# Patient Record
Sex: FEMALE | Race: WHITE | NOT HISPANIC OR LATINO | Employment: OTHER | URBAN - METROPOLITAN AREA
[De-identification: names, ages, dates, MRNs, and addresses within clinical notes are randomized per-mention and may not be internally consistent; named-entity substitution may affect disease eponyms.]

---

## 2017-01-12 RX ORDER — LISINOPRIL AND HYDROCHLOROTHIAZIDE 20; 12.5 MG/1; MG/1
TABLET ORAL
Qty: 90 TABLET | Refills: 1 | Status: SHIPPED | OUTPATIENT
Start: 2017-01-12 | End: 2017-08-19 | Stop reason: SDUPTHER

## 2017-01-12 RX ORDER — PRAVASTATIN SODIUM 40 MG
TABLET ORAL
Qty: 90 TABLET | Refills: 1 | Status: SHIPPED | OUTPATIENT
Start: 2017-01-12 | End: 2017-08-19 | Stop reason: SDUPTHER

## 2017-01-12 RX ORDER — ALLOPURINOL 300 MG/1
TABLET ORAL
Qty: 90 TABLET | Refills: 1 | Status: SHIPPED | OUTPATIENT
Start: 2017-01-12 | End: 2017-08-19 | Stop reason: SDUPTHER

## 2017-06-14 ENCOUNTER — OFFICE VISIT (OUTPATIENT)
Dept: INTERNAL MEDICINE | Facility: CLINIC | Age: 82
End: 2017-06-14

## 2017-06-14 VITALS
HEIGHT: 63 IN | WEIGHT: 208 LBS | OXYGEN SATURATION: 97 % | BODY MASS INDEX: 36.86 KG/M2 | SYSTOLIC BLOOD PRESSURE: 130 MMHG | DIASTOLIC BLOOD PRESSURE: 80 MMHG | HEART RATE: 78 BPM

## 2017-06-14 DIAGNOSIS — N18.2 CHRONIC RENAL INSUFFICIENCY, STAGE II (MILD): Chronic | ICD-10-CM

## 2017-06-14 DIAGNOSIS — G47.34 NOCTURNAL HYPOXEMIA: Chronic | ICD-10-CM

## 2017-06-14 DIAGNOSIS — Z00.00 INITIAL MEDICARE ANNUAL WELLNESS VISIT: Primary | ICD-10-CM

## 2017-06-14 DIAGNOSIS — R73.01 IMPAIRED FASTING GLUCOSE: Chronic | ICD-10-CM

## 2017-06-14 DIAGNOSIS — Z82.49 FAMILY HISTORY OF CORONARY ARTERY DISEASE: Chronic | ICD-10-CM

## 2017-06-14 DIAGNOSIS — E03.9 HYPOTHYROIDISM, UNSPECIFIED TYPE: Chronic | ICD-10-CM

## 2017-06-14 DIAGNOSIS — D75.1 SECONDARY POLYCYTHEMIA: Chronic | ICD-10-CM

## 2017-06-14 DIAGNOSIS — Z87.39 HISTORY OF GOUT: Chronic | ICD-10-CM

## 2017-06-14 DIAGNOSIS — I10 BENIGN ESSENTIAL HYPERTENSION: Chronic | ICD-10-CM

## 2017-06-14 DIAGNOSIS — Z51.81 THERAPEUTIC DRUG MONITORING: ICD-10-CM

## 2017-06-14 DIAGNOSIS — E78.5 HYPERLIPIDEMIA, UNSPECIFIED HYPERLIPIDEMIA TYPE: Chronic | ICD-10-CM

## 2017-06-14 DIAGNOSIS — H90.3 BILATERAL SENSORINEURAL HEARING LOSS: Chronic | ICD-10-CM

## 2017-06-14 DIAGNOSIS — E55.9 VITAMIN D DEFICIENCY: Chronic | ICD-10-CM

## 2017-06-14 PROCEDURE — G0438 PPPS, INITIAL VISIT: HCPCS | Performed by: INTERNAL MEDICINE

## 2017-06-14 PROCEDURE — 99214 OFFICE O/P EST MOD 30 MIN: CPT | Performed by: INTERNAL MEDICINE

## 2017-06-14 NOTE — PROGRESS NOTES
QUICK REFERENCE INFORMATION:  The ABCs of the Annual Wellness Visit    Initial Medicare Wellness Visit    HEALTH RISK ASSESSMENT    5/30/1933    Recent Hospitalizations:  No hospitalization(s) within the last year..        Current Medical Providers:  Patient Care Team:  Daryl Santos MD as PCP - General (Internal Medicine)  Daryl Santos MD as PCP - Claims Attributed        Smoking Status:  History   Smoking Status   • Former Smoker   Smokeless Tobacco   • Never Used     Comment: Stopped drinking at age 30.       Alcohol Consumption:  History   Alcohol Use No       Depression Screen:   PHQ-9 Depression Screening 6/14/2017   Little interest or pleasure in doing things 0   Feeling down, depressed, or hopeless 0   PHQ-9 Total Score 0       Health Habits and Functional and Cognitive Screening:  Functional & Cognitive Status 6/14/2017   Do you have difficulty preparing food and eating? No   Do you have difficulty bathing yourself? No   Do you have difficulty getting dressed? No   Do you have difficulty using the toilet? No   Do you have difficulty moving around from place to place? No   In the past year have you fallen or experienced a near fall? No   Do you need help using the phone?  No   Are you deaf or do you have serious difficulty hearing?  Yes   Do you need help with transportation? No   Do you need help shopping? No   Do you need help preparing meals?  No   Do you need help with housework?  No   Do you need help with laundry? No   Do you need help taking your medications? No   Do you need help managing money? No   Do you have difficulty concentrating, remembering or making decisions? No       Health Habits  Current Diet: Limited Junk Food  Dental Exam: Up to date  Eye Exam: Up to date  Exercise (times per week): 3 times per week  Current Exercise Activities Include: Housecleaning          Does the patient have evidence of cognitive impairment? No    Asiprin use counseling: Start ASA 81 mg daily        Recent Lab Results:    Visual Acuity:  No exam data present    Age-appropriate Screening Schedule:  Refer to the list below for future screening recommendations based on patient's age, sex and/or medical conditions. Orders for these recommended tests are listed in the plan section. The patient has been provided with a written plan.    Health Maintenance   Topic Date Due   • ZOSTER VACCINE  05/16/2016   • INFLUENZA VACCINE  08/01/2017   • LIPID PANEL  05/31/2018   • MAMMOGRAM  11/30/2018   • TDAP/TD VACCINES (2 - Td) 06/14/2027   • PNEUMOCOCCAL VACCINES (65+ LOW/MEDIUM RISK)  Completed        Subjective   History of Present Illness    Mandy Alarcon is a 84 y.o. female who presents for an Annual Wellness Visit.    The following portions of the patient's history were reviewed and updated as appropriate: allergies, current medications, past family history, past medical history, past social history, past surgical history and problem list.    Outpatient Medications Prior to Visit   Medication Sig Dispense Refill   • allopurinol (ZYLOPRIM) 300 MG tablet TAKE 1 TABLET BY MOUTH EVERY DAY 90 tablet 1   • Biotin 10 MG tablet Take 1 tablet by mouth Daily.     • Cholecalciferol (VITAMIN D) 2000 UNITS tablet Take 2 tablets by mouth daily.     • cyclobenzaprine (FLEXERIL) 5 MG tablet Take 1 tablet by mouth 3 (Three) Times a Day As Needed for muscle spasms. 15 tablet 0   • lisinopril-hydrochlorothiazide (PRINZIDE,ZESTORETIC) 20-12.5 MG per tablet TAKE 1 TABLET BY MOUTH EVERY MORNING FOR BLOOD PRESSURE 90 tablet 1   • pravastatin (PRAVACHOL) 40 MG tablet TAKE 1 TABLET BY MOUTH EVERY NIGHT AT BEDTIME 90 tablet 1     No facility-administered medications prior to visit.        Patient Active Problem List   Diagnosis   • Benign essential hypertension   • Chronic renal insufficiency, stage II (mild), 05/18/2016--creatinine 1.35   • Claustrophobia   • Gout   • Hyperlipidemia   • Hypothyroidism   • Impaired fasting glucose   •  Nocturnal hypoxemia   • Secondary polycythemia   • Vitamin D deficiency   • Therapeutic drug monitoring   • Family history of coronary artery disease   • Bilateral sensorineural hearing loss, wears hearing aids       Advance Care Planning:  has NO advance directive - information provided to the patient today    Identification of Risk Factors:  Risk factors include: weight , cardiovascular risk and hearing limitations.    Review of Systems   Constitutional: Negative.    HENT: Positive for hearing loss.    Eyes: Negative.    Respiratory: Negative.    Cardiovascular: Negative.    Gastrointestinal: Negative.    Endocrine: Negative.    Genitourinary: Negative.    Musculoskeletal: Negative.    Skin: Negative.    Allergic/Immunologic: Negative.    Neurological: Negative.    Hematological: Negative.    Psychiatric/Behavioral: Negative.        Compared to one year ago, the patient feels her physical health is better.  Compared to one year ago, the patient feels her mental health is the same.    Objective       Physical Exam    General: Alert and oriented x 3.  No acute distress. Obese.  Normal affect.  HEENT: Pupils equal, round, reactive to light; extraocular movements intact; sclerae nonicteric; pharynx, ear canals and TMs normal.  Neck: Without JVD, thyromegaly, bruit, or adenopathy.  Lungs: Clear to auscultation in all fields.  Heart: Regular rate and rhythm without murmur, rub, gallop, or click.  Abdomen: Soft, nontender, without hepatosplenomegaly or hernia.  Bowel sounds normal.  : Deferred.  Rectal: Deferred.  Extremities: Without clubbing, cyanosis, edema, or pulse deficit.  Neurologic: Intact without focal deficit.  Normal station and gait observed during ingress and egress from the examination room.  Skin: Without significant lesion.  Musculoskeletal: Unremarkable.    Vitals:    06/14/17 1215   BP: 130/80   BP Location: Left arm   Patient Position: Sitting   Cuff Size: Large Adult   Pulse: 78   SpO2: 97%  "  Weight: 208 lb (94.3 kg)   Height: 63\" (160 cm)   PainSc: 0-No pain       Body mass index is 36.85 kg/(m^2).  Discussed the patient's BMI with her. The BMI is above average; BMI management plan is completed.    Assessment/Plan   Patient Self-Management and Personalized Health Advice  The patient has been provided with information about: diet, exercise, weight management, prevention of cardiac or vascular disease, the relationship between weight and GERD and fall prevention and preventive services including:   · Advance directive, Diabetes screening, see lab orders, Exercise counseling provided, Fall Risk assessment done, Glaucoma screening recommended, Zostavax vaccine (Herpes Zoster).    Visit Diagnoses:    ICD-10-CM ICD-9-CM   1. Initial Medicare annual wellness visit Z00.00 V70.0   2. Impaired fasting glucose R73.01 790.21   3. Hyperlipidemia, unspecified hyperlipidemia type E78.5 272.4   4. Hypothyroidism, unspecified type E03.9 244.9   5. Gout Z87.39 V12.29   6. Chronic renal insufficiency, stage II (mild), 05/18/2016--creatinine 1.35 N18.2 585.2   7. Benign essential hypertension I10 401.1   8. Nocturnal hypoxemia G47.34 327.24   9. Secondary polycythemia D75.1 289.0   10. Vitamin D deficiency E55.9 268.9   11. Family history of coronary artery disease Z82.49 V17.3   12. Bilateral sensorineural hearing loss, wears hearing aids H90.3 389.18   13. Therapeutic drug monitoring Z51.81 V58.83       No orders of the defined types were placed in this encounter.      Outpatient Encounter Prescriptions as of 6/14/2017   Medication Sig Dispense Refill   • allopurinol (ZYLOPRIM) 300 MG tablet TAKE 1 TABLET BY MOUTH EVERY DAY 90 tablet 1   • Biotin 10 MG tablet Take 1 tablet by mouth Daily.     • Cholecalciferol (VITAMIN D) 2000 UNITS tablet Take 2 tablets by mouth daily.     • cyclobenzaprine (FLEXERIL) 5 MG tablet Take 1 tablet by mouth 3 (Three) Times a Day As Needed for muscle spasms. 15 tablet 0   • aspirin 81 MG " tablet Take 1 tablet by mouth Daily.     • lisinopril-hydrochlorothiazide (PRINZIDE,ZESTORETIC) 20-12.5 MG per tablet TAKE 1 TABLET BY MOUTH EVERY MORNING FOR BLOOD PRESSURE 90 tablet 1   • pravastatin (PRAVACHOL) 40 MG tablet TAKE 1 TABLET BY MOUTH EVERY NIGHT AT BEDTIME 90 tablet 1     No facility-administered encounter medications on file as of 6/14/2017.        Reviewed use of high risk medication in the elderly: yes  Reviewed for potential of harmful drug interactions in the elderly: yes    Follow Up:  No Follow-up on file.     An After Visit Summary and PPPS with all of these plans were given to the patient.

## 2017-06-14 NOTE — PROGRESS NOTES
06/14/2017    Patient Information  Mandy Alarcon                                                                                          [address] [facility] [address]      5/30/1933  [phone]      Chief Complaint:     Follow-up impaired fasting glucose, hyperlipidemia, hypothyroidism, gout, chronic renal insufficiency, hypertension, nocturnal hypoxemia and secondary polycythemia, vitamin D deficiency, family history of coronary artery disease, sensorineural hearing loss.  Initial Medicare wellness visit.  No new acute complaints.    History of Present Illness:    Patient with a history of medical problems as outlined in the chief complaint that have been fairly stable over at least the past year.  She presents today for a routine follow-up with lab prior in order to monitor her chronic medical issues.  She is tolerating her medications well and has no new acute complaints.  She reports she feels well and has a lot of energy.  She is very active.  Past medical history reviewed and updated where necessary including health maintenance parameters.  This reveals she is up-to-date except she needs a Zostavax.  She prefers not to have a screening colonoscopy or mammogram.  Her age is 84 and I think this is fairly reasonable although currently her life expectancy would be well into the 90s.    Review of Systems   Constitution: Negative.   HENT: Negative.    Eyes: Negative.    Cardiovascular: Negative.    Respiratory: Negative.    Endocrine: Negative.    Hematologic/Lymphatic: Negative.    Skin: Negative.    Musculoskeletal: Negative.    Gastrointestinal: Negative.    Genitourinary: Negative.    Neurological: Negative.    Psychiatric/Behavioral: Negative.    Allergic/Immunologic: Negative.        Active Problems:    Patient Active Problem List   Diagnosis   • Benign essential hypertension   • Chronic renal insufficiency, stage II (mild), 05/18/2016--creatinine 1.35   • Claustrophobia   • Gout   •  Hyperlipidemia   • Hypothyroidism   • Impaired fasting glucose   • Nocturnal hypoxemia   • Secondary polycythemia   • Vitamin D deficiency   • Therapeutic drug monitoring   • Family history of coronary artery disease   • Bilateral sensorineural hearing loss, wears hearing aids         Past Medical History:   Diagnosis Date   • History of Cellulitis of trunk, Right 09/13/2012 09/13/2012--patient presented with a rather significant cellulitis of the right anterior chest wall. Treated successfully with Bactrim double strength two by mouth twice a day.   • History of mammogram 07/17/2012 07/17/2012--normal mammogram.   • History of pneumococcal vaccination 11/30/2016 11/30/2016--PPSV 23 given.  No further pneumococcal vaccinations required.  05/13/2015--Prevnar 13 given. Patient is pneumococcal vaccination jacob and therefore will need PPSV 23 in 6-12 months.         Past Surgical History:   Procedure Laterality Date   • TOTAL ABDOMINAL HYSTERECTOMY  48 years old    48 years of age. Uterine fibroid tumors with menorrhagia         Allergies   Allergen Reactions   • Cefaclor Swelling   • Sulfa Antibiotics Rash           Current Outpatient Prescriptions:   •  allopurinol (ZYLOPRIM) 300 MG tablet, TAKE 1 TABLET BY MOUTH EVERY DAY, Disp: 90 tablet, Rfl: 1  •  Biotin 10 MG tablet, Take 1 tablet by mouth Daily., Disp: , Rfl:   •  Cholecalciferol (VITAMIN D) 2000 UNITS tablet, Take 2 tablets by mouth daily., Disp: , Rfl:   •  cyclobenzaprine (FLEXERIL) 5 MG tablet, Take 1 tablet by mouth 3 (Three) Times a Day As Needed for muscle spasms., Disp: 15 tablet, Rfl: 0  •  aspirin 81 MG tablet, Take 1 tablet by mouth Daily., Disp: , Rfl:   •  lisinopril-hydrochlorothiazide (PRINZIDE,ZESTORETIC) 20-12.5 MG per tablet, TAKE 1 TABLET BY MOUTH EVERY MORNING FOR BLOOD PRESSURE, Disp: 90 tablet, Rfl: 1  •  pravastatin (PRAVACHOL) 40 MG tablet, TAKE 1 TABLET BY MOUTH EVERY NIGHT AT BEDTIME, Disp: 90 tablet, Rfl: 1      Family History  "  Problem Relation Age of Onset   • Heart disease Mother      Ischemic.  from coronary artery disease.   • Heart disease Father      Ischemic.  from coronary artery disease.   • Heart disease Sister      Ischemic.  from coronary artery disease.   • Heart disease Brother      Ischemic.  from coronary artery disease.   • Heart disease Sister      Ischemic.  from coronary artery disease.         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   • Alcohol use No   • Drug use: No   • Sexual activity: Not Currently     Partners: Male     Other Topics Concern   • Not on file     Social History Narrative         Vitals:    17 1215   BP: 130/80   BP Location: Left arm   Patient Position: Sitting   Cuff Size: Large Adult   Pulse: 78   SpO2: 97%   Weight: 208 lb (94.3 kg)   Height: 63\" (160 cm)          Physical Exam:    General: Alert and oriented x 3.  Obese. No acute distress.  Normal affect.  HEENT: Pupils equal, round, reactive to light; extraocular movements intact; sclerae nonicteric; pharynx, ear canals and TMs normal.  Neck: Without JVD, thyromegaly, bruit, or adenopathy.  Lungs: Clear to auscultation in all fields.  Heart: Regular rate and rhythm without murmur, rub, gallop, or click.  Abdomen: Soft, nontender, without hepatosplenomegaly or hernia.  Bowel sounds normal.  : Deferred.  Rectal: Deferred.  Extremities: Without clubbing, cyanosis, edema, or pulse deficit.  Neurologic: Intact without focal deficit.  Normal station and gait observed during ingress and egress from the examination room.  Skin: Without significant lesion.  Musculoskeletal: Unremarkable.      Lab/other results:    NMR is " absolutely perfect.  CMP normal except blood sugar elevated at 107, creatinine elevated at 1.5.  Hemoglobin is elevated at 16.2 and hematocrit elevated at 49.6.  Hemoglobin A1c 5.7.  Thyroid function tests normal.  Vitamin D is a little low at 29.6.  CPK normal.    Assessment/Plan:     Diagnosis Plan   1. Initial Medicare annual wellness visit     2. Impaired fasting glucose     3. Hyperlipidemia, unspecified hyperlipidemia type     4. Hypothyroidism, unspecified type     5. Gout     6. Chronic renal insufficiency, stage II (mild), 05/18/2016--creatinine 1.35     7. Benign essential hypertension     8. Nocturnal hypoxemia     9. Secondary polycythemia     10. Vitamin D deficiency     11. Family history of coronary artery disease     12. Bilateral sensorineural hearing loss, wears hearing aids     13. Therapeutic drug monitoring         The initial Medicare wellness visit is documented on a separate note.  Patient has mild impaired fasting glucose that does not require medication.  Hyperlipidemia is under excellent control which is particularly important given her family history of coronary artery disease.  She needs to start taking a baby aspirin every day.  She has diagnosis of hypothyroidism which may be laboratory error.  We will continue to monitor.  Gout is stable on allopurinol.  She has chronic renal insufficiency that is mild to moderate and her creatinine is a little higher than previous.  We will continue to monitor.  Blood pressure is well controlled.  Patient has secondary polycythemia related to documented nocturnal hypoxemia.  We were unable to obtain nocturnal oxygen due to her Medicare guidelines.  Also patient would prefer not to proceed with this anyway.  She has been donating blood on a fairly regular basis.  I have recommended that she continue this practice.  She does have bilateral sensorineural hearing loss in her left ear is much worse than the right.  She had multiple episodes of otitis  media as a child and this is likely the etiology.  Both tympanic membranes have a lot of scarring.    Plan is as follows: Prescription for Zostavax given.  No change in current medical regimen.  Patient will follow-up in 6 months with lab prior.          Procedures

## 2017-08-21 RX ORDER — PRAVASTATIN SODIUM 40 MG
TABLET ORAL
Qty: 90 TABLET | Refills: 0 | Status: SHIPPED | OUTPATIENT
Start: 2017-08-21 | End: 2017-12-11 | Stop reason: SDUPTHER

## 2017-08-21 RX ORDER — ALLOPURINOL 300 MG/1
TABLET ORAL
Qty: 90 TABLET | Refills: 0 | Status: SHIPPED | OUTPATIENT
Start: 2017-08-21 | End: 2017-12-11 | Stop reason: SDUPTHER

## 2017-08-21 RX ORDER — LISINOPRIL AND HYDROCHLOROTHIAZIDE 20; 12.5 MG/1; MG/1
TABLET ORAL
Qty: 90 TABLET | Refills: 0 | Status: SHIPPED | OUTPATIENT
Start: 2017-08-21 | End: 2017-12-11 | Stop reason: SDUPTHER

## 2017-12-11 RX ORDER — LISINOPRIL AND HYDROCHLOROTHIAZIDE 20; 12.5 MG/1; MG/1
TABLET ORAL
Qty: 90 TABLET | Refills: 0 | Status: SHIPPED | OUTPATIENT
Start: 2017-12-11 | End: 2018-04-03 | Stop reason: SDUPTHER

## 2017-12-11 RX ORDER — ALLOPURINOL 300 MG/1
TABLET ORAL
Qty: 90 TABLET | Refills: 0 | Status: SHIPPED | OUTPATIENT
Start: 2017-12-11 | End: 2018-04-05 | Stop reason: SDUPTHER

## 2017-12-11 RX ORDER — PRAVASTATIN SODIUM 40 MG
TABLET ORAL
Qty: 90 TABLET | Refills: 0 | Status: SHIPPED | OUTPATIENT
Start: 2017-12-11 | End: 2018-04-03 | Stop reason: SDUPTHER

## 2017-12-13 DIAGNOSIS — E55.9 VITAMIN D DEFICIENCY: Chronic | ICD-10-CM

## 2017-12-13 DIAGNOSIS — Z87.39 HISTORY OF GOUT: Chronic | ICD-10-CM

## 2017-12-13 DIAGNOSIS — R73.01 IMPAIRED FASTING GLUCOSE: Chronic | ICD-10-CM

## 2017-12-13 DIAGNOSIS — E03.9 HYPOTHYROIDISM, UNSPECIFIED TYPE: Chronic | ICD-10-CM

## 2017-12-13 DIAGNOSIS — D75.1 SECONDARY POLYCYTHEMIA: Chronic | ICD-10-CM

## 2017-12-13 DIAGNOSIS — E78.5 HYPERLIPIDEMIA, UNSPECIFIED HYPERLIPIDEMIA TYPE: Chronic | ICD-10-CM

## 2017-12-16 LAB
25(OH)D3+25(OH)D2 SERPL-MCNC: 33.2 NG/ML (ref 30–100)
ALBUMIN SERPL-MCNC: 4 G/DL (ref 3.5–5.2)
ALBUMIN/GLOB SERPL: 1.4 G/DL
ALP SERPL-CCNC: 44 U/L (ref 39–117)
ALT SERPL-CCNC: 22 U/L (ref 1–33)
AST SERPL-CCNC: 19 U/L (ref 1–32)
BILIRUB SERPL-MCNC: 0.3 MG/DL (ref 0.1–1.2)
BUN SERPL-MCNC: 21 MG/DL (ref 8–23)
BUN/CREAT SERPL: 15.2 (ref 7–25)
CALCIUM SERPL-MCNC: 9 MG/DL (ref 8.6–10.5)
CHLORIDE SERPL-SCNC: 105 MMOL/L (ref 98–107)
CHOLEST SERPL-MCNC: 135 MG/DL (ref 100–199)
CK SERPL-CCNC: 164 U/L (ref 20–180)
CO2 SERPL-SCNC: 21.7 MMOL/L (ref 22–29)
CREAT SERPL-MCNC: 1.38 MG/DL (ref 0.57–1)
ERYTHROCYTE [DISTWIDTH] IN BLOOD BY AUTOMATED COUNT: 14.3 % (ref 11.7–13)
GFR SERPLBLD CREATININE-BSD FMLA CKD-EPI: 36 ML/MIN/1.73
GFR SERPLBLD CREATININE-BSD FMLA CKD-EPI: 44 ML/MIN/1.73
GLOBULIN SER CALC-MCNC: 2.8 GM/DL
GLUCOSE SERPL-MCNC: 97 MG/DL (ref 65–99)
HBA1C MFR BLD: 5.56 % (ref 4.8–5.6)
HCT VFR BLD AUTO: 43.7 % (ref 35.6–45.5)
HDL SERPL-SCNC: 29.7 UMOL/L
HDLC SERPL-MCNC: 45 MG/DL
HGB BLD-MCNC: 13.9 G/DL (ref 11.9–15.5)
LDL SERPL QN: 20.5 NM
LDL SERPL-SCNC: 988 NMOL/L
LDL SMALL SERPL-SCNC: 519 NMOL/L
LDLC SERPL CALC-MCNC: 67 MG/DL (ref 0–99)
MCH RBC QN AUTO: 31 PG (ref 26.9–32)
MCHC RBC AUTO-ENTMCNC: 31.8 G/DL (ref 32.4–36.3)
MCV RBC AUTO: 97.5 FL (ref 80.5–98.2)
PLATELET # BLD AUTO: 227 10*3/MM3 (ref 140–500)
POTASSIUM SERPL-SCNC: 4.5 MMOL/L (ref 3.5–5.2)
PROT SERPL-MCNC: 6.8 G/DL (ref 6–8.5)
RBC # BLD AUTO: 4.48 10*6/MM3 (ref 3.9–5.2)
SODIUM SERPL-SCNC: 142 MMOL/L (ref 136–145)
T3FREE SERPL-MCNC: 3.2 PG/ML (ref 2–4.4)
T4 FREE SERPL-MCNC: 1.1 NG/DL (ref 0.93–1.7)
TRIGL SERPL-MCNC: 113 MG/DL (ref 0–149)
TSH SERPL DL<=0.005 MIU/L-ACNC: 7.79 MIU/ML (ref 0.27–4.2)
URATE SERPL-MCNC: 4.9 MG/DL (ref 2.4–5.7)
WBC # BLD AUTO: 7.34 10*3/MM3 (ref 4.5–10.7)

## 2017-12-21 ENCOUNTER — HOSPITAL ENCOUNTER (OUTPATIENT)
Dept: ULTRASOUND IMAGING | Facility: HOSPITAL | Age: 82
Discharge: HOME | End: 2017-12-21
Admitting: INTERNAL MEDICINE

## 2017-12-21 ENCOUNTER — OFFICE VISIT (OUTPATIENT)
Dept: INTERNAL MEDICINE | Facility: CLINIC | Age: 82
End: 2017-12-21

## 2017-12-21 VITALS
BODY MASS INDEX: 38.34 KG/M2 | HEIGHT: 63 IN | OXYGEN SATURATION: 99 % | DIASTOLIC BLOOD PRESSURE: 76 MMHG | HEART RATE: 72 BPM | SYSTOLIC BLOOD PRESSURE: 142 MMHG | WEIGHT: 216.4 LBS

## 2017-12-21 DIAGNOSIS — Z51.81 THERAPEUTIC DRUG MONITORING: ICD-10-CM

## 2017-12-21 DIAGNOSIS — Z87.39 HISTORY OF GOUT: Chronic | ICD-10-CM

## 2017-12-21 DIAGNOSIS — E78.5 HYPERLIPIDEMIA, UNSPECIFIED HYPERLIPIDEMIA TYPE: Chronic | ICD-10-CM

## 2017-12-21 DIAGNOSIS — R73.01 IMPAIRED FASTING GLUCOSE: Primary | Chronic | ICD-10-CM

## 2017-12-21 DIAGNOSIS — N18.2 CHRONIC RENAL INSUFFICIENCY, STAGE II (MILD): Chronic | ICD-10-CM

## 2017-12-21 DIAGNOSIS — G47.34 NOCTURNAL HYPOXEMIA: Chronic | ICD-10-CM

## 2017-12-21 DIAGNOSIS — I10 BENIGN ESSENTIAL HYPERTENSION: Chronic | ICD-10-CM

## 2017-12-21 DIAGNOSIS — D75.1 SECONDARY POLYCYTHEMIA: Chronic | ICD-10-CM

## 2017-12-21 DIAGNOSIS — E55.9 VITAMIN D DEFICIENCY: Chronic | ICD-10-CM

## 2017-12-21 DIAGNOSIS — Z12.31 ENCOUNTER FOR SCREENING MAMMOGRAM FOR MALIGNANT NEOPLASM OF BREAST: ICD-10-CM

## 2017-12-21 DIAGNOSIS — E03.9 HYPOTHYROIDISM, UNSPECIFIED TYPE: Chronic | ICD-10-CM

## 2017-12-21 PROCEDURE — 99214 OFFICE O/P EST MOD 30 MIN: CPT | Performed by: INTERNAL MEDICINE

## 2017-12-21 PROCEDURE — 76536 US EXAM OF HEAD AND NECK: CPT

## 2017-12-21 NOTE — PROGRESS NOTES
12/21/2017    Patient Information  Mandy Alarcon                                                                                          [address] [facility] [address]      5/30/1933  [phone]      Chief Complaint:     Follow-up impaired fasting glucose, hypothyroidism, hyperlipidemia, gout, chronic renal insufficiency, hypertension, secondary polycythemia, nocturnal hypoxemia, vitamin D deficiency.  No new acute complaints.    History of Present Illness:    Patient with a history of medical problems as outlined in the chief complaint that have been fairly stable now for at least the past year.  She presents today for follow-up with lab prior in order to monitor her chronic medical issues.  She is tolerating her medications well and has no new acute complaints.  Her past medical history reviewed and updated where necessary including health maintenance parameters.  This reveals she is up-to-date except she needs a mammogram which we will order to be done after the first year.  Also it appears that patient does have hypothyroidism which we have been monitoring over the past couple of years.  This will be described below.    The history regarding possible hypothyroidism:    12/21/2017--TSH is elevated at 7.79.  Repeat for normal at 1.1.  Free T3 normal at 3.2.  Note that several thyroid function tests performed after 11/17/2015 overall in the normal range.  No thyroid antibodies noted at that time.  It appears that this diagnosis indeed present and needs further evaluation.  We will repeat thyroid function tests and obtain thyroid antibodies once again.  Thyroid ultrasound ordered.  I will have patient follow-up after the results are known.    11/17/2015--routine follow-up reveals elevated TSH of 5.14. Repeat thyroid function tests ordered as well as thyroid antibodies.    Review of Systems   Constitution: Negative.   HENT: Negative.    Eyes: Negative.    Cardiovascular: Negative.     Respiratory: Negative.    Endocrine: Negative.    Hematologic/Lymphatic: Negative.    Skin: Negative.    Musculoskeletal: Negative.    Gastrointestinal: Negative.    Genitourinary: Negative.    Neurological: Negative.    Psychiatric/Behavioral: Negative.    Allergic/Immunologic: Negative.        Active Problems:    Patient Active Problem List   Diagnosis   • Benign essential hypertension   • Chronic renal insufficiency, stage II (mild), 2016--creatinine 1.35   • Claustrophobia   • Gout   • Hyperlipidemia   • Hypothyroidism   • Impaired fasting glucose   • Nocturnal hypoxemia   • Secondary polycythemia   • Vitamin D deficiency   • Therapeutic drug monitoring   • Family history of coronary artery disease   • Bilateral sensorineural hearing loss, wears hearing aids         Past Medical History:   Diagnosis Date   • Benign essential hypertension 10/28/2012    2012--treatment for hypertension begun.   • Bilateral sensorineural hearing loss, wears hearing aids 2017    Left is much worse than right.  Patient had multiple ear infections as a child.   • Chronic renal insufficiency, stage II (mild), 2016--creatinine 1.35 2016--creatinine 1.35.  Baseline creatinine approximately 1.3.   • Claustrophobia 2016    This patient has significant nocturnal hypoxemia and I think that she could benefit from nocturnal oxygen therapy. The exact etiology of her hypoxemia is not clear. She could possibly have obstructive sleep apnea but this is not documented. We cannot test this patient for sleep apnea due to the fact that she is severely claustrophobic. Patient was a former smoker and it is possibly that COPD he is playing a role.   • Family history of coronary artery disease 2016    Patient's mother, father, 2 sisters and a brother all  from myocardial infarctions   • Gout 10/28/2012    2012--initial diagnosis and treatment of gout.   • History of Cellulitis of trunk,  Right 09/13/2012 09/13/2012--patient presented with a rather significant cellulitis of the right anterior chest wall. Treated successfully with Bactrim double strength two by mouth twice a day.   • History of mammogram 07/17/2012 07/17/2012--normal mammogram.   • History of pneumococcal vaccination 11/30/2016 11/30/2016--PPSV 23 given.  No further pneumococcal vaccinations required.  05/13/2015--Prevnar 13 given. Patient is pneumococcal vaccination jacob and therefore will need PPSV 23 in 6-12 months.   • Hyperlipidemia 10/28/2012    October 2012--treatment for hyperlipidemia begun.   • Hypothyroidism 11/17/2015 11/17/2015--routine follow-up reveals elevated TSH of 5.14. Repeat thyroid function tests ordered as well as thyroid antibodies.   • Impaired fasting glucose 10/28/2012    October 2012--initial diagnosis impaired fasting glucose.   • Nocturnal hypoxemia 8/2/2012 11/06/2014--patient did not receive nocturnal oxygen because of Medicare regulations.   05/15/2014--overnight oximetry revealed oxygen saturations less than 89% for 22 minutes and 40 seconds. Oxygen saturations less than or equal to 88% for 22 minutes and 40 seconds. Lowest oxygen saturation 83%. The longest continuous time with oxygen saturations less than or equal to 88% was 1 minute and 32 seconds.   08/02/2012--overnight oximetry revealed oxygen saturations less than 90% for one hour and 35 minutes. Oxygen saturations less than 89% 59 minutes. This patient has significant nocturnal hypoxemia and I think that she could benefit from nocturnal oxygen therapy. The exact etiology of her hypoxemia is not clear. She could possibly have obstructive sleep apnea but this is not documented. We cannot test this patient for sleep apnea due to the fact that she is severely claustrophobic. Patient was a former smoker and it is possibly that COPD he is playing a role.   • Secondary polycythemia 8/2/2012 11/06/2014--patient did not receive  nocturnal oxygen because of Medicare regulations.   05/15/2014--overnight oximetry revealed oxygen saturations less than 89% for 22 minutes and 40 seconds. Oxygen saturations less than or equal to 88% for 22 minutes and 40 seconds. Lowest oxygen saturation 83%. The longest continuous time with oxygen saturations less than or equal to 88% was 1 minute and 32 seconds.   08/02/2012--overnight oximetry revealed oxygen saturations less than 90% for one hour and 35 minutes. Oxygen saturations less than 89% 59 minutes. This patient has significant nocturnal hypoxemia and I think that she could benefit from nocturnal oxygen therapy. The exact etiology of her hypoxemia is not clear. She could possibly have obstructive sleep apnea but this is not documented. We cannot test this patient for sleep apnea due to the fact that she is severely claustrophobic. Patient was a former smoker and it is possibly that COPD he is playing a role.   • Vitamin D deficiency 5/23/2016         Past Surgical History:   Procedure Laterality Date   • TOTAL ABDOMINAL HYSTERECTOMY  48 years old    48 years of age. Uterine fibroid tumors with menorrhagia         Allergies   Allergen Reactions   • Cefaclor Swelling   • Sulfa Antibiotics Rash           Current Outpatient Prescriptions:   •  allopurinol (ZYLOPRIM) 300 MG tablet, TAKE 1 TABLET BY MOUTH EVERY DAY, Disp: 90 tablet, Rfl: 0  •  aspirin 81 MG tablet, Take 1 tablet by mouth Daily., Disp: , Rfl:   •  Biotin 10 MG tablet, Take 1 tablet by mouth Daily., Disp: , Rfl:   •  Cholecalciferol (VITAMIN D) 2000 UNITS tablet, Take 2 tablets by mouth daily., Disp: , Rfl:   •  cyclobenzaprine (FLEXERIL) 5 MG tablet, Take 1 tablet by mouth 3 (Three) Times a Day As Needed for muscle spasms., Disp: 15 tablet, Rfl: 0  •  lisinopril-hydrochlorothiazide (PRINZIDE,ZESTORETIC) 20-12.5 MG per tablet, TAKE 1 TABLET BY MOUTH EVERY MORNING FOR BLOOD PRESSURE, Disp: 90 tablet, Rfl: 0  •  pravastatin (PRAVACHOL) 40 MG  "tablet, TAKE 1 TABLET BY MOUTH EVERY NIGHT AT BEDTIME, Disp: 90 tablet, Rfl: 0      Family History   Problem Relation Age of Onset   • Heart disease Mother      Ischemic.  from coronary artery disease.   • Heart disease Father      Ischemic.  from coronary artery disease.   • Heart disease Sister      Ischemic.  from coronary artery disease.   • Heart disease Brother      Ischemic.  from coronary artery disease.   • Heart disease Sister      Ischemic.  from coronary artery disease.         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   • Alcohol use No   • Drug use: No   • Sexual activity: Not Currently     Partners: Male     Other Topics Concern   • Not on file     Social History Narrative         Vitals:    17 0701   BP: 142/76   Pulse: 72   SpO2: 99%   Weight: 98.2 kg (216 lb 6.4 oz)   Height: 160 cm (62.99\")          Physical Exam:    General: Alert and oriented x 3.  No acute distress. Obese.  Normal affect.  HEENT: Pupils equal, round, reactive to light; extraocular movements intact; sclerae nonicteric; pharynx, ear canals and TMs normal.  Neck: Without JVD, thyromegaly, bruit, or adenopathy.  Lungs: Clear to auscultation in all fields.  Heart: Regular rate and rhythm without murmur, rub, gallop, or click.  Abdomen: Soft, nontender, without hepatosplenomegaly or hernia.  Bowel sounds normal.  : Deferred.  Rectal: Deferred.  Extremities: Without clubbing, cyanosis, edema, or pulse deficit.  Neurologic: Intact without focal deficit.  Normal station and gait observed during ingress and egress from the examination room.  Skin: Without significant lesion.  Musculoskeletal: " Unremarkable.      Lab/other results:    NMR reveals total cholesterol 135.  Triglycerides normal at 113.  LDL particle number excellent at 988.  Small LDL particle number excellent at 519.  HDL particle number is low at 29.7.  However, the ratio of total cholesterol to HDL is favorable.  CMP normal except creatinine elevated at 1.38.  CBC is essentially normal.  Hemoglobin A1c normal at 5.56.  TSH elevated at 7.79.  Vitamin D normal.  Uric acid normal.  CPK normal.    Assessment/Plan:     Diagnosis Plan   1. Impaired fasting glucose     2. Hypothyroidism, unspecified type     3. Hyperlipidemia, unspecified hyperlipidemia type     4. Gout     5. Chronic renal insufficiency, stage II (mild), 05/18/2016--creatinine 1.35     6. Benign essential hypertension     7. Secondary polycythemia     8. Nocturnal hypoxemia     9. Vitamin D deficiency     10. Therapeutic drug monitoring       It appears that patient's impaired fasting glucose may have resolved.  We will continue to monitor.  It also appears that she does indeed have hypothyroidism.  This needs further evaluation and likely needs to be treated.  Hyperlipidemia is under excellent control with pravastatin.  Her gout is stable and uric acid levels were normal.  Her chronic renal insufficiency is mild and stable.  It appears secondary polycythemia may have resolved but we will also continue to monitor this.  Vitamin D is therapeutic.     Plan is as follows: Repeat thyroid function tests.  Repeat thyroid antibodies.  Ultrasound of the thyroid ordered.  I will have patient follow-up after the results are known to we can review her options regarding treatment plan.  Mammogram ordered.        Procedures

## 2017-12-22 LAB
T3FREE SERPL-MCNC: 4 PG/ML (ref 2–4.4)
T4 FREE SERPL-MCNC: 1.04 NG/DL (ref 0.93–1.7)
THYROGLOB AB SERPL-ACNC: <1 IU/ML (ref 0–0.9)
THYROPEROXIDASE AB SERPL-ACNC: 8 IU/ML (ref 0–34)
TSH SERPL DL<=0.005 MIU/L-ACNC: 3.93 MIU/ML (ref 0.27–4.2)

## 2017-12-26 ENCOUNTER — HOSPITAL ENCOUNTER (OUTPATIENT)
Dept: MAMMOGRAPHY | Facility: HOSPITAL | Age: 82
Discharge: HOME | End: 2017-12-26
Admitting: INTERNAL MEDICINE

## 2017-12-26 PROCEDURE — G0202 SCR MAMMO BI INCL CAD: HCPCS

## 2018-01-24 ENCOUNTER — OFFICE VISIT (OUTPATIENT)
Dept: INTERNAL MEDICINE | Facility: CLINIC | Age: 83
End: 2018-01-24

## 2018-01-24 VITALS
HEART RATE: 77 BPM | HEIGHT: 63 IN | OXYGEN SATURATION: 98 % | WEIGHT: 216 LBS | DIASTOLIC BLOOD PRESSURE: 68 MMHG | BODY MASS INDEX: 38.27 KG/M2 | SYSTOLIC BLOOD PRESSURE: 122 MMHG

## 2018-01-24 DIAGNOSIS — I10 BENIGN ESSENTIAL HYPERTENSION: Chronic | ICD-10-CM

## 2018-01-24 DIAGNOSIS — D75.1 SECONDARY POLYCYTHEMIA: Chronic | ICD-10-CM

## 2018-01-24 DIAGNOSIS — Z51.81 THERAPEUTIC DRUG MONITORING: ICD-10-CM

## 2018-01-24 DIAGNOSIS — R73.01 IMPAIRED FASTING GLUCOSE: Chronic | ICD-10-CM

## 2018-01-24 DIAGNOSIS — N18.2 CHRONIC RENAL INSUFFICIENCY, STAGE II (MILD): Chronic | ICD-10-CM

## 2018-01-24 DIAGNOSIS — E55.9 VITAMIN D DEFICIENCY: Chronic | ICD-10-CM

## 2018-01-24 DIAGNOSIS — E03.9 HYPOTHYROIDISM, UNSPECIFIED TYPE: Primary | Chronic | ICD-10-CM

## 2018-01-24 DIAGNOSIS — E78.5 HYPERLIPIDEMIA, UNSPECIFIED HYPERLIPIDEMIA TYPE: Chronic | ICD-10-CM

## 2018-01-24 DIAGNOSIS — Z87.39 HISTORY OF GOUT: Chronic | ICD-10-CM

## 2018-01-24 DIAGNOSIS — E04.2 MULTINODULAR GOITER: Chronic | ICD-10-CM

## 2018-01-24 PROCEDURE — 99214 OFFICE O/P EST MOD 30 MIN: CPT | Performed by: INTERNAL MEDICINE

## 2018-01-24 NOTE — PROGRESS NOTES
01/24/2018    Patient Information  Mandy Alarcon                                                                                          [address] [facility] [address]      5/30/1933  [phone]      Chief Complaint:     Follow-up questionable hypothyroidism and recent thyroid ultrasound.  No new acute complaints.    History of Present Illness:    Patient with a history of hypertension, chronic renal insufficiency, gout, hyperlipidemia, possible hypothyroidism, impaired fasting glucose.  She presents today to follow-up on the possibility of development of hypothyroidism.  She is also here to review the results of repeat thyroid function tests and thyroid ultrasound.  Past medical history reviewed and updated where necessary including health maintenance parameters.  This reveals she is up-to-date.    The history regarding questionable hypothyroidism and a recent thyroid ultrasound revealing multinodular goiter:    01/24/2018--patient seen in follow-up.  Repeat thyroid function tests returned normal although TSH is upper limit of normal.  Thyroid antibodies negative.  Ultrasound of the thyroid revealed a normal size thyroid gland but there were multiple tiny solid and cystic complex nodules bilaterally.  No single dominant lesion present.  Consistent with multinodular goiter.  Recommend conservative sonographic surveillance with six-month follow-up.  This was discussed with patient today.    12/21/2017--TSH is elevated at 7.79.  Repeat for normal at 1.1.  Free T3 normal at 3.2.  Note that several thyroid function tests performed after 11/17/2015 overall in the normal range.  No thyroid antibodies noted at that time.  It appears that this diagnosis indeed present and needs further evaluation.  We will repeat thyroid function tests and obtain thyroid antibodies once again.  Thyroid ultrasound ordered.  I will have patient follow-up after the results are known.    11/17/2015--routine follow-up  reveals elevated TSH of 5.14. Repeat thyroid function tests ordered as well as thyroid antibodies.    Review of Systems   Constitution: Negative.   HENT: Negative.    Eyes: Negative.    Cardiovascular: Negative.    Respiratory: Negative.    Endocrine: Negative.    Hematologic/Lymphatic: Negative.    Skin: Negative.    Musculoskeletal: Negative.    Gastrointestinal: Negative.    Genitourinary: Negative.    Neurological: Negative.    Psychiatric/Behavioral: Negative.    Allergic/Immunologic: Negative.        Active Problems:    Patient Active Problem List   Diagnosis   • Benign essential hypertension   • Chronic renal insufficiency, stage II (mild), 05/18/2016--creatinine 1.35   • Claustrophobia   • Gout   • Hyperlipidemia   • Hypothyroidism   • Impaired fasting glucose   • Nocturnal hypoxemia   • Secondary polycythemia   • Vitamin D deficiency   • Therapeutic drug monitoring   • Family history of coronary artery disease   • Bilateral sensorineural hearing loss, wears hearing aids   • Multinodular goiter         Past Medical History:   Diagnosis Date   • Benign essential hypertension 10/28/2012    October 2012--treatment for hypertension begun.   • Bilateral sensorineural hearing loss, wears hearing aids 6/14/2017    Left is much worse than right.  Patient had multiple ear infections as a child.   • Chronic renal insufficiency, stage II (mild), 05/18/2016--creatinine 1.35 4/28/2016 05/18/2016--creatinine 1.35.  Baseline creatinine approximately 1.3.   • Claustrophobia 4/28/2016    This patient has significant nocturnal hypoxemia and I think that she could benefit from nocturnal oxygen therapy. The exact etiology of her hypoxemia is not clear. She could possibly have obstructive sleep apnea but this is not documented. We cannot test this patient for sleep apnea due to the fact that she is severely claustrophobic. Patient was a former smoker and it is possibly that COPD he is playing a role.   • Family history of  coronary artery disease 2016    Patient's mother, father, 2 sisters and a brother all  from myocardial infarctions   • Gout 10/28/2012    2012--initial diagnosis and treatment of gout.   • History of Cellulitis of trunk, Right 2012--patient presented with a rather significant cellulitis of the right anterior chest wall. Treated successfully with Bactrim double strength two by mouth twice a day.   • History of mammogram 2012--normal mammogram.   • History of pneumococcal vaccination 2016--PPSV 23 given.  No further pneumococcal vaccinations required.  2015--Prevnar 13 given. Patient is pneumococcal vaccination jacob and therefore will need PPSV 23 in 6-12 months.   • Hyperlipidemia 10/28/2012    2012--treatment for hyperlipidemia begun.   • Hypothyroidism 2015--routine follow-up reveals elevated TSH of 5.14. Repeat thyroid function tests ordered as well as thyroid antibodies.   • Impaired fasting glucose 10/28/2012    2012--initial diagnosis impaired fasting glucose.   • Nocturnal hypoxemia 2014--patient did not receive nocturnal oxygen because of Medicare regulations.   05/15/2014--overnight oximetry revealed oxygen saturations less than 89% for 22 minutes and 40 seconds. Oxygen saturations less than or equal to 88% for 22 minutes and 40 seconds. Lowest oxygen saturation 83%. The longest continuous time with oxygen saturations less than or equal to 88% was 1 minute and 32 seconds.   2012--overnight oximetry revealed oxygen saturations less than 90% for one hour and 35 minutes. Oxygen saturations less than 89% 59 minutes. This patient has significant nocturnal hypoxemia and I think that she could benefit from nocturnal oxygen therapy. The exact etiology of her hypoxemia is not clear. She could possibly have obstructive sleep apnea but this is not documented. We cannot test this  patient for sleep apnea due to the fact that she is severely claustrophobic. Patient was a former smoker and it is possibly that COPD he is playing a role.   • Secondary polycythemia 8/2/2012 11/06/2014--patient did not receive nocturnal oxygen because of Medicare regulations.   05/15/2014--overnight oximetry revealed oxygen saturations less than 89% for 22 minutes and 40 seconds. Oxygen saturations less than or equal to 88% for 22 minutes and 40 seconds. Lowest oxygen saturation 83%. The longest continuous time with oxygen saturations less than or equal to 88% was 1 minute and 32 seconds.   08/02/2012--overnight oximetry revealed oxygen saturations less than 90% for one hour and 35 minutes. Oxygen saturations less than 89% 59 minutes. This patient has significant nocturnal hypoxemia and I think that she could benefit from nocturnal oxygen therapy. The exact etiology of her hypoxemia is not clear. She could possibly have obstructive sleep apnea but this is not documented. We cannot test this patient for sleep apnea due to the fact that she is severely claustrophobic. Patient was a former smoker and it is possibly that COPD he is playing a role.   • Vitamin D deficiency 5/23/2016         Past Surgical History:   Procedure Laterality Date   • RADICAL ABDOMINAL HYSTERECTOMY  48 years old    48 years of age. Uterine fibroid tumors with menorrhagia         Allergies   Allergen Reactions   • Cefaclor Swelling   • Sulfa Antibiotics Rash           Current Outpatient Prescriptions:   •  allopurinol (ZYLOPRIM) 300 MG tablet, TAKE 1 TABLET BY MOUTH EVERY DAY, Disp: 90 tablet, Rfl: 0  •  aspirin 81 MG tablet, Take 1 tablet by mouth Daily., Disp: , Rfl:   •  Biotin 10 MG tablet, Take 1 tablet by mouth Daily., Disp: , Rfl:   •  Cholecalciferol (VITAMIN D) 2000 UNITS tablet, Take 2 tablets by mouth daily., Disp: , Rfl:   •  cyclobenzaprine (FLEXERIL) 5 MG tablet, Take 1 tablet by mouth 3 (Three) Times a Day As Needed for muscle  "spasms., Disp: 15 tablet, Rfl: 0  •  lisinopril-hydrochlorothiazide (PRINZIDE,ZESTORETIC) 20-12.5 MG per tablet, TAKE 1 TABLET BY MOUTH EVERY MORNING FOR BLOOD PRESSURE, Disp: 90 tablet, Rfl: 0  •  pravastatin (PRAVACHOL) 40 MG tablet, TAKE 1 TABLET BY MOUTH EVERY NIGHT AT BEDTIME, Disp: 90 tablet, Rfl: 0      Family History   Problem Relation Age of Onset   • Heart disease Mother      Ischemic.  from coronary artery disease.   • Heart disease Father      Ischemic.  from coronary artery disease.   • Heart disease Sister      Ischemic.  from coronary artery disease.   • Heart disease Brother      Ischemic.  from coronary artery disease.   • Heart disease Sister      Ischemic.  from coronary artery disease.   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   • Alcohol use No   • Drug use: No   • Sexual activity: Not Currently     Partners: Male     Other Topics Concern   • Not on file     Social History Narrative         Vitals:    18 1002   BP: 122/68   Pulse: 77   SpO2: 98%   Weight: 98 kg (216 lb)   Height: 160 cm (62.99\")          Physical Exam:    General: Alert and oriented x 3.  No acute distress.  Normal affect.  HEENT: Pupils equal, round, reactive to light; extraocular movements intact; sclerae nonicteric; pharynx, ear canals and TMs normal.  Neck: Without JVD, thyromegaly, bruit, or adenopathy.  Lungs: Clear to auscultation in all fields.  Heart: Regular rate and rhythm without murmur, rub, gallop, or click.  Abdomen: Soft, nontender, without hepatosplenomegaly or hernia.  Bowel sounds " normal.  : Deferred.  Rectal: Deferred.  Extremities: Without clubbing, cyanosis, edema, or pulse deficit.  Neurologic: Intact without focal deficit.  Normal station and gait observed during ingress and egress from the examination room.  Skin: Without significant lesion.  Musculoskeletal: Unremarkable.      Lab/other results:    I reviewed the results of the thyroid ultrasound and repeat thyroid function test with the patient.    Assessment/Plan:     Diagnosis Plan   1. Hypothyroidism, unspecified type     2. Multinodular goiter     3. Gout     4. Benign essential hypertension     5. Chronic renal insufficiency, stage II (mild), 05/18/2016--creatinine 1.35     6. Hyperlipidemia, unspecified hyperlipidemia type     7. Impaired fasting glucose     8. Secondary polycythemia     9. Vitamin D deficiency     10. Therapeutic drug monitoring       The diagnosis of hypothyroidism and is questionable what we will continue to monitor.  She does have a multinodular goiter that needs further evaluation in the future.  Blood pressure assess today and appears to be controlled.  Note that several diagnoses were added to the assessment and plan that were not formally evaluated today.  This was done to facilitate future lab and follow-up appointment.    Plan is as follows: No change in current medical regimen.  Schedule thyroid ultrasound to be done in 6 months.  Patient will follow-up in about 7 months with lab prior to reassess.        Procedures

## 2018-04-04 RX ORDER — LISINOPRIL AND HYDROCHLOROTHIAZIDE 20; 12.5 MG/1; MG/1
TABLET ORAL
Qty: 90 TABLET | Refills: 1 | Status: SHIPPED | OUTPATIENT
Start: 2018-04-04 | End: 2018-06-20

## 2018-04-04 RX ORDER — PRAVASTATIN SODIUM 40 MG
TABLET ORAL
Qty: 90 TABLET | Refills: 1 | Status: SHIPPED | OUTPATIENT
Start: 2018-04-04 | End: 2018-06-20

## 2018-04-05 RX ORDER — ALLOPURINOL 300 MG/1
TABLET ORAL
Qty: 90 TABLET | Refills: 1 | Status: SHIPPED | OUTPATIENT
Start: 2018-04-05 | End: 2018-06-20

## 2018-06-20 ENCOUNTER — TELEPHONE (OUTPATIENT)
Dept: INTERNAL MEDICINE | Facility: CLINIC | Age: 83
End: 2018-06-20

## 2018-06-20 ENCOUNTER — APPOINTMENT (OUTPATIENT)
Dept: GENERAL RADIOLOGY | Facility: HOSPITAL | Age: 83
End: 2018-06-20

## 2018-06-20 ENCOUNTER — HOSPITAL ENCOUNTER (OUTPATIENT)
Facility: HOSPITAL | Age: 83
Setting detail: OBSERVATION
LOS: 1 days | Discharge: HOME | End: 2018-06-21
Attending: EMERGENCY MEDICINE | Admitting: INTERNAL MEDICINE

## 2018-06-20 DIAGNOSIS — R55 SYNCOPE, UNSPECIFIED SYNCOPE TYPE: Primary | ICD-10-CM

## 2018-06-20 DIAGNOSIS — I47.1 SVT (SUPRAVENTRICULAR TACHYCARDIA) (HCC): ICD-10-CM

## 2018-06-20 LAB
ALBUMIN SERPL-MCNC: 4.3 G/DL (ref 3.5–5.2)
ALBUMIN/GLOB SERPL: 1.3 G/DL
ALP SERPL-CCNC: 46 U/L (ref 39–117)
ALT SERPL W P-5'-P-CCNC: 19 U/L (ref 1–33)
ANION GAP SERPL CALCULATED.3IONS-SCNC: 15.1 MMOL/L
AST SERPL-CCNC: 16 U/L (ref 1–32)
BASOPHILS # BLD AUTO: 0.04 10*3/MM3 (ref 0–0.2)
BASOPHILS NFR BLD AUTO: 0.4 % (ref 0–1.5)
BILIRUB SERPL-MCNC: 0.5 MG/DL (ref 0.1–1.2)
BILIRUB UR QL STRIP: NEGATIVE
BUN BLD-MCNC: 25 MG/DL (ref 8–23)
BUN/CREAT SERPL: 18.7 (ref 7–25)
CALCIUM SPEC-SCNC: 10 MG/DL (ref 8.6–10.5)
CHLORIDE SERPL-SCNC: 101 MMOL/L (ref 98–107)
CLARITY UR: CLEAR
CO2 SERPL-SCNC: 23.9 MMOL/L (ref 22–29)
COLOR UR: YELLOW
CREAT BLD-MCNC: 1.34 MG/DL (ref 0.57–1)
D DIMER PPP FEU-MCNC: 0.8 MCGFEU/ML (ref 0–0.49)
DEPRECATED RDW RBC AUTO: 49.8 FL (ref 37–54)
EOSINOPHIL # BLD AUTO: 0.31 10*3/MM3 (ref 0–0.7)
EOSINOPHIL NFR BLD AUTO: 3.2 % (ref 0.3–6.2)
ERYTHROCYTE [DISTWIDTH] IN BLOOD BY AUTOMATED COUNT: 14.7 % (ref 11.7–13)
GFR SERPL CREATININE-BSD FRML MDRD: 38 ML/MIN/1.73
GLOBULIN UR ELPH-MCNC: 3.3 GM/DL
GLUCOSE BLD-MCNC: 144 MG/DL (ref 65–99)
GLUCOSE UR STRIP-MCNC: NEGATIVE MG/DL
HCT VFR BLD AUTO: 49.5 % (ref 35.6–45.5)
HGB BLD-MCNC: 16 G/DL (ref 11.9–15.5)
HGB UR QL STRIP.AUTO: NEGATIVE
HOLD SPECIMEN: NORMAL
HOLD SPECIMEN: NORMAL
IMM GRANULOCYTES # BLD: 0.02 10*3/MM3 (ref 0–0.03)
IMM GRANULOCYTES NFR BLD: 0.2 % (ref 0–0.5)
KETONES UR QL STRIP: NEGATIVE
LEUKOCYTE ESTERASE UR QL STRIP.AUTO: NEGATIVE
LYMPHOCYTES # BLD AUTO: 1.84 10*3/MM3 (ref 0.9–4.8)
LYMPHOCYTES NFR BLD AUTO: 18.8 % (ref 19.6–45.3)
MAGNESIUM SERPL-MCNC: 2 MG/DL (ref 1.6–2.4)
MCH RBC QN AUTO: 30.3 PG (ref 26.9–32)
MCHC RBC AUTO-ENTMCNC: 32.3 G/DL (ref 32.4–36.3)
MCV RBC AUTO: 93.8 FL (ref 80.5–98.2)
MONOCYTES # BLD AUTO: 0.67 10*3/MM3 (ref 0.2–1.2)
MONOCYTES NFR BLD AUTO: 6.8 % (ref 5–12)
NEUTROPHILS # BLD AUTO: 6.91 10*3/MM3 (ref 1.9–8.1)
NEUTROPHILS NFR BLD AUTO: 70.6 % (ref 42.7–76)
NITRITE UR QL STRIP: NEGATIVE
PH UR STRIP.AUTO: 7 [PH] (ref 5–8)
PLATELET # BLD AUTO: 243 10*3/MM3 (ref 140–500)
PMV BLD AUTO: 10.1 FL (ref 6–12)
POTASSIUM BLD-SCNC: 4.2 MMOL/L (ref 3.5–5.2)
PROT SERPL-MCNC: 7.6 G/DL (ref 6–8.5)
PROT UR QL STRIP: NEGATIVE
RBC # BLD AUTO: 5.28 10*6/MM3 (ref 3.9–5.2)
SODIUM BLD-SCNC: 140 MMOL/L (ref 136–145)
SP GR UR STRIP: 1.01 (ref 1–1.03)
T4 FREE SERPL-MCNC: 1.33 NG/DL (ref 0.93–1.7)
TROPONIN T SERPL-MCNC: <0.01 NG/ML (ref 0–0.03)
TSH SERPL DL<=0.05 MIU/L-ACNC: 2.54 MIU/ML (ref 0.27–4.2)
UROBILINOGEN UR QL STRIP: NORMAL
WBC NRBC COR # BLD: 9.79 10*3/MM3 (ref 4.5–10.7)
WHOLE BLOOD HOLD SPECIMEN: NORMAL
WHOLE BLOOD HOLD SPECIMEN: NORMAL

## 2018-06-20 PROCEDURE — 93010 ELECTROCARDIOGRAM REPORT: CPT | Performed by: INTERNAL MEDICINE

## 2018-06-20 PROCEDURE — 84439 ASSAY OF FREE THYROXINE: CPT | Performed by: EMERGENCY MEDICINE

## 2018-06-20 PROCEDURE — 71045 X-RAY EXAM CHEST 1 VIEW: CPT

## 2018-06-20 PROCEDURE — 80053 COMPREHEN METABOLIC PANEL: CPT | Performed by: NURSE PRACTITIONER

## 2018-06-20 PROCEDURE — 99285 EMERGENCY DEPT VISIT HI MDM: CPT

## 2018-06-20 PROCEDURE — 84484 ASSAY OF TROPONIN QUANT: CPT | Performed by: NURSE PRACTITIONER

## 2018-06-20 PROCEDURE — 96375 TX/PRO/DX INJ NEW DRUG ADDON: CPT

## 2018-06-20 PROCEDURE — 83735 ASSAY OF MAGNESIUM: CPT | Performed by: NURSE PRACTITIONER

## 2018-06-20 PROCEDURE — 93005 ELECTROCARDIOGRAM TRACING: CPT | Performed by: NURSE PRACTITIONER

## 2018-06-20 PROCEDURE — 81003 URINALYSIS AUTO W/O SCOPE: CPT | Performed by: NURSE PRACTITIONER

## 2018-06-20 PROCEDURE — 96374 THER/PROPH/DIAG INJ IV PUSH: CPT

## 2018-06-20 PROCEDURE — 84443 ASSAY THYROID STIM HORMONE: CPT | Performed by: EMERGENCY MEDICINE

## 2018-06-20 PROCEDURE — 93005 ELECTROCARDIOGRAM TRACING: CPT | Performed by: EMERGENCY MEDICINE

## 2018-06-20 PROCEDURE — 93005 ELECTROCARDIOGRAM TRACING: CPT | Performed by: INTERNAL MEDICINE

## 2018-06-20 PROCEDURE — 85379 FIBRIN DEGRADATION QUANT: CPT | Performed by: EMERGENCY MEDICINE

## 2018-06-20 PROCEDURE — 85025 COMPLETE CBC W/AUTO DIFF WBC: CPT | Performed by: NURSE PRACTITIONER

## 2018-06-20 PROCEDURE — 25010000002 ADENOSINE PER 6 MG

## 2018-06-20 RX ORDER — ADENOSINE 3 MG/ML
6 INJECTION, SOLUTION INTRAVENOUS ONCE
Status: COMPLETED | OUTPATIENT
Start: 2018-06-20 | End: 2018-06-20

## 2018-06-20 RX ORDER — METOPROLOL TARTRATE 5 MG/5ML
INJECTION INTRAVENOUS
Status: COMPLETED
Start: 2018-06-20 | End: 2018-06-20

## 2018-06-20 RX ORDER — PRAVASTATIN SODIUM 40 MG
40 TABLET ORAL NIGHTLY
COMMUNITY
End: 2019-02-06

## 2018-06-20 RX ORDER — NAPROXEN 500 MG/1
500 TABLET ORAL
COMMUNITY
End: 2019-02-06

## 2018-06-20 RX ORDER — AMOXICILLIN AND CLAVULANATE POTASSIUM 875; 125 MG/1; MG/1
1 TABLET, FILM COATED ORAL EVERY 12 HOURS SCHEDULED
COMMUNITY
Start: 2018-06-13 | End: 2018-06-22

## 2018-06-20 RX ORDER — ADENOSINE 3 MG/ML
INJECTION, SOLUTION INTRAVENOUS
Status: COMPLETED
Start: 2018-06-20 | End: 2018-06-20

## 2018-06-20 RX ORDER — ALLOPURINOL 300 MG/1
300 TABLET ORAL DAILY
COMMUNITY
End: 2019-02-05 | Stop reason: SDUPTHER

## 2018-06-20 RX ORDER — LISINOPRIL AND HYDROCHLOROTHIAZIDE 20; 12.5 MG/1; MG/1
1 TABLET ORAL DAILY
COMMUNITY
End: 2018-06-21 | Stop reason: HOSPADM

## 2018-06-20 RX ADMIN — ADENOSINE 6 MG: 3 INJECTION, SOLUTION INTRAVENOUS at 18:30

## 2018-06-20 RX ADMIN — METOPROLOL TARTRATE 5 MG: 5 INJECTION, SOLUTION INTRAVENOUS at 18:35

## 2018-06-20 RX ADMIN — METOPROLOL TARTRATE 25 MG: 25 TABLET ORAL at 20:19

## 2018-06-20 NOTE — ED PROVIDER NOTES
EMERGENCY DEPARTMENT ENCOUNTER    Room Number:  24/24  Date seen:  6/20/2018  Time seen: 6:28 PM  PCP: Daryl Santos MD  Historian: patient, family  History Limited By: N/A      HPI:  Chief Complaint: syncope  Context: Mandy lAarcon is a 85 y.o. female who presents to the ED c/o syncope that occurred earlier today. She states that after she performed yard work in the heat, she went into her home to rest. While she was getting up from her chair, she became lightheaded, generally weak, pale (noted by family), nauseated, and then lost consciousness. She notes that at the time, she did not sustain blow to head or any other injury. She reports that she has had recent cough but it has been improving. Pt also states that while she was sitting in her ED room waiting to be evaluated, she had recurrence of lightheadedness. RN noticed that at the time, pt was in SVT on the monitor. Pt denies chest pain, dyspnea, palpitations, BLE swelling, headache, focal weakness, numbness, trouble with speech, vision changes, nausea, vomiting, sweating, abd pain, fevers, chills, and hx of CAD. She has hx of HTN and hyperlipidemia. There are no other complaints at this time.     Location: generalized  Radiation: none  Quality: none  Intensity/Severity: moderate  Duration: occurred today  Onset quality: gradual  Timing: constant  Progression: resolved  Aggravating Factors: none  Alleviating Factors: none  Previous Episodes: none mentioned  Treatment before arrival: none mentioned  Associated Symptoms: lightheadedness, nausea, pallor, generalized weakness, recent cough (improved)      PAST MEDICAL HISTORY  Active Ambulatory Problems     Diagnosis Date Noted   • Benign essential hypertension 10/28/2012   • Chronic renal insufficiency, stage II (mild), 05/18/2016--creatinine 1.35 04/28/2016   • Claustrophobia 04/28/2016   • Gout 10/28/2012   • Hyperlipidemia 10/28/2012   • Hypothyroidism 11/17/2015   • Impaired fasting glucose 10/28/2012    • Nocturnal hypoxemia 2012   • Secondary polycythemia 2012   • Vitamin D deficiency 2016   • Therapeutic drug monitoring 2016   • Family history of coronary artery disease 2016   • Bilateral sensorineural hearing loss, wears hearing aids 2017   • Multinodular goiter 2018     Resolved Ambulatory Problems     Diagnosis Date Noted   • History of Cellulitis of trunk, Right 2016   • History of mammogram 2016   • History of pneumococcal vaccination 2016     Past Medical History:   Diagnosis Date   • Benign essential hypertension 10/28/2012   • Bilateral sensorineural hearing loss, wears hearing aids 2017   • Chronic renal insufficiency, stage II (mild), 2016--creatinine 1.35 2016   • Claustrophobia 2016   • Family history of coronary artery disease 2016   • Gout 10/28/2012   • History of Cellulitis of trunk, Right 2012   • History of mammogram 2012   • History of pneumococcal vaccination 2016   • Hyperlipidemia 10/28/2012   • Hypothyroidism 2015   • Impaired fasting glucose 10/28/2012   • Nocturnal hypoxemia 2012   • Secondary polycythemia 2012   • Vitamin D deficiency 2016         PAST SURGICAL HISTORY  Past Surgical History:   Procedure Laterality Date   • RADICAL ABDOMINAL HYSTERECTOMY  48 years old    48 years of age. Uterine fibroid tumors with menorrhagia         FAMILY HISTORY  Family History   Problem Relation Age of Onset   • Heart disease Mother         Ischemic.  from coronary artery disease.   • Heart disease Father         Ischemic.  from coronary artery disease.   • Heart disease Sister         Ischemic.  from coronary artery disease.   • Heart disease Brother         Ischemic.  from coronary artery disease.   • Heart disease Sister         Ischemic.  from coronary artery disease.   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   • Alcohol use No   • Drug use: No   • Sexual activity: Not Currently     Partners: Male     Other Topics Concern   • Not on file     Social History Narrative   • No narrative on file         ALLERGIES  Cefaclor and Sulfa antibiotics        REVIEW OF SYSTEMS  Review of Systems   Constitutional: Negative for chills and fever.   HENT: Negative for congestion, rhinorrhea and sore throat.    Eyes: Negative for pain.   Respiratory: Positive for cough (recent, improved). Negative for shortness of breath.    Cardiovascular: Negative for chest pain and palpitations.   Gastrointestinal: Positive for nausea. Negative for abdominal pain, diarrhea and vomiting.   Endocrine: Negative.    Genitourinary: Negative for difficulty urinating.   Musculoskeletal: Negative for myalgias.   Skin: Positive for pallor.   Neurological: Positive for dizziness (lightheadedness), syncope, weakness (generalized weakness, no focal weakness) and light-headedness. Negative for speech difficulty, numbness and headaches.   Psychiatric/Behavioral: Negative.    All other systems reviewed and are negative.           PHYSICAL EXAM  ED Triage Vitals   Temp Heart Rate Resp BP SpO2   06/20/18 1517 06/20/18 1517 06/20/18 1517 06/20/18 1619 06/20/18 1517   97.8 °F (36.6 °C) 62 16 142/84 98 % WNL      Temp src Heart Rate Source Patient Position BP Location FiO2 (%)   -- -- -- -- --              Physical Exam   Constitutional: She is oriented to person, place, and time. No distress.   HENT:   Head: Normocephalic and atraumatic.   Mouth/Throat: Mucous membranes are normal.   Eyes: EOM  are normal. Pupils are equal, round, and reactive to light.   Neck: Normal range of motion. Neck supple.   Cardiovascular: Normal heart sounds.  Tachycardia present.    No murmur heard.  HR= 160s, SVT on monitor   Pulmonary/Chest: Effort normal and breath sounds normal. No respiratory distress. She has no decreased breath sounds. She has no wheezes. She has no rhonchi. She has no rales.   Abdominal: Soft. There is no tenderness. There is no rebound and no guarding.   Musculoskeletal: Normal range of motion. She exhibits no edema (no BLE edema).   Neurological: She is alert and oriented to person, place, and time. She has normal motor skills and normal sensation.   nonfocal neuro exam   Skin: Skin is warm and dry.   Psychiatric: Mood and affect normal.   Nursing note and vitals reviewed.          LAB RESULTS  Recent Results (from the past 24 hour(s))   Comprehensive Metabolic Panel    Collection Time: 06/20/18  5:38 PM   Result Value Ref Range    Glucose 144 (H) 65 - 99 mg/dL    BUN 25 (H) 8 - 23 mg/dL    Creatinine 1.34 (H) 0.57 - 1.00 mg/dL    Sodium 140 136 - 145 mmol/L    Potassium 4.2 3.5 - 5.2 mmol/L    Chloride 101 98 - 107 mmol/L    CO2 23.9 22.0 - 29.0 mmol/L    Calcium 10.0 8.6 - 10.5 mg/dL    Total Protein 7.6 6.0 - 8.5 g/dL    Albumin 4.30 3.50 - 5.20 g/dL    ALT (SGPT) 19 1 - 33 U/L    AST (SGOT) 16 1 - 32 U/L    Alkaline Phosphatase 46 39 - 117 U/L    Total Bilirubin 0.5 0.1 - 1.2 mg/dL    eGFR Non African Amer 38 (L) >60 mL/min/1.73    Globulin 3.3 gm/dL    A/G Ratio 1.3 g/dL    BUN/Creatinine Ratio 18.7 7.0 - 25.0    Anion Gap 15.1 mmol/L   Troponin    Collection Time: 06/20/18  5:38 PM   Result Value Ref Range    Troponin T <0.010 0.000 - 0.030 ng/mL   Magnesium    Collection Time: 06/20/18  5:38 PM   Result Value Ref Range    Magnesium 2.0 1.6 - 2.4 mg/dL   CBC Auto Differential    Collection Time: 06/20/18  5:38 PM   Result Value Ref Range    WBC 9.79 4.50 - 10.70 10*3/mm3    RBC 5.28 (H) 3.90 -  5.20 10*6/mm3    Hemoglobin 16.0 (H) 11.9 - 15.5 g/dL    Hematocrit 49.5 (H) 35.6 - 45.5 %    MCV 93.8 80.5 - 98.2 fL    MCH 30.3 26.9 - 32.0 pg    MCHC 32.3 (L) 32.4 - 36.3 g/dL    RDW 14.7 (H) 11.7 - 13.0 %    RDW-SD 49.8 37.0 - 54.0 fl    MPV 10.1 6.0 - 12.0 fL    Platelets 243 140 - 500 10*3/mm3    Neutrophil % 70.6 42.7 - 76.0 %    Lymphocyte % 18.8 (L) 19.6 - 45.3 %    Monocyte % 6.8 5.0 - 12.0 %    Eosinophil % 3.2 0.3 - 6.2 %    Basophil % 0.4 0.0 - 1.5 %    Immature Grans % 0.2 0.0 - 0.5 %    Neutrophils, Absolute 6.91 1.90 - 8.10 10*3/mm3    Lymphocytes, Absolute 1.84 0.90 - 4.80 10*3/mm3    Monocytes, Absolute 0.67 0.20 - 1.20 10*3/mm3    Eosinophils, Absolute 0.31 0.00 - 0.70 10*3/mm3    Basophils, Absolute 0.04 0.00 - 0.20 10*3/mm3    Immature Grans, Absolute 0.02 0.00 - 0.03 10*3/mm3   Light Blue Top    Collection Time: 06/20/18  5:38 PM   Result Value Ref Range    Extra Tube hold for add-on    Green Top (Gel)    Collection Time: 06/20/18  5:38 PM   Result Value Ref Range    Extra Tube Hold for add-ons.    Lavender Top    Collection Time: 06/20/18  5:38 PM   Result Value Ref Range    Extra Tube hold for add-on    Gold Top - SST    Collection Time: 06/20/18  5:38 PM   Result Value Ref Range    Extra Tube Hold for add-ons.    TSH    Collection Time: 06/20/18  5:38 PM   Result Value Ref Range    TSH 2.540 0.270 - 4.200 mIU/mL   T4, Free    Collection Time: 06/20/18  5:38 PM   Result Value Ref Range    Free T4 1.33 0.93 - 1.70 ng/dL   D-dimer, Quantitative    Collection Time: 06/20/18  5:38 PM   Result Value Ref Range    D-Dimer, Quantitative 0.80 (H) 0.00 - 0.49 MCGFEU/mL   Urinalysis With / Microscopic If Indicated (No Culture) - Urine, Clean Catch    Collection Time: 06/20/18  7:08 PM   Result Value Ref Range    Color, UA Yellow Yellow, Straw    Appearance, UA Clear Clear    pH, UA 7.0 5.0 - 8.0    Specific Gravity, UA 1.007 1.005 - 1.030    Glucose, UA Negative Negative    Ketones, UA Negative Negative     Bilirubin, UA Negative Negative    Blood, UA Negative Negative    Protein, UA Negative Negative    Leuk Esterase, UA Negative Negative    Nitrite, UA Negative Negative    Urobilinogen, UA 0.2 E.U./dL 0.2 - 1.0 E.U./dL       Ordered the above labs and reviewed the results.        RADIOLOGY  XR Chest 1 View (Final result)   Result time 06/20/18 18:36:22   Final result by Trenton Pendleton MD (06/20/18 18:36:22) (independently viewed by me, interpreted by radiologist)                Impression:    No focal pulmonary consolidation. Borderline heart size.  Tortuous aorta. Follow-up as clinically indicated.     This report was finalized on 6/20/2018 6:36 PM by Dr. Trenton Pendleton M.D.               Narrative:    XR CHEST 1 VW-     HISTORY: Female who is 85 years-old,  syncope     TECHNIQUE: Frontal view of the chest     COMPARISON: None available     FINDINGS: Heart size is borderline. Pulmonary vasculature is  unremarkable. Aorta is tortuous. No focal pulmonary consolidation,  pleural effusion, or pneumothorax. No acute osseous process.                         Ordered the above noted radiological studies. Reviewed by me in PACS.         PROCEDURES  Critical Care  Performed by: PHILLIP GUILLAUME  Authorized by: PHILLIP GUILLAUME     Critical care provider statement:     Critical care time (minutes):  30    Critical care time was exclusive of:  Separately billable procedures and treating other patients    Critical care was necessary to treat or prevent imminent or life-threatening deterioration of the following conditions:  Cardiac failure and circulatory failure    Critical care was time spent personally by me on the following activities:  Development of treatment plan with patient or surrogate, discussions with consultants, evaluation of patient's response to treatment, examination of patient, obtaining history from patient or surrogate, ordering and performing treatments and interventions, ordering and review of  "laboratory studies, ordering and review of radiographic studies, pulse oximetry, re-evaluation of patient's condition and review of old charts          CHEMICAL CARDIOVERSION:  Time:   Cardiac rhythm/rate: SVT, rate= 160s  Chemical cardioversion performed usinmg adenosine and 5mg metoprolol  Cardiac rhythm/rate after administration of adenosine and metoprolol: sinus rhythm with frequent PVCs, rate= 80s  Pt tolerated the procedure without significant difficulty.       EKG (after chemical cardioversion at ):      EKG time:   Rhythm/Rate: sinus rhythm, rate= 70  P waves and MD: occasional PVCs  QRS, axis: normal   ST and T waves: normal     Interpreted Contemporaneously by me, independently viewed  unchanged compared to prior 18          PROGRESS AND CONSULTS  ED Course as of    1719 85 yof with a chief complaint of near syncope onset this am. Reports she was walking around the house when she felt like \"her head went empty\" and she assisted herself to the chair. She became very weak. Daughter at bedside reports she appeared very pale. Did not pass out. Assisted her to the couch where she regained color and strength in about 10 minutes. No seizure activity. No recent fever, vomiting or diarrhea. Has had a cough for about 2 weeks but it is improving. States she did eat breakfast, cereal, prior to symptom onset this morning. Taking augmentin as Rx by her doctor for the cough.  No previous similar episodes. Feeling much better now. No hx of DM.  EXAM: Pt awake and alert and in no distress. Smiling and laughing on exam. VANN. Symmetric smile. Heart RRR. Lungs CTA bilaterally. Abdomen soft and non tender.   [JS]      ED Course User Index  [JS] Leigh Jg Bauer, APRN     - When I walked into the room for pt's initial evaluation, pt was noted to be in SVT with HR in 160s. Consequently, we performed chemical cardioversion after which pt converted to sinus rhythm with HR " in 80s. See procedure note for further details.     1848- Ordered TSH for further evaluation. Sent call out to [facility] for admission. Admission decision time= 1848 1917- Ordered d-dimer to rule out the presence of a thrombus and for further evaluation.      1947- Discussed case with Dr Raymond (cardiologist)  Reviewed history, exam, results and treatments.  Discussed concerns and plan of care. Dr Raymond accepts pt to be admitted to telemetry. Dr Raymond agrees that there is low suspicion for pulmonary embolism even though pt's d-dimer is mildly elevated. We agree that the elevated d-dimer is likely due to pt's elevated creatinine. Dr Raymond also requests that we administer PO metoprolol.     1950- Ordered PO metoprolol for rate control per Dr Raymond's request.     1952- Rechecked pt. She is resting comfortably and is in no acute distress. HR is in 60s, sinus rhythm. Informed pt and family that because pt was in SVT earlier, we performed chemical cardioversion after which pt converted to sinus rhythm with frequent PVCs. Discussed with pt and family about all pertinent results including CXR findings (no acute process), negative troponin, and elevated creatinine that appears similar to previous. Discussed pt admission for further care, cardiology evaluation, and observation. Pt and family verbalize understanding and agreement with plan.       MEDICAL DECISION MAKING      MDM  Number of Diagnoses or Management Options  SVT (supraventricular tachycardia):   Syncope, unspecified syncope type:      Amount and/or Complexity of Data Reviewed  Clinical lab tests: ordered and reviewed (D-dimer= 0.8, WBC= 9.79, hgb= 16, magnesium= 2, troponin <0.010, UA, BUN= 25, creatinine= 1.34, TSH= 2.54)  Tests in the radiology section of CPT®: reviewed and ordered (CXR- no acute process)  Tests in the medicine section of CPT®: reviewed and ordered (EKG)  Decide to obtain previous medical records or to obtain history from someone other  than the patient: yes  Review and summarize past medical records: yes (6 months ago, BUN was 21, creatinine was 1.38. )  Discuss the patient with other providers: yes (Dr Raymond (cardiologist))  Independent visualization of images, tracings, or specimens: yes    Patient Progress  Patient progress: stable             DIAGNOSIS  Final diagnoses:   Syncope, unspecified syncope type   SVT (supraventricular tachycardia)         DISPOSITION  Admitted- telemetry    Discussed treatment plan and reason for admission with pt/family and admitting physician.  Pt/family voiced understanding of the plan for admission for further testing/treatment as needed.         Latest Documented Vital Signs:  As of 8:01 PM  BP- 151/86 HR- 52 Temp- 97.8 °F (36.6 °C) O2 sat- 98%        --  Documentation assistance provided by victor hugo Malave for Dr. Fabian MD.  Information recorded by the scribe was done at my direction and has been verified and validated by me.         Rober Malave  06/20/18 2017       Flynn Peralta MD  06/20/18 4192

## 2018-06-20 NOTE — TELEPHONE ENCOUNTER
LILI WATERMANI   FYI    Daughter called stating that they were taking pt to ER for high BP and Brachycardia. [phone]  Ashley

## 2018-06-21 ENCOUNTER — APPOINTMENT (OUTPATIENT)
Dept: CARDIOLOGY | Facility: HOSPITAL | Age: 83
End: 2018-06-21
Attending: INTERNAL MEDICINE

## 2018-06-21 VITALS
TEMPERATURE: 97.8 F | RESPIRATION RATE: 18 BRPM | BODY MASS INDEX: 38.46 KG/M2 | OXYGEN SATURATION: 96 % | HEART RATE: 52 BPM | SYSTOLIC BLOOD PRESSURE: 126 MMHG | HEIGHT: 63 IN | DIASTOLIC BLOOD PRESSURE: 71 MMHG | WEIGHT: 217.1 LBS

## 2018-06-21 PROBLEM — R55 SYNCOPE: Status: ACTIVE | Noted: 2018-06-21

## 2018-06-21 LAB
ANION GAP SERPL CALCULATED.3IONS-SCNC: 11.4 MMOL/L
BH CV ECHO MEAS - ACS: 1.6 CM
BH CV ECHO MEAS - AI DEC SLOPE: 138 CM/SEC^2
BH CV ECHO MEAS - AI MAX PG: 64 MMHG
BH CV ECHO MEAS - AI MAX VEL: 400 CM/SEC
BH CV ECHO MEAS - AI P1/2T: 849 MSEC
BH CV ECHO MEAS - AO MEAN PG (FULL): 3 MMHG
BH CV ECHO MEAS - AO MEAN PG: 5 MMHG
BH CV ECHO MEAS - AO ROOT AREA (BSA CORRECTED): 1.3
BH CV ECHO MEAS - AO ROOT AREA: 5.7 CM^2
BH CV ECHO MEAS - AO ROOT DIAM: 2.7 CM
BH CV ECHO MEAS - AO V2 MAX: 154 CM/SEC
BH CV ECHO MEAS - AO V2 MEAN: 104 CM/SEC
BH CV ECHO MEAS - AO V2 VTI: 33.3 CM
BH CV ECHO MEAS - ASC AORTA: 2.8 CM
BH CV ECHO MEAS - AVA(I,A): 2 CM^2
BH CV ECHO MEAS - AVA(I,D): 2 CM^2
BH CV ECHO MEAS - BSA(HAYCOCK): 2.1 M^2
BH CV ECHO MEAS - BSA: 2 M^2
BH CV ECHO MEAS - BZI_BMI: 38.4 KILOGRAMS/M^2
BH CV ECHO MEAS - BZI_METRIC_HEIGHT: 160 CM
BH CV ECHO MEAS - BZI_METRIC_WEIGHT: 98.4 KG
BH CV ECHO MEAS - CONTRAST EF (2CH): 53.2 ML/M^2
BH CV ECHO MEAS - CONTRAST EF 4CH: 59.7 ML/M^2
BH CV ECHO MEAS - EDV(CUBED): 100.5 ML
BH CV ECHO MEAS - EDV(MOD-SP2): 77 ML
BH CV ECHO MEAS - EDV(MOD-SP4): 119 ML
BH CV ECHO MEAS - EDV(TEICH): 99.8 ML
BH CV ECHO MEAS - EF(CUBED): 74.5 %
BH CV ECHO MEAS - EF(MOD-BP): 60 %
BH CV ECHO MEAS - EF(MOD-SP2): 53.2 %
BH CV ECHO MEAS - EF(MOD-SP4): 59.7 %
BH CV ECHO MEAS - EF(TEICH): 66.4 %
BH CV ECHO MEAS - ESV(CUBED): 25.7 ML
BH CV ECHO MEAS - ESV(MOD-SP2): 36 ML
BH CV ECHO MEAS - ESV(MOD-SP4): 48 ML
BH CV ECHO MEAS - ESV(TEICH): 33.6 ML
BH CV ECHO MEAS - FS: 36.6 %
BH CV ECHO MEAS - IVS/LVPW: 0.95
BH CV ECHO MEAS - IVSD: 1 CM
BH CV ECHO MEAS - LAT PEAK E' VEL: 6 CM/SEC
BH CV ECHO MEAS - LV DIASTOLIC VOL/BSA (35-75): 59.4 ML/M^2
BH CV ECHO MEAS - LV MASS(C)D: 167.2 GRAMS
BH CV ECHO MEAS - LV MASS(C)DI: 83.5 GRAMS/M^2
BH CV ECHO MEAS - LV MEAN PG: 2 MMHG
BH CV ECHO MEAS - LV SYSTOLIC VOL/BSA (12-30): 24 ML/M^2
BH CV ECHO MEAS - LV V1 MAX: 96 CM/SEC
BH CV ECHO MEAS - LV V1 MEAN: 58.4 CM/SEC
BH CV ECHO MEAS - LV V1 VTI: 21.5 CM
BH CV ECHO MEAS - LVIDD: 4.7 CM
BH CV ECHO MEAS - LVIDS: 3 CM
BH CV ECHO MEAS - LVLD AP2: 7 CM
BH CV ECHO MEAS - LVLD AP4: 8.7 CM
BH CV ECHO MEAS - LVLS AP2: 6.3 CM
BH CV ECHO MEAS - LVLS AP4: 7.1 CM
BH CV ECHO MEAS - LVOT AREA (M): 3.1 CM^2
BH CV ECHO MEAS - LVOT AREA: 3.1 CM^2
BH CV ECHO MEAS - LVOT DIAM: 2 CM
BH CV ECHO MEAS - LVPWD: 1.1 CM
BH CV ECHO MEAS - MED PEAK E' VEL: 4 CM/SEC
BH CV ECHO MEAS - MV A DUR: 0.16 SEC
BH CV ECHO MEAS - MV A MAX VEL: 102 CM/SEC
BH CV ECHO MEAS - MV DEC SLOPE: 290 CM/SEC^2
BH CV ECHO MEAS - MV DEC TIME: 0.27 SEC
BH CV ECHO MEAS - MV E MAX VEL: 63.2 CM/SEC
BH CV ECHO MEAS - MV E/A: 0.62
BH CV ECHO MEAS - MV MEAN PG: 1 MMHG
BH CV ECHO MEAS - MV P1/2T MAX VEL: 70.6 CM/SEC
BH CV ECHO MEAS - MV P1/2T: 71.3 MSEC
BH CV ECHO MEAS - MV V2 MEAN: 45.2 CM/SEC
BH CV ECHO MEAS - MV V2 VTI: 39.7 CM
BH CV ECHO MEAS - MVA P1/2T LCG: 3.1 CM^2
BH CV ECHO MEAS - MVA(P1/2T): 3.1 CM^2
BH CV ECHO MEAS - MVA(VTI): 1.7 CM^2
BH CV ECHO MEAS - PA ACC SLOPE: 0 CM/SEC^2
BH CV ECHO MEAS - PA ACC TIME: 0.14 SEC
BH CV ECHO MEAS - PA MAX PG: 2 MMHG
BH CV ECHO MEAS - PA PR(ACCEL): 14.2 MMHG
BH CV ECHO MEAS - PA V2 MAX: 71.4 CM/SEC
BH CV ECHO MEAS - PULM A REVS DUR: 0.12 SEC
BH CV ECHO MEAS - PULM A REVS VEL: 29.9 CM/SEC
BH CV ECHO MEAS - PULM DIAS VEL: 32.9 CM/SEC
BH CV ECHO MEAS - PULM S/D: 1.4
BH CV ECHO MEAS - PULM SYS VEL: 46.8 CM/SEC
BH CV ECHO MEAS - QP/QS: 0.84
BH CV ECHO MEAS - RV MEAN PG: 1 MMHG
BH CV ECHO MEAS - RV V1 MEAN: 45 CM/SEC
BH CV ECHO MEAS - RV V1 VTI: 20 CM
BH CV ECHO MEAS - RVOT AREA: 2.8 CM^2
BH CV ECHO MEAS - RVOT DIAM: 1.9 CM
BH CV ECHO MEAS - SI(AO): 95.2 ML/M^2
BH CV ECHO MEAS - SI(CUBED): 37.4 ML/M^2
BH CV ECHO MEAS - SI(LVOT): 33.7 ML/M^2
BH CV ECHO MEAS - SI(MOD-SP2): 20.5 ML/M^2
BH CV ECHO MEAS - SI(MOD-SP4): 35.5 ML/M^2
BH CV ECHO MEAS - SI(TEICH): 33.1 ML/M^2
BH CV ECHO MEAS - SV(AO): 190.7 ML
BH CV ECHO MEAS - SV(CUBED): 74.9 ML
BH CV ECHO MEAS - SV(LVOT): 67.5 ML
BH CV ECHO MEAS - SV(MOD-SP2): 41 ML
BH CV ECHO MEAS - SV(MOD-SP4): 71 ML
BH CV ECHO MEAS - SV(RVOT): 56.7 ML
BH CV ECHO MEAS - SV(TEICH): 66.2 ML
BH CV ECHO MEAS - TAPSE (>1.6): 1.8 CM2
BH CV ECHO MEAS - TR MAX VEL: 269 CM/SEC
BH CV ECHO MEASUREMENTS AVERAGE E/E' RATIO: 12.64
BH CV VAS BP RIGHT ARM: NORMAL MMHG
BH CV XLRA - RV BASE: 3.6 CM
BH CV XLRA - TDI S': 13 CM/SEC
BUN BLD-MCNC: 25 MG/DL (ref 8–23)
BUN/CREAT SERPL: 20.3 (ref 7–25)
CALCIUM SPEC-SCNC: 9.9 MG/DL (ref 8.6–10.5)
CHLORIDE SERPL-SCNC: 104 MMOL/L (ref 98–107)
CO2 SERPL-SCNC: 24.6 MMOL/L (ref 22–29)
CREAT BLD-MCNC: 1.23 MG/DL (ref 0.57–1)
GFR SERPL CREATININE-BSD FRML MDRD: 41 ML/MIN/1.73
GLUCOSE BLD-MCNC: 100 MG/DL (ref 65–99)
LEFT ATRIUM VOLUME INDEX: 27 ML/M2
MAXIMAL PREDICTED HEART RATE: 135 BPM
POTASSIUM BLD-SCNC: 4.5 MMOL/L (ref 3.5–5.2)
SODIUM BLD-SCNC: 140 MMOL/L (ref 136–145)
STRESS TARGET HR: 115 BPM
TROPONIN T SERPL-MCNC: <0.01 NG/ML (ref 0–0.03)

## 2018-06-21 PROCEDURE — 93010 ELECTROCARDIOGRAM REPORT: CPT | Performed by: INTERNAL MEDICINE

## 2018-06-21 PROCEDURE — 84484 ASSAY OF TROPONIN QUANT: CPT | Performed by: INTERNAL MEDICINE

## 2018-06-21 PROCEDURE — G0378 HOSPITAL OBSERVATION PER HR: HCPCS

## 2018-06-21 PROCEDURE — 93005 ELECTROCARDIOGRAM TRACING: CPT | Performed by: INTERNAL MEDICINE

## 2018-06-21 PROCEDURE — 25010000002 PERFLUTREN (DEFINITY) 8.476 MG IN SODIUM CHLORIDE 0.9 % 10 ML INJECTION: Performed by: INTERNAL MEDICINE

## 2018-06-21 PROCEDURE — 80048 BASIC METABOLIC PNL TOTAL CA: CPT | Performed by: INTERNAL MEDICINE

## 2018-06-21 PROCEDURE — 0296T HC EXT ECG > 48HR TO 21 DAY RCRD W/CONECT INTL RCRD: CPT

## 2018-06-21 PROCEDURE — 93306 TTE W/DOPPLER COMPLETE: CPT

## 2018-06-21 PROCEDURE — 93306 TTE W/DOPPLER COMPLETE: CPT | Performed by: INTERNAL MEDICINE

## 2018-06-21 PROCEDURE — 99235 HOSP IP/OBS SAME DATE MOD 70: CPT | Performed by: INTERNAL MEDICINE

## 2018-06-21 RX ORDER — LISINOPRIL 20 MG/1
20 TABLET ORAL
Qty: 30 TABLET | Refills: 3 | Status: SHIPPED | OUTPATIENT
Start: 2018-06-22 | End: 2018-08-24

## 2018-06-21 RX ORDER — AMOXICILLIN AND CLAVULANATE POTASSIUM 875; 125 MG/1; MG/1
1 TABLET, FILM COATED ORAL EVERY 12 HOURS SCHEDULED
Status: DISCONTINUED | OUTPATIENT
Start: 2018-06-21 | End: 2018-06-21 | Stop reason: HOSPADM

## 2018-06-21 RX ORDER — ASPIRIN 81 MG/1
81 TABLET, CHEWABLE ORAL DAILY
Status: DISCONTINUED | OUTPATIENT
Start: 2018-06-21 | End: 2018-06-21 | Stop reason: HOSPADM

## 2018-06-21 RX ORDER — ALLOPURINOL 300 MG/1
300 TABLET ORAL DAILY
Status: DISCONTINUED | OUTPATIENT
Start: 2018-06-21 | End: 2018-06-21 | Stop reason: HOSPADM

## 2018-06-21 RX ORDER — LISINOPRIL 20 MG/1
20 TABLET ORAL
Status: DISCONTINUED | OUTPATIENT
Start: 2018-06-21 | End: 2018-06-21 | Stop reason: HOSPADM

## 2018-06-21 RX ORDER — ATORVASTATIN CALCIUM 10 MG/1
10 TABLET, FILM COATED ORAL DAILY
Status: DISCONTINUED | OUTPATIENT
Start: 2018-06-21 | End: 2018-06-21 | Stop reason: HOSPADM

## 2018-06-21 RX ADMIN — PERFLUTREN 3 ML: 6.52 INJECTION, SUSPENSION INTRAVENOUS at 11:45

## 2018-06-21 RX ADMIN — AMOXICILLIN AND CLAVULANATE POTASSIUM 1 TABLET: 875; 125 TABLET, FILM COATED ORAL at 11:00

## 2018-06-21 RX ADMIN — ATORVASTATIN CALCIUM 10 MG: 10 TABLET, FILM COATED ORAL at 11:00

## 2018-06-21 RX ADMIN — LISINOPRIL 20 MG: 20 TABLET ORAL at 11:00

## 2018-06-21 RX ADMIN — ALLOPURINOL 300 MG: 300 TABLET ORAL at 10:59

## 2018-06-21 RX ADMIN — ASPIRIN 81 MG: 81 TABLET, CHEWABLE ORAL at 11:03

## 2018-06-21 NOTE — H&P
"    Patient Name: Mandy Alarcon  :1933  85 y.o.    Date of Admission: 2018  Date of Consultation:  18  Encounter Provider: Lizbeth Laura MD  Place of Service: [facility] CARDIOLOGY  Referring Provider: Lj Raymond MD  Patient Care Team:  Daryl Santos MD as PCP - General (Internal Medicine)  Daryl Santos MD as PCP - Claims Attributed      Chief complaint: Near syncope     History of Present Illness: Ms. Mandy Alarcon is an 85 year old female patient with a history of hypertension, CKD stage 2, gout, hypothyroidism, tobacco use, hyperlipidemia who is admitted following a near syncopal episode and reported supraventricular tachycardia.      The patient was at home doing some work around her house.  She went outside for a couple of minutes and went back inside.  While she was walking around she had a sudden onset of sensation where she felt like her \"head went empty\".  She denies any palpitations, chest pain or dyspnea with the episode.  She reportedly was nauseous and diaphoretic.  She was able to sit down immediately.  She denies loss of consciousness.  She called her daughter who was somewhere else in the house and then brought to the emergency room.  She reports she was feeling pretty well up till yesterday.  She has been on antibiotics for an upper respiratory infection but reports that has been better lately.  While in the emergency room it was noted that her heart rate went up to the 140's-160's.  She denies having any symptoms during that episode.  She reports that if no one had said anything to her she would not have known anything was wrong.  Per the ER notes she appeared to be in SVT and she was converted after given adenosine 6 mg and metoprolol tartrate 5 mg IV.  Unfortunately I am currently unable to locate any strips from the episode in either her chart or in Epic.      She denies any cardiac history or prior cardiac testing.  She has felt well " since admission.  She was given a dose of metoprolol tartrate 25 mg po once in the ER.        Past Medical History:   Diagnosis Date   • Benign essential hypertension 10/28/2012    2012--treatment for hypertension begun.   • Bilateral sensorineural hearing loss, wears hearing aids 2017    Left is much worse than right.  Patient had multiple ear infections as a child.   • Chronic renal insufficiency, stage II (mild), 2016--creatinine 1.35 2016--creatinine 1.35.  Baseline creatinine approximately 1.3.   • Claustrophobia 2016    This patient has significant nocturnal hypoxemia and I think that she could benefit from nocturnal oxygen therapy. The exact etiology of her hypoxemia is not clear. She could possibly have obstructive sleep apnea but this is not documented. We cannot test this patient for sleep apnea due to the fact that she is severely claustrophobic. Patient was a former smoker and it is possibly that COPD he is playing a role.   • Family history of coronary artery disease 2016    Patient's mother, father, 2 sisters and a brother all  from myocardial infarctions   • Gout 10/28/2012    2012--initial diagnosis and treatment of gout.   • History of Cellulitis of trunk, Right 2012--patient presented with a rather significant cellulitis of the right anterior chest wall. Treated successfully with Bactrim double strength two by mouth twice a day.   • History of mammogram 2012--normal mammogram.   • History of pneumococcal vaccination 2016--PPSV 23 given.  No further pneumococcal vaccinations required.  2015--Prevnar 13 given. Patient is pneumococcal vaccination jacob and therefore will need PPSV 23 in 6-12 months.   • Hyperlipidemia 10/28/2012    2012--treatment for hyperlipidemia begun.   • Hypothyroidism 2015--routine follow-up reveals elevated TSH of 5.14. Repeat  thyroid function tests ordered as well as thyroid antibodies.   • Impaired fasting glucose 10/28/2012    October 2012--initial diagnosis impaired fasting glucose.   • Nocturnal hypoxemia 8/2/2012 11/06/2014--patient did not receive nocturnal oxygen because of Medicare regulations.   05/15/2014--overnight oximetry revealed oxygen saturations less than 89% for 22 minutes and 40 seconds. Oxygen saturations less than or equal to 88% for 22 minutes and 40 seconds. Lowest oxygen saturation 83%. The longest continuous time with oxygen saturations less than or equal to 88% was 1 minute and 32 seconds.   08/02/2012--overnight oximetry revealed oxygen saturations less than 90% for one hour and 35 minutes. Oxygen saturations less than 89% 59 minutes. This patient has significant nocturnal hypoxemia and I think that she could benefit from nocturnal oxygen therapy. The exact etiology of her hypoxemia is not clear. She could possibly have obstructive sleep apnea but this is not documented. We cannot test this patient for sleep apnea due to the fact that she is severely claustrophobic. Patient was a former smoker and it is possibly that COPD he is playing a role.   • Secondary polycythemia 8/2/2012 11/06/2014--patient did not receive nocturnal oxygen because of Medicare regulations.   05/15/2014--overnight oximetry revealed oxygen saturations less than 89% for 22 minutes and 40 seconds. Oxygen saturations less than or equal to 88% for 22 minutes and 40 seconds. Lowest oxygen saturation 83%. The longest continuous time with oxygen saturations less than or equal to 88% was 1 minute and 32 seconds.   08/02/2012--overnight oximetry revealed oxygen saturations less than 90% for one hour and 35 minutes. Oxygen saturations less than 89% 59 minutes. This patient has significant nocturnal hypoxemia and I think that she could benefit from nocturnal oxygen therapy. The exact etiology of her hypoxemia is not clear. She could possibly  have obstructive sleep apnea but this is not documented. We cannot test this patient for sleep apnea due to the fact that she is severely claustrophobic. Patient was a former smoker and it is possibly that COPD he is playing a role.   • Vitamin D deficiency 5/23/2016       Past Surgical History:   Procedure Laterality Date   • RADICAL ABDOMINAL HYSTERECTOMY  48 years old    48 years of age. Uterine fibroid tumors with menorrhagia         Prior to Admission medications    Medication Sig Start Date End Date Taking? Authorizing Provider   allopurinol (ZYLOPRIM) 300 MG tablet Take 300 mg by mouth Daily.   Yes Historical Provider, MD   amoxicillin-clavulanate (AUGMENTIN) 875-125 MG per tablet Take 1 tablet by mouth Every 12 (Twelve) Hours. 6/13/18 6/22/18 Yes Historical Provider, MD   aspirin 81 MG tablet Take 1 tablet by mouth Daily. 6/14/17  Yes Daryl Santos MD   Biotin 10 MG tablet Take 1 tablet by mouth Daily.   Yes Historical Provider, MD   Cholecalciferol (VITAMIN D) 2000 UNITS tablet Take 2 tablets by mouth daily. 11/14/13  Yes Daryl Santos MD   lisinopril-hydrochlorothiazide (PRINZIDE,ZESTORETIC) 20-12.5 MG per tablet Take 1 tablet by mouth Daily.   Yes Historical Provider, MD   naproxen (NAPROSYN) 500 MG tablet Take 500 mg by mouth Daily With Dinner.   Yes Historical Provider, MD   pravastatin (PRAVACHOL) 40 MG tablet Take 40 mg by mouth Every Night.   Yes Historical Provider, MD   cyclobenzaprine (FLEXERIL) 5 MG tablet Take 1 tablet by mouth 3 (Three) Times a Day As Needed for muscle spasms. 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   • Alcohol use No   • Drug use: No   • Sexual activity: Not Currently     Partners: Male     Other Topics Concern   • Not on file       Family History   Problem Relation Age of Onset   • Heart disease Mother         Ischemic.  from coronary artery disease.   • Heart disease Father         Ischemic.  from coronary artery disease.   • Heart disease Sister         Ischemic.  from coronary artery disease.   • Heart disease Brother         Ischemic.  from coronary artery disease.   • Heart disease Sister         Ischemic.  from coronary artery disease.   • Breast cancer Daughter        REVIEW OF SYSTEMS:   All systems reviewed.  Pertinent positives identified in HPI.  All other systems are negative.      Objective:     Vitals:    18 2019 18 2116 18 2351 18 0735   BP: 111/82 152/78 123/60 132/70   BP Location:  Right arm Right arm Right arm   Patient Position:  Sitting Lying Lying   Pulse: 65 51     Resp:     Temp:  98.1 °F (36.7 °C) 98.2 °F (36.8 °C) 98.1 °F (36.7 °C)   TempSrc:  Oral Oral Oral   SpO2:  96%     Weight:  98.5 kg (217 lb 1.6 oz)     Height:  160 cm (63\")       Body mass index is 38.46 kg/m².    General Appearance:    Alert, cooperative, in no acute distress   Head:    Normocephalic, without obvious abnormality, atraumatic   Eyes:            Lids and lashes normal, conjunctivae and sclerae normal, no   icterus, no pallor, corneas clear, PERRLA   Ears:    Ears appear intact with no abnormalities noted   Neck:   No adenopathy, supple, trachea midline, no thyromegaly, no   carotid bruit, no JVD   Lungs:     Clear to auscultation,respirations regular, even and unlabored    Heart:    Regular rhythm and normal rate, normal S1 and S2, no murmur, no gallop, no rub, no click   Chest Wall:    No abnormalities observed   Abdomen:     Normal bowel sounds, no masses, no organomegaly, soft        non-tender, non-distended, " no guarding, no rebound  tenderness   Extremities:   Moves all extremities well, no edema, no cyanosis, no redness   Pulses:   Pulses palpable and equal bilaterally. Normal radial, carotid, femoral, dorsalis pedis and posterior tibial pulses bilaterally. Normal abdominal aorta   Skin:  Psychiatric:   No bleeding, bruising or rash    Alert and oriented x 3, normal mood and affect   Lab Review:       Results from last 7 days  Lab Units 06/20/18  1738   SODIUM mmol/L 140   POTASSIUM mmol/L 4.2   CHLORIDE mmol/L 101   CO2 mmol/L 23.9   BUN mg/dL 25*   CREATININE mg/dL 1.34*   CALCIUM mg/dL 10.0   BILIRUBIN mg/dL 0.5   ALK PHOS U/L 46   ALT (SGPT) U/L 19   AST (SGOT) U/L 16   GLUCOSE mg/dL 144*       Results from last 7 days  Lab Units 06/20/18  1738   TROPONIN T ng/mL <0.010       Results from last 7 days  Lab Units 06/20/18  1738   WBC 10*3/mm3 9.79   HEMOGLOBIN g/dL 16.0*   HEMATOCRIT % 49.5*   PLATELETS 10*3/mm3 243           Results from last 7 days  Lab Units 06/20/18  1738   MAGNESIUM mg/dL 2.0       EKG 6/20/2018      EKG 6/20/2018      EKG 6/20/2018              I personally viewed and interpreted the patient's EKG/Telemetry data.        Assessment and Plan:       1. Near syncope.  Unclear etiololgy.  Could be related to presumed SVT but not sure why she would have been symptomatic at home and not in ER unless it had to do with standing versus laying position.    2. SVT.  Reported by ER.  No strips to review to confirm this was SVT versus another atrial arrhythmia.  Certainly behaved like SVT in that she converted with adenosine.    3.  Hypertension.    4.  CKD.  Appears to be at baseline.   5.  Hyperlipidemia. On a statin.     -  Will recheck BMP and troponin  -  Check echocardiogram  -  Continue metoprolol at low dose as tolerates  -  If above work up ok and she has no further events, will discharge with a Zio patch      Lizbeth Laura MD  06/21/18  7:58 AM    ADDENDUM:  Labs and echo unremarkable.  No  further episodes.  Will discharge home with low dose metoprolol, lisinopril and Zio patch.  Will discontinue hydrochlorothiazide for now in case this was contributing to near syncope.  Follow up in the office in 4 weeks.

## 2018-06-21 NOTE — PROGRESS NOTES
Clinical Pharmacy Services: Medication History    Mandy Alarcon is a 85 y.o. female presenting to [facility] for   Chief Complaint   Patient presents with   • Syncope     near syncope became pale and fell back in a chair and laid head down on desk now feeling better       She  has a past medical history of Benign essential hypertension (10/28/2012); Bilateral sensorineural hearing loss, wears hearing aids (6/14/2017); Chronic renal insufficiency, stage II (mild), 05/18/2016--creatinine 1.35 (4/28/2016); Claustrophobia (4/28/2016); Family history of coronary artery disease (5/24/2016); Gout (10/28/2012); History of Cellulitis of trunk, Right (09/13/2012); History of mammogram (07/17/2012); History of pneumococcal vaccination (11/30/2016); Hyperlipidemia (10/28/2012); Hypothyroidism (11/17/2015); Impaired fasting glucose (10/28/2012); Nocturnal hypoxemia (8/2/2012); Secondary polycythemia (8/2/2012); and Vitamin D deficiency (5/23/2016).    Allergies as of 06/20/2018 - Reviewed 06/20/2018   Allergen Reaction Noted   • Cefaclor Swelling 04/28/2016   • Sulfa antibiotics Rash 04/28/2016       Medication information was obtained from: Patient  Pharmacy and Phone Number: [facility]- [phone]    Prior to Admission Medications     Prescriptions Last Dose Informant Patient Reported? Taking?    allopurinol (ZYLOPRIM) 300 MG tablet  Self Yes Yes    Take 300 mg by mouth Daily.    aspirin 81 MG tablet 6/20/2018 Self No Yes    Take 1 tablet by mouth Daily.    Biotin 10 MG tablet  Self Yes Yes    Take 1 tablet by mouth Daily.    Cholecalciferol (VITAMIN D) 2000 UNITS tablet  Self Yes Yes    Take 2 tablets by mouth daily.    lisinopril-hydrochlorothiazide (PRINZIDE,ZESTORETIC) 20-12.5 MG per tablet 6/20/2018 Self Yes Yes    Take 1 tablet by mouth Daily.    naproxen (NAPROSYN) 500 MG tablet  Self Yes Yes    Take 500 mg by mouth Daily With Dinner.    pravastatin (PRAVACHOL) 40 MG tablet  Self Yes Yes    Take 40 mg  by mouth Every Night.    cyclobenzaprine (FLEXERIL) 5 MG tablet  Self No No    Take 1 tablet by mouth 3 (Three) Times a Day As Needed for muscle spasms.            Medication notes: None    This medication list is complete to the best of my knowledge as of 6/20/2018    Please call if questions.    Brianna Laura, Medication History Technician  6/20/2018 8:01 PM

## 2018-06-21 NOTE — PLAN OF CARE
Problem: Patient Care Overview  Goal: Plan of Care Review  Outcome: Ongoing (interventions implemented as appropriate)   06/21/18 0608   Coping/Psychosocial   Plan of Care Reviewed With patient   Plan of Care Review   Progress no change   OTHER   Outcome Summary VSS, HR remains in SB (down to 30's at times). Pt admitted to ER s/p syncopal episode @ home. Pt with recent URI, ? bronchitis. Has been taking PO Augmentin (5 doses left to take); added to PTA meds. Pt up with assist, no hx falls. Will continue to monitor.

## 2018-06-25 ENCOUNTER — EPISODE CHANGES (OUTPATIENT)
Dept: CASE MANAGEMENT | Facility: OTHER | Age: 83
End: 2018-06-25

## 2018-07-10 ENCOUNTER — HOSPITAL ENCOUNTER (OUTPATIENT)
Dept: GENERAL RADIOLOGY | Facility: HOSPITAL | Age: 83
Discharge: HOME | End: 2018-07-10
Attending: INTERNAL MEDICINE

## 2018-07-10 ENCOUNTER — OFFICE VISIT (OUTPATIENT)
Dept: INTERNAL MEDICINE | Facility: CLINIC | Age: 83
End: 2018-07-10

## 2018-07-10 ENCOUNTER — HOSPITAL ENCOUNTER (OUTPATIENT)
Dept: CARDIOLOGY | Facility: HOSPITAL | Age: 83
Discharge: HOME | End: 2018-07-10
Admitting: INTERNAL MEDICINE

## 2018-07-10 ENCOUNTER — HOSPITAL ENCOUNTER (OUTPATIENT)
Dept: NUCLEAR MEDICINE | Facility: HOSPITAL | Age: 83
Discharge: HOME | End: 2018-07-10
Attending: INTERNAL MEDICINE

## 2018-07-10 VITALS
HEIGHT: 63 IN | SYSTOLIC BLOOD PRESSURE: 168 MMHG | WEIGHT: 218.4 LBS | DIASTOLIC BLOOD PRESSURE: 94 MMHG | HEART RATE: 51 BPM | BODY MASS INDEX: 38.7 KG/M2 | OXYGEN SATURATION: 99 %

## 2018-07-10 VITALS
DIASTOLIC BLOOD PRESSURE: 79 MMHG | RESPIRATION RATE: 20 BRPM | SYSTOLIC BLOOD PRESSURE: 138 MMHG | OXYGEN SATURATION: 98 % | HEART RATE: 60 BPM

## 2018-07-10 DIAGNOSIS — R73.01 IMPAIRED FASTING GLUCOSE: Chronic | ICD-10-CM

## 2018-07-10 DIAGNOSIS — I10 BENIGN ESSENTIAL HYPERTENSION: Chronic | ICD-10-CM

## 2018-07-10 DIAGNOSIS — Z09 HOSPITAL DISCHARGE FOLLOW-UP: Primary | ICD-10-CM

## 2018-07-10 DIAGNOSIS — E03.9 HYPOTHYROIDISM, UNSPECIFIED TYPE: Chronic | ICD-10-CM

## 2018-07-10 DIAGNOSIS — G47.34 NOCTURNAL HYPOXEMIA: Chronic | ICD-10-CM

## 2018-07-10 DIAGNOSIS — Z51.81 THERAPEUTIC DRUG MONITORING: ICD-10-CM

## 2018-07-10 DIAGNOSIS — Z87.39 HISTORY OF GOUT: Chronic | ICD-10-CM

## 2018-07-10 DIAGNOSIS — R07.9 CHEST PAIN, UNSPECIFIED TYPE: ICD-10-CM

## 2018-07-10 DIAGNOSIS — R07.89 CHEST TIGHTNESS OR PRESSURE: ICD-10-CM

## 2018-07-10 DIAGNOSIS — I47.1 SUPRAVENTRICULAR TACHYCARDIA (HCC): ICD-10-CM

## 2018-07-10 DIAGNOSIS — R06.00 PND (PAROXYSMAL NOCTURNAL DYSPNEA): ICD-10-CM

## 2018-07-10 DIAGNOSIS — R06.02 SHORTNESS OF BREATH: Primary | ICD-10-CM

## 2018-07-10 DIAGNOSIS — N18.2 CHRONIC RENAL INSUFFICIENCY, STAGE II (MILD): Chronic | ICD-10-CM

## 2018-07-10 DIAGNOSIS — R55 NEAR SYNCOPE: ICD-10-CM

## 2018-07-10 DIAGNOSIS — E78.5 HYPERLIPIDEMIA, UNSPECIFIED HYPERLIPIDEMIA TYPE: Chronic | ICD-10-CM

## 2018-07-10 DIAGNOSIS — E04.2 MULTINODULAR GOITER: Chronic | ICD-10-CM

## 2018-07-10 DIAGNOSIS — R79.89 ELEVATED D-DIMER: ICD-10-CM

## 2018-07-10 DIAGNOSIS — D75.1 SECONDARY POLYCYTHEMIA: Chronic | ICD-10-CM

## 2018-07-10 PROBLEM — I47.10 SUPRAVENTRICULAR TACHYCARDIA: Status: ACTIVE | Noted: 2018-07-10

## 2018-07-10 LAB
ALBUMIN SERPL-MCNC: 3.8 G/DL (ref 3.5–5.2)
ALBUMIN/GLOB SERPL: 1 G/DL
ALP SERPL-CCNC: 42 U/L (ref 39–117)
ALT SERPL W P-5'-P-CCNC: 16 U/L (ref 1–33)
ANION GAP SERPL CALCULATED.3IONS-SCNC: 14 MMOL/L
AST SERPL-CCNC: 22 U/L (ref 1–32)
BASOPHILS # BLD AUTO: 0.07 10*3/MM3 (ref 0–0.2)
BASOPHILS NFR BLD AUTO: 1 % (ref 0–1.5)
BILIRUB SERPL-MCNC: 0.6 MG/DL (ref 0.1–1.2)
BUN BLD-MCNC: 23 MG/DL (ref 8–23)
BUN/CREAT SERPL: 16.7 (ref 7–25)
CALCIUM SPEC-SCNC: 9.9 MG/DL (ref 8.6–10.5)
CHLORIDE SERPL-SCNC: 106 MMOL/L (ref 98–107)
CO2 SERPL-SCNC: 21 MMOL/L (ref 22–29)
CREAT BLD-MCNC: 1.38 MG/DL (ref 0.57–1)
D DIMER PPP FEU-MCNC: 1.24 MCG/ML FEU (ref 0–0.49)
D DIMER PPP FEU-MCNC: 1.3 MCGFEU/ML (ref 0–0.49)
DEPRECATED RDW RBC AUTO: 49.7 FL (ref 37–54)
EOSINOPHIL # BLD AUTO: 0.4 10*3/MM3 (ref 0–0.7)
EOSINOPHIL NFR BLD AUTO: 5.5 % (ref 0.3–6.2)
ERYTHROCYTE [DISTWIDTH] IN BLOOD BY AUTOMATED COUNT: 15 % (ref 11.7–13)
GFR SERPL CREATININE-BSD FRML MDRD: 36 ML/MIN/1.73
GLOBULIN UR ELPH-MCNC: 3.8 GM/DL
GLUCOSE BLD-MCNC: 102 MG/DL (ref 65–99)
HCT VFR BLD AUTO: 44.6 % (ref 35.6–45.5)
HGB BLD-MCNC: 15 G/DL (ref 11.9–15.5)
IMM GRANULOCYTES # BLD: 0.03 10*3/MM3 (ref 0–0.03)
IMM GRANULOCYTES NFR BLD: 0.4 % (ref 0–0.5)
LYMPHOCYTES # BLD AUTO: 2.02 10*3/MM3 (ref 0.9–4.8)
LYMPHOCYTES NFR BLD AUTO: 27.6 % (ref 19.6–45.3)
MCH RBC QN AUTO: 30.6 PG (ref 26.9–32)
MCHC RBC AUTO-ENTMCNC: 33.6 G/DL (ref 32.4–36.3)
MCV RBC AUTO: 91 FL (ref 80.5–98.2)
MONOCYTES # BLD AUTO: 0.53 10*3/MM3 (ref 0.2–1.2)
MONOCYTES NFR BLD AUTO: 7.2 % (ref 5–12)
NEUTROPHILS # BLD AUTO: 4.31 10*3/MM3 (ref 1.9–8.1)
NEUTROPHILS NFR BLD AUTO: 58.7 % (ref 42.7–76)
NT-PROBNP SERPL-MCNC: 1111 PG/ML (ref 0–1800)
PLATELET # BLD AUTO: 224 10*3/MM3 (ref 140–500)
PMV BLD AUTO: 10.1 FL (ref 6–12)
POTASSIUM BLD-SCNC: 4.8 MMOL/L (ref 3.5–5.2)
PROT SERPL-MCNC: 7.6 G/DL (ref 6–8.5)
RBC # BLD AUTO: 4.9 10*6/MM3 (ref 3.9–5.2)
SODIUM BLD-SCNC: 141 MMOL/L (ref 136–145)
TROPONIN T SERPL-MCNC: <0.01 NG/ML (ref 0–0.03)
WBC NRBC COR # BLD: 7.33 10*3/MM3 (ref 4.5–10.7)

## 2018-07-10 PROCEDURE — 85025 COMPLETE CBC W/AUTO DIFF WBC: CPT | Performed by: INTERNAL MEDICINE

## 2018-07-10 PROCEDURE — A9540 TC99M MAA: HCPCS | Performed by: INTERNAL MEDICINE

## 2018-07-10 PROCEDURE — 85379 FIBRIN DEGRADATION QUANT: CPT | Performed by: INTERNAL MEDICINE

## 2018-07-10 PROCEDURE — 99215 OFFICE O/P EST HI 40 MIN: CPT | Performed by: INTERNAL MEDICINE

## 2018-07-10 PROCEDURE — 71046 X-RAY EXAM CHEST 2 VIEWS: CPT

## 2018-07-10 PROCEDURE — 83880 ASSAY OF NATRIURETIC PEPTIDE: CPT | Performed by: INTERNAL MEDICINE

## 2018-07-10 PROCEDURE — 80053 COMPREHEN METABOLIC PANEL: CPT | Performed by: INTERNAL MEDICINE

## 2018-07-10 PROCEDURE — A9558 XE133 XENON 10MCI: HCPCS | Performed by: INTERNAL MEDICINE

## 2018-07-10 PROCEDURE — 84484 ASSAY OF TROPONIN QUANT: CPT | Performed by: INTERNAL MEDICINE

## 2018-07-10 PROCEDURE — 99213 OFFICE O/P EST LOW 20 MIN: CPT | Performed by: INTERNAL MEDICINE

## 2018-07-10 PROCEDURE — 94760 N-INVAS EAR/PLS OXIMETRY 1: CPT

## 2018-07-10 PROCEDURE — 0 XENON XE 133: Performed by: INTERNAL MEDICINE

## 2018-07-10 PROCEDURE — 93010 ELECTROCARDIOGRAM REPORT: CPT | Performed by: INTERNAL MEDICINE

## 2018-07-10 PROCEDURE — 36415 COLL VENOUS BLD VENIPUNCTURE: CPT

## 2018-07-10 PROCEDURE — 78582 LUNG VENTILAT&PERFUS IMAGING: CPT

## 2018-07-10 PROCEDURE — 93005 ELECTROCARDIOGRAM TRACING: CPT | Performed by: INTERNAL MEDICINE

## 2018-07-10 PROCEDURE — 0 TECHNETIUM ALBUMIN AGGREGATED: Performed by: INTERNAL MEDICINE

## 2018-07-10 RX ORDER — SODIUM CHLORIDE 0.9 % (FLUSH) 0.9 %
10 SYRINGE (ML) INJECTION AS NEEDED
Status: DISCONTINUED | OUTPATIENT
Start: 2018-07-10 | End: 2019-09-04

## 2018-07-10 RX ORDER — NITROGLYCERIN 0.4 MG/1
0.4 TABLET SUBLINGUAL
Status: DISCONTINUED | OUTPATIENT
Start: 2018-07-10 | End: 2019-09-04

## 2018-07-10 RX ADMIN — Medication 1 DOSE: at 14:05

## 2018-07-10 RX ADMIN — XENON XE-133 10.8 MILLICURIE: 10 GAS RESPIRATORY (INHALATION) at 14:00

## 2018-07-10 NOTE — PROGRESS NOTES
07/10/2018    Patient Information  Mandy Alarcon                                                                                          [address] [id number]      5/30/1933  [phone]     Current outpatient and discharge medications have been reconciled for the patient.  Reviewed by: Daryl Santos MD    Chief Complaint:     Hospital discharge follow-up for episode of near syncope and SVT.  Complaining of chest tightness/pressure and shortness of breath in the middle of the night.    History of Present Illness:    Patient with a history of hypertension that was previously well controlled, chronic renal insufficiency, hyperlipidemia, gout, hypothyroidism, impaired fasting glucose, secondary polycythemia and nocturnal hypoxemia, multinodular goiter.  She presents today for hospital discharge follow-up more really prompted this visit was complaints of chest pressure/tightness and shortness of breath that occurs at night as described below.  Past medical history reviewed and updated where necessary including health maintenance parameters.  This reveals she is up-to-date or account for with the exception of the shingles vaccination which is not covered by Medicare and she also needs her Medicare wellness visit which will be deferred until next month.    The history regarding recent Hospital admission for near syncope and SVT; new complaints of chest pressure/tightness and shortness of breath/PND:    07/10/2018--patient seen in follow-up by Dr. Dr. Santos.  She reports no further episodes of near-syncope.  Reports periodic intermittent episodes of tightness in her chest and shortness of breath that only occurs in the morning hours at approximately 3 AM.  She describes a sensation like an elephant sitting on her chest and had a rattling in her chest.  No radiation into the jaw, shoulders, or upper extremities.  No nausea or vomiting.  The episodes last approximately 3 hours and seems  "to be improved with sitting up.  She has had several episodes since discharge but these have not occurred every night.  Last episode was 2 nights ago.  I attempted to find the ZIO patch results but cannot find them.  Patient reports she turned this in about 2 weeks ago.  Patient apparently has a follow-up appointment with cardiology 07/30/2018.  I will contact the cardiologist involved and discuss the case.  Her blood pressure is elevated today and this is likely due to the blood pressure medication change.  I really see no reason why she can't go back on the lisinopril HCT which had controlled her blood pressure well in the past.  However, I think the symptoms warrant immediate attention and I will send her to the chest pain unit for further cardiovascular evaluation.  I will go ahead and order a stat d-dimer prior to her leaving the office.    06/20/2018--\"Ms. Mandy Alarcon is an 85 year old female patient with a history of hypertension, CKD stage 2, gout, hypothyroidism, tobacco use, hyperlipidemia who is admitted following a near syncopal episode and reported supraventricular tachycardia.  The patient was at home doing some work around her house.  She went outside for a couple of minutes and went back inside.  While she was walking around she had a sudden onset of sensation where she felt like her \"head went empty\".  She denies any palpitations, chest pain or dyspnea with the episode.  She reportedly was nauseous and diaphoretic.  She was able to sit down immediately.  She denies loss of consciousness.  She called her daughter who was somewhere else in the house and then brought to the emergency room.  She reports she was feeling pretty well up till yesterday.  She has been on antibiotics for an upper respiratory infection but reports that has been better lately.  While in the emergency room it was noted that her heart rate went up to the 140's-160's.  She denies having any symptoms during that episode.  She " "reports that if no one had said anything to her she would not have known anything was wrong.  Per the ER notes she appeared to be in SVT and she was converted after given adenosine 6 mg and metoprolol tartrate 5 mg IV.  Unfortunately I am currently unable to locate any strips from the episode in either her chart or in Epic. She denies any cardiac history or prior cardiac testing.  She has felt well since admission.  She was given a dose of metoprolol tartrate 25 mg po once in the ER.\"  Patient was admitted to the hospital and observed.  There was no documented SVT.  Laboratory work returned unremarkable although her d-dimer was mildly elevated.  Apparently, CTA of the chest or VQ scan was not ordered.  She was subsequently discharged home after the negative workup including an echocardiogram which was basically normal except for mild aortic insufficiency and aortic valve sclerosis.  Ejection fraction 60%.  She was discharged home on plain lisinopril 20 mg per day and the lisinopril HCT 20/12.5 was discontinued.  ZIO patch was placed prior to discharge.  This information was obtained from the patient and the progress notes.  I cannot find a discharge summary.    Review of Systems   Constitution: Negative.   HENT: Negative.    Eyes: Negative.    Cardiovascular: Positive for chest pain, near-syncope and paroxysmal nocturnal dyspnea. Negative for leg swelling.   Respiratory: Positive for shortness of breath and sleep disturbances due to breathing.    Endocrine: Negative.    Hematologic/Lymphatic: Negative.    Skin: Negative.    Musculoskeletal: Negative.    Gastrointestinal: Negative.    Genitourinary: Negative.    Neurological: Negative.    Psychiatric/Behavioral: Negative.    Allergic/Immunologic: Negative.        Active Problems:    Patient Active Problem List   Diagnosis   • Benign essential hypertension   • Chronic renal insufficiency, stage II (mild), 05/18/2016--creatinine 1.35   • Claustrophobia   • Gout   • " Hyperlipidemia   • Hypothyroidism   • Impaired fasting glucose   • Nocturnal hypoxemia   • Secondary polycythemia   • Vitamin D deficiency   • Therapeutic drug monitoring   • Family history of coronary artery disease   • Bilateral sensorineural hearing loss, wears hearing aids   • Multinodular goiter   • Hospital discharge follow-up   • Near syncope   • Supraventricular tachycardia (CMS/HCC)   • Chest tightness or pressure   • PND (paroxysmal nocturnal dyspnea)         Past Medical History:   Diagnosis Date   • Benign essential hypertension 10/28/2012    2012--treatment for hypertension begun.   • Bilateral sensorineural hearing loss, wears hearing aids 2017    Left is much worse than right.  Patient had multiple ear infections as a child.   • Chronic renal insufficiency, stage II (mild), 2016--creatinine 1.35 2016--creatinine 1.35.  Baseline creatinine approximately 1.3.   • Claustrophobia 2016    This patient has significant nocturnal hypoxemia and I think that she could benefit from nocturnal oxygen therapy. The exact etiology of her hypoxemia is not clear. She could possibly have obstructive sleep apnea but this is not documented. We cannot test this patient for sleep apnea due to the fact that she is severely claustrophobic. Patient was a former smoker and it is possibly that COPD he is playing a role.   • Family history of coronary artery disease 2016    Patient's mother, father, 2 sisters and a brother all  from myocardial infarctions   • Gout 10/28/2012    2012--initial diagnosis and treatment of gout.   • Hyperlipidemia 10/28/2012    2012--treatment for hyperlipidemia begun.   • Hypothyroidism 2015--routine follow-up reveals elevated TSH of 5.14. Repeat thyroid function tests ordered as well as thyroid antibodies.   • Impaired fasting glucose 10/28/2012    2012--initial diagnosis impaired fasting glucose.   •  Nocturnal hypoxemia 8/2/2012 11/06/2014--patient did not receive nocturnal oxygen because of Medicare regulations.   05/15/2014--overnight oximetry revealed oxygen saturations less than 89% for 22 minutes and 40 seconds. Oxygen saturations less than or equal to 88% for 22 minutes and 40 seconds. Lowest oxygen saturation 83%. The longest continuous time with oxygen saturations less than or equal to 88% was 1 minute and 32 seconds.   08/02/2012--overnight oximetry revealed oxygen saturations less than 90% for one hour and 35 minutes. Oxygen saturations less than 89% 59 minutes. This patient has significant nocturnal hypoxemia and I think that she could benefit from nocturnal oxygen therapy. The exact etiology of her hypoxemia is not clear. She could possibly have obstructive sleep apnea but this is not documented. We cannot test this patient for sleep apnea due to the fact that she is severely claustrophobic. Patient was a former smoker and it is possibly that COPD he is playing a role.   • Secondary polycythemia 8/2/2012 11/06/2014--patient did not receive nocturnal oxygen because of Medicare regulations.   05/15/2014--overnight oximetry revealed oxygen saturations less than 89% for 22 minutes and 40 seconds. Oxygen saturations less than or equal to 88% for 22 minutes and 40 seconds. Lowest oxygen saturation 83%. The longest continuous time with oxygen saturations less than or equal to 88% was 1 minute and 32 seconds.   08/02/2012--overnight oximetry revealed oxygen saturations less than 90% for one hour and 35 minutes. Oxygen saturations less than 89% 59 minutes. This patient has significant nocturnal hypoxemia and I think that she could benefit from nocturnal oxygen therapy. The exact etiology of her hypoxemia is not clear. She could possibly have obstructive sleep apnea but this is not documented. We cannot test this patient for sleep apnea due to the fact that she is severely claustrophobic. Patient was a  former smoker and it is possibly that COPD he is playing a role.   • Vitamin D deficiency 2016         Past Surgical History:   Procedure Laterality Date   • RADICAL ABDOMINAL HYSTERECTOMY  48 years old    48 years of age. Uterine fibroid tumors with menorrhagia         Allergies   Allergen Reactions   • Cefaclor Swelling   • Sulfa Antibiotics Rash           Current Outpatient Prescriptions:   •  allopurinol (ZYLOPRIM) 300 MG tablet, Take 300 mg by mouth Daily., Disp: , Rfl:   •  aspirin 81 MG tablet, Take 1 tablet by mouth Daily., Disp: , Rfl:   •  Biotin 10 MG tablet, Take 1 tablet by mouth Daily., Disp: , Rfl:   •  Cholecalciferol (VITAMIN D) 2000 UNITS tablet, Take 2 tablets by mouth daily., Disp: , Rfl:   •  cyclobenzaprine (FLEXERIL) 5 MG tablet, Take 1 tablet by mouth 3 (Three) Times a Day As Needed for muscle spasms., Disp: 15 tablet, Rfl: 0  •  lisinopril (PRINIVIL,ZESTRIL) 20 MG tablet, Take 1 tablet by mouth Daily., Disp: 30 tablet, Rfl: 3  •  metoprolol tartrate (LOPRESSOR) 25 MG tablet, Take 0.5 tablets by mouth Every 12 (Twelve) Hours., Disp: 60 tablet, Rfl: 3  •  naproxen (NAPROSYN) 500 MG tablet, Take 500 mg by mouth Daily With Dinner., Disp: , Rfl:   •  pravastatin (PRAVACHOL) 40 MG tablet, Take 40 mg by mouth Every Night., Disp: , Rfl:       Family History   Problem Relation Age of Onset   • Heart disease Mother         Ischemic.  from coronary artery disease.   • Heart disease Father         Ischemic.  from coronary artery disease.   • Heart disease Sister         Ischemic.  from coronary artery disease.   • Heart disease Brother         Ischemic.  from coronary artery disease.   • Heart disease Sister         Ischemic.  from coronary artery disease.   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   • Alcohol use No   • Drug use: No   • Sexual activity: Not Currently     Partners: Male     Other Topics Concern   • Not on file     Social History Narrative   • No narrative on file         Vitals:    07/10/18 0828   BP: 168/94   BP Location: Right arm   Pulse: 51   SpO2: 99%   Weight: 99.1 kg (218 lb 6.4 oz)   Height: 160 cm (62.99\")          Physical Exam:    General: Alert and oriented x 3.  No acute distress.  Normal affect.  HEENT: Pupils equal, round, reactive to light; extraocular movements intact; sclerae nonicteric; pharynx, ear canals and TMs normal.  Neck: Without JVD, thyromegaly, bruit, or adenopathy.  Lungs: Clear to auscultation in all fields.  Heart: Regular rate and rhythm without murmur, rub, gallop, or click.  Abdomen: Soft, nontender, without hepatosplenomegaly or hernia.  Bowel sounds normal.  : Deferred.  Rectal: Deferred.  Extremities: Without clubbing, cyanosis, edema, or pulse deficit.  Neurologic: Intact without focal deficit.  Normal station and gait observed during ingress and egress from the examination room.  Skin: Without significant lesion.  Musculoskeletal: Unremarkable.      Lab/other results:    I reviewed the documentation from the Hospital including the emergency room history and physical, lab work, cardiology consultation, echocardiogram, chest x-ray, electrocardiograms.    Assessment/Plan:     Diagnosis Plan   1. Hospital discharge follow-up     2. Near syncope     3. Supraventricular tachycardia (CMS/HCC)     4. Chest tightness or pressure     5. PND (paroxysmal nocturnal dyspnea)     6. Benign essential hypertension     7. Chronic renal insufficiency, stage II (mild), 05/18/2016--creatinine 1.35     8. Hyperlipidemia, unspecified hyperlipidemia type "     9. Gout     10. Hypothyroidism, unspecified type     11. Impaired fasting glucose     12. Secondary polycythemia     13. Nocturnal hypoxemia     14. Multinodular goiter     15. Therapeutic drug monitoring       Patient presents today for hospital discharge follow-up but primarily her complaints of chest tightness or pressure and PND is what prompted this visit.  It does not appear the patient was scheduled for a hospital discharge follow-up appointment with me at the time of her discharge.  Her symptoms are very worrisome for cardiovascular problem.  And I think this warrants immediate attention.  She did have an elevated d-dimer of 0.8 at the time of presentation at the emergency room but I cannot find any other evaluation for this.  She has no significant leg swelling or calf tenderness but certainly pulmonary embolism should be a consideration.    Plan is as follows: I will order a stat repeat d-dimer.  Patient sent immediately to the chest pain unit.  I will contact the patient's cardiologist, Dr. Laura, to discuss the case.    Note: Greater than 40 minutes was spent today evaluating patient and 50% of this time or greater was spent discussing potential diagnoses, prognoses, further therapeutic options.  Complex patient with potential serious medical issue.  [id number] level of service justified.    Procedures

## 2018-07-10 NOTE — PROGRESS NOTES
"    Subjective:     Encounter Date:07/10/2018      Patient ID: Mandy Alarcon is a 85 y.o. female.    Chief Complaint:  History of Present Illness    Ms. Mandy Alarcon is an 85 year old female patient with a history of hypertension, CKD stage 2, gout, hypothyroidism, tobacco use, hyperlipidemia, recent episode of near syncope, paroxsymal supraventricular tachycardia, who presents to the cardiac evaluation center with nocturnal chest discomfort and dyspnea on exertion.         I saw the patient initially when she was admitted to the hospital following a near syncopal episode on 6/20/18.  Prior to admission the patient reports she was at home doing some work around her house.  She went outside for a couple of minutes and went back inside.  While she was walking around she had a sudden onset of sensation where she felt like her \"head went empty\".  She denied any palpitations, chest pain or dyspnea at that time.  She denied loss of consciousness.  While in the emergency room it was noted that her heart rate went up to the 140's-160's but she denied having any symptoms at that time. Per the ER notes she appeared to be in SVT and she was converted after given adenosine 6 mg and metoprolol tartrate 5 mg IV.  Unable to locate any of the EKG strips from the ER to confirm rhythm.  Her work up was unremarkable except for an minimally elevated d-dimer.  I had a low suspicion for thromboembolism at that time and felt that the abnormality was likely due to her age and renal insufficiency.  She underwent an echocardiogram during that admission that showed normal left ventricular systolic function and mild aortic regurgitation.  She was started on metoprolol tartrate 25 mg bid, her hydrochlorothiazide was discontinued and she was sent home with a [employer] monitor.     She came in to see Dr. Santos today for complaints of chest pressure occurring at night.  She reports that the symptoms started after she was discharged from the hospital.  " Initially symptoms were happening nightly but more recently they've decreased a little bit of frequency.  Her last episode was about 2 days ago.  She reports that the symptoms last about 3 hours and are associated with significant shortness of breath.  She denies any associated palpitations or lightheadedness.  No significant symptoms during the daytime or with exertion.  Her daughter does note that she appears to have a little more shortness of breath with exertion than normal.  The daughter also feels she has a little bit more lower extremity edema than normal.  Due to her new symptoms she was sent to the cardiac evaluation center for further evaluation.         Review of Systems   Constitution: Negative for weakness and malaise/fatigue.   HENT: Negative for hearing loss, hoarse voice, nosebleeds and sore throat.    Eyes: Negative for pain.   Cardiovascular: Positive for chest pain and dyspnea on exertion. Negative for claudication, cyanosis, irregular heartbeat, leg swelling, near-syncope, orthopnea, palpitations, paroxysmal nocturnal dyspnea and syncope.   Respiratory: Negative for shortness of breath and snoring.    Endocrine: Negative for cold intolerance, heat intolerance, polydipsia, polyphagia and polyuria.   Skin: Negative for itching and rash.   Musculoskeletal: Negative for arthritis, falls, joint pain, joint swelling, muscle cramps, muscle weakness and myalgias.   Gastrointestinal: Negative for constipation, diarrhea, dysphagia, heartburn, hematemesis, hematochezia, melena, nausea and vomiting.   Genitourinary: Negative for frequency, hematuria and hesitancy.   Neurological: Negative for excessive daytime sleepiness, dizziness, headaches, light-headedness and numbness.   Psychiatric/Behavioral: Negative for depression. The patient is not nervous/anxious.           Current Outpatient Prescriptions:   •  allopurinol (ZYLOPRIM) 300 MG tablet, Take 300 mg by mouth Daily., Disp: , Rfl:   •  aspirin 81 MG  tablet, Take 1 tablet by mouth Daily., Disp: , Rfl:   •  Biotin 10 MG tablet, Take 1 tablet by mouth Daily., Disp: , Rfl:   •  Cholecalciferol (VITAMIN D) 2000 UNITS tablet, Take 2 tablets by mouth daily., Disp: , Rfl:   •  cyclobenzaprine (FLEXERIL) 5 MG tablet, Take 1 tablet by mouth 3 (Three) Times a Day As Needed for muscle spasms., Disp: 15 tablet, Rfl: 0  •  lisinopril (PRINIVIL,ZESTRIL) 20 MG tablet, Take 1 tablet by mouth Daily., Disp: 30 tablet, Rfl: 3  •  metoprolol tartrate (LOPRESSOR) 25 MG tablet, Take 0.5 tablets by mouth Every 12 (Twelve) Hours., Disp: 60 tablet, Rfl: 3  •  naproxen (NAPROSYN) 500 MG tablet, Take 500 mg by mouth Daily With Dinner., Disp: , Rfl:   •  pravastatin (PRAVACHOL) 40 MG tablet, Take 40 mg by mouth Every Night., Disp: , Rfl:     Current Facility-Administered Medications:   •  nitroglycerin (NITROSTAT) SL tablet 0.4 mg, 0.4 mg, Sublingual, Q5 Min PRN, Lizbeth Laura MD  •  sodium chloride 0.9 % flush 10 mL, 10 mL, Intravenous, PRN, Lizbeth Laura MD    Past Medical History:   Diagnosis Date   • Benign essential hypertension 10/28/2012    October 2012--treatment for hypertension begun.   • Bilateral sensorineural hearing loss, wears hearing aids 6/14/2017    Left is much worse than right.  Patient had multiple ear infections as a child.   • Chronic renal insufficiency, stage II (mild), 05/18/2016--creatinine 1.35 4/28/2016 05/18/2016--creatinine 1.35.  Baseline creatinine approximately 1.3.   • Claustrophobia 4/28/2016    This patient has significant nocturnal hypoxemia and I think that she could benefit from nocturnal oxygen therapy. The exact etiology of her hypoxemia is not clear. She could possibly have obstructive sleep apnea but this is not documented. We cannot test this patient for sleep apnea due to the fact that she is severely claustrophobic. Patient was a former smoker and it is possibly that COPD he is playing a role.   • Family history of coronary artery  disease 2016    Patient's mother, father, 2 sisters and a brother all  from myocardial infarctions   • Gout 10/28/2012    2012--initial diagnosis and treatment of gout.   • Hyperlipidemia 10/28/2012    2012--treatment for hyperlipidemia begun.   • Hypothyroidism 2015--routine follow-up reveals elevated TSH of 5.14. Repeat thyroid function tests ordered as well as thyroid antibodies.   • Impaired fasting glucose 10/28/2012    2012--initial diagnosis impaired fasting glucose.   • Nocturnal hypoxemia 2014--patient did not receive nocturnal oxygen because of Medicare regulations.   05/15/2014--overnight oximetry revealed oxygen saturations less than 89% for 22 minutes and 40 seconds. Oxygen saturations less than or equal to 88% for 22 minutes and 40 seconds. Lowest oxygen saturation 83%. The longest continuous time with oxygen saturations less than or equal to 88% was 1 minute and 32 seconds.   2012--overnight oximetry revealed oxygen saturations less than 90% for one hour and 35 minutes. Oxygen saturations less than 89% 59 minutes. This patient has significant nocturnal hypoxemia and I think that she could benefit from nocturnal oxygen therapy. The exact etiology of her hypoxemia is not clear. She could possibly have obstructive sleep apnea but this is not documented. We cannot test this patient for sleep apnea due to the fact that she is severely claustrophobic. Patient was a former smoker and it is possibly that COPD he is playing a role.   • Secondary polycythemia 2014--patient did not receive nocturnal oxygen because of Medicare regulations.   05/15/2014--overnight oximetry revealed oxygen saturations less than 89% for 22 minutes and 40 seconds. Oxygen saturations less than or equal to 88% for 22 minutes and 40 seconds. Lowest oxygen saturation 83%. The longest continuous time with oxygen saturations less than or equal to  88% was 1 minute and 32 seconds.   2012--overnight oximetry revealed oxygen saturations less than 90% for one hour and 35 minutes. Oxygen saturations less than 89% 59 minutes. This patient has significant nocturnal hypoxemia and I think that she could benefit from nocturnal oxygen therapy. The exact etiology of her hypoxemia is not clear. She could possibly have obstructive sleep apnea but this is not documented. We cannot test this patient for sleep apnea due to the fact that she is severely claustrophobic. Patient was a former smoker and it is possibly that COPD he is playing a role.   • Vitamin D deficiency 2016     Past Surgical History:   Procedure Laterality Date   • RADICAL ABDOMINAL HYSTERECTOMY  48 years old    48 years of age. Uterine fibroid tumors with menorrhagia     Family History   Problem Relation Age of Onset   • Heart disease Mother         Ischemic.  from coronary artery disease.   • Heart disease Father         Ischemic.  from coronary artery disease.   • Heart disease Sister         Ischemic.  from coronary artery disease.   • Heart disease Brother         Ischemic.  from coronary artery disease.   • Heart disease Sister         Ischemic.  from coronary artery disease.   • Breast cancer Daughter      Social History   Substance Use Topics   • Smoking status: Former Smoker     Packs/day: 0.50     Start date: 1946     Quit date: 1954   • Smokeless tobacco: Never Used      Comment: Stopped drinking at age 30.   • Alcohol use No           ECG 12 Lead  Date/Time: 7/10/2018 1:19 PM  Performed by: ELEUTERIO LOMAX  Authorized by: ELEUTERIO LOMAX   Comparison: compared with previous ECG   Similar to previous ECG  Rhythm: sinus rhythm  Comments: Non-specific inferior T wave changes               Objective:           Visit Vitals  /79 (BP Location: Left arm, Patient Position: Sitting)   Pulse 60   Resp 20   SpO2 98%          Physical Exam   Constitutional: She is  oriented to person, place, and time. She appears well-developed and well-nourished.   HENT:   Head: Normocephalic and atraumatic.   Eyes: Conjunctivae, EOM and lids are normal. Pupils are equal, round, and reactive to light.   Neck: Normal range of motion and full passive range of motion without pain. Neck supple. No JVD present. Carotid bruit is not present.   Cardiovascular: Normal rate, regular rhythm, S1 normal and S2 normal.  Exam reveals no gallop.    No murmur heard.  Pulses:       Radial pulses are 2+ on the right side, and 2+ on the left side.   No bilateral lower extremity edema   Pulmonary/Chest: Effort normal and breath sounds normal.   Abdominal: Soft. Normal appearance.   Lymphadenopathy:     She has no cervical adenopathy.   Neurological: She is alert and oriented to person, place, and time.   Skin: Skin is warm, dry and intact.   Psychiatric: She has a normal mood and affect.       Lab Review:       Assessment:          Diagnosis Plan   1. Shortness of breath  Cardiac Monitoring    Vital Signs - Once    Vital Signs - As Needed    Pulse Oximetry    Oxygen Therapy- Nasal Cannula; Titrate for SPO2: 92%, equal to or greater than    Insert Peripheral IV    sodium chloride 0.9 % flush 10 mL    nitroglycerin (NITROSTAT) SL tablet 0.4 mg    NPO Diet    Bathroom Privileges With Assistance    CBC & Differential    Comprehensive Metabolic Panel    Troponin T    D-dimer    proBNP    Cardiac Monitoring    Vital Signs - Once    Oxygen Therapy- Nasal Cannula; Titrate for SPO2: 92%, equal to or greater than    Insert Peripheral IV    NPO Diet    Bathroom Privileges With Assistance    Troponin T    Troponin T    D-dimer    D-dimer    proBNP    proBNP    CBC Auto Differential    XR Chest PA & Lateral   2. Chest pain, unspecified type  Cardiac Monitoring    Vital Signs - Once    Vital Signs - As Needed    Pulse Oximetry    Oxygen Therapy- Nasal Cannula; Titrate for SPO2: 92%, equal to or greater than    Insert  Peripheral IV    sodium chloride 0.9 % flush 10 mL    nitroglycerin (NITROSTAT) SL tablet 0.4 mg    NPO Diet    Bathroom Privileges With Assistance    CBC & Differential    Comprehensive Metabolic Panel    Troponin T    D-dimer    proBNP    Cardiac Monitoring    Vital Signs - Once    Oxygen Therapy- Nasal Cannula; Titrate for SPO2: 92%, equal to or greater than    Insert Peripheral IV    NPO Diet    Bathroom Privileges With Assistance    Troponin T    Troponin T    D-dimer    D-dimer    proBNP    proBNP    CBC Auto Differential   3. Chest tightness or pressure  Treadmill Stress Test    Stress Test With Myocardial Perfusion One Day          Plan:       1.  Chest pressure/dyspnea on exertion.  Although she has no symptoms with exertion except for dyspnea the fact that her symptoms are nocturnal is concerning.  Her EKG and troponin are both unremarkable.  Her d-dimer is mildly elevated and although I continue to have low suspicion for thromboembolism as the cause of her presentation, I think we need to rule this out.  Because of her renal insufficiency and her GFR today is 36 and feeling comfortable pursuing a CT angiogram of the chest.  We will proceed with a stat VQ scan instead.  If this is low probability will plan on having her come back for a stress test tomorrow.  Otherwise continue current management with aspirin and beta blocker.  2.  Paroxysmal supraventricular tachycardia.  Again this is per emergency room report.  I do not have any strips from that episode to review.  She was sent home with a [employer] monitor which she just turned in last week.  Since it usually takes about a week for them to process the monitor before the strips are sent to us for review I'm hoping that I will see them by the end of the week.  3.  Hypertension.  Mildly elevated in Dr. Santos's office but well controlled here.  We'll continue her current management with metoprolol and lisinopril for the time being.  4.  Chronic kidney disease.   Creatinine appears to be within her baseline range of 1.3-1.5.  5.  Hyperlipidemia    Further recommendations based on the results of the VQ scan.  If the VQ scan is low probability will plan to proceed with stress test tomorrow.    ADDENDUM (9204):  Notified that her V/Q scan was low probability for a pulmonary emoblus.  The patient will go home today with plans to return tomorrow for stress test.

## 2018-07-11 ENCOUNTER — HOSPITAL ENCOUNTER (OUTPATIENT)
Dept: CARDIOLOGY | Facility: HOSPITAL | Age: 83
Discharge: HOME | End: 2018-07-11
Attending: INTERNAL MEDICINE | Admitting: INTERNAL MEDICINE

## 2018-07-11 DIAGNOSIS — R07.89 CHEST TIGHTNESS OR PRESSURE: ICD-10-CM

## 2018-07-11 LAB
BH CV NUCLEAR PRIOR STUDY: 2
BH CV STRESS BP STAGE 1: NORMAL
BH CV STRESS COMMENTS STAGE 1: NORMAL
BH CV STRESS DOSE REGADENOSON STAGE 1: 0.4
BH CV STRESS DURATION MIN STAGE 1: 0
BH CV STRESS DURATION SEC STAGE 1: 10
BH CV STRESS HR STAGE 1: 81
BH CV STRESS PROTOCOL 1: NORMAL
BH CV STRESS RECOVERY BP: NORMAL MMHG
BH CV STRESS RECOVERY HR: 69 BPM
BH CV STRESS STAGE 1: 1
LV EF NUC BP: 59 %
MAXIMAL PREDICTED HEART RATE: 135 BPM
PERCENT MAX PREDICTED HR: 60 %
STRESS BASELINE BP: NORMAL MMHG
STRESS BASELINE HR: 63 BPM
STRESS PERCENT HR: 71 %
STRESS POST EXERCISE DUR SEC: 10 SEC
STRESS POST PEAK BP: NORMAL MMHG
STRESS POST PEAK HR: 81 BPM
STRESS TARGET HR: 115 BPM

## 2018-07-11 PROCEDURE — A9555 RB82 RUBIDIUM: HCPCS | Performed by: INTERNAL MEDICINE

## 2018-07-11 PROCEDURE — 0 RUBIDIUM CHLORIDE: Performed by: INTERNAL MEDICINE

## 2018-07-11 PROCEDURE — 78492 MYOCRD IMG PET MLT RST&STRS: CPT

## 2018-07-11 PROCEDURE — 78492 MYOCRD IMG PET MLT RST&STRS: CPT | Performed by: INTERNAL MEDICINE

## 2018-07-11 PROCEDURE — 93018 CV STRESS TEST I&R ONLY: CPT | Performed by: INTERNAL MEDICINE

## 2018-07-11 PROCEDURE — 25010000002 REGADENOSON 0.4 MG/5ML SOLUTION: Performed by: INTERNAL MEDICINE

## 2018-07-11 PROCEDURE — 93017 CV STRESS TEST TRACING ONLY: CPT

## 2018-07-11 PROCEDURE — 93016 CV STRESS TEST SUPVJ ONLY: CPT | Performed by: INTERNAL MEDICINE

## 2018-07-11 RX ADMIN — RUBIDIUM CHLORIDE RB-82 1 DOSE: 150 INJECTION, SOLUTION INTRAVENOUS at 09:35

## 2018-07-11 RX ADMIN — RUBIDIUM CHLORIDE RB-82 1 DOSE: 150 INJECTION, SOLUTION INTRAVENOUS at 09:25

## 2018-07-11 RX ADMIN — REGADENOSON 0.4 MG: 0.08 INJECTION, SOLUTION INTRAVENOUS at 09:35

## 2018-07-19 ENCOUNTER — TELEPHONE (OUTPATIENT)
Dept: CARDIOLOGY | Facility: CLINIC | Age: 83
End: 2018-07-19

## 2018-07-19 PROCEDURE — 0298T HOLTER MONITOR - 72 HOUR UP TO 21 DAY: CPT | Performed by: INTERNAL MEDICINE

## 2018-07-20 ENCOUNTER — TELEPHONE (OUTPATIENT)
Dept: CARDIOLOGY | Facility: HOSPITAL | Age: 83
End: 2018-07-20

## 2018-07-30 ENCOUNTER — HOSPITAL ENCOUNTER (OUTPATIENT)
Dept: ULTRASOUND IMAGING | Facility: HOSPITAL | Age: 83
Discharge: HOME | End: 2018-07-30

## 2018-07-31 ENCOUNTER — HOSPITAL ENCOUNTER (OUTPATIENT)
Dept: ULTRASOUND IMAGING | Facility: HOSPITAL | Age: 83
Discharge: HOME | End: 2018-07-31
Admitting: INTERNAL MEDICINE

## 2018-07-31 PROCEDURE — 76536 US EXAM OF HEAD AND NECK: CPT

## 2018-08-01 ENCOUNTER — OFFICE VISIT (OUTPATIENT)
Dept: CARDIOLOGY | Facility: CLINIC | Age: 83
End: 2018-08-01

## 2018-08-01 VITALS
HEIGHT: 63 IN | BODY MASS INDEX: 38.62 KG/M2 | DIASTOLIC BLOOD PRESSURE: 96 MMHG | WEIGHT: 218 LBS | SYSTOLIC BLOOD PRESSURE: 184 MMHG | HEART RATE: 60 BPM

## 2018-08-01 DIAGNOSIS — E78.5 HYPERLIPIDEMIA, UNSPECIFIED HYPERLIPIDEMIA TYPE: Chronic | ICD-10-CM

## 2018-08-01 DIAGNOSIS — I47.1 SUPRAVENTRICULAR TACHYCARDIA (HCC): Primary | ICD-10-CM

## 2018-08-01 DIAGNOSIS — I10 BENIGN ESSENTIAL HYPERTENSION: Chronic | ICD-10-CM

## 2018-08-01 DIAGNOSIS — R55 NEAR SYNCOPE: ICD-10-CM

## 2018-08-01 PROCEDURE — 93000 ELECTROCARDIOGRAM COMPLETE: CPT | Performed by: INTERNAL MEDICINE

## 2018-08-01 PROCEDURE — 99214 OFFICE O/P EST MOD 30 MIN: CPT | Performed by: INTERNAL MEDICINE

## 2018-08-01 RX ORDER — HYDROCHLOROTHIAZIDE 12.5 MG/1
12.5 CAPSULE, GELATIN COATED ORAL DAILY
Qty: 30 CAPSULE | Refills: 1 | Status: SHIPPED | OUTPATIENT
Start: 2018-08-01 | End: 2018-08-24

## 2018-08-01 NOTE — PROGRESS NOTES
"    Subjective:     Encounter Date:08/01/2018      Patient ID: Mandy Alarcon is a 85 y.o. female.    Chief Complaint:  History of Present Illness    This is an 85 year old female patient with a history of hypertension, CKD stage 2, gout, hypothyroidism, tobacco use, hyperlipidemia, recent episode of near syncope, paroxsymal supraventricular tachycardia, who presents for follow up.         I saw the patient initially when she was admitted to the hospital following a near syncopal episode on 6/20/18.  Prior to admission the patient reports she was at home doing some work around her house.  She went outside for a couple of minutes and went back inside.  While she was walking around she had a sudden onset of sensation where she felt like her \"head went empty\".  She denied any palpitations, chest pain or dyspnea at that time.  She denied loss of consciousness.  While in the emergency room it was noted that her heart rate went up to the 140's-160's but she denied having any symptoms at that time. Per the ER notes she appeared to be in SVT and she was converted after given adenosine 6 mg and metoprolol tartrate 5 mg IV.  Unable to locate any of the EKG strips from the ER to confirm rhythm.  Her work up was unremarkable except for an minimally elevated d-dimer.  I had a low suspicion for thromboembolism at that time and felt that the abnormality was likely due to her age and renal insufficiency.  She underwent an echocardiogram during that admission that showed normal left ventricular systolic function, with an EF of 60%, normal diastolic function, and mild aortic regurgitation.  She was started on metoprolol tartrate 25 mg bid, her hydrochlorothiazide was discontinued and she was sent home with a Zio monitor.      She presented to the cardiac evaluation center on 7/10/2018 from Dr. Santos' office after complaining of nocturnal chest pressure.  These symptoms began after she was discharged from the hospital, initially on a " nightly basis.  She reported that the symptoms would last about 3 hours and are associated with significant shortness of breath.  It also appeared that she was having more shortness of breath and lower extremity edema than normal.  Her lab work at that time was unremarkable except for a persistently elevated d-dimer.  Proceeded with a VQ scan that same day that was low probability for a pulmonary embolism.  The following day she returned to the office for a stress test that showed no evidence of ischemia and an EF of 59%.  Her Zio monitor showed 53 nonsustained episodes of supraventricular tachycardia the longest lasting 54 beats and the fastest with a rate of 203 bpm.  She also had rare episodes of PVCs and nonsustained ventricular tachycardia longest lasting 9 beats with a rate of 166 bpm.  There was also a single nonsignificant of 2 seconds.    Today she presents for routine follow-up.  Overall she has been feeling well.  She's had no further episodes of chest pressure.  She denies any palpitations, near syncope or syncope, PND or orthopnea.  Her daughter reports that she's noticed more dyspnea on exertion and more issues with lower extremity edema.  She wonders if this is because she's been off the hydrochlorothiazide since her admission in June.  Her blood pressures are elevated in the office today and the patient reports that she has not been checking her blood pressures at home.             Review of Systems   Constitution: Negative for weakness and malaise/fatigue.   HENT: Negative for hearing loss, hoarse voice, nosebleeds and sore throat.    Eyes: Negative for pain.   Cardiovascular: Positive for dyspnea on exertion and leg swelling. Negative for chest pain, claudication, cyanosis, irregular heartbeat, near-syncope, orthopnea, palpitations, paroxysmal nocturnal dyspnea and syncope.   Respiratory: Negative for shortness of breath and snoring.    Endocrine: Negative for cold intolerance, heat intolerance,  polydipsia, polyphagia and polyuria.   Skin: Negative for itching and rash.   Musculoskeletal: Negative for arthritis, falls, joint pain, joint swelling, muscle cramps, muscle weakness and myalgias.   Gastrointestinal: Negative for constipation, diarrhea, dysphagia, heartburn, hematemesis, hematochezia, melena, nausea and vomiting.   Genitourinary: Negative for frequency, hematuria and hesitancy.   Neurological: Negative for excessive daytime sleepiness, dizziness, headaches, light-headedness and numbness.   Psychiatric/Behavioral: Negative for depression. The patient is not nervous/anxious.           Current Outpatient Prescriptions:   •  allopurinol (ZYLOPRIM) 300 MG tablet, Take 300 mg by mouth Daily., Disp: , Rfl:   •  aspirin 81 MG tablet, Take 1 tablet by mouth Daily., Disp: , Rfl:   •  Biotin 10 MG tablet, Take 1 tablet by mouth Daily., Disp: , Rfl:   •  Cholecalciferol (VITAMIN D) 2000 UNITS tablet, Take 2 tablets by mouth daily., Disp: , Rfl:   •  cyclobenzaprine (FLEXERIL) 5 MG tablet, Take 1 tablet by mouth 3 (Three) Times a Day As Needed for muscle spasms., Disp: 15 tablet, Rfl: 0  •  lisinopril (PRINIVIL,ZESTRIL) 20 MG tablet, Take 1 tablet by mouth Daily., Disp: 30 tablet, Rfl: 3  •  metoprolol tartrate (LOPRESSOR) 25 MG tablet, Take 0.5 tablets by mouth Every 12 (Twelve) Hours., Disp: 60 tablet, Rfl: 3  •  naproxen (NAPROSYN) 500 MG tablet, Take 500 mg by mouth Daily With Dinner., Disp: , Rfl:   •  pravastatin (PRAVACHOL) 40 MG tablet, Take 40 mg by mouth Every Night., Disp: , Rfl:     Current Facility-Administered Medications:   •  nitroglycerin (NITROSTAT) SL tablet 0.4 mg, 0.4 mg, Sublingual, Q5 Min PRN, Lizbeth Laura MD  •  sodium chloride 0.9 % flush 10 mL, 10 mL, Intravenous, PRN, Lizbeth Laura MD    Past Medical History:   Diagnosis Date   • Benign essential hypertension 10/28/2012    October 2012--treatment for hypertension begun.   • Bilateral sensorineural hearing loss, wears hearing  aids 2017    Left is much worse than right.  Patient had multiple ear infections as a child.   • Chronic renal insufficiency, stage II (mild), 2016--creatinine 1.35 2016--creatinine 1.35.  Baseline creatinine approximately 1.3.   • Claustrophobia 2016    This patient has significant nocturnal hypoxemia and I think that she could benefit from nocturnal oxygen therapy. The exact etiology of her hypoxemia is not clear. She could possibly have obstructive sleep apnea but this is not documented. We cannot test this patient for sleep apnea due to the fact that she is severely claustrophobic. Patient was a former smoker and it is possibly that COPD he is playing a role.   • Family history of coronary artery disease 2016    Patient's mother, father, 2 sisters and a brother all  from myocardial infarctions   • Gout 10/28/2012    2012--initial diagnosis and treatment of gout.   • Hyperlipidemia 10/28/2012    2012--treatment for hyperlipidemia begun.   • Hypothyroidism 2015--routine follow-up reveals elevated TSH of 5.14. Repeat thyroid function tests ordered as well as thyroid antibodies.   • Impaired fasting glucose 10/28/2012    2012--initial diagnosis impaired fasting glucose.   • Nocturnal hypoxemia 2014--patient did not receive nocturnal oxygen because of Medicare regulations.   05/15/2014--overnight oximetry revealed oxygen saturations less than 89% for 22 minutes and 40 seconds. Oxygen saturations less than or equal to 88% for 22 minutes and 40 seconds. Lowest oxygen saturation 83%. The longest continuous time with oxygen saturations less than or equal to 88% was 1 minute and 32 seconds.   2012--overnight oximetry revealed oxygen saturations less than 90% for one hour and 35 minutes. Oxygen saturations less than 89% 59 minutes. This patient has significant nocturnal hypoxemia and I think that she could  benefit from nocturnal oxygen therapy. The exact etiology of her hypoxemia is not clear. She could possibly have obstructive sleep apnea but this is not documented. We cannot test this patient for sleep apnea due to the fact that she is severely claustrophobic. Patient was a former smoker and it is possibly that COPD he is playing a role.   • Secondary polycythemia 2014--patient did not receive nocturnal oxygen because of Medicare regulations.   05/15/2014--overnight oximetry revealed oxygen saturations less than 89% for 22 minutes and 40 seconds. Oxygen saturations less than or equal to 88% for 22 minutes and 40 seconds. Lowest oxygen saturation 83%. The longest continuous time with oxygen saturations less than or equal to 88% was 1 minute and 32 seconds.   2012--overnight oximetry revealed oxygen saturations less than 90% for one hour and 35 minutes. Oxygen saturations less than 89% 59 minutes. This patient has significant nocturnal hypoxemia and I think that she could benefit from nocturnal oxygen therapy. The exact etiology of her hypoxemia is not clear. She could possibly have obstructive sleep apnea but this is not documented. We cannot test this patient for sleep apnea due to the fact that she is severely claustrophobic. Patient was a former smoker and it is possibly that COPD he is playing a role.   • Vitamin D deficiency 2016     Past Surgical History:   Procedure Laterality Date   • RADICAL ABDOMINAL HYSTERECTOMY  48 years old    48 years of age. Uterine fibroid tumors with menorrhagia     Family History   Problem Relation Age of Onset   • Heart disease Mother         Ischemic.  from coronary artery disease.   • Heart disease Father         Ischemic.  from coronary artery disease.   • Heart disease Sister         Ischemic.  from coronary artery disease.   • Heart disease Brother         Ischemic.  from coronary artery disease.   • Heart disease Sister          "Ischemic.  from coronary artery disease.   • Breast cancer Daughter      Social History   Substance Use Topics   • Smoking status: Former Smoker     Packs/day: 0.50     Start date: 1946     Quit date: 1954   • Smokeless tobacco: Never Used      Comment: Stopped drinking at age 30.   • Alcohol use No           ECG 12 Lead  Date/Time: 2018 6:10 PM  Performed by: ELEUTERIO LOMAX  Authorized by: ELEUTERIO LOMAX   Comparison: compared with previous ECG   Similar to previous ECG  Rhythm: sinus rhythm               Objective:         Visit Vitals  BP (!) 184/96 (BP Location: Right arm, Patient Position: Sitting)   Pulse 60   Ht 160 cm (63\")   Wt 98.9 kg (218 lb)   BMI 38.62 kg/m²          Physical Exam   Constitutional: She is oriented to person, place, and time. She appears well-developed and well-nourished.   HENT:   Head: Normocephalic and atraumatic.   Eyes: Pupils are equal, round, and reactive to light. Conjunctivae, EOM and lids are normal.   Neck: Normal range of motion and full passive range of motion without pain. Neck supple. No JVD present. Carotid bruit is not present.   Cardiovascular: Normal rate, regular rhythm, S1 normal and S2 normal.  Exam reveals no gallop.    No murmur heard.  Pulses:       Radial pulses are 2+ on the right side, and 2+ on the left side.   No bilateral lower extremity edema   Pulmonary/Chest: Effort normal and breath sounds normal.   Abdominal: Soft. Normal appearance.   Lymphadenopathy:     She has no cervical adenopathy.   Neurological: She is alert and oriented to person, place, and time.   Skin: Skin is warm, dry and intact.   Psychiatric: She has a normal mood and affect.       Lab Review:       Assessment:          Diagnosis Plan   1. Supraventricular tachycardia (CMS/HCC)     2. Benign essential hypertension     3. Hyperlipidemia, unspecified hyperlipidemia type     4. Near syncope            Plan:       1.  Lower extremity edema/dyspnea.  Recent echocardiogram " showed normal left ventricle systolic and diastolic function.  Her blood pressures are elevated in the office today but is unclear if they've been elevated at home.  Since she's had no recurrent episodes of near syncope or syncope we'll go ahead and resume her hydrochlorothiazide at 12.5 mg a day to see this will help with her symptoms.    2.  Paroxysmal superventricular tachycardia.  I suspect this may be a cause of her episodes of chest pressure and possibly her near-syncopal episode.  No recent episodes of we are aware of.  Continue management with low-dose beta blocker.  3.  Hypertension.  Elevated in the office today.  We'll see if there is any improvement with the resumption of hydrochlorothiazide.  4.  Hyperlipidemia on pravastatin per Dr. Santos.    We'll plan on see the patient back in 6 months.

## 2018-08-13 DIAGNOSIS — E78.5 HYPERLIPIDEMIA, UNSPECIFIED HYPERLIPIDEMIA TYPE: Chronic | ICD-10-CM

## 2018-08-13 DIAGNOSIS — N18.2 CHRONIC RENAL INSUFFICIENCY, STAGE II (MILD): Chronic | ICD-10-CM

## 2018-08-13 DIAGNOSIS — E55.9 VITAMIN D DEFICIENCY: Chronic | ICD-10-CM

## 2018-08-13 DIAGNOSIS — R73.01 IMPAIRED FASTING GLUCOSE: Chronic | ICD-10-CM

## 2018-08-13 DIAGNOSIS — E03.9 HYPOTHYROIDISM, UNSPECIFIED TYPE: Chronic | ICD-10-CM

## 2018-08-13 DIAGNOSIS — Z87.39 HISTORY OF GOUT: Chronic | ICD-10-CM

## 2018-08-16 LAB
25(OH)D3+25(OH)D2 SERPL-MCNC: 36.3 NG/ML (ref 30–100)
ALBUMIN SERPL-MCNC: 4.1 G/DL (ref 3.5–5.2)
ALBUMIN/GLOB SERPL: 1.5 G/DL
ALP SERPL-CCNC: 47 U/L (ref 39–117)
ALT SERPL-CCNC: 41 U/L (ref 1–33)
AST SERPL-CCNC: 27 U/L (ref 1–32)
BILIRUB SERPL-MCNC: 0.4 MG/DL (ref 0.1–1.2)
BUN SERPL-MCNC: 28 MG/DL (ref 8–23)
BUN/CREAT SERPL: 21.9 (ref 7–25)
CALCIUM SERPL-MCNC: 9.5 MG/DL (ref 8.6–10.5)
CHLORIDE SERPL-SCNC: 104 MMOL/L (ref 98–107)
CHOLEST SERPL-MCNC: 127 MG/DL (ref 100–199)
CK SERPL-CCNC: 51 U/L (ref 20–180)
CO2 SERPL-SCNC: 23.4 MMOL/L (ref 22–29)
CREAT SERPL-MCNC: 1.28 MG/DL (ref 0.57–1)
ERYTHROCYTE [DISTWIDTH] IN BLOOD BY AUTOMATED COUNT: 14.7 % (ref 11.7–13)
GLOBULIN SER CALC-MCNC: 2.7 GM/DL
GLUCOSE SERPL-MCNC: 92 MG/DL (ref 65–99)
HBA1C MFR BLD: 5.5 % (ref 4.8–5.6)
HCT VFR BLD AUTO: 48.4 % (ref 35.6–45.5)
HDL SERPL-SCNC: 29.3 UMOL/L
HDLC SERPL-MCNC: 54 MG/DL
HGB BLD-MCNC: 15.7 G/DL (ref 11.9–15.5)
LDL SERPL QN: 20.6 NM
LDL SERPL-SCNC: 833 NMOL/L
LDL SMALL SERPL-SCNC: 394 NMOL/L
LDLC SERPL CALC-MCNC: 53 MG/DL (ref 0–99)
MCH RBC QN AUTO: 30.5 PG (ref 26.9–32)
MCHC RBC AUTO-ENTMCNC: 32.4 G/DL (ref 32.4–36.3)
MCV RBC AUTO: 94.2 FL (ref 80.5–98.2)
PLATELET # BLD AUTO: 255 10*3/MM3 (ref 140–500)
POTASSIUM SERPL-SCNC: 5.2 MMOL/L (ref 3.5–5.2)
PROT SERPL-MCNC: 6.8 G/DL (ref 6–8.5)
RBC # BLD AUTO: 5.14 10*6/MM3 (ref 3.9–5.2)
SODIUM SERPL-SCNC: 140 MMOL/L (ref 136–145)
T3FREE SERPL-MCNC: 2.1 PG/ML (ref 2–4.4)
T4 FREE SERPL-MCNC: 1.19 NG/DL (ref 0.93–1.7)
TRIGL SERPL-MCNC: 102 MG/DL (ref 0–149)
TSH SERPL DL<=0.005 MIU/L-ACNC: 4.08 MIU/ML (ref 0.27–4.2)
URATE SERPL-MCNC: 4.4 MG/DL (ref 2.4–5.7)
WBC # BLD AUTO: 10.88 10*3/MM3 (ref 4.5–10.7)

## 2018-08-24 ENCOUNTER — OFFICE VISIT (OUTPATIENT)
Dept: INTERNAL MEDICINE | Facility: CLINIC | Age: 83
End: 2018-08-24

## 2018-08-24 VITALS
OXYGEN SATURATION: 98 % | HEART RATE: 55 BPM | SYSTOLIC BLOOD PRESSURE: 142 MMHG | HEIGHT: 63 IN | BODY MASS INDEX: 37.92 KG/M2 | DIASTOLIC BLOOD PRESSURE: 74 MMHG | WEIGHT: 214 LBS

## 2018-08-24 DIAGNOSIS — Z87.39 HISTORY OF GOUT: Chronic | ICD-10-CM

## 2018-08-24 DIAGNOSIS — G47.34 NOCTURNAL HYPOXEMIA: Chronic | ICD-10-CM

## 2018-08-24 DIAGNOSIS — E78.5 HYPERLIPIDEMIA, UNSPECIFIED HYPERLIPIDEMIA TYPE: Chronic | ICD-10-CM

## 2018-08-24 DIAGNOSIS — R73.01 IMPAIRED FASTING GLUCOSE: Chronic | ICD-10-CM

## 2018-08-24 DIAGNOSIS — E04.2 MULTINODULAR GOITER: Chronic | ICD-10-CM

## 2018-08-24 DIAGNOSIS — D75.1 SECONDARY POLYCYTHEMIA: Chronic | ICD-10-CM

## 2018-08-24 DIAGNOSIS — Z00.00 MEDICARE ANNUAL WELLNESS VISIT, SUBSEQUENT: Primary | ICD-10-CM

## 2018-08-24 DIAGNOSIS — N18.2 CHRONIC RENAL INSUFFICIENCY, STAGE II (MILD): Chronic | ICD-10-CM

## 2018-08-24 DIAGNOSIS — E03.8 SUBCLINICAL HYPOTHYROIDISM: ICD-10-CM

## 2018-08-24 DIAGNOSIS — I10 BENIGN ESSENTIAL HYPERTENSION: Chronic | ICD-10-CM

## 2018-08-24 DIAGNOSIS — Z51.81 THERAPEUTIC DRUG MONITORING: ICD-10-CM

## 2018-08-24 DIAGNOSIS — E55.9 VITAMIN D DEFICIENCY: Chronic | ICD-10-CM

## 2018-08-24 DIAGNOSIS — I47.1 SUPRAVENTRICULAR TACHYCARDIA (HCC): Chronic | ICD-10-CM

## 2018-08-24 PROBLEM — R55 NEAR SYNCOPE: Status: RESOLVED | Noted: 2018-07-10 | Resolved: 2018-08-24

## 2018-08-24 PROBLEM — I47.10 SUPRAVENTRICULAR TACHYCARDIA: Chronic | Status: ACTIVE | Noted: 2018-07-10

## 2018-08-24 PROBLEM — R07.89 CHEST TIGHTNESS OR PRESSURE: Status: RESOLVED | Noted: 2018-07-10 | Resolved: 2018-08-24

## 2018-08-24 PROBLEM — Z09 HOSPITAL DISCHARGE FOLLOW-UP: Status: RESOLVED | Noted: 2018-07-10 | Resolved: 2018-08-24

## 2018-08-24 PROBLEM — R06.00 PND (PAROXYSMAL NOCTURNAL DYSPNEA): Status: RESOLVED | Noted: 2018-07-10 | Resolved: 2018-08-24

## 2018-08-24 PROCEDURE — 99214 OFFICE O/P EST MOD 30 MIN: CPT | Performed by: INTERNAL MEDICINE

## 2018-08-24 PROCEDURE — G0439 PPPS, SUBSEQ VISIT: HCPCS | Performed by: INTERNAL MEDICINE

## 2018-08-24 RX ORDER — LISINOPRIL AND HYDROCHLOROTHIAZIDE 20; 12.5 MG/1; MG/1
TABLET ORAL
Qty: 90 TABLET | Refills: 3 | Status: SHIPPED | OUTPATIENT
Start: 2018-08-24 | End: 2019-06-16 | Stop reason: SDUPTHER

## 2018-08-24 NOTE — PROGRESS NOTES
QUICK REFERENCE INFORMATION:  The ABCs of the Annual Wellness Visit    Subsequent Medicare Wellness Visit    HEALTH RISK ASSESSMENT    5/30/1933    Recent Hospitalizations:  Recently treated at the following:  [facility].        Current Medical Providers:  Patient Care Team:  Daryl Santos MD as PCP - General (Internal Medicine)  Daryl Santos MD as PCP - Claims Attributed  Zakia Herrera RN as Care Coordinator (Population Health)        Smoking Status:  History   Smoking Status   • Former Smoker   • Packs/day: 0.50   • Start date: 1/1/1946   • Quit date: 1/1/1954   Smokeless Tobacco   • Never Used     Comment: Stopped drinking at age 30.       Alcohol Consumption:  History   Alcohol Use No       Depression Screen:   PHQ-2/PHQ-9 Depression Screening 8/24/2018   Little interest or pleasure in doing things 0   Feeling down, depressed, or hopeless 0   Total Score 0       Health Habits and Functional and Cognitive Screening:  Functional & Cognitive Status 8/24/2018   Do you have difficulty preparing food and eating? No   Do you have difficulty bathing yourself, getting dressed or grooming yourself? No   Do you have difficulty using the toilet? No   Do you have difficulty moving around from place to place? No   Do you have trouble with steps or getting out of a bed or a chair? No   In the past year have you fallen or experienced a near fall? No   Current Diet Limited Junk Food   Dental Exam Not up to date   Eye Exam Not up to date   Exercise (times per week) 0 times per week   Current Exercise Activities Include None   Do you need help using the phone?  No   Are you deaf or do you have serious difficulty hearing?  Yes   Do you need help with transportation? No   Do you need help shopping? No   Do you need help preparing meals?  No   Do you need help with housework?  No   Do you need help with laundry? No   Do you need help taking your medications? No   Do you need help managing money? No   Do  you ever drive or ride in a car without wearing a seat belt? No   Have you felt unusual stress, anger or loneliness in the last month? No   Who do you live with? Child   If you need help, do you have trouble finding someone available to you? No   Have you been bothered in the last four weeks by sexual problems? No   Do you have difficulty concentrating, remembering or making decisions? No           Does the patient have evidence of cognitive impairment? No    Aspirin use counseling: Taking ASA appropriately as indicated      Recent Lab Results:  CMP:  Lab Results   Component Value Date    GLU 92 08/14/2018    BUN 28 (H) 08/14/2018    CREATININE 1.28 (H) 08/14/2018    EGFRIFNONA 40 (L) 08/14/2018    EGFRIFAFRI 48 (L) 08/14/2018    BCR 21.9 08/14/2018     08/14/2018    K 5.2 08/14/2018    CO2 23.4 08/14/2018    CALCIUM 9.5 08/14/2018    PROTENTOTREF 6.8 08/14/2018    ALBUMIN 4.10 08/14/2018    LABGLOBREF 2.7 08/14/2018    LABIL2 1.5 08/14/2018    BILITOT 0.4 08/14/2018    ALKPHOS 47 08/14/2018    AST 27 08/14/2018    ALT 41 (H) 08/14/2018     Lipid Panel:  Lab Results   Component Value Date    TRIG 102 08/14/2018     HbA1c:  Lab Results   Component Value Date    HGBA1C 5.50 08/14/2018       Visual Acuity:  No exam data present    Age-appropriate Screening Schedule:  Refer to the list below for future screening recommendations based on patient's age, sex and/or medical conditions. Orders for these recommended tests are listed in the plan section. The patient has been provided with a written plan.    Health Maintenance   Topic Date Due   • INFLUENZA VACCINE  08/01/2018   • LIPID PANEL  08/14/2019   • MAMMOGRAM  12/26/2019   • TDAP/TD VACCINES (2 - Td) 06/14/2027   • PNEUMOCOCCAL VACCINES (65+ LOW/MEDIUM RISK)  Completed   • ZOSTER VACCINE  Excluded        Subjective   History of Present Illness    Mandy Alarcon is a 85 y.o. female who presents for an Subsequent Wellness Visit.    The following portions of the  patient's history were reviewed and updated as appropriate: allergies, current medications, past family history, past medical history, past social history, past surgical history and problem list.    Outpatient Medications Prior to Visit   Medication Sig Dispense Refill   • allopurinol (ZYLOPRIM) 300 MG tablet Take 300 mg by mouth Daily.     • aspirin 81 MG tablet Take 1 tablet by mouth Daily.     • Biotin 10 MG tablet Take 1 tablet by mouth Daily.     • Cholecalciferol (VITAMIN D) 2000 UNITS tablet Take 2 tablets by mouth daily.     • metoprolol tartrate (LOPRESSOR) 25 MG tablet Take 0.5 tablets by mouth Every 12 (Twelve) Hours. 60 tablet 3   • naproxen (NAPROSYN) 500 MG tablet Take 500 mg by mouth Daily With Dinner.     • pravastatin (PRAVACHOL) 40 MG tablet Take 40 mg by mouth Every Night.     • lisinopril (PRINIVIL,ZESTRIL) 20 MG tablet Take 1 tablet by mouth Daily. 30 tablet 3   • cyclobenzaprine (FLEXERIL) 5 MG tablet Take 1 tablet by mouth 3 (Three) Times a Day As Needed for muscle spasms. 15 tablet 0   • hydrochlorothiazide (MICROZIDE) 12.5 MG capsule Take 1 capsule by mouth Daily. 30 capsule 1     Facility-Administered Medications Prior to Visit   Medication Dose Route Frequency Provider Last Rate Last Dose   • nitroglycerin (NITROSTAT) SL tablet 0.4 mg  0.4 mg Sublingual Q5 Min PRN Lizbeth Laura MD       • sodium chloride 0.9 % flush 10 mL  10 mL Intravenous PRN Lizbeth Laura MD           Patient Active Problem List   Diagnosis   • Benign essential hypertension   • Chronic renal insufficiency, stage II (mild), 05/18/2016--creatinine 1.35   • Claustrophobia   • Gout   • Hyperlipidemia   • Subclinical hypothyroidism   • Impaired fasting glucose   • Nocturnal hypoxemia   • Secondary polycythemia   • Vitamin D deficiency   • Therapeutic drug monitoring   • Family history of coronary artery disease   • Bilateral sensorineural hearing loss, wears hearing aids   • Multinodular goiter   • Supraventricular  "tachycardia (CMS/[facility])       Advance Care Planning:  has NO advance directive - information provided to the patient today    Identification of Risk Factors:  Risk factors include: weight  and cardiovascular risk.    Review of Systems   Musculoskeletal: Positive for arthralgias.   All other systems reviewed and are negative.      Compared to one year ago, the patient feels her physical health is better.  Compared to one year ago, the patient feels her mental health is the same.    Objective       Physical Exam    General: Alert and oriented x 3.  No acute distress. Obese. Normal affect.  HEENT: Pupils equal, round, reactive to light; extraocular movements intact; sclerae nonicteric; pharynx, ear canals and TMs normal.  Neck: Without JVD, thyromegaly, bruit, or adenopathy.  Lungs: Clear to auscultation in all fields.  Heart: Regular rate and rhythm without murmur, rub, gallop, or click.  Abdomen: Soft, nontender, without hepatosplenomegaly or hernia.  Bowel sounds normal.  : Deferred.  Rectal: Deferred.  Extremities: Without clubbing, cyanosis, edema, or pulse deficit.  Neurologic: Intact without focal deficit.  Normal station and gait observed during ingress and egress from the examination room.  Skin: Without significant lesion.  Musculoskeletal: Unremarkable.    Vitals:    08/24/18 0926   BP: 142/74   BP Location: Right arm   Patient Position: Sitting   Cuff Size: Large Adult   Pulse: 55   SpO2: 98%   Weight: 97.1 kg (214 lb)   Height: 160 cm (62.99\")       Patient's Body mass index is 37.92 kg/m². BMI is above normal parameters. Recommendations include: exercise counseling, nutrition counseling and referral to primary care.      Assessment/Plan   Patient Self-Management and Personalized Health Advice  The patient has been provided with information about: diet, exercise, weight management, prevention of cardiac or vascular disease, fall prevention and designing advance directives and preventive services including: "   · Advance directive, Counseling for cardiovascular disease risk reduction, Diabetes screening, see lab orders, Exercise counseling provided, Fall Risk assessment done, Glaucoma screening recommended, Nutrition counseling provided, Zostavax vaccine (Herpes Zoster).    Visit Diagnoses:    ICD-10-CM ICD-9-CM   1. Medicare annual wellness visit, subsequent Z00.00 V70.0   2. Impaired fasting glucose R73.01 790.21   3. Hyperlipidemia, unspecified hyperlipidemia type E78.5 272.4   4. Subclinical hypothyroidism E03.9 244.8   5. Gout Z87.39 V12.29   6. Chronic renal insufficiency, stage II (mild), 05/18/2016--creatinine 1.35 N18.2 585.2   7. Benign essential hypertension I10 401.1   8. Secondary polycythemia D75.1 289.0   9. Multinodular goiter E04.2 241.1   10. Supraventricular tachycardia (CMS/[facility]) I47.1 427.89   11. Vitamin D deficiency E55.9 268.9   12. Nocturnal hypoxemia G47.34 327.24   13. Therapeutic drug monitoring Z51.81 V58.83       Orders Placed This Encounter   Procedures   • CBC (No Diff)     Standing Status:   Future     Standing Expiration Date:   8/24/2020   • CK     Standing Status:   Future     Standing Expiration Date:   8/24/2020   • Comprehensive Metabolic Panel     Standing Status:   Future     Standing Expiration Date:   8/24/2020   • Hemoglobin A1c     Standing Status:   Future     Standing Expiration Date:   8/24/2020   • NMR LipoProfile     Standing Status:   Future     Standing Expiration Date:   8/24/2020   • TSH     Standing Status:   Future     Standing Expiration Date:   8/24/2020   • T4, Free     Standing Status:   Future     Standing Expiration Date:   8/24/2020   • T3, Free     Standing Status:   Future     Standing Expiration Date:   8/24/2020       Outpatient Encounter Prescriptions as of 8/24/2018   Medication Sig Dispense Refill   • allopurinol (ZYLOPRIM) 300 MG tablet Take 300 mg by mouth Daily.     • aspirin 81 MG tablet Take 1 tablet by mouth Daily.     • Biotin 10 MG tablet Take 1  tablet by mouth Daily.     • Cholecalciferol (VITAMIN D) 2000 UNITS tablet Take 2 tablets by mouth daily.     • metoprolol tartrate (LOPRESSOR) 25 MG tablet Take 0.5 tablets by mouth Every 12 (Twelve) Hours. 60 tablet 3   • naproxen (NAPROSYN) 500 MG tablet Take 500 mg by mouth Daily With Dinner.     • pravastatin (PRAVACHOL) 40 MG tablet Take 40 mg by mouth Every Night.     • [DISCONTINUED] lisinopril (PRINIVIL,ZESTRIL) 20 MG tablet Take 1 tablet by mouth Daily. 30 tablet 3   • lisinopril-hydrochlorothiazide (ZESTORETIC) 20-12.5 MG per tablet One by mouth every morning for high blood pressure and leg swelling 90 tablet 3   • [DISCONTINUED] cyclobenzaprine (FLEXERIL) 5 MG tablet Take 1 tablet by mouth 3 (Three) Times a Day As Needed for muscle spasms. 15 tablet 0   • [DISCONTINUED] hydrochlorothiazide (MICROZIDE) 12.5 MG capsule Take 1 capsule by mouth Daily. 30 capsule 1     Facility-Administered Encounter Medications as of 8/24/2018   Medication Dose Route Frequency Provider Last Rate Last Dose   • nitroglycerin (NITROSTAT) SL tablet 0.4 mg  0.4 mg Sublingual Q5 Min PRN Lizbeth Laura MD       • sodium chloride 0.9 % flush 10 mL  10 mL Intravenous PRN Lizbeth Laura MD           Reviewed use of high risk medication in the elderly: yes  Reviewed for potential of harmful drug interactions in the elderly: yes    Follow Up:  Return in about 6 months (around 2/24/2019) for Next scheduled follow up with lab prior.     An After Visit Summary and PPPS with all of these plans were given to the patient.

## 2018-08-24 NOTE — PROGRESS NOTES
08/24/2018    Patient Information  Mandy Alarcon                                                                                          [address] [place] [address]      5/30/1933  [phone]      Chief Complaint:     Subsequent Medicare wellness visit.  Follow-up impaired fasting glucose, hyperlipidemia, hypothyroidism, gout, chronic renal insufficiency, hypertension, secondary polycythemia, multinodular goiter, supraventricular tachycardia, vitamin D deficiency and nocturnal hypoxemia.  Patient has no new acute complaints.    History of Present Illness:    Patient with a history of medical problems as outlined in the chief complaint presents today for her subsequent Medicare wellness visit.  She also had lab work in order to monitor her chronic medical issues.  Her past medical history reviewed and updated where necessary including health maintenance parameters.  This reveals she will be up-to-date after today's visit with the exception of the new shingles vaccination and patient will check at the local drug store.    Review of Systems   Constitution: Negative.   HENT: Negative.    Eyes: Negative.    Cardiovascular: Negative.    Respiratory: Negative.    Endocrine: Negative.    Hematologic/Lymphatic: Negative.    Skin: Negative.    Musculoskeletal: Positive for arthritis.   Gastrointestinal: Negative.    Genitourinary: Negative.    Neurological: Negative.    Psychiatric/Behavioral: Negative.    Allergic/Immunologic: Negative.        Active Problems:    Patient Active Problem List   Diagnosis   • Benign essential hypertension   • Chronic renal insufficiency, stage II (mild), 05/18/2016--creatinine 1.35   • Claustrophobia   • Gout   • Hyperlipidemia   • Hypothyroidism   • Impaired fasting glucose   • Nocturnal hypoxemia   • Secondary polycythemia   • Vitamin D deficiency   • Therapeutic drug monitoring   • Family history of coronary artery disease   • Bilateral sensorineural hearing loss,  wears hearing aids   • Multinodular goiter   • Supraventricular tachycardia (CMS/HCC)         Past Medical History:   Diagnosis Date   • Benign essential hypertension 10/28/2012    2012--treatment for hypertension begun.   • Bilateral sensorineural hearing loss, wears hearing aids 2017    Left is much worse than right.  Patient had multiple ear infections as a child.   • Chronic renal insufficiency, stage II (mild), 2016--creatinine 1.35 2016--creatinine 1.35.  Baseline creatinine approximately 1.3.   • Claustrophobia 2016    This patient has significant nocturnal hypoxemia and I think that she could benefit from nocturnal oxygen therapy. The exact etiology of her hypoxemia is not clear. She could possibly have obstructive sleep apnea but this is not documented. We cannot test this patient for sleep apnea due to the fact that she is severely claustrophobic. Patient was a former smoker and it is possibly that COPD he is playing a role.   • Family history of coronary artery disease 2016    Patient's mother, father, 2 sisters and a brother all  from myocardial infarctions   • Gout 10/28/2012    2012--initial diagnosis and treatment of gout.   • Hyperlipidemia 10/28/2012    2012--treatment for hyperlipidemia begun.   • Hypothyroidism 2015--routine follow-up reveals elevated TSH of 5.14. Repeat thyroid function tests ordered as well as thyroid antibodies.   • Impaired fasting glucose 10/28/2012    2012--initial diagnosis impaired fasting glucose.   • Nocturnal hypoxemia 2014--patient did not receive nocturnal oxygen because of Medicare regulations.   05/15/2014--overnight oximetry revealed oxygen saturations less than 89% for 22 minutes and 40 seconds. Oxygen saturations less than or equal to 88% for 22 minutes and 40 seconds. Lowest oxygen saturation 83%. The longest continuous time with oxygen saturations  less than or equal to 88% was 1 minute and 32 seconds.   08/02/2012--overnight oximetry revealed oxygen saturations less than 90% for one hour and 35 minutes. Oxygen saturations less than 89% 59 minutes. This patient has significant nocturnal hypoxemia and I think that she could benefit from nocturnal oxygen therapy. The exact etiology of her hypoxemia is not clear. She could possibly have obstructive sleep apnea but this is not documented. We cannot test this patient for sleep apnea due to the fact that she is severely claustrophobic. Patient was a former smoker and it is possibly that COPD he is playing a role.   • Secondary polycythemia 8/2/2012 11/06/2014--patient did not receive nocturnal oxygen because of Medicare regulations.   05/15/2014--overnight oximetry revealed oxygen saturations less than 89% for 22 minutes and 40 seconds. Oxygen saturations less than or equal to 88% for 22 minutes and 40 seconds. Lowest oxygen saturation 83%. The longest continuous time with oxygen saturations less than or equal to 88% was 1 minute and 32 seconds.   08/02/2012--overnight oximetry revealed oxygen saturations less than 90% for one hour and 35 minutes. Oxygen saturations less than 89% 59 minutes. This patient has significant nocturnal hypoxemia and I think that she could benefit from nocturnal oxygen therapy. The exact etiology of her hypoxemia is not clear. She could possibly have obstructive sleep apnea but this is not documented. We cannot test this patient for sleep apnea due to the fact that she is severely claustrophobic. Patient was a former smoker and it is possibly that COPD he is playing a role.   • Vitamin D deficiency 5/23/2016         Past Surgical History:   Procedure Laterality Date   • RADICAL ABDOMINAL HYSTERECTOMY  48 years old    48 years of age. Uterine fibroid tumors with menorrhagia         Allergies   Allergen Reactions   • Cefaclor Swelling   • Sulfa Antibiotics Rash           Current  Outpatient Prescriptions:   •  allopurinol (ZYLOPRIM) 300 MG tablet, Take 300 mg by mouth Daily., Disp: , Rfl:   •  aspirin 81 MG tablet, Take 1 tablet by mouth Daily., Disp: , Rfl:   •  Biotin 10 MG tablet, Take 1 tablet by mouth Daily., Disp: , Rfl:   •  Cholecalciferol (VITAMIN D) 2000 UNITS tablet, Take 2 tablets by mouth daily., Disp: , Rfl:   •  lisinopril (PRINIVIL,ZESTRIL) 20 MG tablet, Take 1 tablet by mouth Daily., Disp: 30 tablet, Rfl: 3  •  metoprolol tartrate (LOPRESSOR) 25 MG tablet, Take 0.5 tablets by mouth Every 12 (Twelve) Hours., Disp: 60 tablet, Rfl: 3  •  naproxen (NAPROSYN) 500 MG tablet, Take 500 mg by mouth Daily With Dinner., Disp: , Rfl:   •  pravastatin (PRAVACHOL) 40 MG tablet, Take 40 mg by mouth Every Night., Disp: , Rfl:   •  hydrochlorothiazide (MICROZIDE) 12.5 MG capsule, Take 1 capsule by mouth Daily., Disp: 30 capsule, Rfl: 1    Current Facility-Administered Medications:   •  nitroglycerin (NITROSTAT) SL tablet 0.4 mg, 0.4 mg, Sublingual, Q5 Min PRN, Lizbeth Laura MD  •  sodium chloride 0.9 % flush 10 mL, 10 mL, Intravenous, PRN, Lizbeth Laura MD      Family History   Problem Relation Age of Onset   • Heart disease Mother         Ischemic.  from coronary artery disease.   • Heart disease Father         Ischemic.  from coronary artery disease.   • Heart disease Sister         Ischemic.  from coronary artery disease.   • Heart disease Brother         Ischemic.  from coronary artery disease.   • Heart disease Sister         Ischemic.  from coronary artery disease.   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   • Alcohol use No   • Drug use: No   • Sexual activity: Not Currently     Partners: Male     Other Topics Concern   • Not on file     Social History Narrative   • No narrative on file         Vitals:    08/24/18 0926   BP: 142/74   BP Location: Right arm   Patient Position: Sitting   Cuff Size: Large Adult   Pulse: 55   SpO2: 98%   Weight: 97.1 kg (214 lb)   Height: 160 cm (62.99\")          Physical Exam:    General: Alert and oriented x 3.  No acute distress. Obese.  Normal affect.  HEENT: Pupils equal, round, reactive to light; extraocular movements intact; sclerae nonicteric; pharynx, ear canals and TMs normal.  Neck: Without JVD, thyromegaly, bruit, or adenopathy.  Lungs: Clear to auscultation in all fields.  Heart: Regular rate and rhythm without murmur, rub, gallop, or click.  Abdomen: Soft, nontender, without hepatosplenomegaly or hernia.  Bowel sounds normal.  : Deferred.  Rectal: Deferred.  Extremities: Without clubbing, cyanosis, edema, or pulse deficit.  Neurologic: Intact without focal deficit.  Normal station and gait observed during ingress and egress from the examination room.  Skin: Without significant lesion.  Musculoskeletal: Unremarkable.      Lab/other results:    NMR is nearly perfect.  CMP normal except BUN 28 and creatinine 1.28, ALT slightly elevated at 41.  CBC normal except white count slightly elevated at 10.8.  Hemoglobin is a little high at 15.7 and hematocrit level elevated at 48.4.  Hemoglobin A1c normal at 5.5.  Thyroid function tests normal.  Vitamin D normal.  Uric acid normal at 4.4.  CPK normal.    Assessment/Plan:     Diagnosis Plan   1. Medicare annual wellness visit, subsequent     2. Impaired fasting glucose     3. Hyperlipidemia, unspecified hyperlipidemia type     4. Hypothyroidism, unspecified type     5. Gout     6. Chronic renal insufficiency, stage II (mild), 05/18/2016--creatinine 1.35     7. Benign essential " hypertension     8. Secondary polycythemia     9. Multinodular goiter     10. Supraventricular tachycardia (CMS/HCC)     11. Vitamin D deficiency     12. Nocturnal hypoxemia     13. Therapeutic drug monitoring       The subsequent Medicare wellness visit is documented on a separate note.    Patient has very mild impaired fasting glucose at does not require medication.  No evidence of diabetes.  Hyperlipidemia is under good control.  She has subclinical hypothyroidism that we will monitor.  Her gout is stable with normal uric acid levels.  Chronic renal insufficiency is stable.  Her blood pressures a little elevated but not enough to make any changes.  Secondary polycythemia related to nocturnal hypoxemia is mild and stable.  Patient donates blood periodically.  Her multinodular goiter will be reassessed in another year or so with an ultrasound.  Supraventricular tachycardia seems controlled with low dose Lopressor.  Vitamin D therapeutic.    Plan is as follows: We will change her lisinopril 20 mg per day and that hydrochlorothiazide 12.5 mg per day to one pill of lisinopril HCT 20/12.5, one by mouth daily.  Patient needs to stay on Lopressor 25 mg, half a pill twice daily.  I will have her follow-up in 6 months with lab prior or follow-up as needed.  Have also recommended the shingles vaccination and the influenza vaccination when available.    Procedures

## 2018-10-08 RX ORDER — PRAVASTATIN SODIUM 40 MG
TABLET ORAL
Qty: 90 TABLET | Refills: 1 | Status: SHIPPED | OUTPATIENT
Start: 2018-10-08 | End: 2018-11-06

## 2018-10-08 RX ORDER — ALLOPURINOL 300 MG/1
TABLET ORAL
Qty: 90 TABLET | Refills: 1 | Status: SHIPPED | OUTPATIENT
Start: 2018-10-08 | End: 2019-03-18 | Stop reason: SDUPTHER

## 2018-11-01 ENCOUNTER — TELEPHONE (OUTPATIENT)
Dept: OBSTETRICS AND GYNECOLOGY | Facility: CLINIC | Age: 83
End: 2018-11-01

## 2018-11-01 NOTE — TELEPHONE ENCOUNTER
----- Message from Colette Salazar MD sent at 10/30/2018  4:07 PM EDT -----  Can she see me at [facility]? My Wednesdays at [facility] have been pretty light. Thanks!  ----- Message -----  From: Ronnie Hummel  Sent: 10/30/2018  12:48 PM  To: Colette Salazar MD    Pt is 85 yrs old and was seen by our nurse practitioner that used to be here [place] (4-5 years ago-could've been longer) she has not been seen since. She wants to see you for vaginal itching that is starting to cause her pain. Your next available isn't until 11/29/18 for a new patient. Is there any way we can work her in? Let me know.      Thanks,  Ronnie

## 2018-11-06 ENCOUNTER — OFFICE VISIT (OUTPATIENT)
Dept: OBSTETRICS AND GYNECOLOGY | Facility: CLINIC | Age: 83
End: 2018-11-06

## 2018-11-06 VITALS
BODY MASS INDEX: 38.27 KG/M2 | HEIGHT: 63 IN | WEIGHT: 216 LBS | DIASTOLIC BLOOD PRESSURE: 65 MMHG | HEART RATE: 67 BPM | SYSTOLIC BLOOD PRESSURE: 134 MMHG

## 2018-11-06 DIAGNOSIS — L28.0 LICHEN: Primary | ICD-10-CM

## 2018-11-06 PROCEDURE — 99203 OFFICE O/P NEW LOW 30 MIN: CPT | Performed by: OBSTETRICS & GYNECOLOGY

## 2018-11-06 RX ORDER — TRIAMCINOLONE ACETONIDE 5 MG/G
OINTMENT TOPICAL DAILY
Qty: 15 G | Refills: 1 | Status: SHIPPED | OUTPATIENT
Start: 2018-11-06 | End: 2019-02-06

## 2018-11-06 RX ORDER — INFLUENZA A VIRUS A/MICHIGAN/45/2015 X-275 (H1N1) ANTIGEN (FORMALDEHYDE INACTIVATED), INFLUENZA A VIRUS A/SINGAPORE/INFIMH-16-0019/2016 IVR-186 (H3N2) ANTIGEN (FORMALDEHYDE INACTIVATED), AND INFLUENZA B VIRUS B/MARYLAND/15/2016 BX-69A (A B/COLORADO/6/2017-LIKE VIRUS) ANTIGEN (FORMALDEHYDE INACTIVATED) 60; 60; 60 UG/.5ML; UG/.5ML; UG/.5ML
INJECTION, SUSPENSION INTRAMUSCULAR
Refills: 0 | COMMUNITY
Start: 2018-10-03 | End: 2019-03-11

## 2018-11-06 RX ORDER — TRAMADOL HYDROCHLORIDE 50 MG/1
TABLET ORAL
Refills: 1 | COMMUNITY
Start: 2018-10-26 | End: 2019-02-06

## 2018-11-06 RX ORDER — DOXYCYCLINE HYCLATE 100 MG/1
CAPSULE ORAL
Refills: 0 | COMMUNITY
Start: 2018-10-26 | End: 2019-03-11

## 2018-11-06 RX ORDER — TRIAMCINOLONE ACETONIDE 5 MG/G
CREAM TOPICAL 2 TIMES DAILY
Qty: 15 G | Refills: 1 | Status: SHIPPED | OUTPATIENT
Start: 2018-11-06 | End: 2018-11-06

## 2018-11-06 NOTE — PROGRESS NOTES
"Subjective   Mandy Alarcon is a 85 y.o. female   Chief Complaint   Patient presents with   • Vaginal Itching     patient reports vaginal itching off and on for 4 months      History of Present Illness  Patient presents with 4 months of vaginal and vulvar itching.  She reports she had these symptoms about 5 years ago and was seen by a physician at the [facility]'s office.  She was given Premarin cream and another ointment that \"helped a lot more.\"  She reports a biopsy was done at that time.  She ran out of the cream, but her son-in-law who is a physician who called in a cream that was $400.  She did not pick this up.  She denies any changes in her detergent or soaps.    Her surgical history reports \"radical hysterectomy.\"  This was done age 48 in [facility] for fibroids.  The patient denies a history of cancer.  She did have her tubes and ovaries removed at that time.  This is more consistent with total hysterectomy, BSO  Past Medical History:   Diagnosis Date   • Benign essential hypertension 10/28/2012    October 2012--treatment for hypertension begun.   • Bilateral sensorineural hearing loss, wears hearing aids 6/14/2017    Left is much worse than right.  Patient had multiple ear infections as a child.   • Chronic renal insufficiency, stage II (mild), 05/18/2016--creatinine 1.35 4/28/2016 05/18/2016--creatinine 1.35.  Baseline creatinine approximately 1.3.   • Claustrophobia 4/28/2016    This patient has significant nocturnal hypoxemia and I think that she could benefit from nocturnal oxygen therapy. The exact etiology of her hypoxemia is not clear. She could possibly have obstructive sleep apnea but this is not documented. We cannot test this patient for sleep apnea due to the fact that she is severely claustrophobic. Patient was a former smoker and it is possibly that COPD he is playing a role.   • Family history of coronary artery disease 5/24/2016    Patient's mother, father, 2 sisters and a " brother all  from myocardial infarctions   • Gout 10/28/2012    2012--initial diagnosis and treatment of gout.   • Hyperlipidemia 10/28/2012    2012--treatment for hyperlipidemia begun.   • Impaired fasting glucose 10/28/2012    2012--initial diagnosis impaired fasting glucose.   • Nocturnal hypoxemia 2014--patient did not receive nocturnal oxygen because of Medicare regulations.   05/15/2014--overnight oximetry revealed oxygen saturations less than 89% for 22 minutes and 40 seconds. Oxygen saturations less than or equal to 88% for 22 minutes and 40 seconds. Lowest oxygen saturation 83%. The longest continuous time with oxygen saturations less than or equal to 88% was 1 minute and 32 seconds.   2012--overnight oximetry revealed oxygen saturations less than 90% for one hour and 35 minutes. Oxygen saturations less than 89% 59 minutes. This patient has significant nocturnal hypoxemia and I think that she could benefit from nocturnal oxygen therapy. The exact etiology of her hypoxemia is not clear. She could possibly have obstructive sleep apnea but this is not documented. We cannot test this patient for sleep apnea due to the fact that she is severely claustrophobic. Patient was a former smoker and it is possibly that COPD he is playing a role.   • Secondary polycythemia 2014--patient did not receive nocturnal oxygen because of Medicare regulations.   05/15/2014--overnight oximetry revealed oxygen saturations less than 89% for 22 minutes and 40 seconds. Oxygen saturations less than or equal to 88% for 22 minutes and 40 seconds. Lowest oxygen saturation 83%. The longest continuous time with oxygen saturations less than or equal to 88% was 1 minute and 32 seconds.   2012--overnight oximetry revealed oxygen saturations less than 90% for one hour and 35 minutes. Oxygen saturations less than 89% 59 minutes. This patient has significant nocturnal  hypoxemia and I think that she could benefit from nocturnal oxygen therapy. The exact etiology of her hypoxemia is not clear. She could possibly have obstructive sleep apnea but this is not documented. We cannot test this patient for sleep apnea due to the fact that she is severely claustrophobic. Patient was a former smoker and it is possibly that COPD he is playing a role.   • Subclinical hypothyroidism 11/17/2015 01/24/2018--patient seen in follow-up.  Repeat thyroid function tests returned normal although TSH is upper limit of normal.  Thyroid antibodies negative.  Ultrasound of the thyroid revealed a normal size thyroid gland but there were multiple tiny solid and cystic complex nodules bilaterally.  No single dominant lesion present.  Consistent with multinodular goiter.  Recommend conservative sonographic surveillance with six-month follow-up.  This was discussed with patient today.  12/21/2017--TSH is elevated at 7.79.  Repeat for normal at 1.1.  Free T3 normal at 3.2.  Note that several thyroid function tests performed after 11/17/2015 overall in the normal range.  No thyroid antibodies noted at that time.  It appears that this diagnosis indeed present and needs further evaluation.  We will repeat thyroid function tests and obtain thyroid antibodies once again.  Thyroid ultrasound ordered.  I will have patient follow-up after the results are known.  11/17/2015--routine follow-up reveals elevated TSH of 5.14.   • Vitamin D deficiency 5/23/2016     Past Surgical History:   Procedure Laterality Date   • RADICAL ABDOMINAL HYSTERECTOMY  48 years old    48 years of age. Uterine fibroid tumors with menorrhagia - no cancer     Social History   Substance Use Topics   • Smoking status: Former Smoker     Packs/day: 0.50     Start date: 1/1/1946     Quit date: 1/1/1954   • Smokeless tobacco: Never Used      Comment: Stopped drinking at age 30.   • Alcohol use No     Family History   Problem Relation Age of Onset   •  "Heart disease Mother         Ischemic.  from coronary artery disease.   • Heart disease Father         Ischemic.  from coronary artery disease.   • Heart disease Sister         Ischemic.  from coronary artery disease.   • Heart disease Brother         Ischemic.  from coronary artery disease.   • Heart disease Sister         Ischemic.  from coronary artery disease.   • Breast cancer Daughter          Review of Systems   Constitutional: Negative for activity change, appetite change, fatigue, fever and unexpected weight change.   Gastrointestinal: Negative for abdominal pain, nausea and vomiting.   Genitourinary: Positive for vaginal pain (extreme itching). Negative for menstrual problem, vaginal bleeding and vaginal discharge.   Hematological: Does not bruise/bleed easily.   Psychiatric/Behavioral: Negative for agitation.       Objective    /65   Pulse 67   Ht 160 cm (62.99\")   Wt 98 kg (216 lb)   BMI 38.27 kg/m²    Physical Exam   Constitutional: She is oriented to person, place, and time. She appears well-developed and well-nourished. No distress.   HENT:   Head: Normocephalic and atraumatic.   Eyes: EOM are normal. No scleral icterus.   Pulmonary/Chest: Effort normal and breath sounds normal.   Abdominal: There is no tenderness.   Genitourinary:         Neurological: She is alert and oriented to person, place, and time.   Skin: Skin is warm and dry. She is not diaphoretic.   Psychiatric: She has a normal mood and affect. Her behavior is normal. Judgment and thought content normal.         Assessment/Plan   Problems Addressed this Visit     None      Visit Diagnoses     Lichen    -  Primary    Relevant Medications    triamcinolone (KENALOG) 0.5 % ointment        Reviewed with patient and exam findings.  She declines repeat biopsy at this time and there is no sign concerning for vulvar cancer surviving this is a safe option.  I recommended that if the symptoms did not improve with " topical treatment, she will return for vulvar biopsy.  We will request her records from previous exam including biopsy results.  She was recommended to call if she has not heard from us regarding a review of her records in 14 days.  Patient feels that the clobetasol is cost prohibitive and would like to try triamcinolone cream.  This was sent to her pharmacy.  We discussed follow-up in 2-3 months and indications for sooner follow-up if worsening symptoms, ulcerations, bleeding were to arise.

## 2018-11-12 ENCOUNTER — TRANSCRIBE ORDERS (OUTPATIENT)
Dept: ADMINISTRATIVE | Facility: HOSPITAL | Age: 83
End: 2018-11-12

## 2018-11-12 DIAGNOSIS — Z12.31 VISIT FOR SCREENING MAMMOGRAM: Primary | ICD-10-CM

## 2018-12-26 ENCOUNTER — HOSPITAL ENCOUNTER (OUTPATIENT)
Dept: MAMMOGRAPHY | Facility: HOSPITAL | Age: 83
Discharge: HOME | End: 2018-12-26
Admitting: INTERNAL MEDICINE

## 2018-12-26 DIAGNOSIS — Z12.31 VISIT FOR SCREENING MAMMOGRAM: ICD-10-CM

## 2018-12-26 PROCEDURE — 77063 BREAST TOMOSYNTHESIS BI: CPT

## 2018-12-26 PROCEDURE — 77067 SCR MAMMO BI INCL CAD: CPT

## 2019-02-05 ENCOUNTER — OFFICE VISIT (OUTPATIENT)
Dept: OBSTETRICS AND GYNECOLOGY | Facility: CLINIC | Age: 84
End: 2019-02-05

## 2019-02-05 VITALS
DIASTOLIC BLOOD PRESSURE: 70 MMHG | SYSTOLIC BLOOD PRESSURE: 126 MMHG | HEIGHT: 63 IN | BODY MASS INDEX: 38.09 KG/M2 | WEIGHT: 215 LBS | HEART RATE: 77 BPM

## 2019-02-05 DIAGNOSIS — L28.0 LICHEN: ICD-10-CM

## 2019-02-05 PROCEDURE — 56605 BIOPSY OF VULVA/PERINEUM: CPT | Performed by: OBSTETRICS & GYNECOLOGY

## 2019-02-05 NOTE — PROGRESS NOTES
"SUBJECTIVE:   Chief Complaint   Patient presents with   • lichen     patient reports she is still itching on her vaginal area. Pt reports the Kenolog ointment has help but only for week but does not get rid of the issue.        Mandy Alarcon is a 85 y.o.  who presents for follow-up of vulvar itching.  She's been using the Kenalog with some relief.  She reports the most relief was when she was steroid injection of her right ankle arthritis.  She did not use the other steroid cream due to cost prohibition  Has tried premarin \"a long time ago\" but unsure if that helped    Past Medical History:   Diagnosis Date   • Benign essential hypertension 10/28/2012    2012--treatment for hypertension begun.   • Bilateral sensorineural hearing loss, wears hearing aids 2017    Left is much worse than right.  Patient had multiple ear infections as a child.   • Chronic renal insufficiency, stage II (mild), 2016--creatinine 1.35 2016--creatinine 1.35.  Baseline creatinine approximately 1.3.   • Claustrophobia 2016    This patient has significant nocturnal hypoxemia and I think that she could benefit from nocturnal oxygen therapy. The exact etiology of her hypoxemia is not clear. She could possibly have obstructive sleep apnea but this is not documented. We cannot test this patient for sleep apnea due to the fact that she is severely claustrophobic. Patient was a former smoker and it is possibly that COPD he is playing a role.   • Family history of coronary artery disease 2016    Patient's mother, father, 2 sisters and a brother all  from myocardial infarctions   • Gout 10/28/2012    2012--initial diagnosis and treatment of gout.   • Hyperlipidemia 10/28/2012    2012--treatment for hyperlipidemia begun.   • Impaired fasting glucose 10/28/2012    2012--initial diagnosis impaired fasting glucose.   • Nocturnal hypoxemia 2014--patient did " not receive nocturnal oxygen because of Medicare regulations.   05/15/2014--overnight oximetry revealed oxygen saturations less than 89% for 22 minutes and 40 seconds. Oxygen saturations less than or equal to 88% for 22 minutes and 40 seconds. Lowest oxygen saturation 83%. The longest continuous time with oxygen saturations less than or equal to 88% was 1 minute and 32 seconds.   08/02/2012--overnight oximetry revealed oxygen saturations less than 90% for one hour and 35 minutes. Oxygen saturations less than 89% 59 minutes. This patient has significant nocturnal hypoxemia and I think that she could benefit from nocturnal oxygen therapy. The exact etiology of her hypoxemia is not clear. She could possibly have obstructive sleep apnea but this is not documented. We cannot test this patient for sleep apnea due to the fact that she is severely claustrophobic. Patient was a former smoker and it is possibly that COPD he is playing a role.   • Secondary polycythemia 8/2/2012 11/06/2014--patient did not receive nocturnal oxygen because of Medicare regulations.   05/15/2014--overnight oximetry revealed oxygen saturations less than 89% for 22 minutes and 40 seconds. Oxygen saturations less than or equal to 88% for 22 minutes and 40 seconds. Lowest oxygen saturation 83%. The longest continuous time with oxygen saturations less than or equal to 88% was 1 minute and 32 seconds.   08/02/2012--overnight oximetry revealed oxygen saturations less than 90% for one hour and 35 minutes. Oxygen saturations less than 89% 59 minutes. This patient has significant nocturnal hypoxemia and I think that she could benefit from nocturnal oxygen therapy. The exact etiology of her hypoxemia is not clear. She could possibly have obstructive sleep apnea but this is not documented. We cannot test this patient for sleep apnea due to the fact that she is severely claustrophobic. Patient was a former smoker and it is possibly that COPD he is playing  a role.   • Subclinical hypothyroidism 2015--patient seen in follow-up.  Repeat thyroid function tests returned normal although TSH is upper limit of normal.  Thyroid antibodies negative.  Ultrasound of the thyroid revealed a normal size thyroid gland but there were multiple tiny solid and cystic complex nodules bilaterally.  No single dominant lesion present.  Consistent with multinodular goiter.  Recommend conservative sonographic surveillance with six-month follow-up.  This was discussed with patient today.  2017--TSH is elevated at 7.79.  Repeat for normal at 1.1.  Free T3 normal at 3.2.  Note that several thyroid function tests performed after 2015 overall in the normal range.  No thyroid antibodies noted at that time.  It appears that this diagnosis indeed present and needs further evaluation.  We will repeat thyroid function tests and obtain thyroid antibodies once again.  Thyroid ultrasound ordered.  I will have patient follow-up after the results are known.  2015--routine follow-up reveals elevated TSH of 5.14.   • Vitamin D deficiency 2016     Past Surgical History:   Procedure Laterality Date   • OOPHORECTOMY      age 48   • RADICAL ABDOMINAL HYSTERECTOMY  48 years old    48 years of age. Uterine fibroid tumors with menorrhagia - no cancer     OB History    Para Term  AB Living   5 5 5         SAB TAB Ectopic Molar Multiple Live Births                    # Outcome Date GA Lbr Zac/2nd Weight Sex Delivery Anes PTL Lv   5 Term            4 Term            3 Term            2 Term            1 Term                  Social History     Tobacco Use   • Smoking status: Former Smoker     Packs/day: 0.50     Start date: 1946     Last attempt to quit: 1954     Years since quittin.1   • Smokeless tobacco: Never Used   • Tobacco comment: Stopped drinking at age 30.   Substance Use Topics   • Alcohol use: No   • Drug use: No     Family History    Problem Relation Age of Onset   • Heart disease Mother         Ischemic.  from coronary artery disease.   • Heart disease Father         Ischemic.  from coronary artery disease.   • Heart disease Sister         Ischemic.  from coronary artery disease.   • Heart disease Brother         Ischemic.  from coronary artery disease.   • Heart disease Sister         Ischemic.  from coronary artery disease.   • Breast cancer Daughter      Current Outpatient Medications on File Prior to Visit   Medication Sig Dispense Refill   • allopurinol (ZYLOPRIM) 300 MG tablet TAKE 1 TABLET BY MOUTH EVERY DAY 90 tablet 1   • aspirin 81 MG tablet Take 1 tablet by mouth Daily.     • Biotin 10 MG tablet Take 1 tablet by mouth Daily.     • Cholecalciferol (VITAMIN D) 2000 UNITS tablet Take 2 tablets by mouth daily.     • doxycycline (VIBRAMYCIN) 100 MG capsule TK 1 C PO BID FOR 14 DAYS  0   • FLUZONE HIGH-DOSE 0.5 ML suspension prefilled syringe injection ADM 0.5ML IM UTD  0   • lisinopril-hydrochlorothiazide (ZESTORETIC) 20-12.5 MG per tablet One by mouth every morning for high blood pressure and leg swelling 90 tablet 3   • metoprolol tartrate (LOPRESSOR) 25 MG tablet Take 0.5 tablets by mouth Every 12 (Twelve) Hours. 60 tablet 3   • naproxen (NAPROSYN) 500 MG tablet Take 500 mg by mouth Daily With Dinner.     • pravastatin (PRAVACHOL) 40 MG tablet Take 40 mg by mouth Every Night.     • traMADol (ULTRAM) 50 MG tablet TK 1 T PO BID  1   • triamcinolone (KENALOG) 0.5 % ointment Apply  topically to the appropriate area as directed Daily. 15 g 1   • [DISCONTINUED] allopurinol (ZYLOPRIM) 300 MG tablet Take 300 mg by mouth Daily.       Current Facility-Administered Medications on File Prior to Visit   Medication Dose Route Frequency Provider Last Rate Last Dose   • nitroglycerin (NITROSTAT) SL tablet 0.4 mg  0.4 mg Sublingual Q5 Min PRN Lizbeth Laura MD       • sodium chloride 0.9 % flush 10 mL  10 mL Intravenous PRN  "Lizbeth Laura MD         Allergies   Allergen Reactions   • Cefaclor Swelling   • Sulfa Antibiotics Rash        Review of Systems   Constitutional: Negative for activity change, appetite change, fatigue, fever and unexpected weight change.   Gastrointestinal: Negative for abdominal pain, nausea and vomiting.   Genitourinary: Positive for vaginal pain (itching). Negative for menstrual problem, vaginal bleeding and vaginal discharge.   Hematological: Does not bruise/bleed easily.   Psychiatric/Behavioral: Negative for agitation.         OBJECTIVE:   Vitals:    02/05/19 1316   BP: 126/70   Pulse: 77   Weight: 97.5 kg (215 lb)   Height: 160 cm (62.99\")      Physical Exam   Constitutional: She is oriented to person, place, and time. She appears well-developed and well-nourished. No distress.   HENT:   Head: Normocephalic and atraumatic.   Eyes: EOM are normal. No scleral icterus.   Neck: Normal range of motion.   Cardiovascular: Normal rate and regular rhythm.   Pulmonary/Chest: Effort normal. No respiratory distress.   Abdominal: Soft. She exhibits no distension.   Genitourinary: Uterus normal and cervix normal.       There is no rash, tenderness, lesion, injury or Bartholin's cyst on the right labia. There is no rash, tenderness, lesion, injury or Bartholin's cyst on the left labia. Cervix does not exhibit motion tenderness. Right adnexum displays no mass, no tenderness and no fullness. Right adnexum is present.Left adnexum displays no mass, no tenderness and no fullness. Left adnexum is present.Vagina exhibits no lesion and no loss of rugae. No erythema, tenderness or bleeding in the vagina. No foreign body in the vagina. No signs of injury around the vagina. No vaginal discharge found.   Musculoskeletal: Normal range of motion.   Neurological: She is alert and oriented to person, place, and time.   Skin: Skin is warm and dry. No rash noted. She is not diaphoretic. No erythema.   Psychiatric: She has a normal mood " and affect. Her behavior is normal. Judgment and thought content normal.       ASSESSMENT/PLAN:     ICD-10-CM ICD-9-CM   1. Lichen L28.0 697.9       Given lack of resolution with kenalog, recommend biopsy  Pt agrees.  See biopsy note  Will treat based on results. Consider higher potency steroid cream vs. Local estrogen if more c/w atrophy although I suspect lichen changes based on exam    Return in about 3 months (around 5/5/2019).

## 2019-02-05 NOTE — PROGRESS NOTES
Procedure   Procedures      Vulvar Biopsy Procedure Note    Indications: Vulvar itching, skin changes, refractory to steroids    Procedure Details:  Area was prepped with Betadine.    Local injection of 1% lidocaine was given at biopsy site(s).    Lesion Location: right fourchette    Biopsy taken with: 4mm punch biopsy    Hemostatis obtained with: 4-0 monocryl    Comments:  Patient tolerated procedure well.  Call in 14 days for results if she has not been contacted sooner

## 2019-02-06 ENCOUNTER — OFFICE VISIT (OUTPATIENT)
Dept: CARDIOLOGY | Facility: CLINIC | Age: 84
End: 2019-02-06

## 2019-02-06 VITALS
HEIGHT: 63 IN | HEART RATE: 74 BPM | SYSTOLIC BLOOD PRESSURE: 132 MMHG | BODY MASS INDEX: 38.09 KG/M2 | WEIGHT: 215 LBS | DIASTOLIC BLOOD PRESSURE: 74 MMHG

## 2019-02-06 DIAGNOSIS — E78.5 HYPERLIPIDEMIA, UNSPECIFIED HYPERLIPIDEMIA TYPE: Chronic | ICD-10-CM

## 2019-02-06 DIAGNOSIS — I47.1 SUPRAVENTRICULAR TACHYCARDIA (HCC): Primary | Chronic | ICD-10-CM

## 2019-02-06 DIAGNOSIS — I10 BENIGN ESSENTIAL HYPERTENSION: Chronic | ICD-10-CM

## 2019-02-06 PROCEDURE — 93000 ELECTROCARDIOGRAM COMPLETE: CPT | Performed by: INTERNAL MEDICINE

## 2019-02-06 PROCEDURE — 99213 OFFICE O/P EST LOW 20 MIN: CPT | Performed by: INTERNAL MEDICINE

## 2019-02-06 RX ORDER — PRAVASTATIN SODIUM 40 MG
40 TABLET ORAL NIGHTLY
Start: 2019-02-06 | End: 2019-09-04 | Stop reason: SDUPTHER

## 2019-02-06 NOTE — PROGRESS NOTES
"    Subjective:     Encounter Date:02/06/2019      Patient ID: Mandy Alarcon is a 85 y.o. female.    Chief Complaint:  History of Present Illness    This is an 85 year old female patient with a history of hypertension, CKD stage 2, gout, hypothyroidism, tobacco use, hyperlipidemia, recent episode of near syncope, paroxsymal supraventricular tachycardia, who presents for follow up.         I saw the patient initially when she was admitted to the hospital following a near syncopal episode on 6/20/18.  Prior to admission the patient reports she was at home doing some work around her house.  She went outside for a couple of minutes and went back inside.  While she was walking around she had a sudden onset of sensation where she felt like her \"head went empty\".  She denied any palpitations, chest pain or dyspnea at that time.  She denied loss of consciousness.  While in the emergency room it was noted that her heart rate went up to the 140's-160's but she denied having any symptoms at that time. Per the ER notes she appeared to be in SVT and she was converted after given adenosine 6 mg and metoprolol tartrate 5 mg IV.  Unable to locate any of the EKG strips from the ER to confirm rhythm.  Her work up was unremarkable except for an minimally elevated d-dimer.  I had a low suspicion for thromboembolism at that time and felt that the abnormality was likely due to her age and renal insufficiency.  She underwent an echocardiogram during that admission that showed normal left ventricular systolic function, with an EF of 60%, normal diastolic function, and mild aortic regurgitation.  She was started on metoprolol tartrate 25 mg bid, her hydrochlorothiazide was discontinued and she was sent home with a Zio monitor.      She presented to the cardiac evaluation center on 7/10/2018 from Dr. Santos' office after complaining of nocturnal chest pressure.  These symptoms began after she was discharged from the hospital, initially on a " nightly basis.  She reported that the symptoms would last about 3 hours and are associated with significant shortness of breath.  It also appeared that she was having more shortness of breath and lower extremity edema than normal.  Her lab work at that time was unremarkable except for a persistently elevated d-dimer.  Proceeded with a VQ scan that same day that was low probability for a pulmonary embolism.  The following day she returned to the office for a stress test that showed no evidence of ischemia and an EF of 59%.  Her Zio monitor showed 53 nonsustained episodes of supraventricular tachycardia the longest lasting 54 beats and the fastest with a rate of 203 bpm.  She also had rare episodes of PVCs and nonsustained ventricular tachycardia longest lasting 9 beats with a rate of 166 bpm.  There was also a single nonsignificant pause of 2 seconds.     Patient presents for routine 6 month follow-up.  She reports that she's been feeling well.  She denies any further palpitations near syncope or syncope.  She reports intermittent episodes of dizziness that occurs when she has upper respiratory issues and resolves if she takes cough medicine.  She denies any chest pain, PND or orthopnea, or lower extremity edema.  She has stable dyspnea on exertion.      Review of Systems   Constitution: Negative for weakness and malaise/fatigue.   HENT: Negative for hearing loss, hoarse voice, nosebleeds and sore throat.    Eyes: Negative for pain.   Cardiovascular: Positive for dyspnea on exertion. Negative for chest pain, claudication, cyanosis, irregular heartbeat, leg swelling, near-syncope, orthopnea, palpitations, paroxysmal nocturnal dyspnea and syncope.   Respiratory: Negative for shortness of breath and snoring.    Endocrine: Negative for cold intolerance, heat intolerance, polydipsia, polyphagia and polyuria.   Skin: Negative for itching and rash.   Musculoskeletal: Negative for arthritis, falls, joint pain, joint  swelling, muscle cramps, muscle weakness and myalgias.   Gastrointestinal: Negative for constipation, diarrhea, dysphagia, heartburn, hematemesis, hematochezia, melena, nausea and vomiting.   Genitourinary: Negative for frequency, hematuria and hesitancy.   Neurological: Positive for dizziness. Negative for excessive daytime sleepiness, headaches, light-headedness and numbness.   Psychiatric/Behavioral: Negative for depression. The patient is not nervous/anxious.           Current Outpatient Medications:   •  allopurinol (ZYLOPRIM) 300 MG tablet, TAKE 1 TABLET BY MOUTH EVERY DAY, Disp: 90 tablet, Rfl: 1  •  Biotin 10 MG tablet, Take 1 tablet by mouth Daily., Disp: , Rfl:   •  Cholecalciferol (VITAMIN D) 2000 UNITS tablet, Take 2 tablets by mouth daily., Disp: , Rfl:   •  doxycycline (VIBRAMYCIN) 100 MG capsule, TK 1 C PO BID FOR 14 DAYS, Disp: , Rfl: 0  •  FLUZONE HIGH-DOSE 0.5 ML suspension prefilled syringe injection, ADM 0.5ML IM UTD, Disp: , Rfl: 0  •  lisinopril-hydrochlorothiazide (ZESTORETIC) 20-12.5 MG per tablet, One by mouth every morning for high blood pressure and leg swelling, Disp: 90 tablet, Rfl: 3    Current Facility-Administered Medications:   •  nitroglycerin (NITROSTAT) SL tablet 0.4 mg, 0.4 mg, Sublingual, Q5 Min PRN, Lizbeth Laura MD  •  sodium chloride 0.9 % flush 10 mL, 10 mL, Intravenous, PRN, Lizbeth Laura MD    Past Medical History:   Diagnosis Date   • Benign essential hypertension 10/28/2012    October 2012--treatment for hypertension begun.   • Bilateral sensorineural hearing loss, wears hearing aids 6/14/2017    Left is much worse than right.  Patient had multiple ear infections as a child.   • Chronic renal insufficiency, stage II (mild), 05/18/2016--creatinine 1.35 4/28/2016 05/18/2016--creatinine 1.35.  Baseline creatinine approximately 1.3.   • Claustrophobia 4/28/2016    This patient has significant nocturnal hypoxemia and I think that she could benefit from nocturnal  oxygen therapy. The exact etiology of her hypoxemia is not clear. She could possibly have obstructive sleep apnea but this is not documented. We cannot test this patient for sleep apnea due to the fact that she is severely claustrophobic. Patient was a former smoker and it is possibly that COPD he is playing a role.   • Family history of coronary artery disease 2016    Patient's mother, father, 2 sisters and a brother all  from myocardial infarctions   • Gout 10/28/2012    2012--initial diagnosis and treatment of gout.   • Hyperlipidemia 10/28/2012    2012--treatment for hyperlipidemia begun.   • Impaired fasting glucose 10/28/2012    2012--initial diagnosis impaired fasting glucose.   • Nocturnal hypoxemia 2014--patient did not receive nocturnal oxygen because of Medicare regulations.   05/15/2014--overnight oximetry revealed oxygen saturations less than 89% for 22 minutes and 40 seconds. Oxygen saturations less than or equal to 88% for 22 minutes and 40 seconds. Lowest oxygen saturation 83%. The longest continuous time with oxygen saturations less than or equal to 88% was 1 minute and 32 seconds.   2012--overnight oximetry revealed oxygen saturations less than 90% for one hour and 35 minutes. Oxygen saturations less than 89% 59 minutes. This patient has significant nocturnal hypoxemia and I think that she could benefit from nocturnal oxygen therapy. The exact etiology of her hypoxemia is not clear. She could possibly have obstructive sleep apnea but this is not documented. We cannot test this patient for sleep apnea due to the fact that she is severely claustrophobic. Patient was a former smoker and it is possibly that COPD he is playing a role.   • Secondary polycythemia 2014--patient did not receive nocturnal oxygen because of Medicare regulations.   05/15/2014--overnight oximetry revealed oxygen saturations less than 89% for 22 minutes  and 40 seconds. Oxygen saturations less than or equal to 88% for 22 minutes and 40 seconds. Lowest oxygen saturation 83%. The longest continuous time with oxygen saturations less than or equal to 88% was 1 minute and 32 seconds.   08/02/2012--overnight oximetry revealed oxygen saturations less than 90% for one hour and 35 minutes. Oxygen saturations less than 89% 59 minutes. This patient has significant nocturnal hypoxemia and I think that she could benefit from nocturnal oxygen therapy. The exact etiology of her hypoxemia is not clear. She could possibly have obstructive sleep apnea but this is not documented. We cannot test this patient for sleep apnea due to the fact that she is severely claustrophobic. Patient was a former smoker and it is possibly that COPD he is playing a role.   • Subclinical hypothyroidism 11/17/2015 01/24/2018--patient seen in follow-up.  Repeat thyroid function tests returned normal although TSH is upper limit of normal.  Thyroid antibodies negative.  Ultrasound of the thyroid revealed a normal size thyroid gland but there were multiple tiny solid and cystic complex nodules bilaterally.  No single dominant lesion present.  Consistent with multinodular goiter.  Recommend conservative sonographic surveillance with six-month follow-up.  This was discussed with patient today.  12/21/2017--TSH is elevated at 7.79.  Repeat for normal at 1.1.  Free T3 normal at 3.2.  Note that several thyroid function tests performed after 11/17/2015 overall in the normal range.  No thyroid antibodies noted at that time.  It appears that this diagnosis indeed present and needs further evaluation.  We will repeat thyroid function tests and obtain thyroid antibodies once again.  Thyroid ultrasound ordered.  I will have patient follow-up after the results are known.  11/17/2015--routine follow-up reveals elevated TSH of 5.14.   • Vitamin D deficiency 5/23/2016     Past Surgical History:   Procedure Laterality  "Date   • OOPHORECTOMY      age 48   • RADICAL ABDOMINAL HYSTERECTOMY  48 years old    48 years of age. Uterine fibroid tumors with menorrhagia - no cancer     Family History   Problem Relation Age of Onset   • Heart disease Mother         Ischemic.  from coronary artery disease.   • Heart disease Father         Ischemic.  from coronary artery disease.   • Heart disease Sister         Ischemic.  from coronary artery disease.   • Heart disease Brother         Ischemic.  from coronary artery disease.   • Heart disease Sister         Ischemic.  from coronary artery disease.   • Breast cancer Daughter      Social History     Tobacco Use   • Smoking status: Former Smoker     Packs/day: 0.50     Start date: 1946     Last attempt to quit: 1954     Years since quittin.1   • Smokeless tobacco: Never Used   • Tobacco comment: Stopped drinking at age 30.   Substance Use Topics   • Alcohol use: No   • Drug use: No           ECG 12 Lead  Date/Time: 2019 2:46 PM  Performed by: Lizbeth Laura MD  Authorized by: Lizbeth Laura MD   Comparison: compared with previous ECG   Similar to previous ECG  Comparison to previous ECG: PAC is new compared with prior  Rhythm: sinus rhythm  Ectopy: atrial premature contractions               Objective:         Visit Vitals  /74   Pulse 74   Ht 160 cm (63\")   Wt 97.5 kg (215 lb)   BMI 38.09 kg/m²          Physical Exam   Constitutional: She is oriented to person, place, and time. She appears well-developed and well-nourished.   HENT:   Head: Normocephalic and atraumatic.   Eyes: Conjunctivae, EOM and lids are normal. Pupils are equal, round, and reactive to light.   Neck: Normal range of motion and full passive range of motion without pain. Neck supple. No JVD present. Carotid bruit is not present.   Cardiovascular: Normal rate, regular rhythm, S1 normal and S2 normal. Exam reveals no gallop.   No murmur heard.  Pulses:       Radial pulses are 2+ on " the right side, and 2+ on the left side.   No bilateral lower extremity edema   Pulmonary/Chest: Effort normal and breath sounds normal.   Abdominal: Soft. Normal appearance.   Lymphadenopathy:     She has no cervical adenopathy.   Neurological: She is alert and oriented to person, place, and time.   Skin: Skin is warm, dry and intact.   Psychiatric: She has a normal mood and affect.       Lab Review:       Assessment:          Diagnosis Plan   1. Supraventricular tachycardia (CMS/HCC)     2. Benign essential hypertension     3. Hyperlipidemia, unspecified hyperlipidemia type            Plan:       1.  Paroxysmal SVT.  No recent episodes.  Continue low-dose metoprolol tartrate.  2.  Hypertension.  Well-controlled on current medications.  3.  Hyperlipidemia.  On pravastatin which is managed by Dr. Santos.    We'll plan on seeing the patient again in one year or sooner if further issues arise.

## 2019-02-07 LAB
DX ICD CODE: NORMAL
DX ICD CODE: NORMAL
PATH REPORT.FINAL DX SPEC: NORMAL
PATH REPORT.GROSS SPEC: NORMAL
PATH REPORT.SITE OF ORIGIN SPEC: NORMAL
PATHOLOGIST NAME: NORMAL
PAYMENT PROCEDURE: NORMAL

## 2019-02-08 ENCOUNTER — TELEPHONE (OUTPATIENT)
Dept: OBSTETRICS AND GYNECOLOGY | Facility: CLINIC | Age: 84
End: 2019-02-08

## 2019-02-08 RX ORDER — CLOBETASOL PROPIONATE 0.5 MG/G
OINTMENT TOPICAL DAILY
Qty: 30 G | Refills: 0 | Status: SHIPPED | OUTPATIENT
Start: 2019-02-08 | End: 2019-02-11

## 2019-02-08 NOTE — TELEPHONE ENCOUNTER
Called patient and reviewed results.  She had been using Kenalog without much relief.  We discussed that we can try a higher potency steroid and there are other treatment options if this does not work or cost prohibitive.  If the clobetasol does not work or she cannot afford would recommend referral to dermatologist for further options.  Pt expressed agreement and understanding.

## 2019-02-11 ENCOUNTER — TELEPHONE (OUTPATIENT)
Dept: OBSTETRICS AND GYNECOLOGY | Facility: CLINIC | Age: 84
End: 2019-02-11

## 2019-02-11 RX ORDER — CLOBETASOL PROPIONATE 0.5 MG/G
OINTMENT TOPICAL 2 TIMES DAILY
Qty: 15 G | Refills: 0 | Status: SHIPPED | OUTPATIENT
Start: 2019-02-11 | End: 2021-03-15

## 2019-02-11 NOTE — TELEPHONE ENCOUNTER
Tried to call compounding pharmacy to see if there is an alternative that they can compound as triamcinolone did not work for her in the past.

## 2019-02-11 NOTE — TELEPHONE ENCOUNTER
Pt called stated that the RX that was sent in were to much money for her to afford. Can you please send in a similar RX for PT.       Thank you

## 2019-02-15 RX ORDER — HALOBETASOL PROPIONATE 0.05 %
OINTMENT (GRAM) TOPICAL
Qty: 15 G | Refills: 2 | Status: SHIPPED | OUTPATIENT
Start: 2019-02-15 | End: 2020-02-27

## 2019-02-15 NOTE — PROGRESS NOTES
Please let patient know I have tried another medication.  If this is still too expensive, we can talk to a compounding pharmacy.  Thanks!

## 2019-02-18 ENCOUNTER — TELEPHONE (OUTPATIENT)
Dept: OBSTETRICS AND GYNECOLOGY | Facility: CLINIC | Age: 84
End: 2019-02-18

## 2019-03-01 DIAGNOSIS — E78.5 HYPERLIPIDEMIA, UNSPECIFIED HYPERLIPIDEMIA TYPE: Chronic | ICD-10-CM

## 2019-03-01 DIAGNOSIS — R73.01 IMPAIRED FASTING GLUCOSE: Chronic | ICD-10-CM

## 2019-03-01 DIAGNOSIS — D75.1 SECONDARY POLYCYTHEMIA: Chronic | ICD-10-CM

## 2019-03-01 DIAGNOSIS — E03.8 SUBCLINICAL HYPOTHYROIDISM: ICD-10-CM

## 2019-03-05 LAB
ALBUMIN SERPL-MCNC: 4 G/DL (ref 3.5–5.2)
ALBUMIN/GLOB SERPL: 1.4 G/DL
ALP SERPL-CCNC: 42 U/L (ref 39–117)
ALT SERPL-CCNC: 21 U/L (ref 1–33)
AST SERPL-CCNC: 19 U/L (ref 1–32)
BILIRUB SERPL-MCNC: 0.6 MG/DL (ref 0.1–1.2)
BUN SERPL-MCNC: 24 MG/DL (ref 8–23)
BUN/CREAT SERPL: 17.6 (ref 7–25)
CALCIUM SERPL-MCNC: 9.6 MG/DL (ref 8.6–10.5)
CHLORIDE SERPL-SCNC: 103 MMOL/L (ref 98–107)
CHOLEST SERPL-MCNC: 124 MG/DL (ref 100–199)
CK SERPL-CCNC: 98 U/L (ref 20–180)
CO2 SERPL-SCNC: 22.6 MMOL/L (ref 22–29)
CREAT SERPL-MCNC: 1.36 MG/DL (ref 0.57–1)
ERYTHROCYTE [DISTWIDTH] IN BLOOD BY AUTOMATED COUNT: 13.7 % (ref 12.3–15.4)
GLOBULIN SER CALC-MCNC: 2.8 GM/DL
GLUCOSE SERPL-MCNC: 98 MG/DL (ref 65–99)
HBA1C MFR BLD: 5.81 % (ref 4.8–5.6)
HCT VFR BLD AUTO: 49.3 % (ref 34–46.6)
HDL SERPL-SCNC: 31.2 UMOL/L
HDLC SERPL-MCNC: 51 MG/DL
HGB BLD-MCNC: 15.3 G/DL (ref 12–15.9)
LDL SERPL QN: 20.8 NM
LDL SERPL-SCNC: 682 NMOL/L
LDL SMALL SERPL-SCNC: 274 NMOL/L
LDLC SERPL CALC-MCNC: 57 MG/DL (ref 0–99)
MCH RBC QN AUTO: 29.4 PG (ref 26.6–33)
MCHC RBC AUTO-ENTMCNC: 31 G/DL (ref 31.5–35.7)
MCV RBC AUTO: 94.6 FL (ref 79–97)
PLATELET # BLD AUTO: 250 10*3/MM3 (ref 140–450)
POTASSIUM SERPL-SCNC: 4.5 MMOL/L (ref 3.5–5.2)
PROT SERPL-MCNC: 6.8 G/DL (ref 6–8.5)
RBC # BLD AUTO: 5.21 10*6/MM3 (ref 3.77–5.28)
SODIUM SERPL-SCNC: 139 MMOL/L (ref 136–145)
T3FREE SERPL-MCNC: 2.9 PG/ML (ref 2–4.4)
T4 FREE SERPL-MCNC: 1.32 NG/DL (ref 0.93–1.7)
TRIGL SERPL-MCNC: 80 MG/DL (ref 0–149)
TSH SERPL DL<=0.005 MIU/L-ACNC: 4.92 MIU/ML (ref 0.27–4.2)
WBC # BLD AUTO: 8.03 10*3/MM3 (ref 3.4–10.8)

## 2019-03-11 ENCOUNTER — OFFICE VISIT (OUTPATIENT)
Dept: INTERNAL MEDICINE | Facility: CLINIC | Age: 84
End: 2019-03-11

## 2019-03-11 VITALS
WEIGHT: 214 LBS | SYSTOLIC BLOOD PRESSURE: 124 MMHG | BODY MASS INDEX: 37.92 KG/M2 | DIASTOLIC BLOOD PRESSURE: 72 MMHG | HEIGHT: 63 IN | HEART RATE: 72 BPM | OXYGEN SATURATION: 98 %

## 2019-03-11 DIAGNOSIS — G47.34 NOCTURNAL HYPOXEMIA: Chronic | ICD-10-CM

## 2019-03-11 DIAGNOSIS — N18.2 CHRONIC RENAL INSUFFICIENCY, STAGE II (MILD): Chronic | ICD-10-CM

## 2019-03-11 DIAGNOSIS — E78.5 HYPERLIPIDEMIA, UNSPECIFIED HYPERLIPIDEMIA TYPE: Chronic | ICD-10-CM

## 2019-03-11 DIAGNOSIS — E03.9 PRIMARY HYPOTHYROIDISM: ICD-10-CM

## 2019-03-11 DIAGNOSIS — E55.9 VITAMIN D DEFICIENCY: Chronic | ICD-10-CM

## 2019-03-11 DIAGNOSIS — I47.1 SUPRAVENTRICULAR TACHYCARDIA (HCC): Chronic | ICD-10-CM

## 2019-03-11 DIAGNOSIS — E03.8 SUBCLINICAL HYPOTHYROIDISM: Chronic | ICD-10-CM

## 2019-03-11 DIAGNOSIS — E66.01 MORBIDLY OBESE (HCC): ICD-10-CM

## 2019-03-11 DIAGNOSIS — Z51.81 THERAPEUTIC DRUG MONITORING: ICD-10-CM

## 2019-03-11 DIAGNOSIS — E04.2 MULTINODULAR GOITER: Chronic | ICD-10-CM

## 2019-03-11 DIAGNOSIS — Z87.39 HISTORY OF GOUT: Chronic | ICD-10-CM

## 2019-03-11 DIAGNOSIS — I10 BENIGN ESSENTIAL HYPERTENSION: Chronic | ICD-10-CM

## 2019-03-11 DIAGNOSIS — D75.1 SECONDARY POLYCYTHEMIA: Chronic | ICD-10-CM

## 2019-03-11 DIAGNOSIS — R73.01 IMPAIRED FASTING GLUCOSE: Primary | Chronic | ICD-10-CM

## 2019-03-11 PROCEDURE — 99214 OFFICE O/P EST MOD 30 MIN: CPT | Performed by: INTERNAL MEDICINE

## 2019-03-11 RX ORDER — LEVOTHYROXINE SODIUM 0.05 MG/1
TABLET ORAL
Qty: 90 TABLET | Refills: 3 | Status: SHIPPED | OUTPATIENT
Start: 2019-03-11 | End: 2019-06-10 | Stop reason: SDUPTHER

## 2019-03-11 NOTE — PROGRESS NOTES
03/11/2019    Patient Information  Mandy Alarcon                                                                                          [address] [address]      5/30/1933  [unfilled]  There is no work phone number on file.    Chief Complaint:     Follow-up impaired fasting glucose, subclinical hypothyroidism, hyperlipidemia, gout, chronic renal insufficiency, hypertension, secondary polycythemia, nocturnal hypoxemia, vitamin D deficiency, multinodular goiter, history of SVT.  No new acute complaints.    History of Present Illness:    Patient with a history of medical problems as outlined in the chief complaint of been fairly stable now for at least the past year.  She presents today for follow-up with lab prior in order to monitor her chronic medical issues.  Her past medical history reviewed and updated were necessary including health maintenance parameters.  This reveals she is up-to-date or else accounted for.    Review of Systems   Constitution: Negative.   HENT: Negative.    Eyes: Negative.    Cardiovascular: Negative.    Respiratory: Negative.    Endocrine: Negative.    Hematologic/Lymphatic: Negative.    Skin: Negative.    Musculoskeletal: Negative.    Gastrointestinal: Negative.    Genitourinary: Negative.    Neurological: Negative.    Psychiatric/Behavioral: Negative.    Allergic/Immunologic: Negative.        Active Problems:    Patient Active Problem List   Diagnosis   • Benign essential hypertension   • Chronic renal insufficiency, stage II (mild), 05/18/2016--creatinine 1.35   • Claustrophobia   • Gout   • Hyperlipidemia   • Primary hypothyroidism   • Impaired fasting glucose   • Nocturnal hypoxemia   • Secondary polycythemia   • Vitamin D deficiency   • Therapeutic drug monitoring   • Family history of coronary artery disease   • Bilateral sensorineural hearing loss, wears hearing aids   • Multinodular goiter   • Supraventricular tachycardia (CMS/HCC)   • Morbidly  obese (CMS/HCC)         Past Medical History:   Diagnosis Date   • Benign essential hypertension 10/28/2012    2012--treatment for hypertension begun.   • Bilateral sensorineural hearing loss, wears hearing aids 2017    Left is much worse than right.  Patient had multiple ear infections as a child.   • Chronic renal insufficiency, stage II (mild), 2016--creatinine 1.35 2016--creatinine 1.35.  Baseline creatinine approximately 1.3.   • Claustrophobia 2016    This patient has significant nocturnal hypoxemia and I think that she could benefit from nocturnal oxygen therapy. The exact etiology of her hypoxemia is not clear. She could possibly have obstructive sleep apnea but this is not documented. We cannot test this patient for sleep apnea due to the fact that she is severely claustrophobic. Patient was a former smoker and it is possibly that COPD he is playing a role.   • Family history of coronary artery disease 2016    Patient's mother, father, 2 sisters and a brother all  from myocardial infarctions   • Gout 10/28/2012    2012--initial diagnosis and treatment of gout.   • Hyperlipidemia 10/28/2012    2012--treatment for hyperlipidemia begun.   • Impaired fasting glucose 10/28/2012    2012--initial diagnosis impaired fasting glucose.   • Nocturnal hypoxemia 2014--patient did not receive nocturnal oxygen because of Medicare regulations.   05/15/2014--overnight oximetry revealed oxygen saturations less than 89% for 22 minutes and 40 seconds. Oxygen saturations less than or equal to 88% for 22 minutes and 40 seconds. Lowest oxygen saturation 83%. The longest continuous time with oxygen saturations less than or equal to 88% was 1 minute and 32 seconds.   2012--overnight oximetry revealed oxygen saturations less than 90% for one hour and 35 minutes. Oxygen saturations less than 89% 59 minutes. This patient has significant  nocturnal hypoxemia and I think that she could benefit from nocturnal oxygen therapy. The exact etiology of her hypoxemia is not clear. She could possibly have obstructive sleep apnea but this is not documented. We cannot test this patient for sleep apnea due to the fact that she is severely claustrophobic. Patient was a former smoker and it is possibly that COPD he is playing a role.   • Secondary polycythemia 8/2/2012 11/06/2014--patient did not receive nocturnal oxygen because of Medicare regulations.   05/15/2014--overnight oximetry revealed oxygen saturations less than 89% for 22 minutes and 40 seconds. Oxygen saturations less than or equal to 88% for 22 minutes and 40 seconds. Lowest oxygen saturation 83%. The longest continuous time with oxygen saturations less than or equal to 88% was 1 minute and 32 seconds.   08/02/2012--overnight oximetry revealed oxygen saturations less than 90% for one hour and 35 minutes. Oxygen saturations less than 89% 59 minutes. This patient has significant nocturnal hypoxemia and I think that she could benefit from nocturnal oxygen therapy. The exact etiology of her hypoxemia is not clear. She could possibly have obstructive sleep apnea but this is not documented. We cannot test this patient for sleep apnea due to the fact that she is severely claustrophobic. Patient was a former smoker and it is possibly that COPD he is playing a role.   • Subclinical hypothyroidism 11/17/2015 01/24/2018--patient seen in follow-up.  Repeat thyroid function tests returned normal although TSH is upper limit of normal.  Thyroid antibodies negative.  Ultrasound of the thyroid revealed a normal size thyroid gland but there were multiple tiny solid and cystic complex nodules bilaterally.  No single dominant lesion present.  Consistent with multinodular goiter.  Recommend conservative sonographic surveillance with six-month follow-up.  This was discussed with patient today.  12/21/2017--TSH is  elevated at 7.79.  Repeat for normal at 1.1.  Free T3 normal at 3.2.  Note that several thyroid function tests performed after 11/17/2015 overall in the normal range.  No thyroid antibodies noted at that time.  It appears that this diagnosis indeed present and needs further evaluation.  We will repeat thyroid function tests and obtain thyroid antibodies once again.  Thyroid ultrasound ordered.  I will have patient follow-up after the results are known.  11/17/2015--routine follow-up reveals elevated TSH of 5.14.   • Vitamin D deficiency 5/23/2016         Past Surgical History:   Procedure Laterality Date   • OOPHORECTOMY      age 48   • RADICAL ABDOMINAL HYSTERECTOMY  48 years old    48 years of age. Uterine fibroid tumors with menorrhagia - no cancer         Allergies   Allergen Reactions   • Cefaclor Swelling   • Sulfa Antibiotics Rash           Current Outpatient Medications:   •  allopurinol (ZYLOPRIM) 300 MG tablet, TAKE 1 TABLET BY MOUTH EVERY DAY, Disp: 90 tablet, Rfl: 1  •  Biotin 10 MG tablet, Take 1 tablet by mouth Daily., Disp: , Rfl:   •  Cholecalciferol (VITAMIN D) 2000 UNITS tablet, Take 2 tablets by mouth daily., Disp: , Rfl:   •  clobetasol (TEMOVATE) 0.05 % ointment, Apply  topically to the appropriate area as directed 2 (Two) Times a Day., Disp: 15 g, Rfl: 0  •  halobetasol (ULTRAVATE) 0.05 % ointment, Apply pea-size amount daily to affected area for 2 months. Then apply every other day for 2 weeks then twice weekly, Disp: 15 g, Rfl: 2  •  lisinopril-hydrochlorothiazide (ZESTORETIC) 20-12.5 MG per tablet, One by mouth every morning for high blood pressure and leg swelling, Disp: 90 tablet, Rfl: 3  •  pravastatin (PRAVACHOL) 40 MG tablet, Take 1 tablet by mouth Every Night., Disp: , Rfl:     Current Facility-Administered Medications:   •  nitroglycerin (NITROSTAT) SL tablet 0.4 mg, 0.4 mg, Sublingual, Q5 Min PRN, Lizbeth Laura MD  •  sodium chloride 0.9 % flush 10 mL, 10 mL, Intravenous, PRN,  "Lizbeth Laura MD      Family History   Problem Relation Age of Onset   • Heart disease Mother         Ischemic.  from coronary artery disease.   • Heart disease Father         Ischemic.  from coronary artery disease.   • Heart disease Sister         Ischemic.  from coronary artery disease.   • Heart disease Brother         Ischemic.  from coronary artery disease.   • Heart disease Sister         Ischemic.  from coronary artery disease.   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   Substance and Sexual Activity   • Alcohol use: No   • Drug use: No   • Sexual activity: Not Currently     Partners: Male   Other Topics Concern   • Not on file   Social History Narrative   • Not on file         Vitals:    19 0933   BP: 124/72   Pulse: 72   SpO2: 98%   Weight: 97.1 kg (214 lb)   Height: 160 cm (62.99\")          Physical Exam:    General: Alert and oriented x 3.  No acute distress.  Normal affect. Obese. HEENT: Pupils equal, round, reactive to light; extraocular movements intact; sclerae nonicteric; pharynx, ear canals and TMs normal.  Neck: Without JVD, thyromegaly, bruit, or adenopathy.  Lungs: Clear to auscultation in all fields.  Heart: Regular rate and rhythm " without murmur, rub, gallop, or click.  Abdomen: Soft, nontender, without hepatosplenomegaly or hernia.  Bowel sounds normal.  : Deferred.  Rectal: Deferred.  Extremities: Without clubbing, cyanosis, edema, or pulse deficit.  Neurologic: Intact without focal deficit.  Normal station and gait observed during ingress and egress from the examination room.  Skin: Without significant lesion.  Musculoskeletal: Unremarkable.    Lab/other results:    NMR is absolutely perfect.  CMP normal except BUN 24, creatinine 1.36.  CBC is normal except hematocrit slightly elevated at 49.3 but hemoglobin is normal at 15.3.  Hemoglobin A1c 5.81.  TSH elevated at 4.92.  CPK normal.  Free T3 and free T4 are normal.    Assessment/Plan:     Diagnosis Plan   1. Impaired fasting glucose  Comprehensive Metabolic Panel    Hemoglobin A1c   2. Subclinical hypothyroidism  TSH    T4, Free    T3, Free    TSH    T4, Free    T3, Free   3. Hyperlipidemia, unspecified hyperlipidemia type  CK    NMR LipoProfile   4. Gout  Uric Acid   5. Chronic renal insufficiency, stage II (mild), 05/18/2016--creatinine 1.35  CBC (No Diff)    Comprehensive Metabolic Panel   6. Benign essential hypertension     7. Secondary polycythemia     8. Nocturnal hypoxemia     9. Vitamin D deficiency  Vitamin D 25 Hydroxy   10. Multinodular goiter     11. Supraventricular tachycardia (CMS/HCC)     12. Therapeutic drug monitoring     13. Morbidly obese (CMS/HCC)     14. Primary hypothyroidism       Patient has mild impaired fasting glucose it does not require medication.  Hyperlipidemia is under excellent control.  Her gout is stable on allopurinol.  Blood pressure seems controlled.  Secondary polycythemia is very mild.  Vitamin D therapeutic.  Patient does have a multinodular goiter and repeat thyroid ultrasound was benign.  However, given the fact her TSH remains elevated, I think it is now time to initiate levothyroxine.  SVT is currently not an issue.    Plan is as follows:  Start levothyroxine 50 mcg/day.  Patient will obtain nonfasting lab work in about 6 weeks and follow-up on the phone for the results.  Otherwise, she will follow-up after August 24, 2019 with lab prior and this will also be subsequent Medicare wellness visit.        Procedures

## 2019-03-18 RX ORDER — PRAVASTATIN SODIUM 40 MG
TABLET ORAL
Qty: 90 TABLET | Refills: 1 | Status: SHIPPED | OUTPATIENT
Start: 2019-03-18 | End: 2019-09-04

## 2019-03-18 RX ORDER — ALLOPURINOL 300 MG/1
TABLET ORAL
Qty: 90 TABLET | Refills: 1 | Status: SHIPPED | OUTPATIENT
Start: 2019-03-18 | End: 2019-09-04 | Stop reason: SDUPTHER

## 2019-03-22 ENCOUNTER — APPOINTMENT (OUTPATIENT)
Dept: GENERAL RADIOLOGY | Facility: HOSPITAL | Age: 84
End: 2019-03-22

## 2019-03-22 ENCOUNTER — HOSPITAL ENCOUNTER (EMERGENCY)
Facility: HOSPITAL | Age: 84
Discharge: HOME | End: 2019-03-22
Attending: EMERGENCY MEDICINE | Admitting: EMERGENCY MEDICINE

## 2019-03-22 VITALS
SYSTOLIC BLOOD PRESSURE: 145 MMHG | HEIGHT: 63 IN | DIASTOLIC BLOOD PRESSURE: 68 MMHG | BODY MASS INDEX: 37.58 KG/M2 | WEIGHT: 212.1 LBS | RESPIRATION RATE: 18 BRPM | OXYGEN SATURATION: 97 % | HEART RATE: 61 BPM | TEMPERATURE: 98.7 F

## 2019-03-22 DIAGNOSIS — R42 POSTURAL DIZZINESS WITH NEAR SYNCOPE: ICD-10-CM

## 2019-03-22 DIAGNOSIS — R55 POSTURAL DIZZINESS WITH NEAR SYNCOPE: ICD-10-CM

## 2019-03-22 DIAGNOSIS — N39.0 ACUTE UTI: Primary | ICD-10-CM

## 2019-03-22 DIAGNOSIS — E86.0 DEHYDRATION: ICD-10-CM

## 2019-03-22 LAB
ALBUMIN SERPL-MCNC: 3.8 G/DL (ref 3.5–5.2)
ALBUMIN/GLOB SERPL: 1.1 G/DL
ALP SERPL-CCNC: 41 U/L (ref 39–117)
ALT SERPL W P-5'-P-CCNC: 20 U/L (ref 1–33)
ANION GAP SERPL CALCULATED.3IONS-SCNC: 15.5 MMOL/L
AST SERPL-CCNC: 26 U/L (ref 1–32)
BACTERIA UR QL AUTO: ABNORMAL /HPF
BASOPHILS # BLD AUTO: 0.06 10*3/MM3 (ref 0–0.2)
BASOPHILS NFR BLD AUTO: 0.6 % (ref 0–1.5)
BILIRUB SERPL-MCNC: 0.5 MG/DL (ref 0.2–1.2)
BILIRUB UR QL STRIP: NEGATIVE
BUN BLD-MCNC: 23 MG/DL (ref 8–23)
BUN/CREAT SERPL: 16.7 (ref 7–25)
CALCIUM SPEC-SCNC: 9.9 MG/DL (ref 8.6–10.5)
CHLORIDE SERPL-SCNC: 99 MMOL/L (ref 98–107)
CLARITY UR: CLEAR
CO2 SERPL-SCNC: 22.5 MMOL/L (ref 22–29)
COLOR UR: ABNORMAL
CREAT BLD-MCNC: 1.38 MG/DL (ref 0.57–1)
DEPRECATED RDW RBC AUTO: 47.2 FL (ref 37–54)
EOSINOPHIL # BLD AUTO: 0.15 10*3/MM3 (ref 0–0.4)
EOSINOPHIL NFR BLD AUTO: 1.5 % (ref 0.3–6.2)
ERYTHROCYTE [DISTWIDTH] IN BLOOD BY AUTOMATED COUNT: 13.4 % (ref 12.3–15.4)
GFR SERPL CREATININE-BSD FRML MDRD: 36 ML/MIN/1.73
GLOBULIN UR ELPH-MCNC: 3.6 GM/DL
GLUCOSE BLD-MCNC: 115 MG/DL (ref 65–99)
GLUCOSE UR STRIP-MCNC: NEGATIVE MG/DL
HCT VFR BLD AUTO: 47.4 % (ref 34–46.6)
HGB BLD-MCNC: 15.4 G/DL (ref 12–15.9)
HGB UR QL STRIP.AUTO: NEGATIVE
HOLD SPECIMEN: NORMAL
HOLD SPECIMEN: NORMAL
HYALINE CASTS UR QL AUTO: ABNORMAL /LPF
IMM GRANULOCYTES # BLD AUTO: 0.05 10*3/MM3 (ref 0–0.05)
IMM GRANULOCYTES NFR BLD AUTO: 0.5 % (ref 0–0.5)
INR PPP: 1.24 (ref 0.9–1.1)
KETONES UR QL STRIP: NEGATIVE
LEUKOCYTE ESTERASE UR QL STRIP.AUTO: ABNORMAL
LYMPHOCYTES # BLD AUTO: 1.61 10*3/MM3 (ref 0.7–3.1)
LYMPHOCYTES NFR BLD AUTO: 16.3 % (ref 19.6–45.3)
MAGNESIUM SERPL-MCNC: 1.8 MG/DL (ref 1.6–2.4)
MCH RBC QN AUTO: 31.2 PG (ref 26.6–33)
MCHC RBC AUTO-ENTMCNC: 32.5 G/DL (ref 31.5–35.7)
MCV RBC AUTO: 96 FL (ref 79–97)
MONOCYTES # BLD AUTO: 0.88 10*3/MM3 (ref 0.1–0.9)
MONOCYTES NFR BLD AUTO: 8.9 % (ref 5–12)
NEUTROPHILS # BLD AUTO: 7.13 10*3/MM3 (ref 1.4–7)
NEUTROPHILS NFR BLD AUTO: 72.2 % (ref 42.7–76)
NITRITE UR QL STRIP: POSITIVE
NRBC BLD AUTO-RTO: 0 /100 WBC (ref 0–0)
PH UR STRIP.AUTO: 5.5 [PH] (ref 5–8)
PLATELET # BLD AUTO: 216 10*3/MM3 (ref 140–450)
PMV BLD AUTO: 10.4 FL (ref 6–12)
POTASSIUM BLD-SCNC: 4.3 MMOL/L (ref 3.5–5.2)
PROT SERPL-MCNC: 7.4 G/DL (ref 6–8.5)
PROT UR QL STRIP: NEGATIVE
PROTHROMBIN TIME: 15.3 SECONDS (ref 11.7–14.2)
RBC # BLD AUTO: 4.94 10*6/MM3 (ref 3.77–5.28)
RBC # UR: ABNORMAL /HPF
REF LAB TEST METHOD: ABNORMAL
SODIUM BLD-SCNC: 137 MMOL/L (ref 136–145)
SP GR UR STRIP: 1.02 (ref 1–1.03)
SQUAMOUS #/AREA URNS HPF: ABNORMAL /HPF
TROPONIN T SERPL-MCNC: <0.01 NG/ML (ref 0–0.03)
UROBILINOGEN UR QL STRIP: ABNORMAL
WBC NRBC COR # BLD: 9.88 10*3/MM3 (ref 3.4–10.8)
WBC UR QL AUTO: ABNORMAL /HPF
WHOLE BLOOD HOLD SPECIMEN: NORMAL
WHOLE BLOOD HOLD SPECIMEN: NORMAL

## 2019-03-22 PROCEDURE — 71046 X-RAY EXAM CHEST 2 VIEWS: CPT

## 2019-03-22 PROCEDURE — 83735 ASSAY OF MAGNESIUM: CPT | Performed by: EMERGENCY MEDICINE

## 2019-03-22 PROCEDURE — 84484 ASSAY OF TROPONIN QUANT: CPT | Performed by: EMERGENCY MEDICINE

## 2019-03-22 PROCEDURE — 81001 URINALYSIS AUTO W/SCOPE: CPT | Performed by: EMERGENCY MEDICINE

## 2019-03-22 PROCEDURE — 96360 HYDRATION IV INFUSION INIT: CPT

## 2019-03-22 PROCEDURE — 99284 EMERGENCY DEPT VISIT MOD MDM: CPT

## 2019-03-22 PROCEDURE — 80053 COMPREHEN METABOLIC PANEL: CPT | Performed by: EMERGENCY MEDICINE

## 2019-03-22 PROCEDURE — 85025 COMPLETE CBC W/AUTO DIFF WBC: CPT | Performed by: EMERGENCY MEDICINE

## 2019-03-22 PROCEDURE — 85610 PROTHROMBIN TIME: CPT | Performed by: EMERGENCY MEDICINE

## 2019-03-22 PROCEDURE — 93010 ELECTROCARDIOGRAM REPORT: CPT | Performed by: INTERNAL MEDICINE

## 2019-03-22 PROCEDURE — 93005 ELECTROCARDIOGRAM TRACING: CPT | Performed by: EMERGENCY MEDICINE

## 2019-03-22 RX ORDER — LEVOFLOXACIN 250 MG/1
250 TABLET ORAL DAILY
Qty: 7 TABLET | Refills: 0 | Status: SHIPPED | OUTPATIENT
Start: 2019-03-22 | End: 2019-09-04

## 2019-03-22 RX ORDER — SODIUM CHLORIDE 0.9 % (FLUSH) 0.9 %
10 SYRINGE (ML) INJECTION AS NEEDED
Status: DISCONTINUED | OUTPATIENT
Start: 2019-03-22 | End: 2019-03-22 | Stop reason: HOSPADM

## 2019-03-22 RX ADMIN — SODIUM CHLORIDE 1000 ML: 9 INJECTION, SOLUTION INTRAVENOUS at 15:41

## 2019-03-22 NOTE — ED NOTES
Patient states she as at [employer] shopping felt hot and dizzy, began to feel nauseous. States she felt better once she sat down. Vomited a few times and diaphoretic. Pt states dizziness has subsided now. States she feels better. Pt denies headache, numbness/tingling.      Zain Nolasco, RN  03/22/19 [id number]

## 2019-03-22 NOTE — ED TRIAGE NOTES
Pt was shopping today, had episode of dizziness, weakness, diaphoresis. Episode lasted about 15 min.

## 2019-03-22 NOTE — ED PROVIDER NOTES
EMERGENCY DEPARTMENT ENCOUNTER    CHIEF COMPLAINT  Chief Complaint: Dizziness  History given by: Patient  History limited by:   Room Number: 21/21  PMD: Daryl Santos MD      HPI:  Pt is a 85 y.o. female who presents complaining of episode of dizziness lasting approximately 15 mins that occurred today while clothes shopping. Shortly after the episode she reports feeling nauseated and eventually had episode of vomiting. She describes the dizziness as a spinning sensation. Pt also reports having some diaphoresis. Pt reports that her sxs improved with sitting down. She has had some increased urinary frequency and pressure that began yesterday, which she has taken Azo for. She denies any weakness, numbness, speech difficulty, HA.    Duration: 15 mins  Onset: sudden  Timing: once  Quality: room-spinning  Intensity/Severity: moderate  Progression: improved  Associated Symptoms: nausea, vomiting, diaphoresis, urinary frequency  Aggravating Factors: movement  Alleviating Factors: sitting down  Previous Episodes: none  Treatment before arrival: none    PAST MEDICAL HISTORY  Active Ambulatory Problems     Diagnosis Date Noted   • Benign essential hypertension 10/28/2012   • Chronic renal insufficiency, stage II (mild), 05/18/2016--creatinine 1.35 04/28/2016   • Claustrophobia 04/28/2016   • Gout 10/28/2012   • Hyperlipidemia 10/28/2012   • Primary hypothyroidism 11/17/2015   • Impaired fasting glucose 10/28/2012   • Nocturnal hypoxemia 08/02/2012   • Secondary polycythemia 08/02/2012   • Vitamin D deficiency 05/23/2016   • Therapeutic drug monitoring 05/23/2016   • Family history of coronary artery disease 05/24/2016   • Bilateral sensorineural hearing loss, wears hearing aids 06/14/2017   • Multinodular goiter 01/24/2018   • Supraventricular tachycardia (CMS/HCC) 07/10/2018   • Morbidly obese (CMS/HCC) 03/11/2019     Resolved Ambulatory Problems     Diagnosis Date Noted   • History of Cellulitis of trunk, Right  2016   • History of mammogram 2016   • History of pneumococcal vaccination 2016   • Hospital discharge follow-up 07/10/2018   • Near syncope 07/10/2018   • Chest tightness or pressure 07/10/2018   • PND (paroxysmal nocturnal dyspnea) 07/10/2018     Past Medical History:   Diagnosis Date   • Benign essential hypertension 10/28/2012   • Bilateral sensorineural hearing loss, wears hearing aids 2017   • Chronic renal insufficiency, stage II (mild), 2016--creatinine 1.35 2016   • Claustrophobia 2016   • Family history of coronary artery disease 2016   • Gout 10/28/2012   • Hyperlipidemia 10/28/2012   • Impaired fasting glucose 10/28/2012   • Nocturnal hypoxemia 2012   • Secondary polycythemia 2012   • Subclinical hypothyroidism 2015   • Vitamin D deficiency 2016       PAST SURGICAL HISTORY  Past Surgical History:   Procedure Laterality Date   • OOPHORECTOMY      age 48   • RADICAL ABDOMINAL HYSTERECTOMY  48 years old    48 years of age. Uterine fibroid tumors with menorrhagia - no cancer       FAMILY HISTORY  Family History   Problem Relation Age of Onset   • Heart disease Mother         Ischemic.  from coronary artery disease.   • Heart disease Father         Ischemic.  from coronary artery disease.   • Heart disease Sister         Ischemic.  from coronary artery disease.   • Heart disease Brother         Ischemic.  from coronary artery disease.   • Heart disease Sister         Ischemic.  from coronary artery disease.   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   Substance and Sexual Activity   • Alcohol use: No   • Drug use: No   • Sexual activity: Not Currently     Partners: Male   Lifestyle   • Physical activity:     Days per week: 0 days     Minutes per session: 0 min   • Stress: Not at all   Relationships   • Social connections:     Talks on phone: More than three times a week     Gets together: Three times a week     Attends L[employer] day service: More than 4 times per year     Active member of club or organization: No     Attends meetings of clubs or organizations: Never     Relationship status:        ALLERGIES  Cefaclor and Sulfa antibiotics    REVIEW OF SYSTEMS  Review of Systems   Constitutional: Positive for diaphoresis. Negative for fever.   HENT: Negative for sore throat.    Eyes: Negative.    Respiratory: Negative for cough and shortness of breath.    Cardiovascular: Negative for chest pain.   Gastrointestinal: Positive for nausea and vomiting. Negative for abdominal pain and diarrhea.   Genitourinary: Positive for frequency. Negative for dysuria.   Musculoskeletal: Negative for neck pain.   Skin: Negative for rash.   Neurological: Positive for dizziness. Negative for weakness, numbness and headaches.   Hematological: Negative.    Psychiatric/Behavioral: Negative.    All other systems reviewed and are negative.      PHYSICAL EXAM  ED Triage Vitals [19 1458]   Temp Heart Rate Resp BP SpO2   98.7 °F (37.1 °C) 76 16 161/82 99 %      Temp src Heart Rate Source Patient Position BP Location FiO2 (%)   Oral -- -- -- --       Physical Exam   Constitutional: She is oriented to person, place, and time. No distress.   HENT:    Head: Normocephalic and atraumatic.   Eyes: EOM are normal. Pupils are equal, round, and reactive to light. Right eye exhibits no nystagmus. Left eye exhibits no nystagmus.   Neck: Normal range of motion. Neck supple.   Cardiovascular: Normal rate, regular rhythm and normal heart sounds.   Occasional pause   Pulmonary/Chest: Effort normal and breath sounds normal. No respiratory distress.   Abdominal: Soft. There is no tenderness. There is no rebound and no guarding.   Musculoskeletal: Normal range of motion. She exhibits no edema.   Neurological: She is alert and oriented to person, place, and time. She has normal sensation and normal strength. She displays no weakness, facial symmetry and normal speech.   Skin: Skin is warm and dry. No rash noted.   Psychiatric: Mood and affect normal.   Nursing note and vitals reviewed.      LAB RESULTS  Lab Results (last 24 hours)     Procedure Component Value Units Date/Time    CBC & Differential [[id number]] Collected:  03/22/19 1524    Specimen:  Blood Updated:  03/22/19 1552    Narrative:       The following orders were created for panel order CBC & Differential.  Procedure                               Abnormality         Status                     ---------                               -----------         ------                     CBC Auto Differential[[id number]]        Abnormal            Final result                 Please view results for these tests on the individual orders.    Comprehensive Metabolic Panel [[id number]]  (Abnormal) Collected:  03/22/19 1524    Specimen:  Blood Updated:  03/22/19 1619     Glucose 115 mg/dL      BUN 23 mg/dL      Creatinine 1.38 mg/dL      Sodium 137 mmol/L      Potassium 4.3 mmol/L      Comment: Specimen hemolyzed.  Results may be affected.        Chloride 99 mmol/L      CO2 22.5 mmol/L      Calcium 9.9 mg/dL      Total Protein 7.4 g/dL      Albumin 3.80 g/dL      ALT (SGPT) 20 U/L      Comment: Specimen hemolyzed.  Results may be  affected.        AST (SGOT) 26 U/L      Comment: Specimen hemolyzed.  Results may be affected.        Alkaline Phosphatase 41 U/L      Total Bilirubin 0.5 mg/dL      eGFR Non African Amer 36 mL/min/1.73      Globulin 3.6 gm/dL      A/G Ratio 1.1 g/dL      BUN/Creatinine Ratio 16.7     Anion Gap 15.5 mmol/L     Narrative:       GFR Normal >60  Chronic Kidney Disease <60  Kidney Failure <15    Protime-INR [[id number]]  (Abnormal) Collected:  03/22/19 1524    Specimen:  Blood Updated:  03/22/19 1614     Protime 15.3 Seconds      INR 1.24    Magnesium [[id number]]  (Normal) Collected:  03/22/19 1524    Specimen:  Blood Updated:  03/22/19 1606     Magnesium 1.8 mg/dL     Troponin [[id number]]  (Normal) Collected:  03/22/19 1524    Specimen:  Blood Updated:  03/22/19 1622     Troponin T <0.010 ng/mL      Comment: Specimen hemolyzed.  Results may be affected.       Narrative:       Troponin T Reference Range:  <= 0.03 ng/mL-   Negative for AMI  >0.03 ng/mL-     Abnormal for myocardial necrosis.  Clinicians would have to utilize clinical acumen, EKG, Troponin and serial changes to determine if it is an Acute Myocardial Infarction or myocardial injury due to an underlying chronic condition.     CBC Auto Differential [[id number]]  (Abnormal) Collected:  03/22/19 1524    Specimen:  Blood Updated:  03/22/19 1552     WBC 9.88 10*3/mm3      RBC 4.94 10*6/mm3      Hemoglobin 15.4 g/dL      Hematocrit 47.4 %      MCV 96.0 fL      MCH 31.2 pg      MCHC 32.5 g/dL      RDW 13.4 %      RDW-SD 47.2 fl      MPV 10.4 fL      Platelets 216 10*3/mm3      Neutrophil % 72.2 %      Lymphocyte % 16.3 %      Monocyte % 8.9 %      Eosinophil % 1.5 %      Basophil % 0.6 %      Immature Grans % 0.5 %      Neutrophils, Absolute 7.13 10*3/mm3      Lymphocytes, Absolute 1.61 10*3/mm3      Monocytes, Absolute 0.88 10*3/mm3      Eosinophils, Absolute 0.15 10*3/mm3      Basophils, Absolute 0.06 10*3/mm3      Immature Grans, Absolute 0.05 10*3/mm3      nRBC  0.0 /100 WBC     Urinalysis With Microscopic If Indicated (No Culture) - Urine, Clean Catch [[id number]]  (Abnormal) Collected:  03/22/19 1550    Specimen:  Urine, Clean Catch Updated:  03/22/19 1610     Color, UA Dark Yellow     Appearance, UA Clear     pH, UA 5.5     Specific Gravity, UA 1.017     Glucose, UA Negative     Ketones, UA Negative     Bilirubin, UA Negative     Blood, UA Negative     Protein, UA Negative     Leuk Esterase, UA Small (1+)     Nitrite, UA Positive     Urobilinogen, UA 1.0 E.U./dL    Urinalysis, Microscopic Only - Urine, Clean Catch [[id number]]  (Abnormal) Collected:  03/22/19 1550    Specimen:  Urine, Clean Catch Updated:  03/22/19 1610     RBC, UA 0-2 /HPF      WBC, UA 13-20 /HPF      Bacteria, UA 4+ /HPF      Squamous Epithelial Cells, UA 0-2 /HPF      Hyaline Casts, UA 3-6 /LPF      Methodology Automated Microscopy          I ordered the above labs and reviewed the results    RADIOLOGY  XR Chest 2 View   TWO-VIEW CHEST     HISTORY: Dizziness. Hypertension.     FINDINGS: The lungs are well-expanded and clear. The heart and hilar  structures are within normal limits and there is no acute disease or  change from 07/10/2018.              I ordered the above noted radiological studies. Interpreted by radiologist. Reviewed by me in PACS.       PROCEDURES  Procedures  EKG          EKG time: 1556  Rhythm/Rate: NSR, 62BPM  P waves and GA: occasional PVCs  QRS, axis: nml   ST and T waves: nml     Interpreted Contemporaneously by me, independently viewed  changed compared to prior 6/21/18      PROGRESS AND CONSULTS     3:30 PM  Ordered blood work, UA, EKG, and CXR.  4:31 PM  Rechecked with pt. Informed pt of her labs showing positive UTI and her unremarkable imaging and EKG. Discussed with pt about plan to treat with abx and discharge. Pt understands and agrees with plan. All concerns were addressed.        MEDICAL DECISION MAKING  Results were reviewed/discussed with the patient and they were  also made aware of online access. Pt also made aware that some labs, such as cultures, will not be resulted during ER visit and follow up with PMD is necessary.     MDM  Number of Diagnoses or Management Options     Amount and/or Complexity of Data Reviewed  Clinical lab tests: reviewed and ordered (UA: Nitrate positive, 4+ bacteria, 13-20 WBC)  Tests in the radiology section of CPT®: ordered and reviewed (negative)  Tests in the medicine section of CPT®: reviewed and ordered (EKG - See EKG procedure note  )           DIAGNOSIS  Final diagnoses:   Acute UTI   Postural dizziness with near syncope   Dehydration       DISPOSITION  DISCHARGE    Patient discharged in stable condition.    Reviewed implications of results, diagnosis, meds, responsibility to follow up, warning signs and symptoms of possible worsening, potential complications and reasons to return to ER.    Patient/Family voiced understanding of above instructions.    Discussed plan for discharge, as there is no emergent indication for admission. Patient referred to primary care provider for BP management due to today's BP. Pt/family is agreeable and understands need for follow up and repeat testing.  Pt is aware that discharge does not mean that nothing is wrong but it indicates no emergency is present that requires admission and they must continue care with follow-up as given below or physician of their choice.     FOLLOW-UP  Daryl Santos MD  [address]  [place] [id number] [phone]    Schedule an appointment as soon as possible for a visit       Lizbeth Laura MD  [address]  [place] [facility] [id number] [phone]    Schedule an appointment as soon as possible for a visit            Medication List      New Prescriptions    levoFLOXacin 250 MG tablet  Commonly known as:  LEVAQUIN  Take 1 tablet by mouth Daily.              Latest Documented Vital Signs:  As of 4:56 PM  BP- 145/67 HR- 60 Temp- 98.7 °F (37.1 °C)  (Oral) O2 sat- 96%    --  Documentation assistance provided by victor hugo Winn for Dr. Witt.  Information recorded by the scribe was done at my direction and has been verified and validated by me.     Dorian Winn  03/22/19 [id number]       Marvin Witt MD  03/22/19 [id number]

## 2019-04-23 LAB
T3FREE SERPL-MCNC: 2.6 PG/ML (ref 2–4.4)
T4 FREE SERPL-MCNC: 1.54 NG/DL (ref 0.93–1.7)
TSH SERPL DL<=0.005 MIU/L-ACNC: 1.22 MIU/ML (ref 0.27–4.2)

## 2019-06-10 DIAGNOSIS — E03.9 PRIMARY HYPOTHYROIDISM: ICD-10-CM

## 2019-06-10 RX ORDER — LEVOTHYROXINE SODIUM 0.05 MG/1
TABLET ORAL
Qty: 90 TABLET | Refills: 0 | Status: SHIPPED | OUTPATIENT
Start: 2019-06-10 | End: 2019-06-16 | Stop reason: SDUPTHER

## 2019-06-16 DIAGNOSIS — I10 BENIGN ESSENTIAL HYPERTENSION: Chronic | ICD-10-CM

## 2019-06-16 DIAGNOSIS — E03.9 PRIMARY HYPOTHYROIDISM: ICD-10-CM

## 2019-06-17 RX ORDER — LEVOTHYROXINE SODIUM 0.05 MG/1
TABLET ORAL
Qty: 90 TABLET | Refills: 0 | Status: SHIPPED | OUTPATIENT
Start: 2019-06-17 | End: 2019-09-04 | Stop reason: SDUPTHER

## 2019-06-17 RX ORDER — LISINOPRIL AND HYDROCHLOROTHIAZIDE 20; 12.5 MG/1; MG/1
TABLET ORAL
Qty: 90 TABLET | Refills: 0 | Status: SHIPPED | OUTPATIENT
Start: 2019-06-17 | End: 2019-09-04 | Stop reason: SDUPTHER

## 2019-08-20 DIAGNOSIS — E55.9 VITAMIN D DEFICIENCY: Chronic | ICD-10-CM

## 2019-08-20 DIAGNOSIS — E03.8 SUBCLINICAL HYPOTHYROIDISM: Chronic | ICD-10-CM

## 2019-08-20 DIAGNOSIS — Z87.39 HISTORY OF GOUT: Chronic | ICD-10-CM

## 2019-08-20 DIAGNOSIS — R73.01 IMPAIRED FASTING GLUCOSE: Chronic | ICD-10-CM

## 2019-08-20 DIAGNOSIS — E78.5 HYPERLIPIDEMIA, UNSPECIFIED HYPERLIPIDEMIA TYPE: Chronic | ICD-10-CM

## 2019-08-20 DIAGNOSIS — N18.2 CHRONIC RENAL INSUFFICIENCY, STAGE II (MILD): Chronic | ICD-10-CM

## 2019-08-21 LAB
25(OH)D3+25(OH)D2 SERPL-MCNC: 39.1 NG/ML (ref 30–100)
ALBUMIN SERPL-MCNC: 4.1 G/DL (ref 3.5–5.2)
ALBUMIN/GLOB SERPL: 1.7 G/DL
ALP SERPL-CCNC: 43 U/L (ref 39–117)
ALT SERPL-CCNC: 16 U/L (ref 1–33)
AST SERPL-CCNC: 18 U/L (ref 1–32)
BILIRUB SERPL-MCNC: 0.3 MG/DL (ref 0.2–1.2)
BUN SERPL-MCNC: 24 MG/DL (ref 8–23)
BUN/CREAT SERPL: 18.6 (ref 7–25)
CALCIUM SERPL-MCNC: 9.2 MG/DL (ref 8.6–10.5)
CHLORIDE SERPL-SCNC: 105 MMOL/L (ref 98–107)
CHOLEST SERPL-MCNC: 132 MG/DL (ref 100–199)
CK SERPL-CCNC: 98 U/L (ref 20–180)
CO2 SERPL-SCNC: 24.1 MMOL/L (ref 22–29)
CREAT SERPL-MCNC: 1.29 MG/DL (ref 0.57–1)
ERYTHROCYTE [DISTWIDTH] IN BLOOD BY AUTOMATED COUNT: 13.7 % (ref 12.3–15.4)
GLOBULIN SER CALC-MCNC: 2.4 GM/DL
GLUCOSE SERPL-MCNC: 111 MG/DL (ref 65–99)
HBA1C MFR BLD: 5.8 % (ref 4.8–5.6)
HCT VFR BLD AUTO: 47.9 % (ref 34–46.6)
HDL SERPL-SCNC: 31.5 UMOL/L
HDLC SERPL-MCNC: 52 MG/DL
HGB BLD-MCNC: 15.4 G/DL (ref 12–15.9)
LDL SERPL QN: 20.6 NM
LDL SERPL-SCNC: 695 NMOL/L
LDL SMALL SERPL-SCNC: 375 NMOL/L
LDLC SERPL CALC-MCNC: 62 MG/DL (ref 0–99)
MCH RBC QN AUTO: 30.7 PG (ref 26.6–33)
MCHC RBC AUTO-ENTMCNC: 32.2 G/DL (ref 31.5–35.7)
MCV RBC AUTO: 95.4 FL (ref 79–97)
PLATELET # BLD AUTO: 228 10*3/MM3 (ref 140–450)
POTASSIUM SERPL-SCNC: 4.6 MMOL/L (ref 3.5–5.2)
PROT SERPL-MCNC: 6.5 G/DL (ref 6–8.5)
RBC # BLD AUTO: 5.02 10*6/MM3 (ref 3.77–5.28)
SODIUM SERPL-SCNC: 143 MMOL/L (ref 136–145)
T3FREE SERPL-MCNC: 2.7 PG/ML (ref 2–4.4)
T4 FREE SERPL-MCNC: 1.33 NG/DL (ref 0.93–1.7)
TRIGL SERPL-MCNC: 92 MG/DL (ref 0–149)
TSH SERPL DL<=0.005 MIU/L-ACNC: 1.85 MIU/ML (ref 0.27–4.2)
URATE SERPL-MCNC: 5.1 MG/DL (ref 2.4–5.7)
WBC # BLD AUTO: 6.93 10*3/MM3 (ref 3.4–10.8)

## 2019-09-04 ENCOUNTER — OFFICE VISIT (OUTPATIENT)
Dept: INTERNAL MEDICINE | Facility: CLINIC | Age: 84
End: 2019-09-04

## 2019-09-04 VITALS
HEIGHT: 63 IN | HEART RATE: 72 BPM | SYSTOLIC BLOOD PRESSURE: 132 MMHG | OXYGEN SATURATION: 98 % | BODY MASS INDEX: 37.95 KG/M2 | DIASTOLIC BLOOD PRESSURE: 64 MMHG | WEIGHT: 214.2 LBS

## 2019-09-04 DIAGNOSIS — E03.9 PRIMARY HYPOTHYROIDISM: Chronic | ICD-10-CM

## 2019-09-04 DIAGNOSIS — Z00.00 MEDICARE ANNUAL WELLNESS VISIT, SUBSEQUENT: Primary | ICD-10-CM

## 2019-09-04 DIAGNOSIS — E66.01 MORBIDLY OBESE (HCC): Chronic | ICD-10-CM

## 2019-09-04 DIAGNOSIS — D75.1 SECONDARY POLYCYTHEMIA: Chronic | ICD-10-CM

## 2019-09-04 DIAGNOSIS — E78.5 HYPERLIPIDEMIA, UNSPECIFIED HYPERLIPIDEMIA TYPE: Chronic | ICD-10-CM

## 2019-09-04 DIAGNOSIS — Z23 NEED FOR INFLUENZA VACCINATION: ICD-10-CM

## 2019-09-04 DIAGNOSIS — Z51.81 THERAPEUTIC DRUG MONITORING: ICD-10-CM

## 2019-09-04 DIAGNOSIS — I10 BENIGN ESSENTIAL HYPERTENSION: Chronic | ICD-10-CM

## 2019-09-04 DIAGNOSIS — E55.9 VITAMIN D DEFICIENCY: Chronic | ICD-10-CM

## 2019-09-04 DIAGNOSIS — R73.01 IMPAIRED FASTING GLUCOSE: Chronic | ICD-10-CM

## 2019-09-04 DIAGNOSIS — E04.2 MULTINODULAR GOITER: Chronic | ICD-10-CM

## 2019-09-04 DIAGNOSIS — G47.34 NOCTURNAL HYPOXEMIA: Chronic | ICD-10-CM

## 2019-09-04 DIAGNOSIS — I47.1 SUPRAVENTRICULAR TACHYCARDIA (HCC): Chronic | ICD-10-CM

## 2019-09-04 DIAGNOSIS — N18.2 CHRONIC RENAL INSUFFICIENCY, STAGE II (MILD): Chronic | ICD-10-CM

## 2019-09-04 DIAGNOSIS — Z87.39 HISTORY OF GOUT: Chronic | ICD-10-CM

## 2019-09-04 PROCEDURE — 90662 IIV NO PRSV INCREASED AG IM: CPT | Performed by: INTERNAL MEDICINE

## 2019-09-04 PROCEDURE — G0008 ADMIN INFLUENZA VIRUS VAC: HCPCS | Performed by: INTERNAL MEDICINE

## 2019-09-04 PROCEDURE — 99214 OFFICE O/P EST MOD 30 MIN: CPT | Performed by: INTERNAL MEDICINE

## 2019-09-04 PROCEDURE — G0439 PPPS, SUBSEQ VISIT: HCPCS | Performed by: INTERNAL MEDICINE

## 2019-09-04 RX ORDER — PRAVASTATIN SODIUM 40 MG
TABLET ORAL
Qty: 90 TABLET | Refills: 3 | Status: SHIPPED | OUTPATIENT
Start: 2019-09-04 | End: 2020-02-27

## 2019-09-04 RX ORDER — LISINOPRIL AND HYDROCHLOROTHIAZIDE 20; 12.5 MG/1; MG/1
TABLET ORAL
Qty: 90 TABLET | Refills: 3 | Status: SHIPPED | OUTPATIENT
Start: 2019-09-04 | End: 2020-02-11 | Stop reason: HOSPADM

## 2019-09-04 RX ORDER — LEVOTHYROXINE SODIUM 0.05 MG/1
TABLET ORAL
Qty: 90 TABLET | Refills: 3 | Status: SHIPPED | OUTPATIENT
Start: 2019-09-04 | End: 2020-02-27

## 2019-09-04 RX ORDER — ALLOPURINOL 300 MG/1
TABLET ORAL
Qty: 90 TABLET | Refills: 3 | Status: SHIPPED | OUTPATIENT
Start: 2019-09-04 | End: 2020-02-27

## 2019-09-04 NOTE — PROGRESS NOTES
The ABCs of the Annual Wellness Visit  Subsequent Medicare Wellness Visit    No chief complaint on file.      Subjective   History of Present Illness:  Mandy Alarcon is a 86 y.o. female who presents for a Subsequent Medicare Wellness Visit.    HEALTH RISK ASSESSMENT    Recent Hospitalizations:  No hospitalization(s) within the last year.    Current Medical Providers:  Patient Care Team:  Daryl Santos MD as PCP - General (Internal Medicine)  Daryl Santos MD as PCP - Claims Attributed    Smoking Status:  Social History     Tobacco Use   Smoking Status Former Smoker   • Packs/day: 0.50   • Start date: 1946   • Last attempt to quit: 1954   • Years since quittin.7   Smokeless Tobacco Never Used   Tobacco Comment    Stopped drinking at age 30.       Alcohol Consumption:  Social History     Substance and Sexual Activity   Alcohol Use No       Depression Screen:   PHQ-2/PHQ-9 Depression Screening 2019   Little interest or pleasure in doing things 0   Feeling down, depressed, or hopeless 0   Total Score 0       Fall Risk Screen:  STEADI Fall Risk Assessment was completed, and patient is at LOW risk for falls.Assessment completed on:2019    Health Habits and Functional and Cognitive Screening:  Functional & Cognitive Status 2019   Do you have difficulty preparing food and eating? No   Do you have difficulty bathing yourself, getting dressed or grooming yourself? No   Do you have difficulty using the toilet? No   Do you have difficulty moving around from place to place? No   Do you have trouble with steps or getting out of a bed or a chair? No   Current Diet Well Balanced Diet   Dental Exam Up to date   Eye Exam Up to date   Exercise (times per week) 4 times per week   Current Exercise Activities Include Housecleaning   Do you need help using the phone?  No   Are you deaf or do you have serious difficulty hearing?  Yes   Do you need help with transportation? No   Do you need help shopping?  No   Do you need help preparing meals?  No   Do you need help with housework?  No   Do you need help with laundry? No   Do you need help taking your medications? No   Do you need help managing money? No   Do you ever drive or ride in a car without wearing a seat belt? No   Have you felt unusual stress, anger or loneliness in the last month? No   Who do you live with? Child   If you need help, do you have trouble finding someone available to you? No   Have you been bothered in the last four weeks by sexual problems? No   Do you have difficulty concentrating, remembering or making decisions? -         Does the patient have evidence of cognitive impairment? No    Asprin use counseling:Start ASA 81 mg daily     Age-appropriate Screening Schedule:  Refer to the list below for future screening recommendations based on patient's age, sex and/or medical conditions. Orders for these recommended tests are listed in the plan section. The patient has been provided with a written plan.    Health Maintenance   Topic Date Due   • INFLUENZA VACCINE  08/01/2019   • LIPID PANEL  08/20/2020   • MAMMOGRAM  12/26/2020   • TDAP/TD VACCINES (2 - Td) 06/14/2027   • PNEUMOCOCCAL VACCINES (65+ LOW/MEDIUM RISK)  Completed   • ZOSTER VACCINE  Discontinued          The following portions of the patient's history were reviewed and updated as appropriate: allergies, current medications, past family history, past medical history, past social history, past surgical history and problem list.    Outpatient Medications Prior to Visit   Medication Sig Dispense Refill   • allopurinol (ZYLOPRIM) 300 MG tablet TAKE 1 TABLET BY MOUTH EVERY DAY 90 tablet 1   • Cholecalciferol (VITAMIN D) 2000 UNITS tablet Take 2 tablets by mouth daily.     • clobetasol (TEMOVATE) 0.05 % ointment Apply  topically to the appropriate area as directed 2 (Two) Times a Day. 15 g 0   • halobetasol (ULTRAVATE) 0.05 % ointment Apply pea-size amount daily to affected area for 2  months. Then apply every other day for 2 weeks then twice weekly 15 g 2   • levothyroxine (SYNTHROID, LEVOTHROID) 50 MCG tablet TAKE 1 TABLET BY MOUTH DAILY FOR LOW THYROID 90 tablet 0   • lisinopril-hydrochlorothiazide (PRINZIDE,ZESTORETIC) 20-12.5 MG per tablet TAKE ONE TABLET BY MOUTH EVERY MORNING FOR HIGH BLOOD PRESSURE AND LEG SWELLING 90 tablet 0   • pravastatin (PRAVACHOL) 40 MG tablet Take 1 tablet by mouth Every Night.     • Biotin 10 MG tablet Take 1 tablet by mouth Daily.     • levoFLOXacin (LEVAQUIN) 250 MG tablet Take 1 tablet by mouth Daily. 7 tablet 0   • metoprolol tartrate (LOPRESSOR) 25 MG tablet TAKE 1/2 TABLET BY MOUTH EVERY 12 HOURS 90 tablet 3   • pravastatin (PRAVACHOL) 40 MG tablet TAKE 1 TABLET BY MOUTH EVERY NIGHT AT BEDTIME 90 tablet 1     Facility-Administered Medications Prior to Visit   Medication Dose Route Frequency Provider Last Rate Last Dose   • nitroglycerin (NITROSTAT) SL tablet 0.4 mg  0.4 mg Sublingual Q5 Min PRN Lizbeth Laura MD       • sodium chloride 0.9 % flush 10 mL  10 mL Intravenous PRN Lizbeth Laura MD           Patient Active Problem List   Diagnosis   • Benign essential hypertension   • Chronic renal insufficiency, stage II (mild), 05/18/2016--creatinine 1.35   • Claustrophobia   • Gout   • Hyperlipidemia   • Primary hypothyroidism   • Impaired fasting glucose   • Nocturnal hypoxemia   • Secondary polycythemia   • Vitamin D deficiency   • Therapeutic drug monitoring   • Family history of coronary artery disease   • Bilateral sensorineural hearing loss, wears hearing aids   • Multinodular goiter   • Supraventricular tachycardia (CMS/HCC)   • Morbidly obese (CMS/HCC)       Advanced Care Planning:  Patient does not have an advance directive - information provided to the patient today    Review of Systems   Musculoskeletal: Positive for arthralgias.   All other systems reviewed and are negative.      Compared to one year ago, the patient feels her physical health  "is better.  Compared to one year ago, the patient feels her mental health is better.    Reviewed chart for potential of high risk medication in the elderly: yes  Reviewed chart for potential of harmful drug interactions in the elderly:yes    Objective         Vitals:    09/04/19 0913   BP: 132/64   BP Location: Right arm   Pulse: 72   SpO2: 98%   Weight: 97.2 kg (214 lb 3.2 oz)   Height: 160 cm (62.99\")       Body mass index is 37.95 kg/m².  Discussed the patient's BMI with her. The BMI is above average; BMI management plan is completed.    Physical Exam  General: Alert and oriented x 3.  No acute distress.  Normal affect. Obese. HEENT: Pupils equal, round, reactive to light; extraocular movements intact; sclerae nonicteric; pharynx, ear canals and TMs normal.  Neck: Without JVD, thyromegaly, bruit, or adenopathy.  Lungs: Clear to auscultation in all fields.  Heart: Regular rate and rhythm without murmur, rub, gallop, or click.  Abdomen: Soft, nontender, without hepatosplenomegaly or hernia.  Bowel sounds normal.  : Deferred.  Rectal: Deferred.  Extremities: Without clubbing, cyanosis, edema, or pulse deficit.  Neurologic: Intact without focal deficit.  Normal station and gait observed during ingress and egress from the examination room.  Skin: Without significant lesion.  Musculoskeletal: Unremarkable.    Lab Results   Component Value Date     (H) 08/20/2019    CHLPL 132 08/20/2019    TRIG 92 08/20/2019    HGBA1C 5.80 (H) 08/20/2019        Assessment/Plan   Medicare Risks and Personalized Health Plan  CMS Preventative Services Quick Reference  Cardiovascular risk  Diabetic Lab Screening   Immunizations Discussed/Encouraged (specific immunizations; Influenza )  Obesity/Overweight     The above risks/problems have been discussed with the patient.  Pertinent information has been shared with the patient in the After Visit Summary.  Follow up plans and orders are seen below in the Assessment/Plan " Section.    Diagnoses and all orders for this visit:    1. Medicare annual wellness visit, subsequent (Primary)    2. Impaired fasting glucose  -     Comprehensive Metabolic Panel; Future    3. Primary hypothyroidism  -     TSH; Future  -     T4, Free; Future  -     T3, Free; Future    4. Hyperlipidemia, unspecified hyperlipidemia type  -     CK; Future  -     Comprehensive Metabolic Panel; Future  -     NMR LipoProfile; Future    5. Gout    6. Chronic renal insufficiency, stage II (mild), 05/18/2016--creatinine 1.35  -     CBC (No Diff); Future    7. Benign essential hypertension    8. Secondary polycythemia    9. Nocturnal hypoxemia    10. Vitamin D deficiency    11. Multinodular goiter    12. Supraventricular tachycardia (CMS/HCC)    13. Morbidly obese (CMS/HCC)    14. Therapeutic drug monitoring    15. Need for influenza vaccination  -     FluZone High Dose 65YR+ ([id number])      Follow Up:  Return in about 6 months (around 3/4/2020) for Next scheduled follow up with lab prior.     An After Visit Summary and PPPS were given to the patient.

## 2019-09-04 NOTE — PROGRESS NOTES
09/04/2019    Patient Information  Mandy Alarcon                                                                                          [address] [facility] [address]      5/30/1933  [unfilled]  There is no work phone number on file.    Chief Complaint:     Subsequent Medicare wellness visit.  Follow-up multiple medical issues and recent lab work.  No new acute complaints.    History of Present Illness:    Patient with a history of impaired fasting glucose, hypothyroidism, hyperlipidemia, gout, mild chronic renal insufficiency, hypertension, secondary polycythemia nocturnal hypoxemia, vitamin D deficiency, multinodular goiter, SVT, morbid obesity.  She presents today for her subsequent Medicare wellness visit.  She also had lab work in order to monitor her chronic medical issues.  Her past medical history reviewed and updated were necessary including health maintenance parameters.  This reveals she will be up-to-date or else accounted for after today's visit.    Review of Systems   Constitution: Negative.   HENT: Negative.    Eyes: Negative.    Cardiovascular: Negative.    Respiratory: Negative.    Endocrine: Negative.    Hematologic/Lymphatic: Negative.    Skin: Negative.    Musculoskeletal: Negative.    Gastrointestinal: Negative.    Genitourinary: Negative.    Neurological: Negative.    Psychiatric/Behavioral: Negative.    Allergic/Immunologic: Negative.        Active Problems:    Patient Active Problem List   Diagnosis   • Benign essential hypertension   • Chronic renal insufficiency, stage II (mild), 05/18/2016--creatinine 1.35   • Claustrophobia   • Gout   • Hyperlipidemia   • Primary hypothyroidism   • Impaired fasting glucose   • Nocturnal hypoxemia   • Secondary polycythemia   • Vitamin D deficiency   • Therapeutic drug monitoring   • Family history of coronary artery disease   • Bilateral sensorineural hearing loss, wears hearing aids   • Multinodular goiter   • Supraventricular  tachycardia (CMS/Formerly [facility])   • Morbidly obese (CMS/Formerly [facility])         Past Medical History:   Diagnosis Date   • Benign essential hypertension 10/28/2012    2012--treatment for hypertension begun.   • Bilateral sensorineural hearing loss, wears hearing aids 2017    Left is much worse than right.  Patient had multiple ear infections as a child.   • Chronic renal insufficiency, stage II (mild), 2016--creatinine 1.35 2016--creatinine 1.35.  Baseline creatinine approximately 1.3.   • Claustrophobia 2016    This patient has significant nocturnal hypoxemia and I think that she could benefit from nocturnal oxygen therapy. The exact etiology of her hypoxemia is not clear. She could possibly have obstructive sleep apnea but this is not documented. We cannot test this patient for sleep apnea due to the fact that she is severely claustrophobic. Patient was a former smoker and it is possibly that COPD he is playing a role.   • Family history of coronary artery disease 2016    Patient's mother, father, 2 sisters and a brother all  from myocardial infarctions   • Gout 10/28/2012    2012--initial diagnosis and treatment of gout.   • Hyperlipidemia 10/28/2012    2012--treatment for hyperlipidemia begun.   • Impaired fasting glucose 10/28/2012    2012--initial diagnosis impaired fasting glucose.   • Morbidly obese (CMS/Formerly [facility]) 3/11/2019   • Nocturnal hypoxemia 2014--patient did not receive nocturnal oxygen because of Medicare regulations.   05/15/2014--overnight oximetry revealed oxygen saturations less than 89% for 22 minutes and 40 seconds. Oxygen saturations less than or equal to 88% for 22 minutes and 40 seconds. Lowest oxygen saturation 83%. The longest continuous time with oxygen saturations less than or equal to 88% was 1 minute and 32 seconds.   2012--overnight oximetry revealed oxygen saturations less than 90% for one hour and 35 minutes.  Oxygen saturations less than 89% 59 minutes. This patient has significant nocturnal hypoxemia and I think that she could benefit from nocturnal oxygen therapy. The exact etiology of her hypoxemia is not clear. She could possibly have obstructive sleep apnea but this is not documented. We cannot test this patient for sleep apnea due to the fact that she is severely claustrophobic. Patient was a former smoker and it is possibly that COPD he is playing a role.   • Primary hypothyroidism 11/17/2015 March 11, 2019--TSH remains elevated slightly at 4.92.  Given the overall clinical picture including the multinodular goiter, we will initiate levothyroxine 50 mcg/day and reassess in about 6 weeks.  August 1, 2018--thyroid ultrasound reveals a multinodular thyroid with multiple subcentimeter nodules.  Only minimal increase in size of the largest nodule in the left lobe has occurred when compared    • Secondary polycythemia 8/2/2012 11/06/2014--patient did not receive nocturnal oxygen because of Medicare regulations.   05/15/2014--overnight oximetry revealed oxygen saturations less than 89% for 22 minutes and 40 seconds. Oxygen saturations less than or equal to 88% for 22 minutes and 40 seconds. Lowest oxygen saturation 83%. The longest continuous time with oxygen saturations less than or equal to 88% was 1 minute and 32 seconds.   08/02/2012--overnight oximetry revealed oxygen saturations less than 90% for one hour and 35 minutes. Oxygen saturations less than 89% 59 minutes. This patient has significant nocturnal hypoxemia and I think that she could benefit from nocturnal oxygen therapy. The exact etiology of her hypoxemia is not clear. She could possibly have obstructive sleep apnea but this is not documented. We cannot test this patient for sleep apnea due to the fact that she is severely claustrophobic. Patient was a former smoker and it is possibly that COPD he is playing a role.   • Vitamin D deficiency 5/23/2016          Past Surgical History:   Procedure Laterality Date   • OOPHORECTOMY      age 48   • RADICAL ABDOMINAL HYSTERECTOMY  48 years old    48 years of age. Uterine fibroid tumors with menorrhagia - no cancer         Allergies   Allergen Reactions   • Cefaclor Swelling   • Sulfa Antibiotics Rash           Current Outpatient Medications:   •  allopurinol (ZYLOPRIM) 300 MG tablet, TAKE 1 TABLET BY MOUTH EVERY DAY, Disp: 90 tablet, Rfl: 1  •  Cholecalciferol (VITAMIN D) 2000 UNITS tablet, Take 2 tablets by mouth daily., Disp: , Rfl:   •  clobetasol (TEMOVATE) 0.05 % ointment, Apply  topically to the appropriate area as directed 2 (Two) Times a Day., Disp: 15 g, Rfl: 0  •  halobetasol (ULTRAVATE) 0.05 % ointment, Apply pea-size amount daily to affected area for 2 months. Then apply every other day for 2 weeks then twice weekly, Disp: 15 g, Rfl: 2  •  levothyroxine (SYNTHROID, LEVOTHROID) 50 MCG tablet, TAKE 1 TABLET BY MOUTH DAILY FOR LOW THYROID, Disp: 90 tablet, Rfl: 0  •  lisinopril-hydrochlorothiazide (PRINZIDE,ZESTORETIC) 20-12.5 MG per tablet, TAKE ONE TABLET BY MOUTH EVERY MORNING FOR HIGH BLOOD PRESSURE AND LEG SWELLING, Disp: 90 tablet, Rfl: 0  •  pravastatin (PRAVACHOL) 40 MG tablet, Take 1 tablet by mouth Every Night., Disp: , Rfl:   •  Biotin 10 MG tablet, Take 1 tablet by mouth Daily., Disp: , Rfl:   •  levoFLOXacin (LEVAQUIN) 250 MG tablet, Take 1 tablet by mouth Daily., Disp: 7 tablet, Rfl: 0  •  metoprolol tartrate (LOPRESSOR) 25 MG tablet, TAKE 1/2 TABLET BY MOUTH EVERY 12 HOURS, Disp: 90 tablet, Rfl: 3    Current Facility-Administered Medications:   •  nitroglycerin (NITROSTAT) SL tablet 0.4 mg, 0.4 mg, Sublingual, Q5 Min PRN, Lizbeth Laura MD  •  sodium chloride 0.9 % flush 10 mL, 10 mL, Intravenous, PRN, Lizbeth Laura MD      Family History   Problem Relation Age of Onset   • Heart disease Mother         Ischemic.  from coronary artery disease.   • Heart disease Father         Ischemic.  " from coronary artery disease.   • Heart disease Sister         Ischemic.  from coronary artery disease.   • Heart disease Brother         Ischemic.  from coronary artery disease.   • Heart disease Sister         Ischemic.  from coronary artery disease.   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   Substance and Sexual Activity   • Alcohol use: No   • Drug use: No   • Sexual activity: Not Currently     Partners: Male   Lifestyle   • Physical activity:     Days per week: 0 days     Minutes per session: 0 min   • Stress: Not at all   Relationships   • Social connections:     Talks on phone: More than three times a week     Gets together: Three times a week     Attends Hol[employer] service: More than 4 times per year     Active member of club or organization: No     Attends meetings of clubs or organizations: Never     Relationship status:          Vitals:    19 0913   BP: 132/64   BP Location: Right arm   Pulse: 72   SpO2: 98%   Weight: 97.2 kg (214 lb 3.2 oz)   Height: 160 cm (62.99\")          Physical Exam:    General: Alert and oriented x 3.  No acute distress.  Normal affect. Obese. HEENT: Pupils equal, round, reactive to light; extraocular movements intact; sclerae nonicteric; pharynx, " ear canals and TMs normal.  Neck: Without JVD, thyromegaly, bruit, or adenopathy.  Lungs: Clear to auscultation in all fields.  Heart: Regular rate and rhythm without murmur, rub, gallop, or click.  Abdomen: Soft, nontender, without hepatosplenomegaly or hernia.  Bowel sounds normal.  : Deferred.  Rectal: Deferred.  Extremities: Without clubbing, cyanosis, edema, or pulse deficit.  Neurologic: Intact without focal deficit.  Normal station and gait observed during ingress and egress from the examination room.  Skin: Without significant lesion.  Musculoskeletal: Unremarkable.    Lab/other results:    NMR is absolutely perfect.  CMP normal except blood sugar elevated 111, BUN 24, creatinine 1.29.  CBC normal except hematocrit slightly elevated at 47.9.  Hemoglobin A1c 5.8.  Thyroid function tests are now normal.  Vitamin D normal.  Uric acid normal at 5.8.  CPK normal.    Assessment/Plan:     Diagnosis Plan   1. Medicare annual wellness visit, subsequent     2. Impaired fasting glucose     3. Primary hypothyroidism     4. Hyperlipidemia, unspecified hyperlipidemia type     5. Gout     6. Chronic renal insufficiency, stage II (mild), 05/18/2016--creatinine 1.35     7. Benign essential hypertension     8. Secondary polycythemia     9. Nocturnal hypoxemia     10. Vitamin D deficiency     11. Multinodular goiter     12. Supraventricular tachycardia (CMS/HCC)     13. Morbidly obese (CMS/HCC)     14. Therapeutic drug monitoring     15. Need for influenza vaccination  Flu[employer] High Dose 65YR+ (0310-2877)     The subsequent Medicare wellness visit is documented on separate note.    Patient has primary hypothyroidism and is now euthyroid on 50 mcg of levothyroxine.  She also has a multinodular goiter that does not need to be evaluated with an ultrasound at the present time.  She has mild impaired fasting glucose which we will monitor.  She does not need medication for this.  Hyperlipidemia is under perfect control.  Her gout  is stable on allopurinol.  It appears her chronic renal insufficiency is stable.  Her blood pressure well controlled.  He has nocturnal hypoxemia and mild secondary polycythemia which is not being treated because of claustrophobia.  Patient has SVT and she is no longer taking metoprolol and doing well without it.    Plan is as follows: High-dose influenza vaccine given today.  No change in current medical regimen.  I will have patient follow-up in about 6 months with lab prior or follow-up as needed.    Procedures

## 2020-01-31 ENCOUNTER — APPOINTMENT (OUTPATIENT)
Dept: GENERAL RADIOLOGY | Facility: HOSPITAL | Age: 85
End: 2020-01-31

## 2020-01-31 ENCOUNTER — HOSPITAL ENCOUNTER (INPATIENT)
Facility: HOSPITAL | Age: 85
LOS: 10 days | Discharge: NURSING, REHAB OR LONG-TERM CARE | End: 2020-02-11
Attending: EMERGENCY MEDICINE | Admitting: INTERNAL MEDICINE

## 2020-01-31 ENCOUNTER — APPOINTMENT (OUTPATIENT)
Dept: CT IMAGING | Facility: HOSPITAL | Age: 85
End: 2020-01-31

## 2020-01-31 DIAGNOSIS — N18.9 ACUTE KIDNEY INJURY SUPERIMPOSED ON CHRONIC KIDNEY DISEASE (HCC): ICD-10-CM

## 2020-01-31 DIAGNOSIS — N17.9 ACUTE KIDNEY INJURY SUPERIMPOSED ON CHRONIC KIDNEY DISEASE (HCC): ICD-10-CM

## 2020-01-31 DIAGNOSIS — K85.90 ACUTE PANCREATITIS WITHOUT INFECTION OR NECROSIS, UNSPECIFIED PANCREATITIS TYPE: Primary | ICD-10-CM

## 2020-01-31 DIAGNOSIS — K85.00 IDIOPATHIC ACUTE PANCREATITIS WITHOUT INFECTION OR NECROSIS: ICD-10-CM

## 2020-01-31 LAB
ALBUMIN SERPL-MCNC: 3.9 G/DL (ref 3.5–5.2)
ALBUMIN/GLOB SERPL: 1.3 G/DL
ALP SERPL-CCNC: 41 U/L (ref 39–117)
ALT SERPL W P-5'-P-CCNC: 16 U/L (ref 1–33)
ANION GAP SERPL CALCULATED.3IONS-SCNC: 15.6 MMOL/L (ref 5–15)
AST SERPL-CCNC: 27 U/L (ref 1–32)
BASOPHILS # BLD AUTO: 0.05 10*3/MM3 (ref 0–0.2)
BASOPHILS NFR BLD AUTO: 0.5 % (ref 0–1.5)
BILIRUB SERPL-MCNC: 0.3 MG/DL (ref 0.2–1.2)
BUN BLD-MCNC: 25 MG/DL (ref 8–23)
BUN/CREAT SERPL: 14.4 (ref 7–25)
CALCIUM SPEC-SCNC: 9 MG/DL (ref 8.6–10.5)
CHLORIDE SERPL-SCNC: 102 MMOL/L (ref 98–107)
CO2 SERPL-SCNC: 20.4 MMOL/L (ref 22–29)
CREAT BLD-MCNC: 1.74 MG/DL (ref 0.57–1)
DEPRECATED RDW RBC AUTO: 47.1 FL (ref 37–54)
EOSINOPHIL # BLD AUTO: 0.3 10*3/MM3 (ref 0–0.4)
EOSINOPHIL NFR BLD AUTO: 2.8 % (ref 0.3–6.2)
ERYTHROCYTE [DISTWIDTH] IN BLOOD BY AUTOMATED COUNT: 14.3 % (ref 12.3–15.4)
GFR SERPL CREATININE-BSD FRML MDRD: 28 ML/MIN/1.73
GLOBULIN UR ELPH-MCNC: 3.1 GM/DL
GLUCOSE BLD-MCNC: 180 MG/DL (ref 65–99)
HCT VFR BLD AUTO: 45.8 % (ref 34–46.6)
HGB BLD-MCNC: 15 G/DL (ref 12–15.9)
IMM GRANULOCYTES # BLD AUTO: 0.06 10*3/MM3 (ref 0–0.05)
IMM GRANULOCYTES NFR BLD AUTO: 0.6 % (ref 0–0.5)
LIPASE SERPL-CCNC: >3000 U/L (ref 13–60)
LYMPHOCYTES # BLD AUTO: 2.59 10*3/MM3 (ref 0.7–3.1)
LYMPHOCYTES NFR BLD AUTO: 24 % (ref 19.6–45.3)
MCH RBC QN AUTO: 29.9 PG (ref 26.6–33)
MCHC RBC AUTO-ENTMCNC: 32.8 G/DL (ref 31.5–35.7)
MCV RBC AUTO: 91.4 FL (ref 79–97)
MONOCYTES # BLD AUTO: 0.75 10*3/MM3 (ref 0.1–0.9)
MONOCYTES NFR BLD AUTO: 7 % (ref 5–12)
NEUTROPHILS # BLD AUTO: 7.03 10*3/MM3 (ref 1.7–7)
NEUTROPHILS NFR BLD AUTO: 65.1 % (ref 42.7–76)
NRBC BLD AUTO-RTO: 0 /100 WBC (ref 0–0.2)
NT-PROBNP SERPL-MCNC: 667 PG/ML (ref 5–1800)
PLATELET # BLD AUTO: 235 10*3/MM3 (ref 140–450)
PMV BLD AUTO: 10.2 FL (ref 6–12)
POTASSIUM BLD-SCNC: 3.7 MMOL/L (ref 3.5–5.2)
PROT SERPL-MCNC: 7 G/DL (ref 6–8.5)
RBC # BLD AUTO: 5.01 10*6/MM3 (ref 3.77–5.28)
SODIUM BLD-SCNC: 138 MMOL/L (ref 136–145)
TROPONIN T SERPL-MCNC: <0.01 NG/ML (ref 0–0.03)
WBC NRBC COR # BLD: 10.78 10*3/MM3 (ref 3.4–10.8)

## 2020-01-31 PROCEDURE — 71045 X-RAY EXAM CHEST 1 VIEW: CPT

## 2020-01-31 PROCEDURE — 93010 ELECTROCARDIOGRAM REPORT: CPT | Performed by: INTERNAL MEDICINE

## 2020-01-31 PROCEDURE — 93005 ELECTROCARDIOGRAM TRACING: CPT | Performed by: EMERGENCY MEDICINE

## 2020-01-31 PROCEDURE — 25010000002 ONDANSETRON PER 1 MG: Performed by: EMERGENCY MEDICINE

## 2020-01-31 PROCEDURE — 74176 CT ABD & PELVIS W/O CONTRAST: CPT

## 2020-01-31 PROCEDURE — 83880 ASSAY OF NATRIURETIC PEPTIDE: CPT | Performed by: EMERGENCY MEDICINE

## 2020-01-31 PROCEDURE — 80053 COMPREHEN METABOLIC PANEL: CPT | Performed by: EMERGENCY MEDICINE

## 2020-01-31 PROCEDURE — 85025 COMPLETE CBC W/AUTO DIFF WBC: CPT | Performed by: EMERGENCY MEDICINE

## 2020-01-31 PROCEDURE — 83690 ASSAY OF LIPASE: CPT | Performed by: EMERGENCY MEDICINE

## 2020-01-31 PROCEDURE — 84484 ASSAY OF TROPONIN QUANT: CPT | Performed by: EMERGENCY MEDICINE

## 2020-01-31 PROCEDURE — 25010000002 HYDROMORPHONE PER 4 MG: Performed by: EMERGENCY MEDICINE

## 2020-01-31 PROCEDURE — 99284 EMERGENCY DEPT VISIT MOD MDM: CPT

## 2020-01-31 RX ORDER — HYDROMORPHONE HYDROCHLORIDE 1 MG/ML
0.5 INJECTION, SOLUTION INTRAMUSCULAR; INTRAVENOUS; SUBCUTANEOUS ONCE
Status: COMPLETED | OUTPATIENT
Start: 2020-01-31 | End: 2020-01-31

## 2020-01-31 RX ORDER — SODIUM CHLORIDE 0.9 % (FLUSH) 0.9 %
10 SYRINGE (ML) INJECTION AS NEEDED
Status: DISCONTINUED | OUTPATIENT
Start: 2020-01-31 | End: 2020-02-11 | Stop reason: HOSPADM

## 2020-01-31 RX ORDER — ONDANSETRON 2 MG/ML
4 INJECTION INTRAMUSCULAR; INTRAVENOUS ONCE
Status: COMPLETED | OUTPATIENT
Start: 2020-01-31 | End: 2020-01-31

## 2020-01-31 RX ORDER — ONDANSETRON 4 MG/1
4 TABLET, ORALLY DISINTEGRATING ORAL ONCE
Status: COMPLETED | OUTPATIENT
Start: 2020-01-31 | End: 2020-01-31

## 2020-01-31 RX ADMIN — ONDANSETRON 4 MG: 4 TABLET, ORALLY DISINTEGRATING ORAL at 21:46

## 2020-01-31 RX ADMIN — HYDROMORPHONE HYDROCHLORIDE 0.5 MG: 1 INJECTION, SOLUTION INTRAMUSCULAR; INTRAVENOUS; SUBCUTANEOUS at 21:46

## 2020-01-31 RX ADMIN — ONDANSETRON 4 MG: 2 INJECTION INTRAMUSCULAR; INTRAVENOUS at 23:20

## 2020-01-31 RX ADMIN — SODIUM CHLORIDE, PRESERVATIVE FREE 10 ML: 5 INJECTION INTRAVENOUS at 21:43

## 2020-02-01 ENCOUNTER — APPOINTMENT (OUTPATIENT)
Dept: ULTRASOUND IMAGING | Facility: HOSPITAL | Age: 85
End: 2020-02-01

## 2020-02-01 PROBLEM — N18.30 CKD (CHRONIC KIDNEY DISEASE) STAGE 3, GFR 30-59 ML/MIN (HCC): Status: ACTIVE | Noted: 2020-02-01

## 2020-02-01 PROBLEM — K85.90 ACUTE PANCREATITIS WITHOUT INFECTION OR NECROSIS: Status: ACTIVE | Noted: 2020-02-01

## 2020-02-01 PROBLEM — E66.9 OBESITY (BMI 30-39.9): Status: ACTIVE | Noted: 2020-02-01

## 2020-02-01 LAB
BASOPHILS # BLD AUTO: 0.04 10*3/MM3 (ref 0–0.2)
BASOPHILS NFR BLD AUTO: 0.3 % (ref 0–1.5)
CHOLEST SERPL-MCNC: 107 MG/DL (ref 0–200)
DEPRECATED RDW RBC AUTO: 46.1 FL (ref 37–54)
EOSINOPHIL # BLD AUTO: 0 10*3/MM3 (ref 0–0.4)
EOSINOPHIL NFR BLD AUTO: 0 % (ref 0.3–6.2)
ERYTHROCYTE [DISTWIDTH] IN BLOOD BY AUTOMATED COUNT: 14 % (ref 12.3–15.4)
HBA1C MFR BLD: 5.9 % (ref 4.8–5.6)
HCT VFR BLD AUTO: 46.2 % (ref 34–46.6)
HDLC SERPL-MCNC: 57 MG/DL (ref 40–60)
HGB BLD-MCNC: 15.4 G/DL (ref 12–15.9)
IMM GRANULOCYTES # BLD AUTO: 0.05 10*3/MM3 (ref 0–0.05)
IMM GRANULOCYTES NFR BLD AUTO: 0.4 % (ref 0–0.5)
INR PPP: 1.16 (ref 0.9–1.1)
LDLC SERPL CALC-MCNC: 42 MG/DL (ref 0–100)
LDLC/HDLC SERPL: 0.74 {RATIO}
LYMPHOCYTES # BLD AUTO: 0.56 10*3/MM3 (ref 0.7–3.1)
LYMPHOCYTES NFR BLD AUTO: 4 % (ref 19.6–45.3)
MCH RBC QN AUTO: 30.1 PG (ref 26.6–33)
MCHC RBC AUTO-ENTMCNC: 33.3 G/DL (ref 31.5–35.7)
MCV RBC AUTO: 90.4 FL (ref 79–97)
MONOCYTES # BLD AUTO: 0.9 10*3/MM3 (ref 0.1–0.9)
MONOCYTES NFR BLD AUTO: 6.5 % (ref 5–12)
NEUTROPHILS # BLD AUTO: 12.4 10*3/MM3 (ref 1.7–7)
NEUTROPHILS NFR BLD AUTO: 88.8 % (ref 42.7–76)
NRBC BLD AUTO-RTO: 0 /100 WBC (ref 0–0.2)
PLATELET # BLD AUTO: 199 10*3/MM3 (ref 140–450)
PMV BLD AUTO: 9.8 FL (ref 6–12)
PROTHROMBIN TIME: 14.5 SECONDS (ref 11.7–14.2)
RBC # BLD AUTO: 5.11 10*6/MM3 (ref 3.77–5.28)
TRIGL SERPL-MCNC: 38 MG/DL (ref 0–150)
VLDLC SERPL-MCNC: 7.6 MG/DL (ref 5–40)
WBC NRBC COR # BLD: 13.95 10*3/MM3 (ref 3.4–10.8)

## 2020-02-01 PROCEDURE — 83036 HEMOGLOBIN GLYCOSYLATED A1C: CPT | Performed by: INTERNAL MEDICINE

## 2020-02-01 PROCEDURE — 76705 ECHO EXAM OF ABDOMEN: CPT

## 2020-02-01 PROCEDURE — 85610 PROTHROMBIN TIME: CPT | Performed by: NURSE PRACTITIONER

## 2020-02-01 PROCEDURE — 80061 LIPID PANEL: CPT | Performed by: NURSE PRACTITIONER

## 2020-02-01 PROCEDURE — 25010000002 ONDANSETRON PER 1 MG: Performed by: NURSE PRACTITIONER

## 2020-02-01 PROCEDURE — 85025 COMPLETE CBC W/AUTO DIFF WBC: CPT | Performed by: NURSE PRACTITIONER

## 2020-02-01 PROCEDURE — 99222 1ST HOSP IP/OBS MODERATE 55: CPT | Performed by: INTERNAL MEDICINE

## 2020-02-01 PROCEDURE — 25010000002 HYDROMORPHONE PER 4 MG: Performed by: INTERNAL MEDICINE

## 2020-02-01 RX ORDER — MELATONIN
1000 DAILY
Status: DISCONTINUED | OUTPATIENT
Start: 2020-02-01 | End: 2020-02-11 | Stop reason: HOSPADM

## 2020-02-01 RX ORDER — HYDROMORPHONE HYDROCHLORIDE 1 MG/ML
0.5 INJECTION, SOLUTION INTRAMUSCULAR; INTRAVENOUS; SUBCUTANEOUS
Status: DISPENSED | OUTPATIENT
Start: 2020-02-01 | End: 2020-02-11

## 2020-02-01 RX ORDER — HYDROMORPHONE HYDROCHLORIDE 1 MG/ML
0.5 INJECTION, SOLUTION INTRAMUSCULAR; INTRAVENOUS; SUBCUTANEOUS ONCE AS NEEDED
Status: COMPLETED | OUTPATIENT
Start: 2020-02-01 | End: 2020-02-01

## 2020-02-01 RX ORDER — NALOXONE HCL 0.4 MG/ML
0.4 VIAL (ML) INJECTION
Status: DISCONTINUED | OUTPATIENT
Start: 2020-02-01 | End: 2020-02-11 | Stop reason: HOSPADM

## 2020-02-01 RX ORDER — CLOBETASOL PROPIONATE 0.5 MG/G
CREAM TOPICAL EVERY 12 HOURS SCHEDULED
Status: DISCONTINUED | OUTPATIENT
Start: 2020-02-01 | End: 2020-02-11 | Stop reason: HOSPADM

## 2020-02-01 RX ORDER — SODIUM CHLORIDE 0.9 % (FLUSH) 0.9 %
10 SYRINGE (ML) INJECTION EVERY 12 HOURS SCHEDULED
Status: DISCONTINUED | OUTPATIENT
Start: 2020-02-01 | End: 2020-02-11 | Stop reason: HOSPADM

## 2020-02-01 RX ORDER — HYDROCODONE BITARTRATE AND ACETAMINOPHEN 7.5; 325 MG/1; MG/1
1 TABLET ORAL EVERY 4 HOURS PRN
Status: DISCONTINUED | OUTPATIENT
Start: 2020-02-01 | End: 2020-02-11 | Stop reason: HOSPADM

## 2020-02-01 RX ORDER — ONDANSETRON 2 MG/ML
4 INJECTION INTRAMUSCULAR; INTRAVENOUS EVERY 6 HOURS PRN
Status: DISCONTINUED | OUTPATIENT
Start: 2020-02-01 | End: 2020-02-11 | Stop reason: HOSPADM

## 2020-02-01 RX ORDER — SODIUM CHLORIDE 0.9 % (FLUSH) 0.9 %
10 SYRINGE (ML) INJECTION AS NEEDED
Status: DISCONTINUED | OUTPATIENT
Start: 2020-02-01 | End: 2020-02-11 | Stop reason: HOSPADM

## 2020-02-01 RX ORDER — ALUMINA, MAGNESIA, AND SIMETHICONE 2400; 2400; 240 MG/30ML; MG/30ML; MG/30ML
15 SUSPENSION ORAL EVERY 6 HOURS PRN
Status: DISCONTINUED | OUTPATIENT
Start: 2020-02-01 | End: 2020-02-11 | Stop reason: HOSPADM

## 2020-02-01 RX ORDER — SODIUM CHLORIDE 9 MG/ML
125 INJECTION, SOLUTION INTRAVENOUS CONTINUOUS
Status: DISCONTINUED | OUTPATIENT
Start: 2020-02-01 | End: 2020-02-03

## 2020-02-01 RX ORDER — FAMOTIDINE 10 MG/ML
20 INJECTION, SOLUTION INTRAVENOUS EVERY 24 HOURS
Status: DISCONTINUED | OUTPATIENT
Start: 2020-02-01 | End: 2020-02-07

## 2020-02-01 RX ORDER — ACETAMINOPHEN 325 MG/1
650 TABLET ORAL EVERY 4 HOURS PRN
Status: DISCONTINUED | OUTPATIENT
Start: 2020-02-01 | End: 2020-02-11 | Stop reason: HOSPADM

## 2020-02-01 RX ORDER — ONDANSETRON 4 MG/1
4 TABLET, FILM COATED ORAL EVERY 6 HOURS PRN
Status: DISCONTINUED | OUTPATIENT
Start: 2020-02-01 | End: 2020-02-11 | Stop reason: HOSPADM

## 2020-02-01 RX ORDER — BISACODYL 5 MG/1
5 TABLET, DELAYED RELEASE ORAL DAILY PRN
Status: DISCONTINUED | OUTPATIENT
Start: 2020-02-01 | End: 2020-02-11 | Stop reason: HOSPADM

## 2020-02-01 RX ORDER — PRAVASTATIN SODIUM 40 MG
40 TABLET ORAL NIGHTLY
Status: DISCONTINUED | OUTPATIENT
Start: 2020-02-01 | End: 2020-02-11 | Stop reason: HOSPADM

## 2020-02-01 RX ORDER — LEVOTHYROXINE SODIUM 0.05 MG/1
50 TABLET ORAL
Status: DISCONTINUED | OUTPATIENT
Start: 2020-02-01 | End: 2020-02-11 | Stop reason: HOSPADM

## 2020-02-01 RX ADMIN — HYDROMORPHONE HYDROCHLORIDE 0.5 MG: 10 INJECTION INTRAMUSCULAR; INTRAVENOUS; SUBCUTANEOUS at 14:00

## 2020-02-01 RX ADMIN — SODIUM CHLORIDE 125 ML/HR: 9 INJECTION, SOLUTION INTRAVENOUS at 02:05

## 2020-02-01 RX ADMIN — HYDROCODONE BITARTRATE AND ACETAMINOPHEN 1 TABLET: 7.5; 325 TABLET ORAL at 09:28

## 2020-02-01 RX ADMIN — SODIUM CHLORIDE, PRESERVATIVE FREE 10 ML: 5 INJECTION INTRAVENOUS at 02:05

## 2020-02-01 RX ADMIN — ALUMINUM HYDROXIDE, MAGNESIUM HYDROXIDE, AND DIMETHICONE 15 ML: 400; 400; 40 SUSPENSION ORAL at 08:08

## 2020-02-01 RX ADMIN — HYDROCODONE BITARTRATE AND ACETAMINOPHEN 1 TABLET: 7.5; 325 TABLET ORAL at 17:20

## 2020-02-01 RX ADMIN — FAMOTIDINE 20 MG: 10 INJECTION INTRAVENOUS at 06:43

## 2020-02-01 RX ADMIN — CLOBETASOL PROPIONATE: 0.5 CREAM TOPICAL at 21:42

## 2020-02-01 RX ADMIN — PRAVASTATIN SODIUM 40 MG: 40 TABLET ORAL at 21:06

## 2020-02-01 RX ADMIN — ALUMINUM HYDROXIDE, MAGNESIUM HYDROXIDE, AND DIMETHICONE 15 ML: 400; 400; 40 SUSPENSION ORAL at 02:11

## 2020-02-01 RX ADMIN — HYDROMORPHONE HYDROCHLORIDE 0.5 MG: 1 INJECTION, SOLUTION INTRAMUSCULAR; INTRAVENOUS; SUBCUTANEOUS at 04:10

## 2020-02-01 RX ADMIN — HYDROMORPHONE HYDROCHLORIDE 0.5 MG: 10 INJECTION INTRAMUSCULAR; INTRAVENOUS; SUBCUTANEOUS at 11:20

## 2020-02-01 RX ADMIN — ACETAMINOPHEN 650 MG: 325 TABLET, FILM COATED ORAL at 02:03

## 2020-02-01 RX ADMIN — HYDROMORPHONE HYDROCHLORIDE 0.5 MG: 10 INJECTION INTRAMUSCULAR; INTRAVENOUS; SUBCUTANEOUS at 06:17

## 2020-02-01 RX ADMIN — VITAMIN D, TAB 1000IU (100/BT) 1000 UNITS: 25 TAB at 08:08

## 2020-02-01 RX ADMIN — ONDANSETRON 4 MG: 2 INJECTION INTRAMUSCULAR; INTRAVENOUS at 18:59

## 2020-02-01 RX ADMIN — LEVOTHYROXINE SODIUM 50 MCG: 50 TABLET ORAL at 06:43

## 2020-02-01 RX ADMIN — HYDROMORPHONE HYDROCHLORIDE 0.5 MG: 10 INJECTION INTRAMUSCULAR; INTRAVENOUS; SUBCUTANEOUS at 08:08

## 2020-02-01 RX ADMIN — ONDANSETRON 4 MG: 2 INJECTION INTRAMUSCULAR; INTRAVENOUS at 08:18

## 2020-02-01 RX ADMIN — HYDROMORPHONE HYDROCHLORIDE 0.5 MG: 10 INJECTION INTRAMUSCULAR; INTRAVENOUS; SUBCUTANEOUS at 18:59

## 2020-02-01 RX ADMIN — HYDROMORPHONE HYDROCHLORIDE 0.5 MG: 10 INJECTION INTRAMUSCULAR; INTRAVENOUS; SUBCUTANEOUS at 21:06

## 2020-02-01 NOTE — PLAN OF CARE
Problem: Patient Care Overview  Goal: Interprofessional Rounds/Family Conf  Outcome: Ongoing (interventions implemented as appropriate)     Problem: Patient Care Overview  Goal: Plan of Care Review  Flowsheets (Taken 2/1/2020 9834)  Progress: no change  Plan of Care Reviewed With: patient  Outcome Summary: new admit.  c/o upper abdominal /back pain. tylenol and dilaudid given.  lipase elevated, to recheck this am.  Alert and oriented x 4.  up with assist.    IVF started.  kept on clears

## 2020-02-01 NOTE — H&P
Patient Name:  Mandy Alarcon  YOB: 1933  MRN:  [id number]  Admit Date:  1/31/2020  Patient Care Team:  Daryl Santos MD as PCP - General (Internal Medicine)  Daryl Santos MD as PCP - Claims Attributed      Subjective   History Present Illness     Chief Complaint   Patient presents with   • Chest Pain       History of Present Illness   Ms. Alarcon is a 86 y.o. former smoker with a history of HLD, CKD stage III, HTN, hypothyroidism,  that presents to [facility] complaining of back pain that radiates to her chest. Tonight she was out to eat at [employer] and shortly after her meal, she developed back pain that radiated to her chest and shortness of breath. She states she ate chicken and dumpling, macaroni and cheese, and donald beans. In the ER, she had an emesis x1. Ct of the abdomen and pelvis shows inflammatory stranding surrounding the pancreatic head and neck consistent with pancreatitis. Lipase greater than 3000, glucose 180, and creat 1.74. Upon assessment, her SOA has resolved. She denies chest pain, n/v/d/c, fever, chills, and night sweats.     Review of Systems   Constitutional: Negative for activity change, chills and fever.   HENT: Negative for congestion and rhinorrhea.    Eyes: Negative for photophobia and visual disturbance.   Respiratory: Negative for cough and shortness of breath.    Cardiovascular: Negative for chest pain and leg swelling.   Gastrointestinal: Positive for vomiting. Negative for abdominal pain, constipation, diarrhea and nausea.        Epigastric pain   Endocrine: Negative for cold intolerance and heat intolerance.   Genitourinary: Negative for difficulty urinating, dysuria and frequency.   Musculoskeletal: Negative for gait problem and joint swelling.   Skin: Negative for rash and wound.   Neurological: Negative for dizziness, light-headedness and headaches.   Psychiatric/Behavioral: Negative for sleep disturbance and suicidal ideas.          Personal History     Past Medical History:   Diagnosis Date   • Benign essential hypertension 10/28/2012    2012--treatment for hypertension begun.   • Bilateral sensorineural hearing loss, wears hearing aids 2017    Left is much worse than right.  Patient had multiple ear infections as a child.   • Chronic renal insufficiency, stage II (mild), 2016--creatinine 1.35 2016--creatinine 1.35.  Baseline creatinine approximately 1.3.   • Claustrophobia 2016    This patient has significant nocturnal hypoxemia and I think that she could benefit from nocturnal oxygen therapy. The exact etiology of her hypoxemia is not clear. She could possibly have obstructive sleep apnea but this is not documented. We cannot test this patient for sleep apnea due to the fact that she is severely claustrophobic. Patient was a former smoker and it is possibly that COPD he is playing a role.   • Family history of coronary artery disease 2016    Patient's mother, father, 2 sisters and a brother all  from myocardial infarctions   • Gout 10/28/2012    2012--initial diagnosis and treatment of gout.   • Hyperlipidemia 10/28/2012    2012--treatment for hyperlipidemia begun.   • Impaired fasting glucose 10/28/2012    2012--initial diagnosis impaired fasting glucose.   • Morbidly obese (CMS/HCC) 3/11/2019   • Nocturnal hypoxemia 2014--patient did not receive nocturnal oxygen because of Medicare regulations.   05/15/2014--overnight oximetry revealed oxygen saturations less than 89% for 22 minutes and 40 seconds. Oxygen saturations less than or equal to 88% for 22 minutes and 40 seconds. Lowest oxygen saturation 83%. The longest continuous time with oxygen saturations less than or equal to 88% was 1 minute and 32 seconds.   2012--overnight oximetry revealed oxygen saturations less than 90% for one hour and 35 minutes. Oxygen saturations less than 89%  59 minutes. This patient has significant nocturnal hypoxemia and I think that she could benefit from nocturnal oxygen therapy. The exact etiology of her hypoxemia is not clear. She could possibly have obstructive sleep apnea but this is not documented. We cannot test this patient for sleep apnea due to the fact that she is severely claustrophobic. Patient was a former smoker and it is possibly that COPD he is playing a role.   • Primary hypothyroidism 11/17/2015 March 11, 2019--TSH remains elevated slightly at 4.92.  Given the overall clinical picture including the multinodular goiter, we will initiate levothyroxine 50 mcg/day and reassess in about 6 weeks.  August 1, 2018--thyroid ultrasound reveals a multinodular thyroid with multiple subcentimeter nodules.  Only minimal increase in size of the largest nodule in the left lobe has occurred when compared    • Secondary polycythemia 8/2/2012 11/06/2014--patient did not receive nocturnal oxygen because of Medicare regulations.   05/15/2014--overnight oximetry revealed oxygen saturations less than 89% for 22 minutes and 40 seconds. Oxygen saturations less than or equal to 88% for 22 minutes and 40 seconds. Lowest oxygen saturation 83%. The longest continuous time with oxygen saturations less than or equal to 88% was 1 minute and 32 seconds.   08/02/2012--overnight oximetry revealed oxygen saturations less than 90% for one hour and 35 minutes. Oxygen saturations less than 89% 59 minutes. This patient has significant nocturnal hypoxemia and I think that she could benefit from nocturnal oxygen therapy. The exact etiology of her hypoxemia is not clear. She could possibly have obstructive sleep apnea but this is not documented. We cannot test this patient for sleep apnea due to the fact that she is severely claustrophobic. Patient was a former smoker and it is possibly that COPD he is playing a role.   • Vitamin D deficiency 5/23/2016     Past Surgical History:    Procedure Laterality Date   • OOPHORECTOMY      age 48   • RADICAL ABDOMINAL HYSTERECTOMY  48 years old    48 years of age. Uterine fibroid tumors with menorrhagia - no cancer     Family History   Problem Relation Age of Onset   • Heart disease Mother         Ischemic.  from coronary artery disease.   • Heart disease Father         Ischemic.  from coronary artery disease.   • Heart disease Sister         Ischemic.  from coronary artery disease.   • Heart disease Brother         Ischemic.  from coronary artery disease.   • Heart disease Sister         Ischemic.  from coronary artery disease.   • Breast cancer Daughter      Social History     Tobacco Use   • Smoking status: Former Smoker     Packs/day: 0.50     Start date: 1946     Last attempt to quit: 1954     Years since quittin.1   • Smokeless tobacco: Never Used   • Tobacco comment: Stopped drinking at age 30.   Substance Use Topics   • Alcohol use: No   • Drug use: No     No current facility-administered medications on file prior to encounter.      Current Outpatient Medications on File Prior to Encounter   Medication Sig Dispense Refill   • allopurinol (ZYLOPRIM) 300 MG tablet Take 1 p.o. daily for gout 90 tablet 3   • Cholecalciferol (VITAMIN D) 2000 UNITS tablet Take 2 tablets by mouth daily.     • clobetasol (TEMOVATE) 0.05 % ointment Apply  topically to the appropriate area as directed 2 (Two) Times a Day. 15 g 0   • levothyroxine (SYNTHROID, LEVOTHROID) 50 MCG tablet Take 1 p.o. every morning for thyroid 90 tablet 3   • lisinopril-hydrochlorothiazide (PRINZIDE,ZESTORETIC) 20-12.5 MG per tablet TAKE ONE TABLET BY MOUTH EVERY MORNING FOR HIGH BLOOD PRESSURE AND LEG SWELLING 90 tablet 3   • pravastatin (PRAVACHOL) 40 MG tablet Take 1 p.o. daily for high cholesterol 90 tablet 3   • Biotin 10 MG tablet Take 1 tablet by mouth Daily.     • halobetasol (ULTRAVATE) 0.05 % ointment Apply pea-size amount daily to affected area for 2  months. Then apply every other day for 2 weeks then twice weekly 15 g 2   • [DISCONTINUED] aspirin 81 MG tablet Take 1 tablet by mouth Daily.       Allergies   Allergen Reactions   • Cefaclor Swelling   • Sulfa Antibiotics Rash       Objective    Objective     Vital Signs  Temp:  [97.3 °F (36.3 °C)-98.6 °F (37 °C)] 97.3 °F (36.3 °C)  Heart Rate:  [60-73] 69  Resp:  [16] 16  BP: (125-155)/(73-74) 155/73  SpO2:  [94 %-99 %] 96 %  on  Flow (L/min):  [2] 2;   Device (Oxygen Therapy): nasal cannula  Body mass index is 37.75 kg/m².    Physical Exam   Constitutional: She is oriented to person, place, and time. She appears well-developed and well-nourished. No distress.   HENT:   Head: Normocephalic and atraumatic.   Eyes: Conjunctivae and EOM are normal.   Neck: Normal range of motion. Neck supple. No JVD present.   Cardiovascular: Normal rate, regular rhythm and normal heart sounds. Exam reveals no gallop and no friction rub.   No murmur heard.  Pulmonary/Chest: Effort normal and breath sounds normal. No respiratory distress. She has no wheezes. She has no rales.   Abdominal: Soft. Bowel sounds are normal. She exhibits no distension. There is no tenderness. There is no guarding.   Musculoskeletal: Normal range of motion. She exhibits no edema or deformity.   Neurological: She is alert and oriented to person, place, and time.   Skin: Skin is warm and dry. Capillary refill takes less than 2 seconds.   Psychiatric: She has a normal mood and affect. Her behavior is normal. Judgment and thought content normal.   Nursing note and vitals reviewed.      Results Review:  I reviewed the patient's new clinical results.  Discussed with ED provider.    Lab Results (last 24 hours)     Procedure Component Value Units Date/Time    CBC & Differential [[id number]] Collected:  01/31/20 2142    Specimen:  Blood Updated:  01/31/20 2205    Narrative:       The following orders were created for panel order CBC & Differential.  Procedure                                Abnormality         Status                     ---------                               -----------         ------                     CBC Auto Differential[[id number]]        Abnormal            Final result                 Please view results for these tests on the individual orders.    Comprehensive Metabolic Panel [[id number]]  (Abnormal) Collected:  01/31/20 2142    Specimen:  Blood Updated:  01/31/20 2228     Glucose 180 mg/dL      BUN 25 mg/dL      Creatinine 1.74 mg/dL      Sodium 138 mmol/L      Potassium 3.7 mmol/L      Chloride 102 mmol/L      CO2 20.4 mmol/L      Calcium 9.0 mg/dL      Total Protein 7.0 g/dL      Albumin 3.90 g/dL      ALT (SGPT) 16 U/L      AST (SGOT) 27 U/L      Comment: Specimen hemolyzed.  Results may be affected.        Alkaline Phosphatase 41 U/L      Total Bilirubin 0.3 mg/dL      eGFR Non African Amer 28 mL/min/1.73      Globulin 3.1 gm/dL      A/G Ratio 1.3 g/dL      BUN/Creatinine Ratio 14.4     Anion Gap 15.6 mmol/L     Narrative:       GFR Normal >60  Chronic Kidney Disease <60  Kidney Failure <15      Lipase [[id number]]  (Abnormal) Collected:  01/31/20 2142    Specimen:  Blood Updated:  01/31/20 2240     Lipase >3,000 U/L     BNP [[id number]]  (Normal) Collected:  01/31/20 2142    Specimen:  Blood Updated:  01/31/20 2225     proBNP 667.0 pg/mL     Narrative:       Among patients with dyspnea, NT-proBNP is highly sensitive for the detection of acute congestive heart failure. In addition NT-proBNP of <300 pg/ml effectively rules out acute congestive heart failure with 99% negative predictive value.    Results may be falsely decreased if patient taking Biotin.      Troponin [[id number]]  (Normal) Collected:  01/31/20 2142    Specimen:  Blood Updated:  01/31/20 2227     Troponin T <0.010 ng/mL     Narrative:       Troponin T Reference Range:  <= 0.03 ng/mL-   Negative for AMI  >0.03 ng/mL-     Abnormal for myocardial necrosis.  Clinicians would have to utilize  clinical acumen, EKG, Troponin and serial changes to determine if it is an Acute Myocardial Infarction or myocardial injury due to an underlying chronic condition.       Results may be falsely decreased if patient taking Biotin.      CBC Auto Differential [[id number]]  (Abnormal) Collected:  01/31/20 2142    Specimen:  Blood Updated:  01/31/20 2205     WBC 10.78 10*3/mm3      RBC 5.01 10*6/mm3      Hemoglobin 15.0 g/dL      Hematocrit 45.8 %      MCV 91.4 fL      MCH 29.9 pg      MCHC 32.8 g/dL      RDW 14.3 %      RDW-SD 47.1 fl      MPV 10.2 fL      Platelets 235 10*3/mm3      Neutrophil % 65.1 %      Lymphocyte % 24.0 %      Monocyte % 7.0 %      Eosinophil % 2.8 %      Basophil % 0.5 %      Immature Grans % 0.6 %      Neutrophils, Absolute 7.03 10*3/mm3      Lymphocytes, Absolute 2.59 10*3/mm3      Monocytes, Absolute 0.75 10*3/mm3      Eosinophils, Absolute 0.30 10*3/mm3      Basophils, Absolute 0.05 10*3/mm3      Immature Grans, Absolute 0.06 10*3/mm3      nRBC 0.0 /100 WBC           Imaging Results (Last 24 Hours)     Procedure Component Value Units Date/Time    CT Abdomen Pelvis Without Contrast [[id number]] Collected:  01/31/20 2346     Updated:  01/31/20 2355    Narrative:       CT OF THE ABDOMEN AND PELVIS WITHOUT CONTRAST     HISTORY: Pancreatitis     COMPARISON: None available.     TECHNIQUE: Axial CT imaging was obtained from the dome the diaphragm to  the symphysis pubis. No IV contrast was administered.     FINDINGS:  Images through the lung bases demonstrate bibasilar atelectasis and  scarring. There is a small hiatal hernia. No focal hepatic lesions are  seen. The adrenal glands are normal. There is a large low-attenuation  lesion arising from the right kidney. This measures up to 9.5 cm, and is  favored to represent a cyst, although it has a somewhat lobulated  contour. Renal ultrasound could be considered on a nonemergent  outpatient basis for further evaluation. The patient also has an  additional  small cyst seen within the inferior pole of the right kidney.  There is no hydronephrosis. No distal ureteral or bladder stones are  seen. Urinary bladder appears normal. The patient has inflammatory  stranding seen surrounding the pancreas, in keeping with history of  pancreatitis. Presence or absence of pancreatic necrosis cannot be  assessed on the basis of this unenhanced examination. There is no  pancreatic ductal dilatation. No discrete peripancreatic collections are  seen. There is no evidence of gastric outlet obstruction. The patient  does have a duodenal diverticulum, arising from the second portion of  the duodenum. Uterus is surgically absent. There is colonic  diverticulosis. There is no evidence of diverticulitis. The appendix is  normal. There is some mild wedging deformity involving superior endplate  of T11. This was also present on the prior radiograph from 07/10/2018       Impression:          1. The patient has inflammatory stranding surrounding the pancreatic  head and neck, in keeping with history of pancreatitis. No discrete  peripancreatic collections are seen. There is no evidence of gastric  outlet obstruction.     Radiation dose reduction techniques were utilized, including automated  exposure control and exposure modulation based on body size.     This report was finalized on 1/31/2020 11:52 PM by Dr. Kathleen Montoya M.D.       XR Chest 1 View [[id number]] Collected:  01/31/20 2211     Updated:  01/31/20 2215    Narrative:       PORTABLE CHEST RADIOGRAPH     HISTORY: Chest pain     COMPARISON: 03/22/2019     FINDINGS:  Heart size is within normal limits for technique. No pneumothorax,  pleural effusion, or acute infiltrate is seen. There is mild bibasilar  atelectasis.       Impression:       Mild bibasilar atelectasis.     This report was finalized on 1/31/2020 10:12 PM by Dr. Kathleen Montoya M.D.             Results for orders placed during the hospital encounter of 06/20/18      Adult Transthoracic Echo Complete W/ Cont if Necessary Per Protocol    Narrative · Left ventricular systolic function is normal. Calculated EF = 60%.   Estimated EF was in agreement with the calculated EF. Normal left   ventricular cavity size and wall thickness noted. All left ventricular   wall segments contract normally.  · Left ventricular diastolic function is normal.  · The aortic valve is abnormal in structure. The valve exhibits sclerosis.  · Mild aortic valve regurgitation is present.          ECG 12 Lead   Preliminary Result   HEART RATE= 59  bpm   RR Interval= 1012  ms   WV Interval= 131  ms   P Horizontal Axis= 36  deg   P Front Axis= 54  deg   QRSD Interval= 89  ms   QT Interval= 458  ms   QRS Axis= 30  deg   T Wave Axis= 75  deg   - OTHERWISE NORMAL ECG -   Sinus rhythm   Ventricular premature complex   Electronically Signed By:    Date and Time of Study: 2020-01-31 21:53:52           Assessment/Plan     Active Hospital Problems    Diagnosis  POA   • **Acute pancreatitis without infection or necrosis [K85.90]  Yes   • Obesity (BMI 30-39.9) [E66.9]  Yes   • CKD (chronic kidney disease) stage 3, GFR 30-59 ml/min (CMS/HCC) [N18.3]  Yes   • Supraventricular tachycardia (CMS/HCC) [I47.1]  Yes   • Vitamin D deficiency [E55.9]  Yes   • CKD (chronic kidney disease), stage II [N18.2]  Yes   • Primary hypothyroidism [E03.9]  Yes   • Benign essential hypertension [I10]  Yes   • Hyperlipidemia [E78.5]  Yes   • Secondary polycythemia [D75.1]  Yes      Resolved Hospital Problems   No resolved problems to display.       Ms. Alarcon is a 86 y.o. former smoker with a history of HTN, HLD, and CKD stage III who presents with epigastric pain secondary to acute pancreatitis.    Acute pancreatitis  -Will give IVF over night  -Clear liquid diet  -IV Pepcid BID  -Anti-emetics as needed  -Check lipase and lipid panel in AM    Acute on chronic CKD stage 3  -Creat 1.74, baseline 1.30  -Will hydrate overnight  -BMP in  AM    Hypertension  -Stable  -Will hold her ACE inhibitor for now       I discussed the patient's findings and my recommendations with the patient, daughter, and Dr. Tam.      VTE Prophylaxis - SCDs  Code Status - Full       CJ Aguirre  [facility]  02/01/20  2:52 AM

## 2020-02-01 NOTE — ED NOTES
Nursing report ED to floor  Mandy Alarcon  86 y.o.  female    HPI (triage note):   Chief Complaint   Patient presents with   • Chest Pain       Admitting doctor:   Nathan Tam MD    Admitting diagnosis:   The primary encounter diagnosis was Acute pancreatitis without infection or necrosis, unspecified pancreatitis type. A diagnosis of Acute kidney injury superimposed on chronic kidney disease (CMS/HCC) was also pertinent to this visit.    Code status:   Current Code Status     Date Active Code Status Order ID Comments User Context       Not on file          Allergies:   Cefaclor and Sulfa antibiotics    Weight:       01/31/20  2149   Weight: 99.5 kg (219 lb 6.4 oz)       Most recent vitals:   Vitals:    01/31/20 2248 01/31/20 2305 01/31/20 2359 02/01/20 0016   BP:       Pulse: 73 63 71 65   Resp:       Temp:       TempSrc:       SpO2: 98% 98% 95% 96%   Weight:       Height:           Active LDAs/IV Access:   Lines, Drains & Airways    Active LDAs     Name:   Placement date:   Placement time:   Site:   Days:    Peripheral IV 01/31/20 0000 Left Wrist   01/31/20    0000    Wrist   1                Labs (abnormal labs have a star):   Labs Reviewed   COMPREHENSIVE METABOLIC PANEL - Abnormal; Notable for the following components:       Result Value    Glucose 180 (*)     BUN 25 (*)     Creatinine 1.74 (*)     CO2 20.4 (*)     eGFR Non  Amer 28 (*)     Anion Gap 15.6 (*)     All other components within normal limits    Narrative:     GFR Normal >60  Chronic Kidney Disease <60  Kidney Failure <15     LIPASE - Abnormal; Notable for the following components:    Lipase >3,000 (*)     All other components within normal limits   CBC WITH AUTO DIFFERENTIAL - Abnormal; Notable for the following components:    Immature Grans % 0.6 (*)     Neutrophils, Absolute 7.03 (*)     Immature Grans, Absolute 0.06 (*)     All other components within normal limits   BNP (IN-HOUSE) - Normal    Narrative:     Among patients with  dyspnea, NT-proBNP is highly sensitive for the detection of acute congestive heart failure. In addition NT-proBNP of <300 pg/ml effectively rules out acute congestive heart failure with 99% negative predictive value.    Results may be falsely decreased if patient taking Biotin.     TROPONIN (IN-HOUSE) - Normal    Narrative:     Troponin T Reference Range:  <= 0.03 ng/mL-   Negative for AMI  >0.03 ng/mL-     Abnormal for myocardial necrosis.  Clinicians would have to utilize clinical acumen, EKG, Troponin and serial changes to determine if it is an Acute Myocardial Infarction or myocardial injury due to an underlying chronic condition.       Results may be falsely decreased if patient taking Biotin.     CBC AND DIFFERENTIAL    Narrative:     The following orders were created for panel order CBC & Differential.  Procedure                               Abnormality         Status                     ---------                               -----------         ------                     CBC Auto Differential[[id number]]        Abnormal            Final result                 Please view results for these tests on the individual orders.       EKG:   ECG 12 Lead   Preliminary Result   HEART RATE= 59  bpm   RR Interval= 1012  ms   MN Interval= 131  ms   P Horizontal Axis= 36  deg   P Front Axis= 54  deg   QRSD Interval= 89  ms   QT Interval= 458  ms   QRS Axis= 30  deg   T Wave Axis= 75  deg   - OTHERWISE NORMAL ECG -   Sinus rhythm   Ventricular premature complex   Electronically Signed By:    Date and Time of Study: 2020-01-31 21:53:52          Meds given in ED:   Medications   sodium chloride 0.9 % flush 10 mL (10 mL Intravenous Given 1/31/20 2143)   HYDROmorphone (DILAUDID) injection 0.5 mg (0.5 mg Intravenous Given 1/31/20 2146)   ondansetron ODT (ZOFRAN-ODT) disintegrating tablet 4 mg (4 mg Oral Given 1/31/20 2146)   ondansetron (ZOFRAN) injection 4 mg (4 mg Intravenous Given 1/31/20 2320)       Imaging results:  Ct  Abdomen Pelvis Without Contrast    Result Date: 2020   1. The patient has inflammatory stranding surrounding the pancreatic head and neck, in keeping with history of pancreatitis. No discrete peripancreatic collections are seen. There is no evidence of gastric outlet obstruction.  Radiation dose reduction techniques were utilized, including automated exposure control and exposure modulation based on body size.  This report was finalized on 2020 11:52 PM by Dr. Kathleen Montoya M.D.      Xr Chest 1 View    Result Date: 2020  Mild bibasilar atelectasis.  This report was finalized on 2020 10:12 PM by Dr. Kathleen Montoya M.D.        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   Substance and Sexual Activity   • Alcohol use: No   • Drug use: No   • Sexual activity: Not Currently     Partners: Male   Lifestyle   • Physical activity:     Days per week: 0 days     Minutes per session: 0 min   • Stress: Not at all   Relationships   • Social connections:     Talks on phone: More than three times a week     Gets together: Three times a week     Attends [employer] service: More than 4 times per year     Active member of club or organization: No     Attends meetings of clubs or organizations:  Never     Relationship status:         Alba Edmondson, RN  02/01/20 0044

## 2020-02-01 NOTE — PLAN OF CARE
Problem: Patient Care Overview  Goal: Plan of Care Review  Outcome: Ongoing (interventions implemented as appropriate)  Flowsheets  Taken 2/1/2020 1603  Progress: no change  Outcome Summary: C/O abd pain and pressure. Belching. No flatus. IV Dilaudid and Norco for pain. Zofran given this a.m. for nausea and small emesis. NS @ 125/hr. GI consult pending. Up w/assist. O2 @ 1.5L NC r/t pain meds and sats 88-89 when sleeping. Daughters at bedside. MD notified of pain, HR, and B/P this a.m. No new orders. Pain appears better this afternoon than this morning.  Taken 2/1/2020 0804  Plan of Care Reviewed With: patient;daughter

## 2020-02-01 NOTE — ED TRIAGE NOTES
Pt was picked up at [facility] for evaluation of chest pain (pt went to [facility] but it was closed), chest pain started in back and moved to front.  Pt had just completed eating a large meal prior to onset of pain.  Pt is alert and oriented.  EMS placed 18 g in L wrist, ASA and nitro SL x 2 with no relief and 2 EKG en route.

## 2020-02-01 NOTE — ED PROVIDER NOTES
EMERGENCY DEPARTMENT ENCOUNTER    CHIEF COMPLAINT  Chief Complaint: back pain   History given by: pt  History limited by: pt started vomiting   Room Number: P687/1  PMD: Daryl Santos MD      HPI:  Pt is a 86 y.o. female who presents to the ED via EMS complaining of back pain that began tonight around 1950. The pain radiated to her chest. Pt also c/o nausea and SOA. Pt states she was eating at [employer] when the pain started. She states she ate chicken and dumplings, macaroni and cheese, and donald beans. Hx is limited because pt started vomiting during my initial interview..        PAST MEDICAL HISTORY  Active Ambulatory Problems     Diagnosis Date Noted   • Benign essential hypertension 10/28/2012   • Chronic renal insufficiency, stage II (mild), 05/18/2016--creatinine 1.35 04/28/2016   • Claustrophobia 04/28/2016   • Gout 10/28/2012   • Hyperlipidemia 10/28/2012   • Primary hypothyroidism 11/17/2015   • Impaired fasting glucose 10/28/2012   • Nocturnal hypoxemia 08/02/2012   • Secondary polycythemia 08/02/2012   • Vitamin D deficiency 05/23/2016   • Therapeutic drug monitoring 05/23/2016   • Family history of coronary artery disease 05/24/2016   • Bilateral sensorineural hearing loss, wears hearing aids 06/14/2017   • Multinodular goiter 01/24/2018   • Supraventricular tachycardia (CMS/HCC) 07/10/2018   • Morbidly obese (CMS/HCC) 03/11/2019     Resolved Ambulatory Problems     Diagnosis Date Noted   • History of Cellulitis of trunk, Right 04/28/2016   • History of mammogram 04/28/2016   • History of pneumococcal vaccination 04/28/2016   • Hospital discharge follow-up 07/10/2018   • Near syncope 07/10/2018   • Chest tightness or pressure 07/10/2018   • PND (paroxysmal nocturnal dyspnea) 07/10/2018     No Additional Past Medical History       PAST SURGICAL HISTORY  Past Surgical History:   Procedure Laterality Date   • OOPHORECTOMY      age 48   • RADICAL ABDOMINAL HYSTERECTOMY  48 years old    48 years  of age. Uterine fibroid tumors with menorrhagia - no cancer       FAMILY HISTORY  Family History   Problem Relation Age of Onset   • Heart disease Mother         Ischemic.  from coronary artery disease.   • Heart disease Father         Ischemic.  from coronary artery disease.   • Heart disease Sister         Ischemic.  from coronary artery disease.   • Heart disease Brother         Ischemic.  from coronary artery disease.   • Heart disease Sister         Ischemic.  from coronary artery disease.   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   Substance and Sexual Activity   • Alcohol use: No   • Drug use: No   • Sexual activity: Not Currently     Partners: Male   Lifestyle   • Physical activity:     Days per week: 0 days     Minutes per session: 0 min   • Stress: Not at all   Relationships   • Social connections:     Talks on phone: More than three times a week     Gets together: Three times a week     Attends Holiness service: More than 4 times per year     Active member of club or organization: No     Attends meetings of clubs or organizations: Never     Relationship status:        ALLERGIES  Cefaclor and Sulfa antibiotics    REVIEW OF SYSTEMS  Review of Systems   Unable to  perform ROS: Other (Pt started vomiting)   Respiratory: Positive for shortness of breath.    Cardiovascular: Positive for chest pain.   Gastrointestinal: Positive for nausea and vomiting.   Musculoskeletal: Positive for back pain.       PHYSICAL EXAM  ED Triage Vitals [01/31/20 2110]   Temp Heart Rate Resp BP SpO2   97.9 °F (36.6 °C) 66 16 125/74 95 %      Temp src Heart Rate Source Patient Position BP Location FiO2 (%)   Oral -- -- -- --     Pt examined after she was medicated for pain and nausea.     Physical Exam   Constitutional: She is oriented to person, place, and time. No distress.   HENT:   Head: Normocephalic and atraumatic.   Eyes: Pupils are equal, round, and reactive to light. EOM are normal.   Neck: Normal range of motion. Neck supple.   Cardiovascular: Normal rate, regular rhythm and normal heart sounds.   Pulmonary/Chest: Effort normal and breath sounds normal. No respiratory distress.   Abdominal: Soft. There is tenderness (Mild, epigastric). There is no rebound and no guarding.   Musculoskeletal: Normal range of motion. She exhibits no edema.   Neurological: She is alert and oriented to person, place, and time. She has normal sensation and normal strength.   Skin: Skin is warm and dry. No rash noted.   Psychiatric: Mood and affect normal.   Nursing note and vitals reviewed.      LAB RESULTS  Lab Results (last 24 hours)     Procedure Component Value Units Date/Time    CBC & Differential [[id number]] Collected:  01/31/20 2142    Specimen:  Blood Updated:  01/31/20 2205    Narrative:       The following orders were created for panel order CBC & Differential.  Procedure                               Abnormality         Status                     ---------                               -----------         ------                     CBC Auto Differential[[id number]]        Abnormal            Final result                 Please view results for these tests on the individual orders.    Comprehensive  Metabolic Panel [[id number]]  (Abnormal) Collected:  01/31/20 2142    Specimen:  Blood Updated:  01/31/20 2228     Glucose 180 mg/dL      BUN 25 mg/dL      Creatinine 1.74 mg/dL      Sodium 138 mmol/L      Potassium 3.7 mmol/L      Chloride 102 mmol/L      CO2 20.4 mmol/L      Calcium 9.0 mg/dL      Total Protein 7.0 g/dL      Albumin 3.90 g/dL      ALT (SGPT) 16 U/L      AST (SGOT) 27 U/L      Comment: Specimen hemolyzed.  Results may be affected.        Alkaline Phosphatase 41 U/L      Total Bilirubin 0.3 mg/dL      eGFR Non African Amer 28 mL/min/1.73      Globulin 3.1 gm/dL      A/G Ratio 1.3 g/dL      BUN/Creatinine Ratio 14.4     Anion Gap 15.6 mmol/L     Narrative:       GFR Normal >60  Chronic Kidney Disease <60  Kidney Failure <15      Lipase [[id number]]  (Abnormal) Collected:  01/31/20 2142    Specimen:  Blood Updated:  01/31/20 2240     Lipase >3,000 U/L     BNP [[id number]]  (Normal) Collected:  01/31/20 2142    Specimen:  Blood Updated:  01/31/20 2225     proBNP 667.0 pg/mL     Narrative:       Among patients with dyspnea, NT-proBNP is highly sensitive for the detection of acute congestive heart failure. In addition NT-proBNP of <300 pg/ml effectively rules out acute congestive heart failure with 99% negative predictive value.    Results may be falsely decreased if patient taking Biotin.      Troponin [[id number]]  (Normal) Collected:  01/31/20 2142    Specimen:  Blood Updated:  01/31/20 2227     Troponin T <0.010 ng/mL     Narrative:       Troponin T Reference Range:  <= 0.03 ng/mL-   Negative for AMI  >0.03 ng/mL-     Abnormal for myocardial necrosis.  Clinicians would have to utilize clinical acumen, EKG, Troponin and serial changes to determine if it is an Acute Myocardial Infarction or myocardial injury due to an underlying chronic condition.       Results may be falsely decreased if patient taking Biotin.      CBC Auto Differential [[id number]]  (Abnormal) Collected:  01/31/20 2142    Specimen:  Blood  Updated:  01/31/20 2205     WBC 10.78 10*3/mm3      RBC 5.01 10*6/mm3      Hemoglobin 15.0 g/dL      Hematocrit 45.8 %      MCV 91.4 fL      MCH 29.9 pg      MCHC 32.8 g/dL      RDW 14.3 %      RDW-SD 47.1 fl      MPV 10.2 fL      Platelets 235 10*3/mm3      Neutrophil % 65.1 %      Lymphocyte % 24.0 %      Monocyte % 7.0 %      Eosinophil % 2.8 %      Basophil % 0.5 %      Immature Grans % 0.6 %      Neutrophils, Absolute 7.03 10*3/mm3      Lymphocytes, Absolute 2.59 10*3/mm3      Monocytes, Absolute 0.75 10*3/mm3      Eosinophils, Absolute 0.30 10*3/mm3      Basophils, Absolute 0.05 10*3/mm3      Immature Grans, Absolute 0.06 10*3/mm3      nRBC 0.0 /100 WBC           I ordered the above labs and reviewed the results    RADIOLOGY  CT Abdomen Pelvis Without Contrast   Final Result       1. The patient has inflammatory stranding surrounding the pancreatic   head and neck, in keeping with history of pancreatitis. No discrete   peripancreatic collections are seen. There is no evidence of gastric   outlet obstruction.       Radiation dose reduction techniques were utilized, including automated   exposure control and exposure modulation based on body size.       This report was finalized on 1/31/2020 11:52 PM by Dr. Kathleen Montoya M.D.          XR Chest 1 View   Final Result   Mild bibasilar atelectasis.       This report was finalized on 1/31/2020 10:12 PM by Dr. Kathleen Montoya M.D.               I ordered the above noted radiological studies. Interpreted by radiologist. Reviewed by me in PACS.       PROCEDURES  Procedures      PROGRESS AND CONSULTS  ED Course as of Feb 01 0143 Fri Jan 31, 2020 2121 Prior record review: She had a normal myocardial stress perfusion test in July 2018.    [WC]   2126 Prehospital EKGs were reviewed and are unremarkable.    During initial history, the patient came very nauseated and vomited a copious amount of what appeared to be undigested food.  I did not notice any blood in  the emesis.    [WC]   2203 EKG performed at 2153 and interpreted by me shows normal sinus rhythm with a heart rate of 60 bpm..  Troubles, QRS complexes and ST-T segments are all unremarkable.  There is 1 PVC.  There is no significant change when compared to 3/22/2019.    [WC]   2357 CT of the abdomen and pelvis was independently viewed by me and discussed with Dr. Montoya (radiologist)-there is stranding about the pancreas consistent with acute pancreatitis.  No discrete masses or cysts are identified.  For official interpretation see dictated report    [WC]   Sat Feb 01, 2020 0007 Discussed with EKTA Brunner who accepts the patient on behalf of Dr. Tam.    [WC]      ED Course User Index  [WC] Ranjith Quinn MD     2126  Ordered Zofran-ODT for nausea/vomiting and Dilaudid for pain. Ordered lab work and CXR for further evaluation.     2248  Ordered CT abd/pelvis for further evaluation.    2249  Rechecked pt. Pt is resting comfortably. Family is at bedside. Notified pt of lab results: Troponin= <0.010, Lipase= >3,000, BUN= 25, and Creatinine= 1.74 and CXR which revealed mild bibasilar atelectasis. Discussed the plan to obtain CT abd/pelvis for further evaluation. Pt understands and agrees with the plan, all questions answered.    2318  Ordered Zofran for nausea.    2358  Placed a call to A.    0000  Rechecked pt. Pt is resting comfortably who states she feels better. Family is at bedside. Notified pt of imaging which revealed inflammatory stranding surrounding the pancreatic  head and neck. Discussed the plan to admit for further evaluation and treatment. Pt understands and agrees with the plan, all questions answered.      MEDICAL DECISION MAKING  Results were reviewed/discussed with the patient and they were also made aware of online access. Pt also made aware that some labs, such as cultures, will not be resulted during ER visit and follow up with PMD is necessary.     MDM  Number of Diagnoses or  Management Options     Amount and/or Complexity of Data Reviewed  Clinical lab tests: reviewed and ordered (Troponin= <0.010  Lipase= >3,000  BUN= 25  Creatinine= 1.74)  Tests in the radiology section of CPT®: reviewed and ordered (CXR= mild bibasilar atelectasis.  CT abd/pelvis= inflammatory stranding surrounding the pancreatic head and neck.)  Tests in the medicine section of CPT®: reviewed and ordered (See EKG procedure note.)  Decide to obtain previous medical records or to obtain history from someone other than the patient: yes  Review and summarize past medical records: yes  Discuss the patient with other providers: yes  Independent visualization of images, tracings, or specimens: yes           DIAGNOSIS  Final diagnoses:   Acute pancreatitis without infection or necrosis, unspecified pancreatitis type   Acute kidney injury superimposed on chronic kidney disease (CMS/HCC)       DISPOSITION  ADMISSION    Discussed treatment plan and reason for admission with pt/family and admitting physician.  Pt/family voiced understanding of the plan for admission for further testing/treatment as needed.       Latest Documented Vital Signs:  As of 1:43 AM  BP- 155/73 HR- 69 Temp- 97.3 °F (36.3 °C) (Oral) O2 sat- 96%    --  Documentation assistance provided by victor hugo Spears for Dr. Quinn. Information recorded by the scribe was done at my direction and has been verified and validated by me.       Kaylynn Spears  02/01/20 0008       Ranjith Quinn MD  02/01/20 0143

## 2020-02-01 NOTE — CONSULTS
[facility]  Initial Inpatient Consult Note    Referring Provider: A    Reason for Consultation: Acute pancreatitis    Subjective     History of present illness:    86 y.o. female, not previously known to our service, that we are asked to see for acute pancreatitis.  Patient reports the acute onset of abdominal pain that occurred after a meal at [employer].  She is had no prior similar episodes.  She felt fine prior to the meal.  She is had associated nausea and abdominal distention in the upper abdomen.  The pain radiates to her back.  She denies any recent new medication with the exception of an over-the-counter calcium supplement.  She denies alcohol abuse.  Lipid panel was normal.  No evidence of gallstones on CT.  Noncontrast CT showed findings suggestive of acute pancreatitis.  No biliary dilatation or mass.  Lipase was greater than 3000.  She has a mildly elevated white blood cell count.  She has a creatinine of 1.7 today.  She has mild chronic renal insufficiency at baseline.    Past Medical History:  Past Medical History:   Diagnosis Date   • Benign essential hypertension 10/28/2012    October 2012--treatment for hypertension begun.   • Bilateral sensorineural hearing loss, wears hearing aids 6/14/2017    Left is much worse than right.  Patient had multiple ear infections as a child.   • Chronic renal insufficiency, stage II (mild), 05/18/2016--creatinine 1.35 4/28/2016 05/18/2016--creatinine 1.35.  Baseline creatinine approximately 1.3.   • Claustrophobia 4/28/2016    This patient has significant nocturnal hypoxemia and I think that she could benefit from nocturnal oxygen therapy. The exact etiology of her hypoxemia is not clear. She could possibly have obstructive sleep apnea but this is not documented. We cannot test this patient for sleep apnea due to the fact that she is severely claustrophobic. Patient was a former smoker and it is possibly that COPD he is playing a  role.   • Family history of coronary artery disease 2016    Patient's mother, father, 2 sisters and a brother all  from myocardial infarctions   • Gout 10/28/2012    2012--initial diagnosis and treatment of gout.   • Hyperlipidemia 10/28/2012    2012--treatment for hyperlipidemia begun.   • Impaired fasting glucose 10/28/2012    2012--initial diagnosis impaired fasting glucose.   • Morbidly obese (CMS/HCC) 3/11/2019   • Nocturnal hypoxemia 2014--patient did not receive nocturnal oxygen because of Medicare regulations.   05/15/2014--overnight oximetry revealed oxygen saturations less than 89% for 22 minutes and 40 seconds. Oxygen saturations less than or equal to 88% for 22 minutes and 40 seconds. Lowest oxygen saturation 83%. The longest continuous time with oxygen saturations less than or equal to 88% was 1 minute and 32 seconds.   2012--overnight oximetry revealed oxygen saturations less than 90% for one hour and 35 minutes. Oxygen saturations less than 89% 59 minutes. This patient has significant nocturnal hypoxemia and I think that she could benefit from nocturnal oxygen therapy. The exact etiology of her hypoxemia is not clear. She could possibly have obstructive sleep apnea but this is not documented. We cannot test this patient for sleep apnea due to the fact that she is severely claustrophobic. Patient was a former smoker and it is possibly that COPD he is playing a role.   • Primary hypothyroidism 2015--TSH remains elevated slightly at 4.92.  Given the overall clinical picture including the multinodular goiter, we will initiate levothyroxine 50 mcg/day and reassess in about 6 weeks.  2018--thyroid ultrasound reveals a multinodular thyroid with multiple subcentimeter nodules.  Only minimal increase in size of the largest nodule in the left lobe has occurred when compared    • Secondary polycythemia 2012     2014--patient did not receive nocturnal oxygen because of Medicare regulations.   05/15/2014--overnight oximetry revealed oxygen saturations less than 89% for 22 minutes and 40 seconds. Oxygen saturations less than or equal to 88% for 22 minutes and 40 seconds. Lowest oxygen saturation 83%. The longest continuous time with oxygen saturations less than or equal to 88% was 1 minute and 32 seconds.   2012--overnight oximetry revealed oxygen saturations less than 90% for one hour and 35 minutes. Oxygen saturations less than 89% 59 minutes. This patient has significant nocturnal hypoxemia and I think that she could benefit from nocturnal oxygen therapy. The exact etiology of her hypoxemia is not clear. She could possibly have obstructive sleep apnea but this is not documented. We cannot test this patient for sleep apnea due to the fact that she is severely claustrophobic. Patient was a former smoker and it is possibly that COPD he is playing a role.   • Vitamin D deficiency 2016     Past Surgical History:  Past Surgical History:   Procedure Laterality Date   • OOPHORECTOMY      age 48   • RADICAL ABDOMINAL HYSTERECTOMY  48 years old    48 years of age. Uterine fibroid tumors with menorrhagia - no cancer      Social History:   Social History     Tobacco Use   • Smoking status: Former Smoker     Packs/day: 0.50     Start date: 1946     Last attempt to quit: 1954     Years since quittin.1   • Smokeless tobacco: Never Used   • Tobacco comment: Stopped drinking at age 30.   Substance Use Topics   • Alcohol use: No      Family History:  Family History   Problem Relation Age of Onset   • Heart disease Mother         Ischemic.  from coronary artery disease.   • Heart disease Father         Ischemic.  from coronary artery disease.   • Heart disease Sister         Ischemic.  from coronary artery disease.   • Heart disease Brother         Ischemic.  from coronary artery disease.   •  Heart disease Sister         Ischemic.  from coronary artery disease.   • Breast cancer Daughter        Home Meds:  Medications Prior to Admission   Medication Sig Dispense Refill Last Dose   • allopurinol (ZYLOPRIM) 300 MG tablet Take 1 p.o. daily for gout 90 tablet 3    • Cholecalciferol (VITAMIN D) 2000 UNITS tablet Take 2 tablets by mouth daily.   Taking   • clobetasol (TEMOVATE) 0.05 % ointment Apply  topically to the appropriate area as directed 2 (Two) Times a Day. 15 g 0 Taking   • levothyroxine (SYNTHROID, LEVOTHROID) 50 MCG tablet Take 1 p.o. every morning for thyroid 90 tablet 3    • lisinopril-hydrochlorothiazide (PRINZIDE,ZESTORETIC) 20-12.5 MG per tablet TAKE ONE TABLET BY MOUTH EVERY MORNING FOR HIGH BLOOD PRESSURE AND LEG SWELLING 90 tablet 3    • pravastatin (PRAVACHOL) 40 MG tablet Take 1 p.o. daily for high cholesterol 90 tablet 3    • Biotin 10 MG tablet Take 1 tablet by mouth Daily.   Not Taking   • halobetasol (ULTRAVATE) 0.05 % ointment Apply pea-size amount daily to affected area for 2 months. Then apply every other day for 2 weeks then twice weekly 15 g 2 Taking     Current Meds:     cholecalciferol 1,000 Units Oral Daily   clobetasol  Topical Q12H   famotidine 20 mg Intravenous Q24H   levothyroxine 50 mcg Oral Q AM   pravastatin 40 mg Oral Nightly   sodium chloride 10 mL Intravenous Q12H     Allergies:  Allergies   Allergen Reactions   • Cefaclor Swelling   • Sulfa Antibiotics Rash     Review of Systems  Pertinent items are noted in HPI, all other systems reviewed and negative     Objective     Vital Signs  Temp:  [96.7 °F (35.9 °C)-98.6 °F (37 °C)] 96.7 °F (35.9 °C)  Heart Rate:  [] 98  Resp:  [16-24] 24  BP: (107-155)/(60-80) 107/60  Physical Exam:  General Appearance:    Alert, cooperative, appears uncomfortable   Head:    Normocephalic, without obvious abnormality, atraumatic   Eyes:          conjunctivae and sclerae normal, no   icterus   Throat:   no thrush, oral mucosa  moist   Neck:   Supple, no adenopathy   Lungs:     Clear to auscultation bilaterally    Heart:    Regular rhythm and normal rate    Chest Wall:    No abnormalities observed   Abdomen:     Soft, distended in the upper abdomen, epigastric tenderness; normal bowel sounds   Extremities:   no edema, no redness   Skin:   No bruising or rash   Psychiatric:  normal mood and insight     Results Review:   I reviewed the patient's new clinical results.    Results from last 7 days   Lab Units 02/01/20 0458 01/31/20  2142   WBC 10*3/mm3 13.95* 10.78   HEMOGLOBIN g/dL 15.4 15.0   HEMATOCRIT % 46.2 45.8   PLATELETS 10*3/mm3 199 235     Results from last 7 days   Lab Units 01/31/20  2142   SODIUM mmol/L 138   POTASSIUM mmol/L 3.7   CHLORIDE mmol/L 102   CO2 mmol/L 20.4*   BUN mg/dL 25*   CREATININE mg/dL 1.74*   CALCIUM mg/dL 9.0   BILIRUBIN mg/dL 0.3   ALK PHOS U/L 41   ALT (SGPT) U/L 16   AST (SGOT) U/L 27   GLUCOSE mg/dL 180*     Results from last 7 days   Lab Units 02/01/20  0458   INR  1.16*     Lab Results   Lab Value Date/Time    LIPASE >3,000 (H) 01/31/2020 2142       Radiology:  CT Abdomen Pelvis Without Contrast   Final Result       1. The patient has inflammatory stranding surrounding the pancreatic   head and neck, in keeping with history of pancreatitis. No discrete   peripancreatic collections are seen. There is no evidence of gastric   outlet obstruction.       Radiation dose reduction techniques were utilized, including automated   exposure control and exposure modulation based on body size.       This report was finalized on 1/31/2020 11:52 PM by Dr. Kathleen Montoya M.D.          XR Chest 1 View   Final Result   Mild bibasilar atelectasis.       This report was finalized on 1/31/2020 10:12 PM by Dr. Kathleen Montoya M.D.              Assessment/Plan   Patient Active Problem List   Diagnosis   • Benign essential hypertension   • CKD (chronic kidney disease), stage II   • Claustrophobia   • Gout   •  Hyperlipidemia   • Primary hypothyroidism   • Impaired fasting glucose   • Nocturnal hypoxemia   • Secondary polycythemia   • Vitamin D deficiency   • Therapeutic drug monitoring   • Family history of coronary artery disease   • Bilateral sensorineural hearing loss, wears hearing aids   • Multinodular goiter   • Supraventricular tachycardia (CMS/HCC)   • Morbidly obese (CMS/HCC)   • Acute pancreatitis without infection or necrosis   • Obesity (BMI 30-39.9)   • CKD (chronic kidney disease) stage 3, GFR 30-59 ml/min (CMS/HCC)       Assessment:  1. Acute pancreatitis-etiology uncertain at this point.  Lipid panel normal, no gallstones on CT, no history of alcohol abuse.  She is on outpatient lisinopril/HCTZ which is a possible culprit.  2. Abdominal pain secondary to #1    Plan:  · Recommend continue conservative care for acute pancreatitis with gut rest, IV fluids, narcotics and antiemetics  · Would recommend stopping her lisinopril and HCTZ given no obvious other culprits at this point  · Agree with gallbladder ultrasound to rule out subtle gallstone missed on CT  · She will need a contrasted study of her pancreas at some point to rule out any underlying mass or concerning finding-her renal function currently limits our ability to proceed      I discussed the patients findings and my recommendations with patient and family.    Adriane Skaggs MD

## 2020-02-02 ENCOUNTER — APPOINTMENT (OUTPATIENT)
Dept: GENERAL RADIOLOGY | Facility: HOSPITAL | Age: 85
End: 2020-02-02

## 2020-02-02 PROBLEM — N17.9 AKI (ACUTE KIDNEY INJURY): Status: ACTIVE | Noted: 2020-02-02

## 2020-02-02 LAB
ALBUMIN SERPL-MCNC: 3.2 G/DL (ref 3.5–5.2)
ALBUMIN/GLOB SERPL: 1.1 G/DL
ALP SERPL-CCNC: 38 U/L (ref 39–117)
ALT SERPL W P-5'-P-CCNC: 22 U/L (ref 1–33)
ANION GAP SERPL CALCULATED.3IONS-SCNC: 16.4 MMOL/L (ref 5–15)
AST SERPL-CCNC: 26 U/L (ref 1–32)
BILIRUB SERPL-MCNC: 2 MG/DL (ref 0.2–1.2)
BUN BLD-MCNC: 35 MG/DL (ref 8–23)
BUN/CREAT SERPL: 15.4 (ref 7–25)
CALCIUM SPEC-SCNC: 7.8 MG/DL (ref 8.6–10.5)
CHLORIDE SERPL-SCNC: 99 MMOL/L (ref 98–107)
CO2 SERPL-SCNC: 16.6 MMOL/L (ref 22–29)
CREAT BLD-MCNC: 2.28 MG/DL (ref 0.57–1)
GFR SERPL CREATININE-BSD FRML MDRD: 20 ML/MIN/1.73
GLOBULIN UR ELPH-MCNC: 2.8 GM/DL
GLUCOSE BLD-MCNC: 88 MG/DL (ref 65–99)
LIPASE SERPL-CCNC: 417 U/L (ref 13–60)
POTASSIUM BLD-SCNC: 4.4 MMOL/L (ref 3.5–5.2)
PROT SERPL-MCNC: 6 G/DL (ref 6–8.5)
SODIUM BLD-SCNC: 132 MMOL/L (ref 136–145)

## 2020-02-02 PROCEDURE — 25010000002 HYDROMORPHONE PER 4 MG: Performed by: INTERNAL MEDICINE

## 2020-02-02 PROCEDURE — 25010000002 ENOXAPARIN PER 10 MG: Performed by: INTERNAL MEDICINE

## 2020-02-02 PROCEDURE — 93005 ELECTROCARDIOGRAM TRACING: CPT | Performed by: NURSE PRACTITIONER

## 2020-02-02 PROCEDURE — 25010000002 ONDANSETRON PER 1 MG: Performed by: NURSE PRACTITIONER

## 2020-02-02 PROCEDURE — 80053 COMPREHEN METABOLIC PANEL: CPT | Performed by: INTERNAL MEDICINE

## 2020-02-02 PROCEDURE — 83690 ASSAY OF LIPASE: CPT | Performed by: INTERNAL MEDICINE

## 2020-02-02 PROCEDURE — 93010 ELECTROCARDIOGRAM REPORT: CPT | Performed by: INTERNAL MEDICINE

## 2020-02-02 PROCEDURE — 71045 X-RAY EXAM CHEST 1 VIEW: CPT

## 2020-02-02 PROCEDURE — 99232 SBSQ HOSP IP/OBS MODERATE 35: CPT | Performed by: INTERNAL MEDICINE

## 2020-02-02 PROCEDURE — 94799 UNLISTED PULMONARY SVC/PX: CPT

## 2020-02-02 RX ADMIN — POLYETHYLENE GLYCOL 3350 17 G: 17 POWDER, FOR SOLUTION ORAL at 09:56

## 2020-02-02 RX ADMIN — ENOXAPARIN SODIUM 30 MG: 30 INJECTION SUBCUTANEOUS at 09:56

## 2020-02-02 RX ADMIN — SODIUM CHLORIDE, PRESERVATIVE FREE 10 ML: 5 INJECTION INTRAVENOUS at 20:39

## 2020-02-02 RX ADMIN — HYDROCODONE BITARTRATE AND ACETAMINOPHEN 1 TABLET: 7.5; 325 TABLET ORAL at 02:27

## 2020-02-02 RX ADMIN — HYDROCODONE BITARTRATE AND ACETAMINOPHEN 1 TABLET: 7.5; 325 TABLET ORAL at 06:19

## 2020-02-02 RX ADMIN — FAMOTIDINE 20 MG: 10 INJECTION INTRAVENOUS at 06:11

## 2020-02-02 RX ADMIN — SODIUM CHLORIDE 125 ML/HR: 9 INJECTION, SOLUTION INTRAVENOUS at 20:44

## 2020-02-02 RX ADMIN — HYDROMORPHONE HYDROCHLORIDE 0.5 MG: 10 INJECTION INTRAMUSCULAR; INTRAVENOUS; SUBCUTANEOUS at 00:27

## 2020-02-02 RX ADMIN — PRAVASTATIN SODIUM 40 MG: 40 TABLET ORAL at 20:39

## 2020-02-02 RX ADMIN — LEVOTHYROXINE SODIUM 50 MCG: 50 TABLET ORAL at 06:11

## 2020-02-02 RX ADMIN — HYDROMORPHONE HYDROCHLORIDE 0.5 MG: 10 INJECTION INTRAMUSCULAR; INTRAVENOUS; SUBCUTANEOUS at 14:10

## 2020-02-02 RX ADMIN — HYDROMORPHONE HYDROCHLORIDE 0.5 MG: 10 INJECTION INTRAMUSCULAR; INTRAVENOUS; SUBCUTANEOUS at 18:19

## 2020-02-02 RX ADMIN — VITAMIN D, TAB 1000IU (100/BT) 1000 UNITS: 25 TAB at 13:05

## 2020-02-02 RX ADMIN — CLOBETASOL PROPIONATE: 0.5 CREAM TOPICAL at 20:39

## 2020-02-02 RX ADMIN — HYDROMORPHONE HYDROCHLORIDE 0.5 MG: 10 INJECTION INTRAMUSCULAR; INTRAVENOUS; SUBCUTANEOUS at 09:56

## 2020-02-02 RX ADMIN — ONDANSETRON 4 MG: 2 INJECTION INTRAMUSCULAR; INTRAVENOUS at 23:52

## 2020-02-02 RX ADMIN — CLOBETASOL PROPIONATE: 0.5 CREAM TOPICAL at 13:05

## 2020-02-02 RX ADMIN — SODIUM CHLORIDE 125 ML/HR: 9 INJECTION, SOLUTION INTRAVENOUS at 00:44

## 2020-02-02 RX ADMIN — HYDROCODONE BITARTRATE AND ACETAMINOPHEN 1 TABLET: 7.5; 325 TABLET ORAL at 13:05

## 2020-02-02 RX ADMIN — HYDROCODONE BITARTRATE AND ACETAMINOPHEN 1 TABLET: 7.5; 325 TABLET ORAL at 21:09

## 2020-02-02 NOTE — SIGNIFICANT NOTE
02/02/20 1012   Rehab Time/Intention   Evaluation Not Performed other (see comments)  (pt just received dilaudid and is too sleepy to try to get up, family reports pt is independent at baseline and very active, PT will follow up tomorrow)   Rehab Treatment   Discipline physical therapist   Recommendation   PT - Next Appointment 02/03/20

## 2020-02-02 NOTE — PROGRESS NOTES
[facility]  Inpatient Progress Note    Reason for Follow Up: Acute pancreatitis    Subjective     Interval History:   More comfortable than she was last night.  Still complains of abdominal pain bilateral upper quadrants under the diaphragm.  She complains of abdominal distention.  She is not nauseated.    Current Facility-Administered Medications:   •  acetaminophen (TYLENOL) tablet 650 mg, 650 mg, Oral, Q4H PRN, Kannan Rizo APRN, 650 mg at 02/01/20 0203  •  aluminum-magnesium hydroxide-simethicone (MAALOX MAX) 400-400-40 MG/5ML suspension 15 mL, 15 mL, Oral, Q6H PRN, Kannan Rizo APRN, 15 mL at 02/01/20 0808  •  bisacodyl (DULCOLAX) EC tablet 5 mg, 5 mg, Oral, Daily PRN, Kannan Rizo APRN  •  cholecalciferol (VITAMIN D3) tablet 1,000 Units, 1,000 Units, Oral, Daily, Kannan Rizo APRN, 1,000 Units at 02/02/20 1305  •  clobetasol (TEMOVATE) 0.05 % cream, , Topical, Q12H, Kannan Rizo APRN  •  enoxaparin (LOVENOX) syringe 30 mg, 30 mg, Subcutaneous, Q24H, Amadou Das MD, 30 mg at 02/02/20 0956  •  famotidine (PEPCID) injection 20 mg, 20 mg, Intravenous, Q24H, Kannan Rizo APRN, 20 mg at 02/02/20 0611  •  HYDROcodone-acetaminophen (NORCO) 7.5-325 MG per tablet 1 tablet, 1 tablet, Oral, Q4H PRN, Amadou Das MD, 1 tablet at 02/02/20 1305  •  HYDROmorphone (DILAUDID) injection 0.5 mg, 0.5 mg, Intravenous, Q2H PRN, 0.5 mg at 02/02/20 0956 **AND** naloxone (NARCAN) injection 0.4 mg, 0.4 mg, Intravenous, Q5 Min PRN, Nathan Tam MD  •  levothyroxine (SYNTHROID, LEVOTHROID) tablet 50 mcg, 50 mcg, Oral, Q AM, Kannan Rizo APRN, 50 mcg at 02/02/20 0611  •  ondansetron (ZOFRAN) tablet 4 mg, 4 mg, Oral, Q6H PRN **OR** ondansetron (ZOFRAN) injection 4 mg, 4 mg, Intravenous, Q6H PRN, Kannan Rizo APRN, 4 mg at 02/01/20 1859  •  polyethylene glycol 3350 powder (packet), 17 g, Oral, Daily, Amadou Das MD, 17 g  at 02/02/20 0956  •  pravastatin (PRAVACHOL) tablet 40 mg, 40 mg, Oral, Nightly, Kannan Rizo APRN, 40 mg at 02/01/20 2106  •  [COMPLETED] Insert peripheral IV, , , Once **AND** sodium chloride 0.9 % flush 10 mL, 10 mL, Intravenous, PRN, Ranjith Quinn MD, 10 mL at 01/31/20 2143  •  sodium chloride 0.9 % flush 10 mL, 10 mL, Intravenous, Q12H, Kannan Rizo APRN, 10 mL at 02/01/20 0205  •  sodium chloride 0.9 % flush 10 mL, 10 mL, Intravenous, PRN, Kannan Rizo APRN  •  sodium chloride 0.9 % infusion, 125 mL/hr, Intravenous, Continuous, Kannan Rizo APRN, Last Rate: 125 mL/hr at 02/02/20 0044, 125 mL/hr at 02/02/20 0044  Review of Systems:    All systems were reviewed and negative except for:  Gastrointestinal: positive for  bloating / distention and pain  Genitourinary: postivie for  dark urine    Objective     Vital Signs  Temp:  [96.7 °F (35.9 °C)-97.7 °F (36.5 °C)] 97.7 °F (36.5 °C)  Heart Rate:  [87-98] 87  Resp:  [18-24] 18  BP: (100-116)/(57-62) 109/62  Body mass index is 37.75 kg/m².    Intake/Output Summary (Last 24 hours) at 2/2/2020 1405  Last data filed at 2/2/2020 1300  Gross per 24 hour   Intake 390 ml   Output 650 ml   Net -260 ml     I/O this shift:  In: 170 [P.O.:170]  Out: 150 [Urine:150]     Physical Exam:   General: patient awake, alert and cooperative   Eyes: Normal lids and lashes, no scleral icterus   Neck: supple, normal ROM   Skin: warm and dry, not jaundiced   Cardiovascular: regular rhythm and rate, no murmurs auscultated   Pulm: clear to auscultation bilaterally, regular and unlabored   Abdomen: soft, upper abdominal tender, no guarding, distended in the upper abdomen; decreased bowel sounds   Extremities: no rash or edema   Psychiatric: Normal mood and behavior; memory intact     Results Review:     I reviewed the patient's new clinical results.    Results from last 7 days   Lab Units 02/01/20  0458 01/31/20  2142   WBC 10*3/mm3 13.95* 10.78   HEMOGLOBIN  g/dL 15.4 15.0   HEMATOCRIT % 46.2 45.8   PLATELETS 10*3/mm3 199 235     Results from last 7 days   Lab Units 02/02/20  0441 01/31/20  2142   SODIUM mmol/L 132* 138   POTASSIUM mmol/L 4.4 3.7   CHLORIDE mmol/L 99 102   CO2 mmol/L 16.6* 20.4*   BUN mg/dL 35* 25*   CREATININE mg/dL 2.28* 1.74*   CALCIUM mg/dL 7.8* 9.0   BILIRUBIN mg/dL 2.0* 0.3   ALK PHOS U/L 38* 41   ALT (SGPT) U/L 22 16   AST (SGOT) U/L 26 27   GLUCOSE mg/dL 88 180*     Results from last 7 days   Lab Units 02/01/20  0458   INR  1.16*     Lab Results   Lab Value Date/Time    LIPASE 417 (H) 02/02/2020 0441    LIPASE >3,000 (H) 01/31/2020 2142       Radiology:  US Gallbladder   Final Result       1. No stones or sludge are seen within the gallbladder. Gallbladder   appears mildly distended, with gallbladder wall thickening and edema   noted. Appearance is nonspecific, and may be reactive, and may be   related to the patient's pancreatitis.   2. The patient does have some mild dilatation of the common bile duct,   measuring up to 9 mm. Some of this may be age related, but correlation   with liver function tests is recommended. MRCP or ERCP could be   considered for additional evaluation if these are abnormal.       This report was finalized on 2/1/2020 9:55 PM by Dr. Kathleen Montoya M.D.          CT Abdomen Pelvis Without Contrast   Final Result       1. The patient has inflammatory stranding surrounding the pancreatic   head and neck, in keeping with history of pancreatitis. No discrete   peripancreatic collections are seen. There is no evidence of gastric   outlet obstruction.       Radiation dose reduction techniques were utilized, including automated   exposure control and exposure modulation based on body size.       This report was finalized on 1/31/2020 11:52 PM by Dr. Kathleen Montoya M.D.          XR Chest 1 View   Final Result   Mild bibasilar atelectasis.       This report was finalized on 1/31/2020 10:12 PM by Dr. Kathleen Montoya    M.D.              Assessment/Plan     Patient Active Problem List   Diagnosis   • Benign essential hypertension   • Claustrophobia   • Gout   • Hyperlipidemia   • Primary hypothyroidism   • Impaired fasting glucose   • Nocturnal hypoxemia   • Secondary polycythemia   • Vitamin D deficiency   • Therapeutic drug monitoring   • Family history of coronary artery disease   • Bilateral sensorineural hearing loss, wears hearing aids   • Multinodular goiter   • Supraventricular tachycardia (CMS/HCC)   • Morbidly obese (CMS/HCC)   • Acute pancreatitis without infection or necrosis   • Obesity (BMI 30-39.9)   • CKD (chronic kidney disease) stage 3, GFR 30-59 ml/min (CMS/HCC)   • LINNEA (acute kidney injury) (CMS/HCC)       Assessment:  1. Acute pancreatitis-etiology uncertain at this point.  Lipid panel normal, no gallstones on CT, no history of alcohol abuse.  She is on outpatient lisinopril/HCTZ which is a possible culprit.  2. Abdominal pain secondary to #1  3. LINNEA  4. Elevated LFTs-follow trend     Plan:  · Recommend continue conservative care for acute pancreatitis with gut rest, IV fluids, narcotics and antiemetics  · Would recommend stopping her lisinopril and HCTZ given no obvious other culprits at this point  · Recommend MRCP to follow-up on mildly dilated CBD and dilated gallbladder seen on the gallbladder ultrasound as well as to better evaluate the pancreas with contrast.  We will see what her kidney function looks like tomorrow in terms of timing of the study.  · May need more aggressive hydration-discussed with Dr. Das.  Cautiously hydrating given her age but appears dry    I discussed the patients findings and my recommendations with patient, family and Dr. Das.    Adriane Skaggs MD

## 2020-02-02 NOTE — PLAN OF CARE
Problem: Patient Care Overview  Goal: Plan of Care Review  Outcome: Ongoing (interventions implemented as appropriate)  Flowsheets (Taken 2/2/2020 0600)  Progress: no change  Plan of Care Reviewed With: daughter  Note:   Continues to c/o abd pain, alternated IV dilaudid and norco for pain, pt was able to rest some during the night, IVF infusing, pt reports her pain gets worse when she drinks anything so she has been eating ice chips, had US of gallbladder, up with assistance, unable to pass flatus, vss, afebrile, educated on use and encouraged to use IS, will continue to monitor.

## 2020-02-03 PROBLEM — J18.9 PNEUMONIA: Status: ACTIVE | Noted: 2020-02-03

## 2020-02-03 LAB
ALBUMIN SERPL-MCNC: 2.9 G/DL (ref 3.5–5.2)
ALBUMIN/GLOB SERPL: 1 G/DL
ALP SERPL-CCNC: 50 U/L (ref 39–117)
ALT SERPL W P-5'-P-CCNC: 17 U/L (ref 1–33)
ANION GAP SERPL CALCULATED.3IONS-SCNC: 14.5 MMOL/L (ref 5–15)
AST SERPL-CCNC: 23 U/L (ref 1–32)
BILIRUB SERPL-MCNC: 1.7 MG/DL (ref 0.2–1.2)
BUN BLD-MCNC: 35 MG/DL (ref 8–23)
BUN/CREAT SERPL: 19.4 (ref 7–25)
CALCIUM SPEC-SCNC: 7.6 MG/DL (ref 8.6–10.5)
CHLORIDE SERPL-SCNC: 101 MMOL/L (ref 98–107)
CO2 SERPL-SCNC: 16.5 MMOL/L (ref 22–29)
CREAT BLD-MCNC: 1.8 MG/DL (ref 0.57–1)
D-LACTATE SERPL-SCNC: 1.3 MMOL/L (ref 0.5–2)
D-LACTATE SERPL-SCNC: 2.5 MMOL/L (ref 0.5–2)
DEPRECATED RDW RBC AUTO: 45.3 FL (ref 37–54)
ERYTHROCYTE [DISTWIDTH] IN BLOOD BY AUTOMATED COUNT: 14.1 % (ref 12.3–15.4)
GFR SERPL CREATININE-BSD FRML MDRD: 27 ML/MIN/1.73
GLOBULIN UR ELPH-MCNC: 2.8 GM/DL
GLUCOSE BLD-MCNC: 77 MG/DL (ref 65–99)
HCT VFR BLD AUTO: 39.6 % (ref 34–46.6)
HGB BLD-MCNC: 13.5 G/DL (ref 12–15.9)
HOLD SPECIMEN: NORMAL
LIPASE SERPL-CCNC: 181 U/L (ref 13–60)
LYMPHOCYTES # BLD MANUAL: 0.23 10*3/MM3 (ref 0.7–3.1)
LYMPHOCYTES NFR BLD MANUAL: 2 % (ref 19.6–45.3)
LYMPHOCYTES NFR BLD MANUAL: 3 % (ref 5–12)
MCH RBC QN AUTO: 30.3 PG (ref 26.6–33)
MCHC RBC AUTO-ENTMCNC: 34.1 G/DL (ref 31.5–35.7)
MCV RBC AUTO: 89 FL (ref 79–97)
MONOCYTES # BLD AUTO: 0.34 10*3/MM3 (ref 0.1–0.9)
MRSA DNA SPEC QL NAA+PROBE: NORMAL
NEUTROPHILS # BLD AUTO: 10.71 10*3/MM3 (ref 1.7–7)
NEUTROPHILS NFR BLD MANUAL: 95 % (ref 42.7–76)
PLAT MORPH BLD: NORMAL
PLATELET # BLD AUTO: 174 10*3/MM3 (ref 140–450)
PMV BLD AUTO: 10 FL (ref 6–12)
POTASSIUM BLD-SCNC: 3.8 MMOL/L (ref 3.5–5.2)
PROCALCITONIN SERPL-MCNC: 12.61 NG/ML (ref 0.1–0.25)
PROT SERPL-MCNC: 5.7 G/DL (ref 6–8.5)
RBC # BLD AUTO: 4.45 10*6/MM3 (ref 3.77–5.28)
RBC MORPH BLD: NORMAL
SODIUM BLD-SCNC: 132 MMOL/L (ref 136–145)
WBC MORPH BLD: NORMAL
WBC NRBC COR # BLD: 11.27 10*3/MM3 (ref 3.4–10.8)

## 2020-02-03 PROCEDURE — 83605 ASSAY OF LACTIC ACID: CPT | Performed by: NURSE PRACTITIONER

## 2020-02-03 PROCEDURE — 25010000002 ENOXAPARIN PER 10 MG: Performed by: INTERNAL MEDICINE

## 2020-02-03 PROCEDURE — 85007 BL SMEAR W/DIFF WBC COUNT: CPT | Performed by: INTERNAL MEDICINE

## 2020-02-03 PROCEDURE — 83690 ASSAY OF LIPASE: CPT | Performed by: INTERNAL MEDICINE

## 2020-02-03 PROCEDURE — 99232 SBSQ HOSP IP/OBS MODERATE 35: CPT | Performed by: INTERNAL MEDICINE

## 2020-02-03 PROCEDURE — 25010000002 LEVOFLOXACIN PER 250 MG: Performed by: INTERNAL MEDICINE

## 2020-02-03 PROCEDURE — 87641 MR-STAPH DNA AMP PROBE: CPT | Performed by: NURSE PRACTITIONER

## 2020-02-03 PROCEDURE — 97162 PT EVAL MOD COMPLEX 30 MIN: CPT

## 2020-02-03 PROCEDURE — 25010000002 VANCOMYCIN 10 G RECONSTITUTED SOLUTION: Performed by: NURSE PRACTITIONER

## 2020-02-03 PROCEDURE — 80053 COMPREHEN METABOLIC PANEL: CPT | Performed by: INTERNAL MEDICINE

## 2020-02-03 PROCEDURE — 94799 UNLISTED PULMONARY SVC/PX: CPT

## 2020-02-03 PROCEDURE — 85025 COMPLETE CBC W/AUTO DIFF WBC: CPT | Performed by: INTERNAL MEDICINE

## 2020-02-03 PROCEDURE — 84145 PROCALCITONIN (PCT): CPT | Performed by: NURSE PRACTITIONER

## 2020-02-03 PROCEDURE — 25010000002 HYDROMORPHONE PER 4 MG: Performed by: INTERNAL MEDICINE

## 2020-02-03 PROCEDURE — 87040 BLOOD CULTURE FOR BACTERIA: CPT | Performed by: NURSE PRACTITIONER

## 2020-02-03 PROCEDURE — 97110 THERAPEUTIC EXERCISES: CPT

## 2020-02-03 RX ORDER — IPRATROPIUM BROMIDE AND ALBUTEROL SULFATE 2.5; .5 MG/3ML; MG/3ML
3 SOLUTION RESPIRATORY (INHALATION)
Status: DISCONTINUED | OUTPATIENT
Start: 2020-02-03 | End: 2020-02-07

## 2020-02-03 RX ORDER — SODIUM CHLORIDE 9 MG/ML
75 INJECTION, SOLUTION INTRAVENOUS CONTINUOUS
Status: DISCONTINUED | OUTPATIENT
Start: 2020-02-03 | End: 2020-02-08

## 2020-02-03 RX ORDER — LEVOFLOXACIN 5 MG/ML
750 INJECTION, SOLUTION INTRAVENOUS
Status: COMPLETED | OUTPATIENT
Start: 2020-02-03 | End: 2020-02-07

## 2020-02-03 RX ADMIN — LEVOFLOXACIN 750 MG: 5 INJECTION, SOLUTION INTRAVENOUS at 09:38

## 2020-02-03 RX ADMIN — HYDROMORPHONE HYDROCHLORIDE 0.5 MG: 10 INJECTION INTRAMUSCULAR; INTRAVENOUS; SUBCUTANEOUS at 00:18

## 2020-02-03 RX ADMIN — HYDROCODONE BITARTRATE AND ACETAMINOPHEN 1 TABLET: 7.5; 325 TABLET ORAL at 12:55

## 2020-02-03 RX ADMIN — SODIUM CHLORIDE 75 ML/HR: 9 INJECTION, SOLUTION INTRAVENOUS at 00:18

## 2020-02-03 RX ADMIN — IPRATROPIUM BROMIDE AND ALBUTEROL SULFATE 3 ML: 2.5; .5 SOLUTION RESPIRATORY (INHALATION) at 20:48

## 2020-02-03 RX ADMIN — HYDROCODONE BITARTRATE AND ACETAMINOPHEN 1 TABLET: 7.5; 325 TABLET ORAL at 18:39

## 2020-02-03 RX ADMIN — VITAMIN D, TAB 1000IU (100/BT) 1000 UNITS: 25 TAB at 09:36

## 2020-02-03 RX ADMIN — FAMOTIDINE 20 MG: 10 INJECTION INTRAVENOUS at 06:16

## 2020-02-03 RX ADMIN — IPRATROPIUM BROMIDE AND ALBUTEROL SULFATE 3 ML: 2.5; .5 SOLUTION RESPIRATORY (INHALATION) at 16:24

## 2020-02-03 RX ADMIN — HYDROCODONE BITARTRATE AND ACETAMINOPHEN 1 TABLET: 7.5; 325 TABLET ORAL at 06:20

## 2020-02-03 RX ADMIN — HYDROMORPHONE HYDROCHLORIDE 0.5 MG: 10 INJECTION INTRAMUSCULAR; INTRAVENOUS; SUBCUTANEOUS at 16:29

## 2020-02-03 RX ADMIN — HYDROMORPHONE HYDROCHLORIDE 0.5 MG: 10 INJECTION INTRAMUSCULAR; INTRAVENOUS; SUBCUTANEOUS at 21:27

## 2020-02-03 RX ADMIN — ENOXAPARIN SODIUM 30 MG: 30 INJECTION SUBCUTANEOUS at 09:36

## 2020-02-03 RX ADMIN — AZTREONAM 2 G: 2 INJECTION, POWDER, LYOPHILIZED, FOR SOLUTION INTRAMUSCULAR; INTRAVENOUS at 02:12

## 2020-02-03 RX ADMIN — SODIUM CHLORIDE 2000 MG: 9 INJECTION, SOLUTION INTRAVENOUS at 03:03

## 2020-02-03 RX ADMIN — CLOBETASOL PROPIONATE: 0.5 CREAM TOPICAL at 21:18

## 2020-02-03 RX ADMIN — HYDROMORPHONE HYDROCHLORIDE 0.5 MG: 10 INJECTION INTRAMUSCULAR; INTRAVENOUS; SUBCUTANEOUS at 09:37

## 2020-02-03 RX ADMIN — CLOBETASOL PROPIONATE: 0.5 CREAM TOPICAL at 09:38

## 2020-02-03 RX ADMIN — SODIUM CHLORIDE 75 ML/HR: 9 INJECTION, SOLUTION INTRAVENOUS at 12:44

## 2020-02-03 RX ADMIN — HYDROMORPHONE HYDROCHLORIDE 0.5 MG: 10 INJECTION INTRAMUSCULAR; INTRAVENOUS; SUBCUTANEOUS at 02:21

## 2020-02-03 RX ADMIN — PRAVASTATIN SODIUM 40 MG: 40 TABLET ORAL at 21:18

## 2020-02-03 RX ADMIN — POLYETHYLENE GLYCOL 3350 17 G: 17 POWDER, FOR SOLUTION ORAL at 09:37

## 2020-02-03 RX ADMIN — LEVOTHYROXINE SODIUM 50 MCG: 50 TABLET ORAL at 06:16

## 2020-02-03 NOTE — PROGRESS NOTES
[facility]  Inpatient Progress Note    Reason for Follow Up: Acute pancreatitis    Subjective     Interval History:   Overnight events noted-increased shortness of air overnight with chest x-ray reflecting new pneumonia.  Patient started on antibiotics.  Noted to have elevated lactic acid and pro calcitonin overnight as well.    This morning she is feeling better.  She is less abdominal pain.  She is gotten out of bed and had a bath.  She is currently sitting in the chair.  She still complains of some wheezing and chest tightness.  She has no cough.  She has no nausea and is tolerating clear liquids.      Current Facility-Administered Medications:   •  acetaminophen (TYLENOL) tablet 650 mg, 650 mg, Oral, Q4H PRN, Kannan Rizo APRN, 650 mg at 02/01/20 0203  •  aluminum-magnesium hydroxide-simethicone (MAALOX MAX) 400-400-40 MG/5ML suspension 15 mL, 15 mL, Oral, Q6H PRN, Kannan Rizo APRN, 15 mL at 02/01/20 0808  •  bisacodyl (DULCOLAX) EC tablet 5 mg, 5 mg, Oral, Daily PRN, Kannan Rizo APRN  •  cholecalciferol (VITAMIN D3) tablet 1,000 Units, 1,000 Units, Oral, Daily, Kannan Rizo APRN, 1,000 Units at 02/02/20 1305  •  clobetasol (TEMOVATE) 0.05 % cream, , Topical, Q12H, Kannan Rizo APRN  •  enoxaparin (LOVENOX) syringe 30 mg, 30 mg, Subcutaneous, Q24H, Amadou Das MD, 30 mg at 02/02/20 0956  •  famotidine (PEPCID) injection 20 mg, 20 mg, Intravenous, Q24H, Kannan Rizo APRN, 20 mg at 02/03/20 0616  •  HYDROcodone-acetaminophen (NORCO) 7.5-325 MG per tablet 1 tablet, 1 tablet, Oral, Q4H PRN, Amadou Das MD, 1 tablet at 02/03/20 0620  •  HYDROmorphone (DILAUDID) injection 0.5 mg, 0.5 mg, Intravenous, Q2H PRN, 0.5 mg at 02/03/20 0221 **AND** naloxone (NARCAN) injection 0.4 mg, 0.4 mg, Intravenous, Q5 Min PRN, Nathan Tam MD  •  levoFLOXacin (LEVAQUIN) 750 mg/150 mL D5W (premix) (LEVAQUIN) 750 mg, 750 mg, Intravenous,  Q48H, Amadou Das MD  •  levothyroxine (SYNTHROID, LEVOTHROID) tablet 50 mcg, 50 mcg, Oral, Q AM, Kannan Rizo APRN, 50 mcg at 02/03/20 0616  •  ondansetron (ZOFRAN) tablet 4 mg, 4 mg, Oral, Q6H PRN **OR** ondansetron (ZOFRAN) injection 4 mg, 4 mg, Intravenous, Q6H PRN, Kannan Rizo APRN, 4 mg at 02/02/20 2352  •  Pharmacy Consult - Pharmacy to dose, , Does not apply, Continuous PRN, Amadou Das MD  •  polyethylene glycol 3350 powder (packet), 17 g, Oral, Daily, Amadou Das MD, 17 g at 02/02/20 0956  •  pravastatin (PRAVACHOL) tablet 40 mg, 40 mg, Oral, Nightly, Kannan Rizo APRN, 40 mg at 02/02/20 2039  •  [COMPLETED] Insert peripheral IV, , , Once **AND** sodium chloride 0.9 % flush 10 mL, 10 mL, Intravenous, PRN, Ranjith Quinn MD, 10 mL at 01/31/20 2143  •  sodium chloride 0.9 % flush 10 mL, 10 mL, Intravenous, Q12H, Kannan Rizo APRN, 10 mL at 02/02/20 2039  •  sodium chloride 0.9 % flush 10 mL, 10 mL, Intravenous, PRN, Kannan Rizo APRN  •  sodium chloride 0.9 % infusion, 100 mL/hr, Intravenous, Continuous, Leigh Barriga APRN, Last Rate: 100 mL/hr at 02/03/20 0220, 100 mL/hr at 02/03/20 0220  Review of Systems:    All systems were reviewed and negative except for:  Respiratory: positive for  shortness of air  Gastrointestinal: positive for  bloating / distention    Objective     Vital Signs  Temp:  [97.1 °F (36.2 °C)-97.7 °F (36.5 °C)] 97.5 °F (36.4 °C)  Heart Rate:  [] 99  Resp:  [18-20] 18  BP: ()/(56-90) 100/70  Body mass index is 37.75 kg/m².    Intake/Output Summary (Last 24 hours) at 2/3/2020 0821  Last data filed at 2/3/2020 0637  Gross per 24 hour   Intake 2460 ml   Output 900 ml   Net 1560 ml     No intake/output data recorded.     Physical Exam:   General: patient awake, alert and cooperative   Eyes: Normal lids and lashes, no scleral icterus   Neck: supple, normal ROM   Skin: warm and dry, not  jaundiced   Cardiovascular: regular rhythm and rate, no murmurs auscultated   Pulm: Decreased breath sounds in the right base, faint expiratory wheezes bilaterally   Abdomen: soft, nontender, mild distention in the upper abdomen; normal bowel sounds   Extremities: no rash or edema   Psychiatric: Normal mood and behavior; memory intact     Results Review:     I reviewed the patient's new clinical results.    Results from last 7 days   Lab Units 02/03/20 0211 02/01/20 0458 01/31/20  2142   WBC 10*3/mm3 11.27* 13.95* 10.78   HEMOGLOBIN g/dL 13.5 15.4 15.0   HEMATOCRIT % 39.6 46.2 45.8   PLATELETS 10*3/mm3 174 199 235     Results from last 7 days   Lab Units 02/03/20 0211 02/02/20 0441 01/31/20  2142   SODIUM mmol/L 132* 132* 138   POTASSIUM mmol/L 3.8 4.4 3.7   CHLORIDE mmol/L 101 99 102   CO2 mmol/L 16.5* 16.6* 20.4*   BUN mg/dL 35* 35* 25*   CREATININE mg/dL 1.80* 2.28* 1.74*   CALCIUM mg/dL 7.6* 7.8* 9.0   BILIRUBIN mg/dL 1.7* 2.0* 0.3   ALK PHOS U/L 50 38* 41   ALT (SGPT) U/L 17 22 16   AST (SGOT) U/L 23 26 27   GLUCOSE mg/dL 77 88 180*     Results from last 7 days   Lab Units 02/01/20 0458   INR  1.16*     Lab Results   Lab Value Date/Time    LIPASE 181 (H) 02/03/2020 0211    LIPASE 417 (H) 02/02/2020 0441    LIPASE >3,000 (H) 01/31/2020 2142       Radiology:  XR Chest 1 View   Final Result   Interval development of dense right basilar infiltrate, which may   reflect pneumonia. There is also a small right pleural effusion.       This report was finalized on 2/2/2020 11:47 PM by Dr. Kathleen Montoya M.D.           Gallbladder   Final Result       1. No stones or sludge are seen within the gallbladder. Gallbladder   appears mildly distended, with gallbladder wall thickening and edema   noted. Appearance is nonspecific, and may be reactive, and may be   related to the patient's pancreatitis.   2. The patient does have some mild dilatation of the common bile duct,   measuring up to 9 mm. Some of this may be  age related, but correlation   with liver function tests is recommended. MRCP or ERCP could be   considered for additional evaluation if these are abnormal.       This report was finalized on 2/1/2020 9:55 PM by Dr. Kathleen Montoya M.D.          CT Abdomen Pelvis Without Contrast   Final Result       1. The patient has inflammatory stranding surrounding the pancreatic   head and neck, in keeping with history of pancreatitis. No discrete   peripancreatic collections are seen. There is no evidence of gastric   outlet obstruction.       Radiation dose reduction techniques were utilized, including automated   exposure control and exposure modulation based on body size.       This report was finalized on 1/31/2020 11:52 PM by Dr. Kathleen Montoya M.D.          XR Chest 1 View   Final Result   Mild bibasilar atelectasis.       This report was finalized on 1/31/2020 10:12 PM by Dr. Kathleen Montoya M.D.              Assessment/Plan     Patient Active Problem List   Diagnosis   • Benign essential hypertension   • Claustrophobia   • Gout   • Hyperlipidemia   • Primary hypothyroidism   • Impaired fasting glucose   • Nocturnal hypoxemia   • Secondary polycythemia   • Vitamin D deficiency   • Therapeutic drug monitoring   • Family history of coronary artery disease   • Bilateral sensorineural hearing loss, wears hearing aids   • Multinodular goiter   • Supraventricular tachycardia (CMS/HCC)   • Morbidly obese (CMS/HCC)   • Acute pancreatitis without infection or necrosis   • Obesity (BMI 30-39.9)   • CKD (chronic kidney disease) stage 3, GFR 30-59 ml/min (CMS/HCC)   • LINNEA (acute kidney injury) (CMS/HCC)       Assessment:  1. Acute pancreatitis-etiology uncertain at this point.  Lipid panel normal, no gallstones on CT, no history of alcohol abuse.  She is on outpatient lisinopril/HCTZ which is a possible culprit.  2. Abdominal pain secondary to #1  3. LINNEA  4. Elevated LFTs-follow  trend  5. Pneumonia      Plan:  · Showing some clinical improvement today from a pancreatitis standpoint.  She is definitely less tender.  She is more comfortable than she has been since admission.  · New chest x-ray finding suggesting pneumonia-patient started on antibiotics  · Add Ensure clear-encouraged increased p.o. intake today  · Encourage pulmonary toilet and increased ambulation  · Kidney function improving-given her shortness of air would hold off on MRCP as that may influence the quality of images that we obtain if she cannot hold her breath.  She will need an MRCP when her breathing is better and her kidney function is at an acceptable level    I discussed the patients findings and my recommendations with patient, family and nursing staff.    Adriane Skaggs MD

## 2020-02-03 NOTE — PROGRESS NOTES
"Pharmacy Consult to Dose Levofloxacin    Mandy Alarcon is a 86 y.o. female 96.7 kg (213 lb 1.6 oz).    Pharmacy consulted to dose per Dr. Das's request.   Indication: PNA    Anti-Infectives (From admission, onward)    Ordered     Dose/Rate Route Frequency Start Stop    02/03/20 0126  aztreonam (AZACTAM) 2 g/100 mL 0.9% NS (mbp)     Ordering Provider:  Leigh Barriga APRN    2 g  over 30 Minutes Intravenous Once 02/03/20 0215 02/03/20 0242    02/03/20 0126  vancomycin 2000 mg/500 mL 0.9% NS IVPB ([facility])     Ordering Provider:  Leigh Barriga APRN    20 mg/kg × 96.7 kg Intravenous Once 02/03/20 0215 02/03/20 0303         Results from last 7 days   Lab Units 02/03/20  0211 02/02/20  0441 01/31/20  2142   CREATININE mg/dL 1.80* 2.28* 1.74*     Estimated Creatinine Clearance: 24.8 mL/min (A) (by C-G formula based on SCr of 1.8 mg/dL (H)).  Body mass index is 37.75 kg/m².    WBC   Date Value Ref Range Status   02/03/2020 11.27 (H) 3.40 - 10.80 10*3/mm3 Final   02/01/2020 13.95 (H) 3.40 - 10.80 10*3/mm3 Final   01/31/2020 10.78 3.40 - 10.80 10*3/mm3 Final     Temp:  [97.1 °F (36.2 °C)-97.7 °F (36.5 °C)] 97.5 °F (36.4 °C)  Heart Rate:  [] 99  Resp:  [18-20] 18  BP: ()/(56-90) 100/70    Baseline cultures/labs/radiology:  BCx x2 sets (02/03): in process   MRSA (02/03): negative    CT abd (01/31): \"The patient has inflammatory stranding surrounding the pancreatic head and neck, in keeping with history of pancreatitis. No discrete peripancreatic collections are seen. There is no evidence of gastric outlet obstruction.\"    Assessment/Plan:  1) Begin levofloxacin 750 mg IV q48h based on indication and CrCl 20-50 mL/min.    2) Monitor SCr tomorrow with AM labs to assess trend.     Thank you,    Daryl Walton, PharmD, STACEY, BCPS  02/03/20 7:58 AM      "

## 2020-02-03 NOTE — PLAN OF CARE
Problem: Patient Care Overview  Goal: Plan of Care Review  Outcome: Ongoing (interventions implemented as appropriate)  Flowsheets (Taken 2/3/2020 1029)  Outcome Summary: pt presents with general weakness and impaired functional mobility from baseline, prior to admission, pt lived with daughter and was independent very active, bedroom is upstairs and pt does not use assistive device, pt needed min assist to get out of bed and walk 180 ft on o2,  mild unsteadiness, pt will benefit from PT to address deficits and maximize potential for pt to go home at discharge

## 2020-02-03 NOTE — PROGRESS NOTES
Discharge Planning Assessment  [facility]     Patient Name: Mandy Alarcon  MRN: [id number]  Today's Date: 2/3/2020    Admit Date: 1/31/2020    Discharge Needs Assessment     Row Name 02/03/20 1145       Living Environment    Lives With  child(adiel), adult    Name(s) of Who Lives With Patient  lives with her dgt Ashley and her family    Current Living Arrangements  home/apartment/condo    Primary Care Provided by  self    Family Caregiver if Needed  child(adiel), adult    Family Caregiver Names  has 5 daughters, lives with Ashley    Able to Return to Prior Arrangements  yes       Resource/Environmental Concerns    Resource/Environmental Concerns  none    Transportation Concerns  car, none       Transition Planning    Patient/Family Anticipates Transition to  home with family    Transportation Anticipated  family or friend will provide       Discharge Needs Assessment    Concerns to be Addressed  no discharge needs identified;denies needs/concerns at this time    Equipment Currently Used at Home  none    Anticipated Changes Related to Illness  none    Equipment Needed After Discharge  none    Provided post acute provider list?  Yes    Post Acute Provider List  Home Health    Delivered To  Support Person    Method of Delivery  In person        Discharge Plan     Row Name 02/03/20 1146       Plan    Plan  Home with dgt & her family; denies needs at this time.  CCP to follow for possible home health needs at d/c.      Patient/Family in Agreement with Plan  yes    Plan Comments  Met in room with patient and her youngest daughter Izzy.  Introduced self and explained role.  Facesheet verified.          Destination      Coordination has not been started for this encounter.      Durable Medical Equipment      Coordination has not been started for this encounter.      Dialysis/Infusion      Coordination has not been started for this encounter.      Home Medical Care      Coordination has not been started for this encounter.       Therapy      Coordination has not been started for this encounter.      Community Resources      Coordination has not been started for this encounter.          Demographic Summary     Row Name 02/03/20 1144       General Information    Admission Type  inpatient    Arrived From  home    Referral Source  admission list    Reason for Consult  discharge planning    Preferred Language  English     Used During This Interaction  no       Contact Information    Permission Granted to Share Info With  ;family/designee    Contact Information Obtained for          Functional Status     Row Name 02/03/20 1144       Functional Status    Usual Activity Tolerance  good    Current Activity Tolerance  good       Functional Status, IADL    Medications  independent;assistive person    Meal Preparation  independent;assistive person    Housekeeping  independent;assistive person    Laundry  independent;assistive person    Shopping  independent;assistive person       Mental Status    General Appearance WDL  WDL       Mental Status Summary    Recent Changes in Mental Status/Cognitive Functioning  no changes       Employment/    Employment Status  retired              Kristen Melendez RN

## 2020-02-03 NOTE — PROGRESS NOTES
"Pharmacokinetic Consult - Vancomycin Dosing (Initial Note)    Mandy Alarcon has been consulted for pharmacy to dose vancomycin for pneumonia/fever.  Pharmacy dosing vancomycin per CJ Contreras's request.   Goal trough: 15-20 mg/L   Other antimicrobials: Aztreonam 2gm q12h      Relevant clinical data and objective history reviewed:  86 y.o. female 160 cm (63\") 96.7 kg (213 lb 1.6 oz)    Creatinine   Date Value Ref Range Status   2020 2.28 (H) 0.57 - 1.00 mg/dL Final   2020 1.74 (H) 0.57 - 1.00 mg/dL Final     BUN   Date Value Ref Range Status   2020 35 (H) 8 - 23 mg/dL Final     Estimated Creatinine Clearance: 19.6 mL/min (A) (by C-G formula based on SCr of 2.28 mg/dL (H)).    Lab Results   Component Value Date    WBC 13.95 (H) 2020     Temp Readings from Last 3 Encounters:   20 97.6 °F (36.4 °C) (Oral)   19 98.7 °F (37.1 °C) (Oral)   19 96.9 °F (36.1 °C) (Oral)      Baseline culture/source/susceptibility:  2   Blood cx ordered but not collected    Imagin/2 - CXR  IMPRESSION:  Interval development of dense right basilar infiltrate, which may  reflect pneumonia. There is also a small right pleural effusion.        Assessment/Plan    87 y/o female admitted  for acute pancreatitis now being initiated on vancomycin and aztreonam for concerns of pneumonia. Lactate and procalcitonin drawn just after midnight 2.5 and 12.61 respectively. Afebrile, WBC from  elevated. LINNEA on CKD III, Scr most recently 2.28.     Vancomycin 2000mg (20mg/kg) will be started.     1) Will dose vancomycin intermittently due to LINNEA on CKD III    2) Will schedule 12 hour random level to determine need for additional dosing.    3) Will monitor serum creatinine every 24 hours for the first 3 days then at least every 48 hours per dosing recommendations.     4) Pharmacy will continue to follow daily while on vancomycin and adjust as needed.     Den Valdez, [facility]    "

## 2020-02-03 NOTE — PLAN OF CARE
Problem: Patient Care Overview  Goal: Plan of Care Review  Outcome: Ongoing (interventions implemented as appropriate)  Flowsheets (Taken 2/3/2020 9640)  Progress: no change  Plan of Care Reviewed With: patient  Outcome Summary: sinus tachycardia, pt c/o SOB with wheezing last night called out to on call [facility],  chest xray done showed pneumonia, EKG done sinus tachycardia, 2 set of blood c/s with procalcitonin and lactic acid collected, decreased ivfluid, started on iv antibiotic vanco and azactam 1st dose given, on 4L/NC, for vanco trough at 1500, up with assist, voiding w/o difficulty, nasal swab for MRSA collected, continue to monitor the pt.

## 2020-02-03 NOTE — NURSING NOTE
Called out to [facility] on call spoke with ramiro Barriga regarding pt changes of condition, result of chest xay and ekg, asked if she can reassess pt now for further evaluation, says not tonight but somebody will be there in the morning to see her, will place new order.

## 2020-02-03 NOTE — PROGRESS NOTES
Name: Mandy Alarcon ADMIT: 2020   : 1933  PCP: Daryl Santos MD    MRN: [id number] LOS: 2 days   AGE/SEX: 86 y.o. female  ROOM: [facility]   Subjective   Chief Complaint   Patient presents with   • Chest Pain   abd pain is a little better  +radiation to back  Better with meds    On IVFs    Worsening wheezing and cough overnight  Possible PNA, started on ABX    ROS  No f/c  +n/-v  No cp/palp  +soa/cough    Objective   Vital Signs  Temp:  [97.1 °F (36.2 °C)-97.7 °F (36.5 °C)] 97.5 °F (36.4 °C)  Heart Rate:  [] 99  Resp:  [18-20] 18  BP: ()/(56-90) 100/70  SpO2:  [93 %-97 %] 96 %  on  Flow (L/min):  [2-4] 4;   Device (Oxygen Therapy): nasal cannula  Body mass index is 37.75 kg/m².    Physical Exam   Constitutional: She is oriented to person, place, and time. She appears well-developed and well-nourished.   HENT:   Head: Normocephalic and atraumatic.   Eyes: Conjunctivae are normal. No scleral icterus.   Neck: Neck supple. No tracheal deviation present.   Cardiovascular: Normal rate and regular rhythm.   No murmur heard.  Pulmonary/Chest: No respiratory distress. She has wheezes (minimal, decreased bs at bases).   Abdominal: Soft. She exhibits distension (mild). There is tenderness (mild epigastric). There is no guarding.   Neurological: She is alert and oriented to person, place, and time. She exhibits normal muscle tone.   Skin: Skin is warm and dry.   Psychiatric: She has a normal mood and affect. Her behavior is normal.       Results Review:       I reviewed the patient's new clinical results.  Results from last 7 days   Lab Units 20  0211 20  0458 20  2142   WBC 10*3/mm3 11.27* 13.95* 10.78   HEMOGLOBIN g/dL 13.5 15.4 15.0   PLATELETS 10*3/mm3 174 199 235     Results from last 7 days   Lab Units 20  0211 20  0441 20  2142   SODIUM mmol/L 132* 132* 138   POTASSIUM mmol/L 3.8 4.4 3.7   CHLORIDE mmol/L 101 99 102   CO2 mmol/L 16.5* 16.6* 20.4*   BUN  mg/dL 35* 35* 25*   CREATININE mg/dL 1.80* 2.28* 1.74*   GLUCOSE mg/dL 77 88 180*   Estimated Creatinine Clearance: 24.8 mL/min (A) (by C-G formula based on SCr of 1.8 mg/dL (H)).  Results from last 7 days   Lab Units 02/03/20  0211 02/02/20 0441 01/31/20  2142   ALBUMIN g/dL 2.90* 3.20* 3.90   BILIRUBIN mg/dL 1.7* 2.0* 0.3   ALK PHOS U/L 50 38* 41   AST (SGOT) U/L 23 26 27   ALT (SGPT) U/L 17 22 16     Results from last 7 days   Lab Units 02/03/20  0211 02/02/20 0441 01/31/20  2142   CALCIUM mg/dL 7.6* 7.8* 9.0   ALBUMIN g/dL 2.90* 3.20* 3.90     Results from last 7 days   Lab Units 02/03/20  0531 02/03/20  0024   PROCALCITONIN ng/mL  --  12.61*   LACTATE mmol/L 1.3 2.5*       Coag   Results from last 7 days   Lab Units 02/01/20  0458   INR  1.16*     HbA1C   Lab Results   Component Value Date    HGBA1C 5.90 (H) 02/01/2020    HGBA1C 5.80 (H) 08/20/2019    HGBA1C 5.81 (H) 03/04/2019     Infection   Results from last 7 days   Lab Units 02/03/20  0024   PROCALCITONIN ng/mL 12.61*     Radiology(recent) Us Gallbladder    Result Date: 2/1/2020   1. No stones or sludge are seen within the gallbladder. Gallbladder appears mildly distended, with gallbladder wall thickening and edema noted. Appearance is nonspecific, and may be reactive, and may be related to the patient's pancreatitis. 2. The patient does have some mild dilatation of the common bile duct, measuring up to 9 mm. Some of this may be age related, but correlation with liver function tests is recommended. MRCP or ERCP could be considered for additional evaluation if these are abnormal.  This report was finalized on 2/1/2020 9:55 PM by Dr. Kathleen Montoya M.D.      Xr Chest 1 View    Result Date: 2/2/2020  Interval development of dense right basilar infiltrate, which may reflect pneumonia. There is also a small right pleural effusion.  This report was finalized on 2/2/2020 11:47 PM by Dr. Kathleen Montoya M.D.      Troponin T   Date Value Ref Range Status      01/31/2020 <0.010 0.000 - 0.030 ng/mL Final     No components found for: TSH;2      cholecalciferol 1,000 Units Oral Daily   clobetasol  Topical Q12H   enoxaparin 30 mg Subcutaneous Q24H   famotidine 20 mg Intravenous Q24H   ipratropium-albuterol 3 mL Nebulization TID - RT   levoFLOXacin 750 mg Intravenous Q48H   levothyroxine 50 mcg Oral Q AM   polyethylene glycol 17 g Oral Daily   pravastatin 40 mg Oral Nightly   sodium chloride 10 mL Intravenous Q12H       Pharmacy Consult - Pharmacy to dose     sodium chloride 75 mL/hr Last Rate: 100 mL/hr (02/03/20 0220)   Diet Clear Liquid      Assessment/Plan      Active Hospital Problems    Diagnosis  POA   • **Acute pancreatitis without infection or necrosis [K85.90]  Yes   • Pneumonia [J18.9]  Clinically Undetermined   • LINNEA (acute kidney injury) (CMS/HCC) [N17.9]  No   • Obesity (BMI 30-39.9) [E66.9]  Yes   • CKD (chronic kidney disease) stage 3, GFR 30-59 ml/min (CMS/HCC) [N18.3]  Yes   • Supraventricular tachycardia (CMS/HCC) [I47.1]  Yes   • Vitamin D deficiency [E55.9]  Yes   • Primary hypothyroidism [E03.9]  Yes   • Benign essential hypertension [I10]  Yes   • Hyperlipidemia [E78.5]  Yes   • Secondary polycythemia [D75.1]  Yes      Resolved Hospital Problems   No resolved problems to display.       Ms. Alarcon is a 86 y.o. former smoker with a history of HTN, HLD, and CKD stage III who presents with epigastric pain secondary to acute pancreatitis.     Acute pancreatitis  -IVF  -liquid diet  -PRN agents for pain  -Anti-emetics as needed  -Seen by GI. Etiology is unclear. Possibly related to lisinopril/HCTZ  -Mild duct thickening on U/S, potentially could get MRCP tomorrow now that renal fx is better but will watch her Cr another day     Acute on chronic CKD stage 3  -baseline 1.30  -IVFs  -BMP in AM     Hypertension  -Stable  -HCTZ and lisinopril held as stated above     PNA  Symptoms of dyspnea, wheezing, and suspect infiltrate on CXR  DC the azactam and vanc, start  levaquin  Follow cultures  Add bronchodilators and encouraging IS     VTE Prophylaxis - lovenox  Code Status - Full      Reviewed previous records  DW family      Amadou Das MD  [facility]  02/03/20  8:06 AM

## 2020-02-03 NOTE — THERAPY EVALUATION
Patient Name: Mandy Alarcon  : 1933    MRN: [id number]                              Today's Date: 2/3/2020       Admit Date: 2020    Visit Dx:     ICD-10-CM ICD-9-CM   1. Acute pancreatitis without infection or necrosis, unspecified pancreatitis type K85.90 577.0   2. Acute kidney injury superimposed on chronic kidney disease (CMS/HCC) N17.9 866.00    N18.9 585.9     Patient Active Problem List   Diagnosis   • Benign essential hypertension   • Claustrophobia   • Gout   • Hyperlipidemia   • Primary hypothyroidism   • Impaired fasting glucose   • Nocturnal hypoxemia   • Secondary polycythemia   • Vitamin D deficiency   • Therapeutic drug monitoring   • Family history of coronary artery disease   • Bilateral sensorineural hearing loss, wears hearing aids   • Multinodular goiter   • Supraventricular tachycardia (CMS/[facility])   • Morbidly obese (CMS/[facility])   • Acute pancreatitis without infection or necrosis   • Obesity (BMI 30-39.9)   • CKD (chronic kidney disease) stage 3, GFR 30-59 ml/min (CMS/[facility])   • LINNEA (acute kidney injury) (CMS/[facility]ly [facility])   • Pneumonia     Past Medical History:   Diagnosis Date   • Benign essential hypertension 10/28/2012    2012--treatment for hypertension begun.   • Bilateral sensorineural hearing loss, wears hearing aids 2017    Left is much worse than right.  Patient had multiple ear infections as a child.   • Chronic renal insufficiency, stage II (mild), 2016--creatinine 1.35 2016--creatinine 1.35.  Baseline creatinine approximately 1.3.   • Claustrophobia 2016    This patient has significant nocturnal hypoxemia and I think that she could benefit from nocturnal oxygen therapy. The exact etiology of her hypoxemia is not clear. She could possibly have obstructive sleep apnea but this is not documented. We cannot test this patient for sleep apnea due to the fact that she is severely claustrophobic. Patient was a former smoker and it is possibly that  COPD he is playing a role.   • Family history of coronary artery disease 2016    Patient's mother, father, 2 sisters and a brother all  from myocardial infarctions   • Gout 10/28/2012    2012--initial diagnosis and treatment of gout.   • Hyperlipidemia 10/28/2012    2012--treatment for hyperlipidemia begun.   • Impaired fasting glucose 10/28/2012    2012--initial diagnosis impaired fasting glucose.   • Morbidly obese (CMS/HCC) 3/11/2019   • Nocturnal hypoxemia 2014--patient did not receive nocturnal oxygen because of Medicare regulations.   05/15/2014--overnight oximetry revealed oxygen saturations less than 89% for 22 minutes and 40 seconds. Oxygen saturations less than or equal to 88% for 22 minutes and 40 seconds. Lowest oxygen saturation 83%. The longest continuous time with oxygen saturations less than or equal to 88% was 1 minute and 32 seconds.   2012--overnight oximetry revealed oxygen saturations less than 90% for one hour and 35 minutes. Oxygen saturations less than 89% 59 minutes. This patient has significant nocturnal hypoxemia and I think that she could benefit from nocturnal oxygen therapy. The exact etiology of her hypoxemia is not clear. She could possibly have obstructive sleep apnea but this is not documented. We cannot test this patient for sleep apnea due to the fact that she is severely claustrophobic. Patient was a former smoker and it is possibly that COPD he is playing a role.   • Primary hypothyroidism 2015--TSH remains elevated slightly at 4.92.  Given the overall clinical picture including the multinodular goiter, we will initiate levothyroxine 50 mcg/day and reassess in about 6 weeks.  2018--thyroid ultrasound reveals a multinodular thyroid with multiple subcentimeter nodules.  Only minimal increase in size of the largest nodule in the left lobe has occurred when compared    • Secondary polycythemia  8/2/2012 11/06/2014--patient did not receive nocturnal oxygen because of Medicare regulations.   05/15/2014--overnight oximetry revealed oxygen saturations less than 89% for 22 minutes and 40 seconds. Oxygen saturations less than or equal to 88% for 22 minutes and 40 seconds. Lowest oxygen saturation 83%. The longest continuous time with oxygen saturations less than or equal to 88% was 1 minute and 32 seconds.   08/02/2012--overnight oximetry revealed oxygen saturations less than 90% for one hour and 35 minutes. Oxygen saturations less than 89% 59 minutes. This patient has significant nocturnal hypoxemia and I think that she could benefit from nocturnal oxygen therapy. The exact etiology of her hypoxemia is not clear. She could possibly have obstructive sleep apnea but this is not documented. We cannot test this patient for sleep apnea due to the fact that she is severely claustrophobic. Patient was a former smoker and it is possibly that COPD he is playing a role.   • Vitamin D deficiency 5/23/2016     Past Surgical History:   Procedure Laterality Date   • OOPHORECTOMY      age 48   • RADICAL ABDOMINAL HYSTERECTOMY  48 years old    48 years of age. Uterine fibroid tumors with menorrhagia - no cancer     General Information     Row Name 02/03/20 1025          PT Evaluation Time/Intention    Document Type  evaluation  -PC     Mode of Treatment  physical therapy  -PC     Row Name 02/03/20 1025          General Information    Patient Profile Reviewed?  yes  -PC     Prior Level of Function  independent: per family, very active , bedroom upstairs, no assistive device  -PC     Existing Precautions/Restrictions  fall;oxygen therapy device and L/min pt is on 4L o2 in the room  -PC     Row Name 02/03/20 1025          Relationship/Environment    Lives With  child(adiel), adult  -PC     Row Name 02/03/20 1025          Resource/Environmental Concerns    Current Living Arrangements  home/apartment/condo  -PC     Row Name  02/03/20 1025          Cognitive Assessment/Intervention- PT/OT    Orientation Status (Cognition)  oriented x 4  -PC     Row Name 02/03/20 1025          Safety Issues, Functional Mobility    Impairments Affecting Function (Mobility)  pain;endurance/activity tolerance;shortness of breath  -PC       User Key  (r) = Recorded By, (t) = Taken By, (c) = Cosigned By    Initials Name Provider Type    PC Rae Ontiveros, PT Physical Therapist        Mobility     Row Name 02/03/20 1026          Bed Mobility Assessment/Treatment    Bed Mobility Assessment/Treatment  supine-sit;sit-supine  -PC     Supine-Sit [place] (Bed Mobility)  minimum assist (75% patient effort)  -PC     Sit-Supine [place] (Bed Mobility)  minimum assist (75% patient effort)  -PC     Row Name 02/03/20 1026          Sit-Stand Transfer    Sit-Stand [place] (Transfers)  minimum assist (75% patient effort)  -PC     Row Name 02/03/20 1026          Gait/Stairs Assessment/Training    [place] Level (Gait)  minimum assist (75% patient effort);1 person assist;1 person to manage equipment  -PC     Assistive Device (Gait)  -- HHA  -PC     Distance in Feet (Gait)  180 ft on 4L o2  -PC     Pattern (Gait)  step-through  -PC     Comment (Gait/Stairs)  mildly unsteady with inc SOA and fatigue  -PC       User Key  (r) = Recorded By, (t) = Taken By, (c) = Cosigned By    Initials Name Provider Type    PC Rae Ontiveros, PT Physical Therapist        Obj/Interventions     Row Name 02/03/20 1029          General ROM    GENERAL ROM COMMENTS  WFL  -PC     Row Name 02/03/20 1029          MMT (Manual Muscle Testing)    General MMT Comments  general weakness due to illness and inactivity  -PC       User Key  (r) = Recorded By, (t) = Taken By, (c) = Cosigned By    Initials Name Provider Type    PC Rae Ontiveros, PT Physical Therapist        Goals/Plan     Row Name 02/03/20 1032          Bed Mobility Goal 1 (PT)    Activity/Assistive Device (Bed Mobility Goal 1,  PT)  sit to supine;supine to sit  -PC     [place] Level/Cues Needed (Bed Mobility Goal 1, PT)  supervision required  -PC     Time Frame (Bed Mobility Goal 1, PT)  1 week  -PC     Row Name 02/03/20 1032          Transfer Goal 1 (PT)    Activity/Assistive Device (Transfer Goal 1, PT)  sit-to-stand/stand-to-sit  -PC     [place] Level/Cues Needed (Transfer Goal 1, PT)  supervision required  -PC     Time Frame (Transfer Goal 1, PT)  1 week  -PC     Row Name 02/03/20 1032          Gait Training Goal 1 (PT)    Activity/Assistive Device (Gait Training Goal 1, PT)  gait (walking locomotion)  -PC     [place] Level (Gait Training Goal 1, PT)  supervision required  -PC     Distance (Gait Goal 1, PT)  200 ft  -PC     Time Frame (Gait Training Goal 1, PT)  1 week  -PC       User Key  (r) = Recorded By, (t) = Taken By, (c) = Cosigned By    Initials Name Provider Type    PC Rae Ontiveros, PT Physical Therapist        Clinical Impression     Row Name 02/03/20 1029          Pain Assessment    Additional Documentation  Pain Scale: Numbers Pre/Post-Treatment (Group)  -PC     Row Name 02/03/20 1029          Pain Scale: Numbers Pre/Post-Treatment    Pain Scale: Numbers, Pretreatment  6/10  -PC     Pain Scale: Numbers, Post-Treatment  6/10  -PC     Pain Location - Side  Right  -PC     Pain Location  flank  -PC     Pain Intervention(s)  Medication (See MAR);Repositioned  -PC     Row Name 02/03/20 1029          Plan of Care Review    Plan of Care Reviewed With  patient  -PC     Outcome Summary  pt presents with general weakness and impaired functional mobility from baseline, prior to admission, pt lived with daughter and was independent very active, bedroom is upstairs and pt does not use assistive device, pt needed min assist to get out of bed and walk 180 ft on o2,  mild unsteadiness, pt will benefit from PT to address deficits and maximize potential for pt to go home at discharge  -PC     Row Name 02/03/20 1028           Physical Therapy Clinical Impression    Criteria for Skilled Interventions Met (PT Clinical Impression)  yes;treatment indicated  -PC     Rehab Potential (PT Clinical Summary)  good, to achieve stated therapy goals  -PC     Row Name 02/03/20 1029          Positioning and Restraints    Pre-Treatment Position  in bed  -PC     Post Treatment Position  bed  -PC     In Bed  supine;call light within reach;encouraged to call for assist;exit alarm on  -PC       User Key  (r) = Recorded By, (t) = Taken By, (c) = Cosigned By    Initials Name Provider Type    PC Rae Ontiveros, PT Physical Therapist        Outcome Measures     Row Name 02/03/20 1033          How much help from another person do you currently need...    Turning from your back to your side while in flat bed without using bedrails?  3  -PC     Moving from lying on back to sitting on the side of a flat bed without bedrails?  3  -PC     Moving to and from a bed to a chair (including a wheelchair)?  3  -PC     Standing up from a chair using your arms (e.g., wheelchair, bedside chair)?  3  -PC     Climbing 3-5 steps with a railing?  3  -PC     To walk in hospital room?  3  -PC     AM-PAC 6 Clicks Score (PT)  18  -PC     Row Name 02/03/20 1033          Functional Assessment    Outcome Measure Options  AM-PAC 6 Clicks Basic Mobility (PT)  -PC       User Key  (r) = Recorded By, (t) = Taken By, (c) = Cosigned By    Initials Name Provider Type    PC Rae Ontiveros, PT Physical Therapist          PT Recommendation and Plan  Planned Therapy Interventions (PT Eval): bed mobility training, gait training, strengthening, transfer training  Outcome Summary/Treatment Plan (PT)  Anticipated Discharge Disposition (PT): home with assist  Plan of Care Reviewed With: patient  Outcome Summary: pt presents with general weakness and impaired functional mobility from baseline, prior to admission, pt lived with daughter and was independent very active, bedroom is upstairs and pt does not  use assistive device, pt needed min assist to get out of bed and walk 180 ft on o2,  mild unsteadiness, pt will benefit from PT to address deficits and maximize potential for pt to go home at discharge     Time Calculation:   PT Charges     Row Name 02/03/20 1035             Time Calculation    Start Time  0932  -PC      Stop Time  1003  -PC      Time Calculation (min)  31 min  -PC      PT Received On  02/03/20  -PC      PT - Next Appointment  02/04/20  -PC      PT Goal Re-Cert Due Date  02/10/20  -PC        User Key  (r) = Recorded By, (t) = Taken By, (c) = Cosigned By    Initials Name Provider Type    PC Rae Ontiveros, PT Physical Therapist        Therapy Charges for Today     Code Description Service Date Service Provider Modifiers Qty    10391385540 HC PT EVAL MOD COMPLEXITY 2 2/3/2020 Rae Ontiveros, PT GP 1    79304384525 HC PT THER PROC EA 15 MIN 2/3/2020 Rae Ontiveros, PT GP 1    11195969405 HC PT THER SUPP EA 15 MIN 2/3/2020 Rae Ontiveros, PT GP 1          PT G-Codes  Outcome Measure Options: AM-PAC 6 Clicks Basic Mobility (PT)  AM-PAC 6 Clicks Score (PT): 18    Rae Ontiveros PT  2/3/2020

## 2020-02-04 ENCOUNTER — APPOINTMENT (OUTPATIENT)
Dept: GENERAL RADIOLOGY | Facility: HOSPITAL | Age: 85
End: 2020-02-04

## 2020-02-04 LAB
ALBUMIN SERPL-MCNC: 2.5 G/DL (ref 3.5–5.2)
ALBUMIN/GLOB SERPL: 0.9 G/DL
ALP SERPL-CCNC: 46 U/L (ref 39–117)
ALT SERPL W P-5'-P-CCNC: 18 U/L (ref 1–33)
ANION GAP SERPL CALCULATED.3IONS-SCNC: 14 MMOL/L (ref 5–15)
AST SERPL-CCNC: 22 U/L (ref 1–32)
BILIRUB SERPL-MCNC: 0.7 MG/DL (ref 0.2–1.2)
BUN BLD-MCNC: 38 MG/DL (ref 8–23)
BUN/CREAT SERPL: 21.8 (ref 7–25)
CALCIUM SPEC-SCNC: 7.3 MG/DL (ref 8.6–10.5)
CHLORIDE SERPL-SCNC: 101 MMOL/L (ref 98–107)
CHLORIDE UR-SCNC: 79 MMOL/L
CO2 SERPL-SCNC: 18 MMOL/L (ref 22–29)
CREAT BLD-MCNC: 1.74 MG/DL (ref 0.57–1)
CREAT UR-MCNC: 77.9 MG/DL
DEPRECATED RDW RBC AUTO: 44.5 FL (ref 37–54)
ERYTHROCYTE [DISTWIDTH] IN BLOOD BY AUTOMATED COUNT: 13.8 % (ref 12.3–15.4)
GFR SERPL CREATININE-BSD FRML MDRD: 28 ML/MIN/1.73
GLOBULIN UR ELPH-MCNC: 2.8 GM/DL
GLUCOSE BLD-MCNC: 82 MG/DL (ref 65–99)
HCT VFR BLD AUTO: 37.5 % (ref 34–46.6)
HGB BLD-MCNC: 12.7 G/DL (ref 12–15.9)
LIPASE SERPL-CCNC: 60 U/L (ref 13–60)
LYMPHOCYTES # BLD MANUAL: 0.23 10*3/MM3 (ref 0.7–3.1)
LYMPHOCYTES NFR BLD MANUAL: 2 % (ref 19.6–45.3)
LYMPHOCYTES NFR BLD MANUAL: 8 % (ref 5–12)
MCH RBC QN AUTO: 29.8 PG (ref 26.6–33)
MCHC RBC AUTO-ENTMCNC: 33.9 G/DL (ref 31.5–35.7)
MCV RBC AUTO: 88 FL (ref 79–97)
MONOCYTES # BLD AUTO: 0.9 10*3/MM3 (ref 0.1–0.9)
NEUTROPHILS # BLD AUTO: 10.16 10*3/MM3 (ref 1.7–7)
NEUTROPHILS NFR BLD MANUAL: 90 % (ref 42.7–76)
PLAT MORPH BLD: NORMAL
PLATELET # BLD AUTO: 145 10*3/MM3 (ref 140–450)
PMV BLD AUTO: 10 FL (ref 6–12)
POTASSIUM BLD-SCNC: 4 MMOL/L (ref 3.5–5.2)
PROT SERPL-MCNC: 5.3 G/DL (ref 6–8.5)
PROT UR-MCNC: 71 MG/DL
PROT/CREAT UR: 911.4 MG/G CREA (ref 0–200)
RBC # BLD AUTO: 4.26 10*6/MM3 (ref 3.77–5.28)
RBC MORPH BLD: NORMAL
SODIUM BLD-SCNC: 133 MMOL/L (ref 136–145)
SODIUM UR-SCNC: 76 MMOL/L
WBC MORPH BLD: NORMAL
WBC NRBC COR # BLD: 11.29 10*3/MM3 (ref 3.4–10.8)

## 2020-02-04 PROCEDURE — 25010000002 HYDROMORPHONE 1 MG/ML SOLUTION: Performed by: INTERNAL MEDICINE

## 2020-02-04 PROCEDURE — 25010000002 HYDROMORPHONE PER 4 MG: Performed by: INTERNAL MEDICINE

## 2020-02-04 PROCEDURE — 82436 ASSAY OF URINE CHLORIDE: CPT | Performed by: INTERNAL MEDICINE

## 2020-02-04 PROCEDURE — 25010000002 ENOXAPARIN PER 10 MG: Performed by: INTERNAL MEDICINE

## 2020-02-04 PROCEDURE — 94799 UNLISTED PULMONARY SVC/PX: CPT

## 2020-02-04 PROCEDURE — 80053 COMPREHEN METABOLIC PANEL: CPT | Performed by: INTERNAL MEDICINE

## 2020-02-04 PROCEDURE — 85007 BL SMEAR W/DIFF WBC COUNT: CPT | Performed by: INTERNAL MEDICINE

## 2020-02-04 PROCEDURE — 99232 SBSQ HOSP IP/OBS MODERATE 35: CPT | Performed by: INTERNAL MEDICINE

## 2020-02-04 PROCEDURE — 84300 ASSAY OF URINE SODIUM: CPT | Performed by: INTERNAL MEDICINE

## 2020-02-04 PROCEDURE — 93005 ELECTROCARDIOGRAM TRACING: CPT | Performed by: INTERNAL MEDICINE

## 2020-02-04 PROCEDURE — 83690 ASSAY OF LIPASE: CPT | Performed by: INTERNAL MEDICINE

## 2020-02-04 PROCEDURE — 71046 X-RAY EXAM CHEST 2 VIEWS: CPT

## 2020-02-04 PROCEDURE — 84156 ASSAY OF PROTEIN URINE: CPT | Performed by: INTERNAL MEDICINE

## 2020-02-04 PROCEDURE — 82570 ASSAY OF URINE CREATININE: CPT | Performed by: INTERNAL MEDICINE

## 2020-02-04 PROCEDURE — 85025 COMPLETE CBC W/AUTO DIFF WBC: CPT | Performed by: INTERNAL MEDICINE

## 2020-02-04 PROCEDURE — 93010 ELECTROCARDIOGRAM REPORT: CPT | Performed by: INTERNAL MEDICINE

## 2020-02-04 PROCEDURE — 97110 THERAPEUTIC EXERCISES: CPT

## 2020-02-04 RX ORDER — CYCLOBENZAPRINE HCL 10 MG
5 TABLET ORAL 3 TIMES DAILY PRN
Status: DISCONTINUED | OUTPATIENT
Start: 2020-02-04 | End: 2020-02-11 | Stop reason: HOSPADM

## 2020-02-04 RX ORDER — METOPROLOL SUCCINATE 50 MG/1
50 TABLET, EXTENDED RELEASE ORAL ONCE
Status: COMPLETED | OUTPATIENT
Start: 2020-02-04 | End: 2020-02-04

## 2020-02-04 RX ADMIN — HYDROCODONE BITARTRATE AND ACETAMINOPHEN 1 TABLET: 7.5; 325 TABLET ORAL at 10:15

## 2020-02-04 RX ADMIN — POLYETHYLENE GLYCOL 3350 17 G: 17 POWDER, FOR SOLUTION ORAL at 08:11

## 2020-02-04 RX ADMIN — HYDROCODONE BITARTRATE AND ACETAMINOPHEN 1 TABLET: 7.5; 325 TABLET ORAL at 20:22

## 2020-02-04 RX ADMIN — METOPROLOL TARTRATE 25 MG: 25 TABLET ORAL at 23:19

## 2020-02-04 RX ADMIN — CLOBETASOL PROPIONATE: 0.5 CREAM TOPICAL at 08:11

## 2020-02-04 RX ADMIN — HYDROCODONE BITARTRATE AND ACETAMINOPHEN 1 TABLET: 7.5; 325 TABLET ORAL at 05:47

## 2020-02-04 RX ADMIN — SODIUM CHLORIDE, PRESERVATIVE FREE 10 ML: 5 INJECTION INTRAVENOUS at 22:00

## 2020-02-04 RX ADMIN — IPRATROPIUM BROMIDE AND ALBUTEROL SULFATE 3 ML: 2.5; .5 SOLUTION RESPIRATORY (INHALATION) at 14:48

## 2020-02-04 RX ADMIN — PRAVASTATIN SODIUM 40 MG: 40 TABLET ORAL at 20:22

## 2020-02-04 RX ADMIN — HYDROCODONE BITARTRATE AND ACETAMINOPHEN 1 TABLET: 7.5; 325 TABLET ORAL at 00:02

## 2020-02-04 RX ADMIN — ENOXAPARIN SODIUM 30 MG: 30 INJECTION SUBCUTANEOUS at 08:11

## 2020-02-04 RX ADMIN — SODIUM CHLORIDE 75 ML/HR: 9 INJECTION, SOLUTION INTRAVENOUS at 19:22

## 2020-02-04 RX ADMIN — LEVOTHYROXINE SODIUM 50 MCG: 50 TABLET ORAL at 05:47

## 2020-02-04 RX ADMIN — HYDROMORPHONE HYDROCHLORIDE 0.5 MG: 10 INJECTION INTRAMUSCULAR; INTRAVENOUS; SUBCUTANEOUS at 02:02

## 2020-02-04 RX ADMIN — FAMOTIDINE 20 MG: 10 INJECTION INTRAVENOUS at 05:47

## 2020-02-04 RX ADMIN — IPRATROPIUM BROMIDE AND ALBUTEROL SULFATE 3 ML: 2.5; .5 SOLUTION RESPIRATORY (INHALATION) at 07:47

## 2020-02-04 RX ADMIN — VITAMIN D, TAB 1000IU (100/BT) 1000 UNITS: 25 TAB at 08:11

## 2020-02-04 RX ADMIN — HYDROMORPHONE HYDROCHLORIDE 0.5 MG: 10 INJECTION INTRAMUSCULAR; INTRAVENOUS; SUBCUTANEOUS at 21:58

## 2020-02-04 RX ADMIN — HYDROCODONE BITARTRATE AND ACETAMINOPHEN 1 TABLET: 7.5; 325 TABLET ORAL at 16:07

## 2020-02-04 RX ADMIN — CLOBETASOL PROPIONATE: 0.5 CREAM TOPICAL at 20:23

## 2020-02-04 RX ADMIN — METOPROLOL SUCCINATE 50 MG: 50 TABLET, EXTENDED RELEASE ORAL at 20:22

## 2020-02-04 RX ADMIN — CYCLOBENZAPRINE 5 MG: 10 TABLET, FILM COATED ORAL at 11:15

## 2020-02-04 NOTE — CONSULTS
Referring Provider: Amadou Das MD  Reason for Consultation: Evaluation of patient with acute on chronic kidney disease    Subjective     Chief complaint   Chief Complaint   Patient presents with   • Chest Pain       History of present illness:    This is a very pleasant 86-year-old  female was admitted on 1/31/2020 with severe abdominal and back pain also chest pain was found to have acute pancreatitis and treated with IV fluid on admission creatinine was 1.74, increased up to 2.28 but today 1.74 again.  Her baseline creatinine is 1.2-1.3 based on prior lab studies between 2018 and mid 2019.  Patient has history of hypertension has been treated with lisinopril and hydrochlorothiazide, history of hypothyroidism, severe hearing loss, she has history of gout and treated for dyslipidemia    At present she is hard of hearing, denies any chest pain or shortness of air, her abdominal pain has improved, no nausea or vomiting, no constipation diarrhea, no dysuria or gross hematuria, no lightheadedness.  Her electrolytes within acceptable range other than having total CO2 is 18 and her anion gap slightly elevated is 14.  Her albumin is 2.5.  Past Medical History:   Diagnosis Date   • Benign essential hypertension 10/28/2012    October 2012--treatment for hypertension begun.   • Bilateral sensorineural hearing loss, wears hearing aids 6/14/2017    Left is much worse than right.  Patient had multiple ear infections as a child.   • Chronic renal insufficiency, stage II (mild), 05/18/2016--creatinine 1.35 4/28/2016 05/18/2016--creatinine 1.35.  Baseline creatinine approximately 1.3.   • Claustrophobia 4/28/2016    This patient has significant nocturnal hypoxemia and I think that she could benefit from nocturnal oxygen therapy. The exact etiology of her hypoxemia is not clear. She could possibly have obstructive sleep apnea but this is not documented. We cannot test this patient for sleep apnea due to the  fact that she is severely claustrophobic. Patient was a former smoker and it is possibly that COPD he is playing a role.   • Family history of coronary artery disease 2016    Patient's mother, father, 2 sisters and a brother all  from myocardial infarctions   • Gout 10/28/2012    2012--initial diagnosis and treatment of gout.   • Hyperlipidemia 10/28/2012    2012--treatment for hyperlipidemia begun.   • Impaired fasting glucose 10/28/2012    2012--initial diagnosis impaired fasting glucose.   • Morbidly obese (CMS/HCC) 3/11/2019   • Nocturnal hypoxemia 2014--patient did not receive nocturnal oxygen because of Medicare regulations.   05/15/2014--overnight oximetry revealed oxygen saturations less than 89% for 22 minutes and 40 seconds. Oxygen saturations less than or equal to 88% for 22 minutes and 40 seconds. Lowest oxygen saturation 83%. The longest continuous time with oxygen saturations less than or equal to 88% was 1 minute and 32 seconds.   2012--overnight oximetry revealed oxygen saturations less than 90% for one hour and 35 minutes. Oxygen saturations less than 89% 59 minutes. This patient has significant nocturnal hypoxemia and I think that she could benefit from nocturnal oxygen therapy. The exact etiology of her hypoxemia is not clear. She could possibly have obstructive sleep apnea but this is not documented. We cannot test this patient for sleep apnea due to the fact that she is severely claustrophobic. Patient was a former smoker and it is possibly that COPD he is playing a role.   • Primary hypothyroidism 2015--TSH remains elevated slightly at 4.92.  Given the overall clinical picture including the multinodular goiter, we will initiate levothyroxine 50 mcg/day and reassess in about 6 weeks.  2018--thyroid ultrasound reveals a multinodular thyroid with multiple subcentimeter nodules.  Only minimal increase in size  of the largest nodule in the left lobe has occurred when compared    • Secondary polycythemia 2014--patient did not receive nocturnal oxygen because of Medicare regulations.   05/15/2014--overnight oximetry revealed oxygen saturations less than 89% for 22 minutes and 40 seconds. Oxygen saturations less than or equal to 88% for 22 minutes and 40 seconds. Lowest oxygen saturation 83%. The longest continuous time with oxygen saturations less than or equal to 88% was 1 minute and 32 seconds.   2012--overnight oximetry revealed oxygen saturations less than 90% for one hour and 35 minutes. Oxygen saturations less than 89% 59 minutes. This patient has significant nocturnal hypoxemia and I think that she could benefit from nocturnal oxygen therapy. The exact etiology of her hypoxemia is not clear. She could possibly have obstructive sleep apnea but this is not documented. We cannot test this patient for sleep apnea due to the fact that she is severely claustrophobic. Patient was a former smoker and it is possibly that COPD he is playing a role.   • Vitamin D deficiency 2016     Past Surgical History:   Procedure Laterality Date   • OOPHORECTOMY      age 48   • RADICAL ABDOMINAL HYSTERECTOMY  48 years old    48 years of age. Uterine fibroid tumors with menorrhagia - no cancer     Family History   Problem Relation Age of Onset   • Heart disease Mother         Ischemic.  from coronary artery disease.   • Heart disease Father         Ischemic.  from coronary artery disease.   • Heart disease Sister         Ischemic.  from coronary artery disease.   • Heart disease Brother         Ischemic.  from coronary artery disease.   • Heart disease Sister         Ischemic.  from coronary artery disease.   • Breast cancer Daughter        Social History     Tobacco Use   • Smoking status: Former Smoker     Packs/day: 0.50     Start date: 1946     Last attempt to quit: 1954     Years  "since quittin.1   • Smokeless tobacco: Never Used   • Tobacco comment: Stopped drinking at age 30.   Substance Use Topics   • Alcohol use: No   • Drug use: No     Medications Prior to Admission   Medication Sig Dispense Refill Last Dose   • allopurinol (ZYLOPRIM) 300 MG tablet Take 1 p.o. daily for gout 90 tablet 3    • Cholecalciferol (VITAMIN D) 2000 UNITS tablet Take 2 tablets by mouth daily.   Taking   • clobetasol (TEMOVATE) 0.05 % ointment Apply  topically to the appropriate area as directed 2 (Two) Times a Day. 15 g 0 Taking   • levothyroxine (SYNTHROID, LEVOTHROID) 50 MCG tablet Take 1 p.o. every morning for thyroid 90 tablet 3    • lisinopril-hydrochlorothiazide (PRINZIDE,ZESTORETIC) 20-12.5 MG per tablet TAKE ONE TABLET BY MOUTH EVERY MORNING FOR HIGH BLOOD PRESSURE AND LEG SWELLING 90 tablet 3    • pravastatin (PRAVACHOL) 40 MG tablet Take 1 p.o. daily for high cholesterol 90 tablet 3    • Biotin 10 MG tablet Take 1 tablet by mouth Daily.   Not Taking   • halobetasol (ULTRAVATE) 0.05 % ointment Apply pea-size amount daily to affected area for 2 months. Then apply every other day for 2 weeks then twice weekly 15 g 2 Taking     Allergies:  Cefaclor and Sulfa antibiotics    Review of Systems  14 points review of system was performed and it was negative other than what noted above in the HPI    Objective     Vital Signs  Temp:  [96.6 °F (35.9 °C)-97.8 °F (36.6 °C)] 97.2 °F (36.2 °C)  Heart Rate:  [] 128  Resp:  [16-20] 20  BP: (101-151)/(59-82) 120/59    Flowsheet Rows      First Filed Value   Admission Height  160 cm (63\") Documented at 2020   Admission Weight  99.5 kg (219 lb 6.4 oz) Documented at 2020           I/O this shift:  In: 120 [P.O.:120]  Out: 700 [Urine:700]  I/O last 3 completed shifts:  In: 3836.9 [P.O.:1020; I.V.:2116.9; IV Piggyback:700]  Out: 2700 [Urine:2700]    Intake/Output Summary (Last 24 hours) at 2020 1250  Last data filed at 2020 1238  Gross " per 24 hour   Intake 360 ml   Output 2400 ml   Net -2040 ml       Physical Exam:  General Appearance: alert, oriented x 3, no acute distress, obese, appears to be chronically ill  Skin: warm and dry  HEENT: pupils round and reactive to light, oral mucosa dry, nonicteric sclerae,   Neck: supple, no JVD, trachea midline, I did not appreciate JVD  Lungs: CTA, unlabored breathing effort  Heart: RRR, normal S1 and S2, no S3, no rub  Abdomen: soft,  present bowel sounds to auscultation, the abdomen is protuberant, she has significant epigastric tenderness  : no palpable bladder, no palpable bladder  Extremities: no edema, cyanosis or clubbing, the patient has been no pitting edema.  Joints: No significant deformities noted, no crepitation of the knees or the ankles  Lymphatics: No cervical or supraclavicular lymphadenopathy.  Neuro: normal speech and mental status, hard of hearing      Results Review:  Results for orders placed or performed during the hospital encounter of 01/31/20   Blood Culture - Blood, Arm, Left   Result Value Ref Range    Blood Culture No growth at 24 hours    Blood Culture - Blood, Hand, Left   Result Value Ref Range    Blood Culture No growth at 24 hours    MRSA Screen, PCR - Swab, Nares   Result Value Ref Range    MRSA PCR No MRSA Detected No MRSA Detected   Comprehensive Metabolic Panel   Result Value Ref Range    Glucose 180 (H) 65 - 99 mg/dL    BUN 25 (H) 8 - 23 mg/dL    Creatinine 1.74 (H) 0.57 - 1.00 mg/dL    Sodium 138 136 - 145 mmol/L    Potassium 3.7 3.5 - 5.2 mmol/L    Chloride 102 98 - 107 mmol/L    CO2 20.4 (L) 22.0 - 29.0 mmol/L    Calcium 9.0 8.6 - 10.5 mg/dL    Total Protein 7.0 6.0 - 8.5 g/dL    Albumin 3.90 3.50 - 5.20 g/dL    ALT (SGPT) 16 1 - 33 U/L    AST (SGOT) 27 1 - 32 U/L    Alkaline Phosphatase 41 39 - 117 U/L    Total Bilirubin 0.3 0.2 - 1.2 mg/dL    eGFR Non African Amer 28 (L) >60 mL/min/1.73    Globulin 3.1 gm/dL    A/G Ratio 1.3 g/dL    BUN/Creatinine Ratio 14.4 7.0  - 25.0    Anion Gap 15.6 (H) 5.0 - 15.0 mmol/L   Lipase   Result Value Ref Range    Lipase >3,000 (H) 13 - 60 U/L   BNP   Result Value Ref Range    proBNP 667.0 5.0-1,800.0 pg/mL   Troponin   Result Value Ref Range    Troponin T <0.010 0.000 - 0.030 ng/mL   CBC Auto Differential   Result Value Ref Range    WBC 10.78 3.40 - 10.80 10*3/mm3    RBC 5.01 3.77 - 5.28 10*6/mm3    Hemoglobin 15.0 12.0 - 15.9 g/dL    Hematocrit 45.8 34.0 - 46.6 %    MCV 91.4 79.0 - 97.0 fL    MCH 29.9 26.6 - 33.0 pg    MCHC 32.8 31.5 - 35.7 g/dL    RDW 14.3 12.3 - 15.4 %    RDW-SD 47.1 37.0 - 54.0 fl    MPV 10.2 6.0 - 12.0 fL    Platelets 235 140 - 450 10*3/mm3    Neutrophil % 65.1 42.7 - 76.0 %    Lymphocyte % 24.0 19.6 - 45.3 %    Monocyte % 7.0 5.0 - 12.0 %    Eosinophil % 2.8 0.3 - 6.2 %    Basophil % 0.5 0.0 - 1.5 %    Immature Grans % 0.6 (H) 0.0 - 0.5 %    Neutrophils, Absolute 7.03 (H) 1.70 - 7.00 10*3/mm3    Lymphocytes, Absolute 2.59 0.70 - 3.10 10*3/mm3    Monocytes, Absolute 0.75 0.10 - 0.90 10*3/mm3    Eosinophils, Absolute 0.30 0.00 - 0.40 10*3/mm3    Basophils, Absolute 0.05 0.00 - 0.20 10*3/mm3    Immature Grans, Absolute 0.06 (H) 0.00 - 0.05 10*3/mm3    nRBC 0.0 0.0 - 0.2 /100 WBC   Lipid Panel   Result Value Ref Range    Total Cholesterol 107 0 - 200 mg/dL    Triglycerides 38 0 - 150 mg/dL    HDL Cholesterol 57 40 - 60 mg/dL    LDL Cholesterol  42 0 - 100 mg/dL    VLDL Cholesterol 7.6 5 - 40 mg/dL    LDL/HDL Ratio 0.74    CBC Auto Differential   Result Value Ref Range    WBC 13.95 (H) 3.40 - 10.80 10*3/mm3    RBC 5.11 3.77 - 5.28 10*6/mm3    Hemoglobin 15.4 12.0 - 15.9 g/dL    Hematocrit 46.2 34.0 - 46.6 %    MCV 90.4 79.0 - 97.0 fL    MCH 30.1 26.6 - 33.0 pg    MCHC 33.3 31.5 - 35.7 g/dL    RDW 14.0 12.3 - 15.4 %    RDW-SD 46.1 37.0 - 54.0 fl    MPV 9.8 6.0 - 12.0 fL    Platelets 199 140 - 450 10*3/mm3    Neutrophil % 88.8 (H) 42.7 - 76.0 %    Lymphocyte % 4.0 (L) 19.6 - 45.3 %    Monocyte % 6.5 5.0 - 12.0 %    Eosinophil  % 0.0 (L) 0.3 - 6.2 %    Basophil % 0.3 0.0 - 1.5 %    Immature Grans % 0.4 0.0 - 0.5 %    Neutrophils, Absolute 12.40 (H) 1.70 - 7.00 10*3/mm3    Lymphocytes, Absolute 0.56 (L) 0.70 - 3.10 10*3/mm3    Monocytes, Absolute 0.90 0.10 - 0.90 10*3/mm3    Eosinophils, Absolute 0.00 0.00 - 0.40 10*3/mm3    Basophils, Absolute 0.04 0.00 - 0.20 10*3/mm3    Immature Grans, Absolute 0.05 0.00 - 0.05 10*3/mm3    nRBC 0.0 0.0 - 0.2 /100 WBC   Protime-INR   Result Value Ref Range    Protime 14.5 (H) 11.7 - 14.2 Seconds    INR 1.16 (H) 0.90 - 1.10   Hemoglobin A1c   Result Value Ref Range    Hemoglobin A1C 5.90 (H) 4.80 - 5.60 %   Comprehensive Metabolic Panel   Result Value Ref Range    Glucose 88 65 - 99 mg/dL    BUN 35 (H) 8 - 23 mg/dL    Creatinine 2.28 (H) 0.57 - 1.00 mg/dL    Sodium 132 (L) 136 - 145 mmol/L    Potassium 4.4 3.5 - 5.2 mmol/L    Chloride 99 98 - 107 mmol/L    CO2 16.6 (L) 22.0 - 29.0 mmol/L    Calcium 7.8 (L) 8.6 - 10.5 mg/dL    Total Protein 6.0 6.0 - 8.5 g/dL    Albumin 3.20 (L) 3.50 - 5.20 g/dL    ALT (SGPT) 22 1 - 33 U/L    AST (SGOT) 26 1 - 32 U/L    Alkaline Phosphatase 38 (L) 39 - 117 U/L    Total Bilirubin 2.0 (H) 0.2 - 1.2 mg/dL    eGFR Non African Amer 20 (L) >60 mL/min/1.73    Globulin 2.8 gm/dL    A/G Ratio 1.1 g/dL    BUN/Creatinine Ratio 15.4 7.0 - 25.0    Anion Gap 16.4 (H) 5.0 - 15.0 mmol/L   Lipase   Result Value Ref Range    Lipase 417 (H) 13 - 60 U/L   Comprehensive Metabolic Panel   Result Value Ref Range    Glucose 77 65 - 99 mg/dL    BUN 35 (H) 8 - 23 mg/dL    Creatinine 1.80 (H) 0.57 - 1.00 mg/dL    Sodium 132 (L) 136 - 145 mmol/L    Potassium 3.8 3.5 - 5.2 mmol/L    Chloride 101 98 - 107 mmol/L    CO2 16.5 (L) 22.0 - 29.0 mmol/L    Calcium 7.6 (L) 8.6 - 10.5 mg/dL    Total Protein 5.7 (L) 6.0 - 8.5 g/dL    Albumin 2.90 (L) 3.50 - 5.20 g/dL    ALT (SGPT) 17 1 - 33 U/L    AST (SGOT) 23 1 - 32 U/L    Alkaline Phosphatase 50 39 - 117 U/L    Total Bilirubin 1.7 (H) 0.2 - 1.2 mg/dL    eGFR  Non African Amer 27 (L) >60 mL/min/1.73    Globulin 2.8 gm/dL    A/G Ratio 1.0 g/dL    BUN/Creatinine Ratio 19.4 7.0 - 25.0    Anion Gap 14.5 5.0 - 15.0 mmol/L   Lipase   Result Value Ref Range    Lipase 181 (H) 13 - 60 U/L   CBC Auto Differential   Result Value Ref Range    WBC 11.27 (H) 3.40 - 10.80 10*3/mm3    RBC 4.45 3.77 - 5.28 10*6/mm3    Hemoglobin 13.5 12.0 - 15.9 g/dL    Hematocrit 39.6 34.0 - 46.6 %    MCV 89.0 79.0 - 97.0 fL    MCH 30.3 26.6 - 33.0 pg    MCHC 34.1 31.5 - 35.7 g/dL    RDW 14.1 12.3 - 15.4 %    RDW-SD 45.3 37.0 - 54.0 fl    MPV 10.0 6.0 - 12.0 fL    Platelets 174 140 - 450 10*3/mm3   Procalcitonin   Result Value Ref Range    Procalcitonin 12.61 (H) 0.10 - 0.25 ng/mL   Lactic Acid, Plasma   Result Value Ref Range    Lactate 2.5 (C) 0.5 - 2.0 mmol/L   Lactic Acid, Reflex Timer (This will reflex a repeat order 3-3:15 hours after ordered.)   Result Value Ref Range    Extra Tube Hold for add-ons.    Lactic Acid, Reflex   Result Value Ref Range    Lactate 1.3 0.5 - 2.0 mmol/L   Comprehensive Metabolic Panel   Result Value Ref Range    Glucose 82 65 - 99 mg/dL    BUN 38 (H) 8 - 23 mg/dL    Creatinine 1.74 (H) 0.57 - 1.00 mg/dL    Sodium 133 (L) 136 - 145 mmol/L    Potassium 4.0 3.5 - 5.2 mmol/L    Chloride 101 98 - 107 mmol/L    CO2 18.0 (L) 22.0 - 29.0 mmol/L    Calcium 7.3 (L) 8.6 - 10.5 mg/dL    Total Protein 5.3 (L) 6.0 - 8.5 g/dL    Albumin 2.50 (L) 3.50 - 5.20 g/dL    ALT (SGPT) 18 1 - 33 U/L    AST (SGOT) 22 1 - 32 U/L    Alkaline Phosphatase 46 39 - 117 U/L    Total Bilirubin 0.7 0.2 - 1.2 mg/dL    eGFR Non African Amer 28 (L) >60 mL/min/1.73    Globulin 2.8 gm/dL    A/G Ratio 0.9 g/dL    BUN/Creatinine Ratio 21.8 7.0 - 25.0    Anion Gap 14.0 5.0 - 15.0 mmol/L   Lipase   Result Value Ref Range    Lipase 60 13 - 60 U/L   CBC Auto Differential   Result Value Ref Range    WBC 11.29 (H) 3.40 - 10.80 10*3/mm3    RBC 4.26 3.77 - 5.28 10*6/mm3    Hemoglobin 12.7 12.0 - 15.9 g/dL    Hematocrit  37.5 34.0 - 46.6 %    MCV 88.0 79.0 - 97.0 fL    MCH 29.8 26.6 - 33.0 pg    MCHC 33.9 31.5 - 35.7 g/dL    RDW 13.8 12.3 - 15.4 %    RDW-SD 44.5 37.0 - 54.0 fl    MPV 10.0 6.0 - 12.0 fL    Platelets 145 140 - 450 10*3/mm3   Manual Differential   Result Value Ref Range    Neutrophil % 95.0 (H) 42.7 - 76.0 %    Lymphocyte % 2.0 (L) 19.6 - 45.3 %    Monocyte % 3.0 (L) 5.0 - 12.0 %    Neutrophils Absolute 10.71 (H) 1.70 - 7.00 10*3/mm3    Lymphocytes Absolute 0.23 (L) 0.70 - 3.10 10*3/mm3    Monocytes Absolute 0.34 0.10 - 0.90 10*3/mm3    RBC Morphology Normal Normal    WBC Morphology Normal Normal    Platelet Morphology Normal Normal   Manual Differential   Result Value Ref Range    Neutrophil % 90.0 (H) 42.7 - 76.0 %    Lymphocyte % 2.0 (L) 19.6 - 45.3 %    Monocyte % 8.0 5.0 - 12.0 %    Neutrophils Absolute 10.16 (H) 1.70 - 7.00 10*3/mm3    Lymphocytes Absolute 0.23 (L) 0.70 - 3.10 10*3/mm3    Monocytes Absolute 0.90 0.10 - 0.90 10*3/mm3    RBC Morphology Normal Normal    WBC Morphology Normal Normal    Platelet Morphology Normal Normal     Imaging Results (Last 72 Hours)     Procedure Component Value Units Date/Time    XR Chest PA & Lateral [[id number]] Collected:  02/04/20 1226     Updated:  02/04/20 1235    Narrative:       Chest radiograph     HISTORY:Pneumonia, hypoxia     TECHNIQUE: Two AP and lateral radiographs     COMPARISON:Multiple chest radiographs dating back to 01/31/2020       Impression:       FINDINGS AND IMPRESSION:  Lungs are hypoinflated with asymmetric elevation right hemidiaphragm and  bibasilar pulmonary opacification, right greater than left, as before.  No pneumothorax is seen. Heart size accentuated by low lung volumes.  Small right pleural effusion posteriorly is suspected. Findings  represent atelectasis versus pneumonia with parapneumonic effusion in  the appropriate clinical context and correlation with patient history is  recommended.     This report was finalized on 2/4/2020 12:32 PM by  Dr. Carlitos Willis M.D.       XR Chest 1 View [[id number]] Collected:  02/02/20 2346     Updated:  02/02/20 2351    Narrative:       PORTABLE CHEST RADIOGRAPH     HISTORY: Shortness of air and wheezing     COMPARISON: 10/30 first 2020     FINDINGS:  Cardiac silhouette is stable. The patient has developed consolidation at  the right lung base. Pneumonia is not excluded. There is also a small  right pleural effusion. No pneumothorax is seen. There is left basilar  atelectasis.       Impression:       Interval development of dense right basilar infiltrate, which may  reflect pneumonia. There is also a small right pleural effusion.     This report was finalized on 2/2/2020 11:47 PM by Dr. Kathleen Montoya M.D.       US Gallbladder [[id number]] Collected:  02/01/20 2148     Updated:  02/01/20 2159    Narrative:       GALLBLADDER ULTRASOUND     HISTORY: Pancreatitis     COMPARISON: 01/31/2020     TECHNIQUE: Grayscale and color Doppler sonographic images were obtained  through the right upper quadrant     FINDINGS:  There is limited visualization of the pancreas. Yesterday's imaging  suggested changes of acute pancreatitis. The patient is noted to have  some mild dilatation of the common bile duct, measuring up to 9 mm. No  focal hepatic lesions are seen. There is a trace amount of ascites seen  adjacent to the liver. This may be inflammatory, given the presence of  pancreatitis. The patient's gallbladder appears mildly distended. No  stones or sludge are seen within the gallbladder, although there is some  gallbladder wall edema and gallbladder wall thickening, with gallbladder  wall measuring up to 5 mm in thickness. The patient has a large cyst  arising from the right kidney. This measures up to 10.6 x 8.3 x 7.0 cm.  A second smaller cyst is seen inferiorly. This measures 1.2 x 1.2 x 1.1  cm. No hydronephrosis is identified.       Impression:          1. No stones or sludge are seen within the gallbladder.  Gallbladder  appears mildly distended, with gallbladder wall thickening and edema  noted. Appearance is nonspecific, and may be reactive, and may be  related to the patient's pancreatitis.  2. The patient does have some mild dilatation of the common bile duct,  measuring up to 9 mm. Some of this may be age related, but correlation  with liver function tests is recommended. MRCP or ERCP could be  considered for additional evaluation if these are abnormal.     This report was finalized on 2/1/2020 9:55 PM by Dr. Kathleen Montoya M.D.                 cholecalciferol 1,000 Units Oral Daily   clobetasol  Topical Q12H   enoxaparin 30 mg Subcutaneous Q24H   famotidine 20 mg Intravenous Q24H   ipratropium-albuterol 3 mL Nebulization TID - RT   levoFLOXacin 750 mg Intravenous Q48H   levothyroxine 50 mcg Oral Q AM   polyethylene glycol 17 g Oral Daily   pravastatin 40 mg Oral Nightly   sodium chloride 10 mL Intravenous Q12H       Pharmacy Consult - Pharmacy to dose     sodium chloride 75 mL/hr Last Rate: 75 mL/hr (02/03/20 1244)       Assessment/Plan   1.  Acute kidney injury on chronic kidney disease stage III, associated with acute pancreatitis improved with hydration, creatinine is stable since admission creatinine today 1.74.  It is associated with hemodynamic changes and blunted autoregulation because of the ACE inhibitor  2.  Chronic kidney disease stage III most likely secondary to nephrosclerosis also being on hydrochlorothiazide lead to decreased GFR by negative tubular glomerular feedback mechanism.  3.  Acute pancreatitis  4.  Hypertension, controlled  5.  Hypothyroidism  6.  Dyslipidemia  7.  Severe hearing loss    Plan:  1.  Random urine for sodium, chloride and protein to creatinine ratio  2.  I agree with the present treatment and IV fluid  3.  Surveillance labs        Thank you Dr. Das for asking me to see this patient in consultation        I discussed the patients findings and my recommendations with  the patient and her daughter    Jaeger Bella Acosta MD  02/04/20  12:50 PM      Much of this encounter note is an electronic transcription/translation of spoken language to printed text. The electronic translation of spoken language may permit erroneous, or at times, nonsensical words or phrases to be inadvertently transcribed; Although I have reviewed the note for such errors, some may still exist

## 2020-02-04 NOTE — PLAN OF CARE
Problem: Patient Care Overview  Goal: Plan of Care Review  Outcome: Ongoing (interventions implemented as appropriate)  Flowsheets (Taken 2/3/2020 2029)  Plan of Care Reviewed With: patient  Outcome Summary: VSS, on 4L NC. IV fluids infusing. Started on IV Levaquin. Complaints of pain in back, Dilaudid and [place] given. GI said will need MRCP when breathing better and  kidney function at good level. Worked with PT today.

## 2020-02-04 NOTE — PLAN OF CARE
Problem: Patient Care Overview  Goal: Plan of Care Review  Outcome: Ongoing (interventions implemented as appropriate)  Flowsheets (Taken 2/4/2020 4625)  Progress: no change  Plan of Care Reviewed With: patient  Note:   VSS. PO and IV pain meds given. Voiding freely. Cont to monitor.

## 2020-02-04 NOTE — PROGRESS NOTES
Name: Mandy Alarcon ADMIT: 2020   : 1933  PCP: Daryl Santos MD    MRN: [id number] LOS: 3 days   AGE/SEX: 86 y.o. female  ROOM: [facility]   Subjective   Chief Complaint   Patient presents with   • Chest Pain   abd pain is a little better  +radiation to back  Does have some musculoskeletal back pain    On IVFs  Some leg swelling    Worsening wheezing and cough 2 days ago  Possible PNA, started on ABX    ROS  No f/c  +n/-v  No cp/palp  +soa/cough    Objective   Vital Signs  Temp:  [96.6 °F (35.9 °C)-97.8 °F (36.6 °C)] 97.2 °F (36.2 °C)  Heart Rate:  [] 128  Resp:  [16-20] 20  BP: (101-151)/(59-82) 120/59  SpO2:  [95 %-100 %] 98 %  on  Flow (L/min):  [3-4] 3;   Device (Oxygen Therapy): nasal cannula  Body mass index is 37.75 kg/m².    Physical Exam   Constitutional: She is oriented to person, place, and time. She appears well-developed and well-nourished.   HENT:   Head: Normocephalic and atraumatic.   Eyes: Conjunctivae are normal. No scleral icterus.   Neck: Neck supple. No tracheal deviation present.   Cardiovascular: Normal rate and regular rhythm.   No murmur heard.  Pulmonary/Chest: No respiratory distress. She has no wheezes (minimal, decreased bs at bases).   Abdominal: Soft. She exhibits distension (mild). There is tenderness (mild epigastric). There is no guarding.   Neurological: She is alert and oriented to person, place, and time. She exhibits normal muscle tone.   Skin: Skin is warm and dry.   Psychiatric: She has a normal mood and affect. Her behavior is normal.       Results Review:       I reviewed the patient's new clinical results.  Results from last 7 days   Lab Units 20  0435 20  0211 20  0458 20  2142   WBC 10*3/mm3 11.29* 11.27* 13.95* 10.78   HEMOGLOBIN g/dL 12.7 13.5 15.4 15.0   PLATELETS 10*3/mm3 145 174 199 235     Results from last 7 days   Lab Units 20  0435 20  0211 20  0441 20  2142   SODIUM mmol/L 133* 132* 132*  138   POTASSIUM mmol/L 4.0 3.8 4.4 3.7   CHLORIDE mmol/L 101 101 99 102   CO2 mmol/L 18.0* 16.5* 16.6* 20.4*   BUN mg/dL 38* 35* 35* 25*   CREATININE mg/dL 1.74* 1.80* 2.28* 1.74*   GLUCOSE mg/dL 82 77 88 180*   Estimated Creatinine Clearance: 25.7 mL/min (A) (by C-G formula based on SCr of 1.74 mg/dL (H)).  Results from last 7 days   Lab Units 02/04/20 0435 02/03/20 0211 02/02/20 0441 01/31/20  2142   ALBUMIN g/dL 2.50* 2.90* 3.20* 3.90   BILIRUBIN mg/dL 0.7 1.7* 2.0* 0.3   ALK PHOS U/L 46 50 38* 41   AST (SGOT) U/L 22 23 26 27   ALT (SGPT) U/L 18 17 22 16     Results from last 7 days   Lab Units 02/04/20 0435 02/03/20 0211 02/02/20 0441 01/31/20  2142   CALCIUM mg/dL 7.3* 7.6* 7.8* 9.0   ALBUMIN g/dL 2.50* 2.90* 3.20* 3.90     Results from last 7 days   Lab Units 02/03/20  0531 02/03/20  0024   PROCALCITONIN ng/mL  --  12.61*   LACTATE mmol/L 1.3 2.5*       Coag   Results from last 7 days   Lab Units 02/01/20  0458   INR  1.16*     HbA1C   Lab Results   Component Value Date    HGBA1C 5.90 (H) 02/01/2020    HGBA1C 5.80 (H) 08/20/2019    HGBA1C 5.81 (H) 03/04/2019     Infection   Results from last 7 days   Lab Units 02/03/20 0211 02/03/20  0158 02/03/20  0024   BLOODCX  No growth at 24 hours No growth at 24 hours  --    PROCALCITONIN ng/mL  --   --  12.61*     Radiology(recent) Xr Chest 1 View    Result Date: 2/2/2020  Interval development of dense right basilar infiltrate, which may reflect pneumonia. There is also a small right pleural effusion.  This report was finalized on 2/2/2020 11:47 PM by Dr. Kathleen Montoya M.D.      No results found for: TROPONINT, TROPONINI, BNP  No components found for: TSH;2      cholecalciferol 1,000 Units Oral Daily   clobetasol  Topical Q12H   enoxaparin 30 mg Subcutaneous Q24H   famotidine 20 mg Intravenous Q24H   ipratropium-albuterol 3 mL Nebulization TID - RT   levoFLOXacin 750 mg Intravenous Q48H   levothyroxine 50 mcg Oral Q AM   polyethylene glycol 17 g Oral Daily      pravastatin 40 mg Oral Nightly   sodium chloride 10 mL Intravenous Q12H       Pharmacy Consult - Pharmacy to dose     sodium chloride 75 mL/hr Last Rate: 75 mL/hr (02/03/20 1244)   Diet Clear Liquid      Assessment/Plan      Active Hospital Problems    Diagnosis  POA   • **Acute pancreatitis without infection or necrosis [K85.90]  Yes   • Pneumonia [J18.9]  Clinically Undetermined   • LINNEA (acute kidney injury) (CMS/HCC) [N17.9]  No   • Obesity (BMI 30-39.9) [E66.9]  Yes   • CKD (chronic kidney disease) stage 3, GFR 30-59 ml/min (CMS/HCC) [N18.3]  Yes   • Supraventricular tachycardia (CMS/[facility]) [I47.1]  Yes   • Vitamin D deficiency [E55.9]  Yes   • Primary hypothyroidism [E03.9]  Yes   • Benign essential hypertension [I10]  Yes   • Hyperlipidemia [E78.5]  Yes   • Secondary polycythemia [D75.1]  Yes      Resolved Hospital Problems   No resolved problems to display.       Ms. Alarcon is a 86 y.o. former smoker with a history of HTN, HLD, and CKD stage III who presents with epigastric pain secondary to acute pancreatitis.     Acute pancreatitis  -IVF  -PRN agents for pain  -Anti-emetics as needed  -Seen by GI. Etiology is unclear. Possibly related to lisinopril/HCTZ  -Mild duct thickening on U/S, potentially could get MRCP when renal fx is a little better and PNA more treated     Acute on chronic CKD stage 3  -IVFs  -BMP in AM  -She is starting to get a little bit more volume overloaded.  Some this may be related to lower albumin.  Will consult nephrology to help with volume management.  At some point she may need diuretics, but she still is not taking in very much. Will get PA/Lateral CXR to look at volume status as well     Hypertension  -Stable  -HCTZ and lisinopril held as stated above     PNA  Symptoms of dyspnea, wheezing, and suspect infiltrate on CXR  On levaquin  Follow cultures  Added bronchodilators and encouraging IS     VTE Prophylaxis - lovenox  Code Status - Full      Reviewed previous records  DW  family      Amadou Das MD  [facility]  02/04/20  8:06 AM

## 2020-02-04 NOTE — PLAN OF CARE
Problem: Patient Care Overview  Goal: Plan of Care Review  Flowsheets (Taken 2/4/2020 1610)  Progress: no change  Plan of Care Reviewed With: patient;daughter  Outcome Summary: Pt amb 8' to BR then 180' req CGA/min A and HHA plus 1 to manage equip. Pt moaning entire session. Pt req min A / CGA for STS.

## 2020-02-04 NOTE — THERAPY TREATMENT NOTE
Patient Name: Mandy Alarcon  : 1933    MRN: [id number]                              Today's Date: 2020       Admit Date: 2020    Visit Dx:     ICD-10-CM ICD-9-CM   1. Acute pancreatitis without infection or necrosis, unspecified pancreatitis type K85.90 577.0   2. Acute kidney injury superimposed on chronic kidney disease (CMS/HCC) N17.9 866.00    N18.9 585.9     Patient Active Problem List   Diagnosis   • Benign essential hypertension   • Claustrophobia   • Gout   • Hyperlipidemia   • Primary hypothyroidism   • Impaired fasting glucose   • Nocturnal hypoxemia   • Secondary polycythemia   • Vitamin D deficiency   • Therapeutic drug monitoring   • Family history of coronary artery disease   • Bilateral sensorineural hearing loss, wears hearing aids   • Multinodular goiter   • Supraventricular tachycardia (CMS/[facility])   • Morbidly obese (CMS/[facility])   • Acute pancreatitis without infection or necrosis   • Obesity (BMI 30-39.9)   • CKD (chronic kidney disease) stage 3, GFR 30-59 ml/min (CMS/[facility])   • LINNEA (acute kidney injury) (CMS/[facility])   • Pneumonia     Past Medical History:   Diagnosis Date   • Benign essential hypertension 10/28/2012    2012--treatment for hypertension begun.   • Bilateral sensorineural hearing loss, wears hearing aids 2017    Left is much worse than right.  Patient had multiple ear infections as a child.   • Chronic renal insufficiency, stage II (mild), 2016--creatinine 1.35 2016--creatinine 1.35.  Baseline creatinine approximately 1.3.   • Claustrophobia 2016    This patient has significant nocturnal hypoxemia and I think that she could benefit from nocturnal oxygen therapy. The exact etiology of her hypoxemia is not clear. She could possibly have obstructive sleep apnea but this is not documented. We cannot test this patient for sleep apnea due to the fact that she is severely claustrophobic. Patient was a former smoker and it is possibly that  COPD he is playing a role.   • Family history of coronary artery disease 2016    Patient's mother, father, 2 sisters and a brother all  from myocardial infarctions   • Gout 10/28/2012    2012--initial diagnosis and treatment of gout.   • Hyperlipidemia 10/28/2012    2012--treatment for hyperlipidemia begun.   • Impaired fasting glucose 10/28/2012    2012--initial diagnosis impaired fasting glucose.   • Morbidly obese (CMS/HCC) 3/11/2019   • Nocturnal hypoxemia 2014--patient did not receive nocturnal oxygen because of Medicare regulations.   05/15/2014--overnight oximetry revealed oxygen saturations less than 89% for 22 minutes and 40 seconds. Oxygen saturations less than or equal to 88% for 22 minutes and 40 seconds. Lowest oxygen saturation 83%. The longest continuous time with oxygen saturations less than or equal to 88% was 1 minute and 32 seconds.   2012--overnight oximetry revealed oxygen saturations less than 90% for one hour and 35 minutes. Oxygen saturations less than 89% 59 minutes. This patient has significant nocturnal hypoxemia and I think that she could benefit from nocturnal oxygen therapy. The exact etiology of her hypoxemia is not clear. She could possibly have obstructive sleep apnea but this is not documented. We cannot test this patient for sleep apnea due to the fact that she is severely claustrophobic. Patient was a former smoker and it is possibly that COPD he is playing a role.   • Primary hypothyroidism 2015--TSH remains elevated slightly at 4.92.  Given the overall clinical picture including the multinodular goiter, we will initiate levothyroxine 50 mcg/day and reassess in about 6 weeks.  2018--thyroid ultrasound reveals a multinodular thyroid with multiple subcentimeter nodules.  Only minimal increase in size of the largest nodule in the left lobe has occurred when compared    • Secondary polycythemia  8/2/2012 11/06/2014--patient did not receive nocturnal oxygen because of Medicare regulations.   05/15/2014--overnight oximetry revealed oxygen saturations less than 89% for 22 minutes and 40 seconds. Oxygen saturations less than or equal to 88% for 22 minutes and 40 seconds. Lowest oxygen saturation 83%. The longest continuous time with oxygen saturations less than or equal to 88% was 1 minute and 32 seconds.   08/02/2012--overnight oximetry revealed oxygen saturations less than 90% for one hour and 35 minutes. Oxygen saturations less than 89% 59 minutes. This patient has significant nocturnal hypoxemia and I think that she could benefit from nocturnal oxygen therapy. The exact etiology of her hypoxemia is not clear. She could possibly have obstructive sleep apnea but this is not documented. We cannot test this patient for sleep apnea due to the fact that she is severely claustrophobic. Patient was a former smoker and it is possibly that COPD he is playing a role.   • Vitamin D deficiency 5/23/2016     Past Surgical History:   Procedure Laterality Date   • OOPHORECTOMY      age 48   • RADICAL ABDOMINAL HYSTERECTOMY  48 years old    48 years of age. Uterine fibroid tumors with menorrhagia - no cancer     General Information     Row Name 02/04/20 1603          PT Evaluation Time/Intention    Document Type  therapy note (daily note)  -PH     Mode of Treatment  physical therapy  -PH     Row Name 02/04/20 5180          Safety Issues, Functional Mobility    Safety Issues Affecting Function (Mobility)  insight into deficits/self awareness  -PH     Impairments Affecting Function (Mobility)  pain;endurance/activity tolerance;shortness of breath  -PH       User Key  (r) = Recorded By, (t) = Taken By, (c) = Cosigned By    Initials Name Provider Type    PH Christi Benjamin PTA Physical Therapy Assistant        Mobility     Row Name 02/04/20 1772          Bed Mobility Assessment/Treatment    Comment (Bed Mobility)   UIC  -PH     Row Name 02/04/20 1607          Transfer Assessment/Treatment    Comment (Transfers)  STS x 2 - chair then toilet w/ 2nd trial req CGA  -PH     Row Name 02/04/20 1607          Sit-Stand Transfer    Sit-Stand [place] (Transfers)  minimum assist (75% patient effort);verbal cues;nonverbal cues (demo/gesture);contact guard  -PH     Assistive Device (Sit-Stand Transfers)  other (see comments) HHA x 1  -PH     Row Name 02/04/20 1607          Gait/Stairs Assessment/Training    [place] Level (Gait)  contact guard;minimum assist (75% patient effort);1 person assist;1 person to manage equipment  -PH     Assistive Device (Gait)  other (see comments) HHA   -PH     Distance in Feet (Gait)  8' to BR then 180' on 3L O2  -PH     Pattern (Gait)  step-through  -PH     Comment (Gait/Stairs)  mild unsteadiness w/ pt moaning entire time; Pt limited by fatigue and pain  -PH       User Key  (r) = Recorded By, (t) = Taken By, (c) = Cosigned By    Initials Name Provider Type     Christi Benjamin PTA Physical Therapy Assistant        Obj/Interventions     Row Name 02/04/20 1609          Static Sitting Balance    Level of [place] (Unsupported Sitting, Static Balance)  supervision  -PH     Sitting Position (Unsupported Sitting, Static Balance)  other (see comments) toilet  -PH     Time Able to Maintain Position (Unsupported Sitting, Static Balance)  3 to 4 minutes  -PH     Comment (Unsupported Sitting, Static Balance)  pt using L safety rail for support  -PH       User Key  (r) = Recorded By, (t) = Taken By, (c) = Cosigned By    Initials Name Provider Type    PH Christi Benjamin PTA Physical Therapy Assistant        Goals/Plan    No documentation.       Clinical Impression     Row Name 02/04/20 1610          Pain Scale: Numbers Pre/Post-Treatment    Pre/Post Treatment Pain Comment  when asked at end of session, pt stated she did not know how to answer - that she was exhausted and in pain.  -PH      Pain Intervention(s)  Repositioned;Rest  -PH     Row Name 02/04/20 1610          Plan of Care Review    Plan of Care Reviewed With  patient;daughter  -PH     Progress  no change  -PH     Outcome Summary  Pt amb 8' to BR then 180' req CGA/min A and HHA plus 1 to manage equip. Pt moaning entire session. Pt req min A / CGA for STS.   -PH     Row Name 02/04/20 1610          Vital Signs    O2 Delivery Pre Treatment  supplemental O2  -PH     O2 Delivery Intra Treatment  supplemental O2  -PH     O2 Delivery Post Treatment  supplemental O2  -PH     Row Name 02/04/20 1610          Positioning and Restraints    Pre-Treatment Position  sitting in chair/recliner  -PH     Post Treatment Position  chair  -PH     In Chair  reclined;encouraged to call for assist;call light within reach;with nsg w/ aide  -PH       User Key  (r) = Recorded By, (t) = Taken By, (c) = Cosigned By    Initials Name Provider Type    Christi Zuleta PTA Physical Therapy Assistant        Outcome Measures     Row Name 02/04/20 1613          How much help from another person do you currently need...    Turning from your back to your side while in flat bed without using bedrails?  3  -PH     Moving from lying on back to sitting on the side of a flat bed without bedrails?  3  -PH     Moving to and from a bed to a chair (including a wheelchair)?  3  -PH     Standing up from a chair using your arms (e.g., wheelchair, bedside chair)?  3  -PH     Climbing 3-5 steps with a railing?  3  -PH     To walk in hospital room?  3  -PH     AM-PAC 6 Clicks Score (PT)  18  -PH     Row Name 02/04/20 1613          Functional Assessment    Outcome Measure Options  AM-PAC 6 Clicks Basic Mobility (PT)  -PH       User Key  (r) = Recorded By, (t) = Taken By, (c) = Cosigned By    Initials Name Provider Type    Christi Zuleta PTA Physical Therapy Assistant          PT Recommendation and Plan     Plan of Care Reviewed With: patient, daughter  Progress: no  change  Outcome Summary: Pt amb 8' to BR then 180' req CGA/min A and HHA plus 1 to manage equip. Pt moaning entire session. Pt req min A / CGA for STS.      Time Calculation:   PT Charges     Row Name 02/04/20 1614             Time Calculation    Start Time  1552  -PH      Stop Time  1605  -PH      Time Calculation (min)  13 min  -PH      PT Received On  02/04/20  -PH      PT - Next Appointment  02/05/20  -PH         Time Calculation- PT    Total Timed Code Minutes- PT  8 minute(s)  -PH        User Key  (r) = Recorded By, (t) = Taken By, (c) = Cosigned By    Initials Name Provider Type    PH Christi Benjamin PTA Physical Therapy Assistant        Therapy Charges for Today     Code Description Service Date Service Provider Modifiers Qty    76261986100 HC PT THER PROC EA 15 MIN 2/4/2020 Christi Benjamin PTA GP 1    69741548094 HC PT THER SUPP EA 15 MIN 2/4/2020 Christi Benjamin PTA GP 1          PT G-Codes  Outcome Measure Options: AM-PAC 6 Clicks Basic Mobility (PT)  AM-PAC 6 Clicks Score (PT): 18    Christi Benjamin PTA  2/4/2020

## 2020-02-04 NOTE — PROGRESS NOTES
[facility]  Inpatient Progress Note    Reason for Follow Up: Acute pancreatitis    Subjective     Interval History:   She is very restless.  She c/o spasm in her shoulder and back.  She has poor appetite.        Current Facility-Administered Medications:   •  acetaminophen (TYLENOL) tablet 650 mg, 650 mg, Oral, Q4H PRN, Kannan Rizo APRN, 650 mg at 02/01/20 0203  •  aluminum-magnesium hydroxide-simethicone (MAALOX MAX) 400-400-40 MG/5ML suspension 15 mL, 15 mL, Oral, Q6H PRN, Kannan Rizo APRN, 15 mL at 02/01/20 0808  •  bisacodyl (DULCOLAX) EC tablet 5 mg, 5 mg, Oral, Daily PRN, Kannan Rizo APRN  •  cholecalciferol (VITAMIN D3) tablet 1,000 Units, 1,000 Units, Oral, Daily, Kannan Rizo APRN, 1,000 Units at 02/04/20 0811  •  clobetasol (TEMOVATE) 0.05 % cream, , Topical, Q12H, Kannan Rizo APRN  •  enoxaparin (LOVENOX) syringe 30 mg, 30 mg, Subcutaneous, Q24H, Amadou Das MD, 30 mg at 02/04/20 0811  •  famotidine (PEPCID) injection 20 mg, 20 mg, Intravenous, Q24H, Kannan Rizo APRN, 20 mg at 02/04/20 0547  •  HYDROcodone-acetaminophen (NORCO) 7.5-325 MG per tablet 1 tablet, 1 tablet, Oral, Q4H PRN, Amadou Das MD, 1 tablet at 02/04/20 0547  •  HYDROmorphone (DILAUDID) injection 0.5 mg, 0.5 mg, Intravenous, Q2H PRN, 0.5 mg at 02/04/20 0202 **AND** naloxone (NARCAN) injection 0.4 mg, 0.4 mg, Intravenous, Q5 Min PRN, Nathan Tam MD  •  ipratropium-albuterol (DUO-NEB) nebulizer solution 3 mL, 3 mL, Nebulization, TID - RT, Amadou Das MD, 3 mL at 02/04/20 0747  •  levoFLOXacin (LEVAQUIN) 750 mg/150 mL D5W (premix) (LEVAQUIN) 750 mg, 750 mg, Intravenous, Q48H, Amadou Das MD, 750 mg at 02/03/20 0938  •  levothyroxine (SYNTHROID, LEVOTHROID) tablet 50 mcg, 50 mcg, Oral, Q AM, Kannan Rizo APRN, 50 mcg at 02/04/20 0547  •  ondansetron (ZOFRAN) tablet 4 mg, 4 mg, Oral, Q6H PRN **OR** ondansetron  (ZOFRAN) injection 4 mg, 4 mg, Intravenous, Q6H PRN, Kannan Rizo APRN, 4 mg at 02/02/20 2352  •  Pharmacy Consult - Pharmacy to dose, , Does not apply, Continuous PRN, Amadou Das MD  •  polyethylene glycol 3350 powder (packet), 17 g, Oral, Daily, Amadou Das MD, 17 g at 02/04/20 0811  •  pravastatin (PRAVACHOL) tablet 40 mg, 40 mg, Oral, Nightly, Kannan Rizo APRN, 40 mg at 02/03/20 2118  •  [COMPLETED] Insert peripheral IV, , , Once **AND** sodium chloride 0.9 % flush 10 mL, 10 mL, Intravenous, PRN, Ranjith Quinn MD, 10 mL at 01/31/20 2143  •  sodium chloride 0.9 % flush 10 mL, 10 mL, Intravenous, Q12H, Kannan Rizo APRN, 10 mL at 02/02/20 2039  •  sodium chloride 0.9 % flush 10 mL, 10 mL, Intravenous, PRN, Kannan Rizo APRN  •  sodium chloride 0.9 % infusion, 75 mL/hr, Intravenous, Continuous, Amadou Das MD, Last Rate: 75 mL/hr at 02/03/20 1244, 75 mL/hr at 02/03/20 1244  Review of Systems:   MSK : muscle spasm      Objective     Vital Signs  Temp:  [96.6 °F (35.9 °C)-98.4 °F (36.9 °C)] 96.6 °F (35.9 °C)  Heart Rate:  [] 97  Resp:  [16-20] 18  BP: (101-151)/(59-82) 141/67  Body mass index is 37.75 kg/m².    Intake/Output Summary (Last 24 hours) at 2/4/2020 0911  Last data filed at 2/4/2020 0835  Gross per 24 hour   Intake 1356.85 ml   Output 2100 ml   Net -743.15 ml     I/O this shift:  In: -   Out: 200 [Urine:200]     Physical Exam:   General: patient awake, alert and cooperative   Pulm: Decreased breath sounds in the right base, faint expiratory wheezes bilaterally   Abdomen: soft, nontender, mild distention in the upper abdomen; normal bowel sounds   Extremities: no rash or edema   Psychiatric: Normal mood and behavior; memory intact     Results Review:     I reviewed the patient's new clinical results.    Results from last 7 days   Lab Units 02/03/20  0211 02/01/20  0458 01/31/20  2142   WBC 10*3/mm3 11.27* 13.95* 10.78   HEMOGLOBIN g/dL  13.5 15.4 15.0   HEMATOCRIT % 39.6 46.2 45.8   PLATELETS 10*3/mm3 174 199 235     Results from last 7 days   Lab Units 02/04/20  0435 02/03/20  0211 02/02/20  0441   SODIUM mmol/L 133* 132* 132*   POTASSIUM mmol/L 4.0 3.8 4.4   CHLORIDE mmol/L 101 101 99   CO2 mmol/L 18.0* 16.5* 16.6*   BUN mg/dL 38* 35* 35*   CREATININE mg/dL 1.74* 1.80* 2.28*   CALCIUM mg/dL 7.3* 7.6* 7.8*   BILIRUBIN mg/dL 0.7 1.7* 2.0*   ALK PHOS U/L 46 50 38*   ALT (SGPT) U/L 18 17 22   AST (SGOT) U/L 22 23 26   GLUCOSE mg/dL 82 77 88     Results from last 7 days   Lab Units 02/01/20  0458   INR  1.16*     Lab Results   Lab Value Date/Time    LIPASE 60 02/04/2020 0435    LIPASE 181 (H) 02/03/2020 0211    LIPASE 417 (H) 02/02/2020 0441    LIPASE >3,000 (H) 01/31/2020 2142       Radiology:  XR Chest 1 View   Final Result   Interval development of dense right basilar infiltrate, which may   reflect pneumonia. There is also a small right pleural effusion.       This report was finalized on 2/2/2020 11:47 PM by Dr. Kathleen Montoya M.D.          US Gallbladder   Final Result       1. No stones or sludge are seen within the gallbladder. Gallbladder   appears mildly distended, with gallbladder wall thickening and edema   noted. Appearance is nonspecific, and may be reactive, and may be   related to the patient's pancreatitis.   2. The patient does have some mild dilatation of the common bile duct,   measuring up to 9 mm. Some of this may be age related, but correlation   with liver function tests is recommended. MRCP or ERCP could be   considered for additional evaluation if these are abnormal.       This report was finalized on 2/1/2020 9:55 PM by Dr. Kathleen Montoya M.D.          CT Abdomen Pelvis Without Contrast   Final Result       1. The patient has inflammatory stranding surrounding the pancreatic   head and neck, in keeping with history of pancreatitis. No discrete   peripancreatic collections are seen. There is no evidence of gastric      outlet obstruction.       Radiation dose reduction techniques were utilized, including automated   exposure control and exposure modulation based on body size.       This report was finalized on 1/31/2020 11:52 PM by Dr. Kathleen Montoya M.D.          XR Chest 1 View   Final Result   Mild bibasilar atelectasis.       This report was finalized on 1/31/2020 10:12 PM by Dr. Kathleen Montoya M.D.              Assessment/Plan     Patient Active Problem List   Diagnosis   • Benign essential hypertension   • Claustrophobia   • Gout   • Hyperlipidemia   • Primary hypothyroidism   • Impaired fasting glucose   • Nocturnal hypoxemia   • Secondary polycythemia   • Vitamin D deficiency   • Therapeutic drug monitoring   • Family history of coronary artery disease   • Bilateral sensorineural hearing loss, wears hearing aids   • Multinodular goiter   • Supraventricular tachycardia (CMS/HCC)   • Morbidly obese (CMS/HCC)   • Acute pancreatitis without infection or necrosis   • Obesity (BMI 30-39.9)   • CKD (chronic kidney disease) stage 3, GFR 30-59 ml/min (CMS/HCC)   • LINNEA (acute kidney injury) (CMS/HCC)   • Pneumonia       Assessment:  1. Acute pancreatitis-etiology uncertain at this point.  Lipid panel normal, no gallstones on CT, no history of alcohol abuse.  She is on outpatient lisinopril/HCTZ which is a possible culprit.  2. Abdominal pain secondary to #1  3. LINNEA  4. Elevated LFTs-follow trend  5. Pneumonia    Plan:  · Encouraged continued nutrition recommend Ensure clear with meals  · Encourage pulmonary toilet and increased ambulation  · Eventual consideration for MRI/MRCP once Cr has improved and she is less restless. At this time I think it would be a poor quality study    I discussed the patients findings and my recommendations with patient and family.          Guido Crawford M.D.  [facility]  [address] [place] [address] - [place] [address]  Office: [phone]

## 2020-02-05 PROBLEM — I48.91 NEW ONSET A-FIB (HCC): Status: ACTIVE | Noted: 2020-02-05

## 2020-02-05 PROBLEM — D72.829 LEUKOCYTOSIS: Status: ACTIVE | Noted: 2020-02-05

## 2020-02-05 PROBLEM — E87.1 HYPONATREMIA: Status: ACTIVE | Noted: 2020-02-05

## 2020-02-05 PROBLEM — E83.42 HYPOMAGNESEMIA: Status: ACTIVE | Noted: 2020-02-05

## 2020-02-05 LAB
ALBUMIN SERPL-MCNC: 2.3 G/DL (ref 3.5–5.2)
ALBUMIN/GLOB SERPL: 0.7 G/DL
ALP SERPL-CCNC: 54 U/L (ref 39–117)
ALT SERPL W P-5'-P-CCNC: 14 U/L (ref 1–33)
ANION GAP SERPL CALCULATED.3IONS-SCNC: 12.9 MMOL/L (ref 5–15)
AST SERPL-CCNC: 18 U/L (ref 1–32)
BILIRUB SERPL-MCNC: 1.1 MG/DL (ref 0.2–1.2)
BUN BLD-MCNC: 30 MG/DL (ref 8–23)
BUN/CREAT SERPL: 18.5 (ref 7–25)
CALCIUM SPEC-SCNC: 7.8 MG/DL (ref 8.6–10.5)
CHLORIDE SERPL-SCNC: 103 MMOL/L (ref 98–107)
CO2 SERPL-SCNC: 16.1 MMOL/L (ref 22–29)
CREAT BLD-MCNC: 1.62 MG/DL (ref 0.57–1)
DEPRECATED RDW RBC AUTO: 45.3 FL (ref 37–54)
EOSINOPHIL # BLD MANUAL: 0.16 10*3/MM3 (ref 0–0.4)
EOSINOPHIL NFR BLD MANUAL: 1.3 % (ref 0.3–6.2)
ERYTHROCYTE [DISTWIDTH] IN BLOOD BY AUTOMATED COUNT: 14 % (ref 12.3–15.4)
GFR SERPL CREATININE-BSD FRML MDRD: 30 ML/MIN/1.73
GLOBULIN UR ELPH-MCNC: 3.2 GM/DL
GLUCOSE BLD-MCNC: 80 MG/DL (ref 65–99)
HCT VFR BLD AUTO: 37.5 % (ref 34–46.6)
HGB BLD-MCNC: 12.8 G/DL (ref 12–15.9)
LIPASE SERPL-CCNC: 38 U/L (ref 13–60)
LYMPHOCYTES # BLD MANUAL: 0.78 10*3/MM3 (ref 0.7–3.1)
LYMPHOCYTES NFR BLD MANUAL: 6.3 % (ref 19.6–45.3)
LYMPHOCYTES NFR BLD MANUAL: 7.6 % (ref 5–12)
MAGNESIUM SERPL-MCNC: 1.6 MG/DL (ref 1.6–2.4)
MCH RBC QN AUTO: 29.7 PG (ref 26.6–33)
MCHC RBC AUTO-ENTMCNC: 34.1 G/DL (ref 31.5–35.7)
MCV RBC AUTO: 87 FL (ref 79–97)
MONOCYTES # BLD AUTO: 0.94 10*3/MM3 (ref 0.1–0.9)
NEUTROPHILS # BLD AUTO: 10.46 10*3/MM3 (ref 1.7–7)
NEUTROPHILS NFR BLD MANUAL: 84.8 % (ref 42.7–76)
PHOSPHATE SERPL-MCNC: 2.1 MG/DL (ref 2.5–4.5)
PLAT MORPH BLD: NORMAL
PLATELET # BLD AUTO: 147 10*3/MM3 (ref 140–450)
PMV BLD AUTO: 9.5 FL (ref 6–12)
POTASSIUM BLD-SCNC: 3.8 MMOL/L (ref 3.5–5.2)
PROT SERPL-MCNC: 5.5 G/DL (ref 6–8.5)
RBC # BLD AUTO: 4.31 10*6/MM3 (ref 3.77–5.28)
RBC MORPH BLD: NORMAL
SODIUM BLD-SCNC: 132 MMOL/L (ref 136–145)
TSH SERPL DL<=0.05 MIU/L-ACNC: 2.03 UIU/ML (ref 0.27–4.2)
URATE SERPL-MCNC: 3.9 MG/DL (ref 2.4–5.7)
WBC MORPH BLD: NORMAL
WBC NRBC COR # BLD: 12.33 10*3/MM3 (ref 3.4–10.8)

## 2020-02-05 PROCEDURE — 25010000002 ENOXAPARIN PER 10 MG: Performed by: INTERNAL MEDICINE

## 2020-02-05 PROCEDURE — 94799 UNLISTED PULMONARY SVC/PX: CPT

## 2020-02-05 PROCEDURE — 84550 ASSAY OF BLOOD/URIC ACID: CPT | Performed by: INTERNAL MEDICINE

## 2020-02-05 PROCEDURE — 85007 BL SMEAR W/DIFF WBC COUNT: CPT | Performed by: INTERNAL MEDICINE

## 2020-02-05 PROCEDURE — 83690 ASSAY OF LIPASE: CPT | Performed by: INTERNAL MEDICINE

## 2020-02-05 PROCEDURE — 97110 THERAPEUTIC EXERCISES: CPT

## 2020-02-05 PROCEDURE — 25010000002 ONDANSETRON PER 1 MG: Performed by: NURSE PRACTITIONER

## 2020-02-05 PROCEDURE — 99232 SBSQ HOSP IP/OBS MODERATE 35: CPT | Performed by: INTERNAL MEDICINE

## 2020-02-05 PROCEDURE — 84443 ASSAY THYROID STIM HORMONE: CPT | Performed by: INTERNAL MEDICINE

## 2020-02-05 PROCEDURE — 83735 ASSAY OF MAGNESIUM: CPT | Performed by: INTERNAL MEDICINE

## 2020-02-05 PROCEDURE — 80053 COMPREHEN METABOLIC PANEL: CPT | Performed by: INTERNAL MEDICINE

## 2020-02-05 PROCEDURE — 84100 ASSAY OF PHOSPHORUS: CPT | Performed by: INTERNAL MEDICINE

## 2020-02-05 PROCEDURE — 25010000002 MAGNESIUM SULFATE IN D5W 1G/100ML (PREMIX) 1-5 GM/100ML-% SOLUTION: Performed by: INTERNAL MEDICINE

## 2020-02-05 PROCEDURE — 25010000002 LEVOFLOXACIN PER 250 MG: Performed by: INTERNAL MEDICINE

## 2020-02-05 PROCEDURE — 85025 COMPLETE CBC W/AUTO DIFF WBC: CPT | Performed by: INTERNAL MEDICINE

## 2020-02-05 RX ORDER — MAGNESIUM SULFATE 1 G/100ML
1 INJECTION INTRAVENOUS
Status: COMPLETED | OUTPATIENT
Start: 2020-02-05 | End: 2020-02-05

## 2020-02-05 RX ADMIN — ENOXAPARIN SODIUM 30 MG: 30 INJECTION SUBCUTANEOUS at 08:55

## 2020-02-05 RX ADMIN — CLOBETASOL PROPIONATE: 0.5 CREAM TOPICAL at 08:52

## 2020-02-05 RX ADMIN — SODIUM CHLORIDE, PRESERVATIVE FREE 10 ML: 5 INJECTION INTRAVENOUS at 07:36

## 2020-02-05 RX ADMIN — IPRATROPIUM BROMIDE AND ALBUTEROL SULFATE 3 ML: 2.5; .5 SOLUTION RESPIRATORY (INHALATION) at 19:56

## 2020-02-05 RX ADMIN — METOPROLOL TARTRATE 25 MG: 25 TABLET ORAL at 13:00

## 2020-02-05 RX ADMIN — SODIUM CHLORIDE 5 MG/HR: 900 INJECTION, SOLUTION INTRAVENOUS at 00:56

## 2020-02-05 RX ADMIN — HYDROCODONE BITARTRATE AND ACETAMINOPHEN 1 TABLET: 7.5; 325 TABLET ORAL at 13:00

## 2020-02-05 RX ADMIN — IPRATROPIUM BROMIDE AND ALBUTEROL SULFATE 3 ML: 2.5; .5 SOLUTION RESPIRATORY (INHALATION) at 07:49

## 2020-02-05 RX ADMIN — METOPROLOL TARTRATE 25 MG: 25 TABLET ORAL at 20:22

## 2020-02-05 RX ADMIN — POLYETHYLENE GLYCOL 3350 17 G: 17 POWDER, FOR SOLUTION ORAL at 08:56

## 2020-02-05 RX ADMIN — POLYETHYLENE GLYCOL 3350 17 G: 17 POWDER, FOR SOLUTION ORAL at 08:52

## 2020-02-05 RX ADMIN — SODIUM CHLORIDE 75 ML/HR: 9 INJECTION, SOLUTION INTRAVENOUS at 09:02

## 2020-02-05 RX ADMIN — IPRATROPIUM BROMIDE AND ALBUTEROL SULFATE 3 ML: 2.5; .5 SOLUTION RESPIRATORY (INHALATION) at 14:52

## 2020-02-05 RX ADMIN — FAMOTIDINE 20 MG: 10 INJECTION INTRAVENOUS at 07:37

## 2020-02-05 RX ADMIN — PRAVASTATIN SODIUM 40 MG: 40 TABLET ORAL at 20:21

## 2020-02-05 RX ADMIN — CLOBETASOL PROPIONATE: 0.5 CREAM TOPICAL at 20:22

## 2020-02-05 RX ADMIN — LEVOFLOXACIN 750 MG: 5 INJECTION, SOLUTION INTRAVENOUS at 08:52

## 2020-02-05 RX ADMIN — VITAMIN D, TAB 1000IU (100/BT) 1000 UNITS: 25 TAB at 08:52

## 2020-02-05 RX ADMIN — HYDROCODONE BITARTRATE AND ACETAMINOPHEN 1 TABLET: 7.5; 325 TABLET ORAL at 03:23

## 2020-02-05 RX ADMIN — MAGNESIUM SULFATE 1 G: 1 INJECTION INTRAVENOUS at 12:56

## 2020-02-05 RX ADMIN — LEVOTHYROXINE SODIUM 50 MCG: 50 TABLET ORAL at 06:48

## 2020-02-05 RX ADMIN — HYDROCODONE BITARTRATE AND ACETAMINOPHEN 1 TABLET: 7.5; 325 TABLET ORAL at 23:39

## 2020-02-05 RX ADMIN — MAGNESIUM SULFATE 1 G: 1 INJECTION INTRAVENOUS at 10:59

## 2020-02-05 RX ADMIN — METOPROLOL TARTRATE 25 MG: 25 TABLET ORAL at 06:47

## 2020-02-05 RX ADMIN — ONDANSETRON 4 MG: 2 INJECTION INTRAMUSCULAR; INTRAVENOUS at 20:40

## 2020-02-05 NOTE — NURSING NOTE
Pt pulled out IV; tried twice in opposite arm, got in both times but up against a value.  Called IV team

## 2020-02-05 NOTE — PROGRESS NOTES
Continued Stay Note  [facility]     Patient Name: Mandy Alarcon  MRN: [id number]  Today's Date: 2/5/2020    Admit Date: 1/31/2020    Discharge Plan     Row Name 02/05/20 1513       Plan    Plan  Plan is home with her dtr Ashley and her family.  CCP will follow for possible HH upon DC.     Plan Comments  Spoke with pt and 2 of her dtrs, Melissa and Lexii at bedside.  They confirms plan to return home with Ashley upon DC.  CCP discussed possible HH and pt will think about it.  Provider list given.         Discharge Codes    No documentation.             Tracie Gutierrez RN

## 2020-02-05 NOTE — CONSULTS
[facility] Consult    Patient Name: Mandy Alarcon  Age/Sex: 86 y.o. female  : 1933  MRN: [id number]    Date of Admission: 2020  Date of Encounter Visit: 20  Encounter Provider: Lizbeth Laura MD  Referring Provider: Nathan Tam MD  Place of Service: [facility] CARDIOLOGY  Patient Care Team:  Daryl Santos MD as PCP - General (Internal Medicine)  Daryl Santos MD as PCP - Claims Attributed    Subjective:     Consulted for: Atrial fibrillation    Chief Complaint: abdominal pain    History of Present Illness:  Mandy Alarcon is a 86 y.o. female with a history of paroxsymal supraventricular tachycardia, hypertension, chronic kidney disease, gout, hypothyroidism, tobacco uses, hyperlipidemia, who is admitted with acute pancreatitis and pneumonia.    The patient presented to the emergency room on 2020 with complaints of epigastric pain radiating to her back after eating dinner.  Pain also radiated to her chest and was associated with dyspnea.  She then developed nausea and vomiting.  Following arrival to the emergency room a CT of the abdomen and pelvis showed evidence of pancreatitis.  Lipase was also noted to be elevated and her creatinine was above her baseline.  Since admission she has been treated supportively with IV fluids and bowel rest.  Plan is for an eventual MRCP to determine etiology.  Yesterday she went into atrial fibrillation with rates in the 130s to 150s.  She was started on a diltiazem gtt for rate control.      She is very drowsy this morning and unable to stay awake to participate in history.  The patient is unable to tell me about any current discomfort or complaints.  The patient's daughters report she is quite confused at night and does not sleep.     I saw her last in 2019 for routine follow up at which time she was doing well.  Prior to that her cardiac work up included a stress test, echocardiogram and  V/Q scan in 2018 and 2019.  Her stress test was negative for ischemia.  Echo was unremarkable except for mild aortic regurgitation.  V/Q scan was low probability for pulmonary embolism.  ZIO monitor was placed for SVT and near syncope which showed brief, non-sustained episodes of SVT but otherwise was unremarkable.       Past Medical History:  Past Medical History:   Diagnosis Date   • Benign essential hypertension 10/28/2012    2012--treatment for hypertension begun.   • Bilateral sensorineural hearing loss, wears hearing aids 2017    Left is much worse than right.  Patient had multiple ear infections as a child.   • Chronic renal insufficiency, stage II (mild), 2016--creatinine 1.35 2016--creatinine 1.35.  Baseline creatinine approximately 1.3.   • Claustrophobia 2016    This patient has significant nocturnal hypoxemia and I think that she could benefit from nocturnal oxygen therapy. The exact etiology of her hypoxemia is not clear. She could possibly have obstructive sleep apnea but this is not documented. We cannot test this patient for sleep apnea due to the fact that she is severely claustrophobic. Patient was a former smoker and it is possibly that COPD he is playing a role.   • Family history of coronary artery disease 2016    Patient's mother, father, 2 sisters and a brother all  from myocardial infarctions   • Gout 10/28/2012    2012--initial diagnosis and treatment of gout.   • Hyperlipidemia 10/28/2012    2012--treatment for hyperlipidemia begun.   • Impaired fasting glucose 10/28/2012    2012--initial diagnosis impaired fasting glucose.   • Morbidly obese (CMS/HCC) 3/11/2019   • Nocturnal hypoxemia 2014--patient did not receive nocturnal oxygen because of Medicare regulations.   05/15/2014--overnight oximetry revealed oxygen saturations less than 89% for 22 minutes and 40 seconds. Oxygen saturations less  than or equal to 88% for 22 minutes and 40 seconds. Lowest oxygen saturation 83%. The longest continuous time with oxygen saturations less than or equal to 88% was 1 minute and 32 seconds.   08/02/2012--overnight oximetry revealed oxygen saturations less than 90% for one hour and 35 minutes. Oxygen saturations less than 89% 59 minutes. This patient has significant nocturnal hypoxemia and I think that she could benefit from nocturnal oxygen therapy. The exact etiology of her hypoxemia is not clear. She could possibly have obstructive sleep apnea but this is not documented. We cannot test this patient for sleep apnea due to the fact that she is severely claustrophobic. Patient was a former smoker and it is possibly that COPD he is playing a role.   • Primary hypothyroidism 11/17/2015 March 11, 2019--TSH remains elevated slightly at 4.92.  Given the overall clinical picture including the multinodular goiter, we will initiate levothyroxine 50 mcg/day and reassess in about 6 weeks.  August 1, 2018--thyroid ultrasound reveals a multinodular thyroid with multiple subcentimeter nodules.  Only minimal increase in size of the largest nodule in the left lobe has occurred when compared    • Secondary polycythemia 8/2/2012 11/06/2014--patient did not receive nocturnal oxygen because of Medicare regulations.   05/15/2014--overnight oximetry revealed oxygen saturations less than 89% for 22 minutes and 40 seconds. Oxygen saturations less than or equal to 88% for 22 minutes and 40 seconds. Lowest oxygen saturation 83%. The longest continuous time with oxygen saturations less than or equal to 88% was 1 minute and 32 seconds.   08/02/2012--overnight oximetry revealed oxygen saturations less than 90% for one hour and 35 minutes. Oxygen saturations less than 89% 59 minutes. This patient has significant nocturnal hypoxemia and I think that she could benefit from nocturnal oxygen therapy. The exact etiology of her hypoxemia is not  clear. She could possibly have obstructive sleep apnea but this is not documented. We cannot test this patient for sleep apnea due to the fact that she is severely claustrophobic. Patient was a former smoker and it is possibly that COPD he is playing a role.   • Vitamin D deficiency 5/23/2016       Past Surgical History:   Procedure Laterality Date   • OOPHORECTOMY      age 48   • RADICAL ABDOMINAL HYSTERECTOMY  48 years old    48 years of age. Uterine fibroid tumors with menorrhagia - no cancer       Home Medications:   Medications Prior to Admission   Medication Sig Dispense Refill Last Dose   • allopurinol (ZYLOPRIM) 300 MG tablet Take 1 p.o. daily for gout 90 tablet 3    • Cholecalciferol (VITAMIN D) 2000 UNITS tablet Take 2 tablets by mouth daily.   Taking   • clobetasol (TEMOVATE) 0.05 % ointment Apply  topically to the appropriate area as directed 2 (Two) Times a Day. 15 g 0 Taking   • levothyroxine (SYNTHROID, LEVOTHROID) 50 MCG tablet Take 1 p.o. every morning for thyroid 90 tablet 3    • lisinopril-hydrochlorothiazide (PRINZIDE,ZESTORETIC) 20-12.5 MG per tablet TAKE ONE TABLET BY MOUTH EVERY MORNING FOR HIGH BLOOD PRESSURE AND LEG SWELLING 90 tablet 3    • pravastatin (PRAVACHOL) 40 MG tablet Take 1 p.o. daily for high cholesterol 90 tablet 3    • Biotin 10 MG tablet Take 1 tablet by mouth Daily.   Not Taking   • halobetasol (ULTRAVATE) 0.05 % ointment Apply pea-size amount daily to affected area for 2 months. 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   Substance and Sexual Activity   • Alcohol use: No   • Drug use: No   • Sexual activity: Not Currently     Partners: Male   Lifestyle   • Physical activity:     Days per week: 0 days     Minutes per session: 0 min   • Stress: Not at all   Relationships   • Social connections:     Talks on phone: More than three times a week     Gets together: Three times a week     Attends [employer] service: More than 4 times per year     Active member of club or organization: No     Attends meetings of clubs or organizations: Never     Relationship status:        Past Family History:  Family History   Problem Relation Age of Onset   • Heart disease Mother         Ischemic.  from coronary artery disease.   • Heart disease Father         Ischemic.  from coronary artery disease.   • Heart disease Sister         Ischemic.  from coronary artery disease.   • Heart disease Brother         Ischemic.  from coronary artery disease.   • Heart disease Sister         Ischemic.  from coronary artery disease.   • Breast cancer Daughter        Review of Systems:   All systems reviewed. Pertinent positives identified in HPI. All other systems are negative.    Objective:   Temp:  [97 °F (36.1 °C)-99.4 °F (37.4 °C)] 97.9 °F (36.6 °C)  Heart Rate:  [] 87  Resp:  [18-20] 18  BP: (115-147)/(59-87) 125/74     Intake/Output Summary (Last 24 hours) at 2020 0748  Last data filed at 2020 1900  Gross per 24 hour   Intake 360 ml   Output 1200 ml   Net -840 ml     Body mass index is 43.86  kg/m².      02/01/20  0133 02/04/20  2056 02/05/20  0500   Weight: 96.7 kg (213 lb 1.6 oz) 113 kg (249 lb 1.6 oz) 112 kg (247 lb 9.2 oz)     Weight change:     Physical Exam:   General Appearance:    Alert, cooperative, in no acute distress   Head:    Normocephalic, without obvious abnormality, atraumatic   Eyes:            Lids and lashes normal, conjunctivae and sclerae normal, no   icterus, no pallor, corneas clear, PERRLA   Ears:    Ears appear intact with no abnormalities noted   Neck:   No adenopathy, supple, trachea midline, no thyromegaly, no   carotid bruit, no JVD   Lungs:     Clear to auscultation,respirations regular, even and unlabored    Heart:    Irregularly, irregular, rhythm and normal rate, normal S1 and S2, no murmur, no gallop, no rub, no click   Chest Wall:    No abnormalities observed   Abdomen:     Normal bowel sounds, no masses, no organomegaly, soft        non-tender, non-distended, no guarding, no rebound  tenderness   Extremities:   Moves all extremities well, no edema, no cyanosis, no redness   Pulses:   Pulses palpable and equal bilaterally. Normal radial, carotid, femoral, dorsalis pedis and posterior tibial pulses bilaterally. Normal abdominal aorta   Skin:  Psychiatric:   No bleeding, bruising or rash    Alert and oriented x 3, normal mood and affect       Lab Review:   Results from last 7 days   Lab Units 02/05/20  0507 02/04/20  0435 02/03/20  0211 02/02/20  0441 01/31/20  2142   SODIUM mmol/L 132* 133* 132* 132* 138   POTASSIUM mmol/L 3.8 4.0 3.8 4.4 3.7   CHLORIDE mmol/L 103 101 101 99 102   CO2 mmol/L 16.1* 18.0* 16.5* 16.6* 20.4*   BUN mg/dL 30* 38* 35* 35* 25*   CREATININE mg/dL 1.62* 1.74* 1.80* 2.28* 1.74*   GLUCOSE mg/dL 80 82 77 88 180*   CALCIUM mg/dL 7.8* 7.3* 7.6* 7.8* 9.0   AST (SGOT) U/L 18 22 23 26 27   ALT (SGPT) U/L 14 18 17 22 16     Results from last 7 days   Lab Units 01/31/20  2142   TROPONIN T ng/mL <0.010     Results from last 7 days   Lab Units 02/05/20  5807  02/04/20  0435 02/03/20  0211 02/01/20  0458 01/31/20  2142   WBC 10*3/mm3 12.33* 11.29* 11.27* 13.95* 10.78   HEMOGLOBIN g/dL 12.8 12.7 13.5 15.4 15.0   HEMATOCRIT % 37.5 37.5 39.6 46.2 45.8   PLATELETS 10*3/mm3 147 145 174 199 235     Results from last 7 days   Lab Units 02/01/20  0458   INR  1.16*     Results from last 7 days   Lab Units 02/05/20  0507   MAGNESIUM mg/dL 1.6     Results from last 7 days   Lab Units 02/01/20  0458   CHOLESTEROL mg/dL 107   TRIGLYCERIDES mg/dL 38   HDL CHOL mg/dL 57     Results from last 7 days   Lab Units 01/31/20  2142   PROBNP pg/mL 667.0     Results from last 7 days   Lab Units 02/05/20  0507   TSH uIU/mL 2.030         Imaging:  Imaging Results (Most Recent)     Procedure Component Value Units Date/Time    XR Chest PA & Lateral [[id number]] Collected:  02/04/20 1226     Updated:  02/04/20 1235    Narrative:       Chest radiograph     HISTORY:Pneumonia, hypoxia     TECHNIQUE: Two AP and lateral radiographs     COMPARISON:Multiple chest radiographs dating back to 01/31/2020       Impression:       FINDINGS AND IMPRESSION:  Lungs are hypoinflated with asymmetric elevation right hemidiaphragm and  bibasilar pulmonary opacification, right greater than left, as before.  No pneumothorax is seen. Heart size accentuated by low lung volumes.  Small right pleural effusion posteriorly is suspected. Findings  represent atelectasis versus pneumonia with parapneumonic effusion in  the appropriate clinical context and correlation with patient history is  recommended.     This report was finalized on 2/4/2020 12:32 PM by Dr. Carlitos Willis M.D.       XR Chest 1 View [[id number]] Collected:  02/02/20 2346     Updated:  02/02/20 2351    Narrative:       PORTABLE CHEST RADIOGRAPH     HISTORY: Shortness of air and wheezing     COMPARISON: 10/30 first 2020     FINDINGS:  Cardiac silhouette is stable. The patient has developed consolidation at  the right lung base. Pneumonia is not excluded. There is  also a small  right pleural effusion. No pneumothorax is seen. There is left basilar  atelectasis.       Impression:       Interval development of dense right basilar infiltrate, which may  reflect pneumonia. There is also a small right pleural effusion.     This report was finalized on 2/2/2020 11:47 PM by Dr. Kathleen Montoya M.D.       US Gallbladder [[id number]] Collected:  02/01/20 2148     Updated:  02/01/20 2159    Narrative:       GALLBLADDER ULTRASOUND     HISTORY: Pancreatitis     COMPARISON: 01/31/2020     TECHNIQUE: Grayscale and color Doppler sonographic images were obtained  through the right upper quadrant     FINDINGS:  There is limited visualization of the pancreas. Yesterday's imaging  suggested changes of acute pancreatitis. The patient is noted to have  some mild dilatation of the common bile duct, measuring up to 9 mm. No  focal hepatic lesions are seen. There is a trace amount of ascites seen  adjacent to the liver. This may be inflammatory, given the presence of  pancreatitis. The patient's gallbladder appears mildly distended. No  stones or sludge are seen within the gallbladder, although there is some  gallbladder wall edema and gallbladder wall thickening, with gallbladder  wall measuring up to 5 mm in thickness. The patient has a large cyst  arising from the right kidney. This measures up to 10.6 x 8.3 x 7.0 cm.  A second smaller cyst is seen inferiorly. This measures 1.2 x 1.2 x 1.1  cm. No hydronephrosis is identified.       Impression:          1. No stones or sludge are seen within the gallbladder. Gallbladder  appears mildly distended, with gallbladder wall thickening and edema  noted. Appearance is nonspecific, and may be reactive, and may be  related to the patient's pancreatitis.  2. The patient does have some mild dilatation of the common bile duct,  measuring up to 9 mm. Some of this may be age related, but correlation  with liver function tests is recommended. MRCP or ERCP  could be  considered for additional evaluation if these are abnormal.     This report was finalized on 2/1/2020 9:55 PM by Dr. Kathleen Montoya M.D.       CT Abdomen Pelvis Without Contrast [[id number]] Collected:  01/31/20 2346     Updated:  01/31/20 2355    Narrative:       CT OF THE ABDOMEN AND PELVIS WITHOUT CONTRAST     HISTORY: Pancreatitis     COMPARISON: None available.     TECHNIQUE: Axial CT imaging was obtained from the dome the diaphragm to  the symphysis pubis. No IV contrast was administered.     FINDINGS:  Images through the lung bases demonstrate bibasilar atelectasis and  scarring. There is a small hiatal hernia. No focal hepatic lesions are  seen. The adrenal glands are normal. There is a large low-attenuation  lesion arising from the right kidney. This measures up to 9.5 cm, and is  favored to represent a cyst, although it has a somewhat lobulated  contour. Renal ultrasound could be considered on a nonemergent  outpatient basis for further evaluation. The patient also has an  additional small cyst seen within the inferior pole of the right kidney.  There is no hydronephrosis. No distal ureteral or bladder stones are  seen. Urinary bladder appears normal. The patient has inflammatory  stranding seen surrounding the pancreas, in keeping with history of  pancreatitis. Presence or absence of pancreatic necrosis cannot be  assessed on the basis of this unenhanced examination. There is no  pancreatic ductal dilatation. No discrete peripancreatic collections are  seen. There is no evidence of gastric outlet obstruction. The patient  does have a duodenal diverticulum, arising from the second portion of  the duodenum. Uterus is surgically absent. There is colonic  diverticulosis. There is no evidence of diverticulitis. The appendix is  normal. There is some mild wedging deformity involving superior endplate  of T11. This was also present on the prior radiograph from 07/10/2018       Impression:           1. The patient has inflammatory stranding surrounding the pancreatic  head and neck, in keeping with history of pancreatitis. No discrete  peripancreatic collections are seen. There is no evidence of gastric  outlet obstruction.     Radiation dose reduction techniques were utilized, including automated  exposure control and exposure modulation based on body size.     This report was finalized on 1/31/2020 11:52 PM by Dr. Kathleen Montoya M.D.       XR Chest 1 View [[id number]] Collected:  01/31/20 2211     Updated:  01/31/20 2215    Narrative:       PORTABLE CHEST RADIOGRAPH     HISTORY: Chest pain     COMPARISON: 03/22/2019     FINDINGS:  Heart size is within normal limits for technique. No pneumothorax,  pleural effusion, or acute infiltrate is seen. There is mild bibasilar  atelectasis.       Impression:       Mild bibasilar atelectasis.     This report was finalized on 1/31/2020 10:12 PM by Dr. Kathleen Montoya M.D.             I personally viewed and interpreted the patient's EKG    Assessment/Plan:     1. Atrial fibrillation. New diagnosis.  Likely related to pancreatitis and pneumonia.  Rate controlled on diltiazem.  Metoprolol started yesterday.   2. Acute pancreatitis. Improving lipase.    3. LINNEA/CKD. Improving with IV hydration.   4. Pneumonia  5. Hypertension. Controlled.   6. History of paroxysmal SVT    -  Continue metoprolol.   -  Wean diltiazem off as long as HR < 130  -  If remains in atrial fibrillation tomorrow will consider starting anticoagulation    Thank you for allowing me to participate in the care of Mandy Alarcon. Feel free to contact me directly with any further questions or concerns.    Lizbeth Laura MD  [facility]  02/05/20  7:48 AM

## 2020-02-05 NOTE — PROGRESS NOTES
"   LOS: 4 days   Patient Care Team:  Daryl Santos MD as PCP - General (Internal Medicine)  Daryl Santos MD as PCP - Claims Attributed    Chief Complaint: tired    Subjective     Feeling a little better today. New onset AFib with RVR overnight, but pt asymptomatic. No cardiac symptoms at present. C/o some pain in LUQ. No N/V. Tolerating full liquid diet.       Subjective:  Symptoms:  Improved.  She reports malaise and weakness.  No shortness of breath, cough, chest pain, headache, chest pressure, anorexia, diarrhea or anxiety.    Diet:  Poor intake.  No nausea or vomiting.    Activity level: Impaired due to weakness.    Pain:  She complains of pain that is mild.  She reports pain is unchanged.  Pain is well controlled.        History taken from: patient chart family    Objective     Vital Signs  Temp:  [97 °F (36.1 °C)-99.4 °F (37.4 °C)] 97.9 °F (36.6 °C)  Heart Rate:  [] 78  Resp:  [18-20] 18  BP: (111-147)/(62-87) 111/81    Objective:  General Appearance:  Comfortable, in no acute distress and ill-appearing.    Vital signs: (most recent): Blood pressure 111/81, pulse 78, temperature 97.9 °F (36.6 °C), temperature source Oral, resp. rate 18, height 160 cm (63\"), weight 112 kg (247 lb 9.2 oz), SpO2 98 %, not currently breastfeeding.  Vital signs are normal.  No fever.    Output: Producing urine and no stool output.    HEENT: Normal HEENT exam.    Lungs:  Normal effort and normal respiratory rate.  Breath sounds clear to auscultation.  She is not in respiratory distress.    Heart: Normal rate.  Irregular rhythm.    Abdomen: Abdomen is soft and distended.  Bowel sounds are normal.   There is no abdominal tenderness.     Extremities: There is dependent edema (trace in BLEs).    Pulses: Distal pulses are intact.    Neurological: Patient is alert and oriented to person, place and time.  Normal strength.  Patient has normal muscle tone.    Skin:  Warm and dry.  No rash.           I/O last 3 completed " shifts:  In: 360 [P.O.:360]  Out: 2400 [Urine:2400]  I/O this shift:  In: 360 [P.O.:360]  Out: -         Results Review:     I reviewed the patient's new clinical results.  I reviewed the patient's other test results and agree with the interpretation  I personally viewed and interpreted the patient's EKG/Telemetry data  Discussed with pt, dtrs x 2    Results from last 7 days   Lab Units 02/05/20  0507 02/04/20 0435 02/03/20 0211 02/01/20 0458 01/31/20  2142   WBC 10*3/mm3 12.33* 11.29* 11.27* 13.95* 10.78   HEMOGLOBIN g/dL 12.8 12.7 13.5 15.4 15.0   PLATELETS 10*3/mm3 147 145 174 199 235     Results from last 7 days   Lab Units 02/05/20 0507 02/04/20 0435 02/03/20 0211 02/02/20 0441 01/31/20  2142   SODIUM mmol/L 132* 133* 132* 132* 138   POTASSIUM mmol/L 3.8 4.0 3.8 4.4 3.7   CHLORIDE mmol/L 103 101 101 99 102   CO2 mmol/L 16.1* 18.0* 16.5* 16.6* 20.4*   BUN mg/dL 30* 38* 35* 35* 25*   CREATININE mg/dL 1.62* 1.74* 1.80* 2.28* 1.74*   CALCIUM mg/dL 7.8* 7.3* 7.6* 7.8* 9.0   MAGNESIUM mg/dL 1.6  --   --   --   --    PHOSPHORUS mg/dL 2.1*  --   --   --   --    Estimated Creatinine Clearance: 30 mL/min (A) (by C-G formula based on SCr of 1.62 mg/dL (H)).    Medication Review: reviewed    Assessment/Plan       Acute pancreatitis without infection or necrosis    Benign essential hypertension    Hyperlipidemia    Primary hypothyroidism    Secondary polycythemia    Vitamin D deficiency    Supraventricular tachycardia (CMS/HCC)    Obesity (BMI 30-39.9)    CKD (chronic kidney disease) stage 3, GFR 30-59 ml/min (CMS/HCC)    LINNEA (acute kidney injury) (CMS/HCC)    Pneumonia    New onset a-fib (CMS/HCC)    Hyponatremia    Hypomagnesemia    Leukocytosis          Plan:   (Ms. Alarcon is a 86 y.o. former smoker with a history of HTN, HLD, and CKD stage III who presented with epigastric pain secondary to acute pancreatitis.     Acute pancreatitis  -IVF, PRN agents for pain, Anti-emetics as needed  -Lipase normalized now  -full  liquid diet  -Seen by GI. Etiology is unclear. Possibly related to lisinopril/HCTZ. Trigs okay.  -Mild duct thickening on U/S, potentially could get MRCP when renal fx is a little better and PNA more treated and pt less agitated    New onset AFib with RVR  -due to PNA and acute pancreatitis  -Card consult appreciated  -Diltiazem gtt and oral Metoprolol  -possibly start AC if remains in AFib tomorrow  -HR improved     LINNEA/CKD stage 3  -IVFs  -Cr falling  -repeat BMP in AM  -Appreciate Renal attention to pt  -likely due to dehydration in setting of ACE-I and HCTZ  -Na+ stable, replacing Mg++     Hypertension  -Stable  -HCTZ and lisinopril held as stated above     Right basilar community-acquired PNA  -Symptoms of dyspnea, wheezing, and suspected infiltrate on CXR  -On IV Levaquin Q48h  -Blood cultures negative so far, MRSA screen is negative  -Added bronchodilators and encouraging IS     VTE Prophylaxis - Lovenox  Code Status - Full   ).       Rex Coy MD  02/05/20  1:21 PM    Time: 30min

## 2020-02-05 NOTE — PROGRESS NOTES
[facility]  Inpatient Progress Note    Reason for Follow Up: Acute pancreatitis    Subjective     Interval History:   Moved to [facility] last night due to Afib with RVR, rate better today. Still restless and a little confused.        Current Facility-Administered Medications:   •  acetaminophen (TYLENOL) tablet 650 mg, 650 mg, Oral, Q4H PRN, Kannan Rizo APRN, 650 mg at 02/01/20 0203  •  aluminum-magnesium hydroxide-simethicone (MAALOX MAX) 400-400-40 MG/5ML suspension 15 mL, 15 mL, Oral, Q6H PRN, Kannan Rizo APRN, 15 mL at 02/01/20 0808  •  bisacodyl (DULCOLAX) EC tablet 5 mg, 5 mg, Oral, Daily PRN, Kannan Rizo APRN  •  cholecalciferol (VITAMIN D3) tablet 1,000 Units, 1,000 Units, Oral, Daily, Kannan Rizo APRN, 1,000 Units at 02/05/20 0852  •  clobetasol (TEMOVATE) 0.05 % cream, , Topical, Q12H, Kannan Rizo APRN  •  cyclobenzaprine (FLEXERIL) tablet 5 mg, 5 mg, Oral, TID PRN, Amadou Das MD, 5 mg at 02/04/20 1115  •  dilTIAZem (CARDIZEM) 100 mg in 100 mL NS (1 mg/mL) infusion, 5 mg/hr, Intravenous, Titrated, Rex Arias Jr., MD, Stopped at 02/05/20 1318  •  enoxaparin (LOVENOX) syringe 30 mg, 30 mg, Subcutaneous, Q24H, Amadou Das MD, 30 mg at 02/05/20 0855  •  famotidine (PEPCID) injection 20 mg, 20 mg, Intravenous, Q24H, Kannan Rizo APRN, 20 mg at 02/05/20 0737  •  HYDROcodone-acetaminophen (NORCO) 7.5-325 MG per tablet 1 tablet, 1 tablet, Oral, Q4H PRN, Amadou Das MD, 1 tablet at 02/05/20 1300  •  HYDROmorphone (DILAUDID) injection 0.5 mg, 0.5 mg, Intravenous, Q2H PRN, 0.5 mg at 02/04/20 2158 **AND** naloxone (NARCAN) injection 0.4 mg, 0.4 mg, Intravenous, Q5 Min PRN, Nathan Tam MD  •  ipratropium-albuterol (DUO-NEB) nebulizer solution 3 mL, 3 mL, Nebulization, TID - RT, Amadou Das MD, 3 mL at 02/05/20 0798  •  levoFLOXacin (LEVAQUIN) 750 mg/150 mL D5W (premix) (LEVAQUIN) 750 mg, 750 mg,  Intravenous, Q48H, Amadou Das MD, 750 mg at 02/05/20 0852  •  levothyroxine (SYNTHROID, LEVOTHROID) tablet 50 mcg, 50 mcg, Oral, Q AM, Kannan Rizo APRN, 50 mcg at 02/05/20 0648  •  metoprolol tartrate (LOPRESSOR) tablet 25 mg, 25 mg, Oral, Q8H, Amadou Das MD, 25 mg at 02/05/20 1300  •  ondansetron (ZOFRAN) tablet 4 mg, 4 mg, Oral, Q6H PRN **OR** ondansetron (ZOFRAN) injection 4 mg, 4 mg, Intravenous, Q6H PRN, Kannan Rizo APRN, 4 mg at 02/02/20 2352  •  polyethylene glycol 3350 powder (packet), 17 g, Oral, Daily, Amadou Das MD, 17 g at 02/05/20 0856  •  pravastatin (PRAVACHOL) tablet 40 mg, 40 mg, Oral, Nightly, Kannan Rizo APRN, 40 mg at 02/04/20 2022  •  [COMPLETED] Insert peripheral IV, , , Once **AND** sodium chloride 0.9 % flush 10 mL, 10 mL, Intravenous, PRN, Ranjith Quinn MD, 10 mL at 01/31/20 2143  •  sodium chloride 0.9 % flush 10 mL, 10 mL, Intravenous, Q12H, Kannan Rizo APRN, 10 mL at 02/05/20 0736  •  sodium chloride 0.9 % flush 10 mL, 10 mL, Intravenous, PRN, Kannan Rizo APRN  •  sodium chloride 0.9 % infusion, 75 mL/hr, Intravenous, Continuous, Amadou Das MD, Last Rate: 75 mL/hr at 02/05/20 0902, 75 mL/hr at 02/05/20 0902  Review of Systems:     MSK: neck spasm  Cardio: palpitations      Objective     Vital Signs  Temp:  [97 °F (36.1 °C)-99.4 °F (37.4 °C)] 97.9 °F (36.6 °C)  Heart Rate:  [] 78  Resp:  [18-20] 18  BP: (111-147)/(62-87) 111/81  Body mass index is 43.86 kg/m².    Intake/Output Summary (Last 24 hours) at 2/5/2020 1325  Last data filed at 2/5/2020 1312  Gross per 24 hour   Intake 600 ml   Output 500 ml   Net 100 ml     I/O this shift:  In: 360 [P.O.:360]  Out: -      Physical Exam:   General: patient awake, alert and cooperative   Pulm: Decreased breath sounds in the right base, faint expiratory wheezes bilaterally   Abdomen: soft, nontender, minimal generalized tenderness; normal bowel  sounds   Extremities: no rash or edema   Psychiatric: Normal mood and behavior; memory intact     Results Review:     I reviewed the patient's new clinical results.    Results from last 7 days   Lab Units 02/05/20  0507 02/04/20  0435 02/03/20  0211   WBC 10*3/mm3 12.33* 11.29* 11.27*   HEMOGLOBIN g/dL 12.8 12.7 13.5   HEMATOCRIT % 37.5 37.5 39.6   PLATELETS 10*3/mm3 147 145 174     Results from last 7 days   Lab Units 02/05/20  0507 02/04/20  0435 02/03/20  0211   SODIUM mmol/L 132* 133* 132*   POTASSIUM mmol/L 3.8 4.0 3.8   CHLORIDE mmol/L 103 101 101   CO2 mmol/L 16.1* 18.0* 16.5*   BUN mg/dL 30* 38* 35*   CREATININE mg/dL 1.62* 1.74* 1.80*   CALCIUM mg/dL 7.8* 7.3* 7.6*   BILIRUBIN mg/dL 1.1 0.7 1.7*   ALK PHOS U/L 54 46 50   ALT (SGPT) U/L 14 18 17   AST (SGOT) U/L 18 22 23   GLUCOSE mg/dL 80 82 77     Results from last 7 days   Lab Units 02/01/20  0458   INR  1.16*     Lab Results   Lab Value Date/Time    LIPASE 38 02/05/2020 0507    LIPASE 60 02/04/2020 0435    LIPASE 181 (H) 02/03/2020 0211    LIPASE 417 (H) 02/02/2020 0441    LIPASE >3,000 (H) 01/31/2020 2142       Radiology:  XR Chest PA & Lateral   Final Result   FINDINGS AND IMPRESSION:   Lungs are hypoinflated with asymmetric elevation right hemidiaphragm and   bibasilar pulmonary opacification, right greater than left, as before.   No pneumothorax is seen. Heart size accentuated by low lung volumes.   Small right pleural effusion posteriorly is suspected. Findings   represent atelectasis versus pneumonia with parapneumonic effusion in   the appropriate clinical context and correlation with patient history is   recommended.       This report was finalized on 2/4/2020 12:32 PM by Dr. Carlitos Willis M.D.          XR Chest 1 View   Final Result   Interval development of dense right basilar infiltrate, which may   reflect pneumonia. There is also a small right pleural effusion.       This report was finalized on 2/2/2020 11:47 PM by Dr. Kathleen Montoya,    M.D.          US Gallbladder   Final Result       1. No stones or sludge are seen within the gallbladder. Gallbladder   appears mildly distended, with gallbladder wall thickening and edema   noted. Appearance is nonspecific, and may be reactive, and may be   related to the patient's pancreatitis.   2. The patient does have some mild dilatation of the common bile duct,   measuring up to 9 mm. Some of this may be age related, but correlation   with liver function tests is recommended. MRCP or ERCP could be   considered for additional evaluation if these are abnormal.       This report was finalized on 2/1/2020 9:55 PM by Dr. Kathleen Montoya M.D.          CT Abdomen Pelvis Without Contrast   Final Result       1. The patient has inflammatory stranding surrounding the pancreatic   head and neck, in keeping with history of pancreatitis. No discrete   peripancreatic collections are seen. There is no evidence of gastric   outlet obstruction.       Radiation dose reduction techniques were utilized, including automated   exposure control and exposure modulation based on body size.       This report was finalized on 1/31/2020 11:52 PM by Dr. Kathleen Montoya M.D.          XR Chest 1 View   Final Result   Mild bibasilar atelectasis.       This report was finalized on 1/31/2020 10:12 PM by Dr. Kathleen Montoya M.D.              Assessment/Plan     Patient Active Problem List   Diagnosis   • Benign essential hypertension   • Claustrophobia   • Gout   • Hyperlipidemia   • Primary hypothyroidism   • Impaired fasting glucose   • Nocturnal hypoxemia   • Secondary polycythemia   • Vitamin D deficiency   • Therapeutic drug monitoring   • Family history of coronary artery disease   • Bilateral sensorineural hearing loss, wears hearing aids   • Multinodular goiter   • Supraventricular tachycardia (CMS/HCC)   • Morbidly obese (CMS/HCC)   • Acute pancreatitis without infection or necrosis   • Obesity (BMI 30-39.9)   • CKD  (chronic kidney disease) stage 3, GFR 30-59 ml/min (CMS/HCC)   • LINNEA (acute kidney injury) (CMS/HCC)   • Pneumonia   • New onset a-fib (CMS/HCC)   • Hyponatremia   • Hypomagnesemia   • Leukocytosis       Assessment:  1. Acute pancreatitis-etiology uncertain at this point.  Lipid panel normal, no gallstones on CT, no history of alcohol abuse.  She is on outpatient lisinopril/HCTZ which is a possible culprit.  2. Abdominal pain secondary to #1  3. Pneumonia  4. Afib with RVR    Plan:  · Advance to FLD today, then low fat if tolerating  · Encourage pulmonary toilet and increased ambulation  · Eventual consideration for MRI/MRCP once Cr has improved and she is less restless. At this time I think it would be a poor quality study    I discussed the patients findings and my recommendations with patient and family.          Guido Crawford M.D.  [facility]  [address] [place] [address]  Office: [phone]

## 2020-02-05 NOTE — THERAPY TREATMENT NOTE
Patient Name: Mandy Alarcon  : 1933    MRN: [id number]                              Today's Date: 2020       Admit Date: 2020    Visit Dx:     ICD-10-CM ICD-9-CM   1. Acute pancreatitis without infection or necrosis, unspecified pancreatitis type K85.90 577.0   2. Acute kidney injury superimposed on chronic kidney disease (CMS/HCC) N17.9 866.00    N18.9 585.9     Patient Active Problem List   Diagnosis   • Benign essential hypertension   • Claustrophobia   • Gout   • Hyperlipidemia   • Primary hypothyroidism   • Impaired fasting glucose   • Nocturnal hypoxemia   • Secondary polycythemia   • Vitamin D deficiency   • Therapeutic drug monitoring   • Family history of coronary artery disease   • Bilateral sensorineural hearing loss, wears hearing aids   • Multinodular goiter   • Supraventricular tachycardia (CMS/[facility])   • Morbidly obese (CMS/[facility])   • Acute pancreatitis without infection or necrosis   • Obesity (BMI 30-39.9)   • CKD (chronic kidney disease) stage 3, GFR 30-59 ml/min (CMS/[facility])   • LINNEA (acute kidney injury) (CMS/[facility])   • Pneumonia   • New onset a-fib (CMS/[facility])   • Hyponatremia   • Hypomagnesemia   • Leukocytosis     Past Medical History:   Diagnosis Date   • Benign essential hypertension 10/28/2012    2012--treatment for hypertension begun.   • Bilateral sensorineural hearing loss, wears hearing aids 2017    Left is much worse than right.  Patient had multiple ear infections as a child.   • Chronic renal insufficiency, stage II (mild), 2016--creatinine 1.35 2016--creatinine 1.35.  Baseline creatinine approximately 1.3.   • Claustrophobia 2016    This patient has significant nocturnal hypoxemia and I think that she could benefit from nocturnal oxygen therapy. The exact etiology of her hypoxemia is not clear. She could possibly have obstructive sleep apnea but this is not documented. We cannot test this patient for sleep apnea due to the fact that  she is severely claustrophobic. Patient was a former smoker and it is possibly that COPD he is playing a role.   • Family history of coronary artery disease 2016    Patient's mother, father, 2 sisters and a brother all  from myocardial infarctions   • Gout 10/28/2012    2012--initial diagnosis and treatment of gout.   • Hyperlipidemia 10/28/2012    2012--treatment for hyperlipidemia begun.   • Impaired fasting glucose 10/28/2012    2012--initial diagnosis impaired fasting glucose.   • Morbidly obese (CMS/HCC) 3/11/2019   • Nocturnal hypoxemia 2014--patient did not receive nocturnal oxygen because of Medicare regulations.   05/15/2014--overnight oximetry revealed oxygen saturations less than 89% for 22 minutes and 40 seconds. Oxygen saturations less than or equal to 88% for 22 minutes and 40 seconds. Lowest oxygen saturation 83%. The longest continuous time with oxygen saturations less than or equal to 88% was 1 minute and 32 seconds.   2012--overnight oximetry revealed oxygen saturations less than 90% for one hour and 35 minutes. Oxygen saturations less than 89% 59 minutes. This patient has significant nocturnal hypoxemia and I think that she could benefit from nocturnal oxygen therapy. The exact etiology of her hypoxemia is not clear. She could possibly have obstructive sleep apnea but this is not documented. We cannot test this patient for sleep apnea due to the fact that she is severely claustrophobic. Patient was a former smoker and it is possibly that COPD he is playing a role.   • Primary hypothyroidism 2015--TSH remains elevated slightly at 4.92.  Given the overall clinical picture including the multinodular goiter, we will initiate levothyroxine 50 mcg/day and reassess in about 6 weeks.  2018--thyroid ultrasound reveals a multinodular thyroid with multiple subcentimeter nodules.  Only minimal increase in size of the  largest nodule in the left lobe has occurred when compared    • Secondary polycythemia 8/2/2012 11/06/2014--patient did not receive nocturnal oxygen because of Medicare regulations.   05/15/2014--overnight oximetry revealed oxygen saturations less than 89% for 22 minutes and 40 seconds. Oxygen saturations less than or equal to 88% for 22 minutes and 40 seconds. Lowest oxygen saturation 83%. The longest continuous time with oxygen saturations less than or equal to 88% was 1 minute and 32 seconds.   08/02/2012--overnight oximetry revealed oxygen saturations less than 90% for one hour and 35 minutes. Oxygen saturations less than 89% 59 minutes. This patient has significant nocturnal hypoxemia and I think that she could benefit from nocturnal oxygen therapy. The exact etiology of her hypoxemia is not clear. She could possibly have obstructive sleep apnea but this is not documented. We cannot test this patient for sleep apnea due to the fact that she is severely claustrophobic. Patient was a former smoker and it is possibly that COPD he is playing a role.   • Vitamin D deficiency 5/23/2016     Past Surgical History:   Procedure Laterality Date   • OOPHORECTOMY      age 48   • RADICAL ABDOMINAL HYSTERECTOMY  48 years old    48 years of age. Uterine fibroid tumors with menorrhagia - no cancer     General Information     Row Name 02/05/20 4965          PT Evaluation Time/Intention    Document Type  therapy note (daily note)  -EM     Mode of Treatment  individual therapy;physical therapy  -EM     Row Name 02/05/20 1807          General Information    Existing Precautions/Restrictions  fall  -EM       User Key  (r) = Recorded By, (t) = Taken By, (c) = Cosigned By    Initials Name Provider Type    EM Viktoriya Wesley PT Physical Therapist        Mobility     Row Name 02/05/20 4838          Bed Mobility Assessment/Treatment    Comment (Bed Mobility)  not tested, up in chair   -EM     Row Name 02/05/20 7810           "Sit-Stand Transfer    Sit-Stand Bowman (Transfers)  supervision;verbal cues pt stood prematurely out of chair, instructed to sit back down until IV untangled and gait belt donned   -EM     Row Name 02/05/20 164          Gait/Stairs Assessment/Training    Bowman Level (Gait)  minimum assist (75% patient effort);1 person to manage equipment  -EM     Assistive Device (Gait)  -- HHA on L, then hanging onto IV pole on R   -EM     Distance in Feet (Gait)  100  -EM     Deviations/Abnormal Patterns (Gait)  stride length decreased;gait speed decreased  -EM     Bilateral Gait Deviations  forward flexed posture  -EM     Comment (Gait/Stairs)  unsteady, may benefit from rwx   -EM       User Key  (r) = Recorded By, (t) = Taken By, (c) = Cosigned By    Initials Name Provider Type    Viktoriya Mas, PT Physical Therapist        Obj/Interventions    No documentation.       Goals/Plan    No documentation.       Clinical Impression     Row Name 02/05/20 4367          Pain Scale: Numbers Pre/Post-Treatment    Pain Scale: Numbers, Pretreatment  6/10  -EM     Pain Location - Side  Left  -EM     Pain Location  flank  -EM     Pain Intervention(s)  Medication (See MAR);Repositioned  -EM     Row Name 02/05/20 7756          Plan of Care Review    Plan of Care Reviewed With  patient  -EM     Outcome Summary  patient appears confused, demonstrates decreased safety awareness and slightly agitated and asking to walk to \"Emergency room\". Patient c/o pain, unsteady with ambulation, may benefit from use of rwx.   -EM     Row Name 02/05/20 5574          Positioning and Restraints    Pre-Treatment Position  sitting in chair/recliner  -EM     Post Treatment Position  chair  -EM     In Chair  reclined;call light within reach;with family/caregiver;exit alarm on  -EM       User Key  (r) = Recorded By, (t) = Taken By, (c) = Cosigned By    Initials Name Provider Type    Viktoriya Mas PT Physical Therapist        Outcome " "Measures     Row Name 02/05/20 1651          How much help from another person do you currently need...    Turning from your back to your side while in flat bed without using bedrails?  3  -EM     Moving from lying on back to sitting on the side of a flat bed without bedrails?  3  -EM     Moving to and from a bed to a chair (including a wheelchair)?  3  -EM     Standing up from a chair using your arms (e.g., wheelchair, bedside chair)?  3  -EM     Climbing 3-5 steps with a railing?  2  -EM     To walk in hospital room?  3  -EM     AM-PAC 6 Clicks Score (PT)  17  -EM       User Key  (r) = Recorded By, (t) = Taken By, (c) = Cosigned By    Initials Name Provider Type    Viktoriya Mas PT Physical Therapist          PT Recommendation and Plan     Plan of Care Reviewed With: patient  Outcome Summary: patient appears confused, demonstrates decreased safety awareness and slightly agitated and asking to walk to \"Emergency room\". Patient c/o pain, unsteady with ambulation, may benefit from use of rwx.      Time Calculation:   PT Charges     Row Name 02/05/20 1652             Time Calculation    Start Time  1605  -EM      Stop Time  1619  -EM      Time Calculation (min)  14 min  -EM      PT Received On  02/05/20  -EM      PT - Next Appointment  02/06/20  -EM         Time Calculation- PT    Total Timed Code Minutes- PT  14 minute(s)  -EM        User Key  (r) = Recorded By, (t) = Taken By, (c) = Cosigned By    Initials Name Provider Type    Viktoriya Mas PT Physical Therapist        Therapy Charges for Today     Code Description Service Date Service Provider Modifiers Qty    36911979996  PT THER PROC EA 15 MIN 2/5/2020 Viktoriya Wesley, PT GP 1    [id number] HC PT THER SUPP EA 15 MIN 2/5/2020 Viktoriya Wesley, PT GP 1          PT G-Codes  Outcome Measure Options: AM-PAC 6 Clicks Basic Mobility (PT)  AM-PAC 6 Clicks Score (PT): 17    Viktoriya Wesley PT  2/5/2020       "

## 2020-02-05 NOTE — NURSING NOTE
Per Dr Lizbeth Laura MD to Wean diltiazem off as long as HR < 130.  Turned down to 2.5 mg/ 2.5 ml hr to watch HR at this time is 77.  Will continue to monitor.

## 2020-02-05 NOTE — PLAN OF CARE
"  Problem: Patient Care Overview  Goal: Plan of Care Review  Flowsheets (Taken 2/5/2020 5044)  Plan of Care Reviewed With: patient  Outcome Summary: patient appears confused, demonstrates decreased safety awareness and slightly agitated and asking to walk to \"Emergency room\". Patient c/o pain, unsteady with ambulation, may benefit from use of rwx.      "

## 2020-02-05 NOTE — PLAN OF CARE
"Pt has been sleepy.  Pt sat up in chair for meals but daughters made it seem that she wanted to go back to bed.  Daughters in her face and stating she hurts while she is sleeping.  Pt is fine as long as daughters leave her alone.  Education done with family the importance of her getting up to help with pain; skin; and overall health and breathing.  Cardizem drip weaned off per orders; heart rate staying in the 80's.  Pt stable at this time and will continue to monitor.  Problem: Patient Care Overview  Goal: Plan of Care Review  Outcome: Ongoing (interventions implemented as appropriate)  Flowsheets  Taken 2/5/2020 1731 by Mallorie Gamez RN  Progress: improving  Plan of Care Reviewed With: patient;daughter  Taken 2/5/2020 1649 by Viktoriya Wesley PT  Outcome Summary: patient appears confused, demonstrates decreased safety awareness and slightly agitated and asking to walk to \"Emergency room\". Patient c/o pain, unsteady with ambulation, may benefit from use of rwx.   Goal: Individualization and Mutuality  Outcome: Ongoing (interventions implemented as appropriate)  Flowsheets (Taken 2/5/2020 1731)  Patient Specific Goals (Include Timeframe): Wants to leave now and go to [facility]  Patient Specific Preferences: Daughters staying with patient; causing more aggiatation  Goal: Discharge Needs Assessment  Outcome: Ongoing (interventions implemented as appropriate)  Flowsheets (Taken 2/5/2020 1731)  Concerns to be Addressed: basic needs; care coordination/care conferences; discharge planning; denies needs/concerns at this time; compliance issue; adjustment to diagnosis/illness  Readmission Within the Last 30 Days: no previous admission in last 30 days  Goal: Interprofessional Rounds/Family Conf  Outcome: Ongoing (interventions implemented as appropriate)  Flowsheets (Taken 2/5/2020 1731)  Participants: ; nursing; pharmacy; physician     Problem: Pancreatitis, Acute/Chronic (Adult)  Goal: Signs and Symptoms " of Listed Potential Problems Will be Absent, Minimized or Managed (Pancreatitis, Acute/Chronic)  Outcome: Ongoing (interventions implemented as appropriate)  Flowsheets (Taken 2/5/2020 1731)  Problems Assessed (Pancreatitis): infection; pain  Problems Present (Pancreatitis): pain; infection (pt has pneumiona)     Problem: Pain, Acute (Adult)  Goal: Acceptable Pain Control/Comfort Level  Outcome: Ongoing (interventions implemented as appropriate)  Flowsheets (Taken 2/5/2020 1731)  Acceptable Pain Control/Comfort Level: making progress toward outcome     Problem: Fall Risk (Adult)  Goal: Identify Related Risk Factors and Signs and Symptoms  Outcome: Ongoing (interventions implemented as appropriate)  Flowsheets (Taken 2/5/2020 1731)  Related Risk Factors (Fall Risk): confusion/agitation; fatigue/slow reaction; gait/mobility problems; environment unfamiliar  Signs and Symptoms (Fall Risk): presence of risk factors  Goal: Absence of Fall  Outcome: Ongoing (interventions implemented as appropriate)  Flowsheets (Taken 2/5/2020 1731)  Absence of Fall: making progress toward outcome

## 2020-02-05 NOTE — PLAN OF CARE
Problem: Patient Care Overview  Goal: Plan of Care Review  Outcome: Ongoing (interventions implemented as appropriate)  Flowsheets (Taken 2/5/2020 4848)  Progress: no change  Plan of Care Reviewed With: patient; family  Outcome Summary: Here with new onset A-fib, HR as high as 160, cardiziem gtt started, patient adgitated at times, wonder if the presence of two of her daughters aren't adding to the agitation.

## 2020-02-05 NOTE — PROGRESS NOTES
"   LOS: 4 days    Patient Care Team:  Daryl Santos MD as PCP - General (Internal Medicine)  Daryl Santos MD as PCP - Claims Attributed    Chief Complaint:    Chief Complaint   Patient presents with   • Chest Pain     Acute kidney injury on chronic kidney disease  Subjective     Interval History:   Patient is complaining of severe abdominal pain, she had recurrent nausea, no vomiting, no diarrhea, no dysuria or gross hematuria.  No lightheadedness      Review of Systems:   As noted above    Objective     Vital Signs  Temp:  [97 °F (36.1 °C)-99.4 °F (37.4 °C)] 97.9 °F (36.6 °C)  Heart Rate:  [] 80  Resp:  [18-20] 18  BP: (115-147)/(59-87) 125/74    Flowsheet Rows      First Filed Value   Admission Height  160 cm (63\") Documented at 01/31/2020 2149   Admission Weight  99.5 kg (219 lb 6.4 oz) Documented at 01/31/2020 2149          No intake/output data recorded.  I/O last 3 completed shifts:  In: 360 [P.O.:360]  Out: 2400 [Urine:2400]    Intake/Output Summary (Last 24 hours) at 2/5/2020 0852  Last data filed at 2/4/2020 1900  Gross per 24 hour   Intake 360 ml   Output 1000 ml   Net -640 ml       Physical Exam:  General Appearance: alert, oriented x 3, no acute distress, obese, appears to be chronically ill  Skin: warm and dry  HEENT: pupils round and reactive to light, oral mucosa dry, nonicteric sclerae,   Neck: supple, no JVD, trachea midline, I did not appreciate JVD  Lungs: CTA, unlabored breathing effort  Heart: RRR, normal S1 and S2, no S3, no rub  Abdomen: soft,  present bowel sounds to auscultation, the abdomen is protuberant, she has significant epigastric tenderness  : no palpable bladder, no palpable bladder  Extremities: Trace pretibial edema, no cyanosis or clubbing,   Neuro: normal speech and mental status, hard of hearing     Results Review:    Results from last 7 days   Lab Units 02/05/20  0507 02/04/20  0435 02/03/20  0211   SODIUM mmol/L 132* 133* 132*   POTASSIUM mmol/L 3.8 4.0 3.8 "   CHLORIDE mmol/L 103 101 101   CO2 mmol/L 16.1* 18.0* 16.5*   BUN mg/dL 30* 38* 35*   CREATININE mg/dL 1.62* 1.74* 1.80*   CALCIUM mg/dL 7.8* 7.3* 7.6*   BILIRUBIN mg/dL 1.1 0.7 1.7*   ALK PHOS U/L 54 46 50   ALT (SGPT) U/L 14 18 17   AST (SGOT) U/L 18 22 23   GLUCOSE mg/dL 80 82 77       Estimated Creatinine Clearance: 30 mL/min (A) (by C-G formula based on SCr of 1.62 mg/dL (H)).    Results from last 7 days   Lab Units 02/05/20  0507   MAGNESIUM mg/dL 1.6   PHOSPHORUS mg/dL 2.1*       Results from last 7 days   Lab Units 02/05/20  0507   URIC ACID mg/dL 3.9       Results from last 7 days   Lab Units 02/05/20  0507 02/04/20  0435 02/03/20  0211 02/01/20  0458 01/31/20  2142   WBC 10*3/mm3 12.33* 11.29* 11.27* 13.95* 10.78   HEMOGLOBIN g/dL 12.8 12.7 13.5 15.4 15.0   PLATELETS 10*3/mm3 147 145 174 199 235       Results from last 7 days   Lab Units 02/01/20  0458   INR  1.16*         Imaging Results (Last 24 Hours)     Procedure Component Value Units Date/Time    XR Chest PA & Lateral [[id number]] Collected:  02/04/20 1226     Updated:  02/04/20 1235    Narrative:       Chest radiograph     HISTORY:Pneumonia, hypoxia     TECHNIQUE: Two AP and lateral radiographs     COMPARISON:Multiple chest radiographs dating back to 01/31/2020       Impression:       FINDINGS AND IMPRESSION:  Lungs are hypoinflated with asymmetric elevation right hemidiaphragm and  bibasilar pulmonary opacification, right greater than left, as before.  No pneumothorax is seen. Heart size accentuated by low lung volumes.  Small right pleural effusion posteriorly is suspected. Findings  represent atelectasis versus pneumonia with parapneumonic effusion in  the appropriate clinical context and correlation with patient history is  recommended.     This report was finalized on 2/4/2020 12:32 PM by Dr. Carlitos Willis M.D.             cholecalciferol 1,000 Units Oral Daily   clobetasol  Topical Q12H   enoxaparin 30 mg Subcutaneous Q24H   famotidine 20 mg  Intravenous Q24H   ipratropium-albuterol 3 mL Nebulization TID - RT   levoFLOXacin 750 mg Intravenous Q48H   levothyroxine 50 mcg Oral Q AM   metoprolol tartrate 25 mg Oral Q8H   polyethylene glycol 17 g Oral Daily   pravastatin 40 mg Oral Nightly   sodium chloride 10 mL Intravenous Q12H       dilTIAZem 5 mg/hr Last Rate: 5 mg/hr (02/05/20 0056)   sodium chloride 75 mL/hr Last Rate: 75 mL/hr (02/04/20 1922)       Medication Review:   Current Facility-Administered Medications   Medication Dose Route Frequency Provider Last Rate Last Dose   • acetaminophen (TYLENOL) tablet 650 mg  650 mg Oral Q4H PRN Kannan Rizo APRN   650 mg at 02/01/20 0203   • aluminum-magnesium hydroxide-simethicone (MAALOX MAX) 400-400-40 MG/5ML suspension 15 mL  15 mL Oral Q6H PRN Kannan Rizo APRN   15 mL at 02/01/20 0808   • bisacodyl (DULCOLAX) EC tablet 5 mg  5 mg Oral Daily PRN Kannan Rizo APRN       • cholecalciferol (VITAMIN D3) tablet 1,000 Units  1,000 Units Oral Daily Kannan Rizo APRN   1,000 Units at 02/04/20 0811   • clobetasol (TEMOVATE) 0.05 % cream   Topical Q12H Kannan Rizo APRN       • cyclobenzaprine (FLEXERIL) tablet 5 mg  5 mg Oral TID PRN Amadou Das MD   5 mg at 02/04/20 1115   • dilTIAZem (CARDIZEM) 100 mg in 100 mL NS (1 mg/mL) infusion  5 mg/hr Intravenous Titrated Rex Arias Jr., MD 5 mL/hr at 02/05/20 0056 5 mg/hr at 02/05/20 0056   • enoxaparin (LOVENOX) syringe 30 mg  30 mg Subcutaneous Q24H Amadou Das MD   30 mg at 02/04/20 0811   • famotidine (PEPCID) injection 20 mg  20 mg Intravenous Q24H Kannan Rizo APRN   20 mg at 02/05/20 0737   • HYDROcodone-acetaminophen (NORCO) 7.5-325 MG per tablet 1 tablet  1 tablet Oral Q4H PRN Amadou Das MD   1 tablet at 02/05/20 0323   • HYDROmorphone (DILAUDID) injection 0.5 mg  0.5 mg Intravenous Q2H PRN Nathan Tam MD   0.5 mg at 02/04/20 8305    And   • naloxone (NARCAN) injection 0.4 mg   0.4 mg Intravenous Q5 Min PRN Nathan Tam MD       • ipratropium-albuterol (DUO-NEB) nebulizer solution 3 mL  3 mL Nebulization TID - RT Amadou Das MD   3 mL at 02/05/20 0749   • levoFLOXacin (LEVAQUIN) 750 mg/150 mL D5W (premix) (LEVAQUIN) 750 mg  750 mg Intravenous Q48H Amadou Das MD   750 mg at 02/03/20 0938   • levothyroxine (SYNTHROID, LEVOTHROID) tablet 50 mcg  50 mcg Oral Q AM Kannan Rizo APRN   50 mcg at 02/05/20 0648   • metoprolol tartrate (LOPRESSOR) tablet 25 mg  25 mg Oral Q8H Amadou Das MD   25 mg at 02/05/20 0647   • ondansetron (ZOFRAN) tablet 4 mg  4 mg Oral Q6H PRN Kannan Rizo APRN        Or   • ondansetron (ZOFRAN) injection 4 mg  4 mg Intravenous Q6H PRN Kannan Rizo APRN   4 mg at 02/02/20 2352   • polyethylene glycol 3350 powder (packet)  17 g Oral Daily Amadou Das MD   17 g at 02/04/20 0811   • pravastatin (PRAVACHOL) tablet 40 mg  40 mg Oral Nightly Kannan Rizo APRN   40 mg at 02/04/20 2022   • sodium chloride 0.9 % flush 10 mL  10 mL Intravenous PRN Ranjith Quinn MD   10 mL at 01/31/20 2143   • sodium chloride 0.9 % flush 10 mL  10 mL Intravenous Q12H Kannan Rizo APRN   10 mL at 02/05/20 0736   • sodium chloride 0.9 % flush 10 mL  10 mL Intravenous PRN Kannan Rizo APRN       • sodium chloride 0.9 % infusion  75 mL/hr Intravenous Continuous Amadou Das MD 75 mL/hr at 02/04/20 1922 75 mL/hr at 02/04/20 1922       Assessment/Plan   1.  Acute kidney injury on chronic kidney disease stage III, associated with acute pancreatitis improved with hydration, creatinine is stable since admission creatinine is down to 1.62.  It is associated with hemodynamic changes and blunted autoregulation because of the ACE inhibitor, sodium is 132, total CO2 16.1 and anion gap is normal.  2.  Chronic kidney disease stage III most likely secondary to nephrosclerosis also being on hydrochlorothiazide lead  to decreased GFR by negative tubular glomerular feedback mechanism.  3.  Acute pancreatitis  4.  Hypertension, controlled  5.  Hypothyroidism  6.  Dyslipidemia  7.  Severe hearing loss  8.  Minimal lower extremity edema associated probably with hypoalbuminemia, albumin is 2.3.  9.  Hypomagnesemia, magnesium 1.6.        Plan:  1.  To continue the same treatment  2.  Replete magnesium  3.  Surveillance labs        Mil Acosta MD  02/05/20  8:52 AM

## 2020-02-06 ENCOUNTER — APPOINTMENT (OUTPATIENT)
Dept: MRI IMAGING | Facility: HOSPITAL | Age: 85
End: 2020-02-06

## 2020-02-06 LAB
ALBUMIN SERPL-MCNC: 2.2 G/DL (ref 3.5–5.2)
ALBUMIN/GLOB SERPL: 0.7 G/DL
ALP SERPL-CCNC: 56 U/L (ref 39–117)
ALT SERPL W P-5'-P-CCNC: 15 U/L (ref 1–33)
ANION GAP SERPL CALCULATED.3IONS-SCNC: 15.1 MMOL/L (ref 5–15)
AST SERPL-CCNC: 20 U/L (ref 1–32)
BILIRUB SERPL-MCNC: 1.1 MG/DL (ref 0.2–1.2)
BUN BLD-MCNC: 36 MG/DL (ref 8–23)
BUN/CREAT SERPL: 26.1 (ref 7–25)
CALCIUM SPEC-SCNC: 8.2 MG/DL (ref 8.6–10.5)
CHLORIDE SERPL-SCNC: 104 MMOL/L (ref 98–107)
CO2 SERPL-SCNC: 14.9 MMOL/L (ref 22–29)
CREAT BLD-MCNC: 1.38 MG/DL (ref 0.57–1)
DEPRECATED RDW RBC AUTO: 43.8 FL (ref 37–54)
ERYTHROCYTE [DISTWIDTH] IN BLOOD BY AUTOMATED COUNT: 13.9 % (ref 12.3–15.4)
GFR SERPL CREATININE-BSD FRML MDRD: 36 ML/MIN/1.73
GLOBULIN UR ELPH-MCNC: 3.2 GM/DL
GLUCOSE BLD-MCNC: 79 MG/DL (ref 65–99)
HCT VFR BLD AUTO: 37.8 % (ref 34–46.6)
HGB BLD-MCNC: 13 G/DL (ref 12–15.9)
MAGNESIUM SERPL-MCNC: 1.9 MG/DL (ref 1.6–2.4)
MCH RBC QN AUTO: 29.9 PG (ref 26.6–33)
MCHC RBC AUTO-ENTMCNC: 34.4 G/DL (ref 31.5–35.7)
MCV RBC AUTO: 86.9 FL (ref 79–97)
PHOSPHATE SERPL-MCNC: 2.6 MG/DL (ref 2.5–4.5)
PLATELET # BLD AUTO: 180 10*3/MM3 (ref 140–450)
PMV BLD AUTO: 9.8 FL (ref 6–12)
POTASSIUM BLD-SCNC: 3.9 MMOL/L (ref 3.5–5.2)
PROT SERPL-MCNC: 5.4 G/DL (ref 6–8.5)
RBC # BLD AUTO: 4.35 10*6/MM3 (ref 3.77–5.28)
SODIUM BLD-SCNC: 134 MMOL/L (ref 136–145)
URATE SERPL-MCNC: 4.6 MG/DL (ref 2.4–5.7)
WBC NRBC COR # BLD: 12.13 10*3/MM3 (ref 3.4–10.8)

## 2020-02-06 PROCEDURE — 83735 ASSAY OF MAGNESIUM: CPT | Performed by: INTERNAL MEDICINE

## 2020-02-06 PROCEDURE — 94799 UNLISTED PULMONARY SVC/PX: CPT

## 2020-02-06 PROCEDURE — 80053 COMPREHEN METABOLIC PANEL: CPT | Performed by: INTERNAL MEDICINE

## 2020-02-06 PROCEDURE — 99232 SBSQ HOSP IP/OBS MODERATE 35: CPT | Performed by: INTERNAL MEDICINE

## 2020-02-06 PROCEDURE — 25010000002 ENOXAPARIN PER 10 MG: Performed by: INTERNAL MEDICINE

## 2020-02-06 PROCEDURE — 84550 ASSAY OF BLOOD/URIC ACID: CPT | Performed by: INTERNAL MEDICINE

## 2020-02-06 PROCEDURE — 84100 ASSAY OF PHOSPHORUS: CPT | Performed by: INTERNAL MEDICINE

## 2020-02-06 PROCEDURE — 85027 COMPLETE CBC AUTOMATED: CPT | Performed by: HOSPITALIST

## 2020-02-06 PROCEDURE — 97116 GAIT TRAINING THERAPY: CPT | Performed by: PHYSICAL THERAPIST

## 2020-02-06 PROCEDURE — 97110 THERAPEUTIC EXERCISES: CPT | Performed by: PHYSICAL THERAPIST

## 2020-02-06 PROCEDURE — 74181 MRI ABDOMEN W/O CONTRAST: CPT

## 2020-02-06 PROCEDURE — 25010000002 HYDROMORPHONE PER 4 MG: Performed by: INTERNAL MEDICINE

## 2020-02-06 RX ORDER — SENNA AND DOCUSATE SODIUM 50; 8.6 MG/1; MG/1
2 TABLET, FILM COATED ORAL NIGHTLY
Status: DISCONTINUED | OUTPATIENT
Start: 2020-02-06 | End: 2020-02-10

## 2020-02-06 RX ORDER — SODIUM BICARBONATE 650 MG/1
1300 TABLET ORAL 3 TIMES DAILY
Status: DISCONTINUED | OUTPATIENT
Start: 2020-02-06 | End: 2020-02-11

## 2020-02-06 RX ADMIN — SODIUM CHLORIDE, PRESERVATIVE FREE 10 ML: 5 INJECTION INTRAVENOUS at 08:35

## 2020-02-06 RX ADMIN — POLYETHYLENE GLYCOL 3350 17 G: 17 POWDER, FOR SOLUTION ORAL at 08:34

## 2020-02-06 RX ADMIN — SODIUM CHLORIDE 75 ML/HR: 9 INJECTION, SOLUTION INTRAVENOUS at 02:27

## 2020-02-06 RX ADMIN — VITAMIN D, TAB 1000IU (100/BT) 1000 UNITS: 25 TAB at 08:34

## 2020-02-06 RX ADMIN — CLOBETASOL PROPIONATE: 0.5 CREAM TOPICAL at 21:52

## 2020-02-06 RX ADMIN — NYSTATIN 500000 UNITS: 100000 SUSPENSION ORAL at 18:18

## 2020-02-06 RX ADMIN — CLOBETASOL PROPIONATE: 0.5 CREAM TOPICAL at 08:34

## 2020-02-06 RX ADMIN — SODIUM CHLORIDE 75 ML/HR: 9 INJECTION, SOLUTION INTRAVENOUS at 21:58

## 2020-02-06 RX ADMIN — NYSTATIN 500000 UNITS: 100000 SUSPENSION ORAL at 08:34

## 2020-02-06 RX ADMIN — SENNOSIDES AND DOCUSATE SODIUM 2 TABLET: 8.6; 5 TABLET ORAL at 21:52

## 2020-02-06 RX ADMIN — IPRATROPIUM BROMIDE AND ALBUTEROL SULFATE 3 ML: 2.5; .5 SOLUTION RESPIRATORY (INHALATION) at 20:25

## 2020-02-06 RX ADMIN — HYDROCODONE BITARTRATE AND ACETAMINOPHEN 1 TABLET: 7.5; 325 TABLET ORAL at 21:58

## 2020-02-06 RX ADMIN — ENOXAPARIN SODIUM 30 MG: 30 INJECTION SUBCUTANEOUS at 08:34

## 2020-02-06 RX ADMIN — METOPROLOL TARTRATE 25 MG: 25 TABLET ORAL at 06:14

## 2020-02-06 RX ADMIN — METOPROLOL TARTRATE 25 MG: 25 TABLET ORAL at 21:52

## 2020-02-06 RX ADMIN — NYSTATIN 500000 UNITS: 100000 SUSPENSION ORAL at 21:52

## 2020-02-06 RX ADMIN — ENOXAPARIN SODIUM 70 MG: 80 INJECTION SUBCUTANEOUS at 11:00

## 2020-02-06 RX ADMIN — IPRATROPIUM BROMIDE AND ALBUTEROL SULFATE 3 ML: 2.5; .5 SOLUTION RESPIRATORY (INHALATION) at 08:03

## 2020-02-06 RX ADMIN — GLYCERIN 7.5 ML: 5.4 LIQUID RECTAL at 14:09

## 2020-02-06 RX ADMIN — SODIUM CHLORIDE, PRESERVATIVE FREE 10 ML: 5 INJECTION INTRAVENOUS at 21:52

## 2020-02-06 RX ADMIN — ENOXAPARIN SODIUM 100 MG: 100 INJECTION SUBCUTANEOUS at 21:52

## 2020-02-06 RX ADMIN — HYDROCODONE BITARTRATE AND ACETAMINOPHEN 1 TABLET: 7.5; 325 TABLET ORAL at 06:14

## 2020-02-06 RX ADMIN — NYSTATIN 500000 UNITS: 100000 SUSPENSION ORAL at 01:14

## 2020-02-06 RX ADMIN — IPRATROPIUM BROMIDE AND ALBUTEROL SULFATE 3 ML: 2.5; .5 SOLUTION RESPIRATORY (INHALATION) at 14:50

## 2020-02-06 RX ADMIN — PRAVASTATIN SODIUM 40 MG: 40 TABLET ORAL at 21:52

## 2020-02-06 RX ADMIN — HYDROMORPHONE HYDROCHLORIDE 0.5 MG: 10 INJECTION INTRAMUSCULAR; INTRAVENOUS; SUBCUTANEOUS at 15:13

## 2020-02-06 RX ADMIN — LEVOTHYROXINE SODIUM 50 MCG: 50 TABLET ORAL at 06:14

## 2020-02-06 RX ADMIN — SODIUM BICARBONATE 1300 MG: 650 TABLET ORAL at 21:52

## 2020-02-06 RX ADMIN — METOPROLOL TARTRATE 25 MG: 25 TABLET ORAL at 18:18

## 2020-02-06 RX ADMIN — FAMOTIDINE 20 MG: 10 INJECTION INTRAVENOUS at 06:13

## 2020-02-06 RX ADMIN — HYDROMORPHONE HYDROCHLORIDE 0.5 MG: 10 INJECTION INTRAMUSCULAR; INTRAVENOUS; SUBCUTANEOUS at 02:26

## 2020-02-06 RX ADMIN — POLYETHYLENE GLYCOL 3350 17 G: 17 POWDER, FOR SOLUTION ORAL at 21:52

## 2020-02-06 NOTE — PROGRESS NOTES
[facility] Follow Up    Chief Complaint: Follow up atrial fibrillation    Interval History: Feeling better today.  Breathing better.  Tolerating diet.      Objective:     Objective:  Temp:  [97.7 °F (36.5 °C)-99.7 °F (37.6 °C)] 99.7 °F (37.6 °C)  Heart Rate:  [75-97] 93  Resp:  [18] 18  BP: (111-128)/(68-81) 120/68     Intake/Output Summary (Last 24 hours) at 2/6/2020 0745  Last data filed at 2/6/2020 0617  Gross per 24 hour   Intake 3354 ml   Output --   Net 3354 ml     Body mass index is 40.8 kg/m².      02/04/20 2056 02/05/20  0500 02/06/20  0500   Weight: 113 kg (249 lb 1.6 oz) 112 kg (247 lb 9.2 oz) 104 kg (230 lb 4.8 oz)     Weight change: -8.528 kg (-18 lb 12.8 oz)      Physical Exam:   General : Alert, cooperative, in no acute distress.  Neuro: Alert,cooperative and oriented.  Lungs: CTAB. Normal respiratory effort and rate.  CV: irregularly, irregular, rate and rhythm, normal S1 and S2, no murmurs, gallops or rubs.  ABD: Soft, nontender, nondistended. Positive bowel sounds.  Extr: No edema or cyanosis, moves all extremities.    Lab Review:   Results from last 7 days   Lab Units 02/06/20  0458 02/05/20  0507   SODIUM mmol/L 134* 132*   POTASSIUM mmol/L 3.9 3.8   CHLORIDE mmol/L 104 103   CO2 mmol/L 14.9* 16.1*   BUN mg/dL 36* 30*   CREATININE mg/dL 1.38* 1.62*   GLUCOSE mg/dL 79 80   CALCIUM mg/dL 8.2* 7.8*   AST (SGOT) U/L 20 18   ALT (SGPT) U/L 15 14     Results from last 7 days   Lab Units 01/31/20  2142   TROPONIN T ng/mL <0.010     Results from last 7 days   Lab Units 02/06/20  0458 02/05/20  0507   WBC 10*3/mm3 12.13* 12.33*   HEMOGLOBIN g/dL 13.0 12.8   HEMATOCRIT % 37.8 37.5   PLATELETS 10*3/mm3 180 147     Results from last 7 days   Lab Units 02/01/20  0458   INR  1.16*     Results from last 7 days   Lab Units 02/06/20  0458 02/05/20  0507   MAGNESIUM mg/dL 1.9 1.6     Results from last 7 days   Lab Units 02/01/20  0458   CHOLESTEROL mg/dL 107   TRIGLYCERIDES mg/dL 38   HDL  CHOL mg/dL 57     Results from last 7 days   Lab Units 01/31/20  2142   PROBNP pg/mL 667.0     Results from last 7 days   Lab Units 02/05/20  0507   TSH uIU/mL 2.030     I reviewed the patient's new clinical results.  I personally viewed and interpreted the patient's EKG  Current Medications:   Scheduled Meds:  cholecalciferol 1,000 Units Oral Daily   clobetasol  Topical Q12H   enoxaparin 30 mg Subcutaneous Q24H   famotidine 20 mg Intravenous Q24H   ipratropium-albuterol 3 mL Nebulization TID - RT   levoFLOXacin 750 mg Intravenous Q48H   levothyroxine 50 mcg Oral Q AM   metoprolol tartrate 25 mg Oral Q8H   nystatin 5 mL Swish & Swallow 4x Daily   polyethylene glycol 17 g Oral Daily   pravastatin 40 mg Oral Nightly   sodium chloride 10 mL Intravenous Q12H     Continuous Infusions:  dilTIAZem 5 mg/hr Last Rate: Stopped (02/05/20 1318)   sodium chloride 75 mL/hr Last Rate: 75 mL/hr (02/06/20 0227)       Allergies:  Allergies   Allergen Reactions   • Cefaclor Anaphylaxis and Swelling   • Sulfa Antibiotics Rash       Assessment/Plan:     1. Atrial fibrillation. New diagnosis.  Remains in atrial fibrillation.  Likely related to pancreatitis and pneumonia.  Rate controlled on oral metoprolol.  Diltiazem weaned off.  CHADS-VASc is 4.    2. Acute pancreatitis. Improving lipase.    3. LINNEA/CKD. Improving with IV hydration.   4. Pneumonia  5. Hypertension. Controlled.   6. History of paroxysmal SVT    -  Continue oral metoprolol  -  Start therapeutic dosing of enoxaparin.  If no invasive procedures planned and she remains in atrial fibrillation at discharge will need to start oral anticoagulation, likely apixaban    Lizbeth Laura MD  02/06/20  7:45 AM

## 2020-02-06 NOTE — THERAPY TREATMENT NOTE
Patient Name: Mandy Alarcon  : 1933    MRN: [id number]                              Today's Date: 2020       Admit Date: 2020    Visit Dx:     ICD-10-CM ICD-9-CM   1. Acute pancreatitis without infection or necrosis, unspecified pancreatitis type K85.90 577.0   2. Acute kidney injury superimposed on chronic kidney disease (CMS/HCC) N17.9 866.00    N18.9 585.9     Patient Active Problem List   Diagnosis   • Benign essential hypertension   • Claustrophobia   • Gout   • Hyperlipidemia   • Primary hypothyroidism   • Impaired fasting glucose   • Nocturnal hypoxemia   • Secondary polycythemia   • Vitamin D deficiency   • Therapeutic drug monitoring   • Family history of coronary artery disease   • Bilateral sensorineural hearing loss, wears hearing aids   • Multinodular goiter   • Supraventricular tachycardia (CMS/[facility])   • Morbidly obese (CMS/[facility])   • Acute pancreatitis without infection or necrosis   • Obesity (BMI 30-39.9)   • CKD (chronic kidney disease) stage 3, GFR 30-59 ml/min (CMS/[facility])   • LINNEA (acute kidney injury) (CMS/[facility])   • Pneumonia   • New onset a-fib (CMS/[facility])   • Hyponatremia   • Hypomagnesemia   • Leukocytosis     Past Medical History:   Diagnosis Date   • Benign essential hypertension 10/28/2012    2012--treatment for hypertension begun.   • Bilateral sensorineural hearing loss, wears hearing aids 2017    Left is much worse than right.  Patient had multiple ear infections as a child.   • Chronic renal insufficiency, stage II (mild), 2016--creatinine 1.35 2016--creatinine 1.35.  Baseline creatinine approximately 1.3.   • Claustrophobia 2016    This patient has significant nocturnal hypoxemia and I think that she could benefit from nocturnal oxygen therapy. The exact etiology of her hypoxemia is not clear. She could possibly have obstructive sleep apnea but this is not documented. We cannot test this patient for sleep apnea due to the fact that  she is severely claustrophobic. Patient was a former smoker and it is possibly that COPD he is playing a role.   • Family history of coronary artery disease 2016    Patient's mother, father, 2 sisters and a brother all  from myocardial infarctions   • Gout 10/28/2012    2012--initial diagnosis and treatment of gout.   • Hyperlipidemia 10/28/2012    2012--treatment for hyperlipidemia begun.   • Impaired fasting glucose 10/28/2012    2012--initial diagnosis impaired fasting glucose.   • Morbidly obese (CMS/HCC) 3/11/2019   • Nocturnal hypoxemia 2014--patient did not receive nocturnal oxygen because of Medicare regulations.   05/15/2014--overnight oximetry revealed oxygen saturations less than 89% for 22 minutes and 40 seconds. Oxygen saturations less than or equal to 88% for 22 minutes and 40 seconds. Lowest oxygen saturation 83%. The longest continuous time with oxygen saturations less than or equal to 88% was 1 minute and 32 seconds.   2012--overnight oximetry revealed oxygen saturations less than 90% for one hour and 35 minutes. Oxygen saturations less than 89% 59 minutes. This patient has significant nocturnal hypoxemia and I think that she could benefit from nocturnal oxygen therapy. The exact etiology of her hypoxemia is not clear. She could possibly have obstructive sleep apnea but this is not documented. We cannot test this patient for sleep apnea due to the fact that she is severely claustrophobic. Patient was a former smoker and it is possibly that COPD he is playing a role.   • Primary hypothyroidism 2015--TSH remains elevated slightly at 4.92.  Given the overall clinical picture including the multinodular goiter, we will initiate levothyroxine 50 mcg/day and reassess in about 6 weeks.  2018--thyroid ultrasound reveals a multinodular thyroid with multiple subcentimeter nodules.  Only minimal increase in size of the  largest nodule in the left lobe has occurred when compared    • Secondary polycythemia 8/2/2012 11/06/2014--patient did not receive nocturnal oxygen because of Medicare regulations.   05/15/2014--overnight oximetry revealed oxygen saturations less than 89% for 22 minutes and 40 seconds. Oxygen saturations less than or equal to 88% for 22 minutes and 40 seconds. Lowest oxygen saturation 83%. The longest continuous time with oxygen saturations less than or equal to 88% was 1 minute and 32 seconds.   08/02/2012--overnight oximetry revealed oxygen saturations less than 90% for one hour and 35 minutes. Oxygen saturations less than 89% 59 minutes. This patient has significant nocturnal hypoxemia and I think that she could benefit from nocturnal oxygen therapy. The exact etiology of her hypoxemia is not clear. She could possibly have obstructive sleep apnea but this is not documented. We cannot test this patient for sleep apnea due to the fact that she is severely claustrophobic. Patient was a former smoker and it is possibly that COPD he is playing a role.   • Vitamin D deficiency 5/23/2016     Past Surgical History:   Procedure Laterality Date   • OOPHORECTOMY      age 48   • RADICAL ABDOMINAL HYSTERECTOMY  48 years old    48 years of age. Uterine fibroid tumors with menorrhagia - no cancer     General Information     Row Name 02/06/20 1031          PT Evaluation Time/Intention    Document Type  therapy note (daily note)  -CAR     Mode of Treatment  individual therapy;physical therapy  -     Row Name 02/06/20 1031          General Information    Patient Profile Reviewed?  yes  -CAR     Existing Precautions/Restrictions  fall;other (see comments) confusion  -     Row Name 02/06/20 1031          Cognitive Assessment/Intervention- PT/OT    Orientation Status (Cognition)  person;situation  -     Row Name 02/06/20 1031          Safety Issues, Functional Mobility    Impairments Affecting Function (Mobility)   pain;endurance/activity tolerance;shortness of breath  -JK       User Key  (r) = Recorded By, (t) = Taken By, (c) = Cosigned By    Initials Name Provider Type    Rafaela Matamoros, GREG Physical Therapist        Mobility     Row Name 02/06/20 1032          Bed Mobility Assessment/Treatment    Supine-Sit [place] (Bed Mobility)  not tested  -JK     Sit-Supine [place] (Bed Mobility)  not tested  -JK     Row Name 02/06/20 1032          Sit-Stand Transfer    Sit-Stand [place] (Transfers)  contact guard;verbal cues  -JK     Assistive Device (Sit-Stand Transfers)  walker, front-wheeled  -JK     Row Name 02/06/20 1032          Gait/Stairs Assessment/Training    Mull[place] Level (Gait)  contact guard;1 person to manage equipment;minimum assist (75% patient effort)  -JK     Assistive Device (Gait)  walker, front-wheeled  -JK     Distance in Feet (Gait)  145  -JK     Pattern (Gait)  step-through  -JK     Deviations/Abnormal Patterns (Gait)  stride length decreased;gait speed decreased  -JK     Bilateral Gait Deviations  forward flexed posture  -JK     Comment (Gait/Stairs)  CG for pt assist; min A at times to steer RW at times; toilet transfer with CGA and VCs for clothing management and pt positioning  -JK       User Key  (r) = Recorded By, (t) = Taken By, (c) = Cosigned By    Initials Name Provider Type    Rafaela Matamoros, PT Physical Therapist        Obj/Interventions    No documentation.       Goals/Plan    No documentation.       Clinical Impression     Row Name 02/06/20 1033          Pain Scale: Numbers Pre/Post-Treatment    Pain Scale: Numbers, Pretreatment  8/10  -JK     Pain Scale: Numbers, Post-Treatment  8/10  -JK     Pain Location - Side  Right  -JK     Pain Location - Orientation  generalized  -JK     Pain Location  flank  -JK     Row Name 02/06/20 1033          Plan of Care Review    Outcome Summary  Pt willing to work with PT today and had increased gait distance today. Pt is slightly confused  and needs VCs for safety cues. Pt's gait distance increased today; pt needs CGA for stability but also needs min A to steer/guide RW and is limited by R flank pain. Pt would benefit from continued skilled PT.   -CAR     Row Name 02/06/20 1033          Vital Signs    Pretreatment Heart Rate (beats/min)  93  -JK     Posttreatment Heart Rate (beats/min)  93  -JK     O2 Delivery Pre Treatment  room air  -JK     O2 Delivery Intra Treatment  room air  -JK     O2 Delivery Post Treatment  room air  -JK     Row Name 02/06/20 1033          Positioning and Restraints    Pre-Treatment Position  sitting in chair/recliner  -JK     Post Treatment Position  chair  -JK     In Chair  reclined;call light within reach;encouraged to call for assist;exit alarm on;with family/caregiver;legs elevated  -ASHLEEK       User Key  (r) = Recorded By, (t) = Taken By, (c) = Cosigned By    Initials Name Provider Type    Rafaela Matamoros, PT Physical Therapist        Outcome Measures     Row Name 02/06/20 1037          How much help from another person do you currently need...    Turning from your back to your side while in flat bed without using bedrails?  3  -JK     Moving from lying on back to sitting on the side of a flat bed without bedrails?  3  -JK     Moving to and from a bed to a chair (including a wheelchair)?  3  -JK     Standing up from a chair using your arms (e.g., wheelchair, bedside chair)?  3  -JK     Climbing 3-5 steps with a railing?  2  -JK     To walk in hospital room?  3  -ASHLEEK     AM-PAC 6 Clicks Score (PT)  17  -CAR       User Key  (r) = Recorded By, (t) = Taken By, (c) = Cosigned By    Initials Name Provider Type    Rafaela Matamoros, PT Physical Therapist          PT Recommendation and Plan     Outcome Summary: Pt willing to work with PT today and had increased gait distance today. Pt is slightly confused and needs VCs for safety cues. Pt's gait distance increased today; pt needs CGA for stability but also needs min A to  steer/guide RW and is limited by R flank pain. Pt would benefit from continued skilled PT.      Time Calculation:   PT Charges     Row Name 02/06/20 1038             Time Calculation    Start Time  1012  -JK      Stop Time  1038  -JK      Time Calculation (min)  26 min  -CAR      PT Received On  02/06/20  -CAR      PT - Next Appointment  02/07/20  -CAR        User Key  (r) = Recorded By, (t) = Taken By, (c) = Cosigned By    Initials Name Provider Type    Rafaela Matamoros PT Physical Therapist        Therapy Charges for Today     Code Description Service Date Service Provider Modifiers Qty    19956296598 HC PT THER PROC EA 15 MIN 2/6/2020 Rafaela Falk, PT GP 1    90268967467 HC GAIT TRAINING EA 15 MIN 2/6/2020 Rafaela Falk, PT GP 1    [id number]  PT THER SUPP EA 15 MIN 2/6/2020 Rafaela Falk, PT GP 2          PT G-Codes  Outcome Measure Options: AM-PAC 6 Clicks Basic Mobility (PT)  AM-PAC 6 Clicks Score (PT): 17    Rafaela Falk PT  2/6/2020

## 2020-02-06 NOTE — PLAN OF CARE
Problem: Patient Care Overview  Goal: Plan of Care Review  Flowsheets (Taken 2/6/2020 1033)  Outcome Summary: Pt willing to work with PT today and had increased gait distance today. Pt is slightly confused and needs VCs for safety cues. Pt's gait distance increased today; pt needs CGA for stability but also needs min A to steer/guide RW and is limited by R flank pain. Pt would benefit from continued skilled PT.

## 2020-02-06 NOTE — PROGRESS NOTES
"   LOS: 5 days    Patient Care Team:  Daryl Santos MD as PCP - General (Internal Medicine)  Daryl Santos MD as PCP - Claims Attributed    Chief Complaint:    Chief Complaint   Patient presents with   • Chest Pain     Acute kidney injury on chronic kidney disease  Subjective     Interval History:   Patient is complaining of less abdominal pain, she had recurrent nausea, no vomiting, no diarrhea, no dysuria or gross hematuria.  No lightheadedness      Review of Systems:   As noted above    Objective     Vital Signs  Temp:  [97.7 °F (36.5 °C)-99.7 °F (37.6 °C)] 99.7 °F (37.6 °C)  Heart Rate:  [75-97] 93  Resp:  [18] 18  BP: (111-128)/(68-81) 120/68    Flowsheet Rows      First Filed Value   Admission Height  160 cm (63\") Documented at 01/31/2020 2149   Admission Weight  99.5 kg (219 lb 6.4 oz) Documented at 01/31/2020 2149          No intake/output data recorded.  I/O last 3 completed shifts:  In: 3354 [P.O.:1080; I.V.:2274]  Out: -     Intake/Output Summary (Last 24 hours) at 2/6/2020 0747  Last data filed at 2/6/2020 0617  Gross per 24 hour   Intake 3354 ml   Output --   Net 3354 ml       Physical Exam:  General Appearance: alert, oriented x 3, no acute distress, obese, appears to be chronically ill  Skin: warm and dry  HEENT: pupils round and reactive to light, oral mucosa dry, nonicteric sclerae,   Neck: supple, no JVD, trachea midline, I did not appreciate JVD  Lungs: CTA, unlabored breathing effort  Heart: RRR, normal S1 and S2, no S3, no rub  Abdomen: soft,  present bowel sounds to auscultation, the abdomen is protuberant, she has less epigastric tenderness   : no palpable bladder, no palpable bladder  Extremities: Trace pretibial edema, no cyanosis or clubbing,   Neuro: normal speech and mental status, hard of hearing     Results Review:    Results from last 7 days   Lab Units 02/06/20  0458 02/05/20  0507 02/04/20  0435   SODIUM mmol/L 134* 132* 133*   POTASSIUM mmol/L 3.9 3.8 4.0   CHLORIDE " mmol/L 104 103 101   CO2 mmol/L 14.9* 16.1* 18.0*   BUN mg/dL 36* 30* 38*   CREATININE mg/dL 1.38* 1.62* 1.74*   CALCIUM mg/dL 8.2* 7.8* 7.3*   BILIRUBIN mg/dL 1.1 1.1 0.7   ALK PHOS U/L 56 54 46   ALT (SGPT) U/L 15 14 18   AST (SGOT) U/L 20 18 22   GLUCOSE mg/dL 79 80 82       Estimated Creatinine Clearance: 33.7 mL/min (A) (by C-G formula based on SCr of 1.38 mg/dL (H)).    Results from last 7 days   Lab Units 02/06/20  0458 02/05/20  0507   MAGNESIUM mg/dL 1.9 1.6   PHOSPHORUS mg/dL 2.6 2.1*       Results from last 7 days   Lab Units 02/06/20  0458 02/05/20  0507   URIC ACID mg/dL 4.6 3.9       Results from last 7 days   Lab Units 02/06/20  0458 02/05/20  0507 02/04/20  0435 02/03/20  0211 02/01/20  0458   WBC 10*3/mm3 12.13* 12.33* 11.29* 11.27* 13.95*   HEMOGLOBIN g/dL 13.0 12.8 12.7 13.5 15.4   PLATELETS 10*3/mm3 180 147 145 174 199       Results from last 7 days   Lab Units 02/01/20  0458   INR  1.16*         Imaging Results (Last 24 Hours)     ** No results found for the last 24 hours. **          cholecalciferol 1,000 Units Oral Daily   clobetasol  Topical Q12H   enoxaparin 30 mg Subcutaneous Q24H   famotidine 20 mg Intravenous Q24H   ipratropium-albuterol 3 mL Nebulization TID - RT   levoFLOXacin 750 mg Intravenous Q48H   levothyroxine 50 mcg Oral Q AM   metoprolol tartrate 25 mg Oral Q8H   nystatin 5 mL Swish & Swallow 4x Daily   polyethylene glycol 17 g Oral Daily   pravastatin 40 mg Oral Nightly   sodium chloride 10 mL Intravenous Q12H       dilTIAZem 5 mg/hr Last Rate: Stopped (02/05/20 1318)   sodium chloride 75 mL/hr Last Rate: 75 mL/hr (02/06/20 0227)       Medication Review:   Current Facility-Administered Medications   Medication Dose Route Frequency Provider Last Rate Last Dose   • acetaminophen (TYLENOL) tablet 650 mg  650 mg Oral Q4H PRN Kannan Rizo APRN   650 mg at 02/01/20 0203   • aluminum-magnesium hydroxide-simethicone (MAALOX MAX) 400-400-40 MG/5ML suspension 15 mL  15 mL Oral  Q6H PRN Kannan Rizo APRN   15 mL at 02/01/20 0808   • bisacodyl (DULCOLAX) EC tablet 5 mg  5 mg Oral Daily PRN Kannan Rizo APRN       • cholecalciferol (VITAMIN D3) tablet 1,000 Units  1,000 Units Oral Daily Kannan Rizo APRN   1,000 Units at 02/05/20 0852   • clobetasol (TEMOVATE) 0.05 % cream   Topical Q12H Kannan Rizo APRN       • cyclobenzaprine (FLEXERIL) tablet 5 mg  5 mg Oral TID PRN Amadou Das MD   5 mg at 02/04/20 1115   • dilTIAZem (CARDIZEM) 100 mg in 100 mL NS (1 mg/mL) infusion  5 mg/hr Intravenous Titrated Rex Arias Jr., MD   Stopped at 02/05/20 1318   • enoxaparin (LOVENOX) syringe 30 mg  30 mg Subcutaneous Q24H Amadou Das MD   30 mg at 02/05/20 0855   • famotidine (PEPCID) injection 20 mg  20 mg Intravenous Q24H Kannan Rizo APRN   20 mg at 02/06/20 0613   • HYDROcodone-acetaminophen (NORCO) 7.5-325 MG per tablet 1 tablet  1 tablet Oral Q4H PRN Amadou Das MD   1 tablet at 02/06/20 0614   • HYDROmorphone (DILAUDID) injection 0.5 mg  0.5 mg Intravenous Q2H PRN Nathan Tam MD   0.5 mg at 02/06/20 0226    And   • naloxone (NARCAN) injection 0.4 mg  0.4 mg Intravenous Q5 Min PRN Nathan Tam MD       • ipratropium-albuterol (DUO-NEB) nebulizer solution 3 mL  3 mL Nebulization TID - RT Amadou Das MD   3 mL at 02/05/20 1956   • levoFLOXacin (LEVAQUIN) 750 mg/150 mL D5W (premix) (LEVAQUIN) 750 mg  750 mg Intravenous Q48H Amadou Das MD   750 mg at 02/05/20 0852   • levothyroxine (SYNTHROID, LEVOTHROID) tablet 50 mcg  50 mcg Oral Q AM Kannan Rizo APRN   50 mcg at 02/06/20 0614   • metoprolol tartrate (LOPRESSOR) tablet 25 mg  25 mg Oral Q8H Amadou Das MD   25 mg at 02/06/20 0614   • nystatin (MYCOSTATIN) 730651 UNIT/ML suspension 500,000 Units  5 mL Swish & Swallow 4x Daily Meeta Goodwin APRN   500,000 Units at 02/06/20 0114   • ondansetron (ZOFRAN) tablet 4 mg  4 mg Oral Q6H  PRN Kannan Rizo APRN        Or   • ondansetron (ZOFRAN) injection 4 mg  4 mg Intravenous Q6H PRN aKnnan Rizo APRN   4 mg at 02/05/20 2040   • polyethylene glycol 3350 powder (packet)  17 g Oral Daily Amadou Das MD   17 g at 02/05/20 0856   • pravastatin (PRAVACHOL) tablet 40 mg  40 mg Oral Nightly Kannan Rizo APRN   40 mg at 02/05/20 2021   • sodium chloride 0.9 % flush 10 mL  10 mL Intravenous PRN Ranjith Quinn MD   10 mL at 01/31/20 2143   • sodium chloride 0.9 % flush 10 mL  10 mL Intravenous Q12H Kannan Rizo APRN   10 mL at 02/05/20 0736   • sodium chloride 0.9 % flush 10 mL  10 mL Intravenous PRN Kannan Rizo APRN       • sodium chloride 0.9 % infusion  75 mL/hr Intravenous Continuous Amadou Das MD 75 mL/hr at 02/06/20 0227 75 mL/hr at 02/06/20 0227       Assessment/Plan   1.  Acute kidney injury on chronic kidney disease stage III, associated with acute pancreatitis improved with hydration, creatinine is stable since admission creatinine is down to 1.38.  It is associated with hemodynamic changes and blunted autoregulation because of the ACE inhibitor, sodium is 134, total CO2 14.9 and anion gap is normal.  2.  Chronic kidney disease stage III most likely secondary to nephrosclerosis also being on hydrochlorothiazide lead to decreased GFR by negative tubular glomerular feedback mechanism.  3.  Acute pancreatitis  4.  Hypertension, controlled  5.  Hypothyroidism  6.  Dyslipidemia  7.  Severe hearing loss  8.  Minimal lower extremity edema associated probably with hypoalbuminemia, albumin is 2.2  9.  Hypomagnesemia, magnesium 1.9  10.  Metabolic acidosis with slight increase in the anion gap, multifactorial        Plan:  1.  To continue the same treatment  2.  Add oral sodium bicarbonate  3.  Surveillance labs      Mil Acosta MD  02/06/20  7:47 AM

## 2020-02-06 NOTE — PROGRESS NOTES
[facility]  Inpatient Progress Note    Reason for Follow Up:  pancreatitis    Subjective     Interval History:   Patient was seen with her daughter in the room.  Still having some left-sided abdominal pain but overall better.  Some dry heaving during the night but no vomiting.  Tolerated breakfast this morning.  Mostly complaining of constipation saying she has not had a bowel movement since before admission    Current Facility-Administered Medications:   •  acetaminophen (TYLENOL) tablet 650 mg, 650 mg, Oral, Q4H PRN, Kannan Rizo APRN, 650 mg at 02/01/20 0203  •  aluminum-magnesium hydroxide-simethicone (MAALOX MAX) 400-400-40 MG/5ML suspension 15 mL, 15 mL, Oral, Q6H PRN, Kannan Rizo APRN, 15 mL at 02/01/20 0808  •  bisacodyl (DULCOLAX) EC tablet 5 mg, 5 mg, Oral, Daily PRN, Kannan Rizo APRN  •  cholecalciferol (VITAMIN D3) tablet 1,000 Units, 1,000 Units, Oral, Daily, Kannan Rizo APRN, 1,000 Units at 02/06/20 0834  •  clobetasol (TEMOVATE) 0.05 % cream, , Topical, Q12H, Kannan Rizo APRN  •  cyclobenzaprine (FLEXERIL) tablet 5 mg, 5 mg, Oral, TID PRN, Amadou Das MD, 5 mg at 02/04/20 1115  •  dilTIAZem (CARDIZEM) 100 mg in 100 mL NS (1 mg/mL) infusion, 5 mg/hr, Intravenous, Titrated, Rex Arias Jr., MD, Stopped at 02/05/20 1318  •  enoxaparin (LOVENOX) syringe 30 mg, 30 mg, Subcutaneous, Q24H, Amadou Das MD, 30 mg at 02/06/20 0834  •  famotidine (PEPCID) injection 20 mg, 20 mg, Intravenous, Q24H, Kannan Rizo APRN, 20 mg at 02/06/20 0613  •  HYDROcodone-acetaminophen (NORCO) 7.5-325 MG per tablet 1 tablet, 1 tablet, Oral, Q4H PRN, Amadou Das MD, 1 tablet at 02/06/20 0614  •  HYDROmorphone (DILAUDID) injection 0.5 mg, 0.5 mg, Intravenous, Q2H PRN, 0.5 mg at 02/06/20 0226 **AND** naloxone (NARCAN) injection 0.4 mg, 0.4 mg, Intravenous, Q5 Min PRN, Nathan Tam MD  •  ipratropium-albuterol (DUO-NEB)  nebulizer solution 3 mL, 3 mL, Nebulization, TID - RT, Amadou Das MD, 3 mL at 02/06/20 0803  •  levoFLOXacin (LEVAQUIN) 750 mg/150 mL D5W (premix) (LEVAQUIN) 750 mg, 750 mg, Intravenous, Q48H, Amadou Das MD, 750 mg at 02/05/20 0852  •  levothyroxine (SYNTHROID, LEVOTHROID) tablet 50 mcg, 50 mcg, Oral, Q AM, Kannan Rizo APRN, 50 mcg at 02/06/20 0614  •  metoprolol tartrate (LOPRESSOR) tablet 25 mg, 25 mg, Oral, Q8H, Amadou Das MD, 25 mg at 02/06/20 0614  •  nystatin (MYCOSTATIN) 509508 UNIT/ML suspension 500,000 Units, 5 mL, Swish & Swallow, 4x Daily, Meeta Goodwin APRN, 500,000 Units at 02/06/20 0834  •  ondansetron (ZOFRAN) tablet 4 mg, 4 mg, Oral, Q6H PRN **OR** ondansetron (ZOFRAN) injection 4 mg, 4 mg, Intravenous, Q6H PRN, Kannan Rizo APRN, 4 mg at 02/05/20 2040  •  polyethylene glycol 3350 powder (packet), 17 g, Oral, Daily, Amadou Das MD, 17 g at 02/06/20 0834  •  pravastatin (PRAVACHOL) tablet 40 mg, 40 mg, Oral, Nightly, Kannan Rizo APRN, 40 mg at 02/05/20 2021  •  sodium bicarbonate tablet 1,300 mg, 1,300 mg, Oral, TID, Mil Acosta MD  •  [COMPLETED] Insert peripheral IV, , , Once **AND** sodium chloride 0.9 % flush 10 mL, 10 mL, Intravenous, PRN, Ranjith Quinn MD, 10 mL at 01/31/20 2143  •  sodium chloride 0.9 % flush 10 mL, 10 mL, Intravenous, Q12H, Kannan Rizo APRN, 10 mL at 02/06/20 0835  •  sodium chloride 0.9 % flush 10 mL, 10 mL, Intravenous, PRN, Kannan Rizo, APRN  •  sodium chloride 0.9 % infusion, 75 mL/hr, Intravenous, Continuous, Amadou Das MD, Last Rate: 75 mL/hr at 02/06/20 0227, 75 mL/hr at 02/06/20 0227  Review of Systems:   All systems reviewed and negative except for: GI: Dry heaving overnight, left-sided abdominal pain, constipation      Objective     Vital Signs  Temp:  [97.7 °F (36.5 °C)-99.7 °F (37.6 °C)] 98.1 °F (36.7 °C)  Heart Rate:  [] 102  Resp:  [18] 18  BP:  (111-141)/(68-97) 141/97  Body mass index is 40.8 kg/m².    Intake/Output Summary (Last 24 hours) at 2/6/2020 0908  Last data filed at 2/6/2020 0617  Gross per 24 hour   Intake 3234 ml   Output --   Net 3234 ml     No intake/output data recorded.     Physical Exam:   General: patient awake, alert and cooperative, hard of hearing, appears stated age   Eyes: Normal lids and lashes, no scleral icterus   Neck: supple, normal ROM   Skin: warm and dry, not jaundiced   Cardiovascular: regular rhythm and rate, no murmurs auscultated   Pulm: clear to auscultation bilaterally, regular and unlabored   Abdomen: soft, obese, mildly tender on palpation, nondistended; normal bowel sounds   Rectal: deferred   Extremities: no rash or edema   Psychiatric: Normal mood and behavior; memory intact     Results Review:     I reviewed the patient's new clinical results.    Results from last 7 days   Lab Units 02/06/20 0458 02/05/20 0507 02/04/20 0435   WBC 10*3/mm3 12.13* 12.33* 11.29*   HEMOGLOBIN g/dL 13.0 12.8 12.7   HEMATOCRIT % 37.8 37.5 37.5   PLATELETS 10*3/mm3 180 147 145     Results from last 7 days   Lab Units 02/06/20 0458 02/05/20  0507 02/04/20  0435   SODIUM mmol/L 134* 132* 133*   POTASSIUM mmol/L 3.9 3.8 4.0   CHLORIDE mmol/L 104 103 101   CO2 mmol/L 14.9* 16.1* 18.0*   BUN mg/dL 36* 30* 38*   CREATININE mg/dL 1.38* 1.62* 1.74*   CALCIUM mg/dL 8.2* 7.8* 7.3*   BILIRUBIN mg/dL 1.1 1.1 0.7   ALK PHOS U/L 56 54 46   ALT (SGPT) U/L 15 14 18   AST (SGOT) U/L 20 18 22   GLUCOSE mg/dL 79 80 82     Results from last 7 days   Lab Units 02/01/20 0458   INR  1.16*     Lab Results   Lab Value Date/Time    LIPASE 38 02/05/2020 0507    LIPASE 60 02/04/2020 0435    LIPASE 181 (H) 02/03/2020 0211    LIPASE 417 (H) 02/02/2020 0441    LIPASE >3,000 (H) 01/31/2020 2142       Radiology:  XR Chest PA & Lateral   Final Result   FINDINGS AND IMPRESSION:   Lungs are hypoinflated with asymmetric elevation right hemidiaphragm and   bibasilar  pulmonary opacification, right greater than left, as before.   No pneumothorax is seen. Heart size accentuated by low lung volumes.   Small right pleural effusion posteriorly is suspected. Findings   represent atelectasis versus pneumonia with parapneumonic effusion in   the appropriate clinical context and correlation with patient history is   recommended.       This report was finalized on 2/4/2020 12:32 PM by Dr. Carlitos Willis M.D.          XR Chest 1 View   Final Result   Interval development of dense right basilar infiltrate, which may   reflect pneumonia. There is also a small right pleural effusion.       This report was finalized on 2/2/2020 11:47 PM by Dr. Kathleen Montoya M.D.          US Gallbladder   Final Result       1. No stones or sludge are seen within the gallbladder. Gallbladder   appears mildly distended, with gallbladder wall thickening and edema   noted. Appearance is nonspecific, and may be reactive, and may be   related to the patient's pancreatitis.   2. The patient does have some mild dilatation of the common bile duct,   measuring up to 9 mm. Some of this may be age related, but correlation   with liver function tests is recommended. MRCP or ERCP could be   considered for additional evaluation if these are abnormal.       This report was finalized on 2/1/2020 9:55 PM by Dr. Kathleen Montoya M.D.          CT Abdomen Pelvis Without Contrast   Final Result       1. The patient has inflammatory stranding surrounding the pancreatic   head and neck, in keeping with history of pancreatitis. No discrete   peripancreatic collections are seen. There is no evidence of gastric   outlet obstruction.       Radiation dose reduction techniques were utilized, including automated   exposure control and exposure modulation based on body size.       This report was finalized on 1/31/2020 11:52 PM by Dr. Kathleen Montoya M.D.          XR Chest 1 View   Final Result   Mild bibasilar atelectasis.        This report was finalized on 1/31/2020 10:12 PM by Dr. Kathleen Montoya M.D.              Assessment/Plan     Patient Active Problem List   Diagnosis   • Benign essential hypertension   • Claustrophobia   • Gout   • Hyperlipidemia   • Primary hypothyroidism   • Impaired fasting glucose   • Nocturnal hypoxemia   • Secondary polycythemia   • Vitamin D deficiency   • Therapeutic drug monitoring   • Family history of coronary artery disease   • Bilateral sensorineural hearing loss, wears hearing aids   • Multinodular goiter   • Supraventricular tachycardia (CMS/HCC)   • Morbidly obese (CMS/HCC)   • Acute pancreatitis without infection or necrosis   • Obesity (BMI 30-39.9)   • CKD (chronic kidney disease) stage 3, GFR 30-59 ml/min (CMS/HCC)   • LINNEA (acute kidney injury) (CMS/HCC)   • Pneumonia   • New onset a-fib (CMS/HCC)   • Hyponatremia   • Hypomagnesemia   • Leukocytosis       Impression  1. Acute pancreatitis: Medication associated (lisinopril/HCTZ) versus passed gallstone.  Overall, she is improving    2. Upper abdominal pain: with above, still present but improving    3. Mild common bile duct dilatation: as per 2/1 gallbladder ultrasound, unclear if this abnormal finding is related to #1 or a normal variant; awaiting further imaging; of not she did present with a mild bump in her bilirubin on admission, now resolved, raises the possibility of a passed gallstone    4. Constipation: No bowel movement since admission, feeling uncomfortable due to this    5. Leukocytosis: persists, unchanged, likely related to #1 and also pneumonia    Plan  Glycerin suppository today, increase MiraLAX to twice daily, added senna S  She thinks she can try to cooperate with an MRCP for further evaluation of the ultrasound CBD abnormality; this will be noncontrasted due to her CKD  Continue diet as tolerated      I discussed the patients findings and my recommendations with patient and family.    Kathy Lima MD

## 2020-02-06 NOTE — PROGRESS NOTES
"   LOS: 5 days   Patient Care Team:  Daryl Santos MD as PCP - General (Internal Medicine)  Daryl Santos MD as PCP - Claims Attributed    Chief Complaint: mouth is sore    Subjective     C/o sore mouth today. Nystatin started this AM and already feeling better. Abd pain is better. No nausea. Was tolerating full liquid diet, but is now NPO for MRCP later. No chest pain, SOA, or palp.      Subjective:  Symptoms:  Improved.  She reports malaise and weakness.  No shortness of breath, cough, chest pain, headache, chest pressure, anorexia, diarrhea or anxiety.    Diet:  NPO.  No nausea or vomiting.    Activity level: Impaired due to weakness.    Pain:  She complains of pain that is mild.  She reports pain is unchanged.  Pain is well controlled.        History taken from: patient chart family    Objective     Vital Signs  Temp:  [97.7 °F (36.5 °C)-99.7 °F (37.6 °C)] 98.1 °F (36.7 °C)  Heart Rate:  [] 102  Resp:  [18] 18  BP: (111-141)/(68-97) 141/97    Objective:  General Appearance:  Comfortable, in no acute distress and ill-appearing.    Vital signs: (most recent): Blood pressure 141/97, pulse 102, temperature 98.1 °F (36.7 °C), temperature source Oral, resp. rate 18, height 160 cm (63\"), weight 104 kg (230 lb 4.8 oz), SpO2 100 %, not currently breastfeeding.  Vital signs are normal.  No fever.    Output: Producing urine and no stool output.    HEENT: Normal HEENT exam.  (Has some white spots on palate and tongue)    Lungs:  Normal effort and normal respiratory rate.  Breath sounds clear to auscultation.  She is not in respiratory distress.    Heart: Normal rate.  Irregular rhythm.    Abdomen: Abdomen is soft and distended.  Bowel sounds are normal.   There is generalized tenderness.  There is no rebound tenderness.  There is no guarding.     Extremities: There is dependent edema (trace in BLEs).    Pulses: Distal pulses are intact.    Neurological: Patient is alert and oriented to person, place and " time.  Normal strength.  Patient has normal muscle tone.    Skin:  Warm and dry.  No rash.             Results Review:     I reviewed the patient's new clinical results.  I reviewed the patient's other test results and agree with the interpretation  I personally viewed and interpreted the patient's EKG/Telemetry data  Discussed with pt and dtr    Results from last 7 days   Lab Units 02/06/20 0458 02/05/20 0507 02/04/20 0435 02/03/20 0211 02/01/20 0458 01/31/20  2142   WBC 10*3/mm3 12.13* 12.33* 11.29* 11.27* 13.95* 10.78   HEMOGLOBIN g/dL 13.0 12.8 12.7 13.5 15.4 15.0   PLATELETS 10*3/mm3 180 147 145 174 199 235     Results from last 7 days   Lab Units 02/06/20 0458 02/05/20 0507 02/04/20 0435 02/03/20 0211 02/02/20 0441 01/31/20  2142   SODIUM mmol/L 134* 132* 133* 132* 132* 138   POTASSIUM mmol/L 3.9 3.8 4.0 3.8 4.4 3.7   CHLORIDE mmol/L 104 103 101 101 99 102   CO2 mmol/L 14.9* 16.1* 18.0* 16.5* 16.6* 20.4*   BUN mg/dL 36* 30* 38* 35* 35* 25*   CREATININE mg/dL 1.38* 1.62* 1.74* 1.80* 2.28* 1.74*   CALCIUM mg/dL 8.2* 7.8* 7.3* 7.6* 7.8* 9.0   MAGNESIUM mg/dL 1.9 1.6  --   --   --   --    PHOSPHORUS mg/dL 2.6 2.1*  --   --   --   --    Estimated Creatinine Clearance: 33.7 mL/min (A) (by C-G formula based on SCr of 1.38 mg/dL (H)).    Medication Review: reviewed     Assessment/Plan       Acute pancreatitis without infection or necrosis    Benign essential hypertension    Hyperlipidemia    Primary hypothyroidism    Secondary polycythemia    Vitamin D deficiency    Supraventricular tachycardia (CMS/HCC)    Obesity (BMI 30-39.9)    CKD (chronic kidney disease) stage 3, GFR 30-59 ml/min (CMS/HCC)    LINNEA (acute kidney injury) (CMS/HCC)    Pneumonia    New onset a-fib (CMS/HCC)    Hyponatremia    Hypomagnesemia    Leukocytosis          Plan:   (Ms. Alarcon is a 86 y.o. former smoker with a history of HTN, HLD, and CKD stage III who presented with epigastric pain secondary to acute pancreatitis.     Acute  pancreatitis  -IVF, PRN agents for pain, anti-emetics as needed  -Lipase normalized now  -has been tolerating full liquid diet, NPO now for MRCP  -Seen by GI. Etiology is unclear. Possibly related to lisinopril/HCTZ. Trigs okay.  -Mild duct thickening on U/S, MRCP today without contrast    New onset AFib with RVR  -due to PNA and acute pancreatitis  -Card consult appreciated  -s/p Diltiazem gtt and now on oral Metoprolol  -Card starting AC today, Lovenox  -HR improved     LINNEA/CKD stage 3  -IVFs  -Cr falling  -Appreciate Renal attention to pt  -likely due to dehydration in setting of ACE-I and HCTZ  -Na+ improved, replacing Mg++ as needed  -bicarb added today     Hypertension  -Stable  -HCTZ and lisinopril held as stated above     Right basilar community-acquired PNA  -Symptoms of dyspnea, wheezing, and suspected infiltrate on CXR  -On IV Levaquin Q48h  -Blood cultures negative so far, MRSA screen is negative  -Added bronchodilators and encouraging IS    Thrush  -Nystatin orally     VTE Prophylaxis - therapeutic dose of Lovenox should suffice  Code Status - Full   ).       Rex Coy MD  02/06/20  11:31 AM    Time: 25min

## 2020-02-06 NOTE — PLAN OF CARE
Pt up in chair today but slept most of day, family wanted patient to sleep. pt had enema with no bowel movement noted.  Pt went for MRI after pain medication given by IV as ordered for pain.  Patient had to be NPO for MRI; pt is able to have low fat diet now.  Pt Lovenox has changed from 30 mg to total of 100 mg every 12 hours.    Problem: Patient Care Overview  Goal: Plan of Care Review  Outcome: Ongoing (interventions implemented as appropriate)  Flowsheets  Taken 2/6/2020 1715 by Mallorie Gamez RN  Progress: improving  Plan of Care Reviewed With: patient;daughter  Taken 2/6/2020 1033 by Rafaela Falk PT  Outcome Summary: Pt willing to work with PT today and had increased gait distance today. Pt is slightly confused and needs VCs for safety cues. Pt's gait distance increased today; pt needs CGA for stability but also needs min A to steer/guide RW and is limited by R flank pain. Pt would benefit from continued skilled PT.   Goal: Individualization and Mutuality  Outcome: Ongoing (interventions implemented as appropriate)  Flowsheets (Taken 2/6/2020 1715)  Patient Specific Goals (Include Timeframe): To go home with daughter this weekend  Patient Specific Preferences: Family preference to stay with patient at all times  Goal: Discharge Needs Assessment  Outcome: Ongoing (interventions implemented as appropriate)  Flowsheets (Taken 2/6/2020 1715)  Concerns to be Addressed: basic needs; denies needs/concerns at this time; discharge planning; care coordination/care conferences; adjustment to diagnosis/illness  Readmission Within the Last 30 Days: no previous admission in last 30 days  Goal: Interprofessional Rounds/Family Conf  Outcome: Ongoing (interventions implemented as appropriate)  Flowsheets (Taken 2/5/2020 1731)  Participants: ;nursing;pharmacy;physician     Problem: Pain, Acute (Adult)  Goal: Acceptable Pain Control/Comfort Level  Outcome: Ongoing (interventions implemented as  appropriate)  Flowsheets (Taken 2/6/2020 1715)  Acceptable Pain Control/Comfort Level: making progress toward outcome  Note:   Pt slept most of the day, daughter requested for patient to have pain medication stating in pain. Pain medication given per order by IV       Problem: Fall Risk (Adult)  Goal: Absence of Fall  Outcome: Ongoing (interventions implemented as appropriate)  Flowsheets (Taken 2/6/2020 1715)  Absence of Fall: making progress toward outcome     Problem: Skin Injury Risk (Adult)  Goal: Skin Health and Integrity  Outcome: Ongoing (interventions implemented as appropriate)  Flowsheets (Taken 2/6/2020 1715)  Skin Health and Integrity: making progress toward outcome

## 2020-02-06 NOTE — PLAN OF CARE
Problem: Patient Care Overview  Goal: Plan of Care Review  Outcome: Ongoing (interventions implemented as appropriate)  Flowsheets (Taken 2/6/2020 0500)  Plan of Care Reviewed With: patient; daughter  Outcome Summary: Pt confused, daughter (RN) states this is not her baseline. Pt c/o's pain to R flank and back. Up to BR several times during shift. Pt could benefit from a walker to decrease risk of fall. Daughter assisting in care of pt. Awaiting further plan of care.

## 2020-02-06 NOTE — PROGRESS NOTES
"Pharmacy to Dose Enoxaparin    Patient: Mandy Alarcon (S511/1,  LOS: 5 days )  Relevant clinical data and objective history reviewed:  86 y.o. female 160 cm (63\") 104 kg (230 lb 4.8 oz)  Body mass index is 40.8 kg/m².  she has a past medical history of Benign essential hypertension (10/28/2012), Bilateral sensorineural hearing loss, wears hearing aids (6/14/2017), Chronic renal insufficiency, stage II (mild), 05/18/2016--creatinine 1.35 (4/28/2016), Claustrophobia (4/28/2016), Family history of coronary artery disease (5/24/2016), Gout (10/28/2012), Hyperlipidemia (10/28/2012), Impaired fasting glucose (10/28/2012), Morbidly obese (CMS/HCC) (3/11/2019), Nocturnal hypoxemia (8/2/2012), Primary hypothyroidism (11/17/2015), Secondary polycythemia (8/2/2012), and Vitamin D deficiency (5/23/2016).    Documented weights    01/31/20 2149 02/01/20 0133 02/04/20 2056 02/05/20 0500   Weight: 99.5 kg (219 lb 6.4 oz) 96.7 kg (213 lb 1.6 oz) 113 kg (249 lb 1.6 oz) 112 kg (247 lb 9.2 oz)    02/06/20 0500   Weight: 104 kg (230 lb 4.8 oz)       Consult for Dr Laura  Indication: afib - full dose  Current dose: 30 mg subQ q24h    Other Anticoagulation: none    Estimated Creatinine Clearance: 33.7 mL/min (A) (by C-G formula based on SCr of 1.38 mg/dL (H)).  Body mass index is 40.8 kg/m².    Creatinine   Date Value Ref Range Status   02/06/2020 1.38 (H) 0.57 - 1.00 mg/dL Final   02/05/2020 1.62 (H) 0.57 - 1.00 mg/dL Final   02/04/2020 1.74 (H) 0.57 - 1.00 mg/dL Final     Platelets   Date Value Ref Range Status   02/06/2020 180 140 - 450 10*3/mm3 Final   02/05/2020 147 140 - 450 10*3/mm3 Final   02/04/2020 145 140 - 450 10*3/mm3 Final     Lab Results   Component Value Date    INR 1.16 (H) 02/01/2020     Lab Results   Component Value Date    DDIMERQUANT 1.30 (H) 07/10/2018    DDIMERQUANT 0.80 (H) 06/20/2018       PLAN:  Will give Lovenox 70 mg SubQ x1 dose this AM for a total of 100 mg then start 100 mg SubQ q12h for CrCl >30 mL/hr this " evening. Pharmacy will continue to follow and adjust as indicated per [facility] dosing guidelines.      Blane Apple, PharmD  02/06/20 9:33 AM

## 2020-02-07 LAB
ALBUMIN SERPL-MCNC: 2 G/DL (ref 3.5–5.2)
ALBUMIN/GLOB SERPL: 0.6 G/DL
ALP SERPL-CCNC: 61 U/L (ref 39–117)
ALT SERPL W P-5'-P-CCNC: 15 U/L (ref 1–33)
ANION GAP SERPL CALCULATED.3IONS-SCNC: 14.4 MMOL/L (ref 5–15)
AST SERPL-CCNC: 31 U/L (ref 1–32)
BILIRUB SERPL-MCNC: 1.2 MG/DL (ref 0.2–1.2)
BUN BLD-MCNC: 39 MG/DL (ref 8–23)
BUN/CREAT SERPL: 27.7 (ref 7–25)
CALCIUM SPEC-SCNC: 8.3 MG/DL (ref 8.6–10.5)
CHLORIDE SERPL-SCNC: 103 MMOL/L (ref 98–107)
CO2 SERPL-SCNC: 17.6 MMOL/L (ref 22–29)
CREAT BLD-MCNC: 1.41 MG/DL (ref 0.57–1)
DEPRECATED RDW RBC AUTO: 43.2 FL (ref 37–54)
ERYTHROCYTE [DISTWIDTH] IN BLOOD BY AUTOMATED COUNT: 13.9 % (ref 12.3–15.4)
GFR SERPL CREATININE-BSD FRML MDRD: 35 ML/MIN/1.73
GLOBULIN UR ELPH-MCNC: 3.2 GM/DL
GLUCOSE BLD-MCNC: 89 MG/DL (ref 65–99)
HCT VFR BLD AUTO: 37.5 % (ref 34–46.6)
HGB BLD-MCNC: 12.7 G/DL (ref 12–15.9)
MAGNESIUM SERPL-MCNC: 1.8 MG/DL (ref 1.6–2.4)
MCH RBC QN AUTO: 29.4 PG (ref 26.6–33)
MCHC RBC AUTO-ENTMCNC: 33.9 G/DL (ref 31.5–35.7)
MCV RBC AUTO: 86.8 FL (ref 79–97)
PHOSPHATE SERPL-MCNC: 3.1 MG/DL (ref 2.5–4.5)
PLATELET # BLD AUTO: 208 10*3/MM3 (ref 140–450)
PMV BLD AUTO: 9.5 FL (ref 6–12)
POTASSIUM BLD-SCNC: 4 MMOL/L (ref 3.5–5.2)
PROT SERPL-MCNC: 5.2 G/DL (ref 6–8.5)
RBC # BLD AUTO: 4.32 10*6/MM3 (ref 3.77–5.28)
SODIUM BLD-SCNC: 135 MMOL/L (ref 136–145)
URATE SERPL-MCNC: 5.6 MG/DL (ref 2.4–5.7)
WBC NRBC COR # BLD: 14.41 10*3/MM3 (ref 3.4–10.8)

## 2020-02-07 PROCEDURE — 99232 SBSQ HOSP IP/OBS MODERATE 35: CPT | Performed by: INTERNAL MEDICINE

## 2020-02-07 PROCEDURE — 25010000002 ENOXAPARIN PER 10 MG: Performed by: INTERNAL MEDICINE

## 2020-02-07 PROCEDURE — 25010000002 LEVOFLOXACIN PER 250 MG: Performed by: INTERNAL MEDICINE

## 2020-02-07 PROCEDURE — 97110 THERAPEUTIC EXERCISES: CPT

## 2020-02-07 PROCEDURE — 84100 ASSAY OF PHOSPHORUS: CPT | Performed by: INTERNAL MEDICINE

## 2020-02-07 PROCEDURE — 85027 COMPLETE CBC AUTOMATED: CPT | Performed by: HOSPITALIST

## 2020-02-07 PROCEDURE — 80053 COMPREHEN METABOLIC PANEL: CPT | Performed by: INTERNAL MEDICINE

## 2020-02-07 PROCEDURE — 94799 UNLISTED PULMONARY SVC/PX: CPT

## 2020-02-07 PROCEDURE — 83735 ASSAY OF MAGNESIUM: CPT | Performed by: INTERNAL MEDICINE

## 2020-02-07 PROCEDURE — 84550 ASSAY OF BLOOD/URIC ACID: CPT | Performed by: INTERNAL MEDICINE

## 2020-02-07 RX ORDER — ATENOLOL 25 MG/1
25 TABLET ORAL EVERY 12 HOURS SCHEDULED
Status: DISCONTINUED | OUTPATIENT
Start: 2020-02-07 | End: 2020-02-08

## 2020-02-07 RX ORDER — BISACODYL 10 MG
10 SUPPOSITORY, RECTAL RECTAL ONCE
Status: COMPLETED | OUTPATIENT
Start: 2020-02-07 | End: 2020-02-07

## 2020-02-07 RX ORDER — FAMOTIDINE 20 MG/1
20 TABLET, FILM COATED ORAL DAILY
Status: DISCONTINUED | OUTPATIENT
Start: 2020-02-08 | End: 2020-02-11 | Stop reason: HOSPADM

## 2020-02-07 RX ORDER — IPRATROPIUM BROMIDE AND ALBUTEROL SULFATE 2.5; .5 MG/3ML; MG/3ML
3 SOLUTION RESPIRATORY (INHALATION)
Status: DISCONTINUED | OUTPATIENT
Start: 2020-02-07 | End: 2020-02-11 | Stop reason: HOSPADM

## 2020-02-07 RX ADMIN — SODIUM BICARBONATE 1300 MG: 650 TABLET ORAL at 08:37

## 2020-02-07 RX ADMIN — VITAMIN D, TAB 1000IU (100/BT) 1000 UNITS: 25 TAB at 08:37

## 2020-02-07 RX ADMIN — BISACODYL 10 MG: 10 SUPPOSITORY RECTAL at 13:04

## 2020-02-07 RX ADMIN — ENOXAPARIN SODIUM 110 MG: 120 INJECTION SUBCUTANEOUS at 11:22

## 2020-02-07 RX ADMIN — SODIUM CHLORIDE, PRESERVATIVE FREE 10 ML: 5 INJECTION INTRAVENOUS at 08:38

## 2020-02-07 RX ADMIN — CLOBETASOL PROPIONATE: 0.5 CREAM TOPICAL at 08:37

## 2020-02-07 RX ADMIN — NYSTATIN 500000 UNITS: 100000 SUSPENSION ORAL at 18:09

## 2020-02-07 RX ADMIN — HYDROCODONE BITARTRATE AND ACETAMINOPHEN 1 TABLET: 7.5; 325 TABLET ORAL at 21:49

## 2020-02-07 RX ADMIN — METOPROLOL TARTRATE 25 MG: 25 TABLET ORAL at 06:36

## 2020-02-07 RX ADMIN — SODIUM BICARBONATE 1300 MG: 650 TABLET ORAL at 16:10

## 2020-02-07 RX ADMIN — FAMOTIDINE 20 MG: 10 INJECTION INTRAVENOUS at 06:36

## 2020-02-07 RX ADMIN — IPRATROPIUM BROMIDE AND ALBUTEROL SULFATE 3 ML: 2.5; .5 SOLUTION RESPIRATORY (INHALATION) at 15:32

## 2020-02-07 RX ADMIN — ATENOLOL 25 MG: 25 TABLET ORAL at 21:49

## 2020-02-07 RX ADMIN — SODIUM BICARBONATE 1300 MG: 650 TABLET ORAL at 21:49

## 2020-02-07 RX ADMIN — IPRATROPIUM BROMIDE AND ALBUTEROL SULFATE 3 ML: 2.5; .5 SOLUTION RESPIRATORY (INHALATION) at 07:15

## 2020-02-07 RX ADMIN — POLYETHYLENE GLYCOL 3350 17 G: 17 POWDER, FOR SOLUTION ORAL at 21:50

## 2020-02-07 RX ADMIN — HYDROCODONE BITARTRATE AND ACETAMINOPHEN 1 TABLET: 7.5; 325 TABLET ORAL at 05:35

## 2020-02-07 RX ADMIN — CLOBETASOL PROPIONATE: 0.5 CREAM TOPICAL at 21:49

## 2020-02-07 RX ADMIN — NYSTATIN 500000 UNITS: 100000 SUSPENSION ORAL at 08:37

## 2020-02-07 RX ADMIN — SENNOSIDES AND DOCUSATE SODIUM 2 TABLET: 8.6; 5 TABLET ORAL at 21:49

## 2020-02-07 RX ADMIN — IPRATROPIUM BROMIDE AND ALBUTEROL SULFATE 3 ML: 2.5; .5 SOLUTION RESPIRATORY (INHALATION) at 19:36

## 2020-02-07 RX ADMIN — ATENOLOL 25 MG: 25 TABLET ORAL at 16:10

## 2020-02-07 RX ADMIN — NYSTATIN 500000 UNITS: 100000 SUSPENSION ORAL at 21:49

## 2020-02-07 RX ADMIN — LEVOFLOXACIN 750 MG: 5 INJECTION, SOLUTION INTRAVENOUS at 08:38

## 2020-02-07 RX ADMIN — POLYETHYLENE GLYCOL 3350 17 G: 17 POWDER, FOR SOLUTION ORAL at 08:38

## 2020-02-07 RX ADMIN — SODIUM CHLORIDE 75 ML/HR: 9 INJECTION, SOLUTION INTRAVENOUS at 06:39

## 2020-02-07 RX ADMIN — NYSTATIN 500000 UNITS: 100000 SUSPENSION ORAL at 13:04

## 2020-02-07 RX ADMIN — ENOXAPARIN SODIUM 110 MG: 120 INJECTION SUBCUTANEOUS at 21:52

## 2020-02-07 RX ADMIN — PRAVASTATIN SODIUM 40 MG: 40 TABLET ORAL at 21:49

## 2020-02-07 RX ADMIN — HYDROCODONE BITARTRATE AND ACETAMINOPHEN 1 TABLET: 7.5; 325 TABLET ORAL at 16:50

## 2020-02-07 RX ADMIN — LEVOTHYROXINE SODIUM 50 MCG: 50 TABLET ORAL at 06:36

## 2020-02-07 NOTE — PROGRESS NOTES
Continued Stay Note  [facility]     Patient Name: Mandy Alarcon  MRN: [id number]  Today's Date: 2/7/2020    Admit Date: 1/31/2020    Discharge Plan     Row Name 02/07/20 1531       Plan    Plan  Plan is home with her dtr Ashley and her family.  Pt declines SNF or HH.      Plan Comments  Spoke with pt and her dtr Ashley at bedside.  Discussed possible short term rehab but pt prefers to go home.  Ashley is in agreement because pt's dtr Diamond is a nurse and Ashley's  is an MD.  CCP recommended HH, but they do not feel it will be necessary,  MD notes states plan is to start probably Eliquis upon DC.  30 day free discount card given.        Discharge Codes    No documentation.             Tracie Gutierrez RN

## 2020-02-07 NOTE — PLAN OF CARE
Problem: Patient Care Overview  Goal: Plan of Care Review  Outcome: Ongoing (interventions implemented as appropriate)  Flowsheets (Taken 2/7/2020 0428)  Plan of Care Reviewed With: patient  Outcome Summary: Pt oriented for the first hour or so of this shift. Pt c/o's of diaphragmatic pain with cough. Frequent cough with production of very thick (plug) yellow sputum. Perhaps a mucolytic would be beneficial. Awaiting further plan of care.    Renal enzymes and WBC elevating.

## 2020-02-07 NOTE — PROGRESS NOTES
Name: Mandy Alarcon ADMIT: 2020   : 1933  PCP: Daryl Santos MD    MRN: [id number] LOS: 6 days   AGE/SEX: 86 y.o. female  ROOM: [facility]   Subjective   Chief Complaint   Patient presents with   • Chest Pain   abd pain is better  On IVFs  Some leg swelling    On abx  Dyspnea better    ROS  No f/c  +n/-v  No cp/palp  +soa/cough    Objective   Vital Signs  Temp:  [97.7 °F (36.5 °C)-99.4 °F (37.4 °C)] 98.2 °F (36.8 °C)  Heart Rate:  [] 116  Resp:  [18-20] 20  BP: (132-154)/() 154/89  SpO2:  [93 %-97 %] 93 %  on   ;   Device (Oxygen Therapy): room air  Body mass index is 43.75 kg/m².    Physical Exam   Constitutional: She is oriented to person, place, and time. She appears well-developed and well-nourished.   HENT:   Head: Normocephalic and atraumatic.   Eyes: Conjunctivae are normal. No scleral icterus.   Neck: Neck supple. No tracheal deviation present.   Cardiovascular:   No murmur heard.  Irregular    Pulmonary/Chest: No respiratory distress. She has no wheezes (minimal, decreased bs at bases).   Decreased bs at bases   Abdominal: Soft. She exhibits no distension (mild). There is no tenderness (mild epigastric). There is no guarding.   Neurological: She is alert and oriented to person, place, and time. She exhibits normal muscle tone.   Skin: Skin is warm and dry.   Psychiatric: She has a normal mood and affect. Her behavior is normal.       Results Review:       I reviewed the patient's new clinical results.  Results from last 7 days   Lab Units 20  0435   WBC 10*3/mm3 14.41* 12.13* 12.33* 11.29*   HEMOGLOBIN g/dL 12.7 13.0 12.8 12.7   PLATELETS 10*3/mm3 208 180 147 145     Results from last 7 days   Lab Units 207 208 20  0507 20  0435   SODIUM mmol/L 135* 134* 132* 133*   POTASSIUM mmol/L 4.0 3.9 3.8 4.0   CHLORIDE mmol/L 103 104 103 101   CO2 mmol/L 17.6* 14.9* 16.1* 18.0*   BUN mg/dL 39* 36* 30*  38*   CREATININE mg/dL 1.41* 1.38* 1.62* 1.74*   GLUCOSE mg/dL 89 79 80 82   Estimated Creatinine Clearance: 34.5 mL/min (A) (by C-G formula based on SCr of 1.41 mg/dL (H)).  Results from last 7 days   Lab Units 02/07/20 0457 02/06/20  0458 02/05/20  0507 02/04/20  0435   ALBUMIN g/dL 2.00* 2.20* 2.30* 2.50*   BILIRUBIN mg/dL 1.2 1.1 1.1 0.7   ALK PHOS U/L 61 56 54 46   AST (SGOT) U/L 31 20 18 22   ALT (SGPT) U/L 15 15 14 18     Results from last 7 days   Lab Units 02/07/20 0457 02/06/20 0458 02/05/20  0507 02/04/20  0435   CALCIUM mg/dL 8.3* 8.2* 7.8* 7.3*   ALBUMIN g/dL 2.00* 2.20* 2.30* 2.50*   MAGNESIUM mg/dL 1.8 1.9 1.6  --    PHOSPHORUS mg/dL 3.1 2.6 2.1*  --      Results from last 7 days   Lab Units 02/03/20  0531 02/03/20  0024   PROCALCITONIN ng/mL  --  12.61*   LACTATE mmol/L 1.3 2.5*       Coag   Results from last 7 days   Lab Units 02/01/20  0458   INR  1.16*     HbA1C   Lab Results   Component Value Date    HGBA1C 5.90 (H) 02/01/2020    HGBA1C 5.80 (H) 08/20/2019    HGBA1C 5.81 (H) 03/04/2019     Infection   Results from last 7 days   Lab Units 02/03/20  0211 02/03/20  0158 02/03/20  0024   BLOODCX  No growth at 4 days No growth at 4 days  --    PROCALCITONIN ng/mL  --   --  12.61*     Radiology(recent) Mri Abdomen Wo Contrast Mrcp    Result Date: 2/7/2020  Markedly limited study due to extensive motion artifact as above. 1. MR findings of acute pancreatitis. 2. No intrahepatic biliary dilatation. No choledocholithiasis. The gallbladder is distended with mild wall thickening. Pancreatic duct demonstrates normal caliber.  This report was finalized on 2/7/2020 11:07 AM by Dr. Ruthy Haro M.D.      No results found for: TROPONINT, TROPONINI, BNP  No components found for: TSH;2      atenolol 25 mg Oral Q12H   cholecalciferol 1,000 Units Oral Daily   clobetasol  Topical Q12H   enoxaparin 1 mg/kg Subcutaneous Q12H   [START ON 2/8/2020] famotidine 20 mg Oral Daily   ipratropium-albuterol 3 mL  Nebulization TID - RT   levothyroxine 50 mcg Oral Q AM   nystatin 5 mL Swish & Swallow 4x Daily   polyethylene glycol 17 g Oral BID   pravastatin 40 mg Oral Nightly   senna-docusate sodium 2 tablet Oral Nightly   sodium bicarbonate 1,300 mg Oral TID   sodium chloride 10 mL Intravenous Q12H       dilTIAZem 5 mg/hr Last Rate: Stopped (02/05/20 1318)   Pharmacy to Dose enoxaparin (LOVENOX)     sodium chloride 75 mL/hr Last Rate: 75 mL/hr (02/07/20 0639)   Diet Regular; Low Fat, Renal      Assessment/Plan      Active Hospital Problems    Diagnosis  POA   • **Acute pancreatitis without infection or necrosis [K85.90]  Yes   • New onset a-fib (CMS/HCC) [I48.91]  No   • Hyponatremia [E87.1]  Yes   • Hypomagnesemia [E83.42]  No   • Leukocytosis [D72.829]  Yes   • Pneumonia [J18.9]  Yes   • LINNEA (acute kidney injury) (CMS/HCC) [N17.9]  No   • Obesity (BMI 30-39.9) [E66.9]  Yes   • CKD (chronic kidney disease) stage 3, GFR 30-59 ml/min (CMS/HCC) [N18.3]  Yes   • Supraventricular tachycardia (CMS/HCC) [I47.1]  Yes   • Vitamin D deficiency [E55.9]  Yes   • Primary hypothyroidism [E03.9]  Yes   • Benign essential hypertension [I10]  Yes   • Hyperlipidemia [E78.5]  Yes   • Secondary polycythemia [D75.1]  Yes      Resolved Hospital Problems   No resolved problems to display.     Ms. Alarcon is a 86 y.o. former smoker with a history of HTN, HLD, and CKD stage III who presented with epigastric pain secondary to acute pancreatitis.     Acute pancreatitis  -IVF, PRN agents for pain, anti-emetics as needed  -Lipase normalized now  -MRCP is ok  -Seen by GI. Etiology is unclear. Possibly related to lisinopril/HCTZ vs less likely passed stone     New onset AFib with RVR  -due to PNA and acute pancreatitis  -Card consult appreciated  -s/p Diltiazem gtt and now on oral Metoprolol  -on Lovenox potential oral a/c at discharge  -HR improved     LINNEA/CKD stage 3  -IVFs  -Cr falling  -Appreciate Renal attention to pt  -likely due to dehydration in  setting of ACE-I and HCTZ       Hypertension  -Stable  -HCTZ and lisinopril held as stated above     Right basilar community-acquired PNA  -Symptoms of dyspnea, wheezing, and suspected infiltrate on CXR  -On IV Levaquin Q48h  -Blood cultures negative so far, MRSA screen is negative  -Added bronchodilators and encouraging IS  -She seems much better than a few days ago  -Would need repeat CXR in a month as an outpatient  -With improvement of symptoms, oxygen, and resolution of fevers do not think pulmonary consult is needed     Thrush  -Nystatin orally     VTE Prophylaxis - therapeutic dose of Lovenox  Code Status - Full          Amadou Das MD  [facility]  02/07/20  8:06 AM

## 2020-02-07 NOTE — PLAN OF CARE
Problem: Patient Care Overview  Goal: Plan of Care Review  Flowsheets  Taken 2/7/2020 1700  Progress: no change  Outcome Summary: suppository given. small bm. metoprolol changed to atenolol. prn pain meds given. resting comfortably. up chair and ambulate multiple times to bathroom. safety maintained will continue to monitor.  Taken 2/7/2020 1415  Plan of Care Reviewed With: patient;family

## 2020-02-07 NOTE — PROGRESS NOTES
[facility]  Inpatient Progress Note    Reason for Follow Up:  pancreatitis    Subjective     Interval History:   Still no BM.  Tolerated most of breakfast without difficulty.  MRI with no choledocholithiasis.  Dtr worried because of wheezing overnight    Current Facility-Administered Medications:   •  acetaminophen (TYLENOL) tablet 650 mg, 650 mg, Oral, Q4H PRN, Kannan Rizo APRN, 650 mg at 02/01/20 0203  •  aluminum-magnesium hydroxide-simethicone (MAALOX MAX) 400-400-40 MG/5ML suspension 15 mL, 15 mL, Oral, Q6H PRN, Kannan Rizo APRN, 15 mL at 02/01/20 0808  •  atenolol (TENORMIN) tablet 25 mg, 25 mg, Oral, Q12H, Lizbeth Laura MD  •  bisacodyl (DULCOLAX) EC tablet 5 mg, 5 mg, Oral, Daily PRN, Kannan Rizo APRN  •  cholecalciferol (VITAMIN D3) tablet 1,000 Units, 1,000 Units, Oral, Daily, Kannan iRzo APRN, 1,000 Units at 02/07/20 0837  •  clobetasol (TEMOVATE) 0.05 % cream, , Topical, Q12H, Kannan Rizo APRN  •  cyclobenzaprine (FLEXERIL) tablet 5 mg, 5 mg, Oral, TID PRN, Amadou Das MD, 5 mg at 02/04/20 1115  •  dilTIAZem (CARDIZEM) 100 mg in 100 mL NS (1 mg/mL) infusion, 5 mg/hr, Intravenous, Titrated, Rex Arias Jr., MD, Stopped at 02/05/20 1318  •  enoxaparin (LOVENOX) syringe 110 mg, 1 mg/kg, Subcutaneous, Q12H, Amadou Das MD  •  [START ON 2/8/2020] famotidine (PEPCID) tablet 20 mg, 20 mg, Oral, Daily, Amadou Das MD  •  HYDROcodone-acetaminophen (NORCO) 7.5-325 MG per tablet 1 tablet, 1 tablet, Oral, Q4H PRN, Amadou Das MD, 1 tablet at 02/07/20 0535  •  HYDROmorphone (DILAUDID) injection 0.5 mg, 0.5 mg, Intravenous, Q2H PRN, 0.5 mg at 02/06/20 1513 **AND** naloxone (NARCAN) injection 0.4 mg, 0.4 mg, Intravenous, Q5 Min PRN, Nathan Tam MD  •  ipratropium-albuterol (DUO-NEB) nebulizer solution 3 mL, 3 mL, Nebulization, TID - RT, Amadou Das MD, 3 mL at 02/07/20 0715  •  levothyroxine  (SYNTHROID, LEVOTHROID) tablet 50 mcg, 50 mcg, Oral, Q AM, Kannan Rizo APRN, 50 mcg at 02/07/20 0636  •  nystatin (MYCOSTATIN) 634878 UNIT/ML suspension 500,000 Units, 5 mL, Swish & Swallow, 4x Daily, Meeta Goodwin APRN, 500,000 Units at 02/07/20 0837  •  ondansetron (ZOFRAN) tablet 4 mg, 4 mg, Oral, Q6H PRN **OR** ondansetron (ZOFRAN) injection 4 mg, 4 mg, Intravenous, Q6H PRN, Kannan Rizo APRN, 4 mg at 02/05/20 2040  •  Pharmacy to Dose enoxaparin (LOVENOX), , Does not apply, Continuous PRN, Lizbeth Laura MD  •  polyethylene glycol 3350 powder (packet), 17 g, Oral, BID, Kathy Lima MD, 17 g at 02/07/20 0838  •  pravastatin (PRAVACHOL) tablet 40 mg, 40 mg, Oral, Nightly, Kannan Rizo APRN, 40 mg at 02/06/20 2152  •  senna-docusate sodium (SENOKOT-S) 8.6-50 MG tablet 2 tablet, 2 tablet, Oral, Nightly, Kathy Lima MD, 2 tablet at 02/06/20 2152  •  sodium bicarbonate tablet 1,300 mg, 1,300 mg, Oral, TID, Mil Acosta MD, 1,300 mg at 02/07/20 0837  •  [COMPLETED] Insert peripheral IV, , , Once **AND** sodium chloride 0.9 % flush 10 mL, 10 mL, Intravenous, PRN, Ranjith Quinn MD, 10 mL at 01/31/20 2143  •  sodium chloride 0.9 % flush 10 mL, 10 mL, Intravenous, Q12H, Kannan Rizo APRN, 10 mL at 02/07/20 0838  •  sodium chloride 0.9 % flush 10 mL, 10 mL, Intravenous, PRN, Kannan Rizo APRN  •  sodium chloride 0.9 % infusion, 75 mL/hr, Intravenous, Continuous, Amadou Das MD, Last Rate: 75 mL/hr at 02/07/20 0639, 75 mL/hr at 02/07/20 0639  Review of Systems:   All systems reviewed and negative except for: GI: constipation; Pul: wheezing    Objective     Vital Signs  Temp:  [97.7 °F (36.5 °C)-99.4 °F (37.4 °C)] 97.7 °F (36.5 °C)  Heart Rate:  [] 93  Resp:  [18] 18  BP: (132-153)/() 132/85  Body mass index is 43.75 kg/m².    Intake/Output Summary (Last 24 hours) at 2/7/2020 1046  Last data filed at 2/7/2020 0900  Gross per 24 hour    Intake 855 ml   Output --   Net 855 ml     I/O this shift:  In: 855 [P.O.:855]  Out: -      Physical Exam:   General: patient awake, alert and cooperative, hard of hearing, appears stated age   Eyes: Normal lids and lashes, no scleral icterus   Neck: supple, normal ROM   Skin: warm and dry, not jaundiced   Cardiovascular: irregular rhythm and rate, no murmurs auscultated   Pulm: clear to auscultation bilaterally, regular, slightly labored   Abdomen: soft, obese, not tender on palpation, nondistended; normal bowel sounds   Rectal: deferred   Extremities: no rash or edema   Psychiatric: Normal mood and behavior; memory intact     Results Review:     I reviewed the patient's new clinical results.    Results from last 7 days   Lab Units 02/07/20 0457 02/06/20 0458 02/05/20  0507   WBC 10*3/mm3 14.41* 12.13* 12.33*   HEMOGLOBIN g/dL 12.7 13.0 12.8   HEMATOCRIT % 37.5 37.8 37.5   PLATELETS 10*3/mm3 208 180 147     Results from last 7 days   Lab Units 02/07/20  0457 02/06/20  0458 02/05/20  0507   SODIUM mmol/L 135* 134* 132*   POTASSIUM mmol/L 4.0 3.9 3.8   CHLORIDE mmol/L 103 104 103   CO2 mmol/L 17.6* 14.9* 16.1*   BUN mg/dL 39* 36* 30*   CREATININE mg/dL 1.41* 1.38* 1.62*   CALCIUM mg/dL 8.3* 8.2* 7.8*   BILIRUBIN mg/dL 1.2 1.1 1.1   ALK PHOS U/L 61 56 54   ALT (SGPT) U/L 15 15 14   AST (SGOT) U/L 31 20 18   GLUCOSE mg/dL 89 79 80     Results from last 7 days   Lab Units 02/01/20  0458   INR  1.16*     Lab Results   Lab Value Date/Time    LIPASE 38 02/05/2020 0507    LIPASE 60 02/04/2020 0435    LIPASE 181 (H) 02/03/2020 0211    LIPASE 417 (H) 02/02/2020 0441    LIPASE >3,000 (H) 01/31/2020 2142       Radiology:  MRI abdomen wo contrast mrcp   Preliminary Result   Markedly limited study due to extensive motion artifact as   above.   1. MR findings of acute pancreatitis.   2. No intrahepatic biliary dilatation. No choledocholithiasis. The   gallbladder is distended with mild wall thickening. Pancreatic duct    demonstrates normal caliber.          XR Chest PA & Lateral   Final Result   FINDINGS AND IMPRESSION:   Lungs are hypoinflated with asymmetric elevation right hemidiaphragm and   bibasilar pulmonary opacification, right greater than left, as before.   No pneumothorax is seen. Heart size accentuated by low lung volumes.   Small right pleural effusion posteriorly is suspected. Findings   represent atelectasis versus pneumonia with parapneumonic effusion in   the appropriate clinical context and correlation with patient history is   recommended.       This report was finalized on 2/4/2020 12:32 PM by Dr. Carlitos Willis M.D.          XR Chest 1 View   Final Result   Interval development of dense right basilar infiltrate, which may   reflect pneumonia. There is also a small right pleural effusion.       This report was finalized on 2/2/2020 11:47 PM by Dr. Kathleen Montoya M.D.          US Gallbladder   Final Result       1. No stones or sludge are seen within the gallbladder. Gallbladder   appears mildly distended, with gallbladder wall thickening and edema   noted. Appearance is nonspecific, and may be reactive, and may be   related to the patient's pancreatitis.   2. The patient does have some mild dilatation of the common bile duct,   measuring up to 9 mm. Some of this may be age related, but correlation   with liver function tests is recommended. MRCP or ERCP could be   considered for additional evaluation if these are abnormal.       This report was finalized on 2/1/2020 9:55 PM by Dr. Kathleen Montoya M.D.          CT Abdomen Pelvis Without Contrast   Final Result       1. The patient has inflammatory stranding surrounding the pancreatic   head and neck, in keeping with history of pancreatitis. No discrete   peripancreatic collections are seen. There is no evidence of gastric   outlet obstruction.       Radiation dose reduction techniques were utilized, including automated   exposure control and exposure  modulation based on body size.       This report was finalized on 1/31/2020 11:52 PM by Dr. Kathleen Montoya M.D.          XR Chest 1 View   Final Result   Mild bibasilar atelectasis.       This report was finalized on 1/31/2020 10:12 PM by Dr. Kathleen Montoya M.D.              Assessment/Plan     Patient Active Problem List   Diagnosis   • Benign essential hypertension   • Claustrophobia   • Gout   • Hyperlipidemia   • Primary hypothyroidism   • Impaired fasting glucose   • Nocturnal hypoxemia   • Secondary polycythemia   • Vitamin D deficiency   • Therapeutic drug monitoring   • Family history of coronary artery disease   • Bilateral sensorineural hearing loss, wears hearing aids   • Multinodular goiter   • Supraventricular tachycardia (CMS/HCC)   • Morbidly obese (CMS/HCC)   • Acute pancreatitis without infection or necrosis   • Obesity (BMI 30-39.9)   • CKD (chronic kidney disease) stage 3, GFR 30-59 ml/min (CMS/HCC)   • LINNEA (acute kidney injury) (CMS/HCC)   • Pneumonia   • New onset a-fib (CMS/HCC)   • Hyponatremia   • Hypomagnesemia   • Leukocytosis       Impression  1. Acute pancreatitis: Symptomatically improved, inflammation still noted on MRCP; no divisum, no evidence of obstruction or stone; suspect medication associated    2. Upper abdominal pain: with above, resolved    3. Mild common bile duct dilatation: as per 2/1 gallbladder ultrasound, no worrisome MRCP findings    4. Constipation: persists despite enema last night    5. Leukocytosis: persists,  Slightly worsened, likely related to #1 and also pneumonia    Plan  Continue MiraLAX, senna s  Bisacodyl suppository today  Diet as tolerated  Continue off lisinopril/HCTZ      I discussed the patients findings and my recommendations with patient and family.    Kathy Lima MD

## 2020-02-07 NOTE — PLAN OF CARE
Problem: Patient Care Overview  Goal: Plan of Care Review  Outcome: Ongoing (interventions implemented as appropriate)  Flowsheets (Taken 2/7/2020 5596)  Plan of Care Reviewed With: patient; daughter  Outcome Summary: Pt just back to bed but willing to ambulate. Constipation issues today. Pt min asst OOB with cga for sit to stand with rwx today. Cga for 60' with rwx then [id number] Pt mouth breathing today and was educated on PLB. Pt performed PLB during amb , cued for safe usage during turns with rwx and to slow pace. Fall risk at this time. Pt may benefit from rollator for seated rest periods as needed. Increased asst needed today but fatigued from up in chair all morning per daughter.

## 2020-02-07 NOTE — THERAPY TREATMENT NOTE
Patient Name: Mandy Alarcon  : 1933    MRN: [id number]                              Today's Date: 2020       Admit Date: 2020    Visit Dx:     ICD-10-CM ICD-9-CM   1. Acute pancreatitis without infection or necrosis, unspecified pancreatitis type K85.90 577.0   2. Acute kidney injury superimposed on chronic kidney disease (CMS/HCC) N17.9 866.00    N18.9 585.9     Patient Active Problem List   Diagnosis   • Benign essential hypertension   • Claustrophobia   • Gout   • Hyperlipidemia   • Primary hypothyroidism   • Impaired fasting glucose   • Nocturnal hypoxemia   • Secondary polycythemia   • Vitamin D deficiency   • Therapeutic drug monitoring   • Family history of coronary artery disease   • Bilateral sensorineural hearing loss, wears hearing aids   • Multinodular goiter   • Supraventricular tachycardia (CMS/[facility])   • Morbidly obese (CMS/[facility])   • Acute pancreatitis without infection or necrosis   • Obesity (BMI 30-39.9)   • CKD (chronic kidney disease) stage 3, GFR 30-59 ml/min (CMS/[facility])   • LINNEA (acute kidney injury) (CMS/[facility])   • Pneumonia   • New onset a-fib (CMS/[facility])   • Hyponatremia   • Hypomagnesemia   • Leukocytosis     Past Medical History:   Diagnosis Date   • Benign essential hypertension 10/28/2012    2012--treatment for hypertension begun.   • Bilateral sensorineural hearing loss, wears hearing aids 2017    Left is much worse than right.  Patient had multiple ear infections as a child.   • Chronic renal insufficiency, stage II (mild), 2016--creatinine 1.35 2016--creatinine 1.35.  Baseline creatinine approximately 1.3.   • Claustrophobia 2016    This patient has significant nocturnal hypoxemia and I think that she could benefit from nocturnal oxygen therapy. The exact etiology of her hypoxemia is not clear. She could possibly have obstructive sleep apnea but this is not documented. We cannot test this patient for sleep apnea due to the fact that  she is severely claustrophobic. Patient was a former smoker and it is possibly that COPD he is playing a role.   • Family history of coronary artery disease 2016    Patient's mother, father, 2 sisters and a brother all  from myocardial infarctions   • Gout 10/28/2012    2012--initial diagnosis and treatment of gout.   • Hyperlipidemia 10/28/2012    2012--treatment for hyperlipidemia begun.   • Impaired fasting glucose 10/28/2012    2012--initial diagnosis impaired fasting glucose.   • Morbidly obese (CMS/HCC) 3/11/2019   • Nocturnal hypoxemia 2014--patient did not receive nocturnal oxygen because of Medicare regulations.   05/15/2014--overnight oximetry revealed oxygen saturations less than 89% for 22 minutes and 40 seconds. Oxygen saturations less than or equal to 88% for 22 minutes and 40 seconds. Lowest oxygen saturation 83%. The longest continuous time with oxygen saturations less than or equal to 88% was 1 minute and 32 seconds.   2012--overnight oximetry revealed oxygen saturations less than 90% for one hour and 35 minutes. Oxygen saturations less than 89% 59 minutes. This patient has significant nocturnal hypoxemia and I think that she could benefit from nocturnal oxygen therapy. The exact etiology of her hypoxemia is not clear. She could possibly have obstructive sleep apnea but this is not documented. We cannot test this patient for sleep apnea due to the fact that she is severely claustrophobic. Patient was a former smoker and it is possibly that COPD he is playing a role.   • Primary hypothyroidism 2015--TSH remains elevated slightly at 4.92.  Given the overall clinical picture including the multinodular goiter, we will initiate levothyroxine 50 mcg/day and reassess in about 6 weeks.  2018--thyroid ultrasound reveals a multinodular thyroid with multiple subcentimeter nodules.  Only minimal increase in size of the  largest nodule in the left lobe has occurred when compared    • Secondary polycythemia 8/2/2012 11/06/2014--patient did not receive nocturnal oxygen because of Medicare regulations.   05/15/2014--overnight oximetry revealed oxygen saturations less than 89% for 22 minutes and 40 seconds. Oxygen saturations less than or equal to 88% for 22 minutes and 40 seconds. Lowest oxygen saturation 83%. The longest continuous time with oxygen saturations less than or equal to 88% was 1 minute and 32 seconds.   08/02/2012--overnight oximetry revealed oxygen saturations less than 90% for one hour and 35 minutes. Oxygen saturations less than 89% 59 minutes. This patient has significant nocturnal hypoxemia and I think that she could benefit from nocturnal oxygen therapy. The exact etiology of her hypoxemia is not clear. She could possibly have obstructive sleep apnea but this is not documented. We cannot test this patient for sleep apnea due to the fact that she is severely claustrophobic. Patient was a former smoker and it is possibly that COPD he is playing a role.   • Vitamin D deficiency 5/23/2016     Past Surgical History:   Procedure Laterality Date   • OOPHORECTOMY      age 48   • RADICAL ABDOMINAL HYSTERECTOMY  48 years old    48 years of age. Uterine fibroid tumors with menorrhagia - no cancer     General Information     Row Name 02/07/20 1140          PT Evaluation Time/Intention    Document Type  therapy note (daily note)  -SV     Mode of Treatment  physical therapy;individual therapy  -SV       User Key  (r) = Recorded By, (t) = Taken By, (c) = Cosigned By    Initials Name Provider Type    SV Ashley Le, PT Physical Therapist        Mobility     Row Name 02/07/20 8029          Bed Mobility Assessment/Treatment    Bed Mobility Assessment/Treatment  supine-sit-supine  -SV     Supine-Sit Switzer (Bed Mobility)  minimum assist (75% patient effort)  -SV     Sit-Supine Switzer (Bed Mobility)  minimum assist  (75% patient effort)  -SV     Assistive Device (Bed Mobility)  bed rails  -SV     Row Name 02/07/20 1239          Sit-Stand Transfer    Sit-Stand Runnels (Transfers)  verbal cues;contact guard  -SV     Assistive Device (Sit-Stand Transfers)  walker, front-wheeled  -SV     Row Name 02/07/20 1239          Gait/Stairs Assessment/Training    Runnels Level (Gait)  contact guard;verbal cues  -SV     Assistive Device (Gait)  walker, front-wheeled  -SV     Distance in Feet (Gait)  60' seated rest due to soa, 125' rwx, shuffle steps, cues with safe rwx usage, labored  -SV       User Key  (r) = Recorded By, (t) = Taken By, (c) = Cosigned By    Initials Name Provider Type    Ashley Bennett, PT Physical Therapist        Obj/Interventions     Row Name 02/07/20 1241          Therapeutic Exercise    Sets/Reps (Therapeutic Exercise)  seated posterior leg stretch x2 10 h  -SV     Row Name 02/07/20 1241          Static Sitting Balance    Level of Runnels (Unsupported Sitting, Static Balance)  supervision  -SV     Time Able to Maintain Position (Unsupported Sitting, Static Balance)  2 to 3 minutes  -SV     Row Name 02/07/20 1241          Static Standing Balance    Level of Runnels (Supported Standing, Static Balance)  contact guard assist  -SV     Time Able to Maintain Position (Supported Standing, Static Balance)  1 to 2 minutes  -SV     Assistive Device Utilized (Supported Standing, Static Balance)  walker, rolling  -SV       User Key  (r) = Recorded By, (t) = Taken By, (c) = Cosigned By    Initials Name Provider Type    Ashley Bennett, PT Physical Therapist        Goals/Plan    No documentation.       Clinical Impression     Row Name 02/07/20 1242          Pain Assessment    Additional Documentation  -- RLQ pain reported with bed mobility  -SV     Row Name 02/07/20 1242          Vital Signs    Pretreatment Heart Rate (beats/min)  93  -SV     Intratreatment Heart Rate (beats/min)  120  -SV     Pre SpO2  (%)  95  -SV     O2 Delivery Pre Treatment  room air  -SV     O2 Delivery Intra Treatment  room air  -SV     Post SpO2 (%)  94  -SV     O2 Delivery Post Treatment  room air  -SV     Pre Patient Position  Supine  -SV     Intra Patient Position  Standing  -SV     Post Patient Position  Supine  -SV     Row Name 02/07/20 1241          Positioning and Restraints    Pre-Treatment Position  in bed  -SV     Post Treatment Position  bed  -SV     In Bed  fowlers;encouraged to call for assist;call light within reach;with family/caregiver;exit alarm on  -SV       User Key  (r) = Recorded By, (t) = Taken By, (c) = Cosigned By    Initials Name Provider Type    Ashley Bennett, PT Physical Therapist        Outcome Measures     Row Name 02/07/20 1243          How much help from another person do you currently need...    Turning from your back to your side while in flat bed without using bedrails?  4  -SV     Moving from lying on back to sitting on the side of a flat bed without bedrails?  3  -SV     Moving to and from a bed to a chair (including a wheelchair)?  3  -SV     Standing up from a chair using your arms (e.g., wheelchair, bedside chair)?  3  -SV     Climbing 3-5 steps with a railing?  2  -SV     To walk in hospital room?  3  -SV     AM-PAC 6 Clicks Score (PT)  18  -SV       User Key  (r) = Recorded By, (t) = Taken By, (c) = Cosigned By    Initials Name Provider Type    Ashley Bennett, PT Physical Therapist          PT Recommendation and Plan     Plan of Care Reviewed With: patient, daughter  Outcome Summary: Pt just back to bed but willing to ambulate. Constipation issues today. Pt min asst OOB with cga for sit to stand with rwx today. Cga for 60' with rwx then [id number] Pt mouth breathing today and was educated on PLB. Pt performed PLB during amb , cued for safe usage during turns with rwx and to slow pace. Fall risk at this time.     Time Calculation:   PT Charges     Row Name 02/07/20 3239             Time  Calculation    Start Time  1140  -SV      Stop Time  1155  -SV      Time Calculation (min)  15 min  -SV      PT Received On  02/07/20  -SV      PT - Next Appointment  02/08/20  -        User Key  (r) = Recorded By, (t) = Taken By, (c) = Cosigned By    Initials Name Provider Type    SV Ashley Le, PT Physical Therapist        Therapy Charges for Today     Code Description Service Date Service Provider Modifiers Qty    80341687873 HC PT THER PROC EA 15 MIN 2/7/2020 Ashley Le, PT GP 1          PT G-Codes  Outcome Measure Options: AM-PAC 6 Clicks Basic Mobility (PT)  AM-PAC 6 Clicks Score (PT): 18    Ashley Le, PT  2/7/2020

## 2020-02-07 NOTE — PROGRESS NOTES
[facility] Follow Up    Chief Complaint: Follow up atrial fibrillation    Interval History: No pain or dyspnea this morning.  With worsening productive cough overnight.     Objective:     Objective:  Temp:  [98.1 °F (36.7 °C)-99.4 °F (37.4 °C)] 99.4 °F (37.4 °C)  Heart Rate:  [] 101  Resp:  [18] 18  BP: (132-153)/(83-97) 153/95   No intake or output data in the 24 hours ending 02/07/20 0720  Body mass index is 43.75 kg/m².      02/05/20  0500 02/06/20  0500 02/07/20  0700   Weight: 112 kg (247 lb 9.2 oz) 104 kg (230 lb 4.8 oz) 112 kg (247 lb)     Weight change: 7.575 kg (16 lb 11.2 oz)      Physical Exam:   General : Alert, cooperative, in no acute distress.  Neuro: Alert,cooperative and oriented.  Lungs: CTAB. Normal respiratory effort and rate.  CV: irregularly, irregular, rate and rhythm, normal S1 and S2, no murmurs, gallops or rubs.  ABD: Soft, nontender, nondistended. Positive bowel sounds.  Extr: No edema or cyanosis, moves all extremities.    Lab Review:   Results from last 7 days   Lab Units 02/07/20 0457 02/06/20 0458   SODIUM mmol/L 135* 134*   POTASSIUM mmol/L 4.0 3.9   CHLORIDE mmol/L 103 104   CO2 mmol/L 17.6* 14.9*   BUN mg/dL 39* 36*   CREATININE mg/dL 1.41* 1.38*   GLUCOSE mg/dL 89 79   CALCIUM mg/dL 8.3* 8.2*   AST (SGOT) U/L 31 20   ALT (SGPT) U/L 15 15     Results from last 7 days   Lab Units 01/31/20  2142   TROPONIN T ng/mL <0.010     Results from last 7 days   Lab Units 02/07/20 0457 02/06/20 0458   WBC 10*3/mm3 14.41* 12.13*   HEMOGLOBIN g/dL 12.7 13.0   HEMATOCRIT % 37.5 37.8   PLATELETS 10*3/mm3 208 180     Results from last 7 days   Lab Units 02/01/20  0458   INR  1.16*     Results from last 7 days   Lab Units 02/07/20  0457 02/06/20  0458   MAGNESIUM mg/dL 1.8 1.9     Results from last 7 days   Lab Units 02/01/20  0458   CHOLESTEROL mg/dL 107   TRIGLYCERIDES mg/dL 38   HDL CHOL mg/dL 57     Results from last 7 days   Lab Units 01/31/20  2142   PROBNP pg/mL  667.0     Results from last 7 days   Lab Units 02/05/20  0507   TSH uIU/mL 2.030     I reviewed the patient's new clinical results.  I personally viewed and interpreted the patient's EKG  Current Medications:   Scheduled Meds:  cholecalciferol 1,000 Units Oral Daily   clobetasol  Topical Q12H   enoxaparin 1 mg/kg Subcutaneous Q12H   famotidine 20 mg Intravenous Q24H   ipratropium-albuterol 3 mL Nebulization TID - RT   levoFLOXacin 750 mg Intravenous Q48H   levothyroxine 50 mcg Oral Q AM   metoprolol tartrate 25 mg Oral Q8H   nystatin 5 mL Swish & Swallow 4x Daily   polyethylene glycol 17 g Oral BID   pravastatin 40 mg Oral Nightly   senna-docusate sodium 2 tablet Oral Nightly   sodium bicarbonate 1,300 mg Oral TID   sodium chloride 10 mL Intravenous Q12H     Continuous Infusions:  dilTIAZem 5 mg/hr Last Rate: Stopped (02/05/20 1318)   Pharmacy to Dose enoxaparin (LOVENOX)     sodium chloride 75 mL/hr Last Rate: 75 mL/hr (02/07/20 0639)       Allergies:  Allergies   Allergen Reactions   • Cefaclor Anaphylaxis and Swelling   • Sulfa Antibiotics Rash       Assessment/Plan:     1. Atrial fibrillation. New diagnosis.  Remains in atrial fibrillation.  Likely related to pancreatitis and pneumonia.  Fair rate control on oral metoprolol tid.  Diltiazem weaned off.  CHADS-VASc is 4.  On enoxaparin with plans for apixaban if remains in atrial fibrillation at discharge.   2. Acute pancreatitis. Improved.  MRCP results pending.  3. LINNEA/CKD. Improved with hydration.  Fairly stable currently.    4. Pneumonia. CXR with stable findings.  With continued productive cough and intermittent wheezing.  5. Hypertension. Controlled.   6. History of paroxysmal SVT    -  Switch metoprolol to atenolol for better rate control.   -  Continue enoxaparin for anticoagulation for now.  Plan to transition enoxaparin to apixaban at discharge if remains in atrial fibrillation  -  Consider pulmonary consult regarding pneumonia     Lizbeth Laura  MD  02/07/20  7:20 AM

## 2020-02-07 NOTE — PROGRESS NOTES
"   LOS: 6 days    Patient Care Team:  Daryl Santos MD as PCP - General (Internal Medicine)  Daryl Santos MD as PCP - Claims Attributed    Chief Complaint:    Chief Complaint   Patient presents with   • Chest Pain     Acute kidney injury on chronic kidney disease  Subjective     Interval History:   Patient is feeling better, decreased abdominal pain, no nausea or vomiting, no dysuria or gross hematuria.  No lightheadedness, has poor appetite.      Review of Systems:   As noted above    Objective     Vital Signs  Temp:  [97.7 °F (36.5 °C)-99.4 °F (37.4 °C)] 97.7 °F (36.5 °C)  Heart Rate:  [] 93  Resp:  [18] 18  BP: (132-153)/() 132/85    Flowsheet Rows      First Filed Value   Admission Height  160 cm (63\") Documented at 01/31/2020 2149   Admission Weight  99.5 kg (219 lb 6.4 oz) Documented at 01/31/2020 2149          I/O this shift:  In: 855 [P.O.:855]  Out: -   I/O last 3 completed shifts:  In: 2634 [P.O.:360; I.V.:2274]  Out: -     Intake/Output Summary (Last 24 hours) at 2/7/2020 1016  Last data filed at 2/7/2020 0900  Gross per 24 hour   Intake 855 ml   Output --   Net 855 ml       Physical Exam:  General Appearance: alert, oriented x 3, no acute distress, obese, appears to be chronically ill  Skin: warm and dry  HEENT: pupils round and reactive to light, oral mucosa dry, nonicteric sclerae,   Neck: supple, no JVD, trachea midline, I did not appreciate JVD  Lungs: CTA, unlabored breathing effort  Heart: Irregularly irregular, no rub  Abdomen: soft,  present bowel sounds to auscultation, the abdomen is protuberant, she has less epigastric tenderness   : no palpable bladder, no palpable bladder  Extremities: Trace pretibial edema, no cyanosis or clubbing,   Neuro: normal speech and mental status, hard of hearing     Results Review:    Results from last 7 days   Lab Units 02/07/20  0457 02/06/20  0458 02/05/20  0507   SODIUM mmol/L 135* 134* 132*   POTASSIUM mmol/L 4.0 3.9 3.8   CHLORIDE " mmol/L 103 104 103   CO2 mmol/L 17.6* 14.9* 16.1*   BUN mg/dL 39* 36* 30*   CREATININE mg/dL 1.41* 1.38* 1.62*   CALCIUM mg/dL 8.3* 8.2* 7.8*   BILIRUBIN mg/dL 1.2 1.1 1.1   ALK PHOS U/L 61 56 54   ALT (SGPT) U/L 15 15 14   AST (SGOT) U/L 31 20 18   GLUCOSE mg/dL 89 79 80       Estimated Creatinine Clearance: 34.5 mL/min (A) (by C-G formula based on SCr of 1.41 mg/dL (H)).    Results from last 7 days   Lab Units 02/07/20 0457 02/06/20  0458 02/05/20  0507   MAGNESIUM mg/dL 1.8 1.9 1.6   PHOSPHORUS mg/dL 3.1 2.6 2.1*       Results from last 7 days   Lab Units 02/07/20  0457 02/06/20  0458 02/05/20  0507   URIC ACID mg/dL 5.6 4.6 3.9       Results from last 7 days   Lab Units 02/07/20  0457 02/06/20  0458 02/05/20  0507 02/04/20  0435 02/03/20  0211   WBC 10*3/mm3 14.41* 12.13* 12.33* 11.29* 11.27*   HEMOGLOBIN g/dL 12.7 13.0 12.8 12.7 13.5   PLATELETS 10*3/mm3 208 180 147 145 174       Results from last 7 days   Lab Units 02/01/20  0458   INR  1.16*         Imaging Results (Last 24 Hours)     Procedure Component Value Units Date/Time    MRI abdomen wo contrast mrcp [[id number]] Collected:  02/07/20 0926     Updated:  02/07/20 0926    Narrative:       MR ABDOMEN WITHOUT CONTRAST, MRCP     HISTORY: Acute pancreatitis.     TECHNIQUE: MRI of the abdomen was performed utilizing a MRCP protocol.  Axial 2-D FIESTA, in and out of phase FSPGR, axial fat-saturated T2  imaging was acquired. Thick slab coronal MRCP imaging was acquired in  multiple obliquities through the biliary tree. Coronal thin section fat  saturated T2 and 3-D MRCP imaging was acquired. Axial thin section  precontrast and dynamic enhanced postcontrast T1 weighted LAVA imaging  was acquired.     COMPARISON: CT abdomen and pelvis from 01/31/2020     FINDINGS: The patient did not cooperate leading to significant motion  artifact on almost all images.     There is marked anasarca. There is a small layering right and trace left  pleural effusion with basilar  atelectasis. Diffuse pancreatic edema with  peripancreatic fat stranding and fluid most consistent with acute  pancreatitis. The pancreatic duct is not dilated. No convincing filling  defects are present. The liver demonstrates mild diffuse hepatic  steatosis. There is no intrahepatic biliary dilatation. The gallbladder  is distended with mild circumferential wall thickening. The common bile  duct is mildly dilated measuring up to 8 mm and demonstrates normal  distal tapering.       Impression:       Markedly limited study due to extensive motion artifact as  above.  1. MR findings of acute pancreatitis.  2. No intrahepatic biliary dilatation. No choledocholithiasis. The  gallbladder is distended with mild wall thickening. Pancreatic duct  demonstrates normal caliber.             atenolol 25 mg Oral Q12H   cholecalciferol 1,000 Units Oral Daily   clobetasol  Topical Q12H   enoxaparin 1 mg/kg Subcutaneous Q12H   [START ON 2/8/2020] famotidine 20 mg Oral Daily   ipratropium-albuterol 3 mL Nebulization TID - RT   levothyroxine 50 mcg Oral Q AM   nystatin 5 mL Swish & Swallow 4x Daily   polyethylene glycol 17 g Oral BID   pravastatin 40 mg Oral Nightly   senna-docusate sodium 2 tablet Oral Nightly   sodium bicarbonate 1,300 mg Oral TID   sodium chloride 10 mL Intravenous Q12H       dilTIAZem 5 mg/hr Last Rate: Stopped (02/05/20 1318)   Pharmacy to Dose enoxaparin (LOVENOX)     sodium chloride 75 mL/hr Last Rate: 75 mL/hr (02/07/20 0639)       Medication Review:   Current Facility-Administered Medications   Medication Dose Route Frequency Provider Last Rate Last Dose   • acetaminophen (TYLENOL) tablet 650 mg  650 mg Oral Q4H PRN Kannan Rizo APRN   650 mg at 02/01/20 0203   • aluminum-magnesium hydroxide-simethicone (MAALOX MAX) 400-400-40 MG/5ML suspension 15 mL  15 mL Oral Q6H PRN Kannan Rizo APRN   15 mL at 02/01/20 0808   • atenolol (TENORMIN) tablet 25 mg  25 mg Oral Q12H Lizbeth Laura MD       •  bisacodyl (DULCOLAX) EC tablet 5 mg  5 mg Oral Daily PRN Kannan Rizo APRN       • cholecalciferol (VITAMIN D3) tablet 1,000 Units  1,000 Units Oral Daily Kannan Rizo APRN   1,000 Units at 02/07/20 0837   • clobetasol (TEMOVATE) 0.05 % cream   Topical Q12H Kannan Rizo APRN       • cyclobenzaprine (FLEXERIL) tablet 5 mg  5 mg Oral TID PRN Amadou Das MD   5 mg at 02/04/20 1115   • dilTIAZem (CARDIZEM) 100 mg in 100 mL NS (1 mg/mL) infusion  5 mg/hr Intravenous Titrated Rex Arias Jr., MD   Stopped at 02/05/20 1318   • enoxaparin (LOVENOX) syringe 110 mg  1 mg/kg Subcutaneous Q12H Amadou Das MD       • [START ON 2/8/2020] famotidine (PEPCID) tablet 20 mg  20 mg Oral Daily Amadou Das MD       • HYDROcodone-acetaminophen (NORCO) 7.5-325 MG per tablet 1 tablet  1 tablet Oral Q4H PRN Amadou Das MD   1 tablet at 02/07/20 0535   • HYDROmorphone (DILAUDID) injection 0.5 mg  0.5 mg Intravenous Q2H PRN Nathan Tam MD   0.5 mg at 02/06/20 1513    And   • naloxone (NARCAN) injection 0.4 mg  0.4 mg Intravenous Q5 Min PRN Nathan Tam MD       • ipratropium-albuterol (DUO-NEB) nebulizer solution 3 mL  3 mL Nebulization TID - RT Amadou Das MD   3 mL at 02/07/20 0715   • levothyroxine (SYNTHROID, LEVOTHROID) tablet 50 mcg  50 mcg Oral Q AM Kannan Rizo APRN   50 mcg at 02/07/20 0636   • nystatin (MYCOSTATIN) 814668 UNIT/ML suspension 500,000 Units  5 mL Swish & Swallow 4x Daily Meeta Goodwin APRN   500,000 Units at 02/07/20 0837   • ondansetron (ZOFRAN) tablet 4 mg  4 mg Oral Q6H PRN Kannan Rizo APRN        Or   • ondansetron (ZOFRAN) injection 4 mg  4 mg Intravenous Q6H PRN Kannan Rizo APRN   4 mg at 02/05/20 2040   • Pharmacy to Dose enoxaparin (LOVENOX)   Does not apply Continuous PRN Lizbeth Laura MD       • polyethylene glycol 3350 powder (packet)  17 g Oral BID Kathy Lima MD   17 g at 02/07/20  0838   • pravastatin (PRAVACHOL) tablet 40 mg  40 mg Oral Nightly Kannan Rzio APRN   40 mg at 02/06/20 2152   • senna-docusate sodium (SENOKOT-S) 8.6-50 MG tablet 2 tablet  2 tablet Oral Nightly Kathy Lima MD   2 tablet at 02/06/20 2152   • sodium bicarbonate tablet 1,300 mg  1,300 mg Oral TID Mil Acosta MD   1,300 mg at 02/07/20 0837   • sodium chloride 0.9 % flush 10 mL  10 mL Intravenous PRN Ranjith Quinn MD   10 mL at 01/31/20 2143   • sodium chloride 0.9 % flush 10 mL  10 mL Intravenous Q12H Kannan Rizo APRN   10 mL at 02/07/20 0838   • sodium chloride 0.9 % flush 10 mL  10 mL Intravenous PRN Kannan Rizo APRN       • sodium chloride 0.9 % infusion  75 mL/hr Intravenous Continuous Amadou Das MD 75 mL/hr at 02/07/20 0639 75 mL/hr at 02/07/20 0639       Assessment/Plan   1.  Acute kidney injury on chronic kidney disease stage III, associated with acute pancreatitis improved with hydration, creatinine is stable since admission creatinine is down to 1.38.  It is associated with hemodynamic changes and blunted autoregulation because of the ACE inhibitor, sodium is 135, total CO2 17.6 and anion gap is normal.  2.  Chronic kidney disease stage III most likely secondary to nephrosclerosis also being on hydrochlorothiazide lead to decreased GFR by negative tubular glomerular feedback mechanism.  3.  Acute pancreatitis  4.  Hypertension, controlled  5.  Hypothyroidism  6.  Dyslipidemia  7.  Severe hearing loss  8.  Minimal lower extremity edema associated probably with hypoalbuminemia, albumin is 2  9.  Hypomagnesemia, magnesium 1.8  10.  Metabolic acidosis with slight increase in the anion gap, multifactorial  11.  Atrial fibrillation        Plan:  1.  To continue the same treatment  2.  Surveillance labs      Mil Acosta MD  02/07/20  10:16 AM

## 2020-02-08 ENCOUNTER — APPOINTMENT (OUTPATIENT)
Dept: GENERAL RADIOLOGY | Facility: HOSPITAL | Age: 85
End: 2020-02-08

## 2020-02-08 LAB
ALBUMIN SERPL-MCNC: 2.1 G/DL (ref 3.5–5.2)
ALBUMIN/GLOB SERPL: 0.7 G/DL
ALP SERPL-CCNC: 59 U/L (ref 39–117)
ALT SERPL W P-5'-P-CCNC: 17 U/L (ref 1–33)
ANION GAP SERPL CALCULATED.3IONS-SCNC: 14.1 MMOL/L (ref 5–15)
AST SERPL-CCNC: 23 U/L (ref 1–32)
BACTERIA SPEC AEROBE CULT: NORMAL
BACTERIA SPEC AEROBE CULT: NORMAL
BASOPHILS # BLD AUTO: 0.11 10*3/MM3 (ref 0–0.2)
BASOPHILS NFR BLD AUTO: 0.5 % (ref 0–1.5)
BILIRUB SERPL-MCNC: 1.1 MG/DL (ref 0.2–1.2)
BUN BLD-MCNC: 34 MG/DL (ref 8–23)
BUN/CREAT SERPL: 26.4 (ref 7–25)
CALCIUM SPEC-SCNC: 8.3 MG/DL (ref 8.6–10.5)
CHLORIDE SERPL-SCNC: 104 MMOL/L (ref 98–107)
CO2 SERPL-SCNC: 15.9 MMOL/L (ref 22–29)
CREAT BLD-MCNC: 1.29 MG/DL (ref 0.57–1)
DEPRECATED RDW RBC AUTO: 43.9 FL (ref 37–54)
EOSINOPHIL # BLD AUTO: 0.18 10*3/MM3 (ref 0–0.4)
EOSINOPHIL NFR BLD AUTO: 0.9 % (ref 0.3–6.2)
ERYTHROCYTE [DISTWIDTH] IN BLOOD BY AUTOMATED COUNT: 14.1 % (ref 12.3–15.4)
GFR SERPL CREATININE-BSD FRML MDRD: 39 ML/MIN/1.73
GLOBULIN UR ELPH-MCNC: 3 GM/DL
GLUCOSE BLD-MCNC: 99 MG/DL (ref 65–99)
HCT VFR BLD AUTO: 38 % (ref 34–46.6)
HGB BLD-MCNC: 12.8 G/DL (ref 12–15.9)
IMM GRANULOCYTES # BLD AUTO: 0.95 10*3/MM3 (ref 0–0.05)
IMM GRANULOCYTES NFR BLD AUTO: 4.6 % (ref 0–0.5)
LYMPHOCYTES # BLD AUTO: 1.39 10*3/MM3 (ref 0.7–3.1)
LYMPHOCYTES NFR BLD AUTO: 6.7 % (ref 19.6–45.3)
MAGNESIUM SERPL-MCNC: 1.5 MG/DL (ref 1.6–2.4)
MCH RBC QN AUTO: 29.1 PG (ref 26.6–33)
MCHC RBC AUTO-ENTMCNC: 33.7 G/DL (ref 31.5–35.7)
MCV RBC AUTO: 86.4 FL (ref 79–97)
MONOCYTES # BLD AUTO: 2.03 10*3/MM3 (ref 0.1–0.9)
MONOCYTES NFR BLD AUTO: 9.8 % (ref 5–12)
NEUTROPHILS # BLD AUTO: 15.96 10*3/MM3 (ref 1.7–7)
NEUTROPHILS NFR BLD AUTO: 77.5 % (ref 42.7–76)
NRBC BLD AUTO-RTO: 0 /100 WBC (ref 0–0.2)
PHOSPHATE SERPL-MCNC: 3.3 MG/DL (ref 2.5–4.5)
PLATELET # BLD AUTO: 258 10*3/MM3 (ref 140–450)
PMV BLD AUTO: 9.2 FL (ref 6–12)
POTASSIUM BLD-SCNC: 3.5 MMOL/L (ref 3.5–5.2)
PROT SERPL-MCNC: 5.1 G/DL (ref 6–8.5)
RBC # BLD AUTO: 4.4 10*6/MM3 (ref 3.77–5.28)
SODIUM BLD-SCNC: 134 MMOL/L (ref 136–145)
URATE SERPL-MCNC: 5.4 MG/DL (ref 2.4–5.7)
WBC NRBC COR # BLD: 20.62 10*3/MM3 (ref 3.4–10.8)

## 2020-02-08 PROCEDURE — 99232 SBSQ HOSP IP/OBS MODERATE 35: CPT | Performed by: INTERNAL MEDICINE

## 2020-02-08 PROCEDURE — 84550 ASSAY OF BLOOD/URIC ACID: CPT | Performed by: INTERNAL MEDICINE

## 2020-02-08 PROCEDURE — 25010000002 MAGNESIUM SULFATE IN D5W 1G/100ML (PREMIX) 1-5 GM/100ML-% SOLUTION: Performed by: INTERNAL MEDICINE

## 2020-02-08 PROCEDURE — 84100 ASSAY OF PHOSPHORUS: CPT | Performed by: INTERNAL MEDICINE

## 2020-02-08 PROCEDURE — 85025 COMPLETE CBC W/AUTO DIFF WBC: CPT | Performed by: INTERNAL MEDICINE

## 2020-02-08 PROCEDURE — 71046 X-RAY EXAM CHEST 2 VIEWS: CPT

## 2020-02-08 PROCEDURE — 83735 ASSAY OF MAGNESIUM: CPT | Performed by: INTERNAL MEDICINE

## 2020-02-08 PROCEDURE — 80053 COMPREHEN METABOLIC PANEL: CPT | Performed by: INTERNAL MEDICINE

## 2020-02-08 PROCEDURE — 97110 THERAPEUTIC EXERCISES: CPT

## 2020-02-08 PROCEDURE — 94799 UNLISTED PULMONARY SVC/PX: CPT

## 2020-02-08 PROCEDURE — 25010000002 ENOXAPARIN PER 10 MG: Performed by: INTERNAL MEDICINE

## 2020-02-08 RX ORDER — FUROSEMIDE 40 MG/1
40 TABLET ORAL DAILY
Status: DISCONTINUED | OUTPATIENT
Start: 2020-02-08 | End: 2020-02-09

## 2020-02-08 RX ORDER — POTASSIUM CHLORIDE 750 MG/1
40 CAPSULE, EXTENDED RELEASE ORAL ONCE
Status: COMPLETED | OUTPATIENT
Start: 2020-02-08 | End: 2020-02-08

## 2020-02-08 RX ORDER — LEVOFLOXACIN 500 MG/1
500 TABLET, FILM COATED ORAL ONCE
Status: COMPLETED | OUTPATIENT
Start: 2020-02-09 | End: 2020-02-09

## 2020-02-08 RX ORDER — MAGNESIUM SULFATE 1 G/100ML
1 INJECTION INTRAVENOUS
Status: COMPLETED | OUTPATIENT
Start: 2020-02-08 | End: 2020-02-08

## 2020-02-08 RX ORDER — ATENOLOL 50 MG/1
50 TABLET ORAL EVERY 12 HOURS SCHEDULED
Status: DISCONTINUED | OUTPATIENT
Start: 2020-02-08 | End: 2020-02-11 | Stop reason: HOSPADM

## 2020-02-08 RX ADMIN — CLOBETASOL PROPIONATE: 0.5 CREAM TOPICAL at 08:30

## 2020-02-08 RX ADMIN — ATENOLOL 25 MG: 25 TABLET ORAL at 08:27

## 2020-02-08 RX ADMIN — LEVOTHYROXINE SODIUM 50 MCG: 50 TABLET ORAL at 06:34

## 2020-02-08 RX ADMIN — NYSTATIN 500000 UNITS: 100000 SUSPENSION ORAL at 08:27

## 2020-02-08 RX ADMIN — ATENOLOL 50 MG: 50 TABLET ORAL at 20:07

## 2020-02-08 RX ADMIN — HYDROCODONE BITARTRATE AND ACETAMINOPHEN 1 TABLET: 7.5; 325 TABLET ORAL at 15:09

## 2020-02-08 RX ADMIN — IPRATROPIUM BROMIDE AND ALBUTEROL SULFATE 3 ML: 2.5; .5 SOLUTION RESPIRATORY (INHALATION) at 15:26

## 2020-02-08 RX ADMIN — NYSTATIN 500000 UNITS: 100000 SUSPENSION ORAL at 20:07

## 2020-02-08 RX ADMIN — ENOXAPARIN SODIUM 110 MG: 120 INJECTION SUBCUTANEOUS at 21:44

## 2020-02-08 RX ADMIN — SENNOSIDES AND DOCUSATE SODIUM 2 TABLET: 8.6; 5 TABLET ORAL at 20:07

## 2020-02-08 RX ADMIN — MAGNESIUM SULFATE HEPTAHYDRATE 1 G: 1 INJECTION, SOLUTION INTRAVENOUS at 16:26

## 2020-02-08 RX ADMIN — SODIUM CHLORIDE, PRESERVATIVE FREE 10 ML: 5 INJECTION INTRAVENOUS at 20:07

## 2020-02-08 RX ADMIN — SODIUM BICARBONATE 1300 MG: 650 TABLET ORAL at 08:28

## 2020-02-08 RX ADMIN — SODIUM BICARBONATE 1300 MG: 650 TABLET ORAL at 17:26

## 2020-02-08 RX ADMIN — MAGNESIUM SULFATE HEPTAHYDRATE 1 G: 1 INJECTION, SOLUTION INTRAVENOUS at 17:27

## 2020-02-08 RX ADMIN — CLOBETASOL PROPIONATE 1 APPLICATION: 0.5 CREAM TOPICAL at 20:07

## 2020-02-08 RX ADMIN — POTASSIUM CHLORIDE 40 MEQ: 750 CAPSULE, EXTENDED RELEASE ORAL at 21:24

## 2020-02-08 RX ADMIN — POLYETHYLENE GLYCOL 3350 17 G: 17 POWDER, FOR SOLUTION ORAL at 08:29

## 2020-02-08 RX ADMIN — HYDROCODONE BITARTRATE AND ACETAMINOPHEN 1 TABLET: 7.5; 325 TABLET ORAL at 21:43

## 2020-02-08 RX ADMIN — HYDROCODONE BITARTRATE AND ACETAMINOPHEN 1 TABLET: 7.5; 325 TABLET ORAL at 11:11

## 2020-02-08 RX ADMIN — FAMOTIDINE 20 MG: 20 TABLET, FILM COATED ORAL at 06:33

## 2020-02-08 RX ADMIN — HYDROCODONE BITARTRATE AND ACETAMINOPHEN 1 TABLET: 7.5; 325 TABLET ORAL at 04:09

## 2020-02-08 RX ADMIN — SODIUM BICARBONATE 1300 MG: 650 TABLET ORAL at 20:07

## 2020-02-08 RX ADMIN — DOXYCYCLINE 100 MG: 100 INJECTION, POWDER, LYOPHILIZED, FOR SOLUTION INTRAVENOUS at 22:47

## 2020-02-08 RX ADMIN — ENOXAPARIN SODIUM 110 MG: 120 INJECTION SUBCUTANEOUS at 12:22

## 2020-02-08 RX ADMIN — NYSTATIN 500000 UNITS: 100000 SUSPENSION ORAL at 17:27

## 2020-02-08 RX ADMIN — SODIUM CHLORIDE, PRESERVATIVE FREE 10 ML: 5 INJECTION INTRAVENOUS at 08:29

## 2020-02-08 RX ADMIN — DOXYCYCLINE 100 MG: 100 INJECTION, POWDER, LYOPHILIZED, FOR SOLUTION INTRAVENOUS at 14:42

## 2020-02-08 RX ADMIN — IPRATROPIUM BROMIDE AND ALBUTEROL SULFATE 3 ML: 2.5; .5 SOLUTION RESPIRATORY (INHALATION) at 22:50

## 2020-02-08 RX ADMIN — POLYETHYLENE GLYCOL 3350 17 G: 17 POWDER, FOR SOLUTION ORAL at 20:07

## 2020-02-08 RX ADMIN — NYSTATIN 500000 UNITS: 100000 SUSPENSION ORAL at 12:25

## 2020-02-08 RX ADMIN — IPRATROPIUM BROMIDE AND ALBUTEROL SULFATE 3 ML: 2.5; .5 SOLUTION RESPIRATORY (INHALATION) at 06:59

## 2020-02-08 RX ADMIN — PRAVASTATIN SODIUM 40 MG: 40 TABLET ORAL at 20:07

## 2020-02-08 RX ADMIN — VITAMIN D, TAB 1000IU (100/BT) 1000 UNITS: 25 TAB at 08:28

## 2020-02-08 RX ADMIN — FUROSEMIDE 40 MG: 40 TABLET ORAL at 21:24

## 2020-02-08 RX ADMIN — MAGNESIUM SULFATE HEPTAHYDRATE 1 G: 1 INJECTION, SOLUTION INTRAVENOUS at 12:26

## 2020-02-08 NOTE — PROGRESS NOTES
Name: Mandy Alarcon ADMIT: 2020   : 1933  PCP: Daryl Santos MD    MRN: [id number] LOS: 7 days   AGE/SEX: 86 y.o. female  ROOM: [facility]   Subjective   Chief Complaint   Patient presents with   • Chest Pain   abd pain is better  On IVFs  Some leg swelling    On abx  Dyspnea better, some cough      ROS  No f/c  +n/-v  No cp/palp  +soa/cough    Objective   Vital Signs  Temp:  [98 °F (36.7 °C)-98.3 °F (36.8 °C)] 98 °F (36.7 °C)  Heart Rate:  [] 96  Resp:  [18-20] 18  BP: (114-154)/() 128/101  SpO2:  [93 %-98 %] 96 %  on   ;   Device (Oxygen Therapy): room air  Body mass index is 43.75 kg/m².    Physical Exam   Constitutional: She is oriented to person, place, and time. She appears well-developed and well-nourished.   HENT:   Head: Normocephalic and atraumatic.   Eyes: Conjunctivae are normal. No scleral icterus.   Neck: Neck supple. No tracheal deviation present.   Cardiovascular:   No murmur heard.  Irregular    Pulmonary/Chest: No respiratory distress. She has no wheezes (decreased bs at bases).   Decreased bs at bases   Abdominal: Soft. She exhibits no distension (mild). There is no tenderness (mild epigastric). There is no guarding.   Neurological: She is alert and oriented to person, place, and time. She exhibits normal muscle tone.   Skin: Skin is warm and dry.   Psychiatric: She has a normal mood and affect. Her behavior is normal.       Results Review:       I reviewed the patient's new clinical results.  Results from last 7 days   Lab Units 20  0504 20  0457 20  0507   WBC 10*3/mm3 20.62* 14.41* 12.13* 12.33*   HEMOGLOBIN g/dL 12.8 12.7 13.0 12.8   PLATELETS 10*3/mm3 258 208 180 147     Results from last 7 days   Lab Units 20  0504 20  0457 20  0458 20  0507   SODIUM mmol/L 134* 135* 134* 132*   POTASSIUM mmol/L 3.5 4.0 3.9 3.8   CHLORIDE mmol/L 104 103 104 103   CO2 mmol/L 15.9* 17.6* 14.9* 16.1*   BUN mg/dL 34* 39* 36*  30*   CREATININE mg/dL 1.29* 1.41* 1.38* 1.62*   GLUCOSE mg/dL 99 89 79 80   Estimated Creatinine Clearance: 37.7 mL/min (A) (by C-G formula based on SCr of 1.29 mg/dL (H)).  Results from last 7 days   Lab Units 02/08/20  0504 02/07/20  0457 02/06/20  0458 02/05/20  0507   ALBUMIN g/dL 2.10* 2.00* 2.20* 2.30*   BILIRUBIN mg/dL 1.1 1.2 1.1 1.1   ALK PHOS U/L 59 61 56 54   AST (SGOT) U/L 23 31 20 18   ALT (SGPT) U/L 17 15 15 14     Results from last 7 days   Lab Units 02/08/20  0504 02/07/20  0457 02/06/20  0458 02/05/20  0507   CALCIUM mg/dL 8.3* 8.3* 8.2* 7.8*   ALBUMIN g/dL 2.10* 2.00* 2.20* 2.30*   MAGNESIUM mg/dL 1.5* 1.8 1.9 1.6   PHOSPHORUS mg/dL 3.3 3.1 2.6 2.1*     Results from last 7 days   Lab Units 02/03/20  0531 02/03/20  0024   PROCALCITONIN ng/mL  --  12.61*   LACTATE mmol/L 1.3 2.5*       Coag       HbA1C   Lab Results   Component Value Date    HGBA1C 5.90 (H) 02/01/2020    HGBA1C 5.80 (H) 08/20/2019    HGBA1C 5.81 (H) 03/04/2019     Infection   Results from last 7 days   Lab Units 02/03/20  0211 02/03/20  0158 02/03/20  0024   BLOODCX  No growth at 5 days No growth at 5 days  --    PROCALCITONIN ng/mL  --   --  12.61*     Radiology(recent) Mri Abdomen Wo Contrast Mrcp    Result Date: 2/7/2020  Markedly limited study due to extensive motion artifact as above. 1. MR findings of acute pancreatitis. 2. No intrahepatic biliary dilatation. No choledocholithiasis. The gallbladder is distended with mild wall thickening. Pancreatic duct demonstrates normal caliber.  This report was finalized on 2/7/2020 11:07 AM by Dr. Ruthy Haro M.D.      No results found for: TROPONINT, TROPONINI, BNP  No components found for: TSH;2      atenolol 25 mg Oral Q12H   cholecalciferol 1,000 Units Oral Daily   clobetasol  Topical Q12H   doxycycline 100 mg Intravenous Q12H   enoxaparin 1 mg/kg Subcutaneous Q12H   famotidine 20 mg Oral Daily   ipratropium-albuterol 3 mL Nebulization Q4H While Awake - RT   [START ON 2/9/2020]  levoFLOXacin 500 mg Oral Once   levothyroxine 50 mcg Oral Q AM   magnesium sulfate 1 g Intravenous Q1H   nystatin 5 mL Swish & Swallow 4x Daily   polyethylene glycol 17 g Oral BID   pravastatin 40 mg Oral Nightly   senna-docusate sodium 2 tablet Oral Nightly   sodium bicarbonate 1,300 mg Oral TID   sodium chloride 10 mL Intravenous Q12H      Diet Regular; Low Fat, Renal      Assessment/Plan      Active Hospital Problems    Diagnosis  POA   • **Acute pancreatitis without infection or necrosis [K85.90]  Yes   • New onset a-fib (CMS/HCC) [I48.91]  No   • Hyponatremia [E87.1]  Yes   • Hypomagnesemia [E83.42]  No   • Leukocytosis [D72.829]  Yes   • Pneumonia [J18.9]  Yes   • LINNEA (acute kidney injury) (CMS/HCC) [N17.9]  No   • Obesity (BMI 30-39.9) [E66.9]  Yes   • CKD (chronic kidney disease) stage 3, GFR 30-59 ml/min (CMS/HCC) [N18.3]  Yes   • Supraventricular tachycardia (CMS/HCC) [I47.1]  Yes   • Vitamin D deficiency [E55.9]  Yes   • Primary hypothyroidism [E03.9]  Yes   • Benign essential hypertension [I10]  Yes   • Hyperlipidemia [E78.5]  Yes   • Secondary polycythemia [D75.1]  Yes      Resolved Hospital Problems   No resolved problems to display.     Ms. Alarcon is a 86 y.o. former smoker with a history of HTN, HLD, and CKD stage III who presented with epigastric pain secondary to acute pancreatitis.     Acute pancreatitis  -PRN agents for pain, anti-emetics as needed  -Lipase normalized now  -MRCP is ok  -Seen by GI. Etiology is unclear. Possibly related to lisinopril/HCTZ vs less likely passed stone     New onset AFib with RVR  -due to PNA and acute pancreatitis  -Card consult appreciated  -s/p Diltiazem gtt and now on oral Metoprolol  -on Lovenox potential oral a/c at discharge  -HR improved     LINNEA/CKD stage 3  -can stop fluids, will see if renal thinks if diuretics would be beneficial or not  -Appreciate Renal attention to pt  -likely due to dehydration in setting of ACE-I and HCTZ    Hypomag  Replace  IV     Hypertension  -Stable  -HCTZ and lisinopril held as stated above     Right basilar community-acquired PNA  -Symptoms of dyspnea, wheezing, and suspected infiltrate on CXR  -On Levaquin Q48h  -Blood cultures negative so far, MRSA screen is negative  -Added bronchodilators and encouraging IS  -She seems much better than a few days ago, Leukocytosis is worse though but also could be related to the pancreatitis. Will continue levaquin, added doxy, recheck CXR to make sure no significant effusion that would need thoracentesis      Thrush  -Nystatin orally     VTE Prophylaxis - therapeutic dose of Lovenox  Code Status - Full      DW Dr. Dunham  DW family  Greater than 36 minutes spent with greater than 50% counseling and coordinating care      Amadou Das MD  [facility]  02/08/20  8:06 AM

## 2020-02-08 NOTE — THERAPY TREATMENT NOTE
Patient Name: Mandy Alarcon  : 1933    MRN: [id number]                              Today's Date: 2020       Admit Date: 2020    Visit Dx:     ICD-10-CM ICD-9-CM   1. Acute pancreatitis without infection or necrosis, unspecified pancreatitis type K85.90 577.0   2. Acute kidney injury superimposed on chronic kidney disease (CMS/HCC) N17.9 866.00    N18.9 585.9     Patient Active Problem List   Diagnosis   • Benign essential hypertension   • Claustrophobia   • Gout   • Hyperlipidemia   • Primary hypothyroidism   • Impaired fasting glucose   • Nocturnal hypoxemia   • Secondary polycythemia   • Vitamin D deficiency   • Therapeutic drug monitoring   • Family history of coronary artery disease   • Bilateral sensorineural hearing loss, wears hearing aids   • Multinodular goiter   • Supraventricular tachycardia (CMS/[facility])   • Morbidly obese (CMS/[facility])   • Acute pancreatitis without infection or necrosis   • Obesity (BMI 30-39.9)   • CKD (chronic kidney disease) stage 3, GFR 30-59 ml/min (CMS/[facility])   • LINNEA (acute kidney injury) (CMS/[facility])   • Pneumonia   • New onset a-fib (CMS/[facility])   • Hyponatremia   • Hypomagnesemia   • Leukocytosis     Past Medical History:   Diagnosis Date   • Benign essential hypertension 10/28/2012    2012--treatment for hypertension begun.   • Bilateral sensorineural hearing loss, wears hearing aids 2017    Left is much worse than right.  Patient had multiple ear infections as a child.   • Chronic renal insufficiency, stage II (mild), 2016--creatinine 1.35 2016--creatinine 1.35.  Baseline creatinine approximately 1.3.   • Claustrophobia 2016    This patient has significant nocturnal hypoxemia and I think that she could benefit from nocturnal oxygen therapy. The exact etiology of her hypoxemia is not clear. She could possibly have obstructive sleep apnea but this is not documented. We cannot test this patient for sleep apnea due to the fact that  she is severely claustrophobic. Patient was a former smoker and it is possibly that COPD he is playing a role.   • Family history of coronary artery disease 2016    Patient's mother, father, 2 sisters and a brother all  from myocardial infarctions   • Gout 10/28/2012    2012--initial diagnosis and treatment of gout.   • Hyperlipidemia 10/28/2012    2012--treatment for hyperlipidemia begun.   • Impaired fasting glucose 10/28/2012    2012--initial diagnosis impaired fasting glucose.   • Morbidly obese (CMS/HCC) 3/11/2019   • Nocturnal hypoxemia 2014--patient did not receive nocturnal oxygen because of Medicare regulations.   05/15/2014--overnight oximetry revealed oxygen saturations less than 89% for 22 minutes and 40 seconds. Oxygen saturations less than or equal to 88% for 22 minutes and 40 seconds. Lowest oxygen saturation 83%. The longest continuous time with oxygen saturations less than or equal to 88% was 1 minute and 32 seconds.   2012--overnight oximetry revealed oxygen saturations less than 90% for one hour and 35 minutes. Oxygen saturations less than 89% 59 minutes. This patient has significant nocturnal hypoxemia and I think that she could benefit from nocturnal oxygen therapy. The exact etiology of her hypoxemia is not clear. She could possibly have obstructive sleep apnea but this is not documented. We cannot test this patient for sleep apnea due to the fact that she is severely claustrophobic. Patient was a former smoker and it is possibly that COPD he is playing a role.   • Primary hypothyroidism 2015--TSH remains elevated slightly at 4.92.  Given the overall clinical picture including the multinodular goiter, we will initiate levothyroxine 50 mcg/day and reassess in about 6 weeks.  2018--thyroid ultrasound reveals a multinodular thyroid with multiple subcentimeter nodules.  Only minimal increase in size of the  "largest nodule in the left lobe has occurred when compared    • Secondary polycythemia 8/2/2012 11/06/2014--patient did not receive nocturnal oxygen because of Medicare regulations.   05/15/2014--overnight oximetry revealed oxygen saturations less than 89% for 22 minutes and 40 seconds. Oxygen saturations less than or equal to 88% for 22 minutes and 40 seconds. Lowest oxygen saturation 83%. The longest continuous time with oxygen saturations less than or equal to 88% was 1 minute and 32 seconds.   08/02/2012--overnight oximetry revealed oxygen saturations less than 90% for one hour and 35 minutes. Oxygen saturations less than 89% 59 minutes. This patient has significant nocturnal hypoxemia and I think that she could benefit from nocturnal oxygen therapy. The exact etiology of her hypoxemia is not clear. She could possibly have obstructive sleep apnea but this is not documented. We cannot test this patient for sleep apnea due to the fact that she is severely claustrophobic. Patient was a former smoker and it is possibly that COPD he is playing a role.   • Vitamin D deficiency 5/23/2016     Past Surgical History:   Procedure Laterality Date   • OOPHORECTOMY      age 48   • RADICAL ABDOMINAL HYSTERECTOMY  48 years old    48 years of age. Uterine fibroid tumors with menorrhagia - no cancer     General Information     Row Name 02/08/20 1526          PT Evaluation Time/Intention    Document Type  therapy note (daily note) Pt. reports pain/soreness in her Right \"side\" this PM as well as overall fatigue.  Otherwise, pt. agreeable to work with P.T.   -MS     Mode of Treatment  physical therapy;individual therapy  -MS     Row Name 02/08/20 1526          General Information    Patient Profile Reviewed?  yes  -MS     Existing Precautions/Restrictions  (S) fall  -MS     Row Name 02/08/20 1526          Cognitive Assessment/Intervention- PT/OT    Orientation Status (Cognition)  oriented x 3  -MS     Row Name 02/08/20 1526       " "   Safety Issues, Functional Mobility    Comment, Safety Issues/Impairments (Mobility)  Gait belt used for safety.   -MS       User Key  (r) = Recorded By, (t) = Taken By, (c) = Cosigned By    Initials Name Provider Type    Nathan Purdy, PT Physical Therapist        Mobility     Row Name 02/08/20 1527          Bed Mobility Assessment/Treatment    Comment (Bed Mobility)  Pt. up in chair this PM.   -MS     Row Name 02/08/20 1527          Sit-Stand Transfer    Sit-Stand [place] (Transfers)  contact guard  -MS     Assistive Device (Sit-Stand Transfers)  walker, front-wheeled  -MS     Row Name 02/08/20 1527          Gait/Stairs Assessment/Training    [place] Level (Gait)  contact guard  -MS     Assistive Device (Gait)  walker, front-wheeled  -MS     Distance in Feet (Gait)  150 feet  -MS     Pattern (Gait)  step-through  -MS     Deviations/Abnormal Patterns (Gait)  fletcher decreased  -MS     Bilateral Gait Deviations  forward flexed posture  -MS     Comment (Gait/Stairs)  verbal/tactile cues for posture correction. Limited in gait distance this PM due to overall fatigue.   -MS       User Key  (r) = Recorded By, (t) = Taken By, (c) = Cosigned By    Initials Name Provider Type    Nathan Purdy PT Physical Therapist        Obj/Interventions     Row Name 02/08/20 1528          Therapeutic Exercise    Comment (Therapeutic Exercise)  BLE ther. ex. program x 10 reps completed (Ankle Pumps, LAQ's, Hip Flexion)  -MS       User Key  (r) = Recorded By, (t) = Taken By, (c) = Cosigned By    Initials Name Provider Type    Nathan Purdy PT Physical Therapist        Goals/Plan    No documentation.       Clinical Impression     Row Name 02/08/20 1529          Pain Scale: Numbers Pre/Post-Treatment    Pain Scale: Numbers, Pretreatment  3/10  -MS     Pain Scale: Numbers, Post-Treatment  3/10  -MS     Pain Location - Side  Right  -MS     Pre/Post Treatment Pain Comment  Pt. reports Right \"side\" " pain/soreness this PM.   -MS     Row Name 02/08/20 1529          Positioning and Restraints    Pre-Treatment Position  sitting in chair/recliner  -MS     Post Treatment Position  chair  -MS     In Chair  notified nsg;reclined;sitting;call light within reach;encouraged to call for assist;with family/caregiver All lines intact.  -MS       User Key  (r) = Recorded By, (t) = Taken By, (c) = Cosigned By    Initials Name Provider Type    Nathan Purdy, PT Physical Therapist        Outcome Measures     Row Name 02/08/20 1529          How much help from another person do you currently need...    Turning from your back to your side while in flat bed without using bedrails?  3  -MS     Moving from lying on back to sitting on the side of a flat bed without bedrails?  3  -MS     Moving to and from a bed to a chair (including a wheelchair)?  3  -MS     Standing up from a chair using your arms (e.g., wheelchair, bedside chair)?  3  -MS     Climbing 3-5 steps with a railing?  2  -MS     To walk in hospital room?  3  -MS     AM-PAC 6 Clicks Score (PT)  17  -MS     Row Name 02/08/20 1529          Functional Assessment    Outcome Measure Options  AM-PAC 6 Clicks Basic Mobility (PT)  -MS       User Key  (r) = Recorded By, (t) = Taken By, (c) = Cosigned By    Initials Name Provider Type    Nathan Purdy, PT Physical Therapist          PT Recommendation and Plan     Plan of Care Reviewed With: patient  Outcome Summary: Pt. able to ambulate 150 feet, CGA x 1, with use of Rwx this date. Pt. requires CGA x 1 for sit <-> stand transfers.  BLE ther. ex. program x 10 reps completed.  Verbal/tactile cues for posture correction during ambulation. Limited in overall gait distance due to fatigue, weakness.     Time Calculation:   PT Charges     Row Name 02/08/20 1532             Time Calculation    Start Time  1448  -MS      Stop Time  1504  -MS      Time Calculation (min)  16 min  -MS      PT Received On  02/08/20  -MS      PT -  Next Appointment  02/09/20  -MS         Time Calculation- PT    Total Timed Code Minutes- PT  14 minute(s)  -MS        User Key  (r) = Recorded By, (t) = Taken By, (c) = Cosigned By    Initials Name Provider Type    Nathan Purdy, PT Physical Therapist        Therapy Charges for Today     Code Description Service Date Service Provider Modifiers Qty    51158180896 HC PT THER PROC EA 15 MIN 2/8/2020 Nathan Ugarte, PT GP 1          PT G-Codes  Outcome Measure Options: AM-PAC 6 Clicks Basic Mobility (PT)  AM-PAC 6 Clicks Score (PT): 17    Nathan Ugarte, PT  2/8/2020

## 2020-02-08 NOTE — PLAN OF CARE
Problem: Patient Care Overview  Goal: Plan of Care Review  Outcome: Ongoing (interventions implemented as appropriate)  Flowsheets (Taken 2/8/2020 2408)  Progress: no change  Plan of Care Reviewed With: patient  Outcome Summary: Pt AO and [place], VSS on RA in A-fib, IV fluids continuous, Family at Bedside, Small BM x2 this shift, PRN medication for chronic Back Pain

## 2020-02-08 NOTE — PROGRESS NOTES
[facility]  Inpatient Progress Note    Reason for Follow Up: Pancreatitis    Subjective     Interval History: Discussed with hospitalist, discussed with patient and family at bedside.  Pancreatitis improving, no current abdominal pain.  Passing some flatus and small bowel movements.  Leukocytosis progressive, etiology not well-defined.      Current Facility-Administered Medications:   •  acetaminophen (TYLENOL) tablet 650 mg, 650 mg, Oral, Q4H PRN, Kannan Rizo APRN, 650 mg at 02/01/20 0203  •  aluminum-magnesium hydroxide-simethicone (MAALOX MAX) 400-400-40 MG/5ML suspension 15 mL, 15 mL, Oral, Q6H PRN, Kannan Rizo APRN, 15 mL at 02/01/20 0808  •  atenolol (TENORMIN) tablet 25 mg, 25 mg, Oral, Q12H, Lizbeth Laura MD, 25 mg at 02/08/20 0827  •  bisacodyl (DULCOLAX) EC tablet 5 mg, 5 mg, Oral, Daily PRN, Kannan Rizo APRN  •  cholecalciferol (VITAMIN D3) tablet 1,000 Units, 1,000 Units, Oral, Daily, Kannan Rizo APRN, 1,000 Units at 02/08/20 0828  •  clobetasol (TEMOVATE) 0.05 % cream, , Topical, Q12H, Kannan Rizo APRN  •  cyclobenzaprine (FLEXERIL) tablet 5 mg, 5 mg, Oral, TID PRN, Amadou Das MD, 5 mg at 02/04/20 1115  •  dilTIAZem (CARDIZEM) 100 mg in 100 mL NS (1 mg/mL) infusion, 5 mg/hr, Intravenous, Titrated, Rex Arias Jr., MD, Stopped at 02/05/20 1318  •  enoxaparin (LOVENOX) syringe 110 mg, 1 mg/kg, Subcutaneous, Q12H, Amadou Das MD, 110 mg at 02/07/20 2152  •  famotidine (PEPCID) tablet 20 mg, 20 mg, Oral, Daily, Amadou Das MD, 20 mg at 02/08/20 0633  •  HYDROcodone-acetaminophen (NORCO) 7.5-325 MG per tablet 1 tablet, 1 tablet, Oral, Q4H PRN, Amadou Das MD, 1 tablet at 02/08/20 0409  •  HYDROmorphone (DILAUDID) injection 0.5 mg, 0.5 mg, Intravenous, Q2H PRN, 0.5 mg at 02/06/20 1513 **AND** naloxone (NARCAN) injection 0.4 mg, 0.4 mg, Intravenous, Q5 Min PRN, Nathan Tam MD  •   ipratropium-albuterol (DUO-NEB) nebulizer solution 3 mL, 3 mL, Nebulization, Q4H While Awake - RT, Amadou Das MD, 3 mL at 02/08/20 0659  •  levothyroxine (SYNTHROID, LEVOTHROID) tablet 50 mcg, 50 mcg, Oral, Q AM, Kannan Rizo APRN, 50 mcg at 02/08/20 0634  •  nystatin (MYCOSTATIN) 576414 UNIT/ML suspension 500,000 Units, 5 mL, Swish & Swallow, 4x Daily, Meeta Goodwin APRN, 500,000 Units at 02/08/20 0827  •  ondansetron (ZOFRAN) tablet 4 mg, 4 mg, Oral, Q6H PRN **OR** ondansetron (ZOFRAN) injection 4 mg, 4 mg, Intravenous, Q6H PRN, Kannan Rizo APRN, 4 mg at 02/05/20 2040  •  Pharmacy to Dose enoxaparin (LOVENOX), , Does not apply, Continuous PRN, Lizbeth Laura MD  •  polyethylene glycol 3350 powder (packet), 17 g, Oral, BID, Kathy Lima MD, 17 g at 02/08/20 0829  •  pravastatin (PRAVACHOL) tablet 40 mg, 40 mg, Oral, Nightly, Kannan Rizo APRN, 40 mg at 02/07/20 2149  •  senna-docusate sodium (SENOKOT-S) 8.6-50 MG tablet 2 tablet, 2 tablet, Oral, Nightly, Kathy Lima MD, 2 tablet at 02/07/20 2149  •  sodium bicarbonate tablet 1,300 mg, 1,300 mg, Oral, TID, Mil Acosta MD, 1,300 mg at 02/08/20 0828  •  [COMPLETED] Insert peripheral IV, , , Once **AND** sodium chloride 0.9 % flush 10 mL, 10 mL, Intravenous, PRN, Ranjith Quinn MD, 10 mL at 01/31/20 2143  •  sodium chloride 0.9 % flush 10 mL, 10 mL, Intravenous, Q12H, Kannan Rizo APRN, 10 mL at 02/08/20 0829  •  sodium chloride 0.9 % flush 10 mL, 10 mL, Intravenous, PRN, Kannan Rizo APRN  •  sodium chloride 0.9 % infusion, 75 mL/hr, Intravenous, Continuous, Amadou Das MD, Last Rate: 75 mL/hr at 02/07/20 0639, 75 mL/hr at 02/07/20 0639  Review of Systems:    All systems were reviewed and negative except for:  Gastrointestinal: positive for  See HPI    Objective     Vital Signs  Temp:  [98 °F (36.7 °C)-98.3 °F (36.8 °C)] 98 °F (36.7 °C)  Heart Rate:  [] 96  Resp:  [18-20]  18  BP: (114-154)/() 128/101  Body mass index is 43.75 kg/m².    Intake/Output Summary (Last 24 hours) at 2/8/2020 1017  Last data filed at 2/7/2020 1507  Gross per 24 hour   Intake 420 ml   Output --   Net 420 ml     No intake/output data recorded.     Physical Exam:   General: patient awake, alert and cooperative   Eyes: Normal lids and lashes, no scleral icterus   Neck: supple, normal ROM   Skin: warm and dry, not jaundiced   Cardiovascular: regular rhythm and rate, no murmurs auscultated   Pulm: clear to auscultation bilaterally, regular and unlabored   Abdomen: soft, nontender, nondistended; normal bowel sounds   Extremities: no rash or edema   Psychiatric: Normal mood and behavior; memory intact     Results Review:     I reviewed the patient's new clinical results.    Results from last 7 days   Lab Units 02/08/20  0504 02/07/20  0457 02/06/20  0458   WBC 10*3/mm3 20.62* 14.41* 12.13*   HEMOGLOBIN g/dL 12.8 12.7 13.0   HEMATOCRIT % 38.0 37.5 37.8   PLATELETS 10*3/mm3 258 208 180     Results from last 7 days   Lab Units 02/08/20  0504 02/07/20  0457 02/06/20  0458   SODIUM mmol/L 134* 135* 134*   POTASSIUM mmol/L 3.5 4.0 3.9   CHLORIDE mmol/L 104 103 104   CO2 mmol/L 15.9* 17.6* 14.9*   BUN mg/dL 34* 39* 36*   CREATININE mg/dL 1.29* 1.41* 1.38*   CALCIUM mg/dL 8.3* 8.3* 8.2*   BILIRUBIN mg/dL 1.1 1.2 1.1   ALK PHOS U/L 59 61 56   ALT (SGPT) U/L 17 15 15   AST (SGOT) U/L 23 31 20   GLUCOSE mg/dL 99 89 79         Lab Results   Lab Value Date/Time    LIPASE 38 02/05/2020 0507    LIPASE 60 02/04/2020 0435    LIPASE 181 (H) 02/03/2020 0211    LIPASE 417 (H) 02/02/2020 0441    LIPASE >3,000 (H) 01/31/2020 2142       Radiology:  MRI abdomen wo contrast mrcp   Final Result   Markedly limited study due to extensive motion artifact as   above.   1. MR findings of acute pancreatitis.   2. No intrahepatic biliary dilatation. No choledocholithiasis. The   gallbladder is distended with mild wall thickening. Pancreatic  duct   demonstrates normal caliber.       This report was finalized on 2/7/2020 11:07 AM by Dr. Ruthy Haro M.D.          XR Chest PA & Lateral   Final Result   FINDINGS AND IMPRESSION:   Lungs are hypoinflated with asymmetric elevation right hemidiaphragm and   bibasilar pulmonary opacification, right greater than left, as before.   No pneumothorax is seen. Heart size accentuated by low lung volumes.   Small right pleural effusion posteriorly is suspected. Findings   represent atelectasis versus pneumonia with parapneumonic effusion in   the appropriate clinical context and correlation with patient history is   recommended.       This report was finalized on 2/4/2020 12:32 PM by Dr. Carlitos Willis M.D.          XR Chest 1 View   Final Result   Interval development of dense right basilar infiltrate, which may   reflect pneumonia. There is also a small right pleural effusion.       This report was finalized on 2/2/2020 11:47 PM by Dr. Kathleen Montoya M.D.          US Gallbladder   Final Result       1. No stones or sludge are seen within the gallbladder. Gallbladder   appears mildly distended, with gallbladder wall thickening and edema   noted. Appearance is nonspecific, and may be reactive, and may be   related to the patient's pancreatitis.   2. The patient does have some mild dilatation of the common bile duct,   measuring up to 9 mm. Some of this may be age related, but correlation   with liver function tests is recommended. MRCP or ERCP could be   considered for additional evaluation if these are abnormal.       This report was finalized on 2/1/2020 9:55 PM by Dr. Kathleen Montoya M.D.          CT Abdomen Pelvis Without Contrast   Final Result       1. The patient has inflammatory stranding surrounding the pancreatic   head and neck, in keeping with history of pancreatitis. No discrete   peripancreatic collections are seen. There is no evidence of gastric   outlet obstruction.       Radiation dose  reduction techniques were utilized, including automated   exposure control and exposure modulation based on body size.       This report was finalized on 1/31/2020 11:52 PM by Dr. Kathleen Montoya M.D.          XR Chest 1 View   Final Result   Mild bibasilar atelectasis.       This report was finalized on 1/31/2020 10:12 PM by Dr. Kathleen Montoya M.D.              Assessment/Plan     Patient Active Problem List   Diagnosis   • Benign essential hypertension   • Claustrophobia   • Gout   • Hyperlipidemia   • Primary hypothyroidism   • Impaired fasting glucose   • Nocturnal hypoxemia   • Secondary polycythemia   • Vitamin D deficiency   • Therapeutic drug monitoring   • Family history of coronary artery disease   • Bilateral sensorineural hearing loss, wears hearing aids   • Multinodular goiter   • Supraventricular tachycardia (CMS/HCC)   • Morbidly obese (CMS/HCC)   • Acute pancreatitis without infection or necrosis   • Obesity (BMI 30-39.9)   • CKD (chronic kidney disease) stage 3, GFR 30-59 ml/min (CMS/HCC)   • LINNEA (acute kidney injury) (CMS/HCC)   • Pneumonia   • New onset a-fib (CMS/HCC)   • Hyponatremia   • Hypomagnesemia   • Leukocytosis       Assessment:  1. Acute pancreatitis: Improved  2. Upper abdominal pain: Resolved  3. Constipation: Slow improvement  4. Leukocytosis: Progressive      Plan:  · Continue mild stimulation to improve bowel pattern  · Defer work-up of leukocytosis to hospital service  · Continue diet as tolerated.  I discussed the patients findings and my recommendations with patient, family and primary care team.    Daryl Dunham MD

## 2020-02-08 NOTE — PROGRESS NOTES
"Patient Care Team:  Daryl Santos MD as PCP - General (Internal Medicine)  Daryl Santos MD as PCP - Claims Attributed    Chief Complaint: Follow-up atrial fibrillation, persistent    Interval History:   Patient complains of left posterior thoracic pain.  No report of chest discomfort.  Down for x-ray earlier.    Objective   Vital Signs  Temp:  [98 °F (36.7 °C)-98.3 °F (36.8 °C)] 98 °F (36.7 °C)  Heart Rate:  [] 96  Resp:  [18-20] 18  BP: (114-154)/() 128/101    Intake/Output Summary (Last 24 hours) at 2/8/2020 1203  Last data filed at 2/7/2020 1507  Gross per 24 hour   Intake 360 ml   Output --   Net 360 ml     Flowsheet Rows      First Filed Value   Admission Height  160 cm (63\") Documented at 01/31/2020 2149   Admission Weight  99.5 kg (219 lb 6.4 oz) Documented at 01/31/2020 2149          General Appearance:    Alert, cooperative, in no acute distress   Head:    Normocephalic, without obvious abnormality, atraumatic       Neck:   No adenopathy, supple, no thyromegaly, no carotid bruit, no    JVD   Lungs:     Clear to auscultation bilaterally, no wheezes, rales, or     rhonchi    Heart:    Normal rate, irregular rhythm, no murmur, no rub, no gallop   Chest Wall:    No abnormalities observed   Abdomen:     Normal bowel sounds, soft, nontender, nondistended,            no rebound tenderness   Extremities:   No cyanosis, clubbing, or edema   Pulses:   Pulses palpable and equal bilaterally   Skin:   No bleeding or rash       Neurologic:   Cranial nerves 2 - 12 grossly intact, sensation intact             atenolol 25 mg Oral Q12H   cholecalciferol 1,000 Units Oral Daily   clobetasol  Topical Q12H   doxycycline 100 mg Intravenous Q12H   enoxaparin 1 mg/kg Subcutaneous Q12H   famotidine 20 mg Oral Daily   ipratropium-albuterol 3 mL Nebulization Q4H While Awake - RT   [START ON 2/9/2020] levoFLOXacin 500 mg Oral Once   levothyroxine 50 mcg Oral Q AM   magnesium sulfate 1 g Intravenous Q1H   nystatin " 5 mL Swish & Swallow 4x Daily   polyethylene glycol 17 g Oral BID   pravastatin 40 mg Oral Nightly   senna-docusate sodium 2 tablet Oral Nightly   sodium bicarbonate 1,300 mg Oral TID   sodium chloride 10 mL Intravenous Q12H            Results Review:    Results from last 7 days   Lab Units 02/08/20  0504   SODIUM mmol/L 134*   POTASSIUM mmol/L 3.5   CHLORIDE mmol/L 104   CO2 mmol/L 15.9*   BUN mg/dL 34*   CREATININE mg/dL 1.29*   GLUCOSE mg/dL 99   CALCIUM mg/dL 8.3*         Results from last 7 days   Lab Units 02/08/20  0504   WBC 10*3/mm3 20.62*   HEMOGLOBIN g/dL 12.8   HEMATOCRIT % 38.0   PLATELETS 10*3/mm3 258             Results from last 7 days   Lab Units 02/08/20  0504   MAGNESIUM mg/dL 1.5*         @LABRCNT(bnp)@  I reviewed the patient's new clinical results.  I personally viewed and interpreted the patient's EKG/Telemetry data        Assessment/Plan   1.  Persistent atrial fibrillation  2.  Acute pancreatitis  3.  Acute on chronic kidney injury  4.  Essential hypertension mildly hypertensive.  5.  Anabolic acidosis  6.  Pneumonia: Per hospitalist team.    -Ventricular rate reasonably well controlled, however intermittently hypertensive as well.  I am going to double the atenolol dose to 50 mg twice daily.  I think that she will tolerate this well.  -Eliquis therapy is reasonable.  -We will continue to monitor with a change in her medical regimen today.

## 2020-02-08 NOTE — PLAN OF CARE
Problem: Patient Care Overview  Goal: Plan of Care Review  Flowsheets (Taken 2/8/2020 8350)  Plan of Care Reviewed With: patient  Outcome Summary: Pt. able to ambulate 150 feet, CGA x 1, with use of Rwx this date. Pt. requires CGA x 1 for sit <-> stand transfers.  BLE ther. ex. program x 10 reps completed.  Verbal/tactile cues for posture correction during ambulation. Limited in overall gait distance due to fatigue, weakness.

## 2020-02-08 NOTE — PLAN OF CARE
Problem: Patient Care Overview  Goal: Plan of Care Review  Outcome: Ongoing (interventions implemented as appropriate)  Flowsheets (Taken 2/8/2020 9808)  Progress: improving  Plan of Care Reviewed With: patient  Outcome Summary: Vitals as charted, c/o lt sided back pain that is treated w/ PRN pain meds, up w/ 1 assist, A&Ox4, mag replaced, IV abx as ordered, chest xray completed, will continue to monitor.

## 2020-02-09 LAB
ALBUMIN SERPL-MCNC: 2.2 G/DL (ref 3.5–5.2)
ALBUMIN/GLOB SERPL: 0.7 G/DL
ALP SERPL-CCNC: 70 U/L (ref 39–117)
ALT SERPL W P-5'-P-CCNC: 18 U/L (ref 1–33)
ANION GAP SERPL CALCULATED.3IONS-SCNC: 11.8 MMOL/L (ref 5–15)
AST SERPL-CCNC: 25 U/L (ref 1–32)
BASOPHILS # BLD AUTO: 0.15 10*3/MM3 (ref 0–0.2)
BASOPHILS NFR BLD AUTO: 0.6 % (ref 0–1.5)
BILIRUB SERPL-MCNC: 1 MG/DL (ref 0.2–1.2)
BUN BLD-MCNC: 30 MG/DL (ref 8–23)
BUN/CREAT SERPL: 23.8 (ref 7–25)
CALCIUM SPEC-SCNC: 8.5 MG/DL (ref 8.6–10.5)
CHLORIDE SERPL-SCNC: 105 MMOL/L (ref 98–107)
CO2 SERPL-SCNC: 17.2 MMOL/L (ref 22–29)
CREAT BLD-MCNC: 1.26 MG/DL (ref 0.57–1)
DEPRECATED RDW RBC AUTO: 45.3 FL (ref 37–54)
EOSINOPHIL # BLD AUTO: 0.35 10*3/MM3 (ref 0–0.4)
EOSINOPHIL NFR BLD AUTO: 1.5 % (ref 0.3–6.2)
ERYTHROCYTE [DISTWIDTH] IN BLOOD BY AUTOMATED COUNT: 14.3 % (ref 12.3–15.4)
GFR SERPL CREATININE-BSD FRML MDRD: 40 ML/MIN/1.73
GLOBULIN UR ELPH-MCNC: 3.3 GM/DL
GLUCOSE BLD-MCNC: 82 MG/DL (ref 65–99)
HCT VFR BLD AUTO: 39.4 % (ref 34–46.6)
HGB BLD-MCNC: 13.2 G/DL (ref 12–15.9)
IMM GRANULOCYTES # BLD AUTO: 0.85 10*3/MM3 (ref 0–0.05)
IMM GRANULOCYTES NFR BLD AUTO: 3.6 % (ref 0–0.5)
LYMPHOCYTES # BLD AUTO: 1.52 10*3/MM3 (ref 0.7–3.1)
LYMPHOCYTES NFR BLD AUTO: 6.4 % (ref 19.6–45.3)
MAGNESIUM SERPL-MCNC: 1.8 MG/DL (ref 1.6–2.4)
MCH RBC QN AUTO: 29.5 PG (ref 26.6–33)
MCHC RBC AUTO-ENTMCNC: 33.5 G/DL (ref 31.5–35.7)
MCV RBC AUTO: 88.1 FL (ref 79–97)
MONOCYTES # BLD AUTO: 1.8 10*3/MM3 (ref 0.1–0.9)
MONOCYTES NFR BLD AUTO: 7.6 % (ref 5–12)
NEUTROPHILS # BLD AUTO: 19.17 10*3/MM3 (ref 1.7–7)
NEUTROPHILS NFR BLD AUTO: 80.3 % (ref 42.7–76)
NRBC BLD AUTO-RTO: 0 /100 WBC (ref 0–0.2)
PLATELET # BLD AUTO: 295 10*3/MM3 (ref 140–450)
PMV BLD AUTO: 9.5 FL (ref 6–12)
POTASSIUM BLD-SCNC: 4 MMOL/L (ref 3.5–5.2)
PROT SERPL-MCNC: 5.5 G/DL (ref 6–8.5)
RBC # BLD AUTO: 4.47 10*6/MM3 (ref 3.77–5.28)
SODIUM BLD-SCNC: 134 MMOL/L (ref 136–145)
WBC NRBC COR # BLD: 23.84 10*3/MM3 (ref 3.4–10.8)

## 2020-02-09 PROCEDURE — 97110 THERAPEUTIC EXERCISES: CPT

## 2020-02-09 PROCEDURE — 83735 ASSAY OF MAGNESIUM: CPT | Performed by: INTERNAL MEDICINE

## 2020-02-09 PROCEDURE — 25010000002 ENOXAPARIN PER 10 MG: Performed by: INTERNAL MEDICINE

## 2020-02-09 PROCEDURE — 94799 UNLISTED PULMONARY SVC/PX: CPT

## 2020-02-09 PROCEDURE — 99232 SBSQ HOSP IP/OBS MODERATE 35: CPT | Performed by: INTERNAL MEDICINE

## 2020-02-09 PROCEDURE — 85025 COMPLETE CBC W/AUTO DIFF WBC: CPT | Performed by: INTERNAL MEDICINE

## 2020-02-09 PROCEDURE — 99233 SBSQ HOSP IP/OBS HIGH 50: CPT | Performed by: NURSE PRACTITIONER

## 2020-02-09 PROCEDURE — 80053 COMPREHEN METABOLIC PANEL: CPT | Performed by: INTERNAL MEDICINE

## 2020-02-09 RX ORDER — FUROSEMIDE 20 MG/1
20 TABLET ORAL EVERY MORNING
Status: DISCONTINUED | OUTPATIENT
Start: 2020-02-10 | End: 2020-02-10

## 2020-02-09 RX ORDER — FUROSEMIDE 40 MG/1
40 TABLET ORAL ONCE
Status: COMPLETED | OUTPATIENT
Start: 2020-02-09 | End: 2020-02-09

## 2020-02-09 RX ADMIN — SODIUM BICARBONATE 1300 MG: 650 TABLET ORAL at 09:07

## 2020-02-09 RX ADMIN — NYSTATIN 500000 UNITS: 100000 SUSPENSION ORAL at 18:16

## 2020-02-09 RX ADMIN — IPRATROPIUM BROMIDE AND ALBUTEROL SULFATE 3 ML: 2.5; .5 SOLUTION RESPIRATORY (INHALATION) at 23:39

## 2020-02-09 RX ADMIN — ENOXAPARIN SODIUM 110 MG: 120 INJECTION SUBCUTANEOUS at 09:16

## 2020-02-09 RX ADMIN — HYDROCODONE BITARTRATE AND ACETAMINOPHEN 1 TABLET: 7.5; 325 TABLET ORAL at 02:03

## 2020-02-09 RX ADMIN — ATENOLOL 50 MG: 50 TABLET ORAL at 20:28

## 2020-02-09 RX ADMIN — NYSTATIN 500000 UNITS: 100000 SUSPENSION ORAL at 09:07

## 2020-02-09 RX ADMIN — HYDROCODONE BITARTRATE AND ACETAMINOPHEN 1 TABLET: 7.5; 325 TABLET ORAL at 06:06

## 2020-02-09 RX ADMIN — SODIUM CHLORIDE, PRESERVATIVE FREE 10 ML: 5 INJECTION INTRAVENOUS at 09:17

## 2020-02-09 RX ADMIN — HYDROCODONE BITARTRATE AND ACETAMINOPHEN 1 TABLET: 7.5; 325 TABLET ORAL at 12:09

## 2020-02-09 RX ADMIN — NYSTATIN 500000 UNITS: 100000 SUSPENSION ORAL at 11:25

## 2020-02-09 RX ADMIN — HYDROCODONE BITARTRATE AND ACETAMINOPHEN 1 TABLET: 7.5; 325 TABLET ORAL at 20:28

## 2020-02-09 RX ADMIN — DOXYCYCLINE 100 MG: 100 INJECTION, POWDER, LYOPHILIZED, FOR SOLUTION INTRAVENOUS at 11:24

## 2020-02-09 RX ADMIN — IPRATROPIUM BROMIDE AND ALBUTEROL SULFATE 3 ML: 2.5; .5 SOLUTION RESPIRATORY (INHALATION) at 11:20

## 2020-02-09 RX ADMIN — DOXYCYCLINE 100 MG: 100 INJECTION, POWDER, LYOPHILIZED, FOR SOLUTION INTRAVENOUS at 23:12

## 2020-02-09 RX ADMIN — NYSTATIN 500000 UNITS: 100000 SUSPENSION ORAL at 20:28

## 2020-02-09 RX ADMIN — SODIUM BICARBONATE 1300 MG: 650 TABLET ORAL at 20:28

## 2020-02-09 RX ADMIN — POLYETHYLENE GLYCOL 3350 17 G: 17 POWDER, FOR SOLUTION ORAL at 09:07

## 2020-02-09 RX ADMIN — HYDROCODONE BITARTRATE AND ACETAMINOPHEN 1 TABLET: 7.5; 325 TABLET ORAL at 16:13

## 2020-02-09 RX ADMIN — LEVOFLOXACIN 500 MG: 500 TABLET, FILM COATED ORAL at 09:15

## 2020-02-09 RX ADMIN — PRAVASTATIN SODIUM 40 MG: 40 TABLET ORAL at 20:28

## 2020-02-09 RX ADMIN — FUROSEMIDE 40 MG: 40 TABLET ORAL at 09:15

## 2020-02-09 RX ADMIN — IPRATROPIUM BROMIDE AND ALBUTEROL SULFATE 3 ML: 2.5; .5 SOLUTION RESPIRATORY (INHALATION) at 07:51

## 2020-02-09 RX ADMIN — SODIUM CHLORIDE, PRESERVATIVE FREE 10 ML: 5 INJECTION INTRAVENOUS at 20:28

## 2020-02-09 RX ADMIN — CLOBETASOL PROPIONATE: 0.5 CREAM TOPICAL at 09:07

## 2020-02-09 RX ADMIN — IPRATROPIUM BROMIDE AND ALBUTEROL SULFATE 3 ML: 2.5; .5 SOLUTION RESPIRATORY (INHALATION) at 20:50

## 2020-02-09 RX ADMIN — ENOXAPARIN SODIUM 110 MG: 120 INJECTION SUBCUTANEOUS at 23:12

## 2020-02-09 RX ADMIN — LEVOTHYROXINE SODIUM 50 MCG: 50 TABLET ORAL at 06:06

## 2020-02-09 RX ADMIN — CLOBETASOL PROPIONATE: 0.5 CREAM TOPICAL at 20:28

## 2020-02-09 RX ADMIN — IPRATROPIUM BROMIDE AND ALBUTEROL SULFATE 3 ML: 2.5; .5 SOLUTION RESPIRATORY (INHALATION) at 15:52

## 2020-02-09 RX ADMIN — ATENOLOL 50 MG: 50 TABLET ORAL at 09:07

## 2020-02-09 RX ADMIN — VITAMIN D, TAB 1000IU (100/BT) 1000 UNITS: 25 TAB at 09:07

## 2020-02-09 RX ADMIN — POLYETHYLENE GLYCOL 3350 17 G: 17 POWDER, FOR SOLUTION ORAL at 20:28

## 2020-02-09 RX ADMIN — SENNOSIDES AND DOCUSATE SODIUM 2 TABLET: 8.6; 5 TABLET ORAL at 20:28

## 2020-02-09 RX ADMIN — SODIUM BICARBONATE 1300 MG: 650 TABLET ORAL at 16:31

## 2020-02-09 RX ADMIN — FAMOTIDINE 20 MG: 20 TABLET, FILM COATED ORAL at 06:06

## 2020-02-09 NOTE — PROGRESS NOTES
"   LOS: 8 days    Patient Care Team:  Daryl Santos MD as PCP - General (Internal Medicine)  Daryl Santos MD as PCP - Claims Attributed    Chief Complaint:    Chief Complaint   Patient presents with   • Chest Pain     Acute kidney injury on chronic kidney disease  Subjective     Interval History:   Patient is feeling better, less short of breath.  No nausea or vomiting.  Abdominal pain seems better.   When I saw her last night she was short of breath and I gave her oral Lasix.  She tells me this morning that she was up all night urinating.  Breathing much better though.  Edema improved.      Objective     Vital Signs  Temp:  [97.5 °F (36.4 °C)-98.7 °F (37.1 °C)] 98.7 °F (37.1 °C)  Heart Rate:  [] 90  Resp:  [16-20] 16  BP: (121-141)/(75-86) 121/75    Flowsheet Rows      First Filed Value   Admission Height  160 cm (63\") Documented at 01/31/2020 2149   Admission Weight  99.5 kg (219 lb 6.4 oz) Documented at 01/31/2020 2149          No intake/output data recorded.  I/O last 3 completed shifts:  In: 240 [P.O.:240]  Out: -     Intake/Output Summary (Last 24 hours) at 2/9/2020 0854  Last data filed at 2/8/2020 1740  Gross per 24 hour   Intake 240 ml   Output --   Net 240 ml       Physical Exam:  General Appearance: alert, oriented x 3, no acute distress, obese, appears to be chronically ill  Skin: warm and dry  HEENT: pupils round and reactive to light, oral mucosa dry, nonicteric sclerae,   Neck: supple, + JVD, trachea midline.  Lungs: Minimal bilateral basal rales.  No wheezing.  Mildly tachypneic.    Heart: Irregularly irregular, no rub  Abdomen: soft,  present bowel sounds to auscultation, the abdomen is protuberant, she has less epigastric tenderness   Extremities: + Edema, no cyanosis or clubbing,   Neuro: normal speech and mental status, hard of hearing on one side.     Results Review:    Results from last 7 days   Lab Units 02/09/20  0631 02/08/20  0504 02/07/20  0457   SODIUM mmol/L 134* 134* " 135*   POTASSIUM mmol/L 4.0 3.5 4.0   CHLORIDE mmol/L 105 104 103   CO2 mmol/L 17.2* 15.9* 17.6*   BUN mg/dL 30* 34* 39*   CREATININE mg/dL 1.26* 1.29* 1.41*   CALCIUM mg/dL 8.5* 8.3* 8.3*   BILIRUBIN mg/dL 1.0 1.1 1.2   ALK PHOS U/L 70 59 61   ALT (SGPT) U/L 18 17 15   AST (SGOT) U/L 25 23 31   GLUCOSE mg/dL 82 99 89       Estimated Creatinine Clearance: 38.6 mL/min (A) (by C-G formula based on SCr of 1.26 mg/dL (H)).    Results from last 7 days   Lab Units 02/09/20  0631 02/08/20  0504 02/07/20  0457 02/06/20  0458   MAGNESIUM mg/dL 1.8 1.5* 1.8 1.9   PHOSPHORUS mg/dL  --  3.3 3.1 2.6       Results from last 7 days   Lab Units 02/08/20  0504 02/07/20  0457 02/06/20  0458 02/05/20  0507   URIC ACID mg/dL 5.4 5.6 4.6 3.9       Results from last 7 days   Lab Units 02/09/20  0631 02/08/20  0504 02/07/20  0457 02/06/20  0458 02/05/20  0507   WBC 10*3/mm3 23.84* 20.62* 14.41* 12.13* 12.33*   HEMOGLOBIN g/dL 13.2 12.8 12.7 13.0 12.8   PLATELETS 10*3/mm3 295 258 208 180 147               Imaging Results (Last 24 Hours)     Procedure Component Value Units Date/Time    XR Chest PA & Lateral [[id number]] Collected:  02/08/20 1144     Updated:  02/08/20 1152    Narrative:       TWO-VIEW CHEST     HISTORY: Follow-up of atelectasis or pneumonia.     FINDINGS: The lungs are moderately well-expanded with some persistent  localized atelectasis at the right base medially combined with some  pleural fluid extending into the fissure and the appearance is quite  similar to the study of 4 days ago. The left lung remains clear and the  heart remains mildly enlarged.     This report was finalized on 2/8/2020 11:49 AM by Dr. Kyle Hawkins M.D.             atenolol 50 mg Oral Q12H   cholecalciferol 1,000 Units Oral Daily   clobetasol  Topical Q12H   doxycycline 100 mg Intravenous Q12H   enoxaparin 1 mg/kg Subcutaneous Q12H   famotidine 20 mg Oral Daily   [START ON 2/10/2020] furosemide 20 mg Oral QAM   furosemide 40 mg Oral Once    ipratropium-albuterol 3 mL Nebulization Q4H While Awake - RT   levoFLOXacin 500 mg Oral Once   levothyroxine 50 mcg Oral Q AM   nystatin 5 mL Swish & Swallow 4x Daily   polyethylene glycol 17 g Oral BID   pravastatin 40 mg Oral Nightly   senna-docusate sodium 2 tablet Oral Nightly   sodium bicarbonate 1,300 mg Oral TID   sodium chloride 10 mL Intravenous Q12H          Medication Review:   Current Facility-Administered Medications   Medication Dose Route Frequency Provider Last Rate Last Dose   • acetaminophen (TYLENOL) tablet 650 mg  650 mg Oral Q4H PRN Kannan Rizo APRN   650 mg at 02/01/20 0203   • aluminum-magnesium hydroxide-simethicone (MAALOX MAX) 400-400-40 MG/5ML suspension 15 mL  15 mL Oral Q6H PRN Kannan Rizo APRN   15 mL at 02/01/20 0808   • atenolol (TENORMIN) tablet 50 mg  50 mg Oral Q12H Pete Hensley MD   50 mg at 02/08/20 2007   • bisacodyl (DULCOLAX) EC tablet 5 mg  5 mg Oral Daily PRN Kannan Rizo APRN       • cholecalciferol (VITAMIN D3) tablet 1,000 Units  1,000 Units Oral Daily Kannan Rizo APRN   1,000 Units at 02/08/20 0828   • clobetasol (TEMOVATE) 0.05 % cream   Topical Q12H Kannan Rizo APRN   1 application at 02/08/20 2007   • cyclobenzaprine (FLEXERIL) tablet 5 mg  5 mg Oral TID PRN Amadou Das MD   5 mg at 02/04/20 1115   • doxycycline (VIBRAMYCIN) 100 mg/100 mL 0.9% NS MBP  100 mg Intravenous Q12H Amadou Das MD   100 mg at 02/08/20 2247   • enoxaparin (LOVENOX) syringe 110 mg  1 mg/kg Subcutaneous Q12H Amadou Das MD   110 mg at 02/08/20 2144   • famotidine (PEPCID) tablet 20 mg  20 mg Oral Daily Amadou Das MD   20 mg at 02/09/20 0606   • [START ON 2/10/2020] furosemide (LASIX) tablet 20 mg  20 mg Oral QAM Billy Crowder MD       • furosemide (LASIX) tablet 40 mg  40 mg Oral Once Billy Crowder MD       • HYDROcodone-acetaminophen (NORCO) 7.5-325 MG per tablet 1 tablet  1 tablet Oral  Q4H PRN Amadou Das MD   1 tablet at 02/09/20 0606   • HYDROmorphone (DILAUDID) injection 0.5 mg  0.5 mg Intravenous Q2H PRN Nathan Tam MD   0.5 mg at 02/06/20 1513    And   • naloxone (NARCAN) injection 0.4 mg  0.4 mg Intravenous Q5 Min PRN Nathan Tam MD       • ipratropium-albuterol (DUO-NEB) nebulizer solution 3 mL  3 mL Nebulization Q4H While Awake - RT Amadou Das MD   3 mL at 02/09/20 0751   • levoFLOXacin (LEVAQUIN) tablet 500 mg  500 mg Oral Once Amadou Das MD       • levothyroxine (SYNTHROID, LEVOTHROID) tablet 50 mcg  50 mcg Oral Q AM Kannan Rioz APRN   50 mcg at 02/09/20 0606   • nystatin (MYCOSTATIN) 111196 UNIT/ML suspension 500,000 Units  5 mL Swish & Swallow 4x Daily Meeta Goodwin APRN   500,000 Units at 02/08/20 2007   • ondansetron (ZOFRAN) tablet 4 mg  4 mg Oral Q6H PRN Kannan Rizo APRN        Or   • ondansetron (ZOFRAN) injection 4 mg  4 mg Intravenous Q6H PRN Kannan Rizo APRN   4 mg at 02/05/20 2040   • polyethylene glycol 3350 powder (packet)  17 g Oral BID Kathy Lima MD   17 g at 02/08/20 2007   • pravastatin (PRAVACHOL) tablet 40 mg  40 mg Oral Nightly Kannan Rizo APRN   40 mg at 02/08/20 2007   • senna-docusate sodium (SENOKOT-S) 8.6-50 MG tablet 2 tablet  2 tablet Oral Nightly Kathy Lima MD   2 tablet at 02/08/20 2007   • sodium bicarbonate tablet 1,300 mg  1,300 mg Oral TID Mli Acosta MD   1,300 mg at 02/08/20 2007   • sodium chloride 0.9 % flush 10 mL  10 mL Intravenous PRN Ranjith Quinn MD   10 mL at 01/31/20 2143   • sodium chloride 0.9 % flush 10 mL  10 mL Intravenous Q12H Kannan Rizo, CJ   10 mL at 02/08/20 2007   • sodium chloride 0.9 % flush 10 mL  10 mL Intravenous PRN Kannan Rizo APRN           Assessment/Plan   1.  Acute kidney injury on chronic kidney disease stage III, associated with acute pancreatitis.  Volume status improved respiratory status  is better.  Will give another dose of 40 mg p.o. Lasix today and start 20 mg daily tomorrow.  Monitor closely.  Creatinine is slightly better today.  Electrolytes are stable.  2.  Chronic kidney disease stage III most likely secondary to nephrosclerosis.  Avoid nephrotoxins.   3.  Acute pancreatitis, clinically improving.  4.  Hypertension, controlled  5.  Hypothyroidism  6.  Dyslipidemia  7.  Hearing loss  8.  Edema with shortness of breath, Lasix as above.   9.  Hypomagnesemia, replaced.  Monitor.    10. Metabolic acidosis with slight increase in the anion gap, on oral sodium bicarb.  Improved.  Monitor.  11.  Atrial fibrillation  Discussed with daughter.  Will follow.      Billy Crowder MD  02/09/20  8:54 AM

## 2020-02-09 NOTE — PROGRESS NOTES
Name: Mandy Alarcon ADMIT: 2020   : 1933  PCP: Daryl Santos MD    MRN: [id number] LOS: 8 days   AGE/SEX: 86 y.o. female  ROOM: [facility]   Subjective   Chief Complaint   Patient presents with   • Chest Pain   abd pain is better  Some leg swelling    On abx  Dyspnea better  Working with PT      ROS  No f/c  -n/-v  No cp/palp  +soa/cough    Objective   Vital Signs  Temp:  [97.5 °F (36.4 °C)-98.7 °F (37.1 °C)] 97.7 °F (36.5 °C)  Heart Rate:  [] 79  Resp:  [16-20] 16  BP: (111-141)/(75-86) 111/75  SpO2:  [95 %-97 %] 97 %  on   ;   Device (Oxygen Therapy): room air  Body mass index is 43.75 kg/m².    Physical Exam   Constitutional: She is oriented to person, place, and time. She appears well-developed and well-nourished.   HENT:   Head: Normocephalic and atraumatic.   Eyes: Conjunctivae are normal. No scleral icterus.   Neck: Neck supple. No tracheal deviation present.   Cardiovascular:   No murmur heard.  Irregular    Pulmonary/Chest: No respiratory distress. She has no wheezes (decreased bs at bases).   Decreased bs at bases   Abdominal: Soft. She exhibits no distension (mild). There is no tenderness (mild epigastric). There is no guarding.   Neurological: She is alert and oriented to person, place, and time. She exhibits normal muscle tone.   Skin: Skin is warm and dry.   Psychiatric: She has a normal mood and affect. Her behavior is normal.       Results Review:       I reviewed the patient's new clinical results.  Results from last 7 days   Lab Units 20  0631 20  0504 20  0458   WBC 10*3/mm3 23.84* 20.62* 14.41* 12.13*   HEMOGLOBIN g/dL 13.2 12.8 12.7 13.0   PLATELETS 10*3/mm3 295 258 208 180     Results from last 7 days   Lab Units 20  0631 20  0504 207 20  0458   SODIUM mmol/L 134* 134* 135* 134*   POTASSIUM mmol/L 4.0 3.5 4.0 3.9   CHLORIDE mmol/L 105 104 103 104   CO2 mmol/L 17.2* 15.9* 17.6* 14.9*   BUN mg/dL 30* 34* 39*  36*   CREATININE mg/dL 1.26* 1.29* 1.41* 1.38*   GLUCOSE mg/dL 82 99 89 79   Estimated Creatinine Clearance: 38.6 mL/min (A) (by C-G formula based on SCr of 1.26 mg/dL (H)).  Results from last 7 days   Lab Units 02/09/20  0631 02/08/20  0504 02/07/20  0457 02/06/20  0458   ALBUMIN g/dL 2.20* 2.10* 2.00* 2.20*   BILIRUBIN mg/dL 1.0 1.1 1.2 1.1   ALK PHOS U/L 70 59 61 56   AST (SGOT) U/L 25 23 31 20   ALT (SGPT) U/L 18 17 15 15     Results from last 7 days   Lab Units 02/09/20  0631 02/08/20  0504 02/07/20  0457 02/06/20  0458 02/05/20  0507   CALCIUM mg/dL 8.5* 8.3* 8.3* 8.2* 7.8*   ALBUMIN g/dL 2.20* 2.10* 2.00* 2.20* 2.30*   MAGNESIUM mg/dL 1.8 1.5* 1.8 1.9 1.6   PHOSPHORUS mg/dL  --  3.3 3.1 2.6 2.1*     Results from last 7 days   Lab Units 02/03/20  0531 02/03/20  0024   PROCALCITONIN ng/mL  --  12.61*   LACTATE mmol/L 1.3 2.5*       Coag       HbA1C   Lab Results   Component Value Date    HGBA1C 5.90 (H) 02/01/2020    HGBA1C 5.80 (H) 08/20/2019    HGBA1C 5.81 (H) 03/04/2019     Infection   Results from last 7 days   Lab Units 02/03/20  0211 02/03/20  0158 02/03/20  0024   BLOODCX  No growth at 5 days No growth at 5 days  --    PROCALCITONIN ng/mL  --   --  12.61*     Radiology(recent) No radiology results for the last day  No results found for: TROPONINT, TROPONINI, BNP  No components found for: TSH;2      atenolol 50 mg Oral Q12H   cholecalciferol 1,000 Units Oral Daily   clobetasol  Topical Q12H   doxycycline 100 mg Intravenous Q12H   enoxaparin 1 mg/kg Subcutaneous Q12H   famotidine 20 mg Oral Daily   [START ON 2/10/2020] furosemide 20 mg Oral QAM   ipratropium-albuterol 3 mL Nebulization Q4H While Awake - RT   levothyroxine 50 mcg Oral Q AM   nystatin 5 mL Swish & Swallow 4x Daily   polyethylene glycol 17 g Oral BID   pravastatin 40 mg Oral Nightly   senna-docusate sodium 2 tablet Oral Nightly   sodium bicarbonate 1,300 mg Oral TID   sodium chloride 10 mL Intravenous Q12H      Diet Regular; Low Fat,  Renal      Assessment/Plan      Active Hospital Problems    Diagnosis  POA   • **Acute pancreatitis without infection or necrosis [K85.90]  Yes   • New onset a-fib (CMS/HCC) [I48.91]  No   • Hyponatremia [E87.1]  Yes   • Hypomagnesemia [E83.42]  No   • Leukocytosis [D72.829]  Yes   • Pneumonia [J18.9]  Yes   • LINNEA (acute kidney injury) (CMS/HCC) [N17.9]  No   • Obesity (BMI 30-39.9) [E66.9]  Yes   • CKD (chronic kidney disease) stage 3, GFR 30-59 ml/min (CMS/HCC) [N18.3]  Yes   • Supraventricular tachycardia (CMS/HCC) [I47.1]  Yes   • Vitamin D deficiency [E55.9]  Yes   • Primary hypothyroidism [E03.9]  Yes   • Benign essential hypertension [I10]  Yes   • Hyperlipidemia [E78.5]  Yes   • Secondary polycythemia [D75.1]  Yes      Resolved Hospital Problems   No resolved problems to display.     Ms. Alarcon is a 86 y.o. former smoker with a history of HTN, HLD, and CKD stage III who presented with epigastric pain secondary to acute pancreatitis.     Acute pancreatitis  -PRN agents for pain, anti-emetics as needed  -Lipase normalized now  -MRCP is ok  -Seen by GI. Etiology is unclear. Possibly related to lisinopril/HCTZ vs less likely passed stone     New onset AFib with RVR  -due to PNA and acute pancreatitis  -Card consult appreciated  -s/p Diltiazem gtt and now on oral Metoprolol  -on Lovenox potential oral a/c at discharge  -HR improved     LINNEA/CKD stage 3  -now on diuretics  -likely due to dehydration in setting of ACE-I and HCTZ    Hypomag  Replace IV     Hypertension  -Stable  -HCTZ and lisinopril held as stated above     Right basilar community-acquired PNA  -Symptoms of dyspnea, wheezing, and suspected infiltrate on CXR  -completing Levaquin Q48h  -Blood cultures negative so far, MRSA screen is negative  -bronchodilators and encouraging IS  -She seems much better than a few days ago, Leukocytosis is worse though but also could be related to the pancreatitis. Finished levaquin, added doxy, will consult  Pulmonary     Thrush  -Nystatin orally     VTE Prophylaxis - therapeutic dose of Lovenox  Code Status - Full      DW Dr. Rachel SALES family  Greater than 37 minutes spent with greater than 50% counseling and coordinating care      Amadou Das MD  [facility]  02/09/20  8:06 AM

## 2020-02-09 NOTE — PLAN OF CARE
Problem: Patient Care Overview  Goal: Plan of Care Review  Flowsheets (Taken 2/9/2020 8079)  Plan of Care Reviewed With: patient  Outcome Summary: Pt. able to ambulate 150 feet, CGA x 1, with use of Rwx this date.  Pt. requires CGAx1 for sit <-> stand transfers.  BLE ther. ex. program x 12 reps completed for general strengthening. Verbal/tactile cues for posture correction and Rwx guidance.

## 2020-02-09 NOTE — PROGRESS NOTES
[facility]  Inpatient Progress Note    Reason for Follow Up:  Pancreatitis    Subjective     Interval History: Discussed with patient and family at bedside.  She denies any current abdominal complaints.  Tolerating p.o. well.  Bowel pattern improving.  Leukocytosis persists, etiology not well-defined.      Current Facility-Administered Medications:   •  acetaminophen (TYLENOL) tablet 650 mg, 650 mg, Oral, Q4H PRN, Kannan Rizo APRN, 650 mg at 02/01/20 0203  •  aluminum-magnesium hydroxide-simethicone (MAALOX MAX) 400-400-40 MG/5ML suspension 15 mL, 15 mL, Oral, Q6H PRN, Kannan Rizo APRN, 15 mL at 02/01/20 0808  •  atenolol (TENORMIN) tablet 50 mg, 50 mg, Oral, Q12H, Pete Hensley MD, 50 mg at 02/09/20 0907  •  bisacodyl (DULCOLAX) EC tablet 5 mg, 5 mg, Oral, Daily PRN, Kannan Rizo APRN  •  cholecalciferol (VITAMIN D3) tablet 1,000 Units, 1,000 Units, Oral, Daily, Kannan Rizo APRN, 1,000 Units at 02/09/20 0907  •  clobetasol (TEMOVATE) 0.05 % cream, , Topical, Q12H, Kannan Rizo APRN  •  cyclobenzaprine (FLEXERIL) tablet 5 mg, 5 mg, Oral, TID PRN, Amadou Das MD, 5 mg at 02/04/20 1115  •  doxycycline (VIBRAMYCIN) 100 mg/100 mL 0.9% NS MBP, 100 mg, Intravenous, Q12H, Amadou Das MD, 100 mg at 02/08/20 2247  •  enoxaparin (LOVENOX) syringe 110 mg, 1 mg/kg, Subcutaneous, Q12H, Amadou Das MD, 110 mg at 02/09/20 0916  •  famotidine (PEPCID) tablet 20 mg, 20 mg, Oral, Daily, Amadou Das MD, 20 mg at 02/09/20 0606  •  [START ON 2/10/2020] furosemide (LASIX) tablet 20 mg, 20 mg, Oral, QARAFAT, Billy Crowder MD  •  HYDROcodone-acetaminophen (NORCO) 7.5-325 MG per tablet 1 tablet, 1 tablet, Oral, Q4H PRN, Amadou Das MD, 1 tablet at 02/09/20 0606  •  HYDROmorphone (DILAUDID) injection 0.5 mg, 0.5 mg, Intravenous, Q2H PRN, 0.5 mg at 02/06/20 1513 **AND** naloxone (NARCAN) injection 0.4 mg, 0.4 mg,  Intravenous, Q5 Min PRN, Nathan Tam MD  •  ipratropium-albuterol (DUO-NEB) nebulizer solution 3 mL, 3 mL, Nebulization, Q4H While Awake - RT, Amadou Das MD, 3 mL at 02/09/20 0751  •  levothyroxine (SYNTHROID, LEVOTHROID) tablet 50 mcg, 50 mcg, Oral, Q AM, Kannan Rizo APRN, 50 mcg at 02/09/20 0606  •  nystatin (MYCOSTATIN) 022514 UNIT/ML suspension 500,000 Units, 5 mL, Swish & Swallow, 4x Daily, Meeta Goodwin APRN, 500,000 Units at 02/09/20 0907  •  ondansetron (ZOFRAN) tablet 4 mg, 4 mg, Oral, Q6H PRN **OR** ondansetron (ZOFRAN) injection 4 mg, 4 mg, Intravenous, Q6H PRN, Kannan Rizo APRN, 4 mg at 02/05/20 2040  •  polyethylene glycol 3350 powder (packet), 17 g, Oral, BID, Kathy Lima MD, 17 g at 02/09/20 0907  •  pravastatin (PRAVACHOL) tablet 40 mg, 40 mg, Oral, Nightly, Kannan Rizo APRN, 40 mg at 02/08/20 2007  •  senna-docusate sodium (SENOKOT-S) 8.6-50 MG tablet 2 tablet, 2 tablet, Oral, Nightly, Kathy Lima MD, 2 tablet at 02/08/20 2007  •  sodium bicarbonate tablet 1,300 mg, 1,300 mg, Oral, TID, Mil Acosta MD, 1,300 mg at 02/09/20 0907  •  [COMPLETED] Insert peripheral IV, , , Once **AND** sodium chloride 0.9 % flush 10 mL, 10 mL, Intravenous, PRN, Ranjith Quinn MD, 10 mL at 01/31/20 2143  •  sodium chloride 0.9 % flush 10 mL, 10 mL, Intravenous, Q12H, Kannan Rizo APRN, 10 mL at 02/09/20 0917  •  sodium chloride 0.9 % flush 10 mL, 10 mL, Intravenous, PRN, Kannan Rizo, APRN  Review of Systems:    All systems were reviewed and negative except for:  Gastrointestinal: positive for  See HPI    Objective     Vital Signs  Temp:  [97.5 °F (36.4 °C)-98.7 °F (37.1 °C)] 98.7 °F (37.1 °C)  Heart Rate:  [] 94  Resp:  [16-20] 16  BP: (121-141)/(75-86) 121/75  Body mass index is 43.75 kg/m².    Intake/Output Summary (Last 24 hours) at 2/9/2020 0941  Last data filed at 2/9/2020 0921  Gross per 24 hour   Intake 360 ml   Output  --   Net 360 ml     I/O this shift:  In: 120 [P.O.:120]  Out: -      Physical Exam:   General: patient awake, alert and cooperative   Eyes: Normal lids and lashes, no scleral icterus   Neck: supple, normal ROM   Skin: warm and dry, not jaundiced   Cardiovascular: regular rhythm and rate, no murmurs auscultated   Pulm: clear to auscultation bilaterally, regular and unlabored   Abdomen: soft, nontender, nondistended; normal bowel sounds   Extremities: no rash or edema   Psychiatric: Normal mood and behavior; memory intact     Results Review:     I reviewed the patient's new clinical results.    Results from last 7 days   Lab Units 02/09/20  0631 02/08/20  0504 02/07/20  0457   WBC 10*3/mm3 23.84* 20.62* 14.41*   HEMOGLOBIN g/dL 13.2 12.8 12.7   HEMATOCRIT % 39.4 38.0 37.5   PLATELETS 10*3/mm3 295 258 208     Results from last 7 days   Lab Units 02/09/20 0631 02/08/20  0504 02/07/20  0457   SODIUM mmol/L 134* 134* 135*   POTASSIUM mmol/L 4.0 3.5 4.0   CHLORIDE mmol/L 105 104 103   CO2 mmol/L 17.2* 15.9* 17.6*   BUN mg/dL 30* 34* 39*   CREATININE mg/dL 1.26* 1.29* 1.41*   CALCIUM mg/dL 8.5* 8.3* 8.3*   BILIRUBIN mg/dL 1.0 1.1 1.2   ALK PHOS U/L 70 59 61   ALT (SGPT) U/L 18 17 15   AST (SGOT) U/L 25 23 31   GLUCOSE mg/dL 82 99 89         Lab Results   Lab Value Date/Time    LIPASE 38 02/05/2020 0507    LIPASE 60 02/04/2020 0435    LIPASE 181 (H) 02/03/2020 0211    LIPASE 417 (H) 02/02/2020 0441    LIPASE >3,000 (H) 01/31/2020 2142       Radiology:  XR Chest PA & Lateral   Final Result      MRI abdomen wo contrast mrcp   Final Result   Markedly limited study due to extensive motion artifact as   above.   1. MR findings of acute pancreatitis.   2. No intrahepatic biliary dilatation. No choledocholithiasis. The   gallbladder is distended with mild wall thickening. Pancreatic duct   demonstrates normal caliber.       This report was finalized on 2/7/2020 11:07 AM by Dr. Ruthy Haro M.D.          XR Chest PA & Lateral    Final Result   FINDINGS AND IMPRESSION:   Lungs are hypoinflated with asymmetric elevation right hemidiaphragm and   bibasilar pulmonary opacification, right greater than left, as before.   No pneumothorax is seen. Heart size accentuated by low lung volumes.   Small right pleural effusion posteriorly is suspected. Findings   represent atelectasis versus pneumonia with parapneumonic effusion in   the appropriate clinical context and correlation with patient history is   recommended.       This report was finalized on 2/4/2020 12:32 PM by Dr. Carlitos Willis M.D.          XR Chest 1 View   Final Result   Interval development of dense right basilar infiltrate, which may   reflect pneumonia. There is also a small right pleural effusion.       This report was finalized on 2/2/2020 11:47 PM by Dr. Kathleen Montoya M.D.          US Gallbladder   Final Result       1. No stones or sludge are seen within the gallbladder. Gallbladder   appears mildly distended, with gallbladder wall thickening and edema   noted. Appearance is nonspecific, and may be reactive, and may be   related to the patient's pancreatitis.   2. The patient does have some mild dilatation of the common bile duct,   measuring up to 9 mm. Some of this may be age related, but correlation   with liver function tests is recommended. MRCP or ERCP could be   considered for additional evaluation if these are abnormal.       This report was finalized on 2/1/2020 9:55 PM by Dr. Kathleen Montoya M.D.          CT Abdomen Pelvis Without Contrast   Final Result       1. The patient has inflammatory stranding surrounding the pancreatic   head and neck, in keeping with history of pancreatitis. No discrete   peripancreatic collections are seen. There is no evidence of gastric   outlet obstruction.       Radiation dose reduction techniques were utilized, including automated   exposure control and exposure modulation based on body size.       This report was  finalized on 1/31/2020 11:52 PM by Dr. Kathleen Montoya M.D.          XR Chest 1 View   Final Result   Mild bibasilar atelectasis.       This report was finalized on 1/31/2020 10:12 PM by Dr. Kathleen Montoya M.D.              Assessment/Plan     Patient Active Problem List   Diagnosis   • Benign essential hypertension   • Claustrophobia   • Gout   • Hyperlipidemia   • Primary hypothyroidism   • Impaired fasting glucose   • Nocturnal hypoxemia   • Secondary polycythemia   • Vitamin D deficiency   • Therapeutic drug monitoring   • Family history of coronary artery disease   • Bilateral sensorineural hearing loss, wears hearing aids   • Multinodular goiter   • Supraventricular tachycardia (CMS/HCC)   • Morbidly obese (CMS/HCC)   • Acute pancreatitis without infection or necrosis   • Obesity (BMI 30-39.9)   • CKD (chronic kidney disease) stage 3, GFR 30-59 ml/min (CMS/HCC)   • LINNEA (acute kidney injury) (CMS/HCC)   • Pneumonia   • New onset a-fib (CMS/HCC)   • Hyponatremia   • Hypomagnesemia   • Leukocytosis       Assessment:  1. Acute pancreatitis: Resolving  2. Upper abdominal pain: Resolved  3. Constipation: Gradual improvement  4. Leukocytosis: Not well-defined      Plan:  · Continue current bowel regimen  · Continue current dietary intake  I discussed the patients findings and my recommendations with patient and family.    Daryl Dunham MD

## 2020-02-09 NOTE — PROGRESS NOTES
"    Patient Name: Mandy Alarcon  :1933  86 y.o.      Patient Care Team:  Daryl Santos MD as PCP - General (Internal Medicine)  Daryl Santos MD as PCP - Claims Attributed    Chief Complaint: persistent afib    Interval History: Up in chair, family at bedside.  C/o right LQ pain, mild SOA.  LE edema 1-2+       Objective   Vital Signs  Temp:  [97.5 °F (36.4 °C)-98.7 °F (37.1 °C)] 98.7 °F (37.1 °C)  Heart Rate:  [] 82  Resp:  [16-20] 16  BP: (121-141)/(75-86) 121/75    Intake/Output Summary (Last 24 hours) at 2020 1242  Last data filed at 2020 0921  Gross per 24 hour   Intake 240 ml   Output --   Net 240 ml     Flowsheet Rows      First Filed Value   Admission Height  160 cm (63\") Documented at 2020   Admission Weight  99.5 kg (219 lb 6.4 oz) Documented at 2020          Physical Exam:   General Appearance:    Alert, cooperative, in no acute distress   Lungs:     Clear to auscultation.  Normal respiratory effort and rate.      Heart:    Irregular rhythm and normal rate, normal S1 and S2, no murmurs, gallops or rubs.     Chest Wall:    No abnormalities observed   Abdomen:     Soft, tender, positive bowel sounds.     Extremities:   no cyanosis, clubbing.  1-2+ LE edema.  No marked joint deformities.  Adequate musculoskeletal strength.       Results Review:    Results from last 7 days   Lab Units 20  0631   SODIUM mmol/L 134*   POTASSIUM mmol/L 4.0   CHLORIDE mmol/L 105   CO2 mmol/L 17.2*   BUN mg/dL 30*   CREATININE mg/dL 1.26*   GLUCOSE mg/dL 82   CALCIUM mg/dL 8.5*         Results from last 7 days   Lab Units 20  0631   WBC 10*3/mm3 23.84*   HEMOGLOBIN g/dL 13.2   HEMATOCRIT % 39.4   PLATELETS 10*3/mm3 295         Results from last 7 days   Lab Units 20  0631   MAGNESIUM mg/dL 1.8                   Medication Review:     atenolol 50 mg Oral Q12H   cholecalciferol 1,000 Units Oral Daily   clobetasol  Topical Q12H   doxycycline 100 mg Intravenous " Q12H   enoxaparin 1 mg/kg Subcutaneous Q12H   famotidine 20 mg Oral Daily   [START ON 2/10/2020] furosemide 20 mg Oral QAM   ipratropium-albuterol 3 mL Nebulization Q4H While Awake - RT   levothyroxine 50 mcg Oral Q AM   nystatin 5 mL Swish & Swallow 4x Daily   polyethylene glycol 17 g Oral BID   pravastatin 40 mg Oral Nightly   senna-docusate sodium 2 tablet Oral Nightly   sodium bicarbonate 1,300 mg Oral TID   sodium chloride 10 mL Intravenous Q12H             Assessment/Plan   1.  Persistent atrial fibrillation - rate controlled, currently on enoxaparin  2.  Acute pancreatitis - states right LQ pain  3.  Acute on chronic kidney injury - creatinine noted 1.26, mildly better.  Per nephrology  4.  Essential hypertension mildly hypertensive. - better with increase in BB  5.  Anabolic acidosis  6.  Pneumonia: Per hospitalist team.     -Eliquis therapy is reasonable.  -We will continue to monitor with a change in her medical regimen today.             CJ Alfred  [facility]  02/09/20  12:42 PM

## 2020-02-09 NOTE — PLAN OF CARE
Problem: Patient Care Overview  Goal: Plan of Care Review  Outcome: Ongoing (interventions implemented as appropriate)  Flowsheets (Taken 2/9/2020 3925)  Progress: no change  Plan of Care Reviewed With: patient  Outcome Summary: Pt AO VSS on RA in A-fib, Up to Bathroom with Walker, Family at bedside assisting with care, PRN pain medication provided for Abdomen pain, New Diuretic order started this shift

## 2020-02-09 NOTE — PLAN OF CARE
Pt has improved and doing remarking in moving.  Pt states rest better in the chair than in the bed.  Family at bedside and is requesting pain medication for the patient while she is asleep.  RN goes to exam patient about pain and where it hurts, before getting medication.  Pain medication as ordered.  Pt has been stable, will continue to monitor.   Problem: Patient Care Overview  Goal: Individualization and Mutuality  Outcome: Ongoing (interventions implemented as appropriate)  Flowsheets (Taken 2/6/2020 1715)  Patient Specific Preferences: Family preference to stay with patient at all times  Goal: Discharge Needs Assessment  Outcome: Ongoing (interventions implemented as appropriate)  Flowsheets  Taken 2/6/2020 1715 by Mallorie Gamez RN  Concerns to be Addressed: basic needs;denies needs/concerns at this time;discharge planning;care coordination/care conferences;adjustment to diagnosis/illness  Readmission Within the Last 30 Days: no previous admission in last 30 days  Taken 2/3/2020 1145 by Kristen Melendez RN  Patient/Family Anticipates Transition to: home with family  Goal: Interprofessional Rounds/Family Conf  Outcome: Ongoing (interventions implemented as appropriate)  Flowsheets (Taken 2/9/2020 1735)  Participants: physician; nursing; family     Problem: Pain, Acute (Adult)  Goal: Acceptable Pain Control/Comfort Level  Outcome: Ongoing (interventions implemented as appropriate)  Flowsheets (Taken 2/9/2020 1735)  Acceptable Pain Control/Comfort Level: making progress toward outcome     Problem: Fall Risk (Adult)  Goal: Absence of Fall  Outcome: Ongoing (interventions implemented as appropriate)  Flowsheets (Taken 2/9/2020 1735)  Absence of Fall: making progress toward outcome     Problem: Skin Injury Risk (Adult)  Goal: Skin Health and Integrity  Outcome: Ongoing (interventions implemented as appropriate)  Flowsheets (Taken 2/9/2020 1735)  Skin Health and Integrity: making progress toward outcome

## 2020-02-09 NOTE — PROGRESS NOTES
"   LOS: 7 days    Patient Care Team:  Daryl Santos MD as PCP - General (Internal Medicine)  Daryl Santos MD as PCP - Claims Attributed    Chief Complaint:    Chief Complaint   Patient presents with   • Chest Pain     Acute kidney injury on chronic kidney disease  Subjective     Interval History:   Patient is feeling better, decreased abdominal pain, no nausea or vomiting, no dysuria or gross hematuria.  No lightheadedness, has poor appetite.  Some increased shortness of breath and increased edema.      Objective     Vital Signs  Temp:  [98 °F (36.7 °C)-98.3 °F (36.8 °C)] 98.2 °F (36.8 °C)  Heart Rate:  [] 101  Resp:  [16-20] 16  BP: (114-141)/() 133/84    Flowsheet Rows      First Filed Value   Admission Height  160 cm (63\") Documented at 01/31/2020 2149   Admission Weight  99.5 kg (219 lb 6.4 oz) Documented at 01/31/2020 2149          No intake/output data recorded.  I/O last 3 completed shifts:  In: 1635 [P.O.:1635]  Out: -     Intake/Output Summary (Last 24 hours) at 2/8/2020 2005  Last data filed at 2/8/2020 1740  Gross per 24 hour   Intake 240 ml   Output --   Net 240 ml       Physical Exam:  General Appearance: alert, oriented x 3, no acute distress, obese, appears to be chronically ill  Skin: warm and dry  HEENT: pupils round and reactive to light, oral mucosa dry, nonicteric sclerae,   Neck: supple, + JVD, trachea midline.  Lungs: Bilateral basal rales.  No wheezing.  Mildly tachypneic.    Heart: Irregularly irregular, no rub  Abdomen: soft,  present bowel sounds to auscultation, the abdomen is protuberant, she has less epigastric tenderness   Extremities: + Edema, no cyanosis or clubbing,   Neuro: normal speech and mental status, hard of hearing on one side.     Results Review:    Results from last 7 days   Lab Units 02/08/20  0504 02/07/20  0457 02/06/20  0458   SODIUM mmol/L 134* 135* 134*   POTASSIUM mmol/L 3.5 4.0 3.9   CHLORIDE mmol/L 104 103 104   CO2 mmol/L 15.9* 17.6* 14.9* "   BUN mg/dL 34* 39* 36*   CREATININE mg/dL 1.29* 1.41* 1.38*   CALCIUM mg/dL 8.3* 8.3* 8.2*   BILIRUBIN mg/dL 1.1 1.2 1.1   ALK PHOS U/L 59 61 56   ALT (SGPT) U/L 17 15 15   AST (SGOT) U/L 23 31 20   GLUCOSE mg/dL 99 89 79       Estimated Creatinine Clearance: 37.7 mL/min (A) (by C-G formula based on SCr of 1.29 mg/dL (H)).    Results from last 7 days   Lab Units 02/08/20  0504 02/07/20  0457 02/06/20  0458   MAGNESIUM mg/dL 1.5* 1.8 1.9   PHOSPHORUS mg/dL 3.3 3.1 2.6       Results from last 7 days   Lab Units 02/08/20  0504 02/07/20  0457 02/06/20  0458 02/05/20  0507   URIC ACID mg/dL 5.4 5.6 4.6 3.9       Results from last 7 days   Lab Units 02/08/20  0504 02/07/20  0457 02/06/20  0458 02/05/20  0507 02/04/20  0435   WBC 10*3/mm3 20.62* 14.41* 12.13* 12.33* 11.29*   HEMOGLOBIN g/dL 12.8 12.7 13.0 12.8 12.7   PLATELETS 10*3/mm3 258 208 180 147 145               Imaging Results (Last 24 Hours)     Procedure Component Value Units Date/Time    XR Chest PA & Lateral [[id number]] Collected:  02/08/20 1144     Updated:  02/08/20 1152    Narrative:       TWO-VIEW CHEST     HISTORY: Follow-up of atelectasis or pneumonia.     FINDINGS: The lungs are moderately well-expanded with some persistent  localized atelectasis at the right base medially combined with some  pleural fluid extending into the fissure and the appearance is quite  similar to the study of 4 days ago. The left lung remains clear and the  heart remains mildly enlarged.     This report was finalized on 2/8/2020 11:49 AM by Dr. Kyle Hawkins M.D.             atenolol 50 mg Oral Q12H   cholecalciferol 1,000 Units Oral Daily   clobetasol  Topical Q12H   doxycycline 100 mg Intravenous Q12H   enoxaparin 1 mg/kg Subcutaneous Q12H   famotidine 20 mg Oral Daily   furosemide 40 mg Oral Daily   ipratropium-albuterol 3 mL Nebulization Q4H While Awake - RT   [START ON 2/9/2020] levoFLOXacin 500 mg Oral Once   levothyroxine 50 mcg Oral Q AM   nystatin 5 mL Swish &  Swallow 4x Daily   polyethylene glycol 17 g Oral BID   potassium chloride 40 mEq Oral Once   pravastatin 40 mg Oral Nightly   senna-docusate sodium 2 tablet Oral Nightly   sodium bicarbonate 1,300 mg Oral TID   sodium chloride 10 mL Intravenous Q12H          Medication Review:   Current Facility-Administered Medications   Medication Dose Route Frequency Provider Last Rate Last Dose   • acetaminophen (TYLENOL) tablet 650 mg  650 mg Oral Q4H PRN Kannan Rizo APRN   650 mg at 02/01/20 0203   • aluminum-magnesium hydroxide-simethicone (MAALOX MAX) 400-400-40 MG/5ML suspension 15 mL  15 mL Oral Q6H PRN Kannan Rizo APRN   15 mL at 02/01/20 0808   • atenolol (TENORMIN) tablet 50 mg  50 mg Oral Q12H Pete Hensley MD       • bisacodyl (DULCOLAX) EC tablet 5 mg  5 mg Oral Daily PRN Kannan Rizo APRN       • cholecalciferol (VITAMIN D3) tablet 1,000 Units  1,000 Units Oral Daily Kannan Rizo APRN   1,000 Units at 02/08/20 0828   • clobetasol (TEMOVATE) 0.05 % cream   Topical Q12H Kannan Rizo APRN       • cyclobenzaprine (FLEXERIL) tablet 5 mg  5 mg Oral TID PRN Amadou Das MD   5 mg at 02/04/20 1115   • doxycycline (VIBRAMYCIN) 100 mg/100 mL 0.9% NS MBP  100 mg Intravenous Q12H Amadou Das MD   100 mg at 02/08/20 1442   • enoxaparin (LOVENOX) syringe 110 mg  1 mg/kg Subcutaneous Q12H Amadou Das MD   110 mg at 02/08/20 1222   • famotidine (PEPCID) tablet 20 mg  20 mg Oral Daily Amadou Das MD   20 mg at 02/08/20 0633   • furosemide (LASIX) tablet 40 mg  40 mg Oral Daily Billy Crowder MD       • HYDROcodone-acetaminophen (NORCO) 7.5-325 MG per tablet 1 tablet  1 tablet Oral Q4H PRN Amadou Das MD   1 tablet at 02/08/20 1509   • HYDROmorphone (DILAUDID) injection 0.5 mg  0.5 mg Intravenous Q2H PRN Nathan Tam MD   0.5 mg at 02/06/20 1513    And   • naloxone (NARCAN) injection 0.4 mg  0.4 mg Intravenous Q5 Min PRN Anel,  Nathan BARBER MD       • ipratropium-albuterol (DUO-NEB) nebulizer solution 3 mL  3 mL Nebulization Q4H While Awake - RT Amadou Das MD   3 mL at 02/08/20 1526   • [START ON 2/9/2020] levoFLOXacin (LEVAQUIN) tablet 500 mg  500 mg Oral Once Amadou Das MD       • levothyroxine (SYNTHROID, LEVOTHROID) tablet 50 mcg  50 mcg Oral Q AM Kannan Rizo APRN   50 mcg at 02/08/20 0634   • nystatin (MYCOSTATIN) 542647 UNIT/ML suspension 500,000 Units  5 mL Swish & Swallow 4x Daily Meeta Goodwin APRN   500,000 Units at 02/08/20 1727   • ondansetron (ZOFRAN) tablet 4 mg  4 mg Oral Q6H PRN Kannan Rizo APRN        Or   • ondansetron (ZOFRAN) injection 4 mg  4 mg Intravenous Q6H PRN Kannan Rizo APRN   4 mg at 02/05/20 2040   • polyethylene glycol 3350 powder (packet)  17 g Oral BID Kathy Lima MD   17 g at 02/08/20 0829   • potassium chloride (MICRO-K) CR capsule 40 mEq  40 mEq Oral Once Billy Crowder MD       • pravastatin (PRAVACHOL) tablet 40 mg  40 mg Oral Nightly Kannan Rizo APRN   40 mg at 02/07/20 2149   • senna-docusate sodium (SENOKOT-S) 8.6-50 MG tablet 2 tablet  2 tablet Oral Nightly Kathy Lima MD   2 tablet at 02/07/20 2149   • sodium bicarbonate tablet 1,300 mg  1,300 mg Oral TID Mil Acosta MD   1,300 mg at 02/08/20 1726   • sodium chloride 0.9 % flush 10 mL  10 mL Intravenous PRN Ranjith Quinn MD   10 mL at 01/31/20 2143   • sodium chloride 0.9 % flush 10 mL  10 mL Intravenous Q12H Kannan Rizo APRN   10 mL at 02/08/20 0829   • sodium chloride 0.9 % flush 10 mL  10 mL Intravenous PRN Kannan Rizo, APRN           Assessment/Plan   1.  Acute kidney injury on chronic kidney disease stage III, associated with acute pancreatitis improved with hydration, creatinine is slightly better.  Volume status is generous.  Will start Lasix 40 mg p.o. daily.  Replace potassium as well.  Monitor closely.    2.  Chronic kidney disease  stage III most likely secondary to nephrosclerosis.  Avoid nephrotoxins.   3.  Acute pancreatitis, clinically improving.  4.  Hypertension, controlled  5.  Hypothyroidism  6.  Dyslipidemia  7.  Hearing loss  8.  Edema with shortness of breath, plan for loop diuretics as above  9.  Hypomagnesemia, replaced.  Recheck in a.m.    10.  Metabolic acidosis with slight increase in the anion gap, on oral sodium bicarb.  Lasix should be helpful.  Monitor.  11.  Atrial fibrillation  Discussed with daughter.  Will follow.      Billy Crowder MD  02/08/20  8:05 PM

## 2020-02-09 NOTE — THERAPY TREATMENT NOTE
Patient Name: Mandy Alarcon  : 1933    MRN: [id number]                              Today's Date: 2020       Admit Date: 2020    Visit Dx:     ICD-10-CM ICD-9-CM   1. Acute pancreatitis without infection or necrosis, unspecified pancreatitis type K85.90 577.0   2. Acute kidney injury superimposed on chronic kidney disease (CMS/HCC) N17.9 866.00    N18.9 585.9     Patient Active Problem List   Diagnosis   • Benign essential hypertension   • Claustrophobia   • Gout   • Hyperlipidemia   • Primary hypothyroidism   • Impaired fasting glucose   • Nocturnal hypoxemia   • Secondary polycythemia   • Vitamin D deficiency   • Therapeutic drug monitoring   • Family history of coronary artery disease   • Bilateral sensorineural hearing loss, wears hearing aids   • Multinodular goiter   • Supraventricular tachycardia (CMS/[facility])   • Morbidly obese (CMS/[facility])   • Acute pancreatitis without infection or necrosis   • Obesity (BMI 30-39.9)   • CKD (chronic kidney disease) stage 3, GFR 30-59 ml/min (CMS/[facility])   • LINNEA (acute kidney injury) (CMS/[facility])   • Pneumonia   • New onset a-fib (CMS/[facility])   • Hyponatremia   • Hypomagnesemia   • Leukocytosis     Past Medical History:   Diagnosis Date   • Benign essential hypertension 10/28/2012    2012--treatment for hypertension begun.   • Bilateral sensorineural hearing loss, wears hearing aids 2017    Left is much worse than right.  Patient had multiple ear infections as a child.   • Chronic renal insufficiency, stage II (mild), 2016--creatinine 1.35 2016--creatinine 1.35.  Baseline creatinine approximately 1.3.   • Claustrophobia 2016    This patient has significant nocturnal hypoxemia and I think that she could benefit from nocturnal oxygen therapy. The exact etiology of her hypoxemia is not clear. She could possibly have obstructive sleep apnea but this is not documented. We cannot test this patient for sleep apnea due to the fact that  she is severely claustrophobic. Patient was a former smoker and it is possibly that COPD he is playing a role.   • Family history of coronary artery disease 2016    Patient's mother, father, 2 sisters and a brother all  from myocardial infarctions   • Gout 10/28/2012    2012--initial diagnosis and treatment of gout.   • Hyperlipidemia 10/28/2012    2012--treatment for hyperlipidemia begun.   • Impaired fasting glucose 10/28/2012    2012--initial diagnosis impaired fasting glucose.   • Morbidly obese (CMS/HCC) 3/11/2019   • Nocturnal hypoxemia 2014--patient did not receive nocturnal oxygen because of Medicare regulations.   05/15/2014--overnight oximetry revealed oxygen saturations less than 89% for 22 minutes and 40 seconds. Oxygen saturations less than or equal to 88% for 22 minutes and 40 seconds. Lowest oxygen saturation 83%. The longest continuous time with oxygen saturations less than or equal to 88% was 1 minute and 32 seconds.   2012--overnight oximetry revealed oxygen saturations less than 90% for one hour and 35 minutes. Oxygen saturations less than 89% 59 minutes. This patient has significant nocturnal hypoxemia and I think that she could benefit from nocturnal oxygen therapy. The exact etiology of her hypoxemia is not clear. She could possibly have obstructive sleep apnea but this is not documented. We cannot test this patient for sleep apnea due to the fact that she is severely claustrophobic. Patient was a former smoker and it is possibly that COPD he is playing a role.   • Primary hypothyroidism 2015--TSH remains elevated slightly at 4.92.  Given the overall clinical picture including the multinodular goiter, we will initiate levothyroxine 50 mcg/day and reassess in about 6 weeks.  2018--thyroid ultrasound reveals a multinodular thyroid with multiple subcentimeter nodules.  Only minimal increase in size of the  "largest nodule in the left lobe has occurred when compared    • Secondary polycythemia 8/2/2012 11/06/2014--patient did not receive nocturnal oxygen because of Medicare regulations.   05/15/2014--overnight oximetry revealed oxygen saturations less than 89% for 22 minutes and 40 seconds. Oxygen saturations less than or equal to 88% for 22 minutes and 40 seconds. Lowest oxygen saturation 83%. The longest continuous time with oxygen saturations less than or equal to 88% was 1 minute and 32 seconds.   08/02/2012--overnight oximetry revealed oxygen saturations less than 90% for one hour and 35 minutes. Oxygen saturations less than 89% 59 minutes. This patient has significant nocturnal hypoxemia and I think that she could benefit from nocturnal oxygen therapy. The exact etiology of her hypoxemia is not clear. She could possibly have obstructive sleep apnea but this is not documented. We cannot test this patient for sleep apnea due to the fact that she is severely claustrophobic. Patient was a former smoker and it is possibly that COPD he is playing a role.   • Vitamin D deficiency 5/23/2016     Past Surgical History:   Procedure Laterality Date   • OOPHORECTOMY      age 48   • RADICAL ABDOMINAL HYSTERECTOMY  48 years old    48 years of age. Uterine fibroid tumors with menorrhagia - no cancer     General Information     Row Name 02/09/20 1415          PT Evaluation Time/Intention    Document Type  therapy note (daily note) Pt. reports continued pain in her Right \"side\".  Otherwise pt. agreeable to work with P.T.   -MS     Mode of Treatment  physical therapy;individual therapy  -MS     Row Name 02/09/20 1415          General Information    Existing Precautions/Restrictions  (S) fall  -MS     Row Name 02/09/20 1415          Cognitive Assessment/Intervention- PT/OT    Orientation Status (Cognition)  oriented to;person;place  -MS     Row Name 02/09/20 1415          Safety Issues, Functional Mobility    Comment, Safety " "Issues/Impairments (Mobility)  Gait belt used for safety.   -MS       User Key  (r) = Recorded By, (t) = Taken By, (c) = Cosigned By    Initials Name Provider Type    Nathan Purdy, PT Physical Therapist        Mobility     Row Name 02/09/20 1416          Bed Mobility Assessment/Treatment    Comment (Bed Mobility)  Pt. up in chair this PM.   -MS     Row Name 02/09/20 1416          Sit-Stand Transfer    Sit-Stand Wilkinson (Transfers)  contact guard  -MS     Assistive Device (Sit-Stand Transfers)  walker, front-wheeled  -MS     Row Name 02/09/20 1416          Gait/Stairs Assessment/Training    Wilkinson Level (Gait)  contact guard  -MS     Assistive Device (Gait)  walker, front-wheeled  -MS     Distance in Feet (Gait)  150 feet  -MS     Pattern (Gait)  step-through  -MS     Deviations/Abnormal Patterns (Gait)  fletcher decreased  -MS     Bilateral Gait Deviations  forward flexed posture  -MS     Comment (Gait/Stairs)  Verbal/tactile cues for posture correction and Rwx guidance.   -MS       User Key  (r) = Recorded By, (t) = Taken By, (c) = Cosigned By    Initials Name Provider Type    Nathan Purdy, PT Physical Therapist        Obj/Interventions     Row Name 02/09/20 1417          Therapeutic Exercise    Comment (Therapeutic Exercise)  BLE ther. ex. program x 12 reps completed (Ankle Pumps, Hip Flexion, LAQ's)  -MS       User Key  (r) = Recorded By, (t) = Taken By, (c) = Cosigned By    Initials Name Provider Type    Nathan Purdy, PT Physical Therapist        Goals/Plan    No documentation.       Clinical Impression     Row Name 02/09/20 1417          Pain Scale: Numbers Pre/Post-Treatment    Pain Scale: Numbers, Pretreatment  5/10  -MS     Pain Scale: Numbers, Post-Treatment  4/10  -MS     Pain Location - Side  Right  -MS     Pre/Post Treatment Pain Comment  Right \"side\" pain  -MS     Pain Intervention(s)  Medication (See MAR);Rest;Repositioned  -MS     Row Name 02/09/20 1417          " Positioning and Restraints    Pre-Treatment Position  sitting in chair/recliner  -MS     Post Treatment Position  chair  -MS     In Chair  notified nsg;reclined;sitting;call light within reach;encouraged to call for assist;with family/caregiver All lines intact.   -MS       User Key  (r) = Recorded By, (t) = Taken By, (c) = Cosigned By    Initials Name Provider Type    Nathan Purdy, PT Physical Therapist        Outcome Measures     Row Name 02/09/20 1418          How much help from another person do you currently need...    Turning from your back to your side while in flat bed without using bedrails?  3  -MS     Moving from lying on back to sitting on the side of a flat bed without bedrails?  3  -MS     Moving to and from a bed to a chair (including a wheelchair)?  3  -MS     Standing up from a chair using your arms (e.g., wheelchair, bedside chair)?  3  -MS     Climbing 3-5 steps with a railing?  2  -MS     To walk in hospital room?  3  -MS     AM-PAC 6 Clicks Score (PT)  17  -MS     Row Name 02/09/20 1418          Functional Assessment    Outcome Measure Options  AM-PAC 6 Clicks Basic Mobility (PT)  -MS       User Key  (r) = Recorded By, (t) = Taken By, (c) = Cosigned By    Initials Name Provider Type    Nathan Purdy, PT Physical Therapist          PT Recommendation and Plan     Plan of Care Reviewed With: patient  Outcome Summary: Pt. able to ambulate 150 feet, CGA x 1, with use of Rwx this date.  Pt. requires CGAx1 for sit <-> stand transfers.  BLE ther. ex. program x 12 reps completed for general strengthening. Verbal/tactile cues for posture correction and Rwx guidance.     Time Calculation:   PT Charges     Row Name 02/09/20 1419             Time Calculation    Start Time  1342  -MS      Stop Time  1356  -MS      Time Calculation (min)  14 min  -MS      PT Received On  02/09/20  -MS      PT - Next Appointment  02/10/20  -MS         Time Calculation- PT    Total Timed Code Minutes- PT  13  minute(s)  -MS        User Key  (r) = Recorded By, (t) = Taken By, (c) = Cosigned By    Initials Name Provider Type    Nathan Purdy, PT Physical Therapist        Therapy Charges for Today     Code Description Service Date Service Provider Modifiers Qty    37095926950 HC PT THER PROC EA 15 MIN 2/8/2020 Nathan Ugarte, PT GP 1    [id number] HC PT THER PROC EA 15 MIN 2/9/2020 Nathan Ugarte, PT GP 1          PT G-Codes  Outcome Measure Options: AM-PAC 6 Clicks Basic Mobility (PT)  AM-PAC 6 Clicks Score (PT): 17    Nathan Ugarte, PT  2/9/2020

## 2020-02-10 LAB
ALBUMIN SERPL-MCNC: 2.1 G/DL (ref 3.5–5.2)
ALBUMIN/GLOB SERPL: 0.8 G/DL
ALP SERPL-CCNC: 62 U/L (ref 39–117)
ALT SERPL W P-5'-P-CCNC: 15 U/L (ref 1–33)
ANION GAP SERPL CALCULATED.3IONS-SCNC: 11.2 MMOL/L (ref 5–15)
AST SERPL-CCNC: 21 U/L (ref 1–32)
BASOPHILS # BLD AUTO: 0.06 10*3/MM3 (ref 0–0.2)
BASOPHILS NFR BLD AUTO: 0.3 % (ref 0–1.5)
BILIRUB SERPL-MCNC: 0.8 MG/DL (ref 0.2–1.2)
BUN BLD-MCNC: 30 MG/DL (ref 8–23)
BUN/CREAT SERPL: 25.4 (ref 7–25)
CALCIUM SPEC-SCNC: 8 MG/DL (ref 8.6–10.5)
CHLORIDE SERPL-SCNC: 101 MMOL/L (ref 98–107)
CO2 SERPL-SCNC: 20.8 MMOL/L (ref 22–29)
CREAT BLD-MCNC: 1.18 MG/DL (ref 0.57–1)
DEPRECATED RDW RBC AUTO: 47.4 FL (ref 37–54)
EOSINOPHIL # BLD AUTO: 0.25 10*3/MM3 (ref 0–0.4)
EOSINOPHIL NFR BLD AUTO: 1.2 % (ref 0.3–6.2)
ERYTHROCYTE [DISTWIDTH] IN BLOOD BY AUTOMATED COUNT: 14.4 % (ref 12.3–15.4)
GFR SERPL CREATININE-BSD FRML MDRD: 43 ML/MIN/1.73
GLOBULIN UR ELPH-MCNC: 2.8 GM/DL
GLUCOSE BLD-MCNC: 88 MG/DL (ref 65–99)
HCT VFR BLD AUTO: 36.3 % (ref 34–46.6)
HGB BLD-MCNC: 12.1 G/DL (ref 12–15.9)
IMM GRANULOCYTES # BLD AUTO: 0.71 10*3/MM3 (ref 0–0.05)
IMM GRANULOCYTES NFR BLD AUTO: 3.5 % (ref 0–0.5)
LYMPHOCYTES # BLD AUTO: 1.31 10*3/MM3 (ref 0.7–3.1)
LYMPHOCYTES NFR BLD AUTO: 6.4 % (ref 19.6–45.3)
MCH RBC QN AUTO: 30.1 PG (ref 26.6–33)
MCHC RBC AUTO-ENTMCNC: 33.3 G/DL (ref 31.5–35.7)
MCV RBC AUTO: 90.3 FL (ref 79–97)
MONOCYTES # BLD AUTO: 1.33 10*3/MM3 (ref 0.1–0.9)
MONOCYTES NFR BLD AUTO: 6.5 % (ref 5–12)
NEUTROPHILS # BLD AUTO: 16.68 10*3/MM3 (ref 1.7–7)
NEUTROPHILS NFR BLD AUTO: 82.1 % (ref 42.7–76)
NRBC BLD AUTO-RTO: 0 /100 WBC (ref 0–0.2)
PLATELET # BLD AUTO: 263 10*3/MM3 (ref 140–450)
PMV BLD AUTO: 9 FL (ref 6–12)
POTASSIUM BLD-SCNC: 4 MMOL/L (ref 3.5–5.2)
PROCALCITONIN SERPL-MCNC: 1.06 NG/ML (ref 0.1–0.25)
PROT SERPL-MCNC: 4.9 G/DL (ref 6–8.5)
RBC # BLD AUTO: 4.02 10*6/MM3 (ref 3.77–5.28)
SODIUM BLD-SCNC: 133 MMOL/L (ref 136–145)
WBC NRBC COR # BLD: 20.34 10*3/MM3 (ref 3.4–10.8)

## 2020-02-10 PROCEDURE — 99232 SBSQ HOSP IP/OBS MODERATE 35: CPT | Performed by: INTERNAL MEDICINE

## 2020-02-10 PROCEDURE — 84145 PROCALCITONIN (PCT): CPT | Performed by: NURSE PRACTITIONER

## 2020-02-10 PROCEDURE — 93010 ELECTROCARDIOGRAM REPORT: CPT | Performed by: INTERNAL MEDICINE

## 2020-02-10 PROCEDURE — 94799 UNLISTED PULMONARY SVC/PX: CPT

## 2020-02-10 PROCEDURE — 25010000002 ENOXAPARIN PER 10 MG: Performed by: INTERNAL MEDICINE

## 2020-02-10 PROCEDURE — 80053 COMPREHEN METABOLIC PANEL: CPT | Performed by: INTERNAL MEDICINE

## 2020-02-10 PROCEDURE — 97110 THERAPEUTIC EXERCISES: CPT

## 2020-02-10 PROCEDURE — 93005 ELECTROCARDIOGRAM TRACING: CPT | Performed by: INTERNAL MEDICINE

## 2020-02-10 PROCEDURE — 25010000002 FUROSEMIDE PER 20 MG: Performed by: INTERNAL MEDICINE

## 2020-02-10 PROCEDURE — 85025 COMPLETE CBC W/AUTO DIFF WBC: CPT | Performed by: INTERNAL MEDICINE

## 2020-02-10 RX ORDER — FUROSEMIDE 10 MG/ML
80 INJECTION INTRAMUSCULAR; INTRAVENOUS EVERY 12 HOURS
Status: COMPLETED | OUTPATIENT
Start: 2020-02-10 | End: 2020-02-10

## 2020-02-10 RX ADMIN — HYDROCODONE BITARTRATE AND ACETAMINOPHEN 1 TABLET: 7.5; 325 TABLET ORAL at 00:00

## 2020-02-10 RX ADMIN — IPRATROPIUM BROMIDE AND ALBUTEROL SULFATE 3 ML: 2.5; .5 SOLUTION RESPIRATORY (INHALATION) at 19:24

## 2020-02-10 RX ADMIN — IPRATROPIUM BROMIDE AND ALBUTEROL SULFATE 3 ML: 2.5; .5 SOLUTION RESPIRATORY (INHALATION) at 07:08

## 2020-02-10 RX ADMIN — IPRATROPIUM BROMIDE AND ALBUTEROL SULFATE 3 ML: 2.5; .5 SOLUTION RESPIRATORY (INHALATION) at 23:57

## 2020-02-10 RX ADMIN — SODIUM BICARBONATE 1300 MG: 650 TABLET ORAL at 16:28

## 2020-02-10 RX ADMIN — FAMOTIDINE 20 MG: 20 TABLET, FILM COATED ORAL at 06:18

## 2020-02-10 RX ADMIN — DOXYCYCLINE 100 MG: 100 INJECTION, POWDER, LYOPHILIZED, FOR SOLUTION INTRAVENOUS at 23:17

## 2020-02-10 RX ADMIN — FUROSEMIDE 80 MG: 10 INJECTION, SOLUTION INTRAMUSCULAR; INTRAVENOUS at 08:32

## 2020-02-10 RX ADMIN — CLOBETASOL PROPIONATE 1 APPLICATION: 0.5 CREAM TOPICAL at 22:54

## 2020-02-10 RX ADMIN — PRAVASTATIN SODIUM 40 MG: 40 TABLET ORAL at 20:48

## 2020-02-10 RX ADMIN — ATENOLOL 50 MG: 50 TABLET ORAL at 08:33

## 2020-02-10 RX ADMIN — HYDROCODONE BITARTRATE AND ACETAMINOPHEN 1 TABLET: 7.5; 325 TABLET ORAL at 08:33

## 2020-02-10 RX ADMIN — HYDROCODONE BITARTRATE AND ACETAMINOPHEN 1 TABLET: 7.5; 325 TABLET ORAL at 04:21

## 2020-02-10 RX ADMIN — SODIUM CHLORIDE, PRESERVATIVE FREE 10 ML: 5 INJECTION INTRAVENOUS at 20:50

## 2020-02-10 RX ADMIN — ENOXAPARIN SODIUM 110 MG: 120 INJECTION SUBCUTANEOUS at 11:11

## 2020-02-10 RX ADMIN — NYSTATIN 500000 UNITS: 100000 SUSPENSION ORAL at 18:06

## 2020-02-10 RX ADMIN — CLOBETASOL PROPIONATE: 0.5 CREAM TOPICAL at 08:34

## 2020-02-10 RX ADMIN — FUROSEMIDE 80 MG: 10 INJECTION, SOLUTION INTRAMUSCULAR; INTRAVENOUS at 20:49

## 2020-02-10 RX ADMIN — IPRATROPIUM BROMIDE AND ALBUTEROL SULFATE 3 ML: 2.5; .5 SOLUTION RESPIRATORY (INHALATION) at 10:59

## 2020-02-10 RX ADMIN — NYSTATIN 500000 UNITS: 100000 SUSPENSION ORAL at 08:34

## 2020-02-10 RX ADMIN — IPRATROPIUM BROMIDE AND ALBUTEROL SULFATE 3 ML: 2.5; .5 SOLUTION RESPIRATORY (INHALATION) at 14:47

## 2020-02-10 RX ADMIN — NYSTATIN 500000 UNITS: 100000 SUSPENSION ORAL at 11:11

## 2020-02-10 RX ADMIN — HYDROCODONE BITARTRATE AND ACETAMINOPHEN 1 TABLET: 7.5; 325 TABLET ORAL at 13:20

## 2020-02-10 RX ADMIN — DOXYCYCLINE 100 MG: 100 INJECTION, POWDER, LYOPHILIZED, FOR SOLUTION INTRAVENOUS at 11:12

## 2020-02-10 RX ADMIN — HYDROCODONE BITARTRATE AND ACETAMINOPHEN 1 TABLET: 7.5; 325 TABLET ORAL at 22:54

## 2020-02-10 RX ADMIN — SODIUM BICARBONATE 1300 MG: 650 TABLET ORAL at 08:33

## 2020-02-10 RX ADMIN — FUROSEMIDE 20 MG: 20 TABLET ORAL at 06:18

## 2020-02-10 RX ADMIN — SODIUM BICARBONATE 1300 MG: 650 TABLET ORAL at 20:48

## 2020-02-10 RX ADMIN — VITAMIN D, TAB 1000IU (100/BT) 1000 UNITS: 25 TAB at 08:33

## 2020-02-10 RX ADMIN — NYSTATIN 500000 UNITS: 100000 SUSPENSION ORAL at 20:48

## 2020-02-10 RX ADMIN — HYDROCODONE BITARTRATE AND ACETAMINOPHEN 1 TABLET: 7.5; 325 TABLET ORAL at 18:41

## 2020-02-10 RX ADMIN — SODIUM CHLORIDE, PRESERVATIVE FREE 10 ML: 5 INJECTION INTRAVENOUS at 08:34

## 2020-02-10 RX ADMIN — LEVOTHYROXINE SODIUM 50 MCG: 50 TABLET ORAL at 06:18

## 2020-02-10 NOTE — PROGRESS NOTES
[facility]  Inpatient Progress Note    Reason for Follow Up: Acute pancreatitis    Subjective     Interval History:   Bowels moving-no abdominal pain.  No nausea or vomiting.  Her appetite is not great but she is eating.  She overall feels well.  Continues to have a productive cough.    Current Facility-Administered Medications:   •  acetaminophen (TYLENOL) tablet 650 mg, 650 mg, Oral, Q4H PRN, Kannan Rizo APRN, 650 mg at 02/01/20 0203  •  aluminum-magnesium hydroxide-simethicone (MAALOX MAX) 400-400-40 MG/5ML suspension 15 mL, 15 mL, Oral, Q6H PRN, Kannan Rizo APRN, 15 mL at 02/01/20 0808  •  atenolol (TENORMIN) tablet 50 mg, 50 mg, Oral, Q12H, Pete Hensley MD, 50 mg at 02/10/20 0833  •  bisacodyl (DULCOLAX) EC tablet 5 mg, 5 mg, Oral, Daily PRN, Kannan Rizo APRN  •  cholecalciferol (VITAMIN D3) tablet 1,000 Units, 1,000 Units, Oral, Daily, Kannan Rizo APRN, 1,000 Units at 02/10/20 0833  •  clobetasol (TEMOVATE) 0.05 % cream, , Topical, Q12H, Kannan Rizo APRN  •  cyclobenzaprine (FLEXERIL) tablet 5 mg, 5 mg, Oral, TID PRN, Amadou Das MD, 5 mg at 02/04/20 1115  •  doxycycline (VIBRAMYCIN) 100 mg/100 mL 0.9% NS MBP, 100 mg, Intravenous, Q12H, Amadou Das MD, Last Rate: 100 mL/hr at 02/09/20 1124, 100 mg at 02/09/20 2312  •  enoxaparin (LOVENOX) syringe 110 mg, 1 mg/kg, Subcutaneous, Q12H, Amadou Das MD, 110 mg at 02/09/20 2312  •  famotidine (PEPCID) tablet 20 mg, 20 mg, Oral, Daily, Amadou Das MD, 20 mg at 02/10/20 0618  •  furosemide (LASIX) injection 80 mg, 80 mg, Intravenous, Q12H, Mil Acosta MD, 80 mg at 02/10/20 0832  •  HYDROcodone-acetaminophen (NORCO) 7.5-325 MG per tablet 1 tablet, 1 tablet, Oral, Q4H PRN, Amadou Das MD, 1 tablet at 02/10/20 0833  •  HYDROmorphone (DILAUDID) injection 0.5 mg, 0.5 mg, Intravenous, Q2H PRN, 0.5 mg at 02/06/20 1513 **AND** naloxone  (NARCAN) injection 0.4 mg, 0.4 mg, Intravenous, Q5 Min PRN, Nathan Tam MD  •  ipratropium-albuterol (DUO-NEB) nebulizer solution 3 mL, 3 mL, Nebulization, Q4H While Awake - RT, Amadou Das MD, 3 mL at 02/10/20 0708  •  levothyroxine (SYNTHROID, LEVOTHROID) tablet 50 mcg, 50 mcg, Oral, Q AM, Kannan Rizo APRN, 50 mcg at 02/10/20 0618  •  nystatin (MYCOSTATIN) 157522 UNIT/ML suspension 500,000 Units, 5 mL, Swish & Swallow, 4x Daily, Meeta Goodwin APRN, 500,000 Units at 02/10/20 0834  •  ondansetron (ZOFRAN) tablet 4 mg, 4 mg, Oral, Q6H PRN **OR** ondansetron (ZOFRAN) injection 4 mg, 4 mg, Intravenous, Q6H PRN, Kannan Rizo APRN, 4 mg at 02/05/20 2040  •  pravastatin (PRAVACHOL) tablet 40 mg, 40 mg, Oral, Nightly, Kannan Rizo APRN, 40 mg at 02/09/20 2028  •  sodium bicarbonate tablet 1,300 mg, 1,300 mg, Oral, TID, KarlaMil MD, 1,300 mg at 02/10/20 0833  •  [COMPLETED] Insert peripheral IV, , , Once **AND** sodium chloride 0.9 % flush 10 mL, 10 mL, Intravenous, PRN, Ranjith Quinn MD, 10 mL at 01/31/20 2143  •  sodium chloride 0.9 % flush 10 mL, 10 mL, Intravenous, Q12H, Kannan Rizo APRN, 10 mL at 02/10/20 0834  •  sodium chloride 0.9 % flush 10 mL, 10 mL, Intravenous, PRN, Kannan Rizo APRN  Review of Systems:    All systems were reviewed and negative except for:  Constitution:  positive for anorexia  Respiratory: positive for  cough, productive    Objective     Vital Signs  Temp:  [97.7 °F (36.5 °C)-98.1 °F (36.7 °C)] 97.9 °F (36.6 °C)  Heart Rate:  [79-97] 97  Resp:  [16-18] 16  BP: (111-133)/(62-79) 123/62  Body mass index is 43.75 kg/m².    Intake/Output Summary (Last 24 hours) at 2/10/2020 0953  Last data filed at 2/10/2020 0828  Gross per 24 hour   Intake 800 ml   Output --   Net 800 ml     I/O this shift:  In: 240 [P.O.:240]  Out: -      Physical Exam:   General: patient awake, alert and cooperative   Eyes: Normal lids and lashes,  no scleral icterus   Neck: supple, normal ROM   Skin: warm and dry, not jaundiced   Abdomen: soft, nontender, nondistended; normal bowel sounds   Extremities: no rash or edema   Psychiatric: Normal mood and behavior; memory intact     Results Review:     I reviewed the patient's new clinical results.    Results from last 7 days   Lab Units 02/09/20  0631 02/08/20  0504 02/07/20  0457   WBC 10*3/mm3 23.84* 20.62* 14.41*   HEMOGLOBIN g/dL 13.2 12.8 12.7   HEMATOCRIT % 39.4 38.0 37.5   PLATELETS 10*3/mm3 295 258 208     Results from last 7 days   Lab Units 02/10/20  0431 02/09/20  0631 02/08/20  0504   SODIUM mmol/L 133* 134* 134*   POTASSIUM mmol/L 4.0 4.0 3.5   CHLORIDE mmol/L 101 105 104   CO2 mmol/L 20.8* 17.2* 15.9*   BUN mg/dL 30* 30* 34*   CREATININE mg/dL 1.18* 1.26* 1.29*   CALCIUM mg/dL 8.0* 8.5* 8.3*   BILIRUBIN mg/dL 0.8 1.0 1.1   ALK PHOS U/L 62 70 59   ALT (SGPT) U/L 15 18 17   AST (SGOT) U/L 21 25 23   GLUCOSE mg/dL 88 82 99         Lab Results   Lab Value Date/Time    LIPASE 38 02/05/2020 0507    LIPASE 60 02/04/2020 0435    LIPASE 181 (H) 02/03/2020 0211    LIPASE 417 (H) 02/02/2020 0441    LIPASE >3,000 (H) 01/31/2020 2142       Radiology:  XR Chest PA & Lateral   Final Result      MRI abdomen wo contrast mrcp   Final Result   Markedly limited study due to extensive motion artifact as   above.   1. MR findings of acute pancreatitis.   2. No intrahepatic biliary dilatation. No choledocholithiasis. The   gallbladder is distended with mild wall thickening. Pancreatic duct   demonstrates normal caliber.       This report was finalized on 2/7/2020 11:07 AM by Dr. Ruthy Haro M.D.          XR Chest PA & Lateral   Final Result   FINDINGS AND IMPRESSION:   Lungs are hypoinflated with asymmetric elevation right hemidiaphragm and   bibasilar pulmonary opacification, right greater than left, as before.   No pneumothorax is seen. Heart size accentuated by low lung volumes.   Small right pleural effusion  posteriorly is suspected. Findings   represent atelectasis versus pneumonia with parapneumonic effusion in   the appropriate clinical context and correlation with patient history is   recommended.       This report was finalized on 2/4/2020 12:32 PM by Dr. Carlitos Willis M.D.          XR Chest 1 View   Final Result   Interval development of dense right basilar infiltrate, which may   reflect pneumonia. There is also a small right pleural effusion.       This report was finalized on 2/2/2020 11:47 PM by Dr. Kathleen Montoya M.D.          US Gallbladder   Final Result       1. No stones or sludge are seen within the gallbladder. Gallbladder   appears mildly distended, with gallbladder wall thickening and edema   noted. Appearance is nonspecific, and may be reactive, and may be   related to the patient's pancreatitis.   2. The patient does have some mild dilatation of the common bile duct,   measuring up to 9 mm. Some of this may be age related, but correlation   with liver function tests is recommended. MRCP or ERCP could be   considered for additional evaluation if these are abnormal.       This report was finalized on 2/1/2020 9:55 PM by Dr. Kahtleen Montoya M.D.          CT Abdomen Pelvis Without Contrast   Final Result       1. The patient has inflammatory stranding surrounding the pancreatic   head and neck, in keeping with history of pancreatitis. No discrete   peripancreatic collections are seen. There is no evidence of gastric   outlet obstruction.       Radiation dose reduction techniques were utilized, including automated   exposure control and exposure modulation based on body size.       This report was finalized on 1/31/2020 11:52 PM by Dr. Kathleen Montoya M.D.          XR Chest 1 View   Final Result   Mild bibasilar atelectasis.       This report was finalized on 1/31/2020 10:12 PM by Dr. Kathleen Montoya M.D.              Assessment/Plan     Patient Active Problem List   Diagnosis   •  Benign essential hypertension   • Claustrophobia   • Gout   • Hyperlipidemia   • Primary hypothyroidism   • Impaired fasting glucose   • Nocturnal hypoxemia   • Secondary polycythemia   • Vitamin D deficiency   • Therapeutic drug monitoring   • Family history of coronary artery disease   • Bilateral sensorineural hearing loss, wears hearing aids   • Multinodular goiter   • Supraventricular tachycardia (CMS/HCC)   • Morbidly obese (CMS/HCC)   • Acute pancreatitis without infection or necrosis   • Obesity (BMI 30-39.9)   • CKD (chronic kidney disease) stage 3, GFR 30-59 ml/min (CMS/HCC)   • LINNEA (acute kidney injury) (CMS/HCC)   • Pneumonia   • New onset a-fib (CMS/HCC)   • Hyponatremia   • Hypomagnesemia   • Leukocytosis       Assessment:  1. Acute pancreatitis-resolving; MRCP with normal ducts.  Possibly secondary to hydrochlorothiazide which she was on as an outpatient  2. Constipation-improved  3. Leukocytosis      Plan:  · Clinically much improved.  No further abdominal pain.  · Constipation has resolved  · Leukocytosis with significant increase yesterday-we will order repeat CBC this morning to follow-up.  Recent pneumonia on antibiotics but clinically improving.    I discussed the patients findings and my recommendations with patient and family.    Adriane Skaggs MD

## 2020-02-10 NOTE — PLAN OF CARE
Problem: Patient Care Overview  Goal: Plan of Care Review  Flowsheets  Taken 2/10/2020 0520 by Darleen Love RN  Progress: no change  Taken 2/10/2020 1519 by Latonya Sanchez PT Student  Plan of Care Reviewed With: patient;daughter (Pended)  Outcome Summary: Pt is alert, up in chair, and agreeable to PT this pm. Pt able to ambulate 150' on this date w/ CGA, a RW, vc for upright posture and no gross LOB noted. Pt's ambulation limited d/t reported increased fatigue from ambulating earier today. Pt will continue to benefit from skilled therapy at this time.  (Pended)

## 2020-02-10 NOTE — PLAN OF CARE
Problem: Patient Care Overview  Goal: Plan of Care Review  Outcome: Ongoing (interventions implemented as appropriate)  Flowsheets (Taken 2/10/2020 1726)  Progress: improving  Plan of Care Reviewed With: patient  Outcome Summary: Labs better. Possible discharge tomorrow. Lasix 80 mg Q. 12 hour ordered. Chest xray and labs for am. Will continue to monitor.     Problem: Pain, Acute (Adult)  Goal: Acceptable Pain Control/Comfort Level  Outcome: Ongoing (interventions implemented as appropriate)  Flowsheets (Taken 2/10/2020 1726)  Acceptable Pain Control/Comfort Level: making progress toward outcome     Problem: Fall Risk (Adult)  Goal: Absence of Fall  Outcome: Ongoing (interventions implemented as appropriate)  Flowsheets (Taken 2/10/2020 1726)  Absence of Fall: making progress toward outcome     Problem: Skin Injury Risk (Adult)  Goal: Skin Health and Integrity  Outcome: Ongoing (interventions implemented as appropriate)  Flowsheets (Taken 2/10/2020 1726)  Skin Health and Integrity: making progress toward outcome

## 2020-02-10 NOTE — PROGRESS NOTES
"   LOS: 9 days    Patient Care Team:  Daryl Santos MD as PCP - General (Internal Medicine)  Daryl Santos MD as PCP - Claims Attributed    Chief Complaint:    Chief Complaint   Patient presents with   • Chest Pain     Acute kidney injury on chronic kidney disease  Subjective     Interval History:   The patient is complaining of lower back pain, abdominal pain has improved, no chest pain or shortness of air, no nausea or vomiting.  She had increased lower extremity edema.      Review of Systems:   As noted above    Objective     Vital Signs  Temp:  [97.7 °F (36.5 °C)-98.1 °F (36.7 °C)] 97.8 °F (36.6 °C)  Heart Rate:  [] 97  Resp:  [16-18] 16  BP: (111-133)/(75-79) 116/77    Flowsheet Rows      First Filed Value   Admission Height  160 cm (63\") Documented at 01/31/2020 2149   Admission Weight  99.5 kg (219 lb 6.4 oz) Documented at 01/31/2020 2149          No intake/output data recorded.  I/O last 3 completed shifts:  In: 680 [P.O.:580; IV Piggyback:100]  Out: -     Intake/Output Summary (Last 24 hours) at 2/10/2020 0805  Last data filed at 2/9/2020 2312  Gross per 24 hour   Intake 680 ml   Output --   Net 680 ml       Physical Exam:  General Appearance: alert, oriented x 3, no acute distress, obese, appears to be chronically ill  Skin: warm and dry  HEENT: pupils round and reactive to light, oral mucosa dry, nonicteric sclerae,   Neck: supple, no JVD, trachea midline, I did not appreciate JVD  Lungs: CTA, unlabored breathing effort  Heart: Irregularly irregular, no rub  Abdomen: soft,  present bowel sounds to auscultation, the abdomen is protuberant, she has less epigastric tenderness   : no palpable bladder, no palpable bladder  Extremities: 2+ pretibial edema, no cyanosis or clubbing,   Neuro: normal speech and mental status, hard of hearing     Results Review:    Results from last 7 days   Lab Units 02/10/20  0431 02/09/20  0631 02/08/20  0504   SODIUM mmol/L 133* 134* 134*   POTASSIUM mmol/L 4.0 " 4.0 3.5   CHLORIDE mmol/L 101 105 104   CO2 mmol/L 20.8* 17.2* 15.9*   BUN mg/dL 30* 30* 34*   CREATININE mg/dL 1.18* 1.26* 1.29*   CALCIUM mg/dL 8.0* 8.5* 8.3*   BILIRUBIN mg/dL 0.8 1.0 1.1   ALK PHOS U/L 62 70 59   ALT (SGPT) U/L 15 18 17   AST (SGOT) U/L 21 25 23   GLUCOSE mg/dL 88 82 99       Estimated Creatinine Clearance: 41.2 mL/min (A) (by C-G formula based on SCr of 1.18 mg/dL (H)).    Results from last 7 days   Lab Units 02/09/20  0631 02/08/20  0504 02/07/20  0457 02/06/20  0458   MAGNESIUM mg/dL 1.8 1.5* 1.8 1.9   PHOSPHORUS mg/dL  --  3.3 3.1 2.6       Results from last 7 days   Lab Units 02/08/20  0504 02/07/20  0457 02/06/20  0458 02/05/20  0507   URIC ACID mg/dL 5.4 5.6 4.6 3.9       Results from last 7 days   Lab Units 02/09/20  0631 02/08/20  0504 02/07/20  0457 02/06/20  0458 02/05/20  0507   WBC 10*3/mm3 23.84* 20.62* 14.41* 12.13* 12.33*   HEMOGLOBIN g/dL 13.2 12.8 12.7 13.0 12.8   PLATELETS 10*3/mm3 295 258 208 180 147               Imaging Results (Last 24 Hours)     ** No results found for the last 24 hours. **          atenolol 50 mg Oral Q12H   cholecalciferol 1,000 Units Oral Daily   clobetasol  Topical Q12H   doxycycline 100 mg Intravenous Q12H   enoxaparin 1 mg/kg Subcutaneous Q12H   famotidine 20 mg Oral Daily   furosemide 20 mg Oral QAM   ipratropium-albuterol 3 mL Nebulization Q4H While Awake - RT   levothyroxine 50 mcg Oral Q AM   nystatin 5 mL Swish & Swallow 4x Daily   polyethylene glycol 17 g Oral BID   pravastatin 40 mg Oral Nightly   senna-docusate sodium 2 tablet Oral Nightly   sodium bicarbonate 1,300 mg Oral TID   sodium chloride 10 mL Intravenous Q12H          Medication Review:   Current Facility-Administered Medications   Medication Dose Route Frequency Provider Last Rate Last Dose   • acetaminophen (TYLENOL) tablet 650 mg  650 mg Oral Q4H PRN Kannan Rizo APRN   650 mg at 02/01/20 0203   • aluminum-magnesium hydroxide-simethicone (MAALOX MAX) 400-400-40 MG/5ML  suspension 15 mL  15 mL Oral Q6H PRN Kannan Rizo APRN   15 mL at 02/01/20 0808   • atenolol (TENORMIN) tablet 50 mg  50 mg Oral Q12H Pete Hensley MD   50 mg at 02/09/20 2028   • bisacodyl (DULCOLAX) EC tablet 5 mg  5 mg Oral Daily PRN Kannan Rizo APRN       • cholecalciferol (VITAMIN D3) tablet 1,000 Units  1,000 Units Oral Daily Kannan Rizo APRN   1,000 Units at 02/09/20 0907   • clobetasol (TEMOVATE) 0.05 % cream   Topical Q12H Kannan Rizo APRN       • cyclobenzaprine (FLEXERIL) tablet 5 mg  5 mg Oral TID PRN Amadou Das MD   5 mg at 02/04/20 1115   • doxycycline (VIBRAMYCIN) 100 mg/100 mL 0.9% NS MBP  100 mg Intravenous Q12H Amadou Das  mL/hr at 02/09/20 1124 100 mg at 02/09/20 2312   • enoxaparin (LOVENOX) syringe 110 mg  1 mg/kg Subcutaneous Q12H Amadou Das MD   110 mg at 02/09/20 2312   • famotidine (PEPCID) tablet 20 mg  20 mg Oral Daily Amadou Das MD   20 mg at 02/10/20 0618   • furosemide (LASIX) tablet 20 mg  20 mg Oral Billy Gomez MD   20 mg at 02/10/20 0618   • HYDROcodone-acetaminophen (NORCO) 7.5-325 MG per tablet 1 tablet  1 tablet Oral Q4H PRN Amadou Das MD   1 tablet at 02/10/20 0421   • HYDROmorphone (DILAUDID) injection 0.5 mg  0.5 mg Intravenous Q2H PRN Nathan Tam MD   0.5 mg at 02/06/20 1513    And   • naloxone (NARCAN) injection 0.4 mg  0.4 mg Intravenous Q5 Min PRN Nathan Tam MD       • ipratropium-albuterol (DUO-NEB) nebulizer solution 3 mL  3 mL Nebulization Q4H While Awake - RT Amadou Das MD   3 mL at 02/10/20 0708   • levothyroxine (SYNTHROID, LEVOTHROID) tablet 50 mcg  50 mcg Oral Q AM Kannan Rizo APRN   50 mcg at 02/10/20 0618   • nystatin (MYCOSTATIN) 990523 UNIT/ML suspension 500,000 Units  5 mL Swish & Swallow 4x Daily Meeta Goodwin APRN   500,000 Units at 02/09/20 2028   • ondansetron (ZOFRAN) tablet 4 mg  4 mg Oral Q6H PRN  Kannan Rizo APRN        Or   • ondansetron (ZOFRAN) injection 4 mg  4 mg Intravenous Q6H PRN Kannan Rizo APRN   4 mg at 02/05/20 2040   • polyethylene glycol 3350 powder (packet)  17 g Oral BID Kathy Lima MD   17 g at 02/09/20 2028   • pravastatin (PRAVACHOL) tablet 40 mg  40 mg Oral Nightly Kannan Rizo APRN   40 mg at 02/09/20 2028   • senna-docusate sodium (SENOKOT-S) 8.6-50 MG tablet 2 tablet  2 tablet Oral Nightly Kathy Lima MD   2 tablet at 02/09/20 2028   • sodium bicarbonate tablet 1,300 mg  1,300 mg Oral TID Mil Acosta MD   1,300 mg at 02/09/20 2028   • sodium chloride 0.9 % flush 10 mL  10 mL Intravenous PRN Ranjith Quinn MD   10 mL at 01/31/20 2143   • sodium chloride 0.9 % flush 10 mL  10 mL Intravenous Q12H Kannan Rizo APRN   10 mL at 02/09/20 2028   • sodium chloride 0.9 % flush 10 mL  10 mL Intravenous PRN Kannan Rizo APRN           Assessment/Plan   1.  Acute kidney injury on chronic kidney disease stage III, associated with acute pancreatitis improved with hydration, creatinine is stable since admission creatinine is down to 1.18.  It is associated with hemodynamic changes and blunted autoregulation because of the ACE inhibitor, sodium is 133, total CO2 20.8 and anion gap is normal.  Patient has peripheral edema which is probably associated with the fluid excess and hypoalbuminemia  2.  Chronic kidney disease stage III most likely secondary to nephrosclerosis also being on hydrochlorothiazide lead to decreased GFR by negative tubular glomerular feedback mechanism.  3.  Acute pancreatitis  4.  Hypertension, controlled  5.  Hypothyroidism  6.  Dyslipidemia  7.  Severe hearing loss  8.  Lower extremity edema associated probably with hypoalbuminemia, albumin is 2.1  9.  Hypomagnesemia, magnesium yesterday was 1.8  10.  Metabolic acidosis with slight increase in the anion gap, multifactorial, much improved  11.  Atrial  fibrillation        Plan:  1.  To continue the same treatment  2.  Diuresed with IV furosemide today  3.  Surveillance lab    I discussed the case with the patient and her daughter at the bedside      Mil Acosta MD  02/10/20  8:05 AM

## 2020-02-10 NOTE — PROGRESS NOTES
Dr. SEDRICK Ramos    [address]    2/10/2020    Patient ID:  Name:  Mandy Alarcon  MRN:  [id number]  5/30/1933  86 y.o.  female            CC/Reason for visit: Atelectasis, leukocytosis, pleural effusion    Interval hx: No shortness of breath at rest.  Not on oxygen.  Has some mild sputum and mild occasional cough    ROS: No fever, chills or hemoptysis    Vitals:  Vitals:    02/10/20 0709 02/10/20 0828 02/10/20 1100 02/10/20 1413   BP:  123/62  103/53   BP Location:  Right arm  Right arm   Patient Position:  Sitting  Sitting   Pulse:   57 58   Resp: 16  16 16   Temp:  97.9 °F (36.6 °C)  98.2 °F (36.8 °C)   TempSrc:  Oral  Oral   SpO2:  97% 99% 96%   Weight:       Height:               Body mass index is 43.75 kg/m².    Intake/Output Summary (Last 24 hours) at 2/10/2020 1417  Last data filed at 2/10/2020 0828  Gross per 24 hour   Intake 680 ml   Output --   Net 680 ml       Exam:  GEN:  No distress  Alert, oriented x 3.   LUNGS: Diminished breath sounds bilaterally but no wheezing, no use of accessory muscles  CV:  Normal S1S2, without murmur, 2+ leg edema  ABD:  Mildly tender in epigastrium on deep palpation      Scheduled meds:    atenolol 50 mg Oral Q12H   cholecalciferol 1,000 Units Oral Daily   clobetasol  Topical Q12H   doxycycline 100 mg Intravenous Q12H   enoxaparin 1 mg/kg Subcutaneous Q12H   famotidine 20 mg Oral Daily   furosemide 80 mg Intravenous Q12H   ipratropium-albuterol 3 mL Nebulization Q4H While Awake - RT   levothyroxine 50 mcg Oral Q AM   nystatin 5 mL Swish & Swallow 4x Daily   pravastatin 40 mg Oral Nightly   sodium bicarbonate 1,300 mg Oral TID   sodium chloride 10 mL Intravenous Q12H     IV meds:                           Data Review:   I reviewed the patient's medications and new clinical results.  Lab Results   Component Value Date    CALCIUM 8.0 (L) 02/10/2020    PHOS 3.3 02/08/2020    MG 1.8 02/09/2020    MG 1.5 (L) 02/08/2020    MG 1.8 02/07/2020     Results from last  7 days   Lab Units 02/10/20  1003 02/10/20  0431 02/09/20  0631 02/08/20  0504   SODIUM mmol/L  --  133* 134* 134*   POTASSIUM mmol/L  --  4.0 4.0 3.5   CHLORIDE mmol/L  --  101 105 104   CO2 mmol/L  --  20.8* 17.2* 15.9*   BUN mg/dL  --  30* 30* 34*   CREATININE mg/dL  --  1.18* 1.26* 1.29*   CALCIUM mg/dL  --  8.0* 8.5* 8.3*   BILIRUBIN mg/dL  --  0.8 1.0 1.1   ALK PHOS U/L  --  62 70 59   ALT (SGPT) U/L  --  15 18 17   AST (SGOT) U/L  --  21 25 23   GLUCOSE mg/dL  --  88 82 99   WBC 10*3/mm3 20.34*  --  23.84* 20.62*   HEMOGLOBIN g/dL 12.1  --  13.2 12.8   PLATELETS 10*3/mm3 263  --  295 258   PROCALCITONIN ng/mL  --  1.06*  --   --                  ASSESSMENT:   Acute pancreatitis without infection or necrosis  Pleural effusion  Atelectasis  Shortness of breath on exertion  Elevated white count    PLAN:  Patient is doing well.  No need for antibiotics.  No evidence of pneumonia.  Chest x-ray indicates effusions and atelectasis.  Both of these will take some time to resolve.  Patient not requiring supplemental oxygen and procalcitonin has dropped 12 fold.      Cruz Ramos MD  2/10/2020

## 2020-02-10 NOTE — PROGRESS NOTES
Name: Mandy Alarcon ADMIT: 2020   : 1933  PCP: Daryl Santos MD    MRN: [id number] LOS: 9 days   AGE/SEX: 86 y.o. female  ROOM: [facility]   Subjective   Chief Complaint   Patient presents with   • Chest Pain   abd pain is better. Appetite ok.   Leg swelling better. IV diuretics today    On abx to  today  Dyspnea better but only mild SOB when walking. Oxygen remains at 95-97% on room ait when ambulating around the bed  Working with PT      ROS  No f/c  -n/-v  No cp/palp  +soa/cough    Objective   Vital Signs  Temp:  [97.7 °F (36.5 °C)-98.1 °F (36.7 °C)] 97.9 °F (36.6 °C)  Heart Rate:  [57-97] 57  Resp:  [16-18] 16  BP: (111-133)/(62-79) 123/62  SpO2:  [93 %-100 %] 99 %  on   ;   Device (Oxygen Therapy): room air  Body mass index is 43.75 kg/m².    Physical Exam   Constitutional: She is oriented to person, place, and time. She appears well-developed and well-nourished.   HENT:   Head: Normocephalic and atraumatic.   Eyes: Conjunctivae are normal. No scleral icterus.   Neck: Neck supple. No tracheal deviation present.   Cardiovascular:   No murmur heard.  Irregular    Pulmonary/Chest: No respiratory distress. She has no wheezes (decreased bs at bases).   Decreased bs at bases   Abdominal: Soft. She exhibits no distension (mild). There is no tenderness (mild epigastric). There is no guarding.   Neurological: She is alert and oriented to person, place, and time. She exhibits normal muscle tone.   Skin: Skin is warm and dry.   Psychiatric: She has a normal mood and affect. Her behavior is normal.       Results Review:       I reviewed the patient's new clinical results.  Results from last 7 days   Lab Units 02/10/20  1003 20  0631 20  0504 20  0457   WBC 10*3/mm3 20.34* 23.84* 20.62* 14.41*   HEMOGLOBIN g/dL 12.1 13.2 12.8 12.7   PLATELETS 10*3/mm3 263 295 258 208     Results from last 7 days   Lab Units 02/10/20  0431 20  0631 20  0504 20  0457   SODIUM mmol/L  133* 134* 134* 135*   POTASSIUM mmol/L 4.0 4.0 3.5 4.0   CHLORIDE mmol/L 101 105 104 103   CO2 mmol/L 20.8* 17.2* 15.9* 17.6*   BUN mg/dL 30* 30* 34* 39*   CREATININE mg/dL 1.18* 1.26* 1.29* 1.41*   GLUCOSE mg/dL 88 82 99 89   Estimated Creatinine Clearance: 41.2 mL/min (A) (by C-G formula based on SCr of 1.18 mg/dL (H)).  Results from last 7 days   Lab Units 02/10/20  0431 02/09/20  0631 02/08/20  0504 02/07/20  0457   ALBUMIN g/dL 2.10* 2.20* 2.10* 2.00*   BILIRUBIN mg/dL 0.8 1.0 1.1 1.2   ALK PHOS U/L 62 70 59 61   AST (SGOT) U/L 21 25 23 31   ALT (SGPT) U/L 15 18 17 15     Results from last 7 days   Lab Units 02/10/20  0431 02/09/20  0631 02/08/20  0504 02/07/20  0457 02/06/20  0458 02/05/20  0507   CALCIUM mg/dL 8.0* 8.5* 8.3* 8.3* 8.2* 7.8*   ALBUMIN g/dL 2.10* 2.20* 2.10* 2.00* 2.20* 2.30*   MAGNESIUM mg/dL  --  1.8 1.5* 1.8 1.9 1.6   PHOSPHORUS mg/dL  --   --  3.3 3.1 2.6 2.1*           Coag       HbA1C   Lab Results   Component Value Date    HGBA1C 5.90 (H) 02/01/2020    HGBA1C 5.80 (H) 08/20/2019    HGBA1C 5.81 (H) 03/04/2019     Infection       Radiology(recent) No radiology results for the last day  No results found for: TROPONINT, TROPONINI, BNP  No components found for: TSH;2      atenolol 50 mg Oral Q12H   cholecalciferol 1,000 Units Oral Daily   clobetasol  Topical Q12H   doxycycline 100 mg Intravenous Q12H   enoxaparin 1 mg/kg Subcutaneous Q12H   famotidine 20 mg Oral Daily   furosemide 80 mg Intravenous Q12H   ipratropium-albuterol 3 mL Nebulization Q4H While Awake - RT   levothyroxine 50 mcg Oral Q AM   nystatin 5 mL Swish & Swallow 4x Daily   pravastatin 40 mg Oral Nightly   sodium bicarbonate 1,300 mg Oral TID   sodium chloride 10 mL Intravenous Q12H      Diet Regular; Low Fat, Renal      Assessment/Plan      Active Hospital Problems    Diagnosis  POA   • **Acute pancreatitis without infection or necrosis [K85.90]  Yes   • New onset a-fib (CMS/HCC) [I48.91]  No   • Hyponatremia [E87.1]  Yes   •  Hypomagnesemia [E83.42]  No   • Leukocytosis [D72.829]  Yes   • Pneumonia [J18.9]  Yes   • LINNEA (acute kidney injury) (CMS/HCC) [N17.9]  No   • Obesity (BMI 30-39.9) [E66.9]  Yes   • CKD (chronic kidney disease) stage 3, GFR 30-59 ml/min (CMS/HCC) [N18.3]  Yes   • Supraventricular tachycardia (CMS/HCC) [I47.1]  Yes   • Vitamin D deficiency [E55.9]  Yes   • Primary hypothyroidism [E03.9]  Yes   • Benign essential hypertension [I10]  Yes   • Hyperlipidemia [E78.5]  Yes   • Secondary polycythemia [D75.1]  Yes      Resolved Hospital Problems   No resolved problems to display.     Ms. Alarcon is a 86 y.o. former smoker with a history of HTN, HLD, and CKD stage III who presented with epigastric pain secondary to acute pancreatitis.     Acute pancreatitis  -PRN agents for pain, anti-emetics as needed  -Lipase normalized now  -MRCP is ok  -Seen by GI. Etiology is unclear. Possibly related to lisinopril/HCTZ vs less likely passed stone     New onset AFib with RVR  -due to PNA and acute pancreatitis  -Card consult appreciated  -s/p Diltiazem gtt and now on oral Metoprolol  -on Lovenox potential oral a/c at discharge  -HR improved     Right basilar community-acquired PNA/ atelectasis   - pulmonary following.   -Leukocytosis slight decline today. Afebrile.   - Finished levaquin, added doxy dose 3/3. No further abx per pulmonary  -Pleural effusion and basilar atelectasis as expected in setting of acute pancreatitis  -Diuretics IV per renal and encourage ambulation.  - Repeat chest x-ray in the a.m.  -bronchodilators and encouraging IS  -She seems much better than a few days ago      LINNEA/CKD stage 3  -Crt improved. now on diuretics  -likely due to dehydration in setting of ACE-I and HCTZ    Hypomag  -Replete  -Repeat tmrw      Hypertension  -Stable  -HCTZ and lisinopril held as stated above   Thrush  -Nystatin orally     VTE Prophylaxis - therapeutic dose of Lovenox  Code Status - Full      DW Dr. Thiago SALES family  Greater than 37  minutes spent with greater than 50% counseling and coordinating care    Anticipate discharge tomorrow to home with family following IV diuretics.   CJ Dey  [facility]  02/10/20  8:06 AM

## 2020-02-10 NOTE — THERAPY TREATMENT NOTE
Patient Name: Mandy Alarcon  : 1933    MRN: [id number]                              Today's Date: 2/10/2020       Admit Date: 2020    Visit Dx:     ICD-10-CM ICD-9-CM   1. Acute pancreatitis without infection or necrosis, unspecified pancreatitis type K85.90 577.0   2. Acute kidney injury superimposed on chronic kidney disease (CMS/HCC) N17.9 866.00    N18.9 585.9     Patient Active Problem List   Diagnosis   • Benign essential hypertension   • Claustrophobia   • Gout   • Hyperlipidemia   • Primary hypothyroidism   • Impaired fasting glucose   • Nocturnal hypoxemia   • Secondary polycythemia   • Vitamin D deficiency   • Therapeutic drug monitoring   • Family history of coronary artery disease   • Bilateral sensorineural hearing loss, wears hearing aids   • Multinodular goiter   • Supraventricular tachycardia (CMS/[facility])   • Morbidly obese (CMS/[facility])   • Acute pancreatitis without infection or necrosis   • Obesity (BMI 30-39.9)   • CKD (chronic kidney disease) stage 3, GFR 30-59 ml/min (CMS/[facility])   • LINNEA (acute kidney injury) (CMS/[facility])   • Pneumonia   • New onset a-fib (CMS/[facility])   • Hyponatremia   • Hypomagnesemia   • Leukocytosis     Past Medical History:   Diagnosis Date   • Benign essential hypertension 10/28/2012    2012--treatment for hypertension begun.   • Bilateral sensorineural hearing loss, wears hearing aids 2017    Left is much worse than right.  Patient had multiple ear infections as a child.   • Chronic renal insufficiency, stage II (mild), 2016--creatinine 1.35 2016--creatinine 1.35.  Baseline creatinine approximately 1.3.   • Claustrophobia 2016    This patient has significant nocturnal hypoxemia and I think that she could benefit from nocturnal oxygen therapy. The exact etiology of her hypoxemia is not clear. She could possibly have obstructive sleep apnea but this is not documented. We cannot test this patient for sleep apnea due to the fact that  she is severely claustrophobic. Patient was a former smoker and it is possibly that COPD he is playing a role.   • Family history of coronary artery disease 2016    Patient's mother, father, 2 sisters and a brother all  from myocardial infarctions   • Gout 10/28/2012    2012--initial diagnosis and treatment of gout.   • Hyperlipidemia 10/28/2012    2012--treatment for hyperlipidemia begun.   • Impaired fasting glucose 10/28/2012    2012--initial diagnosis impaired fasting glucose.   • Morbidly obese (CMS/HCC) 3/11/2019   • Nocturnal hypoxemia 2014--patient did not receive nocturnal oxygen because of Medicare regulations.   05/15/2014--overnight oximetry revealed oxygen saturations less than 89% for 22 minutes and 40 seconds. Oxygen saturations less than or equal to 88% for 22 minutes and 40 seconds. Lowest oxygen saturation 83%. The longest continuous time with oxygen saturations less than or equal to 88% was 1 minute and 32 seconds.   2012--overnight oximetry revealed oxygen saturations less than 90% for one hour and 35 minutes. Oxygen saturations less than 89% 59 minutes. This patient has significant nocturnal hypoxemia and I think that she could benefit from nocturnal oxygen therapy. The exact etiology of her hypoxemia is not clear. She could possibly have obstructive sleep apnea but this is not documented. We cannot test this patient for sleep apnea due to the fact that she is severely claustrophobic. Patient was a former smoker and it is possibly that COPD he is playing a role.   • Primary hypothyroidism 2015--TSH remains elevated slightly at 4.92.  Given the overall clinical picture including the multinodular goiter, we will initiate levothyroxine 50 mcg/day and reassess in about 6 weeks.  2018--thyroid ultrasound reveals a multinodular thyroid with multiple subcentimeter nodules.  Only minimal increase in size of the  largest nodule in the left lobe has occurred when compared    • Secondary polycythemia 8/2/2012 11/06/2014--patient did not receive nocturnal oxygen because of Medicare regulations.   05/15/2014--overnight oximetry revealed oxygen saturations less than 89% for 22 minutes and 40 seconds. Oxygen saturations less than or equal to 88% for 22 minutes and 40 seconds. Lowest oxygen saturation 83%. The longest continuous time with oxygen saturations less than or equal to 88% was 1 minute and 32 seconds.   08/02/2012--overnight oximetry revealed oxygen saturations less than 90% for one hour and 35 minutes. Oxygen saturations less than 89% 59 minutes. This patient has significant nocturnal hypoxemia and I think that she could benefit from nocturnal oxygen therapy. The exact etiology of her hypoxemia is not clear. She could possibly have obstructive sleep apnea but this is not documented. We cannot test this patient for sleep apnea due to the fact that she is severely claustrophobic. Patient was a former smoker and it is possibly that COPD he is playing a role.   • Vitamin D deficiency 5/23/2016     Past Surgical History:   Procedure Laterality Date   • OOPHORECTOMY      age 48   • RADICAL ABDOMINAL HYSTERECTOMY  48 years old    48 years of age. Uterine fibroid tumors with menorrhagia - no cancer     General Information     Row Name 02/10/20 1516          PT Evaluation Time/Intention    Document Type  therapy note (daily note)  (Pended)   -     Mode of Treatment  physical therapy  (Pended)   -     Row Name 02/10/20 1516          General Information    Patient Profile Reviewed?  yes  (Pended)   -     Existing Precautions/Restrictions  fall  (Pended)   -     Row Name 02/10/20 1516          Safety Issues, Functional Mobility    Impairments Affecting Function (Mobility)  balance;endurance/activity tolerance;shortness of breath;strength  (Pended)   -       User Key  (r) = Recorded By, (t) = Taken By, (c) = Cosigned By     Initials Name Provider Type    Latonya Olivares, PT Student PT Student        Mobility     Row Name 02/10/20 1517          Bed Mobility Assessment/Treatment    Comment (Bed Mobility)  up in chair  (Pended)   -     Row Name 02/10/20 1517          Sit-Stand Transfer    Sit-Stand [place] (Transfers)  verbal cues;stand by assist  (Pended)   -     Assistive Device (Sit-Stand Transfers)  walker, front-wheeled  (Pended)   -     Row Name 02/10/20 1517          Gait/Stairs Assessment/Training    Gait/Stairs Assessment/Training  gait/ambulation assistive device  (Pended)   -     [place] Level (Gait)  verbal cues;contact guard  (Pended)   -     Assistive Device (Gait)  walker, front-wheeled  (Pended)   -     Distance in Feet (Gait)  150  (Pended)   -     Deviations/Abnormal Patterns (Gait)  gait speed decreased;stride length decreased  (Pended)   -     Bilateral Gait Deviations  forward flexed posture  (Pended)   -SJ     Comment (Gait/Stairs)  no gross LOB noted; vc for upright posture; limited on this date d/t increased fatigue from ambulating earlier  (Pended)   -       User Key  (r) = Recorded By, (t) = Taken By, (c) = Cosigned By    Initials Name Provider Type    Latonya Olivares, PT Student PT Student        Obj/Interventions    No documentation.       Goals/Plan    No documentation.       Clinical Impression     Row Name 02/10/20 1519          Pain Assessment    Additional Documentation  Pain Scale: Numbers Pre/Post-Treatment (Group)  (Pended)   -     Row Name 02/10/20 1519          Pain Scale: Numbers Pre/Post-Treatment    Pain Scale: Numbers, Pretreatment  5/10  (Pended)   -     Pain Scale: Numbers, Post-Treatment  5/10  (Pended)   -     Pain Location - Side  Bilateral  (Pended)   -     Pain Location - Orientation  generalized  (Pended)   -     Pain Location  flank  (Pended)   -     Pre/Post Treatment Pain Comment  pt reports pain on L and R sides of chest w/ ambulation;  pain did not affect therapy session  (Pended)   -     Row Name 02/10/20 1519          Plan of Care Review    Plan of Care Reviewed With  patient;daughter  (Pended)   -     Outcome Summary  Pt is alert, up in chair, and agreeable to PT this pm. Pt able to ambulate 150' on this date w/ CGA, a RW, vc for upright posture and no gross LOB noted. Pt's ambulation limited d/t reported increased fatigue from ambulating earier today. Pt will continue to benefit from skilled therapy at this time.   (Pended)   -     Row Name 02/10/20 1519          Vital Signs    Pre Patient Position  Sitting  (Pended)   -SJ     Intra Patient Position  Standing  (Pended)   -SJ     Post Patient Position  Sitting  (Pended)   -     Row Name 02/10/20 1519          Positioning and Restraints    Pre-Treatment Position  sitting in chair/recliner  (Pended)   -SJ     Post Treatment Position  chair  (Pended)   -SJ     In Chair  notified nsg;reclined;call light within reach;encouraged to call for assist;with family/caregiver  (Pended)   -       User Key  (r) = Recorded By, (t) = Taken By, (c) = Cosigned By    Initials Name Provider Type    Latonya Olivares, PT Student PT Student        Outcome Measures     Row Name 02/10/20 1523          How much help from another person do you currently need...    Turning from your back to your side while in flat bed without using bedrails?  3  (Pended)   -SJ     Moving from lying on back to sitting on the side of a flat bed without bedrails?  3  (Pended)   -SJ     Moving to and from a bed to a chair (including a wheelchair)?  3  (Pended)   -SJ     Standing up from a chair using your arms (e.g., wheelchair, bedside chair)?  3  (Pended)   -SJ     Climbing 3-5 steps with a railing?  2  (Pended)   -SJ     To walk in hospital room?  3  (Pended)   -SJ     AM-PAC 6 Clicks Score (PT)  17  (Pended)   -     Row Name 02/10/20 1523          Functional Assessment    Outcome Measure Options  AM-PAC 6 Clicks Basic Mobility  (PT)  (Pended)   -       User Key  (r) = Recorded By, (t) = Taken By, (c) = Cosigned By    Initials Name Provider Type    Latonya Olivares PT Student PT Student          PT Recommendation and Plan     Plan of Care Reviewed With: (P) patient, daughter  Outcome Summary: (P) Pt is alert, up in chair, and agreeable to PT this pm. Pt able to ambulate 150' on this date w/ CGA, a RW, vc for upright posture and no gross LOB noted. Pt's ambulation limited d/t reported increased fatigue from ambulating earier today. Pt will continue to benefit from skilled therapy at this time.      Time Calculation:   PT Charges     Row Name 02/10/20 1524             Time Calculation    Start Time  1500  (Pended)   -SJ      Stop Time  1513  (Pended)   -SJ      Time Calculation (min)  13 min  (Pended)   -SJ      PT Received On  02/10/20  (Pended)   -      PT - Next Appointment  02/11/20  (Pended)   -        User Key  (r) = Recorded By, (t) = Taken By, (c) = Cosigned By    Initials Name Provider Type    Latonya Olivares PT Student PT Student        Therapy Charges for Today     Code Description Service Date Service Provider Modifiers Qty    79048622879 HC PT THER PROC EA 15 MIN 2/10/2020 Latonya Sanchez PT Student GP 1          PT G-Codes  Outcome Measure Options: (P) AM-PAC 6 Clicks Basic Mobility (PT)  AM-PAC 6 Clicks Score (PT): (P) 17    GREG Hunter  2/10/2020

## 2020-02-10 NOTE — PROGRESS NOTES
[facility] Follow Up    Chief Complaint: Follow up atrial fibrillation    Interval History: No chest pain or dyspnea.  Converted to sinus rhythm today.      Objective:     Objective:  Temp:  [97.7 °F (36.5 °C)-98.1 °F (36.7 °C)] 97.8 °F (36.6 °C)  Heart Rate:  [] 97  Resp:  [16-18] 16  BP: (111-133)/(75-79) 116/77     Intake/Output Summary (Last 24 hours) at 2/10/2020 0720  Last data filed at 2/9/2020 2312  Gross per 24 hour   Intake 680 ml   Output --   Net 680 ml     Body mass index is 43.75 kg/m².      02/05/20  0500 02/06/20  0500 02/07/20  0700   Weight: 112 kg (247 lb 9.2 oz) 104 kg (230 lb 4.8 oz) 112 kg (247 lb)     Weight change:       Physical Exam:   General : Alert, cooperative, in no acute distress.  Neuro: Alert,cooperative and oriented.  Lungs: CTAB. Normal respiratory effort and rate.  CV: Regular rate and rhythm, normal S1 and S2, no murmurs, gallops or rubs.  ABD: Soft, nontender, nondistended. Positive bowel sounds.  Extr: No edema or cyanosis, moves all extremities.    Lab Review:   Results from last 7 days   Lab Units 02/10/20  0431 02/09/20  0631   SODIUM mmol/L 133* 134*   POTASSIUM mmol/L 4.0 4.0   CHLORIDE mmol/L 101 105   CO2 mmol/L 20.8* 17.2*   BUN mg/dL 30* 30*   CREATININE mg/dL 1.18* 1.26*   GLUCOSE mg/dL 88 82   CALCIUM mg/dL 8.0* 8.5*   AST (SGOT) U/L 21 25   ALT (SGPT) U/L 15 18         Results from last 7 days   Lab Units 02/09/20  0631 02/08/20  0504   WBC 10*3/mm3 23.84* 20.62*   HEMOGLOBIN g/dL 13.2 12.8   HEMATOCRIT % 39.4 38.0   PLATELETS 10*3/mm3 295 258         Results from last 7 days   Lab Units 02/09/20  0631 02/08/20  0504   MAGNESIUM mg/dL 1.8 1.5*           Invalid input(s): LDLCALC      Results from last 7 days   Lab Units 02/05/20  0507   TSH uIU/mL 2.030     I reviewed the patient's new clinical results.  I personally viewed and interpreted the patient's EKG  Current Medications:   Scheduled Meds:  atenolol 50 mg Oral Q12H    cholecalciferol 1,000 Units Oral Daily   clobetasol  Topical Q12H   doxycycline 100 mg Intravenous Q12H   enoxaparin 1 mg/kg Subcutaneous Q12H   famotidine 20 mg Oral Daily   furosemide 20 mg Oral QAM   ipratropium-albuterol 3 mL Nebulization Q4H While Awake - RT   levothyroxine 50 mcg Oral Q AM   nystatin 5 mL Swish & Swallow 4x Daily   polyethylene glycol 17 g Oral BID   pravastatin 40 mg Oral Nightly   senna-docusate sodium 2 tablet Oral Nightly   sodium bicarbonate 1,300 mg Oral TID   sodium chloride 10 mL Intravenous Q12H     Continuous Infusions:     Allergies:  Allergies   Allergen Reactions   • Cefaclor Anaphylaxis and Swelling   • Sulfa Antibiotics Rash       Assessment/Plan:     1. Paroxsymal atrial fibrillation. Back in sinus rhythm today.  New diagnosis on admission and likely related to acute illness.  On atenolol.    2. Acute pancreatitis. Improved.  3. LINNEA/CKD. Improved with hydration.  Fairly stable currently.    4. Pneumonia. CXR with stable findings.  With continued productive cough and intermittent wheezing.  5. Hypertension. Controlled.   6. Volume overload. Diuresis per Nephrology.  7. History of paroxysmal SVT    -  Continue atenolol  -  Since she is back in sinus rhythm will discontinue full dose enoxaparin and switch to therapeutic dose.   -  I think we can hold off starting long-term anticoagulation with apixaban if she remains in sinus rhythm.     Lizbeth Laura MD  02/10/20  7:20 AM

## 2020-02-10 NOTE — PLAN OF CARE
Problem: Patient Care Overview  Goal: Plan of Care Review  Flowsheets  Taken 2/10/2020 0520  Progress: no change  Outcome Summary: VSS, A&Ox4, norco q4hrs for complaints of back pain, slept in chair all night, up with assist to BR, afib on monitor controlled rate, will continue to monitor.  Taken 2/9/2020 2028  Plan of Care Reviewed With: patient;daughter

## 2020-02-11 ENCOUNTER — APPOINTMENT (OUTPATIENT)
Dept: GENERAL RADIOLOGY | Facility: HOSPITAL | Age: 85
End: 2020-02-11

## 2020-02-11 VITALS
HEART RATE: 62 BPM | BODY MASS INDEX: 43.77 KG/M2 | OXYGEN SATURATION: 95 % | TEMPERATURE: 98.6 F | DIASTOLIC BLOOD PRESSURE: 51 MMHG | RESPIRATION RATE: 20 BRPM | SYSTOLIC BLOOD PRESSURE: 129 MMHG | WEIGHT: 247 LBS | HEIGHT: 63 IN

## 2020-02-11 PROBLEM — K85.90 ACUTE PANCREATITIS WITHOUT INFECTION OR NECROSIS: Status: RESOLVED | Noted: 2020-02-01 | Resolved: 2020-02-11

## 2020-02-11 PROBLEM — I48.91 NEW ONSET A-FIB: Status: RESOLVED | Noted: 2020-02-05 | Resolved: 2020-02-11

## 2020-02-11 PROBLEM — N17.9 AKI (ACUTE KIDNEY INJURY) (HCC): Status: RESOLVED | Noted: 2020-02-02 | Resolved: 2020-02-11

## 2020-02-11 PROBLEM — J18.9 PNEUMONIA: Status: RESOLVED | Noted: 2020-02-03 | Resolved: 2020-02-11

## 2020-02-11 LAB
ALBUMIN SERPL-MCNC: 2.2 G/DL (ref 3.5–5.2)
ALBUMIN/GLOB SERPL: 0.7 G/DL
ALP SERPL-CCNC: 58 U/L (ref 39–117)
ALT SERPL W P-5'-P-CCNC: 15 U/L (ref 1–33)
ANION GAP SERPL CALCULATED.3IONS-SCNC: 12.1 MMOL/L (ref 5–15)
AST SERPL-CCNC: 21 U/L (ref 1–32)
BASOPHILS # BLD AUTO: 0.06 10*3/MM3 (ref 0–0.2)
BASOPHILS NFR BLD AUTO: 0.4 % (ref 0–1.5)
BILIRUB SERPL-MCNC: 0.8 MG/DL (ref 0.2–1.2)
BUN BLD-MCNC: 32 MG/DL (ref 8–23)
BUN/CREAT SERPL: 24.1 (ref 7–25)
CALCIUM SPEC-SCNC: 8.6 MG/DL (ref 8.6–10.5)
CHLORIDE SERPL-SCNC: 96 MMOL/L (ref 98–107)
CO2 SERPL-SCNC: 25.9 MMOL/L (ref 22–29)
CREAT BLD-MCNC: 1.33 MG/DL (ref 0.57–1)
DEPRECATED RDW RBC AUTO: 44.7 FL (ref 37–54)
EOSINOPHIL # BLD AUTO: 0.21 10*3/MM3 (ref 0–0.4)
EOSINOPHIL NFR BLD AUTO: 1.4 % (ref 0.3–6.2)
ERYTHROCYTE [DISTWIDTH] IN BLOOD BY AUTOMATED COUNT: 14 % (ref 12.3–15.4)
GFR SERPL CREATININE-BSD FRML MDRD: 38 ML/MIN/1.73
GLOBULIN UR ELPH-MCNC: 3.2 GM/DL
GLUCOSE BLD-MCNC: 72 MG/DL (ref 65–99)
HCT VFR BLD AUTO: 35.3 % (ref 34–46.6)
HGB BLD-MCNC: 11.6 G/DL (ref 12–15.9)
IMM GRANULOCYTES # BLD AUTO: 0.34 10*3/MM3 (ref 0–0.05)
IMM GRANULOCYTES NFR BLD AUTO: 2.2 % (ref 0–0.5)
LYMPHOCYTES # BLD AUTO: 1.16 10*3/MM3 (ref 0.7–3.1)
LYMPHOCYTES NFR BLD AUTO: 7.5 % (ref 19.6–45.3)
MAGNESIUM SERPL-MCNC: 1.4 MG/DL (ref 1.6–2.4)
MCH RBC QN AUTO: 29.1 PG (ref 26.6–33)
MCHC RBC AUTO-ENTMCNC: 32.9 G/DL (ref 31.5–35.7)
MCV RBC AUTO: 88.5 FL (ref 79–97)
MONOCYTES # BLD AUTO: 1.21 10*3/MM3 (ref 0.1–0.9)
MONOCYTES NFR BLD AUTO: 7.8 % (ref 5–12)
NEUTROPHILS # BLD AUTO: 12.44 10*3/MM3 (ref 1.7–7)
NEUTROPHILS NFR BLD AUTO: 80.7 % (ref 42.7–76)
NRBC BLD AUTO-RTO: 0 /100 WBC (ref 0–0.2)
PHOSPHATE SERPL-MCNC: 3.9 MG/DL (ref 2.5–4.5)
PLATELET # BLD AUTO: 266 10*3/MM3 (ref 140–450)
PMV BLD AUTO: 9.3 FL (ref 6–12)
POTASSIUM BLD-SCNC: 3.5 MMOL/L (ref 3.5–5.2)
PROT SERPL-MCNC: 5.4 G/DL (ref 6–8.5)
RBC # BLD AUTO: 3.99 10*6/MM3 (ref 3.77–5.28)
SODIUM BLD-SCNC: 134 MMOL/L (ref 136–145)
URATE SERPL-MCNC: 7.3 MG/DL (ref 2.4–5.7)
WBC NRBC COR # BLD: 15.42 10*3/MM3 (ref 3.4–10.8)

## 2020-02-11 PROCEDURE — 99232 SBSQ HOSP IP/OBS MODERATE 35: CPT | Performed by: INTERNAL MEDICINE

## 2020-02-11 PROCEDURE — 80053 COMPREHEN METABOLIC PANEL: CPT | Performed by: INTERNAL MEDICINE

## 2020-02-11 PROCEDURE — 94799 UNLISTED PULMONARY SVC/PX: CPT

## 2020-02-11 PROCEDURE — 97166 OT EVAL MOD COMPLEX 45 MIN: CPT

## 2020-02-11 PROCEDURE — 99231 SBSQ HOSP IP/OBS SF/LOW 25: CPT | Performed by: INTERNAL MEDICINE

## 2020-02-11 PROCEDURE — 84100 ASSAY OF PHOSPHORUS: CPT | Performed by: INTERNAL MEDICINE

## 2020-02-11 PROCEDURE — 83735 ASSAY OF MAGNESIUM: CPT | Performed by: INTERNAL MEDICINE

## 2020-02-11 PROCEDURE — 25010000002 MAGNESIUM SULFATE IN D5W 1G/100ML (PREMIX) 1-5 GM/100ML-% SOLUTION: Performed by: INTERNAL MEDICINE

## 2020-02-11 PROCEDURE — 85025 COMPLETE CBC W/AUTO DIFF WBC: CPT | Performed by: INTERNAL MEDICINE

## 2020-02-11 PROCEDURE — 84550 ASSAY OF BLOOD/URIC ACID: CPT | Performed by: INTERNAL MEDICINE

## 2020-02-11 PROCEDURE — 97535 SELF CARE MNGMENT TRAINING: CPT

## 2020-02-11 PROCEDURE — 25010000002 ENOXAPARIN PER 10 MG: Performed by: INTERNAL MEDICINE

## 2020-02-11 PROCEDURE — 71046 X-RAY EXAM CHEST 2 VIEWS: CPT

## 2020-02-11 RX ORDER — FAMOTIDINE 20 MG/1
20 TABLET, FILM COATED ORAL DAILY
Start: 2020-02-12 | End: 2020-03-23

## 2020-02-11 RX ORDER — POTASSIUM CHLORIDE 750 MG/1
20 CAPSULE, EXTENDED RELEASE ORAL DAILY
Status: DISCONTINUED | OUTPATIENT
Start: 2020-02-11 | End: 2020-02-11 | Stop reason: HOSPADM

## 2020-02-11 RX ORDER — TORSEMIDE 20 MG/1
40 TABLET ORAL DAILY
Status: DISCONTINUED | OUTPATIENT
Start: 2020-02-11 | End: 2020-02-11 | Stop reason: HOSPADM

## 2020-02-11 RX ORDER — HYDROCODONE BITARTRATE AND ACETAMINOPHEN 7.5; 325 MG/1; MG/1
1 TABLET ORAL EVERY 4 HOURS PRN
Qty: 10 TABLET | Refills: 0 | Status: SHIPPED | OUTPATIENT
Start: 2020-02-11 | End: 2020-02-20

## 2020-02-11 RX ORDER — ATENOLOL 50 MG/1
50 TABLET ORAL EVERY 12 HOURS SCHEDULED
Qty: 60 TABLET
Start: 2020-02-11 | End: 2020-03-23 | Stop reason: SDUPTHER

## 2020-02-11 RX ORDER — POTASSIUM CHLORIDE 750 MG/1
20 CAPSULE, EXTENDED RELEASE ORAL DAILY
Start: 2020-02-12 | End: 2020-03-23

## 2020-02-11 RX ORDER — MAGNESIUM SULFATE 1 G/100ML
1 INJECTION INTRAVENOUS
Status: COMPLETED | OUTPATIENT
Start: 2020-02-11 | End: 2020-02-11

## 2020-02-11 RX ORDER — TORSEMIDE 20 MG/1
40 TABLET ORAL DAILY
Start: 2020-02-12 | End: 2020-03-23 | Stop reason: SDUPTHER

## 2020-02-11 RX ORDER — DOCUSATE SODIUM 100 MG/1
100 CAPSULE, LIQUID FILLED ORAL 2 TIMES DAILY PRN
Start: 2020-02-11 | End: 2020-08-07

## 2020-02-11 RX ADMIN — HYDROCODONE BITARTRATE AND ACETAMINOPHEN 1 TABLET: 7.5; 325 TABLET ORAL at 03:52

## 2020-02-11 RX ADMIN — POTASSIUM CHLORIDE 20 MEQ: 750 CAPSULE, EXTENDED RELEASE ORAL at 12:34

## 2020-02-11 RX ADMIN — HYDROCODONE BITARTRATE AND ACETAMINOPHEN 1 TABLET: 7.5; 325 TABLET ORAL at 08:17

## 2020-02-11 RX ADMIN — TORSEMIDE 40 MG: 20 TABLET ORAL at 12:34

## 2020-02-11 RX ADMIN — FAMOTIDINE 20 MG: 20 TABLET, FILM COATED ORAL at 06:47

## 2020-02-11 RX ADMIN — VITAMIN D, TAB 1000IU (100/BT) 1000 UNITS: 25 TAB at 10:05

## 2020-02-11 RX ADMIN — LEVOTHYROXINE SODIUM 50 MCG: 50 TABLET ORAL at 06:47

## 2020-02-11 RX ADMIN — IPRATROPIUM BROMIDE AND ALBUTEROL SULFATE 3 ML: 2.5; .5 SOLUTION RESPIRATORY (INHALATION) at 11:06

## 2020-02-11 RX ADMIN — HYDROCODONE BITARTRATE AND ACETAMINOPHEN 1 TABLET: 7.5; 325 TABLET ORAL at 18:41

## 2020-02-11 RX ADMIN — SODIUM BICARBONATE 1300 MG: 650 TABLET ORAL at 10:05

## 2020-02-11 RX ADMIN — MAGNESIUM SULFATE 1 G: 1 INJECTION INTRAVENOUS at 12:34

## 2020-02-11 RX ADMIN — MAGNESIUM SULFATE 1 G: 1 INJECTION INTRAVENOUS at 10:10

## 2020-02-11 RX ADMIN — ATENOLOL 50 MG: 50 TABLET ORAL at 10:04

## 2020-02-11 RX ADMIN — NYSTATIN 500000 UNITS: 100000 SUSPENSION ORAL at 10:05

## 2020-02-11 RX ADMIN — CLOBETASOL PROPIONATE: 0.5 CREAM TOPICAL at 10:05

## 2020-02-11 RX ADMIN — NYSTATIN 500000 UNITS: 100000 SUSPENSION ORAL at 12:34

## 2020-02-11 RX ADMIN — MAGNESIUM SULFATE 1 G: 1 INJECTION INTRAVENOUS at 11:18

## 2020-02-11 RX ADMIN — NYSTATIN 500000 UNITS: 100000 SUSPENSION ORAL at 17:34

## 2020-02-11 RX ADMIN — HYDROCODONE BITARTRATE AND ACETAMINOPHEN 1 TABLET: 7.5; 325 TABLET ORAL at 13:55

## 2020-02-11 RX ADMIN — IPRATROPIUM BROMIDE AND ALBUTEROL SULFATE 3 ML: 2.5; .5 SOLUTION RESPIRATORY (INHALATION) at 06:49

## 2020-02-11 RX ADMIN — IPRATROPIUM BROMIDE AND ALBUTEROL SULFATE 3 ML: 2.5; .5 SOLUTION RESPIRATORY (INHALATION) at 14:52

## 2020-02-11 RX ADMIN — ENOXAPARIN SODIUM 30 MG: 30 INJECTION SUBCUTANEOUS at 10:25

## 2020-02-11 RX ADMIN — SODIUM CHLORIDE, PRESERVATIVE FREE 10 ML: 5 INJECTION INTRAVENOUS at 10:06

## 2020-02-11 NOTE — PROGRESS NOTES
"   LOS: 10 days    Patient Care Team:  Daryl Santos MD as PCP - General (Internal Medicine)  Daryl Santos MD as PCP - Claims Attributed    Chief Complaint:    Chief Complaint   Patient presents with   • Chest Pain     Follow UP LINNEA CKD3  Subjective     Interval History:   UOP and weights not recorded, s/p IV lasix x 2 yesterday; denies dyspnea     Objective     Vital Signs  Temp:  [98.2 °F (36.8 °C)-98.6 °F (37 °C)] 98.6 °F (37 °C)  Heart Rate:  [54-58] 55  Resp:  [16-18] 16  BP: (103-120)/(48-54) 117/48    Flowsheet Rows      First Filed Value   Admission Height  160 cm (63\") Documented at 01/31/2020 2149   Admission Weight  99.5 kg (219 lb 6.4 oz) Documented at 01/31/2020 2149          I/O this shift:  In: 240 [P.O.:240]  Out: 500 [Urine:500]  I/O last 3 completed shifts:  In: 340 [P.O.:240; IV Piggyback:100]  Out: -     Intake/Output Summary (Last 24 hours) at 2/11/2020 1034  Last data filed at 2/11/2020 0902  Gross per 24 hour   Intake 240 ml   Output 500 ml   Net -260 ml       Physical Exam:  GEN frail WF In no distress  Neck supple no JVD  Lungs CTA bilat no rales  CV RRR  abd soft NT/ND   vasc 2+ BLE pedal/ankle edema      Results Review:    Results from last 7 days   Lab Units 02/11/20  0515 02/10/20  0431 02/09/20  0631   SODIUM mmol/L 134* 133* 134*   POTASSIUM mmol/L 3.5 4.0 4.0   CHLORIDE mmol/L 96* 101 105   CO2 mmol/L 25.9 20.8* 17.2*   BUN mg/dL 32* 30* 30*   CREATININE mg/dL 1.33* 1.18* 1.26*   CALCIUM mg/dL 8.6 8.0* 8.5*   BILIRUBIN mg/dL 0.8 0.8 1.0   ALK PHOS U/L 58 62 70   ALT (SGPT) U/L 15 15 18   AST (SGOT) U/L 21 21 25   GLUCOSE mg/dL 72 88 82       Estimated Creatinine Clearance: 36.5 mL/min (A) (by C-G formula based on SCr of 1.33 mg/dL (H)).    Results from last 7 days   Lab Units 02/11/20  0515 02/09/20  0631 02/08/20  0504 02/07/20  0457   MAGNESIUM mg/dL 1.4* 1.8 1.5* 1.8   PHOSPHORUS mg/dL 3.9  --  3.3 3.1       Results from last 7 days   Lab Units 02/11/20  0515 " 02/08/20  0504 02/07/20  0457 02/06/20  0458 02/05/20  0507   URIC ACID mg/dL 7.3* 5.4 5.6 4.6 3.9       Results from last 7 days   Lab Units 02/11/20  0515 02/10/20  1003 02/09/20  0631 02/08/20  0504 02/07/20  0457   WBC 10*3/mm3 15.42* 20.34* 23.84* 20.62* 14.41*   HEMOGLOBIN g/dL 11.6* 12.1 13.2 12.8 12.7   PLATELETS 10*3/mm3 266 263 295 258 208               Imaging Results (Last 24 Hours)     Procedure Component Value Units Date/Time    XR Chest PA & Lateral [[id number]] Resulted:  02/11/20 0844     Updated:  02/11/20 0901          atenolol 50 mg Oral Q12H   cholecalciferol 1,000 Units Oral Daily   clobetasol  Topical Q12H   enoxaparin 30 mg Subcutaneous Q24H   famotidine 20 mg Oral Daily   ipratropium-albuterol 3 mL Nebulization Q4H While Awake - RT   levothyroxine 50 mcg Oral Q AM   magnesium sulfate 1 g Intravenous Q1H   nystatin 5 mL Swish & Swallow 4x Daily   pravastatin 40 mg Oral Nightly   sodium bicarbonate 1,300 mg Oral TID   sodium chloride 10 mL Intravenous Q12H          Medication Review:   Current Facility-Administered Medications   Medication Dose Route Frequency Provider Last Rate Last Dose   • acetaminophen (TYLENOL) tablet 650 mg  650 mg Oral Q4H PRN Kannan Rizo APRN   650 mg at 02/01/20 0203   • aluminum-magnesium hydroxide-simethicone (MAALOX MAX) 400-400-40 MG/5ML suspension 15 mL  15 mL Oral Q6H PRN Kannan Rizo APRN   15 mL at 02/01/20 0808   • atenolol (TENORMIN) tablet 50 mg  50 mg Oral Q12H Pete Hensley MD   50 mg at 02/11/20 1004   • bisacodyl (DULCOLAX) EC tablet 5 mg  5 mg Oral Daily PRN Kannan Rizo APRN       • cholecalciferol (VITAMIN D3) tablet 1,000 Units  1,000 Units Oral Daily Kannan Rizo APRN   1,000 Units at 02/11/20 1005   • clobetasol (TEMOVATE) 0.05 % cream   Topical Q12H Kannan Rizo APRN       • cyclobenzaprine (FLEXERIL) tablet 5 mg  5 mg Oral TID PRN Amadou Das MD   5 mg at 02/04/20 1115   • enoxaparin  (LOVENOX) syringe 30 mg  30 mg Subcutaneous Q24H Lizbeth Laura MD   30 mg at 02/11/20 1025   • famotidine (PEPCID) tablet 20 mg  20 mg Oral Daily Amadou Das MD   20 mg at 02/11/20 0647   • HYDROcodone-acetaminophen (NORCO) 7.5-325 MG per tablet 1 tablet  1 tablet Oral Q4H PRN Amadou Das MD   1 tablet at 02/11/20 0817   • ipratropium-albuterol (DUO-NEB) nebulizer solution 3 mL  3 mL Nebulization Q4H While Awake - RT Amadou Das MD   3 mL at 02/11/20 0649   • levothyroxine (SYNTHROID, LEVOTHROID) tablet 50 mcg  50 mcg Oral Q AM Kannan Rizo APRN   50 mcg at 02/11/20 0647   • magnesium sulfate in D5W 1g/100mL (PREMIX)  1 g Intravenous Q1H Amadou Das MD   1 g at 02/11/20 1010   • naloxone (NARCAN) injection 0.4 mg  0.4 mg Intravenous Q5 Min PRN Nathan Tam MD       • nystatin (MYCOSTATIN) 783308 UNIT/ML suspension 500,000 Units  5 mL Swish & Swallow 4x Daily Meeta Goodwin APRN   500,000 Units at 02/11/20 1005   • ondansetron (ZOFRAN) tablet 4 mg  4 mg Oral Q6H PRN Kannan Rizo APRN        Or   • ondansetron (ZOFRAN) injection 4 mg  4 mg Intravenous Q6H PRN Kannan Rizo APRN   4 mg at 02/05/20 2040   • pravastatin (PRAVACHOL) tablet 40 mg  40 mg Oral Nightly Kannan Rizo APRN   40 mg at 02/10/20 2048   • sodium bicarbonate tablet 1,300 mg  1,300 mg Oral TID Mil Acosta MD   1,300 mg at 02/11/20 1005   • sodium chloride 0.9 % flush 10 mL  10 mL Intravenous PRN Ranjith Quinn MD   10 mL at 01/31/20 2143   • sodium chloride 0.9 % flush 10 mL  10 mL Intravenous Q12H Kannan Rizo, APRN   10 mL at 02/11/20 1006   • sodium chloride 0.9 % flush 10 mL  10 mL Intravenous PRN Kannan Rizo APRN           Assessment/Plan   1.  Acute kidney injury on chronic kidney disease stage III, associated with acute pancreatitis improved some with hydration; Cr up slightly 1.3 today following IV lasix.  UOP/Wt NR.    2.  Chronic  kidney disease stage III most likely secondary to nephrosclerosis also being on hydrochlorothiazide lead to decreased GFR by negative tubular glomerular feedback mechanism.  3.  Acute pancreatitis - thiazide may have been factor per GI  4.  Hypertension, controlled; off ACE/HCTZ due to LINNEA  5.  Hypothyroidism  6.  Dyslipidemia  7.  Severe hearing loss  8.  Lower extremity edema associated probably from hypoalbuminemia, albumin is 2.2, and on sodium bicarb supplementation 1300 TID; diuretic response unclear, UOP/Wt NR; no dyspnea; also has pleural effusions  9.  Hypomagnesemia, magnesium down to 1.4  10.  Metabolic acidosis with slight increase in the anion gap, multifactorial, much improved  11.  Atrial fibrillation    Plan  - d/c sodium bicarb tabs  - torsemide 40mg daily   - KCL 20meq daily   - replacing Mg  - recheck weight   - patient asking me about discharge, no objection from nephrology standpoint but we will need to see her in a couple weeks to reassess renal fcn & volume status   - hold ACE/HCTZ at DC         Acute pancreatitis without infection or necrosis    Benign essential hypertension    Hyperlipidemia    Primary hypothyroidism    Secondary polycythemia    Vitamin D deficiency    Supraventricular tachycardia (CMS/HCC)    Obesity (BMI 30-39.9)    CKD (chronic kidney disease) stage 3, GFR 30-59 ml/min (CMS/HCC)    LINNEA (acute kidney injury) (CMS/HCC)    Pneumonia    New onset a-fib (CMS/HCC)    Hyponatremia    Hypomagnesemia    Leukocytosis              Daryl Valle MD  02/11/20  10:34 AM

## 2020-02-11 NOTE — DISCHARGE SUMMARY
[facility]    Date of Discharge:  2/11/2020    PCP: Daryl Santos MD    Discharge Diagnosis:   Active Hospital Problems    Diagnosis  POA   • Hyponatremia [E87.1]  Yes   • Hypomagnesemia [E83.42]  No   • Leukocytosis [D72.829]  Yes   • Obesity (BMI 30-39.9) [E66.9]  Yes   • CKD (chronic kidney disease) stage 3, GFR 30-59 ml/min (CMS/[facility]) [N18.3]  Yes   • Supraventricular tachycardia (CMS/[facility]) [I47.1]  Yes   • Vitamin D deficiency [E55.9]  Yes   • Primary hypothyroidism [E03.9]  Yes   • Benign essential hypertension [I10]  Yes   • Hyperlipidemia [E78.5]  Yes   • Secondary polycythemia [D75.1]  Yes      Resolved Hospital Problems    Diagnosis Date Resolved POA   • **Acute pancreatitis without infection or necrosis [K85.90] 02/11/2020 Yes   • New onset a-fib (CMS/[facility]) [I48.91] 02/11/2020 No   • Pneumonia [J18.9] 02/11/2020 Yes   • LINNEA (acute kidney injury) (CMS/[facility]) [N17.9] 02/11/2020 No     Procedures Performed  none     Consults     Date and Time Order Name Status Description    2/9/2020 0802 Inpatient Pulmonology Consult Completed     2/4/2020 1904 Inpatient Cardiology Consult Completed     2/4/2020 0941 Inpatient Nephrology Consult Completed     2/1/2020 0526 Inpatient Gastroenterology Consult Completed     1/31/2020 2607 [facility] (on-call MD unless specified) Details Completed         Hospital Course  Please see history and physical for details. Patient is a 86 y.o. female that initially presented to the hospital for complaints of abdominal pain. She was found to have acute pancreatitis as demonstrated by an elevated lipase and CT imaging. She was put on gut rest and given IV fluids with improvement. She was seen in consultation by Gastroenterology who could not find a clear source of her pancreatitis as gallbladder ultrasound showed no stones or sludge, but a mildly distended gallbladder with wall thickening and edema thought secondary to pancreatitis. She did  have mild dilatation of the common bile duct so MRCP was ordered and showed pancreatitis and no intrahepatic biliary dilatation or choledocholithiasis. She was transitioned to a regular low fat diet and has tolerated. GI recommended holding her HCTZ/lisinopril as a possible cause of her pancreatitis. She was recommended to use stool softeners as needed. They have cleared her for discharge.   Her hospitalization was somewhat complicated while admitted. She was diagnosed with pneumonia a few days after admission and treated with a course of IV antibiotics. Given recurrent leukocytosis, Pulmonary was consulted and repeated x-ray imaging today that showed bilateral small effusions and atelectasis, but did not feel any further antibiotics were warranted as her procalcitonin improved. They felt these findings would improve with time and her leukocytosis was not infectious in nature. Her WBC have trended down on the day of discharge.    In addition to her pneumonia, she developed new atrial fibrillation with RVR and Cardiology was consulted. Her arrhythmia was thought secondary to pneumonia and pancreatitis and treated with IV Cardizem. She was transitioned to PO atenolol and converted back to NSR by discharge. She will stay on the beta blocker and will not need anticoagulation at this time per Cardiology. She needs to follow up with them in 4 weeks. She also developed some acute kidney injury while admitted on top of her CKD3. For this reason, Nephrology was consulted and treated her initially with IV hydration and followed by diuretics. She was given a dose of IV Lasix yesterday with a slight increase in her creatinine, but is currently stable for discharge from renal perspective. She will be discharged on PO torsemide and PO potassium with follow up with Nephrology in 2 weeks for reassessment. Her lisinopril/HCTZ will be held. Her magnesium was low and has been replaced on the day of discharge.    The patient denies any  complaints including nausea, vomiting, abdominal pain, dyspnea or chest pain. She has been urinating and has had a BM. Her daughter is at bedside. Initially, the plan was for her to return home, but given her weakness due to the hospital stay, rehab has been decided and she will be transported by family to a bed today. The patient and family are agreeable to the plan of care.    I discussed the patient's findings and my recommendations with patient, family, nursing staff and Dr. Das..    Condition on Discharge: Improved.     Temp:  [98.6 °F (37 °C)] 98.6 °F (37 °C)  Heart Rate:  [53-62] 62  Resp:  [16-20] 20  BP: (111-129)/(48-54) 129/51  Body mass index is 43.75 kg/m².    Physical Exam   Constitutional: She is oriented to person, place, and time. She appears well-developed and well-nourished. No distress.   HENT:   Head: Normocephalic and atraumatic.   Nose: Nose normal.   Mouth/Throat: Oropharynx is clear and moist.   Eyes: Conjunctivae are normal. Right eye exhibits no discharge. Left eye exhibits no discharge.   Neck: Normal range of motion. Neck supple.   Cardiovascular: Normal rate, regular rhythm, normal heart sounds and intact distal pulses.   Pulmonary/Chest: Effort normal and breath sounds normal. No respiratory distress.   Abdominal: Soft. Bowel sounds are normal. She exhibits no distension.   Musculoskeletal: Normal range of motion. She exhibits edema (moderate 1-2 + BLE).   Neurological: She is alert and oriented to person, place, and time. She exhibits normal muscle tone. Coordination normal.   Skin: Skin is warm and dry. She is not diaphoretic. No erythema.   Psychiatric: She has a normal mood and affect. Her behavior is normal.   Nursing note and vitals reviewed.        Discharge Medications      New Medications      Instructions Start Date   atenolol 50 MG tablet  Commonly known as:  TENORMIN   50 mg, Oral, Every 12 Hours Scheduled      docusate sodium 100 MG capsule  Commonly known as:   COLACE   100 mg, Oral, 2 Times Daily PRN      famotidine 20 MG tablet  Commonly known as:  PEPCID   20 mg, Oral, Daily   Start Date:  February 12, 2020     HYDROcodone-acetaminophen 7.5-325 MG per tablet  Commonly known as:  NORCO   1 tablet, Oral, Every 4 Hours PRN      nystatin 687993 UNIT/ML suspension  Commonly known as:  MYCOSTATIN   500,000 Units, Swish & Swallow, 4 Times Daily      potassium chloride 10 MEQ CR capsule  Commonly known as:  MICRO-K   20 mEq, Oral, Daily   Start Date:  February 12, 2020     torsemide 20 MG tablet  Commonly known as:  DEMADEX   40 mg, Oral, Daily   Start Date:  February 12, 2020        Continue These Medications      Instructions Start Date   allopurinol 300 MG tablet  Commonly known as:  ZYLOPRIM   Take 1 p.o. daily for gout      Biotin 10 MG tablet   1 tablet, Oral, Daily      clobetasol 0.05 % ointment  Commonly known as:  TEMOVATE   Topical, 2 Times Daily      halobetasol 0.05 % ointment  Commonly known as:  ULTRAVATE   Apply pea-size amount daily to affected area for 2 months. Then apply every other day for 2 weeks then twice weekly      levothyroxine 50 MCG tablet  Commonly known as:  SYNTHROID, LEVOTHROID   Take 1 p.o. every morning for thyroid      pravastatin 40 MG tablet  Commonly known as:  PRAVACHOL   Take 1 p.o. daily for high cholesterol      Vitamin D 50 MCG (2000 UT) tablet   2 tablets, Oral, Daily         Stop These Medications    lisinopril-hydrochlorothiazide 20-12.5 MG per tablet  Commonly known as:  PRINZIDE,ZESTORETIC             Additional Instructions for the Follow-ups that You Need to Schedule     Discharge Follow-up with PCP   As directed       Currently Documented PCP:    Daryl Santos MD    PCP Phone Number:    [phone]     Follow Up Details:  see in 1-2 weeks after hospitalization         Discharge Follow-up with Specified Provider: Dr. Skaggs with Gastroenterology   As directed      To:  Dr. Skaggs with Gastroenterology    Follow Up  Details:  see as needed         Discharge Follow-up with Specified Provider: Dr. Laura with Cardiology; 1 Month   As directed      To:  Dr. Laura with Cardiology    Follow Up:  1 Month    Follow Up Details:  see in 4 weeks for follow up of afib with RVR         Discharge Follow-up with Specified Provider: Dr. Valle with Nephrology; 2 Weeks   As directed      To:  Dr. Valle with Nephrology    Follow Up:  2 Weeks    Follow Up Details:  for repeat renal function and assessment of volume status            Contact information for follow-up providers     Daryl Santos MD .    Specialty:  Internal Medicine  Why:  see in 1-2 weeks after hospitalization  Contact information:  [address] [facility] [address]  [facility] [id number] [phone]             Lizbeth Laura MD Follow up in 1 month(s).    Specialty:  Cardiology  Contact information:  [address]  [facility] [id number] [phone]                   Contact information for after-discharge care     Destination     [facility] .    Service:  Skilled Nursing  Contact information:  [address] [facility] [address] [phone]                           Test Results Pending at Discharge     Rubi Clement, APRN  02/11/20  5:25 PM    Discharge time spent greater than 30 minutes.

## 2020-02-11 NOTE — PROGRESS NOTES
Continued Stay Note  [facility]     Patient Name: Mandy Alarcon  MRN: [id number]  Today's Date: 2/11/2020    Admit Date: 1/31/2020    Discharge Plan     Row Name 02/11/20 1334       Plan    Plan  Referrals to [facility], [facility] and [facility].     Plan Comments  Pt now states she would like to go to rehab.  She spoke with her family.  Per their request, referrals called to [facility] and [facility].  Referrals called. Per Margie/ Kevan, [facility] has no beds available today.  [facility] can accpet, but pt's dtr now states that is not their choice.  She requests referral to [facility].  Referral called to [facility] Intake.  OT order received and OT depertment notified.         Discharge Codes    No documentation.       Expected Discharge Date and Time     Expected Discharge Date Expected Discharge Time    Feb 11, 2020             Tracie Gutierrez RN

## 2020-02-11 NOTE — PLAN OF CARE
Problem: Patient Care Overview  Goal: Plan of Care Review  Flowsheets (Taken 2/11/2020 1431)  Plan of Care Reviewed With: patient; family  Outcome Summary: Pt 87 YO female admitted to [facility] with acute pancreatitis and PNA.  PTA, pt was very active and independent with ADLs and did not use any AE/DME.  Pt lives with her daughter and bedroom/bathroom is on the second level.  Pt presets to OT with GW, decreased endurance, decreased functional mobility, decreased ind with ADLs.  Pt perform sit to stand with CGA, perform functional mobility with CGA to walk to sink for ADL activity, tolerate standing for 2 min before requesting to sit down due to increased lower back pain.  Pt will benefit from skilled OT to address deficits and increase independence with ADLS and anticipate IP rehab/SNF to get pt back to PLOF before returning home.

## 2020-02-11 NOTE — PLAN OF CARE
Problem: Patient Care Overview  Goal: Plan of Care Review  Outcome: Ongoing (interventions implemented as appropriate)  Flowsheets (Taken 2/11/2020 1812)  Progress: improving  Plan of Care Reviewed With: patient  Outcome Summary: Discharge orders to rehab.     Problem: Pain, Acute (Adult)  Goal: Acceptable Pain Control/Comfort Level  Outcome: Ongoing (interventions implemented as appropriate)  Flowsheets (Taken 2/11/2020 1812)  Acceptable Pain Control/Comfort Level: making progress toward outcome     Problem: Fall Risk (Adult)  Goal: Absence of Fall  Outcome: Ongoing (interventions implemented as appropriate)  Flowsheets (Taken 2/11/2020 1812)  Absence of Fall: making progress toward outcome     Problem: Skin Injury Risk (Adult)  Goal: Skin Health and Integrity  Outcome: Ongoing (interventions implemented as appropriate)  Flowsheets (Taken 2/11/2020 1812)  Skin Health and Integrity: making progress toward outcome

## 2020-02-11 NOTE — DISCHARGE PLACEMENT REQUEST
"Marcos Yancey (86 y.o. Female)     Date of Birth Social Security Number Address Home Phone MRN    05/30/1933  [address] [id number] [phone] [id number]    Shinto Marital Status          Tenriism        Admission Date Admission Type Admitting Provider Attending Provider Department, Room/Bed    1/31/20 Emergency Nathan Tam MD Jackson, Alan David, MD [address], [address]/1    Discharge Date Discharge Disposition Discharge Destination         Home or Self Care              Attending Provider:  Amadou Das MD    Allergies:  Cefaclor, Sulfa Antibiotics    Isolation:  None   Infection:  None   Code Status:  CPR    Ht:  160 cm (63\")   Wt:  112 kg (247 lb)    Admission Cmt:  None   Principal Problem:  Acute pancreatitis without infection or necrosis [K85.90]                 Active Insurance as of 1/31/2020     Primary Coverage     Payor Plan Insurance Group Employer/Plan Group    MEDICARE MEDICARE A & B      Payor Plan Address Payor Plan Phone Number Payor Plan Fax Number Effective Dates    [address] [phone]  5/1/1998 - None Entered    [facility] [id number]       Subscriber Name Subscriber Birth Date Member ID       MARCOS YANCEY 5/30/1933 [id number]           Secondary Coverage     Payor Plan Insurance Group Employer/Plan Group    [place]      Payor Plan Address Payor Plan Phone Number Payor Plan Fax Number Effective Dates    [address] [phone]  1/1/2017 - None Entered    [facility] [id number]       Subscriber Name Subscriber Birth Date Member ID       MARCOS YANCEY 5/30/1933 [id number]                 Emergency Contacts      (Rel.) Home Phone Work Phone Mobile Phone    Ashley Valdes (Daughter) [phone] -- [phone]    MIKHAILANNAYL (Daughter) [phone] -- --              "

## 2020-02-11 NOTE — PROGRESS NOTES
[facility]  Inpatient Progress Note    Reason for Follow Up:  Acute pancreatitis    Subjective     Interval History:   No abdominal pain.  No nausea.  She feels like her appetite is improving.  She is coughing less.    Current Facility-Administered Medications:   •  acetaminophen (TYLENOL) tablet 650 mg, 650 mg, Oral, Q4H PRN, Kannan Rizo APRN, 650 mg at 20 0203  •  aluminum-magnesium hydroxide-simethicone (MAALOX MAX) 400-400-40 MG/5ML suspension 15 mL, 15 mL, Oral, Q6H PRN, Kannan Rizo APRN, 15 mL at 20 0808  •  atenolol (TENORMIN) tablet 50 mg, 50 mg, Oral, Q12H, Pete Hensley MD, 50 mg at 02/10/20 0833  •  bisacodyl (DULCOLAX) EC tablet 5 mg, 5 mg, Oral, Daily PRN, Kannan Rizo APRN  •  cholecalciferol (VITAMIN D3) tablet 1,000 Units, 1,000 Units, Oral, Daily, Kannan Rizo APRN, 1,000 Units at 02/10/20 0833  •  clobetasol (TEMOVATE) 0.05 % cream, , Topical, Q12H, Kannan Rizo APRN, 1 application at 02/10/20 2254  •  cyclobenzaprine (FLEXERIL) tablet 5 mg, 5 mg, Oral, TID PRN, Amadou Das MD, 5 mg at 20 1115  •  enoxaparin (LOVENOX) syringe 30 mg, 30 mg, Subcutaneous, Q24H, Lizbeth Laura MD  •  famotidine (PEPCID) tablet 20 mg, 20 mg, Oral, Daily, Amadou Das MD, 20 mg at 20 0647  •  HYDROcodone-acetaminophen (NORCO) 7.5-325 MG per tablet 1 tablet, 1 tablet, Oral, Q4H PRN, Amadou Das MD, 1 tablet at 20 0817  •  ipratropium-albuterol (DUO-NEB) nebulizer solution 3 mL, 3 mL, Nebulization, Q4H While Awake - RT, Amadou Das MD, 3 mL at 20 0649  •  levothyroxine (SYNTHROID, LEVOTHROID) tablet 50 mcg, 50 mcg, Oral, Q AM, Kannan Rizo, APRN, 50 mcg at 20 0647  •  magnesium sulfate in D5W 1g/100mL (PREMIX), 1 g, Intravenous, Q1H, Amadou Das MD  •  [] HYDROmorphone (DILAUDID) injection 0.5 mg, 0.5 mg, Intravenous, Q2H PRN, 0.5 mg at 20  1513 **AND** naloxone (NARCAN) injection 0.4 mg, 0.4 mg, Intravenous, Q5 Min PRN, Nathan Tam MD  •  nystatin (MYCOSTATIN) 100397 UNIT/ML suspension 500,000 Units, 5 mL, Swish & Swallow, 4x Daily, Meeta Goodwin APRN, 500,000 Units at 02/10/20 2048  •  ondansetron (ZOFRAN) tablet 4 mg, 4 mg, Oral, Q6H PRN **OR** ondansetron (ZOFRAN) injection 4 mg, 4 mg, Intravenous, Q6H PRN, Kannan Rizo APRN, 4 mg at 02/05/20 2040  •  pravastatin (PRAVACHOL) tablet 40 mg, 40 mg, Oral, Nightly, Kannan Rizo APRN, 40 mg at 02/10/20 2048  •  sodium bicarbonate tablet 1,300 mg, 1,300 mg, Oral, TID, Mil Acosta MD, 1,300 mg at 02/10/20 2048  •  [COMPLETED] Insert peripheral IV, , , Once **AND** sodium chloride 0.9 % flush 10 mL, 10 mL, Intravenous, PRN, Ranjith Quinn MD, 10 mL at 01/31/20 2143  •  sodium chloride 0.9 % flush 10 mL, 10 mL, Intravenous, Q12H, Kannan Rizo APRN, 10 mL at 02/10/20 2050  •  sodium chloride 0.9 % flush 10 mL, 10 mL, Intravenous, PRN, Kannan Rizo APRN  Review of Systems:    All systems were reviewed and negative except for:  Musculoskeletal: positive for  back pain    Objective     Vital Signs  Temp:  [98.2 °F (36.8 °C)-98.6 °F (37 °C)] 98.6 °F (37 °C)  Heart Rate:  [54-58] 55  Resp:  [16-18] 16  BP: (103-120)/(48-54) 117/48  Body mass index is 43.75 kg/m².    Intake/Output Summary (Last 24 hours) at 2/11/2020 0910  Last data filed at 2/11/2020 0814  Gross per 24 hour   Intake 240 ml   Output --   Net 240 ml     I/O this shift:  In: 240 [P.O.:240]  Out: -      Physical Exam:   General: patient awake, alert and cooperative   Eyes: Normal lids and lashes, no scleral icterus   Neck: supple, normal ROM   Skin: warm and dry, not jaundiced   Abdomen: soft, nontender, nondistended Extremities: no rash or edema   Psychiatric: Normal mood and behavior; memory intact     Results Review:     I reviewed the patient's new clinical results.    Results from last 7  days   Lab Units 02/11/20  0515 02/10/20  1003 02/09/20  0631   WBC 10*3/mm3 15.42* 20.34* 23.84*   HEMOGLOBIN g/dL 11.6* 12.1 13.2   HEMATOCRIT % 35.3 36.3 39.4   PLATELETS 10*3/mm3 266 263 295     Results from last 7 days   Lab Units 02/11/20  0515 02/10/20  0431 02/09/20  0631   SODIUM mmol/L 134* 133* 134*   POTASSIUM mmol/L 3.5 4.0 4.0   CHLORIDE mmol/L 96* 101 105   CO2 mmol/L 25.9 20.8* 17.2*   BUN mg/dL 32* 30* 30*   CREATININE mg/dL 1.33* 1.18* 1.26*   CALCIUM mg/dL 8.6 8.0* 8.5*   BILIRUBIN mg/dL 0.8 0.8 1.0   ALK PHOS U/L 58 62 70   ALT (SGPT) U/L 15 15 18   AST (SGOT) U/L 21 21 25   GLUCOSE mg/dL 72 88 82         Lab Results   Lab Value Date/Time    LIPASE 38 02/05/2020 0507    LIPASE 60 02/04/2020 0435    LIPASE 181 (H) 02/03/2020 0211    LIPASE 417 (H) 02/02/2020 0441    LIPASE >3,000 (H) 01/31/2020 2142       Radiology:  XR Chest PA & Lateral   Final Result      MRI abdomen wo contrast mrcp   Final Result   Markedly limited study due to extensive motion artifact as   above.   1. MR findings of acute pancreatitis.   2. No intrahepatic biliary dilatation. No choledocholithiasis. The   gallbladder is distended with mild wall thickening. Pancreatic duct   demonstrates normal caliber.       This report was finalized on 2/7/2020 11:07 AM by Dr. Ruthy Haro M.D.          XR Chest PA & Lateral   Final Result   FINDINGS AND IMPRESSION:   Lungs are hypoinflated with asymmetric elevation right hemidiaphragm and   bibasilar pulmonary opacification, right greater than left, as before.   No pneumothorax is seen. Heart size accentuated by low lung volumes.   Small right pleural effusion posteriorly is suspected. Findings   represent atelectasis versus pneumonia with parapneumonic effusion in   the appropriate clinical context and correlation with patient history is   recommended.       This report was finalized on 2/4/2020 12:32 PM by Dr. Carlitos Willis M.D.          XR Chest 1 View   Final Result   Interval  development of dense right basilar infiltrate, which may   reflect pneumonia. There is also a small right pleural effusion.       This report was finalized on 2/2/2020 11:47 PM by Dr. Kathleen Montoya M.D.          US Gallbladder   Final Result       1. No stones or sludge are seen within the gallbladder. Gallbladder   appears mildly distended, with gallbladder wall thickening and edema   noted. Appearance is nonspecific, and may be reactive, and may be   related to the patient's pancreatitis.   2. The patient does have some mild dilatation of the common bile duct,   measuring up to 9 mm. Some of this may be age related, but correlation   with liver function tests is recommended. MRCP or ERCP could be   considered for additional evaluation if these are abnormal.       This report was finalized on 2/1/2020 9:55 PM by Dr. Kathleen Montoya M.D.          CT Abdomen Pelvis Without Contrast   Final Result       1. The patient has inflammatory stranding surrounding the pancreatic   head and neck, in keeping with history of pancreatitis. No discrete   peripancreatic collections are seen. There is no evidence of gastric   outlet obstruction.       Radiation dose reduction techniques were utilized, including automated   exposure control and exposure modulation based on body size.       This report was finalized on 1/31/2020 11:52 PM by Dr. Kathleen Montoya M.D.          XR Chest 1 View   Final Result   Mild bibasilar atelectasis.       This report was finalized on 1/31/2020 10:12 PM by Dr. Kathleen Montoya M.D.          XR Chest PA & Lateral    (Results Pending)       Assessment/Plan     Patient Active Problem List   Diagnosis   • Benign essential hypertension   • Claustrophobia   • Gout   • Hyperlipidemia   • Primary hypothyroidism   • Impaired fasting glucose   • Nocturnal hypoxemia   • Secondary polycythemia   • Vitamin D deficiency   • Therapeutic drug monitoring   • Family history of coronary artery disease    • Bilateral sensorineural hearing loss, wears hearing aids   • Multinodular goiter   • Supraventricular tachycardia (CMS/HCC)   • Morbidly obese (CMS/HCC)   • Acute pancreatitis without infection or necrosis   • Obesity (BMI 30-39.9)   • CKD (chronic kidney disease) stage 3, GFR 30-59 ml/min (CMS/HCC)   • LINNEA (acute kidney injury) (CMS/HCC)   • Pneumonia   • New onset a-fib (CMS/HCC)   • Hyponatremia   • Hypomagnesemia   • Leukocytosis       Assessment:  1. Acute pancreatitis-resolving; MRCP with normal ducts.  Possibly secondary to hydrochlorothiazide which she was on as an outpatient  2. Constipation-improved  3. Leukocytosis- improved      Plan:  · Improved from a pancreatitis standpoint-no residual symptoms  · Continue low-fat diet  · Would keep off hydrochlorothiazide/lisinopril as this is a potential cause of her acute pancreatitis given that no other cause was delineated on work-up.  · Stool softeners as needed-I expect her constipation will improve now that she is no longer requiring pain medication  · No further recommendations at this time-we will sign off but are available as needed    I discussed the patients findings and my recommendations with patient, family and nursing staff.    Adriane Skaggs MD

## 2020-02-11 NOTE — PROGRESS NOTES
[facility] Follow Up    Chief Complaint: Follow up paroxsymal atrial fibrillation    Interval History: No chest pain or dyspnea.  Wants to go to rehab following discharge. Remains in sinus rhythm.     Objective:     Objective:  Temp:  [97.9 °F (36.6 °C)-98.6 °F (37 °C)] 98.6 °F (37 °C)  Heart Rate:  [54-58] 55  Resp:  [16-18] 18  BP: (103-123)/(53-62) 111/54     Intake/Output Summary (Last 24 hours) at 2/11/2020 0720  Last data filed at 2/10/2020 0828  Gross per 24 hour   Intake 240 ml   Output --   Net 240 ml     Body mass index is 43.75 kg/m².      02/05/20  0500 02/06/20  0500 02/07/20  0700   Weight: 112 kg (247 lb 9.2 oz) 104 kg (230 lb 4.8 oz) 112 kg (247 lb)     Weight change:       Physical Exam:   General : Alert, cooperative, in no acute distress.  Neuro: Alert,cooperative and oriented.  Lungs: CTAB. Normal respiratory effort and rate.  CV: Regular rate and rhythm, normal S1 and S2, no murmurs, gallops or rubs.  ABD: Soft, nontender, nondistended. Positive bowel sounds.  Extr: No edema or cyanosis, moves all extremities.    Lab Review:   Results from last 7 days   Lab Units 02/11/20  0515 02/10/20  0431   SODIUM mmol/L 134* 133*   POTASSIUM mmol/L 3.5 4.0   CHLORIDE mmol/L 96* 101   CO2 mmol/L 25.9 20.8*   BUN mg/dL 32* 30*   CREATININE mg/dL 1.33* 1.18*   GLUCOSE mg/dL 72 88   CALCIUM mg/dL 8.6 8.0*   AST (SGOT) U/L 21 21   ALT (SGPT) U/L 15 15         Results from last 7 days   Lab Units 02/11/20  0515 02/10/20  1003   WBC 10*3/mm3 15.42* 20.34*   HEMOGLOBIN g/dL 11.6* 12.1   HEMATOCRIT % 35.3 36.3   PLATELETS 10*3/mm3 266 263         Results from last 7 days   Lab Units 02/11/20  0515 02/09/20  0631   MAGNESIUM mg/dL 1.4* 1.8           Invalid input(s): LDLCALC      Results from last 7 days   Lab Units 02/05/20  0507   TSH uIU/mL 2.030     I reviewed the patient's new clinical results.  I personally viewed and interpreted the patient's EKG  Current Medications:   Scheduled  Meds:  atenolol 50 mg Oral Q12H   cholecalciferol 1,000 Units Oral Daily   clobetasol  Topical Q12H   enoxaparin 30 mg Subcutaneous Q24H   famotidine 20 mg Oral Daily   ipratropium-albuterol 3 mL Nebulization Q4H While Awake - RT   levothyroxine 50 mcg Oral Q AM   nystatin 5 mL Swish & Swallow 4x Daily   pravastatin 40 mg Oral Nightly   sodium bicarbonate 1,300 mg Oral TID   sodium chloride 10 mL Intravenous Q12H     Continuous Infusions:     Allergies:  Allergies   Allergen Reactions   • Cefaclor Anaphylaxis and Swelling   • Sulfa Antibiotics Rash       Assessment/Plan:     1. Paroxsymal atrial fibrillation. Back in sinus rhythm today.  New diagnosis on admission and likely related to acute illness.  On atenolol.    2. Acute pancreatitis. Improved.  3. LINNEA/CKD. Improved with hydration.  Fairly stable currently.    4. Pneumonia. CXR with stable findings.  With continued productive cough and intermittent wheezing.  5. Hypertension. Controlled.   6. Volume overload. Diuresis per Nephrology.  7. History of paroxysmal SVT    -  Hold off on further anticoagulation for now.    -  Continue atenolol.   -  Ok for discharge from my standpoint.  Will need 4 week follow up with me.       Lizbeth Laura MD  02/11/20  7:20 AM

## 2020-02-11 NOTE — PLAN OF CARE
Problem: Patient Care Overview  Goal: Plan of Care Review  Outcome: Ongoing (interventions implemented as appropriate)  Flowsheets  Taken 2/11/2020 0549  Progress: improving  Taken 2/11/2020 0027  Plan of Care Reviewed With: patient;daughter       Aox4, VSS on room air, Lasix via IV, treated for back pain X2 with po prn Norco per mar. Daughter remains at bedside. No s/s distress observed. Will cont to monitor.

## 2020-02-11 NOTE — PROGRESS NOTES
Name: Mandy Alarcon ADMIT: 2020   : 1933  PCP: Daryl Santos MD    MRN: [id number] LOS: 10 days   AGE/SEX: 86 y.o. female  ROOM: [facility]     Subjective   Subjective   Feels good. Sitting up in chair. No new complaints. Daughter x 1 at bedside. No chest pain, dyspnea, nausea, vomiting or abdominal pain. No fever or chills.      Objective   Objective   Vital Signs  Temp:  [98.2 °F (36.8 °C)-98.6 °F (37 °C)] 98.6 °F (37 °C)  Heart Rate:  [53-58] 53  Resp:  [16-18] 18  BP: (103-120)/(48-54) 117/48  SpO2:  [95 %-97 %] 95 %  on  Flow (L/min):  [3] 3;   Device (Oxygen Therapy): room air  Body mass index is 43.75 kg/m².     Physical Exam   Constitutional: She is oriented to person, place, and time. She appears well-developed and well-nourished. No distress.   HENT:   Head: Normocephalic and atraumatic.   Nose: Nose normal.   Mouth/Throat: Oropharynx is clear and moist.   Eyes: Conjunctivae are normal. Right eye exhibits no discharge. Left eye exhibits no discharge.   Neck: Normal range of motion. Neck supple.   Cardiovascular: Normal rate, regular rhythm, normal heart sounds and intact distal pulses.   Pulmonary/Chest: Effort normal. No respiratory distress. She has decreased breath sounds.   Abdominal: Soft. Bowel sounds are normal. She exhibits no distension. There is no tenderness.   Musculoskeletal: Normal range of motion. She exhibits edema (moderate 1+ BLE). She exhibits no tenderness.   Neurological: She is alert and oriented to person, place, and time. She exhibits normal muscle tone. Coordination normal.   Skin: Skin is warm and dry. She is not diaphoretic. No erythema.   Psychiatric: She has a normal mood and affect. Her behavior is normal.   Nursing note and vitals reviewed.     Results Review:       I reviewed the patient's new clinical results.  Results from last 7 days   Lab Units 20  0515 02/10/20  1003 20  0631 20  0504   WBC 10*3/mm3 15.42* 20.34* 23.84* 20.62*      HEMOGLOBIN g/dL 11.6* 12.1 13.2 12.8   PLATELETS 10*3/mm3 266 263 295 258     Results from last 7 days   Lab Units 02/11/20  0515 02/10/20  0431 02/09/20  0631 02/08/20  0504   SODIUM mmol/L 134* 133* 134* 134*   POTASSIUM mmol/L 3.5 4.0 4.0 3.5   CHLORIDE mmol/L 96* 101 105 104   CO2 mmol/L 25.9 20.8* 17.2* 15.9*   BUN mg/dL 32* 30* 30* 34*   CREATININE mg/dL 1.33* 1.18* 1.26* 1.29*   GLUCOSE mg/dL 72 88 82 99   Estimated Creatinine Clearance: 36.5 mL/min (A) (by C-G formula based on SCr of 1.33 mg/dL (H)).  Results from last 7 days   Lab Units 02/11/20  0515 02/10/20  0431 02/09/20  0631 02/08/20  0504   ALBUMIN g/dL 2.20* 2.10* 2.20* 2.10*   BILIRUBIN mg/dL 0.8 0.8 1.0 1.1   ALK PHOS U/L 58 62 70 59   AST (SGOT) U/L 21 21 25 23   ALT (SGPT) U/L 15 15 18 17     Results from last 7 days   Lab Units 02/11/20  0515 02/10/20  0431 02/09/20  0631 02/08/20  0504 02/07/20  0457 02/06/20  0458   CALCIUM mg/dL 8.6 8.0* 8.5* 8.3* 8.3* 8.2*   ALBUMIN g/dL 2.20* 2.10* 2.20* 2.10* 2.00* 2.20*   MAGNESIUM mg/dL 1.4*  --  1.8 1.5* 1.8 1.9   PHOSPHORUS mg/dL 3.9  --   --  3.3 3.1 2.6     Results from last 7 days   Lab Units 02/10/20  0431   PROCALCITONIN ng/mL 1.06*     No results found for: HGBA1C, POCGLU      atenolol 50 mg Oral Q12H   cholecalciferol 1,000 Units Oral Daily   clobetasol  Topical Q12H   enoxaparin 30 mg Subcutaneous Q24H   famotidine 20 mg Oral Daily   ipratropium-albuterol 3 mL Nebulization Q4H While Awake - RT   levothyroxine 50 mcg Oral Q AM   nystatin 5 mL Swish & Swallow 4x Daily   potassium chloride 20 mEq Oral Daily   pravastatin 40 mg Oral Nightly   sodium chloride 10 mL Intravenous Q12H   torsemide 40 mg Oral Daily      Diet Regular; Low Fat, Renal       Assessment/Plan     Active Hospital Problems    Diagnosis  POA   • **Acute pancreatitis without infection or necrosis [K85.90]  Yes   • New onset a-fib (CMS/HCC) [I48.91]  No   • Hyponatremia [E87.1]  Yes   • Hypomagnesemia [E83.42]  No   •  Leukocytosis [D72.829]  Yes   • Pneumonia [J18.9]  Yes   • LINNEA (acute kidney injury) (CMS/[facility]) [N17.9]  No   • Obesity (BMI 30-39.9) [E66.9]  Yes   • CKD (chronic kidney disease) stage 3, GFR 30-59 ml/min (CMS/[facility]) [N18.3]  Yes   • Supraventricular tachycardia (CMS/[facility]) [I47.1]  Yes   • Vitamin D deficiency [E55.9]  Yes   • Primary hypothyroidism [E03.9]  Yes   • Benign essential hypertension [I10]  Yes   • Hyperlipidemia [E78.5]  Yes   • Secondary polycythemia [D75.1]  Yes      Resolved Hospital Problems   No resolved problems to display.     Ms. Alarcon is a 86 y.o. former smoker with a history of HTN, HLD, and CKD stage III who presented with epigastric pain secondary to acute pancreatitis.     Acute pancreatitis  -PRN agents for pain, anti-emetics as needed  -Lipase normalized now  -MRCP is ok  -Seen by GI. Etiology is unclear. Possibly related to lisinopril/HCTZ vs less likely passed stone  -GI signed off. Stay off HCTZ/lisinopril. Stool softeners as needed. Low fat diet.     New onset AFib with RVR  -Due to PNA and acute pancreatitis  -Card consult appreciated  -S/p Diltiazem gtt and now on oral atenolol.  -On Lovenox for now. No A/C at discharge per Cards. Currently NSR.  -F/U in 4 weeks with Cards. They have signed off.     Right basilar community-acquired PNA/ atelectasis   -Pulmonary following.   -Leukocytosis improved today.  -Finished levaquin, added doxy dose 3/3. No further abx per pulmonary  -Pleural effusion and basilar atelectasis as expected in setting of acute pancreatitis. Repeat XR today showing the same.  -Diuretics per renal and encourage ambulation.  -Bronchodilators and encouraging IS     LINNEA/CKD stage 3  -Crt improved. Now on diuretics. Torsemide PO at discharge. Renal cleared for discharge.  -Likely due to dehydration in setting of ACE-I and HCTZ. Will stay off of this at discharge. F/U in couple weeks for renal function and volume status check.     Hypomagnesemia  -Replacing IV  today.  -Recheck in AM.     Hypertension  -Stable  -HCTZ and lisinopril held as stated above    Thrush  -Nystatin orally     VTE Prophylaxis - Lovenox 30 mg SC daily  Code Status - Full code  Disposition - Was to be discharged today, but now family wants rehab. Inpatient rehab referral today. Hold discharge. Maybe tomorrow?      CJ Saxena  [facility]  02/11/20  1:34 PM

## 2020-02-11 NOTE — NURSING NOTE
Family requested eval for acute rehab. Pt  Has been consistently walking 150ft with contact guard asst since she was admitted. OT eval and was CG and no ST ordered. Se is too high level for acute rehab and would not need 3 hrs of therapy a day. Would recommend subacute rehab. CCP Tracie Vazquez RN  Acute Rehab Admission Nurse

## 2020-02-11 NOTE — THERAPY EVALUATION
Acute Care - Occupational Therapy Initial Evaluation  [facility]     Patient Name: Mandy Alarcon  : 1933  MRN: [id number]  Today's Date: 2020             Admit Date: 2020       ICD-10-CM ICD-9-CM   1. Acute pancreatitis without infection or necrosis, unspecified pancreatitis type K85.90 577.0   2. Acute kidney injury superimposed on chronic kidney disease (CMS/HCC) N17.9 866.00    N18.9 585.9     Patient Active Problem List   Diagnosis   • Benign essential hypertension   • Claustrophobia   • Gout   • Hyperlipidemia   • Primary hypothyroidism   • Impaired fasting glucose   • Nocturnal hypoxemia   • Secondary polycythemia   • Vitamin D deficiency   • Therapeutic drug monitoring   • Family history of coronary artery disease   • Bilateral sensorineural hearing loss, wears hearing aids   • Multinodular goiter   • Supraventricular tachycardia (CMS/[facility])   • Morbidly obese (CMS/[facility])   • Acute pancreatitis without infection or necrosis   • Obesity (BMI 30-39.9)   • CKD (chronic kidney disease) stage 3, GFR 30-59 ml/min (CMS/[facility])   • LINNEA (acute kidney injury) (CMS/[facility])   • Pneumonia   • New onset a-fib (CMS/[facility])   • Hyponatremia   • Hypomagnesemia   • Leukocytosis     Past Medical History:   Diagnosis Date   • Benign essential hypertension 10/28/2012    2012--treatment for hypertension begun.   • Bilateral sensorineural hearing loss, wears hearing aids 2017    Left is much worse than right.  Patient had multiple ear infections as a child.   • Chronic renal insufficiency, stage II (mild), 2016--creatinine 1.35 2016--creatinine 1.35.  Baseline creatinine approximately 1.3.   • Claustrophobia 2016    This patient has significant nocturnal hypoxemia and I think that she could benefit from nocturnal oxygen therapy. The exact etiology of her hypoxemia is not clear. She could possibly have obstructive sleep apnea but this is not documented. We cannot test this patient  for sleep apnea due to the fact that she is severely claustrophobic. Patient was a former smoker and it is possibly that COPD he is playing a role.   • Family history of coronary artery disease 2016    Patient's mother, father, 2 sisters and a brother all  from myocardial infarctions   • Gout 10/28/2012    2012--initial diagnosis and treatment of gout.   • Hyperlipidemia 10/28/2012    2012--treatment for hyperlipidemia begun.   • Impaired fasting glucose 10/28/2012    2012--initial diagnosis impaired fasting glucose.   • Morbidly obese (CMS/HCC) 3/11/2019   • Nocturnal hypoxemia 2014--patient did not receive nocturnal oxygen because of Medicare regulations.   05/15/2014--overnight oximetry revealed oxygen saturations less than 89% for 22 minutes and 40 seconds. Oxygen saturations less than or equal to 88% for 22 minutes and 40 seconds. Lowest oxygen saturation 83%. The longest continuous time with oxygen saturations less than or equal to 88% was 1 minute and 32 seconds.   2012--overnight oximetry revealed oxygen saturations less than 90% for one hour and 35 minutes. Oxygen saturations less than 89% 59 minutes. This patient has significant nocturnal hypoxemia and I think that she could benefit from nocturnal oxygen therapy. The exact etiology of her hypoxemia is not clear. She could possibly have obstructive sleep apnea but this is not documented. We cannot test this patient for sleep apnea due to the fact that she is severely claustrophobic. Patient was a former smoker and it is possibly that COPD he is playing a role.   • Primary hypothyroidism 2015--TSH remains elevated slightly at 4.92.  Given the overall clinical picture including the multinodular goiter, we will initiate levothyroxine 50 mcg/day and reassess in about 6 weeks.  2018--thyroid ultrasound reveals a multinodular thyroid with multiple subcentimeter nodules.   Only minimal increase in size of the largest nodule in the left lobe has occurred when compared    • Secondary polycythemia 8/2/2012 11/06/2014--patient did not receive nocturnal oxygen because of Medicare regulations.   05/15/2014--overnight oximetry revealed oxygen saturations less than 89% for 22 minutes and 40 seconds. Oxygen saturations less than or equal to 88% for 22 minutes and 40 seconds. Lowest oxygen saturation 83%. The longest continuous time with oxygen saturations less than or equal to 88% was 1 minute and 32 seconds.   08/02/2012--overnight oximetry revealed oxygen saturations less than 90% for one hour and 35 minutes. Oxygen saturations less than 89% 59 minutes. This patient has significant nocturnal hypoxemia and I think that she could benefit from nocturnal oxygen therapy. The exact etiology of her hypoxemia is not clear. She could possibly have obstructive sleep apnea but this is not documented. We cannot test this patient for sleep apnea due to the fact that she is severely claustrophobic. Patient was a former smoker and it is possibly that COPD he is playing a role.   • Vitamin D deficiency 5/23/2016     Past Surgical History:   Procedure Laterality Date   • OOPHORECTOMY      age 48   • RADICAL ABDOMINAL HYSTERECTOMY  48 years old    48 years of age. Uterine fibroid tumors with menorrhagia - no cancer          OT ASSESSMENT FLOWSHEET (last 12 hours)      Occupational Therapy Evaluation    No documentation.              OT Recommendation and Plan  Outcome Summary/Treatment Plan (OT)  Anticipated Equipment Needs at Discharge (OT): four wheeled walker, shower chair  Patient/Family Concerns, Equipment Needs at Discharge (OT): family concered because pt room is on second level and pt endurance and mobility is decreased at this time  Anticipated Discharge Disposition (OT): inpatient rehabilitation facility, skilled nursing facility  Planned Therapy Interventions (OT Eval): activity tolerance  training, BADL retraining, strengthening exercise, transfer/mobility retraining  Therapy Frequency (OT Eval): 5 times/wk  Plan of Care Review  Plan of Care Reviewed With: patient, family  Plan of Care Reviewed With: patient, family  Outcome Summary: Pt 85 YO female admitted to [facility] with acute pancreatitis and PNA.  PTA, pt was very active and independent with ADLs and did not use any AE/DME.  Pt lives with her daughter and bedroom/bathroom is on the second level.  Pt presets to OT with GW, decreased endurance, decreased functional mobility, decreased ind with ADLs.  Pt perform sit to stand with CGA, perform functional mobility with CGA to walk to sink for ADL activity, tolerate standing for 2 min before requesting to sit down due to increased lower back pain.  Pt will benefit from skilled OT to address deficits and increase independence with ADLS and anticipate IP rehab/SNF to get pt back to PLOF before returning home.    Outcome Measures     Row Name 02/11/20 1400             How much help from another is currently needed...    Putting on and taking off regular lower body clothing?  2  -SK      Bathing (including washing, rinsing, and drying)  3  -SK      Toileting (which includes using toilet bed pan or urinal)  2  -SK      Putting on and taking off regular upper body clothing  3  -SK      Taking care of personal grooming (such as brushing teeth)  3  -SK      Eating meals  3  -SK      AM-PAC 6 Clicks Score (OT)  16  -SK         Functional Assessment    Outcome Measure Options  AM-PAC 6 Clicks Daily Activity (OT)  -SK        User Key  (r) = Recorded By, (t) = Taken By, (c) = Cosigned By    Initials Name Provider Type    Yanira Everett OT Occupational Therapist          Time Calculation:   Time Calculation- OT     Row Name 02/11/20 1446             Time Calculation- OT    OT Start Time  1355  -SK      OT Stop Time  1432  -SK      OT Time Calculation (min)  37 min  -SK      Total Timed Code Minutes- OT  30  minute(s)  -SK      OT Received On  02/11/20  -SK      OT Goal Re-Cert Due Date  02/18/20  -SK        User Key  (r) = Recorded By, (t) = Taken By, (c) = Cosigned By    Initials Name Provider Type    Yanira Everett OT Occupational Therapist        Therapy Charges for Today     Code Description Service Date Service Provider Modifiers Qty    [id number]  OT EVAL MOD COMPLEXITY 2 2/11/2020 Yanira Suarez OT GO 1    [id number]  OT SELF CARE/MGMT/TRAIN EA 15 MIN 2/11/2020 Yanira Suarez OT GO 2               Yanira Suarez OT  2/11/2020

## 2020-02-12 ENCOUNTER — EPISODE CHANGES (OUTPATIENT)
Dept: CASE MANAGEMENT | Facility: OTHER | Age: 85
End: 2020-02-12

## 2020-02-12 NOTE — PROGRESS NOTES
Case Management Discharge Note      Final Note: DC'd to skilled bed at [facility]    Provided post acute provider list?: Yes  Post Acute Provider List: Home Health  Delivered To: Support Person  Method of Delivery: In person    Destination - Selection Complete      Service Provider Request Status Selected Services Address Phone Number Fax Number    [facility] Selected Skilled Nursing [address] [id number] [phone]      Durable Medical Equipment      No service has been selected for the patient.      Dialysis/Infusion      No service has been selected for the patient.      Home Medical Care      No service has been selected for the patient.      Therapy      No service has been selected for the patient.      Community Resources      No service has been selected for the patient.        Transportation Services  Private: Car    Final Discharge Disposition Code: 03 - skilled nursing facility (SNF)([facility])

## 2020-02-19 ENCOUNTER — EPISODE CHANGES (OUTPATIENT)
Dept: CASE MANAGEMENT | Facility: OTHER | Age: 85
End: 2020-02-19

## 2020-02-25 ENCOUNTER — LAB REQUISITION (OUTPATIENT)
Dept: LAB | Facility: HOSPITAL | Age: 85
End: 2020-02-25

## 2020-02-25 DIAGNOSIS — Z00.00 ROUTINE GENERAL MEDICAL EXAMINATION AT A HEALTH CARE FACILITY: ICD-10-CM

## 2020-02-25 PROCEDURE — 85025 COMPLETE CBC W/AUTO DIFF WBC: CPT

## 2020-02-25 PROCEDURE — 80048 BASIC METABOLIC PNL TOTAL CA: CPT

## 2020-02-26 ENCOUNTER — TELEPHONE (OUTPATIENT)
Dept: INTERNAL MEDICINE | Facility: CLINIC | Age: 85
End: 2020-02-26

## 2020-02-26 LAB
ANION GAP SERPL CALCULATED.3IONS-SCNC: 13 MMOL/L (ref 5–15)
BASOPHILS # BLD AUTO: 0.05 10*3/MM3 (ref 0–0.2)
BASOPHILS NFR BLD AUTO: 0.5 % (ref 0–1.5)
BUN BLD-MCNC: 35 MG/DL (ref 8–23)
BUN/CREAT SERPL: 18.2 (ref 7–25)
CALCIUM SPEC-SCNC: 8.8 MG/DL (ref 8.6–10.5)
CHLORIDE SERPL-SCNC: 88 MMOL/L (ref 98–107)
CO2 SERPL-SCNC: 26 MMOL/L (ref 22–29)
CREAT BLD-MCNC: 1.92 MG/DL (ref 0.57–1)
DEPRECATED RDW RBC AUTO: 44.7 FL (ref 37–54)
EOSINOPHIL # BLD AUTO: 0.21 10*3/MM3 (ref 0–0.4)
EOSINOPHIL NFR BLD AUTO: 2 % (ref 0.3–6.2)
ERYTHROCYTE [DISTWIDTH] IN BLOOD BY AUTOMATED COUNT: 13.8 % (ref 12.3–15.4)
GFR SERPL CREATININE-BSD FRML MDRD: 25 ML/MIN/1.73
GLUCOSE BLD-MCNC: 142 MG/DL (ref 65–99)
HCT VFR BLD AUTO: 35.2 % (ref 34–46.6)
HGB BLD-MCNC: 11.9 G/DL (ref 12–15.9)
IMM GRANULOCYTES # BLD AUTO: 0.07 10*3/MM3 (ref 0–0.05)
IMM GRANULOCYTES NFR BLD AUTO: 0.7 % (ref 0–0.5)
LYMPHOCYTES # BLD AUTO: 1.87 10*3/MM3 (ref 0.7–3.1)
LYMPHOCYTES NFR BLD AUTO: 17.6 % (ref 19.6–45.3)
MCH RBC QN AUTO: 29.9 PG (ref 26.6–33)
MCHC RBC AUTO-ENTMCNC: 33.8 G/DL (ref 31.5–35.7)
MCV RBC AUTO: 88.4 FL (ref 79–97)
MONOCYTES # BLD AUTO: 1.26 10*3/MM3 (ref 0.1–0.9)
MONOCYTES NFR BLD AUTO: 11.8 % (ref 5–12)
NEUTROPHILS # BLD AUTO: 7.19 10*3/MM3 (ref 1.7–7)
NEUTROPHILS NFR BLD AUTO: 67.4 % (ref 42.7–76)
NRBC BLD AUTO-RTO: 0 /100 WBC (ref 0–0.2)
PLATELET # BLD AUTO: 261 10*3/MM3 (ref 140–450)
PMV BLD AUTO: 9.8 FL (ref 6–12)
POTASSIUM BLD-SCNC: 3.6 MMOL/L (ref 3.5–5.2)
RBC # BLD AUTO: 3.98 10*6/MM3 (ref 3.77–5.28)
SODIUM BLD-SCNC: 127 MMOL/L (ref 136–145)
WBC NRBC COR # BLD: 10.65 10*3/MM3 (ref 3.4–10.8)

## 2020-02-26 NOTE — TELEPHONE ENCOUNTER
[facility] called for orders for sn, pt, and ot for the pt being released from the hospital

## 2020-02-27 ENCOUNTER — OFFICE VISIT (OUTPATIENT)
Dept: GASTROENTEROLOGY | Facility: CLINIC | Age: 85
End: 2020-02-27

## 2020-02-27 ENCOUNTER — HOSPITAL ENCOUNTER (INPATIENT)
Facility: HOSPITAL | Age: 85
LOS: 24 days | Discharge: HOME HEALTH | End: 2020-03-22
Attending: EMERGENCY MEDICINE | Admitting: INTERNAL MEDICINE

## 2020-02-27 ENCOUNTER — APPOINTMENT (OUTPATIENT)
Dept: CARDIOLOGY | Facility: HOSPITAL | Age: 85
End: 2020-02-27

## 2020-02-27 ENCOUNTER — APPOINTMENT (OUTPATIENT)
Dept: GENERAL RADIOLOGY | Facility: HOSPITAL | Age: 85
End: 2020-02-27

## 2020-02-27 ENCOUNTER — APPOINTMENT (OUTPATIENT)
Dept: CT IMAGING | Facility: HOSPITAL | Age: 85
End: 2020-02-27

## 2020-02-27 VITALS
DIASTOLIC BLOOD PRESSURE: 62 MMHG | WEIGHT: 202.5 LBS | HEIGHT: 63 IN | BODY MASS INDEX: 35.88 KG/M2 | TEMPERATURE: 98.4 F | SYSTOLIC BLOOD PRESSURE: 116 MMHG

## 2020-02-27 DIAGNOSIS — K85.90 ACUTE PANCREATITIS, UNSPECIFIED COMPLICATION STATUS, UNSPECIFIED PANCREATITIS TYPE: Primary | ICD-10-CM

## 2020-02-27 DIAGNOSIS — Z87.19 HISTORY OF ACUTE PANCREATITIS: ICD-10-CM

## 2020-02-27 DIAGNOSIS — N17.9 ACUTE KIDNEY INJURY (HCC): ICD-10-CM

## 2020-02-27 DIAGNOSIS — K85.90 ACUTE PANCREATITIS, UNSPECIFIED COMPLICATION STATUS, UNSPECIFIED PANCREATITIS TYPE: ICD-10-CM

## 2020-02-27 DIAGNOSIS — E87.1 HYPONATREMIA: ICD-10-CM

## 2020-02-27 DIAGNOSIS — R11.14 BILIOUS VOMITING WITH NAUSEA: Primary | ICD-10-CM

## 2020-02-27 DIAGNOSIS — R50.9 FEVER, UNSPECIFIED FEVER CAUSE: ICD-10-CM

## 2020-02-27 PROBLEM — K85.00 IDIOPATHIC ACUTE PANCREATITIS: Status: ACTIVE | Noted: 2020-02-27

## 2020-02-27 LAB
ALBUMIN SERPL-MCNC: 2.8 G/DL (ref 3.5–5.2)
ALBUMIN/GLOB SERPL: 0.7 G/DL
ALP SERPL-CCNC: 80 U/L (ref 39–117)
ALT SERPL W P-5'-P-CCNC: 14 U/L (ref 1–33)
AMYLASE SERPL-CCNC: 61 U/L (ref 28–100)
ANION GAP SERPL CALCULATED.3IONS-SCNC: 13 MMOL/L (ref 5–15)
AST SERPL-CCNC: 19 U/L (ref 1–32)
BACTERIA UR QL AUTO: ABNORMAL /HPF
BASOPHILS # BLD AUTO: 0.04 10*3/MM3 (ref 0–0.2)
BASOPHILS NFR BLD AUTO: 0.3 % (ref 0–1.5)
BH CV LOWER VASCULAR LEFT COMMON FEMORAL AUGMENT: NORMAL
BH CV LOWER VASCULAR LEFT COMMON FEMORAL COMPETENT: NORMAL
BH CV LOWER VASCULAR LEFT COMMON FEMORAL COMPRESS: NORMAL
BH CV LOWER VASCULAR LEFT COMMON FEMORAL PHASIC: NORMAL
BH CV LOWER VASCULAR LEFT COMMON FEMORAL SPONT: NORMAL
BH CV LOWER VASCULAR LEFT DISTAL FEMORAL COMPRESS: NORMAL
BH CV LOWER VASCULAR LEFT GASTRONEMIUS COMPRESS: NORMAL
BH CV LOWER VASCULAR LEFT GREATER SAPH AK COMPRESS: NORMAL
BH CV LOWER VASCULAR LEFT GREATER SAPH BK COMPRESS: NORMAL
BH CV LOWER VASCULAR LEFT MID FEMORAL AUGMENT: NORMAL
BH CV LOWER VASCULAR LEFT MID FEMORAL COMPETENT: NORMAL
BH CV LOWER VASCULAR LEFT MID FEMORAL COMPRESS: NORMAL
BH CV LOWER VASCULAR LEFT MID FEMORAL PHASIC: NORMAL
BH CV LOWER VASCULAR LEFT MID FEMORAL SPONT: NORMAL
BH CV LOWER VASCULAR LEFT PERONEAL COMPRESS: NORMAL
BH CV LOWER VASCULAR LEFT POPLITEAL AUGMENT: NORMAL
BH CV LOWER VASCULAR LEFT POPLITEAL COMPETENT: NORMAL
BH CV LOWER VASCULAR LEFT POPLITEAL COMPRESS: NORMAL
BH CV LOWER VASCULAR LEFT POPLITEAL PHASIC: NORMAL
BH CV LOWER VASCULAR LEFT POPLITEAL SPONT: NORMAL
BH CV LOWER VASCULAR LEFT POSTERIOR TIBIAL COMPRESS: NORMAL
BH CV LOWER VASCULAR LEFT PROFUNDA FEMORAL COMPRESS: NORMAL
BH CV LOWER VASCULAR LEFT PROXIMAL FEMORAL COMPRESS: NORMAL
BH CV LOWER VASCULAR LEFT SAPHENOFEMORAL JUNCTION COMPRESS: NORMAL
BH CV LOWER VASCULAR RIGHT COMMON FEMORAL AUGMENT: NORMAL
BH CV LOWER VASCULAR RIGHT COMMON FEMORAL COMPETENT: NORMAL
BH CV LOWER VASCULAR RIGHT COMMON FEMORAL COMPRESS: NORMAL
BH CV LOWER VASCULAR RIGHT COMMON FEMORAL PHASIC: NORMAL
BH CV LOWER VASCULAR RIGHT COMMON FEMORAL SPONT: NORMAL
BH CV LOWER VASCULAR RIGHT DISTAL FEMORAL COMPRESS: NORMAL
BH CV LOWER VASCULAR RIGHT GASTRONEMIUS COMPRESS: NORMAL
BH CV LOWER VASCULAR RIGHT GREATER SAPH AK COMPRESS: NORMAL
BH CV LOWER VASCULAR RIGHT GREATER SAPH BK COMPRESS: NORMAL
BH CV LOWER VASCULAR RIGHT MID FEMORAL AUGMENT: NORMAL
BH CV LOWER VASCULAR RIGHT MID FEMORAL COMPETENT: NORMAL
BH CV LOWER VASCULAR RIGHT MID FEMORAL COMPRESS: NORMAL
BH CV LOWER VASCULAR RIGHT MID FEMORAL PHASIC: NORMAL
BH CV LOWER VASCULAR RIGHT MID FEMORAL SPONT: NORMAL
BH CV LOWER VASCULAR RIGHT PERONEAL COMPRESS: NORMAL
BH CV LOWER VASCULAR RIGHT POPLITEAL AUGMENT: NORMAL
BH CV LOWER VASCULAR RIGHT POPLITEAL COMPETENT: NORMAL
BH CV LOWER VASCULAR RIGHT POPLITEAL COMPRESS: NORMAL
BH CV LOWER VASCULAR RIGHT POPLITEAL PHASIC: NORMAL
BH CV LOWER VASCULAR RIGHT POPLITEAL SPONT: NORMAL
BH CV LOWER VASCULAR RIGHT POSTERIOR TIBIAL COMPRESS: NORMAL
BH CV LOWER VASCULAR RIGHT PROFUNDA FEMORAL COMPRESS: NORMAL
BH CV LOWER VASCULAR RIGHT PROXIMAL FEMORAL COMPRESS: NORMAL
BH CV LOWER VASCULAR RIGHT SAPHENOFEMORAL JUNCTION COMPRESS: NORMAL
BILIRUB SERPL-MCNC: 0.8 MG/DL (ref 0.2–1.2)
BILIRUB UR QL STRIP: NEGATIVE
BUN BLD-MCNC: 25 MG/DL (ref 8–23)
BUN/CREAT SERPL: 15.5 (ref 7–25)
CALCIUM SPEC-SCNC: 9 MG/DL (ref 8.6–10.5)
CHLORIDE SERPL-SCNC: 93 MMOL/L (ref 98–107)
CLARITY UR: ABNORMAL
CO2 SERPL-SCNC: 25 MMOL/L (ref 22–29)
COLOR UR: YELLOW
CREAT BLD-MCNC: 1.61 MG/DL (ref 0.57–1)
D-LACTATE SERPL-SCNC: 1.2 MMOL/L (ref 0.5–2)
DEPRECATED RDW RBC AUTO: 46.8 FL (ref 37–54)
EOSINOPHIL # BLD AUTO: 0.07 10*3/MM3 (ref 0–0.4)
EOSINOPHIL NFR BLD AUTO: 0.6 % (ref 0.3–6.2)
ERYTHROCYTE [DISTWIDTH] IN BLOOD BY AUTOMATED COUNT: 14.3 % (ref 12.3–15.4)
GFR SERPL CREATININE-BSD FRML MDRD: 30 ML/MIN/1.73
GLOBULIN UR ELPH-MCNC: 4.2 GM/DL
GLUCOSE BLD-MCNC: 115 MG/DL (ref 65–99)
GLUCOSE UR STRIP-MCNC: NEGATIVE MG/DL
HCT VFR BLD AUTO: 37.4 % (ref 34–46.6)
HGB BLD-MCNC: 12.2 G/DL (ref 12–15.9)
HGB UR QL STRIP.AUTO: NEGATIVE
HYALINE CASTS UR QL AUTO: ABNORMAL /LPF
IMM GRANULOCYTES # BLD AUTO: 0.06 10*3/MM3 (ref 0–0.05)
IMM GRANULOCYTES NFR BLD AUTO: 0.5 % (ref 0–0.5)
KETONES UR QL STRIP: NEGATIVE
LEUKOCYTE ESTERASE UR QL STRIP.AUTO: ABNORMAL
LIPASE SERPL-CCNC: 62 U/L (ref 13–60)
LYMPHOCYTES # BLD AUTO: 0.93 10*3/MM3 (ref 0.7–3.1)
LYMPHOCYTES NFR BLD AUTO: 7.8 % (ref 19.6–45.3)
MAGNESIUM SERPL-MCNC: 1.7 MG/DL (ref 1.6–2.4)
MCH RBC QN AUTO: 29.3 PG (ref 26.6–33)
MCHC RBC AUTO-ENTMCNC: 32.6 G/DL (ref 31.5–35.7)
MCV RBC AUTO: 89.7 FL (ref 79–97)
MONOCYTES # BLD AUTO: 1.53 10*3/MM3 (ref 0.1–0.9)
MONOCYTES NFR BLD AUTO: 12.8 % (ref 5–12)
NEUTROPHILS # BLD AUTO: 9.3 10*3/MM3 (ref 1.7–7)
NEUTROPHILS NFR BLD AUTO: 78 % (ref 42.7–76)
NITRITE UR QL STRIP: NEGATIVE
NRBC BLD AUTO-RTO: 0 /100 WBC (ref 0–0.2)
PH UR STRIP.AUTO: <=5 [PH] (ref 5–8)
PLATELET # BLD AUTO: 207 10*3/MM3 (ref 140–450)
PMV BLD AUTO: 9.1 FL (ref 6–12)
POTASSIUM BLD-SCNC: 4.1 MMOL/L (ref 3.5–5.2)
PROCALCITONIN SERPL-MCNC: 0.28 NG/ML (ref 0.1–0.25)
PROT SERPL-MCNC: 7 G/DL (ref 6–8.5)
PROT UR QL STRIP: ABNORMAL
RBC # BLD AUTO: 4.17 10*6/MM3 (ref 3.77–5.28)
RBC # UR: ABNORMAL /HPF
REF LAB TEST METHOD: ABNORMAL
SODIUM BLD-SCNC: 131 MMOL/L (ref 136–145)
SP GR UR STRIP: 1.02 (ref 1–1.03)
SQUAMOUS #/AREA URNS HPF: ABNORMAL /HPF
UROBILINOGEN UR QL STRIP: ABNORMAL
WBC NRBC COR # BLD: 11.93 10*3/MM3 (ref 3.4–10.8)
WBC UR QL AUTO: ABNORMAL /HPF

## 2020-02-27 PROCEDURE — 83735 ASSAY OF MAGNESIUM: CPT | Performed by: NURSE PRACTITIONER

## 2020-02-27 PROCEDURE — 99214 OFFICE O/P EST MOD 30 MIN: CPT | Performed by: INTERNAL MEDICINE

## 2020-02-27 PROCEDURE — 71250 CT THORAX DX C-: CPT

## 2020-02-27 PROCEDURE — 93970 EXTREMITY STUDY: CPT

## 2020-02-27 PROCEDURE — 84145 PROCALCITONIN (PCT): CPT | Performed by: EMERGENCY MEDICINE

## 2020-02-27 PROCEDURE — 25010000002 VANCOMYCIN 10 G RECONSTITUTED SOLUTION: Performed by: EMERGENCY MEDICINE

## 2020-02-27 PROCEDURE — 83605 ASSAY OF LACTIC ACID: CPT | Performed by: EMERGENCY MEDICINE

## 2020-02-27 PROCEDURE — 74176 CT ABD & PELVIS W/O CONTRAST: CPT

## 2020-02-27 PROCEDURE — 83690 ASSAY OF LIPASE: CPT | Performed by: EMERGENCY MEDICINE

## 2020-02-27 PROCEDURE — 81001 URINALYSIS AUTO W/SCOPE: CPT | Performed by: EMERGENCY MEDICINE

## 2020-02-27 PROCEDURE — 87040 BLOOD CULTURE FOR BACTERIA: CPT | Performed by: EMERGENCY MEDICINE

## 2020-02-27 PROCEDURE — 85025 COMPLETE CBC W/AUTO DIFF WBC: CPT | Performed by: EMERGENCY MEDICINE

## 2020-02-27 PROCEDURE — 82150 ASSAY OF AMYLASE: CPT | Performed by: EMERGENCY MEDICINE

## 2020-02-27 PROCEDURE — 71046 X-RAY EXAM CHEST 2 VIEWS: CPT

## 2020-02-27 PROCEDURE — 25010000002 ENOXAPARIN PER 10 MG: Performed by: NURSE PRACTITIONER

## 2020-02-27 PROCEDURE — 80053 COMPREHEN METABOLIC PANEL: CPT | Performed by: EMERGENCY MEDICINE

## 2020-02-27 PROCEDURE — 99284 EMERGENCY DEPT VISIT MOD MDM: CPT

## 2020-02-27 RX ORDER — LEVOTHYROXINE SODIUM 0.05 MG/1
50 TABLET ORAL DAILY
COMMUNITY
End: 2020-11-30

## 2020-02-27 RX ORDER — POLYETHYLENE GLYCOL 3350 17 G/17G
17 POWDER, FOR SOLUTION ORAL DAILY
COMMUNITY
End: 2020-04-17 | Stop reason: SDUPTHER

## 2020-02-27 RX ORDER — SODIUM CHLORIDE 0.9 % (FLUSH) 0.9 %
10 SYRINGE (ML) INJECTION AS NEEDED
Status: DISCONTINUED | OUTPATIENT
Start: 2020-02-27 | End: 2020-03-22 | Stop reason: HOSPADM

## 2020-02-27 RX ORDER — ONDANSETRON 4 MG/1
4 TABLET, ORALLY DISINTEGRATING ORAL EVERY 8 HOURS PRN
COMMUNITY
End: 2020-03-23 | Stop reason: SDUPTHER

## 2020-02-27 RX ORDER — FAMOTIDINE 10 MG/ML
20 INJECTION, SOLUTION INTRAVENOUS DAILY
Status: DISCONTINUED | OUTPATIENT
Start: 2020-02-27 | End: 2020-03-08

## 2020-02-27 RX ORDER — SODIUM CHLORIDE 0.9 % (FLUSH) 0.9 %
10 SYRINGE (ML) INJECTION EVERY 12 HOURS SCHEDULED
Status: DISCONTINUED | OUTPATIENT
Start: 2020-02-27 | End: 2020-03-22 | Stop reason: HOSPADM

## 2020-02-27 RX ORDER — ONDANSETRON 2 MG/ML
4 INJECTION INTRAMUSCULAR; INTRAVENOUS EVERY 6 HOURS PRN
Status: DISCONTINUED | OUTPATIENT
Start: 2020-02-27 | End: 2020-03-22 | Stop reason: HOSPADM

## 2020-02-27 RX ORDER — SACCHAROMYCES BOULARDII 250 MG
250 CAPSULE ORAL DAILY
COMMUNITY
End: 2020-04-21

## 2020-02-27 RX ORDER — ALLOPURINOL 300 MG/1
300 TABLET ORAL DAILY
COMMUNITY
End: 2021-03-15 | Stop reason: SDUPTHER

## 2020-02-27 RX ORDER — LEVOTHYROXINE SODIUM 0.05 MG/1
50 TABLET ORAL
Status: DISCONTINUED | OUTPATIENT
Start: 2020-02-28 | End: 2020-03-22 | Stop reason: HOSPADM

## 2020-02-27 RX ORDER — NITROGLYCERIN 0.4 MG/1
0.4 TABLET SUBLINGUAL
Status: DISCONTINUED | OUTPATIENT
Start: 2020-02-27 | End: 2020-03-22 | Stop reason: HOSPADM

## 2020-02-27 RX ORDER — HYDROCODONE BITARTRATE AND ACETAMINOPHEN 7.5; 325 MG/1; MG/1
1 TABLET ORAL EVERY 4 HOURS PRN
COMMUNITY
End: 2020-03-23

## 2020-02-27 RX ORDER — PROMETHAZINE HYDROCHLORIDE 25 MG/ML
12.5 INJECTION, SOLUTION INTRAMUSCULAR; INTRAVENOUS EVERY 6 HOURS PRN
Status: DISCONTINUED | OUTPATIENT
Start: 2020-02-27 | End: 2020-03-22 | Stop reason: HOSPADM

## 2020-02-27 RX ORDER — ATENOLOL 50 MG/1
50 TABLET ORAL EVERY 12 HOURS SCHEDULED
Status: DISCONTINUED | OUTPATIENT
Start: 2020-02-27 | End: 2020-03-22 | Stop reason: HOSPADM

## 2020-02-27 RX ORDER — HYDROMORPHONE HYDROCHLORIDE 1 MG/ML
0.5 INJECTION, SOLUTION INTRAMUSCULAR; INTRAVENOUS; SUBCUTANEOUS
Status: DISPENSED | OUTPATIENT
Start: 2020-02-27 | End: 2020-03-08

## 2020-02-27 RX ORDER — ACETAMINOPHEN 325 MG/1
650 TABLET ORAL EVERY 4 HOURS PRN
Status: DISCONTINUED | OUTPATIENT
Start: 2020-02-27 | End: 2020-03-22 | Stop reason: HOSPADM

## 2020-02-27 RX ORDER — SODIUM CHLORIDE, SODIUM LACTATE, POTASSIUM CHLORIDE, CALCIUM CHLORIDE 600; 310; 30; 20 MG/100ML; MG/100ML; MG/100ML; MG/100ML
150 INJECTION, SOLUTION INTRAVENOUS CONTINUOUS
Status: ACTIVE | OUTPATIENT
Start: 2020-02-27 | End: 2020-02-28

## 2020-02-27 RX ORDER — ROSUVASTATIN CALCIUM 5 MG/1
5 TABLET, COATED ORAL NIGHTLY
COMMUNITY
End: 2020-04-10 | Stop reason: ALTCHOICE

## 2020-02-27 RX ORDER — ACETAMINOPHEN 650 MG/1
650 SUPPOSITORY RECTAL EVERY 4 HOURS PRN
Status: DISCONTINUED | OUTPATIENT
Start: 2020-02-27 | End: 2020-03-22 | Stop reason: HOSPADM

## 2020-02-27 RX ORDER — NALOXONE HCL 0.4 MG/ML
0.4 VIAL (ML) INJECTION
Status: DISCONTINUED | OUTPATIENT
Start: 2020-02-27 | End: 2020-03-22 | Stop reason: HOSPADM

## 2020-02-27 RX ORDER — BISACODYL 10 MG
10 SUPPOSITORY, RECTAL RECTAL DAILY
COMMUNITY
End: 2020-04-10

## 2020-02-27 RX ORDER — OXYCODONE AND ACETAMINOPHEN 7.5; 325 MG/1; MG/1
1 TABLET ORAL EVERY 4 HOURS PRN
Status: DISPENSED | OUTPATIENT
Start: 2020-02-27 | End: 2020-03-08

## 2020-02-27 RX ORDER — ACETAMINOPHEN 160 MG/5ML
650 SOLUTION ORAL EVERY 4 HOURS PRN
Status: DISCONTINUED | OUTPATIENT
Start: 2020-02-27 | End: 2020-03-22 | Stop reason: HOSPADM

## 2020-02-27 RX ADMIN — SODIUM CHLORIDE, POTASSIUM CHLORIDE, SODIUM LACTATE AND CALCIUM CHLORIDE 150 ML/HR: 600; 310; 30; 20 INJECTION, SOLUTION INTRAVENOUS at 18:42

## 2020-02-27 RX ADMIN — METRONIDAZOLE 500 MG: 500 INJECTION, SOLUTION INTRAVENOUS at 15:33

## 2020-02-27 RX ADMIN — ATENOLOL 50 MG: 50 TABLET ORAL at 21:48

## 2020-02-27 RX ADMIN — SODIUM CHLORIDE 1000 ML: 9 INJECTION, SOLUTION INTRAVENOUS at 14:55

## 2020-02-27 RX ADMIN — SODIUM CHLORIDE, PRESERVATIVE FREE 10 ML: 5 INJECTION INTRAVENOUS at 21:51

## 2020-02-27 RX ADMIN — SODIUM CHLORIDE, PRESERVATIVE FREE 10 ML: 5 INJECTION INTRAVENOUS at 18:42

## 2020-02-27 RX ADMIN — ENOXAPARIN SODIUM 30 MG: 30 INJECTION SUBCUTANEOUS at 21:48

## 2020-02-27 RX ADMIN — AZTREONAM 2 G: 2 INJECTION, POWDER, LYOPHILIZED, FOR SOLUTION INTRAMUSCULAR; INTRAVENOUS at 14:53

## 2020-02-27 RX ADMIN — SODIUM CHLORIDE 1000 ML: 9 INJECTION, SOLUTION INTRAVENOUS at 10:29

## 2020-02-27 RX ADMIN — VANCOMYCIN HYDROCHLORIDE 1750 MG: 10 INJECTION, POWDER, LYOPHILIZED, FOR SOLUTION INTRAVENOUS at 16:55

## 2020-02-27 RX ADMIN — OXYCODONE HYDROCHLORIDE AND ACETAMINOPHEN 1 TABLET: 7.5; 325 TABLET ORAL at 21:48

## 2020-02-27 NOTE — PROGRESS NOTES
Subjective   Chief Complaint   Patient presents with   • Anorexia   • Vomiting   • Abdominal Pain       Mandy Alarcon is a  86 y.o. female here for a follow up visit for recent hospitalization for acute pancreatitis.  She presents today with her 2 daughters.    Patient was recently admitted to the hospital with acute idiopathic pancreatitis.  No obvious cause for her attack was delineated during her hospitalization.  She was on a lisinopril/hydrochlorothiazide antihypertensive prior to admission which was discontinued due to the possibility that this was the etiology.  She did have an MRCP with normal anatomy.  She had a thick-walled distended gallbladder but no gallstones.  She progressively improved throughout the course of her hospitalization from a pancreatitis standpoint.  Her hospitalization was complicated by atrial fibrillation which was new as well as pneumonia which was treated with antibiotics.  She was discharged to rehab.  She presents today complaining of diffuse abdominal pain and chest pain with bilious vomiting and poor p.o. intake for the past 4 days.  This is progressively worsened over the weekend.  She did receive IV fluids yesterday.  Her creatinine has worsened to 2 measured on 2/24.  It was 1.4 on 2/12.  She is now hyponatremic as of her most recent labs with a sodium of 129.  Her white blood cell count is normal, hemoglobin is 12.7 up from 11.8 but I expect that she is volume contracted.  She had a fever to 101.5 last night.  She reports that her chest hurts when she takes a deep breath.  She is not coughing.  She denies dysuria.  She appears dehydrated.  She did have a run of A. fib witnessed by her daughter but is currently in sinus rhythm.  Bowel movements have been normal although have been less frequent since she stopped eating.  HPI  Past Medical History:   Diagnosis Date   • Benign essential hypertension 10/28/2012    October 2012--treatment for hypertension begun.   • Bilateral  sensorineural hearing loss, wears hearing aids 2017    Left is much worse than right.  Patient had multiple ear infections as a child.   • Chronic renal insufficiency, stage II (mild), 2016--creatinine 1.35 2016--creatinine 1.35.  Baseline creatinine approximately 1.3.   • Claustrophobia 2016    This patient has significant nocturnal hypoxemia and I think that she could benefit from nocturnal oxygen therapy. The exact etiology of her hypoxemia is not clear. She could possibly have obstructive sleep apnea but this is not documented. We cannot test this patient for sleep apnea due to the fact that she is severely claustrophobic. Patient was a former smoker and it is possibly that COPD he is playing a role.   • Family history of coronary artery disease 2016    Patient's mother, father, 2 sisters and a brother all  from myocardial infarctions   • Gout 10/28/2012    2012--initial diagnosis and treatment of gout.   • Hyperlipidemia 10/28/2012    2012--treatment for hyperlipidemia begun.   • Impaired fasting glucose 10/28/2012    2012--initial diagnosis impaired fasting glucose.   • Morbidly obese (CMS/HCC) 3/11/2019   • Nocturnal hypoxemia 2014--patient did not receive nocturnal oxygen because of Medicare regulations.   05/15/2014--overnight oximetry revealed oxygen saturations less than 89% for 22 minutes and 40 seconds. Oxygen saturations less than or equal to 88% for 22 minutes and 40 seconds. Lowest oxygen saturation 83%. The longest continuous time with oxygen saturations less than or equal to 88% was 1 minute and 32 seconds.   2012--overnight oximetry revealed oxygen saturations less than 90% for one hour and 35 minutes. Oxygen saturations less than 89% 59 minutes. This patient has significant nocturnal hypoxemia and I think that she could benefit from nocturnal oxygen therapy. The exact etiology of her hypoxemia is not  clear. She could possibly have obstructive sleep apnea but this is not documented. We cannot test this patient for sleep apnea due to the fact that she is severely claustrophobic. Patient was a former smoker and it is possibly that COPD he is playing a role.   • Primary hypothyroidism 11/17/2015 March 11, 2019--TSH remains elevated slightly at 4.92.  Given the overall clinical picture including the multinodular goiter, we will initiate levothyroxine 50 mcg/day and reassess in about 6 weeks.  August 1, 2018--thyroid ultrasound reveals a multinodular thyroid with multiple subcentimeter nodules.  Only minimal increase in size of the largest nodule in the left lobe has occurred when compared    • Secondary polycythemia 8/2/2012 11/06/2014--patient did not receive nocturnal oxygen because of Medicare regulations.   05/15/2014--overnight oximetry revealed oxygen saturations less than 89% for 22 minutes and 40 seconds. Oxygen saturations less than or equal to 88% for 22 minutes and 40 seconds. Lowest oxygen saturation 83%. The longest continuous time with oxygen saturations less than or equal to 88% was 1 minute and 32 seconds.   08/02/2012--overnight oximetry revealed oxygen saturations less than 90% for one hour and 35 minutes. Oxygen saturations less than 89% 59 minutes. This patient has significant nocturnal hypoxemia and I think that she could benefit from nocturnal oxygen therapy. The exact etiology of her hypoxemia is not clear. She could possibly have obstructive sleep apnea but this is not documented. We cannot test this patient for sleep apnea due to the fact that she is severely claustrophobic. Patient was a former smoker and it is possibly that COPD he is playing a role.   • Vitamin D deficiency 5/23/2016     Past Surgical History:   Procedure Laterality Date   • OOPHORECTOMY      age 48   • RADICAL ABDOMINAL HYSTERECTOMY  48 years old    48 years of age. Uterine fibroid tumors with menorrhagia - no  cancer       Current Outpatient Medications:   •  allopurinol (ZYLOPRIM) 300 MG tablet, Take 1 p.o. daily for gout, Disp: 90 tablet, Rfl: 3  •  atenolol (TENORMIN) 50 MG tablet, Take 1 tablet by mouth Every 12 (Twelve) Hours., Disp: 60 tablet, Rfl:   •  Biotin 10 MG tablet, Take 1 tablet by mouth Daily., Disp: , Rfl:   •  Cholecalciferol (VITAMIN D) 2000 UNITS tablet, Take 2 tablets by mouth daily., Disp: , Rfl:   •  clobetasol (TEMOVATE) 0.05 % ointment, Apply  topically to the appropriate area as directed 2 (Two) Times a Day., Disp: 15 g, Rfl: 0  •  docusate sodium (COLACE) 100 MG capsule, Take 1 capsule by mouth 2 (Two) Times a Day As Needed for Constipation., Disp: , Rfl:   •  famotidine (PEPCID) 20 MG tablet, Take 1 tablet by mouth Daily., Disp: , Rfl:   •  halobetasol (ULTRAVATE) 0.05 % ointment, Apply pea-size amount daily to affected area for 2 months. Then apply every other day for 2 weeks then twice weekly, Disp: 15 g, Rfl: 2  •  levothyroxine (SYNTHROID, LEVOTHROID) 50 MCG tablet, Take 1 p.o. every morning for thyroid, Disp: 90 tablet, Rfl: 3  •  nystatin (MYCOSTATIN) 568111 UNIT/ML suspension, Swish and swallow 5 mL 4 (Four) Times a Day., Disp: , Rfl:   •  potassium chloride (MICRO-K) 10 MEQ CR capsule, Take 2 capsules by mouth Daily., Disp: , Rfl:   •  rosuvastatin (CRESTOR) 5 MG tablet, Take 5 mg by mouth Daily., Disp: , Rfl:   •  torsemide (DEMADEX) 20 MG tablet, Take 2 tablets by mouth Daily., Disp: , Rfl:   •  pravastatin (PRAVACHOL) 40 MG tablet, Take 1 p.o. daily for high cholesterol, Disp: 90 tablet, Rfl: 3  PRN Meds:.  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   Substance and Sexual Activity   • Alcohol use: No   • Drug use: No   • Sexual activity: Not Currently     Partners: Male   Lifestyle   • Physical activity:     Days per week: 0 days     Minutes per session: 0 min   • Stress: Not at all   Relationships   • Social connections:     Talks on phone: More than three times a week     Gets together: Three times a week     Attends Holiness service: More than 4 times per year     Active member of club or organization: No     Attends meetings of clubs or organizations: Never     Relationship status:      Family History   Problem Relation Age of Onset   • Heart disease Mother         Ischemic.  from coronary artery disease.   • Heart disease Father         Ischemic.  from coronary artery disease.   • Heart disease Sister         Ischemic.  from coronary artery disease.   • Heart disease Brother         Ischemic.  from coronary artery disease.   • Heart disease Sister         Ischemic.  from coronary artery disease.   • Breast cancer Daughter      Review of Systems   Constitutional: Positive for appetite change and fever.   Respiratory: Negative for cough and shortness of breath.    Cardiovascular: Positive for chest pain. Negative for leg swelling.   Gastrointestinal: Positive for abdominal pain, nausea and vomiting. Negative for constipation and diarrhea.   Genitourinary: Negative for dysuria.   Psychiatric/Behavioral: Negative for confusion.   All other systems reviewed and are negative.    Vitals:    20  0831   BP: 116/62   Temp: 98.4 °F (36.9 °C)         02/27/20  0831   Weight: 91.9 kg (202 lb 8 oz)       Objective   Physical Exam   Constitutional: She appears well-developed and well-nourished.   Appears ill, vomiting bilious fluid   HENT:   Head: Normocephalic and atraumatic.   Eyes: No scleral icterus.   Cardiovascular: Normal rate and regular rhythm.   No murmur heard.  Pulmonary/Chest: Effort normal and breath sounds normal.   Abdominal: Soft. She exhibits no distension and no mass. There is no tenderness.   Musculoskeletal: She exhibits no edema.   Neurological: She is alert.   Skin: Skin is warm and dry.   Psychiatric: She has a normal mood and affect.     No radiology results for the last 7 days    Assessment/Plan   Diagnoses and all orders for this visit:    Bilious vomiting with nausea    Acute kidney injury (CMS/HCC)    Hyponatremia    Fever, unspecified fever cause    History of acute pancreatitis    Other orders  -     rosuvastatin (CRESTOR) 5 MG tablet; Take 5 mg by mouth Daily.      Plan:  · Patient presents with worsening p.o. and laboratory values suggesting worsening renal failure, hyponatremia.  She is experienced bilious vomiting which is been witnessed by me.  She is also been febrile overnight.  She had a recent prolonged hospitalization for acute pancreatitis complicated by A. fib and pneumonia.  She is clinically dehydrated at present with multiple abnormal labs and warrants hospital admission.  Would recommend repeat imaging of her abdomen to follow-up on her previous pancreatitis.  She also needs an infectious work-up with a fever and pleuritic chest pain, recent pneumonia.  [facility] for [facility] and we are happy to follow her while she is admitted for her GI complaints.    Greater than 25 minutes spent participating in patient care with greater than half spent in direct contact with patient care and counseling

## 2020-02-27 NOTE — TELEPHONE ENCOUNTER
Called to give home health the ok for orders, home health says pt has been admitted back to hospital

## 2020-02-28 ENCOUNTER — APPOINTMENT (OUTPATIENT)
Dept: ULTRASOUND IMAGING | Facility: HOSPITAL | Age: 85
End: 2020-02-28

## 2020-02-28 ENCOUNTER — EPISODE CHANGES (OUTPATIENT)
Dept: CASE MANAGEMENT | Facility: OTHER | Age: 85
End: 2020-02-28

## 2020-02-28 LAB
ALBUMIN SERPL-MCNC: 2.1 G/DL (ref 3.5–5.2)
ALBUMIN/GLOB SERPL: 0.6 G/DL
ALP SERPL-CCNC: 65 U/L (ref 39–117)
ALT SERPL W P-5'-P-CCNC: 11 U/L (ref 1–33)
ANION GAP SERPL CALCULATED.3IONS-SCNC: 11.6 MMOL/L (ref 5–15)
AST SERPL-CCNC: 15 U/L (ref 1–32)
BASOPHILS # BLD AUTO: 0.04 10*3/MM3 (ref 0–0.2)
BASOPHILS NFR BLD AUTO: 0.5 % (ref 0–1.5)
BILIRUB SERPL-MCNC: 0.6 MG/DL (ref 0.2–1.2)
BUN BLD-MCNC: 17 MG/DL (ref 8–23)
BUN/CREAT SERPL: 14.8 (ref 7–25)
CALCIUM SPEC-SCNC: 8.8 MG/DL (ref 8.6–10.5)
CHLORIDE SERPL-SCNC: 100 MMOL/L (ref 98–107)
CO2 SERPL-SCNC: 21.4 MMOL/L (ref 22–29)
CREAT BLD-MCNC: 1.15 MG/DL (ref 0.57–1)
DEPRECATED RDW RBC AUTO: 44.5 FL (ref 37–54)
EOSINOPHIL # BLD AUTO: 0.18 10*3/MM3 (ref 0–0.4)
EOSINOPHIL NFR BLD AUTO: 2.2 % (ref 0.3–6.2)
ERYTHROCYTE [DISTWIDTH] IN BLOOD BY AUTOMATED COUNT: 13.8 % (ref 12.3–15.4)
GFR SERPL CREATININE-BSD FRML MDRD: 45 ML/MIN/1.73
GLOBULIN UR ELPH-MCNC: 3.6 GM/DL
GLUCOSE BLD-MCNC: 91 MG/DL (ref 65–99)
HCT VFR BLD AUTO: 31.6 % (ref 34–46.6)
HGB BLD-MCNC: 10.4 G/DL (ref 12–15.9)
IMM GRANULOCYTES # BLD AUTO: 0.06 10*3/MM3 (ref 0–0.05)
IMM GRANULOCYTES NFR BLD AUTO: 0.7 % (ref 0–0.5)
LIPASE SERPL-CCNC: 52 U/L (ref 13–60)
LYMPHOCYTES # BLD AUTO: 0.84 10*3/MM3 (ref 0.7–3.1)
LYMPHOCYTES NFR BLD AUTO: 10.2 % (ref 19.6–45.3)
MCH RBC QN AUTO: 29.1 PG (ref 26.6–33)
MCHC RBC AUTO-ENTMCNC: 32.9 G/DL (ref 31.5–35.7)
MCV RBC AUTO: 88.3 FL (ref 79–97)
MONOCYTES # BLD AUTO: 1.08 10*3/MM3 (ref 0.1–0.9)
MONOCYTES NFR BLD AUTO: 13.1 % (ref 5–12)
NEUTROPHILS # BLD AUTO: 6.05 10*3/MM3 (ref 1.7–7)
NEUTROPHILS NFR BLD AUTO: 73.3 % (ref 42.7–76)
NRBC BLD AUTO-RTO: 0 /100 WBC (ref 0–0.2)
PLATELET # BLD AUTO: 192 10*3/MM3 (ref 140–450)
PMV BLD AUTO: 9.1 FL (ref 6–12)
POTASSIUM BLD-SCNC: 4 MMOL/L (ref 3.5–5.2)
PROT SERPL-MCNC: 5.7 G/DL (ref 6–8.5)
RBC # BLD AUTO: 3.58 10*6/MM3 (ref 3.77–5.28)
SODIUM BLD-SCNC: 133 MMOL/L (ref 136–145)
VANCOMYCIN SERPL-MCNC: 13.6 MCG/ML (ref 5–40)
WBC NRBC COR # BLD: 8.25 10*3/MM3 (ref 3.4–10.8)

## 2020-02-28 PROCEDURE — 76705 ECHO EXAM OF ABDOMEN: CPT

## 2020-02-28 PROCEDURE — 99222 1ST HOSP IP/OBS MODERATE 55: CPT | Performed by: SURGERY

## 2020-02-28 PROCEDURE — 25010000002 VANCOMYCIN 10 G RECONSTITUTED SOLUTION: Performed by: HOSPITALIST

## 2020-02-28 PROCEDURE — 85025 COMPLETE CBC W/AUTO DIFF WBC: CPT | Performed by: NURSE PRACTITIONER

## 2020-02-28 PROCEDURE — 25010000002 ENOXAPARIN PER 10 MG: Performed by: NURSE PRACTITIONER

## 2020-02-28 PROCEDURE — 99232 SBSQ HOSP IP/OBS MODERATE 35: CPT | Performed by: INTERNAL MEDICINE

## 2020-02-28 PROCEDURE — 83690 ASSAY OF LIPASE: CPT | Performed by: NURSE PRACTITIONER

## 2020-02-28 PROCEDURE — 25010000002 ONDANSETRON PER 1 MG: Performed by: NURSE PRACTITIONER

## 2020-02-28 PROCEDURE — 25010000002 PROMETHAZINE PER 50 MG: Performed by: NURSE PRACTITIONER

## 2020-02-28 PROCEDURE — 80053 COMPREHEN METABOLIC PANEL: CPT | Performed by: NURSE PRACTITIONER

## 2020-02-28 PROCEDURE — 99223 1ST HOSP IP/OBS HIGH 75: CPT | Performed by: INTERNAL MEDICINE

## 2020-02-28 PROCEDURE — 80202 ASSAY OF VANCOMYCIN: CPT | Performed by: HOSPITALIST

## 2020-02-28 PROCEDURE — 36415 COLL VENOUS BLD VENIPUNCTURE: CPT | Performed by: NURSE PRACTITIONER

## 2020-02-28 RX ADMIN — PROMETHAZINE HYDROCHLORIDE 12.5 MG: 25 INJECTION INTRAMUSCULAR; INTRAVENOUS at 22:13

## 2020-02-28 RX ADMIN — OXYCODONE HYDROCHLORIDE AND ACETAMINOPHEN 1 TABLET: 7.5; 325 TABLET ORAL at 13:56

## 2020-02-28 RX ADMIN — SODIUM CHLORIDE, PRESERVATIVE FREE 10 ML: 5 INJECTION INTRAVENOUS at 22:14

## 2020-02-28 RX ADMIN — FAMOTIDINE 20 MG: 10 INJECTION INTRAVENOUS at 11:50

## 2020-02-28 RX ADMIN — OXYCODONE HYDROCHLORIDE AND ACETAMINOPHEN 1 TABLET: 7.5; 325 TABLET ORAL at 04:15

## 2020-02-28 RX ADMIN — AZTREONAM 1 G: 1 INJECTION, POWDER, LYOPHILIZED, FOR SOLUTION INTRAMUSCULAR; INTRAVENOUS at 16:20

## 2020-02-28 RX ADMIN — METRONIDAZOLE 500 MG: 500 INJECTION, SOLUTION INTRAVENOUS at 16:21

## 2020-02-28 RX ADMIN — AZTREONAM 1 G: 1 INJECTION, POWDER, LYOPHILIZED, FOR SOLUTION INTRAMUSCULAR; INTRAVENOUS at 04:15

## 2020-02-28 RX ADMIN — ACETAMINOPHEN 650 MG: 325 TABLET, FILM COATED ORAL at 22:13

## 2020-02-28 RX ADMIN — VANCOMYCIN HYDROCHLORIDE 1750 MG: 10 INJECTION, POWDER, LYOPHILIZED, FOR SOLUTION INTRAVENOUS at 16:20

## 2020-02-28 RX ADMIN — SODIUM CHLORIDE, PRESERVATIVE FREE 10 ML: 5 INJECTION INTRAVENOUS at 10:55

## 2020-02-28 RX ADMIN — METRONIDAZOLE 500 MG: 500 INJECTION, SOLUTION INTRAVENOUS at 10:55

## 2020-02-28 RX ADMIN — ONDANSETRON 4 MG: 2 INJECTION INTRAMUSCULAR; INTRAVENOUS at 14:01

## 2020-02-28 RX ADMIN — ATENOLOL 50 MG: 50 TABLET ORAL at 22:12

## 2020-02-28 RX ADMIN — METRONIDAZOLE 500 MG: 500 INJECTION, SOLUTION INTRAVENOUS at 01:19

## 2020-02-28 RX ADMIN — ENOXAPARIN SODIUM 30 MG: 30 INJECTION SUBCUTANEOUS at 18:21

## 2020-02-29 LAB
ALBUMIN SERPL-MCNC: 2.1 G/DL (ref 3.5–5.2)
ALBUMIN/GLOB SERPL: 0.7 G/DL
ALP SERPL-CCNC: 70 U/L (ref 39–117)
ALT SERPL W P-5'-P-CCNC: 10 U/L (ref 1–33)
ANION GAP SERPL CALCULATED.3IONS-SCNC: 11.1 MMOL/L (ref 5–15)
AST SERPL-CCNC: 16 U/L (ref 1–32)
BILIRUB SERPL-MCNC: 0.6 MG/DL (ref 0.2–1.2)
BUN BLD-MCNC: 14 MG/DL (ref 8–23)
BUN/CREAT SERPL: 12.2 (ref 7–25)
CALCIUM SPEC-SCNC: 7.9 MG/DL (ref 8.6–10.5)
CHLORIDE SERPL-SCNC: 103 MMOL/L (ref 98–107)
CO2 SERPL-SCNC: 18.9 MMOL/L (ref 22–29)
CREAT BLD-MCNC: 1.15 MG/DL (ref 0.57–1)
DEPRECATED RDW RBC AUTO: 44.6 FL (ref 37–54)
ERYTHROCYTE [DISTWIDTH] IN BLOOD BY AUTOMATED COUNT: 13.7 % (ref 12.3–15.4)
GFR SERPL CREATININE-BSD FRML MDRD: 45 ML/MIN/1.73
GLOBULIN UR ELPH-MCNC: 3 GM/DL
GLUCOSE BLD-MCNC: 82 MG/DL (ref 65–99)
HCT VFR BLD AUTO: 34.1 % (ref 34–46.6)
HGB BLD-MCNC: 11 G/DL (ref 12–15.9)
LIPASE SERPL-CCNC: 36 U/L (ref 13–60)
MCH RBC QN AUTO: 28.9 PG (ref 26.6–33)
MCHC RBC AUTO-ENTMCNC: 32.3 G/DL (ref 31.5–35.7)
MCV RBC AUTO: 89.5 FL (ref 79–97)
PLATELET # BLD AUTO: 186 10*3/MM3 (ref 140–450)
PMV BLD AUTO: 9.2 FL (ref 6–12)
POTASSIUM BLD-SCNC: 4.2 MMOL/L (ref 3.5–5.2)
PROT SERPL-MCNC: 5.1 G/DL (ref 6–8.5)
RBC # BLD AUTO: 3.81 10*6/MM3 (ref 3.77–5.28)
SODIUM BLD-SCNC: 133 MMOL/L (ref 136–145)
WBC NRBC COR # BLD: 9.59 10*3/MM3 (ref 3.4–10.8)

## 2020-02-29 PROCEDURE — 99232 SBSQ HOSP IP/OBS MODERATE 35: CPT | Performed by: INTERNAL MEDICINE

## 2020-02-29 PROCEDURE — 99231 SBSQ HOSP IP/OBS SF/LOW 25: CPT | Performed by: SURGERY

## 2020-02-29 PROCEDURE — 83690 ASSAY OF LIPASE: CPT | Performed by: INTERNAL MEDICINE

## 2020-02-29 PROCEDURE — 85027 COMPLETE CBC AUTOMATED: CPT | Performed by: INTERNAL MEDICINE

## 2020-02-29 PROCEDURE — 25010000002 ONDANSETRON PER 1 MG: Performed by: NURSE PRACTITIONER

## 2020-02-29 PROCEDURE — 80053 COMPREHEN METABOLIC PANEL: CPT | Performed by: INTERNAL MEDICINE

## 2020-02-29 PROCEDURE — 25010000002 ENOXAPARIN PER 10 MG: Performed by: NURSE PRACTITIONER

## 2020-02-29 RX ADMIN — FAMOTIDINE 20 MG: 10 INJECTION INTRAVENOUS at 08:12

## 2020-02-29 RX ADMIN — OXYCODONE HYDROCHLORIDE AND ACETAMINOPHEN 1 TABLET: 7.5; 325 TABLET ORAL at 09:01

## 2020-02-29 RX ADMIN — ONDANSETRON 4 MG: 2 INJECTION INTRAMUSCULAR; INTRAVENOUS at 08:11

## 2020-02-29 RX ADMIN — SODIUM CHLORIDE, PRESERVATIVE FREE 10 ML: 5 INJECTION INTRAVENOUS at 21:33

## 2020-02-29 RX ADMIN — ENOXAPARIN SODIUM 30 MG: 30 INJECTION SUBCUTANEOUS at 18:22

## 2020-02-29 RX ADMIN — SODIUM CHLORIDE, PRESERVATIVE FREE 10 ML: 5 INJECTION INTRAVENOUS at 08:13

## 2020-02-29 RX ADMIN — ATENOLOL 50 MG: 50 TABLET ORAL at 08:11

## 2020-02-29 RX ADMIN — AZTREONAM 1 G: 1 INJECTION, POWDER, LYOPHILIZED, FOR SOLUTION INTRAMUSCULAR; INTRAVENOUS at 02:36

## 2020-02-29 RX ADMIN — METRONIDAZOLE 500 MG: 500 INJECTION, SOLUTION INTRAVENOUS at 00:10

## 2020-02-29 RX ADMIN — LEVOTHYROXINE SODIUM 50 MCG: 50 TABLET ORAL at 05:16

## 2020-03-01 LAB
ALBUMIN SERPL-MCNC: 2 G/DL (ref 3.5–5.2)
ALBUMIN/GLOB SERPL: 0.6 G/DL
ALP SERPL-CCNC: 65 U/L (ref 39–117)
ALT SERPL W P-5'-P-CCNC: 9 U/L (ref 1–33)
ANION GAP SERPL CALCULATED.3IONS-SCNC: 10.5 MMOL/L (ref 5–15)
AST SERPL-CCNC: 15 U/L (ref 1–32)
BILIRUB SERPL-MCNC: 0.5 MG/DL (ref 0.2–1.2)
BUN BLD-MCNC: 17 MG/DL (ref 8–23)
BUN/CREAT SERPL: 14.5 (ref 7–25)
CALCIUM SPEC-SCNC: 8.2 MG/DL (ref 8.6–10.5)
CHLORIDE SERPL-SCNC: 100 MMOL/L (ref 98–107)
CO2 SERPL-SCNC: 20.5 MMOL/L (ref 22–29)
CREAT BLD-MCNC: 1.17 MG/DL (ref 0.57–1)
DEPRECATED RDW RBC AUTO: 44 FL (ref 37–54)
ERYTHROCYTE [DISTWIDTH] IN BLOOD BY AUTOMATED COUNT: 13.7 % (ref 12.3–15.4)
GFR SERPL CREATININE-BSD FRML MDRD: 44 ML/MIN/1.73
GLOBULIN UR ELPH-MCNC: 3.3 GM/DL
GLUCOSE BLD-MCNC: 89 MG/DL (ref 65–99)
HCT VFR BLD AUTO: 31.3 % (ref 34–46.6)
HGB BLD-MCNC: 10.2 G/DL (ref 12–15.9)
MCH RBC QN AUTO: 28.9 PG (ref 26.6–33)
MCHC RBC AUTO-ENTMCNC: 32.6 G/DL (ref 31.5–35.7)
MCV RBC AUTO: 88.7 FL (ref 79–97)
PLATELET # BLD AUTO: 198 10*3/MM3 (ref 140–450)
PMV BLD AUTO: 9 FL (ref 6–12)
POTASSIUM BLD-SCNC: 4.1 MMOL/L (ref 3.5–5.2)
PROT SERPL-MCNC: 5.3 G/DL (ref 6–8.5)
RBC # BLD AUTO: 3.53 10*6/MM3 (ref 3.77–5.28)
SODIUM BLD-SCNC: 131 MMOL/L (ref 136–145)
WBC NRBC COR # BLD: 10.75 10*3/MM3 (ref 3.4–10.8)

## 2020-03-01 PROCEDURE — 99231 SBSQ HOSP IP/OBS SF/LOW 25: CPT | Performed by: INTERNAL MEDICINE

## 2020-03-01 PROCEDURE — 99232 SBSQ HOSP IP/OBS MODERATE 35: CPT | Performed by: INTERNAL MEDICINE

## 2020-03-01 PROCEDURE — 25010000002 ENOXAPARIN PER 10 MG: Performed by: NURSE PRACTITIONER

## 2020-03-01 PROCEDURE — 80053 COMPREHEN METABOLIC PANEL: CPT | Performed by: INTERNAL MEDICINE

## 2020-03-01 PROCEDURE — 85027 COMPLETE CBC AUTOMATED: CPT | Performed by: HOSPITALIST

## 2020-03-01 PROCEDURE — 99232 SBSQ HOSP IP/OBS MODERATE 35: CPT | Performed by: SURGERY

## 2020-03-01 RX ORDER — SODIUM CHLORIDE 9 MG/ML
75 INJECTION, SOLUTION INTRAVENOUS CONTINUOUS
Status: ACTIVE | OUTPATIENT
Start: 2020-03-01 | End: 2020-03-02

## 2020-03-01 RX ADMIN — SODIUM CHLORIDE 75 ML/HR: 9 INJECTION, SOLUTION INTRAVENOUS at 22:00

## 2020-03-01 RX ADMIN — OXYCODONE HYDROCHLORIDE AND ACETAMINOPHEN 1 TABLET: 7.5; 325 TABLET ORAL at 01:33

## 2020-03-01 RX ADMIN — FAMOTIDINE 20 MG: 10 INJECTION INTRAVENOUS at 08:44

## 2020-03-01 RX ADMIN — OXYCODONE HYDROCHLORIDE AND ACETAMINOPHEN 1 TABLET: 7.5; 325 TABLET ORAL at 21:01

## 2020-03-01 RX ADMIN — SODIUM CHLORIDE, PRESERVATIVE FREE 10 ML: 5 INJECTION INTRAVENOUS at 08:42

## 2020-03-01 RX ADMIN — LEVOTHYROXINE SODIUM 50 MCG: 50 TABLET ORAL at 05:51

## 2020-03-01 RX ADMIN — ATENOLOL 50 MG: 50 TABLET ORAL at 08:42

## 2020-03-01 RX ADMIN — ENOXAPARIN SODIUM 30 MG: 30 INJECTION SUBCUTANEOUS at 18:42

## 2020-03-01 RX ADMIN — SODIUM CHLORIDE, PRESERVATIVE FREE 10 ML: 5 INJECTION INTRAVENOUS at 20:51

## 2020-03-01 NOTE — PROGRESS NOTES
LOS: 3 days     Chief Complaint:  Follow-up pancreatitis    History from pt and daughter    Interval History:  RUQ still present but improved. No fever. Tolerating ice chips. She is asking for lemonade.     ROS: no CP or SOA    Vital Signs  Temp:  [97.4 °F (36.3 °C)-98.3 °F (36.8 °C)] 97.4 °F (36.3 °C)  Heart Rate:  [51-61] 51  Resp:  [18] 18  BP: (108-122)/(48-52) 108/51    Physical Exam:  General: awake, alert  Head: Normocephalic  Eyes:  no scleral icterus  Cardiovascular: bradycardic  Respiratory: normal work of breathing on 2L NC  GI: Abdomen is mildly distended with RUQ TTP; no rebound or guarding; normal bowel sounds  : no Hussein catheter present  Skin: No rashes  Neurological: Alert and oriented x 3  Psychiatric: Normal mood and affect     Meds:    Current Facility-Administered Medications:   •  acetaminophen (TYLENOL) tablet 650 mg, 650 mg, Oral, Q4H PRN, 650 mg at 02/28/20 2213 **OR** acetaminophen (TYLENOL) 160 MG/5ML solution 650 mg, 650 mg, Oral, Q4H PRN **OR** acetaminophen (TYLENOL) suppository 650 mg, 650 mg, Rectal, Q4H PRN, Viktoriya Herrera APRN  •  atenolol (TENORMIN) tablet 50 mg, 50 mg, Oral, Q12H, Viktoriya Herrera APRN, 50 mg at 02/29/20 0811  •  enoxaparin (LOVENOX) syringe 30 mg, 30 mg, Subcutaneous, Q24H, Viktoriya Herrera APRN, 30 mg at 02/29/20 1822  •  famotidine (PEPCID) injection 20 mg, 20 mg, Intravenous, Daily, Viktoriya Herrera APRN, 20 mg at 02/29/20 0812  •  HYDROmorphone (DILAUDID) injection 0.5 mg, 0.5 mg, Intravenous, Q2H PRN **AND** naloxone (NARCAN) injection 0.4 mg, 0.4 mg, Intravenous, Q5 Min PRN, Herrera, Viktoriya W, APRN  •  levothyroxine (SYNTHROID, LEVOTHROID) tablet 50 mcg, 50 mcg, Oral, Q AM, Viktoriya Herrera APRN, 50 mcg at 03/01/20 0551  •  nitroglycerin (NITROSTAT) SL tablet 0.4 mg, 0.4 mg, Sublingual, Q5 Min PRN, Viktoriya Herrera, APRN  •  ondansetron (ZOFRAN) injection 4 mg, 4 mg, Intravenous, Q6H PRN, Viktoriya Herrera APRN, 4 mg at  02/29/20 0811  •  oxyCODONE-acetaminophen (PERCOCET) 7.5-325 MG per tablet 1 tablet, 1 tablet, Oral, Q4H PRN, Viktoriya Herrera APRN, 1 tablet at 03/01/20 0133  •  promethazine (PHENERGAN) injection 12.5 mg, 12.5 mg, Intravenous, Q6H PRN, Viktoriya Herrera APRN, 12.5 mg at 02/28/20 2213  •  [COMPLETED] Insert peripheral IV, , , Once **AND** sodium chloride 0.9 % flush 10 mL, 10 mL, Intravenous, PRN, Jared Cisneros MD  •  sodium chloride 0.9 % flush 10 mL, 10 mL, Intravenous, Q12H, Viktoriya Herrera APRN, 10 mL at 02/29/20 2133  •  sodium chloride 0.9 % flush 10 mL, 10 mL, Intravenous, PRN, Viktoriya Herrera APRGRETTA    LABS:  CBC, CMP, micro reviewed today  Lab Results   Component Value Date    WBC 10.75 03/01/2020    HGB 10.2 (L) 03/01/2020    HCT 31.3 (L) 03/01/2020    MCV 88.7 03/01/2020     03/01/2020     Lab Results   Component Value Date    GLUCOSE 89 03/01/2020    CALCIUM 8.2 (L) 03/01/2020     (L) 03/01/2020    K 4.1 03/01/2020    CO2 20.5 (L) 03/01/2020     03/01/2020    BUN 17 03/01/2020    CREATININE 1.17 (H) 03/01/2020    EGFRIFAFRI 47 (L) 08/20/2019    EGFRIFNONA 44 (L) 03/01/2020    BCR 14.5 03/01/2020    ANIONGAP 10.5 03/01/2020     Lab Results   Component Value Date    ALT 9 03/01/2020     Lipase 52--->36  UA 3-5 WBCs     Microbiology:  2/27 BCx: NGTD     Radiology (prior):  RUQ US with gallbladder wall thickening but no convincing evidence for cholecystitis    Assessment/Plan   1. Pancreatitis with small developing pseudocysts  2. Abdominal pain  3. Gallbladder distension  4. Acute kidney injury superimposed upon CKD 3  5. Paroxysmal atrial fibrillation  6. Hyponatremia  7. Cefaclor allergy (tolerates amoxicillin and cephalexin)     Stable off of antibiotics. No fever. Blood cultures are negative. Noted plans for future cholecystectomy to try to prevent further attacks of pancreatitis.     ID will sign off but please call [phone] if any further questions or  concerns.

## 2020-03-01 NOTE — PROGRESS NOTES
GI Daily Progress Note    Assessment/Plan:      Idiopathic acute pancreatitis    Hyperlipidemia    Primary hypothyroidism    Supraventricular tachycardia (CMS/HCC)    Obesity (BMI 30-39.9)    CKD (chronic kidney disease) stage 3, GFR 30-59 ml/min (CMS/HCC)    LINNEA (acute kidney injury) (CMS/[facility])    Hyponatremia    Fever       LOS: 3 days     Mandy Alarcon is a 86 y.o. female who was admitted with Idiopathic acute pancreatitis. She reports the symptoms are improving with treatment. Lost IV access this Am but rested better and is getting thirsty.    Subjective:    Patient expresses abdominal pain  Patient denies vomiting and diarrhea    Objective:    Vital signs in last 24 hours:  Temp:  [97.4 °F (36.3 °C)-98.3 °F (36.8 °C)] 97.4 °F (36.3 °C)  Heart Rate:  [51-61] 51  Resp:  [18] 18  BP: (108-122)/(48-52) 108/51    Intake/Output last 3 shifts:  I/O last 3 completed shifts:  In: -   Out: 550 [Urine:550]  Intake/Output this shift:  No intake/output data recorded.    Physical Exam:Abdomen  Sounds Normal Active Bowel Sounds   Distension Soft   Tenderness Mildly Tender     Imaging Results (Last 72 Hours)     Procedure Component Value Units Date/Time    US Abdomen Limited [[id number]] Collected:  02/28/20 1646     Updated:  02/28/20 2121    Narrative:       RIGHT UPPER QUADRANT ABDOMINAL SONOGRAM     TECHNIQUE: Grayscale and color Doppler images of the right upper  quadrant of the abdomen were obtained.     HISTORY: Pancreatitis, right quadrant pain.     COMPARISON: Multiple CT abdomen and pelvis dating back to 01/31/2020.     FINDINGS:     The gallbladder is nondistended. Biliary sludge is present.  Pericholecystic fluid is present and appears to be partially loculated.  Borderline gallbladder wall thickening is present measuring up to 3-4  mm.     The common bile duct measures 10 mm. in diameter. There is no  intrahepatic biliary ductal dilation.      The liver has a normal sonographic appearance.      The right kidney  measures 11 cm in length.  The right kidney  demonstrates normal echogenicity and cortical thickness. There is no  right-sided hydronephrosis. There are no right-sided renal calculi.  Large exophytic anechoic lesion arising off the right kidney corresponds  with cystic density lesion seen on prior CTs and likely represents  simple cyst.     The pancreas is not well-visualized, likely related to pancreatitis  which was best demonstrated on recent CT.     Visualized portions of the aorta and IVC appear normal.       Impression:       1.  There is pericholecystic fluid, as well as mild gallbladder wall  thickening, as seen on recent CT but appears to be new since 01/31/2020.  The diameter of the common bile duct is grossly unchanged since  01/31/2020. The gallbladder is nondistended. While findings are favored  be related to adjacent worsening pancreatitis, early acute cholecystitis  cannot be excluded and correlation with patient history and laboratory  values is recommended. Findings can be further evaluated with HIDA scan  if clinically indicated.  2.  The pancreas is not well-visualized, likely related to significant  pancreatitis.  3.  Pericholecystic fluid is present with 1 of the areas of fluid  appearing to be partially loculated and may represent an early  pseudocyst.     This report was finalized on 2/28/2020 9:18 PM by Dr. Carlitos Willis M.D.       CT Chest Without Contrast [[id number]] Collected:  02/27/20 1235     Updated:  02/27/20 1618    Narrative:       CT CHEST, ABDOMEN, AND PELVIS WITHOUT CONTRAST     HISTORY: 86-year-old female with upper abdominal pain. Recent  pancreatitis and pneumonia. Hysterectomy in the past.     TECHNIQUE: Radiation dose reduction techniques were utilized, including  automated exposure control and exposure modulation based on body size.   3 mm images were obtained through the chest, abdomen, and pelvis without  the administration of IV contrast. Compared with previous CT of  the  abdomen and pelvis from 01/31/2020. There is no previous chest CT for  comparison.     FINDINGS:  CHEST: There is underexpansion of the lower lobes with some increased  markings, but no dense consolidations are seen. There are linear or  bandlike opacities at the lower lobes which may represent linear  atelectasis or scarring. There is a minimal right pleural effusion.  There is no pericardial effusion and there is no lymphadenopathy within  the chest.     ABDOMEN/PELVIS: There has been marked interval increase in the stranding  surrounding the pancreas and the stranding is seen surrounding the  entire pancreas. There has also been development of some peripancreatic  fluid and small fluid collections. A small collection at the lesser sac  measures approximately 2.7 x 1.2 cm. A small portacaval collection  measures 2.8 x 1.7 cm. The gallbladder is distended and there is slight  haziness surrounding the gallbladder and the adjacent liver. There is no  biliary dilatation. Noncontrasted spleen, adrenals, and left kidney  appear unremarkable. The right kidney appears unremarkable other than a  stable right renal cyst which measures nearly 11 cm in craniocaudad span  on the coronal reconstruction series. No acute bowel abnormality is  seen. There is moderate sigmoid diverticulosis without evidence for  diverticulitis. The appendix appears normal. There are moderate  abdominal aortic atherosclerotic changes without aneurysmal dilatation.       Impression:       1. Significant progression of pancreatitis. The small peripancreatic  fluid collections likely represent developing pseudocysts.  2. Distended gallbladder is suspected to be related to the pancreatitis.  Characterization is limited in the absence of IV contrast and  cholecystitis cannot be entirely excluded. Please correlate clinically.  3. Sigmoid diverticulosis without evidence for diverticulitis.  4. There is a minimal right pleural effusion. The increase in  density  and markings at both lower lobes may be related to underexpansion with  atelectatic change. No definite pneumonia is seen.     Discussed with Dr. Cisneros.     This report was finalized on 2/27/2020 4:14 PM by Dr. Lakeisha Robison M.D.       CT Abdomen Pelvis Without Contrast [[id number]] Collected:  02/27/20 1235     Updated:  02/27/20 1618    Narrative:       CT CHEST, ABDOMEN, AND PELVIS WITHOUT CONTRAST     HISTORY: 86-year-old female with upper abdominal pain. Recent  pancreatitis and pneumonia. Hysterectomy in the past.     TECHNIQUE: Radiation dose reduction techniques were utilized, including  automated exposure control and exposure modulation based on body size.   3 mm images were obtained through the chest, abdomen, and pelvis without  the administration of IV contrast. Compared with previous CT of the  abdomen and pelvis from 01/31/2020. There is no previous chest CT for  comparison.     FINDINGS:  CHEST: There is underexpansion of the lower lobes with some increased  markings, but no dense consolidations are seen. There are linear or  bandlike opacities at the lower lobes which may represent linear  atelectasis or scarring. There is a minimal right pleural effusion.  There is no pericardial effusion and there is no lymphadenopathy within  the chest.     ABDOMEN/PELVIS: There has been marked interval increase in the stranding  surrounding the pancreas and the stranding is seen surrounding the  entire pancreas. There has also been development of some peripancreatic  fluid and small fluid collections. A small collection at the lesser sac  measures approximately 2.7 x 1.2 cm. A small portacaval collection  measures 2.8 x 1.7 cm. The gallbladder is distended and there is slight  haziness surrounding the gallbladder and the adjacent liver. There is no  biliary dilatation. Noncontrasted spleen, adrenals, and left kidney  appear unremarkable. The right kidney appears unremarkable other than a  stable right  renal cyst which measures nearly 11 cm in craniocaudad span  on the coronal reconstruction series. No acute bowel abnormality is  seen. There is moderate sigmoid diverticulosis without evidence for  diverticulitis. The appendix appears normal. There are moderate  abdominal aortic atherosclerotic changes without aneurysmal dilatation.       Impression:       1. Significant progression of pancreatitis. The small peripancreatic  fluid collections likely represent developing pseudocysts.  2. Distended gallbladder is suspected to be related to the pancreatitis.  Characterization is limited in the absence of IV contrast and  cholecystitis cannot be entirely excluded. Please correlate clinically.  3. Sigmoid diverticulosis without evidence for diverticulitis.  4. There is a minimal right pleural effusion. The increase in density  and markings at both lower lobes may be related to underexpansion with  atelectatic change. No definite pneumonia is seen.     Discussed with Dr. Cisneros.     This report was finalized on 2/27/2020 4:14 PM by Dr. Lakeisha Robison M.D.             WBC   Date Value Ref Range Status   03/01/2020 10.75 3.40 - 10.80 10*3/mm3 Final     RBC   Date Value Ref Range Status   03/01/2020 3.53 (L) 3.77 - 5.28 10*6/mm3 Final     Hemoglobin   Date Value Ref Range Status   03/01/2020 10.2 (L) 12.0 - 15.9 g/dL Final     Hematocrit   Date Value Ref Range Status   03/01/2020 31.3 (L) 34.0 - 46.6 % Final     MCV   Date Value Ref Range Status   03/01/2020 88.7 79.0 - 97.0 fL Final     MCH   Date Value Ref Range Status   03/01/2020 28.9 26.6 - 33.0 pg Final     MCHC   Date Value Ref Range Status   03/01/2020 32.6 31.5 - 35.7 g/dL Final     RDW   Date Value Ref Range Status   03/01/2020 13.7 12.3 - 15.4 % Final     RDW-SD   Date Value Ref Range Status   03/01/2020 44.0 37.0 - 54.0 fl Final     MPV   Date Value Ref Range Status   03/01/2020 9.0 6.0 - 12.0 fL Final     Platelets   Date Value Ref Range Status   03/01/2020 198  140 - 450 10*3/mm3 Final     Neutrophil %   Date Value Ref Range Status   02/28/2020 73.3 42.7 - 76.0 % Final     Lymphocyte %   Date Value Ref Range Status   02/28/2020 10.2 (L) 19.6 - 45.3 % Final     Monocyte %   Date Value Ref Range Status   02/28/2020 13.1 (H) 5.0 - 12.0 % Final     Eosinophil %   Date Value Ref Range Status   02/28/2020 2.2 0.3 - 6.2 % Final     Basophil %   Date Value Ref Range Status   02/28/2020 0.5 0.0 - 1.5 % Final     Immature Grans %   Date Value Ref Range Status   02/28/2020 0.7 (H) 0.0 - 0.5 % Final     Neutrophils, Absolute   Date Value Ref Range Status   02/28/2020 6.05 1.70 - 7.00 10*3/mm3 Final     Lymphocytes, Absolute   Date Value Ref Range Status   02/28/2020 0.84 0.70 - 3.10 10*3/mm3 Final     Monocytes, Absolute   Date Value Ref Range Status   02/28/2020 1.08 (H) 0.10 - 0.90 10*3/mm3 Final     Eosinophils, Absolute   Date Value Ref Range Status   02/28/2020 0.18 0.00 - 0.40 10*3/mm3 Final     Basophils, Absolute   Date Value Ref Range Status   02/28/2020 0.04 0.00 - 0.20 10*3/mm3 Final     Immature Grans, Absolute   Date Value Ref Range Status   02/28/2020 0.06 (H) 0.00 - 0.05 10*3/mm3 Final     nRBC   Date Value Ref Range Status   02/28/2020 0.0 0.0 - 0.2 /100 WBC Final       Glucose   Date Value Ref Range Status   03/01/2020 89 65 - 99 mg/dL Final     Sodium   Date Value Ref Range Status   03/01/2020 131 (L) 136 - 145 mmol/L Final     Potassium   Date Value Ref Range Status   03/01/2020 4.1 3.5 - 5.2 mmol/L Final     CO2   Date Value Ref Range Status   03/01/2020 20.5 (L) 22.0 - 29.0 mmol/L Final     Chloride   Date Value Ref Range Status   03/01/2020 100 98 - 107 mmol/L Final     Anion Gap   Date Value Ref Range Status   03/01/2020 10.5 5.0 - 15.0 mmol/L Final     Creatinine   Date Value Ref Range Status   03/01/2020 1.17 (H) 0.57 - 1.00 mg/dL Final     BUN   Date Value Ref Range Status   03/01/2020 17 8 - 23 mg/dL Final     BUN/Creatinine Ratio   Date Value Ref Range  Status   03/01/2020 14.5 7.0 - 25.0 Final     Calcium   Date Value Ref Range Status   03/01/2020 8.2 (L) 8.6 - 10.5 mg/dL Final     eGFR Non  Amer   Date Value Ref Range Status   03/01/2020 44 (L) >60 mL/min/1.73 Final     Alkaline Phosphatase   Date Value Ref Range Status   03/01/2020 65 39 - 117 U/L Final     Total Protein   Date Value Ref Range Status   03/01/2020 5.3 (L) 6.0 - 8.5 g/dL Final     ALT (SGPT)   Date Value Ref Range Status   03/01/2020 9 1 - 33 U/L Final     AST (SGOT)   Date Value Ref Range Status   03/01/2020 15 1 - 32 U/L Final     Total Bilirubin   Date Value Ref Range Status   03/01/2020 0.5 0.2 - 1.2 mg/dL Final     Albumin   Date Value Ref Range Status   03/01/2020 2.00 (L) 3.50 - 5.20 g/dL Final     Globulin   Date Value Ref Range Status   03/01/2020 3.3 gm/dL Final     Pancreatitis    Exam is improved an will need IV access back but will add clears and po pain meds now that she is s/w improved. Will check on tomorrow but nearing D/C say Tues/Wed

## 2020-03-01 NOTE — PLAN OF CARE
Problem: Patient Care Overview  Goal: Plan of Care Review  Outcome: Ongoing (interventions implemented as appropriate)  Flowsheets  Taken 3/1/2020 0417 by Kelly Cavazos, RN  Progress: improving  Outcome Summary: Pain x1 relieved with PO percocet. Has ambulated to chair and has been changing positions more frequently. Daughter at bedside. VSS.  Taken 2/29/2020 1802 by Ann-Marie An, RN  Plan of Care Reviewed With: patient  Goal: Individualization and Mutuality  Outcome: Ongoing (interventions implemented as appropriate)  Goal: Discharge Needs Assessment  Outcome: Ongoing (interventions implemented as appropriate)  Goal: Interprofessional Rounds/Family Conf  Outcome: Ongoing (interventions implemented as appropriate)     Problem: Pancreatitis, Acute/Chronic (Adult)  Goal: Signs and Symptoms of Listed Potential Problems Will be Absent, Minimized or Managed (Pancreatitis, Acute/Chronic)  Outcome: Ongoing (interventions implemented as appropriate)     Problem: Skin Injury Risk (Adult)  Goal: Identify Related Risk Factors and Signs and Symptoms  Outcome: Ongoing (interventions implemented as appropriate)  Goal: Skin Health and Integrity  Outcome: Ongoing (interventions implemented as appropriate)  Flowsheets (Taken 2/29/2020 1802 by Ann-Marie An, RN)  Skin Health and Integrity: making progress toward outcome

## 2020-03-01 NOTE — PLAN OF CARE
Problem: Patient Care Overview  Goal: Plan of Care Review  Outcome: Ongoing (interventions implemented as appropriate)  Flowsheets (Taken 3/1/2020 6582)  Progress: improving  Plan of Care Reviewed With: patient; daughter  Outcome Summary: No c/o pain. Ambulated to the chair several times this shift, also had daughter push her WC around the nurse's station and down the juárez. Diet changed to clear liquids. VSS. Will CTM the rest of my shift.

## 2020-03-02 LAB
ALBUMIN SERPL-MCNC: 1.9 G/DL (ref 3.5–5.2)
ALBUMIN/GLOB SERPL: 0.6 G/DL
ALP SERPL-CCNC: 67 U/L (ref 39–117)
ALT SERPL W P-5'-P-CCNC: 8 U/L (ref 1–33)
ANION GAP SERPL CALCULATED.3IONS-SCNC: 9.9 MMOL/L (ref 5–15)
AST SERPL-CCNC: 12 U/L (ref 1–32)
BASOPHILS # BLD AUTO: 0.05 10*3/MM3 (ref 0–0.2)
BASOPHILS NFR BLD AUTO: 0.4 % (ref 0–1.5)
BILIRUB SERPL-MCNC: 0.5 MG/DL (ref 0.2–1.2)
BUN BLD-MCNC: 17 MG/DL (ref 8–23)
BUN/CREAT SERPL: 15.5 (ref 7–25)
CALCIUM SPEC-SCNC: 8.4 MG/DL (ref 8.6–10.5)
CHLORIDE SERPL-SCNC: 101 MMOL/L (ref 98–107)
CO2 SERPL-SCNC: 24.1 MMOL/L (ref 22–29)
CREAT BLD-MCNC: 1.1 MG/DL (ref 0.57–1)
DEPRECATED RDW RBC AUTO: 43.4 FL (ref 37–54)
EOSINOPHIL # BLD AUTO: 0.23 10*3/MM3 (ref 0–0.4)
EOSINOPHIL NFR BLD AUTO: 2 % (ref 0.3–6.2)
ERYTHROCYTE [DISTWIDTH] IN BLOOD BY AUTOMATED COUNT: 13.7 % (ref 12.3–15.4)
GFR SERPL CREATININE-BSD FRML MDRD: 47 ML/MIN/1.73
GLOBULIN UR ELPH-MCNC: 3.4 GM/DL
GLUCOSE BLD-MCNC: 97 MG/DL (ref 65–99)
HCT VFR BLD AUTO: 33.9 % (ref 34–46.6)
HGB BLD-MCNC: 11.2 G/DL (ref 12–15.9)
IMM GRANULOCYTES # BLD AUTO: 0.06 10*3/MM3 (ref 0–0.05)
IMM GRANULOCYTES NFR BLD AUTO: 0.5 % (ref 0–0.5)
LYMPHOCYTES # BLD AUTO: 1.09 10*3/MM3 (ref 0.7–3.1)
LYMPHOCYTES NFR BLD AUTO: 9.4 % (ref 19.6–45.3)
MAGNESIUM SERPL-MCNC: 1.8 MG/DL (ref 1.6–2.4)
MCH RBC QN AUTO: 29.2 PG (ref 26.6–33)
MCHC RBC AUTO-ENTMCNC: 33 G/DL (ref 31.5–35.7)
MCV RBC AUTO: 88.3 FL (ref 79–97)
MONOCYTES # BLD AUTO: 1.09 10*3/MM3 (ref 0.1–0.9)
MONOCYTES NFR BLD AUTO: 9.4 % (ref 5–12)
NEUTROPHILS # BLD AUTO: 9.13 10*3/MM3 (ref 1.7–7)
NEUTROPHILS NFR BLD AUTO: 78.3 % (ref 42.7–76)
NRBC BLD AUTO-RTO: 0 /100 WBC (ref 0–0.2)
PHOSPHATE SERPL-MCNC: 2.4 MG/DL (ref 2.5–4.5)
PLATELET # BLD AUTO: 225 10*3/MM3 (ref 140–450)
PMV BLD AUTO: 9 FL (ref 6–12)
POTASSIUM BLD-SCNC: 4.1 MMOL/L (ref 3.5–5.2)
PROT SERPL-MCNC: 5.3 G/DL (ref 6–8.5)
RBC # BLD AUTO: 3.84 10*6/MM3 (ref 3.77–5.28)
SODIUM BLD-SCNC: 135 MMOL/L (ref 136–145)
TRIGL SERPL-MCNC: 68 MG/DL (ref 0–150)
WBC NRBC COR # BLD: 11.65 10*3/MM3 (ref 3.4–10.8)

## 2020-03-02 PROCEDURE — 25010000002 ONDANSETRON PER 1 MG: Performed by: NURSE PRACTITIONER

## 2020-03-02 PROCEDURE — 25010000002 CALCIUM GLUCONATE PER 10 ML: Performed by: SURGERY

## 2020-03-02 PROCEDURE — 80053 COMPREHEN METABOLIC PANEL: CPT | Performed by: HOSPITALIST

## 2020-03-02 PROCEDURE — 25010000002 POTASSIUM CHLORIDE PER 2 MEQ OF POTASSIUM: Performed by: SURGERY

## 2020-03-02 PROCEDURE — 85025 COMPLETE CBC W/AUTO DIFF WBC: CPT | Performed by: HOSPITALIST

## 2020-03-02 PROCEDURE — 25010000002 MAGNESIUM SULFATE PER 500 MG OF MAGNESIUM: Performed by: SURGERY

## 2020-03-02 PROCEDURE — C1751 CATH, INF, PER/CENT/MIDLINE: HCPCS

## 2020-03-02 PROCEDURE — 99231 SBSQ HOSP IP/OBS SF/LOW 25: CPT | Performed by: INTERNAL MEDICINE

## 2020-03-02 PROCEDURE — 84478 ASSAY OF TRIGLYCERIDES: CPT | Performed by: SURGERY

## 2020-03-02 PROCEDURE — 25010000002 ENOXAPARIN PER 10 MG: Performed by: NURSE PRACTITIONER

## 2020-03-02 PROCEDURE — 99232 SBSQ HOSP IP/OBS MODERATE 35: CPT | Performed by: SURGERY

## 2020-03-02 PROCEDURE — 84100 ASSAY OF PHOSPHORUS: CPT | Performed by: SURGERY

## 2020-03-02 PROCEDURE — 83735 ASSAY OF MAGNESIUM: CPT | Performed by: SURGERY

## 2020-03-02 PROCEDURE — 02HV33Z INSERTION OF INFUSION DEVICE INTO SUPERIOR VENA CAVA, PERCUTANEOUS APPROACH: ICD-10-PCS | Performed by: SURGERY

## 2020-03-02 RX ORDER — SODIUM CHLORIDE 0.9 % (FLUSH) 0.9 %
10 SYRINGE (ML) INJECTION EVERY 12 HOURS SCHEDULED
Status: DISCONTINUED | OUTPATIENT
Start: 2020-03-02 | End: 2020-03-22 | Stop reason: HOSPADM

## 2020-03-02 RX ORDER — SODIUM CHLORIDE 0.9 % (FLUSH) 0.9 %
20 SYRINGE (ML) INJECTION AS NEEDED
Status: DISCONTINUED | OUTPATIENT
Start: 2020-03-02 | End: 2020-03-22 | Stop reason: HOSPADM

## 2020-03-02 RX ORDER — SODIUM CHLORIDE 0.9 % (FLUSH) 0.9 %
10 SYRINGE (ML) INJECTION AS NEEDED
Status: DISCONTINUED | OUTPATIENT
Start: 2020-03-02 | End: 2020-03-22 | Stop reason: HOSPADM

## 2020-03-02 RX ADMIN — ONDANSETRON 4 MG: 2 INJECTION INTRAMUSCULAR; INTRAVENOUS at 20:12

## 2020-03-02 RX ADMIN — POTASSIUM CHLORIDE: 2 INJECTION, SOLUTION, CONCENTRATE INTRAVENOUS at 18:03

## 2020-03-02 RX ADMIN — LEVOTHYROXINE SODIUM 50 MCG: 50 TABLET ORAL at 05:15

## 2020-03-02 RX ADMIN — ONDANSETRON 4 MG: 2 INJECTION INTRAMUSCULAR; INTRAVENOUS at 09:33

## 2020-03-02 RX ADMIN — ENOXAPARIN SODIUM 40 MG: 40 INJECTION SUBCUTANEOUS at 18:17

## 2020-03-02 RX ADMIN — SODIUM CHLORIDE, PRESERVATIVE FREE 10 ML: 5 INJECTION INTRAVENOUS at 20:12

## 2020-03-02 NOTE — PROGRESS NOTES
[facility]  Inpatient Progress Note    Reason for Follow Up: Recurrent pancreatitis    Subjective     Interval History: Discussed with patient, patient's daughters at bedside.  Reviewed surgical note.  Plans for TPN noted.  She describes some diffuse abdominal soreness.  Evidently did not tolerate clear liquids.      Current Facility-Administered Medications:   •  acetaminophen (TYLENOL) tablet 650 mg, 650 mg, Oral, Q4H PRN, 650 mg at 02/28/20 2213 **OR** acetaminophen (TYLENOL) 160 MG/5ML solution 650 mg, 650 mg, Oral, Q4H PRN **OR** acetaminophen (TYLENOL) suppository 650 mg, 650 mg, Rectal, Q4H PRN, Viktoriya Herrera, APRN  •  Adult Central 2-in-1 TPN, , Intravenous, Continuous, Bryce Andujar MD  •  atenolol (TENORMIN) tablet 50 mg, 50 mg, Oral, Q12H, Viktoriya Herrera APRN, Stopped at 03/01/20 2101  •  enoxaparin (LOVENOX) syringe 40 mg, 40 mg, Subcutaneous, Q24H, Viktoriya Herrera, APRN  •  famotidine (PEPCID) injection 20 mg, 20 mg, Intravenous, Daily, Viktoriya Herrera APRN, Stopped at 03/02/20 0900  •  HYDROmorphone (DILAUDID) injection 0.5 mg, 0.5 mg, Intravenous, Q2H PRN **AND** naloxone (NARCAN) injection 0.4 mg, 0.4 mg, Intravenous, Q5 Min PRN, Viktoriya Herrera APRN  •  levothyroxine (SYNTHROID, LEVOTHROID) tablet 50 mcg, 50 mcg, Oral, Q AM, Viktoriya Herrera APRN, 50 mcg at 03/02/20 0515  •  nitroglycerin (NITROSTAT) SL tablet 0.4 mg, 0.4 mg, Sublingual, Q5 Min PRN, Viktoriya Herrera, APRN  •  ondansetron (ZOFRAN) injection 4 mg, 4 mg, Intravenous, Q6H PRN, Viktoriya Herrera APRN, 4 mg at 03/02/20 0933  •  oxyCODONE-acetaminophen (PERCOCET) 7.5-325 MG per tablet 1 tablet, 1 tablet, Oral, Q4H PRN, Viktoriya Herrera APRN, 1 tablet at 03/01/20 2101  •  Pharmacy to Dose TPN, , Does not apply, Continuous PRN, Bryce Andujar MD  •  promethazine (PHENERGAN) injection 12.5 mg, 12.5 mg, Intravenous, Q6H PRN, Viktoriya Herrera APRN, 12.5 mg at  02/28/20 2213  •  [COMPLETED] Insert peripheral IV, , , Once **AND** sodium chloride 0.9 % flush 10 mL, 10 mL, Intravenous, PRN, Jared Cisneros MD  •  sodium chloride 0.9 % flush 10 mL, 10 mL, Intravenous, Q12H, Viktoriya Herrera APRN, 10 mL at 03/01/20 2051  •  sodium chloride 0.9 % flush 10 mL, 10 mL, Intravenous, PRN, Viktoriya Herrera APRN  •  sodium chloride 0.9 % infusion, 75 mL/hr, Intravenous, Continuous, Bryce Andujar MD, Stopped at 03/02/20 0900  Review of Systems:    All systems were reviewed and negative except for:  Gastrointestinal: positive for  See HPI    Objective     Vital Signs  Temp:  [97.5 °F (36.4 °C)-98.8 °F (37.1 °C)] 97.9 °F (36.6 °C)  Heart Rate:  [51-68] 62  Resp:  [18] 18  BP: (103-141)/(55-87) 110/87  Body mass index is 37.1 kg/m².    Intake/Output Summary (Last 24 hours) at 3/2/2020 1411  Last data filed at 3/2/2020 0900  Gross per 24 hour   Intake 1065 ml   Output --   Net 1065 ml     I/O this shift:  In: 825 [I.V.:825]  Out: -      Physical Exam:   General: patient awake, alert and cooperative   Eyes: Normal lids and lashes, no scleral icterus   Neck: supple, normal ROM   Skin: warm and dry, not jaundiced   Cardiovascular: regular rhythm and rate, no murmurs auscultated   Pulm: clear to auscultation bilaterally, regular and unlabored   Abdomen: soft, nontender, nondistended; normal bowel sounds   Extremities: no rash or edema   Psychiatric: Normal mood and behavior; memory intact     Results Review:     I reviewed the patient's new clinical results.    Results from last 7 days   Lab Units 03/02/20  0506 03/01/20  0507 02/29/20  0623   WBC 10*3/mm3 11.65* 10.75 9.59   HEMOGLOBIN g/dL 11.2* 10.2* 11.0*   HEMATOCRIT % 33.9* 31.3* 34.1   PLATELETS 10*3/mm3 225 198 186     Results from last 7 days   Lab Units 03/02/20  0506 03/01/20  0507 02/29/20  0623   SODIUM mmol/L 135* 131* 133*   POTASSIUM mmol/L 4.1 4.1 4.2   CHLORIDE mmol/L 101 100 103   CO2 mmol/L 24.1 20.5* 18.9*    BUN mg/dL 17 17 14   CREATININE mg/dL 1.10* 1.17* 1.15*   CALCIUM mg/dL 8.4* 8.2* 7.9*   BILIRUBIN mg/dL 0.5 0.5 0.6   ALK PHOS U/L 67 65 70   ALT (SGPT) U/L 8 9 10   AST (SGOT) U/L 12 15 16   GLUCOSE mg/dL 97 89 82         Lab Results   Lab Value Date/Time    LIPASE 36 02/29/2020 0623    LIPASE 52 02/28/2020 0508    LIPASE 62 (H) 02/27/2020 1016    LIPASE 38 02/05/2020 0507    LIPASE 60 02/04/2020 0435    LIPASE 181 (H) 02/03/2020 0211    LIPASE 417 (H) 02/02/2020 0441    LIPASE >3,000 (H) 01/31/2020 2142       Radiology:  US Abdomen Limited   Final Result   1.  There is pericholecystic fluid, as well as mild gallbladder wall   thickening, as seen on recent CT but appears to be new since 01/31/2020.   The diameter of the common bile duct is grossly unchanged since   01/31/2020. The gallbladder is nondistended. While findings are favored   be related to adjacent worsening pancreatitis, early acute cholecystitis   cannot be excluded and correlation with patient history and laboratory   values is recommended. Findings can be further evaluated with HIDA scan   if clinically indicated.   2.  The pancreas is not well-visualized, likely related to significant   pancreatitis.   3.  Pericholecystic fluid is present with 1 of the areas of fluid   appearing to be partially loculated and may represent an early   pseudocyst.       This report was finalized on 2/28/2020 9:18 PM by Dr. Carlitos Willis M.D.          CT Chest Without Contrast   Final Result   1. Significant progression of pancreatitis. The small peripancreatic   fluid collections likely represent developing pseudocysts.   2. Distended gallbladder is suspected to be related to the pancreatitis.   Characterization is limited in the absence of IV contrast and   cholecystitis cannot be entirely excluded. Please correlate clinically.   3. Sigmoid diverticulosis without evidence for diverticulitis.   4. There is a minimal right pleural effusion. The increase in  density   and markings at both lower lobes may be related to underexpansion with   atelectatic change. No definite pneumonia is seen.       Discussed with Dr. Cisneros.       This report was finalized on 2/27/2020 4:14 PM by Dr. Lakeisha Robison M.D.          CT Abdomen Pelvis Without Contrast   Final Result   1. Significant progression of pancreatitis. The small peripancreatic   fluid collections likely represent developing pseudocysts.   2. Distended gallbladder is suspected to be related to the pancreatitis.   Characterization is limited in the absence of IV contrast and   cholecystitis cannot be entirely excluded. Please correlate clinically.   3. Sigmoid diverticulosis without evidence for diverticulitis.   4. There is a minimal right pleural effusion. The increase in density   and markings at both lower lobes may be related to underexpansion with   atelectatic change. No definite pneumonia is seen.       Discussed with Dr. Cisneros.       This report was finalized on 2/27/2020 4:14 PM by Dr. Lakeisha Robison M.D.          XR Chest 2 View   Final Result   Improvement in bilateral pleural effusions with potential   mild residual pleural fluid at the posterior costophrenic angles. Mild   left basilar linear subsegmental scarring or atelectasis.       This report was finalized on 2/27/2020 11:08 AM by Dr. Jose Thorne M.D.              Assessment/Plan     Patient Active Problem List   Diagnosis   • Benign essential hypertension   • Claustrophobia   • Gout   • Hyperlipidemia   • Primary hypothyroidism   • Impaired fasting glucose   • Nocturnal hypoxemia   • Secondary polycythemia   • Vitamin D deficiency   • Therapeutic drug monitoring   • Family history of coronary artery disease   • Bilateral sensorineural hearing loss, wears hearing aids   • Multinodular goiter   • Supraventricular tachycardia (CMS/HCC)   • Morbidly obese (CMS/HCC)   • Obesity (BMI 30-39.9)   • CKD (chronic kidney disease) stage 3, GFR 30-59 ml/min  (CMS/HCC)   • LINNEA (acute kidney injury) (CMS/HCC)   • Hyponatremia   • Hypomagnesemia   • Leukocytosis   • Idiopathic acute pancreatitis   • Fever       Assessment:  1. Pancreatitis: Pain resolving but still nauseated      Plan:  · Gut rest  · TPN as per surgery  I discussed the patients findings and my recommendations with patient and family.    Daryl Dunham MD

## 2020-03-02 NOTE — PROGRESS NOTES
Continued Stay Note  [facility]     Patient Name: Mandy Alarcon  MRN: [id number]  Today's Date: 3/2/2020    Admit Date: 2/27/2020    Discharge Plan     Row Name 03/02/20 1326       Plan    Plan  Follow up after sx anticipated tomorrow- Return back to SNF? [facility]; pending bed availability    Patient/Family in Agreement with Plan  yes Spoke with Kandace Fields    Plan Comments  Reviewed chart, plan for sx tomorrow. PICC and TPN to ordered today also. CCP to follow up on post-op dc needs. Pt was at skilled rehab at [facility] prior to admission, [place] [facility] [place] left [place]. CCP spoke with kandace Fields at bedside and provided her with CCP contact info. Packet in ccp office. mary ellen schultz/ccp         Discharge Codes    No documentation.             Amy Patterson RN

## 2020-03-02 NOTE — PROGRESS NOTES
"Pharmacy to dose TPN - Daily Progress Note  Patient: Mandy Alarcon  MRN#: [id number]  Attending: No name on file.  Admission Date: 022720    Mandy Alarcon is a 86 y.o. female receiving TPN for Severe Acute Necrotizing Pancreatitis.     TPN # 1 per Dr. Andujar's request.    Principal Problem:    Idiopathic acute pancreatitis  Active Problems:    Hyperlipidemia    Primary hypothyroidism    Supraventricular tachycardia (CMS/[facility])    Obesity (BMI 30-39.9)    CKD (chronic kidney disease) stage 3, GFR 30-59 ml/min (CMS/[facility])    LINNEA (acute kidney injury) (CMS/[facility])    Hyponatremia    Fever    Subjective/Objective  160 cm (63\")  95 kg (209 lb 7 oz)  Body mass index is 37.1 kg/m².   Results from last 7 days   Lab Units 03/02/20  0506  02/27/20  1016   SODIUM mmol/L 135*   < > 131*   POTASSIUM mmol/L 4.1   < > 4.1   CHLORIDE mmol/L 101   < > 93*   CO2 mmol/L 24.1   < > 25.0   BUN mg/dL 17   < > 25*   CREATININE mg/dL 1.10*   < > 1.61*   CALCIUM mg/dL 8.4*   < > 9.0   ALBUMIN g/dL 1.90*   < > 2.80*   BILIRUBIN mg/dL 0.5   < > 0.8   ALK PHOS U/L 67   < > 80   ALT (SGPT) U/L 8   < > 14   AST (SGOT) U/L 12   < > 19   GLUCOSE mg/dL 97   < > 115*   MAGNESIUM mg/dL  --   --  1.7    < > = values in this interval not displayed.        I/O last 3 completed shifts:  In: 480 [P.O.:480]  Out: 100 [Urine:100]  Diet Orders (active) (From admission, onward)     Start     Ordered    03/01/20 2116  NPO Diet NPO Except: Ice Chips, Sips With Meds  Diet Effective Now      03/01/20 2115              Additional insulin administration past 24 hours: none  Additional electrolyte administration past 24 hours: none  Acid suppression: Famotidine 20 mg IV daily    Goal:   Protein: 75 grams/day  Dextrose: 900 Kcal/day   Lipids: 100 mL of 20% lipid infusion   Fluid rate: 80 mL/hr (1920 mL/day)     Plan    1) Initiate TPN with the following macronutrients:  Protein: 40 grams/day  Dextrose: 500 Kcal/day   Lipids: None today, start tomorrow if TG's not " significantly elevated.    Fluid rate: 80 mL/hr     2) Based on the above labs, will add the following electrolytes/additives to the TPN.     Sodium chloride: 70 mEq  Potassium chloride:  50 mEq  Potassium phosphate: 22 mEq   Calcium gluconate: 9 mEq   Magnesium sulfate: 10 mEq   Multivitamin: 10 mL   Trace Elements: 1 mL     Labs: CMP, Mag, Phos with AM labs tomorrow. TGs, Mag, Phos added onto CMP collected this AM.     Pharmacy will continue to follow and monitor daily while patient on TPN. Thank you for this consult.      Daryl Walton, PharmD, STACEY, BCPS

## 2020-03-02 NOTE — PLAN OF CARE
Problem: Patient Care Overview  Goal: Plan of Care Review  Outcome: Ongoing (interventions implemented as appropriate)  Flowsheets  Taken 3/2/2020 0432 by Kelly Cavazos, RN  Progress: improving  Outcome Summary: Some abdominal pain with clear liquids. Back to NPO except sips with meds and may have ice chips. NS started at 75cc/hr. Plans for lap phuong on Tuesday. Bradycardic while sleeping, not new. Has ambulated several times and daughter pushed her around unit in w/c. Percocet x1. In good spirits. VSS.  Taken 3/1/2020 1557 by Ann-Marie An, RN  Plan of Care Reviewed With: patient;daughter  Goal: Individualization and Mutuality  Outcome: Ongoing (interventions implemented as appropriate)  Goal: Discharge Needs Assessment  Outcome: Ongoing (interventions implemented as appropriate)  Goal: Interprofessional Rounds/Family Conf  Outcome: Ongoing (interventions implemented as appropriate)     Problem: Pancreatitis, Acute/Chronic (Adult)  Goal: Signs and Symptoms of Listed Potential Problems Will be Absent, Minimized or Managed (Pancreatitis, Acute/Chronic)  Outcome: Ongoing (interventions implemented as appropriate)     Problem: Skin Injury Risk (Adult)  Goal: Identify Related Risk Factors and Signs and Symptoms  Outcome: Ongoing (interventions implemented as appropriate)  Goal: Skin Health and Integrity  Outcome: Ongoing (interventions implemented as appropriate)  Flowsheets (Taken 3/2/2020 0432)  Skin Health and Integrity: making progress toward outcome

## 2020-03-02 NOTE — PROGRESS NOTES
Chief Complaint:    Pancreatitis    Subjective:    Still having intermittent pain.  Today it occurred after drinking iced tea.    Objective:    Vitals:    03/01/20 0606 03/01/20 0743 03/01/20 1401 03/01/20 2001   BP:  108/51 121/53 124/60   BP Location:  Left arm Left arm Left arm   Patient Position:  Lying Lying Lying   Pulse:  51 62 57   Resp:  18 18 18   Temp:  97.4 °F (36.3 °C) 98.7 °F (37.1 °C) 98.8 °F (37.1 °C)   TempSrc:  Oral Oral Oral   SpO2:  98% 98% 97%   Weight: 95 kg (209 lb 7 oz)      Height:           Lungs: Clear  Heart: Regular  Abdomen: Soft.  Less distended and less tender.  Bowel sounds are present.  Extremities: Warm    Labs reviewed.  WBC 10.75, Hgb 10.2, platelets 198.  Creat 1.17, CO2 20.5.  AlkP 65, ALT 9, AST 15, T bili 0.5.    US shows GB sludge  CT on 2/27/2020 showed increased peripancreatic inflammation and small fluid collections    Assessment:    Pancreatitis  Gallbladder sludge    Plan:    Clinically I do not think she has acute cholecystitis, but because there is sludge in the gallbladder, I think ultimately she will benefit from cholecystectomy to prevent further episodes of pancreatitis. I was going to plan for Tuesday, as I anticipated discharge on Wednesday. The pain is concerning to me, so I will back off again to ice chips and medications, and resume IV fluids. Fortunately, she has a workable IV line again.

## 2020-03-02 NOTE — PROGRESS NOTES
"DAILY PROGRESS NOTE  [facility]    Patient Identification:  Name: Mandy Alarcon  Age: 86 y.o.  Sex: female  :  1933  MRN: [id number]         Primary Care Physician: Daryl Santos MD    Subjective:  Interval History:She is sleepy.  Some pain.  Nausea and vomiting.    Objective:    Scheduled Meds:    atenolol 50 mg Oral Q12H   enoxaparin 40 mg Subcutaneous Q24H   famotidine 20 mg Intravenous Daily   levothyroxine 50 mcg Oral Q AM   sodium chloride 10 mL Intravenous Q12H   sodium chloride 10 mL Intravenous Q12H   sodium chloride 10 mL Intravenous Q12H   sodium chloride 10 mL Intravenous Q12H     Continuous Infusions:    Adult Central 2-in-1 TPN     Pharmacy to Dose TPN     sodium chloride 75 mL/hr Last Rate: Stopped (20 0900)       Vital signs in last 24 hours:  Temp:  [97.5 °F (36.4 °C)-98.8 °F (37.1 °C)] 97.9 °F (36.6 °C)  Heart Rate:  [51-68] 62  Resp:  [18] 18  BP: (103-141)/(55-87) 110/87    Intake/Output:    Intake/Output Summary (Last 24 hours) at 3/2/2020 1611  Last data filed at 3/2/2020 0900  Gross per 24 hour   Intake 1065 ml   Output --   Net 1065 ml       Exam:  /87 (BP Location: Left arm, Patient Position: Lying)   Pulse 62   Temp 97.9 °F (36.6 °C) (Oral)   Resp 18   Ht 160 cm (63\")   Wt 95 kg (209 lb 7 oz)   SpO2 97%   BMI 37.10 kg/m²     General Appearance:    Alert, cooperative, no distress   Head:    Normocephalic, without obvious abnormality, atraumatic   Eyes:       Throat:   Lips, tongue, gums normal   Neck:   Supple, symmetrical, trachea midline, no JVD   Lungs:     Clear to auscultation bilaterally, respirations unlabored   Chest Wall:    No tenderness or deformity    Heart:    Regular rate and rhythm, S1 and S2 normal, no murmur,no  Rub or gallop   Abdomen:     Soft,upper abdominal tenderness, bowel sounds active, no masses, no organomegaly    Extremities:   Extremities normal, atraumatic, no cyanosis or edema   Pulses:      Skin:   Skin is warm " and dry,  no rashes or palpable lesions   Neurologic:   no focal deficits noted      Lab Results (last 72 hours)     Procedure Component Value Units Date/Time    Blood Culture - Blood, Hand, Right [[id number]] Collected:  02/27/20 1425    Specimen:  Blood from Hand, Right Updated:  03/01/20 1445     Blood Culture No growth at 3 days    Blood Culture - Blood, Arm, Right [[id number]] Collected:  02/27/20 1017    Specimen:  Blood from Arm, Right Updated:  03/01/20 1030     Blood Culture No growth at 3 days    Comprehensive Metabolic Panel [[id number]]  (Abnormal) Collected:  03/01/20 0507    Specimen:  Blood Updated:  03/01/20 0552     Glucose 89 mg/dL      BUN 17 mg/dL      Creatinine 1.17 mg/dL      Sodium 131 mmol/L      Potassium 4.1 mmol/L      Chloride 100 mmol/L      CO2 20.5 mmol/L      Calcium 8.2 mg/dL      Total Protein 5.3 g/dL      Albumin 2.00 g/dL      ALT (SGPT) 9 U/L      AST (SGOT) 15 U/L      Alkaline Phosphatase 65 U/L      Total Bilirubin 0.5 mg/dL      eGFR Non African Amer 44 mL/min/1.73      Globulin 3.3 gm/dL      A/G Ratio 0.6 g/dL      BUN/Creatinine Ratio 14.5     Anion Gap 10.5 mmol/L     Narrative:       GFR Normal >60  Chronic Kidney Disease <60  Kidney Failure <15      CBC (No Diff) [[id number]]  (Abnormal) Collected:  03/01/20 0507    Specimen:  Blood Updated:  03/01/20 0530     WBC 10.75 10*3/mm3      RBC 3.53 10*6/mm3      Hemoglobin 10.2 g/dL      Hematocrit 31.3 %      MCV 88.7 fL      MCH 28.9 pg      MCHC 32.6 g/dL      RDW 13.7 %      RDW-SD 44.0 fl      MPV 9.0 fL      Platelets 198 10*3/mm3     Lipase [[id number]]  (Normal) Collected:  02/29/20 0623    Specimen:  Blood Updated:  02/29/20 0708     Lipase 36 U/L     Comprehensive Metabolic Panel [[id number]]  (Abnormal) Collected:  02/29/20 0623    Specimen:  Blood Updated:  02/29/20 0708     Glucose 82 mg/dL      BUN 14 mg/dL      Creatinine 1.15 mg/dL      Sodium 133 mmol/L      Potassium 4.2 mmol/L      Chloride 103 mmol/L      CO2  18.9 mmol/L      Calcium 7.9 mg/dL      Total Protein 5.1 g/dL      Albumin 2.10 g/dL      ALT (SGPT) 10 U/L      AST (SGOT) 16 U/L      Alkaline Phosphatase 70 U/L      Total Bilirubin 0.6 mg/dL      eGFR Non African Amer 45 mL/min/1.73      Globulin 3.0 gm/dL      A/G Ratio 0.7 g/dL      BUN/Creatinine Ratio 12.2     Anion Gap 11.1 mmol/L     Narrative:       GFR Normal >60  Chronic Kidney Disease <60  Kidney Failure <15      CBC (No Diff) [[id number]]  (Abnormal) Collected:  02/29/20 0623    Specimen:  Blood Updated:  02/29/20 0650     WBC 9.59 10*3/mm3      RBC 3.81 10*6/mm3      Hemoglobin 11.0 g/dL      Hematocrit 34.1 %      MCV 89.5 fL      MCH 28.9 pg      MCHC 32.3 g/dL      RDW 13.7 %      RDW-SD 44.6 fl      MPV 9.2 fL      Platelets 186 10*3/mm3     Comprehensive Metabolic Panel [[id number]]  (Abnormal) Collected:  02/28/20 0508    Specimen:  Blood Updated:  02/28/20 0612     Glucose 91 mg/dL      BUN 17 mg/dL      Creatinine 1.15 mg/dL      Sodium 133 mmol/L      Potassium 4.0 mmol/L      Chloride 100 mmol/L      CO2 21.4 mmol/L      Calcium 8.8 mg/dL      Total Protein 5.7 g/dL      Albumin 2.10 g/dL      ALT (SGPT) 11 U/L      AST (SGOT) 15 U/L      Alkaline Phosphatase 65 U/L      Total Bilirubin 0.6 mg/dL      eGFR Non African Amer 45 mL/min/1.73      Globulin 3.6 gm/dL      A/G Ratio 0.6 g/dL      BUN/Creatinine Ratio 14.8     Anion Gap 11.6 mmol/L     Narrative:       GFR Normal >60  Chronic Kidney Disease <60  Kidney Failure <15      Lipase [[id number]]  (Normal) Collected:  02/28/20 0508    Specimen:  Blood Updated:  02/28/20 0612     Lipase 52 U/L     Vancomycin, Random [[id number]]  (Normal) Collected:  02/28/20 0508    Specimen:  Blood Updated:  02/28/20 0609     Vancomycin Random 13.60 mcg/mL     CBC & Differential [[id number]] Collected:  02/28/20 0508    Specimen:  Blood Updated:  02/28/20 0538    Narrative:       The following orders were created for panel order CBC & Differential.  Procedure                                Abnormality         Status                     ---------                               -----------         ------                     CBC Auto Differential[[id number]]        Abnormal            Final result                 Please view results for these tests on the individual orders.    CBC Auto Differential [[id number]]  (Abnormal) Collected:  02/28/20 0508    Specimen:  Blood Updated:  02/28/20 0538     WBC 8.25 10*3/mm3      RBC 3.58 10*6/mm3      Hemoglobin 10.4 g/dL      Hematocrit 31.6 %      MCV 88.3 fL      MCH 29.1 pg      MCHC 32.9 g/dL      RDW 13.8 %      RDW-SD 44.5 fl      MPV 9.1 fL      Platelets 192 10*3/mm3      Neutrophil % 73.3 %      Lymphocyte % 10.2 %      Monocyte % 13.1 %      Eosinophil % 2.2 %      Basophil % 0.5 %      Immature Grans % 0.7 %      Neutrophils, Absolute 6.05 10*3/mm3      Lymphocytes, Absolute 0.84 10*3/mm3      Monocytes, Absolute 1.08 10*3/mm3      Eosinophils, Absolute 0.18 10*3/mm3      Basophils, Absolute 0.04 10*3/mm3      Immature Grans, Absolute 0.06 10*3/mm3      nRBC 0.0 /100 WBC         Data Review:  Results from last 7 days   Lab Units 03/02/20  0506 03/01/20  0507 02/29/20  0623   SODIUM mmol/L 135* 131* 133*   POTASSIUM mmol/L 4.1 4.1 4.2   CHLORIDE mmol/L 101 100 103   CO2 mmol/L 24.1 20.5* 18.9*   BUN mg/dL 17 17 14   CREATININE mg/dL 1.10* 1.17* 1.15*   GLUCOSE mg/dL 97 89 82   CALCIUM mg/dL 8.4* 8.2* 7.9*     Results from last 7 days   Lab Units 03/02/20  0506 03/01/20  0507 02/29/20  0623   WBC 10*3/mm3 11.65* 10.75 9.59   HEMOGLOBIN g/dL 11.2* 10.2* 11.0*   HEMATOCRIT % 33.9* 31.3* 34.1   PLATELETS 10*3/mm3 225 198 186             Lab Results   Lab Value Date/Time    TROPONINT <0.010 01/31/2020 2142    TROPONINT <0.010 03/22/2019 1524    TROPONINT <0.010 07/10/2018 1038    TROPONINT <0.010 06/21/2018 0823    TROPONINT <0.010 06/20/2018 1738     Results from last 7 days   Lab Units 03/02/20  0506   TRIGLYCERIDES mg/dL 68          Results from last 7 days   Lab Units 20  0506 20  0507 20  0623   ALK PHOS U/L 67 65 70   BILIRUBIN mg/dL 0.5 0.5 0.6   ALT (SGPT) U/L 8 9 10   AST (SGOT) U/L 12 15 16             No results found for: POCGLU        Past Medical History:   Diagnosis Date   • Benign essential hypertension 10/28/2012    2012--treatment for hypertension begun.   • Bilateral sensorineural hearing loss, wears hearing aids 2017    Left is much worse than right.  Patient had multiple ear infections as a child.   • Chronic renal insufficiency, stage II (mild), 2016--creatinine 1.35 2016--creatinine 1.35.  Baseline creatinine approximately 1.3.   • Claustrophobia 2016    This patient has significant nocturnal hypoxemia and I think that she could benefit from nocturnal oxygen therapy. The exact etiology of her hypoxemia is not clear. She could possibly have obstructive sleep apnea but this is not documented. We cannot test this patient for sleep apnea due to the fact that she is severely claustrophobic. Patient was a former smoker and it is possibly that COPD he is playing a role.   • Family history of coronary artery disease 2016    Patient's mother, father, 2 sisters and a brother all  from myocardial infarctions   • Gout 10/28/2012    2012--initial diagnosis and treatment of gout.   • Hyperlipidemia 10/28/2012    2012--treatment for hyperlipidemia begun.   • Impaired fasting glucose 10/28/2012    2012--initial diagnosis impaired fasting glucose.   • Morbidly obese (CMS/HCC) 3/11/2019   • Nocturnal hypoxemia 2014--patient did not receive nocturnal oxygen because of Medicare regulations.   05/15/2014--overnight oximetry revealed oxygen saturations less than 89% for 22 minutes and 40 seconds. Oxygen saturations less than or equal to 88% for 22 minutes and 40 seconds. Lowest oxygen saturation 83%. The longest continuous time with  oxygen saturations less than or equal to 88% was 1 minute and 32 seconds.   08/02/2012--overnight oximetry revealed oxygen saturations less than 90% for one hour and 35 minutes. Oxygen saturations less than 89% 59 minutes. This patient has significant nocturnal hypoxemia and I think that she could benefit from nocturnal oxygen therapy. The exact etiology of her hypoxemia is not clear. She could possibly have obstructive sleep apnea but this is not documented. We cannot test this patient for sleep apnea due to the fact that she is severely claustrophobic. Patient was a former smoker and it is possibly that COPD he is playing a role.   • Pancreatitis    • Pneumonia    • Primary hypothyroidism 11/17/2015 March 11, 2019--TSH remains elevated slightly at 4.92.  Given the overall clinical picture including the multinodular goiter, we will initiate levothyroxine 50 mcg/day and reassess in about 6 weeks.  August 1, 2018--thyroid ultrasound reveals a multinodular thyroid with multiple subcentimeter nodules.  Only minimal increase in size of the largest nodule in the left lobe has occurred when compared    • Secondary polycythemia 8/2/2012 11/06/2014--patient did not receive nocturnal oxygen because of Medicare regulations.   05/15/2014--overnight oximetry revealed oxygen saturations less than 89% for 22 minutes and 40 seconds. Oxygen saturations less than or equal to 88% for 22 minutes and 40 seconds. Lowest oxygen saturation 83%. The longest continuous time with oxygen saturations less than or equal to 88% was 1 minute and 32 seconds.   08/02/2012--overnight oximetry revealed oxygen saturations less than 90% for one hour and 35 minutes. Oxygen saturations less than 89% 59 minutes. This patient has significant nocturnal hypoxemia and I think that she could benefit from nocturnal oxygen therapy. The exact etiology of her hypoxemia is not clear. She could possibly have obstructive sleep apnea but this is not documented.  We cannot test this patient for sleep apnea due to the fact that she is severely claustrophobic. Patient was a former smoker and it is possibly that COPD he is playing a role.   • Vitamin D deficiency 5/23/2016       Assessment:  Active Hospital Problems    Diagnosis  POA   • **Idiopathic acute pancreatitis [K85.00]  Yes   • Fever [R50.9]  Yes   • Hyponatremia [E87.1]  Yes   • LINNEA (acute kidney injury) (CMS/HCC) [N17.9]  Yes   • CKD (chronic kidney disease) stage 3, GFR 30-59 ml/min (CMS/HCC) [N18.3]  Yes   • Obesity (BMI 30-39.9) [E66.9]  Yes   • Supraventricular tachycardia (CMS/HCC) [I47.1]  Yes   • Primary hypothyroidism [E03.9]  Yes   • Hyperlipidemia [E78.5]  Yes      Resolved Hospital Problems   No resolved problems to display.       Plan:  Continue current RX. As per GI and surgery. Cholecystectomy sometime.  Follow lab.  Repeat CT and start TPN    Mendoza Sullivan MD  3/2/2020  4:11 PM

## 2020-03-02 NOTE — SIGNIFICANT NOTE
03/02/20 1555   PICC Triple Lumen 03/02/20 Right Basilic   Placement Date/Time: 03/02/20 1543   Hand Hygiene Completed: Yes  Size (Fr): 5  Description (optional): (c) power picc  Length (cm): 44 cm  Orientation: Right  Location: Basilic  Site Prep: Chlorhexidine isopropyl alcohol  All 5 Sterile Barriers Used ...   Site Assessment Clean;Dry;Intact   #1 Lumen Status Blood return noted;Capped;Flushed;Normal saline locked   #2 Lumen Status Blood return noted;Capped;Flushed;Normal saline locked   #3 Lumen Status Blood return noted;Capped;Flushed;Normal saline locked   Length noel (cm) 44 cm  (1 cm external)   Line Care Connections checked and tightened   Extremity Circumference (cm) 35 cm   Dressing Type Border Dressing;Securing device;Antimicrobial dressing/disc   Dressing Status Clean;Dry;Intact   Dressing Intervention New dressing   Liquid Adhesive Applied   Dressing Change Due 03/09/20   Indication/Daily Review of Necessity total parenteral nutrition;intravenous medication therapy;intravenous fluid therapy;blood sampling   Picc Line tip in SVC per 3cg technology

## 2020-03-02 NOTE — CONSULTS
Adult Nutrition  Assessment/PES    Patient Name:  Mandy Alarcon  YOB: 1933  MRN: [id number]  Admit Date:  2/27/2020    Assessment Date:  3/2/2020    Comments:  Ms. Alarcon is a 86 y.o. admitted with  acute pancreatitis that was just discharged  2/11.   CT scan showed progression of pancreatitis with peripancreatic fluid collections consistent with pseudocyst, distended gallbladder.   She is NPO at this time due to abd pain with clears. Will continue to follow for advance of diet and toleration.    Reason for Assessment     Row Name 03/02/20 0843          Reason for Assessment    Reason For Assessment  diagnosis/disease state     Diagnosis  -- pancreatitis, gallbladder disease, tachycardia         Nutrition/Diet History     Row Name 03/02/20 0844          Nutrition/Diet History    Typical Food/Fluid Intake  abd pain with clear liquid diet now NPO. Plan is for phuong on 3/3         Anthropometrics     Row Name 03/02/20 0844          Body Mass Index (BMI)    BMI Assessment  BMI 35-39.9: obesity grade II         Labs/Tests/Procedures/Meds     Row Name 03/02/20 0845          Labs/Procedures/Meds    Lab Results Reviewed  reviewed     Lab Results Comments  na, cr, alb, wbc, H/H        Medications    Pertinent Medications Reviewed  reviewed     Pertinent Medications Comments  lovenox, pepcid, synthroid, IV         Physical Findings     Row Name 03/02/20 0846          Physical Findings    Gastrointestinal  -- abd pain     Skin  -- paty 16           Nutrition Prescription Ordered     Row Name 03/02/20 0851          Nutrition Prescription PO    Current PO Diet  NPO                 Problem/Interventions:  Problem 1     Row Name 03/02/20 0851          Nutrition Diagnoses Problem 1    Problem 1  Altered GI Function     Etiology (related to)  Medical Diagnosis               Intervention Goal     Row Name 03/02/20 0851          Intervention Goal    General  Maintain nutrition     PO  Initiate feeding;Tolerate PO      Weight  No significant weight loss         Nutrition Intervention     Row Name 03/02/20 0851          Nutrition Intervention    RD/Tech Action  Follow Tx progress;Care plan reviewd;Await begin PO           Education/Evaluation     Row Name 03/02/20 0851          Education    Education  Will Instruct as appropriate        Monitor/Evaluation    Monitor  Per protocol           Electronically signed by:  Lucía Orona RD  03/02/20 8:51 AM

## 2020-03-02 NOTE — PROGRESS NOTES
Chief Complaint:    Pancreatitis    Subjective:    No pain today, but lots of nausea.  IV site is leaking again.    Objective:    Vitals:    03/01/20 1401 03/01/20 2001 03/01/20 2344 03/02/20 0807   BP: 121/53 124/60 103/55 141/68   BP Location: Left arm Left arm Left arm Right arm   Patient Position: Lying Lying Sitting Sitting   Pulse: 62 57 51 68   Resp: 18 18 18 18   Temp: 98.7 °F (37.1 °C) 98.8 °F (37.1 °C) 98 °F (36.7 °C) 97.5 °F (36.4 °C)   TempSrc: Oral Oral Oral Oral   SpO2: 98% 97% 98% 91%   Weight:       Height:           Lungs: Clear  Heart: Regular  Abdomen: Soft.  Less distended and less tender.  Bowel sounds are present.  Extremities: Warm    Labs reviewed.  WBC 11.65, Hgb 11.2, platelets 225.  Creat 1.10, CO2 24.1.  AlkP 67, ALT 8, AST 12, T bili 0.5.    US shows GB sludge  CT on 2/27/2020 showed increased peripancreatic inflammation and small fluid collections    Assessment:    Pancreatitis  Gallbladder sludge    Plan:    Lab assessment looks favorable, but she is still having complaints, and I am reluctant to begin a diet again.  I am going to ask for a PICC line to be place today, TPN to begin, and repeat the CT abdomen.

## 2020-03-03 ENCOUNTER — APPOINTMENT (OUTPATIENT)
Dept: CT IMAGING | Facility: HOSPITAL | Age: 85
End: 2020-03-03

## 2020-03-03 LAB
ALBUMIN SERPL-MCNC: 1.8 G/DL (ref 3.5–5.2)
ALBUMIN/GLOB SERPL: 0.6 G/DL
ALP SERPL-CCNC: 58 U/L (ref 39–117)
ALT SERPL W P-5'-P-CCNC: 8 U/L (ref 1–33)
ANION GAP SERPL CALCULATED.3IONS-SCNC: 10.1 MMOL/L (ref 5–15)
AST SERPL-CCNC: 16 U/L (ref 1–32)
BACTERIA SPEC AEROBE CULT: NORMAL
BACTERIA SPEC AEROBE CULT: NORMAL
BASOPHILS # BLD AUTO: 0.06 10*3/MM3 (ref 0–0.2)
BASOPHILS NFR BLD AUTO: 0.5 % (ref 0–1.5)
BILIRUB SERPL-MCNC: 0.4 MG/DL (ref 0.2–1.2)
BUN BLD-MCNC: 18 MG/DL (ref 8–23)
BUN/CREAT SERPL: 17.5 (ref 7–25)
CALCIUM SPEC-SCNC: 7.9 MG/DL (ref 8.6–10.5)
CHLORIDE SERPL-SCNC: 101 MMOL/L (ref 98–107)
CO2 SERPL-SCNC: 20.9 MMOL/L (ref 22–29)
CREAT BLD-MCNC: 1.03 MG/DL (ref 0.57–1)
DEPRECATED RDW RBC AUTO: 44.1 FL (ref 37–54)
EOSINOPHIL # BLD AUTO: 0.21 10*3/MM3 (ref 0–0.4)
EOSINOPHIL NFR BLD AUTO: 1.9 % (ref 0.3–6.2)
ERYTHROCYTE [DISTWIDTH] IN BLOOD BY AUTOMATED COUNT: 13.7 % (ref 12.3–15.4)
GFR SERPL CREATININE-BSD FRML MDRD: 51 ML/MIN/1.73
GLOBULIN UR ELPH-MCNC: 3 GM/DL
GLUCOSE BLD-MCNC: 125 MG/DL (ref 65–99)
HCT VFR BLD AUTO: 29.8 % (ref 34–46.6)
HGB BLD-MCNC: 10.3 G/DL (ref 12–15.9)
IMM GRANULOCYTES # BLD AUTO: 0.09 10*3/MM3 (ref 0–0.05)
IMM GRANULOCYTES NFR BLD AUTO: 0.8 % (ref 0–0.5)
LYMPHOCYTES # BLD AUTO: 1.22 10*3/MM3 (ref 0.7–3.1)
LYMPHOCYTES NFR BLD AUTO: 11.1 % (ref 19.6–45.3)
MAGNESIUM SERPL-MCNC: 1.7 MG/DL (ref 1.6–2.4)
MCH RBC QN AUTO: 30.5 PG (ref 26.6–33)
MCHC RBC AUTO-ENTMCNC: 34.6 G/DL (ref 31.5–35.7)
MCV RBC AUTO: 88.2 FL (ref 79–97)
MONOCYTES # BLD AUTO: 1.06 10*3/MM3 (ref 0.1–0.9)
MONOCYTES NFR BLD AUTO: 9.7 % (ref 5–12)
NEUTROPHILS # BLD AUTO: 8.33 10*3/MM3 (ref 1.7–7)
NEUTROPHILS NFR BLD AUTO: 76 % (ref 42.7–76)
NRBC BLD AUTO-RTO: 0 /100 WBC (ref 0–0.2)
PHOSPHATE SERPL-MCNC: 2.2 MG/DL (ref 2.5–4.5)
PLATELET # BLD AUTO: 208 10*3/MM3 (ref 140–450)
PMV BLD AUTO: 9.5 FL (ref 6–12)
POTASSIUM BLD-SCNC: 4 MMOL/L (ref 3.5–5.2)
PROT SERPL-MCNC: 4.8 G/DL (ref 6–8.5)
RBC # BLD AUTO: 3.38 10*6/MM3 (ref 3.77–5.28)
SODIUM BLD-SCNC: 132 MMOL/L (ref 136–145)
WBC NRBC COR # BLD: 10.97 10*3/MM3 (ref 3.4–10.8)

## 2020-03-03 PROCEDURE — 85025 COMPLETE CBC W/AUTO DIFF WBC: CPT | Performed by: HOSPITALIST

## 2020-03-03 PROCEDURE — 74177 CT ABD & PELVIS W/CONTRAST: CPT

## 2020-03-03 PROCEDURE — 25010000002 CALCIUM GLUCONATE PER 10 ML: Performed by: SURGERY

## 2020-03-03 PROCEDURE — 84100 ASSAY OF PHOSPHORUS: CPT | Performed by: SURGERY

## 2020-03-03 PROCEDURE — 25010000002 HYDROMORPHONE 1 MG/ML SOLUTION: Performed by: NURSE PRACTITIONER

## 2020-03-03 PROCEDURE — 25010000002 ENOXAPARIN PER 10 MG: Performed by: NURSE PRACTITIONER

## 2020-03-03 PROCEDURE — 25010000002 HYDROMORPHONE PER 4 MG: Performed by: NURSE PRACTITIONER

## 2020-03-03 PROCEDURE — 0 DIATRIZOATE MEGLUMINE & SODIUM PER 1 ML: Performed by: SURGERY

## 2020-03-03 PROCEDURE — 83735 ASSAY OF MAGNESIUM: CPT | Performed by: SURGERY

## 2020-03-03 PROCEDURE — 99231 SBSQ HOSP IP/OBS SF/LOW 25: CPT | Performed by: INTERNAL MEDICINE

## 2020-03-03 PROCEDURE — 25010000002 MAGNESIUM SULFATE PER 500 MG OF MAGNESIUM: Performed by: SURGERY

## 2020-03-03 PROCEDURE — 80053 COMPREHEN METABOLIC PANEL: CPT | Performed by: SURGERY

## 2020-03-03 PROCEDURE — 99232 SBSQ HOSP IP/OBS MODERATE 35: CPT | Performed by: SURGERY

## 2020-03-03 PROCEDURE — 25010000002 POTASSIUM CHLORIDE PER 2 MEQ OF POTASSIUM: Performed by: SURGERY

## 2020-03-03 PROCEDURE — 25010000002 IOPAMIDOL 61 % SOLUTION: Performed by: HOSPITALIST

## 2020-03-03 RX ADMIN — SODIUM CHLORIDE, PRESERVATIVE FREE 10 ML: 5 INJECTION INTRAVENOUS at 22:15

## 2020-03-03 RX ADMIN — POTASSIUM CHLORIDE: 2 INJECTION, SOLUTION, CONCENTRATE INTRAVENOUS at 17:04

## 2020-03-03 RX ADMIN — SODIUM CHLORIDE, PRESERVATIVE FREE 10 ML: 5 INJECTION INTRAVENOUS at 09:11

## 2020-03-03 RX ADMIN — SODIUM CHLORIDE, PRESERVATIVE FREE 10 ML: 5 INJECTION INTRAVENOUS at 09:12

## 2020-03-03 RX ADMIN — HYDROMORPHONE HYDROCHLORIDE 0.5 MG: 10 INJECTION INTRAMUSCULAR; INTRAVENOUS; SUBCUTANEOUS at 03:30

## 2020-03-03 RX ADMIN — DIATRIZOATE MEGLUMINE AND DIATRIZOATE SODIUM 30 ML: 600; 100 SOLUTION ORAL; RECTAL at 09:10

## 2020-03-03 RX ADMIN — ENOXAPARIN SODIUM 40 MG: 40 INJECTION SUBCUTANEOUS at 18:15

## 2020-03-03 RX ADMIN — I.V. FAT EMULSION 20 G: 20 EMULSION INTRAVENOUS at 18:15

## 2020-03-03 RX ADMIN — FAMOTIDINE 20 MG: 10 INJECTION INTRAVENOUS at 09:10

## 2020-03-03 RX ADMIN — HYDROMORPHONE HYDROCHLORIDE 0.5 MG: 10 INJECTION INTRAMUSCULAR; INTRAVENOUS; SUBCUTANEOUS at 22:14

## 2020-03-03 RX ADMIN — LEVOTHYROXINE SODIUM 50 MCG: 50 TABLET ORAL at 06:49

## 2020-03-03 RX ADMIN — IOPAMIDOL 85 ML: 612 INJECTION, SOLUTION INTRAVENOUS at 11:45

## 2020-03-03 RX ADMIN — SODIUM CHLORIDE, PRESERVATIVE FREE 10 ML: 5 INJECTION INTRAVENOUS at 22:16

## 2020-03-03 RX ADMIN — HYDROMORPHONE HYDROCHLORIDE 0.5 MG: 10 INJECTION INTRAMUSCULAR; INTRAVENOUS; SUBCUTANEOUS at 16:53

## 2020-03-03 NOTE — PROGRESS NOTES
"DAILY PROGRESS NOTE  [facility]    Patient Identification:  Name: Mandy Alarcon  Age: 86 y.o.  Sex: female  :  1933  MRN: [id number]         Primary Care Physician: Daryl Santos MD    Subjective:  Interval History:She is sleepy.  Some pain.  Nausea and vomiting.    Objective:    Scheduled Meds:    atenolol 50 mg Oral Q12H   enoxaparin 40 mg Subcutaneous Q24H   famotidine 20 mg Intravenous Daily   levothyroxine 50 mcg Oral Q AM   sodium chloride 10 mL Intravenous Q12H   sodium chloride 10 mL Intravenous Q12H   sodium chloride 10 mL Intravenous Q12H   sodium chloride 10 mL Intravenous Q12H     Continuous Infusions:    Adult Central 2-in-1 TPN  Last Rate: 75 mL/hr at 20 1803   Pharmacy to Dose TPN         Vital signs in last 24 hours:  Temp:  [97.9 °F (36.6 °C)-98.8 °F (37.1 °C)] 98.8 °F (37.1 °C)  Heart Rate:  [53-64] 58  Resp:  [18-20] 18  BP: (110-125)/(59-81) 125/59    Intake/Output:  No intake or output data in the 24 hours ending 20 1305    Exam:  /59 (BP Location: Left arm, Patient Position: Sitting)   Pulse 58   Temp 98.8 °F (37.1 °C) (Oral)   Resp 18   Ht 160 cm (63\")   Wt 95 kg (209 lb 7 oz)   SpO2 96%   BMI 37.10 kg/m²     General Appearance:    Alert, cooperative, no distress   Head:    Normocephalic, without obvious abnormality, atraumatic   Eyes:       Throat:   Lips, tongue, gums normal   Neck:   Supple, symmetrical, trachea midline, no JVD   Lungs:     Clear to auscultation bilaterally, respirations unlabored   Chest Wall:    No tenderness or deformity    Heart:    Regular rate and rhythm, S1 and S2 normal, no murmur,no  Rub or gallop   Abdomen:     Soft,upper abdominal tenderness, bowel sounds active, no masses, no organomegaly    Extremities:   Extremities normal, atraumatic, no cyanosis or edema   Pulses:      Skin:   Skin is warm and dry,  no rashes or palpable lesions   Neurologic:   no focal deficits noted      Lab Results (last 72 hours)    "    Procedure Component Value Units Date/Time    Blood Culture - Blood, Hand, Right [[id number]] Collected:  02/27/20 1425    Specimen:  Blood from Hand, Right Updated:  03/01/20 1445     Blood Culture No growth at 3 days    Blood Culture - Blood, Arm, Right [[id number]] Collected:  02/27/20 1017    Specimen:  Blood from Arm, Right Updated:  03/01/20 1030     Blood Culture No growth at 3 days    Comprehensive Metabolic Panel [[id number]]  (Abnormal) Collected:  03/01/20 0507    Specimen:  Blood Updated:  03/01/20 0552     Glucose 89 mg/dL      BUN 17 mg/dL      Creatinine 1.17 mg/dL      Sodium 131 mmol/L      Potassium 4.1 mmol/L      Chloride 100 mmol/L      CO2 20.5 mmol/L      Calcium 8.2 mg/dL      Total Protein 5.3 g/dL      Albumin 2.00 g/dL      ALT (SGPT) 9 U/L      AST (SGOT) 15 U/L      Alkaline Phosphatase 65 U/L      Total Bilirubin 0.5 mg/dL      eGFR Non African Amer 44 mL/min/1.73      Globulin 3.3 gm/dL      A/G Ratio 0.6 g/dL      BUN/Creatinine Ratio 14.5     Anion Gap 10.5 mmol/L     Narrative:       GFR Normal >60  Chronic Kidney Disease <60  Kidney Failure <15      CBC (No Diff) [[id number]]  (Abnormal) Collected:  03/01/20 0507    Specimen:  Blood Updated:  03/01/20 0530     WBC 10.75 10*3/mm3      RBC 3.53 10*6/mm3      Hemoglobin 10.2 g/dL      Hematocrit 31.3 %      MCV 88.7 fL      MCH 28.9 pg      MCHC 32.6 g/dL      RDW 13.7 %      RDW-SD 44.0 fl      MPV 9.0 fL      Platelets 198 10*3/mm3     Lipase [[id number]]  (Normal) Collected:  02/29/20 0623    Specimen:  Blood Updated:  02/29/20 0708     Lipase 36 U/L     Comprehensive Metabolic Panel [[id number]]  (Abnormal) Collected:  02/29/20 0623    Specimen:  Blood Updated:  02/29/20 0708     Glucose 82 mg/dL      BUN 14 mg/dL      Creatinine 1.15 mg/dL      Sodium 133 mmol/L      Potassium 4.2 mmol/L      Chloride 103 mmol/L      CO2 18.9 mmol/L      Calcium 7.9 mg/dL      Total Protein 5.1 g/dL      Albumin 2.10 g/dL      ALT (SGPT) 10 U/L       AST (SGOT) 16 U/L      Alkaline Phosphatase 70 U/L      Total Bilirubin 0.6 mg/dL      eGFR Non African Amer 45 mL/min/1.73      Globulin 3.0 gm/dL      A/G Ratio 0.7 g/dL      BUN/Creatinine Ratio 12.2     Anion Gap 11.1 mmol/L     Narrative:       GFR Normal >60  Chronic Kidney Disease <60  Kidney Failure <15      CBC (No Diff) [[id number]]  (Abnormal) Collected:  02/29/20 0623    Specimen:  Blood Updated:  02/29/20 0650     WBC 9.59 10*3/mm3      RBC 3.81 10*6/mm3      Hemoglobin 11.0 g/dL      Hematocrit 34.1 %      MCV 89.5 fL      MCH 28.9 pg      MCHC 32.3 g/dL      RDW 13.7 %      RDW-SD 44.6 fl      MPV 9.2 fL      Platelets 186 10*3/mm3     Comprehensive Metabolic Panel [[id number]]  (Abnormal) Collected:  02/28/20 0508    Specimen:  Blood Updated:  02/28/20 0612     Glucose 91 mg/dL      BUN 17 mg/dL      Creatinine 1.15 mg/dL      Sodium 133 mmol/L      Potassium 4.0 mmol/L      Chloride 100 mmol/L      CO2 21.4 mmol/L      Calcium 8.8 mg/dL      Total Protein 5.7 g/dL      Albumin 2.10 g/dL      ALT (SGPT) 11 U/L      AST (SGOT) 15 U/L      Alkaline Phosphatase 65 U/L      Total Bilirubin 0.6 mg/dL      eGFR Non African Amer 45 mL/min/1.73      Globulin 3.6 gm/dL      A/G Ratio 0.6 g/dL      BUN/Creatinine Ratio 14.8     Anion Gap 11.6 mmol/L     Narrative:       GFR Normal >60  Chronic Kidney Disease <60  Kidney Failure <15      Lipase [[id number]]  (Normal) Collected:  02/28/20 0508    Specimen:  Blood Updated:  02/28/20 0612     Lipase 52 U/L     Vancomycin, Random [[id number]]  (Normal) Collected:  02/28/20 0508    Specimen:  Blood Updated:  02/28/20 0609     Vancomycin Random 13.60 mcg/mL     CBC & Differential [[id number]] Collected:  02/28/20 0508    Specimen:  Blood Updated:  02/28/20 0538    Narrative:       The following orders were created for panel order CBC & Differential.  Procedure                               Abnormality         Status                     ---------                                -----------         ------                     CBC Auto Differential[[id number]]        Abnormal            Final result                 Please view results for these tests on the individual orders.    CBC Auto Differential [[id number]]  (Abnormal) Collected:  02/28/20 0508    Specimen:  Blood Updated:  02/28/20 0538     WBC 8.25 10*3/mm3      RBC 3.58 10*6/mm3      Hemoglobin 10.4 g/dL      Hematocrit 31.6 %      MCV 88.3 fL      MCH 29.1 pg      MCHC 32.9 g/dL      RDW 13.8 %      RDW-SD 44.5 fl      MPV 9.1 fL      Platelets 192 10*3/mm3      Neutrophil % 73.3 %      Lymphocyte % 10.2 %      Monocyte % 13.1 %      Eosinophil % 2.2 %      Basophil % 0.5 %      Immature Grans % 0.7 %      Neutrophils, Absolute 6.05 10*3/mm3      Lymphocytes, Absolute 0.84 10*3/mm3      Monocytes, Absolute 1.08 10*3/mm3      Eosinophils, Absolute 0.18 10*3/mm3      Basophils, Absolute 0.04 10*3/mm3      Immature Grans, Absolute 0.06 10*3/mm3      nRBC 0.0 /100 WBC         Data Review:  Results from last 7 days   Lab Units 03/03/20  0539 03/02/20  0506 03/01/20  0507   SODIUM mmol/L 132* 135* 131*   POTASSIUM mmol/L 4.0 4.1 4.1   CHLORIDE mmol/L 101 101 100   CO2 mmol/L 20.9* 24.1 20.5*   BUN mg/dL 18 17 17   CREATININE mg/dL 1.03* 1.10* 1.17*   GLUCOSE mg/dL 125* 97 89   CALCIUM mg/dL 7.9* 8.4* 8.2*     Results from last 7 days   Lab Units 03/03/20  0539 03/02/20  0506 03/01/20  0507   WBC 10*3/mm3 10.97* 11.65* 10.75   HEMOGLOBIN g/dL 10.3* 11.2* 10.2*   HEMATOCRIT % 29.8* 33.9* 31.3*   PLATELETS 10*3/mm3 208 225 198             Lab Results   Lab Value Date/Time    TROPONINT <0.010 01/31/2020 2142    TROPONINT <0.010 03/22/2019 1524    TROPONINT <0.010 07/10/2018 1038    TROPONINT <0.010 06/21/2018 0823    TROPONINT <0.010 06/20/2018 1738     Results from last 7 days   Lab Units 03/02/20  0506   TRIGLYCERIDES mg/dL 68     Results from last 7 days   Lab Units 03/03/20  0539 03/02/20  0506 03/01/20  0507   ALK PHOS U/L 58 67 65      BILIRUBIN mg/dL 0.4 0.5 0.5   ALT (SGPT) U/L 8 8 9   AST (SGOT) U/L 16 12 15             No results found for: POCGLU        Past Medical History:   Diagnosis Date   • Benign essential hypertension 10/28/2012    2012--treatment for hypertension begun.   • Bilateral sensorineural hearing loss, wears hearing aids 2017    Left is much worse than right.  Patient had multiple ear infections as a child.   • Chronic renal insufficiency, stage II (mild), 2016--creatinine 1.35 2016--creatinine 1.35.  Baseline creatinine approximately 1.3.   • Claustrophobia 2016    This patient has significant nocturnal hypoxemia and I think that she could benefit from nocturnal oxygen therapy. The exact etiology of her hypoxemia is not clear. She could possibly have obstructive sleep apnea but this is not documented. We cannot test this patient for sleep apnea due to the fact that she is severely claustrophobic. Patient was a former smoker and it is possibly that COPD he is playing a role.   • Family history of coronary artery disease 2016    Patient's mother, father, 2 sisters and a brother all  from myocardial infarctions   • Gout 10/28/2012    2012--initial diagnosis and treatment of gout.   • Hyperlipidemia 10/28/2012    2012--treatment for hyperlipidemia begun.   • Impaired fasting glucose 10/28/2012    2012--initial diagnosis impaired fasting glucose.   • Morbidly obese (CMS/HCC) 3/11/2019   • Nocturnal hypoxemia 2014--patient did not receive nocturnal oxygen because of Medicare regulations.   05/15/2014--overnight oximetry revealed oxygen saturations less than 89% for 22 minutes and 40 seconds. Oxygen saturations less than or equal to 88% for 22 minutes and 40 seconds. Lowest oxygen saturation 83%. The longest continuous time with oxygen saturations less than or equal to 88% was 1 minute and 32 seconds.   2012--overnight oximetry  revealed oxygen saturations less than 90% for one hour and 35 minutes. Oxygen saturations less than 89% 59 minutes. This patient has significant nocturnal hypoxemia and I think that she could benefit from nocturnal oxygen therapy. The exact etiology of her hypoxemia is not clear. She could possibly have obstructive sleep apnea but this is not documented. We cannot test this patient for sleep apnea due to the fact that she is severely claustrophobic. Patient was a former smoker and it is possibly that COPD he is playing a role.   • Pancreatitis    • Pneumonia    • Primary hypothyroidism 11/17/2015 March 11, 2019--TSH remains elevated slightly at 4.92.  Given the overall clinical picture including the multinodular goiter, we will initiate levothyroxine 50 mcg/day and reassess in about 6 weeks.  August 1, 2018--thyroid ultrasound reveals a multinodular thyroid with multiple subcentimeter nodules.  Only minimal increase in size of the largest nodule in the left lobe has occurred when compared    • Secondary polycythemia 8/2/2012 11/06/2014--patient did not receive nocturnal oxygen because of Medicare regulations.   05/15/2014--overnight oximetry revealed oxygen saturations less than 89% for 22 minutes and 40 seconds. Oxygen saturations less than or equal to 88% for 22 minutes and 40 seconds. Lowest oxygen saturation 83%. The longest continuous time with oxygen saturations less than or equal to 88% was 1 minute and 32 seconds.   08/02/2012--overnight oximetry revealed oxygen saturations less than 90% for one hour and 35 minutes. Oxygen saturations less than 89% 59 minutes. This patient has significant nocturnal hypoxemia and I think that she could benefit from nocturnal oxygen therapy. The exact etiology of her hypoxemia is not clear. She could possibly have obstructive sleep apnea but this is not documented. We cannot test this patient for sleep apnea due to the fact that she is severely claustrophobic. Patient  was a former smoker and it is possibly that COPD he is playing a role.   • Vitamin D deficiency 5/23/2016       Assessment:  Active Hospital Problems    Diagnosis  POA   • **Idiopathic acute pancreatitis [K85.00]  Yes   • Fever [R50.9]  Yes   • Hyponatremia [E87.1]  Yes   • LINNEA (acute kidney injury) (CMS/HCC) [N17.9]  Yes   • CKD (chronic kidney disease) stage 3, GFR 30-59 ml/min (CMS/HCC) [N18.3]  Yes   • Obesity (BMI 30-39.9) [E66.9]  Yes   • Supraventricular tachycardia (CMS/HCC) [I47.1]  Yes   • Primary hypothyroidism [E03.9]  Yes   • Hyperlipidemia [E78.5]  Yes      Resolved Hospital Problems   No resolved problems to display.       Plan:  Continue current RX. As per GI and surgery. Cholecystectomy sometime.  Follow lab.  Repeat CT report noted. And continue TPN    Mendoza Sullivan MD  3/3/2020  1:05 PM

## 2020-03-03 NOTE — PLAN OF CARE
Patient NPO with ice chips, vomiting and pain throughout shift, IV pain & nausea medication given, Atenolol held for low heart rate and low BP's, standby assist, CT follow up in am, daughter at bedside, on RA, will CTM

## 2020-03-03 NOTE — PROGRESS NOTES
"Pharmacy to dose TPN - Daily Progress Note  Patient: Mandy Alarcon  MRN#: [id number]  Attending: No name on file.  Admission Date: [id number]    Mandy Alarcon is a 86 y.o. female receiving TPN for Severe Acute Necrotizing Pancreatitis.      TPN #2 per Dr. Andujar's request.    Principal Problem:    Idiopathic acute pancreatitis  Active Problems:    Hyperlipidemia    Primary hypothyroidism    Supraventricular tachycardia (CMS/[facility])    Obesity (BMI 30-39.9)    CKD (chronic kidney disease) stage 3, GFR 30-59 ml/min (CMS/[facility])    LINNEA (acute kidney injury) (CMS/[facility])    Hyponatremia    Fever    Subjective/Objective  160 cm (63\")  95 kg (209 lb 7 oz)  Body mass index is 37.1 kg/m².   Results from last 7 days   Lab Units 03/03/20  0539 03/02/20  0506   SODIUM mmol/L 132* 135*   POTASSIUM mmol/L 4.0 4.1   CHLORIDE mmol/L 101 101   CO2 mmol/L 20.9* 24.1   BUN mg/dL 18 17   CREATININE mg/dL 1.03* 1.10*   CALCIUM mg/dL 7.9* 8.4*   ALBUMIN g/dL 1.80* 1.90*   BILIRUBIN mg/dL 0.4 0.5   ALK PHOS U/L 58 67   ALT (SGPT) U/L 8 8   AST (SGOT) U/L 16 12   GLUCOSE mg/dL 125* 97   MAGNESIUM mg/dL 1.7 1.8   PHOSPHORUS mg/dL 2.2* 2.4*   TRIGLYCERIDES mg/dL  --  68        I/O last 3 completed shifts:  In: 825 [I.V.:825]  Out: -   Diet Orders (active) (From admission, onward)     Start     Ordered    03/01/20 2116  NPO Diet NPO Except: Ice Chips, Sips With Meds  Diet Effective Now      03/01/20 2115              Additional insulin administration past 24 hours: none  Additional electrolyte administration past 24 hours: none  Acid suppression: Famotidine 20 mg IV daily    Goal:   Protein: 75 grams/day  Dextrose: 900 Kcal/day   Lipids: 100 mL of 20% lipid infusion   Fluid rate: 80 mL/hr (1920 mL/day)      Daily Assessment  Protein: 40 grams/day  Dextrose: 500 Kcal/day   Lipids: none    Plan    1) TPN #2 ordered with the following macronutrients:  Protein: 75 grams/day  Dextrose: 700 Kcal/day (increase to 900 kcal tomorrow 3/4 if patient " tolerates)   Lipids: 100 mL of 20% lipid infusion     Fluid rate: 80 mL/hr      2) Based on the above labs, will add the following electrolytes/additives to the TPN.      Sodium chloride: 70 mEq  Sodium acetate: 20 mEq  Sodium phosphate: 10 mEq  Potassium chloride: 50 mEq  Potassium phosphate: 22 mEq   Calcium gluconate: 9 mEq   Magnesium sulfate: 10 mEq   Multivitamin: 10 mL   Trace Elements: 1 mL      Sodium acetate and phos added. No other electrolyte changes    Labs: CMP, Mag, Phos with AM labs tomorrow.     Pharmacy will continue to follow and monitor daily while patient on TPN. Thank you for this consult.      Daryl Walton, PharmD, STACEY, BCPS

## 2020-03-03 NOTE — PROGRESS NOTES
[facility]  Inpatient Progress Note    Reason for Follow Up: Recurrent pancreatitis    Subjective     Interval History: Discussed with patient and patient's daughter at bedside.  She reports symptoms are improving now but had significant pain with nausea and vomiting last p.m.  CT scan of the abdomen was obtained and report is pending.  TPN infusing.      Current Facility-Administered Medications:   •  acetaminophen (TYLENOL) tablet 650 mg, 650 mg, Oral, Q4H PRN, 650 mg at 02/28/20 2213 **OR** acetaminophen (TYLENOL) 160 MG/5ML solution 650 mg, 650 mg, Oral, Q4H PRN **OR** acetaminophen (TYLENOL) suppository 650 mg, 650 mg, Rectal, Q4H PRN, Viktoriya Herrera APRN  •  Adult Central 2-in-1 TPN, , Intravenous, Continuous, Cacchione, Bryce GLYNN MD, Last Rate: 75 mL/hr at 03/02/20 1803  •  atenolol (TENORMIN) tablet 50 mg, 50 mg, Oral, Q12H, Viktoriya Herrera APRN, Stopped at 03/01/20 2101  •  enoxaparin (LOVENOX) syringe 40 mg, 40 mg, Subcutaneous, Q24H, Viktoriya Herrera APRN, 40 mg at 03/02/20 1817  •  famotidine (PEPCID) injection 20 mg, 20 mg, Intravenous, Daily, Viktoriya Herrera APRN, 20 mg at 03/03/20 0910  •  HYDROmorphone (DILAUDID) injection 0.5 mg, 0.5 mg, Intravenous, Q2H PRN, 0.5 mg at 03/03/20 0330 **AND** naloxone (NARCAN) injection 0.4 mg, 0.4 mg, Intravenous, Q5 Min PRN, Viktoriya Herrera APRN  •  levothyroxine (SYNTHROID, LEVOTHROID) tablet 50 mcg, 50 mcg, Oral, Q AM, Viktoriya Herrera APRN, 50 mcg at 03/03/20 0649  •  nitroglycerin (NITROSTAT) SL tablet 0.4 mg, 0.4 mg, Sublingual, Q5 Min PRN, Herrera, Viktoriya W, APRN  •  ondansetron (ZOFRAN) injection 4 mg, 4 mg, Intravenous, Q6H PRN, Viktoriya Herrera APRN, 4 mg at 03/02/20 2012  •  oxyCODONE-acetaminophen (PERCOCET) 7.5-325 MG per tablet 1 tablet, 1 tablet, Oral, Q4H PRN, Viktoriya Herrera APRN, 1 tablet at 03/01/20 2101  •  Pharmacy to Dose TPN, , Does not apply, Continuous PRN, Bryce Andujar,  MD  •  promethazine (PHENERGAN) injection 12.5 mg, 12.5 mg, Intravenous, Q6H PRN, Viktoriya Herrera APRN, 12.5 mg at 02/28/20 2213  •  [COMPLETED] Insert peripheral IV, , , Once **AND** sodium chloride 0.9 % flush 10 mL, 10 mL, Intravenous, PRN, Jared Cisneros MD  •  sodium chloride 0.9 % flush 10 mL, 10 mL, Intravenous, Q12H, Viktoriya Herrera APRN, 10 mL at 03/03/20 0912  •  sodium chloride 0.9 % flush 10 mL, 10 mL, Intravenous, PRN, Viktoriya Herrera APRN  •  sodium chloride 0.9 % flush 10 mL, 10 mL, Intravenous, Q12H, Bryce Andujar MD, 10 mL at 03/03/20 0911  •  sodium chloride 0.9 % flush 10 mL, 10 mL, Intravenous, Q12H, Bryce Andujar MD  •  sodium chloride 0.9 % flush 10 mL, 10 mL, Intravenous, Q12H, rByce Andujar MD, 10 mL at 03/03/20 0911  •  sodium chloride 0.9 % flush 10 mL, 10 mL, Intravenous, PRNPratima Robert N, MD  •  sodium chloride 0.9 % flush 20 mL, 20 mL, Intravenous, PRN, Bryce Andujar MD  Review of Systems:    All systems were reviewed and negative except for:  Gastrointestinal: positive for  See HPI    Objective     Vital Signs  Temp:  [97.9 °F (36.6 °C)-98.8 °F (37.1 °C)] 98.8 °F (37.1 °C)  Heart Rate:  [53-64] 58  Resp:  [18-20] 18  BP: (110-125)/(59-87) 125/59  Body mass index is 37.1 kg/m².  No intake or output data in the 24 hours ending 03/03/20 1257  No intake/output data recorded.     Physical Exam:   General: patient awake, alert and cooperative   Eyes: Normal lids and lashes, no scleral icterus   Neck: supple, normal ROM   Skin: warm and dry, not jaundiced   Cardiovascular: regular rhythm and rate, no murmurs auscultated   Pulm: clear to auscultation bilaterally, regular and unlabored   Abdomen: soft, nontender, nondistended; normal bowel sounds   Extremities: no rash or edema   Psychiatric: Normal mood and behavior; memory intact     Results Review:     I reviewed the patient's new clinical results.    Results from last 7 days   Lab Units  03/03/20  0539 03/02/20  0506 03/01/20  0507   WBC 10*3/mm3 10.97* 11.65* 10.75   HEMOGLOBIN g/dL 10.3* 11.2* 10.2*   HEMATOCRIT % 29.8* 33.9* 31.3*   PLATELETS 10*3/mm3 208 225 198     Results from last 7 days   Lab Units 03/03/20  0539 03/02/20  0506 03/01/20  0507   SODIUM mmol/L 132* 135* 131*   POTASSIUM mmol/L 4.0 4.1 4.1   CHLORIDE mmol/L 101 101 100   CO2 mmol/L 20.9* 24.1 20.5*   BUN mg/dL 18 17 17   CREATININE mg/dL 1.03* 1.10* 1.17*   CALCIUM mg/dL 7.9* 8.4* 8.2*   BILIRUBIN mg/dL 0.4 0.5 0.5   ALK PHOS U/L 58 67 65   ALT (SGPT) U/L 8 8 9   AST (SGOT) U/L 16 12 15   GLUCOSE mg/dL 125* 97 89         Lab Results   Lab Value Date/Time    LIPASE 36 02/29/2020 0623    LIPASE 52 02/28/2020 0508    LIPASE 62 (H) 02/27/2020 1016    LIPASE 38 02/05/2020 0507    LIPASE 60 02/04/2020 0435    LIPASE 181 (H) 02/03/2020 0211    LIPASE 417 (H) 02/02/2020 0441    LIPASE >3,000 (H) 01/31/2020 2142       Radiology:  CT Abdomen Pelvis With Contrast   Preliminary Result   The findings on CT are most concerning for acute   pancreatitis with acute necrotic/peripancreatic phlegmon. There is a   relatively simple appearing collection seen just anterior to the IVC   measuring up to 3.2 cm as well. There is severe attenuation of the   splenoportal confluence, proximal superior mesenteric vein as well as   the splenic vein. There is marked asymmetric gallbladder wall   thickening.       Radiation dose reduction techniques were utilized, including automated   exposure control and exposure modulation based on body size.              US Abdomen Limited   Final Result   1.  There is pericholecystic fluid, as well as mild gallbladder wall   thickening, as seen on recent CT but appears to be new since 01/31/2020.   The diameter of the common bile duct is grossly unchanged since   01/31/2020. The gallbladder is nondistended. While findings are favored   be related to adjacent worsening pancreatitis, early acute cholecystitis   cannot be  excluded and correlation with patient history and laboratory   values is recommended. Findings can be further evaluated with HIDA scan   if clinically indicated.   2.  The pancreas is not well-visualized, likely related to significant   pancreatitis.   3.  Pericholecystic fluid is present with 1 of the areas of fluid   appearing to be partially loculated and may represent an early   pseudocyst.       This report was finalized on 2/28/2020 9:18 PM by Dr. Carlitos Willis M.D.          CT Chest Without Contrast   Final Result   1. Significant progression of pancreatitis. The small peripancreatic   fluid collections likely represent developing pseudocysts.   2. Distended gallbladder is suspected to be related to the pancreatitis.   Characterization is limited in the absence of IV contrast and   cholecystitis cannot be entirely excluded. Please correlate clinically.   3. Sigmoid diverticulosis without evidence for diverticulitis.   4. There is a minimal right pleural effusion. The increase in density   and markings at both lower lobes may be related to underexpansion with   atelectatic change. No definite pneumonia is seen.       Discussed with Dr. Cisneros.       This report was finalized on 2/27/2020 4:14 PM by Dr. Lakeisha Robison M.D.          CT Abdomen Pelvis Without Contrast   Final Result   1. Significant progression of pancreatitis. The small peripancreatic   fluid collections likely represent developing pseudocysts.   2. Distended gallbladder is suspected to be related to the pancreatitis.   Characterization is limited in the absence of IV contrast and   cholecystitis cannot be entirely excluded. Please correlate clinically.   3. Sigmoid diverticulosis without evidence for diverticulitis.   4. There is a minimal right pleural effusion. The increase in density   and markings at both lower lobes may be related to underexpansion with   atelectatic change. No definite pneumonia is seen.       Discussed with   Amelia.       This report was finalized on 2/27/2020 4:14 PM by Dr. Lakeisha Robison M.D.          XR Chest 2 View   Final Result   Improvement in bilateral pleural effusions with potential   mild residual pleural fluid at the posterior costophrenic angles. Mild   left basilar linear subsegmental scarring or atelectasis.       This report was finalized on 2/27/2020 11:08 AM by Dr. Jose Thorne M.D.              Assessment/Plan     Patient Active Problem List   Diagnosis   • Benign essential hypertension   • Claustrophobia   • Gout   • Hyperlipidemia   • Primary hypothyroidism   • Impaired fasting glucose   • Nocturnal hypoxemia   • Secondary polycythemia   • Vitamin D deficiency   • Therapeutic drug monitoring   • Family history of coronary artery disease   • Bilateral sensorineural hearing loss, wears hearing aids   • Multinodular goiter   • Supraventricular tachycardia (CMS/HCC)   • Morbidly obese (CMS/HCC)   • Obesity (BMI 30-39.9)   • CKD (chronic kidney disease) stage 3, GFR 30-59 ml/min (CMS/HCC)   • LINNEA (acute kidney injury) (CMS/HCC)   • Hyponatremia   • Hypomagnesemia   • Leukocytosis   • Idiopathic acute pancreatitis   • Fever       Assessment:  1. Pancreatitis: On gut rest, still episodes of nausea with vomiting and abdominal pain.      Plan:  · Await CT scan results  · TPN as per surgery  I discussed the patients findings and my recommendations with patient.    Daryl Dunham MD

## 2020-03-03 NOTE — PROGRESS NOTES
Chief Complaint:    Pancreatitis    Subjective:    No pain today, but still nauseated.    Objective:    Vitals:    03/02/20 2317 03/03/20 0818 03/03/20 1242 03/03/20 1425   BP: 110/59 125/59  125/66   BP Location: Left arm Left arm  Left arm   Patient Position: Lying Sitting  Lying   Pulse: 55 64 58 58   Resp: 18 18  18   Temp:  98.8 °F (37.1 °C)  97.9 °F (36.6 °C)   TempSrc:  Oral  Oral   SpO2: 95% 96%  95%   Weight:       Height:           Lungs: Clear  Heart: Regular  Abdomen: Soft.  Less distended and less tender.  Bowel sounds are present.  Extremities: Warm    Labs reviewed.  WBC 10.97, Hgb 10.3, platelets 208.  Creat 1.03, CO2 20.9.  AlkP 58, ALT 8, AST 16, T bili 0.4.    US shows GB sludge  CT today shows areas of pancreatic necrosis and coalescence of some of the peripancreatic fluid. I don't think anything has worsened, or that surgical debridement will be necessary.    Assessment:    Pancreatitis  Gallbladder sludge    Plan:    TPN has begun.   Repeat the CT abdomen in 5-7 days.  Discussed with patient and family.

## 2020-03-04 LAB
ALBUMIN SERPL-MCNC: 2 G/DL (ref 3.5–5.2)
ALBUMIN/GLOB SERPL: 0.7 G/DL
ALP SERPL-CCNC: 51 U/L (ref 39–117)
ALT SERPL W P-5'-P-CCNC: 8 U/L (ref 1–33)
ANION GAP SERPL CALCULATED.3IONS-SCNC: 10 MMOL/L (ref 5–15)
AST SERPL-CCNC: 16 U/L (ref 1–32)
BASOPHILS # BLD AUTO: 0.04 10*3/MM3 (ref 0–0.2)
BASOPHILS NFR BLD AUTO: 0.5 % (ref 0–1.5)
BILIRUB SERPL-MCNC: 0.3 MG/DL (ref 0.2–1.2)
BUN BLD-MCNC: 16 MG/DL (ref 8–23)
BUN/CREAT SERPL: 19.8 (ref 7–25)
CALCIUM SPEC-SCNC: 8.1 MG/DL (ref 8.6–10.5)
CHLORIDE SERPL-SCNC: 99 MMOL/L (ref 98–107)
CO2 SERPL-SCNC: 22 MMOL/L (ref 22–29)
CREAT BLD-MCNC: 0.81 MG/DL (ref 0.57–1)
DEPRECATED RDW RBC AUTO: 45.2 FL (ref 37–54)
EOSINOPHIL # BLD AUTO: 0.28 10*3/MM3 (ref 0–0.4)
EOSINOPHIL NFR BLD AUTO: 3.3 % (ref 0.3–6.2)
ERYTHROCYTE [DISTWIDTH] IN BLOOD BY AUTOMATED COUNT: 13.9 % (ref 12.3–15.4)
GFR SERPL CREATININE-BSD FRML MDRD: 67 ML/MIN/1.73
GLOBULIN UR ELPH-MCNC: 2.8 GM/DL
GLUCOSE BLD-MCNC: 112 MG/DL (ref 65–99)
HCT VFR BLD AUTO: 31.1 % (ref 34–46.6)
HGB BLD-MCNC: 10.1 G/DL (ref 12–15.9)
IMM GRANULOCYTES # BLD AUTO: 0.08 10*3/MM3 (ref 0–0.05)
IMM GRANULOCYTES NFR BLD AUTO: 1 % (ref 0–0.5)
LYMPHOCYTES # BLD AUTO: 1.21 10*3/MM3 (ref 0.7–3.1)
LYMPHOCYTES NFR BLD AUTO: 14.5 % (ref 19.6–45.3)
MAGNESIUM SERPL-MCNC: 1.8 MG/DL (ref 1.6–2.4)
MCH RBC QN AUTO: 29.4 PG (ref 26.6–33)
MCHC RBC AUTO-ENTMCNC: 32.5 G/DL (ref 31.5–35.7)
MCV RBC AUTO: 90.4 FL (ref 79–97)
MONOCYTES # BLD AUTO: 0.87 10*3/MM3 (ref 0.1–0.9)
MONOCYTES NFR BLD AUTO: 10.4 % (ref 5–12)
NEUTROPHILS # BLD AUTO: 5.89 10*3/MM3 (ref 1.7–7)
NEUTROPHILS NFR BLD AUTO: 70.3 % (ref 42.7–76)
NRBC BLD AUTO-RTO: 0 /100 WBC (ref 0–0.2)
PHOSPHATE SERPL-MCNC: 2.6 MG/DL (ref 2.5–4.5)
PLATELET # BLD AUTO: 206 10*3/MM3 (ref 140–450)
PMV BLD AUTO: 9.4 FL (ref 6–12)
POTASSIUM BLD-SCNC: 4 MMOL/L (ref 3.5–5.2)
PROT SERPL-MCNC: 4.8 G/DL (ref 6–8.5)
RBC # BLD AUTO: 3.44 10*6/MM3 (ref 3.77–5.28)
SODIUM BLD-SCNC: 131 MMOL/L (ref 136–145)
WBC NRBC COR # BLD: 8.37 10*3/MM3 (ref 3.4–10.8)

## 2020-03-04 PROCEDURE — 25010000002 CALCIUM GLUCONATE PER 10 ML: Performed by: SURGERY

## 2020-03-04 PROCEDURE — 25010000002 ENOXAPARIN PER 10 MG: Performed by: NURSE PRACTITIONER

## 2020-03-04 PROCEDURE — 25010000002 ONDANSETRON PER 1 MG: Performed by: NURSE PRACTITIONER

## 2020-03-04 PROCEDURE — 25010000002 HYDROMORPHONE PER 4 MG: Performed by: NURSE PRACTITIONER

## 2020-03-04 PROCEDURE — 80053 COMPREHEN METABOLIC PANEL: CPT | Performed by: HOSPITALIST

## 2020-03-04 PROCEDURE — 83735 ASSAY OF MAGNESIUM: CPT | Performed by: SURGERY

## 2020-03-04 PROCEDURE — 25010000002 MAGNESIUM SULFATE PER 500 MG OF MAGNESIUM: Performed by: SURGERY

## 2020-03-04 PROCEDURE — 25010000002 POTASSIUM CHLORIDE PER 2 MEQ OF POTASSIUM: Performed by: SURGERY

## 2020-03-04 PROCEDURE — 84100 ASSAY OF PHOSPHORUS: CPT | Performed by: SURGERY

## 2020-03-04 PROCEDURE — 85025 COMPLETE CBC W/AUTO DIFF WBC: CPT | Performed by: HOSPITALIST

## 2020-03-04 PROCEDURE — 99231 SBSQ HOSP IP/OBS SF/LOW 25: CPT | Performed by: SURGERY

## 2020-03-04 PROCEDURE — 99231 SBSQ HOSP IP/OBS SF/LOW 25: CPT | Performed by: INTERNAL MEDICINE

## 2020-03-04 RX ADMIN — SODIUM CHLORIDE, PRESERVATIVE FREE 10 ML: 5 INJECTION INTRAVENOUS at 21:51

## 2020-03-04 RX ADMIN — ATENOLOL 50 MG: 50 TABLET ORAL at 21:49

## 2020-03-04 RX ADMIN — SODIUM CHLORIDE, PRESERVATIVE FREE 10 ML: 5 INJECTION INTRAVENOUS at 09:50

## 2020-03-04 RX ADMIN — I.V. FAT EMULSION 20 G: 20 EMULSION INTRAVENOUS at 18:28

## 2020-03-04 RX ADMIN — SODIUM CHLORIDE, PRESERVATIVE FREE 10 ML: 5 INJECTION INTRAVENOUS at 09:51

## 2020-03-04 RX ADMIN — ONDANSETRON 4 MG: 2 INJECTION INTRAMUSCULAR; INTRAVENOUS at 21:49

## 2020-03-04 RX ADMIN — FAMOTIDINE 20 MG: 10 INJECTION INTRAVENOUS at 09:49

## 2020-03-04 RX ADMIN — HYDROMORPHONE HYDROCHLORIDE 0.5 MG: 10 INJECTION INTRAMUSCULAR; INTRAVENOUS; SUBCUTANEOUS at 02:56

## 2020-03-04 RX ADMIN — SODIUM CHLORIDE, PRESERVATIVE FREE 10 ML: 5 INJECTION INTRAVENOUS at 09:49

## 2020-03-04 RX ADMIN — SODIUM CHLORIDE, PRESERVATIVE FREE 10 ML: 5 INJECTION INTRAVENOUS at 21:49

## 2020-03-04 RX ADMIN — HYDROMORPHONE HYDROCHLORIDE 0.5 MG: 10 INJECTION INTRAMUSCULAR; INTRAVENOUS; SUBCUTANEOUS at 21:49

## 2020-03-04 RX ADMIN — ONDANSETRON 4 MG: 2 INJECTION INTRAMUSCULAR; INTRAVENOUS at 11:00

## 2020-03-04 RX ADMIN — ENOXAPARIN SODIUM 40 MG: 40 INJECTION SUBCUTANEOUS at 18:28

## 2020-03-04 RX ADMIN — ATENOLOL 50 MG: 50 TABLET ORAL at 09:48

## 2020-03-04 RX ADMIN — LEVOTHYROXINE SODIUM 50 MCG: 50 TABLET ORAL at 06:16

## 2020-03-04 RX ADMIN — POTASSIUM CHLORIDE: 2 INJECTION, SOLUTION, CONCENTRATE INTRAVENOUS at 18:29

## 2020-03-04 NOTE — PLAN OF CARE
VSS, pain meds given, on gut rest on TPN with LIPIDS, frequent urination, daughter at bedside, patient c/o of soreness up to chair for 30 mins throughout night, will CTM

## 2020-03-04 NOTE — PROGRESS NOTES
Chief Complaint:    Pancreatitis    Subjective:    No pain today, but still nauseated.  Had diarrhea today.    Objective:    Vitals:    03/03/20 2352 03/04/20 0554 03/04/20 0752 03/04/20 1357   BP: 115/62  138/65 121/63   BP Location: Left arm  Left arm Left arm   Patient Position: Lying  Sitting Sitting   Pulse: 62  64 63   Resp: 16  16 16   Temp: 98.1 °F (36.7 °C)  98.7 °F (37.1 °C) 98.4 °F (36.9 °C)   TempSrc: Oral  Oral Oral   SpO2: 94%  94%    Weight:  98.8 kg (217 lb 13 oz)     Height:           Lungs: Clear  Heart: Regular  Abdomen: Soft.  Less distended and less tender.  Bowel sounds are present.  Extremities: Warm    Labs reviewed.  WBC 8.37, Hgb 10.1, platelets 206.  Creat 0.81, CO2 22.0.  AlkP 51, ALT 8, AST 16, T bili 0.4.    Assessment:    Pancreatitis  Gallbladder sludge    Plan:    Continue TPN.  Repeat the CT abdomen in 4-6 days.  Discussed with patient and family.

## 2020-03-04 NOTE — PROGRESS NOTES
[facility]  Inpatient Progress Note    Reason for Follow Up: Recurrent pancreatitis    Subjective     Interval History: Patient in the bathroom, discussed with daughter at bedside.  She has had 2 episodes of diarrhea with some lower abdominal cramping this a.m.  Daughter felt it might be related to her TPN but not sure as to the etiology.  CT scan results reviewed.  Findings still consistent with acute pancreatitis as well as necrotic/peripancreatic phlegmon.      Current Facility-Administered Medications:   •  acetaminophen (TYLENOL) tablet 650 mg, 650 mg, Oral, Q4H PRN, 650 mg at 02/28/20 2213 **OR** acetaminophen (TYLENOL) 160 MG/5ML solution 650 mg, 650 mg, Oral, Q4H PRN **OR** acetaminophen (TYLENOL) suppository 650 mg, 650 mg, Rectal, Q4H PRN, Viktoriya Herrera APRN  •  Adult Central 2-in-1 TPN, , Intravenous, Continuous, Bryce Andujar MD  •  Adult Central 2-in-1 TPN, , Intravenous, Continuous, Bryce Andujar MD, Last Rate: 60 mL/hr at 03/04/20 0948  •  atenolol (TENORMIN) tablet 50 mg, 50 mg, Oral, Q12H, Viktoriya Herrera APRN, 50 mg at 03/04/20 0948  •  enoxaparin (LOVENOX) syringe 40 mg, 40 mg, Subcutaneous, Q24H, Viktoriya Herrera APRN, 40 mg at 03/03/20 1815  •  famotidine (PEPCID) injection 20 mg, 20 mg, Intravenous, Daily, Viktoriya Herrera APRN, 20 mg at 03/04/20 0949  •  Fat Emulsion Plant Based (INTRALIPID,LIPOSYN) 20 % infusion 20 g, 100 mL, Intravenous, Q24H (TPN), Bryce Andujar MD, Last Rate: 8.33 mL/hr at 03/03/20 1815, 20 g at 03/03/20 1815  •  HYDROmorphone (DILAUDID) injection 0.5 mg, 0.5 mg, Intravenous, Q2H PRN, 0.5 mg at 03/04/20 0256 **AND** naloxone (NARCAN) injection 0.4 mg, 0.4 mg, Intravenous, Q5 Min PRN, Viktoriya Herrera, CJ  •  levothyroxine (SYNTHROID, LEVOTHROID) tablet 50 mcg, 50 mcg, Oral, Q AM, Viktoriya Herrera, APRGRETTA, 50 mcg at 03/04/20 0616  •  nitroglycerin (NITROSTAT) SL tablet 0.4 mg, 0.4 mg, Sublingual, Q5 Min  PRN, Viktoriya Herrera APRN  •  ondansetron (ZOFRAN) injection 4 mg, 4 mg, Intravenous, Q6H PRN, Viktoriya Herrera APRN, 4 mg at 03/02/20 2012  •  oxyCODONE-acetaminophen (PERCOCET) 7.5-325 MG per tablet 1 tablet, 1 tablet, Oral, Q4H PRN, Viktoriya Herrera APRN, 1 tablet at 03/01/20 2101  •  Pharmacy to Dose TPN, , Does not apply, Continuous PRN, Bryce Andujar MD  •  promethazine (PHENERGAN) injection 12.5 mg, 12.5 mg, Intravenous, Q6H PRN, Viktoriya Herrera APRN, 12.5 mg at 02/28/20 2213  •  [COMPLETED] Insert peripheral IV, , , Once **AND** sodium chloride 0.9 % flush 10 mL, 10 mL, Intravenous, PRN, Jared Cisneros MD  •  sodium chloride 0.9 % flush 10 mL, 10 mL, Intravenous, Q12H, Viktoriya Herrera APRN, 10 mL at 03/04/20 0951  •  sodium chloride 0.9 % flush 10 mL, 10 mL, Intravenous, PRN, Viktoriya Herrera APRN  •  sodium chloride 0.9 % flush 10 mL, 10 mL, Intravenous, Q12H, Bryce Andujar MD, 10 mL at 03/04/20 0950  •  sodium chloride 0.9 % flush 10 mL, 10 mL, Intravenous, Q12H, Bryce Andujar MD, 10 mL at 03/04/20 0949  •  sodium chloride 0.9 % flush 10 mL, 10 mL, Intravenous, Q12H, Bryce Andujar MD, 10 mL at 03/04/20 0949  •  sodium chloride 0.9 % flush 10 mL, 10 mL, Intravenous, PRNPratima Robert N, MD  •  sodium chloride 0.9 % flush 20 mL, 20 mL, Intravenous, Pratima HER Robert N, MD  Review of Systems:    All systems were reviewed and negative except for:  Gastrointestinal: positive for  See HPI    Objective     Vital Signs  Temp:  [97.9 °F (36.6 °C)-98.7 °F (37.1 °C)] 98.7 °F (37.1 °C)  Heart Rate:  [58-64] 64  Resp:  [16-18] 16  BP: (115-138)/(60-66) 138/65  Body mass index is 38.58 kg/m².  No intake or output data in the 24 hours ending 03/04/20 1038  No intake/output data recorded.     Physical Exam:   General: patient awake, alert and cooperative   Eyes: Normal lids and lashes, no scleral icterus   Neck: supple, normal ROM   Skin: warm and dry, not  jaundiced   Cardiovascular: regular rhythm and rate, no murmurs auscultated   Pulm: clear to auscultation bilaterally, regular and unlabored   Abdomen: soft, nontender, nondistended; normal bowel sounds   Extremities: no rash or edema   Psychiatric: Normal mood and behavior; memory intact     Results Review:     I reviewed the patient's new clinical results.    Results from last 7 days   Lab Units 03/04/20  0502 03/03/20  0539 03/02/20  0506   WBC 10*3/mm3 8.37 10.97* 11.65*   HEMOGLOBIN g/dL 10.1* 10.3* 11.2*   HEMATOCRIT % 31.1* 29.8* 33.9*   PLATELETS 10*3/mm3 206 208 225     Results from last 7 days   Lab Units 03/04/20  0502 03/03/20  0539 03/02/20  0506   SODIUM mmol/L 131* 132* 135*   POTASSIUM mmol/L 4.0 4.0 4.1   CHLORIDE mmol/L 99 101 101   CO2 mmol/L 22.0 20.9* 24.1   BUN mg/dL 16 18 17   CREATININE mg/dL 0.81 1.03* 1.10*   CALCIUM mg/dL 8.1* 7.9* 8.4*   BILIRUBIN mg/dL 0.3 0.4 0.5   ALK PHOS U/L 51 58 67   ALT (SGPT) U/L 8 8 8   AST (SGOT) U/L 16 16 12   GLUCOSE mg/dL 112* 125* 97         Lab Results   Lab Value Date/Time    LIPASE 36 02/29/2020 0623    LIPASE 52 02/28/2020 0508    LIPASE 62 (H) 02/27/2020 1016    LIPASE 38 02/05/2020 0507    LIPASE 60 02/04/2020 0435    LIPASE 181 (H) 02/03/2020 0211    LIPASE 417 (H) 02/02/2020 0441    LIPASE >3,000 (H) 01/31/2020 2142       Radiology:  CT Abdomen Pelvis With Contrast   Final Result   The findings on CT are most concerning for acute   pancreatitis with acute necrotic/peripancreatic phlegmon. There is a   relatively simple appearing collection seen just anterior to the IVC   measuring up to 3.2 cm as well. There is severe attenuation of the   splenoportal confluence, proximal superior mesenteric vein as well as   the splenic vein. There is marked asymmetric gallbladder wall   thickening.       Radiation dose reduction techniques were utilized, including automated   exposure control and exposure modulation based on body size.       This report was  finalized on 3/4/2020 9:02 AM by Dr. Ruthy Haro M.D.          US Abdomen Limited   Final Result   1.  There is pericholecystic fluid, as well as mild gallbladder wall   thickening, as seen on recent CT but appears to be new since 01/31/2020.   The diameter of the common bile duct is grossly unchanged since   01/31/2020. The gallbladder is nondistended. While findings are favored   be related to adjacent worsening pancreatitis, early acute cholecystitis   cannot be excluded and correlation with patient history and laboratory   values is recommended. Findings can be further evaluated with HIDA scan   if clinically indicated.   2.  The pancreas is not well-visualized, likely related to significant   pancreatitis.   3.  Pericholecystic fluid is present with 1 of the areas of fluid   appearing to be partially loculated and may represent an early   pseudocyst.       This report was finalized on 2/28/2020 9:18 PM by Dr. Carlitos Willis M.D.          CT Chest Without Contrast   Final Result   1. Significant progression of pancreatitis. The small peripancreatic   fluid collections likely represent developing pseudocysts.   2. Distended gallbladder is suspected to be related to the pancreatitis.   Characterization is limited in the absence of IV contrast and   cholecystitis cannot be entirely excluded. Please correlate clinically.   3. Sigmoid diverticulosis without evidence for diverticulitis.   4. There is a minimal right pleural effusion. The increase in density   and markings at both lower lobes may be related to underexpansion with   atelectatic change. No definite pneumonia is seen.       Discussed with Dr. Cisneros.       This report was finalized on 2/27/2020 4:14 PM by Dr. Lakeisha Robison M.D.          CT Abdomen Pelvis Without Contrast   Final Result   1. Significant progression of pancreatitis. The small peripancreatic   fluid collections likely represent developing pseudocysts.   2. Distended gallbladder is  suspected to be related to the pancreatitis.   Characterization is limited in the absence of IV contrast and   cholecystitis cannot be entirely excluded. Please correlate clinically.   3. Sigmoid diverticulosis without evidence for diverticulitis.   4. There is a minimal right pleural effusion. The increase in density   and markings at both lower lobes may be related to underexpansion with   atelectatic change. No definite pneumonia is seen.       Discussed with Dr. Cisneros.       This report was finalized on 2/27/2020 4:14 PM by Dr. Lakeisha Robison M.D.          XR Chest 2 View   Final Result   Improvement in bilateral pleural effusions with potential   mild residual pleural fluid at the posterior costophrenic angles. Mild   left basilar linear subsegmental scarring or atelectasis.       This report was finalized on 2/27/2020 11:08 AM by Dr. Jose Thorne M.D.              Assessment/Plan     Patient Active Problem List   Diagnosis   • Benign essential hypertension   • Claustrophobia   • Gout   • Hyperlipidemia   • Primary hypothyroidism   • Impaired fasting glucose   • Nocturnal hypoxemia   • Secondary polycythemia   • Vitamin D deficiency   • Therapeutic drug monitoring   • Family history of coronary artery disease   • Bilateral sensorineural hearing loss, wears hearing aids   • Multinodular goiter   • Supraventricular tachycardia (CMS/HCC)   • Morbidly obese (CMS/HCC)   • Obesity (BMI 30-39.9)   • CKD (chronic kidney disease) stage 3, GFR 30-59 ml/min (CMS/HCC)   • LINNEA (acute kidney injury) (CMS/HCC)   • Hyponatremia   • Hypomagnesemia   • Leukocytosis   • Idiopathic acute pancreatitis   • Fever       Assessment:  1. Pancreatitis: CT consistent with continued involvement  2. Recent onset diarrhea        Plan:  · Continue conservative management  · If diarrhea persist consider work-up to include GI panel by PCR  I discussed the patients findings and my recommendations with patient and family.    Daryl LAGUERRE  MD [place]

## 2020-03-04 NOTE — PROGRESS NOTES
"DAILY PROGRESS NOTE  [facility]    Patient Identification:  Name: Mandy Alarcon  Age: 86 y.o.  Sex: female  :  1933  MRN: [id number]         Primary Care Physician: Daryl Santos MD    Subjective:  Interval History:She is sleepy.  Some pain.  Nausea and vomiting.    Objective:    Scheduled Meds:    atenolol 50 mg Oral Q12H   enoxaparin 40 mg Subcutaneous Q24H   famotidine 20 mg Intravenous Daily   Fat Emulsion Plant Based 100 mL Intravenous Q24H (TPN)   levothyroxine 50 mcg Oral Q AM   sodium chloride 10 mL Intravenous Q12H   sodium chloride 10 mL Intravenous Q12H   sodium chloride 10 mL Intravenous Q12H   sodium chloride 10 mL Intravenous Q12H     Continuous Infusions:    Adult Central 2-in-1 TPN     Adult Central 2-in-1 TPN  Last Rate: 60 mL/hr at 20 0948   Pharmacy to Dose TPN         Vital signs in last 24 hours:  Temp:  [97.9 °F (36.6 °C)-98.7 °F (37.1 °C)] 98.4 °F (36.9 °C)  Heart Rate:  [58-64] 63  Resp:  [16-18] 16  BP: (115-138)/(60-65) 121/63    Intake/Output:  No intake or output data in the 24 hours ending 20 1524    Exam:  /63 (BP Location: Left arm, Patient Position: Sitting)   Pulse 63   Temp 98.4 °F (36.9 °C) (Oral)   Resp 16   Ht 160 cm (63\")   Wt 98.8 kg (217 lb 13 oz)   SpO2 94%   BMI 38.58 kg/m²     General Appearance:    Alert, cooperative, no distress   Head:    Normocephalic, without obvious abnormality, atraumatic   Eyes:       Throat:   Lips, tongue, gums normal   Neck:   Supple, symmetrical, trachea midline, no JVD   Lungs:     Clear to auscultation bilaterally, respirations unlabored   Chest Wall:    No tenderness or deformity    Heart:    Regular rate and rhythm, S1 and S2 normal, no murmur,no  Rub or gallop   Abdomen:     Soft,upper abdominal tenderness, bowel sounds active, no masses, no organomegaly    Extremities:   Extremities normal, atraumatic, no cyanosis or edema   Pulses:      Skin:   Skin is warm and dry,  no rashes or " palpable lesions   Neurologic:   no focal deficits noted      Lab Results (last 72 hours)     Procedure Component Value Units Date/Time    Blood Culture - Blood, Hand, Right [[id number]] Collected:  02/27/20 1425    Specimen:  Blood from Hand, Right Updated:  03/01/20 1445     Blood Culture No growth at 3 days    Blood Culture - Blood, Arm, Right [[id number]] Collected:  02/27/20 1017    Specimen:  Blood from Arm, Right Updated:  03/01/20 1030     Blood Culture No growth at 3 days    Comprehensive Metabolic Panel [[id number]]  (Abnormal) Collected:  03/01/20 0507    Specimen:  Blood Updated:  03/01/20 0552     Glucose 89 mg/dL      BUN 17 mg/dL      Creatinine 1.17 mg/dL      Sodium 131 mmol/L      Potassium 4.1 mmol/L      Chloride 100 mmol/L      CO2 20.5 mmol/L      Calcium 8.2 mg/dL      Total Protein 5.3 g/dL      Albumin 2.00 g/dL      ALT (SGPT) 9 U/L      AST (SGOT) 15 U/L      Alkaline Phosphatase 65 U/L      Total Bilirubin 0.5 mg/dL      eGFR Non African Amer 44 mL/min/1.73      Globulin 3.3 gm/dL      A/G Ratio 0.6 g/dL      BUN/Creatinine Ratio 14.5     Anion Gap 10.5 mmol/L     Narrative:       GFR Normal >60  Chronic Kidney Disease <60  Kidney Failure <15      CBC (No Diff) [[id number]]  (Abnormal) Collected:  03/01/20 0507    Specimen:  Blood Updated:  03/01/20 0530     WBC 10.75 10*3/mm3      RBC 3.53 10*6/mm3      Hemoglobin 10.2 g/dL      Hematocrit 31.3 %      MCV 88.7 fL      MCH 28.9 pg      MCHC 32.6 g/dL      RDW 13.7 %      RDW-SD 44.0 fl      MPV 9.0 fL      Platelets 198 10*3/mm3     Lipase [[id number]]  (Normal) Collected:  02/29/20 0623    Specimen:  Blood Updated:  02/29/20 0708     Lipase 36 U/L     Comprehensive Metabolic Panel [[id number]]  (Abnormal) Collected:  02/29/20 0623    Specimen:  Blood Updated:  02/29/20 0708     Glucose 82 mg/dL      BUN 14 mg/dL      Creatinine 1.15 mg/dL      Sodium 133 mmol/L      Potassium 4.2 mmol/L      Chloride 103 mmol/L      CO2 18.9 mmol/L       Calcium 7.9 mg/dL      Total Protein 5.1 g/dL      Albumin 2.10 g/dL      ALT (SGPT) 10 U/L      AST (SGOT) 16 U/L      Alkaline Phosphatase 70 U/L      Total Bilirubin 0.6 mg/dL      eGFR Non African Amer 45 mL/min/1.73      Globulin 3.0 gm/dL      A/G Ratio 0.7 g/dL      BUN/Creatinine Ratio 12.2     Anion Gap 11.1 mmol/L     Narrative:       GFR Normal >60  Chronic Kidney Disease <60  Kidney Failure <15      CBC (No Diff) [[id number]]  (Abnormal) Collected:  02/29/20 0623    Specimen:  Blood Updated:  02/29/20 0650     WBC 9.59 10*3/mm3      RBC 3.81 10*6/mm3      Hemoglobin 11.0 g/dL      Hematocrit 34.1 %      MCV 89.5 fL      MCH 28.9 pg      MCHC 32.3 g/dL      RDW 13.7 %      RDW-SD 44.6 fl      MPV 9.2 fL      Platelets 186 10*3/mm3     Comprehensive Metabolic Panel [[id number]]  (Abnormal) Collected:  02/28/20 0508    Specimen:  Blood Updated:  02/28/20 0612     Glucose 91 mg/dL      BUN 17 mg/dL      Creatinine 1.15 mg/dL      Sodium 133 mmol/L      Potassium 4.0 mmol/L      Chloride 100 mmol/L      CO2 21.4 mmol/L      Calcium 8.8 mg/dL      Total Protein 5.7 g/dL      Albumin 2.10 g/dL      ALT (SGPT) 11 U/L      AST (SGOT) 15 U/L      Alkaline Phosphatase 65 U/L      Total Bilirubin 0.6 mg/dL      eGFR Non African Amer 45 mL/min/1.73      Globulin 3.6 gm/dL      A/G Ratio 0.6 g/dL      BUN/Creatinine Ratio 14.8     Anion Gap 11.6 mmol/L     Narrative:       GFR Normal >60  Chronic Kidney Disease <60  Kidney Failure <15      Lipase [[id number]]  (Normal) Collected:  02/28/20 0508    Specimen:  Blood Updated:  02/28/20 0612     Lipase 52 U/L     Vancomycin, Random [[id number]]  (Normal) Collected:  02/28/20 0508    Specimen:  Blood Updated:  02/28/20 0609     Vancomycin Random 13.60 mcg/mL     CBC & Differential [[id number]] Collected:  02/28/20 0508    Specimen:  Blood Updated:  02/28/20 0538    Narrative:       The following orders were created for panel order CBC & Differential.  Procedure                                Abnormality         Status                     ---------                               -----------         ------                     CBC Auto Differential[[id number]]        Abnormal            Final result                 Please view results for these tests on the individual orders.    CBC Auto Differential [[id number]]  (Abnormal) Collected:  02/28/20 0508    Specimen:  Blood Updated:  02/28/20 0538     WBC 8.25 10*3/mm3      RBC 3.58 10*6/mm3      Hemoglobin 10.4 g/dL      Hematocrit 31.6 %      MCV 88.3 fL      MCH 29.1 pg      MCHC 32.9 g/dL      RDW 13.8 %      RDW-SD 44.5 fl      MPV 9.1 fL      Platelets 192 10*3/mm3      Neutrophil % 73.3 %      Lymphocyte % 10.2 %      Monocyte % 13.1 %      Eosinophil % 2.2 %      Basophil % 0.5 %      Immature Grans % 0.7 %      Neutrophils, Absolute 6.05 10*3/mm3      Lymphocytes, Absolute 0.84 10*3/mm3      Monocytes, Absolute 1.08 10*3/mm3      Eosinophils, Absolute 0.18 10*3/mm3      Basophils, Absolute 0.04 10*3/mm3      Immature Grans, Absolute 0.06 10*3/mm3      nRBC 0.0 /100 WBC         Data Review:  Results from last 7 days   Lab Units 03/04/20  0502 03/03/20  0539 03/02/20  0506   SODIUM mmol/L 131* 132* 135*   POTASSIUM mmol/L 4.0 4.0 4.1   CHLORIDE mmol/L 99 101 101   CO2 mmol/L 22.0 20.9* 24.1   BUN mg/dL 16 18 17   CREATININE mg/dL 0.81 1.03* 1.10*   GLUCOSE mg/dL 112* 125* 97   CALCIUM mg/dL 8.1* 7.9* 8.4*     Results from last 7 days   Lab Units 03/04/20  0502 03/03/20  0539 03/02/20  0506   WBC 10*3/mm3 8.37 10.97* 11.65*   HEMOGLOBIN g/dL 10.1* 10.3* 11.2*   HEMATOCRIT % 31.1* 29.8* 33.9*   PLATELETS 10*3/mm3 206 208 225             Lab Results   Lab Value Date/Time    TROPONINT <0.010 01/31/2020 2142    TROPONINT <0.010 03/22/2019 1524    TROPONINT <0.010 07/10/2018 1038    TROPONINT <0.010 06/21/2018 0823    TROPONINT <0.010 06/20/2018 1738     Results from last 7 days   Lab Units 03/02/20  0506   TRIGLYCERIDES mg/dL 68     Results from  last 7 days   Lab Units 20  0502 20  0539 20  0506   ALK PHOS U/L 51 58 67   BILIRUBIN mg/dL 0.3 0.4 0.5   ALT (SGPT) U/L 8 8 8   AST (SGOT) U/L 16 16 12             No results found for: POCGLU        Past Medical History:   Diagnosis Date   • Benign essential hypertension 10/28/2012    2012--treatment for hypertension begun.   • Bilateral sensorineural hearing loss, wears hearing aids 2017    Left is much worse than right.  Patient had multiple ear infections as a child.   • Chronic renal insufficiency, stage II (mild), 2016--creatinine 1.35 2016--creatinine 1.35.  Baseline creatinine approximately 1.3.   • Claustrophobia 2016    This patient has significant nocturnal hypoxemia and I think that she could benefit from nocturnal oxygen therapy. The exact etiology of her hypoxemia is not clear. She could possibly have obstructive sleep apnea but this is not documented. We cannot test this patient for sleep apnea due to the fact that she is severely claustrophobic. Patient was a former smoker and it is possibly that COPD he is playing a role.   • Family history of coronary artery disease 2016    Patient's mother, father, 2 sisters and a brother all  from myocardial infarctions   • Gout 10/28/2012    2012--initial diagnosis and treatment of gout.   • Hyperlipidemia 10/28/2012    2012--treatment for hyperlipidemia begun.   • Impaired fasting glucose 10/28/2012    2012--initial diagnosis impaired fasting glucose.   • Morbidly obese (CMS/HCC) 3/11/2019   • Nocturnal hypoxemia 2014--patient did not receive nocturnal oxygen because of Medicare regulations.   05/15/2014--overnight oximetry revealed oxygen saturations less than 89% for 22 minutes and 40 seconds. Oxygen saturations less than or equal to 88% for 22 minutes and 40 seconds. Lowest oxygen saturation 83%. The longest continuous time with oxygen  saturations less than or equal to 88% was 1 minute and 32 seconds.   08/02/2012--overnight oximetry revealed oxygen saturations less than 90% for one hour and 35 minutes. Oxygen saturations less than 89% 59 minutes. This patient has significant nocturnal hypoxemia and I think that she could benefit from nocturnal oxygen therapy. The exact etiology of her hypoxemia is not clear. She could possibly have obstructive sleep apnea but this is not documented. We cannot test this patient for sleep apnea due to the fact that she is severely claustrophobic. Patient was a former smoker and it is possibly that COPD he is playing a role.   • Pancreatitis    • Pneumonia    • Primary hypothyroidism 11/17/2015 March 11, 2019--TSH remains elevated slightly at 4.92.  Given the overall clinical picture including the multinodular goiter, we will initiate levothyroxine 50 mcg/day and reassess in about 6 weeks.  August 1, 2018--thyroid ultrasound reveals a multinodular thyroid with multiple subcentimeter nodules.  Only minimal increase in size of the largest nodule in the left lobe has occurred when compared    • Secondary polycythemia 8/2/2012 11/06/2014--patient did not receive nocturnal oxygen because of Medicare regulations.   05/15/2014--overnight oximetry revealed oxygen saturations less than 89% for 22 minutes and 40 seconds. Oxygen saturations less than or equal to 88% for 22 minutes and 40 seconds. Lowest oxygen saturation 83%. The longest continuous time with oxygen saturations less than or equal to 88% was 1 minute and 32 seconds.   08/02/2012--overnight oximetry revealed oxygen saturations less than 90% for one hour and 35 minutes. Oxygen saturations less than 89% 59 minutes. This patient has significant nocturnal hypoxemia and I think that she could benefit from nocturnal oxygen therapy. The exact etiology of her hypoxemia is not clear. She could possibly have obstructive sleep apnea but this is not documented. We  cannot test this patient for sleep apnea due to the fact that she is severely claustrophobic. Patient was a former smoker and it is possibly that COPD he is playing a role.   • Vitamin D deficiency 5/23/2016       Assessment:  Active Hospital Problems    Diagnosis  POA   • **Idiopathic acute pancreatitis [K85.00]  Yes   • Fever [R50.9]  Yes   • Hyponatremia [E87.1]  Yes   • LINNEA (acute kidney injury) (CMS/HCC) [N17.9]  Yes   • CKD (chronic kidney disease) stage 3, GFR 30-59 ml/min (CMS/HCC) [N18.3]  Yes   • Obesity (BMI 30-39.9) [E66.9]  Yes   • Supraventricular tachycardia (CMS/HCC) [I47.1]  Yes   • Primary hypothyroidism [E03.9]  Yes   • Hyperlipidemia [E78.5]  Yes      Resolved Hospital Problems   No resolved problems to display.       Plan:  Continue current RX. As per GI and surgery. Cholecystectomy sometime.  Follow lab.  Repeat CT report noted. And continue TPN  Discussed with family.  Mendoza Sullivan MD  3/4/2020  3:24 PM

## 2020-03-04 NOTE — PROGRESS NOTES
"Pharmacy to dose TPN - Daily Progress Note  Patient: Mandy Alarcon  MRN#: [id number]  Attending: No name on file.  Admission Date:     TPN # 3 per Dr Andujar  Indication:pancreatitis    Principal Problem:    Idiopathic acute pancreatitis  Active Problems:    Hyperlipidemia    Primary hypothyroidism    Supraventricular tachycardia (CMS/[facility])    Obesity (BMI 30-39.9)    CKD (chronic kidney disease) stage 3, GFR 30-59 ml/min (CMS/[facility])    LINNEA (acute kidney injury) (CMS/[facility])    Hyponatremia    Fever    Subjective/Objective  86 y.o. female 160 cm (63\") 98.8 kg (217 lb 13 oz)  Results from last 7 days   Lab Units 20  0502  20  0506   SODIUM mmol/L 131*   < > 135*   POTASSIUM mmol/L 4.0   < > 4.1   CHLORIDE mmol/L 99   < > 101   CO2 mmol/L 22.0   < > 24.1   BUN mg/dL 16   < > 17   CREATININE mg/dL 0.81   < > 1.10*   CALCIUM mg/dL 8.1*   < > 8.4*   ALBUMIN g/dL 2.00*   < > 1.90*   BILIRUBIN mg/dL 0.3   < > 0.5   ALK PHOS U/L 51   < > 67   ALT (SGPT) U/L 8   < > 8   AST (SGOT) U/L 16   < > 12   GLUCOSE mg/dL 112*   < > 97   MAGNESIUM mg/dL 1.8   < > 1.8   PHOSPHORUS mg/dL 2.6   < > 2.4*   TRIGLYCERIDES mg/dL  --   --  68    < > = values in this interval not displayed.      No intake/output data recorded.    Diet Orders (active) (From admission, onward)       Start     Ordered    20 1800  Adult Central 2-in-1 TPN  Continuous TPN     Note to Pharmacy:  TPN #3    20 0804    20  NPO Diet NPO Except: Ice Chips, Sips With Meds  Diet Effective Now      20                  Additional insulin administration while previous TPN infusin units  Additional electrolyte administration while previous TPN infusin  Acid suppression: pepcid 20mg IV q24h  Stool in last 24 hours:     Plan    Na has dropped to 131. Suspect dilutional since pt's weight is up 7kg. Will try decreasing fluid rate and increasing NaCl slightling in TPN.  Will instruct nurse to reduce rate of current bag of " TPN.  Will increase TPN to goal of 900kcal, 75gm AA and 100ml 20% lipids.    Shawn IsabelD

## 2020-03-05 ENCOUNTER — APPOINTMENT (OUTPATIENT)
Dept: GENERAL RADIOLOGY | Facility: HOSPITAL | Age: 85
End: 2020-03-05

## 2020-03-05 LAB
ANION GAP SERPL CALCULATED.3IONS-SCNC: 8.6 MMOL/L (ref 5–15)
BASOPHILS # BLD AUTO: 0.04 10*3/MM3 (ref 0–0.2)
BASOPHILS NFR BLD AUTO: 0.5 % (ref 0–1.5)
BUN BLD-MCNC: 17 MG/DL (ref 8–23)
BUN/CREAT SERPL: 20.5 (ref 7–25)
CALCIUM SPEC-SCNC: 8.4 MG/DL (ref 8.6–10.5)
CHLORIDE SERPL-SCNC: 102 MMOL/L (ref 98–107)
CO2 SERPL-SCNC: 21.4 MMOL/L (ref 22–29)
CREAT BLD-MCNC: 0.83 MG/DL (ref 0.57–1)
DEPRECATED RDW RBC AUTO: 45.3 FL (ref 37–54)
EOSINOPHIL # BLD AUTO: 0.33 10*3/MM3 (ref 0–0.4)
EOSINOPHIL NFR BLD AUTO: 4.1 % (ref 0.3–6.2)
ERYTHROCYTE [DISTWIDTH] IN BLOOD BY AUTOMATED COUNT: 14.1 % (ref 12.3–15.4)
GFR SERPL CREATININE-BSD FRML MDRD: 65 ML/MIN/1.73
GLUCOSE BLD-MCNC: 110 MG/DL (ref 65–99)
GLUCOSE BLDC GLUCOMTR-MCNC: 108 MG/DL (ref 70–130)
HCT VFR BLD AUTO: 31.6 % (ref 34–46.6)
HGB BLD-MCNC: 10.3 G/DL (ref 12–15.9)
IMM GRANULOCYTES # BLD AUTO: 0.07 10*3/MM3 (ref 0–0.05)
IMM GRANULOCYTES NFR BLD AUTO: 0.9 % (ref 0–0.5)
LIPASE SERPL-CCNC: 44 U/L (ref 13–60)
LYMPHOCYTES # BLD AUTO: 1.27 10*3/MM3 (ref 0.7–3.1)
LYMPHOCYTES NFR BLD AUTO: 15.9 % (ref 19.6–45.3)
MAGNESIUM SERPL-MCNC: 1.9 MG/DL (ref 1.6–2.4)
MCH RBC QN AUTO: 29.4 PG (ref 26.6–33)
MCHC RBC AUTO-ENTMCNC: 32.6 G/DL (ref 31.5–35.7)
MCV RBC AUTO: 90.3 FL (ref 79–97)
MONOCYTES # BLD AUTO: 0.78 10*3/MM3 (ref 0.1–0.9)
MONOCYTES NFR BLD AUTO: 9.8 % (ref 5–12)
NEUTROPHILS # BLD AUTO: 5.5 10*3/MM3 (ref 1.7–7)
NEUTROPHILS NFR BLD AUTO: 68.8 % (ref 42.7–76)
NRBC BLD AUTO-RTO: 0 /100 WBC (ref 0–0.2)
PHOSPHATE SERPL-MCNC: 3.4 MG/DL (ref 2.5–4.5)
PLATELET # BLD AUTO: 211 10*3/MM3 (ref 140–450)
PMV BLD AUTO: 9.4 FL (ref 6–12)
POTASSIUM BLD-SCNC: 4.2 MMOL/L (ref 3.5–5.2)
RBC # BLD AUTO: 3.5 10*6/MM3 (ref 3.77–5.28)
SODIUM BLD-SCNC: 132 MMOL/L (ref 136–145)
WBC NRBC COR # BLD: 7.99 10*3/MM3 (ref 3.4–10.8)

## 2020-03-05 PROCEDURE — 83690 ASSAY OF LIPASE: CPT | Performed by: HOSPITALIST

## 2020-03-05 PROCEDURE — 25010000002 ENOXAPARIN PER 10 MG: Performed by: NURSE PRACTITIONER

## 2020-03-05 PROCEDURE — 85025 COMPLETE CBC W/AUTO DIFF WBC: CPT | Performed by: HOSPITALIST

## 2020-03-05 PROCEDURE — 25010000002 HYDROMORPHONE PER 4 MG: Performed by: NURSE PRACTITIONER

## 2020-03-05 PROCEDURE — 25010000002 ONDANSETRON PER 1 MG: Performed by: NURSE PRACTITIONER

## 2020-03-05 PROCEDURE — 25010000002 CALCIUM GLUCONATE PER 10 ML: Performed by: SURGERY

## 2020-03-05 PROCEDURE — 99232 SBSQ HOSP IP/OBS MODERATE 35: CPT | Performed by: INTERNAL MEDICINE

## 2020-03-05 PROCEDURE — 25010000002 MAGNESIUM SULFATE PER 500 MG OF MAGNESIUM: Performed by: SURGERY

## 2020-03-05 PROCEDURE — 84100 ASSAY OF PHOSPHORUS: CPT | Performed by: SURGERY

## 2020-03-05 PROCEDURE — 83735 ASSAY OF MAGNESIUM: CPT | Performed by: SURGERY

## 2020-03-05 PROCEDURE — 71045 X-RAY EXAM CHEST 1 VIEW: CPT

## 2020-03-05 PROCEDURE — 25010000002 POTASSIUM CHLORIDE PER 2 MEQ OF POTASSIUM: Performed by: SURGERY

## 2020-03-05 PROCEDURE — 99231 SBSQ HOSP IP/OBS SF/LOW 25: CPT | Performed by: SURGERY

## 2020-03-05 PROCEDURE — 82962 GLUCOSE BLOOD TEST: CPT

## 2020-03-05 PROCEDURE — 80048 BASIC METABOLIC PNL TOTAL CA: CPT | Performed by: SURGERY

## 2020-03-05 RX ADMIN — I.V. FAT EMULSION 20 G: 20 EMULSION INTRAVENOUS at 17:42

## 2020-03-05 RX ADMIN — SODIUM CHLORIDE, PRESERVATIVE FREE 10 ML: 5 INJECTION INTRAVENOUS at 10:05

## 2020-03-05 RX ADMIN — ENOXAPARIN SODIUM 40 MG: 40 INJECTION SUBCUTANEOUS at 17:45

## 2020-03-05 RX ADMIN — SODIUM CHLORIDE, PRESERVATIVE FREE 10 ML: 5 INJECTION INTRAVENOUS at 10:06

## 2020-03-05 RX ADMIN — OXYCODONE HYDROCHLORIDE AND ACETAMINOPHEN 1 TABLET: 7.5; 325 TABLET ORAL at 21:12

## 2020-03-05 RX ADMIN — HYDROMORPHONE HYDROCHLORIDE 0.5 MG: 10 INJECTION INTRAMUSCULAR; INTRAVENOUS; SUBCUTANEOUS at 11:35

## 2020-03-05 RX ADMIN — ATENOLOL 50 MG: 50 TABLET ORAL at 10:04

## 2020-03-05 RX ADMIN — ONDANSETRON 4 MG: 2 INJECTION INTRAMUSCULAR; INTRAVENOUS at 11:35

## 2020-03-05 RX ADMIN — FAMOTIDINE 20 MG: 10 INJECTION INTRAVENOUS at 10:04

## 2020-03-05 RX ADMIN — SODIUM CHLORIDE, PRESERVATIVE FREE 10 ML: 5 INJECTION INTRAVENOUS at 21:15

## 2020-03-05 RX ADMIN — ATENOLOL 50 MG: 50 TABLET ORAL at 20:44

## 2020-03-05 RX ADMIN — LEVOTHYROXINE SODIUM 50 MCG: 50 TABLET ORAL at 06:23

## 2020-03-05 RX ADMIN — POTASSIUM CHLORIDE: 2 INJECTION, SOLUTION, CONCENTRATE INTRAVENOUS at 17:42

## 2020-03-05 RX ADMIN — SODIUM CHLORIDE, PRESERVATIVE FREE 10 ML: 5 INJECTION INTRAVENOUS at 20:44

## 2020-03-05 NOTE — PROGRESS NOTES
"DAILY PROGRESS NOTE  [facility]    Patient Identification:  Name: Mandy Alarcon  Age: 86 y.o.  Sex: female  :  1933  MRN: [id number]         Primary Care Physician: Daryl Santos MD    Subjective:  Interval History:She is sleepy.  Some pain.  Nausea and vomiting.Coughing and short of air.    Objective:    Scheduled Meds:    atenolol 50 mg Oral Q12H   enoxaparin 40 mg Subcutaneous Q24H   famotidine 20 mg Intravenous Daily   Fat Emulsion Plant Based 100 mL Intravenous Q24H (TPN)   levothyroxine 50 mcg Oral Q AM   sodium chloride 10 mL Intravenous Q12H   sodium chloride 10 mL Intravenous Q12H   sodium chloride 10 mL Intravenous Q12H   sodium chloride 10 mL Intravenous Q12H     Continuous Infusions:    Adult Central 2-in-1 TPN  Last Rate: 60 mL/hr at 20 1829   Adult Central 2-in-1 TPN     Pharmacy to Dose TPN         Vital signs in last 24 hours:  Temp:  [98 °F (36.7 °C)-98.4 °F (36.9 °C)] 98 °F (36.7 °C)  Heart Rate:  [55-63] 55  Resp:  [16] 16  BP: (121-148)/(62-74) 148/67    Intake/Output:  No intake or output data in the 24 hours ending 20 1217    Exam:  /67 (BP Location: Left arm, Patient Position: Lying)   Pulse 55   Temp 98 °F (36.7 °C) (Oral)   Resp 16   Ht 160 cm (63\")   Wt 98.8 kg (217 lb 13 oz)   SpO2 95%   BMI 38.58 kg/m²     General Appearance:    Alert, cooperative, no distress   Head:    Normocephalic, without obvious abnormality, atraumatic   Eyes:       Throat:   Lips, tongue, gums normal   Neck:   Supple, symmetrical, trachea midline, no JVD   Lungs:     Clear to auscultation bilaterally, respirations unlabored   Chest Wall:    No tenderness or deformity    Heart:    Regular rate and rhythm, S1 and S2 normal, no murmur,no  Rub or gallop   Abdomen:     Soft,upper abdominal tenderness, bowel sounds active, no masses, no organomegaly    Extremities:   Extremities normal, atraumatic, no cyanosis or edema   Pulses:      Skin:   Skin is warm and dry,  " no rashes or palpable lesions   Neurologic:   no focal deficits noted      Lab Results (last 72 hours)     Procedure Component Value Units Date/Time    Blood Culture - Blood, Hand, Right [[id number]] Collected:  02/27/20 1425    Specimen:  Blood from Hand, Right Updated:  03/01/20 1445     Blood Culture No growth at 3 days    Blood Culture - Blood, Arm, Right [[id number]] Collected:  02/27/20 1017    Specimen:  Blood from Arm, Right Updated:  03/01/20 1030     Blood Culture No growth at 3 days    Comprehensive Metabolic Panel [[id number]]  (Abnormal) Collected:  03/01/20 0507    Specimen:  Blood Updated:  03/01/20 0552     Glucose 89 mg/dL      BUN 17 mg/dL      Creatinine 1.17 mg/dL      Sodium 131 mmol/L      Potassium 4.1 mmol/L      Chloride 100 mmol/L      CO2 20.5 mmol/L      Calcium 8.2 mg/dL      Total Protein 5.3 g/dL      Albumin 2.00 g/dL      ALT (SGPT) 9 U/L      AST (SGOT) 15 U/L      Alkaline Phosphatase 65 U/L      Total Bilirubin 0.5 mg/dL      eGFR Non African Amer 44 mL/min/1.73      Globulin 3.3 gm/dL      A/G Ratio 0.6 g/dL      BUN/Creatinine Ratio 14.5     Anion Gap 10.5 mmol/L     Narrative:       GFR Normal >60  Chronic Kidney Disease <60  Kidney Failure <15      CBC (No Diff) [[id number]]  (Abnormal) Collected:  03/01/20 0507    Specimen:  Blood Updated:  03/01/20 0530     WBC 10.75 10*3/mm3      RBC 3.53 10*6/mm3      Hemoglobin 10.2 g/dL      Hematocrit 31.3 %      MCV 88.7 fL      MCH 28.9 pg      MCHC 32.6 g/dL      RDW 13.7 %      RDW-SD 44.0 fl      MPV 9.0 fL      Platelets 198 10*3/mm3     Lipase [[id number]]  (Normal) Collected:  02/29/20 0623    Specimen:  Blood Updated:  02/29/20 0708     Lipase 36 U/L     Comprehensive Metabolic Panel [[id number]]  (Abnormal) Collected:  02/29/20 0623    Specimen:  Blood Updated:  02/29/20 0708     Glucose 82 mg/dL      BUN 14 mg/dL      Creatinine 1.15 mg/dL      Sodium 133 mmol/L      Potassium 4.2 mmol/L      Chloride 103 mmol/L      CO2 18.9  mmol/L      Calcium 7.9 mg/dL      Total Protein 5.1 g/dL      Albumin 2.10 g/dL      ALT (SGPT) 10 U/L      AST (SGOT) 16 U/L      Alkaline Phosphatase 70 U/L      Total Bilirubin 0.6 mg/dL      eGFR Non African Amer 45 mL/min/1.73      Globulin 3.0 gm/dL      A/G Ratio 0.7 g/dL      BUN/Creatinine Ratio 12.2     Anion Gap 11.1 mmol/L     Narrative:       GFR Normal >60  Chronic Kidney Disease <60  Kidney Failure <15      CBC (No Diff) [[id number]]  (Abnormal) Collected:  02/29/20 0623    Specimen:  Blood Updated:  02/29/20 0650     WBC 9.59 10*3/mm3      RBC 3.81 10*6/mm3      Hemoglobin 11.0 g/dL      Hematocrit 34.1 %      MCV 89.5 fL      MCH 28.9 pg      MCHC 32.3 g/dL      RDW 13.7 %      RDW-SD 44.6 fl      MPV 9.2 fL      Platelets 186 10*3/mm3     Comprehensive Metabolic Panel [[id number]]  (Abnormal) Collected:  02/28/20 0508    Specimen:  Blood Updated:  02/28/20 0612     Glucose 91 mg/dL      BUN 17 mg/dL      Creatinine 1.15 mg/dL      Sodium 133 mmol/L      Potassium 4.0 mmol/L      Chloride 100 mmol/L      CO2 21.4 mmol/L      Calcium 8.8 mg/dL      Total Protein 5.7 g/dL      Albumin 2.10 g/dL      ALT (SGPT) 11 U/L      AST (SGOT) 15 U/L      Alkaline Phosphatase 65 U/L      Total Bilirubin 0.6 mg/dL      eGFR Non African Amer 45 mL/min/1.73      Globulin 3.6 gm/dL      A/G Ratio 0.6 g/dL      BUN/Creatinine Ratio 14.8     Anion Gap 11.6 mmol/L     Narrative:       GFR Normal >60  Chronic Kidney Disease <60  Kidney Failure <15      Lipase [[id number]]  (Normal) Collected:  02/28/20 0508    Specimen:  Blood Updated:  02/28/20 0612     Lipase 52 U/L     Vancomycin, Random [[id number]]  (Normal) Collected:  02/28/20 0508    Specimen:  Blood Updated:  02/28/20 0609     Vancomycin Random 13.60 mcg/mL     CBC & Differential [[id number]] Collected:  02/28/20 0508    Specimen:  Blood Updated:  02/28/20 0538    Narrative:       The following orders were created for panel order CBC & Differential.  Procedure                                Abnormality         Status                     ---------                               -----------         ------                     CBC Auto Differential[[id number]]        Abnormal            Final result                 Please view results for these tests on the individual orders.    CBC Auto Differential [[id number]]  (Abnormal) Collected:  02/28/20 0508    Specimen:  Blood Updated:  02/28/20 0538     WBC 8.25 10*3/mm3      RBC 3.58 10*6/mm3      Hemoglobin 10.4 g/dL      Hematocrit 31.6 %      MCV 88.3 fL      MCH 29.1 pg      MCHC 32.9 g/dL      RDW 13.8 %      RDW-SD 44.5 fl      MPV 9.1 fL      Platelets 192 10*3/mm3      Neutrophil % 73.3 %      Lymphocyte % 10.2 %      Monocyte % 13.1 %      Eosinophil % 2.2 %      Basophil % 0.5 %      Immature Grans % 0.7 %      Neutrophils, Absolute 6.05 10*3/mm3      Lymphocytes, Absolute 0.84 10*3/mm3      Monocytes, Absolute 1.08 10*3/mm3      Eosinophils, Absolute 0.18 10*3/mm3      Basophils, Absolute 0.04 10*3/mm3      Immature Grans, Absolute 0.06 10*3/mm3      nRBC 0.0 /100 WBC         Data Review:  Results from last 7 days   Lab Units 03/05/20  0600 03/04/20  0502 03/03/20  0539   SODIUM mmol/L 132* 131* 132*   POTASSIUM mmol/L 4.2 4.0 4.0   CHLORIDE mmol/L 102 99 101   CO2 mmol/L 21.4* 22.0 20.9*   BUN mg/dL 17 16 18   CREATININE mg/dL 0.83 0.81 1.03*   GLUCOSE mg/dL 110* 112* 125*   CALCIUM mg/dL 8.4* 8.1* 7.9*     Results from last 7 days   Lab Units 03/05/20  0600 03/04/20  0502 03/03/20  0539   WBC 10*3/mm3 7.99 8.37 10.97*   HEMOGLOBIN g/dL 10.3* 10.1* 10.3*   HEMATOCRIT % 31.6* 31.1* 29.8*   PLATELETS 10*3/mm3 211 206 208             Lab Results   Lab Value Date/Time    TROPONINT <0.010 01/31/2020 2142    TROPONINT <0.010 03/22/2019 1524    TROPONINT <0.010 07/10/2018 1038    TROPONINT <0.010 06/21/2018 0823    TROPONINT <0.010 06/20/2018 1738     Results from last 7 days   Lab Units 03/02/20  0506   TRIGLYCERIDES mg/dL 68          Results from last 7 days   Lab Units 20  0502 20  0539 20  0506   ALK PHOS U/L 51 58 67   BILIRUBIN mg/dL 0.3 0.4 0.5   ALT (SGPT) U/L 8 8 8   AST (SGOT) U/L 16 16 12             Glucose   Date/Time Value Ref Range Status   2020 0627 108 70 - 130 mg/dL Final           Past Medical History:   Diagnosis Date   • Benign essential hypertension 10/28/2012    2012--treatment for hypertension begun.   • Bilateral sensorineural hearing loss, wears hearing aids 2017    Left is much worse than right.  Patient had multiple ear infections as a child.   • Chronic renal insufficiency, stage II (mild), 2016--creatinine 1.35 2016--creatinine 1.35.  Baseline creatinine approximately 1.3.   • Claustrophobia 2016    This patient has significant nocturnal hypoxemia and I think that she could benefit from nocturnal oxygen therapy. The exact etiology of her hypoxemia is not clear. She could possibly have obstructive sleep apnea but this is not documented. We cannot test this patient for sleep apnea due to the fact that she is severely claustrophobic. Patient was a former smoker and it is possibly that COPD he is playing a role.   • Family history of coronary artery disease 2016    Patient's mother, father, 2 sisters and a brother all  from myocardial infarctions   • Gout 10/28/2012    2012--initial diagnosis and treatment of gout.   • Hyperlipidemia 10/28/2012    2012--treatment for hyperlipidemia begun.   • Impaired fasting glucose 10/28/2012    2012--initial diagnosis impaired fasting glucose.   • Morbidly obese (CMS/HCC) 3/11/2019   • Nocturnal hypoxemia 2014--patient did not receive nocturnal oxygen because of Medicare regulations.   05/15/2014--overnight oximetry revealed oxygen saturations less than 89% for 22 minutes and 40 seconds. Oxygen saturations less than or equal to 88% for 22 minutes and 40 seconds.  Lowest oxygen saturation 83%. The longest continuous time with oxygen saturations less than or equal to 88% was 1 minute and 32 seconds.   08/02/2012--overnight oximetry revealed oxygen saturations less than 90% for one hour and 35 minutes. Oxygen saturations less than 89% 59 minutes. This patient has significant nocturnal hypoxemia and I think that she could benefit from nocturnal oxygen therapy. The exact etiology of her hypoxemia is not clear. She could possibly have obstructive sleep apnea but this is not documented. We cannot test this patient for sleep apnea due to the fact that she is severely claustrophobic. Patient was a former smoker and it is possibly that COPD he is playing a role.   • Pancreatitis    • Pneumonia    • Primary hypothyroidism 11/17/2015 March 11, 2019--TSH remains elevated slightly at 4.92.  Given the overall clinical picture including the multinodular goiter, we will initiate levothyroxine 50 mcg/day and reassess in about 6 weeks.  August 1, 2018--thyroid ultrasound reveals a multinodular thyroid with multiple subcentimeter nodules.  Only minimal increase in size of the largest nodule in the left lobe has occurred when compared    • Secondary polycythemia 8/2/2012 11/06/2014--patient did not receive nocturnal oxygen because of Medicare regulations.   05/15/2014--overnight oximetry revealed oxygen saturations less than 89% for 22 minutes and 40 seconds. Oxygen saturations less than or equal to 88% for 22 minutes and 40 seconds. Lowest oxygen saturation 83%. The longest continuous time with oxygen saturations less than or equal to 88% was 1 minute and 32 seconds.   08/02/2012--overnight oximetry revealed oxygen saturations less than 90% for one hour and 35 minutes. Oxygen saturations less than 89% 59 minutes. This patient has significant nocturnal hypoxemia and I think that she could benefit from nocturnal oxygen therapy. The exact etiology of her hypoxemia is not clear. She could  possibly have obstructive sleep apnea but this is not documented. We cannot test this patient for sleep apnea due to the fact that she is severely claustrophobic. Patient was a former smoker and it is possibly that COPD he is playing a role.   • Vitamin D deficiency 5/23/2016       Assessment:  Active Hospital Problems    Diagnosis  POA   • **Idiopathic acute pancreatitis [K85.00]  Yes   • Fever [R50.9]  Yes   • Hyponatremia [E87.1]  Yes   • LINNEA (acute kidney injury) (CMS/HCC) [N17.9]  Yes   • CKD (chronic kidney disease) stage 3, GFR 30-59 ml/min (CMS/HCC) [N18.3]  Yes   • Obesity (BMI 30-39.9) [E66.9]  Yes   • Supraventricular tachycardia (CMS/HCC) [I47.1]  Yes   • Primary hypothyroidism [E03.9]  Yes   • Hyperlipidemia [E78.5]  Yes      Resolved Hospital Problems   No resolved problems to display.       Plan:  Continue current RX. As per GI and surgery. Cholecystectomy sometime.  Follow lab.  Repeat CT in a few days. And continue TPN  Discussed with family.  Mendoza Sullivan MD  3/5/2020  12:17 PM

## 2020-03-05 NOTE — PLAN OF CARE
VSS, pain and nausea medication given, TPN at 60 ml/hr, lipids ordered, daughter at bedside, patient up to chair, assist x1 to bathroom, will CTM

## 2020-03-05 NOTE — PROGRESS NOTES
Chief Complaint:    Pancreatitis    Subjective:    Looks and feels better today.    Objective:    Vitals:    03/04/20 2022 03/04/20 2354 03/05/20 0729 03/05/20 1356   BP: 135/62 143/74 148/67 121/71   BP Location: Left arm Left arm Left arm Left arm   Patient Position: Lying Lying Lying Lying   Pulse: 59 59 55 56   Resp: 16 16 16 16   Temp: 98 °F (36.7 °C) 98.2 °F (36.8 °C) 98 °F (36.7 °C) 98.3 °F (36.8 °C)   TempSrc: Oral Oral Oral Oral   SpO2: 97% 95% 95% 97%   Weight:       Height:           Lungs: Clear  Heart: Regular  Abdomen: Soft.  Less distended and less tender.  Bowel sounds are present.  Extremities: Warm    Labs reviewed.  WBC 7.99, Hgb 10.3, platelets 211.  Creat 0.83, CO2 21.4.    Assessment:    Pancreatitis  Gallbladder sludge    Plan:    Continue TPN.  Repeat the CT abdomen in 3-5 days.  Discussed with patient and family.

## 2020-03-05 NOTE — CONSULTS
Adult Nutrition  Assessment/PES    Patient Name:  Mandy Alarcon  YOB: 1933  MRN: [id number]  Admit Date:  2/27/2020    Assessment Date:  3/5/2020    Comments:  Pt receiving TPN with 900dex, 75gm AA, 20% 100ml lipids at 60ml/hr which meets needs at this time. Pt with c/o nausea. Will continue to follow.    Reason for Assessment     Row Name 03/05/20 0656          Reason for Assessment    Reason For Assessment  follow-up protocol;TF/PN         Nutrition/Diet History     Row Name 03/05/20 0715          Nutrition/Diet History    Typical Food/Fluid Intake  tolerated           Labs/Tests/Procedures/Meds     Row Name 03/05/20 0657          Labs/Procedures/Meds    Lab Results Reviewed  reviewed     Lab Results Comments  na, alb, glu, H/H, calcium        Diagnostic Tests/Procedures    Diagnostic Test/Procedure Reviewed  reviewed        Medications    Pertinent Medications Reviewed  reviewed     Pertinent Medications Comments  lovenox, pepcid, synthroid,         Physical Findings     Row Name 03/05/20 0700          Physical Findings    Overall Physical Appearance  obese     Gastrointestinal  nausea;diarrhea     Skin  -- intact           Nutrition Prescription Ordered     Row Name 03/05/20 0702          Nutrition Prescription PO    Current PO Diet  NPO        Nutrition Prescription PN    PN Current Rate (mL/hr)  60 mL/hr     Dextrose (Kcal)  900     Amino Acid (gm)  75     Lipid Concentration (%)  20%         Evaluation of Received Nutrient/Fluid Intake     Row Name 03/05/20 0704          Calories Evaluation    Parenteral Calories (kcal)  1400     % of Kcal Needs  100        Protein Evaluation    Parenteral Protein (gm)  75     % of Protein Needs  100        Fluid Intake Evaluation    Parenteral Fluid (mL)  1440               Problem/Interventions:    Problem 2     Row Name 03/05/20 0707          Nutrition Diagnoses Problem 2    Problem 2  Needs Alternate Route     Etiology (related to)  Medical Diagnosis      Gastrointestinal  Pancreatitis, acute             Intervention Goal     Row Name 03/05/20 0707          Intervention Goal    General  Maintain nutrition;Nutrition support treatment     TF/PN  Maintain TF/PN     Transition  PN to PO     Weight  No significant weight loss         Nutrition Intervention     Row Name 03/05/20 0708          Nutrition Intervention    RD/Tech Action  Follow Tx progress;Care plan reviewd           Education/Evaluation     Row Name 03/05/20 0711          Monitor/Evaluation    Monitor  Per protocol;I&O;Pertinent labs;PN delivery/tolerance;Weight;Skin status           Electronically signed by:  Lucía Orona RD  03/05/20 7:23 AM

## 2020-03-05 NOTE — PROGRESS NOTES
[facility]  Inpatient Progress Note    Reason for Follow Up:  Acute pancreatitis    Subjective     Interval History:   Feels a little better but continues to have pain and nausea.  She is coughing-somewhat productive.  No shortness of breath.  Stools are somewhat loose but not frequent.    Current Facility-Administered Medications:   •  acetaminophen (TYLENOL) tablet 650 mg, 650 mg, Oral, Q4H PRN, 650 mg at 02/28/20 2213 **OR** acetaminophen (TYLENOL) 160 MG/5ML solution 650 mg, 650 mg, Oral, Q4H PRN **OR** acetaminophen (TYLENOL) suppository 650 mg, 650 mg, Rectal, Q4H PRN, Viktoriya Herrera APRN  •  Adult Central 2-in-1 TPN, , Intravenous, Continuous, Bryce Andujar MD, Last Rate: 60 mL/hr at 03/04/20 1829  •  Adult Central 2-in-1 TPN, , Intravenous, Continuous, Bryce Andujar MD  •  atenolol (TENORMIN) tablet 50 mg, 50 mg, Oral, Q12H, Viktoriya Herrera APRN, 50 mg at 03/05/20 1004  •  enoxaparin (LOVENOX) syringe 40 mg, 40 mg, Subcutaneous, Q24H, Viktoriya Herrera APRN, 40 mg at 03/04/20 1828  •  famotidine (PEPCID) injection 20 mg, 20 mg, Intravenous, Daily, Viktoriya Herrera APRN, 20 mg at 03/05/20 1004  •  Fat Emulsion Plant Based (INTRALIPID,LIPOSYN) 20 % infusion 20 g, 100 mL, Intravenous, Q24H (TPN), Bryce Andujar MD, Last Rate: 8.33 mL/hr at 03/04/20 1828, 20 g at 03/04/20 1828  •  HYDROmorphone (DILAUDID) injection 0.5 mg, 0.5 mg, Intravenous, Q2H PRN, 0.5 mg at 03/04/20 2149 **AND** naloxone (NARCAN) injection 0.4 mg, 0.4 mg, Intravenous, Q5 Min PRN, Herrera, Viktoriya W, APRN  •  levothyroxine (SYNTHROID, LEVOTHROID) tablet 50 mcg, 50 mcg, Oral, Q AM, Viktoriya Herrera APRN, 50 mcg at 03/05/20 0623  •  nitroglycerin (NITROSTAT) SL tablet 0.4 mg, 0.4 mg, Sublingual, Q5 Min PRN, Viktoriya Herrera, APRN  •  ondansetron (ZOFRAN) injection 4 mg, 4 mg, Intravenous, Q6H PRN, Viktoriya Herrera APRN, 4 mg at 03/04/20 2149  •  oxyCODONE-acetaminophen  (PERCOCET) 7.5-325 MG per tablet 1 tablet, 1 tablet, Oral, Q4H PRN, Viktoriya Herrera APRN, 1 tablet at 03/01/20 2101  •  Pharmacy to Dose TPN, , Does not apply, Continuous PRN, Bryce Andujar MD  •  promethazine (PHENERGAN) injection 12.5 mg, 12.5 mg, Intravenous, Q6H PRN, Viktoriya Herrera APRN, 12.5 mg at 02/28/20 2213  •  [COMPLETED] Insert peripheral IV, , , Once **AND** sodium chloride 0.9 % flush 10 mL, 10 mL, Intravenous, PRN, Jared Cisneros MD  •  sodium chloride 0.9 % flush 10 mL, 10 mL, Intravenous, Q12H, Viktoriya Herrera APRN, 10 mL at 03/05/20 1006  •  sodium chloride 0.9 % flush 10 mL, 10 mL, Intravenous, PRN, Viktoriya Herrera APRN  •  sodium chloride 0.9 % flush 10 mL, 10 mL, Intravenous, Q12H, Bryce Andujar MD, 10 mL at 03/05/20 1006  •  sodium chloride 0.9 % flush 10 mL, 10 mL, Intravenous, Q12H, Bryce Andujar MD, 10 mL at 03/05/20 1005  •  sodium chloride 0.9 % flush 10 mL, 10 mL, Intravenous, Q12H, Bryce Andujar MD, 10 mL at 03/05/20 1005  •  sodium chloride 0.9 % flush 10 mL, 10 mL, Intravenous, PRPratima GLYNN Robert N, MD  •  sodium chloride 0.9 % flush 20 mL, 20 mL, Intravenous, PRN, Bryce Andujar MD  Review of Systems:   Positive for abdominal pain, nausea, productive cough, lower extremity edema;  negative for fevers or chills    Objective     Vital Signs  Temp:  [98 °F (36.7 °C)-98.4 °F (36.9 °C)] 98 °F (36.7 °C)  Heart Rate:  [55-63] 55  Resp:  [16] 16  BP: (121-148)/(62-74) 148/67  Body mass index is 38.58 kg/m².  No intake or output data in the 24 hours ending 03/05/20 1027  No intake/output data recorded.     Physical Exam:   General: patient awake, alert and cooperative   Eyes: Normal lids and lashes, no scleral icterus   Neck: supple, normal ROM   Skin: warm and dry, not jaundiced   Cardiovascular: regular rhythm and rate, no murmurs auscultated   Pulm: clear to auscultation bilaterally, regular and unlabored   Abdomen: soft, nontender,  nondistended; normal bowel sounds   Extremities: 2+ bilateral lower extremity edema   Psychiatric: Normal mood and behavior; memory intact     Results Review:     I reviewed the patient's new clinical results.    Results from last 7 days   Lab Units 03/05/20  0600 03/04/20  0502 03/03/20  0539   WBC 10*3/mm3 7.99 8.37 10.97*   HEMOGLOBIN g/dL 10.3* 10.1* 10.3*   HEMATOCRIT % 31.6* 31.1* 29.8*   PLATELETS 10*3/mm3 211 206 208     Results from last 7 days   Lab Units 03/05/20  0600 03/04/20  0502 03/03/20  0539 03/02/20  0506   SODIUM mmol/L 132* 131* 132* 135*   POTASSIUM mmol/L 4.2 4.0 4.0 4.1   CHLORIDE mmol/L 102 99 101 101   CO2 mmol/L 21.4* 22.0 20.9* 24.1   BUN mg/dL 17 16 18 17   CREATININE mg/dL 0.83 0.81 1.03* 1.10*   CALCIUM mg/dL 8.4* 8.1* 7.9* 8.4*   BILIRUBIN mg/dL  --  0.3 0.4 0.5   ALK PHOS U/L  --  51 58 67   ALT (SGPT) U/L  --  8 8 8   AST (SGOT) U/L  --  16 16 12   GLUCOSE mg/dL 110* 112* 125* 97         Lab Results   Lab Value Date/Time    LIPASE 44 03/05/2020 0600    LIPASE 36 02/29/2020 0623    LIPASE 52 02/28/2020 0508    LIPASE 62 (H) 02/27/2020 1016    LIPASE 38 02/05/2020 0507    LIPASE 60 02/04/2020 0435    LIPASE 181 (H) 02/03/2020 0211    LIPASE 417 (H) 02/02/2020 0441    LIPASE >3,000 (H) 01/31/2020 2142       Radiology:  CT Abdomen Pelvis With Contrast   Final Result   The findings on CT are most concerning for acute   pancreatitis with acute necrotic/peripancreatic phlegmon. There is a   relatively simple appearing collection seen just anterior to the IVC   measuring up to 3.2 cm as well. There is severe attenuation of the   splenoportal confluence, proximal superior mesenteric vein as well as   the splenic vein. There is marked asymmetric gallbladder wall   thickening.       Radiation dose reduction techniques were utilized, including automated   exposure control and exposure modulation based on body size.       This report was finalized on 3/4/2020 9:02 AM by Dr. Ruthy Haro,    M.D.          US Abdomen Limited   Final Result   1.  There is pericholecystic fluid, as well as mild gallbladder wall   thickening, as seen on recent CT but appears to be new since 01/31/2020.   The diameter of the common bile duct is grossly unchanged since   01/31/2020. The gallbladder is nondistended. While findings are favored   be related to adjacent worsening pancreatitis, early acute cholecystitis   cannot be excluded and correlation with patient history and laboratory   values is recommended. Findings can be further evaluated with HIDA scan   if clinically indicated.   2.  The pancreas is not well-visualized, likely related to significant   pancreatitis.   3.  Pericholecystic fluid is present with 1 of the areas of fluid   appearing to be partially loculated and may represent an early   pseudocyst.       This report was finalized on 2/28/2020 9:18 PM by Dr. Carlitos Willis M.D.          CT Chest Without Contrast   Final Result   1. Significant progression of pancreatitis. The small peripancreatic   fluid collections likely represent developing pseudocysts.   2. Distended gallbladder is suspected to be related to the pancreatitis.   Characterization is limited in the absence of IV contrast and   cholecystitis cannot be entirely excluded. Please correlate clinically.   3. Sigmoid diverticulosis without evidence for diverticulitis.   4. There is a minimal right pleural effusion. The increase in density   and markings at both lower lobes may be related to underexpansion with   atelectatic change. No definite pneumonia is seen.       Discussed with Dr. Cisneros.       This report was finalized on 2/27/2020 4:14 PM by Dr. Lakeisha Robison M.D.          CT Abdomen Pelvis Without Contrast   Final Result   1. Significant progression of pancreatitis. The small peripancreatic   fluid collections likely represent developing pseudocysts.   2. Distended gallbladder is suspected to be related to the pancreatitis.    Characterization is limited in the absence of IV contrast and   cholecystitis cannot be entirely excluded. Please correlate clinically.   3. Sigmoid diverticulosis without evidence for diverticulitis.   4. There is a minimal right pleural effusion. The increase in density   and markings at both lower lobes may be related to underexpansion with   atelectatic change. No definite pneumonia is seen.       Discussed with Dr. Cisneros.       This report was finalized on 2/27/2020 4:14 PM by Dr. Lakeisha Robison M.D.          XR Chest 2 View   Final Result   Improvement in bilateral pleural effusions with potential   mild residual pleural fluid at the posterior costophrenic angles. Mild   left basilar linear subsegmental scarring or atelectasis.       This report was finalized on 2/27/2020 11:08 AM by Dr. Jose Thorne M.D.              Assessment/Plan     Patient Active Problem List   Diagnosis   • Benign essential hypertension   • Claustrophobia   • Gout   • Hyperlipidemia   • Primary hypothyroidism   • Impaired fasting glucose   • Nocturnal hypoxemia   • Secondary polycythemia   • Vitamin D deficiency   • Therapeutic drug monitoring   • Family history of coronary artery disease   • Bilateral sensorineural hearing loss, wears hearing aids   • Multinodular goiter   • Supraventricular tachycardia (CMS/HCC)   • Morbidly obese (CMS/HCC)   • Obesity (BMI 30-39.9)   • CKD (chronic kidney disease) stage 3, GFR 30-59 ml/min (CMS/HCC)   • LINNEA (acute kidney injury) (CMS/HCC)   • Hyponatremia   • Hypomagnesemia   • Leukocytosis   • Idiopathic acute pancreatitis   • Fever       Assessment:  1. Acute idiopathic pancreatitis  2. Hyponatremia  3. linnea-resolved  4. diarrhea      Plan:  · Continue TPN  · Plans for repeat CT imaging in several days noted by surgery  · Continue gut rest, analgesia, fluid resuscitation    I discussed the patients findings and my recommendations with patient and family.    Adriane Skaggs,  MD

## 2020-03-05 NOTE — PROGRESS NOTES
"Pharmacy to dose TPN - Daily Progress Note  Patient: Mandy Alarcon  MRN#: [id number]  Attending: No name on file.  Admission Date:     TPN # 4 per Dr Andujar  Indication:pancreatitis    Principal Problem:    Idiopathic acute pancreatitis  Active Problems:    Hyperlipidemia    Primary hypothyroidism    Supraventricular tachycardia (CMS/[facility])    Obesity (BMI 30-39.9)    CKD (chronic kidney disease) stage 3, GFR 30-59 ml/min (CMS/[facility])    LINNEA (acute kidney injury) (CMS/[facility])    Hyponatremia    Fever    Subjective/Objective  86 y.o. female 160 cm (63\") 98.8 kg (217 lb 13 oz)  Results from last 7 days   Lab Units 20  0600 20  0502  20  0506   SODIUM mmol/L 132* 131*   < > 135*   POTASSIUM mmol/L 4.2 4.0   < > 4.1   CHLORIDE mmol/L 102 99   < > 101   CO2 mmol/L 21.4* 22.0   < > 24.1   BUN mg/dL 17 16   < > 17   CREATININE mg/dL 0.83 0.81   < > 1.10*   CALCIUM mg/dL 8.4* 8.1*   < > 8.4*   ALBUMIN g/dL  --  2.00*   < > 1.90*   BILIRUBIN mg/dL  --  0.3   < > 0.5   ALK PHOS U/L  --  51   < > 67   ALT (SGPT) U/L  --  8   < > 8   AST (SGOT) U/L  --  16   < > 12   GLUCOSE mg/dL 110* 112*   < > 97   MAGNESIUM mg/dL 1.9 1.8   < > 1.8   PHOSPHORUS mg/dL 3.4 2.6   < > 2.4*   TRIGLYCERIDES mg/dL  --   --   --  68    < > = values in this interval not displayed.      No intake/output data recorded.    Diet Orders (active) (From admission, onward)       Start     Ordered    20 1800  Adult Central 2-in-1 TPN  Continuous TPN     Note to Pharmacy:  TPN #4    20  NPO Diet NPO Except: Ice Chips, Sips With Meds  Diet Effective Now      20                  Additional insulin administration while previous TPN infusin units  Additional electrolyte administration while previous TPN infusin  Acid suppression: famotidine  Stool in last 24 hours: diarrhea    Daily Assessment  Current macronutrients: protein 75 gm/day, dextrose 900 kcal/day, lipids 200 kcal/day  Fluid rate: " 60ml/hr    Plan    Will repeat yesterday's TPN and see if Na continues to improve.    Shawn IsabelD

## 2020-03-06 LAB
ALBUMIN SERPL-MCNC: 2 G/DL (ref 3.5–5.2)
ALBUMIN/GLOB SERPL: 0.6 G/DL
ALP SERPL-CCNC: 53 U/L (ref 39–117)
ALT SERPL W P-5'-P-CCNC: 13 U/L (ref 1–33)
ANION GAP SERPL CALCULATED.3IONS-SCNC: 8.2 MMOL/L (ref 5–15)
AST SERPL-CCNC: 22 U/L (ref 1–32)
BASOPHILS # BLD AUTO: 0.05 10*3/MM3 (ref 0–0.2)
BASOPHILS NFR BLD AUTO: 0.6 % (ref 0–1.5)
BILIRUB SERPL-MCNC: 0.3 MG/DL (ref 0.2–1.2)
BUN BLD-MCNC: 18 MG/DL (ref 8–23)
BUN/CREAT SERPL: 21.4 (ref 7–25)
CALCIUM SPEC-SCNC: 8.8 MG/DL (ref 8.6–10.5)
CHLORIDE SERPL-SCNC: 102 MMOL/L (ref 98–107)
CO2 SERPL-SCNC: 20.8 MMOL/L (ref 22–29)
CREAT BLD-MCNC: 0.84 MG/DL (ref 0.57–1)
DEPRECATED RDW RBC AUTO: 47.2 FL (ref 37–54)
EOSINOPHIL # BLD AUTO: 0.31 10*3/MM3 (ref 0–0.4)
EOSINOPHIL NFR BLD AUTO: 3.6 % (ref 0.3–6.2)
ERYTHROCYTE [DISTWIDTH] IN BLOOD BY AUTOMATED COUNT: 14.2 % (ref 12.3–15.4)
GFR SERPL CREATININE-BSD FRML MDRD: 64 ML/MIN/1.73
GLOBULIN UR ELPH-MCNC: 3.3 GM/DL
GLUCOSE BLD-MCNC: 116 MG/DL (ref 65–99)
HCT VFR BLD AUTO: 33.9 % (ref 34–46.6)
HGB BLD-MCNC: 11.2 G/DL (ref 12–15.9)
IMM GRANULOCYTES # BLD AUTO: 0.07 10*3/MM3 (ref 0–0.05)
IMM GRANULOCYTES NFR BLD AUTO: 0.8 % (ref 0–0.5)
LYMPHOCYTES # BLD AUTO: 1.33 10*3/MM3 (ref 0.7–3.1)
LYMPHOCYTES NFR BLD AUTO: 15.3 % (ref 19.6–45.3)
MAGNESIUM SERPL-MCNC: 1.8 MG/DL (ref 1.6–2.4)
MCH RBC QN AUTO: 29.9 PG (ref 26.6–33)
MCHC RBC AUTO-ENTMCNC: 33 G/DL (ref 31.5–35.7)
MCV RBC AUTO: 90.6 FL (ref 79–97)
MONOCYTES # BLD AUTO: 0.82 10*3/MM3 (ref 0.1–0.9)
MONOCYTES NFR BLD AUTO: 9.4 % (ref 5–12)
NEUTROPHILS # BLD AUTO: 6.14 10*3/MM3 (ref 1.7–7)
NEUTROPHILS NFR BLD AUTO: 70.3 % (ref 42.7–76)
NRBC BLD AUTO-RTO: 0 /100 WBC (ref 0–0.2)
PHOSPHATE SERPL-MCNC: 3.8 MG/DL (ref 2.5–4.5)
PLATELET # BLD AUTO: 210 10*3/MM3 (ref 140–450)
PMV BLD AUTO: 9.2 FL (ref 6–12)
POTASSIUM BLD-SCNC: 4.4 MMOL/L (ref 3.5–5.2)
PROT SERPL-MCNC: 5.3 G/DL (ref 6–8.5)
RBC # BLD AUTO: 3.74 10*6/MM3 (ref 3.77–5.28)
SODIUM BLD-SCNC: 131 MMOL/L (ref 136–145)
WBC NRBC COR # BLD: 8.72 10*3/MM3 (ref 3.4–10.8)

## 2020-03-06 PROCEDURE — 25010000002 ENOXAPARIN PER 10 MG: Performed by: NURSE PRACTITIONER

## 2020-03-06 PROCEDURE — 25010000002 HYDROMORPHONE PER 4 MG: Performed by: NURSE PRACTITIONER

## 2020-03-06 PROCEDURE — 83735 ASSAY OF MAGNESIUM: CPT | Performed by: SURGERY

## 2020-03-06 PROCEDURE — 25010000002 POTASSIUM CHLORIDE PER 2 MEQ OF POTASSIUM: Performed by: SURGERY

## 2020-03-06 PROCEDURE — 99231 SBSQ HOSP IP/OBS SF/LOW 25: CPT | Performed by: SURGERY

## 2020-03-06 PROCEDURE — 25010000002 MAGNESIUM SULFATE PER 500 MG OF MAGNESIUM: Performed by: SURGERY

## 2020-03-06 PROCEDURE — 25010000002 ONDANSETRON PER 1 MG: Performed by: NURSE PRACTITIONER

## 2020-03-06 PROCEDURE — 99232 SBSQ HOSP IP/OBS MODERATE 35: CPT | Performed by: INTERNAL MEDICINE

## 2020-03-06 PROCEDURE — 84100 ASSAY OF PHOSPHORUS: CPT | Performed by: SURGERY

## 2020-03-06 PROCEDURE — 85025 COMPLETE CBC W/AUTO DIFF WBC: CPT | Performed by: INTERNAL MEDICINE

## 2020-03-06 PROCEDURE — 80053 COMPREHEN METABOLIC PANEL: CPT | Performed by: SURGERY

## 2020-03-06 PROCEDURE — 25010000002 CALCIUM GLUCONATE PER 10 ML: Performed by: SURGERY

## 2020-03-06 RX ORDER — SIMETHICONE 80 MG
80 TABLET,CHEWABLE ORAL 4 TIMES DAILY PRN
Status: DISCONTINUED | OUTPATIENT
Start: 2020-03-06 | End: 2020-03-22 | Stop reason: HOSPADM

## 2020-03-06 RX ADMIN — ONDANSETRON 4 MG: 2 INJECTION INTRAMUSCULAR; INTRAVENOUS at 03:15

## 2020-03-06 RX ADMIN — SODIUM CHLORIDE, PRESERVATIVE FREE 10 ML: 5 INJECTION INTRAVENOUS at 20:50

## 2020-03-06 RX ADMIN — SODIUM CHLORIDE, PRESERVATIVE FREE 10 ML: 5 INJECTION INTRAVENOUS at 21:04

## 2020-03-06 RX ADMIN — SODIUM CHLORIDE, PRESERVATIVE FREE 10 ML: 5 INJECTION INTRAVENOUS at 20:51

## 2020-03-06 RX ADMIN — ENOXAPARIN SODIUM 40 MG: 40 INJECTION SUBCUTANEOUS at 18:35

## 2020-03-06 RX ADMIN — HYDROMORPHONE HYDROCHLORIDE 0.5 MG: 10 INJECTION INTRAMUSCULAR; INTRAVENOUS; SUBCUTANEOUS at 08:15

## 2020-03-06 RX ADMIN — I.V. FAT EMULSION 20 G: 20 EMULSION INTRAVENOUS at 18:19

## 2020-03-06 RX ADMIN — SODIUM CHLORIDE, PRESERVATIVE FREE 10 ML: 5 INJECTION INTRAVENOUS at 08:16

## 2020-03-06 RX ADMIN — SODIUM CHLORIDE, PRESERVATIVE FREE 10 ML: 5 INJECTION INTRAVENOUS at 08:21

## 2020-03-06 RX ADMIN — ONDANSETRON 4 MG: 2 INJECTION INTRAMUSCULAR; INTRAVENOUS at 20:50

## 2020-03-06 RX ADMIN — HYDROMORPHONE HYDROCHLORIDE 0.5 MG: 10 INJECTION INTRAMUSCULAR; INTRAVENOUS; SUBCUTANEOUS at 20:55

## 2020-03-06 RX ADMIN — LEVOTHYROXINE SODIUM 50 MCG: 50 TABLET ORAL at 06:01

## 2020-03-06 RX ADMIN — FAMOTIDINE 20 MG: 10 INJECTION INTRAVENOUS at 08:15

## 2020-03-06 RX ADMIN — POTASSIUM CHLORIDE: 2 INJECTION, SOLUTION, CONCENTRATE INTRAVENOUS at 18:19

## 2020-03-06 NOTE — PLAN OF CARE
Problem: Patient Care Overview  Goal: Plan of Care Review  Outcome: Ongoing (interventions implemented as appropriate)  Flowsheets (Taken 3/6/2020 0196)  Progress: no change  Plan of Care Reviewed With: patient; daughter  Outcome Summary: C/o pain at the beginning of shift, pt was crying and c/o abd cramping.  Treated with IV dilaudid x1. effective per pt. VSS Daughter at bedside. Will CTM the rest of my shift.

## 2020-03-06 NOTE — PROGRESS NOTES
"Pharmacy to dose TPN - Daily Progress Note  Patient: Mandy Alarcon  MRN#: [id number]  Admission Date:     TPN # 5 per Dr Andujar  Indication: severe necrotizing pancreatitis    Principal Problem:    Idiopathic acute pancreatitis  Active Problems:    Hyperlipidemia    Primary hypothyroidism    Supraventricular tachycardia (CMS/HCC)    Obesity (BMI 30-39.9)    CKD (chronic kidney disease) stage 3, GFR 30-59 ml/min (CMS/[facility])    LINNEA (acute kidney injury) (CMS/[facility])    Hyponatremia    Fever    Subjective/Objective  86 y.o. female 160 cm (63\") 96.7 kg (213 lb 3 oz)  Results from last 7 days   Lab Units 20  0546  20  0506   SODIUM mmol/L 131*   < > 135*   POTASSIUM mmol/L 4.4   < > 4.1   CHLORIDE mmol/L 102   < > 101   CO2 mmol/L 20.8*   < > 24.1   BUN mg/dL 18   < > 17   CREATININE mg/dL 0.84   < > 1.10*   CALCIUM mg/dL 8.8   < > 8.4*   ALBUMIN g/dL 2.00*   < > 1.90*   BILIRUBIN mg/dL 0.3   < > 0.5   ALK PHOS U/L 53   < > 67   ALT (SGPT) U/L 13   < > 8   AST (SGOT) U/L 22   < > 12   GLUCOSE mg/dL 116*   < > 97   MAGNESIUM mg/dL 1.8   < > 1.8   PHOSPHORUS mg/dL 3.8   < > 2.4*   TRIGLYCERIDES mg/dL  --   --  68    < > = values in this interval not displayed.      No intake/output data recorded.    Diet Orders (active) (From admission, onward)     Start     Ordered    20  NPO Diet NPO Except: Ice Chips, Sips With Meds  Diet Effective Now      20              Additional insulin administration while previous TPN infusin units  Additional electrolyte administration while previous TPN infusin  Acid suppression: famotidine 20mg iv q24h  Stool in last 24 hours: 1   Urine output: 6 unmeasured occurences    Daily Assessment  Current macronutrients: protein 75 gm/day, dextrose 900 kcal/day, lipids 200 kcal/day  Fluid rate: 60 ml/hr    AM labs reviewed    - sodium 131 today, similar to previous 4 days.   - phosphate trending up    Weight down 2 kg in past 2 days    Plan    - Will " continue same rate of 60 ml/hr for TPN  - will increase NaCl by 10 mEq ( to total of 90 mEq in 24 hours)  -  Will chage sodium phosphate to sodium acetate.  - Will order bmp, and phosphate with am labs.    Pharmacy will continue to follow.    Patricia Pendleton, Pharm.D., [facility]  3/6/2020  9:27 AM

## 2020-03-06 NOTE — PROGRESS NOTES
"[facility]  Inpatient Progress Note    Reason for Follow Up:  Acute pancreatitis    Subjective     Interval History:   Complains of crampy abdominal pain overnight-she reports that this is different than her pancreatitis pain and she describes it as \"intestinal\".  She followed this with a small bowel movement.  She did get up and walk yesterday.  She is not coughing as much.    Current Facility-Administered Medications:   •  acetaminophen (TYLENOL) tablet 650 mg, 650 mg, Oral, Q4H PRN, 650 mg at 02/28/20 2213 **OR** acetaminophen (TYLENOL) 160 MG/5ML solution 650 mg, 650 mg, Oral, Q4H PRN **OR** acetaminophen (TYLENOL) suppository 650 mg, 650 mg, Rectal, Q4H PRN, Viktoriya Herrera APRN  •  Adult Central 2-in-1 TPN, , Intravenous, Continuous, Bryce Andujar MD, Last Rate: 60 mL/hr at 03/05/20 1742  •  Adult Central 2-in-1 TPN, , Intravenous, Continuous, Bryce Andujar MD  •  atenolol (TENORMIN) tablet 50 mg, 50 mg, Oral, Q12H, Viktoriya Herrera APRN, Stopped at 03/06/20 0815  •  enoxaparin (LOVENOX) syringe 40 mg, 40 mg, Subcutaneous, Q24H, Viktoriya Herrera APRN, 40 mg at 03/05/20 1745  •  famotidine (PEPCID) injection 20 mg, 20 mg, Intravenous, Daily, Viktoriya Herrera APRN, 20 mg at 03/06/20 0815  •  Fat Emulsion Plant Based (INTRALIPID,LIPOSYN) 20 % infusion 20 g, 100 mL, Intravenous, Q24H (TPN), Bryce Andujar MD, Last Rate: 8.33 mL/hr at 03/05/20 1742, 20 g at 03/05/20 1742  •  HYDROmorphone (DILAUDID) injection 0.5 mg, 0.5 mg, Intravenous, Q2H PRN, 0.5 mg at 03/06/20 0815 **AND** naloxone (NARCAN) injection 0.4 mg, 0.4 mg, Intravenous, Q5 Min PRN, Viktoriya Herrera, APRN  •  levothyroxine (SYNTHROID, LEVOTHROID) tablet 50 mcg, 50 mcg, Oral, Q AM, Viktoriya Herrera, APRN, 50 mcg at 03/06/20 0601  •  nitroglycerin (NITROSTAT) SL tablet 0.4 mg, 0.4 mg, Sublingual, Q5 Min PRN, Viktoriya Herrera, APRN  •  ondansetron (ZOFRAN) injection 4 mg, 4 mg, " Intravenous, Q6H PRN, Viktoriya Herrera APRN, 4 mg at 03/06/20 0315  •  oxyCODONE-acetaminophen (PERCOCET) 7.5-325 MG per tablet 1 tablet, 1 tablet, Oral, Q4H PRN, Viktoriya Herrera APRN, 1 tablet at 03/05/20 2112  •  Pharmacy to Dose TPN, , Does not apply, Continuous PRN, Bryce Andujar MD  •  promethazine (PHENERGAN) injection 12.5 mg, 12.5 mg, Intravenous, Q6H PRN, Viktoriya Herrera APRN, 12.5 mg at 02/28/20 2213  •  [COMPLETED] Insert peripheral IV, , , Once **AND** sodium chloride 0.9 % flush 10 mL, 10 mL, Intravenous, PRN, Jared Cisneros MD  •  sodium chloride 0.9 % flush 10 mL, 10 mL, Intravenous, Q12H, Viktoriya Herrera APRN, 10 mL at 03/06/20 0821  •  sodium chloride 0.9 % flush 10 mL, 10 mL, Intravenous, PRN, Viktoriya Herrera APRN  •  sodium chloride 0.9 % flush 10 mL, 10 mL, Intravenous, Q12H, Bryce Andujar MD, 10 mL at 03/06/20 0821  •  sodium chloride 0.9 % flush 10 mL, 10 mL, Intravenous, Q12H, Bryce Andujar MD, 10 mL at 03/06/20 0816  •  sodium chloride 0.9 % flush 10 mL, 10 mL, Intravenous, Q12H, Bryce Andujar MD, 10 mL at 03/06/20 0816  •  sodium chloride 0.9 % flush 10 mL, 10 mL, Intravenous, PRNPratima Robert N, MD  •  sodium chloride 0.9 % flush 20 mL, 20 mL, Intravenous, PRPratima GLYNN Robert N, MD  Review of Systems:   GI positive for abdominal pain, negative for fevers or chills, negative for nausea or vomiting    Objective     Vital Signs  Temp:  [97.9 °F (36.6 °C)-98.4 °F (36.9 °C)] 98 °F (36.7 °C)  Heart Rate:  [49-56] 53  Resp:  [16-18] 18  BP: (121-142)/(70-78) 135/72  Body mass index is 37.76 kg/m².  No intake or output data in the 24 hours ending 03/06/20 0959  No intake/output data recorded.     Physical Exam:   General: patient awake, alert and cooperative   Eyes: Normal lids and lashes, no scleral icterus   Neck: supple, normal ROM   Skin: warm and dry, not jaundiced   Abdomen: soft, nontender, nondistended; normal bowel  sounds   Extremities: no rash or edema   Psychiatric: Normal mood and behavior; memory intact     Results Review:     I reviewed the patient's new clinical results.    Results from last 7 days   Lab Units 03/06/20  0546 03/05/20  0600 03/04/20  0502   WBC 10*3/mm3 8.72 7.99 8.37   HEMOGLOBIN g/dL 11.2* 10.3* 10.1*   HEMATOCRIT % 33.9* 31.6* 31.1*   PLATELETS 10*3/mm3 210 211 206     Results from last 7 days   Lab Units 03/06/20  0546 03/05/20  0600 03/04/20  0502 03/03/20  0539   SODIUM mmol/L 131* 132* 131* 132*   POTASSIUM mmol/L 4.4 4.2 4.0 4.0   CHLORIDE mmol/L 102 102 99 101   CO2 mmol/L 20.8* 21.4* 22.0 20.9*   BUN mg/dL 18 17 16 18   CREATININE mg/dL 0.84 0.83 0.81 1.03*   CALCIUM mg/dL 8.8 8.4* 8.1* 7.9*   BILIRUBIN mg/dL 0.3  --  0.3 0.4   ALK PHOS U/L 53  --  51 58   ALT (SGPT) U/L 13  --  8 8   AST (SGOT) U/L 22  --  16 16   GLUCOSE mg/dL 116* 110* 112* 125*         Lab Results   Lab Value Date/Time    LIPASE 44 03/05/2020 0600    LIPASE 36 02/29/2020 0623    LIPASE 52 02/28/2020 0508    LIPASE 62 (H) 02/27/2020 1016    LIPASE 38 02/05/2020 0507    LIPASE 60 02/04/2020 0435    LIPASE 181 (H) 02/03/2020 0211    LIPASE 417 (H) 02/02/2020 0441    LIPASE >3,000 (H) 01/31/2020 2142       Radiology:  XR Chest 1 View   Final Result   FINDINGS AND IMPRESSION:   Right PICC terminates over the superior vena cava.       The lungs are hypoinflated. Bibasilar pulmonary opacification is   present, left greater than than right. Small left pleural effusion is   suspected. Findings have mildly worsened since 02/27/2020 and while   they're favored to represent atelectasis given the configuration, early   pneumonia would appear similar in the appropriate clinical context and   correlation with patient history is recommended. No pneumothorax is   seen. Heart size accentuated by low lung volumes..       This report was finalized on 3/5/2020 1:36 PM by Dr. Carlitos Willis M.D.          CT Abdomen Pelvis With Contrast   Final  Result   The findings on CT are most concerning for acute   pancreatitis with acute necrotic/peripancreatic phlegmon. There is a   relatively simple appearing collection seen just anterior to the IVC   measuring up to 3.2 cm as well. There is severe attenuation of the   splenoportal confluence, proximal superior mesenteric vein as well as   the splenic vein. There is marked asymmetric gallbladder wall   thickening.       Radiation dose reduction techniques were utilized, including automated   exposure control and exposure modulation based on body size.       This report was finalized on 3/4/2020 9:02 AM by Dr. Ruthy Haro M.D.          US Abdomen Limited   Final Result   1.  There is pericholecystic fluid, as well as mild gallbladder wall   thickening, as seen on recent CT but appears to be new since 01/31/2020.   The diameter of the common bile duct is grossly unchanged since   01/31/2020. The gallbladder is nondistended. While findings are favored   be related to adjacent worsening pancreatitis, early acute cholecystitis   cannot be excluded and correlation with patient history and laboratory   values is recommended. Findings can be further evaluated with HIDA scan   if clinically indicated.   2.  The pancreas is not well-visualized, likely related to significant   pancreatitis.   3.  Pericholecystic fluid is present with 1 of the areas of fluid   appearing to be partially loculated and may represent an early   pseudocyst.       This report was finalized on 2/28/2020 9:18 PM by Dr. Carlitos Willis M.D.          CT Chest Without Contrast   Final Result   1. Significant progression of pancreatitis. The small peripancreatic   fluid collections likely represent developing pseudocysts.   2. Distended gallbladder is suspected to be related to the pancreatitis.   Characterization is limited in the absence of IV contrast and   cholecystitis cannot be entirely excluded. Please correlate clinically.   3. Sigmoid  diverticulosis without evidence for diverticulitis.   4. There is a minimal right pleural effusion. The increase in density   and markings at both lower lobes may be related to underexpansion with   atelectatic change. No definite pneumonia is seen.       Discussed with Dr. Cisneros.       This report was finalized on 2/27/2020 4:14 PM by Dr. Lakeisha Robison M.D.          CT Abdomen Pelvis Without Contrast   Final Result   1. Significant progression of pancreatitis. The small peripancreatic   fluid collections likely represent developing pseudocysts.   2. Distended gallbladder is suspected to be related to the pancreatitis.   Characterization is limited in the absence of IV contrast and   cholecystitis cannot be entirely excluded. Please correlate clinically.   3. Sigmoid diverticulosis without evidence for diverticulitis.   4. There is a minimal right pleural effusion. The increase in density   and markings at both lower lobes may be related to underexpansion with   atelectatic change. No definite pneumonia is seen.       Discussed with Dr. Cisneros.       This report was finalized on 2/27/2020 4:14 PM by Dr. Lakeisha Robison M.D.          XR Chest 2 View   Final Result   Improvement in bilateral pleural effusions with potential   mild residual pleural fluid at the posterior costophrenic angles. Mild   left basilar linear subsegmental scarring or atelectasis.       This report was finalized on 2/27/2020 11:08 AM by Dr. Jose Thorne M.D.              Assessment/Plan     Patient Active Problem List   Diagnosis   • Benign essential hypertension   • Claustrophobia   • Gout   • Hyperlipidemia   • Primary hypothyroidism   • Impaired fasting glucose   • Nocturnal hypoxemia   • Secondary polycythemia   • Vitamin D deficiency   • Therapeutic drug monitoring   • Family history of coronary artery disease   • Bilateral sensorineural hearing loss, wears hearing aids   • Multinodular goiter   • Supraventricular tachycardia  (CMS/[facility])   • Morbidly obese (CMS/[facility])   • Obesity (BMI 30-39.9)   • CKD (chronic kidney disease) stage 3, GFR 30-59 ml/min (CMS/[facility])   • LINNEA (acute kidney injury) (CMS/[facility])   • Hyponatremia   • Hypomagnesemia   • Leukocytosis   • Idiopathic acute pancreatitis   • Fever       Assessment:  1. Acute idiopathic pancreatitis  2. Hyponatremia  3. linnea-resolved  4. diarrhea         Plan:  · Continue gut rest and TPN  · Encourage pulmonary toilet with increased use of incentive spirometer and increase ambulation-chest x-ray from yesterday reviewed  · We will add anti-gas medication to use as needed.  Stools are already somewhat loose from the TPN  · Slow improvement noted    I discussed the patients findings and my recommendations with patient and family.    Adriane Skaggs MD

## 2020-03-06 NOTE — PLAN OF CARE
Problem: Patient Care Overview  Goal: Plan of Care Review  Outcome: Ongoing (interventions implemented as appropriate)  Flowsheets (Taken 3/6/2020 7106)  Plan of Care Reviewed With: patient; daughter  Outcome Summary: c/o pain and nausea relieved with medication. VSS. walked around nurses station and sat in chair multiple times. daughter at bedside. Will CTM.

## 2020-03-06 NOTE — PROGRESS NOTES
"DAILY PROGRESS NOTE  [facility]    Patient Identification:  Name: Mandy Alarcon  Age: 86 y.o.  Sex: female  :  1933  MRN: [id number]         Primary Care Physician: Daryl Santos MD    Subjective:  Interval History:She is sleepy.  Some pain.  Nausea and vomiting.    Objective:    Scheduled Meds:    atenolol 50 mg Oral Q12H   enoxaparin 40 mg Subcutaneous Q24H   famotidine 20 mg Intravenous Daily   Fat Emulsion Plant Based 100 mL Intravenous Q24H (TPN)   levothyroxine 50 mcg Oral Q AM   sodium chloride 10 mL Intravenous Q12H   sodium chloride 10 mL Intravenous Q12H   sodium chloride 10 mL Intravenous Q12H   sodium chloride 10 mL Intravenous Q12H     Continuous Infusions:    Adult Central 2-in-1 TPN  Last Rate: 60 mL/hr at 20 1742   Adult Central 2-in-1 TPN     Pharmacy to Dose TPN         Vital signs in last 24 hours:  Temp:  [97.9 °F (36.6 °C)-98.4 °F (36.9 °C)] 98.2 °F (36.8 °C)  Heart Rate:  [49-56] 55  Resp:  [16-18] 18  BP: (130-142)/(67-78) 130/67    Intake/Output:  No intake or output data in the 24 hours ending 20 1452    Exam:  /67 (BP Location: Left arm, Patient Position: Lying)   Pulse 55   Temp 98.2 °F (36.8 °C) (Oral)   Resp 18   Ht 160 cm (63\")   Wt 96.7 kg (213 lb 3 oz)   SpO2 97%   BMI 37.76 kg/m²     General Appearance:    Alert, cooperative, no distress   Head:    Normocephalic, without obvious abnormality, atraumatic   Eyes:       Throat:   Lips, tongue, gums normal   Neck:   Supple, symmetrical, trachea midline, no JVD   Lungs:     Clear to auscultation bilaterally, respirations unlabored   Chest Wall:    No tenderness or deformity    Heart:    Regular rate and rhythm, S1 and S2 normal, no murmur,no  Rub or gallop   Abdomen:     Soft,upper abdominal tenderness, bowel sounds active, no masses, no organomegaly    Extremities:   Extremities normal, atraumatic, no cyanosis or edema   Pulses:      Skin:   Skin is warm and dry,  no rashes or " palpable lesions   Neurologic:   no focal deficits noted      Lab Results (last 72 hours)     Procedure Component Value Units Date/Time    Blood Culture - Blood, Hand, Right [[id number]] Collected:  02/27/20 1425    Specimen:  Blood from Hand, Right Updated:  03/01/20 1445     Blood Culture No growth at 3 days    Blood Culture - Blood, Arm, Right [[id number]] Collected:  02/27/20 1017    Specimen:  Blood from Arm, Right Updated:  03/01/20 1030     Blood Culture No growth at 3 days    Comprehensive Metabolic Panel [[id number]]  (Abnormal) Collected:  03/01/20 0507    Specimen:  Blood Updated:  03/01/20 0552     Glucose 89 mg/dL      BUN 17 mg/dL      Creatinine 1.17 mg/dL      Sodium 131 mmol/L      Potassium 4.1 mmol/L      Chloride 100 mmol/L      CO2 20.5 mmol/L      Calcium 8.2 mg/dL      Total Protein 5.3 g/dL      Albumin 2.00 g/dL      ALT (SGPT) 9 U/L      AST (SGOT) 15 U/L      Alkaline Phosphatase 65 U/L      Total Bilirubin 0.5 mg/dL      eGFR Non African Amer 44 mL/min/1.73      Globulin 3.3 gm/dL      A/G Ratio 0.6 g/dL      BUN/Creatinine Ratio 14.5     Anion Gap 10.5 mmol/L     Narrative:       GFR Normal >60  Chronic Kidney Disease <60  Kidney Failure <15      CBC (No Diff) [[id number]]  (Abnormal) Collected:  03/01/20 0507    Specimen:  Blood Updated:  03/01/20 0530     WBC 10.75 10*3/mm3      RBC 3.53 10*6/mm3      Hemoglobin 10.2 g/dL      Hematocrit 31.3 %      MCV 88.7 fL      MCH 28.9 pg      MCHC 32.6 g/dL      RDW 13.7 %      RDW-SD 44.0 fl      MPV 9.0 fL      Platelets 198 10*3/mm3     Lipase [[id number]]  (Normal) Collected:  02/29/20 0623    Specimen:  Blood Updated:  02/29/20 0708     Lipase 36 U/L     Comprehensive Metabolic Panel [[id number]]  (Abnormal) Collected:  02/29/20 0623    Specimen:  Blood Updated:  02/29/20 0708     Glucose 82 mg/dL      BUN 14 mg/dL      Creatinine 1.15 mg/dL      Sodium 133 mmol/L      Potassium 4.2 mmol/L      Chloride 103 mmol/L      CO2 18.9 mmol/L       Calcium 7.9 mg/dL      Total Protein 5.1 g/dL      Albumin 2.10 g/dL      ALT (SGPT) 10 U/L      AST (SGOT) 16 U/L      Alkaline Phosphatase 70 U/L      Total Bilirubin 0.6 mg/dL      eGFR Non African Amer 45 mL/min/1.73      Globulin 3.0 gm/dL      A/G Ratio 0.7 g/dL      BUN/Creatinine Ratio 12.2     Anion Gap 11.1 mmol/L     Narrative:       GFR Normal >60  Chronic Kidney Disease <60  Kidney Failure <15      CBC (No Diff) [[id number]]  (Abnormal) Collected:  02/29/20 0623    Specimen:  Blood Updated:  02/29/20 0650     WBC 9.59 10*3/mm3      RBC 3.81 10*6/mm3      Hemoglobin 11.0 g/dL      Hematocrit 34.1 %      MCV 89.5 fL      MCH 28.9 pg      MCHC 32.3 g/dL      RDW 13.7 %      RDW-SD 44.6 fl      MPV 9.2 fL      Platelets 186 10*3/mm3     Comprehensive Metabolic Panel [[id number]]  (Abnormal) Collected:  02/28/20 0508    Specimen:  Blood Updated:  02/28/20 0612     Glucose 91 mg/dL      BUN 17 mg/dL      Creatinine 1.15 mg/dL      Sodium 133 mmol/L      Potassium 4.0 mmol/L      Chloride 100 mmol/L      CO2 21.4 mmol/L      Calcium 8.8 mg/dL      Total Protein 5.7 g/dL      Albumin 2.10 g/dL      ALT (SGPT) 11 U/L      AST (SGOT) 15 U/L      Alkaline Phosphatase 65 U/L      Total Bilirubin 0.6 mg/dL      eGFR Non African Amer 45 mL/min/1.73      Globulin 3.6 gm/dL      A/G Ratio 0.6 g/dL      BUN/Creatinine Ratio 14.8     Anion Gap 11.6 mmol/L     Narrative:       GFR Normal >60  Chronic Kidney Disease <60  Kidney Failure <15      Lipase [[id number]]  (Normal) Collected:  02/28/20 0508    Specimen:  Blood Updated:  02/28/20 0612     Lipase 52 U/L     Vancomycin, Random [[id number]]  (Normal) Collected:  02/28/20 0508    Specimen:  Blood Updated:  02/28/20 0609     Vancomycin Random 13.60 mcg/mL     CBC & Differential [[id number]] Collected:  02/28/20 0508    Specimen:  Blood Updated:  02/28/20 0538    Narrative:       The following orders were created for panel order CBC & Differential.  Procedure                                Abnormality         Status                     ---------                               -----------         ------                     CBC Auto Differential[[id number]]        Abnormal            Final result                 Please view results for these tests on the individual orders.    CBC Auto Differential [[id number]]  (Abnormal) Collected:  02/28/20 0508    Specimen:  Blood Updated:  02/28/20 0538     WBC 8.25 10*3/mm3      RBC 3.58 10*6/mm3      Hemoglobin 10.4 g/dL      Hematocrit 31.6 %      MCV 88.3 fL      MCH 29.1 pg      MCHC 32.9 g/dL      RDW 13.8 %      RDW-SD 44.5 fl      MPV 9.1 fL      Platelets 192 10*3/mm3      Neutrophil % 73.3 %      Lymphocyte % 10.2 %      Monocyte % 13.1 %      Eosinophil % 2.2 %      Basophil % 0.5 %      Immature Grans % 0.7 %      Neutrophils, Absolute 6.05 10*3/mm3      Lymphocytes, Absolute 0.84 10*3/mm3      Monocytes, Absolute 1.08 10*3/mm3      Eosinophils, Absolute 0.18 10*3/mm3      Basophils, Absolute 0.04 10*3/mm3      Immature Grans, Absolute 0.06 10*3/mm3      nRBC 0.0 /100 WBC         Data Review:  Results from last 7 days   Lab Units 03/06/20  0546 03/05/20  0600 03/04/20  0502   SODIUM mmol/L 131* 132* 131*   POTASSIUM mmol/L 4.4 4.2 4.0   CHLORIDE mmol/L 102 102 99   CO2 mmol/L 20.8* 21.4* 22.0   BUN mg/dL 18 17 16   CREATININE mg/dL 0.84 0.83 0.81   GLUCOSE mg/dL 116* 110* 112*   CALCIUM mg/dL 8.8 8.4* 8.1*     Results from last 7 days   Lab Units 03/06/20  0546 03/05/20  0600 03/04/20  0502   WBC 10*3/mm3 8.72 7.99 8.37   HEMOGLOBIN g/dL 11.2* 10.3* 10.1*   HEMATOCRIT % 33.9* 31.6* 31.1*   PLATELETS 10*3/mm3 210 211 206             Lab Results   Lab Value Date/Time    TROPONINT <0.010 01/31/2020 2142    TROPONINT <0.010 03/22/2019 1524    TROPONINT <0.010 07/10/2018 1038    TROPONINT <0.010 06/21/2018 0823    TROPONINT <0.010 06/20/2018 1738     Results from last 7 days   Lab Units 03/02/20  0506   TRIGLYCERIDES mg/dL 68     Results from last 7  days   Lab Units 20  0546 20  0502 20  0539   ALK PHOS U/L 53 51 58   BILIRUBIN mg/dL 0.3 0.3 0.4   ALT (SGPT) U/L 13 8 8   AST (SGOT) U/L 22 16 16             Glucose   Date/Time Value Ref Range Status   2020 0627 108 70 - 130 mg/dL Final           Past Medical History:   Diagnosis Date   • Benign essential hypertension 10/28/2012    2012--treatment for hypertension begun.   • Bilateral sensorineural hearing loss, wears hearing aids 2017    Left is much worse than right.  Patient had multiple ear infections as a child.   • Chronic renal insufficiency, stage II (mild), 2016--creatinine 1.35 2016--creatinine 1.35.  Baseline creatinine approximately 1.3.   • Claustrophobia 2016    This patient has significant nocturnal hypoxemia and I think that she could benefit from nocturnal oxygen therapy. The exact etiology of her hypoxemia is not clear. She could possibly have obstructive sleep apnea but this is not documented. We cannot test this patient for sleep apnea due to the fact that she is severely claustrophobic. Patient was a former smoker and it is possibly that COPD he is playing a role.   • Family history of coronary artery disease 2016    Patient's mother, father, 2 sisters and a brother all  from myocardial infarctions   • Gout 10/28/2012    2012--initial diagnosis and treatment of gout.   • Hyperlipidemia 10/28/2012    2012--treatment for hyperlipidemia begun.   • Impaired fasting glucose 10/28/2012    2012--initial diagnosis impaired fasting glucose.   • Morbidly obese (CMS/HCC) 3/11/2019   • Nocturnal hypoxemia 2014--patient did not receive nocturnal oxygen because of Medicare regulations.   05/15/2014--overnight oximetry revealed oxygen saturations less than 89% for 22 minutes and 40 seconds. Oxygen saturations less than or equal to 88% for 22 minutes and 40 seconds. Lowest oxygen saturation  83%. The longest continuous time with oxygen saturations less than or equal to 88% was 1 minute and 32 seconds.   08/02/2012--overnight oximetry revealed oxygen saturations less than 90% for one hour and 35 minutes. Oxygen saturations less than 89% 59 minutes. This patient has significant nocturnal hypoxemia and I think that she could benefit from nocturnal oxygen therapy. The exact etiology of her hypoxemia is not clear. She could possibly have obstructive sleep apnea but this is not documented. We cannot test this patient for sleep apnea due to the fact that she is severely claustrophobic. Patient was a former smoker and it is possibly that COPD he is playing a role.   • Pancreatitis    • Pneumonia    • Primary hypothyroidism 11/17/2015 March 11, 2019--TSH remains elevated slightly at 4.92.  Given the overall clinical picture including the multinodular goiter, we will initiate levothyroxine 50 mcg/day and reassess in about 6 weeks.  August 1, 2018--thyroid ultrasound reveals a multinodular thyroid with multiple subcentimeter nodules.  Only minimal increase in size of the largest nodule in the left lobe has occurred when compared    • Secondary polycythemia 8/2/2012 11/06/2014--patient did not receive nocturnal oxygen because of Medicare regulations.   05/15/2014--overnight oximetry revealed oxygen saturations less than 89% for 22 minutes and 40 seconds. Oxygen saturations less than or equal to 88% for 22 minutes and 40 seconds. Lowest oxygen saturation 83%. The longest continuous time with oxygen saturations less than or equal to 88% was 1 minute and 32 seconds.   08/02/2012--overnight oximetry revealed oxygen saturations less than 90% for one hour and 35 minutes. Oxygen saturations less than 89% 59 minutes. This patient has significant nocturnal hypoxemia and I think that she could benefit from nocturnal oxygen therapy. The exact etiology of her hypoxemia is not clear. She could possibly have obstructive  sleep apnea but this is not documented. We cannot test this patient for sleep apnea due to the fact that she is severely claustrophobic. Patient was a former smoker and it is possibly that COPD he is playing a role.   • Vitamin D deficiency 5/23/2016       Assessment:  Active Hospital Problems    Diagnosis  POA   • **Idiopathic acute pancreatitis [K85.00]  Yes   • Fever [R50.9]  Yes   • Hyponatremia [E87.1]  Yes   • LINNEA (acute kidney injury) (CMS/HCC) [N17.9]  Yes   • CKD (chronic kidney disease) stage 3, GFR 30-59 ml/min (CMS/HCC) [N18.3]  Yes   • Obesity (BMI 30-39.9) [E66.9]  Yes   • Supraventricular tachycardia (CMS/HCC) [I47.1]  Yes   • Primary hypothyroidism [E03.9]  Yes   • Hyperlipidemia [E78.5]  Yes      Resolved Hospital Problems   No resolved problems to display.       Plan:  Continue current RX. As per GI and surgery. Cholecystectomy sometime.  Follow lab.  Repeat CT in a few days. And continue TPN  Discussed with family.  Mendoza Sullivan MD  3/6/2020  2:52 PM

## 2020-03-06 NOTE — PROGRESS NOTES
Chief Complaint:    Pancreatitis    Subjective:    Had more pain early this AM, but it has resolved. No nausea.    Objective:    Vitals:    03/06/20 0644 03/06/20 0716 03/06/20 0815 03/06/20 1424   BP:  135/72  130/67   BP Location:  Left arm  Left arm   Patient Position:  Lying  Lying   Pulse:  56 53 55   Resp:  18  18   Temp:  98 °F (36.7 °C)  98.2 °F (36.8 °C)   TempSrc:  Oral  Oral   SpO2:  99%  97%   Weight: 96.7 kg (213 lb 3 oz)      Height:           Lungs: Clear  Heart: Regular  Abdomen: Soft.  Less distended and less tender.  Bowel sounds are present.  Extremities: Warm    Labs reviewed.  WBC 8.72, Hgb 11.2, platelets 210.  Creat 0.84, CO2 20.8.  LFT's okay    Assessment:    Pancreatitis  Gallbladder sludge    Plan:    Continue TPN.  Repeat the CT abdomen on Jeffrey 3/8/2020.  Discussed with patient and family.  Paula Chowdhury and [facility] are available over the weekend for emergencies. I will return on Monday.

## 2020-03-06 NOTE — PROGRESS NOTES
Continued Stay Note  [facility]     Patient Name: Mandy Alarcon  MRN: [id number]  Today's Date: 3/6/2020    Admit Date: 2/27/2020    Discharge Plan     Row Name 03/06/20 1517       Plan    Plan  Pending Hospital Course, - Return back to SNF- [facility] following.    Patient/Family in Agreement with Plan  yes    Plan Comments  Pt continues on gut rest with TPN, Repeat CT scan next week. No anticipated dc over the weekend. Margie/[facility] still following but they can NOT do TPN at their facilty. Spoke with pt and daughter Melissa at bedside. Debbie DUTTON/CCP        Discharge Codes    No documentation.             Amy Patterson, RN

## 2020-03-07 LAB
ANION GAP SERPL CALCULATED.3IONS-SCNC: 10.7 MMOL/L (ref 5–15)
BASOPHILS # BLD AUTO: 0.04 10*3/MM3 (ref 0–0.2)
BASOPHILS NFR BLD AUTO: 0.5 % (ref 0–1.5)
BUN BLD-MCNC: 19 MG/DL (ref 8–23)
BUN/CREAT SERPL: 21.3 (ref 7–25)
CALCIUM SPEC-SCNC: 9 MG/DL (ref 8.6–10.5)
CHLORIDE SERPL-SCNC: 100 MMOL/L (ref 98–107)
CO2 SERPL-SCNC: 22.3 MMOL/L (ref 22–29)
CREAT BLD-MCNC: 0.89 MG/DL (ref 0.57–1)
DEPRECATED RDW RBC AUTO: 46.1 FL (ref 37–54)
EOSINOPHIL # BLD AUTO: 0.33 10*3/MM3 (ref 0–0.4)
EOSINOPHIL NFR BLD AUTO: 4 % (ref 0.3–6.2)
ERYTHROCYTE [DISTWIDTH] IN BLOOD BY AUTOMATED COUNT: 14.1 % (ref 12.3–15.4)
GFR SERPL CREATININE-BSD FRML MDRD: 60 ML/MIN/1.73
GLUCOSE BLD-MCNC: 102 MG/DL (ref 65–99)
HCT VFR BLD AUTO: 33.3 % (ref 34–46.6)
HGB BLD-MCNC: 10.9 G/DL (ref 12–15.9)
IMM GRANULOCYTES # BLD AUTO: 0.07 10*3/MM3 (ref 0–0.05)
IMM GRANULOCYTES NFR BLD AUTO: 0.8 % (ref 0–0.5)
LYMPHOCYTES # BLD AUTO: 1.62 10*3/MM3 (ref 0.7–3.1)
LYMPHOCYTES NFR BLD AUTO: 19.5 % (ref 19.6–45.3)
MCH RBC QN AUTO: 29.6 PG (ref 26.6–33)
MCHC RBC AUTO-ENTMCNC: 32.7 G/DL (ref 31.5–35.7)
MCV RBC AUTO: 90.5 FL (ref 79–97)
MONOCYTES # BLD AUTO: 0.85 10*3/MM3 (ref 0.1–0.9)
MONOCYTES NFR BLD AUTO: 10.3 % (ref 5–12)
NEUTROPHILS # BLD AUTO: 5.38 10*3/MM3 (ref 1.7–7)
NEUTROPHILS NFR BLD AUTO: 64.9 % (ref 42.7–76)
NRBC BLD AUTO-RTO: 0 /100 WBC (ref 0–0.2)
PHOSPHATE SERPL-MCNC: 3.6 MG/DL (ref 2.5–4.5)
PLATELET # BLD AUTO: 207 10*3/MM3 (ref 140–450)
PMV BLD AUTO: 9.4 FL (ref 6–12)
POTASSIUM BLD-SCNC: 4.5 MMOL/L (ref 3.5–5.2)
RBC # BLD AUTO: 3.68 10*6/MM3 (ref 3.77–5.28)
SODIUM BLD-SCNC: 133 MMOL/L (ref 136–145)
WBC NRBC COR # BLD: 8.29 10*3/MM3 (ref 3.4–10.8)

## 2020-03-07 PROCEDURE — 84100 ASSAY OF PHOSPHORUS: CPT | Performed by: SURGERY

## 2020-03-07 PROCEDURE — 85025 COMPLETE CBC W/AUTO DIFF WBC: CPT | Performed by: INTERNAL MEDICINE

## 2020-03-07 PROCEDURE — 25010000002 PROMETHAZINE PER 50 MG: Performed by: NURSE PRACTITIONER

## 2020-03-07 PROCEDURE — 25010000002 HYDROMORPHONE PER 4 MG: Performed by: NURSE PRACTITIONER

## 2020-03-07 PROCEDURE — 25010000002 ONDANSETRON PER 1 MG: Performed by: NURSE PRACTITIONER

## 2020-03-07 PROCEDURE — 80048 BASIC METABOLIC PNL TOTAL CA: CPT | Performed by: SURGERY

## 2020-03-07 PROCEDURE — 25010000002 CALCIUM GLUCONATE PER 10 ML: Performed by: SURGERY

## 2020-03-07 PROCEDURE — 25010000002 POTASSIUM CHLORIDE PER 2 MEQ OF POTASSIUM: Performed by: SURGERY

## 2020-03-07 PROCEDURE — 99232 SBSQ HOSP IP/OBS MODERATE 35: CPT | Performed by: INTERNAL MEDICINE

## 2020-03-07 PROCEDURE — 25010000002 MAGNESIUM SULFATE PER 500 MG OF MAGNESIUM: Performed by: SURGERY

## 2020-03-07 PROCEDURE — 25010000002 ENOXAPARIN PER 10 MG: Performed by: NURSE PRACTITIONER

## 2020-03-07 RX ADMIN — SODIUM CHLORIDE, PRESERVATIVE FREE 10 ML: 5 INJECTION INTRAVENOUS at 09:49

## 2020-03-07 RX ADMIN — SODIUM CHLORIDE, PRESERVATIVE FREE 10 ML: 5 INJECTION INTRAVENOUS at 22:31

## 2020-03-07 RX ADMIN — POTASSIUM CHLORIDE: 2 INJECTION, SOLUTION, CONCENTRATE INTRAVENOUS at 18:40

## 2020-03-07 RX ADMIN — I.V. FAT EMULSION 20 G: 20 EMULSION INTRAVENOUS at 22:37

## 2020-03-07 RX ADMIN — ONDANSETRON 4 MG: 2 INJECTION INTRAMUSCULAR; INTRAVENOUS at 22:28

## 2020-03-07 RX ADMIN — FAMOTIDINE 20 MG: 10 INJECTION INTRAVENOUS at 09:47

## 2020-03-07 RX ADMIN — HYDROMORPHONE HYDROCHLORIDE 0.5 MG: 10 INJECTION INTRAMUSCULAR; INTRAVENOUS; SUBCUTANEOUS at 22:32

## 2020-03-07 RX ADMIN — SODIUM CHLORIDE, PRESERVATIVE FREE 10 ML: 5 INJECTION INTRAVENOUS at 22:30

## 2020-03-07 RX ADMIN — SODIUM CHLORIDE, PRESERVATIVE FREE 10 ML: 5 INJECTION INTRAVENOUS at 09:50

## 2020-03-07 RX ADMIN — HYDROMORPHONE HYDROCHLORIDE 0.5 MG: 10 INJECTION INTRAMUSCULAR; INTRAVENOUS; SUBCUTANEOUS at 09:53

## 2020-03-07 RX ADMIN — PROMETHAZINE HYDROCHLORIDE 12.5 MG: 25 INJECTION INTRAMUSCULAR; INTRAVENOUS at 01:36

## 2020-03-07 RX ADMIN — LEVOTHYROXINE SODIUM 50 MCG: 50 TABLET ORAL at 06:41

## 2020-03-07 RX ADMIN — ENOXAPARIN SODIUM 40 MG: 40 INJECTION SUBCUTANEOUS at 18:40

## 2020-03-07 NOTE — PROGRESS NOTES
"DAILY PROGRESS NOTE  [facility]    Patient Identification:  Name: Mandy Alarcon  Age: 86 y.o.  Sex: female  :  1933  MRN: [id number]         Primary Care Physician: Daryl Santos MD    Subjective:  Interval History:She is sleepy.  Some pain.  Nausea .    Objective:    Scheduled Meds:    atenolol 50 mg Oral Q12H   enoxaparin 40 mg Subcutaneous Q24H   famotidine 20 mg Intravenous Daily   Fat Emulsion Plant Based 100 mL Intravenous Q24H (TPN)   levothyroxine 50 mcg Oral Q AM   sodium chloride 10 mL Intravenous Q12H   sodium chloride 10 mL Intravenous Q12H   sodium chloride 10 mL Intravenous Q12H   sodium chloride 10 mL Intravenous Q12H     Continuous Infusions:    Adult Central 2-in-1 TPN  Last Rate: 60 mL/hr at 20 1819   Adult Central 2-in-1 TPN     Pharmacy to Dose TPN         Vital signs in last 24 hours:  Temp:  [98 °F (36.7 °C)-98.4 °F (36.9 °C)] 98.2 °F (36.8 °C)  Heart Rate:  [57-61] 57  Resp:  [18] 18  BP: (113-142)/(54-63) 113/63    Intake/Output:    Intake/Output Summary (Last 24 hours) at 3/7/2020 1755  Last data filed at 3/7/2020 0539  Gross per 24 hour   Intake --   Output 800 ml   Net -800 ml       Exam:  /63 (BP Location: Left arm, Patient Position: Lying)   Pulse 57   Temp 98.2 °F (36.8 °C) (Oral)   Resp 18   Ht 160 cm (63\")   Wt 94.3 kg (207 lb 14.3 oz)   SpO2 96%   BMI 36.83 kg/m²     General Appearance:    Alert, cooperative, no distress   Head:    Normocephalic, without obvious abnormality, atraumatic   Eyes:       Throat:   Lips, tongue, gums normal   Neck:   Supple, symmetrical, trachea midline, no JVD   Lungs:     Clear to auscultation bilaterally, respirations unlabored   Chest Wall:    No tenderness or deformity    Heart:    Regular rate and rhythm, S1 and S2 normal, no murmur,no  Rub or gallop   Abdomen:     Soft,upper abdominal tenderness, bowel sounds active, no masses, no organomegaly    Extremities:   Extremities normal, atraumatic, no " cyanosis or edema   Pulses:      Skin:   Skin is warm and dry,  no rashes or palpable lesions   Neurologic:   no focal deficits noted      Lab Results (last 72 hours)     Procedure Component Value Units Date/Time    Blood Culture - Blood, Hand, Right [[id number]] Collected:  02/27/20 1425    Specimen:  Blood from Hand, Right Updated:  03/01/20 1445     Blood Culture No growth at 3 days    Blood Culture - Blood, Arm, Right [[id number]] Collected:  02/27/20 1017    Specimen:  Blood from Arm, Right Updated:  03/01/20 1030     Blood Culture No growth at 3 days    Comprehensive Metabolic Panel [[id number]]  (Abnormal) Collected:  03/01/20 0507    Specimen:  Blood Updated:  03/01/20 0552     Glucose 89 mg/dL      BUN 17 mg/dL      Creatinine 1.17 mg/dL      Sodium 131 mmol/L      Potassium 4.1 mmol/L      Chloride 100 mmol/L      CO2 20.5 mmol/L      Calcium 8.2 mg/dL      Total Protein 5.3 g/dL      Albumin 2.00 g/dL      ALT (SGPT) 9 U/L      AST (SGOT) 15 U/L      Alkaline Phosphatase 65 U/L      Total Bilirubin 0.5 mg/dL      eGFR Non African Amer 44 mL/min/1.73      Globulin 3.3 gm/dL      A/G Ratio 0.6 g/dL      BUN/Creatinine Ratio 14.5     Anion Gap 10.5 mmol/L     Narrative:       GFR Normal >60  Chronic Kidney Disease <60  Kidney Failure <15      CBC (No Diff) [[id number]]  (Abnormal) Collected:  03/01/20 0507    Specimen:  Blood Updated:  03/01/20 0530     WBC 10.75 10*3/mm3      RBC 3.53 10*6/mm3      Hemoglobin 10.2 g/dL      Hematocrit 31.3 %      MCV 88.7 fL      MCH 28.9 pg      MCHC 32.6 g/dL      RDW 13.7 %      RDW-SD 44.0 fl      MPV 9.0 fL      Platelets 198 10*3/mm3     Lipase [[id number]]  (Normal) Collected:  02/29/20 0623    Specimen:  Blood Updated:  02/29/20 0708     Lipase 36 U/L     Comprehensive Metabolic Panel [[id number]]  (Abnormal) Collected:  02/29/20 0623    Specimen:  Blood Updated:  02/29/20 0708     Glucose 82 mg/dL      BUN 14 mg/dL      Creatinine 1.15 mg/dL      Sodium 133 mmol/L       Potassium 4.2 mmol/L      Chloride 103 mmol/L      CO2 18.9 mmol/L      Calcium 7.9 mg/dL      Total Protein 5.1 g/dL      Albumin 2.10 g/dL      ALT (SGPT) 10 U/L      AST (SGOT) 16 U/L      Alkaline Phosphatase 70 U/L      Total Bilirubin 0.6 mg/dL      eGFR Non African Amer 45 mL/min/1.73      Globulin 3.0 gm/dL      A/G Ratio 0.7 g/dL      BUN/Creatinine Ratio 12.2     Anion Gap 11.1 mmol/L     Narrative:       GFR Normal >60  Chronic Kidney Disease <60  Kidney Failure <15      CBC (No Diff) [[id number]]  (Abnormal) Collected:  02/29/20 0623    Specimen:  Blood Updated:  02/29/20 0650     WBC 9.59 10*3/mm3      RBC 3.81 10*6/mm3      Hemoglobin 11.0 g/dL      Hematocrit 34.1 %      MCV 89.5 fL      MCH 28.9 pg      MCHC 32.3 g/dL      RDW 13.7 %      RDW-SD 44.6 fl      MPV 9.2 fL      Platelets 186 10*3/mm3     Comprehensive Metabolic Panel [[id number]]  (Abnormal) Collected:  02/28/20 0508    Specimen:  Blood Updated:  02/28/20 0612     Glucose 91 mg/dL      BUN 17 mg/dL      Creatinine 1.15 mg/dL      Sodium 133 mmol/L      Potassium 4.0 mmol/L      Chloride 100 mmol/L      CO2 21.4 mmol/L      Calcium 8.8 mg/dL      Total Protein 5.7 g/dL      Albumin 2.10 g/dL      ALT (SGPT) 11 U/L      AST (SGOT) 15 U/L      Alkaline Phosphatase 65 U/L      Total Bilirubin 0.6 mg/dL      eGFR Non African Amer 45 mL/min/1.73      Globulin 3.6 gm/dL      A/G Ratio 0.6 g/dL      BUN/Creatinine Ratio 14.8     Anion Gap 11.6 mmol/L     Narrative:       GFR Normal >60  Chronic Kidney Disease <60  Kidney Failure <15      Lipase [[id number]]  (Normal) Collected:  02/28/20 0508    Specimen:  Blood Updated:  02/28/20 0612     Lipase 52 U/L     Vancomycin, Random [[id number]]  (Normal) Collected:  02/28/20 0508    Specimen:  Blood Updated:  02/28/20 0609     Vancomycin Random 13.60 mcg/mL     CBC & Differential [[id number]] Collected:  02/28/20 0508    Specimen:  Blood Updated:  02/28/20 0538    Narrative:       The following orders were  created for panel order CBC & Differential.  Procedure                               Abnormality         Status                     ---------                               -----------         ------                     CBC Auto Differential[[id number]]        Abnormal            Final result                 Please view results for these tests on the individual orders.    CBC Auto Differential [[id number]]  (Abnormal) Collected:  02/28/20 0508    Specimen:  Blood Updated:  02/28/20 0538     WBC 8.25 10*3/mm3      RBC 3.58 10*6/mm3      Hemoglobin 10.4 g/dL      Hematocrit 31.6 %      MCV 88.3 fL      MCH 29.1 pg      MCHC 32.9 g/dL      RDW 13.8 %      RDW-SD 44.5 fl      MPV 9.1 fL      Platelets 192 10*3/mm3      Neutrophil % 73.3 %      Lymphocyte % 10.2 %      Monocyte % 13.1 %      Eosinophil % 2.2 %      Basophil % 0.5 %      Immature Grans % 0.7 %      Neutrophils, Absolute 6.05 10*3/mm3      Lymphocytes, Absolute 0.84 10*3/mm3      Monocytes, Absolute 1.08 10*3/mm3      Eosinophils, Absolute 0.18 10*3/mm3      Basophils, Absolute 0.04 10*3/mm3      Immature Grans, Absolute 0.06 10*3/mm3      nRBC 0.0 /100 WBC         Data Review:  Results from last 7 days   Lab Units 03/07/20 0529 03/06/20  0546 03/05/20  0600   SODIUM mmol/L 133* 131* 132*   POTASSIUM mmol/L 4.5 4.4 4.2   CHLORIDE mmol/L 100 102 102   CO2 mmol/L 22.3 20.8* 21.4*   BUN mg/dL 19 18 17   CREATININE mg/dL 0.89 0.84 0.83   GLUCOSE mg/dL 102* 116* 110*   CALCIUM mg/dL 9.0 8.8 8.4*     Results from last 7 days   Lab Units 03/07/20 0529 03/06/20  0546 03/05/20  0600   WBC 10*3/mm3 8.29 8.72 7.99   HEMOGLOBIN g/dL 10.9* 11.2* 10.3*   HEMATOCRIT % 33.3* 33.9* 31.6*   PLATELETS 10*3/mm3 207 210 211             Lab Results   Lab Value Date/Time    TROPONINT <0.010 01/31/2020 2142    TROPONINT <0.010 03/22/2019 1524    TROPONINT <0.010 07/10/2018 1038    TROPONINT <0.010 06/21/2018 0823    TROPONINT <0.010 06/20/2018 1738     Results from last 7 days      Lab Units 20  0506   TRIGLYCERIDES mg/dL 68     Results from last 7 days   Lab Units 20  0546 20  0502 20  0539   ALK PHOS U/L 53 51 58   BILIRUBIN mg/dL 0.3 0.3 0.4   ALT (SGPT) U/L 13 8 8   AST (SGOT) U/L 22 16 16             Glucose   Date/Time Value Ref Range Status   2020 0627 108 70 - 130 mg/dL Final           Past Medical History:   Diagnosis Date   • Benign essential hypertension 10/28/2012    2012--treatment for hypertension begun.   • Bilateral sensorineural hearing loss, wears hearing aids 2017    Left is much worse than right.  Patient had multiple ear infections as a child.   • Chronic renal insufficiency, stage II (mild), 2016--creatinine 1.35 2016--creatinine 1.35.  Baseline creatinine approximately 1.3.   • Claustrophobia 2016    This patient has significant nocturnal hypoxemia and I think that she could benefit from nocturnal oxygen therapy. The exact etiology of her hypoxemia is not clear. She could possibly have obstructive sleep apnea but this is not documented. We cannot test this patient for sleep apnea due to the fact that she is severely claustrophobic. Patient was a former smoker and it is possibly that COPD he is playing a role.   • Family history of coronary artery disease 2016    Patient's mother, father, 2 sisters and a brother all  from myocardial infarctions   • Gout 10/28/2012    2012--initial diagnosis and treatment of gout.   • Hyperlipidemia 10/28/2012    2012--treatment for hyperlipidemia begun.   • Impaired fasting glucose 10/28/2012    2012--initial diagnosis impaired fasting glucose.   • Morbidly obese (CMS/HCC) 3/11/2019   • Nocturnal hypoxemia 2014--patient did not receive nocturnal oxygen because of Medicare regulations.   05/15/2014--overnight oximetry revealed oxygen saturations less than 89% for 22 minutes and 40 seconds. Oxygen saturations less  than or equal to 88% for 22 minutes and 40 seconds. Lowest oxygen saturation 83%. The longest continuous time with oxygen saturations less than or equal to 88% was 1 minute and 32 seconds.   08/02/2012--overnight oximetry revealed oxygen saturations less than 90% for one hour and 35 minutes. Oxygen saturations less than 89% 59 minutes. This patient has significant nocturnal hypoxemia and I think that she could benefit from nocturnal oxygen therapy. The exact etiology of her hypoxemia is not clear. She could possibly have obstructive sleep apnea but this is not documented. We cannot test this patient for sleep apnea due to the fact that she is severely claustrophobic. Patient was a former smoker and it is possibly that COPD he is playing a role.   • Pancreatitis    • Pneumonia    • Primary hypothyroidism 11/17/2015 March 11, 2019--TSH remains elevated slightly at 4.92.  Given the overall clinical picture including the multinodular goiter, we will initiate levothyroxine 50 mcg/day and reassess in about 6 weeks.  August 1, 2018--thyroid ultrasound reveals a multinodular thyroid with multiple subcentimeter nodules.  Only minimal increase in size of the largest nodule in the left lobe has occurred when compared    • Secondary polycythemia 8/2/2012 11/06/2014--patient did not receive nocturnal oxygen because of Medicare regulations.   05/15/2014--overnight oximetry revealed oxygen saturations less than 89% for 22 minutes and 40 seconds. Oxygen saturations less than or equal to 88% for 22 minutes and 40 seconds. Lowest oxygen saturation 83%. The longest continuous time with oxygen saturations less than or equal to 88% was 1 minute and 32 seconds.   08/02/2012--overnight oximetry revealed oxygen saturations less than 90% for one hour and 35 minutes. Oxygen saturations less than 89% 59 minutes. This patient has significant nocturnal hypoxemia and I think that she could benefit from nocturnal oxygen therapy. The exact  etiology of her hypoxemia is not clear. She could possibly have obstructive sleep apnea but this is not documented. We cannot test this patient for sleep apnea due to the fact that she is severely claustrophobic. Patient was a former smoker and it is possibly that COPD he is playing a role.   • Vitamin D deficiency 5/23/2016       Assessment:  Active Hospital Problems    Diagnosis  POA   • **Idiopathic acute pancreatitis [K85.00]  Yes   • Fever [R50.9]  Yes   • Hyponatremia [E87.1]  Yes   • LINNEA (acute kidney injury) (CMS/HCC) [N17.9]  Yes   • CKD (chronic kidney disease) stage 3, GFR 30-59 ml/min (CMS/HCC) [N18.3]  Yes   • Obesity (BMI 30-39.9) [E66.9]  Yes   • Supraventricular tachycardia (CMS/HCC) [I47.1]  Yes   • Primary hypothyroidism [E03.9]  Yes   • Hyperlipidemia [E78.5]  Yes      Resolved Hospital Problems   No resolved problems to display.       Plan:  Continue current RX. As per GI and surgery. Cholecystectomy sometime.  Follow lab.  Repeat CT in a few days. And continue TPN  Discussed with family.  Mendoza Sullivan MD  3/7/2020  5:55 PM

## 2020-03-07 NOTE — PLAN OF CARE
Problem: Patient Care Overview  Goal: Plan of Care Review  Outcome: Ongoing (interventions implemented as appropriate)  Flowsheets  Taken 3/7/2020 0613  Progress: improving  Outcome Summary: Pain x1 and nausea this shift. TPN infusing. Has gotten up and walked unit. Encouraged to stay mobile. Has days and nights mixed up. Encouraged to decrease napping during the day to facilitate better sleep at night. Daughter at bedside. VSS.  Taken 3/6/2020 2055  Plan of Care Reviewed With: patient  Goal: Individualization and Mutuality  Outcome: Ongoing (interventions implemented as appropriate)  Goal: Discharge Needs Assessment  Outcome: Ongoing (interventions implemented as appropriate)  Goal: Interprofessional Rounds/Family Conf  Outcome: Ongoing (interventions implemented as appropriate)     Problem: Pancreatitis, Acute/Chronic (Adult)  Goal: Signs and Symptoms of Listed Potential Problems Will be Absent, Minimized or Managed (Pancreatitis, Acute/Chronic)  Outcome: Ongoing (interventions implemented as appropriate)     Problem: Skin Injury Risk (Adult)  Goal: Identify Related Risk Factors and Signs and Symptoms  Outcome: Ongoing (interventions implemented as appropriate)  Goal: Skin Health and Integrity  Outcome: Ongoing (interventions implemented as appropriate)     Problem: Pain, Acute (Adult)  Goal: Identify Related Risk Factors and Signs and Symptoms  Outcome: Ongoing (interventions implemented as appropriate)  Goal: Acceptable Pain Control/Comfort Level  Outcome: Ongoing (interventions implemented as appropriate)  Flowsheets (Taken 3/6/2020 1529 by Ann-Marie An RN)  Acceptable Pain Control/Comfort Level: making progress toward outcome     Problem: Fall Risk (Adult)  Goal: Identify Related Risk Factors and Signs and Symptoms  Outcome: Ongoing (interventions implemented as appropriate)  Goal: Absence of Fall  Outcome: Ongoing (interventions implemented as appropriate)

## 2020-03-07 NOTE — PROGRESS NOTES
"Pharmacy to dose TPN - Daily Progress Note  Patient: Mandy Alarcon  MRN#: [id number]  Admission Date:     TPN # 6 per Dr Andujar  Indication: severe necrotizing pancreatitis    Principal Problem:    Idiopathic acute pancreatitis  Active Problems:    Hyperlipidemia    Primary hypothyroidism    Supraventricular tachycardia (CMS/HCC)    Obesity (BMI 30-39.9)    CKD (chronic kidney disease) stage 3, GFR 30-59 ml/min (CMS/[facility])    LINNEA (acute kidney injury) (CMS/[facility])    Hyponatremia    Fever    Subjective/Objective  86 y.o. female 160 cm (63\") 94.3 kg (207 lb 14.3 oz)  Results from last 7 days   Lab Units 20  0529 20  0546  20  0506   SODIUM mmol/L 133* 131*   < > 135*   POTASSIUM mmol/L 4.5 4.4   < > 4.1   CHLORIDE mmol/L 100 102   < > 101   CO2 mmol/L 22.3 20.8*   < > 24.1   BUN mg/dL 19 18   < > 17   CREATININE mg/dL 0.89 0.84   < > 1.10*   CALCIUM mg/dL 9.0 8.8   < > 8.4*   ALBUMIN g/dL  --  2.00*   < > 1.90*   BILIRUBIN mg/dL  --  0.3   < > 0.5   ALK PHOS U/L  --  53   < > 67   ALT (SGPT) U/L  --  13   < > 8   AST (SGOT) U/L  --  22   < > 12   GLUCOSE mg/dL 102* 116*   < > 97   MAGNESIUM mg/dL  --  1.8   < > 1.8   PHOSPHORUS mg/dL 3.6 3.8   < > 2.4*   TRIGLYCERIDES mg/dL  --   --   --  68    < > = values in this interval not displayed.      I/O last 3 completed shifts:  In: -   Out: 800 [Urine:800]    Diet Orders (active) (From admission, onward)     Start     Ordered    20  NPO Diet NPO Except: Ice Chips, Sips With Meds  Diet Effective Now      20              Additional insulin administration while previous TPN infusin  Additional electrolyte administration while previous TPN infusin  Acid suppression: famotidine 20mg iv q24h  Stool in last 24 hours: 1  Urine output: 800 ml/24h    Daily Assessment  Current macronutrients: protein 75 gm/day, dextrose 900 kcal/day, lipids 200 kcal/day  Fluid rate: 60 ml/hr    AM labs reviewed   - sodium 133 today, similar to " previous 5 days.   - phosphate stable    Weight down 4 kg in past 3 days    Plan    - Will reduce rate to remove water and concentrate TPN today, this should help raise sodium without adding more to the TPN. New rate 45 ml/hr  - Will order bmp, and phosphate with am labs.    Pharmacy will continue to follow.    Blane Apple [facility]   3/7/2020  10:17 AM

## 2020-03-07 NOTE — PROGRESS NOTES
[facility]  Inpatient Progress Note    Reason for Follow Up: Acute pancreatitis    Subjective     Interval History:   Still with nausea    Current Facility-Administered Medications:   •  acetaminophen (TYLENOL) tablet 650 mg, 650 mg, Oral, Q4H PRN, 650 mg at 02/28/20 2213 **OR** acetaminophen (TYLENOL) 160 MG/5ML solution 650 mg, 650 mg, Oral, Q4H PRN **OR** acetaminophen (TYLENOL) suppository 650 mg, 650 mg, Rectal, Q4H PRN, Viktoriya Herrera APRN  •  Adult Central 2-in-1 TPN, , Intravenous, Continuous, Bryce Andujar MD, Last Rate: 60 mL/hr at 03/06/20 1819  •  Adult Central 2-in-1 TPN, , Intravenous, Continuous, Bryce Andujar MD  •  atenolol (TENORMIN) tablet 50 mg, 50 mg, Oral, Q12H, Viktoriya Herrera APRN, Stopped at 03/06/20 0815  •  enoxaparin (LOVENOX) syringe 40 mg, 40 mg, Subcutaneous, Q24H, Viktoriya Herrera APRN, 40 mg at 03/06/20 1835  •  famotidine (PEPCID) injection 20 mg, 20 mg, Intravenous, Daily, Viktoriya Herrera APRN, 20 mg at 03/07/20 0947  •  Fat Emulsion Plant Based (INTRALIPID,LIPOSYN) 20 % infusion 20 g, 100 mL, Intravenous, Q24H (TPN), Bryce Andujar MD, Last Rate: 8.33 mL/hr at 03/06/20 1819, 20 g at 03/06/20 1819  •  HYDROmorphone (DILAUDID) injection 0.5 mg, 0.5 mg, Intravenous, Q2H PRN, 0.5 mg at 03/07/20 0953 **AND** naloxone (NARCAN) injection 0.4 mg, 0.4 mg, Intravenous, Q5 Min PRN, Viktoriya Herrera APRN  •  levothyroxine (SYNTHROID, LEVOTHROID) tablet 50 mcg, 50 mcg, Oral, Q AM, Viktoriya Herrera APRN, 50 mcg at 03/07/20 0641  •  nitroglycerin (NITROSTAT) SL tablet 0.4 mg, 0.4 mg, Sublingual, Q5 Min PRN, Viktoriya Herrera APRN  •  ondansetron (ZOFRAN) injection 4 mg, 4 mg, Intravenous, Q6H PRN, Viktoriya Herrera APRN, 4 mg at 03/06/20 2050  •  oxyCODONE-acetaminophen (PERCOCET) 7.5-325 MG per tablet 1 tablet, 1 tablet, Oral, Q4H PRN, Viktoriya Herrera APRN, 1 tablet at 03/05/20 2112  •  Pharmacy to Dose TPN, ,  Does not apply, Continuous PRN, Bryce Andujar MD  •  promethazine (PHENERGAN) injection 12.5 mg, 12.5 mg, Intravenous, Q6H PRN, Viktoriya Herrera APRN, 12.5 mg at 03/07/20 0136  •  simethicone (MYLICON) chewable tablet 80 mg, 80 mg, Oral, 4x Daily PRN, Adriane Skaggs MD  •  [COMPLETED] Insert peripheral IV, , , Once **AND** sodium chloride 0.9 % flush 10 mL, 10 mL, Intravenous, PRN, Jared Cisneros MD  •  sodium chloride 0.9 % flush 10 mL, 10 mL, Intravenous, Q12H, Viktoriya Herrera APRN, 10 mL at 03/07/20 0949  •  sodium chloride 0.9 % flush 10 mL, 10 mL, Intravenous, PRN, Viktoriya Herrera APRN  •  sodium chloride 0.9 % flush 10 mL, 10 mL, Intravenous, Q12H, Bryce Andujar MD, 10 mL at 03/07/20 0950  •  sodium chloride 0.9 % flush 10 mL, 10 mL, Intravenous, Q12H, Bryce Andujar MD, 10 mL at 03/07/20 0949  •  sodium chloride 0.9 % flush 10 mL, 10 mL, Intravenous, Q12H, Bryce Andujar MD, 10 mL at 03/07/20 0949  •  sodium chloride 0.9 % flush 10 mL, 10 mL, Intravenous, PRNPratima Robert N, MD  •  sodium chloride 0.9 % flush 20 mL, 20 mL, Intravenous, PRN, Bryce Andujar MD  Review of Systems:    She has weakness fatigue all other systems reviewed and negative    Objective     Vital Signs  Temp:  [98 °F (36.7 °C)-98.4 °F (36.9 °C)] 98.2 °F (36.8 °C)  Heart Rate:  [57-61] 57  Resp:  [18] 18  BP: (113-142)/(54-63) 113/63  Body mass index is 36.83 kg/m².    Intake/Output Summary (Last 24 hours) at 3/7/2020 7733  Last data filed at 3/7/2020 0539  Gross per 24 hour   Intake --   Output 800 ml   Net -800 ml     No intake/output data recorded.     Physical Exam:   General: patient awake, alert and cooperative   Eyes: Normal lids and lashes, no scleral icterus   Neck: supple, normal ROM   Skin: warm and dry, not jaundiced   Cardiovascular: regular rhythm and rate, no murmurs auscultated   Pulm: clear to auscultation bilaterally, regular and unlabored   Abdomen: soft,  nontender, nondistended; normal bowel sounds   Extremities: no rash or edema   Psychiatric: Normal mood and behavior; memory intact     Results Review:     I reviewed the patient's new clinical results.    Results from last 7 days   Lab Units 03/07/20  0529 03/06/20  0546 03/05/20  0600   WBC 10*3/mm3 8.29 8.72 7.99   HEMOGLOBIN g/dL 10.9* 11.2* 10.3*   HEMATOCRIT % 33.3* 33.9* 31.6*   PLATELETS 10*3/mm3 207 210 211     Results from last 7 days   Lab Units 03/07/20  0529 03/06/20  0546 03/05/20  0600 03/04/20  0502 03/03/20  0539   SODIUM mmol/L 133* 131* 132* 131* 132*   POTASSIUM mmol/L 4.5 4.4 4.2 4.0 4.0   CHLORIDE mmol/L 100 102 102 99 101   CO2 mmol/L 22.3 20.8* 21.4* 22.0 20.9*   BUN mg/dL 19 18 17 16 18   CREATININE mg/dL 0.89 0.84 0.83 0.81 1.03*   CALCIUM mg/dL 9.0 8.8 8.4* 8.1* 7.9*   BILIRUBIN mg/dL  --  0.3  --  0.3 0.4   ALK PHOS U/L  --  53  --  51 58   ALT (SGPT) U/L  --  13  --  8 8   AST (SGOT) U/L  --  22  --  16 16   GLUCOSE mg/dL 102* 116* 110* 112* 125*         Lab Results   Lab Value Date/Time    LIPASE 44 03/05/2020 0600    LIPASE 36 02/29/2020 0623    LIPASE 52 02/28/2020 0508    LIPASE 62 (H) 02/27/2020 1016    LIPASE 38 02/05/2020 0507    LIPASE 60 02/04/2020 0435    LIPASE 181 (H) 02/03/2020 0211    LIPASE 417 (H) 02/02/2020 0441    LIPASE >3,000 (H) 01/31/2020 2142       Radiology:  XR Chest 1 View   Final Result   FINDINGS AND IMPRESSION:   Right PICC terminates over the superior vena cava.       The lungs are hypoinflated. Bibasilar pulmonary opacification is   present, left greater than than right. Small left pleural effusion is   suspected. Findings have mildly worsened since 02/27/2020 and while   they're favored to represent atelectasis given the configuration, early   pneumonia would appear similar in the appropriate clinical context and   correlation with patient history is recommended. No pneumothorax is   seen. Heart size accentuated by low lung volumes..       This report was  finalized on 3/5/2020 1:36 PM by Dr. Carlitos Willis M.D.          CT Abdomen Pelvis With Contrast   Final Result   The findings on CT are most concerning for acute   pancreatitis with acute necrotic/peripancreatic phlegmon. There is a   relatively simple appearing collection seen just anterior to the IVC   measuring up to 3.2 cm as well. There is severe attenuation of the   splenoportal confluence, proximal superior mesenteric vein as well as   the splenic vein. There is marked asymmetric gallbladder wall   thickening.       Radiation dose reduction techniques were utilized, including automated   exposure control and exposure modulation based on body size.       This report was finalized on 3/4/2020 9:02 AM by Dr. Ruthy Haro M.D.          US Abdomen Limited   Final Result   1.  There is pericholecystic fluid, as well as mild gallbladder wall   thickening, as seen on recent CT but appears to be new since 01/31/2020.   The diameter of the common bile duct is grossly unchanged since   01/31/2020. The gallbladder is nondistended. While findings are favored   be related to adjacent worsening pancreatitis, early acute cholecystitis   cannot be excluded and correlation with patient history and laboratory   values is recommended. Findings can be further evaluated with HIDA scan   if clinically indicated.   2.  The pancreas is not well-visualized, likely related to significant   pancreatitis.   3.  Pericholecystic fluid is present with 1 of the areas of fluid   appearing to be partially loculated and may represent an early   pseudocyst.       This report was finalized on 2/28/2020 9:18 PM by Dr. Carlitos Willis M.D.          CT Chest Without Contrast   Final Result   1. Significant progression of pancreatitis. The small peripancreatic   fluid collections likely represent developing pseudocysts.   2. Distended gallbladder is suspected to be related to the pancreatitis.   Characterization is limited in the absence of  IV contrast and   cholecystitis cannot be entirely excluded. Please correlate clinically.   3. Sigmoid diverticulosis without evidence for diverticulitis.   4. There is a minimal right pleural effusion. The increase in density   and markings at both lower lobes may be related to underexpansion with   atelectatic change. No definite pneumonia is seen.       Discussed with Dr. Cisneros.       This report was finalized on 2/27/2020 4:14 PM by Dr. Lakeisha Robison M.D.          CT Abdomen Pelvis Without Contrast   Final Result   1. Significant progression of pancreatitis. The small peripancreatic   fluid collections likely represent developing pseudocysts.   2. Distended gallbladder is suspected to be related to the pancreatitis.   Characterization is limited in the absence of IV contrast and   cholecystitis cannot be entirely excluded. Please correlate clinically.   3. Sigmoid diverticulosis without evidence for diverticulitis.   4. There is a minimal right pleural effusion. The increase in density   and markings at both lower lobes may be related to underexpansion with   atelectatic change. No definite pneumonia is seen.       Discussed with Dr. Cisneros.       This report was finalized on 2/27/2020 4:14 PM by Dr. Lakeisha Robison M.D.          XR Chest 2 View   Final Result   Improvement in bilateral pleural effusions with potential   mild residual pleural fluid at the posterior costophrenic angles. Mild   left basilar linear subsegmental scarring or atelectasis.       This report was finalized on 2/27/2020 11:08 AM by Dr. Jose Thorne M.D.              Assessment/Plan     Patient Active Problem List   Diagnosis   • Benign essential hypertension   • Claustrophobia   • Gout   • Hyperlipidemia   • Primary hypothyroidism   • Impaired fasting glucose   • Nocturnal hypoxemia   • Secondary polycythemia   • Vitamin D deficiency   • Therapeutic drug monitoring   • Family history of coronary artery disease   • Bilateral  sensorineural hearing loss, wears hearing aids   • Multinodular goiter   • Supraventricular tachycardia (CMS/HCC)   • Morbidly obese (CMS/[facility])   • Obesity (BMI 30-39.9)   • CKD (chronic kidney disease) stage 3, GFR 30-59 ml/min (CMS/HCC)   • LINNEA (acute kidney injury) (CMS/[facility])   • Hyponatremia   • Hypomagnesemia   • Leukocytosis   • Idiopathic acute pancreatitis   • Fever       Assessment:  1. Acute idiopathic pancreatitis  2. Hyponatremia  3. linnea-resolved  4. diarrhea           Plan:  · Continue gut rest and TPN  · Encourage pulmonary toilet with increased use of incentive spirometer and increase ambulation-chest x-ray from yesterday reviewed  · We will add anti-gas medication to use as needed.  Stools are already somewhat loose from the TPN  · Slow improvement noted  I discussed the patients findings and my recommendations with patient and nursing staff.    Guido Lacey MD

## 2020-03-08 ENCOUNTER — APPOINTMENT (OUTPATIENT)
Dept: CT IMAGING | Facility: HOSPITAL | Age: 85
End: 2020-03-08

## 2020-03-08 PROBLEM — R50.9 FEVER: Status: RESOLVED | Noted: 2020-02-27 | Resolved: 2020-03-08

## 2020-03-08 PROBLEM — N17.9 AKI (ACUTE KIDNEY INJURY) (HCC): Status: RESOLVED | Noted: 2020-02-02 | Resolved: 2020-03-08

## 2020-03-08 LAB
ANION GAP SERPL CALCULATED.3IONS-SCNC: 8.2 MMOL/L (ref 5–15)
BASOPHILS # BLD AUTO: 0.05 10*3/MM3 (ref 0–0.2)
BASOPHILS NFR BLD AUTO: 0.6 % (ref 0–1.5)
BUN BLD-MCNC: 19 MG/DL (ref 8–23)
BUN/CREAT SERPL: 20.7 (ref 7–25)
CALCIUM SPEC-SCNC: 8.7 MG/DL (ref 8.6–10.5)
CHLORIDE SERPL-SCNC: 96 MMOL/L (ref 98–107)
CO2 SERPL-SCNC: 23.8 MMOL/L (ref 22–29)
CREAT BLD-MCNC: 0.92 MG/DL (ref 0.57–1)
DEPRECATED RDW RBC AUTO: 45.7 FL (ref 37–54)
EOSINOPHIL # BLD AUTO: 0.29 10*3/MM3 (ref 0–0.4)
EOSINOPHIL NFR BLD AUTO: 3.5 % (ref 0.3–6.2)
ERYTHROCYTE [DISTWIDTH] IN BLOOD BY AUTOMATED COUNT: 14.4 % (ref 12.3–15.4)
GFR SERPL CREATININE-BSD FRML MDRD: 58 ML/MIN/1.73
GLUCOSE BLD-MCNC: 118 MG/DL (ref 65–99)
HCT VFR BLD AUTO: 32.5 % (ref 34–46.6)
HGB BLD-MCNC: 10.6 G/DL (ref 12–15.9)
IMM GRANULOCYTES # BLD AUTO: 0.06 10*3/MM3 (ref 0–0.05)
IMM GRANULOCYTES NFR BLD AUTO: 0.7 % (ref 0–0.5)
LYMPHOCYTES # BLD AUTO: 1.68 10*3/MM3 (ref 0.7–3.1)
LYMPHOCYTES NFR BLD AUTO: 20.1 % (ref 19.6–45.3)
MCH RBC QN AUTO: 29.1 PG (ref 26.6–33)
MCHC RBC AUTO-ENTMCNC: 32.6 G/DL (ref 31.5–35.7)
MCV RBC AUTO: 89.3 FL (ref 79–97)
MONOCYTES # BLD AUTO: 0.83 10*3/MM3 (ref 0.1–0.9)
MONOCYTES NFR BLD AUTO: 9.9 % (ref 5–12)
NEUTROPHILS # BLD AUTO: 5.46 10*3/MM3 (ref 1.7–7)
NEUTROPHILS NFR BLD AUTO: 65.2 % (ref 42.7–76)
NRBC BLD AUTO-RTO: 0 /100 WBC (ref 0–0.2)
PHOSPHATE SERPL-MCNC: 3.6 MG/DL (ref 2.5–4.5)
PLATELET # BLD AUTO: 236 10*3/MM3 (ref 140–450)
PMV BLD AUTO: 9.6 FL (ref 6–12)
POTASSIUM BLD-SCNC: 4.2 MMOL/L (ref 3.5–5.2)
RBC # BLD AUTO: 3.64 10*6/MM3 (ref 3.77–5.28)
SODIUM BLD-SCNC: 128 MMOL/L (ref 136–145)
WBC NRBC COR # BLD: 8.37 10*3/MM3 (ref 3.4–10.8)

## 2020-03-08 PROCEDURE — 85025 COMPLETE CBC W/AUTO DIFF WBC: CPT | Performed by: HOSPITALIST

## 2020-03-08 PROCEDURE — 25010000002 ONDANSETRON PER 1 MG: Performed by: NURSE PRACTITIONER

## 2020-03-08 PROCEDURE — 25010000002 MAGNESIUM SULFATE PER 500 MG OF MAGNESIUM: Performed by: SURGERY

## 2020-03-08 PROCEDURE — 74160 CT ABDOMEN W/CONTRAST: CPT

## 2020-03-08 PROCEDURE — 25010000002 ENOXAPARIN PER 10 MG: Performed by: NURSE PRACTITIONER

## 2020-03-08 PROCEDURE — 84100 ASSAY OF PHOSPHORUS: CPT | Performed by: SURGERY

## 2020-03-08 PROCEDURE — 80048 BASIC METABOLIC PNL TOTAL CA: CPT | Performed by: SURGERY

## 2020-03-08 PROCEDURE — 99232 SBSQ HOSP IP/OBS MODERATE 35: CPT | Performed by: INTERNAL MEDICINE

## 2020-03-08 PROCEDURE — 25010000002 POTASSIUM CHLORIDE PER 2 MEQ OF POTASSIUM: Performed by: SURGERY

## 2020-03-08 PROCEDURE — 25010000002 CALCIUM GLUCONATE PER 10 ML: Performed by: SURGERY

## 2020-03-08 PROCEDURE — 25010000002 IOPAMIDOL 61 % SOLUTION: Performed by: HOSPITALIST

## 2020-03-08 RX ADMIN — SODIUM CHLORIDE, PRESERVATIVE FREE 10 ML: 5 INJECTION INTRAVENOUS at 08:31

## 2020-03-08 RX ADMIN — ONDANSETRON 4 MG: 2 INJECTION INTRAMUSCULAR; INTRAVENOUS at 10:06

## 2020-03-08 RX ADMIN — LEVOTHYROXINE SODIUM 50 MCG: 50 TABLET ORAL at 06:54

## 2020-03-08 RX ADMIN — I.V. FAT EMULSION 20 G: 20 EMULSION INTRAVENOUS at 18:31

## 2020-03-08 RX ADMIN — SODIUM CHLORIDE, PRESERVATIVE FREE 10 ML: 5 INJECTION INTRAVENOUS at 20:42

## 2020-03-08 RX ADMIN — ATENOLOL 50 MG: 50 TABLET ORAL at 08:31

## 2020-03-08 RX ADMIN — SODIUM CHLORIDE, PRESERVATIVE FREE 10 ML: 5 INJECTION INTRAVENOUS at 20:45

## 2020-03-08 RX ADMIN — ONDANSETRON 4 MG: 2 INJECTION INTRAMUSCULAR; INTRAVENOUS at 04:50

## 2020-03-08 RX ADMIN — ENOXAPARIN SODIUM 40 MG: 40 INJECTION SUBCUTANEOUS at 18:31

## 2020-03-08 RX ADMIN — SODIUM CHLORIDE, PRESERVATIVE FREE 10 ML: 5 INJECTION INTRAVENOUS at 20:41

## 2020-03-08 RX ADMIN — POTASSIUM CHLORIDE: 2 INJECTION, SOLUTION, CONCENTRATE INTRAVENOUS at 18:31

## 2020-03-08 RX ADMIN — IOPAMIDOL 95 ML: 612 INJECTION, SOLUTION INTRAVENOUS at 21:31

## 2020-03-08 NOTE — PLAN OF CARE
VSS, room air, no pain medicine administered this shift, zofran x1 r/t nausea, SR on monitor, NPO except for ice chips and sips with meds, family to bedside, will CTM

## 2020-03-08 NOTE — PLAN OF CARE
"  Problem: Patient Care Overview  Goal: Plan of Care Review  Outcome: Ongoing (interventions implemented as appropriate)  Flowsheets  Taken 3/8/2020 0520 by Kelly Cavazos, RN  Progress: improving  Outcome Summary: Nausea x2 this shift treated with zofran. IV dilaudid x1 for pain. Pt discouraged with amount of time needed for healing. She states she is \"frustrated.\" Encouragement and support provided. She has been up to the chair a few time tonight and has walked. CT abdomen today to reevaluate. VSS.  Taken 3/7/2020 1430 by Selma Bauer, RN  Plan of Care Reviewed With: patient;daughter  Goal: Individualization and Mutuality  Outcome: Ongoing (interventions implemented as appropriate)  Goal: Discharge Needs Assessment  Outcome: Ongoing (interventions implemented as appropriate)  Goal: Interprofessional Rounds/Family Conf  Outcome: Ongoing (interventions implemented as appropriate)     Problem: Pancreatitis, Acute/Chronic (Adult)  Goal: Signs and Symptoms of Listed Potential Problems Will be Absent, Minimized or Managed (Pancreatitis, Acute/Chronic)  Outcome: Ongoing (interventions implemented as appropriate)     Problem: Skin Injury Risk (Adult)  Goal: Identify Related Risk Factors and Signs and Symptoms  Outcome: Ongoing (interventions implemented as appropriate)  Goal: Skin Health and Integrity  Outcome: Ongoing (interventions implemented as appropriate)  Flowsheets (Taken 3/8/2020 0520)  Skin Health and Integrity: making progress toward outcome     Problem: Pain, Acute (Adult)  Goal: Identify Related Risk Factors and Signs and Symptoms  Outcome: Ongoing (interventions implemented as appropriate)  Goal: Acceptable Pain Control/Comfort Level  Outcome: Ongoing (interventions implemented as appropriate)     Problem: Fall Risk (Adult)  Goal: Identify Related Risk Factors and Signs and Symptoms  Outcome: Ongoing (interventions implemented as appropriate)  Goal: Absence of Fall  Outcome: Ongoing (interventions implemented " as appropriate)

## 2020-03-08 NOTE — PROGRESS NOTES
[facility]  Inpatient Progress Note    Reason for Follow Up: Acute pancreatitis    Subjective     Interval History:   Nausea this morning    Current Facility-Administered Medications:   •  acetaminophen (TYLENOL) tablet 650 mg, 650 mg, Oral, Q4H PRN, 650 mg at 02/28/20 2213 **OR** acetaminophen (TYLENOL) 160 MG/5ML solution 650 mg, 650 mg, Oral, Q4H PRN **OR** acetaminophen (TYLENOL) suppository 650 mg, 650 mg, Rectal, Q4H PRN, Viktoriya Herrera APRN  •  Adult Central 2-in-1 TPN, , Intravenous, Continuous, Bryce Andujar MD, Last Rate: 45 mL/hr at 03/07/20 1840  •  Adult Central 2-in-1 TPN, , Intravenous, Continuous, Bryce Andujar MD  •  atenolol (TENORMIN) tablet 50 mg, 50 mg, Oral, Q12H, Viktoriya Herrera APRN, 50 mg at 03/08/20 0831  •  enoxaparin (LOVENOX) syringe 40 mg, 40 mg, Subcutaneous, Q24H, Viktoriya Herrera APRN, 40 mg at 03/07/20 1840  •  Fat Emulsion Plant Based (INTRALIPID,LIPOSYN) 20 % infusion 20 g, 100 mL, Intravenous, Q24H (TPN), Bryce Andujar MD, Last Rate: 8.33 mL/hr at 03/07/20 2237, 20 g at 03/07/20 2237  •  HYDROmorphone (DILAUDID) injection 0.5 mg, 0.5 mg, Intravenous, Q2H PRN, 0.5 mg at 03/07/20 2232 **AND** naloxone (NARCAN) injection 0.4 mg, 0.4 mg, Intravenous, Q5 Min PRN, Viktoriya Herrera APRN  •  levothyroxine (SYNTHROID, LEVOTHROID) tablet 50 mcg, 50 mcg, Oral, Q AM, Viktoriya Herrera APRN, 50 mcg at 03/08/20 0654  •  nitroglycerin (NITROSTAT) SL tablet 0.4 mg, 0.4 mg, Sublingual, Q5 Min PRN, Viktoriya Herrera APRN  •  ondansetron (ZOFRAN) injection 4 mg, 4 mg, Intravenous, Q6H PRN, Viktoriya Herrera APRN, 4 mg at 03/08/20 1006  •  oxyCODONE-acetaminophen (PERCOCET) 7.5-325 MG per tablet 1 tablet, 1 tablet, Oral, Q4H PRN, Viktoriya Herrera APRN, 1 tablet at 03/05/20 2112  •  Pharmacy to Dose TPN, , Does not apply, Continuous PRN, Bryce Andujar MD  •  promethazine (PHENERGAN) injection 12.5 mg, 12.5 mg,  Intravenous, Q6H PRN, Viktoriya Herrera APRN, 12.5 mg at 03/07/20 0136  •  simethicone (MYLICON) chewable tablet 80 mg, 80 mg, Oral, 4x Daily PRN, Adriane Skaggs MD  •  [COMPLETED] Insert peripheral IV, , , Once **AND** sodium chloride 0.9 % flush 10 mL, 10 mL, Intravenous, PRN, Jared Cisneros MD  •  sodium chloride 0.9 % flush 10 mL, 10 mL, Intravenous, Q12H, Viktoriya Herrera APRN, 10 mL at 03/07/20 2231  •  sodium chloride 0.9 % flush 10 mL, 10 mL, Intravenous, PRN, Viktoriya Herrera APRN  •  sodium chloride 0.9 % flush 10 mL, 10 mL, Intravenous, Q12H, Bryce Andujar MD, 10 mL at 03/08/20 0831  •  sodium chloride 0.9 % flush 10 mL, 10 mL, Intravenous, Q12H, Bryce Andujar MD, 10 mL at 03/08/20 0831  •  sodium chloride 0.9 % flush 10 mL, 10 mL, Intravenous, Q12H, Bryce Andujar MD, 10 mL at 03/08/20 0831  •  sodium chloride 0.9 % flush 10 mL, 10 mL, Intravenous, PRN, Bryce Andujar MD  •  sodium chloride 0.9 % flush 20 mL, 20 mL, Intravenous, PRN, Bryce Andujar MD  Review of Systems:    She has weakness fatigue all other systems reviewed and negative    Objective     Vital Signs  Temp:  [98.1 °F (36.7 °C)-98.2 °F (36.8 °C)] 98.1 °F (36.7 °C)  Resp:  [18] 18  BP: (113-158)/(55-70) 126/55  Body mass index is 37.06 kg/m².  No intake or output data in the 24 hours ending 03/08/20 1223  No intake/output data recorded.     Physical Exam:   General: patient awake, alert and cooperative   Eyes: Normal lids and lashes, no scleral icterus   Neck: supple, normal ROM   Skin: warm and dry, not jaundiced   Cardiovascular: regular rhythm and rate, no murmurs auscultated   Pulm: clear to auscultation bilaterally, regular and unlabored   Abdomen: soft, nontender, nondistended; normal bowel sounds   Extremities: no rash or edema   Psychiatric: Normal mood and behavior; memory intact     Results Review:     I reviewed the patient's new clinical results.    Results from last 7 days   Lab  Units 03/08/20  0534 03/07/20  0529 03/06/20  0546   WBC 10*3/mm3 8.37 8.29 8.72   HEMOGLOBIN g/dL 10.6* 10.9* 11.2*   HEMATOCRIT % 32.5* 33.3* 33.9*   PLATELETS 10*3/mm3 236 207 210     Results from last 7 days   Lab Units 03/08/20  0534 03/07/20  0529 03/06/20  0546  03/04/20  0502 03/03/20  0539   SODIUM mmol/L 128* 133* 131*   < > 131* 132*   POTASSIUM mmol/L 4.2 4.5 4.4   < > 4.0 4.0   CHLORIDE mmol/L 96* 100 102   < > 99 101   CO2 mmol/L 23.8 22.3 20.8*   < > 22.0 20.9*   BUN mg/dL 19 19 18   < > 16 18   CREATININE mg/dL 0.92 0.89 0.84   < > 0.81 1.03*   CALCIUM mg/dL 8.7 9.0 8.8   < > 8.1* 7.9*   BILIRUBIN mg/dL  --   --  0.3  --  0.3 0.4   ALK PHOS U/L  --   --  53  --  51 58   ALT (SGPT) U/L  --   --  13  --  8 8   AST (SGOT) U/L  --   --  22  --  16 16   GLUCOSE mg/dL 118* 102* 116*   < > 112* 125*    < > = values in this interval not displayed.         Lab Results   Lab Value Date/Time    LIPASE 44 03/05/2020 0600    LIPASE 36 02/29/2020 0623    LIPASE 52 02/28/2020 0508    LIPASE 62 (H) 02/27/2020 1016    LIPASE 38 02/05/2020 0507    LIPASE 60 02/04/2020 0435    LIPASE 181 (H) 02/03/2020 0211    LIPASE 417 (H) 02/02/2020 0441    LIPASE >3,000 (H) 01/31/2020 2142       Radiology:  XR Chest 1 View   Final Result   FINDINGS AND IMPRESSION:   Right PICC terminates over the superior vena cava.       The lungs are hypoinflated. Bibasilar pulmonary opacification is   present, left greater than than right. Small left pleural effusion is   suspected. Findings have mildly worsened since 02/27/2020 and while   they're favored to represent atelectasis given the configuration, early   pneumonia would appear similar in the appropriate clinical context and   correlation with patient history is recommended. No pneumothorax is   seen. Heart size accentuated by low lung volumes..       This report was finalized on 3/5/2020 1:36 PM by Dr. Carlitos Willis M.D.          CT Abdomen Pelvis With Contrast   Final Result   The  findings on CT are most concerning for acute   pancreatitis with acute necrotic/peripancreatic phlegmon. There is a   relatively simple appearing collection seen just anterior to the IVC   measuring up to 3.2 cm as well. There is severe attenuation of the   splenoportal confluence, proximal superior mesenteric vein as well as   the splenic vein. There is marked asymmetric gallbladder wall   thickening.       Radiation dose reduction techniques were utilized, including automated   exposure control and exposure modulation based on body size.       This report was finalized on 3/4/2020 9:02 AM by Dr. Ruthy Haro M.D.          US Abdomen Limited   Final Result   1.  There is pericholecystic fluid, as well as mild gallbladder wall   thickening, as seen on recent CT but appears to be new since 01/31/2020.   The diameter of the common bile duct is grossly unchanged since   01/31/2020. The gallbladder is nondistended. While findings are favored   be related to adjacent worsening pancreatitis, early acute cholecystitis   cannot be excluded and correlation with patient history and laboratory   values is recommended. Findings can be further evaluated with HIDA scan   if clinically indicated.   2.  The pancreas is not well-visualized, likely related to significant   pancreatitis.   3.  Pericholecystic fluid is present with 1 of the areas of fluid   appearing to be partially loculated and may represent an early   pseudocyst.       This report was finalized on 2/28/2020 9:18 PM by Dr. Carlitos Willis M.D.          CT Chest Without Contrast   Final Result   1. Significant progression of pancreatitis. The small peripancreatic   fluid collections likely represent developing pseudocysts.   2. Distended gallbladder is suspected to be related to the pancreatitis.   Characterization is limited in the absence of IV contrast and   cholecystitis cannot be entirely excluded. Please correlate clinically.   3. Sigmoid diverticulosis  without evidence for diverticulitis.   4. There is a minimal right pleural effusion. The increase in density   and markings at both lower lobes may be related to underexpansion with   atelectatic change. No definite pneumonia is seen.       Discussed with Dr. Cisneros.       This report was finalized on 2/27/2020 4:14 PM by Dr. Lakeisha Robison M.D.          CT Abdomen Pelvis Without Contrast   Final Result   1. Significant progression of pancreatitis. The small peripancreatic   fluid collections likely represent developing pseudocysts.   2. Distended gallbladder is suspected to be related to the pancreatitis.   Characterization is limited in the absence of IV contrast and   cholecystitis cannot be entirely excluded. Please correlate clinically.   3. Sigmoid diverticulosis without evidence for diverticulitis.   4. There is a minimal right pleural effusion. The increase in density   and markings at both lower lobes may be related to underexpansion with   atelectatic change. No definite pneumonia is seen.       Discussed with Dr. Cisneros.       This report was finalized on 2/27/2020 4:14 PM by Dr. Lakeisha Robison M.D.          XR Chest 2 View   Final Result   Improvement in bilateral pleural effusions with potential   mild residual pleural fluid at the posterior costophrenic angles. Mild   left basilar linear subsegmental scarring or atelectasis.       This report was finalized on 2/27/2020 11:08 AM by Dr. Jose Thorne M.D.              Assessment/Plan     Patient Active Problem List   Diagnosis   • Benign essential hypertension   • Claustrophobia   • Gout   • Hyperlipidemia   • Primary hypothyroidism   • Impaired fasting glucose   • Nocturnal hypoxemia   • Secondary polycythemia   • Vitamin D deficiency   • Therapeutic drug monitoring   • Family history of coronary artery disease   • Bilateral sensorineural hearing loss, wears hearing aids   • Multinodular goiter   • Supraventricular tachycardia (CMS/HCC)   • Morbidly  obese (CMS/[facility])   • Obesity (BMI 30-39.9)   • CKD (chronic kidney disease) stage 3, GFR 30-59 ml/min (CMS/[facility])   • LINNEA (acute kidney injury) (CMS/[facility])   • Hyponatremia   • Hypomagnesemia   • Leukocytosis   • Idiopathic acute pancreatitis   • Fever          Assessment:  1. Acute idiopathic pancreatitis  2. Hyponatremia  3. linnea-resolved  4. diarrhea           Plan:  · Continue gut rest and TPN  · Encourage pulmonary toilet with increased use of incentive spirometer and increase ambulation-chest x-ray from yesterday reviewed  · We will add anti-gas medication to use as needed.  Stools are already somewhat loose from the TPN  · Slow improvement noted  I discussed the patients findings and my recommendations with patient and nursing staff.    Guido Lacey MD

## 2020-03-08 NOTE — PROGRESS NOTES
"Pharmacy to dose TPN - Daily Progress Note  Patient: Mandy Alarcon  MRN#: [id number]  Admission Date:     TPN # 7 per Dr Andujar  Indication: severe necrotizing pancreatitis    Principal Problem:    Idiopathic acute pancreatitis  Active Problems:    Hyperlipidemia    Primary hypothyroidism    Supraventricular tachycardia (CMS/HCC)    Obesity (BMI 30-39.9)    CKD (chronic kidney disease) stage 3, GFR 30-59 ml/min (CMS/[facility])    LINNEA (acute kidney injury) (CMS/[facility])    Hyponatremia    Fever    Subjective/Objective  86 y.o. female 160 cm (63\") 94.9 kg (209 lb 3.5 oz)  Results from last 7 days   Lab Units 20  0534  20  0546  20  0506   SODIUM mmol/L 128*   < > 131*   < > 135*   POTASSIUM mmol/L 4.2   < > 4.4   < > 4.1   CHLORIDE mmol/L 96*   < > 102   < > 101   CO2 mmol/L 23.8   < > 20.8*   < > 24.1   BUN mg/dL 19   < > 18   < > 17   CREATININE mg/dL 0.92   < > 0.84   < > 1.10*   CALCIUM mg/dL 8.7   < > 8.8   < > 8.4*   ALBUMIN g/dL  --   --  2.00*   < > 1.90*   BILIRUBIN mg/dL  --   --  0.3   < > 0.5   ALK PHOS U/L  --   --  53   < > 67   ALT (SGPT) U/L  --   --  13   < > 8   AST (SGOT) U/L  --   --  22   < > 12   GLUCOSE mg/dL 118*   < > 116*   < > 97   MAGNESIUM mg/dL  --   --  1.8   < > 1.8   PHOSPHORUS mg/dL 3.6   < > 3.8   < > 2.4*   TRIGLYCERIDES mg/dL  --   --   --   --  68    < > = values in this interval not displayed.      I/O last 3 completed shifts:  In: -   Out: 800 [Urine:800]    Diet Orders (active) (From admission, onward)     Start     Ordered    20  NPO Diet NPO Except: Ice Chips, Sips With Meds  Diet Effective Now      20              Additional insulin administration while previous TPN infusin  Additional electrolyte administration while previous TPN infusin  Acid suppression: famotidine 20mg iv q24h  Stool in last 24 hours: 1 reports as 'loose'  Urine output: 800 mL    Daily Assessment  Current macronutrients: protein 75 gm/day, dextrose 900 " kcal/day, lipids 200 kcal/day  Fluid rate: 45 ml/hr    AM labs reviewed   - sodium 128 today, remains low despite concentrating TPN more yesterday.    - phosphate stable    Weight down 4 kg in past 3 days    Plan    - increase the NaCl to 120 mEq, I can't concentrate TPN much more at this juncture.  - moving the IV pepcid into the TPN today  - Will order cmp, mag, triglycerides and phosphate with am labs. (if mag and phos stable tomorrow can start checking these less frequently)    Pharmacy will continue to follow.    Blane Apple [facility]   3/8/2020  10:48 AM

## 2020-03-08 NOTE — PROGRESS NOTES
Name: Mandy Alarcon ADMIT: 2020   : 1933  PCP: Daryl Santos MD    MRN: [id number] LOS: 10 days   AGE/SEX: 86 y.o. female  ROOM: [facility]     Subjective   Subjective   No new complaints    Review of Systems     Objective   Objective   Vital Signs  Temp:  [97.8 °F (36.6 °C)-98.1 °F (36.7 °C)] 97.8 °F (36.6 °C)  Resp:  [18] 18  BP: (115-158)/(55-70) 115/68  SpO2:  [99 %] 99 %  on   ;   Device (Oxygen Therapy): room air  Body mass index is 37.06 kg/m².  Physical Exam   Constitutional: She appears well-developed and well-nourished.   HENT:   Head: Normocephalic and atraumatic.   Eyes: No scleral icterus.   Cardiovascular: Normal rate and regular rhythm.   No murmur heard.  Pulmonary/Chest: Effort normal and breath sounds normal.   Abdominal: Soft. She exhibits no distension and no mass. There is no tenderness.   Musculoskeletal: She exhibits no edema.   Neurological: She is alert.   Skin: Skin is warm and dry.   Psychiatric: She has a normal mood and affect.       Results Review:       I reviewed the patient's new clinical results.  Results from last 7 days   Lab Units 20  0534 20  0529 20  0546 20  0600   WBC 10*3/mm3 8.37 8.29 8.72 7.99   HEMOGLOBIN g/dL 10.6* 10.9* 11.2* 10.3*   PLATELETS 10*3/mm3 236 207 210 211     Results from last 7 days   Lab Units 20  0534 20  0529 20  0546 20  0600   SODIUM mmol/L 128* 133* 131* 132*   POTASSIUM mmol/L 4.2 4.5 4.4 4.2   CHLORIDE mmol/L 96* 100 102 102   CO2 mmol/L 23.8 22.3 20.8* 21.4*   BUN mg/dL 19 19 18 17   CREATININE mg/dL 0.92 0.89 0.84 0.83   GLUCOSE mg/dL 118* 102* 116* 110*   Estimated Creatinine Clearance: 48.1 mL/min (by C-G formula based on SCr of 0.92 mg/dL).  Results from last 7 days   Lab Units 20  0546 20  0502 20  0539 20  0506   ALBUMIN g/dL 2.00* 2.00* 1.80* 1.90*   BILIRUBIN mg/dL 0.3 0.3 0.4 0.5   ALK PHOS U/L 53 51 58 67   AST (SGOT) U/L 22 16 16 12   ALT (SGPT)  U/L 13 8 8 8     Results from last 7 days   Lab Units 03/08/20  0534 03/07/20  0529 03/06/20  0546 03/05/20  0600 03/04/20  0502 03/03/20  0539 03/02/20  0506   CALCIUM mg/dL 8.7 9.0 8.8 8.4* 8.1* 7.9* 8.4*   ALBUMIN g/dL  --   --  2.00*  --  2.00* 1.80* 1.90*   MAGNESIUM mg/dL  --   --  1.8 1.9 1.8 1.7 1.8   PHOSPHORUS mg/dL 3.6 3.6 3.8 3.4 2.6 2.2* 2.4*       No results found for: HGBA1C, POCGLU      atenolol 50 mg Oral Q12H   enoxaparin 40 mg Subcutaneous Q24H   Fat Emulsion Plant Based 100 mL Intravenous Q24H (TPN)   levothyroxine 50 mcg Oral Q AM   sodium chloride 10 mL Intravenous Q12H   sodium chloride 10 mL Intravenous Q12H   sodium chloride 10 mL Intravenous Q12H   sodium chloride 10 mL Intravenous Q12H       Adult Central 2-in-1 TPN  Last Rate: 45 mL/hr at 03/07/20 1840   Adult Central 2-in-1 TPN     Pharmacy to Dose TPN     NPO Diet NPO Except: Ice Chips, Sips With Meds  Adult Central 2-in-1 TPN  Adult Central 2-in-1 TPN       Assessment/Plan     Active Hospital Problems    Diagnosis  POA   • **Idiopathic acute pancreatitis [K85.00]  Yes   • Hyponatremia [E87.1]  Yes   • CKD (chronic kidney disease) stage 3, GFR 30-59 ml/min (CMS/HCC) [N18.3]  Yes   • Obesity (BMI 30-39.9) [E66.9]  Yes   • Supraventricular tachycardia (CMS/HCC) [I47.1]  Yes   • Primary hypothyroidism [E03.9]  Yes   • Hyperlipidemia [E78.5]  Yes      Resolved Hospital Problems    Diagnosis Date Resolved POA   • Fever [R50.9] 03/08/2020 Yes   • LINNEA (acute kidney injury) (CMS/HCC) [N17.9] 03/08/2020 Yes       · Pancreatitis-prior CT showing acute necrotic/peripancreatic phlegmon 2/27 and 3/3, repeat CT pending for today.    · Possible underlying cholecystitis.  Surgery following, await decision regarding timing of cholecystectomy  · Hyponatremia, slightly worse.  Pharmacy managing electrolytes with TPN  · CKD 3, stable.  Previous LINNEA resolved      Nathan Tobar MD  [facility]  03/08/20  5:50 PM

## 2020-03-09 LAB
ALBUMIN SERPL-MCNC: 2.1 G/DL (ref 3.5–5.2)
ALBUMIN/GLOB SERPL: 0.6 G/DL
ALP SERPL-CCNC: 55 U/L (ref 39–117)
ALT SERPL W P-5'-P-CCNC: 20 U/L (ref 1–33)
ANION GAP SERPL CALCULATED.3IONS-SCNC: 9.8 MMOL/L (ref 5–15)
AST SERPL-CCNC: 25 U/L (ref 1–32)
BILIRUB SERPL-MCNC: 0.4 MG/DL (ref 0.2–1.2)
BUN BLD-MCNC: 20 MG/DL (ref 8–23)
BUN/CREAT SERPL: 21.3 (ref 7–25)
CALCIUM SPEC-SCNC: 8.9 MG/DL (ref 8.6–10.5)
CHLORIDE SERPL-SCNC: 102 MMOL/L (ref 98–107)
CO2 SERPL-SCNC: 24.2 MMOL/L (ref 22–29)
CREAT BLD-MCNC: 0.94 MG/DL (ref 0.57–1)
GFR SERPL CREATININE-BSD FRML MDRD: 56 ML/MIN/1.73
GLOBULIN UR ELPH-MCNC: 3.4 GM/DL
GLUCOSE BLD-MCNC: 129 MG/DL (ref 65–99)
GLUCOSE BLDC GLUCOMTR-MCNC: 107 MG/DL (ref 70–130)
MAGNESIUM SERPL-MCNC: 1.7 MG/DL (ref 1.6–2.4)
PHOSPHATE SERPL-MCNC: 3.5 MG/DL (ref 2.5–4.5)
POTASSIUM BLD-SCNC: 4.5 MMOL/L (ref 3.5–5.2)
PROT SERPL-MCNC: 5.5 G/DL (ref 6–8.5)
SODIUM BLD-SCNC: 136 MMOL/L (ref 136–145)
TRIGL SERPL-MCNC: 104 MG/DL (ref 0–150)

## 2020-03-09 PROCEDURE — 99232 SBSQ HOSP IP/OBS MODERATE 35: CPT | Performed by: INTERNAL MEDICINE

## 2020-03-09 PROCEDURE — 25010000002 POTASSIUM CHLORIDE PER 2 MEQ OF POTASSIUM: Performed by: SURGERY

## 2020-03-09 PROCEDURE — 82962 GLUCOSE BLOOD TEST: CPT

## 2020-03-09 PROCEDURE — 84100 ASSAY OF PHOSPHORUS: CPT | Performed by: SURGERY

## 2020-03-09 PROCEDURE — 84478 ASSAY OF TRIGLYCERIDES: CPT | Performed by: SURGERY

## 2020-03-09 PROCEDURE — 25010000002 MAGNESIUM SULFATE PER 500 MG OF MAGNESIUM: Performed by: SURGERY

## 2020-03-09 PROCEDURE — 25010000002 CALCIUM GLUCONATE PER 10 ML: Performed by: SURGERY

## 2020-03-09 PROCEDURE — 80053 COMPREHEN METABOLIC PANEL: CPT | Performed by: SURGERY

## 2020-03-09 PROCEDURE — 83735 ASSAY OF MAGNESIUM: CPT | Performed by: SURGERY

## 2020-03-09 PROCEDURE — 25010000002 ONDANSETRON PER 1 MG: Performed by: NURSE PRACTITIONER

## 2020-03-09 PROCEDURE — 25010000002 ENOXAPARIN PER 10 MG: Performed by: NURSE PRACTITIONER

## 2020-03-09 PROCEDURE — 99232 SBSQ HOSP IP/OBS MODERATE 35: CPT | Performed by: SURGERY

## 2020-03-09 RX ADMIN — ATENOLOL 50 MG: 50 TABLET ORAL at 08:26

## 2020-03-09 RX ADMIN — SODIUM CHLORIDE, PRESERVATIVE FREE 10 ML: 5 INJECTION INTRAVENOUS at 08:26

## 2020-03-09 RX ADMIN — SODIUM CHLORIDE, PRESERVATIVE FREE 10 ML: 5 INJECTION INTRAVENOUS at 21:14

## 2020-03-09 RX ADMIN — SODIUM CHLORIDE, PRESERVATIVE FREE 10 ML: 5 INJECTION INTRAVENOUS at 21:15

## 2020-03-09 RX ADMIN — LEVOTHYROXINE SODIUM 50 MCG: 50 TABLET ORAL at 06:10

## 2020-03-09 RX ADMIN — ONDANSETRON 4 MG: 2 INJECTION INTRAMUSCULAR; INTRAVENOUS at 23:56

## 2020-03-09 RX ADMIN — POTASSIUM CHLORIDE: 2 INJECTION, SOLUTION, CONCENTRATE INTRAVENOUS at 19:06

## 2020-03-09 RX ADMIN — ENOXAPARIN SODIUM 40 MG: 40 INJECTION SUBCUTANEOUS at 19:50

## 2020-03-09 RX ADMIN — I.V. FAT EMULSION 20 G: 20 EMULSION INTRAVENOUS at 19:49

## 2020-03-09 RX ADMIN — ATENOLOL 50 MG: 50 TABLET ORAL at 21:14

## 2020-03-09 NOTE — PROGRESS NOTES
Name: Mandy Alarcon ADMIT: 2020   : 1933  PCP: Daryl Santos MD    MRN: [id number] LOS: 11 days   AGE/SEX: 86 y.o. female  ROOM: [facility]     Subjective   Subjective   No new complaints, feels good.  Hoping to go home soon.    Review of Systems     Objective   Objective   Vital Signs  Temp:  [97.7 °F (36.5 °C)-98.4 °F (36.9 °C)] 98.4 °F (36.9 °C)  Heart Rate:  [54-56] 56  Resp:  [16] 16  BP: (118-137)/(48-55) 127/52  SpO2:  [94 %-96 %] 96 %  on   ;   Device (Oxygen Therapy): room air  Body mass index is 36.83 kg/m².  Physical Exam   Constitutional: She appears well-developed and well-nourished.   HENT:   Head: Normocephalic and atraumatic.   Eyes: No scleral icterus.   Cardiovascular: Normal rate and regular rhythm.   No murmur heard.  Pulmonary/Chest: Effort normal and breath sounds normal.   Abdominal: Soft. She exhibits no distension and no mass. There is no tenderness.   Musculoskeletal: She exhibits no edema.   Neurological: She is alert.   Skin: Skin is warm and dry.   Psychiatric: She has a normal mood and affect.       Results Review:       I reviewed the patient's new clinical results.  Results from last 7 days   Lab Units 20  0534 20  0529 20  0546 20  0600   WBC 10*3/mm3 8.37 8.29 8.72 7.99   HEMOGLOBIN g/dL 10.6* 10.9* 11.2* 10.3*   PLATELETS 10*3/mm3 236 207 210 211     Results from last 7 days   Lab Units 20  0458 20  0534 20  0529 20  0546   SODIUM mmol/L 136 128* 133* 131*   POTASSIUM mmol/L 4.5 4.2 4.5 4.4   CHLORIDE mmol/L 102 96* 100 102   CO2 mmol/L 24.2 23.8 22.3 20.8*   BUN mg/dL 20 19 19 18   CREATININE mg/dL 0.94 0.92 0.89 0.84   GLUCOSE mg/dL 129* 118* 102* 116*   Estimated Creatinine Clearance: 46.9 mL/min (by C-G formula based on SCr of 0.94 mg/dL).  Results from last 7 days   Lab Units 20  0458 20  0546 20  0502 20  0539   ALBUMIN g/dL 2.10* 2.00* 2.00* 1.80*   BILIRUBIN mg/dL 0.4 0.3 0.3 0.4      ALK PHOS U/L 55 53 51 58   AST (SGOT) U/L 25 22 16 16   ALT (SGPT) U/L 20 13 8 8     Results from last 7 days   Lab Units 03/09/20  0458 03/08/20  0534 03/07/20  0529 03/06/20  0546 03/05/20  0600 03/04/20  0502 03/03/20  0539   CALCIUM mg/dL 8.9 8.7 9.0 8.8 8.4* 8.1* 7.9*   ALBUMIN g/dL 2.10*  --   --  2.00*  --  2.00* 1.80*   MAGNESIUM mg/dL 1.7  --   --  1.8 1.9 1.8 1.7   PHOSPHORUS mg/dL 3.5 3.6 3.6 3.8 3.4 2.6 2.2*       No results found for: HGBA1C, POCGLU    CT ABDOMEN 3/8  IMPRESSION:     1. Stable changes of acute pancreatitis with associated peripancreatic  collection/acute and chronic collections. I do not see any convincing  evidence of hemorrhage within the collections, and no follicles of gas  are seen to suggest superimposed infection at this time. The process is  not resulting in gastric outlet obstruction, although there is  significant inflammation involving the patient's duodenum. There is  stable narrowing of the main portal vein and distal splenic and superior  mesenteric veins.  2. There is irregular appearance to the patient's gastroduodenal artery.  This is likely related to spasm and inflammation from the adjacent  pancreatitis.  3. Abnormal appearance to the gallbladder. It is mildly distended, with  gallbladder wall thickening and edema. Cholecystitis cannot be excluded.  HIDA scan could be considered for additional evaluation.     Radiation dose reduction techniques were utilized, including automated  exposure control and exposure modulation based on body size.     This report was finalized on 3/8/2020 10:32 PM by Dr. Kathleen Montoya M.D.      atenolol 50 mg Oral Q12H   enoxaparin 40 mg Subcutaneous Q24H   Fat Emulsion Plant Based 100 mL Intravenous Q24H (TPN)   levothyroxine 50 mcg Oral Q AM   sodium chloride 10 mL Intravenous Q12H   sodium chloride 10 mL Intravenous Q12H   sodium chloride 10 mL Intravenous Q12H   sodium chloride 10 mL Intravenous Q12H       Adult Central 2-in-1 TPN   Last Rate: 45 mL/hr at 03/08/20 1831   Adult Central 2-in-1 TPN     Pharmacy to Dose TPN     NPO Diet NPO Except: Ice Chips, Sips With Meds  Adult Central 2-in-1 TPN  Adult Central 2-in-1 TPN       Assessment/Plan     Active Hospital Problems    Diagnosis  POA   • **Idiopathic acute pancreatitis [K85.00]  Yes   • Hyponatremia [E87.1]  Yes   • CKD (chronic kidney disease) stage 3, GFR 30-59 ml/min (CMS/HCC) [N18.3]  Yes   • Obesity (BMI 30-39.9) [E66.9]  Yes   • Supraventricular tachycardia (CMS/[facility]) [I47.1]  Yes   • Primary hypothyroidism [E03.9]  Yes   • Hyperlipidemia [E78.5]  Yes      Resolved Hospital Problems    Diagnosis Date Resolved POA   • Fever [R50.9] 03/08/2020 Yes   • LINNEA (acute kidney injury) (CMS/[facility]) [N17.9] 03/08/2020 Yes       · Pancreatitis, presumed due to gallbladder -persistent fluid collections on CT without evidence for hemorrhage or infection at this time.  · Gallbladder remains inflamed/abnormal on CT scan.  Defer to surgical recommendations regarding timing of cholecystectomy and advancement of diet.  · Symptomatically doing quite well  · Hyponatremia, resolved.  Pharmacy managing electrolytes with TPN  · CKD 3, stable.  Previous LINNEA resolved      Nathan Tobar MD  [facility]  03/09/20  2:01 PM

## 2020-03-09 NOTE — PROGRESS NOTES
"Pharmacy to dose TPN - Daily Progress Note  Patient: Mandy Alarcon  MRN#: [id number]  Admission Date:     TPN # 7 per Dr Andujar  Indication: severe necrotizing pancreatitis    Principal Problem:    Idiopathic acute pancreatitis  Active Problems:    Hyperlipidemia    Primary hypothyroidism    Supraventricular tachycardia (CMS/HCC)    Obesity (BMI 30-39.9)    CKD (chronic kidney disease) stage 3, GFR 30-59 ml/min (CMS/HCC)    Hyponatremia    Subjective/Objective  86 y.o. female 160 cm (63\") 94.3 kg (207 lb 14.3 oz)  Results from last 7 days   Lab Units 20  0458   SODIUM mmol/L 136   POTASSIUM mmol/L 4.5   CHLORIDE mmol/L 102   CO2 mmol/L 24.2   BUN mg/dL 20   CREATININE mg/dL 0.94   CALCIUM mg/dL 8.9   ALBUMIN g/dL 2.10*   BILIRUBIN mg/dL 0.4   ALK PHOS U/L 55   ALT (SGPT) U/L 20   AST (SGOT) U/L 25   GLUCOSE mg/dL 129*   MAGNESIUM mg/dL 1.7   PHOSPHORUS mg/dL 3.5   TRIGLYCERIDES mg/dL 104      No intake/output data recorded.    Diet Orders (active) (From admission, onward)     Start     Ordered    20  NPO Diet NPO Except: Ice Chips, Sips With Meds  Diet Effective Now      20              Additional insulin administration while previous TPN infusin  Additional electrolyte administration while previous TPN infusin  Acid suppression: famotidine 20mg iv q24h in TPN  Stool in last 24 hours: 1 formed  Urine output: unrecorded    Daily Assessment  Current macronutrients: protein 75 gm/day, dextrose 900 kcal/day, lipids 200 kcal/day  Fluid rate: 45 ml/hr    AM labs reviewed   - sodium 136 today, improved after concentrating TPN   - phosphate stable    Weight down 4.5 kg in past 3 days    Plan    - Na and Cl increased after concentrating in TPN.  Continue current concentrations today. Consider reducing if Na and Cl continue to rise.  - Continue IV pepcid in the TPN.  - Will order bmp tomorrow.    Pharmacy will continue to follow.    Valencia Guajardo [facility]   3/9/2020  12:00 PM      "

## 2020-03-09 NOTE — PROGRESS NOTES
[facility]  Inpatient Progress Note    Reason for Follow Up:  Severe pancreatitis    Subjective     Interval History:   Pain is improved.  Tolerating ice chips.  Dtr says Dr Andujar planning for cholecystectomy in a few days, she is to not advance diet until then.  Did have bilious emesis last night    Current Facility-Administered Medications:   •  acetaminophen (TYLENOL) tablet 650 mg, 650 mg, Oral, Q4H PRN, 650 mg at 20 2213 **OR** acetaminophen (TYLENOL) 160 MG/5ML solution 650 mg, 650 mg, Oral, Q4H PRN **OR** acetaminophen (TYLENOL) suppository 650 mg, 650 mg, Rectal, Q4H PRN, Viktoriya Herrera APRN  •  Adult Central 2-in-1 TPN, , Intravenous, Continuous, Bryce Andujar MD, Last Rate: 45 mL/hr at 20  •  Adult Central 2-in-1 TPN, , Intravenous, Continuous, Bryce Andujar MD  •  atenolol (TENORMIN) tablet 50 mg, 50 mg, Oral, Q12H, Viktoriya Herrera APRN, 50 mg at 20 0826  •  enoxaparin (LOVENOX) syringe 40 mg, 40 mg, Subcutaneous, Q24H, Viktoriya Herrera APRN, 40 mg at 20  •  Fat Emulsion Plant Based (INTRALIPID,LIPOSYN) 20 % infusion 20 g, 100 mL, Intravenous, Q24H (TPN), Bryce Andujar MD, Last Rate: 8.33 mL/hr at 20, 20 g at 20  •  levothyroxine (SYNTHROID, LEVOTHROID) tablet 50 mcg, 50 mcg, Oral, Q AM, Viktoriya Herrera APRN, 50 mcg at 20 0610  •  [] HYDROmorphone (DILAUDID) injection 0.5 mg, 0.5 mg, Intravenous, Q2H PRN, 0.5 mg at 20 2232 **AND** naloxone (NARCAN) injection 0.4 mg, 0.4 mg, Intravenous, Q5 Min PRN, Viktoriya Herrera, APRN  •  nitroglycerin (NITROSTAT) SL tablet 0.4 mg, 0.4 mg, Sublingual, Q5 Min PRN, Viktoriya Herrera APRN  •  ondansetron (ZOFRAN) injection 4 mg, 4 mg, Intravenous, Q6H PRN, Viktoriya Herrera APRN, 4 mg at 20 1006  •  Pharmacy to Dose TPN, , Does not apply, Continuous PRN, Bryce Andujar MD  •  promethazine (PHENERGAN)  injection 12.5 mg, 12.5 mg, Intravenous, Q6H PRN, Viktoriya Herrera, APRN, 12.5 mg at 03/07/20 0136  •  simethicone (MYLICON) chewable tablet 80 mg, 80 mg, Oral, 4x Daily PILIN, Adriane Skaggs MD  •  [COMPLETED] Insert peripheral IV, , , Once **AND** sodium chloride 0.9 % flush 10 mL, 10 mL, Intravenous, PRN, Jared Cisneros MD  •  sodium chloride 0.9 % flush 10 mL, 10 mL, Intravenous, Q12H, Viktoriya Herrera APRN, 10 mL at 03/09/20 0826  •  sodium chloride 0.9 % flush 10 mL, 10 mL, Intravenous, PRN, Viktoriya Herrera APRN  •  sodium chloride 0.9 % flush 10 mL, 10 mL, Intravenous, Q12H, Bryce Andujar MD, 10 mL at 03/09/20 0826  •  sodium chloride 0.9 % flush 10 mL, 10 mL, Intravenous, Q12H, Bryce Andujar MD, 10 mL at 03/09/20 0826  •  sodium chloride 0.9 % flush 10 mL, 10 mL, Intravenous, Q12H, Bryce Andujar MD, 10 mL at 03/09/20 0826  •  sodium chloride 0.9 % flush 10 mL, 10 mL, Intravenous, PRPratima GLYNN Robert N, MD  •  sodium chloride 0.9 % flush 20 mL, 20 mL, Intravenous, PRNPratima Robert N, MD  Review of Systems:   All systems reviewed and negative except for: GI: bilious emesis    Objective     Vital Signs  Temp:  [97.7 °F (36.5 °C)-98.4 °F (36.9 °C)] 98.4 °F (36.9 °C)  Heart Rate:  [54-56] 56  Resp:  [16] 16  BP: (118-137)/(48-55) 127/52  Body mass index is 36.83 kg/m².    Intake/Output Summary (Last 24 hours) at 3/9/2020 1355  Last data filed at 3/9/2020 0900  Gross per 24 hour   Intake 120 ml   Output --   Net 120 ml     I/O this shift:  In: 120 [P.O.:120]  Out: -      Physical Exam:   General: patient awake, alert and cooperative   Eyes: Normal lids and lashes, no scleral icterus   Neck: supple, normal ROM   Skin: warm and dry, not jaundiced   Cardiovascular: regular rhythm and rate, no murmurs auscultated   Pulm: clear to auscultation bilaterally, regular and unlabored   Abdomen: soft, nontender, nondistended; normal bowel sounds   Rectal: deferred   Extremities: no  rash or edema   Psychiatric: Normal mood and behavior; memory intact     Results Review:     I reviewed the patient's new clinical results.    Results from last 7 days   Lab Units 03/08/20  0534 03/07/20  0529 03/06/20  0546   WBC 10*3/mm3 8.37 8.29 8.72   HEMOGLOBIN g/dL 10.6* 10.9* 11.2*   HEMATOCRIT % 32.5* 33.3* 33.9*   PLATELETS 10*3/mm3 236 207 210     Results from last 7 days   Lab Units 03/09/20  0458 03/08/20  0534 03/07/20  0529 03/06/20  0546  03/04/20  0502   SODIUM mmol/L 136 128* 133* 131*   < > 131*   POTASSIUM mmol/L 4.5 4.2 4.5 4.4   < > 4.0   CHLORIDE mmol/L 102 96* 100 102   < > 99   CO2 mmol/L 24.2 23.8 22.3 20.8*   < > 22.0   BUN mg/dL 20 19 19 18   < > 16   CREATININE mg/dL 0.94 0.92 0.89 0.84   < > 0.81   CALCIUM mg/dL 8.9 8.7 9.0 8.8   < > 8.1*   BILIRUBIN mg/dL 0.4  --   --  0.3  --  0.3   ALK PHOS U/L 55  --   --  53  --  51   ALT (SGPT) U/L 20  --   --  13  --  8   AST (SGOT) U/L 25  --   --  22  --  16   GLUCOSE mg/dL 129* 118* 102* 116*   < > 112*    < > = values in this interval not displayed.         Lab Results   Lab Value Date/Time    LIPASE 44 03/05/2020 0600    LIPASE 36 02/29/2020 0623    LIPASE 52 02/28/2020 0508    LIPASE 62 (H) 02/27/2020 1016    LIPASE 38 02/05/2020 0507    LIPASE 60 02/04/2020 0435    LIPASE 181 (H) 02/03/2020 0211    LIPASE 417 (H) 02/02/2020 0441    LIPASE >3,000 (H) 01/31/2020 2142       Radiology:  CT Abdomen With Contrast   Final Result       1. Stable changes of acute pancreatitis with associated peripancreatic   collection/acute and chronic collections. I do not see any convincing   evidence of hemorrhage within the collections, and no follicles of gas   are seen to suggest superimposed infection at this time. The process is   not resulting in gastric outlet obstruction, although there is   significant inflammation involving the patient's duodenum. There is   stable narrowing of the main portal vein and distal splenic and superior   mesenteric veins.    2. There is irregular appearance to the patient's gastroduodenal artery.   This is likely related to spasm and inflammation from the adjacent   pancreatitis.   3. Abnormal appearance to the gallbladder. It is mildly distended, with   gallbladder wall thickening and edema. Cholecystitis cannot be excluded.   HIDA scan could be considered for additional evaluation.       Radiation dose reduction techniques were utilized, including automated   exposure control and exposure modulation based on body size.       This report was finalized on 3/8/2020 10:32 PM by Dr. Kathleen Montoya M.D.          XR Chest 1 View   Final Result   FINDINGS AND IMPRESSION:   Right PICC terminates over the superior vena cava.       The lungs are hypoinflated. Bibasilar pulmonary opacification is   present, left greater than than right. Small left pleural effusion is   suspected. Findings have mildly worsened since 02/27/2020 and while   they're favored to represent atelectasis given the configuration, early   pneumonia would appear similar in the appropriate clinical context and   correlation with patient history is recommended. No pneumothorax is   seen. Heart size accentuated by low lung volumes..       This report was finalized on 3/5/2020 1:36 PM by Dr. Carlitos Willis M.D.          CT Abdomen Pelvis With Contrast   Final Result   The findings on CT are most concerning for acute   pancreatitis with acute necrotic/peripancreatic phlegmon. There is a   relatively simple appearing collection seen just anterior to the IVC   measuring up to 3.2 cm as well. There is severe attenuation of the   splenoportal confluence, proximal superior mesenteric vein as well as   the splenic vein. There is marked asymmetric gallbladder wall   thickening.       Radiation dose reduction techniques were utilized, including automated   exposure control and exposure modulation based on body size.       This report was finalized on 3/4/2020 9:02 AM by Dr. Bliss  BETTIE Haro          US Abdomen Limited   Final Result   1.  There is pericholecystic fluid, as well as mild gallbladder wall   thickening, as seen on recent CT but appears to be new since 01/31/2020.   The diameter of the common bile duct is grossly unchanged since   01/31/2020. The gallbladder is nondistended. While findings are favored   be related to adjacent worsening pancreatitis, early acute cholecystitis   cannot be excluded and correlation with patient history and laboratory   values is recommended. Findings can be further evaluated with HIDA scan   if clinically indicated.   2.  The pancreas is not well-visualized, likely related to significant   pancreatitis.   3.  Pericholecystic fluid is present with 1 of the areas of fluid   appearing to be partially loculated and may represent an early   pseudocyst.       This report was finalized on 2/28/2020 9:18 PM by Dr. Carlitos Willis M.D.          CT Chest Without Contrast   Final Result   1. Significant progression of pancreatitis. The small peripancreatic   fluid collections likely represent developing pseudocysts.   2. Distended gallbladder is suspected to be related to the pancreatitis.   Characterization is limited in the absence of IV contrast and   cholecystitis cannot be entirely excluded. Please correlate clinically.   3. Sigmoid diverticulosis without evidence for diverticulitis.   4. There is a minimal right pleural effusion. The increase in density   and markings at both lower lobes may be related to underexpansion with   atelectatic change. No definite pneumonia is seen.       Discussed with Dr. Cisneros.       This report was finalized on 2/27/2020 4:14 PM by Dr. Lakeisha Robison M.D.          CT Abdomen Pelvis Without Contrast   Final Result   1. Significant progression of pancreatitis. The small peripancreatic   fluid collections likely represent developing pseudocysts.   2. Distended gallbladder is suspected to be related to the pancreatitis.    Characterization is limited in the absence of IV contrast and   cholecystitis cannot be entirely excluded. Please correlate clinically.   3. Sigmoid diverticulosis without evidence for diverticulitis.   4. There is a minimal right pleural effusion. The increase in density   and markings at both lower lobes may be related to underexpansion with   atelectatic change. No definite pneumonia is seen.       Discussed with Dr. Cisneros.       This report was finalized on 2/27/2020 4:14 PM by Dr. Lakeisah Robison M.D.          XR Chest 2 View   Final Result   Improvement in bilateral pleural effusions with potential   mild residual pleural fluid at the posterior costophrenic angles. Mild   left basilar linear subsegmental scarring or atelectasis.       This report was finalized on 2/27/2020 11:08 AM by Dr. Jose Thorne M.D.              Assessment/Plan     Patient Active Problem List   Diagnosis   • Benign essential hypertension   • Claustrophobia   • Gout   • Hyperlipidemia   • Primary hypothyroidism   • Impaired fasting glucose   • Nocturnal hypoxemia   • Secondary polycythemia   • Vitamin D deficiency   • Therapeutic drug monitoring   • Family history of coronary artery disease   • Bilateral sensorineural hearing loss, wears hearing aids   • Multinodular goiter   • Supraventricular tachycardia (CMS/HCC)   • Morbidly obese (CMS/HCC)   • Obesity (BMI 30-39.9)   • CKD (chronic kidney disease) stage 3, GFR 30-59 ml/min (CMS/HCC)   • Hyponatremia   • Hypomagnesemia   • Leukocytosis   • Idiopathic acute pancreatitis       Impression  1. Acute pancreatitis: pain has improved, ? Biliary etiology    2. Abnormal gallbladder on imaging: plans for phuong this week per family    3. Nausea with emesis: likely sequela of 1 and 2    All issues are new to me today    Plan  TPN as per general surgery  Continue as need analgesia  Dtr reports plans for upcoming cholecystectomy  As needed anti-emetics     Nothing further to add from GI  standpoint.  Will sign off but remain available as needed in her care.  She is welcome to follow-up with Dr. Skaggs as needed in the outpatient setting      I discussed the patients findings and my recommendations with patient and family.    Kathy Lima MD

## 2020-03-09 NOTE — PLAN OF CARE
Problem: Patient Care Overview  Goal: Plan of Care Review  Outcome: Ongoing (interventions implemented as appropriate)  Flowsheets  Taken 3/9/2020 0447 by Kelly Cavazos, RN  Progress: improving  Outcome Summary: No nausea or pain this shift. Patient seems to be in better spirits. CT abdomen completed. Remains NPO with TPN infusing. VSS. Slept better this shift. Daughter at bedside.  Taken 3/8/2020 1330 by Selma Bauer, RN  Plan of Care Reviewed With: patient;daughter  Goal: Individualization and Mutuality  Outcome: Ongoing (interventions implemented as appropriate)  Goal: Discharge Needs Assessment  Outcome: Ongoing (interventions implemented as appropriate)  Goal: Interprofessional Rounds/Family Conf  Outcome: Ongoing (interventions implemented as appropriate)     Problem: Pancreatitis, Acute/Chronic (Adult)  Goal: Signs and Symptoms of Listed Potential Problems Will be Absent, Minimized or Managed (Pancreatitis, Acute/Chronic)  Outcome: Ongoing (interventions implemented as appropriate)  Flowsheets (Taken 3/6/2020 1529 by Ann-Marie An, RN)  Problems Assessed (Pancreatitis): all  Problems Present (Pancreatitis): pain     Problem: Skin Injury Risk (Adult)  Goal: Identify Related Risk Factors and Signs and Symptoms  Outcome: Ongoing (interventions implemented as appropriate)  Goal: Skin Health and Integrity  Outcome: Ongoing (interventions implemented as appropriate)     Problem: Pain, Acute (Adult)  Goal: Identify Related Risk Factors and Signs and Symptoms  Outcome: Ongoing (interventions implemented as appropriate)  Goal: Acceptable Pain Control/Comfort Level  Outcome: Ongoing (interventions implemented as appropriate)     Problem: Fall Risk (Adult)  Goal: Identify Related Risk Factors and Signs and Symptoms  Outcome: Ongoing (interventions implemented as appropriate)  Goal: Absence of Fall  Outcome: Ongoing (interventions implemented as appropriate)

## 2020-03-09 NOTE — PROGRESS NOTES
Continued Stay Note  [facility]     Patient Name: Mandy Alarcon  MRN: [id number]  Today's Date: 3/9/2020    Admit Date: 2/27/2020    Discharge Plan     Row Name 03/09/20 1358       Plan    Plan  Pending Hospital Course- possible sx end of week. [facility] following    Patient/Family in Agreement with Plan  yes    Plan Comments  Followed up with daughter at bedside, pt sleeping. Possible sx end of week/thursday pending pt status. Pt continues on TPN. Explained will follow up for post-op dc needs and that [facility] still following. Debbie DUTTON/[facility]        Discharge Codes    No documentation.             Amy Patterson, RN

## 2020-03-09 NOTE — PROGRESS NOTES
Chief Complaint:    Pancreatitis    Subjective:    Still having episodes of severe pain despite being NPO. Today having nausea.    Objective:    Vitals:    03/08/20 2314 03/09/20 0500 03/09/20 0721 03/09/20 1417   BP: 137/55  127/52 123/79   BP Location: Left arm  Left arm Left arm   Patient Position: Lying  Lying Sitting   Pulse: 56  56 59   Resp: 16  16 16   Temp: 98.1 °F (36.7 °C)  98.4 °F (36.9 °C) 97.4 °F (36.3 °C)   TempSrc: Oral  Oral Oral   SpO2: 94%  96% 96%   Weight:  94.3 kg (207 lb 14.3 oz)     Height:           Lungs: Clear  Heart: Regular  Abdomen: Soft.  Less distended and less tender.  Bowel sounds are present.  Extremities: Warm    Labs reviewed.  Creat 0.94, CO2 24.2.  AlkP 55, ALT 20, AST 25, TBili 0.4.    I reviewed the images and the report of CT scan of the abdomen and pelvis performed yesterday.  Changes of acute pancreatitis are present, but stable from the imaging study performed 6 days ago.    Assessment:    Pancreatitis  Gallbladder sludge    Plan:    Continue TPN.  I reviewed the CT images and the report. Peripancreatic inflammation appears stable.  Discussed with patient and family.  I think that she needs to remain on TPN for now since abdominal complaints are still frequent and severe.

## 2020-03-10 LAB
ANION GAP SERPL CALCULATED.3IONS-SCNC: 9.2 MMOL/L (ref 5–15)
BUN BLD-MCNC: 21 MG/DL (ref 8–23)
BUN/CREAT SERPL: 21.6 (ref 7–25)
CALCIUM SPEC-SCNC: 8.8 MG/DL (ref 8.6–10.5)
CHLORIDE SERPL-SCNC: 103 MMOL/L (ref 98–107)
CO2 SERPL-SCNC: 22.8 MMOL/L (ref 22–29)
CREAT BLD-MCNC: 0.97 MG/DL (ref 0.57–1)
GFR SERPL CREATININE-BSD FRML MDRD: 54 ML/MIN/1.73
GLUCOSE BLD-MCNC: 110 MG/DL (ref 65–99)
GLUCOSE BLDC GLUCOMTR-MCNC: 135 MG/DL (ref 70–130)
GLUCOSE BLDC GLUCOMTR-MCNC: 141 MG/DL (ref 70–130)
GLUCOSE BLDC GLUCOMTR-MCNC: 149 MG/DL (ref 70–130)
POTASSIUM BLD-SCNC: 4.5 MMOL/L (ref 3.5–5.2)
SODIUM BLD-SCNC: 135 MMOL/L (ref 136–145)

## 2020-03-10 PROCEDURE — 25010000002 ENOXAPARIN PER 10 MG: Performed by: NURSE PRACTITIONER

## 2020-03-10 PROCEDURE — 82962 GLUCOSE BLOOD TEST: CPT

## 2020-03-10 PROCEDURE — 25010000002 CALCIUM GLUCONATE PER 10 ML: Performed by: SURGERY

## 2020-03-10 PROCEDURE — 80048 BASIC METABOLIC PNL TOTAL CA: CPT | Performed by: SURGERY

## 2020-03-10 PROCEDURE — 25010000002 MAGNESIUM SULFATE PER 500 MG OF MAGNESIUM: Performed by: SURGERY

## 2020-03-10 PROCEDURE — 25010000002 ONDANSETRON PER 1 MG: Performed by: NURSE PRACTITIONER

## 2020-03-10 PROCEDURE — 25010000002 POTASSIUM CHLORIDE PER 2 MEQ OF POTASSIUM: Performed by: SURGERY

## 2020-03-10 RX ADMIN — ONDANSETRON 4 MG: 2 INJECTION INTRAMUSCULAR; INTRAVENOUS at 21:33

## 2020-03-10 RX ADMIN — SODIUM CHLORIDE, PRESERVATIVE FREE 10 ML: 5 INJECTION INTRAVENOUS at 08:50

## 2020-03-10 RX ADMIN — POTASSIUM CHLORIDE: 2 INJECTION, SOLUTION, CONCENTRATE INTRAVENOUS at 19:23

## 2020-03-10 RX ADMIN — SODIUM CHLORIDE, PRESERVATIVE FREE 10 ML: 5 INJECTION INTRAVENOUS at 08:51

## 2020-03-10 RX ADMIN — LEVOTHYROXINE SODIUM 50 MCG: 50 TABLET ORAL at 06:05

## 2020-03-10 RX ADMIN — ATENOLOL 50 MG: 50 TABLET ORAL at 08:50

## 2020-03-10 RX ADMIN — ENOXAPARIN SODIUM 40 MG: 40 INJECTION SUBCUTANEOUS at 19:23

## 2020-03-10 RX ADMIN — ONDANSETRON 4 MG: 2 INJECTION INTRAMUSCULAR; INTRAVENOUS at 12:08

## 2020-03-10 RX ADMIN — I.V. FAT EMULSION 20 G: 20 EMULSION INTRAVENOUS at 19:23

## 2020-03-10 RX ADMIN — SODIUM CHLORIDE, PRESERVATIVE FREE 10 ML: 5 INJECTION INTRAVENOUS at 20:40

## 2020-03-10 RX ADMIN — ATENOLOL 50 MG: 50 TABLET ORAL at 20:41

## 2020-03-10 NOTE — PROGRESS NOTES
"Pharmacy to dose TPN - Daily Progress Note  Patient: Mandy Alarcon  MRN#: [id number]  Attending: No name on file.  Admission Date:     TPN # 9 per Dr Andujar  Indication:pancreatitis    Principal Problem:    Idiopathic acute pancreatitis  Active Problems:    Hyperlipidemia    Primary hypothyroidism    Supraventricular tachycardia (CMS/HCC)    Obesity (BMI 30-39.9)    CKD (chronic kidney disease) stage 3, GFR 30-59 ml/min (CMS/HCC)    Hyponatremia    Subjective/Objective  86 y.o. female 160 cm (63\") 90.8 kg (200 lb 2.8 oz)  Results from last 7 days   Lab Units 03/10/20  0449 20  0458   SODIUM mmol/L 135* 136   POTASSIUM mmol/L 4.5 4.5   CHLORIDE mmol/L 103 102   CO2 mmol/L 22.8 24.2   BUN mg/dL 21 20   CREATININE mg/dL 0.97 0.94   CALCIUM mg/dL 8.8 8.9   ALBUMIN g/dL  --  2.10*   BILIRUBIN mg/dL  --  0.4   ALK PHOS U/L  --  55   ALT (SGPT) U/L  --  20   AST (SGOT) U/L  --  25   GLUCOSE mg/dL 110* 129*   MAGNESIUM mg/dL  --  1.7   PHOSPHORUS mg/dL  --  3.5   TRIGLYCERIDES mg/dL  --  104      I/O last 3 completed shifts:  In: 360 [P.O.:360]  Out: -     Diet Orders (active) (From admission, onward)       Start     Ordered    03/10/20 1800  Adult Central 2-in-1 TPN  Continuous TPN      03/10/20 0802    20  NPO Diet NPO Except: Ice Chips, Sips With Meds  Diet Effective Now      20                  Additional insulin administration while previous TPN infusin units  Additional electrolyte administration while previous TPN infusin  Acid suppression: pepcid in TPN  Stool in last 24 hours:     Daily Assessment  Current macronutrients: protein 75 gm/day, dextrose 900 kcal/day, lipids 200 kcal/day  Fluid rate: 45    Plan    Will repeat yesterday's TPN.    Shawn Richard PharmD    "

## 2020-03-10 NOTE — PROGRESS NOTES
Name: Mandy Alarcon ADMIT: 2020   : 1933  PCP: Daryl Santos MD    MRN: [id number] LOS: 12 days   AGE/SEX: 86 y.o. female  ROOM: [facility]     Subjective   Subjective   No new complaints, feels good.  Anticipating surgery later this week    Review of Systems     Objective   Objective   Vital Signs  Temp:  [97.4 °F (36.3 °C)-98.5 °F (36.9 °C)] 98 °F (36.7 °C)  Heart Rate:  [54-57] 54  Resp:  [16] 16  BP: (132-152)/(68-79) 150/79  SpO2:  [95 %-97 %] 97 %  on   ;   Device (Oxygen Therapy): room air  Body mass index is 35.46 kg/m².  Physical Exam   Constitutional: She appears well-developed and well-nourished.   HENT:   Head: Normocephalic and atraumatic.   Eyes: No scleral icterus.   Cardiovascular: Normal rate and regular rhythm.   No murmur heard.  Pulmonary/Chest: Effort normal and breath sounds normal.   Abdominal: Soft. She exhibits no distension and no mass. There is tenderness (mild).   Musculoskeletal: She exhibits no edema.   Neurological: She is alert.   Skin: Skin is warm and dry.   Psychiatric: She has a normal mood and affect.       Results Review:       I reviewed the patient's new clinical results.  Results from last 7 days   Lab Units 20  0534 20  0529 20  0546 20  0600   WBC 10*3/mm3 8.37 8.29 8.72 7.99   HEMOGLOBIN g/dL 10.6* 10.9* 11.2* 10.3*   PLATELETS 10*3/mm3 236 207 210 211     Results from last 7 days   Lab Units 03/10/20  0449 20  0458 20  0534 20  0529   SODIUM mmol/L 135* 136 128* 133*   POTASSIUM mmol/L 4.5 4.5 4.2 4.5   CHLORIDE mmol/L 103 102 96* 100   CO2 mmol/L 22.8 24.2 23.8 22.3   BUN mg/dL 21 20 19 19   CREATININE mg/dL 0.97 0.94 0.92 0.89   GLUCOSE mg/dL 110* 129* 118* 102*   Estimated Creatinine Clearance: 44.6 mL/min (by C-G formula based on SCr of 0.97 mg/dL).  Results from last 7 days   Lab Units 20  0458 20  0546 20  0502   ALBUMIN g/dL 2.10* 2.00* 2.00*   BILIRUBIN mg/dL 0.4 0.3 0.3   ALK PHOS U/L  55 53 51   AST (SGOT) U/L 25 22 16   ALT (SGPT) U/L 20 13 8     Results from last 7 days   Lab Units 03/10/20  0449 03/09/20  0458 03/08/20  0534 03/07/20  0529 03/06/20  0546 03/05/20  0600 03/04/20  0502   CALCIUM mg/dL 8.8 8.9 8.7 9.0 8.8 8.4* 8.1*   ALBUMIN g/dL  --  2.10*  --   --  2.00*  --  2.00*   MAGNESIUM mg/dL  --  1.7  --   --  1.8 1.9 1.8   PHOSPHORUS mg/dL  --  3.5 3.6 3.6 3.8 3.4 2.6       Glucose   Date/Time Value Ref Range Status   03/10/2020 1555 135 (H) 70 - 130 mg/dL Final   03/10/2020 1038 149 (H) 70 - 130 mg/dL Final   03/10/2020 0605 141 (H) 70 - 130 mg/dL Final   03/09/2020 2207 107 70 - 130 mg/dL Final         atenolol 50 mg Oral Q12H   enoxaparin 40 mg Subcutaneous Q24H   Fat Emulsion Plant Based 100 mL Intravenous Q24H (TPN)   levothyroxine 50 mcg Oral Q AM   sodium chloride 10 mL Intravenous Q12H   sodium chloride 10 mL Intravenous Q12H   sodium chloride 10 mL Intravenous Q12H   sodium chloride 10 mL Intravenous Q12H       Adult Central 2-in-1 TPN  Last Rate: 45 mL/hr at 03/09/20 1906   Adult Central 2-in-1 TPN     Pharmacy to Dose TPN     NPO Diet NPO Except: Ice Chips, Sips With Meds  Adult Central 2-in-1 TPN  Adult Central 2-in-1 TPN       Assessment/Plan     Active Hospital Problems    Diagnosis  POA   • **Idiopathic acute pancreatitis [K85.00]  Yes   • Hyponatremia [E87.1]  Yes   • CKD (chronic kidney disease) stage 3, GFR 30-59 ml/min (CMS/HCC) [N18.3]  Yes   • Obesity (BMI 30-39.9) [E66.9]  Yes   • Supraventricular tachycardia (CMS/HCC) [I47.1]  Yes   • Primary hypothyroidism [E03.9]  Yes   • Hyperlipidemia [E78.5]  Yes      Resolved Hospital Problems    Diagnosis Date Resolved POA   • Fever [R50.9] 03/08/2020 Yes   • LINNEA (acute kidney injury) (CMS/HCC) [N17.9] 03/08/2020 Yes       · Pancreatitis, presumed due to gallbladder -persistent fluid collections on CT without evidence for hemorrhage or infection at this time.  · Gallbladder remains inflamed/abnormal on CT scan.  Defer to  surgical recommendations regarding timing of cholecystectomy and advancement of diet.  · Cont TPN until surgery  · Hyponatremia, resolved.  Pharmacy managing electrolytes with TPN  · CKD 3 (?), stable.  Previous LINNEA resolved, creat normal      Nathan Tobar MD  [facility]  03/10/20  16:42

## 2020-03-10 NOTE — CONSULTS
Adult Nutrition  Assessment/PES    Patient Name:  Mandy Alarcon  YOB: 1933  MRN: [id number]  Admit Date:  2/27/2020    Assessment Date:  3/10/2020    Comments:  TPN continues with 900 dextrose, 75gmAA and 20% 100ml lipids. Pt with c/o N/V/abd pain. She remains NPO.  TPN meeting nutritional needs. Will continue to follow.    Reason for Assessment     Row Name 03/10/20 1343          Reason for Assessment    Reason For Assessment  follow-up protocol;TF/PN         Nutrition/Diet History     Row Name 03/10/20 1343          Nutrition/Diet History    Typical Food/Fluid Intake  tpn continues         Anthropometrics     Row Name 03/10/20 0609          Anthropometrics    Weight  90.8 kg (200 lb 2.8 oz)         Labs/Tests/Procedures/Meds     Row Name 03/10/20 1344          Labs/Procedures/Meds    Lab Results Reviewed  reviewed     Lab Results Comments  na, glu, H/H        Diagnostic Tests/Procedures    Diagnostic Test/Procedure Reviewed  reviewed        Medications    Pertinent Medications Reviewed  reviewed     Pertinent Medications Comments  lovenox, synthroid,         Physical Findings     Row Name 03/10/20 1345          Physical Findings    Overall Physical Appearance  obese     Gastrointestinal  nausea;vomiting     Skin  -- intact           Nutrition Prescription Ordered     Row Name 03/10/20 1346          Nutrition Prescription PO    Current PO Diet  NPO        Nutrition Prescription PN    PN Current Rate (mL/hr)  45 mL/hr     Dextrose (Kcal)  900     Amino Acid (gm)  75     Lipid Concentration (%)  20%     Lipid Volume (mL)  100 mL         Evaluation of Received Nutrient/Fluid Intake     Row Name 03/10/20 1346          Calories Evaluation    Parenteral Calories (kcal)  1400     % of Kcal Needs  100        Protein Evaluation    Parenteral Protein (gm)  75     % of Protein Needs  100        Fluid Intake Evaluation    Parenteral Fluid (mL)  1080               Problem/Interventions:          Intervention  Goal     Row Name 03/10/20 1347          Intervention Goal    General  Maintain nutrition;Nutrition support treatment;Disease management/therapy;Meet nutritional needs for age/condition;Improved nutrition related lab(s);Reduce/improve symptoms     PO  Initiate feeding     TF/PN  Maintain TF/PN     Weight  No significant weight loss         Nutrition Intervention     Row Name 03/10/20 1347          Nutrition Intervention    RD/Tech Action  Follow Tx progress;Care plan reviewd           Education/Evaluation     Row Name 03/10/20 1347          Monitor/Evaluation    Monitor  Per protocol;I&O;Pertinent labs;PN delivery/tolerance;Weight;Skin status           Electronically signed by:  Lucía Orona RD  03/10/20 14:05

## 2020-03-10 NOTE — PLAN OF CARE
No pain during the night, but patient did have some nausea with a small amount of emesis after evening medication was given with a small sip of water. No chest pain or soa during the night. Sinus holden on the monitor.

## 2020-03-11 PROBLEM — K85.90 ACUTE PANCREATITIS: Status: ACTIVE | Noted: 2020-02-27

## 2020-03-11 PROBLEM — K81.9 CHOLECYSTITIS: Status: ACTIVE | Noted: 2020-03-11

## 2020-03-11 LAB
ALBUMIN SERPL-MCNC: 2.3 G/DL (ref 3.5–5.2)
ALBUMIN/GLOB SERPL: 0.7 G/DL
ALP SERPL-CCNC: 57 U/L (ref 39–117)
ALT SERPL W P-5'-P-CCNC: 19 U/L (ref 1–33)
ANION GAP SERPL CALCULATED.3IONS-SCNC: 8.3 MMOL/L (ref 5–15)
AST SERPL-CCNC: 22 U/L (ref 1–32)
BILIRUB SERPL-MCNC: 0.4 MG/DL (ref 0.2–1.2)
BUN BLD-MCNC: 23 MG/DL (ref 8–23)
BUN/CREAT SERPL: 23.2 (ref 7–25)
CALCIUM SPEC-SCNC: 8.6 MG/DL (ref 8.6–10.5)
CHLORIDE SERPL-SCNC: 103 MMOL/L (ref 98–107)
CO2 SERPL-SCNC: 22.7 MMOL/L (ref 22–29)
CREAT BLD-MCNC: 0.99 MG/DL (ref 0.57–1)
GFR SERPL CREATININE-BSD FRML MDRD: 53 ML/MIN/1.73
GLOBULIN UR ELPH-MCNC: 3.3 GM/DL
GLUCOSE BLD-MCNC: 120 MG/DL (ref 65–99)
GLUCOSE BLDC GLUCOMTR-MCNC: 109 MG/DL (ref 70–130)
GLUCOSE BLDC GLUCOMTR-MCNC: 115 MG/DL (ref 70–130)
GLUCOSE BLDC GLUCOMTR-MCNC: 116 MG/DL (ref 70–130)
GLUCOSE BLDC GLUCOMTR-MCNC: 123 MG/DL (ref 70–130)
MAGNESIUM SERPL-MCNC: 1.7 MG/DL (ref 1.6–2.4)
PHOSPHATE SERPL-MCNC: 3.3 MG/DL (ref 2.5–4.5)
POTASSIUM BLD-SCNC: 4.4 MMOL/L (ref 3.5–5.2)
PROT SERPL-MCNC: 5.6 G/DL (ref 6–8.5)
SODIUM BLD-SCNC: 134 MMOL/L (ref 136–145)

## 2020-03-11 PROCEDURE — 25010000002 ONDANSETRON PER 1 MG: Performed by: NURSE PRACTITIONER

## 2020-03-11 PROCEDURE — 25010000002 CALCIUM GLUCONATE PER 10 ML: Performed by: SURGERY

## 2020-03-11 PROCEDURE — 83735 ASSAY OF MAGNESIUM: CPT | Performed by: SURGERY

## 2020-03-11 PROCEDURE — 25010000002 MAGNESIUM SULFATE PER 500 MG OF MAGNESIUM: Performed by: SURGERY

## 2020-03-11 PROCEDURE — 25010000002 POTASSIUM CHLORIDE PER 2 MEQ OF POTASSIUM: Performed by: SURGERY

## 2020-03-11 PROCEDURE — 82962 GLUCOSE BLOOD TEST: CPT

## 2020-03-11 PROCEDURE — 99232 SBSQ HOSP IP/OBS MODERATE 35: CPT | Performed by: SURGERY

## 2020-03-11 PROCEDURE — 25010000002 ENOXAPARIN PER 10 MG: Performed by: NURSE PRACTITIONER

## 2020-03-11 PROCEDURE — 84100 ASSAY OF PHOSPHORUS: CPT | Performed by: SURGERY

## 2020-03-11 PROCEDURE — 80053 COMPREHEN METABOLIC PANEL: CPT | Performed by: SURGERY

## 2020-03-11 RX ADMIN — SODIUM CHLORIDE, PRESERVATIVE FREE 10 ML: 5 INJECTION INTRAVENOUS at 08:46

## 2020-03-11 RX ADMIN — SODIUM CHLORIDE, PRESERVATIVE FREE 10 ML: 5 INJECTION INTRAVENOUS at 08:44

## 2020-03-11 RX ADMIN — ATENOLOL 50 MG: 50 TABLET ORAL at 08:44

## 2020-03-11 RX ADMIN — SODIUM CHLORIDE, PRESERVATIVE FREE 10 ML: 5 INJECTION INTRAVENOUS at 08:45

## 2020-03-11 RX ADMIN — LEVOTHYROXINE SODIUM 50 MCG: 50 TABLET ORAL at 06:24

## 2020-03-11 RX ADMIN — POTASSIUM CHLORIDE: 2 INJECTION, SOLUTION, CONCENTRATE INTRAVENOUS at 17:21

## 2020-03-11 RX ADMIN — ONDANSETRON 4 MG: 2 INJECTION INTRAMUSCULAR; INTRAVENOUS at 05:44

## 2020-03-11 RX ADMIN — ENOXAPARIN SODIUM 40 MG: 40 INJECTION SUBCUTANEOUS at 17:20

## 2020-03-11 RX ADMIN — ONDANSETRON 4 MG: 2 INJECTION INTRAMUSCULAR; INTRAVENOUS at 17:20

## 2020-03-11 RX ADMIN — ONDANSETRON 4 MG: 2 INJECTION INTRAMUSCULAR; INTRAVENOUS at 12:46

## 2020-03-11 RX ADMIN — I.V. FAT EMULSION 20 G: 20 EMULSION INTRAVENOUS at 17:21

## 2020-03-11 NOTE — PROGRESS NOTES
"Pharmacy to dose TPN - Daily Progress Note  Patient: Mandy Alarcon  MRN#: [id number]  Attending: No name on file.  Admission Date:     TPN # 10 per Dr Andujar  Indication:pancreatitis    Principal Problem:    Idiopathic acute pancreatitis  Active Problems:    Hyperlipidemia    Primary hypothyroidism    Supraventricular tachycardia (CMS/HCC)    Obesity (BMI 30-39.9)    CKD (chronic kidney disease) stage 3, GFR 30-59 ml/min (CMS/HCC)    Hyponatremia    Subjective/Objective  86 y.o. female 160 cm (63\") 90.1 kg (198 lb 10.2 oz)  Results from last 7 days   Lab Units 20  0446  20  0458   SODIUM mmol/L 134*   < > 136   POTASSIUM mmol/L 4.4   < > 4.5   CHLORIDE mmol/L 103   < > 102   CO2 mmol/L 22.7   < > 24.2   BUN mg/dL 23   < > 20   CREATININE mg/dL 0.99   < > 0.94   CALCIUM mg/dL 8.6   < > 8.9   ALBUMIN g/dL 2.30*  --  2.10*   BILIRUBIN mg/dL 0.4  --  0.4   ALK PHOS U/L 57  --  55   ALT (SGPT) U/L 19  --  20   AST (SGOT) U/L 22  --  25   GLUCOSE mg/dL 120*   < > 129*   MAGNESIUM mg/dL 1.7  --  1.7   PHOSPHORUS mg/dL 3.3  --  3.5   TRIGLYCERIDES mg/dL  --   --  104    < > = values in this interval not displayed.      I/O last 3 completed shifts:  In: 26892 [P.O.:120]  Out: -     Diet Orders (active) (From admission, onward)       Start     Ordered    20 1800  Adult Central 2-in-1 TPN  Continuous TPN      20 0818    20  NPO Diet NPO Except: Ice Chips, Sips With Meds  Diet Effective Now      20                  Additional insulin administration while previous TPN infusin units  Additional electrolyte administration while previous TPN infusin  Acid suppression: famotidine in TPN  Stool in last 24 hours:     Daily Assessment  Current macronutrients: protein 65 gm/day, dextrose 1000 kcal/day, lipids 200 kcal/day  Fluid rate: 55    Plan    Scr and BUN have been trending up - will try decreasing amount of AA and increasing fluid rate.  Will monitor Na closely since " it has been low.    Shawn Richard PharmD

## 2020-03-11 NOTE — PLAN OF CARE
Resting comfortably today. Informed Consent not yet signed - family wishes to speak to surgeon. In chart awaiting signature. VSS. Zofran x1

## 2020-03-11 NOTE — PROGRESS NOTES
Chief Complaint:    Pancreatitis    Subjective:    Still having episodes of severe pain despite being NPO. Today having nausea.    Objective:    Vitals:    03/11/20 0039 03/11/20 0431 03/11/20 0550 03/11/20 0749   BP: 157/74 125/59  138/68   BP Location: Left arm Left arm  Left arm   Patient Position: Lying Lying  Sitting   Pulse: 58 52  57   Resp: 16 16  16   Temp: 98.7 °F (37.1 °C) 98.5 °F (36.9 °C)  97.3 °F (36.3 °C)   TempSrc: Oral Oral  Oral   SpO2: 95% 93%  93%   Weight:   90.1 kg (198 lb 10.2 oz)    Height:           Lungs: Clear  Heart: Regular  Abdomen: Soft.  Less distended and less tender.  Bowel sounds are present.  Extremities: Warm    Labs reviewed.  Creat 0.94, CO2 24.2.  AlkP 55, ALT 20, AST 25, TBili 0.4.    Assessment:    Pancreatitis  Gallbladder sludge    Plan:    Continue TPN.  CT images show stability of peripancreatic fluid and inflammation.  Gallbladder wall is thickened and the gallbladder is distended.  I would usually prefer to postpone cholecystectomy until better resolution of the peripancreatic inflammation has occurred, but I think that some of her abdominal pain and nausea may be a consequence of biliary obstruction.  Plan to proceed with cholecystectomy tomorrow.  Discussed with patient and family.

## 2020-03-11 NOTE — PROGRESS NOTES
Name: Mandy Alarcon ADMIT: 2020   : 1933  PCP: Daryl Santos MD    MRN: [id number] LOS: 13 days   AGE/SEX: 86 y.o. female  ROOM: [facility]     Subjective   Subjective   Continued nausea at times. Pain is controlled for the most part    Review of Systems     Objective   Objective   Vital Signs  Temp:  [97.3 °F (36.3 °C)-98.7 °F (37.1 °C)] 97.5 °F (36.4 °C)  Heart Rate:  [52-60] 53  Resp:  [16] 16  BP: (125-157)/(59-74) 141/63  SpO2:  [93 %-95 %] 95 %  on   ;   Device (Oxygen Therapy): room air  Body mass index is 35.19 kg/m².  Physical Exam   Constitutional: She appears well-developed and well-nourished.   HENT:   Head: Normocephalic and atraumatic.   Eyes: No scleral icterus.   Cardiovascular: Normal rate and regular rhythm.   No murmur heard.  Pulmonary/Chest: Effort normal and breath sounds normal.   Abdominal: Soft. She exhibits no distension and no mass. There is tenderness (mild).   Musculoskeletal: She exhibits no edema.   Neurological: She is alert.   Skin: Skin is warm and dry.   Psychiatric: She has a normal mood and affect.       Results Review:       I reviewed the patient's new clinical results.  Results from last 7 days   Lab Units 20  0534 20  0529 20  0546 20  0600   WBC 10*3/mm3 8.37 8.29 8.72 7.99   HEMOGLOBIN g/dL 10.6* 10.9* 11.2* 10.3*   PLATELETS 10*3/mm3 236 207 210 211     Results from last 7 days   Lab Units 20  0446 03/10/20  0449 20  0458 20  0534   SODIUM mmol/L 134* 135* 136 128*   POTASSIUM mmol/L 4.4 4.5 4.5 4.2   CHLORIDE mmol/L 103 103 102 96*   CO2 mmol/L 22.7 22.8 24.2 23.8   BUN mg/dL 23 21 20 19   CREATININE mg/dL 0.99 0.97 0.94 0.92   GLUCOSE mg/dL 120* 110* 129* 118*   Estimated Creatinine Clearance: 43.5 mL/min (by C-G formula based on SCr of 0.99 mg/dL).  Results from last 7 days   Lab Units 20  0446 20  0458 20  0546   ALBUMIN g/dL 2.30* 2.10* 2.00*   BILIRUBIN mg/dL 0.4 0.4 0.3   ALK PHOS U/L 57  55 53   AST (SGOT) U/L 22 25 22   ALT (SGPT) U/L 19 20 13     Results from last 7 days   Lab Units 03/11/20  0446 03/10/20  0449 03/09/20  0458 03/08/20  0534 03/07/20  0529 03/06/20  0546 03/05/20  0600   CALCIUM mg/dL 8.6 8.8 8.9 8.7 9.0 8.8 8.4*   ALBUMIN g/dL 2.30*  --  2.10*  --   --  2.00*  --    MAGNESIUM mg/dL 1.7  --  1.7  --   --  1.8 1.9   PHOSPHORUS mg/dL 3.3  --  3.5 3.6 3.6 3.8 3.4       Glucose   Date/Time Value Ref Range Status   03/11/2020 1612 116 70 - 130 mg/dL Final   03/11/2020 1148 123 70 - 130 mg/dL Final   03/11/2020 0623 115 70 - 130 mg/dL Final   03/11/2020 0042 109 70 - 130 mg/dL Final   03/10/2020 1555 135 (H) 70 - 130 mg/dL Final   03/10/2020 1038 149 (H) 70 - 130 mg/dL Final   03/10/2020 0605 141 (H) 70 - 130 mg/dL Final         atenolol 50 mg Oral Q12H   enoxaparin 40 mg Subcutaneous Q24H   Fat Emulsion Plant Based 100 mL Intravenous Q24H (TPN)   levothyroxine 50 mcg Oral Q AM   sodium chloride 10 mL Intravenous Q12H   sodium chloride 10 mL Intravenous Q12H   sodium chloride 10 mL Intravenous Q12H   sodium chloride 10 mL Intravenous Q12H       Adult Central 2-in-1 TPN  Last Rate: 45 mL/hr at 03/10/20 1923   Adult Central 2-in-1 TPN     Pharmacy to Dose TPN     NPO Diet NPO Except: Ice Chips, Sips With Meds  Adult Central 2-in-1 TPN  Adult Central 2-in-1 TPN       Assessment/Plan     Active Hospital Problems    Diagnosis  POA   • **Acute pancreatitis [K85.90]  Unknown   • Cholecystitis [K81.9]  Unknown   • Hyponatremia [E87.1]  Yes   • CKD (chronic kidney disease) stage 3, GFR 30-59 ml/min (CMS/HCC) [N18.3]  Yes   • Obesity (BMI 30-39.9) [E66.9]  Yes   • Supraventricular tachycardia (CMS/HCC) [I47.1]  Yes   • Primary hypothyroidism [E03.9]  Yes   • Hyperlipidemia [E78.5]  Yes      Resolved Hospital Problems    Diagnosis Date Resolved POA   • Fever [R50.9] 03/08/2020 Yes   • LINNEA (acute kidney injury) (CMS/HCC) [N17.9] 03/08/2020 Yes       · Pancreatitis, presumed due to gallbladder  -persistent fluid collections on CT without evidence for hemorrhage or infection at this time.  · Gallbladder remains inflamed/abnormal on CT scan. Grisel planned tomorrow  · Cont TPN until surgery  · Hyponatremia, resolved.  Pharmacy managing electrolytes with TPN  · CKD 3 (?), stable.  Previous LINNEA resolved, creat normal      Nathan Tobar MD  [facility]  03/11/20  17:15

## 2020-03-11 NOTE — PLAN OF CARE
Problem: Patient Care Overview  Goal: Plan of Care Review  Outcome: Ongoing (interventions implemented as appropriate)  Flowsheets (Taken 3/11/2020 5609)  Progress: no change  Plan of Care Reviewed With: patient; daughter  Outcome Summary: vitals stable.  pt denied pain.  pt expressed nausea.  meds given per orders.  picc line present.  TPN and lipids infusing per orders.  procedure is planned for thursday, according to the pt, family, and report.  will continue to monitor.

## 2020-03-12 ENCOUNTER — APPOINTMENT (OUTPATIENT)
Dept: GENERAL RADIOLOGY | Facility: HOSPITAL | Age: 85
End: 2020-03-12

## 2020-03-12 ENCOUNTER — ANESTHESIA EVENT (OUTPATIENT)
Dept: PERIOP | Facility: HOSPITAL | Age: 85
End: 2020-03-12

## 2020-03-12 ENCOUNTER — ANESTHESIA (OUTPATIENT)
Dept: PERIOP | Facility: HOSPITAL | Age: 85
End: 2020-03-12

## 2020-03-12 LAB
ANION GAP SERPL CALCULATED.3IONS-SCNC: 7.9 MMOL/L (ref 5–15)
BUN BLD-MCNC: 23 MG/DL (ref 8–23)
BUN/CREAT SERPL: 23.2 (ref 7–25)
CALCIUM SPEC-SCNC: 9 MG/DL (ref 8.6–10.5)
CHLORIDE SERPL-SCNC: 104 MMOL/L (ref 98–107)
CO2 SERPL-SCNC: 23.1 MMOL/L (ref 22–29)
CREAT BLD-MCNC: 0.99 MG/DL (ref 0.57–1)
GFR SERPL CREATININE-BSD FRML MDRD: 53 ML/MIN/1.73
GLUCOSE BLD-MCNC: 117 MG/DL (ref 65–99)
GLUCOSE BLDC GLUCOMTR-MCNC: 126 MG/DL (ref 70–130)
GLUCOSE BLDC GLUCOMTR-MCNC: 133 MG/DL (ref 70–130)
GLUCOSE BLDC GLUCOMTR-MCNC: 138 MG/DL (ref 70–130)
GLUCOSE BLDC GLUCOMTR-MCNC: 139 MG/DL (ref 70–130)
GLUCOSE BLDC GLUCOMTR-MCNC: 152 MG/DL (ref 70–130)
PLATELET # BLD AUTO: 216 10*3/MM3 (ref 140–450)
POTASSIUM BLD-SCNC: 4.6 MMOL/L (ref 3.5–5.2)
SODIUM BLD-SCNC: 135 MMOL/L (ref 136–145)

## 2020-03-12 PROCEDURE — 87205 SMEAR GRAM STAIN: CPT | Performed by: SURGERY

## 2020-03-12 PROCEDURE — 74300 X-RAY BILE DUCTS/PANCREAS: CPT

## 2020-03-12 PROCEDURE — 25010000002 HYDROMORPHONE PER 4 MG: Performed by: NURSE ANESTHETIST, CERTIFIED REGISTERED

## 2020-03-12 PROCEDURE — BF111ZZ FLUOROSCOPY OF BILIARY AND PANCREATIC DUCTS USING LOW OSMOLAR CONTRAST: ICD-10-PCS | Performed by: SURGERY

## 2020-03-12 PROCEDURE — 25010000002 HYDROMORPHONE PER 4 MG: Performed by: SURGERY

## 2020-03-12 PROCEDURE — 25010000002 PROPOFOL 10 MG/ML EMULSION: Performed by: ANESTHESIOLOGY

## 2020-03-12 PROCEDURE — 47563 LAPARO CHOLECYSTECTOMY/GRAPH: CPT | Performed by: SURGERY

## 2020-03-12 PROCEDURE — 80048 BASIC METABOLIC PNL TOTAL CA: CPT | Performed by: SURGERY

## 2020-03-12 PROCEDURE — 87077 CULTURE AEROBIC IDENTIFY: CPT | Performed by: SURGERY

## 2020-03-12 PROCEDURE — 25010000002 CALCIUM GLUCONATE PER 10 ML: Performed by: SURGERY

## 2020-03-12 PROCEDURE — 0F9440Z DRAINAGE OF GALLBLADDER WITH DRAINAGE DEVICE, PERCUTANEOUS ENDOSCOPIC APPROACH: ICD-10-PCS | Performed by: SURGERY

## 2020-03-12 PROCEDURE — 82962 GLUCOSE BLOOD TEST: CPT

## 2020-03-12 PROCEDURE — 25010000002 MAGNESIUM SULFATE PER 500 MG OF MAGNESIUM: Performed by: SURGERY

## 2020-03-12 PROCEDURE — 25010000002 FENTANYL CITRATE (PF) 100 MCG/2ML SOLUTION: Performed by: ANESTHESIOLOGY

## 2020-03-12 PROCEDURE — 87070 CULTURE OTHR SPECIMN AEROBIC: CPT | Performed by: SURGERY

## 2020-03-12 PROCEDURE — 48001 PLACEMENT OF DRAIN PANCREAS: CPT | Performed by: SURGERY

## 2020-03-12 PROCEDURE — 85049 AUTOMATED PLATELET COUNT: CPT | Performed by: HOSPITALIST

## 2020-03-12 PROCEDURE — 0 IOTHALAMATE 60 % SOLUTION: Performed by: SURGERY

## 2020-03-12 PROCEDURE — 25010000002 ONDANSETRON PER 1 MG: Performed by: NURSE PRACTITIONER

## 2020-03-12 PROCEDURE — 25010000002 LEVOFLOXACIN PER 250 MG: Performed by: SURGERY

## 2020-03-12 PROCEDURE — 87075 CULTR BACTERIA EXCEPT BLOOD: CPT | Performed by: SURGERY

## 2020-03-12 PROCEDURE — 25010000002 ONDANSETRON PER 1 MG: Performed by: SURGERY

## 2020-03-12 PROCEDURE — 25010000002 ONDANSETRON PER 1 MG: Performed by: NURSE ANESTHETIST, CERTIFIED REGISTERED

## 2020-03-12 PROCEDURE — 25010000002 POTASSIUM CHLORIDE PER 2 MEQ OF POTASSIUM: Performed by: SURGERY

## 2020-03-12 PROCEDURE — 87186 SC STD MICRODIL/AGAR DIL: CPT | Performed by: SURGERY

## 2020-03-12 DEVICE — SEAL HEMO SURG ARISTA/AH ABS/PWDR 3GM: Type: IMPLANTABLE DEVICE | Site: ABDOMEN | Status: FUNCTIONAL

## 2020-03-12 RX ORDER — LIDOCAINE HYDROCHLORIDE 20 MG/ML
INJECTION, SOLUTION INFILTRATION; PERINEURAL AS NEEDED
Status: DISCONTINUED | OUTPATIENT
Start: 2020-03-12 | End: 2020-03-12 | Stop reason: SURG

## 2020-03-12 RX ORDER — DIPHENHYDRAMINE HYDROCHLORIDE 50 MG/ML
12.5 INJECTION INTRAMUSCULAR; INTRAVENOUS
Status: DISCONTINUED | OUTPATIENT
Start: 2020-03-12 | End: 2020-03-12 | Stop reason: HOSPADM

## 2020-03-12 RX ORDER — HYDROMORPHONE HYDROCHLORIDE 1 MG/ML
0.5 INJECTION, SOLUTION INTRAMUSCULAR; INTRAVENOUS; SUBCUTANEOUS
Status: DISCONTINUED | OUTPATIENT
Start: 2020-03-12 | End: 2020-03-12 | Stop reason: HOSPADM

## 2020-03-12 RX ORDER — CLINDAMYCIN PHOSPHATE 900 MG/50ML
900 INJECTION INTRAVENOUS
Status: COMPLETED | OUTPATIENT
Start: 2020-03-12 | End: 2020-03-12

## 2020-03-12 RX ORDER — PROMETHAZINE HYDROCHLORIDE 25 MG/1
25 TABLET ORAL ONCE AS NEEDED
Status: DISCONTINUED | OUTPATIENT
Start: 2020-03-12 | End: 2020-03-12 | Stop reason: HOSPADM

## 2020-03-12 RX ORDER — FAMOTIDINE 10 MG/ML
20 INJECTION, SOLUTION INTRAVENOUS ONCE
Status: COMPLETED | OUTPATIENT
Start: 2020-03-12 | End: 2020-03-12

## 2020-03-12 RX ORDER — FENTANYL CITRATE 50 UG/ML
50 INJECTION, SOLUTION INTRAMUSCULAR; INTRAVENOUS
Status: DISCONTINUED | OUTPATIENT
Start: 2020-03-12 | End: 2020-03-12 | Stop reason: HOSPADM

## 2020-03-12 RX ORDER — PROMETHAZINE HYDROCHLORIDE 25 MG/ML
12.5 INJECTION, SOLUTION INTRAMUSCULAR; INTRAVENOUS ONCE AS NEEDED
Status: DISCONTINUED | OUTPATIENT
Start: 2020-03-12 | End: 2020-03-12 | Stop reason: HOSPADM

## 2020-03-12 RX ORDER — MIDAZOLAM HYDROCHLORIDE 1 MG/ML
1 INJECTION INTRAMUSCULAR; INTRAVENOUS
Status: DISCONTINUED | OUTPATIENT
Start: 2020-03-12 | End: 2020-03-12 | Stop reason: HOSPADM

## 2020-03-12 RX ORDER — MAGNESIUM HYDROXIDE 1200 MG/15ML
LIQUID ORAL AS NEEDED
Status: DISCONTINUED | OUTPATIENT
Start: 2020-03-12 | End: 2020-03-12 | Stop reason: HOSPADM

## 2020-03-12 RX ORDER — BUPIVACAINE HYDROCHLORIDE AND EPINEPHRINE 5; 5 MG/ML; UG/ML
INJECTION, SOLUTION PERINEURAL AS NEEDED
Status: DISCONTINUED | OUTPATIENT
Start: 2020-03-12 | End: 2020-03-12 | Stop reason: HOSPADM

## 2020-03-12 RX ORDER — SODIUM CHLORIDE 0.9 % (FLUSH) 0.9 %
3-10 SYRINGE (ML) INJECTION AS NEEDED
Status: DISCONTINUED | OUTPATIENT
Start: 2020-03-12 | End: 2020-03-12 | Stop reason: HOSPADM

## 2020-03-12 RX ORDER — PROPOFOL 10 MG/ML
VIAL (ML) INTRAVENOUS AS NEEDED
Status: DISCONTINUED | OUTPATIENT
Start: 2020-03-12 | End: 2020-03-12 | Stop reason: SURG

## 2020-03-12 RX ORDER — HYDROMORPHONE HYDROCHLORIDE 1 MG/ML
0.5 INJECTION, SOLUTION INTRAMUSCULAR; INTRAVENOUS; SUBCUTANEOUS
Status: DISPENSED | OUTPATIENT
Start: 2020-03-12 | End: 2020-03-22

## 2020-03-12 RX ORDER — MIDAZOLAM HYDROCHLORIDE 1 MG/ML
2 INJECTION INTRAMUSCULAR; INTRAVENOUS
Status: DISCONTINUED | OUTPATIENT
Start: 2020-03-12 | End: 2020-03-12 | Stop reason: HOSPADM

## 2020-03-12 RX ORDER — ONDANSETRON 2 MG/ML
4 INJECTION INTRAMUSCULAR; INTRAVENOUS ONCE AS NEEDED
Status: COMPLETED | OUTPATIENT
Start: 2020-03-12 | End: 2020-03-12

## 2020-03-12 RX ORDER — EPHEDRINE SULFATE 50 MG/ML
5 INJECTION, SOLUTION INTRAVENOUS ONCE AS NEEDED
Status: DISCONTINUED | OUTPATIENT
Start: 2020-03-12 | End: 2020-03-12 | Stop reason: HOSPADM

## 2020-03-12 RX ORDER — FENTANYL CITRATE 50 UG/ML
INJECTION, SOLUTION INTRAMUSCULAR; INTRAVENOUS AS NEEDED
Status: DISCONTINUED | OUTPATIENT
Start: 2020-03-12 | End: 2020-03-12 | Stop reason: SURG

## 2020-03-12 RX ORDER — DIPHENHYDRAMINE HCL 25 MG
25 CAPSULE ORAL
Status: DISCONTINUED | OUTPATIENT
Start: 2020-03-12 | End: 2020-03-12 | Stop reason: HOSPADM

## 2020-03-12 RX ORDER — LEVOFLOXACIN 5 MG/ML
500 INJECTION, SOLUTION INTRAVENOUS
Status: COMPLETED | OUTPATIENT
Start: 2020-03-12 | End: 2020-03-12

## 2020-03-12 RX ORDER — LABETALOL HYDROCHLORIDE 5 MG/ML
5 INJECTION, SOLUTION INTRAVENOUS
Status: DISCONTINUED | OUTPATIENT
Start: 2020-03-12 | End: 2020-03-12 | Stop reason: HOSPADM

## 2020-03-12 RX ORDER — PROMETHAZINE HYDROCHLORIDE 25 MG/ML
6.25 INJECTION, SOLUTION INTRAMUSCULAR; INTRAVENOUS
Status: DISCONTINUED | OUTPATIENT
Start: 2020-03-12 | End: 2020-03-12 | Stop reason: HOSPADM

## 2020-03-12 RX ORDER — SODIUM CHLORIDE 0.9 % (FLUSH) 0.9 %
3 SYRINGE (ML) INJECTION EVERY 12 HOURS SCHEDULED
Status: DISCONTINUED | OUTPATIENT
Start: 2020-03-12 | End: 2020-03-12 | Stop reason: HOSPADM

## 2020-03-12 RX ORDER — HYDROMORPHONE HCL 110MG/55ML
PATIENT CONTROLLED ANALGESIA SYRINGE INTRAVENOUS AS NEEDED
Status: DISCONTINUED | OUTPATIENT
Start: 2020-03-12 | End: 2020-03-12 | Stop reason: SURG

## 2020-03-12 RX ORDER — ACETAMINOPHEN 325 MG/1
650 TABLET ORAL ONCE AS NEEDED
Status: DISCONTINUED | OUTPATIENT
Start: 2020-03-12 | End: 2020-03-12 | Stop reason: HOSPADM

## 2020-03-12 RX ORDER — EPHEDRINE SULFATE 50 MG/ML
INJECTION, SOLUTION INTRAVENOUS AS NEEDED
Status: DISCONTINUED | OUTPATIENT
Start: 2020-03-12 | End: 2020-03-12 | Stop reason: SURG

## 2020-03-12 RX ORDER — PROMETHAZINE HYDROCHLORIDE 25 MG/1
25 SUPPOSITORY RECTAL ONCE AS NEEDED
Status: DISCONTINUED | OUTPATIENT
Start: 2020-03-12 | End: 2020-03-12 | Stop reason: HOSPADM

## 2020-03-12 RX ORDER — ROCURONIUM BROMIDE 10 MG/ML
INJECTION, SOLUTION INTRAVENOUS AS NEEDED
Status: DISCONTINUED | OUTPATIENT
Start: 2020-03-12 | End: 2020-03-12 | Stop reason: SURG

## 2020-03-12 RX ORDER — FLUMAZENIL 0.1 MG/ML
0.2 INJECTION INTRAVENOUS AS NEEDED
Status: DISCONTINUED | OUTPATIENT
Start: 2020-03-12 | End: 2020-03-12 | Stop reason: HOSPADM

## 2020-03-12 RX ORDER — HYDROCODONE BITARTRATE AND ACETAMINOPHEN 7.5; 325 MG/1; MG/1
1 TABLET ORAL ONCE AS NEEDED
Status: DISCONTINUED | OUTPATIENT
Start: 2020-03-12 | End: 2020-03-12 | Stop reason: HOSPADM

## 2020-03-12 RX ORDER — OXYCODONE AND ACETAMINOPHEN 7.5; 325 MG/1; MG/1
1 TABLET ORAL ONCE AS NEEDED
Status: DISCONTINUED | OUTPATIENT
Start: 2020-03-12 | End: 2020-03-12 | Stop reason: HOSPADM

## 2020-03-12 RX ORDER — LIDOCAINE HYDROCHLORIDE 10 MG/ML
0.5 INJECTION, SOLUTION EPIDURAL; INFILTRATION; INTRACAUDAL; PERINEURAL ONCE AS NEEDED
Status: DISCONTINUED | OUTPATIENT
Start: 2020-03-12 | End: 2020-03-12 | Stop reason: HOSPADM

## 2020-03-12 RX ORDER — NALOXONE HCL 0.4 MG/ML
0.2 VIAL (ML) INJECTION AS NEEDED
Status: DISCONTINUED | OUTPATIENT
Start: 2020-03-12 | End: 2020-03-12 | Stop reason: HOSPADM

## 2020-03-12 RX ORDER — HYDRALAZINE HYDROCHLORIDE 20 MG/ML
5 INJECTION INTRAMUSCULAR; INTRAVENOUS
Status: DISCONTINUED | OUTPATIENT
Start: 2020-03-12 | End: 2020-03-12 | Stop reason: HOSPADM

## 2020-03-12 RX ORDER — SODIUM CHLORIDE, SODIUM LACTATE, POTASSIUM CHLORIDE, CALCIUM CHLORIDE 600; 310; 30; 20 MG/100ML; MG/100ML; MG/100ML; MG/100ML
9 INJECTION, SOLUTION INTRAVENOUS CONTINUOUS
Status: DISCONTINUED | OUTPATIENT
Start: 2020-03-12 | End: 2020-03-12

## 2020-03-12 RX ADMIN — SODIUM CHLORIDE, POTASSIUM CHLORIDE, SODIUM LACTATE AND CALCIUM CHLORIDE 9 ML/HR: 600; 310; 30; 20 INJECTION, SOLUTION INTRAVENOUS at 12:20

## 2020-03-12 RX ADMIN — SODIUM CHLORIDE, PRESERVATIVE FREE 10 ML: 5 INJECTION INTRAVENOUS at 04:38

## 2020-03-12 RX ADMIN — HYDROMORPHONE HYDROCHLORIDE 0.5 MG: 1 INJECTION, SOLUTION INTRAMUSCULAR; INTRAVENOUS; SUBCUTANEOUS at 17:13

## 2020-03-12 RX ADMIN — ONDANSETRON 4 MG: 2 INJECTION INTRAMUSCULAR; INTRAVENOUS at 17:12

## 2020-03-12 RX ADMIN — SODIUM CHLORIDE, PRESERVATIVE FREE 10 ML: 5 INJECTION INTRAVENOUS at 09:15

## 2020-03-12 RX ADMIN — SODIUM CHLORIDE, PRESERVATIVE FREE 10 ML: 5 INJECTION INTRAVENOUS at 09:16

## 2020-03-12 RX ADMIN — ROCURONIUM BROMIDE 30 MG: 10 INJECTION, SOLUTION INTRAVENOUS at 12:47

## 2020-03-12 RX ADMIN — PROPOFOL 50 MG: 10 INJECTION, EMULSION INTRAVENOUS at 13:16

## 2020-03-12 RX ADMIN — PROPOFOL 100 MG: 10 INJECTION, EMULSION INTRAVENOUS at 12:47

## 2020-03-12 RX ADMIN — ONDANSETRON 4 MG: 2 INJECTION INTRAMUSCULAR; INTRAVENOUS at 15:15

## 2020-03-12 RX ADMIN — I.V. FAT EMULSION 20 G: 20 EMULSION INTRAVENOUS at 18:12

## 2020-03-12 RX ADMIN — FENTANYL CITRATE 25 MCG: 50 INJECTION INTRAMUSCULAR; INTRAVENOUS at 13:16

## 2020-03-12 RX ADMIN — HYDROMORPHONE HYDROCHLORIDE 0.5 MG: 1 INJECTION, SOLUTION INTRAMUSCULAR; INTRAVENOUS; SUBCUTANEOUS at 20:18

## 2020-03-12 RX ADMIN — ROCURONIUM BROMIDE 10 MG: 10 INJECTION, SOLUTION INTRAVENOUS at 13:16

## 2020-03-12 RX ADMIN — SUGAMMADEX 200 MG: 100 INJECTION, SOLUTION INTRAVENOUS at 14:00

## 2020-03-12 RX ADMIN — LEVOFLOXACIN 500 MG: 5 INJECTION, SOLUTION INTRAVENOUS at 12:08

## 2020-03-12 RX ADMIN — FENTANYL CITRATE 25 MCG: 50 INJECTION INTRAMUSCULAR; INTRAVENOUS at 12:47

## 2020-03-12 RX ADMIN — CLINDAMYCIN PHOSPHATE 900 MG: 18 INJECTION, SOLUTION INTRAMUSCULAR; INTRAVENOUS at 12:47

## 2020-03-12 RX ADMIN — PROPOFOL 50 MG: 10 INJECTION, EMULSION INTRAVENOUS at 13:07

## 2020-03-12 RX ADMIN — ONDANSETRON 4 MG: 2 INJECTION INTRAMUSCULAR; INTRAVENOUS at 04:38

## 2020-03-12 RX ADMIN — HYDROMORPHONE HYDROCHLORIDE 1 MG: 2 INJECTION, SOLUTION INTRAMUSCULAR; INTRAVENOUS; SUBCUTANEOUS at 14:06

## 2020-03-12 RX ADMIN — FAMOTIDINE 20 MG: 10 INJECTION INTRAVENOUS at 12:20

## 2020-03-12 RX ADMIN — EPHEDRINE SULFATE 10 MG: 50 INJECTION INTRAVENOUS at 12:58

## 2020-03-12 RX ADMIN — EPHEDRINE SULFATE 10 MG: 50 INJECTION INTRAVENOUS at 13:00

## 2020-03-12 RX ADMIN — FENTANYL CITRATE 50 MCG: 50 INJECTION INTRAMUSCULAR; INTRAVENOUS at 13:49

## 2020-03-12 RX ADMIN — LIDOCAINE HYDROCHLORIDE 60 MG: 20 INJECTION, SOLUTION INFILTRATION; PERINEURAL at 12:47

## 2020-03-12 RX ADMIN — POTASSIUM CHLORIDE: 2 INJECTION, SOLUTION, CONCENTRATE INTRAVENOUS at 18:13

## 2020-03-12 NOTE — ANESTHESIA PREPROCEDURE EVALUATION
Anesthesia Evaluation     Patient summary reviewed and Nursing notes reviewed                Airway   Mallampati: II  No difficulty expected  Dental    (+) upper dentures    Pulmonary    (+) pneumonia , a smoker Former, COPD,   Cardiovascular     ECG reviewed  Patient on routine beta blocker and Beta blocker given within 24 hours of surgery  Rate: normal    (+) hypertension, valvular problems/murmurs AI, dysrhythmias PAC, hyperlipidemia,       Neuro/Psych  (+) psychiatric history,     GI/Hepatic/Renal/Endo    (+) morbid obesity,  renal disease CRI,     Musculoskeletal (-) negative ROS    Abdominal    Substance History - negative use     OB/GYN negative ob/gyn ROS         Other                        Anesthesia Plan    ASA 3     general     intravenous induction     Anesthetic plan, all risks, benefits, and alternatives have been provided, discussed and informed consent has been obtained with: patient.

## 2020-03-12 NOTE — ANESTHESIA POSTPROCEDURE EVALUATION
"Patient: Mandy Alarcon    Procedure Summary     Date:  03/12/20 Room / Location:  [facility] OR [address] [place] [address] MAIN OR    Anesthesia Start:  1242 Anesthesia Stop:  1435    Procedure:  LAPAROSCOPIC CHOLECYSTOSTOMY TUBE, INTRAOPERATIVE CHOLANGIOGRAM (N/A Abdomen) Diagnosis:       Acute pancreatitis, unspecified complication status, unspecified pancreatitis type      (Acute pancreatitis, unspecified complication status, unspecified pancreatitis type [K85.90])    Surgeon:  Bryce Andujar MD Provider:  Dilip Amador MD    Anesthesia Type:  general ASA Status:  3          Anesthesia Type: general    Vitals  Vitals Value Taken Time   /61 3/12/2020  3:30 PM   Temp 36.7 °C (98 °F) 3/12/2020  3:30 PM   Pulse 55 3/12/2020  3:39 PM   Resp 16 3/12/2020  3:30 PM   SpO2 97 % 3/12/2020  3:39 PM   Vitals shown include unvalidated device data.        Post Anesthesia Care and Evaluation    Patient location during evaluation: bedside  Patient participation: complete - patient participated  Level of consciousness: awake and alert  Pain management: adequate  Airway patency: patent  Anesthetic complications: No anesthetic complications    Cardiovascular status: acceptable  Respiratory status: acceptable  Hydration status: acceptable    Comments: /61   Pulse 58   Temp 36.7 °C (98 °F) (Oral)   Resp 16   Ht 160 cm (63\")   Wt 91 kg (200 lb 9.9 oz)   SpO2 96%   BMI 35.54 kg/m²       "

## 2020-03-12 NOTE — PROGRESS NOTES
" LOS: 14 days     Name: Mandy Alarcon  Age: 86 y.o.  Sex: female  :  1933  MRN: [id number]         Primary Care Physician: Daryl Santos MD    Subjective   Subjective  No new complaints or overnight events.  Eagerly awaiting surgery today.    Objective   Vital Signs  Temp:  [97.5 °F (36.4 °C)-98.5 °F (36.9 °C)] 98.2 °F (36.8 °C)  Heart Rate:  [52-58] 58  Resp:  [16] 16  BP: (123-146)/(53-71) 146/71  Body mass index is 35.54 kg/m².    Objective:  General Appearance:  Comfortable and in no acute distress.    Vital signs: (most recent): Blood pressure 146/71, pulse 58, temperature 98.2 °F (36.8 °C), temperature source Oral, resp. rate 16, height 160 cm (63\"), weight 91 kg (200 lb 9.9 oz), SpO2 93 %, not currently breastfeeding.    Lungs:  Normal effort and normal respiratory rate.    Heart: Normal rate.  Regular rhythm.    Abdomen: Abdomen is soft.  Bowel sounds are normal.   There is no abdominal tenderness.     Extremities: There is no dependent edema or local swelling.    Neurological: Patient is alert and oriented to person, place and time.    Skin:  Warm and dry.              Results Review:       I reviewed the patient's new clinical results.    Results from last 7 days   Lab Units 20  03420  0534 20  0520  0546   WBC 10*3/mm3  --  8.37 8.29 8.72   HEMOGLOBIN g/dL  --  10.6* 10.9* 11.2*   PLATELETS 10*3/mm3 216 236 207 210     Results from last 7 days   Lab Units 20  03420  0446 03/10/20  0449 20  0458 20  0534 20  0529 20  0546   SODIUM mmol/L 135* 134* 135* 136 128* 133* 131*   POTASSIUM mmol/L 4.6 4.4 4.5 4.5 4.2 4.5 4.4   CHLORIDE mmol/L 104 103 103 102 96* 100 102   CO2 mmol/L 23.1 22.7 22.8 24.2 23.8 22.3 20.8*   BUN mg/dL 23 23 21 20 19 19 18   CREATININE mg/dL 0.99 0.99 0.97 0.94 0.92 0.89 0.84   CALCIUM mg/dL 9.0 8.6 8.8 8.9 8.7 9.0 8.8   GLUCOSE mg/dL 117* 120* 110* 129* 118* 102* 116*                 Scheduled Meds: "     atenolol 50 mg Oral Q12H   levoFLOXacin 500 mg Intravenous 30 Min Pre-Op   And      clindamycin 900 mg Intravenous 30 Min Pre-Op   [MAR Hold] enoxaparin 40 mg Subcutaneous Q24H   [MAR Hold] Fat Emulsion Plant Based 100 mL Intravenous Q24H (TPN)   [MAR Hold] levothyroxine 50 mcg Oral Q AM   [MAR Hold] sodium chloride 10 mL Intravenous Q12H   [MAR Hold] sodium chloride 10 mL Intravenous Q12H   [MAR Hold] sodium chloride 10 mL Intravenous Q12H   [MAR Hold] sodium chloride 10 mL Intravenous Q12H     PRN Meds:   •  [MAR Hold] acetaminophen **OR** [MAR Hold] acetaminophen **OR** [MAR Hold] acetaminophen  •  [] HYDROmorphone **AND** [MAR Hold] naloxone  •  [MAR Hold] nitroglycerin  •  [MAR Hold] ondansetron  •  Pharmacy to Dose TPN  •  [MAR Hold] promethazine  •  [MAR Hold] simethicone  •  [COMPLETED] Insert peripheral IV **AND** [MAR Hold] sodium chloride  •  [MAR Hold] sodium chloride  •  [MAR Hold] sodium chloride  •  [MAR Hold] sodium chloride  Continuous Infusions:    Adult Central 2-in-1 TPN  Last Rate: 55 mL/hr at 20 1721   Adult Central 2-in-1 TPN     Pharmacy to Dose TPN         Assessment/Plan   Active Hospital Problems    Diagnosis  POA   • **Acute pancreatitis [K85.90]  Unknown   • Cholecystitis [K81.9]  Unknown   • Hyponatremia [E87.1]  Yes   • CKD (chronic kidney disease) stage 3, GFR 30-59 ml/min (CMS/HCC) [N18.3]  Yes   • Obesity (BMI 30-39.9) [E66.9]  Yes   • Supraventricular tachycardia (CMS/HCC) [I47.1]  Yes   • Primary hypothyroidism [E03.9]  Yes   • Hyperlipidemia [E78.5]  Yes      Resolved Hospital Problems    Diagnosis Date Resolved POA   • Fever [R50.9] 2020 Yes   • LINNEA (acute kidney injury) (CMS/HCC) [N17.9] 2020 Yes       Assessment & Plan    -Plans noted for cholecystectomy today.  Currently on TPN and bowel rest  -Electrolytes appear stable.  Pharmacy assisting with management of TPN  -Renal function appears stable at this time  -Discussed with the patient and 2 of  her daughters at the bedside    Bryce Rivera MD  [facility]  03/12/20  12:07 PM

## 2020-03-12 NOTE — CONSULTS
Adult Nutrition  Assessment/PES    Patient Name:  Mandy Alarcon  YOB: 1933  MRN: [id number]  Admit Date:  2/27/2020    Assessment Date:  3/12/2020    Comments:  Follow up note. Pt continues on TPN with 1000dex, 65gm AA, 100ml 20% lipids which is meeting nutritional needs. Note plan for phuong today. Will continue to follow.    Reason for Assessment     Row Name 03/12/20 0853          Reason for Assessment    Reason For Assessment  follow-up protocol;TF/PN         Nutrition/Diet History     Row Name 03/12/20 0853          Nutrition/Diet History    Typical Food/Fluid Intake  TPN continues, Phuong scheduled for today         Anthropometrics     Row Name 03/12/20 0637          Anthropometrics    Weight  91 kg (200 lb 9.9 oz)         Labs/Tests/Procedures/Meds     Row Name 03/12/20 0854          Labs/Procedures/Meds    Lab Results Reviewed  reviewed     Lab Results Comments  na, glu        Diagnostic Tests/Procedures    Diagnostic Test/Procedure Reviewed  reviewed        Medications    Pertinent Medications Reviewed  reviewed         Physical Findings     Row Name 03/12/20 0854          Physical Findings    Overall Physical Appearance  obese     Gastrointestinal  nausea;vomiting     Skin  -- intact           Nutrition Prescription Ordered     Row Name 03/12/20 0855          Nutrition Prescription PO    Current PO Diet  NPO        Nutrition Prescription PN    PN Current Rate (mL/hr)  55 mL/hr     Dextrose (Kcal)  1000     Amino Acid (gm)  65     Lipid Concentration (%)  20%     Lipid Volume (mL)  100 mL         Evaluation of Received Nutrient/Fluid Intake     Row Name 03/12/20 0856          Calories Evaluation    Parenteral Calories (kcal)  1460     % of Kcal Needs  100        Protein Evaluation    Parenteral Protein (gm)  65     % of Protein Needs  100        Fluid Intake Evaluation    Parenteral Fluid (mL)  1320               Problem/Interventions:          Intervention Goal     Row Name 03/12/20 0857           Intervention Goal    General  Maintain nutrition;Nutrition support treatment;Improved nutrition related lab(s);Reduce/improve symptoms;Meet nutritional needs for age/condition;Disease management/therapy     PO  Initiate feeding     TF/PN  Maintain TF/PN     Weight  No significant weight loss         Nutrition Intervention     Row Name 03/12/20 0857          Nutrition Intervention    RD/Tech Action  Follow Tx progress;Care plan reviewd           Education/Evaluation     Row Name 03/12/20 0857          Education    Education  Will Instruct as appropriate        Monitor/Evaluation    Monitor  Per protocol           Electronically signed by:  Lucía Orona RD  03/12/20 08:57

## 2020-03-12 NOTE — PROGRESS NOTES
"Pharmacy to dose TPN - Daily Progress Note  Patient: Mandy Alarcon  MRN#: [id number]  Attending: No name on file.  Admission Date:     TPN # 11 per Dr Andujar  Indication:pancreatitis  Plan for phuong today    Principal Problem:    Acute pancreatitis  Active Problems:    Hyperlipidemia    Primary hypothyroidism    Supraventricular tachycardia (CMS/HCC)    Obesity (BMI 30-39.9)    CKD (chronic kidney disease) stage 3, GFR 30-59 ml/min (CMS/HCC)    Hyponatremia    Cholecystitis    Subjective/Objective  86 y.o. female 160 cm (63\") 91 kg (200 lb 9.9 oz)  Results from last 7 days   Lab Units 20  0341 20  0446  20  0458   SODIUM mmol/L 135* 134*   < > 136   POTASSIUM mmol/L 4.6 4.4   < > 4.5   CHLORIDE mmol/L 104 103   < > 102   CO2 mmol/L 23.1 22.7   < > 24.2   BUN mg/dL 23 23   < > 20   CREATININE mg/dL 0.99 0.99   < > 0.94   CALCIUM mg/dL 9.0 8.6   < > 8.9   ALBUMIN g/dL  --  2.30*  --  2.10*   BILIRUBIN mg/dL  --  0.4  --  0.4   ALK PHOS U/L  --  57  --  55   ALT (SGPT) U/L  --  19  --  20   AST (SGOT) U/L  --  22  --  25   GLUCOSE mg/dL 117* 120*   < > 129*   MAGNESIUM mg/dL  --  1.7  --  1.7   PHOSPHORUS mg/dL  --  3.3  --  3.5   TRIGLYCERIDES mg/dL  --   --   --  104    < > = values in this interval not displayed.      I/O last 3 completed shifts:  In: 67446 [P.O.:120]  Out: -     Diet Orders (active) (From admission, onward)       Start     Ordered    20 1800  Adult Central 2-in-1 TPN  Continuous TPN      20 07520  NPO Diet NPO Except: Ice Chips, Sips With Meds  Diet Effective Now      20                  Additional insulin administration while previous TPN infusin units  Additional electrolyte administration while previous TPN infusin  Acid suppression: famotidine in TPN  Stool in last 24 hours:     Daily Assessment  Current macronutrients: protein 65 gm/day, dextrose 1000 kcal/day, lipids 200 kcal/day  Fluid rate: 55    Plan    Will repeat " yesterday's TPN. Will monitor Na, SCr, and BUN closely.  Shawn Richard PharmD

## 2020-03-12 NOTE — OP NOTE
PREOPERATIVE DIAGNOSIS:   Cholelithiasis.  Acute pancreatitis.    POSTOPERATIVE DIAGNOSIS:   Acute cholecystitis.  Cholelithiasis.  Acute pancreatitis.    PROCEDURE:   Attempted laparoscopic cholecystectomy.  Laparoscopic cholecystostomy with intraoperative fluoroscopic cholangiogram.    SURGEON: Bryce Andujar M.D.    ASSISTANT: Mayur Shelby CFA.   SPECIMENS: Gallbladder.  INTRAOPERATIVE COMPLICATIONS: None.  ANESTHESIA: General.  BLOOD LOSS:  200 mL.  COUNTS: Needle and sponge counts correct.     INDICATIONS: This is a pleasant patient who presented to the hospital with acute pancreatitis.  During this admission, she has undergone imaging studies that have shown biliary sludge, gallbladder wall thickening, and pericholecystic fluid in addition to changes of acute pancreatitis.  She is taken to the operating room today for laparoscopic cholecystectomy.    DESCRIPTION OF PROCEDURE: The patient was brought to the operating room in stable condition. Perioperative antibiotics were given and sequential compression devices were in place. She was then positioned supine on the operating room table. General anesthesia was induced without difficulty.    Access to the peritoneum was achieved in the supraumbilical position by a cutdown technique. A blunt 12-mm trocar was secured to the fascia with 0 Vicryl stay sutures. The abdomen was then insufflated to 15 mmHg pressure with carbon dioxide. A brief survey of the abdominal cavity revealed no evidence of injury from insertion of the trocar. The patient was placed in a reverse Trendelenburg position with the left-side tilted slightly down. Three additional 5-mm trocars were placed- in the subxyphoid, right subcostal, and right flank positions. This was under laparoscopic vision.       There were dense adhesions between the gallbladder, omentum, liver edge, and the abdominal wall.  It was difficult to even identify the gallbladder because of surrounding inflammatory  change.  A photo was obtained.  I performed minimal dissection with the electrocautery hook to free up adhesions.  In doing so I lost about 200 mL of blood, but a small portion of the gallbladder was cleared.  The gallbladder wall was thickened, hyperemic, and edematous- consistent with acute cholecystitis.  Further dissection would be difficult, and I felt hazardous both in terms of blood loss and potential injury to the pancreas, right kidney, bile ducts, or liver.  I elected to place a cholecystostomy tube.    A 2-0 silk pursestring suture was placed on the dome of the gallbladder.  I made an enterotomy in the central portion of the pursestring suture.  A 16 Singaporean Hussein catheter was introduced into the peritoneum through the lateral right upper quadrant trocar site.  It was advanced into the gallbladder.  The pursestring was tied.  The catheter balloon was inflated with 5 mL of sterile water.  I sequentially irrigated the gallbladder with saline through the Hussein catheter- ultimately 1 liter was used.  I then performed a cholangiogram through the cholecystostomy tube.  There was no extravasation of contrast from the gallbladder, but also, there was no flow of contrast through the cystic duct.  The catheter was connected to a bedside drainage bag.  The catheter was secured to the anterior abdominal wall with 2-0 nylon.    I applied Gordon powder to the area of dissection.  A final survey of the operative site was undertaken, I was pleased with hemostasis.  The insufflation was evacuated as the trocars were removed under laparoscopic vision. The umbilical fascia was closed with interrupted 0 Vicryl suture. The skin incisions were closed with 4-0 Monocryl and adhesive strips.    At the end of the case, all needle and sponge counts were correct, and she was extubated.

## 2020-03-12 NOTE — ANESTHESIA PROCEDURE NOTES
Airway  Urgency: elective    Date/Time: 3/12/2020 12:50 PM    General Information and Staff    Patient location during procedure: OR  Anesthesiologist: Juan Gramajo MD    Indications and Patient Condition    Preoxygenated: yes      Final Airway Details  Final airway type: endotracheal airway      Successful airway: ETT  Cuffed: yes   Successful intubation technique: direct laryngoscopy  Endotracheal tube insertion site: oral  Blade: Fe  Blade size: 3  ETT size (mm): 7.0  Cormack-Lehane Classification: grade I - full view of glottis  Placement verified by: chest auscultation   Number of attempts at approach: 1    Additional Comments  SIVI and intubation by CALVIN

## 2020-03-12 NOTE — PLAN OF CARE
Problem: Fall Risk (Adult)  Goal: Absence of Fall  Outcome: Ongoing (interventions implemented as appropriate)     Problem: Pain, Acute (Adult)  Goal: Acceptable Pain Control/Comfort Level  Outcome: Ongoing (interventions implemented as appropriate)     Problem: Skin Injury Risk (Adult)  Goal: Skin Health and Integrity  Outcome: Ongoing (interventions implemented as appropriate)     Problem: Pancreatitis, Acute/Chronic (Adult)  Goal: Signs and Symptoms of Listed Potential Problems Will be Absent, Minimized or Managed (Pancreatitis, Acute/Chronic)  Outcome: Ongoing (interventions implemented as appropriate)     Problem: Patient Care Overview  Goal: Plan of Care Review  Outcome: Ongoing (interventions implemented as appropriate)  Flowsheets (Taken 3/12/2020 7070)  Progress: no change  Outcome Summary: Pt appeared to sleep fair, vss, one episode of emesis, zofran given, daughter at bedside, tolerating tpn and lipids, npo since midnight to have lap phuong today at 12:30, consent signed and in chart

## 2020-03-13 LAB
ALBUMIN SERPL-MCNC: 2.6 G/DL (ref 3.5–5.2)
ALBUMIN/GLOB SERPL: 0.8 G/DL
ALP SERPL-CCNC: 58 U/L (ref 39–117)
ALT SERPL W P-5'-P-CCNC: 19 U/L (ref 1–33)
ANION GAP SERPL CALCULATED.3IONS-SCNC: 9.1 MMOL/L (ref 5–15)
AST SERPL-CCNC: 22 U/L (ref 1–32)
BILIRUB SERPL-MCNC: 0.5 MG/DL (ref 0.2–1.2)
BUN BLD-MCNC: 24 MG/DL (ref 8–23)
BUN/CREAT SERPL: 21.2 (ref 7–25)
CALCIUM SPEC-SCNC: 8.4 MG/DL (ref 8.6–10.5)
CHLORIDE SERPL-SCNC: 103 MMOL/L (ref 98–107)
CO2 SERPL-SCNC: 22.9 MMOL/L (ref 22–29)
CREAT BLD-MCNC: 1.13 MG/DL (ref 0.57–1)
DEPRECATED RDW RBC AUTO: 47.1 FL (ref 37–54)
ERYTHROCYTE [DISTWIDTH] IN BLOOD BY AUTOMATED COUNT: 14.5 % (ref 12.3–15.4)
GFR SERPL CREATININE-BSD FRML MDRD: 46 ML/MIN/1.73
GLOBULIN UR ELPH-MCNC: 3.1 GM/DL
GLUCOSE BLD-MCNC: 131 MG/DL (ref 65–99)
GLUCOSE BLDC GLUCOMTR-MCNC: 125 MG/DL (ref 70–130)
GLUCOSE BLDC GLUCOMTR-MCNC: 125 MG/DL (ref 70–130)
GLUCOSE BLDC GLUCOMTR-MCNC: 135 MG/DL (ref 70–130)
GLUCOSE BLDC GLUCOMTR-MCNC: 139 MG/DL (ref 70–130)
HCT VFR BLD AUTO: 34.8 % (ref 34–46.6)
HGB BLD-MCNC: 11.2 G/DL (ref 12–15.9)
MAGNESIUM SERPL-MCNC: 1.7 MG/DL (ref 1.6–2.4)
MCH RBC QN AUTO: 28.8 PG (ref 26.6–33)
MCHC RBC AUTO-ENTMCNC: 32.2 G/DL (ref 31.5–35.7)
MCV RBC AUTO: 89.5 FL (ref 79–97)
PHOSPHATE SERPL-MCNC: 3.7 MG/DL (ref 2.5–4.5)
PLATELET # BLD AUTO: 183 10*3/MM3 (ref 140–450)
PMV BLD AUTO: 9.8 FL (ref 6–12)
POTASSIUM BLD-SCNC: 4.9 MMOL/L (ref 3.5–5.2)
PROT SERPL-MCNC: 5.7 G/DL (ref 6–8.5)
RBC # BLD AUTO: 3.89 10*6/MM3 (ref 3.77–5.28)
SODIUM BLD-SCNC: 135 MMOL/L (ref 136–145)
WBC NRBC COR # BLD: 10.27 10*3/MM3 (ref 3.4–10.8)

## 2020-03-13 PROCEDURE — 80053 COMPREHEN METABOLIC PANEL: CPT | Performed by: SURGERY

## 2020-03-13 PROCEDURE — 99024 POSTOP FOLLOW-UP VISIT: CPT | Performed by: SURGERY

## 2020-03-13 PROCEDURE — 25010000002 CALCIUM GLUCONATE PER 10 ML: Performed by: SURGERY

## 2020-03-13 PROCEDURE — 82962 GLUCOSE BLOOD TEST: CPT

## 2020-03-13 PROCEDURE — 25010000002 ENOXAPARIN PER 10 MG: Performed by: SURGERY

## 2020-03-13 PROCEDURE — 25010000002 HYDROMORPHONE PER 4 MG: Performed by: SURGERY

## 2020-03-13 PROCEDURE — 83735 ASSAY OF MAGNESIUM: CPT | Performed by: SURGERY

## 2020-03-13 PROCEDURE — 25010000002 MAGNESIUM SULFATE PER 500 MG OF MAGNESIUM: Performed by: SURGERY

## 2020-03-13 PROCEDURE — 25010000002 POTASSIUM CHLORIDE PER 2 MEQ OF POTASSIUM: Performed by: SURGERY

## 2020-03-13 PROCEDURE — 85027 COMPLETE CBC AUTOMATED: CPT | Performed by: SURGERY

## 2020-03-13 PROCEDURE — 25010000002 ONDANSETRON PER 1 MG: Performed by: SURGERY

## 2020-03-13 PROCEDURE — 84100 ASSAY OF PHOSPHORUS: CPT | Performed by: SURGERY

## 2020-03-13 RX ADMIN — SODIUM CHLORIDE, PRESERVATIVE FREE 10 ML: 5 INJECTION INTRAVENOUS at 20:59

## 2020-03-13 RX ADMIN — SODIUM CHLORIDE, PRESERVATIVE FREE 10 ML: 5 INJECTION INTRAVENOUS at 21:01

## 2020-03-13 RX ADMIN — HYDROMORPHONE HYDROCHLORIDE 0.5 MG: 1 INJECTION, SOLUTION INTRAMUSCULAR; INTRAVENOUS; SUBCUTANEOUS at 09:03

## 2020-03-13 RX ADMIN — SODIUM CHLORIDE, PRESERVATIVE FREE 10 ML: 5 INJECTION INTRAVENOUS at 09:05

## 2020-03-13 RX ADMIN — ENOXAPARIN SODIUM 40 MG: 40 INJECTION SUBCUTANEOUS at 18:13

## 2020-03-13 RX ADMIN — SODIUM CHLORIDE, PRESERVATIVE FREE 10 ML: 5 INJECTION INTRAVENOUS at 20:58

## 2020-03-13 RX ADMIN — SODIUM CHLORIDE, PRESERVATIVE FREE 10 ML: 5 INJECTION INTRAVENOUS at 00:45

## 2020-03-13 RX ADMIN — I.V. FAT EMULSION 20 G: 20 EMULSION INTRAVENOUS at 18:13

## 2020-03-13 RX ADMIN — SODIUM CHLORIDE, PRESERVATIVE FREE 10 ML: 5 INJECTION INTRAVENOUS at 09:03

## 2020-03-13 RX ADMIN — ONDANSETRON 4 MG: 2 INJECTION INTRAMUSCULAR; INTRAVENOUS at 00:41

## 2020-03-13 RX ADMIN — ATENOLOL 50 MG: 50 TABLET ORAL at 20:58

## 2020-03-13 RX ADMIN — POTASSIUM CHLORIDE: 2 INJECTION, SOLUTION, CONCENTRATE INTRAVENOUS at 18:13

## 2020-03-13 RX ADMIN — HYDROMORPHONE HYDROCHLORIDE 0.5 MG: 1 INJECTION, SOLUTION INTRAMUSCULAR; INTRAVENOUS; SUBCUTANEOUS at 00:41

## 2020-03-13 RX ADMIN — HYDROMORPHONE HYDROCHLORIDE 0.5 MG: 1 INJECTION, SOLUTION INTRAMUSCULAR; INTRAVENOUS; SUBCUTANEOUS at 06:20

## 2020-03-13 RX ADMIN — ONDANSETRON 4 MG: 2 INJECTION INTRAMUSCULAR; INTRAVENOUS at 20:58

## 2020-03-13 RX ADMIN — HYDROMORPHONE HYDROCHLORIDE 0.5 MG: 1 INJECTION, SOLUTION INTRAMUSCULAR; INTRAVENOUS; SUBCUTANEOUS at 12:27

## 2020-03-13 RX ADMIN — HYDROMORPHONE HYDROCHLORIDE 0.5 MG: 1 INJECTION, SOLUTION INTRAMUSCULAR; INTRAVENOUS; SUBCUTANEOUS at 23:14

## 2020-03-13 RX ADMIN — SODIUM CHLORIDE, PRESERVATIVE FREE 20 ML: 5 INJECTION INTRAVENOUS at 21:00

## 2020-03-13 RX ADMIN — ONDANSETRON 4 MG: 2 INJECTION INTRAMUSCULAR; INTRAVENOUS at 06:20

## 2020-03-13 RX ADMIN — ATENOLOL 50 MG: 50 TABLET ORAL at 00:44

## 2020-03-13 RX ADMIN — HYDROMORPHONE HYDROCHLORIDE 0.5 MG: 1 INJECTION, SOLUTION INTRAMUSCULAR; INTRAVENOUS; SUBCUTANEOUS at 19:22

## 2020-03-13 RX ADMIN — HYDROMORPHONE HYDROCHLORIDE 0.5 MG: 1 INJECTION, SOLUTION INTRAMUSCULAR; INTRAVENOUS; SUBCUTANEOUS at 04:02

## 2020-03-13 NOTE — PLAN OF CARE
Problem: Patient Care Overview  Goal: Plan of Care Review  Outcome: Ongoing (interventions implemented as appropriate)  Flowsheets (Taken 3/13/2020 2253)  Progress: improving  Plan of Care Reviewed With: patient; daughter  Outcome Summary: Pt up to chair X2 today.  Pain meds x 2 this a.m.  Minimal drainage from Gallbladder drain.  Plan is for CT  and then reassess Monday for nutrition options.  Cholecystectomy outpatient down the road.  Ice chips tolerated.  Goal: Individualization and Mutuality  Outcome: Ongoing (interventions implemented as appropriate)  Goal: Discharge Needs Assessment  Outcome: Ongoing (interventions implemented as appropriate)  Goal: Interprofessional Rounds/Family Conf  Outcome: Ongoing (interventions implemented as appropriate)     Problem: Pancreatitis, Acute/Chronic (Adult)  Description  Prevent and manage potential problems includin. diarrhea  2. fluid imbalance  3. hemorrhage  4. hypercatabolism  5. hyperglycemia  6. hypoxia/hypoxemia  7. infection  8. malabsorption/maldigestion  9. nausea and vomiting  10. pain  11. postoperative ileus  12. situational response  Goal: Signs and Symptoms of Listed Potential Problems Will be Absent, Minimized or Managed (Pancreatitis, Acute/Chronic)  Description  Signs and symptoms of listed potential problems will be absent, minimized or managed by discharge/transition of care (reference Pancreatitis, Acute/Chronic (Adult) CPG).  Outcome: Ongoing (interventions implemented as appropriate)     Problem: Skin Injury Risk (Adult)  Goal: Identify Related Risk Factors and Signs and Symptoms  Description  Related risk factors and signs and symptoms are identified upon initiation of Human Response Clinical Practice Guideline (CPG).  Outcome: Ongoing (interventions implemented as appropriate)  Goal: Skin Health and Integrity  Description  Patient will demonstrate the desired outcomes by discharge/transition of care.  Outcome: Ongoing (interventions  implemented as appropriate)     Problem: Pain, Acute (Adult)  Goal: Identify Related Risk Factors and Signs and Symptoms  Description  Related risk factors and signs and symptoms are identified upon initiation of Human Response Clinical Practice Guideline (CPG).  Outcome: Ongoing (interventions implemented as appropriate)  Goal: Acceptable Pain Control/Comfort Level  Description  Patient will demonstrate the desired outcomes by discharge/transition of care.  Outcome: Ongoing (interventions implemented as appropriate)     Problem: Fall Risk (Adult)  Goal: Identify Related Risk Factors and Signs and Symptoms  Description  Related risk factors and signs and symptoms are identified upon initiation of Human Response Clinical Practice Guideline (CPG).  Outcome: Ongoing (interventions implemented as appropriate)  Goal: Absence of Fall  Description  Patient will demonstrate the desired outcomes by discharge/transition of care.  Outcome: Ongoing (interventions implemented as appropriate)

## 2020-03-13 NOTE — PROGRESS NOTES
" LOS: 15 days     Name: Mandy Alarcon  Age: 86 y.o.  Sex: female  :  1933  MRN: [id number]         Primary Care Physician: Daryl Santos MD    Subjective   Subjective  Had abdominal pain overnight last night and found it difficult to sleep.  Gallbladder unable to be dissected and removed and thus cholecystostomy tube had to be placed.    Objective   Vital Signs  Temp:  [97.9 °F (36.6 °C)-98.6 °F (37 °C)] 98.2 °F (36.8 °C)  Heart Rate:  [57-71] 63  Resp:  [16] 16  BP: (104-135)/(47-62) 122/57  Body mass index is 37.45 kg/m².    Objective:  General Appearance:  Comfortable and in no acute distress.    Vital signs: (most recent): Blood pressure 122/57, pulse 63, temperature 98.2 °F (36.8 °C), temperature source Oral, resp. rate 16, height 160 cm (63\"), weight 95.9 kg (211 lb 6.7 oz), SpO2 96 %, not currently breastfeeding.    Lungs:  Normal effort and normal respiratory rate.    Heart: Normal rate.  Regular rhythm.    Abdomen: Abdomen is soft.  (Cholecystostomy tube extending from the right upper quadrant).  Bowel sounds are normal.   There is no abdominal tenderness.     Extremities: There is no dependent edema or local swelling.    Neurological: Patient is alert and oriented to person, place and time.    Skin:  Warm and dry.              Results Review:       I reviewed the patient's new clinical results.    Results from last 7 days   Lab Units 20  05020  0534 20  0529   WBC 10*3/mm3 10.27  --  8.37 8.29   HEMOGLOBIN g/dL 11.2*  --  10.6* 10.9*   PLATELETS 10*3/mm3 183 216 236 207     Results from last 7 days   Lab Units 20  0508 20  03420  0446 03/10/20  0449 20  0458 20  0534 20  0529   SODIUM mmol/L 135* 135* 134* 135* 136 128* 133*   POTASSIUM mmol/L 4.9 4.6 4.4 4.5 4.5 4.2 4.5   CHLORIDE mmol/L 103 104 103 103 102 96* 100   CO2 mmol/L 22.9 23.1 22.7 22.8 24.2 23.8 22.3   BUN mg/dL 24* 23 23 21 20 19 19   CREATININE mg/dL 1.13* " 0.99 0.99 0.97 0.94 0.92 0.89   CALCIUM mg/dL 8.4* 9.0 8.6 8.8 8.9 8.7 9.0   GLUCOSE mg/dL 131* 117* 120* 110* 129* 118* 102*           Scheduled Meds:     atenolol 50 mg Oral Q12H   enoxaparin 40 mg Subcutaneous Q24H   Fat Emulsion Plant Based 100 mL Intravenous Q24H (TPN)   levothyroxine 50 mcg Oral Q AM   sodium chloride 10 mL Intravenous Q12H   sodium chloride 10 mL Intravenous Q12H   sodium chloride 10 mL Intravenous Q12H   sodium chloride 10 mL Intravenous Q12H     PRN Meds:   •  acetaminophen **OR** acetaminophen **OR** acetaminophen  •  HYDROmorphone  •  [] HYDROmorphone **AND** naloxone  •  nitroglycerin  •  ondansetron  •  Pharmacy to Dose TPN  •  promethazine  •  simethicone  •  [COMPLETED] Insert peripheral IV **AND** sodium chloride  •  sodium chloride  •  sodium chloride  •  sodium chloride  Continuous Infusions:    Adult Central 2-in-1 TPN  Last Rate: 55 mL/hr at 20 1813   Pharmacy to Dose TPN         Assessment/Plan   Active Hospital Problems    Diagnosis  POA   • **Acute pancreatitis [K85.90]  Unknown   • Cholecystitis [K81.9]  Unknown   • Hyponatremia [E87.1]  Yes   • CKD (chronic kidney disease) stage 3, GFR 30-59 ml/min (CMS/HCC) [N18.3]  Yes   • Obesity (BMI 30-39.9) [E66.9]  Yes   • Supraventricular tachycardia (CMS/[facility]) [I47.1]  Yes   • Primary hypothyroidism [E03.9]  Yes   • Hyperlipidemia [E78.5]  Yes      Resolved Hospital Problems    Diagnosis Date Resolved POA   • Fever [R50.9] 2020 Yes   • LINNEA (acute kidney injury) (CMS/[facility]) [N17.9] 2020 Yes       Assessment & Plan    -POD 1 from attempted laparoscopic cholecystectomy and laparoscopic cholecystostomy  -Continue bowel rest, analgesia, antiemetics  -Electrolytes appear stable.  Pharmacy assisting with management of TPN  -Creatinine up slightly today will monitor  -Discussed with the patient and daughter at the bedside    Bryce Rivera MD  [facility]  20  1:15 PM

## 2020-03-13 NOTE — PROGRESS NOTES
Chief Complaint:    POD 1, S/P laparoscopic cholecystostomy tube    Subjective:    No complaints today.  Some nausea overnight.    Objective:    Vitals:    03/12/20 2353 03/13/20 0358 03/13/20 0618 03/13/20 0734   BP: 119/54 106/47  122/57   BP Location: Left arm Left arm  Left arm   Patient Position: Lying Lying  Lying   Pulse: 68 66  63   Resp: 16 16  16   Temp: 98.4 °F (36.9 °C) 98.6 °F (37 °C)  98.2 °F (36.8 °C)   TempSrc: Oral Oral  Oral   SpO2: 95% 97%  96%   Weight:   95.9 kg (211 lb 6.7 oz)    Height:         Biliary drain 150 mL since surgery.  Clear dumont bile.    Lungs: Clear  Heart: Regular  Abdomen: Incisions dressed and dry. BS present.   Extremities: Warm    Labs reviewed.  WBC 10.27, Hgb 11.2, platelets 183.  Creat 1.13, CO2 22.9.  TBili 0.5, AlkP 58, ALT 19, AST 22.    OR cultures growing gram-negative raghavendra.    Assessment:    POD 1, S/P laparoscopic cholecystostomy tube  Pancreatitis  Gallbladder sludge    Plan:    Follow-up on OR cultures.  Plan for CT scan of the abdomen and pelvis on Sunday to track resolution of pancreatitis.  Continue TPN for now.

## 2020-03-13 NOTE — PLAN OF CARE
Problem: Fall Risk (Adult)  Goal: Absence of Fall  Outcome: Ongoing (interventions implemented as appropriate)     Problem: Pain, Acute (Adult)  Goal: Acceptable Pain Control/Comfort Level  Outcome: Ongoing (interventions implemented as appropriate)     Problem: Skin Injury Risk (Adult)  Goal: Skin Health and Integrity  Outcome: Ongoing (interventions implemented as appropriate)     Problem: Patient Care Overview  Goal: Plan of Care Review  Outcome: Ongoing (interventions implemented as appropriate)  Flowsheets (Taken 3/13/2020 1098)  Progress: no change  Outcome Summary: Pt appeared to sleep fair, daughter at bedside. pt post op with drain to gallbladder draining brown liquid, lap sites d/i, ox4, vss, medicated for pain and nausea prn.

## 2020-03-13 NOTE — PROGRESS NOTES
Continued Stay Note  [facility]     Patient Name: Mandy Alarcon  MRN: [id number]  Today's Date: 3/13/2020    Admit Date: 2/27/2020    Discharge Plan     Row Name 03/13/20 1619       Plan    Plan  Home with family- Follow for additional dc needs pending progress    Patient/Family in Agreement with Plan  yes    Plan Comments  Followed up with pt and one of her daughters at bedside. Daughter states  repeat CT scan on sunday and will start po diet on Monday. CCP explained there are some SNF that accept TPN, if pt were to need. Daughter states they plan on discharging home with family and possibly HH if needed. They are aware of no visitors allowed for SNF facilitys at this time. CCP explained will follow up on monday for dc planning needs. Debbie schultz/ccp        Discharge Codes    No documentation.             Amy Patterson, RN

## 2020-03-13 NOTE — PROGRESS NOTES
"Pharmacy to dose TPN - Daily Progress Note  Patient: Mandy Alarcon  MRN#: [id number]  Attending: No name on file.  Admission Date:     Mandy Alarcon is a 86 y.o. female receiving TPN for pancreatitis.     TPN # 12 per Dr. Andujar's request.    Principal Problem:    Acute pancreatitis  Active Problems:    Hyperlipidemia    Primary hypothyroidism    Supraventricular tachycardia (CMS/HCC)    Obesity (BMI 30-39.9)    CKD (chronic kidney disease) stage 3, GFR 30-59 ml/min (CMS/[facility])    Hyponatremia    Cholecystitis    Subjective/Objective  160 cm (63\")  95.9 kg (211 lb 6.7 oz)  Body mass index is 37.45 kg/m².   Results from last 7 days   Lab Units 20  0508  20  0458   SODIUM mmol/L 135*   < > 136   POTASSIUM mmol/L 4.9   < > 4.5   CHLORIDE mmol/L 103   < > 102   CO2 mmol/L 22.9   < > 24.2   BUN mg/dL 24*   < > 20   CREATININE mg/dL 1.13*   < > 0.94   CALCIUM mg/dL 8.4*   < > 8.9   ALBUMIN g/dL 2.60*   < > 2.10*   BILIRUBIN mg/dL 0.5   < > 0.4   ALK PHOS U/L 58   < > 55   ALT (SGPT) U/L 19   < > 20   AST (SGOT) U/L 22   < > 25   GLUCOSE mg/dL 131*   < > 129*   MAGNESIUM mg/dL 1.7   < > 1.7   PHOSPHORUS mg/dL 3.7   < > 3.5   TRIGLYCERIDES mg/dL  --   --  104    < > = values in this interval not displayed.        I/O last 3 completed shifts:  In: 1300 [P.O.:100; I.V.:1200]  Out: 850 [Urine:700; Drains:150]  Diet Orders (active) (From admission, onward)     Start     Ordered    20  NPO Diet NPO Except: Ice Chips, Sips With Meds  Diet Effective Now      20              Additional insulin administration while previous TPN infusin units  Additional electrolyte administration while previous TPN infusing: none  Acid suppression: Famotidine 20 mg in TPN    Goal:   Protein: 65 grams/day (0.68 g/kg/day)   Dextrose: 1000 Kcal/day   Lipids: 100 mL of 20% lipid infusion 7 days/week     Plan    1) Continue current macronutrients. Will slightly increase fluid rate further to 65 mL/h due to " increasing BUN/SCr.    2) Based on the above labs, will add the following electrolytes/additives to the TPN.     Sodium chloride: 120 mEq   Sodium acetate: 40 mEq (increased from 30 mEq)  Potassium chloride: 50 mEq  Potassium phosphate: 15 mEq (decreased from 22 mEq)  Calcium gluconate: 9 mEq   Magnesium sulfate: 10 mEq   Multivitamin: 10 mL   Trace Elements: 1 mL   Famotidine: 40 mg    Labs: CMP, Mag, Phos tomorrow with AM labs    Pharmacy will continue to follow and monitor daily while patient on TPN. Thank you for this consult.      Daryl Walton, PharmD, STACEY, BCPS

## 2020-03-14 LAB
ALBUMIN SERPL-MCNC: 2 G/DL (ref 3.5–5.2)
ALBUMIN/GLOB SERPL: 0.6 G/DL
ALP SERPL-CCNC: 51 U/L (ref 39–117)
ALT SERPL W P-5'-P-CCNC: 13 U/L (ref 1–33)
ANION GAP SERPL CALCULATED.3IONS-SCNC: 8 MMOL/L (ref 5–15)
AST SERPL-CCNC: 14 U/L (ref 1–32)
BILIRUB SERPL-MCNC: 0.9 MG/DL (ref 0.2–1.2)
BUN BLD-MCNC: 24 MG/DL (ref 8–23)
BUN/CREAT SERPL: 25 (ref 7–25)
CALCIUM SPEC-SCNC: 8.3 MG/DL (ref 8.6–10.5)
CHLORIDE SERPL-SCNC: 101 MMOL/L (ref 98–107)
CO2 SERPL-SCNC: 21 MMOL/L (ref 22–29)
CREAT BLD-MCNC: 0.96 MG/DL (ref 0.57–1)
DEPRECATED RDW RBC AUTO: 47.2 FL (ref 37–54)
ERYTHROCYTE [DISTWIDTH] IN BLOOD BY AUTOMATED COUNT: 14.5 % (ref 12.3–15.4)
GFR SERPL CREATININE-BSD FRML MDRD: 55 ML/MIN/1.73
GLOBULIN UR ELPH-MCNC: 3.5 GM/DL
GLUCOSE BLD-MCNC: 125 MG/DL (ref 65–99)
GLUCOSE BLDC GLUCOMTR-MCNC: 118 MG/DL (ref 70–130)
GLUCOSE BLDC GLUCOMTR-MCNC: 124 MG/DL (ref 70–130)
HCT VFR BLD AUTO: 32.9 % (ref 34–46.6)
HGB BLD-MCNC: 10.8 G/DL (ref 12–15.9)
MAGNESIUM SERPL-MCNC: 1.7 MG/DL (ref 1.6–2.4)
MCH RBC QN AUTO: 29.1 PG (ref 26.6–33)
MCHC RBC AUTO-ENTMCNC: 32.8 G/DL (ref 31.5–35.7)
MCV RBC AUTO: 88.7 FL (ref 79–97)
PHOSPHATE SERPL-MCNC: 2.8 MG/DL (ref 2.5–4.5)
PLATELET # BLD AUTO: 156 10*3/MM3 (ref 140–450)
PMV BLD AUTO: 9.7 FL (ref 6–12)
POTASSIUM BLD-SCNC: 4.3 MMOL/L (ref 3.5–5.2)
PROT SERPL-MCNC: 5.5 G/DL (ref 6–8.5)
RBC # BLD AUTO: 3.71 10*6/MM3 (ref 3.77–5.28)
SODIUM BLD-SCNC: 130 MMOL/L (ref 136–145)
WBC NRBC COR # BLD: 11.64 10*3/MM3 (ref 3.4–10.8)

## 2020-03-14 PROCEDURE — 25010000002 MAGNESIUM SULFATE PER 500 MG OF MAGNESIUM: Performed by: SURGERY

## 2020-03-14 PROCEDURE — 83735 ASSAY OF MAGNESIUM: CPT | Performed by: SURGERY

## 2020-03-14 PROCEDURE — 25010000002 POTASSIUM CHLORIDE PER 2 MEQ OF POTASSIUM: Performed by: SURGERY

## 2020-03-14 PROCEDURE — 25010000002 ONDANSETRON PER 1 MG: Performed by: SURGERY

## 2020-03-14 PROCEDURE — 80053 COMPREHEN METABOLIC PANEL: CPT | Performed by: SURGERY

## 2020-03-14 PROCEDURE — 25010000002 CALCIUM GLUCONATE PER 10 ML: Performed by: SURGERY

## 2020-03-14 PROCEDURE — 99024 POSTOP FOLLOW-UP VISIT: CPT | Performed by: SURGERY

## 2020-03-14 PROCEDURE — 85027 COMPLETE CBC AUTOMATED: CPT | Performed by: INTERNAL MEDICINE

## 2020-03-14 PROCEDURE — 84100 ASSAY OF PHOSPHORUS: CPT | Performed by: SURGERY

## 2020-03-14 PROCEDURE — 25010000002 HYDROMORPHONE PER 4 MG: Performed by: SURGERY

## 2020-03-14 PROCEDURE — 25010000002 ENOXAPARIN PER 10 MG: Performed by: SURGERY

## 2020-03-14 PROCEDURE — 82962 GLUCOSE BLOOD TEST: CPT

## 2020-03-14 RX ADMIN — I.V. FAT EMULSION 20 G: 20 EMULSION INTRAVENOUS at 18:26

## 2020-03-14 RX ADMIN — HYDROMORPHONE HYDROCHLORIDE 0.5 MG: 1 INJECTION, SOLUTION INTRAMUSCULAR; INTRAVENOUS; SUBCUTANEOUS at 22:36

## 2020-03-14 RX ADMIN — ONDANSETRON 4 MG: 2 INJECTION INTRAMUSCULAR; INTRAVENOUS at 15:24

## 2020-03-14 RX ADMIN — LEVOTHYROXINE SODIUM 50 MCG: 50 TABLET ORAL at 06:21

## 2020-03-14 RX ADMIN — SODIUM CHLORIDE, PRESERVATIVE FREE 10 ML: 5 INJECTION INTRAVENOUS at 21:58

## 2020-03-14 RX ADMIN — ATENOLOL 50 MG: 50 TABLET ORAL at 11:29

## 2020-03-14 RX ADMIN — SODIUM CHLORIDE, PRESERVATIVE FREE 10 ML: 5 INJECTION INTRAVENOUS at 11:31

## 2020-03-14 RX ADMIN — POTASSIUM CHLORIDE: 2 INJECTION, SOLUTION, CONCENTRATE INTRAVENOUS at 18:26

## 2020-03-14 RX ADMIN — ENOXAPARIN SODIUM 40 MG: 40 INJECTION SUBCUTANEOUS at 18:46

## 2020-03-14 RX ADMIN — HYDROMORPHONE HYDROCHLORIDE 0.5 MG: 1 INJECTION, SOLUTION INTRAMUSCULAR; INTRAVENOUS; SUBCUTANEOUS at 18:46

## 2020-03-14 RX ADMIN — ATENOLOL 50 MG: 50 TABLET ORAL at 22:35

## 2020-03-14 RX ADMIN — SODIUM CHLORIDE, PRESERVATIVE FREE 10 ML: 5 INJECTION INTRAVENOUS at 21:59

## 2020-03-14 RX ADMIN — HYDROMORPHONE HYDROCHLORIDE 0.5 MG: 1 INJECTION, SOLUTION INTRAMUSCULAR; INTRAVENOUS; SUBCUTANEOUS at 15:24

## 2020-03-14 RX ADMIN — HYDROMORPHONE HYDROCHLORIDE 0.5 MG: 1 INJECTION, SOLUTION INTRAMUSCULAR; INTRAVENOUS; SUBCUTANEOUS at 07:41

## 2020-03-14 RX ADMIN — ONDANSETRON 4 MG: 2 INJECTION INTRAMUSCULAR; INTRAVENOUS at 07:41

## 2020-03-14 NOTE — PROGRESS NOTES
"Pharmacy to dose TPN - Daily Progress Note  Patient: Mandy Alarcon  MRN#: [id number]  Admission Date:     Mandy Alarcon is a 86 y.o. female receiving TPN for pancreatitis.   POD 2, S/P laparoscopic cholecystostomy tube    TPN # 13 per Dr. Andujar's request.    Principal Problem:    Acute pancreatitis  Active Problems:    Hyperlipidemia    Primary hypothyroidism    Supraventricular tachycardia (CMS/[facility])    Obesity (BMI 30-39.9)    CKD (chronic kidney disease) stage 3, GFR 30-59 ml/min (CMS/[facility])    Hyponatremia    Cholecystitis    Subjective/Objective  160 cm (63\")  97.3 kg (214 lb 9.6 oz)  Body mass index is 38.01 kg/m².   Results from last 7 days   Lab Units 20  0501  20  0458   SODIUM mmol/L 130*   < > 136   POTASSIUM mmol/L 4.3   < > 4.5   CHLORIDE mmol/L 101   < > 102   CO2 mmol/L 21.0*   < > 24.2   BUN mg/dL 24*   < > 20   CREATININE mg/dL 0.96   < > 0.94   CALCIUM mg/dL 8.3*   < > 8.9   ALBUMIN g/dL 2.00*   < > 2.10*   BILIRUBIN mg/dL 0.9   < > 0.4   ALK PHOS U/L 51   < > 55   ALT (SGPT) U/L 13   < > 20   AST (SGOT) U/L 14   < > 25   GLUCOSE mg/dL 125*   < > 129*   MAGNESIUM mg/dL 1.7   < > 1.7   PHOSPHORUS mg/dL 2.8   < > 3.5   TRIGLYCERIDES mg/dL  --   --  104    < > = values in this interval not displayed.   Corrected Calcium for Albumin = 9.9mg/dL     I/O last 3 completed shifts:  In: 100 [P.O.:100]  Out: 2850 [Urine:2400; Drains:350; Other:100]  Diet Orders (active) (From admission, onward)     Start     Ordered    20  NPO Diet NPO Except: Ice Chips, Sips With Meds  Diet Effective Now      20              Additional insulin administration while previous TPN infusin units  Of note, patient hasn't received any insulin this admission  Additional electrolyte administration while previous TPN infusing: none  Acid suppression: Famotidine 20 mg in TPN    Dietitian's Goals from 3/12:   Protein: 65 grams/day (0.68 g/kg/day)   Dextrose: 1000 Kcal/day   Lipids: 100 mL " of 20% lipid infusion 7 days/week     Plan    1) Continue current macronutrients. Continue 65 mL/h due to increasing previously increasing BUN/SCr.    2) Based on the above labs, will add the following electrolytes/additives to the TPN.   Sodium chloride: 120 mEq   Sodium acetate: 70 mEq (increased from 40 mEq)  Potassium chloride: 45 mEq  Potassium phosphate: 20 mEq (increased from 15 mEq)  Calcium gluconate: 5 mEq (corrected calcium near ULN)  Magnesium sulfate: 12 mEq (increased from 10 mEq)  Multivitamin: 10 mL   Trace Elements: 1 mL   Famotidine: 20 mg    Labs: CMP, Mag, Phos tomorrow with AM labs    Pharmacy will continue to follow and monitor daily while patient on TPN.   Thanks, Qasim Singh, PharmD, BCPS

## 2020-03-14 NOTE — PROGRESS NOTES
Postop day 1 laparoscopy and placement of cholecystostomy tube    Subjective:  Clinically feels okay.  Some mild nausea, no appetite.  Pain seems under reasonable control.    Objective:  Afebrile heart rate 60s blood pressure 116/59  General: Awake alert and oriented, answers questions appropriately, no distress  Abdomen: Soft, appropriately tender, dressings are clean  Cholecystostomy tube patent and draining clear appearing bile    Hemoglobin 10.8, albumin 2.0, liver function studies normal, total bilirubin 0.9.    Assessment and plan:  -Severe acute cholecystitis, now status post laparoscopy and cholecystostomy drainage  -Continue TPN  -Await resumption of GI function  -CT abdomen pelvis tomorrow    Fransisco Curry MD  General and Endoscopic Surgery  [facility]    [address], [address]  [place], [place], [address]  P: [phone]  F: [phone]

## 2020-03-14 NOTE — PROGRESS NOTES
" LOS: 16 days     Name: Mandy Alarcon  Age: 86 y.o.  Sex: female  :  1933  MRN: [id number]         Primary Care Physician: Daryl Santos MD    Subjective   Subjective  Complains of intermittent episodes of abdominal discomfort.  Feels okay at present.  No acute overnight events.    Objective   Vital Signs  Temp:  [98.1 °F (36.7 °C)-99.3 °F (37.4 °C)] 99.3 °F (37.4 °C)  Heart Rate:  [60-65] 62  Resp:  [16] 16  BP: ()/(42-78) 116/59  Body mass index is 38.01 kg/m².    Objective:  General Appearance:  Comfortable and in no acute distress.    Vital signs: (most recent): Blood pressure 116/59, pulse 62, temperature 99.3 °F (37.4 °C), temperature source Oral, resp. rate 16, height 160 cm (63\"), weight 97.3 kg (214 lb 9.6 oz), SpO2 92 %, not currently breastfeeding.    Lungs:  Normal effort and normal respiratory rate.    Heart: Normal rate.  Regular rhythm.    Abdomen: Abdomen is soft.  (Cholecystostomy tube extending from the right upper quadrant).  Bowel sounds are normal.   There is generalized tenderness.     Extremities: There is no dependent edema or local swelling.    Neurological: Patient is alert and oriented to person, place and time.    Skin:  Warm and dry.              Results Review:       I reviewed the patient's new clinical results.    Results from last 7 days   Lab Units 20  05020  0508 20  0534   WBC 10*3/mm3 11.64* 10.27  --  8.37   HEMOGLOBIN g/dL 10.8* 11.2*  --  10.6*   PLATELETS 10*3/mm3 156 183 216 236     Results from last 7 days   Lab Units 20  0501 20  0508 20  03420  0446 03/10/20  0449 20  0458 20  0534   SODIUM mmol/L 130* 135* 135* 134* 135* 136 128*   POTASSIUM mmol/L 4.3 4.9 4.6 4.4 4.5 4.5 4.2   CHLORIDE mmol/L 101 103 104 103 103 102 96*   CO2 mmol/L 21.0* 22.9 23.1 22.7 22.8 24.2 23.8   BUN mg/dL 24* 24* 23 23 21 20 19   CREATININE mg/dL 0.96 1.13* 0.99 0.99 0.97 0.94 0.92   CALCIUM mg/dL 8.3* " 8.4* 9.0 8.6 8.8 8.9 8.7   GLUCOSE mg/dL 125* 131* 117* 120* 110* 129* 118*                 Scheduled Meds:     atenolol 50 mg Oral Q12H   enoxaparin 40 mg Subcutaneous Q24H   Fat Emulsion Plant Based 100 mL Intravenous Q24H (TPN)   levothyroxine 50 mcg Oral Q AM   sodium chloride 10 mL Intravenous Q12H   sodium chloride 10 mL Intravenous Q12H   sodium chloride 10 mL Intravenous Q12H   sodium chloride 10 mL Intravenous Q12H     PRN Meds:   •  acetaminophen **OR** acetaminophen **OR** acetaminophen  •  HYDROmorphone  •  [] HYDROmorphone **AND** naloxone  •  nitroglycerin  •  ondansetron  •  Pharmacy to Dose TPN  •  promethazine  •  simethicone  •  [COMPLETED] Insert peripheral IV **AND** sodium chloride  •  sodium chloride  •  sodium chloride  •  sodium chloride  Continuous Infusions:    Adult Central 2-in-1 TPN  Last Rate: 65 mL/hr at 20 1813   Adult Central 2-in-1 TPN     Pharmacy to Dose TPN         Assessment/Plan   Active Hospital Problems    Diagnosis  POA   • **Acute pancreatitis [K85.90]  Unknown   • Cholecystitis [K81.9]  Unknown   • Hyponatremia [E87.1]  Yes   • CKD (chronic kidney disease) stage 3, GFR 30-59 ml/min (CMS/HCC) [N18.3]  Yes   • Obesity (BMI 30-39.9) [E66.9]  Yes   • Supraventricular tachycardia (CMS/[facility]) [I47.1]  Yes   • Primary hypothyroidism [E03.9]  Yes   • Hyperlipidemia [E78.5]  Yes      Resolved Hospital Problems    Diagnosis Date Resolved POA   • Fever [R50.9] 2020 Yes   • LINNEA (acute kidney injury) (CMS/[facility]) [N17.9] 2020 Yes       Assessment & Plan    -POD 2 from attempted laparoscopic cholecystectomy and laparoscopic cholecystostomy tube placement  -Continue bowel rest, analgesia, antiemetics  -Await return of GI function  -Electrolytes appear stable.  Pharmacy assisting with management of TPN  -Creatinine stable  -Discussed with the patient and daughters at the bedside    Bryce Rivera MD  [facility]  20  1:33 PM

## 2020-03-14 NOTE — PLAN OF CARE
Problem: Patient Care Overview  Goal: Plan of Care Review  Outcome: Ongoing (interventions implemented as appropriate)  Flowsheets (Taken 3/14/2020 0537)  Progress: no change  Plan of Care Reviewed With: patient  Outcome Summary: Pt is alert and oriented x4. Assist x1 when up . Pt c/o pain in incision site prn IV Dilaudid given x2 with relief noted. Pt with R triple PICC line and TPN on going. Will continue to monitor     Problem: Patient Care Overview  Goal: Individualization and Mutuality  Outcome: Ongoing (interventions implemented as appropriate)     Problem: Pancreatitis, Acute/Chronic (Adult)  Goal: Signs and Symptoms of Listed Potential Problems Will be Absent, Minimized or Managed (Pancreatitis, Acute/Chronic)  Outcome: Ongoing (interventions implemented as appropriate)     Problem: Skin Injury Risk (Adult)  Goal: Identify Related Risk Factors and Signs and Symptoms  Outcome: Ongoing (interventions implemented as appropriate)     Problem: Pain, Acute (Adult)  Goal: Identify Related Risk Factors and Signs and Symptoms  Outcome: Ongoing (interventions implemented as appropriate)     Problem: Pain, Acute (Adult)  Goal: Acceptable Pain Control/Comfort Level  Outcome: Ongoing (interventions implemented as appropriate)     Problem: Fall Risk (Adult)  Goal: Identify Related Risk Factors and Signs and Symptoms  Outcome: Ongoing (interventions implemented as appropriate)

## 2020-03-15 ENCOUNTER — APPOINTMENT (OUTPATIENT)
Dept: CT IMAGING | Facility: HOSPITAL | Age: 85
End: 2020-03-15

## 2020-03-15 LAB
ALBUMIN SERPL-MCNC: 2.3 G/DL (ref 3.5–5.2)
ALBUMIN/GLOB SERPL: 0.6 G/DL
ALP SERPL-CCNC: 62 U/L (ref 39–117)
ALT SERPL W P-5'-P-CCNC: 13 U/L (ref 1–33)
ANION GAP SERPL CALCULATED.3IONS-SCNC: 7.6 MMOL/L (ref 5–15)
AST SERPL-CCNC: 16 U/L (ref 1–32)
BACTERIA FLD CULT: ABNORMAL
BILIRUB SERPL-MCNC: 1.4 MG/DL (ref 0.2–1.2)
BUN BLD-MCNC: 20 MG/DL (ref 8–23)
BUN/CREAT SERPL: 21.3 (ref 7–25)
CALCIUM SPEC-SCNC: 8.3 MG/DL (ref 8.6–10.5)
CHLORIDE SERPL-SCNC: 101 MMOL/L (ref 98–107)
CO2 SERPL-SCNC: 21.4 MMOL/L (ref 22–29)
CREAT BLD-MCNC: 0.94 MG/DL (ref 0.57–1)
DEPRECATED RDW RBC AUTO: 47.5 FL (ref 37–54)
ERYTHROCYTE [DISTWIDTH] IN BLOOD BY AUTOMATED COUNT: 14.6 % (ref 12.3–15.4)
GFR SERPL CREATININE-BSD FRML MDRD: 56 ML/MIN/1.73
GLOBULIN UR ELPH-MCNC: 3.6 GM/DL
GLUCOSE BLD-MCNC: 115 MG/DL (ref 65–99)
GRAM STN SPEC: ABNORMAL
HCT VFR BLD AUTO: 34.8 % (ref 34–46.6)
HGB BLD-MCNC: 11.5 G/DL (ref 12–15.9)
MAGNESIUM SERPL-MCNC: 1.7 MG/DL (ref 1.6–2.4)
MCH RBC QN AUTO: 29.3 PG (ref 26.6–33)
MCHC RBC AUTO-ENTMCNC: 33 G/DL (ref 31.5–35.7)
MCV RBC AUTO: 88.8 FL (ref 79–97)
PHOSPHATE SERPL-MCNC: 2.8 MG/DL (ref 2.5–4.5)
PLATELET # BLD AUTO: 185 10*3/MM3 (ref 140–450)
PMV BLD AUTO: 10 FL (ref 6–12)
POTASSIUM BLD-SCNC: 4.4 MMOL/L (ref 3.5–5.2)
PROT SERPL-MCNC: 5.9 G/DL (ref 6–8.5)
RBC # BLD AUTO: 3.92 10*6/MM3 (ref 3.77–5.28)
SODIUM BLD-SCNC: 130 MMOL/L (ref 136–145)
WBC NRBC COR # BLD: 11 10*3/MM3 (ref 3.4–10.8)

## 2020-03-15 PROCEDURE — 25010000002 ONDANSETRON PER 1 MG: Performed by: SURGERY

## 2020-03-15 PROCEDURE — 84100 ASSAY OF PHOSPHORUS: CPT | Performed by: SURGERY

## 2020-03-15 PROCEDURE — 25010000002 MAGNESIUM SULFATE PER 500 MG OF MAGNESIUM: Performed by: SURGERY

## 2020-03-15 PROCEDURE — 25010000002 HYDROMORPHONE PER 4 MG: Performed by: SURGERY

## 2020-03-15 PROCEDURE — 25010000002 POTASSIUM CHLORIDE PER 2 MEQ OF POTASSIUM: Performed by: SURGERY

## 2020-03-15 PROCEDURE — 99024 POSTOP FOLLOW-UP VISIT: CPT | Performed by: SURGERY

## 2020-03-15 PROCEDURE — 74177 CT ABD & PELVIS W/CONTRAST: CPT

## 2020-03-15 PROCEDURE — 80053 COMPREHEN METABOLIC PANEL: CPT | Performed by: SURGERY

## 2020-03-15 PROCEDURE — 83735 ASSAY OF MAGNESIUM: CPT | Performed by: SURGERY

## 2020-03-15 PROCEDURE — 85027 COMPLETE CBC AUTOMATED: CPT | Performed by: INTERNAL MEDICINE

## 2020-03-15 PROCEDURE — 25010000002 IOPAMIDOL 61 % SOLUTION: Performed by: INTERNAL MEDICINE

## 2020-03-15 PROCEDURE — 25010000002 CALCIUM GLUCONATE PER 10 ML: Performed by: SURGERY

## 2020-03-15 PROCEDURE — 25010000002 ENOXAPARIN PER 10 MG: Performed by: SURGERY

## 2020-03-15 RX ADMIN — HYDROMORPHONE HYDROCHLORIDE 0.5 MG: 1 INJECTION, SOLUTION INTRAMUSCULAR; INTRAVENOUS; SUBCUTANEOUS at 21:59

## 2020-03-15 RX ADMIN — ATENOLOL 50 MG: 50 TABLET ORAL at 09:08

## 2020-03-15 RX ADMIN — ENOXAPARIN SODIUM 40 MG: 40 INJECTION SUBCUTANEOUS at 18:36

## 2020-03-15 RX ADMIN — IOPAMIDOL 85 ML: 612 INJECTION, SOLUTION INTRAVENOUS at 12:29

## 2020-03-15 RX ADMIN — LEVOTHYROXINE SODIUM 50 MCG: 50 TABLET ORAL at 06:12

## 2020-03-15 RX ADMIN — POTASSIUM CHLORIDE: 2 INJECTION, SOLUTION, CONCENTRATE INTRAVENOUS at 18:36

## 2020-03-15 RX ADMIN — HYDROMORPHONE HYDROCHLORIDE 0.5 MG: 1 INJECTION, SOLUTION INTRAMUSCULAR; INTRAVENOUS; SUBCUTANEOUS at 15:25

## 2020-03-15 RX ADMIN — ONDANSETRON 4 MG: 2 INJECTION INTRAMUSCULAR; INTRAVENOUS at 21:59

## 2020-03-15 RX ADMIN — SODIUM CHLORIDE, PRESERVATIVE FREE 10 ML: 5 INJECTION INTRAVENOUS at 15:26

## 2020-03-15 RX ADMIN — ONDANSETRON 4 MG: 2 INJECTION INTRAMUSCULAR; INTRAVENOUS at 15:25

## 2020-03-15 RX ADMIN — ATENOLOL 50 MG: 50 TABLET ORAL at 20:14

## 2020-03-15 RX ADMIN — SODIUM CHLORIDE, PRESERVATIVE FREE 10 ML: 5 INJECTION INTRAVENOUS at 20:15

## 2020-03-15 RX ADMIN — HYDROMORPHONE HYDROCHLORIDE 0.5 MG: 1 INJECTION, SOLUTION INTRAMUSCULAR; INTRAVENOUS; SUBCUTANEOUS at 06:37

## 2020-03-15 NOTE — PROGRESS NOTES
"Pharmacy to dose TPN - Daily Progress Note  Patient: Mandy Alarcon  MRN#: [id number]  Admission Date:     Mandy Alarcon is a 86 y.o. female receiving TPN for pancreatitis.   POD 3, S/P laparoscopic cholecystostomy tube    TPN # 14 per Dr. Andujar's request.    Principal Problem:    Acute pancreatitis  Active Problems:    Hyperlipidemia    Primary hypothyroidism    Supraventricular tachycardia (CMS/[facility])    Obesity (BMI 30-39.9)    CKD (chronic kidney disease) stage 3, GFR 30-59 ml/min (CMS/[facility])    Hyponatremia    Cholecystitis    Subjective/Objective  160 cm (63\")  97.3 kg (214 lb 9.6 oz)  Body mass index is 38.01 kg/m².   Results from last 7 days   Lab Units 03/15/20  0551  20  0458   SODIUM mmol/L 130*   < > 136   POTASSIUM mmol/L 4.4   < > 4.5   CHLORIDE mmol/L 101   < > 102   CO2 mmol/L 21.4*   < > 24.2   BUN mg/dL 20   < > 20   CREATININE mg/dL 0.94   < > 0.94   CALCIUM mg/dL 8.3*   < > 8.9   ALBUMIN g/dL 2.30*   < > 2.10*   BILIRUBIN mg/dL 1.4*   < > 0.4   ALK PHOS U/L 62   < > 55   ALT (SGPT) U/L 13   < > 20   AST (SGOT) U/L 16   < > 25   GLUCOSE mg/dL 115*   < > 129*   MAGNESIUM mg/dL 1.7   < > 1.7   PHOSPHORUS mg/dL 2.8   < > 3.5   TRIGLYCERIDES mg/dL  --   --  104    < > = values in this interval not displayed.   Corrected Calcium for Albumin = 9.66mg/dL     I/O last 3 completed shifts:  In: 110 [P.O.:110]  Out: 1550 [Urine:1400; Other:150]  Diet Orders (active) (From admission, onward)     Start     Ordered    20  NPO Diet NPO Except: Ice Chips, Sips With Meds  Diet Effective Now      20              Additional insulin administration while previous TPN infusin units  Additional electrolyte administration while previous TPN infusing: none  Acid suppression: Famotidine 20 mg in TPN    Dietitian's Goals from 3/12:   Protein: 65 grams/day (0.68 g/kg/day)   Dextrose: 1000 Kcal/day   Lipids: 100 mL of 20% lipid infusion 7 days/week     Plan    1) Bilirubin continues to " increase (0.4->0.5->0.9->1.4 today).  Will temporarily hold lipids, reduce dextrose content of TPN slightly (900 kcal today vs 1000 kcal yesterday), and will temporarily remove Trace Elements as BHLou product contains Copper and Manganese.  CMP again in AM to trend and for consideration of prior formula resumption.      2) Based on the above labs, will add the following electrolytes/additives to the TPN.   Sodium chloride: 130 mEq (increased from 120 mEq)  Sodium acetate: 80 mEq (increased from 70 mEq)  Potassium chloride: 35 mEq (decreased from 45 mEq)  Potassium phosphate: 20 mEq  Calcium gluconate: 5 mEq  Magnesium sulfate: 12 mEq  Multivitamin: 10 mL   Trace Elements: hold temporarily  Famotidine: 20 mg    Labs: CMP, Mag, Phos tomorrow with AM labs    Pharmacy will continue to follow and monitor daily while patient on TPN.   Thanks, Qasim Singh, PharmD, BCPS

## 2020-03-15 NOTE — PROGRESS NOTES
Postop day 2 laparoscopy and placement of cholecystostomy    Subjective:  No changes.  Some pain at the drain site.  Not much appetite.    Objective:  Patient is afebrile, heart rate 50s to 60s blood pressure 131/66  General: Awake and alert, no distress  Gastrointestinal: Abdomen is soft, minimal tenderness, incisions are okay    Cholecystostomy tube with 200 cc of normal-appearing bile    Labs reviewed.  Liver function studies have normalized, total bilirubin slightly elevated at 1.4, white blood cell count about the same at 11.0, hemoglobin stable at 11.5.    Assessment and plan:  -Gallstone pancreatitis and severe acute cholecystitis, now status post cholecystostomy drainage  -Clinically doing reasonably well to this point, plan for CT of the abdomen and pelvis today to reevaluate progress  -Continue TPN    Fransisco Curry MD  General and Endoscopic Surgery  [facility]    [address] [place] [address], [address]  [place], [place], [address]  P: [phone]  F: [phone]

## 2020-03-15 NOTE — PROGRESS NOTES
" LOS: 17 days     Name: Mandy Alarcon  Age: 86 y.o.  Sex: female  :  1933  MRN: [id number]         Primary Care Physician: Daryl Santos MD    Subjective   Subjective  No new complaints or overnight events.  Quadrant abdominal discomfort is a little bit better today.  Denies fevers or chills.    Objective   Vital Signs  Temp:  [97.5 °F (36.4 °C)-98 °F (36.7 °C)] 97.5 °F (36.4 °C)  Heart Rate:  [54-60] 54  Resp:  [16] 16  BP: (121-124)/(57-73) 121/73  Body mass index is 38.01 kg/m².    Objective:  General Appearance:  Comfortable, in no acute distress and ill-appearing.    Vital signs: (most recent): Blood pressure 121/73, pulse 54, temperature 97.5 °F (36.4 °C), temperature source Oral, resp. rate 16, height 160 cm (63\"), weight 97.3 kg (214 lb 9.6 oz), SpO2 94 %, not currently breastfeeding.    Lungs:  Normal effort and normal respiratory rate.    Heart: Normal rate.  Regular rhythm.    Abdomen: Abdomen is soft.  (Right upper quadrant cholecystostomy tube in place.  Laparoscopic abdominal incisions are well approximated.  Mild abdominal distention and a little bit of pain in the right upper quadrant).  Bowel sounds are normal.     Extremities: There is no dependent edema or local swelling.    Neurological: Patient is alert and oriented to person, place and time.    Skin:  Warm and dry.              Results Review:       I reviewed the patient's new clinical results.    Results from last 7 days   Lab Units 03/15/20  0551 20  0501 20  0508 20  0341   WBC 10*3/mm3 11.00* 11.64* 10.27  --    HEMOGLOBIN g/dL 11.5* 10.8* 11.2*  --    PLATELETS 10*3/mm3 185 156 183 216     Results from last 7 days   Lab Units 03/15/20  0551 20  0501 20  0508 20  0341 20  0446 03/10/20  0449 20  0458   SODIUM mmol/L 130* 130* 135* 135* 134* 135* 136   POTASSIUM mmol/L 4.4 4.3 4.9 4.6 4.4 4.5 4.5   CHLORIDE mmol/L 101 101 103 104 103 103 102   CO2 mmol/L 21.4* 21.0* 22.9 23.1 " 22.7 22.8 24.2   BUN mg/dL 20 24* 24* 23 23 21 20   CREATININE mg/dL 0.94 0.96 1.13* 0.99 0.99 0.97 0.94   CALCIUM mg/dL 8.3* 8.3* 8.4* 9.0 8.6 8.8 8.9   GLUCOSE mg/dL 115* 125* 131* 117* 120* 110* 129*                 Scheduled Meds:     atenolol 50 mg Oral Q12H   enoxaparin 40 mg Subcutaneous Q24H   levothyroxine 50 mcg Oral Q AM   sodium chloride 10 mL Intravenous Q12H   sodium chloride 10 mL Intravenous Q12H   sodium chloride 10 mL Intravenous Q12H   sodium chloride 10 mL Intravenous Q12H     PRN Meds:   •  acetaminophen **OR** acetaminophen **OR** acetaminophen  •  HYDROmorphone  •  [] HYDROmorphone **AND** naloxone  •  nitroglycerin  •  ondansetron  •  Pharmacy to Dose TPN  •  promethazine  •  simethicone  •  [COMPLETED] Insert peripheral IV **AND** sodium chloride  •  sodium chloride  •  sodium chloride  •  sodium chloride  Continuous Infusions:    Adult Central 2-in-1 TPN  Last Rate: 65 mL/hr at 20 1826   Adult Central 2-in-1 TPN     Pharmacy to Dose TPN         Assessment/Plan   Active Hospital Problems    Diagnosis  POA   • **Acute pancreatitis [K85.90]  Unknown   • Cholecystitis [K81.9]  Unknown   • Hyponatremia [E87.1]  Yes   • CKD (chronic kidney disease) stage 3, GFR 30-59 ml/min (CMS/[facility]) [N18.3]  Yes   • Obesity (BMI 30-39.9) [E66.9]  Yes   • Supraventricular tachycardia (CMS/[facility]) [I47.1]  Yes   • Primary hypothyroidism [E03.9]  Yes   • Hyperlipidemia [E78.5]  Yes      Resolved Hospital Problems    Diagnosis Date Resolved POA   • Fever [R50.9] 2020 Yes   • LINNEA (acute kidney injury) (CMS/[facility]) [N17.9] 2020 Yes       Assessment & Plan    -POD 3 from attempted laparoscopic cholecystectomy and laparoscopic cholecystostomy tube placement.  Going for repeat CT scan today.  -Continue bowel rest, analgesia, antiemetics  -Await return of GI function  -Electrolytes appear stable.  Pharmacy assisting with management of TPN  -Creatinine stable  -Discussed with the patient and daughter at  the bedside    Bryce Rivera MD  [facility]  03/15/20  12:47 PM

## 2020-03-15 NOTE — PLAN OF CARE
Problem: Patient Care Overview  Goal: Plan of Care Review  Outcome: Ongoing (interventions implemented as appropriate)  Flowsheets (Taken 3/15/2020 0504)  Progress: improving  Plan of Care Reviewed With: patient  Outcome Summary: Pt able to sleep most of the night. Pt asked for pain meds x 1 with relief noted. Pt and daughter refused to be turn. Will continue to monitor.     Problem: Patient Care Overview  Goal: Individualization and Mutuality  Outcome: Ongoing (interventions implemented as appropriate)     Problem: Pancreatitis, Acute/Chronic (Adult)  Goal: Signs and Symptoms of Listed Potential Problems Will be Absent, Minimized or Managed (Pancreatitis, Acute/Chronic)  Outcome: Ongoing (interventions implemented as appropriate)     Problem: Skin Injury Risk (Adult)  Goal: Identify Related Risk Factors and Signs and Symptoms  Outcome: Ongoing (interventions implemented as appropriate)     Problem: Pain, Acute (Adult)  Goal: Identify Related Risk Factors and Signs and Symptoms  Outcome: Ongoing (interventions implemented as appropriate)     Problem: Fall Risk (Adult)  Goal: Identify Related Risk Factors and Signs and Symptoms  Outcome: Ongoing (interventions implemented as appropriate)

## 2020-03-16 LAB
ALBUMIN SERPL-MCNC: 1.9 G/DL (ref 3.5–5.2)
ALBUMIN/GLOB SERPL: 0.6 G/DL
ALP SERPL-CCNC: 62 U/L (ref 39–117)
ALT SERPL W P-5'-P-CCNC: 13 U/L (ref 1–33)
ANION GAP SERPL CALCULATED.3IONS-SCNC: 7.5 MMOL/L (ref 5–15)
ANION GAP SERPL CALCULATED.3IONS-SCNC: 9.5 MMOL/L (ref 5–15)
AST SERPL-CCNC: 17 U/L (ref 1–32)
BILIRUB SERPL-MCNC: 1.8 MG/DL (ref 0.2–1.2)
BUN BLD-MCNC: 19 MG/DL (ref 8–23)
BUN BLD-MCNC: 19 MG/DL (ref 8–23)
BUN/CREAT SERPL: 19.6 (ref 7–25)
BUN/CREAT SERPL: 20 (ref 7–25)
CALCIUM SPEC-SCNC: 7.7 MG/DL (ref 8.6–10.5)
CALCIUM SPEC-SCNC: 8.1 MG/DL (ref 8.6–10.5)
CHLORIDE SERPL-SCNC: 102 MMOL/L (ref 98–107)
CHLORIDE SERPL-SCNC: 98 MMOL/L (ref 98–107)
CO2 SERPL-SCNC: 21.5 MMOL/L (ref 22–29)
CO2 SERPL-SCNC: 23.5 MMOL/L (ref 22–29)
CREAT BLD-MCNC: 0.95 MG/DL (ref 0.57–1)
CREAT BLD-MCNC: 0.97 MG/DL (ref 0.57–1)
DEPRECATED RDW RBC AUTO: 48.9 FL (ref 37–54)
ERYTHROCYTE [DISTWIDTH] IN BLOOD BY AUTOMATED COUNT: 14.4 % (ref 12.3–15.4)
GFR SERPL CREATININE-BSD FRML MDRD: 54 ML/MIN/1.73
GFR SERPL CREATININE-BSD FRML MDRD: 56 ML/MIN/1.73
GLOBULIN UR ELPH-MCNC: 3 GM/DL
GLUCOSE BLD-MCNC: 112 MG/DL (ref 65–99)
GLUCOSE BLD-MCNC: 757 MG/DL (ref 65–99)
HCT VFR BLD AUTO: 32.7 % (ref 34–46.6)
HGB BLD-MCNC: 10.1 G/DL (ref 12–15.9)
MAGNESIUM SERPL-MCNC: 2 MG/DL (ref 1.6–2.4)
MCH RBC QN AUTO: 29.1 PG (ref 26.6–33)
MCHC RBC AUTO-ENTMCNC: 30.9 G/DL (ref 31.5–35.7)
MCV RBC AUTO: 94.2 FL (ref 79–97)
PHOSPHATE SERPL-MCNC: 3.9 MG/DL (ref 2.5–4.5)
PLATELET # BLD AUTO: 150 10*3/MM3 (ref 140–450)
PMV BLD AUTO: 10.4 FL (ref 6–12)
POTASSIUM BLD-SCNC: 4.5 MMOL/L (ref 3.5–5.2)
POTASSIUM BLD-SCNC: 5.9 MMOL/L (ref 3.5–5.2)
PROT SERPL-MCNC: 4.9 G/DL (ref 6–8.5)
RBC # BLD AUTO: 3.47 10*6/MM3 (ref 3.77–5.28)
SODIUM BLD-SCNC: 129 MMOL/L (ref 136–145)
SODIUM BLD-SCNC: 133 MMOL/L (ref 136–145)
WBC NRBC COR # BLD: 7.82 10*3/MM3 (ref 3.4–10.8)

## 2020-03-16 PROCEDURE — 84100 ASSAY OF PHOSPHORUS: CPT | Performed by: SURGERY

## 2020-03-16 PROCEDURE — 25010000002 POTASSIUM CHLORIDE PER 2 MEQ OF POTASSIUM: Performed by: SURGERY

## 2020-03-16 PROCEDURE — 25010000002 ENOXAPARIN PER 10 MG: Performed by: SURGERY

## 2020-03-16 PROCEDURE — 25010000002 PROMETHAZINE PER 50 MG: Performed by: SURGERY

## 2020-03-16 PROCEDURE — 85027 COMPLETE CBC AUTOMATED: CPT | Performed by: INTERNAL MEDICINE

## 2020-03-16 PROCEDURE — 25010000002 MAGNESIUM SULFATE PER 500 MG OF MAGNESIUM: Performed by: SURGERY

## 2020-03-16 PROCEDURE — 80053 COMPREHEN METABOLIC PANEL: CPT | Performed by: SURGERY

## 2020-03-16 PROCEDURE — 25010000002 CALCIUM GLUCONATE PER 10 ML: Performed by: SURGERY

## 2020-03-16 PROCEDURE — 83735 ASSAY OF MAGNESIUM: CPT | Performed by: SURGERY

## 2020-03-16 PROCEDURE — 99024 POSTOP FOLLOW-UP VISIT: CPT | Performed by: SURGERY

## 2020-03-16 PROCEDURE — 25010000002 ONDANSETRON PER 1 MG: Performed by: SURGERY

## 2020-03-16 PROCEDURE — 25010000002 HYDROMORPHONE PER 4 MG: Performed by: SURGERY

## 2020-03-16 RX ADMIN — POTASSIUM CHLORIDE: 2 INJECTION, SOLUTION, CONCENTRATE INTRAVENOUS at 18:35

## 2020-03-16 RX ADMIN — SODIUM CHLORIDE, PRESERVATIVE FREE 10 ML: 5 INJECTION INTRAVENOUS at 08:40

## 2020-03-16 RX ADMIN — ATENOLOL 50 MG: 50 TABLET ORAL at 20:06

## 2020-03-16 RX ADMIN — SODIUM CHLORIDE, PRESERVATIVE FREE 10 ML: 5 INJECTION INTRAVENOUS at 20:07

## 2020-03-16 RX ADMIN — SODIUM CHLORIDE, PRESERVATIVE FREE 10 ML: 5 INJECTION INTRAVENOUS at 08:39

## 2020-03-16 RX ADMIN — ENOXAPARIN SODIUM 40 MG: 40 INJECTION SUBCUTANEOUS at 18:58

## 2020-03-16 RX ADMIN — SODIUM CHLORIDE, PRESERVATIVE FREE 10 ML: 5 INJECTION INTRAVENOUS at 20:08

## 2020-03-16 RX ADMIN — ONDANSETRON 4 MG: 2 INJECTION INTRAMUSCULAR; INTRAVENOUS at 06:08

## 2020-03-16 RX ADMIN — LEVOTHYROXINE SODIUM 50 MCG: 50 TABLET ORAL at 06:47

## 2020-03-16 RX ADMIN — HYDROMORPHONE HYDROCHLORIDE 0.5 MG: 1 INJECTION, SOLUTION INTRAMUSCULAR; INTRAVENOUS; SUBCUTANEOUS at 21:54

## 2020-03-16 RX ADMIN — PROMETHAZINE HYDROCHLORIDE 12.5 MG: 25 INJECTION INTRAMUSCULAR; INTRAVENOUS at 02:31

## 2020-03-16 RX ADMIN — I.V. FAT EMULSION 20 G: 20 EMULSION INTRAVENOUS at 18:35

## 2020-03-16 RX ADMIN — HYDROMORPHONE HYDROCHLORIDE 0.5 MG: 1 INJECTION, SOLUTION INTRAMUSCULAR; INTRAVENOUS; SUBCUTANEOUS at 09:17

## 2020-03-16 NOTE — PROGRESS NOTES
"Pharmacy to dose TPN - Daily Progress Note  Patient: Mandy Alarcon  MRN#: [id number]  Admission Date:     TPN # 15 per Dr Andujar  Indication: pancreatitis    Principal Problem:    Acute pancreatitis  Active Problems:    Hyperlipidemia    Primary hypothyroidism    Supraventricular tachycardia (CMS/HCC)    Obesity (BMI 30-39.9)    CKD (chronic kidney disease) stage 3, GFR 30-59 ml/min (CMS/HCC)    Hyponatremia    Cholecystitis    Subjective/Objective  86 y.o. female 160 cm (63\") 97.1 kg (214 lb)  Results from last 7 days   Lab Units 20  0741 20  0606   SODIUM mmol/L 133* 129*   POTASSIUM mmol/L 4.5 5.9*   CHLORIDE mmol/L 102 98   CO2 mmol/L 23.5 21.5*   BUN mg/dL 19 19   CREATININE mg/dL 0.97 0.95   CALCIUM mg/dL 8.1* 7.7*   ALBUMIN g/dL  --  1.90*   BILIRUBIN mg/dL  --  1.8*   ALK PHOS U/L  --  62   ALT (SGPT) U/L  --  13   AST (SGOT) U/L  --  17   GLUCOSE mg/dL 112* 757*   MAGNESIUM mg/dL  --  2.0   PHOSPHORUS mg/dL  --  3.9      I/O last 3 completed shifts:  In: 110 [P.O.:110]  Out: 850 [Urine:550; Drains:250; Other:50]    Diet Orders (active) (From admission, onward)       Start     Ordered    20 1800  Adult Central 2-in-1 TPN  Continuous TPN      20 0835    20 1800  Fat Emulsion Plant Based (INTRALIPID,LIPOSYN) 20 % infusion 20 g  Once per day on 20 0836    20  NPO Diet NPO Except: Ice Chips, Sips With Meds  Diet Effective Now      20                  Additional insulin administration while previous TPN infusin units  Additional electrolyte administration while previous TPN infusin  Acid suppression: famotidine in TPN  Stool in last 24 hours:     Daily Assessment  Current macronutrients: protein 65 gm/day, dextrose 900 kcal/day, lipids 100ml kcal/day MWF  Fluid rate: 50    Plan    Na has continued to decrease - will decrease TPN rate. K+ also up - will reduce K+ in TPN    Shawn Richard PharmD    "

## 2020-03-16 NOTE — PROGRESS NOTES
Chief Complaint:    POD 4, S/P laparoscopic cholecystostomy tube    Subjective:    Still having nausea overnight.    Objective:    Vitals:    03/16/20 0640 03/16/20 0659 03/16/20 0838 03/16/20 1321   BP:  120/62  103/48   BP Location:  Left arm  Left arm   Patient Position:  Lying  Lying   Pulse:  57 55 55   Resp:  18  16   Temp:  99 °F (37.2 °C)  97.7 °F (36.5 °C)   TempSrc:  Oral  Oral   SpO2:  96%  97%   Weight: 97.1 kg (214 lb)      Height:         Biliary drain 250 mL for the last 24 hours.  Clear dumont bile.    Lungs: Clear  Heart: Regular  Abdomen: Incisions look okay. BS present.   Extremities: Warm    Labs reviewed.  WBC 7.82, Hgb 10.1, platelets 150.  Creat 0.97, CO2 23.5.    OR cultures grew Enterobacter cloacae    I reviewed the images and the report of a CT scan of the abdomen and pelvis performed yesterday.  There is improvement in the peripancreatic fluid collections.  The gallbladder is decompressed with the cholecystostomy tube.  There is a small amount of complex fluid external to the tube at the inferior margin of the liver, that I think is probably blood loss the time of the surgery.    Assessment:    POD 4, S/P laparoscopic cholecystostomy tube  Pancreatitis  Gallbladder sludge    Plan:    Because of the overall improvement in the appearance of the CT scan, I recommended trying a clear liquid diet.  Continue cholecystostomy tube to bedside drainage for now.  Continue TPN for now, until she is tolerating regular food.

## 2020-03-16 NOTE — PLAN OF CARE
Problem: Patient Care Overview  Goal: Plan of Care Review  Flowsheets (Taken 3/16/2020 0541)  Progress: no change  Plan of Care Reviewed With: patient; daughter  Outcome Summary: Complained of nausea and medicated X2, once for pain, cholecystostomey tube to drainage, lap sites CDI, encouraged to get out of bed, stated will today, VSS, TPN infusing, will continue to monitor.

## 2020-03-16 NOTE — CONSULTS
Adult Nutrition  Assessment/PES    Patient Name:  Mandy Alarcon  YOB: 1933  MRN: [id number]  Admit Date:  2/27/2020    Assessment Date:  3/16/2020    Comments:  TPN continues with 900 dextrose, 65gm AA, 20% 100ml lipids MWF at 50ml/hr. C/o nausea. Cholecystostomy tube to drainage.  Will continue to monitor.    Reason for Assessment     Row Name 03/16/20 0939          Reason for Assessment    Reason For Assessment  follow-up protocol         Nutrition/Diet History     Row Name 03/16/20 0939          Nutrition/Diet History    Typical Food/Fluid Intake  TPN continues         Anthropometrics     Row Name 03/16/20 0640          Anthropometrics    Weight  97.1 kg (214 lb)         Labs/Tests/Procedures/Meds     Row Name 03/16/20 0939          Labs/Procedures/Meds    Lab Results Reviewed  reviewed     Lab Results Comments  na, glu, alb, tbili, h/h        Diagnostic Tests/Procedures    Diagnostic Test/Procedure Reviewed  reviewed        Medications    Pertinent Medications Reviewed  reviewed     Pertinent Medications Comments  lovenox, synthroid         Physical Findings     Row Name 03/16/20 0944          Physical Findings    Overall Physical Appearance  obese     Gastrointestinal  nausea;vomiting     Tubes  -- cholecystostomy tube to drainage     Skin  -- intact           Nutrition Prescription Ordered     Row Name 03/16/20 0945          Nutrition Prescription PO    Current PO Diet  NPO        Nutrition Prescription PN    PN Current Rate (mL/hr)  50 mL/hr     Dextrose (Kcal)  900     Amino Acid (gm)  65     Lipid Concentration (%)  20%     Lipid Volume (mL)  100 mL         Evaluation of Received Nutrient/Fluid Intake     Row Name 03/16/20 0947          Calories Evaluation    Parenteral Calories (kcal)  1360 MWF     % of Kcal Needs  95        Protein Evaluation    Parenteral Protein (gm)  65     % of Protein Needs  100        Fluid Intake Evaluation    Parenteral Fluid (mL)  1200                Problem/Interventions:          Intervention Goal     Row Name 03/16/20 0948          Intervention Goal    General  Maintain nutrition;Nutrition support treatment;Improved nutrition related lab(s);Reduce/improve symptoms;Meet nutritional needs for age/condition;Disease management/therapy     TF/PN  Maintain TF/PN     Transition  PN to PO     Weight  No significant weight loss         Nutrition Intervention     Row Name 03/16/20 0948          Nutrition Intervention    RD/Tech Action  Follow Tx progress;Care plan reviewd           Education/Evaluation     Row Name 03/16/20 0948          Monitor/Evaluation    Monitor  Per protocol;I&O           Electronically signed by:  Lucía Orona RD  03/16/20 09:57

## 2020-03-16 NOTE — PLAN OF CARE
Problem: Patient Care Overview  Goal: Plan of Care Review  Outcome: Ongoing (interventions implemented as appropriate)  Flowsheets (Taken 3/16/2020 5003)  Progress: improving  Plan of Care Reviewed With: patient; daughter  Outcome Summary: Pt. c/o nausea and pain, medicated with relief x1. Diet changed to clear liquid diet. Seems to be tolerating well. Up to chair, VSS, Will CTM the rest of my shift.

## 2020-03-16 NOTE — PROGRESS NOTES
" LOS: 18 days     Name: Mandy Alarcon  Age: 86 y.o.  Sex: female  :  1933  MRN: [id number]         Primary Care Physician: Daryl Santos MD    Subjective   Subjective  Had a little bit of a rough night with nausea and right upper quadrant pain.  These are better this morning.  Quite tired from lack of sleep last night.    Objective   Vital Signs  Temp:  [97.5 °F (36.4 °C)-99 °F (37.2 °C)] 99 °F (37.2 °C)  Heart Rate:  [55-63] 55  Resp:  [16-18] 18  BP: (120-136)/(55-69) 120/62  Body mass index is 37.91 kg/m².    Objective:  General Appearance:  Comfortable and in no acute distress.    Vital signs: (most recent): Blood pressure 120/62, pulse 55, temperature 99 °F (37.2 °C), temperature source Oral, resp. rate 18, height 160 cm (63\"), weight 97.1 kg (214 lb), SpO2 96 %, not currently breastfeeding.    Lungs:  Normal effort and normal respiratory rate.    Heart: Normal rate.  Regular rhythm.    Abdomen: Abdomen is soft.  (Right upper quadrant cholecystostomy tube in place).  Bowel sounds are normal.   There is no abdominal tenderness.     Extremities: There is no dependent edema or local swelling.    Neurological: Patient is alert and oriented to person, place and time.    Skin:  Warm and dry.              Results Review:       I reviewed the patient's new clinical results.    Results from last 7 days   Lab Units 20  0606 03/15/20  0551 20  0501 20  0508 20  0341   WBC 10*3/mm3 7.82 11.00* 11.64* 10.27  --    HEMOGLOBIN g/dL 10.1* 11.5* 10.8* 11.2*  --    PLATELETS 10*3/mm3 150 185 156 183 216     Results from last 7 days   Lab Units 20  0741 20  0606 03/15/20  0551 20  0501 20  0508 20  0341 20  0446   SODIUM mmol/L 133* 129* 130* 130* 135* 135* 134*   POTASSIUM mmol/L 4.5 5.9* 4.4 4.3 4.9 4.6 4.4   CHLORIDE mmol/L 102 98 101 101 103 104 103   CO2 mmol/L 23.5 21.5* 21.4* 21.0* 22.9 23.1 22.7   BUN mg/dL 19 19 20 24* 24* 23 23   CREATININE mg/dL " 0.97 0.95 0.94 0.96 1.13* 0.99 0.99   CALCIUM mg/dL 8.1* 7.7* 8.3* 8.3* 8.4* 9.0 8.6   GLUCOSE mg/dL 112* 757* 115* 125* 131* 117* 120*           Scheduled Meds:     atenolol 50 mg Oral Q12H   enoxaparin 40 mg Subcutaneous Q24H   Fat Emulsion Plant Based 100 mL Intravenous Once per day on    levothyroxine 50 mcg Oral Q AM   sodium chloride 10 mL Intravenous Q12H   sodium chloride 10 mL Intravenous Q12H   sodium chloride 10 mL Intravenous Q12H   sodium chloride 10 mL Intravenous Q12H     PRN Meds:   •  acetaminophen **OR** acetaminophen **OR** acetaminophen  •  HYDROmorphone  •  [] HYDROmorphone **AND** naloxone  •  nitroglycerin  •  ondansetron  •  Pharmacy to Dose TPN  •  promethazine  •  simethicone  •  [COMPLETED] Insert peripheral IV **AND** sodium chloride  •  sodium chloride  •  sodium chloride  •  sodium chloride  Continuous Infusions:    Adult Central 2-in-1 TPN  Last Rate: 65 mL/hr at 03/15/20 1836   Adult Central 2-in-1 TPN     Pharmacy to Dose TPN         Assessment/Plan   Active Hospital Problems    Diagnosis  POA   • **Acute pancreatitis [K85.90]  Unknown   • Cholecystitis [K81.9]  Unknown   • Hyponatremia [E87.1]  Yes   • CKD (chronic kidney disease) stage 3, GFR 30-59 ml/min (CMS/[facility]) [N18.3]  Yes   • Obesity (BMI 30-39.9) [E66.9]  Yes   • Supraventricular tachycardia (CMS/[facility]) [I47.1]  Yes   • Primary hypothyroidism [E03.9]  Yes   • Hyperlipidemia [E78.5]  Yes      Resolved Hospital Problems    Diagnosis Date Resolved POA   • Fever [R50.9] 2020 Yes   • LINNEA (acute kidney injury) (CMS/[facility]) [N17.9] 2020 Yes       Assessment & Plan    -POD 4 from attempted laparoscopic cholecystectomy and laparoscopic cholecystostomy tube placement.   Repeat CT scan noted.  Plans per general surgery  -Continue bowel rest, analgesia, antiemetics  -Await return of GI function  -Electrolytes appear stable.  Pharmacy assisting with management of TPN  -Creatinine stable  -Discussed with the  patient and daughter at the bedside  -Lovenox for DVT prophylaxis    Bryce Rivera MD  [facility]  03/16/20  12:39 PM

## 2020-03-17 LAB
ALBUMIN SERPL-MCNC: 2 G/DL (ref 3.5–5.2)
ALBUMIN/GLOB SERPL: 0.6 G/DL
ALP SERPL-CCNC: 64 U/L (ref 39–117)
ALT SERPL W P-5'-P-CCNC: 21 U/L (ref 1–33)
ANION GAP SERPL CALCULATED.3IONS-SCNC: 8.2 MMOL/L (ref 5–15)
AST SERPL-CCNC: 24 U/L (ref 1–32)
BACTERIA SPEC ANAEROBE CULT: NORMAL
BILIRUB SERPL-MCNC: 1.5 MG/DL (ref 0.2–1.2)
BUN BLD-MCNC: 21 MG/DL (ref 8–23)
BUN/CREAT SERPL: 22.6 (ref 7–25)
CALCIUM SPEC-SCNC: 8.1 MG/DL (ref 8.6–10.5)
CHLORIDE SERPL-SCNC: 103 MMOL/L (ref 98–107)
CO2 SERPL-SCNC: 23.8 MMOL/L (ref 22–29)
CREAT BLD-MCNC: 0.93 MG/DL (ref 0.57–1)
DEPRECATED RDW RBC AUTO: 46.3 FL (ref 37–54)
ERYTHROCYTE [DISTWIDTH] IN BLOOD BY AUTOMATED COUNT: 14.5 % (ref 12.3–15.4)
GFR SERPL CREATININE-BSD FRML MDRD: 57 ML/MIN/1.73
GLOBULIN UR ELPH-MCNC: 3.3 GM/DL
GLUCOSE BLD-MCNC: 114 MG/DL (ref 65–99)
HCT VFR BLD AUTO: 30.2 % (ref 34–46.6)
HGB BLD-MCNC: 10.1 G/DL (ref 12–15.9)
MAGNESIUM SERPL-MCNC: 1.7 MG/DL (ref 1.6–2.4)
MCH RBC QN AUTO: 29.9 PG (ref 26.6–33)
MCHC RBC AUTO-ENTMCNC: 33.4 G/DL (ref 31.5–35.7)
MCV RBC AUTO: 89.3 FL (ref 79–97)
PHOSPHATE SERPL-MCNC: 3 MG/DL (ref 2.5–4.5)
PLATELET # BLD AUTO: 122 10*3/MM3 (ref 140–450)
PMV BLD AUTO: 9.7 FL (ref 6–12)
POTASSIUM BLD-SCNC: 4.1 MMOL/L (ref 3.5–5.2)
PROT SERPL-MCNC: 5.3 G/DL (ref 6–8.5)
RBC # BLD AUTO: 3.38 10*6/MM3 (ref 3.77–5.28)
SODIUM BLD-SCNC: 135 MMOL/L (ref 136–145)
TRIGL SERPL-MCNC: 155 MG/DL (ref 0–150)
WBC NRBC COR # BLD: 5.89 10*3/MM3 (ref 3.4–10.8)

## 2020-03-17 PROCEDURE — 99024 POSTOP FOLLOW-UP VISIT: CPT | Performed by: SURGERY

## 2020-03-17 PROCEDURE — 85027 COMPLETE CBC AUTOMATED: CPT | Performed by: INTERNAL MEDICINE

## 2020-03-17 PROCEDURE — 25010000002 ENOXAPARIN PER 10 MG: Performed by: SURGERY

## 2020-03-17 PROCEDURE — 80053 COMPREHEN METABOLIC PANEL: CPT | Performed by: SURGERY

## 2020-03-17 PROCEDURE — 84100 ASSAY OF PHOSPHORUS: CPT | Performed by: SURGERY

## 2020-03-17 PROCEDURE — 25010000002 CALCIUM GLUCONATE PER 10 ML: Performed by: SURGERY

## 2020-03-17 PROCEDURE — 25010000002 POTASSIUM CHLORIDE PER 2 MEQ OF POTASSIUM: Performed by: SURGERY

## 2020-03-17 PROCEDURE — 84478 ASSAY OF TRIGLYCERIDES: CPT | Performed by: SURGERY

## 2020-03-17 PROCEDURE — 25010000002 MAGNESIUM SULFATE PER 500 MG OF MAGNESIUM: Performed by: SURGERY

## 2020-03-17 PROCEDURE — 83735 ASSAY OF MAGNESIUM: CPT | Performed by: SURGERY

## 2020-03-17 PROCEDURE — 25010000002 HYDROMORPHONE PER 4 MG: Performed by: SURGERY

## 2020-03-17 RX ADMIN — SODIUM CHLORIDE, PRESERVATIVE FREE 10 ML: 5 INJECTION INTRAVENOUS at 08:54

## 2020-03-17 RX ADMIN — SODIUM CHLORIDE, PRESERVATIVE FREE 10 ML: 5 INJECTION INTRAVENOUS at 08:52

## 2020-03-17 RX ADMIN — SODIUM CHLORIDE, PRESERVATIVE FREE 10 ML: 5 INJECTION INTRAVENOUS at 20:27

## 2020-03-17 RX ADMIN — POTASSIUM CHLORIDE: 2 INJECTION, SOLUTION, CONCENTRATE INTRAVENOUS at 18:29

## 2020-03-17 RX ADMIN — ATENOLOL 50 MG: 50 TABLET ORAL at 20:28

## 2020-03-17 RX ADMIN — SODIUM CHLORIDE, PRESERVATIVE FREE 10 ML: 5 INJECTION INTRAVENOUS at 08:53

## 2020-03-17 RX ADMIN — LEVOTHYROXINE SODIUM 50 MCG: 50 TABLET ORAL at 06:45

## 2020-03-17 RX ADMIN — HYDROMORPHONE HYDROCHLORIDE 0.5 MG: 1 INJECTION, SOLUTION INTRAMUSCULAR; INTRAVENOUS; SUBCUTANEOUS at 20:26

## 2020-03-17 RX ADMIN — ENOXAPARIN SODIUM 40 MG: 40 INJECTION SUBCUTANEOUS at 19:02

## 2020-03-17 RX ADMIN — SODIUM CHLORIDE, PRESERVATIVE FREE 10 ML: 5 INJECTION INTRAVENOUS at 21:13

## 2020-03-17 NOTE — PLAN OF CARE
Problem: Patient Care Overview  Goal: Plan of Care Review  Outcome: Ongoing (interventions implemented as appropriate)  Flowsheets (Taken 3/17/2020 0062)  Progress: improving  Plan of Care Reviewed With: patient  Outcome Summary: Pt tolerated diet change well, was changed again today to full liquids per surgery. PICC dressing and caps changed today. TPN and lipids running. VSS Will CTM the rest of my shift.

## 2020-03-17 NOTE — PLAN OF CARE
Problem: Patient Care Overview  Goal: Plan of Care Review  Outcome: Ongoing (interventions implemented as appropriate)  Flowsheets (Taken 3/17/2020 3649)  Progress: no change  Plan of Care Reviewed With: patient; daughter  Outcome Summary: vitals stable.  pt expressed pain.  meds given per orders.  TPN and lipids infusing per orders.  pure wick catheter placed per pt and family request.  bed alarm and turning refused.  pt's daughter states she helps with all transfers and care.  education completed re: normal routine at home, CAUTI, falls, skin integrity, and mobility.  PICC line and biliary drain in place.  will continue to monitor.

## 2020-03-17 NOTE — CONSULTS
Adult Nutrition  Assessment/PES    Patient Name:  Mandy Alarcon  YOB: 1933  MRN: [id number]  Admit Date:  2/27/2020    Assessment Date:  3/17/2020    Comments:  Clear liquid diet started and pt tolerating. She is glad she is able to eat something and enjoys the apple juice and jello. TPN continues with 900 dextrose, 65gm AA, 20%100ml lipids MWF at 60ml/hr. Will continue to follow.    Reason for Assessment     Row Name 03/17/20 0945          Reason for Assessment    Reason For Assessment  follow-up protocol         Nutrition/Diet History     Row Name 03/17/20 0945          Nutrition/Diet History    Typical Food/Fluid Intake  clears started yesturday and she is tolerating     Food Preferences  likes 2 apple juice and likes the jello         Anthropometrics     Row Name 03/17/20 0628          Anthropometrics    Weight  96.5 kg (212 lb 11.9 oz)         Labs/Tests/Procedures/Meds     Row Name 03/17/20 0946          Labs/Procedures/Meds    Lab Results Reviewed  reviewed     Lab Results Comments  na, glu, trig, alb, tbili, h/h        Diagnostic Tests/Procedures    Diagnostic Test/Procedure Reviewed  reviewed        Medications    Pertinent Medications Reviewed  reviewed             Nutrition Prescription Ordered     Row Name 03/17/20 0949          Nutrition Prescription PO    Current PO Diet  Clear Liquid        Nutrition Prescription PN    PN Current Rate (mL/hr)  60 mL/hr     Dextrose (Kcal)  900     Amino Acid (gm)  65     Lipid Concentration (%)  20%     Lipid Volume (mL)  100 mL         Evaluation of Received Nutrient/Fluid Intake     Row Name 03/17/20 0950          Calories Evaluation    Parenteral Calories (kcal)  1360 MWF     % of Kcal Needs  95        Protein Evaluation    Parenteral Protein (gm)  65     % of Protein Needs  100        Fluid Intake Evaluation    Parenteral Fluid (mL)  1440               Problem/Interventions:          Intervention Goal     Row Name 03/17/20 0900           Intervention Goal    General  Maintain nutrition;Nutrition support treatment;Improved nutrition related lab(s);Reduce/improve symptoms;Meet nutritional needs for age/condition;Disease management/therapy     PO  Tolerate PO;Increase intake;Advance diet     TF/PN  Maintain TF/PN     Transition  PN to PO     Weight  No significant weight loss         Nutrition Intervention     Row Name 03/17/20 0951          Nutrition Intervention    RD/Tech Action  Follow Tx progress;Care plan reviewd;Interview for preference           Education/Evaluation     Row Name 03/17/20 0951          Monitor/Evaluation    Monitor  Per protocol;I&O;PO intake;Pertinent labs;PN delivery/tolerance;Weight;Skin status           Electronically signed by:  Lucía Orona RD  03/17/20 09:51

## 2020-03-17 NOTE — PROGRESS NOTES
Chief Complaint:    POD 5, S/P laparoscopic cholecystostomy tube    Subjective:    No nausea or pain last night.  Tolerated clear liquid diet.    Objective:    Vitals:    03/17/20 0628 03/17/20 0724 03/17/20 0851 03/17/20 1308   BP:  133/58  128/63   BP Location:  Left arm  Left arm   Patient Position:  Lying  Sitting   Pulse:  54 51 64   Resp:  16  16   Temp:  98.2 °F (36.8 °C)  97.7 °F (36.5 °C)   TempSrc:  Oral  Oral   SpO2:  98%  98%   Weight: 96.5 kg (212 lb 11.9 oz)      Height:         Biliary drain 350 mL for the last 24 hours.  Clear dumont bile.    Lungs: Clear  Heart: Regular  Abdomen: Incisions look okay. BS present.   Extremities: Warm    Labs reviewed.  WBC 5.89, Hgb 10.1, platelets 122.  Creat 0.93, CO2 23.8.    OR cultures grew Enterobacter cloacae    Assessment:    POD 5, S/P laparoscopic cholecystostomy tube  Pancreatitis  Gallbladder sludge    Plan:    Tolerated diet last night.  I will advance to full liquids.

## 2020-03-17 NOTE — PROGRESS NOTES
"Pharmacy to dose TPN - Daily Progress Note  Patient: Mandy Alarcon  MRN#: [id number]  Attending: No name on file.  Admission Date:     TPN # 16 per Dr Andujar  Indication:pancreatitis    Principal Problem:    Acute pancreatitis  Active Problems:    Hyperlipidemia    Primary hypothyroidism    Supraventricular tachycardia (CMS/HCC)    Obesity (BMI 30-39.9)    CKD (chronic kidney disease) stage 3, GFR 30-59 ml/min (CMS/HCC)    Hyponatremia    Cholecystitis    Subjective/Objective  86 y.o. female 160 cm (63\") 96.5 kg (212 lb 11.9 oz)  Results from last 7 days   Lab Units 20  0654   SODIUM mmol/L 135*   POTASSIUM mmol/L 4.1   CHLORIDE mmol/L 103   CO2 mmol/L 23.8   BUN mg/dL 21   CREATININE mg/dL 0.93   CALCIUM mg/dL 8.1*   ALBUMIN g/dL 2.00*   BILIRUBIN mg/dL 1.5*   ALK PHOS U/L 64   ALT (SGPT) U/L 21   AST (SGOT) U/L 24   GLUCOSE mg/dL 114*   MAGNESIUM mg/dL 1.7   PHOSPHORUS mg/dL 3.0   TRIGLYCERIDES mg/dL 155*      I/O last 3 completed shifts:  In: 6026 [P.O.:480]  Out: 750 [Urine:300; Emesis/NG output:350; Drains:100]    Diet Orders (active) (From admission, onward)       Start     Ordered    20 1800  Adult Central 2-in-1 TPN  Continuous TPN      20 0816    20 1524  Diet Clear Liquid  Diet Effective Now      20 1523                  Additional insulin administration while previous TPN infusin units  Additional electrolyte administration while previous TPN infusin  Acid suppression: in TPN  Stool in last 24 hours:     Daily Assessment  Current macronutrients: protein 65 gm/day, dextrose 900 kcal/day, lipids QMWF   Fluid rate: 60ml/hr    Plan    Will repeat yesterday's TPN.    Shawn Richard PharmD    "

## 2020-03-17 NOTE — PROGRESS NOTES
" LOS: 19 days     Name: Mandy Alarcon  Age: 86 y.o.  Sex: female  :  1933  MRN: [id number]         Primary Care Physician: Daryl Santos MD    Subjective   Subjective  Says that last night was the best that she has had so far.  Denies any pain or nausea.  No fevers.  No cough or shortness of breath.  Tolerating clear liquids.    Objective   Vital Signs  Temp:  [97.7 °F (36.5 °C)-98.2 °F (36.8 °C)] 97.7 °F (36.5 °C)  Heart Rate:  [51-64] 64  Resp:  [16] 16  BP: (124-133)/(53-63) 128/63  Body mass index is 37.69 kg/m².    Objective:  General Appearance:  Comfortable, in no acute distress and ill-appearing.    Vital signs: (most recent): Blood pressure 128/63, pulse 64, temperature 97.7 °F (36.5 °C), temperature source Oral, resp. rate 16, height 160 cm (63\"), weight 96.5 kg (212 lb 11.9 oz), SpO2 98 %, not currently breastfeeding.    Lungs:  Normal effort and normal respiratory rate.    Heart: Normal rate.  Regular rhythm.    Abdomen: Abdomen is soft.  (Cholecystostomy tube extending from right upper quadrant).  Bowel sounds are normal.   There is no abdominal tenderness.     Extremities: There is no dependent edema or local swelling.    Neurological: Patient is alert and oriented to person, place and time.    Skin:  Warm and dry.              Results Review:       I reviewed the patient's new clinical results.    Results from last 7 days   Lab Units 20  0654 20  0606 03/15/20  0551 20  0501 20  0508 20  0341   WBC 10*3/mm3 5.89 7.82 11.00* 11.64* 10.27  --    HEMOGLOBIN g/dL 10.1* 10.1* 11.5* 10.8* 11.2*  --    PLATELETS 10*3/mm3 122* 150 185 156 183 216     Results from last 7 days   Lab Units 20  0654 20  0741 20  0606 03/15/20  0551 20  0501 20  0508 20  0341   SODIUM mmol/L 135* 133* 129* 130* 130* 135* 135*   POTASSIUM mmol/L 4.1 4.5 5.9* 4.4 4.3 4.9 4.6   CHLORIDE mmol/L 103 102 98 101 101 103 104   CO2 mmol/L 23.8 23.5 21.5* 21.4* " 21.0* 22.9 23.1   BUN mg/dL 21 19 19 20 24* 24* 23   CREATININE mg/dL 0.93 0.97 0.95 0.94 0.96 1.13* 0.99   CALCIUM mg/dL 8.1* 8.1* 7.7* 8.3* 8.3* 8.4* 9.0   GLUCOSE mg/dL 114* 112* 757* 115* 125* 131* 117*                 Scheduled Meds:     atenolol 50 mg Oral Q12H   enoxaparin 40 mg Subcutaneous Q24H   Fat Emulsion Plant Based 100 mL Intravenous Once per day on    levothyroxine 50 mcg Oral Q AM   sodium chloride 10 mL Intravenous Q12H   sodium chloride 10 mL Intravenous Q12H   sodium chloride 10 mL Intravenous Q12H   sodium chloride 10 mL Intravenous Q12H     PRN Meds:   •  acetaminophen **OR** acetaminophen **OR** acetaminophen  •  HYDROmorphone  •  [] HYDROmorphone **AND** naloxone  •  nitroglycerin  •  ondansetron  •  Pharmacy to Dose TPN  •  promethazine  •  simethicone  •  [COMPLETED] Insert peripheral IV **AND** sodium chloride  •  sodium chloride  •  sodium chloride  •  sodium chloride  Continuous Infusions:    Adult Central 2-in-1 TPN  Last Rate: 60 mL/hr at 20 1835   Adult Central 2-in-1 TPN     Pharmacy to Dose TPN         Assessment/Plan   Active Hospital Problems    Diagnosis  POA   • **Acute pancreatitis [K85.90]  Unknown   • Cholecystitis [K81.9]  Unknown   • Hyponatremia [E87.1]  Yes   • CKD (chronic kidney disease) stage 3, GFR 30-59 ml/min (CMS/HCC) [N18.3]  Yes   • Obesity (BMI 30-39.9) [E66.9]  Yes   • Supraventricular tachycardia (CMS/[facility]) [I47.1]  Yes   • Primary hypothyroidism [E03.9]  Yes   • Hyperlipidemia [E78.5]  Yes      Resolved Hospital Problems    Diagnosis Date Resolved POA   • Fever [R50.9] 2020 Yes   • LINNEA (acute kidney injury) (CMS/[facility]) [N17.9] 2020 Yes       Assessment & Plan    -POD 5 from attempted laparoscopic cholecystectomy and laparoscopic cholecystostomy tube placement.   Repeat CT scan noted.  Plans per general surgery  -Tolerating clear liquids.  Continue other supportive measures.  -Await return of GI function  -Electrolytes appear  stable.  Pharmacy assisting with management of TPN  -Creatinine stable  -Discussed with the patient and daughter at the bedside  -Lovenox for DVT prophylaxis    Dispo  TBD    Bryce Rivera MD  [facility]  03/17/20  2:07 PM

## 2020-03-17 NOTE — PROGRESS NOTES
Continued Stay Note  [facility]     Patient Name: Mandy Alarcon  MRN: [id number]  Today's Date: 3/17/2020    Admit Date: 2/27/2020    Discharge Plan     Row Name 03/17/20 1647       Plan    Plan  Home with family- following for additional dc needs     Plan Comments  Reviewed clinicals, pt started on po diet, TPN continues until pt on solid food. CCP following. Debbie RN/CCP        Discharge Codes    No documentation.             Amy Patterson RN

## 2020-03-18 LAB
ANION GAP SERPL CALCULATED.3IONS-SCNC: 7.8 MMOL/L (ref 5–15)
BUN BLD-MCNC: 19 MG/DL (ref 8–23)
BUN/CREAT SERPL: 20.7 (ref 7–25)
CALCIUM SPEC-SCNC: 7.9 MG/DL (ref 8.6–10.5)
CHLORIDE SERPL-SCNC: 103 MMOL/L (ref 98–107)
CO2 SERPL-SCNC: 22.2 MMOL/L (ref 22–29)
CREAT BLD-MCNC: 0.92 MG/DL (ref 0.57–1)
DEPRECATED RDW RBC AUTO: 45.9 FL (ref 37–54)
ERYTHROCYTE [DISTWIDTH] IN BLOOD BY AUTOMATED COUNT: 14.4 % (ref 12.3–15.4)
GFR SERPL CREATININE-BSD FRML MDRD: 58 ML/MIN/1.73
GLUCOSE BLD-MCNC: 109 MG/DL (ref 65–99)
HCT VFR BLD AUTO: 31.7 % (ref 34–46.6)
HGB BLD-MCNC: 10.3 G/DL (ref 12–15.9)
MCH RBC QN AUTO: 28.8 PG (ref 26.6–33)
MCHC RBC AUTO-ENTMCNC: 32.5 G/DL (ref 31.5–35.7)
MCV RBC AUTO: 88.5 FL (ref 79–97)
PLATELET # BLD AUTO: 128 10*3/MM3 (ref 140–450)
PMV BLD AUTO: 9.8 FL (ref 6–12)
POTASSIUM BLD-SCNC: 4.3 MMOL/L (ref 3.5–5.2)
RBC # BLD AUTO: 3.58 10*6/MM3 (ref 3.77–5.28)
SODIUM BLD-SCNC: 133 MMOL/L (ref 136–145)
WBC NRBC COR # BLD: 6.04 10*3/MM3 (ref 3.4–10.8)

## 2020-03-18 PROCEDURE — 25010000002 ENOXAPARIN PER 10 MG: Performed by: SURGERY

## 2020-03-18 PROCEDURE — 25010000002 MAGNESIUM SULFATE PER 500 MG OF MAGNESIUM: Performed by: SURGERY

## 2020-03-18 PROCEDURE — 99024 POSTOP FOLLOW-UP VISIT: CPT | Performed by: SURGERY

## 2020-03-18 PROCEDURE — 25010000002 POTASSIUM CHLORIDE PER 2 MEQ OF POTASSIUM: Performed by: SURGERY

## 2020-03-18 PROCEDURE — 80048 BASIC METABOLIC PNL TOTAL CA: CPT | Performed by: SURGERY

## 2020-03-18 PROCEDURE — 25010000002 CALCIUM GLUCONATE PER 10 ML: Performed by: SURGERY

## 2020-03-18 PROCEDURE — 25010000002 HYDROMORPHONE PER 4 MG: Performed by: SURGERY

## 2020-03-18 PROCEDURE — 85027 COMPLETE CBC AUTOMATED: CPT | Performed by: INTERNAL MEDICINE

## 2020-03-18 RX ADMIN — ATENOLOL 50 MG: 50 TABLET ORAL at 08:27

## 2020-03-18 RX ADMIN — SODIUM CHLORIDE, PRESERVATIVE FREE 10 ML: 5 INJECTION INTRAVENOUS at 08:50

## 2020-03-18 RX ADMIN — SODIUM CHLORIDE, PRESERVATIVE FREE 10 ML: 5 INJECTION INTRAVENOUS at 08:28

## 2020-03-18 RX ADMIN — HYDROMORPHONE HYDROCHLORIDE 0.5 MG: 1 INJECTION, SOLUTION INTRAMUSCULAR; INTRAVENOUS; SUBCUTANEOUS at 21:36

## 2020-03-18 RX ADMIN — SODIUM CHLORIDE, PRESERVATIVE FREE 10 ML: 5 INJECTION INTRAVENOUS at 08:49

## 2020-03-18 RX ADMIN — ENOXAPARIN SODIUM 40 MG: 40 INJECTION SUBCUTANEOUS at 17:22

## 2020-03-18 RX ADMIN — SODIUM CHLORIDE, PRESERVATIVE FREE 10 ML: 5 INJECTION INTRAVENOUS at 08:48

## 2020-03-18 RX ADMIN — LEVOTHYROXINE SODIUM 50 MCG: 50 TABLET ORAL at 10:52

## 2020-03-18 RX ADMIN — POTASSIUM CHLORIDE: 2 INJECTION, SOLUTION, CONCENTRATE INTRAVENOUS at 18:48

## 2020-03-18 RX ADMIN — I.V. FAT EMULSION 20 G: 20 EMULSION INTRAVENOUS at 18:49

## 2020-03-18 NOTE — PROGRESS NOTES
Continued Stay Note  [facility]     Patient Name: Mandy Alarcon  MRN: [id number]  Today's Date: 3/18/2020    Admit Date: 2/27/2020    Discharge Plan     Row Name 03/18/20 1537       Plan    Plan  Home with [facility]  and family-    Provided Post Acute Provider List?  Yes    Patient/Family in Agreement with Plan  yes    Plan Comments  Spoke with pts daughter at bedside, discussed dc planning and she states plan is home with HH. She called her sister and they would like to use [facility]  for dc planning. CCP spoke with [place]/[facility]  for referral. Coby DUTTON/CCP        Discharge Codes    No documentation.             Amy Patterson, RN

## 2020-03-18 NOTE — PROGRESS NOTES
" LOS: 20 days     Name: Mandy Alarcon  Age: 86 y.o.  Sex: female  :  1933  MRN: [id number]         Primary Care Physician: Daryl Santos MD    Subjective   Subjective  No new complaints at present    Objective   Vital Signs  Temp:  [97.5 °F (36.4 °C)-98.4 °F (36.9 °C)] 98.4 °F (36.9 °C)  Heart Rate:  [55-64] 55  Resp:  [16] 16  BP: (120-128)/(57-68) 120/68  Body mass index is 36.71 kg/m².    Objective:  General Appearance:  Comfortable and in no acute distress.    Vital signs: (most recent): Blood pressure 120/68, pulse 55, temperature 98.4 °F (36.9 °C), temperature source Oral, resp. rate 16, height 160 cm (63\"), weight 94 kg (207 lb 3.7 oz), SpO2 98 %, not currently breastfeeding.    Lungs:  Normal effort and normal respiratory rate.    Heart: Normal rate.  Regular rhythm.    Abdomen: Abdomen is soft.  Bowel sounds are normal.   There is no abdominal tenderness.     Extremities: There is no dependent edema or local swelling.    Neurological: Patient is alert and oriented to person, place and time.    Skin:  Warm and dry.              Results Review:       I reviewed the patient's new clinical results.    Results from last 7 days   Lab Units 20  0543 20  0654 20  0606 03/15/20  0551 20  0501 20  0508 20  0341   WBC 10*3/mm3 6.04 5.89 7.82 11.00* 11.64* 10.27  --    HEMOGLOBIN g/dL 10.3* 10.1* 10.1* 11.5* 10.8* 11.2*  --    PLATELETS 10*3/mm3 128* 122* 150 185 156 183 216     Results from last 7 days   Lab Units 20  0543 20  0654 20  0741 20  0606 03/15/20  0551 20  0501 20  0508   SODIUM mmol/L 133* 135* 133* 129* 130* 130* 135*   POTASSIUM mmol/L 4.3 4.1 4.5 5.9* 4.4 4.3 4.9   CHLORIDE mmol/L 103 103 102 98 101 101 103   CO2 mmol/L 22.2 23.8 23.5 21.5* 21.4* 21.0* 22.9   BUN mg/dL 19 21 19 19 20 24* 24*   CREATININE mg/dL 0.92 0.93 0.97 0.95 0.94 0.96 1.13*   CALCIUM mg/dL 7.9* 8.1* 8.1* 7.7* 8.3* 8.3* 8.4*   GLUCOSE mg/dL 109* " 114* 112* 757* 115* 125* 131*                 Scheduled Meds:     atenolol 50 mg Oral Q12H   enoxaparin 40 mg Subcutaneous Q24H   Fat Emulsion Plant Based 100 mL Intravenous Once per day on    levothyroxine 50 mcg Oral Q AM   sodium chloride 10 mL Intravenous Q12H   sodium chloride 10 mL Intravenous Q12H   sodium chloride 10 mL Intravenous Q12H   sodium chloride 10 mL Intravenous Q12H     PRN Meds:   •  acetaminophen **OR** acetaminophen **OR** acetaminophen  •  HYDROmorphone  •  [] HYDROmorphone **AND** naloxone  •  nitroglycerin  •  ondansetron  •  Pharmacy to Dose TPN  •  promethazine  •  simethicone  •  [COMPLETED] Insert peripheral IV **AND** sodium chloride  •  sodium chloride  •  sodium chloride  •  sodium chloride  Continuous Infusions:    Adult Central 2-in-1 TPN  Last Rate: 60 mL/hr at 20 1829   Pharmacy to Dose TPN         Assessment/Plan   Active Hospital Problems    Diagnosis  POA   • **Acute pancreatitis [K85.90]  Unknown   • Cholecystitis [K81.9]  Unknown   • Hyponatremia [E87.1]  Yes   • CKD (chronic kidney disease) stage 3, GFR 30-59 ml/min (CMS/HCC) [N18.3]  Yes   • Obesity (BMI 30-39.9) [E66.9]  Yes   • Supraventricular tachycardia (CMS/HCC) [I47.1]  Yes   • Primary hypothyroidism [E03.9]  Yes   • Hyperlipidemia [E78.5]  Yes      Resolved Hospital Problems    Diagnosis Date Resolved POA   • Fever [R50.9] 2020 Yes   • LINNEA (acute kidney injury) (CMS/HCC) [N17.9] 2020 Yes       Assessment & Plan  -POD 6 from attempted laparoscopic cholecystectomy and laparoscopic cholecystostomy tube placement.   Repeat CT scan noted.  Plans per general surgery  -Diet has been advanced to full liquids and tolerating this well.  Patient reports having bowel movements at this point.  Hopefully can get rid of the TPN soon.  -Creatinine stable  -Discussed with the patient and daughter at the bedside  -Lovenox for DVT prophylaxis     Dispo  TBD    Bryce Rivera,  MD [facility]  03/18/20  12:46 PM

## 2020-03-18 NOTE — PLAN OF CARE
Patient is having moderate oral intake. Urinating on own. TPN continued. A/OX4. DAUGHTER HAS BEEN VERY HELPFUL WITH CARE. PATIENT has had a full bath and cleaned up. PICC line dressing to be changed 3/25. Will CTM   Problem: Patient Care Overview  Goal: Plan of Care Review  Outcome: Ongoing (interventions implemented as appropriate)  Goal: Individualization and Mutuality  Outcome: Ongoing (interventions implemented as appropriate)  Goal: Discharge Needs Assessment  Outcome: Ongoing (interventions implemented as appropriate)  Goal: Interprofessional Rounds/Family Conf  Outcome: Ongoing (interventions implemented as appropriate)

## 2020-03-18 NOTE — PROGRESS NOTES
"Pharmacy to dose TPN - Daily Progress Note  Patient: Mandy Alarcon  MRN#: [id number]  Attending: No name on file.  Admission Date:     TPN # 17 per Dr Andujar  Indication:pancreatitis    Principal Problem:    Acute pancreatitis  Active Problems:    Hyperlipidemia    Primary hypothyroidism    Supraventricular tachycardia (CMS/HCC)    Obesity (BMI 30-39.9)    CKD (chronic kidney disease) stage 3, GFR 30-59 ml/min (CMS/HCC)    Hyponatremia    Cholecystitis    Subjective/Objective  86 y.o. female 160 cm (63\") 94 kg (207 lb 3.7 oz)  Results from last 7 days   Lab Units 20  0543 20  0654   SODIUM mmol/L 133* 135*   POTASSIUM mmol/L 4.3 4.1   CHLORIDE mmol/L 103 103   CO2 mmol/L 22.2 23.8   BUN mg/dL 19 21   CREATININE mg/dL 0.92 0.93   CALCIUM mg/dL 7.9* 8.1*   ALBUMIN g/dL  --  2.00*   BILIRUBIN mg/dL  --  1.5*   ALK PHOS U/L  --  64   ALT (SGPT) U/L  --  21   AST (SGOT) U/L  --  24   GLUCOSE mg/dL 109* 114*   MAGNESIUM mg/dL  --  1.7   PHOSPHORUS mg/dL  --  3.0   TRIGLYCERIDES mg/dL  --  155*      I/O last 3 completed shifts:  In: 6146 [P.O.:600]  Out:  [Urine:1250; Emesis/NG output:500]    Diet Orders (active) (From admission, onward)       Start     Ordered    20 1800  Adult Central 2-in-1 TPN  Continuous TPN      20 1313    20 1508  Diet Full Liquid  Diet Effective Now      20 1507                  Additional insulin administration while previous TPN infusin units  Additional electrolyte administration while previous TPN infusin  Acid suppression: pepcid in PTN  Stool in last 24 hours:     Plan    Will repeat yesterday's TPN. Pt now on full liquid diet. Per Dr Andujar will most likely continue TPN until Friday.    Shawn Richard PharmD    "

## 2020-03-18 NOTE — PROGRESS NOTES
Chief Complaint:    POD 6, S/P laparoscopic cholecystostomy tube    Subjective:    Did not sleep well last night.  No emesis.    Objective:    Vitals:    03/17/20 2308 03/18/20 0500 03/18/20 0715 03/18/20 1338   BP: 123/57  120/68 125/60   BP Location: Left arm  Left arm Left arm   Patient Position: Lying  Lying Lying   Pulse: 56  55 58   Resp: 16  16 16   Temp: 97.5 °F (36.4 °C)  98.4 °F (36.9 °C) 98.5 °F (36.9 °C)   TempSrc: Oral  Oral Oral   SpO2: 98%  98% 97%   Weight:  94 kg (207 lb 3.7 oz)     Height:         Biliary drain 350 mL for the last 24 hours.  Clear dumont bile.    Lungs: Clear  Heart: Regular  Abdomen: Incisions look okay. BS present.   Extremities: Warm    Labs reviewed.  WBC 6.04, Hgb 10.3, platelets 128.  Creat 0.92, CO2 22.2.    OR cultures grew Enterobacter cloacae    Assessment:    POD 6, S/P laparoscopic cholecystostomy tube  Pancreatitis  Gallbladder sludge    Plan:    Did not sleep well last night.  I will continue on full liquid diet.  Continue TPN until dietary advancement to regular.

## 2020-03-19 ENCOUNTER — TELEPHONE (OUTPATIENT)
Dept: INTERNAL MEDICINE | Facility: CLINIC | Age: 85
End: 2020-03-19

## 2020-03-19 LAB
ALBUMIN SERPL-MCNC: 2.1 G/DL (ref 3.5–5.2)
ALBUMIN/GLOB SERPL: 0.7 G/DL
ALP SERPL-CCNC: 74 U/L (ref 39–117)
ALT SERPL W P-5'-P-CCNC: 46 U/L (ref 1–33)
ANION GAP SERPL CALCULATED.3IONS-SCNC: 5.9 MMOL/L (ref 5–15)
AST SERPL-CCNC: 44 U/L (ref 1–32)
BILIRUB SERPL-MCNC: 1.2 MG/DL (ref 0.2–1.2)
BUN BLD-MCNC: 18 MG/DL (ref 8–23)
BUN/CREAT SERPL: 20.7 (ref 7–25)
CALCIUM SPEC-SCNC: 7.9 MG/DL (ref 8.6–10.5)
CHLORIDE SERPL-SCNC: 105 MMOL/L (ref 98–107)
CO2 SERPL-SCNC: 23.1 MMOL/L (ref 22–29)
CREAT BLD-MCNC: 0.87 MG/DL (ref 0.57–1)
DEPRECATED RDW RBC AUTO: 48.4 FL (ref 37–54)
ERYTHROCYTE [DISTWIDTH] IN BLOOD BY AUTOMATED COUNT: 14.6 % (ref 12.3–15.4)
GFR SERPL CREATININE-BSD FRML MDRD: 62 ML/MIN/1.73
GLOBULIN UR ELPH-MCNC: 2.9 GM/DL
GLUCOSE BLD-MCNC: 118 MG/DL (ref 65–99)
HCT VFR BLD AUTO: 29.9 % (ref 34–46.6)
HGB BLD-MCNC: 9.8 G/DL (ref 12–15.9)
MAGNESIUM SERPL-MCNC: 1.7 MG/DL (ref 1.6–2.4)
MCH RBC QN AUTO: 29.7 PG (ref 26.6–33)
MCHC RBC AUTO-ENTMCNC: 32.8 G/DL (ref 31.5–35.7)
MCV RBC AUTO: 90.6 FL (ref 79–97)
PHOSPHATE SERPL-MCNC: 3.4 MG/DL (ref 2.5–4.5)
PLATELET # BLD AUTO: 116 10*3/MM3 (ref 140–450)
PMV BLD AUTO: 10 FL (ref 6–12)
POTASSIUM BLD-SCNC: 4.2 MMOL/L (ref 3.5–5.2)
PROT SERPL-MCNC: 5 G/DL (ref 6–8.5)
RBC # BLD AUTO: 3.3 10*6/MM3 (ref 3.77–5.28)
SODIUM BLD-SCNC: 134 MMOL/L (ref 136–145)
WBC NRBC COR # BLD: 6.23 10*3/MM3 (ref 3.4–10.8)

## 2020-03-19 PROCEDURE — 25010000002 CALCIUM GLUCONATE PER 10 ML: Performed by: SURGERY

## 2020-03-19 PROCEDURE — 85027 COMPLETE CBC AUTOMATED: CPT | Performed by: INTERNAL MEDICINE

## 2020-03-19 PROCEDURE — 25010000002 ENOXAPARIN PER 10 MG: Performed by: SURGERY

## 2020-03-19 PROCEDURE — 25010000002 MAGNESIUM SULFATE PER 500 MG OF MAGNESIUM: Performed by: SURGERY

## 2020-03-19 PROCEDURE — 99024 POSTOP FOLLOW-UP VISIT: CPT | Performed by: SURGERY

## 2020-03-19 PROCEDURE — 84100 ASSAY OF PHOSPHORUS: CPT | Performed by: SURGERY

## 2020-03-19 PROCEDURE — 80053 COMPREHEN METABOLIC PANEL: CPT | Performed by: SURGERY

## 2020-03-19 PROCEDURE — 83735 ASSAY OF MAGNESIUM: CPT | Performed by: SURGERY

## 2020-03-19 PROCEDURE — 25010000002 POTASSIUM CHLORIDE PER 2 MEQ OF POTASSIUM: Performed by: SURGERY

## 2020-03-19 RX ADMIN — SODIUM CHLORIDE, PRESERVATIVE FREE 10 ML: 5 INJECTION INTRAVENOUS at 10:01

## 2020-03-19 RX ADMIN — LEVOTHYROXINE SODIUM 50 MCG: 50 TABLET ORAL at 06:28

## 2020-03-19 RX ADMIN — ATENOLOL 50 MG: 50 TABLET ORAL at 20:38

## 2020-03-19 RX ADMIN — ACETAMINOPHEN 650 MG: 325 TABLET, FILM COATED ORAL at 10:08

## 2020-03-19 RX ADMIN — SODIUM CHLORIDE, PRESERVATIVE FREE 10 ML: 5 INJECTION INTRAVENOUS at 20:40

## 2020-03-19 RX ADMIN — NYSTATIN 500000 UNITS: 100000 SUSPENSION ORAL at 18:24

## 2020-03-19 RX ADMIN — NYSTATIN 500000 UNITS: 100000 SUSPENSION ORAL at 14:43

## 2020-03-19 RX ADMIN — SODIUM CHLORIDE, PRESERVATIVE FREE 10 ML: 5 INJECTION INTRAVENOUS at 20:38

## 2020-03-19 RX ADMIN — NYSTATIN 500000 UNITS: 100000 SUSPENSION ORAL at 20:38

## 2020-03-19 RX ADMIN — POTASSIUM CHLORIDE: 2 INJECTION, SOLUTION, CONCENTRATE INTRAVENOUS at 18:24

## 2020-03-19 RX ADMIN — ENOXAPARIN SODIUM 40 MG: 40 INJECTION SUBCUTANEOUS at 18:35

## 2020-03-19 RX ADMIN — SIMETHICONE CHEW TAB 80 MG 80 MG: 80 TABLET ORAL at 06:28

## 2020-03-19 RX ADMIN — ATENOLOL 50 MG: 50 TABLET ORAL at 10:00

## 2020-03-19 RX ADMIN — ACETAMINOPHEN 650 MG: 325 TABLET, FILM COATED ORAL at 14:37

## 2020-03-19 RX ADMIN — SODIUM CHLORIDE, PRESERVATIVE FREE 10 ML: 5 INJECTION INTRAVENOUS at 20:39

## 2020-03-19 NOTE — PLAN OF CARE
Advancing diet as tolerated. Plan to dc TPN tomorrow. Pain treated with tylenol X2; states effective pain relief. No falls. Will CTM

## 2020-03-19 NOTE — PROGRESS NOTES
"Pharmacy to dose TPN - Daily Progress Note  Patient: Mandy Alarcon  MRN#: [id number]  Attending: No name on file.  Admission Date:     TPN # 18 per Dr Andujar  Indication:pancreatitis    Principal Problem:    Acute pancreatitis  Active Problems:    Hyperlipidemia    Primary hypothyroidism    Supraventricular tachycardia (CMS/HCC)    Obesity (BMI 30-39.9)    CKD (chronic kidney disease) stage 3, GFR 30-59 ml/min (CMS/HCC)    Hyponatremia    Cholecystitis    Subjective/Objective  86 y.o. female 160 cm (63\") 94 kg (207 lb 3.7 oz)  Results from last 7 days   Lab Units 20  0342  20  0654   SODIUM mmol/L 134*   < > 135*   POTASSIUM mmol/L 4.2   < > 4.1   CHLORIDE mmol/L 105   < > 103   CO2 mmol/L 23.1   < > 23.8   BUN mg/dL 18   < > 21   CREATININE mg/dL 0.87   < > 0.93   CALCIUM mg/dL 7.9*   < > 8.1*   ALBUMIN g/dL 2.10*  --  2.00*   BILIRUBIN mg/dL 1.2  --  1.5*   ALK PHOS U/L 74  --  64   ALT (SGPT) U/L 46*  --  21   AST (SGOT) U/L 44*  --  24   GLUCOSE mg/dL 118*   < > 114*   MAGNESIUM mg/dL 1.7  --  1.7   PHOSPHORUS mg/dL 3.4  --  3.0   TRIGLYCERIDES mg/dL  --   --  155*    < > = values in this interval not displayed.      I/O last 3 completed shifts:  In: -   Out: 1500 [Urine:750; Emesis/NG output:400]    Diet Orders (active) (From admission, onward)       Start     Ordered    20 1800  Adult Central 2-in-1 TPN  Continuous TPN      20 1313    20 1508  Diet Full Liquid  Diet Effective Now      20 1507                  Additional insulin administration while previous TPN infusin units  Additional electrolyte administration while previous TPN infusin  Acid suppression: pepcid in PTN  Stool in last 24 hours:     Plan    Will repeat yesterday's TPN. Pt now on full liquid diet. Per Dr Andujar will most likely continue TPN until Friday.    Blane Apple, [facility]    "

## 2020-03-19 NOTE — PROGRESS NOTES
" LOS: 21 days     Name: Mandy Alarcon  Age: 86 y.o.  Sex: female  :  1933  MRN: [id number]         Primary Care Physician: Daryl Santos MD    Subjective   Subjective  Complains of increased right upper quadrant pain around the drain site when she is up ambulating.  No pain when resting in the bed or the chair.  Denies any fevers or chills.  No shortness of breath.  Tolerating full liquid diet.  Complains of thrush on her tongue.    Objective   Vital Signs  Temp:  [97.9 °F (36.6 °C)-98.6 °F (37 °C)] 98.6 °F (37 °C)  Heart Rate:  [55-59] 59  Resp:  [16] 16  BP: (118-128)/(60-67) 128/67  Body mass index is 36.71 kg/m².    Objective:  General Appearance:  Comfortable and in no acute distress.    Vital signs: (most recent): Blood pressure 128/67, pulse 59, temperature 98.6 °F (37 °C), temperature source Oral, resp. rate 16, height 160 cm (63\"), weight 94 kg (207 lb 3.7 oz), SpO2 97 %, not currently breastfeeding.    HEENT: (Thrush present on the tongue)    Lungs:  Normal effort and normal respiratory rate.    Heart: Normal rate.  Regular rhythm.    Abdomen: Abdomen is soft.  (Right upper quadrant cholecystostomy tube in place.  She has some tenderness around the drain).  Bowel sounds are normal.   There is no abdominal tenderness.     Extremities: There is no dependent edema or local swelling.    Neurological: Patient is alert and oriented to person, place and time.    Skin:  Warm and dry.              Results Review:       I reviewed the patient's new clinical results.    Results from last 7 days   Lab Units 20  0342 20  0543 20  0654 20  0606 03/15/20  0551 20  0501 20  0508   WBC 10*3/mm3 6.23 6.04 5.89 7.82 11.00* 11.64* 10.27   HEMOGLOBIN g/dL 9.8* 10.3* 10.1* 10.1* 11.5* 10.8* 11.2*   PLATELETS 10*3/mm3 116* 128* 122* 150 185 156 183     Results from last 7 days   Lab Units 20  0342 20  0543 20  0654 20  0741 20  0606 03/15/20  0551 " 20  0501   SODIUM mmol/L 134* 133* 135* 133* 129* 130* 130*   POTASSIUM mmol/L 4.2 4.3 4.1 4.5 5.9* 4.4 4.3   CHLORIDE mmol/L 105 103 103 102 98 101 101   CO2 mmol/L 23.1 22.2 23.8 23.5 21.5* 21.4* 21.0*   BUN mg/dL 18 19 21 19 19 20 24*   CREATININE mg/dL 0.87 0.92 0.93 0.97 0.95 0.94 0.96   CALCIUM mg/dL 7.9* 7.9* 8.1* 8.1* 7.7* 8.3* 8.3*   GLUCOSE mg/dL 118* 109* 114* 112* 757* 115* 125*           Scheduled Meds:     atenolol 50 mg Oral Q12H   enoxaparin 40 mg Subcutaneous Q24H   Fat Emulsion Plant Based 100 mL Intravenous Once per day on    levothyroxine 50 mcg Oral Q AM   sodium chloride 10 mL Intravenous Q12H   sodium chloride 10 mL Intravenous Q12H   sodium chloride 10 mL Intravenous Q12H   sodium chloride 10 mL Intravenous Q12H     PRN Meds:   •  acetaminophen **OR** acetaminophen **OR** acetaminophen  •  HYDROmorphone  •  [] HYDROmorphone **AND** naloxone  •  nitroglycerin  •  ondansetron  •  Pharmacy to Dose TPN  •  promethazine  •  simethicone  •  [COMPLETED] Insert peripheral IV **AND** sodium chloride  •  sodium chloride  •  sodium chloride  •  sodium chloride  Continuous Infusions:    Adult Central 2-in-1 TPN  Last Rate: 60 mL/hr at 20 1848   Pharmacy to Dose TPN         Assessment/Plan   Active Hospital Problems    Diagnosis  POA   • **Acute pancreatitis [K85.90]  Unknown   • Cholecystitis [K81.9]  Unknown   • Hyponatremia [E87.1]  Yes   • CKD (chronic kidney disease) stage 3, GFR 30-59 ml/min (CMS/HCC) [N18.3]  Yes   • Obesity (BMI 30-39.9) [E66.9]  Yes   • Supraventricular tachycardia (CMS/HCC) [I47.1]  Yes   • Primary hypothyroidism [E03.9]  Yes   • Hyperlipidemia [E78.5]  Yes      Resolved Hospital Problems    Diagnosis Date Resolved POA   • Fever [R50.9] 2020 Yes   • LINNEA (acute kidney injury) (CMS/HCC) [N17.9] 2020 Yes       Assessment & Plan    -POD 7 from attempted laparoscopic cholecystectomy and laparoscopic cholecystostomy tube placement.   Repeat CT  scan noted.  Plans per general surgery  -Diet has been advanced to full liquids and tolerating this well.   -Creatinine stable  -Add nystatin swish and spit for thrush  -Platelets trending down.  Continue Lovenox for now and will monitor trend.  -Discussed with the patient and daughter at the bedside  -Lovenox for DVT prophylaxis     Dispo  TBD    Bryce Rivera MD  [facility]  03/19/20  12:16 PM

## 2020-03-19 NOTE — CONSULTS
Adult Nutrition  Assessment/PES    Patient Name:  Mandy Alarcon  YOB: 1933  MRN: [id number]  Admit Date:  2/27/2020    Assessment Date:  3/19/2020    Comments:  Follow up note. Pt on Full liquid diet and TPN continues with 900 dex, 65gm AA, 100ml 20% lipids MWF at 60ml/hr. Plan is to continue TPN till advanced to Regular diet.    Reason for Assessment     Row Name 03/19/20 0758          Reason for Assessment    Reason For Assessment  follow-up protocol;TF/PN         Nutrition/Diet History     Row Name 03/19/20 0759          Nutrition/Diet History    Typical Food/Fluid Intake  on Full liquid now, TPN continues         Anthropometrics     Row Name 03/19/20 0603          Anthropometrics    Weight  94 kg (207 lb 3.7 oz)         Labs/Tests/Procedures/Meds     Row Name 03/19/20 0759          Labs/Procedures/Meds    Lab Results Reviewed  reviewed     Lab Results Comments  na, glu, platelets, trig, alt, alb, tbili, h/h        Diagnostic Tests/Procedures    Diagnostic Test/Procedure Reviewed  reviewed        Medications    Pertinent Medications Reviewed  reviewed     Pertinent Medications Comments  lovenox, synthroid         Physical Findings     Row Name 03/19/20 0800          Physical Findings    Overall Physical Appearance  obese     Tubes  -- cholecystostomy tube to drainage     Skin  surgical incision           Nutrition Prescription Ordered     Row Name 03/19/20 0801          Nutrition Prescription PO    Current PO Diet  Full Liquid        Nutrition Prescription PN    PN Current Rate (mL/hr)  60 mL/hr     Dextrose (Kcal)  900     Amino Acid (gm)  65     Lipid Concentration (%)  20%     Lipid Volume (mL)  100 mL         Evaluation of Received Nutrient/Fluid Intake     Row Name 03/19/20 0804          Fluid Intake Evaluation    Parenteral Fluid (mL)  1440               Problem/Interventions:          Intervention Goal     Row Name 03/19/20 0808          Intervention Goal    General  Maintain  nutrition;Nutrition support treatment;Improved nutrition related lab(s);Reduce/improve symptoms;Meet nutritional needs for age/condition;Disease management/therapy     PO  Advance diet;Tolerate PO     TF/PN  Maintain TF/PN     Transition  PN to PO     Weight  No significant weight loss         Nutrition Intervention     Row Name 03/19/20 0808          Nutrition Intervention    RD/Tech Action  Follow Tx progress;Care plan reviewd           Education/Evaluation     Row Name 03/19/20 0808          Monitor/Evaluation    Monitor  Per protocol;I&O;PO intake;Pertinent labs;PN delivery/tolerance;Weight;Skin status           Electronically signed by:  Lucía Orona RD  03/19/20 08:10

## 2020-03-19 NOTE — PROGRESS NOTES
Chief Complaint:    POD 7, S/P laparoscopic cholecystostomy tube    Subjective:    No complaints    Objective:    Vitals:    03/19/20 0603 03/19/20 0720 03/19/20 1000 03/19/20 1408   BP:  128/67  100/59   BP Location:  Left arm  Left arm   Patient Position:  Lying  Sitting   Pulse:  58 59 58   Resp:  16  16   Temp:  98.6 °F (37 °C)     TempSrc:  Oral     SpO2:  97%  95%   Weight: 94 kg (207 lb 3.7 oz)      Height:         Biliary drain 350 mL for the last 24 hours.  Clear dumont bile.    Lungs: Clear  Heart: Regular  Abdomen: Incisions look okay. BS present.   Extremities: Warm    Labs reviewed.  WBC 6.23, Hgb 9.8, platelets 116.  Creat 0.87, CO2 23.1.    OR cultures grew Enterobacter cloacae    Assessment:    POD 7, S/P laparoscopic cholecystostomy tube  Pancreatitis  Gallbladder sludge    Plan:    Advance to regular diet today. If tolerated, then discontinue TPN tomorrow.

## 2020-03-20 LAB
ALBUMIN SERPL-MCNC: 2.5 G/DL (ref 3.5–5.2)
ALBUMIN/GLOB SERPL: 0.8 G/DL
ALP SERPL-CCNC: 88 U/L (ref 39–117)
ALT SERPL W P-5'-P-CCNC: 50 U/L (ref 1–33)
ANION GAP SERPL CALCULATED.3IONS-SCNC: 6.6 MMOL/L (ref 5–15)
AST SERPL-CCNC: 40 U/L (ref 1–32)
BILIRUB SERPL-MCNC: 1.5 MG/DL (ref 0.2–1.2)
BUN BLD-MCNC: 17 MG/DL (ref 8–23)
BUN/CREAT SERPL: 18.3 (ref 7–25)
CALCIUM SPEC-SCNC: 8.5 MG/DL (ref 8.6–10.5)
CHLORIDE SERPL-SCNC: 103 MMOL/L (ref 98–107)
CO2 SERPL-SCNC: 22.4 MMOL/L (ref 22–29)
CREAT BLD-MCNC: 0.93 MG/DL (ref 0.57–1)
DEPRECATED RDW RBC AUTO: 46.3 FL (ref 37–54)
ERYTHROCYTE [DISTWIDTH] IN BLOOD BY AUTOMATED COUNT: 14.5 % (ref 12.3–15.4)
GFR SERPL CREATININE-BSD FRML MDRD: 57 ML/MIN/1.73
GLOBULIN UR ELPH-MCNC: 3.3 GM/DL
GLUCOSE BLD-MCNC: 125 MG/DL (ref 65–99)
HCT VFR BLD AUTO: 33.2 % (ref 34–46.6)
HGB BLD-MCNC: 11.2 G/DL (ref 12–15.9)
MCH RBC QN AUTO: 30 PG (ref 26.6–33)
MCHC RBC AUTO-ENTMCNC: 33.7 G/DL (ref 31.5–35.7)
MCV RBC AUTO: 89 FL (ref 79–97)
PLATELET # BLD AUTO: 158 10*3/MM3 (ref 140–450)
PMV BLD AUTO: 9.8 FL (ref 6–12)
POTASSIUM BLD-SCNC: 4.4 MMOL/L (ref 3.5–5.2)
PROT SERPL-MCNC: 5.8 G/DL (ref 6–8.5)
RBC # BLD AUTO: 3.73 10*6/MM3 (ref 3.77–5.28)
SODIUM BLD-SCNC: 132 MMOL/L (ref 136–145)
WBC NRBC COR # BLD: 9.37 10*3/MM3 (ref 3.4–10.8)

## 2020-03-20 PROCEDURE — 80053 COMPREHEN METABOLIC PANEL: CPT | Performed by: INTERNAL MEDICINE

## 2020-03-20 PROCEDURE — 99024 POSTOP FOLLOW-UP VISIT: CPT | Performed by: SURGERY

## 2020-03-20 PROCEDURE — 25010000002 ENOXAPARIN PER 10 MG: Performed by: SURGERY

## 2020-03-20 PROCEDURE — 85027 COMPLETE CBC AUTOMATED: CPT | Performed by: INTERNAL MEDICINE

## 2020-03-20 RX ORDER — DEXTROSE AND SODIUM CHLORIDE 5; .9 G/100ML; G/100ML
50 INJECTION, SOLUTION INTRAVENOUS CONTINUOUS
Status: DISCONTINUED | OUTPATIENT
Start: 2020-03-20 | End: 2020-03-22 | Stop reason: HOSPADM

## 2020-03-20 RX ADMIN — NYSTATIN 500000 UNITS: 100000 SUSPENSION ORAL at 12:19

## 2020-03-20 RX ADMIN — SODIUM CHLORIDE, PRESERVATIVE FREE 10 ML: 5 INJECTION INTRAVENOUS at 09:03

## 2020-03-20 RX ADMIN — ATENOLOL 50 MG: 50 TABLET ORAL at 21:05

## 2020-03-20 RX ADMIN — LEVOTHYROXINE SODIUM 50 MCG: 50 TABLET ORAL at 07:57

## 2020-03-20 RX ADMIN — SODIUM CHLORIDE, PRESERVATIVE FREE 10 ML: 5 INJECTION INTRAVENOUS at 21:06

## 2020-03-20 RX ADMIN — ENOXAPARIN SODIUM 40 MG: 40 INJECTION SUBCUTANEOUS at 18:11

## 2020-03-20 RX ADMIN — ACETAMINOPHEN 650 MG: 325 TABLET, FILM COATED ORAL at 01:38

## 2020-03-20 RX ADMIN — NYSTATIN 500000 UNITS: 100000 SUSPENSION ORAL at 18:11

## 2020-03-20 RX ADMIN — NYSTATIN 500000 UNITS: 100000 SUSPENSION ORAL at 21:05

## 2020-03-20 RX ADMIN — DEXTROSE AND SODIUM CHLORIDE 50 ML/HR: 5; 900 INJECTION, SOLUTION INTRAVENOUS at 16:44

## 2020-03-20 RX ADMIN — SODIUM CHLORIDE, PRESERVATIVE FREE 10 ML: 5 INJECTION INTRAVENOUS at 21:05

## 2020-03-20 RX ADMIN — ATENOLOL 50 MG: 50 TABLET ORAL at 09:03

## 2020-03-20 NOTE — CONSULTS
Adult Nutrition  Assessment/PES    Patient Name:  Mandy Alarcon  YOB: 1933  MRN: [id number]  Admit Date:  2/27/2020    Assessment Date:  3/20/2020    Comments:  Diet advance to Low Fat and TPN to be D/C'ed tomorrow. Visited pt and dgt to discuss po toleration and food options. Pt did vomit early this am she ?'s The tomato soup for last night.Will continue to work with pt.    Reason for Assessment     Row Name 03/20/20 0980          Reason for Assessment    Reason For Assessment  follow-up protocol;TF/PN         Nutrition/Diet History     Row Name 03/20/20 0935          Nutrition/Diet History    Typical Food/Fluid Intake  diet adv to low fat, TPN to run one day, She did not tolerate Tomato soup           Labs/Tests/Procedures/Meds     Row Name 03/20/20 0992          Labs/Procedures/Meds    Lab Results Reviewed  reviewed     Lab Results Comments  na, glu, alt, alb, tbili        Diagnostic Tests/Procedures    Diagnostic Test/Procedure Reviewed  reviewed        Medications    Pertinent Medications Reviewed  reviewed         Physical Findings     Row Name 03/20/20 0993          Physical Findings    Overall Physical Appearance  obese     Tubes  -- cholecystostomy tube to drainage, 350ml output     Skin  surgical incision           Nutrition Prescription Ordered     Row Name 03/20/20 0983          Nutrition Prescription PO    Current PO Diet  Regular     Common Modifiers  Low Fat        Nutrition Prescription PN    PN Current Rate (mL/hr)  60 mL/hr     Dextrose (Kcal)  900     Amino Acid (gm)  65     Lipid Concentration (%)  20%     Lipid Volume (mL)  100 mL         Evaluation of Received Nutrient/Fluid Intake     Row Name 03/20/20 0944          Calories Evaluation    Parenteral Calories (kcal)  1360     % of Kcal Needs  95        Protein Evaluation    Parenteral Protein (gm)  65     % of Protein Needs  100        Fluid Intake Evaluation    Parenteral Fluid (mL)  1440                Problem/Interventions:                    Electronically signed by:  Lucía Orona RD  03/20/20 09:40

## 2020-03-20 NOTE — PROGRESS NOTES
"Pharmacy to dose TPN - Daily Progress Note  Patient: Mandy Alarcon  MRN#: [id number]  Attending: No name on file.  Admission Date:     TPN # 19 per Dr Andujar  Indication:pancreatitis    Principal Problem:    Acute pancreatitis  Active Problems:    Hyperlipidemia    Primary hypothyroidism    Supraventricular tachycardia (CMS/HCC)    Obesity (BMI 30-39.9)    CKD (chronic kidney disease) stage 3, GFR 30-59 ml/min (CMS/HCC)    Hyponatremia    Cholecystitis    Subjective/Objective  86 y.o. female 160 cm (63\") 94 kg (207 lb 3.7 oz)  Results from last 7 days   Lab Units 20  0542 20  0342  20  0654   SODIUM mmol/L 132* 134*   < > 135*   POTASSIUM mmol/L 4.4 4.2   < > 4.1   CHLORIDE mmol/L 103 105   < > 103   CO2 mmol/L 22.4 23.1   < > 23.8   BUN mg/dL 17 18   < > 21   CREATININE mg/dL 0.93 0.87   < > 0.93   CALCIUM mg/dL 8.5* 7.9*   < > 8.1*   ALBUMIN g/dL 2.50* 2.10*  --  2.00*   BILIRUBIN mg/dL 1.5* 1.2  --  1.5*   ALK PHOS U/L 88 74  --  64   ALT (SGPT) U/L 50* 46*  --  21   AST (SGOT) U/L 40* 44*  --  24   GLUCOSE mg/dL 125* 118*   < > 114*   MAGNESIUM mg/dL  --  1.7  --  1.7   PHOSPHORUS mg/dL  --  3.4  --  3.0   TRIGLYCERIDES mg/dL  --   --   --  155*    < > = values in this interval not displayed.      I/O last 3 completed shifts:  In: 810 [P.O.:810]  Out: 1525 [Urine:1225; Emesis/NG output:200]    Diet Orders (active) (From admission, onward)       Start     Ordered    20 1800  Adult Central 2-in-1 TPN  Continuous TPN      20 1313    20 1508  Diet Full Liquid  Diet Effective Now      20 1507                  Additional insulin administration while previous TPN infusin units  Additional electrolyte administration while previous TPN infusin  Acid suppression: famotidine 20 mg IV in TPN  Last BM on 3/18    Plan    1)Will repeat yesterday's TPN with the exception of discontinuing the patient's lipids. Noted that the LFTs are trending up and seeing as the " patient is also on a regular diet now, the risk > benefit of giving lipids for now. If having to restart on 3/23, consider giving SMOF instead. Continue giving 900 kcal dextrose and 65 g of amino acids and can increase back to 1000 Kcal of dextrose on 3/21 if pt intake continues to be poor and continuing TPN. Pt now on regular diet.    2) Based on the above labs, will add the following electrolytes/additives to the TPN.   Sodium chloride: 130 mEq   Sodium acetate: 80 mEq  Potassium chloride: 35 mEq   Potassium phosphate: 20 mEq  Calcium gluconate: 5 mEq  Magnesium sulfate: 12 mEq  Multivitamin: 10 mL   Trace Elements: 1 mL  Famotidine: 20 mg    Steven Adam RPH

## 2020-03-20 NOTE — PROGRESS NOTES
Chief Complaint:    POD 7, S/P laparoscopic cholecystostomy tube    Subjective:    Emesis overnight    Objective:    Vitals:    03/19/20 2038 03/19/20 2313 03/20/20 0718 03/20/20 1348   BP:  153/74 138/70 126/53   BP Location:  Left arm Left arm Left arm   Patient Position:  Sitting Lying Lying   Pulse: 60 60 57 57   Resp:  16 16 16   Temp:  98.6 °F (37 °C) 98 °F (36.7 °C) 98.3 °F (36.8 °C)   TempSrc:  Oral Oral Oral   SpO2:  96% 97%    Weight:       Height:         Biliary drain 200 mL for the last 24 hours.  Clear dumont bile.    Lungs: Clear  Heart: Regular  Abdomen: Incisions look okay. BS present.   Extremities: Warm    Labs reviewed.  WBC 9.37, Hgb 11.2, platelets 158.  Creat 0.93, CO2 22.4.  AlkP 88, ALT 50, AST 40, T bili 1.5.    OR cultures grew Enterobacter cloacae    Assessment:    POD 7, S/P laparoscopic cholecystostomy tube  Pancreatitis  Gallbladder sludge    Plan:    Advanced to regular yesterday.  Emesis overnight.  Did better with diet over the course of today.  I think okay to discontinue TPN.

## 2020-03-20 NOTE — PROGRESS NOTES
" LOS: 22 days     Name: Mandy Alarcon  Age: 86 y.o.  Sex: female  :  1933  MRN: [id number]         Primary Care Physician: Daryl Santos MD    Subjective   Subjective  Patient is sitting up in the chair and has no specific complaints.  Denies nausea, vomiting, abdominal pain, chest pain.    Objective   Vital Signs  Temp:  [98 °F (36.7 °C)-98.6 °F (37 °C)] 98.3 °F (36.8 °C)  Heart Rate:  [57-60] 57  Resp:  [16] 16  BP: (126-153)/(53-74) 126/53  Body mass index is 36.71 kg/m².    Objective:  General Appearance:  Comfortable and in no acute distress.    Vital signs: (most recent): Blood pressure 126/53, pulse 57, temperature 98.3 °F (36.8 °C), temperature source Oral, resp. rate 16, height 160 cm (63\"), weight 94 kg (207 lb 3.7 oz), SpO2 97 %, not currently breastfeeding.    HEENT: (Thrush present on the tongue)    Lungs:  Normal effort and normal respiratory rate.    Heart: Normal rate.  Regular rhythm.    Abdomen: Abdomen is soft.  (Right upper quadrant cholecystostomy tube in place.  She has some tenderness around the drain).  Bowel sounds are normal.   There is no abdominal tenderness.     Extremities: There is no dependent edema or local swelling.    Neurological: Patient is alert and oriented to person, place and time.    Skin:  Warm and dry.              Results Review:       I reviewed the patient's new clinical results.    Results from last 7 days   Lab Units 20  0542 20  0342 20  0543 20  0654 20  0606 03/15/20  0551 20  0501   WBC 10*3/mm3 9.37 6.23 6.04 5.89 7.82 11.00* 11.64*   HEMOGLOBIN g/dL 11.2* 9.8* 10.3* 10.1* 10.1* 11.5* 10.8*   PLATELETS 10*3/mm3 158 116* 128* 122* 150 185 156     Results from last 7 days   Lab Units 20  0542 20  0342 20  0543 20  0654 20  0741 20  0606 03/15/20  0551   SODIUM mmol/L 132* 134* 133* 135* 133* 129* 130*   POTASSIUM mmol/L 4.4 4.2 4.3 4.1 4.5 5.9* 4.4   CHLORIDE mmol/L 103 105 103 " 103 102 98 101   CO2 mmol/L 22.4 23.1 22.2 23.8 23.5 21.5* 21.4*   BUN mg/dL 17 18 19 21 19 19 20   CREATININE mg/dL 0.93 0.87 0.92 0.93 0.97 0.95 0.94   CALCIUM mg/dL 8.5* 7.9* 7.9* 8.1* 8.1* 7.7* 8.3*   GLUCOSE mg/dL 125* 118* 109* 114* 112* 757* 115*           Scheduled Meds:     atenolol 50 mg Oral Q12H   enoxaparin 40 mg Subcutaneous Q24H   levothyroxine 50 mcg Oral Q AM   nystatin 5 mL Swish & Spit 4x Daily   sodium chloride 10 mL Intravenous Q12H   sodium chloride 10 mL Intravenous Q12H   sodium chloride 10 mL Intravenous Q12H   sodium chloride 10 mL Intravenous Q12H     PRN Meds:   •  acetaminophen **OR** acetaminophen **OR** acetaminophen  •  HYDROmorphone  •  [] HYDROmorphone **AND** naloxone  •  nitroglycerin  •  ondansetron  •  promethazine  •  simethicone  •  [COMPLETED] Insert peripheral IV **AND** sodium chloride  •  sodium chloride  •  sodium chloride  •  sodium chloride  Continuous Infusions:    dextrose 5 % and sodium chloride 0.9 % 50 mL/hr       Assessment/Plan   Active Hospital Problems    Diagnosis  POA   • **Acute pancreatitis [K85.90]  Unknown   • Cholecystitis [K81.9]  Unknown   • Hyponatremia [E87.1]  Yes   • CKD (chronic kidney disease) stage 3, GFR 30-59 ml/min (CMS/[facility]) [N18.3]  Yes   • Obesity (BMI 30-39.9) [E66.9]  Yes   • Supraventricular tachycardia (CMS/[facility]) [I47.1]  Yes   • Primary hypothyroidism [E03.9]  Yes   • Hyperlipidemia [E78.5]  Yes      Resolved Hospital Problems    Diagnosis Date Resolved POA   • Fever [R50.9] 2020 Yes   • LINNEA (acute kidney injury) (CMS/[facility]) [N17.9] 2020 Yes       Assessment & Plan    1.Status post cholecystostomy tube placement, today is day 8.  WBC is normal and patient is afebrile.  Not on any antibiotics.  2.  Hypertension weight, will continue with atenolol.  3.  Hypothyroidism, on Synthroid.  4.  Obesity, was counseled to lose weight  5.  On Lovenox for DVT prophylaxis.        Adam Macias MD  [facility]  03/20/20  12:16 PM

## 2020-03-20 NOTE — PLAN OF CARE
Problem: Patient Care Overview  Goal: Plan of Care Review  Outcome: Ongoing (interventions implemented as appropriate)  Flowsheets (Taken 3/20/2020 9272)  Progress: no change  Plan of Care Reviewed With: patient  Outcome Summary: Continue on TPN.Pt been restless hs. Pt been awake most of the time. Pt c/o incision pain. PRN Tylenol given however pt vomited x1.After vomiting pt verbalized she is doing ok. Pt up in chair resting with daughter at bedside.Will continue to monitor.     Problem: Patient Care Overview  Goal: Individualization and Mutuality  Outcome: Ongoing (interventions implemented as appropriate)     Problem: Pancreatitis, Acute/Chronic (Adult)  Goal: Signs and Symptoms of Listed Potential Problems Will be Absent, Minimized or Managed (Pancreatitis, Acute/Chronic)  Outcome: Ongoing (interventions implemented as appropriate)     Problem: Skin Injury Risk (Adult)  Goal: Skin Health and Integrity  Outcome: Ongoing (interventions implemented as appropriate)     Problem: Pain, Acute (Adult)  Goal: Acceptable Pain Control/Comfort Level  Outcome: Ongoing (interventions implemented as appropriate)     Problem: Fall Risk (Adult)  Goal: Absence of Fall  Outcome: Ongoing (interventions implemented as appropriate)

## 2020-03-21 LAB
DEPRECATED RDW RBC AUTO: 46.4 FL (ref 37–54)
ERYTHROCYTE [DISTWIDTH] IN BLOOD BY AUTOMATED COUNT: 14.5 % (ref 12.3–15.4)
HCT VFR BLD AUTO: 31.1 % (ref 34–46.6)
HGB BLD-MCNC: 10.1 G/DL (ref 12–15.9)
MCH RBC QN AUTO: 29.1 PG (ref 26.6–33)
MCHC RBC AUTO-ENTMCNC: 32.5 G/DL (ref 31.5–35.7)
MCV RBC AUTO: 89.6 FL (ref 79–97)
PLATELET # BLD AUTO: 162 10*3/MM3 (ref 140–450)
PMV BLD AUTO: 10.1 FL (ref 6–12)
RBC # BLD AUTO: 3.47 10*6/MM3 (ref 3.77–5.28)
WBC NRBC COR # BLD: 7.77 10*3/MM3 (ref 3.4–10.8)

## 2020-03-21 PROCEDURE — 85027 COMPLETE CBC AUTOMATED: CPT | Performed by: INTERNAL MEDICINE

## 2020-03-21 PROCEDURE — 25010000002 ENOXAPARIN PER 10 MG: Performed by: SURGERY

## 2020-03-21 PROCEDURE — 99024 POSTOP FOLLOW-UP VISIT: CPT | Performed by: SURGERY

## 2020-03-21 RX ADMIN — ENOXAPARIN SODIUM 40 MG: 40 INJECTION SUBCUTANEOUS at 18:01

## 2020-03-21 RX ADMIN — NYSTATIN 500000 UNITS: 100000 SUSPENSION ORAL at 21:10

## 2020-03-21 RX ADMIN — SODIUM CHLORIDE, PRESERVATIVE FREE 10 ML: 5 INJECTION INTRAVENOUS at 21:11

## 2020-03-21 RX ADMIN — NYSTATIN 500000 UNITS: 100000 SUSPENSION ORAL at 12:46

## 2020-03-21 RX ADMIN — SODIUM CHLORIDE, PRESERVATIVE FREE 10 ML: 5 INJECTION INTRAVENOUS at 09:11

## 2020-03-21 RX ADMIN — SODIUM CHLORIDE, PRESERVATIVE FREE 10 ML: 5 INJECTION INTRAVENOUS at 09:12

## 2020-03-21 RX ADMIN — NYSTATIN 500000 UNITS: 100000 SUSPENSION ORAL at 18:01

## 2020-03-21 RX ADMIN — ATENOLOL 50 MG: 50 TABLET ORAL at 21:10

## 2020-03-21 RX ADMIN — LEVOTHYROXINE SODIUM 50 MCG: 50 TABLET ORAL at 08:41

## 2020-03-21 RX ADMIN — DEXTROSE AND SODIUM CHLORIDE 50 ML/HR: 5; 900 INJECTION, SOLUTION INTRAVENOUS at 10:14

## 2020-03-21 NOTE — PROGRESS NOTES
Chief Complaint:    POD 8, S/P laparoscopic cholecystostomy tube    Subjective:    No emesis overnight    Objective:    Vitals:    03/21/20 0626 03/21/20 0721 03/21/20 1241 03/21/20 1910   BP:  126/61 132/65 144/87   BP Location:  Left arm Left arm Left arm   Patient Position:  Lying Sitting Lying   Pulse:  55 64 63   Resp:  18 18 16   Temp:  98.2 °F (36.8 °C) 98.2 °F (36.8 °C) 98.3 °F (36.8 °C)   TempSrc:  Oral Oral Oral   SpO2:  97% 97% 98%   Weight: 95.6 kg (210 lb 12.2 oz)      Height:         Biliary drain 200 mL for the last 24 hours.  Clear dumont bile.    Lungs: Clear  Heart: Regular  Abdomen: Incisions look okay. BS present.   Extremities: Warm    Labs reviewed.  WBC 7.77, Hgb 10.1, platelets 162.    Assessment:    POD 8, S/P laparoscopic cholecystostomy tube  Pancreatitis  Gallbladder sludge    Plan:    Advanced to regular diet.  Did better with diet over the course of today.  Off TPN  CT abd tomorrow. Perhaps home tomorrow if the CT looks okay, and if she continues to fare well with oral diet.

## 2020-03-21 NOTE — PLAN OF CARE
Problem: Patient Care Overview  Goal: Plan of Care Review  Outcome: Ongoing (interventions implemented as appropriate)  Flowsheets (Taken 3/21/2020 0752)  Progress: improving  Plan of Care Reviewed With: patient  Outcome Summary: TPN dcd yesterday pt been tolerating regular food. Pt denies any pain or discomfort.Pt able to sleep most of the night. Will continue to monitor.     Problem: Patient Care Overview  Goal: Individualization and Mutuality  Outcome: Ongoing (interventions implemented as appropriate)     Problem: Pancreatitis, Acute/Chronic (Adult)  Goal: Signs and Symptoms of Listed Potential Problems Will be Absent, Minimized or Managed (Pancreatitis, Acute/Chronic)  Outcome: Ongoing (interventions implemented as appropriate)     Problem: Skin Injury Risk (Adult)  Goal: Skin Health and Integrity  Outcome: Ongoing (interventions implemented as appropriate)     Problem: Fall Risk (Adult)  Goal: Absence of Fall  Outcome: Ongoing (interventions implemented as appropriate)

## 2020-03-21 NOTE — PROGRESS NOTES
" LOS: 23 days     Name: Mandy Alarcon  Age: 86 y.o.  Sex: female  :  1933  MRN: [id number]         Primary Care Physician: Daryl Santos MD    Subjective   Subjective  Patient is sitting up in the chair and has no specific complaints.  No reports of abdominal pain, nausea, vomiting, chest pain, shortness of breath.  Objective   Vital Signs  Temp:  [98.2 °F (36.8 °C)-98.5 °F (36.9 °C)] 98.2 °F (36.8 °C)  Heart Rate:  [55-64] 64  Resp:  [16-18] 18  BP: (126-143)/(61-72) 132/65  Body mass index is 37.33 kg/m².    Objective:  General Appearance:  Comfortable and in no acute distress.    Vital signs: (most recent): Blood pressure 132/65, pulse 64, temperature 98.2 °F (36.8 °C), temperature source Oral, resp. rate 18, height 160 cm (63\"), weight 95.6 kg (210 lb 12.2 oz), SpO2 97 %, not currently breastfeeding.    HEENT: (Thrush present on the tongue)    Lungs:  Normal effort and normal respiratory rate.    Heart: Normal rate.  Regular rhythm.    Abdomen: Abdomen is soft.  (Right upper quadrant cholecystostomy tube in place.  She has some tenderness around the drain).  Bowel sounds are normal.   There is no abdominal tenderness.     Extremities: There is no dependent edema or local swelling.    Neurological: Patient is alert and oriented to person, place and time.    Skin:  Warm and dry.              Results Review:       I reviewed the patient's new clinical results.    Results from last 7 days   Lab Units 20  0548 20  0542 20  0342 20  0543 20  0654 20  0606 03/15/20  0551   WBC 10*3/mm3 7.77 9.37 6.23 6.04 5.89 7.82 11.00*   HEMOGLOBIN g/dL 10.1* 11.2* 9.8* 10.3* 10.1* 10.1* 11.5*   PLATELETS 10*3/mm3 162 158 116* 128* 122* 150 185     Results from last 7 days   Lab Units 20  0542 20  0342 20  0543 20  0654 20  0741 20  0606 03/15/20  0551   SODIUM mmol/L 132* 134* 133* 135* 133* 129* 130*   POTASSIUM mmol/L 4.4 4.2 4.3 4.1 4.5 5.9* " 4.4   CHLORIDE mmol/L 103 105 103 103 102 98 101   CO2 mmol/L 22.4 23.1 22.2 23.8 23.5 21.5* 21.4*   BUN mg/dL 17 18 19 21 19 19 20   CREATININE mg/dL 0.93 0.87 0.92 0.93 0.97 0.95 0.94   CALCIUM mg/dL 8.5* 7.9* 7.9* 8.1* 8.1* 7.7* 8.3*   GLUCOSE mg/dL 125* 118* 109* 114* 112* 757* 115*           Scheduled Meds:     atenolol 50 mg Oral Q12H   diatrizoate meglumine-sodium 30 mL Oral Once   enoxaparin 40 mg Subcutaneous Q24H   levothyroxine 50 mcg Oral Q AM   nystatin 5 mL Swish & Spit 4x Daily   sodium chloride 10 mL Intravenous Q12H   sodium chloride 10 mL Intravenous Q12H   sodium chloride 10 mL Intravenous Q12H   sodium chloride 10 mL Intravenous Q12H     PRN Meds:   •  acetaminophen **OR** acetaminophen **OR** acetaminophen  •  HYDROmorphone  •  [] HYDROmorphone **AND** naloxone  •  nitroglycerin  •  ondansetron  •  promethazine  •  simethicone  •  [COMPLETED] Insert peripheral IV **AND** sodium chloride  •  sodium chloride  •  sodium chloride  •  sodium chloride  Continuous Infusions:    dextrose 5 % and sodium chloride 0.9 % 50 mL/hr Last Rate: 50 mL/hr (20 1014)       Assessment/Plan   Active Hospital Problems    Diagnosis  POA   • **Acute pancreatitis [K85.90]  Unknown   • Cholecystitis [K81.9]  Unknown   • Hyponatremia [E87.1]  Yes   • CKD (chronic kidney disease) stage 3, GFR 30-59 ml/min (CMS/HCC) [N18.3]  Yes   • Obesity (BMI 30-39.9) [E66.9]  Yes   • Supraventricular tachycardia (CMS/[facility]) [I47.1]  Yes   • Primary hypothyroidism [E03.9]  Yes   • Hyperlipidemia [E78.5]  Yes      Resolved Hospital Problems    Diagnosis Date Resolved POA   • Fever [R50.9] 2020 Yes   • LINNEA (acute kidney injury) (CMS/[facility]) [N17.9] 2020 Yes       Assessment & Plan    1.Status post cholecystostomy tube placement, today is day 8.  WBC is normal and patient is afebrile.  Not on any antibiotics.  Off TPN.  Discharge once cleared by surgery.  2.  Hypertension weight, will continue with atenolol.  3.   Hypothyroidism, on Synthroid.  4.  Obesity, was counseled to lose weight  5.  On Lovenox for DVT prophylaxis.        Adam Macias MD  [facility]  03/21/20  12:16 PM

## 2020-03-22 ENCOUNTER — APPOINTMENT (OUTPATIENT)
Dept: CT IMAGING | Facility: HOSPITAL | Age: 85
End: 2020-03-22

## 2020-03-22 VITALS
SYSTOLIC BLOOD PRESSURE: 127 MMHG | WEIGHT: 209.66 LBS | HEIGHT: 63 IN | HEART RATE: 62 BPM | RESPIRATION RATE: 18 BRPM | TEMPERATURE: 97.7 F | DIASTOLIC BLOOD PRESSURE: 66 MMHG | BODY MASS INDEX: 37.15 KG/M2 | OXYGEN SATURATION: 97 %

## 2020-03-22 LAB
DEPRECATED RDW RBC AUTO: 46.7 FL (ref 37–54)
ERYTHROCYTE [DISTWIDTH] IN BLOOD BY AUTOMATED COUNT: 14.4 % (ref 12.3–15.4)
HCT VFR BLD AUTO: 30.8 % (ref 34–46.6)
HGB BLD-MCNC: 9.9 G/DL (ref 12–15.9)
MCH RBC QN AUTO: 28.9 PG (ref 26.6–33)
MCHC RBC AUTO-ENTMCNC: 32.1 G/DL (ref 31.5–35.7)
MCV RBC AUTO: 89.8 FL (ref 79–97)
PLATELET # BLD AUTO: 172 10*3/MM3 (ref 140–450)
PMV BLD AUTO: 9.7 FL (ref 6–12)
RBC # BLD AUTO: 3.43 10*6/MM3 (ref 3.77–5.28)
WBC NRBC COR # BLD: 7.4 10*3/MM3 (ref 3.4–10.8)

## 2020-03-22 PROCEDURE — 85027 COMPLETE CBC AUTOMATED: CPT | Performed by: INTERNAL MEDICINE

## 2020-03-22 PROCEDURE — 74177 CT ABD & PELVIS W/CONTRAST: CPT

## 2020-03-22 PROCEDURE — 25010000002 IOPAMIDOL 61 % SOLUTION: Performed by: INTERNAL MEDICINE

## 2020-03-22 PROCEDURE — 99024 POSTOP FOLLOW-UP VISIT: CPT | Performed by: SURGERY

## 2020-03-22 PROCEDURE — 0 DIATRIZOATE MEGLUMINE & SODIUM PER 1 ML: Performed by: SURGERY

## 2020-03-22 RX ADMIN — LEVOTHYROXINE SODIUM 50 MCG: 50 TABLET ORAL at 07:07

## 2020-03-22 RX ADMIN — IOPAMIDOL 85 ML: 612 INJECTION, SOLUTION INTRAVENOUS at 11:53

## 2020-03-22 RX ADMIN — SODIUM CHLORIDE, PRESERVATIVE FREE 10 ML: 5 INJECTION INTRAVENOUS at 08:33

## 2020-03-22 RX ADMIN — DEXTROSE AND SODIUM CHLORIDE 50 ML/HR: 5; 900 INJECTION, SOLUTION INTRAVENOUS at 02:50

## 2020-03-22 RX ADMIN — NYSTATIN 500000 UNITS: 100000 SUSPENSION ORAL at 14:01

## 2020-03-22 RX ADMIN — SODIUM CHLORIDE, PRESERVATIVE FREE 10 ML: 5 INJECTION INTRAVENOUS at 08:32

## 2020-03-22 RX ADMIN — DIATRIZOATE MEGLUMINE AND DIATRIZOATE SODIUM 30 ML: 600; 100 SOLUTION ORAL; RECTAL at 10:18

## 2020-03-22 NOTE — DISCHARGE SUMMARY
NAME: Mandy Alarcon ADMIT: 2020   : 1933  PCP: Daryl Santos MD    MRN: [id number] LOS: 24 days   AGE/SEX: 86 y.o. female  ROOM: [facility]/     Date of Admission:  2020  Date of Discharge:  3/22/2020    PCP: Daryl Santos MD    CHIEF COMPLAINT  Vomiting      DISCHARGE DIAGNOSIS  Active Hospital Problems    Diagnosis  POA   • **Acute pancreatitis [K85.90]  Yes   • Cholecystitis [K81.9]  Yes   • Hyponatremia [E87.1]  Yes   • CKD (chronic kidney disease) stage 3, GFR 30-59 ml/min (CMS/[facility]) [N18.3]  Yes   • Obesity (BMI 30-39.9) [E66.9]  Yes   • Supraventricular tachycardia (CMS/[facility]) [I47.1]  Yes   • Primary hypothyroidism [E03.9]  Yes   • Hyperlipidemia [E78.5]  Yes      Resolved Hospital Problems    Diagnosis Date Resolved POA   • Fever [R50.9] 2020 Yes   • LINNEA (acute kidney injury) (CMS/[facility]) [N17.9] 2020 Yes       SECONDARY DIAGNOSES  Past Medical History:   Diagnosis Date   • Benign essential hypertension 10/28/2012    2012--treatment for hypertension begun.   • Bilateral sensorineural hearing loss, wears hearing aids 2017    Left is much worse than right.  Patient had multiple ear infections as a child.   • Chronic renal insufficiency, stage II (mild), 2016--creatinine 1.35 2016--creatinine 1.35.  Baseline creatinine approximately 1.3.   • Claustrophobia 2016    This patient has significant nocturnal hypoxemia and I think that she could benefit from nocturnal oxygen therapy. The exact etiology of her hypoxemia is not clear. She could possibly have obstructive sleep apnea but this is not documented. We cannot test this patient for sleep apnea due to the fact that she is severely claustrophobic. Patient was a former smoker and it is possibly that COPD he is playing a role.   • Family history of coronary artery disease 2016    Patient's mother, father, 2 sisters and a brother all  from myocardial infarctions   • Gout  10/28/2012    October 2012--initial diagnosis and treatment of gout.   • Hyperlipidemia 10/28/2012    October 2012--treatment for hyperlipidemia begun.   • Impaired fasting glucose 10/28/2012    October 2012--initial diagnosis impaired fasting glucose.   • Morbidly obese (CMS/HCC) 3/11/2019   • Nocturnal hypoxemia 8/2/2012 11/06/2014--patient did not receive nocturnal oxygen because of Medicare regulations.   05/15/2014--overnight oximetry revealed oxygen saturations less than 89% for 22 minutes and 40 seconds. Oxygen saturations less than or equal to 88% for 22 minutes and 40 seconds. Lowest oxygen saturation 83%. The longest continuous time with oxygen saturations less than or equal to 88% was 1 minute and 32 seconds.   08/02/2012--overnight oximetry revealed oxygen saturations less than 90% for one hour and 35 minutes. Oxygen saturations less than 89% 59 minutes. This patient has significant nocturnal hypoxemia and I think that she could benefit from nocturnal oxygen therapy. The exact etiology of her hypoxemia is not clear. She could possibly have obstructive sleep apnea but this is not documented. We cannot test this patient for sleep apnea due to the fact that she is severely claustrophobic. Patient was a former smoker and it is possibly that COPD he is playing a role.   • Pancreatitis    • Pneumonia    • Primary hypothyroidism 11/17/2015 March 11, 2019--TSH remains elevated slightly at 4.92.  Given the overall clinical picture including the multinodular goiter, we will initiate levothyroxine 50 mcg/day and reassess in about 6 weeks.  August 1, 2018--thyroid ultrasound reveals a multinodular thyroid with multiple subcentimeter nodules.  Only minimal increase in size of the largest nodule in the left lobe has occurred when compared    • Secondary polycythemia 8/2/2012 11/06/2014--patient did not receive nocturnal oxygen because of Medicare regulations.   05/15/2014--overnight oximetry revealed oxygen  saturations less than 89% for 22 minutes and 40 seconds. Oxygen saturations less than or equal to 88% for 22 minutes and 40 seconds. Lowest oxygen saturation 83%. The longest continuous time with oxygen saturations less than or equal to 88% was 1 minute and 32 seconds.   08/02/2012--overnight oximetry revealed oxygen saturations less than 90% for one hour and 35 minutes. Oxygen saturations less than 89% 59 minutes. This patient has significant nocturnal hypoxemia and I think that she could benefit from nocturnal oxygen therapy. The exact etiology of her hypoxemia is not clear. She could possibly have obstructive sleep apnea but this is not documented. We cannot test this patient for sleep apnea due to the fact that she is severely claustrophobic. Patient was a former smoker and it is possibly that COPD he is playing a role.   • Vitamin D deficiency 5/23/2016       CONSULTS       HOSPITAL COURSE  Ms. Alarcon is a 86 y.o. non-smoker with a history of idiopathic pancreatitis that was just discharged from this facility 2/11.  Despite extensive work-up there was no obvious source identified for pancreatitis.  Lisinopril HCTZ for hypertension was discontinued as possible etiology.  MRC was unremarkable, gallbladder wall thickened but no gallstones. Her admission was also complicated by pneumonia, LINNEA and A. fib RVR.  She finished Levaquin and 3 days of oral doxycycline.  She states she went to rehab and was doing okay for approximately 1 week.  In the last 4 to 5 days she has had no appetite, bilious vomiting, and diffuse abdominal pain but worse in the epigastric region.  Patient reports a fever of 101.5 last night.  She has pleuritic chest pain worse with a deep breath but no cough.  She denies diarrhea, dysuria, rash, orthopnea, PND.  She was seen in the GI office today by Dr. Skaggs for worsening laboratory values with renal failure and hyponatremia.  She was sent to the ER for further work-up    1.Status post  cholecystostomy tube placement, today is day 9.  WBC is normal and patient is afebrile.  Not on any antibiotics.  Off TPN.  Patient is able to tolerate p.o. diet without any difficulty.  She was advised to be on a low-fat diet upon discharge.  Surgery did evaluate and recommended that she be discharged and follow-up as an outpatient basis.  Cholecystostomy tube will be removed at a later date as an outpatient basis depending on how she is progressing.  Home health is also been arranged.  2.  Hypertension weight, will continue with atenolol.  3.  Hypothyroidism, on Synthroid.  4.  Obesity, was counseled to lose weight    Please note patient was seen and examined today on day of discharge.  Time taken to discharge 45 minutes.    CONDITION ON DISCHARGE  Stable.      DISCHARGE DISPOSITION   Home or Self Care      DISCHARGE MEDICATIONS       Your medication list      CHANGE how you take these medications      Instructions Last Dose Given Next Dose Due   potassium chloride 10 MEQ CR capsule  Commonly known as:  MICRO-K  What changed:  how much to take      Take 2 capsules by mouth Daily.       torsemide 20 MG tablet  Commonly known as:  DEMADEX  What changed:  how much to take      Take 2 tablets by mouth Daily.          CONTINUE taking these medications      Instructions Last Dose Given Next Dose Due   allopurinol 300 MG tablet  Commonly known as:  ZYLOPRIM      Take 300 mg by mouth Daily.       atenolol 50 MG tablet  Commonly known as:  TENORMIN      Take 1 tablet by mouth Every 12 (Twelve) Hours.       Biotin 10 MG tablet      Take 1 tablet by mouth Daily.       bisacodyl 10 MG suppository  Commonly known as:  DULCOLAX      Insert 10 mg into the rectum Daily.       clobetasol 0.05 % ointment  Commonly known as:  TEMOVATE      Apply  topically to the appropriate area as directed 2 (Two) Times a Day.       docusate sodium 100 MG capsule  Commonly known as:  Colace      Take 1 capsule by mouth 2 (Two) Times a Day As  Needed for Constipation.       famotidine 20 MG tablet  Commonly known as:  PEPCID      Take 1 tablet by mouth Daily.       HYDROcodone-acetaminophen 7.5-325 MG per tablet  Commonly known as:  NORCO      Take 1 tablet by mouth Every 4 (Four) Hours As Needed for Moderate Pain .       levothyroxine 50 MCG tablet  Commonly known as:  SYNTHROID, LEVOTHROID      Take 50 mcg by mouth Daily.       miconazole 2 % powder  Commonly known as:  MICOTIN      Apply  topically to the appropriate area as directed 2 (Two) Times a Day.       nystatin 328696 UNIT/ML suspension  Commonly known as:  MYCOSTATIN      Swish and swallow 5 mL 4 (Four) Times a Day.       ondansetron ODT 4 MG disintegrating tablet  Commonly known as:  ZOFRAN-ODT      Take 4 mg by mouth Every 8 (Eight) Hours As Needed for Nausea or Vomiting.       polyethylene glycol packet  Commonly known as:  MIRALAX      Take 17 g by mouth Daily.       rosuvastatin 5 MG tablet  Commonly known as:  CRESTOR      Take 5 mg by mouth Every Night.       saccharomyces boulardii 250 MG capsule  Commonly known as:  FLORASTOR      Take 250 mg by mouth Daily.       Vitamin D 50 MCG (2000 UT) tablet      Take 2 tablets by mouth daily.            Diet Instructions     Diet:      Diet Texture / Consistency:  Regular    Common Modifiers:  Low Fat    No alcohol consumption- no beer, wine, or liquor.    Diet: Cardiac      Discharge Diet:  Cardiac         Activity Instructions     Activity as Tolerated      Discharge activity      1) May walk and climb stairs as tolerated.   2) Do not shower until instructed by Dr. Andujar.  3) Do not immerse the drain under water in the tub or shower.  4) Do not lift / push / pull more then 10 lbs for 2 weeks.  5) Empty the bile drainage bag at least once a day.  Record the volume of drainage.  Please bring the record of daily drain volumes to your office appointment with Dr. Andujar        Future Appointments   Date Time Provider Department Center    3/24/2020  1:15 PM Lizbeth Laura MD [facility] None     Additional Instructions for the Follow-ups that You Need to Schedule     Discharge Follow-up with PCP   As directed       Currently Documented PCP:    Daryl Santos MD    PCP Phone Number:    [phone]     Follow Up Details:  2 weeks         Discharge Follow-up with Specified Provider: Dr Andujar; 2 Weeks   As directed      To:  Dr Andujar    Follow Up:  2 Weeks    Follow Up Details:  Call the office [phone] to schedule an appointment         Discharge Follow-up with Specified Provider: ; 1 Week   As directed      To:      Follow Up:  1 Week            Contact information for follow-up providers     Bryce Andujar MD Follow up in 2 week(s).    Specialty:  General Surgery  Contact information:  [address]  [facility] [id number] [phone]             Daryl Santos MD .    Specialty:  Internal Medicine  Why:  2 weeks  Contact information:  [address]  [facility] [id number] [phone]                   Contact information for after-discharge care     Destination     [facility] .    Service:  Skilled Nursing  Contact information:  [address]  [facility] [address] [phone]                 Home Medical Care     [facility] [place] .    Service:  Home Health Services  Contact information:  [address]  [facility] [id number] [phone]                             TEST  RESULTS PENDING AT DISCHARGE         Adam Macias MD  [facility]  03/22/20  17:28

## 2020-03-22 NOTE — PLAN OF CARE
Problem: Patient Care Overview  Goal: Plan of Care Review  Outcome: Ongoing (interventions implemented as appropriate)  Flowsheets (Taken 3/22/2020 7368)  Progress: improving  Plan of Care Reviewed With: patient  Outcome Summary: Pt denies any pain or discomfort. Pt was walking at the hallway last hs with the daughter .Dressing change to biliary drain. Pt to have ct of abdomen today depending on the result pt might go home today or tomorrow.     Problem: Patient Care Overview  Goal: Individualization and Mutuality  Outcome: Ongoing (interventions implemented as appropriate)     Problem: Pancreatitis, Acute/Chronic (Adult)  Goal: Signs and Symptoms of Listed Potential Problems Will be Absent, Minimized or Managed (Pancreatitis, Acute/Chronic)  Outcome: Ongoing (interventions implemented as appropriate)     Problem: Skin Injury Risk (Adult)  Goal: Skin Health and Integrity  Outcome: Ongoing (interventions implemented as appropriate)     Problem: Fall Risk (Adult)  Goal: Absence of Fall  Outcome: Ongoing (interventions implemented as appropriate)

## 2020-03-22 NOTE — PROGRESS NOTES
Chief Complaint:    POD 9, S/P laparoscopic cholecystostomy tube    Subjective:    No emesis overnight    Objective:    Vitals:    03/21/20 2306 03/22/20 0554 03/22/20 0715 03/22/20 1333   BP: 132/62  120/64 127/66   BP Location: Left arm  Left arm Left arm   Patient Position: Lying  Lying Lying   Pulse: 60  59 62   Resp: 16  18 18   Temp: 98.1 °F (36.7 °C)  98.6 °F (37 °C) 97.7 °F (36.5 °C)   TempSrc: Oral  Oral Oral   SpO2: 98%  94% 97%   Weight:  95.1 kg (209 lb 10.5 oz)     Height:         Biliary drain 500 mL for the last 24 hours.  Clear dumont bile.    Lungs: Clear  Heart: Regular  Abdomen: Incisions look okay. BS present.   Extremities: Warm    Labs reviewed.  WBC 7.70, Hgb 9.9, platelets 172.    Assessment:    POD 9, S/P laparoscopic cholecystostomy tube  Pancreatitis  Gallbladder sludge    Plan:    Has been doing really well over the last 2 days.  I reviewed the images and the report of CT scan of the abdomen and pelvis performed today.  There is improvement in the peripancreatic fluid collections and inflammatory changes surrounding the pancreas and gallbladder.    She will need to be instructed on how to manage the cholecystostomy drain.  I do not want her to shower or immerse the drain under water in the tub or shower.  I discussed diet and activity.  I do not think she needs antibiotics.  I will see her in the office in 2 weeks to arrange for further imaging of the pancreas to track progress and to decide about appropriate timing for removal of the gallbladder.

## 2020-03-22 NOTE — PROGRESS NOTES
Continued Stay Note  [facility]     Patient Name: Mandy Alarcon  MRN: [id number]  Today's Date: 3/22/2020    Admit Date: 2/27/2020    Discharge Plan     Row Name 03/22/20 1703       Plan    Plan  Home with family and [facility] ...............Lebron DUTTON     Patient/Family in Agreement with Plan  yes    Plan Comments  DC orders noted. [facility] has accepted. Call to [facility] and updated of pending DC, they will follow at home.........Lebron DUTTON         Discharge Codes    No documentation.             Tiffanie Barfield, RN

## 2020-03-23 ENCOUNTER — TELEPHONE (OUTPATIENT)
Dept: CARDIOLOGY | Facility: CLINIC | Age: 85
End: 2020-03-23

## 2020-03-23 ENCOUNTER — READMISSION MANAGEMENT (OUTPATIENT)
Dept: CALL CENTER | Facility: HOSPITAL | Age: 85
End: 2020-03-23

## 2020-03-23 ENCOUNTER — EPISODE CHANGES (OUTPATIENT)
Dept: CASE MANAGEMENT | Facility: OTHER | Age: 85
End: 2020-03-23

## 2020-03-23 DIAGNOSIS — K85.00 IDIOPATHIC ACUTE PANCREATITIS WITHOUT INFECTION OR NECROSIS: Primary | ICD-10-CM

## 2020-03-23 DIAGNOSIS — R60.0 BILATERAL LOWER EXTREMITY EDEMA: ICD-10-CM

## 2020-03-23 DIAGNOSIS — Z98.890 POSTOPERATIVE NAUSEA: ICD-10-CM

## 2020-03-23 DIAGNOSIS — G89.18 POSTOPERATIVE PAIN: ICD-10-CM

## 2020-03-23 DIAGNOSIS — K81.9 CHOLECYSTITIS: ICD-10-CM

## 2020-03-23 DIAGNOSIS — K21.9 GASTROESOPHAGEAL REFLUX DISEASE WITHOUT ESOPHAGITIS: ICD-10-CM

## 2020-03-23 DIAGNOSIS — R11.0 POSTOPERATIVE NAUSEA: ICD-10-CM

## 2020-03-23 RX ORDER — HYDROCODONE BITARTRATE AND ACETAMINOPHEN 10; 325 MG/1; MG/1
TABLET ORAL
Qty: 60 TABLET | Refills: 0 | Status: SHIPPED | OUTPATIENT
Start: 2020-03-23 | End: 2020-04-17

## 2020-03-23 RX ORDER — TORSEMIDE 20 MG/1
TABLET ORAL
Qty: 30 TABLET | Refills: 1
Start: 2020-03-23 | End: 2020-03-24 | Stop reason: SDUPTHER

## 2020-03-23 RX ORDER — ATENOLOL 50 MG/1
50 TABLET ORAL EVERY 12 HOURS SCHEDULED
Qty: 180 TABLET | Refills: 1 | Status: SHIPPED | OUTPATIENT
Start: 2020-03-23 | End: 2020-04-10

## 2020-03-23 RX ORDER — ONDANSETRON 4 MG/1
TABLET, ORALLY DISINTEGRATING ORAL
Qty: 30 TABLET | Refills: 0 | Status: SHIPPED | OUTPATIENT
Start: 2020-03-23 | End: 2020-08-07

## 2020-03-23 RX ORDER — POTASSIUM CHLORIDE 20 MEQ/1
TABLET, EXTENDED RELEASE ORAL
Qty: 30 TABLET | Refills: 1 | Status: SHIPPED | OUTPATIENT
Start: 2020-03-23 | End: 2020-03-23

## 2020-03-23 RX ORDER — OMEPRAZOLE 40 MG/1
CAPSULE, DELAYED RELEASE ORAL
Qty: 30 CAPSULE | Refills: 3 | Status: SHIPPED | OUTPATIENT
Start: 2020-03-23 | End: 2020-07-20

## 2020-03-23 RX ORDER — LISINOPRIL AND HYDROCHLOROTHIAZIDE 20; 12.5 MG/1; MG/1
2 TABLET ORAL DAILY
COMMUNITY
Start: 2020-03-20 | End: 2020-04-10

## 2020-03-23 RX ORDER — POTASSIUM CHLORIDE 20 MEQ/1
TABLET, EXTENDED RELEASE ORAL
Qty: 90 TABLET | Refills: 0 | Status: SHIPPED | OUTPATIENT
Start: 2020-03-23 | End: 2020-04-10

## 2020-03-23 NOTE — TELEPHONE ENCOUNTER
Called patient to discuss appt on 3/24.  She has been pretty sick and needs to cancel.  Informed her that we will put her on a call back list to be rescheduled.    Jacquelyn -    Appt has been cancelled in Epic.  Please place her on recall list.  She is aware.    Thank you!

## 2020-03-23 NOTE — OUTREACH NOTE
Prep Survey      Responses   [facility] facility patient discharged from?  [place]   Is LACE score < 7 ?  No   Eligibility  Readm Mgmt   Discharge diagnosis  Acute pancreatitis, cholecystitis, s/p cholecystostomy tube placement, Hyponatremia, CKD III, obesity,SVT, primary hypothyroidism, HLD   Does the patient have one of the following disease processes/diagnoses(primary or secondary)?  Other   Does the patient have Home health ordered?  Yes   What is the Home health agency?   [facility]   Is there a DME ordered?  No   Comments regarding appointments  Pt to schedule F/U appointments   Prep survey completed?  Yes          Shauna Richards RN

## 2020-03-23 NOTE — PROGRESS NOTES
Case Management Discharge Note      Final Note: Home with [facility] and family. mary ellen rn/ccp    Provided Post Acute Provider List?: Yes  Refused Provider List Comment: From [facility] lacey, ralph CMS info  Provided Post Acute Provider Quality & Resource List?: Refused    Destination - Selection Complete      Service Provider Request Status Selected Services Address Phone Number Fax Number    [facility] Selected Skilled Nursing [address] [address] [phone]      Durable Medical Equipment      No service has been selected for the patient.      Dialysis/Infusion      No service has been selected for the patient.      Home Medical Care - Selection Complete      Service Provider Request Status Selected Services Address Phone Number Fax Number    [facility] [place] Selected Home Health Services [address] [address] [phone]      Therapy      No service has been selected for the patient.      Community Resources      No service has been selected for the patient.        Transportation Services  Private: Car    Final Discharge Disposition Code: 06 - home with home health care

## 2020-03-24 ENCOUNTER — TELEPHONE (OUTPATIENT)
Dept: INTERNAL MEDICINE | Facility: CLINIC | Age: 85
End: 2020-03-24

## 2020-03-24 DIAGNOSIS — R60.0 BILATERAL LOWER EXTREMITY EDEMA: ICD-10-CM

## 2020-03-24 RX ORDER — TORSEMIDE 20 MG/1
TABLET ORAL
Qty: 30 TABLET | Refills: 1 | Status: SHIPPED | OUTPATIENT
Start: 2020-03-24 | End: 2020-03-24

## 2020-03-24 RX ORDER — TORSEMIDE 20 MG/1
TABLET ORAL
Qty: 90 TABLET | Refills: 0 | Status: SHIPPED | OUTPATIENT
Start: 2020-03-24 | End: 2020-04-10

## 2020-03-24 NOTE — TELEPHONE ENCOUNTER
Pt's pharmacy called for clarification on pt's potassium.  It was sent to the pharmacy for 20mg a day.  The pt's daughter says that isn't right dose

## 2020-03-25 NOTE — TELEPHONE ENCOUNTER
Yes, I want her on 20 mEq/day and she is to only take it on the days she takes the water pill.  I am concerned her potassium will get too low when she is on the large dose.  Tell the daughter this is fine.  We will check her potassium on Monday through home health which should have already been ordered.

## 2020-03-30 ENCOUNTER — EPISODE CHANGES (OUTPATIENT)
Dept: CASE MANAGEMENT | Facility: OTHER | Age: 85
End: 2020-03-30

## 2020-03-30 ENCOUNTER — LAB REQUISITION (OUTPATIENT)
Dept: LAB | Facility: HOSPITAL | Age: 85
End: 2020-03-30

## 2020-03-30 ENCOUNTER — READMISSION MANAGEMENT (OUTPATIENT)
Dept: CALL CENTER | Facility: HOSPITAL | Age: 85
End: 2020-03-30

## 2020-03-30 DIAGNOSIS — Z00.00 ENCOUNTER FOR GENERAL ADULT MEDICAL EXAMINATION WITHOUT ABNORMAL FINDINGS: ICD-10-CM

## 2020-03-30 LAB
ALBUMIN SERPL-MCNC: 3.2 G/DL (ref 3.5–5.2)
ALBUMIN/GLOB SERPL: 0.7 G/DL
ALP SERPL-CCNC: 101 U/L (ref 39–117)
ALT SERPL W P-5'-P-CCNC: 61 U/L (ref 1–33)
ANION GAP SERPL CALCULATED.3IONS-SCNC: 14.6 MMOL/L (ref 5–15)
AST SERPL-CCNC: 41 U/L (ref 1–32)
BILIRUB SERPL-MCNC: 1.1 MG/DL (ref 0.2–1.2)
BUN BLD-MCNC: 32 MG/DL (ref 8–23)
BUN/CREAT SERPL: 17.9 (ref 7–25)
CALCIUM SPEC-SCNC: 8.5 MG/DL (ref 8.6–10.5)
CHLORIDE SERPL-SCNC: 95 MMOL/L (ref 98–107)
CO2 SERPL-SCNC: 19.4 MMOL/L (ref 22–29)
CREAT BLD-MCNC: 1.79 MG/DL (ref 0.57–1)
DEPRECATED RDW RBC AUTO: 49.8 FL (ref 37–54)
ERYTHROCYTE [DISTWIDTH] IN BLOOD BY AUTOMATED COUNT: 14.9 % (ref 12.3–15.4)
GFR SERPL CREATININE-BSD FRML MDRD: 27 ML/MIN/1.73
GLOBULIN UR ELPH-MCNC: 4.3 GM/DL
GLUCOSE BLD-MCNC: 101 MG/DL (ref 65–99)
HCT VFR BLD AUTO: 37 % (ref 34–46.6)
HGB BLD-MCNC: 12.2 G/DL (ref 12–15.9)
MCH RBC QN AUTO: 30 PG (ref 26.6–33)
MCHC RBC AUTO-ENTMCNC: 33 G/DL (ref 31.5–35.7)
MCV RBC AUTO: 90.9 FL (ref 79–97)
PLATELET # BLD AUTO: 287 10*3/MM3 (ref 140–450)
PMV BLD AUTO: 9.6 FL (ref 6–12)
POTASSIUM BLD-SCNC: 3.8 MMOL/L (ref 3.5–5.2)
PROT SERPL-MCNC: 7.5 G/DL (ref 6–8.5)
RBC # BLD AUTO: 4.07 10*6/MM3 (ref 3.77–5.28)
SODIUM BLD-SCNC: 129 MMOL/L (ref 136–145)
WBC NRBC COR # BLD: 11.12 10*3/MM3 (ref 3.4–10.8)

## 2020-03-30 PROCEDURE — 85027 COMPLETE CBC AUTOMATED: CPT | Performed by: INTERNAL MEDICINE

## 2020-03-30 PROCEDURE — 80053 COMPREHEN METABOLIC PANEL: CPT | Performed by: INTERNAL MEDICINE

## 2020-03-30 NOTE — OUTREACH NOTE
Medical Week 1 Survey      Responses   [facility] patient discharged from?  [place]   Does the patient have one of the following disease processes/diagnoses(primary or secondary)?  Other   Is there a successful TCM telephone encounter documented?  No   Week 1 attempt successful?  No   Unsuccessful attempts  Attempt 1   Revoke  Phone number issues [phone numbers listed do not work]          Berenice Barker RN

## 2020-03-31 ENCOUNTER — PATIENT OUTREACH (OUTPATIENT)
Dept: CASE MANAGEMENT | Facility: OTHER | Age: 85
End: 2020-03-31

## 2020-03-31 ENCOUNTER — TELEPHONE (OUTPATIENT)
Dept: INTERNAL MEDICINE | Facility: CLINIC | Age: 85
End: 2020-03-31

## 2020-03-31 NOTE — OUTREACH NOTE
Care Coordination Assessment    Documented/Reviewed By:  Jessica Jordan RN Date/time:  3/31/2020  2:19 PM   Assessment completed with:   (Comment: Daughter)  Enrolled in care management program:  No  Living arrangement:  family members  Support system:  family  Type of residence:  private residence  Home care services:  Yes  Equipment used at home:  none  Communication device:  Yes  Other issues:  impaired hearing impacts communication (Comment: Deaf in left ear)  Bed or wheelchair confined:  No  Inadequate nutrition:  No  Medication adherence problem:  No  Experiencing side effects from current medications:  No  History of fall(s) in last 6 months:  No  Difficulty keeping appointments:  No

## 2020-03-31 NOTE — OUTREACH NOTE
Care Coordination Note    Talked with [facility] [phone]. She states patient is current; receiving  home health services with start of care 3/23/20.     Jessica Jordan RN  Ambulatory     3/31/2020, 14:48

## 2020-03-31 NOTE — OUTREACH NOTE
Care Plan Note      Responses   Annual Wellness Visit:   Patient Has Completed   Care Gaps Addressed  Flu Shot, Pneumonia Vaccine   Flu Shot Status  Up to Date   Pneumonia Vaccine Status  Up to Date   Other Patient Education/Resources   24/[facility] Nurse Call Line, Advanced Care Planning, Joey   24/7 Nurse Call Line Education Method  Verbal   ACP Education Method  Verbal [Declines]   MyChart Education Method  Verbal   Advanced Directives:  Not Interested At This Time        The main concerns and/or symptoms the patient would like to address are: Talked with patient and daughter who is a nurse. Discussed 2/27/20 hospitalization regarding pancreatitis. Patient has a cholecystomy tube and currently receiving home health services. Daughter states patient had episode of nausea and vomiting during the night; has progressed from clear liquid diet to soup and sandwich and tolerated without difficulty.Daughter states cholecystomy bag is drained daily and is averaging 450 to 575 cc's. Patient is following low fat diet and reports no difficulty with fever; chest pain; or SOB.  Patient lives with daughter and son in law (physician);  receiving assistance as needed with activities; transportation and ambulates without assistive device.. Patient compliant with medications and medical appointments. Patient had blood work by home health and daughter will contact PCP regarding blood work and episodes of N/V. Confirms appointment with Dr. Andujar (general surgeon) for 4/7/20.     Education/instruction provided by Care Coordinator: Reviewed with patient's daughter  24/7 Nurse Line Telephone number; [facility] contact information; Advance Directives; My Chart; gaps in care; MWV and Case Management services. Daughter verbalized understanding. She states to appreciate phone call and declines further outreach at this time. No further questions or concerns voiced at this time.     Follow Up Outreach Due: Follow up as needed.      Jessica oJrdan RN  Ambulatory     3/31/2020, 14:22

## 2020-04-07 ENCOUNTER — OFFICE VISIT (OUTPATIENT)
Dept: SURGERY | Facility: CLINIC | Age: 85
End: 2020-04-07

## 2020-04-07 DIAGNOSIS — K85.11 ACUTE BILIARY PANCREATITIS WITH UNINFECTED NECROSIS: Primary | ICD-10-CM

## 2020-04-07 PROCEDURE — 99024 POSTOP FOLLOW-UP VISIT: CPT | Performed by: SURGERY

## 2020-04-07 NOTE — PROGRESS NOTES
[facility] COMPLAINT:    Cholecystostomy tube    HISTORY OF PRESENT ILLNESS:    Mandy Alarcon is a 86 y.o. female who was admitted to the hospital on 1/31/2020 with acute pancreatitis.  She recovered, but she returned to the hospital after discharge from the initial hospitalization on 2/27/2020 with worsening abdominal pain.  CT imaging showed more extensive pancreatitis.  In addition to pancreatic inflammatory changes there were large peripancreatic fluid collections.  During her admission, I recommended cholecystectomy.  Surgery was extraordinarily difficult, and I elected to bailout by placing a cholecystostomy tube.  The plan is to eventually return to the operating room for cholecystectomy and removal of the tube at some later date after the inflammatory changes have improved significantly.    She was ultimately discharged from the hospital on 3/22/2020.  She is tolerating a regular low-fat diet.  She does not drink alcohol.  The cholecystostomy tube is connected to a gravity drainage bag.  They have been recording daily output volumes from the cholecystostomy tube.  It  has ranged from 450 mL to 580 mL per day, but there was 1 day where it was only 250 mL.  She has a bowel movement every third day.  She usually has to use an over-the-counter stool softener.  Stools are brown, but lighter colored than usual.  There is no blood in the stool.  There has been no nausea, vomiting, or abdominal pain.     EXAM:    Alert. Oriented.  Lungs: Clear.  Heart: Regular.  Abdomen:  Soft.  Nondistended.  The laparoscopic incisions are healed.  There is a  cholecystostomy tube that exits the abdominal wall in the right upper quadrant.  It is an 18 K[place] Hussein catheter.  The balloon contains 5 mL of water.  I removed the retention suture.  Extremities: Warm.    ASSESSMENT:    Severe acute pancreatitis status post lengthy hospitalization.  Attempted laparoscopic cholecystectomy on 3/12/2020- aborted in favor of placement of  cholecystostomy tube.    PLAN:    She will continue with her low-fat diet.    I am ordering a CT scan of the abdomen and pelvis to assess the inflammatory changes and fluid collections that surround the pancreas.  I think the volume of effluent from the cholecystostomy tube is too high to warrant capping it or removing it.  Follow-up in 2 weeks.  Eventually she will require laparoscopic cholecystectomy.  I will time the date of procedure according to resolution of inflammatory changes based on CT imaging.

## 2020-04-08 ENCOUNTER — TELEPHONE (OUTPATIENT)
Dept: SURGERY | Facility: CLINIC | Age: 85
End: 2020-04-08

## 2020-04-08 NOTE — TELEPHONE ENCOUNTER
You ordered a CT and the pt was told the hospital is not scheduling any CT until after 04/27. Would you like to move apt back?

## 2020-04-10 ENCOUNTER — TELEMEDICINE (OUTPATIENT)
Dept: CARDIOLOGY | Facility: CLINIC | Age: 85
End: 2020-04-10

## 2020-04-10 ENCOUNTER — TELEPHONE (OUTPATIENT)
Dept: CARDIOLOGY | Facility: CLINIC | Age: 85
End: 2020-04-10

## 2020-04-10 VITALS
SYSTOLIC BLOOD PRESSURE: 119 MMHG | WEIGHT: 209 LBS | BODY MASS INDEX: 37.03 KG/M2 | HEART RATE: 70 BPM | HEIGHT: 63 IN | DIASTOLIC BLOOD PRESSURE: 71 MMHG

## 2020-04-10 DIAGNOSIS — I10 BENIGN ESSENTIAL HYPERTENSION: Chronic | ICD-10-CM

## 2020-04-10 DIAGNOSIS — I48.0 PAROXYSMAL ATRIAL FIBRILLATION (HCC): ICD-10-CM

## 2020-04-10 DIAGNOSIS — N18.30 CKD (CHRONIC KIDNEY DISEASE) STAGE 3, GFR 30-59 ML/MIN (HCC): ICD-10-CM

## 2020-04-10 DIAGNOSIS — I47.1 SUPRAVENTRICULAR TACHYCARDIA (HCC): Primary | Chronic | ICD-10-CM

## 2020-04-10 PROCEDURE — 99214 OFFICE O/P EST MOD 30 MIN: CPT | Performed by: NURSE PRACTITIONER

## 2020-04-10 RX ORDER — PRAVASTATIN SODIUM 40 MG
40 TABLET ORAL DAILY
COMMUNITY
End: 2020-11-30

## 2020-04-10 RX ORDER — ATENOLOL 50 MG/1
50 TABLET ORAL 2 TIMES DAILY PRN
Qty: 180 TABLET | Refills: 1
Start: 2020-04-10 | End: 2020-09-15

## 2020-04-14 ENCOUNTER — TELEPHONE (OUTPATIENT)
Dept: INTERNAL MEDICINE | Facility: CLINIC | Age: 85
End: 2020-04-14

## 2020-04-14 DIAGNOSIS — I10 BENIGN ESSENTIAL HYPERTENSION: Primary | Chronic | ICD-10-CM

## 2020-04-14 DIAGNOSIS — E78.5 HYPERLIPIDEMIA, UNSPECIFIED HYPERLIPIDEMIA TYPE: Chronic | ICD-10-CM

## 2020-04-14 DIAGNOSIS — E03.9 PRIMARY HYPOTHYROIDISM: Chronic | ICD-10-CM

## 2020-04-14 NOTE — TELEPHONE ENCOUNTER
PT DAUGHTER REQUESTED TO SCHEDULE A LAB FOR PT AND ALSO A F/U FOR PT. DAUGHTER STATED THAT PT JUST GOT OUT OF THE HOSPITAL.    PLEASE ADVISE [phone]

## 2020-04-15 ENCOUNTER — LAB REQUISITION (OUTPATIENT)
Dept: LAB | Facility: HOSPITAL | Age: 85
End: 2020-04-15

## 2020-04-15 ENCOUNTER — TELEPHONE (OUTPATIENT)
Dept: INTERNAL MEDICINE | Facility: CLINIC | Age: 85
End: 2020-04-15

## 2020-04-15 DIAGNOSIS — Z00.00 ENCOUNTER FOR GENERAL ADULT MEDICAL EXAMINATION WITHOUT ABNORMAL FINDINGS: ICD-10-CM

## 2020-04-15 LAB
ALBUMIN SERPL-MCNC: 2.6 G/DL (ref 3.5–5.2)
ALBUMIN/GLOB SERPL: 0.5 G/DL
ALP SERPL-CCNC: 146 U/L (ref 39–117)
ALT SERPL W P-5'-P-CCNC: 22 U/L (ref 1–33)
ANION GAP SERPL CALCULATED.3IONS-SCNC: 10.6 MMOL/L (ref 5–15)
AST SERPL-CCNC: 17 U/L (ref 1–32)
BILIRUB SERPL-MCNC: 0.7 MG/DL (ref 0.2–1.2)
BUN BLD-MCNC: 17 MG/DL (ref 8–23)
BUN/CREAT SERPL: 12.7 (ref 7–25)
CALCIUM SPEC-SCNC: 9 MG/DL (ref 8.6–10.5)
CHLORIDE SERPL-SCNC: 105 MMOL/L (ref 98–107)
CO2 SERPL-SCNC: 16.4 MMOL/L (ref 22–29)
CREAT BLD-MCNC: 1.34 MG/DL (ref 0.57–1)
DEPRECATED RDW RBC AUTO: 46.1 FL (ref 37–54)
ERYTHROCYTE [DISTWIDTH] IN BLOOD BY AUTOMATED COUNT: 14.1 % (ref 12.3–15.4)
GFR SERPL CREATININE-BSD FRML MDRD: 38 ML/MIN/1.73
GLOBULIN UR ELPH-MCNC: 5 GM/DL
GLUCOSE BLD-MCNC: 98 MG/DL (ref 65–99)
HCT VFR BLD AUTO: 34 % (ref 34–46.6)
HGB BLD-MCNC: 11.2 G/DL (ref 12–15.9)
MCH RBC QN AUTO: 29.6 PG (ref 26.6–33)
MCHC RBC AUTO-ENTMCNC: 32.9 G/DL (ref 31.5–35.7)
MCV RBC AUTO: 89.7 FL (ref 79–97)
PLATELET # BLD AUTO: 299 10*3/MM3 (ref 140–450)
PMV BLD AUTO: 9.4 FL (ref 6–12)
POTASSIUM BLD-SCNC: 4.1 MMOL/L (ref 3.5–5.2)
PROT SERPL-MCNC: 7.6 G/DL (ref 6–8.5)
RBC # BLD AUTO: 3.79 10*6/MM3 (ref 3.77–5.28)
SODIUM BLD-SCNC: 132 MMOL/L (ref 136–145)
WBC NRBC COR # BLD: 12.38 10*3/MM3 (ref 3.4–10.8)

## 2020-04-15 PROCEDURE — 80053 COMPREHEN METABOLIC PANEL: CPT | Performed by: INTERNAL MEDICINE

## 2020-04-15 PROCEDURE — 85027 COMPLETE CBC AUTOMATED: CPT | Performed by: INTERNAL MEDICINE

## 2020-04-15 NOTE — TELEPHONE ENCOUNTER
Sunshine ([phone]) with [facility] called stating that she was seeing patient today and can draw labs if needed.  Please advise.

## 2020-04-15 NOTE — TELEPHONE ENCOUNTER
Patient and daughter, Mandy Alarcon, notified and expressed understanding.  Telephone appointment scheduled with Dr. Santos for Friday per patient/daughter request.

## 2020-04-16 ENCOUNTER — TELEPHONE (OUTPATIENT)
Dept: SURGERY | Facility: CLINIC | Age: 85
End: 2020-04-16

## 2020-04-16 NOTE — TELEPHONE ENCOUNTER
Patients daughter called stating that the drainage amts from the cholecystostomy tube are still ranging from 425 mL to 575 mL and the color has changed from a clear jhony to cloudy with a pungent odor. Patient is scheduled for f/u visit on 4/21. Any recommendations at this time?

## 2020-04-17 ENCOUNTER — TELEPHONE (OUTPATIENT)
Dept: SURGERY | Facility: CLINIC | Age: 85
End: 2020-04-17

## 2020-04-17 ENCOUNTER — OFFICE VISIT (OUTPATIENT)
Dept: INTERNAL MEDICINE | Facility: CLINIC | Age: 85
End: 2020-04-17

## 2020-04-17 DIAGNOSIS — K59.00 ACUTE CONSTIPATION: ICD-10-CM

## 2020-04-17 DIAGNOSIS — I48.0 PAROXYSMAL ATRIAL FIBRILLATION (HCC): ICD-10-CM

## 2020-04-17 DIAGNOSIS — K85.10 ACUTE GALLSTONE PANCREATITIS: ICD-10-CM

## 2020-04-17 DIAGNOSIS — E78.2 MIXED HYPERLIPIDEMIA: ICD-10-CM

## 2020-04-17 DIAGNOSIS — Z51.81 THERAPEUTIC DRUG MONITORING: ICD-10-CM

## 2020-04-17 DIAGNOSIS — E04.2 MULTINODULAR GOITER: Chronic | ICD-10-CM

## 2020-04-17 DIAGNOSIS — Z87.39 HISTORY OF GOUT: Chronic | ICD-10-CM

## 2020-04-17 DIAGNOSIS — R73.01 IMPAIRED FASTING GLUCOSE: Chronic | ICD-10-CM

## 2020-04-17 DIAGNOSIS — Z87.19 HISTORY OF ACUTE PANCREATITIS: Primary | ICD-10-CM

## 2020-04-17 DIAGNOSIS — K21.9 GASTROESOPHAGEAL REFLUX DISEASE WITHOUT ESOPHAGITIS: ICD-10-CM

## 2020-04-17 DIAGNOSIS — R60.0 BILATERAL LOWER EXTREMITY EDEMA: ICD-10-CM

## 2020-04-17 DIAGNOSIS — E66.01 MORBIDLY OBESE (HCC): Chronic | ICD-10-CM

## 2020-04-17 DIAGNOSIS — K81.1 CHRONIC CHOLECYSTITIS: ICD-10-CM

## 2020-04-17 DIAGNOSIS — E03.9 PRIMARY HYPOTHYROIDISM: Chronic | ICD-10-CM

## 2020-04-17 DIAGNOSIS — I10 BENIGN ESSENTIAL HYPERTENSION: Chronic | ICD-10-CM

## 2020-04-17 DIAGNOSIS — N18.30 CKD (CHRONIC KIDNEY DISEASE) STAGE 3, GFR 30-59 ML/MIN (HCC): ICD-10-CM

## 2020-04-17 DIAGNOSIS — I47.1 SUPRAVENTRICULAR TACHYCARDIA (HCC): Chronic | ICD-10-CM

## 2020-04-17 PROBLEM — Z09 HOSPITAL DISCHARGE FOLLOW-UP: Status: ACTIVE | Noted: 2020-04-17

## 2020-04-17 PROBLEM — K81.0 ACUTE CHOLECYSTITIS: Status: ACTIVE | Noted: 2020-03-11

## 2020-04-17 PROBLEM — G89.18 POSTOPERATIVE PAIN: Status: RESOLVED | Noted: 2020-03-23 | Resolved: 2020-04-17

## 2020-04-17 PROBLEM — E83.42 HYPOMAGNESEMIA: Status: RESOLVED | Noted: 2020-02-05 | Resolved: 2020-04-17

## 2020-04-17 PROBLEM — E87.1 HYPONATREMIA: Status: RESOLVED | Noted: 2020-02-05 | Resolved: 2020-04-17

## 2020-04-17 PROBLEM — Z98.890 POSTOPERATIVE NAUSEA: Status: RESOLVED | Noted: 2020-03-23 | Resolved: 2020-04-17

## 2020-04-17 PROBLEM — R11.0 POSTOPERATIVE NAUSEA: Status: RESOLVED | Noted: 2020-03-23 | Resolved: 2020-04-17

## 2020-04-17 PROBLEM — D72.829 LEUKOCYTOSIS: Status: RESOLVED | Noted: 2020-02-05 | Resolved: 2020-04-17

## 2020-04-17 PROCEDURE — 99443 PR PHYS/QHP TELEPHONE EVALUATION 21-30 MIN: CPT | Performed by: INTERNAL MEDICINE

## 2020-04-17 RX ORDER — POLYETHYLENE GLYCOL 3350 17 G/17G
POWDER, FOR SOLUTION ORAL
Start: 2020-04-17

## 2020-04-17 RX ORDER — LEVOFLOXACIN 500 MG/1
500 TABLET, FILM COATED ORAL DAILY
Qty: 10 TABLET | Refills: 0 | Status: SHIPPED | OUTPATIENT
Start: 2020-04-17 | End: 2020-04-27

## 2020-04-17 NOTE — TELEPHONE ENCOUNTER
Pt's daughter wanted to give you an update and let you know pt was able to have a bowel movement today after taking Miralax.

## 2020-04-17 NOTE — TELEPHONE ENCOUNTER
Spoke with her on the phone today.  Constipation is improved with suppository.  She has an appointment to see me in the office next week.  There is a change to the drainage from the cholecystostomy tube.  It is malodorous.  I sent a prescription for Levaquin to her pharmacy.

## 2020-04-17 NOTE — TELEPHONE ENCOUNTER
Dr Valdes would like to speak with you regarding this patient. Has a scheduled scan coming up and would like to discuss her lower abd pain. Having constipation and no bowel movement for the last 3 days.

## 2020-04-17 NOTE — PROGRESS NOTES
04/17/2020    Patient Information  Mandy Alarcon                                                                                          [address] [facility] [address]      5/30/1933  [unfilled]  There is no work phone number on file.     Chief Complaint:     Follow-up multiple medical problems including problems with lower extremity edema.  Patient was recently hospitalized but we cannot do a hospital follow-up visit via telephone.    History of Present Illness:    Patient with a history of multiple medical problems including hypertension, gout, hyperlipidemia, primary hypothyroidism, goiter, impaired fasting glucose, nocturnal hypoxemia, secondary polycythemia, history of supraventricular tachycardia and paroxysmal atrial fibrillation, possible chronic kidney disease, esophageal reflux, recent admission to the hospital for acute pancreatitis related to gallstones and subsequent cholecystectomy.  She presents today for a telephone follow-up which was necessitated because of the recent COVID-19 pandemic.  It is too risky to have the patient be seen in person.  Her hospital course was reviewed at length including history and physical, laboratory radiographic studies, hospital course, discharge summary, and operative report.  Discharge medications also reviewed and updated.  Her past medical history reviewed and updated were necessary including health maintenance parameters.  This reveals she is currently up-to-date or else accounted for.    The history regarding recent admission to the hospital for acute pancreatitis related to gallstones and subsequent cholecystectomy for acute cholecystitis:    April 17, 2020--Hospital discharge telephone follow-up per Dr. Santos.  This was necessitated due to the recent COVID-19 pandemic.  I felt it was far too risky for patient to come into the office to be evaluated there.  She reports that she is doing fairly well with the exception of constipation which  she developed about 4 days ago.  She is having some bloating due to this and she is not eating very well because of that.  She has tried over-the-counter Dulcolax but has not tried MiraLAX because she was told by the general surgeon not to take that because it might cause diarrhea.  I do not think the surgeon was aware of her problem with constipation.  Patient reports that if it worked for the constipation she would be doing fairly well.  I extensively reviewed the documentation from the hospital stay including history and physical, laboratory and radiographic studies, operative report, discharge summary, hospital course, discharge medications.  Also reviewed lab work which we obtained recently including a CBC which showed a very mild leukocytosis and a CMP which revealed mild hyponatremia and suspected chronic renal insufficiency.  Since the time of discharge patient has had some problems with lower extremity edema.  Her medications were adjusted and she reports the swelling is no longer an issue.  Plan is as follows: I will discuss the case with her daughter who is a nurse and come up with a game plan regarding her constipation.  I see no reason why she cannot take MiraLAX for the constipation, at least for short period of time.  Magnesium citrate is also a possibility.  Patient has tried stool softeners but this has not been effective and neither has Dulcolax.  Patient has had home health follow-up and is unclear to me whether or not she is scheduled for additional home health visits or not.  I will check on this.  We will plan on following up with patient after she has her gallbladder removed, sooner if she has any further problems.    February 27, 2020 to March 22, 2020--Hospital admission. Ms. Alarcon is a 86 y.o. non-smoker with a history of idiopathic pancreatitis that was just discharged from this facility 2/11.  Despite extensive work-up there was no obvious source identified for pancreatitis.  Lisinopril  HCTZ for hypertension was discontinued as possible etiology.  MRC was unremarkable, gallbladder wall thickened but no gallstones. Her admission was also complicated by pneumonia, LINNEA and A. fib RVR.  She finished Levaquin and 3 days of oral doxycycline.  She states she went to rehab and was doing okay for approximately 1 week.  In the last 4 to 5 days she has had no appetite, bilious vomiting, and diffuse abdominal pain but worse in the epigastric region.  Patient reports a fever of 101.5 last night.  She has pleuritic chest pain worse with a deep breath but no cough.  She denies diarrhea, dysuria, rash, orthopnea, PND.  She was seen in the GI office today by Dr. Skaggs for worsening laboratory values with renal failure and hyponatremia.  She was sent to the ER for further work-up     1.Status post cholecystostomy tube placement, today is day 9.  WBC is normal and patient is afebrile.  Not on any antibiotics.  Off TPN.  Patient is able to tolerate p.o. diet without any difficulty.  She was advised to be on a low-fat diet upon discharge.  Surgery did evaluate and recommended that she be discharged and follow-up as an outpatient basis.  Cholecystostomy tube will be removed at a later date as an outpatient basis depending on how she is progressing.  Home health is also been arranged.  2.  Hypertension weight, will continue with atenolol.  3.  Hypothyroidism, on Synthroid.  4.  Obesity, was counseled to lose weight     Please note patient was seen and examined today on day of discharge.  Time taken to discharge 45 minutes.    Review of Systems   Constitution: Negative. Negative for chills and fever.   HENT: Negative.    Eyes: Negative.    Cardiovascular: Positive for leg swelling.   Respiratory: Negative.  Negative for cough and shortness of breath.    Endocrine: Negative.    Hematologic/Lymphatic: Negative.    Skin: Negative.    Musculoskeletal: Positive for arthritis.   Gastrointestinal: Positive for constipation.  Negative for abdominal pain, hematochezia, melena, nausea and vomiting.   Genitourinary: Negative.    Neurological: Negative.    Psychiatric/Behavioral: Negative.    Allergic/Immunologic: Negative.        Active Problems:    Patient Active Problem List   Diagnosis   • Benign essential hypertension   • Claustrophobia   • Gout   • Hyperlipidemia   • Primary hypothyroidism   • Impaired fasting glucose   • Nocturnal hypoxemia   • Secondary polycythemia   • Vitamin D deficiency   • Therapeutic drug monitoring   • Family history of coronary artery disease   • Bilateral sensorineural hearing loss, wears hearing aids   • Multinodular goiter   • Supraventricular tachycardia (CMS/HCC)   • Morbidly obese (CMS/HCC)   • CKD (chronic kidney disease) stage 3, GFR 30-59 ml/min (CMS/HCC)   • Acute gallstone pancreatitis   • Chronic cholecystitis   • Bilateral lower extremity edema   • Gastroesophageal reflux disease without esophagitis   • Paroxysmal atrial fibrillation (CMS/HCC)   • Hospital discharge follow-up   • History of acute pancreatitis   • Acute constipation         Past Medical History:   Diagnosis Date   • Benign essential hypertension 10/28/2012    October 2012--treatment for hypertension begun.   • Bilateral sensorineural hearing loss, wears hearing aids 6/14/2017    Left is much worse than right.  Patient had multiple ear infections as a child.   • Chronic renal insufficiency, stage II (mild), 05/18/2016--creatinine 1.35 4/28/2016 05/18/2016--creatinine 1.35.  Baseline creatinine approximately 1.3.   • Claustrophobia 4/28/2016    This patient has significant nocturnal hypoxemia and I think that she could benefit from nocturnal oxygen therapy. The exact etiology of her hypoxemia is not clear. She could possibly have obstructive sleep apnea but this is not documented. We cannot test this patient for sleep apnea due to the fact that she is severely claustrophobic. Patient was a former smoker and it is possibly that  COPD he is playing a role.   • Family history of coronary artery disease 2016    Patient's mother, father, 2 sisters and a brother all  from myocardial infarctions   • Gout 10/28/2012    2012--initial diagnosis and treatment of gout.   • Hyperlipidemia 10/28/2012    2012--treatment for hyperlipidemia begun.   • Impaired fasting glucose 10/28/2012    2012--initial diagnosis impaired fasting glucose.   • Morbidly obese (CMS/HCC) 3/11/2019   • Nocturnal hypoxemia 2014--patient did not receive nocturnal oxygen because of Medicare regulations.   05/15/2014--overnight oximetry revealed oxygen saturations less than 89% for 22 minutes and 40 seconds. Oxygen saturations less than or equal to 88% for 22 minutes and 40 seconds. Lowest oxygen saturation 83%. The longest continuous time with oxygen saturations less than or equal to 88% was 1 minute and 32 seconds.   2012--overnight oximetry revealed oxygen saturations less than 90% for one hour and 35 minutes. Oxygen saturations less than 89% 59 minutes. This patient has significant nocturnal hypoxemia and I think that she could benefit from nocturnal oxygen therapy. The exact etiology of her hypoxemia is not clear. She could possibly have obstructive sleep apnea but this is not documented. We cannot test this patient for sleep apnea due to the fact that she is severely claustrophobic. Patient was a former smoker and it is possibly that COPD he is playing a role.   • Pancreatitis    • Pneumonia    • Postoperative nausea 3/23/2020   • Primary hypothyroidism 2015--TSH remains elevated slightly at 4.92.  Given the overall clinical picture including the multinodular goiter, we will initiate levothyroxine 50 mcg/day and reassess in about 6 weeks.  2018--thyroid ultrasound reveals a multinodular thyroid with multiple subcentimeter nodules.  Only minimal increase in size of the largest nodule in  the left lobe has occurred when compared    • Secondary polycythemia 8/2/2012 11/06/2014--patient did not receive nocturnal oxygen because of Medicare regulations.   05/15/2014--overnight oximetry revealed oxygen saturations less than 89% for 22 minutes and 40 seconds. Oxygen saturations less than or equal to 88% for 22 minutes and 40 seconds. Lowest oxygen saturation 83%. The longest continuous time with oxygen saturations less than or equal to 88% was 1 minute and 32 seconds.   08/02/2012--overnight oximetry revealed oxygen saturations less than 90% for one hour and 35 minutes. Oxygen saturations less than 89% 59 minutes. This patient has significant nocturnal hypoxemia and I think that she could benefit from nocturnal oxygen therapy. The exact etiology of her hypoxemia is not clear. She could possibly have obstructive sleep apnea but this is not documented. We cannot test this patient for sleep apnea due to the fact that she is severely claustrophobic. Patient was a former smoker and it is possibly that COPD he is playing a role.   • Vitamin D deficiency 5/23/2016         Past Surgical History:   Procedure Laterality Date   • CHOLECYSTECTOMY WITH INTRAOPERATIVE CHOLANGIOGRAM N/A 3/12/2020    Procedure: LAPAROSCOPIC CHOLECYSTOSTOMY TUBE, INTRAOPERATIVE CHOLANGIOGRAM;  Surgeon: Bryce Andujar MD;  Location: [facility] OR;  Service: General;  Laterality: N/A;   • OOPHORECTOMY      age 48   • RADICAL ABDOMINAL HYSTERECTOMY  48 years old    48 years of age. Uterine fibroid tumors with menorrhagia - no cancer         Allergies   Allergen Reactions   • Cefaclor Anaphylaxis and Angioedema     caused angioedema 25 years ago; she tolerates cephalexin, amoxicillin, and ceftriaxone per my d/w patient and her daughter as well as amoxicillin-clavulanate confirmed in EPIC   • Sulfa Antibiotics Rash           Current Outpatient Medications:   •  allopurinol (ZYLOPRIM) 300 MG tablet, Take 300 mg by mouth Daily., Disp: ,  Rfl:   •  atenolol (TENORMIN) 50 MG tablet, Take 1 tablet by mouth 2 (Two) Times a Day As Needed (Holding for SBP less than 120  or HR less than 60)., Disp: 180 tablet, Rfl: 1  •  Cholecalciferol (VITAMIN D) 2000 UNITS tablet, Take 2 tablets by mouth daily., Disp: , Rfl:   •  clobetasol (TEMOVATE) 0.05 % ointment, Apply  topically to the appropriate area as directed 2 (Two) Times a Day., Disp: 15 g, Rfl: 0  •  docusate sodium (COLACE) 100 MG capsule, Take 1 capsule by mouth 2 (Two) Times a Day As Needed for Constipation., Disp: , Rfl:   •  levoFLOXacin (Levaquin) 500 MG tablet, Take 1 tablet by mouth Daily for 10 days., Disp: 10 tablet, Rfl: 0  •  levothyroxine (SYNTHROID, LEVOTHROID) 50 MCG tablet, Take 50 mcg by mouth Daily., Disp: , Rfl:   •  miconazole (MICOTIN) 2 % powder, Apply  topically to the appropriate area as directed 2 (Two) Times a Day., Disp: , Rfl:   •  nystatin (MYCOSTATIN) 384021 UNIT/ML suspension, Swish and swallow 5 mL 4 (Four) Times a Day., Disp: , Rfl:   •  omeprazole (PrilOSEC) 40 MG capsule, Take 1 p.o. daily before the first meal for acid reflux, Disp: 30 capsule, Rfl: 3  •  ondansetron ODT (ZOFRAN-ODT) 4 MG disintegrating tablet, Take 1 p.o. every 6 to 8 hours as needed for nausea, Disp: 30 tablet, Rfl: 0  •  polyethylene glycol (MIRALAX) packet, Take orally as directed for constipation (Patient not taking: Reported on 2020), Disp: , Rfl:   •  pravastatin (PRAVACHOL) 40 MG tablet, Take 40 mg by mouth Daily., Disp: , Rfl:       Family History   Problem Relation Age of Onset   • Heart disease Mother         Ischemic.  from coronary artery disease.   • Heart disease Father         Ischemic.  from coronary artery disease.   • Heart disease Sister         Ischemic.  from coronary artery disease.   • Heart disease Brother         Ischemic.  from coronary artery disease.   • Heart disease Sister         Ischemic.  from coronary artery disease.   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   Substance and Sexual Activity   • Alcohol use: No     Comment: caffiene use tea coffee   • Drug use: No   • Sexual activity: Not Currently     Partners: Male   Lifestyle   • Physical activity:     Days per week: 0 days     Minutes per session: 0 min   • Stress: Not at all   Relationships   • Social connections:     Talks on phone: More than three times a week     Gets together: Three times a week     Attends Denominational service: More than 4 times per year     Active member of club or organization: No     Attends meetings of clubs or organizations: Never     Relationship status:          There were no vitals filed for this visit.     There is no height or weight on file to calculate BMI.      Physical Exam:     Telephone hospital follow-up visit and therefore physical not performed.    Lab/other results:    I reviewed the emergency room documentation, history and physical, hospital course, laboratory studies, radiographic studies, consultant reports, discharge summary, discharge medications.  Also reviewed subsequent lab work that was ordered by me to monitor renal function and the use of diuretic therapy.    CBC (No Diff)   Order: [id number]   Status:  Final result   Visible to patient:  No (Not  Released) Next appt:  04/21/2020 at 09:45 AM in General Surgery (Bryce Andujar MD) Dx:  Encounter for general adult medical e...   Specimen Information: Blood        Component  Ref Range & Units 2d ago 2wk ago   WBC  3.40 - 10.80 10*3/mm3 12.38High   11.12High     RBC  3.77 - 5.28 10*6/mm3 3.79  4.07    Hemoglobin  12.0 - 15.9 g/dL 11.2Low   12.2    Hematocrit  34.0 - 46.6 % 34.0  37.0    MCV  79.0 - 97.0 fL 89.7  90.9    MCH  26.6 - 33.0 pg 29.6  30.0    MCHC  31.5 - 35.7 g/dL 32.9  33.0    RDW  12.3 - 15.4 % 14.1  14.9    RDW-SD  37.0 - 54.0 fl 46.1  49.8    MPV  6.0 - 12.0 fL 9.4  9.6    Platelets  140 - 450 10*3/mm3 299  287    [facility]         Specimen Collected: 04/15/20 10:30 Last Resulted: 04/15/20 12:54 Lab Flowsheet Order Details View Encounter Lab and Collection Details Routing Result History         Related Result Highlights         Comprehensive Metabolic Panel  Final result 4/15/2020                  Other Results from 4/15/2020     Result Notes for Comprehensive Metabolic Panel     Notes recorded by Daryl Santos MD on 4/15/2020 at 2:03 PM EDT  Tell patient her lab work looks okay except her sodium is a little low but it is better than it was 2 weeks ago.  Her potassium looks okay as well.  If her swelling is doing okay then she can continue with the same medications.   Contains abnormal data Comprehensive Metabolic Panel   Order: [id number]     Status:  Final result   Visible to patient:  No (Not Released) Next appt:  04/21/2020 at 09:45 AM in General Surgery (Bryce Andujar MD) Dx:  Encounter for general adult medical e...   Specimen Information: Blood        Component  Ref Range & Units 2d ago 2wk ago   Glucose  65 - 99 mg/dL 98  101High     BUN  8 - 23 mg/dL 17  32High     Creatinine  0.57 - 1.00 mg/dL 1.34High   1.79High     Sodium  136 - 145 mmol/L 132Low   129Low     Potassium  3.5 - 5.2 mmol/L 4.1  3.8    Chloride  98 - 107 mmol/L 105  95Low      CO2  22.0 - 29.0 mmol/L 16.4Low   19.4Low     Calcium  8.6 - 10.5 mg/dL 9.0  8.5Low     Total Protein  6.0 - 8.5 g/dL 7.6  7.5    Albumin  3.50 - 5.20 g/dL 2.60Low   3.20Low     ALT (SGPT)  1 - 33 U/L 22  61High     AST (SGOT)  1 - 32 U/L 17  41High     Alkaline Phosphatase  39 - 117 U/L 146High   101    Total Bilirubin  0.2 - 1.2 mg/dL 0.7  1.1    eGFR Non African Amer  >60 mL/min/1.73 38Low   27Low     Globulin  gm/dL 5.0  4.3    A/G Ratio  g/dL 0.5  0.7    BUN/Creatinine Ratio  7.0 - 25.0 12.7  17.9    Anion Gap  5.0 - 15.0 mmol/L 10.6  14.6    Resulting Agency  [facility]      Narrative   [facility] by[facility]   GFR Normal >60  Chronic Kidney Disease <60  Kidney Failure <15      Specimen Collected: 04/15/20 10:30 Last Resulted: 04/15/20 13:17 Lab Flowsheet Order Details View Encounter Lab and Collection Details Routing Result History         Related Result Highlights         CBC (No Diff)  Final result 4/15/2020                        Assessment/Plan:     Diagnosis Plan   1. History of acute pancreatitis     2. Acute gallstone pancreatitis     3. Chronic cholecystitis     4. CKD (chronic kidney disease) stage 3, GFR 30-59 ml/min (CMS/HCC)     5. Bilateral lower extremity edema     6. Impaired fasting glucose     7. Primary hypothyroidism     8. Hyperlipidemia     9. Benign essential hypertension     10. Gout     11. Multinodular goiter     12. Supraventricular tachycardia (CMS/HCC)     13. Paroxysmal atrial fibrillation (CMS/HCC)     14. Gastroesophageal reflux disease without esophagitis     15. Morbidly obese (CMS/HCC)     16. Therapeutic drug monitoring     17. Acute constipation  polyethylene glycol (MIRALAX) packet     Telephone follow-up per Dr. Santos.  This was necessitated due to the recent COVID-19 pandemic.  I felt it was far too risky for patient to come into the office to be evaluated there.  She reports that she is doing fairly well with the exception of constipation which she  developed about 4 days ago.  She is having some bloating due to this and she is not eating very well because of that.  She has tried over-the-counter Dulcolax but has not tried MiraLAX because she was told by the general surgeon not to take that because it might cause diarrhea.  I do not think the surgeon was aware of her problem with constipation.  Patient reports that if it worked for the constipation she would be doing fairly well.  I extensively reviewed the documentation from the hospital stay including history and physical, laboratory and radiographic studies, operative report, discharge summary, hospital course, discharge medications.  Also reviewed lab work which we obtained recently including a CBC which showed a very mild leukocytosis and a CMP which revealed mild hyponatremia and suspected chronic renal insufficiency.  Since the time of discharge patient has had some problems with lower extremity edema.  Her medications were adjusted and she reports the swelling is no longer an issue.  Plan is as follows: I will discuss the case with her daughter who is a nurse and come up with a game plan regarding her constipation.  I see no reason why she cannot take MiraLAX for the constipation, at least for short period of time.  Magnesium citrate is also a possibility.  Patient has tried stool softeners but this has not been effective and neither has Dulcolax.  Patient has had home health follow-up and is unclear to me whether or not she is scheduled for additional home health visits or not.  I will check on this.  We will plan on following up with patient after she has her gallbladder removed, sooner if she has any further problems.    Patient gave verbal consent of actively participated in this telephone follow-up appointment.  35 minutes was spent evaluating this patient today.      Procedures

## 2020-04-20 ENCOUNTER — HOSPITAL ENCOUNTER (OUTPATIENT)
Dept: CT IMAGING | Facility: HOSPITAL | Age: 85
Discharge: HOME | End: 2020-04-20
Admitting: SURGERY

## 2020-04-20 DIAGNOSIS — K85.11 ACUTE BILIARY PANCREATITIS WITH UNINFECTED NECROSIS: ICD-10-CM

## 2020-04-20 LAB — CREAT BLDA-MCNC: 1.4 MG/DL (ref 0.6–1.3)

## 2020-04-20 PROCEDURE — 0 DIATRIZOATE MEGLUMINE & SODIUM PER 1 ML: Performed by: SURGERY

## 2020-04-20 PROCEDURE — 82565 ASSAY OF CREATININE: CPT

## 2020-04-20 PROCEDURE — 74177 CT ABD & PELVIS W/CONTRAST: CPT

## 2020-04-20 PROCEDURE — 25010000002 IOPAMIDOL 61 % SOLUTION: Performed by: SURGERY

## 2020-04-20 RX ADMIN — DIATRIZOATE MEGLUMINE AND DIATRIZOATE SODIUM 30 ML: 660; 100 LIQUID ORAL; RECTAL at 10:10

## 2020-04-20 RX ADMIN — IOPAMIDOL 85 ML: 612 INJECTION, SOLUTION INTRAVENOUS at 10:10

## 2020-04-21 ENCOUNTER — OFFICE VISIT (OUTPATIENT)
Dept: SURGERY | Facility: CLINIC | Age: 85
End: 2020-04-21

## 2020-04-21 DIAGNOSIS — K85.11 ACUTE BILIARY PANCREATITIS WITH UNINFECTED NECROSIS: Primary | ICD-10-CM

## 2020-04-21 PROCEDURE — 99024 POSTOP FOLLOW-UP VISIT: CPT | Performed by: SURGERY

## 2020-04-21 NOTE — PROGRESS NOTES
[facility] COMPLAINT:    Cholecystostomy tube    HISTORY OF PRESENT ILLNESS:    Mandy Alarcon is a 86 y.o. female who was admitted to the hospital on 1/31/2020 with acute pancreatitis.  She recovered, but she returned to the hospital after discharge from the initial hospitalization on 2/27/2020 with worsening abdominal pain.  CT imaging showed more extensive pancreatitis.  In addition to pancreatic inflammatory changes there were large peripancreatic fluid collections.  During her admission, I recommended cholecystectomy.  Surgery was extraordinarily difficult, and I elected to bailout by placing a cholecystostomy tube.  The plan is to eventually return to the operating room for cholecystectomy and removal of the tube at some later date after the inflammatory changes have improved significantly.    She was ultimately discharged from the hospital on 3/22/2020.  She is tolerating a regular low-fat diet.  She does not drink alcohol.  The cholecystostomy tube is connected to a gravity drainage bag.  They have been recording daily output volumes from the cholecystostomy tube.  It  has ranged from 325 - 525 mL/day.  Over the last week it had taken on a darker coloration and cloudy character.  I started her on levofloxacin for the next 2 weeks.    She usually has a bowel movement every third day, but she recently went about a week without a bowel movement.  As a consequence she had increased nausea bloating and abdominal pain.  She usually has to use an over-the-counter stool softener.  This time she used Dulcolax suppository and MiraLAX.  There is no blood in the stool.      CT scan of the abdomen and pelvis was performed on 4/20/2020 (yesterday.)  There is still significant inflammatory changes surrounding the pancreas.    EXAM:    Weight 187.8 pounds    Alert. Oriented.  Lungs: Clear.  Heart: Regular.  Abdomen:  Soft.  Nondistended.  The laparoscopic incisions are healed.  There is a  cholecystostomy tube that exits the  abdominal wall in the right upper quadrant.  It is an 18 [place] Hussein catheter.  The balloon contains 5 mL of water.  The fluid in the drain is now clear.  It is a dumont color typical of bile.  Extremities: Warm.    I reviewed the images and the report of a CT scan of the abdomen and pelvis performed on 4/20/2020.  I showed the patient the images on the computer screen in the room.  There is still significant inflammation and edema surrounding the pancreas.  The cholecystostomy tube is in place.  There is a 7.8 cm simple cyst of the right kidney.    ASSESSMENT:    Severe acute pancreatitis status post lengthy hospitalization.  Attempted laparoscopic cholecystectomy on 3/12/2020- aborted in favor of placement of cholecystostomy tube.    PLAN:    I do not recommend proceeding with cholecystectomy at this time.  We discussed the role of cholecystectomy in patients with severe acute pancreatitis to prevent future episodes of biliary pancreatitis.  Continue levofloxacin.  She will continue with her low-fat diet.    She will continue to keep the biliary drain to gravity.  She will continue to record output. I think the volume of effluent from the cholecystostomy tube is too high to warrant capping it or removing it.  Follow-up in one month.  I will order CT scan of the abdomen and pelvis to be performed prior to her next office visit to help decide about timing of cholecystectomy and removal of the cholecystostomy tube.

## 2020-05-06 ENCOUNTER — EPISODE CHANGES (OUTPATIENT)
Dept: CASE MANAGEMENT | Facility: OTHER | Age: 85
End: 2020-05-06

## 2020-05-14 ENCOUNTER — TELEPHONE (OUTPATIENT)
Dept: SURGERY | Facility: CLINIC | Age: 85
End: 2020-05-14

## 2020-05-14 NOTE — TELEPHONE ENCOUNTER
Pt's daughter called stating pt's drain totals have decreased significantly over the past week:    5/9  25 cc  5/10  100 cc  5/11  15 cc  5/12  15 cc  5/13 not enough output to measure    She states pt's drain site is starting to become very sore to the point where she is having trouble walking. It is red and appears irritated but it does not look like it's infected. Pt has a CT scan scheduled on 5/21 and a follow-up appointment on 5/27. Do you want to see pt sooner?

## 2020-05-21 ENCOUNTER — OFFICE VISIT (OUTPATIENT)
Dept: SURGERY | Facility: CLINIC | Age: 85
End: 2020-05-21

## 2020-05-21 ENCOUNTER — HOSPITAL ENCOUNTER (OUTPATIENT)
Dept: CT IMAGING | Facility: HOSPITAL | Age: 85
Discharge: HOME | End: 2020-05-21
Admitting: SURGERY

## 2020-05-21 ENCOUNTER — LAB (OUTPATIENT)
Dept: LAB | Facility: HOSPITAL | Age: 85
End: 2020-05-21

## 2020-05-21 DIAGNOSIS — K85.11 ACUTE BILIARY PANCREATITIS WITH UNINFECTED NECROSIS: ICD-10-CM

## 2020-05-21 DIAGNOSIS — K85.10 GALLSTONE PANCREATITIS: ICD-10-CM

## 2020-05-21 DIAGNOSIS — K85.10 GALLSTONE PANCREATITIS: Primary | ICD-10-CM

## 2020-05-21 LAB
CREAT BLDA-MCNC: 1.4 MG/DL (ref 0.6–1.3)
SARS-COV-2 RNA RESP QL NAA+PROBE: NOT DETECTED

## 2020-05-21 PROCEDURE — 99024 POSTOP FOLLOW-UP VISIT: CPT | Performed by: SURGERY

## 2020-05-21 PROCEDURE — 0 DIATRIZOATE MEGLUMINE & SODIUM PER 1 ML: Performed by: SURGERY

## 2020-05-21 PROCEDURE — 74177 CT ABD & PELVIS W/CONTRAST: CPT

## 2020-05-21 PROCEDURE — 82565 ASSAY OF CREATININE: CPT

## 2020-05-21 PROCEDURE — 25010000002 IOPAMIDOL 61 % SOLUTION: Performed by: SURGERY

## 2020-05-21 PROCEDURE — 87635 SARS-COV-2 COVID-19 AMP PRB: CPT

## 2020-05-21 PROCEDURE — C9803 HOPD COVID-19 SPEC COLLECT: HCPCS

## 2020-05-21 RX ADMIN — DIATRIZOATE MEGLUMINE AND DIATRIZOATE SODIUM 30 ML: 660; 100 LIQUID ORAL; RECTAL at 11:29

## 2020-05-21 RX ADMIN — IOPAMIDOL 85 ML: 612 INJECTION, SOLUTION INTRAVENOUS at 11:29

## 2020-05-21 NOTE — PROGRESS NOTES
CHIEF COMPLAINT:    Cholecystostomy tube    HISTORY OF PRESENT ILLNESS:    Mandy Alarcon is a 86 y.o. female who was admitted to the hospital on 1/31/2020 with acute pancreatitis.  She recovered, but she returned to the hospital after discharge from the initial hospitalization on 2/27/2020 with worsening abdominal pain.  CT imaging showed more extensive pancreatitis.  In addition to pancreatic inflammatory changes there were large peripancreatic fluid collections.  As a consequence of pancreatic inflammatory changes, she developed cholestasis and a picture of acute cholecystitis.  I recommended cholecystectomy.  Surgery was extraordinarily difficult, and I elected to bail out by placing a cholecystostomy tube.  The plan has been to eventually return to the operating room for cholecystectomy.    She was ultimately discharged from the hospital on 3/22/2020.  She is tolerating a regular low-fat diet.  She does not drink alcohol.  The cholecystostomy tube is connected to a gravity drainage bag.  Output has usually ranged from 325 - 525 mL/day.  Over the last 2 weeks it has diminished significantly.  It now ranges from 5mL - 145 mL/day    A CT scan of the abdomen and pelvis was performed today.  There is improvement in the inflammatory changes surrounding the pancreas.  The cholecystostomy tube seems to have backed out of the gallbladder.  Despite that, there is minimal pericholecystic fluid.  She has been tolerating a regular diet.  She has early satiety.  There is no nausea or vomiting.  Bowel movements have been normal in consistency and color.    Past Medical History:   Diagnosis Date   • Benign essential hypertension 10/28/2012    October 2012--treatment for hypertension begun.   • Bilateral sensorineural hearing loss, wears hearing aids 6/14/2017    Left is much worse than right.  Patient had multiple ear infections as a child.   • Chronic renal insufficiency, stage II (mild), 05/18/2016--creatinine 1.35 4/28/2016     2016--creatinine 1.35.  Baseline creatinine approximately 1.3.   • Claustrophobia 2016    This patient has significant nocturnal hypoxemia and I think that she could benefit from nocturnal oxygen therapy. The exact etiology of her hypoxemia is not clear. She could possibly have obstructive sleep apnea but this is not documented. We cannot test this patient for sleep apnea due to the fact that she is severely claustrophobic. Patient was a former smoker and it is possibly that COPD he is playing a role.   • Family history of coronary artery disease 2016    Patient's mother, father, 2 sisters and a brother all  from myocardial infarctions   • Gout 10/28/2012    2012--initial diagnosis and treatment of gout.   • Hyperlipidemia 10/28/2012    2012--treatment for hyperlipidemia begun.   • Impaired fasting glucose 10/28/2012    2012--initial diagnosis impaired fasting glucose.   • Morbidly obese (CMS/HCC) 3/11/2019   • Nocturnal hypoxemia 2014--patient did not receive nocturnal oxygen because of Medicare regulations.   05/15/2014--overnight oximetry revealed oxygen saturations less than 89% for 22 minutes and 40 seconds. Oxygen saturations less than or equal to 88% for 22 minutes and 40 seconds. Lowest oxygen saturation 83%. The longest continuous time with oxygen saturations less than or equal to 88% was 1 minute and 32 seconds.   2012--overnight oximetry revealed oxygen saturations less than 90% for one hour and 35 minutes. Oxygen saturations less than 89% 59 minutes. This patient has significant nocturnal hypoxemia and I think that she could benefit from nocturnal oxygen therapy. The exact etiology of her hypoxemia is not clear. She could possibly have obstructive sleep apnea but this is not documented. We cannot test this patient for sleep apnea due to the fact that she is severely claustrophobic. Patient was a former smoker and it is possibly that  COPD he is playing a role.   • Pancreatitis    • Pneumonia    • Postoperative nausea 3/23/2020   • Primary hypothyroidism 11/17/2015 March 11, 2019--TSH remains elevated slightly at 4.92.  Given the overall clinical picture including the multinodular goiter, we will initiate levothyroxine 50 mcg/day and reassess in about 6 weeks.  August 1, 2018--thyroid ultrasound reveals a multinodular thyroid with multiple subcentimeter nodules.  Only minimal increase in size of the largest nodule in the left lobe has occurred when compared    • Secondary polycythemia 8/2/2012 11/06/2014--patient did not receive nocturnal oxygen because of Medicare regulations.   05/15/2014--overnight oximetry revealed oxygen saturations less than 89% for 22 minutes and 40 seconds. Oxygen saturations less than or equal to 88% for 22 minutes and 40 seconds. Lowest oxygen saturation 83%. The longest continuous time with oxygen saturations less than or equal to 88% was 1 minute and 32 seconds.   08/02/2012--overnight oximetry revealed oxygen saturations less than 90% for one hour and 35 minutes. Oxygen saturations less than 89% 59 minutes. This patient has significant nocturnal hypoxemia and I think that she could benefit from nocturnal oxygen therapy. The exact etiology of her hypoxemia is not clear. She could possibly have obstructive sleep apnea but this is not documented. We cannot test this patient for sleep apnea due to the fact that she is severely claustrophobic. Patient was a former smoker and it is possibly that COPD he is playing a role.   • Vitamin D deficiency 5/23/2016       Past Surgical History:   Procedure Laterality Date   • CHOLECYSTECTOMY WITH INTRAOPERATIVE CHOLANGIOGRAM N/A 3/12/2020    Procedure: LAPAROSCOPIC CHOLECYSTOSTOMY TUBE, INTRAOPERATIVE CHOLANGIOGRAM;  Surgeon: Bryce Andujar MD;  Location: [facility] OR;  Service: General;  Laterality: N/A;   • OOPHORECTOMY      age 48   • RADICAL ABDOMINAL HYSTERECTOMY   48 years old    48 years of age. Uterine fibroid tumors with menorrhagia - no cancer         Current Outpatient Medications:   •  allopurinol (ZYLOPRIM) 300 MG tablet, Take 300 mg by mouth Daily., Disp: , Rfl:   •  atenolol (TENORMIN) 50 MG tablet, Take 1 tablet by mouth 2 (Two) Times a Day As Needed (Holding for SBP less than 120  or HR less than 60)., Disp: 180 tablet, Rfl: 1  •  Cholecalciferol (VITAMIN D) 2000 UNITS tablet, Take 2 tablets by mouth daily., Disp: , Rfl:   •  clobetasol (TEMOVATE) 0.05 % ointment, Apply  topically to the appropriate area as directed 2 (Two) Times a Day., Disp: 15 g, Rfl: 0  •  docusate sodium (COLACE) 100 MG capsule, Take 1 capsule by mouth 2 (Two) Times a Day As Needed for Constipation., Disp: , Rfl:   •  levothyroxine (SYNTHROID, LEVOTHROID) 50 MCG tablet, Take 50 mcg by mouth Daily., Disp: , Rfl:   •  miconazole (MICOTIN) 2 % powder, Apply  topically to the appropriate area as directed 2 (Two) Times a Day., Disp: , Rfl:   •  nystatin (MYCOSTATIN) 624032 UNIT/ML suspension, Swish and swallow 5 mL 4 (Four) Times a Day., Disp: , Rfl:   •  omeprazole (PrilOSEC) 40 MG capsule, Take 1 p.o. daily before the first meal for acid reflux, Disp: 30 capsule, Rfl: 3  •  ondansetron ODT (ZOFRAN-ODT) 4 MG disintegrating tablet, Take 1 p.o. every 6 to 8 hours as needed for nausea, Disp: 30 tablet, Rfl: 0  •  polyethylene glycol (MIRALAX) packet, Take orally as directed for constipation, Disp: , Rfl:   •  pravastatin (PRAVACHOL) 40 MG tablet, Take 40 mg by mouth Daily., Disp: , Rfl:   No current facility-administered medications for this visit.     Allergies   Allergen Reactions   • Cefaclor Anaphylaxis and Angioedema     caused angioedema 25 years ago; she tolerates cephalexin, amoxicillin, and ceftriaxone per my d/w patient and her daughter as well as amoxicillin-clavulanate confirmed in EPIC   • Sulfa Antibiotics Rash       Family History   Problem Relation Age of Onset   • Heart disease  Mother         Ischemic.  from coronary artery disease.   • Heart disease Father         Ischemic.  from coronary artery disease.   • Heart disease Sister         Ischemic.  from coronary artery disease.   • Heart disease Brother         Ischemic.  from coronary artery disease.   • Heart disease Sister         Ischemic.  from coronary artery disease.   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   Substance and Sexual Activity   • Alcohol use: No     Comment: caffiene use tea coffee   • Drug use: No   • Sexual activity: Not Currently     Partners: Male   Lifestyle   • Physical activity:     Days per week: 0 days     Minutes per session: 0 min   • Stress: Not at all   Relationships   • Social connections:     Talks on phone: More than three times a week     Gets together: Three times a week     Attends Sikhism service: More than 4 times per year     Active member of club or organization: No     Attends meetings of clubs or organizations: Never     Relationship status:        Review of Systems   Constitutional: Negative for fever.   HENT: Positive for hearing loss.    Eyes: Negative for visual disturbance.   Respiratory: Negative for shortness of breath.    Cardiovascular: Negative for chest  pain.   Gastrointestinal: Positive for abdominal pain.   Genitourinary: Negative for dysuria.   Skin:        Itching   Neurological: Negative for syncope.   Psychiatric/Behavioral: Negative for confusion.       Objective     There were no vitals taken for this visit.    Physical Exam   Constitutional: She is oriented to person, place, and time. She appears well-developed and well-nourished. No distress.   HENT:   Head: Normocephalic and atraumatic.   Eyes: Conjunctivae are normal. No scleral icterus.   Neck: Neck supple. No tracheal deviation present.   Cardiovascular: Normal rate, regular rhythm, normal heart sounds and intact distal pulses.   No murmur heard.  Pulmonary/Chest: Effort normal and breath sounds normal. No respiratory distress. She has no wheezes. She has no rales.   Abdominal: Soft. Bowel sounds are normal. She exhibits no distension. There is no tenderness. There is no rebound and no guarding. No hernia.   There is a cholecystostomy tube in place in the right upper quadrant.  There is no surrounding inflammation.  There is a small amount of granulation tissue to the lateral aspect of the tube.  There is no drainage around the tube.   Musculoskeletal: She exhibits no edema or deformity.   Neurological: She is alert and oriented to person, place, and time.   Skin: Skin is warm and dry. She is not diaphoretic.   Psychiatric: She has a normal mood and affect. Her behavior is normal.   Vitals reviewed.      DIAGNOSTIC DATA:    I reviewed the images and the report of a CT scan of the abdomen and pelvis performed earlier today.  There is slight reduction in inflammatory changes surrounding the pancreas.  Pancreatic head still shows areas of hypoattenuation consistent with necrosis.  The cholecystostomy tube appears to have withdrawn.  The retention balloon is now adjacent to the abdominal wall.  There is a little pericholecystic and perihepatic fluid.    ASSESSMENT:    Severe acute pancreatitis status post  lengthy hospitalization.  Attempted laparoscopic cholecystectomy on 3/12/2020- aborted in favor of placement of cholecystostomy tube.    PLAN:    Inflammatory changes around the pancreas improved.  Fluid collections have reduced in size.  The cholecystostomy tube has withdrawn.  I recommended removal of the gallbladder.  I am predominantly concerned about the withdrawal of the tube, which might lead to intra-abdominal sepsis.  We discussed laparoscopic approach with possible conversion to an open procedure.  We discussed an overnight hospitalization.  We going to schedule surgery for tomorrow.

## 2020-05-22 ENCOUNTER — HOSPITAL ENCOUNTER (OUTPATIENT)
Facility: HOSPITAL | Age: 85
Discharge: HOME | End: 2020-05-24
Attending: SURGERY | Admitting: SURGERY

## 2020-05-22 ENCOUNTER — ANESTHESIA EVENT (OUTPATIENT)
Dept: PERIOP | Facility: HOSPITAL | Age: 85
End: 2020-05-22

## 2020-05-22 ENCOUNTER — APPOINTMENT (OUTPATIENT)
Dept: GENERAL RADIOLOGY | Facility: HOSPITAL | Age: 85
End: 2020-05-22

## 2020-05-22 ENCOUNTER — ANESTHESIA (OUTPATIENT)
Dept: PERIOP | Facility: HOSPITAL | Age: 85
End: 2020-05-22

## 2020-05-22 DIAGNOSIS — K85.10 GALLSTONE PANCREATITIS: Primary | ICD-10-CM

## 2020-05-22 LAB
ANION GAP SERPL CALCULATED.3IONS-SCNC: 11 MMOL/L (ref 5–15)
BASOPHILS # BLD AUTO: 0.03 10*3/MM3 (ref 0–0.2)
BASOPHILS NFR BLD AUTO: 0.4 % (ref 0–1.5)
BUN BLD-MCNC: 15 MG/DL (ref 8–23)
BUN/CREAT SERPL: 8.6 (ref 7–25)
CALCIUM SPEC-SCNC: 8.6 MG/DL (ref 8.6–10.5)
CHLORIDE SERPL-SCNC: 103 MMOL/L (ref 98–107)
CO2 SERPL-SCNC: 22 MMOL/L (ref 22–29)
CREAT BLD-MCNC: 1.74 MG/DL (ref 0.57–1)
DEPRECATED RDW RBC AUTO: 46.2 FL (ref 37–54)
EOSINOPHIL # BLD AUTO: 0.94 10*3/MM3 (ref 0–0.4)
EOSINOPHIL NFR BLD AUTO: 13.4 % (ref 0.3–6.2)
ERYTHROCYTE [DISTWIDTH] IN BLOOD BY AUTOMATED COUNT: 14.1 % (ref 12.3–15.4)
GFR SERPL CREATININE-BSD FRML MDRD: 28 ML/MIN/1.73
GLUCOSE BLD-MCNC: 90 MG/DL (ref 65–99)
HCT VFR BLD AUTO: 33 % (ref 34–46.6)
HGB BLD-MCNC: 11 G/DL (ref 12–15.9)
IMM GRANULOCYTES # BLD AUTO: 0.04 10*3/MM3 (ref 0–0.05)
IMM GRANULOCYTES NFR BLD AUTO: 0.6 % (ref 0–0.5)
LYMPHOCYTES # BLD AUTO: 1.75 10*3/MM3 (ref 0.7–3.1)
LYMPHOCYTES NFR BLD AUTO: 24.9 % (ref 19.6–45.3)
MCH RBC QN AUTO: 30.1 PG (ref 26.6–33)
MCHC RBC AUTO-ENTMCNC: 33.3 G/DL (ref 31.5–35.7)
MCV RBC AUTO: 90.2 FL (ref 79–97)
MONOCYTES # BLD AUTO: 0.57 10*3/MM3 (ref 0.1–0.9)
MONOCYTES NFR BLD AUTO: 8.1 % (ref 5–12)
NEUTROPHILS # BLD AUTO: 3.71 10*3/MM3 (ref 1.7–7)
NEUTROPHILS NFR BLD AUTO: 52.6 % (ref 42.7–76)
NRBC BLD AUTO-RTO: 0 /100 WBC (ref 0–0.2)
PLATELET # BLD AUTO: 186 10*3/MM3 (ref 140–450)
PMV BLD AUTO: 9.4 FL (ref 6–12)
POTASSIUM BLD-SCNC: 4.1 MMOL/L (ref 3.5–5.2)
RBC # BLD AUTO: 3.66 10*6/MM3 (ref 3.77–5.28)
SODIUM BLD-SCNC: 136 MMOL/L (ref 136–145)
WBC NRBC COR # BLD: 7.04 10*3/MM3 (ref 3.4–10.8)

## 2020-05-22 PROCEDURE — A9270 NON-COVERED ITEM OR SERVICE: HCPCS | Performed by: SURGERY

## 2020-05-22 PROCEDURE — 25010000002 ONDANSETRON PER 1 MG: Performed by: ANESTHESIOLOGY

## 2020-05-22 PROCEDURE — 25010000002 FENTANYL CITRATE (PF) 100 MCG/2ML SOLUTION: Performed by: ANESTHESIOLOGY

## 2020-05-22 PROCEDURE — 25010000002 HYDROMORPHONE PER 4 MG: Performed by: SURGERY

## 2020-05-22 PROCEDURE — 74300 X-RAY BILE DUCTS/PANCREAS: CPT

## 2020-05-22 PROCEDURE — 0 IOTHALAMATE 60 % SOLUTION: Performed by: SURGERY

## 2020-05-22 PROCEDURE — 47563 LAPARO CHOLECYSTECTOMY/GRAPH: CPT | Performed by: SURGERY

## 2020-05-22 PROCEDURE — 25010000002 LEVOFLOXACIN PER 250 MG: Performed by: SURGERY

## 2020-05-22 PROCEDURE — 47563 LAPARO CHOLECYSTECTOMY/GRAPH: CPT | Performed by: PHYSICIAN ASSISTANT

## 2020-05-22 PROCEDURE — 88304 TISSUE EXAM BY PATHOLOGIST: CPT | Performed by: SURGERY

## 2020-05-22 PROCEDURE — G0378 HOSPITAL OBSERVATION PER HR: HCPCS

## 2020-05-22 PROCEDURE — 25010000002 PROPOFOL 10 MG/ML EMULSION: Performed by: ANESTHESIOLOGY

## 2020-05-22 PROCEDURE — 63710000001 CLOBETASOL 0.05 % CREAM 15 G TUBE: Performed by: SURGERY

## 2020-05-22 PROCEDURE — 25010000002 PHENYLEPHRINE PER 1 ML: Performed by: ANESTHESIOLOGY

## 2020-05-22 PROCEDURE — 80048 BASIC METABOLIC PNL TOTAL CA: CPT | Performed by: SURGERY

## 2020-05-22 PROCEDURE — 25010000002 NEOSTIGMINE PER 0.5 MG: Performed by: ANESTHESIOLOGY

## 2020-05-22 PROCEDURE — 63710000001 PRAVASTATIN 40 MG TABLET: Performed by: SURGERY

## 2020-05-22 PROCEDURE — 25010000002 HYDROMORPHONE PER 4 MG: Performed by: ANESTHESIOLOGY

## 2020-05-22 PROCEDURE — 85025 COMPLETE CBC W/AUTO DIFF WBC: CPT | Performed by: SURGERY

## 2020-05-22 PROCEDURE — 63710000001 ATENOLOL 25 MG TABLET: Performed by: SURGERY

## 2020-05-22 PROCEDURE — 63710000001 ALLOPURINOL 300 MG TABLET: Performed by: SURGERY

## 2020-05-22 DEVICE — ENDOPATH ECHELON ENDOSCOPIC LINEAR CUTTER RELOADS, BLUE, 60MM
Type: IMPLANTABLE DEVICE | Site: ABDOMEN | Status: FUNCTIONAL
Brand: ECHELON ENDOPATH

## 2020-05-22 DEVICE — SEAL HEMO SURG ARISTA/AH ABS/PWDR 3GM: Type: IMPLANTABLE DEVICE | Site: ABDOMEN | Status: FUNCTIONAL

## 2020-05-22 DEVICE — CLIP LIGAT VASC HORIZON TI MD/LG GRN 6CT: Type: IMPLANTABLE DEVICE | Site: ABDOMEN | Status: FUNCTIONAL

## 2020-05-22 DEVICE — LIGAMAX 5 MM ENDOSCOPIC MULTIPLE CLIP APPLIER
Type: IMPLANTABLE DEVICE | Site: ABDOMEN | Status: FUNCTIONAL
Brand: LIGAMAX

## 2020-05-22 RX ORDER — BACITRACIN ZINC 500 [USP'U]/G
OINTMENT TOPICAL AS NEEDED
Status: DISCONTINUED | OUTPATIENT
Start: 2020-05-22 | End: 2020-05-22 | Stop reason: HOSPADM

## 2020-05-22 RX ORDER — DOCUSATE SODIUM 100 MG/1
100 CAPSULE, LIQUID FILLED ORAL 2 TIMES DAILY PRN
Status: DISCONTINUED | OUTPATIENT
Start: 2020-05-22 | End: 2020-05-24 | Stop reason: HOSPADM

## 2020-05-22 RX ORDER — SODIUM CHLORIDE 0.9 % (FLUSH) 0.9 %
3-10 SYRINGE (ML) INJECTION AS NEEDED
Status: DISCONTINUED | OUTPATIENT
Start: 2020-05-22 | End: 2020-05-22 | Stop reason: HOSPADM

## 2020-05-22 RX ORDER — MAGNESIUM HYDROXIDE 1200 MG/15ML
LIQUID ORAL AS NEEDED
Status: DISCONTINUED | OUTPATIENT
Start: 2020-05-22 | End: 2020-05-22 | Stop reason: HOSPADM

## 2020-05-22 RX ORDER — ONDANSETRON 2 MG/ML
4 INJECTION INTRAMUSCULAR; INTRAVENOUS ONCE AS NEEDED
Status: COMPLETED | OUTPATIENT
Start: 2020-05-22 | End: 2020-05-22

## 2020-05-22 RX ORDER — FENTANYL CITRATE 50 UG/ML
INJECTION, SOLUTION INTRAMUSCULAR; INTRAVENOUS AS NEEDED
Status: DISCONTINUED | OUTPATIENT
Start: 2020-05-22 | End: 2020-05-22 | Stop reason: SURG

## 2020-05-22 RX ORDER — LIDOCAINE HYDROCHLORIDE 10 MG/ML
0.5 INJECTION, SOLUTION EPIDURAL; INFILTRATION; INTRACAUDAL; PERINEURAL ONCE AS NEEDED
Status: DISCONTINUED | OUTPATIENT
Start: 2020-05-22 | End: 2020-05-22 | Stop reason: HOSPADM

## 2020-05-22 RX ORDER — PRAVASTATIN SODIUM 40 MG
40 TABLET ORAL DAILY
Status: DISCONTINUED | OUTPATIENT
Start: 2020-05-22 | End: 2020-05-24 | Stop reason: HOSPADM

## 2020-05-22 RX ORDER — ATENOLOL 25 MG/1
25 TABLET ORAL
Status: DISCONTINUED | OUTPATIENT
Start: 2020-05-22 | End: 2020-05-24 | Stop reason: HOSPADM

## 2020-05-22 RX ORDER — ONDANSETRON 2 MG/ML
4 INJECTION INTRAMUSCULAR; INTRAVENOUS EVERY 6 HOURS PRN
Status: DISCONTINUED | OUTPATIENT
Start: 2020-05-22 | End: 2020-05-24 | Stop reason: HOSPADM

## 2020-05-22 RX ORDER — FAMOTIDINE 10 MG/ML
20 INJECTION, SOLUTION INTRAVENOUS ONCE
Status: COMPLETED | OUTPATIENT
Start: 2020-05-22 | End: 2020-05-22

## 2020-05-22 RX ORDER — ACETAMINOPHEN 325 MG/1
650 TABLET ORAL ONCE AS NEEDED
Status: DISCONTINUED | OUTPATIENT
Start: 2020-05-22 | End: 2020-05-22 | Stop reason: HOSPADM

## 2020-05-22 RX ORDER — ACETAMINOPHEN 500 MG
500 TABLET ORAL EVERY 6 HOURS PRN
Status: ON HOLD | COMMUNITY
End: 2022-06-28

## 2020-05-22 RX ORDER — BIOTIN 1000 MCG
1 TABLET,CHEWABLE ORAL DAILY
Status: ON HOLD | COMMUNITY
End: 2022-06-28

## 2020-05-22 RX ORDER — SODIUM CHLORIDE 9 MG/ML
50 INJECTION, SOLUTION INTRAVENOUS CONTINUOUS
Status: DISCONTINUED | OUTPATIENT
Start: 2020-05-22 | End: 2020-05-23

## 2020-05-22 RX ORDER — PROMETHAZINE HYDROCHLORIDE 25 MG/ML
6.25 INJECTION, SOLUTION INTRAMUSCULAR; INTRAVENOUS
Status: DISCONTINUED | OUTPATIENT
Start: 2020-05-22 | End: 2020-05-22 | Stop reason: HOSPADM

## 2020-05-22 RX ORDER — HYDROCODONE BITARTRATE AND ACETAMINOPHEN 7.5; 325 MG/1; MG/1
1 TABLET ORAL ONCE AS NEEDED
Status: DISCONTINUED | OUTPATIENT
Start: 2020-05-22 | End: 2020-05-22 | Stop reason: HOSPADM

## 2020-05-22 RX ORDER — HYDROMORPHONE HYDROCHLORIDE 1 MG/ML
0.5 INJECTION, SOLUTION INTRAMUSCULAR; INTRAVENOUS; SUBCUTANEOUS
Status: DISCONTINUED | OUTPATIENT
Start: 2020-05-22 | End: 2020-05-22 | Stop reason: HOSPADM

## 2020-05-22 RX ORDER — HYDROCODONE BITARTRATE AND ACETAMINOPHEN 10; 325 MG/1; MG/1
1 TABLET ORAL EVERY 4 HOURS PRN
Status: DISCONTINUED | OUTPATIENT
Start: 2020-05-22 | End: 2020-05-24 | Stop reason: HOSPADM

## 2020-05-22 RX ORDER — PANTOPRAZOLE SODIUM 40 MG/1
40 TABLET, DELAYED RELEASE ORAL EVERY MORNING
Status: DISCONTINUED | OUTPATIENT
Start: 2020-05-23 | End: 2020-05-24 | Stop reason: HOSPADM

## 2020-05-22 RX ORDER — HYDROCODONE BITARTRATE AND ACETAMINOPHEN 5; 325 MG/1; MG/1
1 TABLET ORAL EVERY 4 HOURS PRN
Status: DISCONTINUED | OUTPATIENT
Start: 2020-05-22 | End: 2020-05-24 | Stop reason: HOSPADM

## 2020-05-22 RX ORDER — HYDROMORPHONE HYDROCHLORIDE 1 MG/ML
0.5 INJECTION, SOLUTION INTRAMUSCULAR; INTRAVENOUS; SUBCUTANEOUS
Status: DISCONTINUED | OUTPATIENT
Start: 2020-05-22 | End: 2020-05-24 | Stop reason: HOSPADM

## 2020-05-22 RX ORDER — CLOBETASOL PROPIONATE 0.5 MG/G
CREAM TOPICAL EVERY 12 HOURS SCHEDULED
Status: DISCONTINUED | OUTPATIENT
Start: 2020-05-22 | End: 2020-05-24 | Stop reason: HOSPADM

## 2020-05-22 RX ORDER — ONDANSETRON 2 MG/ML
INJECTION INTRAMUSCULAR; INTRAVENOUS AS NEEDED
Status: DISCONTINUED | OUTPATIENT
Start: 2020-05-22 | End: 2020-05-22 | Stop reason: SURG

## 2020-05-22 RX ORDER — GLYCOPYRROLATE 0.2 MG/ML
INJECTION INTRAMUSCULAR; INTRAVENOUS AS NEEDED
Status: DISCONTINUED | OUTPATIENT
Start: 2020-05-22 | End: 2020-05-22 | Stop reason: SURG

## 2020-05-22 RX ORDER — BUPIVACAINE HYDROCHLORIDE AND EPINEPHRINE 5; 5 MG/ML; UG/ML
INJECTION, SOLUTION PERINEURAL AS NEEDED
Status: DISCONTINUED | OUTPATIENT
Start: 2020-05-22 | End: 2020-05-22 | Stop reason: HOSPADM

## 2020-05-22 RX ORDER — PROMETHAZINE HYDROCHLORIDE 25 MG/1
25 TABLET ORAL ONCE AS NEEDED
Status: DISCONTINUED | OUTPATIENT
Start: 2020-05-22 | End: 2020-05-22 | Stop reason: HOSPADM

## 2020-05-22 RX ORDER — ROCURONIUM BROMIDE 10 MG/ML
INJECTION, SOLUTION INTRAVENOUS AS NEEDED
Status: DISCONTINUED | OUTPATIENT
Start: 2020-05-22 | End: 2020-05-22 | Stop reason: SURG

## 2020-05-22 RX ORDER — LEVOTHYROXINE SODIUM 0.05 MG/1
50 TABLET ORAL
Status: DISCONTINUED | OUTPATIENT
Start: 2020-05-23 | End: 2020-05-24 | Stop reason: HOSPADM

## 2020-05-22 RX ORDER — HYDRALAZINE HYDROCHLORIDE 20 MG/ML
5 INJECTION INTRAMUSCULAR; INTRAVENOUS
Status: DISCONTINUED | OUTPATIENT
Start: 2020-05-22 | End: 2020-05-22 | Stop reason: HOSPADM

## 2020-05-22 RX ORDER — ONDANSETRON 2 MG/ML
4 INJECTION INTRAMUSCULAR; INTRAVENOUS ONCE AS NEEDED
Status: DISCONTINUED | OUTPATIENT
Start: 2020-05-22 | End: 2020-05-22 | Stop reason: HOSPADM

## 2020-05-22 RX ORDER — DIPHENHYDRAMINE HYDROCHLORIDE 50 MG/ML
12.5 INJECTION INTRAMUSCULAR; INTRAVENOUS
Status: DISCONTINUED | OUTPATIENT
Start: 2020-05-22 | End: 2020-05-22 | Stop reason: HOSPADM

## 2020-05-22 RX ORDER — OXYCODONE AND ACETAMINOPHEN 7.5; 325 MG/1; MG/1
1 TABLET ORAL ONCE AS NEEDED
Status: DISCONTINUED | OUTPATIENT
Start: 2020-05-22 | End: 2020-05-22 | Stop reason: HOSPADM

## 2020-05-22 RX ORDER — PROMETHAZINE HYDROCHLORIDE 25 MG/1
25 SUPPOSITORY RECTAL ONCE AS NEEDED
Status: DISCONTINUED | OUTPATIENT
Start: 2020-05-22 | End: 2020-05-22 | Stop reason: HOSPADM

## 2020-05-22 RX ORDER — LIDOCAINE HYDROCHLORIDE 20 MG/ML
INJECTION, SOLUTION INFILTRATION; PERINEURAL AS NEEDED
Status: DISCONTINUED | OUTPATIENT
Start: 2020-05-22 | End: 2020-05-22 | Stop reason: SURG

## 2020-05-22 RX ORDER — NALOXONE HCL 0.4 MG/ML
0.1 VIAL (ML) INJECTION
Status: DISCONTINUED | OUTPATIENT
Start: 2020-05-22 | End: 2020-05-24 | Stop reason: HOSPADM

## 2020-05-22 RX ORDER — POLYETHYLENE GLYCOL 3350 17 G/17G
17 POWDER, FOR SOLUTION ORAL DAILY
Status: DISCONTINUED | OUTPATIENT
Start: 2020-05-22 | End: 2020-05-24 | Stop reason: HOSPADM

## 2020-05-22 RX ORDER — PROPOFOL 10 MG/ML
VIAL (ML) INTRAVENOUS AS NEEDED
Status: DISCONTINUED | OUTPATIENT
Start: 2020-05-22 | End: 2020-05-22 | Stop reason: SURG

## 2020-05-22 RX ORDER — SODIUM CHLORIDE, SODIUM LACTATE, POTASSIUM CHLORIDE, CALCIUM CHLORIDE 600; 310; 30; 20 MG/100ML; MG/100ML; MG/100ML; MG/100ML
9 INJECTION, SOLUTION INTRAVENOUS CONTINUOUS
Status: DISCONTINUED | OUTPATIENT
Start: 2020-05-22 | End: 2020-05-22

## 2020-05-22 RX ORDER — FLUMAZENIL 0.1 MG/ML
0.2 INJECTION INTRAVENOUS AS NEEDED
Status: DISCONTINUED | OUTPATIENT
Start: 2020-05-22 | End: 2020-05-22 | Stop reason: HOSPADM

## 2020-05-22 RX ORDER — EPHEDRINE SULFATE 50 MG/ML
5 INJECTION, SOLUTION INTRAVENOUS ONCE AS NEEDED
Status: DISCONTINUED | OUTPATIENT
Start: 2020-05-22 | End: 2020-05-22 | Stop reason: HOSPADM

## 2020-05-22 RX ORDER — LEVOFLOXACIN 5 MG/ML
500 INJECTION, SOLUTION INTRAVENOUS EVERY 24 HOURS
Status: COMPLETED | OUTPATIENT
Start: 2020-05-22 | End: 2020-05-22

## 2020-05-22 RX ORDER — NALOXONE HCL 0.4 MG/ML
0.2 VIAL (ML) INJECTION AS NEEDED
Status: DISCONTINUED | OUTPATIENT
Start: 2020-05-22 | End: 2020-05-22 | Stop reason: HOSPADM

## 2020-05-22 RX ORDER — SODIUM CHLORIDE 0.9 % (FLUSH) 0.9 %
3 SYRINGE (ML) INJECTION EVERY 12 HOURS SCHEDULED
Status: DISCONTINUED | OUTPATIENT
Start: 2020-05-22 | End: 2020-05-22 | Stop reason: HOSPADM

## 2020-05-22 RX ORDER — DIPHENHYDRAMINE HCL 25 MG
25 CAPSULE ORAL
Status: DISCONTINUED | OUTPATIENT
Start: 2020-05-22 | End: 2020-05-22 | Stop reason: HOSPADM

## 2020-05-22 RX ORDER — PROMETHAZINE HYDROCHLORIDE 25 MG/ML
12.5 INJECTION, SOLUTION INTRAMUSCULAR; INTRAVENOUS ONCE AS NEEDED
Status: DISCONTINUED | OUTPATIENT
Start: 2020-05-22 | End: 2020-05-22 | Stop reason: HOSPADM

## 2020-05-22 RX ORDER — FENTANYL CITRATE 50 UG/ML
50 INJECTION, SOLUTION INTRAMUSCULAR; INTRAVENOUS
Status: DISCONTINUED | OUTPATIENT
Start: 2020-05-22 | End: 2020-05-22 | Stop reason: HOSPADM

## 2020-05-22 RX ORDER — LABETALOL HYDROCHLORIDE 5 MG/ML
5 INJECTION, SOLUTION INTRAVENOUS
Status: DISCONTINUED | OUTPATIENT
Start: 2020-05-22 | End: 2020-05-22 | Stop reason: HOSPADM

## 2020-05-22 RX ORDER — ALLOPURINOL 300 MG/1
300 TABLET ORAL DAILY
Status: DISCONTINUED | OUTPATIENT
Start: 2020-05-22 | End: 2020-05-24 | Stop reason: HOSPADM

## 2020-05-22 RX ADMIN — ATENOLOL 12.5 MG: 25 TABLET ORAL at 22:10

## 2020-05-22 RX ADMIN — PHENYLEPHRINE HYDROCHLORIDE 100 MCG: 10 INJECTION INTRAVENOUS at 15:06

## 2020-05-22 RX ADMIN — PROPOFOL 20 MG: 10 INJECTION, EMULSION INTRAVENOUS at 15:22

## 2020-05-22 RX ADMIN — FENTANYL CITRATE 25 MCG: 50 INJECTION INTRAMUSCULAR; INTRAVENOUS at 14:50

## 2020-05-22 RX ADMIN — ONDANSETRON HYDROCHLORIDE 4 MG: 2 SOLUTION INTRAMUSCULAR; INTRAVENOUS at 18:26

## 2020-05-22 RX ADMIN — FENTANYL CITRATE 50 MCG: 50 INJECTION INTRAMUSCULAR; INTRAVENOUS at 15:21

## 2020-05-22 RX ADMIN — LEVOFLOXACIN 500 MG: 5 INJECTION, SOLUTION INTRAVENOUS at 14:38

## 2020-05-22 RX ADMIN — NEOSTIGMINE METHYLSULFATE 3 MG: 1 INJECTION INTRAMUSCULAR; INTRAVENOUS; SUBCUTANEOUS at 18:47

## 2020-05-22 RX ADMIN — FENTANYL CITRATE 25 MCG: 50 INJECTION INTRAMUSCULAR; INTRAVENOUS at 16:34

## 2020-05-22 RX ADMIN — PHENYLEPHRINE HYDROCHLORIDE 50 MCG: 10 INJECTION INTRAVENOUS at 16:41

## 2020-05-22 RX ADMIN — ONDANSETRON 4 MG: 2 INJECTION INTRAMUSCULAR; INTRAVENOUS at 13:33

## 2020-05-22 RX ADMIN — PRAVASTATIN SODIUM 40 MG: 40 TABLET ORAL at 22:10

## 2020-05-22 RX ADMIN — FENTANYL CITRATE 25 MCG: 50 INJECTION INTRAMUSCULAR; INTRAVENOUS at 18:02

## 2020-05-22 RX ADMIN — PROPOFOL 100 MG: 10 INJECTION, EMULSION INTRAVENOUS at 14:50

## 2020-05-22 RX ADMIN — FENTANYL CITRATE 50 MCG: 50 INJECTION, SOLUTION INTRAMUSCULAR; INTRAVENOUS at 19:05

## 2020-05-22 RX ADMIN — PHENYLEPHRINE HYDROCHLORIDE 100 MCG: 10 INJECTION INTRAVENOUS at 15:17

## 2020-05-22 RX ADMIN — HYDROMORPHONE HYDROCHLORIDE 0.5 MG: 1 INJECTION, SOLUTION INTRAMUSCULAR; INTRAVENOUS; SUBCUTANEOUS at 19:25

## 2020-05-22 RX ADMIN — SODIUM CHLORIDE, POTASSIUM CHLORIDE, SODIUM LACTATE AND CALCIUM CHLORIDE 9 ML/HR: 600; 310; 30; 20 INJECTION, SOLUTION INTRAVENOUS at 13:33

## 2020-05-22 RX ADMIN — HYDROMORPHONE HYDROCHLORIDE 0.5 MG: 1 INJECTION, SOLUTION INTRAMUSCULAR; INTRAVENOUS; SUBCUTANEOUS at 22:29

## 2020-05-22 RX ADMIN — PROPOFOL 20 MG: 10 INJECTION, EMULSION INTRAVENOUS at 14:55

## 2020-05-22 RX ADMIN — ROCURONIUM BROMIDE 30 MG: 10 INJECTION, SOLUTION INTRAVENOUS at 14:50

## 2020-05-22 RX ADMIN — FENTANYL CITRATE 50 MCG: 50 INJECTION, SOLUTION INTRAMUSCULAR; INTRAVENOUS at 19:10

## 2020-05-22 RX ADMIN — FENTANYL CITRATE 50 MCG: 50 INJECTION INTRAMUSCULAR; INTRAVENOUS at 15:50

## 2020-05-22 RX ADMIN — FENTANYL CITRATE 25 MCG: 50 INJECTION INTRAMUSCULAR; INTRAVENOUS at 15:11

## 2020-05-22 RX ADMIN — GLYCOPYRROLATE 0.6 MG: 0.2 INJECTION INTRAMUSCULAR; INTRAVENOUS at 18:47

## 2020-05-22 RX ADMIN — SODIUM CHLORIDE, POTASSIUM CHLORIDE, SODIUM LACTATE AND CALCIUM CHLORIDE: 600; 310; 30; 20 INJECTION, SOLUTION INTRAVENOUS at 14:44

## 2020-05-22 RX ADMIN — CLOBETASOL PROPIONATE: 0.5 CREAM TOPICAL at 22:10

## 2020-05-22 RX ADMIN — LIDOCAINE HYDROCHLORIDE 60 MG: 20 INJECTION, SOLUTION INFILTRATION; PERINEURAL at 14:50

## 2020-05-22 RX ADMIN — SODIUM CHLORIDE, POTASSIUM CHLORIDE, SODIUM LACTATE AND CALCIUM CHLORIDE: 600; 310; 30; 20 INJECTION, SOLUTION INTRAVENOUS at 18:42

## 2020-05-22 RX ADMIN — HYDROMORPHONE HYDROCHLORIDE 0.5 MG: 1 INJECTION, SOLUTION INTRAMUSCULAR; INTRAVENOUS; SUBCUTANEOUS at 19:49

## 2020-05-22 RX ADMIN — ALLOPURINOL 300 MG: 300 TABLET ORAL at 22:10

## 2020-05-22 RX ADMIN — ROCURONIUM BROMIDE 20 MG: 10 INJECTION, SOLUTION INTRAVENOUS at 16:38

## 2020-05-22 RX ADMIN — FAMOTIDINE 20 MG: 10 INJECTION, SOLUTION INTRAVENOUS at 13:33

## 2020-05-22 RX ADMIN — SODIUM CHLORIDE 50 ML/HR: 9 INJECTION, SOLUTION INTRAVENOUS at 22:32

## 2020-05-22 NOTE — ANESTHESIA PROCEDURE NOTES
Airway  Date/Time: 5/22/2020 2:50 PM  End Time:5/22/2020 2:53 PM    General Information and Staff    Patient location during procedure: OR  Anesthesiologist: Gustavo Anderson MD    Indications and Patient Condition  Indications for airway management: airway protection    Preoxygenated: yes  Mask difficulty assessment: 1 - vent by mask    Final Airway Details  Final airway type: endotracheal airway      Successful airway: ETT  Cuffed: yes   Successful intubation technique: direct laryngoscopy  Endotracheal tube insertion site: oral  Blade: Herrera  Blade size: 2  ETT size (mm): 7.0  Cormack-Lehane Classification: grade I - full view of glottis  Placement verified by: chest auscultation and capnometry   Inital cuff pressure (cm H2O): 22  Cuff volume (mL): 8  Measured from: gums  ETT/EBT to gums (cm): 19  Number of attempts at approach: 1  Assessment: lips, teeth, and gum same as pre-op and atraumatic intubation

## 2020-05-22 NOTE — ANESTHESIA PREPROCEDURE EVALUATION
Anesthesia Evaluation     Patient summary reviewed and Nursing notes reviewed   history of anesthetic complications: PONV  NPO Solid Status: > 8 hours  NPO Liquid Status: > 2 hours           Airway   Mallampati: II  TM distance: >3 FB  Neck ROM: full  no difficulty expected  Dental - normal exam     Pulmonary - normal exam   (+) pneumonia resolved , a smoker Former, sleep apnea,   (-) COPD, asthma, lung cancer  Cardiovascular - normal exam  Exercise tolerance: poor (<4 METS)    ECG reviewed  Rhythm: regular  Rate: normal    (+) hypertension well controlled 2 medications or greater, dysrhythmias Atrial Fib, PAC, hyperlipidemia,   (-) valvular problems/murmurs, past MI, CAD, angina, CHF, orthopnea, PND, cardiac stents, pericardial effusion    ROS comment: TTE 06/2018:  ·Left ventricular systolic function is normal. Calculated EF = 60%. Estimated EF was in agreement with the calculated EF. Normal left ventricular cavity size and wall thickness noted. All left ventricular wall segments contract normally.  ·Left ventricular diastolic function is normal.  ·The aortic valve is abnormal in structure. The valve exhibits sclerosis.  ·Mild aortic valve regurgitation is present.    Neuro/Psych  (+) psychiatric history,     (-) seizures, TIA, CVA  GI/Hepatic/Renal/Endo    (+) obesity,  GERD well controlled,  renal disease CRI,   (-) PUD, hepatitis, liver disease, diabetes, GI bleed, no thyroid disorder    Musculoskeletal (-) negative ROS    Abdominal  - normal exam   Substance History - negative use     OB/GYN negative ob/gyn ROS         Other - negative ROS                     Anesthesia Plan    ASA 4     general     intravenous induction     Anesthetic plan, all risks, benefits, and alternatives have been provided, discussed and informed consent has been obtained with: patient.    Plan discussed with CRNA and attending.

## 2020-05-23 LAB
ALBUMIN SERPL-MCNC: 2.5 G/DL (ref 3.5–5.2)
ALBUMIN/GLOB SERPL: 0.8 G/DL
ALP SERPL-CCNC: 31 U/L (ref 39–117)
ALT SERPL W P-5'-P-CCNC: 17 U/L (ref 1–33)
ANION GAP SERPL CALCULATED.3IONS-SCNC: 8.8 MMOL/L (ref 5–15)
AST SERPL-CCNC: 30 U/L (ref 1–32)
BILIRUB SERPL-MCNC: 0.7 MG/DL (ref 0.2–1.2)
BUN BLD-MCNC: 16 MG/DL (ref 8–23)
BUN/CREAT SERPL: 10.7 (ref 7–25)
CALCIUM SPEC-SCNC: 8 MG/DL (ref 8.6–10.5)
CHLORIDE SERPL-SCNC: 103 MMOL/L (ref 98–107)
CO2 SERPL-SCNC: 22.2 MMOL/L (ref 22–29)
CREAT BLD-MCNC: 1.5 MG/DL (ref 0.57–1)
DEPRECATED RDW RBC AUTO: 45.1 FL (ref 37–54)
ERYTHROCYTE [DISTWIDTH] IN BLOOD BY AUTOMATED COUNT: 13.6 % (ref 12.3–15.4)
GFR SERPL CREATININE-BSD FRML MDRD: 33 ML/MIN/1.73
GLOBULIN UR ELPH-MCNC: 3 GM/DL
GLUCOSE BLD-MCNC: 114 MG/DL (ref 65–99)
HCT VFR BLD AUTO: 33.4 % (ref 34–46.6)
HGB BLD-MCNC: 10.7 G/DL (ref 12–15.9)
LIPASE SERPL-CCNC: 14 U/L (ref 13–60)
MCH RBC QN AUTO: 29 PG (ref 26.6–33)
MCHC RBC AUTO-ENTMCNC: 32 G/DL (ref 31.5–35.7)
MCV RBC AUTO: 90.5 FL (ref 79–97)
PLATELET # BLD AUTO: 182 10*3/MM3 (ref 140–450)
PMV BLD AUTO: 9.3 FL (ref 6–12)
POTASSIUM BLD-SCNC: 4.3 MMOL/L (ref 3.5–5.2)
PROT SERPL-MCNC: 5.5 G/DL (ref 6–8.5)
RBC # BLD AUTO: 3.69 10*6/MM3 (ref 3.77–5.28)
SODIUM BLD-SCNC: 134 MMOL/L (ref 136–145)
WBC NRBC COR # BLD: 9.93 10*3/MM3 (ref 3.4–10.8)

## 2020-05-23 PROCEDURE — 25010000002 ONDANSETRON PER 1 MG: Performed by: SURGERY

## 2020-05-23 PROCEDURE — 63710000001 PANTOPRAZOLE 40 MG TABLET DELAYED-RELEASE: Performed by: SURGERY

## 2020-05-23 PROCEDURE — 83690 ASSAY OF LIPASE: CPT | Performed by: SURGERY

## 2020-05-23 PROCEDURE — A9270 NON-COVERED ITEM OR SERVICE: HCPCS | Performed by: SURGERY

## 2020-05-23 PROCEDURE — 63710000001 PRAVASTATIN 40 MG TABLET: Performed by: SURGERY

## 2020-05-23 PROCEDURE — 25010000002 HYDROMORPHONE PER 4 MG: Performed by: SURGERY

## 2020-05-23 PROCEDURE — 63710000001 LEVOTHYROXINE 50 MCG TABLET: Performed by: SURGERY

## 2020-05-23 PROCEDURE — 63710000001 DIPHENHYDRAMINE PER 50 MG: Performed by: SURGERY

## 2020-05-23 PROCEDURE — 63710000001 ALLOPURINOL 300 MG TABLET: Performed by: SURGERY

## 2020-05-23 PROCEDURE — G0378 HOSPITAL OBSERVATION PER HR: HCPCS

## 2020-05-23 PROCEDURE — 85027 COMPLETE CBC AUTOMATED: CPT | Performed by: SURGERY

## 2020-05-23 PROCEDURE — 63710000001 POLYETHYLENE GLYCOL 17 G PACK: Performed by: SURGERY

## 2020-05-23 PROCEDURE — 99024 POSTOP FOLLOW-UP VISIT: CPT | Performed by: SURGERY

## 2020-05-23 PROCEDURE — 80053 COMPREHEN METABOLIC PANEL: CPT | Performed by: SURGERY

## 2020-05-23 PROCEDURE — 63710000001 HYDROCODONE-ACETAMINOPHEN 10-325 MG TABLET: Performed by: SURGERY

## 2020-05-23 RX ORDER — DIPHENHYDRAMINE HCL 25 MG
25 CAPSULE ORAL EVERY 6 HOURS PRN
Status: DISCONTINUED | OUTPATIENT
Start: 2020-05-23 | End: 2020-05-24 | Stop reason: HOSPADM

## 2020-05-23 RX ADMIN — HYDROCODONE BITARTRATE AND ACETAMINOPHEN 1 TABLET: 10; 325 TABLET ORAL at 08:54

## 2020-05-23 RX ADMIN — ONDANSETRON 4 MG: 2 INJECTION INTRAMUSCULAR; INTRAVENOUS at 04:23

## 2020-05-23 RX ADMIN — PRAVASTATIN SODIUM 40 MG: 40 TABLET ORAL at 08:54

## 2020-05-23 RX ADMIN — ALLOPURINOL 300 MG: 300 TABLET ORAL at 08:54

## 2020-05-23 RX ADMIN — HYDROCODONE BITARTRATE AND ACETAMINOPHEN 1 TABLET: 10; 325 TABLET ORAL at 12:54

## 2020-05-23 RX ADMIN — DIPHENHYDRAMINE HYDROCHLORIDE 25 MG: 25 CAPSULE ORAL at 16:57

## 2020-05-23 RX ADMIN — HYDROCODONE BITARTRATE AND ACETAMINOPHEN 1 TABLET: 10; 325 TABLET ORAL at 20:25

## 2020-05-23 RX ADMIN — PANTOPRAZOLE SODIUM 40 MG: 40 TABLET, DELAYED RELEASE ORAL at 06:04

## 2020-05-23 RX ADMIN — HYDROMORPHONE HYDROCHLORIDE 0.5 MG: 1 INJECTION, SOLUTION INTRAMUSCULAR; INTRAVENOUS; SUBCUTANEOUS at 00:46

## 2020-05-23 RX ADMIN — HYDROMORPHONE HYDROCHLORIDE 0.5 MG: 1 INJECTION, SOLUTION INTRAMUSCULAR; INTRAVENOUS; SUBCUTANEOUS at 04:27

## 2020-05-23 RX ADMIN — LEVOTHYROXINE SODIUM 50 MCG: 50 TABLET ORAL at 06:04

## 2020-05-23 RX ADMIN — CLOBETASOL PROPIONATE: 0.5 CREAM TOPICAL at 08:55

## 2020-05-23 RX ADMIN — POLYETHYLENE GLYCOL 3350 17 G: 17 POWDER, FOR SOLUTION ORAL at 08:54

## 2020-05-23 NOTE — ANESTHESIA POSTPROCEDURE EVALUATION
Patient: Mandy Alarcon    Procedure Summary     Date:  05/22/20 Room / Location:  [facility] OR [address] [place] [address] MAIN OR    Anesthesia Start:  1444 Anesthesia Stop:  1902    Procedure:  CHOLECYSTECTOMY LAPAROSCOPIC INTRAOPERATIVE CHOLANGIOGRAM and EGD (N/A Abdomen) Diagnosis:       Gallstone pancreatitis      (Gallstone pancreatitis [K85.10])    Surgeon:  Bryce Andujar MD Provider:  Gustavo Anderson MD    Anesthesia Type:  general ASA Status:  4          Anesthesia Type: general    Vitals  Vitals Value Taken Time   /61 5/22/2020  8:00 PM   Temp 36.9 °C (98.5 °F) 5/22/2020  6:58 PM   Pulse 88 5/22/2020  8:06 PM   Resp 18 5/22/2020  6:58 PM   SpO2 98 % 5/22/2020  8:04 PM   Vitals shown include unvalidated device data.        Post Anesthesia Care and Evaluation    Patient location during evaluation: bedside  Patient participation: complete - patient participated  Level of consciousness: awake  Pain score: 1  Pain management: adequate  Airway patency: patent  Anesthetic complications: No anesthetic complications  PONV Status: controlled  Cardiovascular status: acceptable  Respiratory status: acceptable  Hydration status: acceptable    Comments: --------------------            05/22/20               1950     --------------------   BP:       142/72     Pulse:      86       Resp:                Temp:                SpO2:      98%      --------------------

## 2020-05-24 ENCOUNTER — READMISSION MANAGEMENT (OUTPATIENT)
Dept: CALL CENTER | Facility: HOSPITAL | Age: 85
End: 2020-05-24

## 2020-05-24 VITALS
SYSTOLIC BLOOD PRESSURE: 116 MMHG | TEMPERATURE: 97.5 F | DIASTOLIC BLOOD PRESSURE: 61 MMHG | OXYGEN SATURATION: 98 % | RESPIRATION RATE: 16 BRPM | HEIGHT: 63 IN | BODY MASS INDEX: 33.72 KG/M2 | WEIGHT: 190.31 LBS | HEART RATE: 66 BPM

## 2020-05-24 LAB
ALBUMIN SERPL-MCNC: 2.5 G/DL (ref 3.5–5.2)
ALBUMIN/GLOB SERPL: 0.8 G/DL
ALP SERPL-CCNC: 35 U/L (ref 39–117)
ALT SERPL W P-5'-P-CCNC: 16 U/L (ref 1–33)
ANION GAP SERPL CALCULATED.3IONS-SCNC: 7 MMOL/L (ref 5–15)
AST SERPL-CCNC: 20 U/L (ref 1–32)
BILIRUB SERPL-MCNC: 0.8 MG/DL (ref 0.2–1.2)
BUN BLD-MCNC: 18 MG/DL (ref 8–23)
BUN/CREAT SERPL: 12.6 (ref 7–25)
CALCIUM SPEC-SCNC: 8.4 MG/DL (ref 8.6–10.5)
CHLORIDE SERPL-SCNC: 103 MMOL/L (ref 98–107)
CO2 SERPL-SCNC: 23 MMOL/L (ref 22–29)
CREAT BLD-MCNC: 1.43 MG/DL (ref 0.57–1)
DEPRECATED RDW RBC AUTO: 47.4 FL (ref 37–54)
ERYTHROCYTE [DISTWIDTH] IN BLOOD BY AUTOMATED COUNT: 13.9 % (ref 12.3–15.4)
GFR SERPL CREATININE-BSD FRML MDRD: 35 ML/MIN/1.73
GLOBULIN UR ELPH-MCNC: 3.3 GM/DL
GLUCOSE BLD-MCNC: 94 MG/DL (ref 65–99)
HCT VFR BLD AUTO: 32 % (ref 34–46.6)
HGB BLD-MCNC: 10.4 G/DL (ref 12–15.9)
MCH RBC QN AUTO: 29.5 PG (ref 26.6–33)
MCHC RBC AUTO-ENTMCNC: 32.5 G/DL (ref 31.5–35.7)
MCV RBC AUTO: 90.9 FL (ref 79–97)
PLATELET # BLD AUTO: 181 10*3/MM3 (ref 140–450)
PMV BLD AUTO: 9.4 FL (ref 6–12)
POTASSIUM BLD-SCNC: 4.1 MMOL/L (ref 3.5–5.2)
PROT SERPL-MCNC: 5.8 G/DL (ref 6–8.5)
RBC # BLD AUTO: 3.52 10*6/MM3 (ref 3.77–5.28)
SODIUM BLD-SCNC: 133 MMOL/L (ref 136–145)
WBC NRBC COR # BLD: 10.02 10*3/MM3 (ref 3.4–10.8)

## 2020-05-24 PROCEDURE — A9270 NON-COVERED ITEM OR SERVICE: HCPCS | Performed by: SURGERY

## 2020-05-24 PROCEDURE — 63710000001 POLYETHYLENE GLYCOL 17 G PACK: Performed by: SURGERY

## 2020-05-24 PROCEDURE — 63710000001 CLOBETASOL 0.05 % CREAM 15 G TUBE: Performed by: SURGERY

## 2020-05-24 PROCEDURE — 63710000001 PRAVASTATIN 40 MG TABLET: Performed by: SURGERY

## 2020-05-24 PROCEDURE — 99024 POSTOP FOLLOW-UP VISIT: CPT | Performed by: SURGERY

## 2020-05-24 PROCEDURE — 85027 COMPLETE CBC AUTOMATED: CPT | Performed by: SURGERY

## 2020-05-24 PROCEDURE — 63710000001 ATENOLOL 25 MG TABLET: Performed by: SURGERY

## 2020-05-24 PROCEDURE — 63710000001 DIPHENHYDRAMINE PER 50 MG: Performed by: SURGERY

## 2020-05-24 PROCEDURE — 63710000001 HYDROCODONE-ACETAMINOPHEN 10-325 MG TABLET: Performed by: SURGERY

## 2020-05-24 PROCEDURE — 63710000001 LEVOTHYROXINE 50 MCG TABLET: Performed by: SURGERY

## 2020-05-24 PROCEDURE — G0378 HOSPITAL OBSERVATION PER HR: HCPCS

## 2020-05-24 PROCEDURE — 63710000001 ALLOPURINOL 300 MG TABLET: Performed by: SURGERY

## 2020-05-24 PROCEDURE — 80053 COMPREHEN METABOLIC PANEL: CPT | Performed by: SURGERY

## 2020-05-24 PROCEDURE — 63710000001 PANTOPRAZOLE 40 MG TABLET DELAYED-RELEASE: Performed by: SURGERY

## 2020-05-24 RX ORDER — HYDROCODONE BITARTRATE AND ACETAMINOPHEN 10; 325 MG/1; MG/1
1 TABLET ORAL EVERY 6 HOURS PRN
Qty: 20 TABLET | Refills: 0 | Status: SHIPPED | OUTPATIENT
Start: 2020-05-24 | End: 2020-06-01

## 2020-05-24 RX ORDER — DIPHENHYDRAMINE HCL 25 MG
25 CAPSULE ORAL EVERY 6 HOURS PRN
Qty: 60 CAPSULE | Refills: 1 | Status: SHIPPED | OUTPATIENT
Start: 2020-05-24 | End: 2020-08-07

## 2020-05-24 RX ADMIN — PANTOPRAZOLE SODIUM 40 MG: 40 TABLET, DELAYED RELEASE ORAL at 06:19

## 2020-05-24 RX ADMIN — ATENOLOL 25 MG: 25 TABLET ORAL at 07:52

## 2020-05-24 RX ADMIN — ALLOPURINOL 300 MG: 300 TABLET ORAL at 07:52

## 2020-05-24 RX ADMIN — CLOBETASOL PROPIONATE: 0.5 CREAM TOPICAL at 07:55

## 2020-05-24 RX ADMIN — LEVOTHYROXINE SODIUM 50 MCG: 50 TABLET ORAL at 06:19

## 2020-05-24 RX ADMIN — DIPHENHYDRAMINE HYDROCHLORIDE 25 MG: 25 CAPSULE ORAL at 06:21

## 2020-05-24 RX ADMIN — HYDROCODONE BITARTRATE AND ACETAMINOPHEN 1 TABLET: 10; 325 TABLET ORAL at 06:18

## 2020-05-24 RX ADMIN — POLYETHYLENE GLYCOL 3350 17 G: 17 POWDER, FOR SOLUTION ORAL at 07:51

## 2020-05-24 RX ADMIN — DIPHENHYDRAMINE HYDROCHLORIDE 25 MG: 25 CAPSULE ORAL at 00:28

## 2020-05-24 RX ADMIN — PRAVASTATIN SODIUM 40 MG: 40 TABLET ORAL at 07:52

## 2020-05-24 NOTE — OUTREACH NOTE
Prep Survey      Responses   [facility] patient discharged from?  [place]   Is LACE score < 7 ?  Yes   Eligibility  [facility]   Date of Admission  05/22/20   Date of Discharge  05/24/20   Discharge Disposition  Home or Self Care   Discharge diagnosis  Lap Grisel   COVID-19 Test Status  Negative   Does the patient have one of the following disease processes/diagnoses(primary or secondary)?  General Surgery   Does the patient have Home health ordered?  No   Is there a DME ordered?  No   Prep survey completed?  Yes          Alyssa Bauer RN

## 2020-05-26 ENCOUNTER — TRANSITIONAL CARE MANAGEMENT TELEPHONE ENCOUNTER (OUTPATIENT)
Dept: CALL CENTER | Facility: HOSPITAL | Age: 85
End: 2020-05-26

## 2020-05-26 NOTE — OUTREACH NOTE
Call Center TCM Note      Responses   [facility] [place] patient discharged from?  [place]   Does the patient have one of the following disease processes/diagnoses(primary or secondary)?  General Surgery   TCM attempt successful?  No   Unsuccessful attempts  Attempt 1   Revoked Reason  Phone Issues          Shauna Mcconnell RN    5/26/2020, 14:19

## 2020-05-27 LAB
CYTO UR: NORMAL
LAB AP CASE REPORT: NORMAL
PATH REPORT.FINAL DX SPEC: NORMAL
PATH REPORT.GROSS SPEC: NORMAL

## 2020-06-05 ENCOUNTER — OFFICE VISIT (OUTPATIENT)
Dept: SURGERY | Facility: CLINIC | Age: 85
End: 2020-06-05

## 2020-06-05 DIAGNOSIS — K81.9 CHOLECYSTITIS: ICD-10-CM

## 2020-06-05 DIAGNOSIS — K85.10 GALLSTONE PANCREATITIS: Primary | ICD-10-CM

## 2020-06-05 PROCEDURE — 99024 POSTOP FOLLOW-UP VISIT: CPT | Performed by: SURGERY

## 2020-06-11 ENCOUNTER — OFFICE VISIT (OUTPATIENT)
Dept: INTERNAL MEDICINE | Facility: CLINIC | Age: 85
End: 2020-06-11

## 2020-06-11 VITALS
SYSTOLIC BLOOD PRESSURE: 152 MMHG | DIASTOLIC BLOOD PRESSURE: 90 MMHG | OXYGEN SATURATION: 100 % | HEART RATE: 102 BPM | HEIGHT: 63 IN | BODY MASS INDEX: 32.53 KG/M2 | WEIGHT: 183.6 LBS

## 2020-06-11 DIAGNOSIS — Z87.19 HISTORY OF ACUTE PANCREATITIS: ICD-10-CM

## 2020-06-11 DIAGNOSIS — Z09 HOSPITAL DISCHARGE FOLLOW-UP: Primary | ICD-10-CM

## 2020-06-11 DIAGNOSIS — K81.1 CHRONIC CHOLECYSTITIS: ICD-10-CM

## 2020-06-11 DIAGNOSIS — M54.12 LEFT CERVICAL RADICULOPATHY: ICD-10-CM

## 2020-06-11 DIAGNOSIS — K21.9 GASTROESOPHAGEAL REFLUX DISEASE WITHOUT ESOPHAGITIS: ICD-10-CM

## 2020-06-11 PROBLEM — K85.10 GALLSTONE PANCREATITIS: Status: RESOLVED | Noted: 2020-05-21 | Resolved: 2020-06-11

## 2020-06-11 PROBLEM — K59.00 ACUTE CONSTIPATION: Status: RESOLVED | Noted: 2020-04-17 | Resolved: 2020-06-11

## 2020-06-11 PROBLEM — K85.10 ACUTE GALLSTONE PANCREATITIS: Status: RESOLVED | Noted: 2020-02-27 | Resolved: 2020-06-11

## 2020-06-11 PROCEDURE — 99214 OFFICE O/P EST MOD 30 MIN: CPT | Performed by: INTERNAL MEDICINE

## 2020-06-11 RX ORDER — PREDNISONE 10 MG/1
TABLET ORAL
Qty: 56 TABLET | Refills: 0 | Status: SHIPPED | OUTPATIENT
Start: 2020-06-11 | End: 2020-06-25

## 2020-06-11 NOTE — PROGRESS NOTES
06/11/2020    Patient Information  Mandy Alarcon                                                                                          [address] [facility] [address]      5/30/1933  [unfilled]  There is no work phone number on file.     Current outpatient and discharge medications have been reconciled for the patient.  Reviewed by: Daryl Santos MD    Chief Complaint:     Hospital discharge follow-up after recent cholecystectomy.  Complaining of left neck shoulder and arm pain without trauma.    History of Present Illness:    Patient with a history of hypertension, hyperlipidemia, gout, hypothyroidism, impaired fasting glucose, multinodular goiter, gallstone pancreatitis and recent cholecystectomy.  She presents today for hospital discharge follow-up and is doing well.  The history regarding this will be described below.  She is also complaining of left-sided neck, shoulder, and upper arm pain that is intermittent and shooting in nature.  She has been having to sleep on her back and thinks this may be aggravating the situation.  She has had no known trauma.      The history regarding recent hospitalization and left neck/shoulder and arm pain:    June 11, 2020--patient seen in hospital discharge follow-up after having a cholecystectomy.  She had previously had an episode of acute pancreatitis that was felt to be gallstone pancreatitis.  Initially there was some complications and had to delay her cholecystectomy.  She has had that done now and is doing much better.  She is eating well on a  low-fat diet and she is not having any pain.  I reviewed her discharge medications and she is now back on her previous medications although she is taking omeprazole which seems to be helpful.  She is having some postoperative itching and she is here today for another problem as well, specifically a cervical radiculopathy and prednisone should help with her itching as well.     Review of Systems    Constitution: Negative.   HENT: Negative.    Eyes: Negative.    Cardiovascular: Negative.    Respiratory: Negative.    Endocrine: Negative.    Hematologic/Lymphatic: Negative.    Skin: Negative.    Musculoskeletal: Positive for arthritis.   Gastrointestinal: Negative.    Genitourinary: Negative.    Neurological: Negative.    Psychiatric/Behavioral: Negative.    Allergic/Immunologic: Negative.        Active Problems:    Patient Active Problem List   Diagnosis   • Benign essential hypertension   • Claustrophobia   • Gout   • Hyperlipidemia   • Primary hypothyroidism   • Impaired fasting glucose   • Nocturnal hypoxemia   • Secondary polycythemia   • Vitamin D deficiency   • Therapeutic drug monitoring   • Family history of coronary artery disease   • Bilateral sensorineural hearing loss, wears hearing aids   • Multinodular goiter   • Supraventricular tachycardia (CMS/HCC)   • Morbidly obese (CMS/HCC)   • Chronic cholecystitis   • Bilateral lower extremity edema   • Gastroesophageal reflux disease without esophagitis   • Paroxysmal atrial fibrillation (CMS/HCC)   • History of acute pancreatitis   • Hospital discharge follow-up   • Left cervical radiculopathy         Past Medical History:   Diagnosis Date   • Benign essential hypertension 10/28/2012    October 2012--treatment for hypertension begun.   • Bilateral sensorineural hearing loss, wears hearing aids 6/14/2017    Left is much worse than right.  Patient had multiple ear infections as a child.   • Chronic renal insufficiency, stage II (mild), 05/18/2016--creatinine 1.35 4/28/2016 05/18/2016--creatinine 1.35.  Baseline creatinine approximately 1.3.   • Claustrophobia 4/28/2016    This patient has significant nocturnal hypoxemia and I think that she could benefit from nocturnal oxygen therapy. The exact etiology of her hypoxemia is not clear. She could possibly have obstructive sleep apnea but this is not documented. We cannot test this patient for sleep apnea  due to the fact that she is severely claustrophobic. Patient was a former smoker and it is possibly that COPD he is playing a role.   • Family history of coronary artery disease 2016    Patient's mother, father, 2 sisters and a brother all  from myocardial infarctions   • Gout 10/28/2012    2012--initial diagnosis and treatment of gout.   • Hyperlipidemia 10/28/2012    2012--treatment for hyperlipidemia begun.   • Impaired fasting glucose 10/28/2012    2012--initial diagnosis impaired fasting glucose.   • Morbidly obese (CMS/HCC) 3/11/2019   • Nocturnal hypoxemia 2014--patient did not receive nocturnal oxygen because of Medicare regulations.   05/15/2014--overnight oximetry revealed oxygen saturations less than 89% for 22 minutes and 40 seconds. Oxygen saturations less than or equal to 88% for 22 minutes and 40 seconds. Lowest oxygen saturation 83%. The longest continuous time with oxygen saturations less than or equal to 88% was 1 minute and 32 seconds.   2012--overnight oximetry revealed oxygen saturations less than 90% for one hour and 35 minutes. Oxygen saturations less than 89% 59 minutes. This patient has significant nocturnal hypoxemia and I think that she could benefit from nocturnal oxygen therapy. The exact etiology of her hypoxemia is not clear. She could possibly have obstructive sleep apnea but this is not documented. We cannot test this patient for sleep apnea due to the fact that she is severely claustrophobic. Patient was a former smoker and it is possibly that COPD he is playing a role.   • Pancreatitis    • Pneumonia    • Postoperative nausea 3/23/2020   • Primary hypothyroidism 2015--TSH remains elevated slightly at 4.92.  Given the overall clinical picture including the multinodular goiter, we will initiate levothyroxine 50 mcg/day and reassess in about 6 weeks.  2018--thyroid ultrasound reveals a  multinodular thyroid with multiple subcentimeter nodules.  Only minimal increase in size of the largest nodule in the left lobe has occurred when compared    • Secondary polycythemia 8/2/2012 11/06/2014--patient did not receive nocturnal oxygen because of Medicare regulations.   05/15/2014--overnight oximetry revealed oxygen saturations less than 89% for 22 minutes and 40 seconds. Oxygen saturations less than or equal to 88% for 22 minutes and 40 seconds. Lowest oxygen saturation 83%. The longest continuous time with oxygen saturations less than or equal to 88% was 1 minute and 32 seconds.   08/02/2012--overnight oximetry revealed oxygen saturations less than 90% for one hour and 35 minutes. Oxygen saturations less than 89% 59 minutes. This patient has significant nocturnal hypoxemia and I think that she could benefit from nocturnal oxygen therapy. The exact etiology of her hypoxemia is not clear. She could possibly have obstructive sleep apnea but this is not documented. We cannot test this patient for sleep apnea due to the fact that she is severely claustrophobic. Patient was a former smoker and it is possibly that COPD he is playing a role.   • Vitamin D deficiency 5/23/2016         Past Surgical History:   Procedure Laterality Date   • CHOLECYSTECTOMY WITH INTRAOPERATIVE CHOLANGIOGRAM N/A 3/12/2020    Procedure: LAPAROSCOPIC CHOLECYSTOSTOMY TUBE, INTRAOPERATIVE CHOLANGIOGRAM;  Surgeon: Bryce Andujar MD;  Location: [facility];  Service: General;  Laterality: N/A;   • CHOLECYSTECTOMY WITH INTRAOPERATIVE CHOLANGIOGRAM N/A 5/22/2020    Procedure: CHOLECYSTECTOMY LAPAROSCOPIC INTRAOPERATIVE CHOLANGIOGRAM and EGD;  Surgeon: Bryce Andujar MD;  Location: [facility];  Service: General;  Laterality: N/A;   • OOPHORECTOMY      age 48   • RADICAL ABDOMINAL HYSTERECTOMY  48 years old    48 years of age. Uterine fibroid tumors with menorrhagia - no cancer         Allergies   Allergen Reactions   •  Cefaclor Anaphylaxis and Angioedema     caused angioedema 25 years ago; she tolerates cephalexin, amoxicillin, and ceftriaxone per my d/w patient and her daughter as well as amoxicillin-clavulanate confirmed in EPIC   • Sulfa Antibiotics Rash           Current Outpatient Medications:   •  acetaminophen (TYLENOL) 500 MG tablet, Take 500 mg by mouth Every 6 (Six) Hours As Needed for Mild Pain ., Disp: , Rfl:   •  allopurinol (ZYLOPRIM) 300 MG tablet, Take 300 mg by mouth Daily., Disp: , Rfl:   •  atenolol (TENORMIN) 50 MG tablet, Take 1 tablet by mouth 2 (Two) Times a Day As Needed (Holding for SBP less than 120  or HR less than 60)., Disp: 180 tablet, Rfl: 1  •  Biotin 1000 MCG chewable tablet, Chew 1 tablet Daily., Disp: , Rfl:   •  Cholecalciferol (VITAMIN D) 2000 UNITS tablet, Take 2 tablets by mouth daily., Disp: , Rfl:   •  clobetasol (TEMOVATE) 0.05 % ointment, Apply  topically to the appropriate area as directed 2 (Two) Times a Day., Disp: 15 g, Rfl: 0  •  diphenhydrAMINE (BENADRYL) 25 mg capsule, Take 1 capsule by mouth Every 6 (Six) Hours As Needed for Itching., Disp: 60 capsule, Rfl: 1  •  docusate sodium (COLACE) 100 MG capsule, Take 1 capsule by mouth 2 (Two) Times a Day As Needed for Constipation., Disp: , Rfl:   •  levothyroxine (SYNTHROID, LEVOTHROID) 50 MCG tablet, Take 50 mcg by mouth Daily., Disp: , Rfl:   •  miconazole (MICOTIN) 2 % powder, Apply  topically to the appropriate area as directed 2 (Two) Times a Day., Disp: , Rfl:   •  Multiple Vitamins-Minerals (MULTIVITAMIN ADULTS PO), Take 1 tablet by mouth Daily., Disp: , Rfl:   •  omeprazole (PrilOSEC) 40 MG capsule, Take 1 p.o. daily before the first meal for acid reflux, Disp: 30 capsule, Rfl: 3  •  ondansetron ODT (ZOFRAN-ODT) 4 MG disintegrating tablet, Take 1 p.o. every 6 to 8 hours as needed for nausea, Disp: 30 tablet, Rfl: 0  •  polyethylene glycol (MIRALAX) packet, Take orally as directed for constipation, Disp: , Rfl:   •  pravastatin  (PRAVACHOL) 40 MG tablet, Take 40 mg by mouth Daily., Disp: , Rfl:       Family History   Problem Relation Age of Onset   • Heart disease Mother         Ischemic.  from coronary artery disease.   • Heart disease Father         Ischemic.  from coronary artery disease.   • Heart disease Sister         Ischemic.  from coronary artery disease.   • Heart disease Brother         Ischemic.  from coronary artery disease.   • Heart disease Sister         Ischemic.  from coronary artery disease.   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   Substance and Sexual Activity   • Alcohol use: No     Comment: caffiene use tea coffee   • Drug use: No   • Sexual activity: Not Currently     Partners: Male   Lifestyle   • Physical activity:     Days per week: 0 days     Minutes per session: 0 min   • Stress: Not at all   Relationships   • Social connections:     Talks on phone: More than three times a week     Gets together: Three times a week     Attends [employer] service: More than 4 times per year     Active member of club or organization: No     Attends meetings of clubs or organizations: Never     Relationship status:          Vitals:    20 1419   BP: 152/90   BP Location: Right arm   Pulse: 102  "  SpO2: 100%   Weight: 83.3 kg (183 lb 9.6 oz)   Height: 160 cm (62.99\")        Body mass index is 32.53 kg/m².      Physical Exam:    General: Alert and oriented x 3.  No acute distress.  Normal affect.  HEENT: Pupils equal, round, reactive to light; extraocular movements intact; sclerae nonicteric; pharynx, ear canals and TMs normal.  Neck: Without JVD, thyromegaly, bruit, or adenopathy.  Lungs: Clear to auscultation in all fields.  Heart: Regular rate and rhythm without murmur, rub, gallop, or click.  Abdomen: Soft, nontender, without hepatosplenomegaly or hernia.  Bowel sounds normal.  : Deferred.  Rectal: Deferred.  Extremities: Without clubbing, cyanosis, edema, or pulse deficit.  Neurologic: Intact without focal deficit.  Normal station and gait observed during ingress and egress from the examination room.  Skin: Without significant lesion.  Musculoskeletal: Unremarkable.    Lab/other results:    I reviewed the documentation from the hospitalization including history and physical, operative report, laboratory studies, discharge summary and discharge medication.    Assessment/Plan:     Diagnosis Plan   1. Hospital discharge follow-up     2. Chronic cholecystitis     3. History of acute pancreatitis     4. Gastroesophageal reflux disease without esophagitis     5. Left cervical radiculopathy       June 11, 2020--patient seen in hospital discharge follow-up after having a cholecystectomy.  She had previously had an episode of acute pancreatitis that was felt to be gallstone pancreatitis.  Initially there was some complications and had to delay her cholecystectomy.  She has had that done now and is doing much better.  She is eating well on a  low-fat diet and she is not having any pain.  I reviewed her discharge medications and she is now back on her previous medications although she is taking omeprazole which seems to be helpful.  She is having some postoperative itching and she is here today for another " problem as well, specifically a cervical radiculopathy and prednisone should help with her itching as well.     Plan is as follows: Prednisone 50 mg p.o. daily x7 days, taper and discontinue.  I will have patient follow-up in 2 weeks to reassess the situation and I will likely draw some nonfasting lab work at that time.        Procedures

## 2020-06-22 DIAGNOSIS — N18.2 CHRONIC RENAL INSUFFICIENCY, STAGE II (MILD): Chronic | ICD-10-CM

## 2020-06-22 DIAGNOSIS — E78.5 HYPERLIPIDEMIA, UNSPECIFIED HYPERLIPIDEMIA TYPE: ICD-10-CM

## 2020-06-22 DIAGNOSIS — E03.9 PRIMARY HYPOTHYROIDISM: ICD-10-CM

## 2020-06-22 DIAGNOSIS — R73.01 IMPAIRED FASTING GLUCOSE: Chronic | ICD-10-CM

## 2020-06-22 DIAGNOSIS — I10 BENIGN ESSENTIAL HYPERTENSION: ICD-10-CM

## 2020-06-23 ENCOUNTER — LAB (OUTPATIENT)
Dept: LAB | Facility: HOSPITAL | Age: 85
End: 2020-06-23

## 2020-06-23 LAB
ALBUMIN SERPL-MCNC: 3.7 G/DL (ref 3.5–5.2)
ALBUMIN/GLOB SERPL: 1.1 G/DL
ALP SERPL-CCNC: 40 U/L (ref 39–117)
ALT SERPL W P-5'-P-CCNC: 18 U/L (ref 1–33)
ANION GAP SERPL CALCULATED.3IONS-SCNC: 7.4 MMOL/L (ref 5–15)
AST SERPL-CCNC: 19 U/L (ref 1–32)
BASOPHILS # BLD AUTO: 0.06 10*3/MM3 (ref 0–0.2)
BASOPHILS NFR BLD AUTO: 0.9 % (ref 0–1.5)
BILIRUB SERPL-MCNC: 0.4 MG/DL (ref 0.2–1.2)
BUN BLD-MCNC: 17 MG/DL (ref 8–23)
BUN/CREAT SERPL: 12.6 (ref 7–25)
CALCIUM SPEC-SCNC: 9.5 MG/DL (ref 8.6–10.5)
CHLORIDE SERPL-SCNC: 106 MMOL/L (ref 98–107)
CK SERPL-CCNC: 29 U/L (ref 20–180)
CO2 SERPL-SCNC: 23.6 MMOL/L (ref 22–29)
CREAT BLD-MCNC: 1.35 MG/DL (ref 0.57–1)
DEPRECATED RDW RBC AUTO: 45.2 FL (ref 37–54)
EOSINOPHIL # BLD AUTO: 0.59 10*3/MM3 (ref 0–0.4)
EOSINOPHIL NFR BLD AUTO: 9 % (ref 0.3–6.2)
ERYTHROCYTE [DISTWIDTH] IN BLOOD BY AUTOMATED COUNT: 13.4 % (ref 12.3–15.4)
GFR SERPL CREATININE-BSD FRML MDRD: 37 ML/MIN/1.73
GLOBULIN UR ELPH-MCNC: 3.3 GM/DL
GLUCOSE BLD-MCNC: 106 MG/DL (ref 65–99)
HCT VFR BLD AUTO: 39.9 % (ref 34–46.6)
HGB BLD-MCNC: 13.1 G/DL (ref 12–15.9)
IMM GRANULOCYTES # BLD AUTO: 0.01 10*3/MM3 (ref 0–0.05)
IMM GRANULOCYTES NFR BLD AUTO: 0.2 % (ref 0–0.5)
LYMPHOCYTES # BLD AUTO: 2.29 10*3/MM3 (ref 0.7–3.1)
LYMPHOCYTES NFR BLD AUTO: 35.1 % (ref 19.6–45.3)
MCH RBC QN AUTO: 30 PG (ref 26.6–33)
MCHC RBC AUTO-ENTMCNC: 32.8 G/DL (ref 31.5–35.7)
MCV RBC AUTO: 91.3 FL (ref 79–97)
MONOCYTES # BLD AUTO: 0.72 10*3/MM3 (ref 0.1–0.9)
MONOCYTES NFR BLD AUTO: 11 % (ref 5–12)
NEUTROPHILS # BLD AUTO: 2.85 10*3/MM3 (ref 1.7–7)
NEUTROPHILS NFR BLD AUTO: 43.8 % (ref 42.7–76)
NRBC BLD AUTO-RTO: 0 /100 WBC (ref 0–0.2)
PLATELET # BLD AUTO: 196 10*3/MM3 (ref 140–450)
PMV BLD AUTO: 10.8 FL (ref 6–12)
POTASSIUM BLD-SCNC: 4.6 MMOL/L (ref 3.5–5.2)
PROT SERPL-MCNC: 7 G/DL (ref 6–8.5)
RBC # BLD AUTO: 4.37 10*6/MM3 (ref 3.77–5.28)
SODIUM BLD-SCNC: 137 MMOL/L (ref 136–145)
T3FREE SERPL-MCNC: 2.97 PG/ML (ref 2–4.4)
T4 FREE SERPL-MCNC: 1.14 NG/DL (ref 0.93–1.7)
TSH SERPL DL<=0.05 MIU/L-ACNC: 1.52 UIU/ML (ref 0.27–4.2)
WBC NRBC COR # BLD: 6.52 10*3/MM3 (ref 3.4–10.8)

## 2020-06-23 PROCEDURE — 80061 LIPID PANEL: CPT | Performed by: INTERNAL MEDICINE

## 2020-06-23 PROCEDURE — 82550 ASSAY OF CK (CPK): CPT | Performed by: INTERNAL MEDICINE

## 2020-06-23 PROCEDURE — 84439 ASSAY OF FREE THYROXINE: CPT | Performed by: INTERNAL MEDICINE

## 2020-06-23 PROCEDURE — 80053 COMPREHEN METABOLIC PANEL: CPT | Performed by: INTERNAL MEDICINE

## 2020-06-23 PROCEDURE — 83704 LIPOPROTEIN BLD QUAN PART: CPT | Performed by: INTERNAL MEDICINE

## 2020-06-23 PROCEDURE — 84481 FREE ASSAY (FT-3): CPT | Performed by: INTERNAL MEDICINE

## 2020-06-23 PROCEDURE — 36415 COLL VENOUS BLD VENIPUNCTURE: CPT

## 2020-06-23 PROCEDURE — 84443 ASSAY THYROID STIM HORMONE: CPT | Performed by: INTERNAL MEDICINE

## 2020-06-23 PROCEDURE — 85025 COMPLETE CBC W/AUTO DIFF WBC: CPT | Performed by: INTERNAL MEDICINE

## 2020-06-25 ENCOUNTER — OFFICE VISIT (OUTPATIENT)
Dept: INTERNAL MEDICINE | Facility: CLINIC | Age: 85
End: 2020-06-25

## 2020-06-25 VITALS
OXYGEN SATURATION: 98 % | HEART RATE: 83 BPM | WEIGHT: 186.8 LBS | SYSTOLIC BLOOD PRESSURE: 144 MMHG | HEIGHT: 63 IN | BODY MASS INDEX: 33.1 KG/M2 | DIASTOLIC BLOOD PRESSURE: 80 MMHG

## 2020-06-25 DIAGNOSIS — E55.9 VITAMIN D DEFICIENCY: Chronic | ICD-10-CM

## 2020-06-25 DIAGNOSIS — E03.9 PRIMARY HYPOTHYROIDISM: Chronic | ICD-10-CM

## 2020-06-25 DIAGNOSIS — R60.0 BILATERAL LOWER EXTREMITY EDEMA: Chronic | ICD-10-CM

## 2020-06-25 DIAGNOSIS — Z87.39 HISTORY OF GOUT: Chronic | ICD-10-CM

## 2020-06-25 DIAGNOSIS — M54.12 LEFT CERVICAL RADICULOPATHY: Primary | ICD-10-CM

## 2020-06-25 DIAGNOSIS — E78.2 MIXED HYPERLIPIDEMIA: Chronic | ICD-10-CM

## 2020-06-25 DIAGNOSIS — Z51.81 THERAPEUTIC DRUG MONITORING: ICD-10-CM

## 2020-06-25 DIAGNOSIS — D75.1 SECONDARY POLYCYTHEMIA: Chronic | ICD-10-CM

## 2020-06-25 DIAGNOSIS — E04.2 MULTINODULAR GOITER: Chronic | ICD-10-CM

## 2020-06-25 DIAGNOSIS — R73.01 IMPAIRED FASTING GLUCOSE: Chronic | ICD-10-CM

## 2020-06-25 DIAGNOSIS — I10 BENIGN ESSENTIAL HYPERTENSION: Chronic | ICD-10-CM

## 2020-06-25 PROBLEM — K81.1 CHRONIC CHOLECYSTITIS: Status: RESOLVED | Noted: 2020-03-11 | Resolved: 2020-06-25

## 2020-06-25 PROBLEM — Z09 HOSPITAL DISCHARGE FOLLOW-UP: Status: RESOLVED | Noted: 2020-06-11 | Resolved: 2020-06-25

## 2020-06-25 PROBLEM — Z87.19 HISTORY OF ACUTE PANCREATITIS: Status: RESOLVED | Noted: 2020-04-17 | Resolved: 2020-06-25

## 2020-06-25 PROBLEM — I48.0 PAROXYSMAL ATRIAL FIBRILLATION: Chronic | Status: ACTIVE | Noted: 2020-04-10

## 2020-06-25 PROBLEM — K21.9 GASTROESOPHAGEAL REFLUX DISEASE WITHOUT ESOPHAGITIS: Chronic | Status: ACTIVE | Noted: 2020-03-23

## 2020-06-25 LAB
CHOLEST SERPL-MCNC: 149 MG/DL (ref 100–199)
HDL SERPL-SCNC: 20.9 UMOL/L
HDLC SERPL-MCNC: 45 MG/DL
LDL-P: 1088 NMOL/L
LDLC REAL SIZE PAT SERPL: 20.8 NM
LDLC SERPL CALC-MCNC: 65 MG/DL (ref 0–99)
SMALL LDL-P: 416 NMOL/L
TRIGL SERPL-MCNC: 197 MG/DL (ref 0–149)

## 2020-06-25 PROCEDURE — 99214 OFFICE O/P EST MOD 30 MIN: CPT | Performed by: INTERNAL MEDICINE

## 2020-06-25 NOTE — PROGRESS NOTES
06/25/2020    Patient Information  Mandy Alarcon                                                                                          [address] [address]      5/30/1933  [unfilled]  There is no work phone number on file.    Chief Complaint:     Follow-up recent problems with neck pain and radiation into the left upper extremity, follow-up recent lab work to assess chronic medical issues listed in history of present illness.  Follow-up complaints of pruritus.    History of Present Illness:    Patient with a history of suspected left cervical radiculopathy recently, history of impaired fasting glucose, hyperlipidemia, hypothyroidism, hypertension, gout, secondary polycythemia, vitamin D deficiency, multinodular goiter, bilateral lower extremity edema.  She presents today to follow-up after treatment for suspected left cervical cervical radiculopathy.  She also had some lab work which we will review in order to monitor chronic medical issues.  Her past medical history reviewed and updated were necessary including health maintenance parameters.  This reveals she is currently up-to-date or else accounted for.    Review of Systems   Constitution: Negative.   HENT: Negative.    Eyes: Negative.    Cardiovascular: Negative.    Respiratory: Negative.    Endocrine: Negative.    Hematologic/Lymphatic: Negative.    Skin: Negative.    Musculoskeletal: Negative.    Gastrointestinal: Negative.    Genitourinary: Negative.    Neurological: Negative.    Psychiatric/Behavioral: Negative.    Allergic/Immunologic: Negative.        Active Problems:    Patient Active Problem List   Diagnosis   • Benign essential hypertension   • Claustrophobia   • Gout   • Hyperlipidemia   • Primary hypothyroidism   • Impaired fasting glucose   • Nocturnal hypoxemia   • Secondary polycythemia   • Vitamin D deficiency   • Therapeutic drug monitoring   • Family history of coronary artery disease   • Bilateral  sensorineural hearing loss, wears hearing aids   • Multinodular goiter   • Supraventricular tachycardia (CMS/[facility])   • Morbidly obese (CMS/[facility])   • Bilateral lower extremity edema   • Gastroesophageal reflux disease without esophagitis   • Paroxysmal atrial fibrillation (CMS/HCC)   • Left cervical radiculopathy         Past Medical History:   Diagnosis Date   • Benign essential hypertension 10/28/2012    2012--treatment for hypertension begun.   • Bilateral lower extremity edema 3/23/2020   • Bilateral sensorineural hearing loss, wears hearing aids 2017    Left is much worse than right.  Patient had multiple ear infections as a child.   • Chronic renal insufficiency, stage II (mild), 2016--creatinine 1.35 2016--creatinine 1.35.  Baseline creatinine approximately 1.3.   • Claustrophobia 2016    This patient has significant nocturnal hypoxemia and I think that she could benefit from nocturnal oxygen therapy. The exact etiology of her hypoxemia is not clear. She could possibly have obstructive sleep apnea but this is not documented. We cannot test this patient for sleep apnea due to the fact that she is severely claustrophobic. Patient was a former smoker and it is possibly that COPD he is playing a role.   • Family history of coronary artery disease 2016    Patient's mother, father, 2 sisters and a brother all  from myocardial infarctions   • Gastroesophageal reflux disease without esophagitis 3/23/2020   • Gout 10/28/2012    2012--initial diagnosis and treatment of gout.   • History of acute pancreatitis 2020   • Hyperlipidemia 10/28/2012    2012--treatment for hyperlipidemia begun.   • Impaired fasting glucose 10/28/2012    2012--initial diagnosis impaired fasting glucose.   • Morbidly obese (CMS/HCC) 3/11/2019   • Nocturnal hypoxemia 2014--patient did not receive nocturnal oxygen because of Medicare regulations.    05/15/2014--overnight oximetry revealed oxygen saturations less than 89% for 22 minutes and 40 seconds. Oxygen saturations less than or equal to 88% for 22 minutes and 40 seconds. Lowest oxygen saturation 83%. The longest continuous time with oxygen saturations less than or equal to 88% was 1 minute and 32 seconds.   08/02/2012--overnight oximetry revealed oxygen saturations less than 90% for one hour and 35 minutes. Oxygen saturations less than 89% 59 minutes. This patient has significant nocturnal hypoxemia and I think that she could benefit from nocturnal oxygen therapy. The exact etiology of her hypoxemia is not clear. She could possibly have obstructive sleep apnea but this is not documented. We cannot test this patient for sleep apnea due to the fact that she is severely claustrophobic. Patient was a former smoker and it is possibly that COPD he is playing a role.   • Pancreatitis    • Paroxysmal atrial fibrillation (CMS/HCC) 4/10/2020   • Pneumonia    • Postoperative nausea 3/23/2020   • Primary hypothyroidism 11/17/2015 March 11, 2019--TSH remains elevated slightly at 4.92.  Given the overall clinical picture including the multinodular goiter, we will initiate levothyroxine 50 mcg/day and reassess in about 6 weeks.  August 1, 2018--thyroid ultrasound reveals a multinodular thyroid with multiple subcentimeter nodules.  Only minimal increase in size of the largest nodule in the left lobe has occurred when compared    • Secondary polycythemia 8/2/2012 11/06/2014--patient did not receive nocturnal oxygen because of Medicare regulations.   05/15/2014--overnight oximetry revealed oxygen saturations less than 89% for 22 minutes and 40 seconds. Oxygen saturations less than or equal to 88% for 22 minutes and 40 seconds. Lowest oxygen saturation 83%. The longest continuous time with oxygen saturations less than or equal to 88% was 1 minute and 32 seconds.   08/02/2012--overnight oximetry revealed oxygen  saturations less than 90% for one hour and 35 minutes. Oxygen saturations less than 89% 59 minutes. This patient has significant nocturnal hypoxemia and I think that she could benefit from nocturnal oxygen therapy. The exact etiology of her hypoxemia is not clear. She could possibly have obstructive sleep apnea but this is not documented. We cannot test this patient for sleep apnea due to the fact that she is severely claustrophobic. Patient was a former smoker and it is possibly that COPD he is playing a role.   • Vitamin D deficiency 5/23/2016         Past Surgical History:   Procedure Laterality Date   • CHOLECYSTECTOMY WITH INTRAOPERATIVE CHOLANGIOGRAM N/A 3/12/2020    Procedure: LAPAROSCOPIC CHOLECYSTOSTOMY TUBE, INTRAOPERATIVE CHOLANGIOGRAM;  Surgeon: Bryce Andujar MD;  Location: [facility] OR;  Service: General;  Laterality: N/A;   • CHOLECYSTECTOMY WITH INTRAOPERATIVE CHOLANGIOGRAM N/A 5/22/2020    Procedure: CHOLECYSTECTOMY LAPAROSCOPIC INTRAOPERATIVE CHOLANGIOGRAM and EGD;  Surgeon: Bryce Andujar MD;  Location: [facility] OR;  Service: General;  Laterality: N/A;   • OOPHORECTOMY      age 48   • RADICAL ABDOMINAL HYSTERECTOMY  48 years old    48 years of age. Uterine fibroid tumors with menorrhagia - no cancer         Allergies   Allergen Reactions   • Cefaclor Anaphylaxis and Angioedema     caused angioedema 25 years ago; she tolerates cephalexin, amoxicillin, and ceftriaxone per my d/w patient and her daughter as well as amoxicillin-clavulanate confirmed in EPIC   • Sulfa Antibiotics Rash           Current Outpatient Medications:   •  acetaminophen (TYLENOL) 500 MG tablet, Take 500 mg by mouth Every 6 (Six) Hours As Needed for Mild Pain ., Disp: , Rfl:   •  allopurinol (ZYLOPRIM) 300 MG tablet, Take 300 mg by mouth Daily., Disp: , Rfl:   •  atenolol (TENORMIN) 50 MG tablet, Take 1 tablet by mouth 2 (Two) Times a Day As Needed (Holding for SBP less than 120  or HR less than 60)., Disp: 180  tablet, Rfl: 1  •  Biotin 1000 MCG chewable tablet, Chew 1 tablet Daily., Disp: , Rfl:   •  Cholecalciferol (VITAMIN D) 2000 UNITS tablet, Take 2 tablets by mouth daily., Disp: , Rfl:   •  clobetasol (TEMOVATE) 0.05 % ointment, Apply  topically to the appropriate area as directed 2 (Two) Times a Day., Disp: 15 g, Rfl: 0  •  diphenhydrAMINE (BENADRYL) 25 mg capsule, Take 1 capsule by mouth Every 6 (Six) Hours As Needed for Itching., Disp: 60 capsule, Rfl: 1  •  docusate sodium (COLACE) 100 MG capsule, Take 1 capsule by mouth 2 (Two) Times a Day As Needed for Constipation., Disp: , Rfl:   •  levothyroxine (SYNTHROID, LEVOTHROID) 50 MCG tablet, Take 50 mcg by mouth Daily., Disp: , Rfl:   •  miconazole (MICOTIN) 2 % powder, Apply  topically to the appropriate area as directed 2 (Two) Times a Day., Disp: , Rfl:   •  Multiple Vitamins-Minerals (MULTIVITAMIN ADULTS PO), Take 1 tablet by mouth Daily., Disp: , Rfl:   •  omeprazole (PrilOSEC) 40 MG capsule, Take 1 p.o. daily before the first meal for acid reflux, Disp: 30 capsule, Rfl: 3  •  ondansetron ODT (ZOFRAN-ODT) 4 MG disintegrating tablet, Take 1 p.o. every 6 to 8 hours as needed for nausea, Disp: 30 tablet, Rfl: 0  •  polyethylene glycol (MIRALAX) packet, Take orally as directed for constipation, Disp: , Rfl:   •  pravastatin (PRAVACHOL) 40 MG tablet, Take 40 mg by mouth Daily., Disp: , Rfl:       Family History   Problem Relation Age of Onset   • Heart disease Mother         Ischemic.  from coronary artery disease.   • Heart disease Father         Ischemic.  from coronary artery disease.   • Heart disease Sister         Ischemic.  from coronary artery disease.   • Heart disease Brother         Ischemic.  from coronary artery disease.   • Heart disease Sister         Ischemic.  from coronary artery disease.   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   Substance and Sexual Activity   • Alcohol use: No     Comment: caffiene use tea coffee   • Drug use: No   • Sexual activity: Not Currently     Partners: Male   Lifestyle   • Physical activity:     Days per week: 0 days     Minutes per session: 0 min   • Stress: Not at all   Relationships   • Social connections:     Talks on phone: More than three times a week     Gets together: Three times a week     Attends [employer] service: More than 4 times per year     Active member of club or organization: No     Attends meetings of clubs or organizations: Never     Relationship status:          Vitals:    20 1337   BP: 144/80   BP Location: Left arm   Pulse: 83   SpO2: 98%   Weight: 84.7 kg (186 lb 12.8 oz)   Height: 160 cm (62.99\")        Body mass index is 33.1 kg/m².      Physical Exam:    General: Alert and oriented x 3.  No acute distress.  Normal affect.  Obese.  HEENT: Pupils equal, round, reactive to light; extraocular movements intact; sclerae nonicteric; pharynx, ear canals and TMs normal.  Neck: Without JVD, thyromegaly, bruit, or adenopathy.  Lungs: Clear to auscultation in all fields.  Heart: Regular rate and rhythm without murmur, rub, gallop, or click.  Abdomen: Soft, nontender, without hepatosplenomegaly or hernia.  Bowel sounds normal.  : Deferred.  Rectal: Deferred.  Extremities: " Without clubbing, cyanosis, edema, or pulse deficit.  Neurologic: Intact without focal deficit.  Normal station and gait observed during ingress and egress from the examination room.  Skin: Without significant lesion.  Musculoskeletal: Unremarkable.    Lab/other results:    NMR reveals total cholesterol 149.  Triglycerides 297.  LDL particle number slightly elevated 1088.  HDL particle number is low at 20.9.  CMP normal except glucose 106 and creatinine 1.35.  CBC is normal except eosinophils are elevated at 9%.  CPK normal.  Thyroid function test normal.    Assessment/Plan:     Diagnosis Plan   1. Left cervical radiculopathy     2. Impaired fasting glucose  Comprehensive Metabolic Panel    Hemoglobin A1c   3. Hyperlipidemia  CK    Comprehensive Metabolic Panel    NMR LipoProfile   4. Primary hypothyroidism  TSH    T4, Free    T3, Free   5. Benign essential hypertension     6. Gout     7. Secondary polycythemia     8. Vitamin D deficiency  Vitamin D 25 Hydroxy   9. Multinodular goiter     10. Bilateral lower extremity edema     11. Therapeutic drug monitoring  CBC (No Diff)     Patient's left cervical radicular symptoms resolved after 1 dose of the prednisone.  Her itching resolved as well.  Patient has very mild impaired fasting glucose that does not require medication.  Hyperlipidemia is under good control and so was her thyroid.  Her blood pressure is under good control and gout is stable.  It appears secondary polycythemia has resolved.  Her vitamin D is in the normal range.  Lower extremity edema well controlled.    Plan is as follows: No change in current medical regimen.  Patient will follow-up after September 4, 2020 with lab prior and this will also be subsequent Medicare wellness visit.        Procedures

## 2020-07-20 DIAGNOSIS — K21.9 GASTROESOPHAGEAL REFLUX DISEASE WITHOUT ESOPHAGITIS: ICD-10-CM

## 2020-07-20 RX ORDER — OMEPRAZOLE 40 MG/1
CAPSULE, DELAYED RELEASE ORAL
Qty: 30 CAPSULE | Refills: 3 | Status: SHIPPED | OUTPATIENT
Start: 2020-07-20 | End: 2020-09-15

## 2020-08-07 ENCOUNTER — OFFICE VISIT (OUTPATIENT)
Dept: CARDIOLOGY | Facility: CLINIC | Age: 85
End: 2020-08-07

## 2020-08-07 VITALS
HEIGHT: 63 IN | HEART RATE: 66 BPM | WEIGHT: 187 LBS | BODY MASS INDEX: 33.13 KG/M2 | SYSTOLIC BLOOD PRESSURE: 138 MMHG | DIASTOLIC BLOOD PRESSURE: 80 MMHG

## 2020-08-07 DIAGNOSIS — E78.2 MIXED HYPERLIPIDEMIA: Chronic | ICD-10-CM

## 2020-08-07 DIAGNOSIS — I10 BENIGN ESSENTIAL HYPERTENSION: Chronic | ICD-10-CM

## 2020-08-07 DIAGNOSIS — I48.0 PAROXYSMAL ATRIAL FIBRILLATION (HCC): Chronic | ICD-10-CM

## 2020-08-07 DIAGNOSIS — I47.1 SUPRAVENTRICULAR TACHYCARDIA (HCC): Primary | Chronic | ICD-10-CM

## 2020-08-07 PROCEDURE — 99214 OFFICE O/P EST MOD 30 MIN: CPT | Performed by: INTERNAL MEDICINE

## 2020-08-07 PROCEDURE — 93000 ELECTROCARDIOGRAM COMPLETE: CPT | Performed by: INTERNAL MEDICINE

## 2020-08-07 NOTE — PROGRESS NOTES
"    Subjective:     Encounter Date:08/07/2020      Patient ID: Mandy Alarcon is a 87 y.o. female.    Chief Complaint:  History of Present Illness    This is an 87 year old female patient with a history of hypertension, CKD stage 2, gout, hypothyroidism, tobacco use, hyperlipidemia, recent episode of near syncope, paroxsymal supraventricular tachycardia, who presents for follow up.         She presents today for routine follow-up.  She reports that it took her a while to recover from her hospitalizations and surgeries earlier in the year but in the last month she reports that she has started to feel more like her normal self.  She does have episodes of palpitations that occur about every couple weeks associated with elevated blood pressures.  When they checked her heart rates at that time there are no higher than the 80s.  At that time she will take either a half dose or full dose of atenolol with improvement in her symptoms.  Otherwise her blood pressures are normally well controlled.  She denies any chest pain, orthopnea, shortness of breath, or lower extremity edema.    Prior History:  I saw the patient initially when she was admitted to the hospital following a near syncopal episode on 6/20/18.  Prior to admission the patient reports she was at home doing some work around her house.  She went outside for a couple of minutes and went back inside.  While she was walking around she had a sudden onset of sensation where she felt like her \"head went empty\".  She denied any palpitations, chest pain or dyspnea at that time.  She denied loss of consciousness.  While in the emergency room it was noted that her heart rate went up to the 140's-160's but she denied having any symptoms at that time. Per the ER notes she appeared to be in SVT and she was converted after given adenosine 6 mg and metoprolol tartrate 5 mg IV.  Unable to locate any of the EKG strips from the ER to confirm rhythm.  Her work up was unremarkable " except for an minimally elevated d-dimer.  I had a low suspicion for thromboembolism at that time and felt that the abnormality was likely due to her age and renal insufficiency.  She underwent an echocardiogram during that admission that showed normal left ventricular systolic function, with an EF of 60%, normal diastolic function, and mild aortic regurgitation.  She was started on metoprolol tartrate 25 mg bid, her hydrochlorothiazide was discontinued and she was sent home with a Zio monitor.      She presented to the cardiac evaluation center on 7/10/2018 from Dr. Santos' office after complaining of nocturnal chest pressure.  These symptoms began after she was discharged from the hospital, initially on a nightly basis.  She reported that the symptoms would last about 3 hours and are associated with significant shortness of breath.  It also appeared that she was having more shortness of breath and lower extremity edema than normal.  Her lab work at that time was unremarkable except for a persistently elevated d-dimer.  Proceeded with a VQ scan that same day that was low probability for a pulmonary embolism.  The following day she returned to the office for a stress test that showed no evidence of ischemia and an EF of 59%.  Her Zio monitor showed 53 nonsustained episodes of supraventricular tachycardia the longest lasting 54 beats and the fastest with a rate of 203 bpm.  She also had rare episodes of PVCs and nonsustained ventricular tachycardia longest lasting 9 beats with a rate of 166 bpm.  There was also a single nonsignificant pause of 2 seconds.    She was admitted to the hospital in 2/2020 with acute pancreatitis and pneumonia.  She developed atrial fibrillation with rapid ventricular rate during that admission that we are able to rate control and she eventually converted back to sinus rhythm.  I felt that her atrial fibrillation was related to her acute illness and did not recommend chronic anticoagulation  at that time.  It appears that following that admission she had 2 additional admissions later in 2/2020 for recurrent pain.  A cholecystectomy was recommended but this was extremely difficult procedure and she underwent bailout placement of a cholecystostomy tube instead.  She was discharged in March and then returned in May for a laparoscopic cholecystectomy which again was difficult but successful.  She has since followed up with her surgeon Dr. Andujar wound is noted to be doing well.  It does not appear that she had any recurrent issues with atrial fibrillation during those 2 following admissions.         Review of Systems   Constitution: Negative for malaise/fatigue.   HENT: Negative for hearing loss, hoarse voice, nosebleeds and sore throat.    Eyes: Negative for pain.   Cardiovascular: Positive for palpitations. Negative for chest pain, claudication, cyanosis, dyspnea on exertion, irregular heartbeat, leg swelling, near-syncope, orthopnea, paroxysmal nocturnal dyspnea and syncope.   Respiratory: Negative for shortness of breath and snoring.    Endocrine: Negative for cold intolerance, heat intolerance, polydipsia, polyphagia and polyuria.   Skin: Negative for itching and rash.   Musculoskeletal: Negative for arthritis, falls, joint pain, joint swelling, muscle cramps, muscle weakness and myalgias.   Gastrointestinal: Negative for constipation, diarrhea, dysphagia, heartburn, hematemesis, hematochezia, melena, nausea and vomiting.   Genitourinary: Negative for frequency, hematuria and hesitancy.   Neurological: Negative for excessive daytime sleepiness, dizziness, headaches, light-headedness, numbness and weakness.   Psychiatric/Behavioral: Negative for depression. The patient is not nervous/anxious.          Current Outpatient Medications:   •  acetaminophen (TYLENOL) 500 MG tablet, Take 500 mg by mouth Every 6 (Six) Hours As Needed for Mild Pain ., Disp: , Rfl:   •  allopurinol (ZYLOPRIM) 300 MG tablet,  Take 300 mg by mouth Daily., Disp: , Rfl:   •  atenolol (TENORMIN) 50 MG tablet, Take 1 tablet by mouth 2 (Two) Times a Day As Needed (Holding for SBP less than 120  or HR less than 60)., Disp: 180 tablet, Rfl: 1  •  Biotin 1000 MCG chewable tablet, Chew 1 tablet Daily., Disp: , Rfl:   •  Cholecalciferol (VITAMIN D) 2000 UNITS tablet, Take 2 tablets by mouth daily., Disp: , Rfl:   •  clobetasol (TEMOVATE) 0.05 % ointment, Apply  topically to the appropriate area as directed 2 (Two) Times a Day., Disp: 15 g, Rfl: 0  •  levothyroxine (SYNTHROID, LEVOTHROID) 50 MCG tablet, Take 50 mcg by mouth Daily., Disp: , Rfl:   •  miconazole (MICOTIN) 2 % powder, Apply  topically to the appropriate area as directed 2 (Two) Times a Day., Disp: , Rfl:   •  Multiple Vitamins-Minerals (MULTIVITAMIN ADULTS PO), Take 1 tablet by mouth Daily., Disp: , Rfl:   •  omeprazole (priLOSEC) 40 MG capsule, TAKE 1 CAPSULE BY MOUTH DAILY BEFORE THE FIRST MEAL FOR ACID REFLUX, Disp: 30 capsule, Rfl: 3  •  polyethylene glycol (MIRALAX) packet, Take orally as directed for constipation, Disp: , Rfl:   •  pravastatin (PRAVACHOL) 40 MG tablet, Take 40 mg by mouth Daily., Disp: , Rfl:     Past Medical History:   Diagnosis Date   • Benign essential hypertension 10/28/2012    October 2012--treatment for hypertension begun.   • Bilateral lower extremity edema 3/23/2020   • Bilateral sensorineural hearing loss, wears hearing aids 6/14/2017    Left is much worse than right.  Patient had multiple ear infections as a child.   • Chronic renal insufficiency, stage II (mild), 05/18/2016--creatinine 1.35 4/28/2016 05/18/2016--creatinine 1.35.  Baseline creatinine approximately 1.3.   • Claustrophobia 4/28/2016    This patient has significant nocturnal hypoxemia and I think that she could benefit from nocturnal oxygen therapy. The exact etiology of her hypoxemia is not clear. She could possibly have obstructive sleep apnea but this is not documented. We cannot  test this patient for sleep apnea due to the fact that she is severely claustrophobic. Patient was a former smoker and it is possibly that COPD he is playing a role.   • Family history of coronary artery disease 2016    Patient's mother, father, 2 sisters and a brother all  from myocardial infarctions   • Gastroesophageal reflux disease without esophagitis 3/23/2020   • Gout 10/28/2012    2012--initial diagnosis and treatment of gout.   • History of acute pancreatitis 2020   • Hyperlipidemia 10/28/2012    2012--treatment for hyperlipidemia begun.   • Impaired fasting glucose 10/28/2012    2012--initial diagnosis impaired fasting glucose.   • Morbidly obese (CMS/[facility]) 3/11/2019   • Nocturnal hypoxemia 2014--patient did not receive nocturnal oxygen because of Medicare regulations.   05/15/2014--overnight oximetry revealed oxygen saturations less than 89% for 22 minutes and 40 seconds. Oxygen saturations less than or equal to 88% for 22 minutes and 40 seconds. Lowest oxygen saturation 83%. The longest continuous time with oxygen saturations less than or equal to 88% was 1 minute and 32 seconds.   2012--overnight oximetry revealed oxygen saturations less than 90% for one hour and 35 minutes. Oxygen saturations less than 89% 59 minutes. This patient has significant nocturnal hypoxemia and I think that she could benefit from nocturnal oxygen therapy. The exact etiology of her hypoxemia is not clear. She could possibly have obstructive sleep apnea but this is not documented. We cannot test this patient for sleep apnea due to the fact that she is severely claustrophobic. Patient was a former smoker and it is possibly that COPD he is playing a role.   • Pancreatitis    • Paroxysmal atrial fibrillation (CMS/[facility]) 4/10/2020   • Pneumonia    • Postoperative nausea 3/23/2020   • Primary hypothyroidism 2015--TSH remains elevated slightly at 4.92.   Given the overall clinical picture including the multinodular goiter, we will initiate levothyroxine 50 mcg/day and reassess in about 6 weeks.  August 1, 2018--thyroid ultrasound reveals a multinodular thyroid with multiple subcentimeter nodules.  Only minimal increase in size of the largest nodule in the left lobe has occurred when compared    • Secondary polycythemia 8/2/2012 11/06/2014--patient did not receive nocturnal oxygen because of Medicare regulations.   05/15/2014--overnight oximetry revealed oxygen saturations less than 89% for 22 minutes and 40 seconds. Oxygen saturations less than or equal to 88% for 22 minutes and 40 seconds. Lowest oxygen saturation 83%. The longest continuous time with oxygen saturations less than or equal to 88% was 1 minute and 32 seconds.   08/02/2012--overnight oximetry revealed oxygen saturations less than 90% for one hour and 35 minutes. Oxygen saturations less than 89% 59 minutes. This patient has significant nocturnal hypoxemia and I think that she could benefit from nocturnal oxygen therapy. The exact etiology of her hypoxemia is not clear. She could possibly have obstructive sleep apnea but this is not documented. We cannot test this patient for sleep apnea due to the fact that she is severely claustrophobic. Patient was a former smoker and it is possibly that COPD he is playing a role.   • Vitamin D deficiency 5/23/2016       Past Surgical History:   Procedure Laterality Date   • CHOLECYSTECTOMY WITH INTRAOPERATIVE CHOLANGIOGRAM N/A 3/12/2020    Procedure: LAPAROSCOPIC CHOLECYSTOSTOMY TUBE, INTRAOPERATIVE CHOLANGIOGRAM;  Surgeon: Bryce Andujar MD;  Location: [facility] OR;  Service: General;  Laterality: N/A;   • CHOLECYSTECTOMY WITH INTRAOPERATIVE CHOLANGIOGRAM N/A 5/22/2020    Procedure: CHOLECYSTECTOMY LAPAROSCOPIC INTRAOPERATIVE CHOLANGIOGRAM and EGD;  Surgeon: Bryce Andujar MD;  Location: [facility];  Service: General;  Laterality: N/A;   •  "OOPHORECTOMY      age 48   • RADICAL ABDOMINAL HYSTERECTOMY  48 years old    48 years of age. Uterine fibroid tumors with menorrhagia - no cancer       Family History   Problem Relation Age of Onset   • Heart disease Mother         Ischemic.  from coronary artery disease.   • Heart disease Father         Ischemic.  from coronary artery disease.   • Heart disease Sister         Ischemic.  from coronary artery disease.   • Heart disease Brother         Ischemic.  from coronary artery disease.   • Heart disease Sister         Ischemic.  from coronary artery disease.   • Breast cancer Daughter        Social History     Tobacco Use   • Smoking status: Former Smoker     Packs/day: 0.50     Start date: 1946     Last attempt to quit: 1954     Years since quittin.6   • Smokeless tobacco: Never Used   • Tobacco comment: Stopped drinking at age 30.   Substance Use Topics   • Alcohol use: No     Comment: caffiene use tea coffee   • Drug use: No         ECG 12 Lead  Date/Time: 2020 4:07 PM  Performed by: Lizbeth Laura MD  Authorized by: Lizebth Laura MD   Comparison: compared with previous ECG   Similar to previous ECG  Rhythm: sinus rhythm               Objective:     Visit Vitals  /80   Pulse 66   Ht 160 cm (63\")   Wt 84.8 kg (187 lb)   BMI 33.13 kg/m²         Physical Exam   Constitutional: She is oriented to person, place, and time. She appears well-developed and well-nourished.   HENT:   Head: Normocephalic and atraumatic.   Eyes: Pupils are equal, round, and reactive to light. Conjunctivae, EOM and lids are normal.   Neck: Normal range of motion and full passive range of motion without pain. Neck supple. No JVD present. Carotid bruit is not present.   Cardiovascular: Normal rate, regular rhythm, S1 normal and S2 normal. Exam reveals no gallop.   No murmur heard.  Pulses:       Radial pulses are 2+ on the right side, and 2+ on the left side.   Pulmonary/Chest: Effort normal " and breath sounds normal.   Abdominal: Soft. Normal appearance.   Musculoskeletal: She exhibits no edema.   Lymphadenopathy:     She has no cervical adenopathy.   Neurological: She is alert and oriented to person, place, and time.   Skin: Skin is warm, dry and intact.   Psychiatric: She has a normal mood and affect.           Assessment:          Diagnosis Plan   1. Supraventricular tachycardia (CMS/HCC)     2. Benign essential hypertension     3. Hyperlipidemia     4. Paroxysmal atrial fibrillation (CMS/HCC)            Plan:       1.  Paroxysmal atrial fibrillation.  No recurrent documented episodes.  Continue atenolol as needed.  Suspect her episodes before related to her acute illness.  2.  Paroxysmal supraventricular tachycardia.  Continue as needed atenolol.  We will need to consider routine atenolol if she develops frequent symptoms.  3.  Hypertension.  Fairly well managed with as needed atenolol.  I asked the patient's daughter to notify me if her blood pressures remain persistently elevated at which point we will need to consider daily medication.  4.  Hyperlipidemia  5.  Hypothyroidism  6.  Chronic kidney disease    We will plan on seeing the patient back again in 6 months.

## 2020-09-01 ENCOUNTER — LAB (OUTPATIENT)
Dept: LAB | Facility: HOSPITAL | Age: 85
End: 2020-09-01

## 2020-09-01 DIAGNOSIS — Z51.81 THERAPEUTIC DRUG MONITORING: ICD-10-CM

## 2020-09-01 DIAGNOSIS — R73.01 IMPAIRED FASTING GLUCOSE: Chronic | ICD-10-CM

## 2020-09-01 DIAGNOSIS — E55.9 VITAMIN D DEFICIENCY: Chronic | ICD-10-CM

## 2020-09-01 DIAGNOSIS — E03.9 PRIMARY HYPOTHYROIDISM: Chronic | ICD-10-CM

## 2020-09-01 DIAGNOSIS — E78.2 MIXED HYPERLIPIDEMIA: Chronic | ICD-10-CM

## 2020-09-01 LAB
25(OH)D3 SERPL-MCNC: 38.7 NG/ML (ref 30–100)
ALBUMIN SERPL-MCNC: 3.6 G/DL (ref 3.5–5.2)
ALBUMIN/GLOB SERPL: 1 G/DL
ALP SERPL-CCNC: 49 U/L (ref 39–117)
ALT SERPL W P-5'-P-CCNC: 16 U/L (ref 1–33)
ANION GAP SERPL CALCULATED.3IONS-SCNC: 8.4 MMOL/L (ref 5–15)
AST SERPL-CCNC: 19 U/L (ref 1–32)
BASOPHILS # BLD AUTO: 0.06 10*3/MM3 (ref 0–0.2)
BASOPHILS NFR BLD AUTO: 0.9 % (ref 0–1.5)
BILIRUB SERPL-MCNC: 0.4 MG/DL (ref 0–1.2)
BUN SERPL-MCNC: 24 MG/DL (ref 8–23)
BUN/CREAT SERPL: 15.3 (ref 7–25)
CALCIUM SPEC-SCNC: 9.8 MG/DL (ref 8.6–10.5)
CHLORIDE SERPL-SCNC: 106 MMOL/L (ref 98–107)
CK SERPL-CCNC: 60 U/L (ref 20–180)
CO2 SERPL-SCNC: 23.6 MMOL/L (ref 22–29)
CREAT SERPL-MCNC: 1.57 MG/DL (ref 0.57–1)
DEPRECATED RDW RBC AUTO: 44.4 FL (ref 37–54)
EOSINOPHIL # BLD AUTO: 0.34 10*3/MM3 (ref 0–0.4)
EOSINOPHIL NFR BLD AUTO: 5.2 % (ref 0.3–6.2)
ERYTHROCYTE [DISTWIDTH] IN BLOOD BY AUTOMATED COUNT: 12.8 % (ref 12.3–15.4)
GFR SERPL CREATININE-BSD FRML MDRD: 31 ML/MIN/1.73
GLOBULIN UR ELPH-MCNC: 3.5 GM/DL
GLUCOSE SERPL-MCNC: 98 MG/DL (ref 65–99)
HBA1C MFR BLD: 5.44 % (ref 4.8–5.6)
HCT VFR BLD AUTO: 46.5 % (ref 34–46.6)
HGB BLD-MCNC: 14.7 G/DL (ref 12–15.9)
IMM GRANULOCYTES # BLD AUTO: 0.01 10*3/MM3 (ref 0–0.05)
IMM GRANULOCYTES NFR BLD AUTO: 0.2 % (ref 0–0.5)
LYMPHOCYTES # BLD AUTO: 2.46 10*3/MM3 (ref 0.7–3.1)
LYMPHOCYTES NFR BLD AUTO: 37.6 % (ref 19.6–45.3)
MCH RBC QN AUTO: 29.3 PG (ref 26.6–33)
MCHC RBC AUTO-ENTMCNC: 31.6 G/DL (ref 31.5–35.7)
MCV RBC AUTO: 92.8 FL (ref 79–97)
MONOCYTES # BLD AUTO: 0.66 10*3/MM3 (ref 0.1–0.9)
MONOCYTES NFR BLD AUTO: 10.1 % (ref 5–12)
NEUTROPHILS NFR BLD AUTO: 3.02 10*3/MM3 (ref 1.7–7)
NEUTROPHILS NFR BLD AUTO: 46 % (ref 42.7–76)
NRBC BLD AUTO-RTO: 0 /100 WBC (ref 0–0.2)
PLATELET # BLD AUTO: 174 10*3/MM3 (ref 140–450)
PMV BLD AUTO: 10.6 FL (ref 6–12)
POTASSIUM SERPL-SCNC: 4.5 MMOL/L (ref 3.5–5.2)
PROT SERPL-MCNC: 7.1 G/DL (ref 6–8.5)
RBC # BLD AUTO: 5.01 10*6/MM3 (ref 3.77–5.28)
SODIUM SERPL-SCNC: 138 MMOL/L (ref 136–145)
T3FREE SERPL-MCNC: 2.77 PG/ML (ref 2–4.4)
T4 FREE SERPL-MCNC: 1.04 NG/DL (ref 0.93–1.7)
TSH SERPL DL<=0.05 MIU/L-ACNC: 2.76 UIU/ML (ref 0.27–4.2)
WBC # BLD AUTO: 6.55 10*3/MM3 (ref 3.4–10.8)

## 2020-09-01 PROCEDURE — 84439 ASSAY OF FREE THYROXINE: CPT | Performed by: INTERNAL MEDICINE

## 2020-09-01 PROCEDURE — 36415 COLL VENOUS BLD VENIPUNCTURE: CPT | Performed by: INTERNAL MEDICINE

## 2020-09-01 PROCEDURE — 84443 ASSAY THYROID STIM HORMONE: CPT | Performed by: INTERNAL MEDICINE

## 2020-09-01 PROCEDURE — 83704 LIPOPROTEIN BLD QUAN PART: CPT | Performed by: INTERNAL MEDICINE

## 2020-09-01 PROCEDURE — 83036 HEMOGLOBIN GLYCOSYLATED A1C: CPT | Performed by: INTERNAL MEDICINE

## 2020-09-01 PROCEDURE — 85025 COMPLETE CBC W/AUTO DIFF WBC: CPT | Performed by: INTERNAL MEDICINE

## 2020-09-01 PROCEDURE — 84481 FREE ASSAY (FT-3): CPT | Performed by: INTERNAL MEDICINE

## 2020-09-01 PROCEDURE — 82550 ASSAY OF CK (CPK): CPT | Performed by: INTERNAL MEDICINE

## 2020-09-01 PROCEDURE — 80061 LIPID PANEL: CPT | Performed by: INTERNAL MEDICINE

## 2020-09-01 PROCEDURE — 80053 COMPREHEN METABOLIC PANEL: CPT | Performed by: INTERNAL MEDICINE

## 2020-09-01 PROCEDURE — 82306 VITAMIN D 25 HYDROXY: CPT | Performed by: INTERNAL MEDICINE

## 2020-09-02 LAB
CHOLEST SERPL-MCNC: 137 MG/DL (ref 100–199)
HDL SERPL-SCNC: 24.4 UMOL/L
HDLC SERPL-MCNC: 47 MG/DL
LDL-P: 694 NMOL/L
LDLC REAL SIZE PAT SERPL: 20.1 NM
LDLC SERPL CALC-MCNC: 64 MG/DL (ref 0–99)
SMALL LDL-P: 504 NMOL/L
TRIGL SERPL-MCNC: 149 MG/DL (ref 0–149)

## 2020-09-15 ENCOUNTER — OFFICE VISIT (OUTPATIENT)
Dept: INTERNAL MEDICINE | Facility: CLINIC | Age: 85
End: 2020-09-15

## 2020-09-15 VITALS
HEIGHT: 63 IN | SYSTOLIC BLOOD PRESSURE: 130 MMHG | OXYGEN SATURATION: 98 % | BODY MASS INDEX: 32.78 KG/M2 | DIASTOLIC BLOOD PRESSURE: 80 MMHG | HEART RATE: 78 BPM | WEIGHT: 185 LBS

## 2020-09-15 DIAGNOSIS — E66.01 MORBIDLY OBESE (HCC): Chronic | ICD-10-CM

## 2020-09-15 DIAGNOSIS — I48.0 PAROXYSMAL ATRIAL FIBRILLATION (HCC): Chronic | ICD-10-CM

## 2020-09-15 DIAGNOSIS — Z51.81 THERAPEUTIC DRUG MONITORING: ICD-10-CM

## 2020-09-15 DIAGNOSIS — R73.01 IMPAIRED FASTING GLUCOSE: Chronic | ICD-10-CM

## 2020-09-15 DIAGNOSIS — N18.30 CHRONIC RENAL INSUFFICIENCY, STAGE III (MODERATE) (HCC): ICD-10-CM

## 2020-09-15 DIAGNOSIS — E78.2 MIXED HYPERLIPIDEMIA: Chronic | ICD-10-CM

## 2020-09-15 DIAGNOSIS — Z00.00 MEDICARE ANNUAL WELLNESS VISIT, SUBSEQUENT: Primary | ICD-10-CM

## 2020-09-15 DIAGNOSIS — R60.0 BILATERAL LOWER EXTREMITY EDEMA: Chronic | ICD-10-CM

## 2020-09-15 DIAGNOSIS — I10 BENIGN ESSENTIAL HYPERTENSION: Chronic | ICD-10-CM

## 2020-09-15 DIAGNOSIS — D75.1 SECONDARY POLYCYTHEMIA: Chronic | ICD-10-CM

## 2020-09-15 DIAGNOSIS — Z23 NEED FOR INFLUENZA VACCINATION: ICD-10-CM

## 2020-09-15 DIAGNOSIS — I47.1 SUPRAVENTRICULAR TACHYCARDIA (HCC): Chronic | ICD-10-CM

## 2020-09-15 DIAGNOSIS — E04.2 MULTINODULAR GOITER: Chronic | ICD-10-CM

## 2020-09-15 DIAGNOSIS — G47.34 NOCTURNAL HYPOXEMIA: Chronic | ICD-10-CM

## 2020-09-15 DIAGNOSIS — E55.9 VITAMIN D DEFICIENCY: Chronic | ICD-10-CM

## 2020-09-15 DIAGNOSIS — K21.9 GASTROESOPHAGEAL REFLUX DISEASE WITHOUT ESOPHAGITIS: Chronic | ICD-10-CM

## 2020-09-15 DIAGNOSIS — Z87.39 HISTORY OF GOUT: Chronic | ICD-10-CM

## 2020-09-15 DIAGNOSIS — E03.9 PRIMARY HYPOTHYROIDISM: Chronic | ICD-10-CM

## 2020-09-15 PROBLEM — M54.12 LEFT CERVICAL RADICULOPATHY: Status: RESOLVED | Noted: 2020-06-11 | Resolved: 2020-09-15

## 2020-09-15 PROCEDURE — G0439 PPPS, SUBSEQ VISIT: HCPCS | Performed by: INTERNAL MEDICINE

## 2020-09-15 PROCEDURE — G0008 ADMIN INFLUENZA VIRUS VAC: HCPCS | Performed by: INTERNAL MEDICINE

## 2020-09-15 PROCEDURE — 99214 OFFICE O/P EST MOD 30 MIN: CPT | Performed by: INTERNAL MEDICINE

## 2020-09-15 PROCEDURE — 90694 VACC AIIV4 NO PRSRV 0.5ML IM: CPT | Performed by: INTERNAL MEDICINE

## 2020-09-15 NOTE — PROGRESS NOTES
The ABCs of the Annual Wellness Visit  Subsequent Medicare Wellness Visit    No chief complaint on file.      Subjective   History of Present Illness:  Mandy Alarcon is a 87 y.o. female who presents for a Subsequent Medicare Wellness Visit.    HEALTH RISK ASSESSMENT    Recent Hospitalizations:  Recently treated at the following:  [facility]    Current Medical Providers:  Patient Care Team:  Daryl Santos MD as PCP - General (Internal Medicine)  Daryl Santos MD as PCP - Claims Attributed    Smoking Status:  Social History     Tobacco Use   Smoking Status Former Smoker   • Packs/day: 0.50   • Start date: 1946   • Quit date: 1954   • Years since quittin.7   Smokeless Tobacco Never Used   Tobacco Comment    Stopped drinking at age 30.       Alcohol Consumption:  Social History     Substance and Sexual Activity   Alcohol Use No    Comment: caffiene use tea coffee       Depression Screen:   PHQ-2/PHQ-9 Depression Screening 9/15/2020   Little interest or pleasure in doing things 0   Feeling down, depressed, or hopeless 0   Total Score 0       Fall Risk Screen:  STEADI Fall Risk Assessment was completed, and patient is at LOW risk for falls.Assessment completed on:9/15/2020    Health Habits and Functional and Cognitive Screening:  Functional & Cognitive Status 9/15/2020   Do you have difficulty preparing food and eating? No   Do you have difficulty bathing yourself, getting dressed or grooming yourself? No   Do you have difficulty using the toilet? No   Do you have difficulty moving around from place to place? No   Do you have trouble with steps or getting out of a bed or a chair? No   Current Diet Well Balanced Diet   Dental Exam Up to date   Eye Exam Up to date   Exercise (times per week) 3 times per week   Current Exercise Activities Include Walking   Do you need help using the phone?  No   Are you deaf or do you have serious difficulty hearing?  No   Do you need help with  transportation? No   Do you need help shopping? No   Do you need help preparing meals?  No   Do you need help with housework?  No   Do you need help with laundry? No   Do you need help taking your medications? No   Do you need help managing money? No   Do you ever drive or ride in a car without wearing a seat belt? No   Have you felt unusual stress, anger or loneliness in the last month? No   Who do you live with? Child   If you need help, do you have trouble finding someone available to you? No   Have you been bothered in the last four weeks by sexual problems? No   Do you have difficulty concentrating, remembering or making decisions? No         Does the patient have evidence of cognitive impairment? No    Asprin use counseling:Does not need ASA (and currently is not on it)    Age-appropriate Screening Schedule:  Refer to the list below for future screening recommendations based on patient's age, sex and/or medical conditions. Orders for these recommended tests are listed in the plan section. The patient has been provided with a written plan.    Health Maintenance   Topic Date Due   • INFLUENZA VACCINE  08/01/2020   • MAMMOGRAM  12/26/2020   • LIPID PANEL  09/01/2021   • TDAP/TD VACCINES (2 - Td) 06/14/2027   • ZOSTER VACCINE  Discontinued          The following portions of the patient's history were reviewed and updated as appropriate: allergies, current medications, past family history, past medical history, past social history, past surgical history and problem list.    Outpatient Medications Prior to Visit   Medication Sig Dispense Refill   • acetaminophen (TYLENOL) 500 MG tablet Take 500 mg by mouth Every 6 (Six) Hours As Needed for Mild Pain .     • allopurinol (ZYLOPRIM) 300 MG tablet Take 300 mg by mouth Daily.     • Biotin 1000 MCG chewable tablet Chew 1 tablet Daily.     • Cholecalciferol (VITAMIN D) 2000 UNITS tablet Take 2 tablets by mouth daily.     • clobetasol (TEMOVATE) 0.05 % ointment Apply   topically to the appropriate area as directed 2 (Two) Times a Day. 15 g 0   • levothyroxine (SYNTHROID, LEVOTHROID) 50 MCG tablet Take 50 mcg by mouth Daily.     • miconazole (MICOTIN) 2 % powder Apply  topically to the appropriate area as directed 2 (Two) Times a Day.     • Multiple Vitamins-Minerals (MULTIVITAMIN ADULTS PO) Take 1 tablet by mouth Daily.     • polyethylene glycol (MIRALAX) packet Take orally as directed for constipation     • pravastatin (PRAVACHOL) 40 MG tablet Take 40 mg by mouth Daily.     • atenolol (TENORMIN) 50 MG tablet Take 1 tablet by mouth 2 (Two) Times a Day As Needed (Holding for SBP less than 120  or HR less than 60). 180 tablet 1   • omeprazole (priLOSEC) 40 MG capsule TAKE 1 CAPSULE BY MOUTH DAILY BEFORE THE FIRST MEAL FOR ACID REFLUX 30 capsule 3     No facility-administered medications prior to visit.        Patient Active Problem List   Diagnosis   • Benign essential hypertension   • Claustrophobia   • Gout   • Hyperlipidemia   • Primary hypothyroidism   • Impaired fasting glucose   • Nocturnal hypoxemia   • Secondary polycythemia   • Vitamin D deficiency   • Therapeutic drug monitoring   • Family history of coronary artery disease   • Bilateral sensorineural hearing loss, wears hearing aids   • Multinodular goiter   • Supraventricular tachycardia (CMS/HCC)   • Morbidly obese (CMS/HCC)   • Bilateral lower extremity edema   • Gastroesophageal reflux disease without esophagitis   • Paroxysmal atrial fibrillation (CMS/HCC)       Advanced Care Planning:  ACP discussion was held with the patient during this visit. Patient has an advance directive (not in EMR), copy requested.    Review of Systems   Constitutional: Negative.    HENT: Negative.    Eyes: Negative.    Respiratory: Negative.    Cardiovascular: Negative.  Negative for leg swelling.   Gastrointestinal: Negative.    Endocrine: Negative.    Genitourinary: Negative.    Musculoskeletal: Negative.    Skin: Negative.   "  Allergic/Immunologic: Negative.    Neurological: Negative.    Psychiatric/Behavioral: Negative.        Compared to one year ago, the patient feels her physical health is better.  Compared to one year ago, the patient feels her mental health is the same.    Reviewed chart for potential of high risk medication in the elderly: yes  Reviewed chart for potential of harmful drug interactions in the elderly:yes    Objective         Vitals:    09/15/20 1336   BP: 130/80   BP Location: Left arm   Pulse: 78   SpO2: 98%   Weight: 83.9 kg (185 lb)   Height: 160 cm (62.99\")       Body mass index is 32.78 kg/m².  Discussed the patient's BMI with her. The BMI is above average; BMI management plan is completed.    Physical Exam     General: Alert and oriented x 3.  No acute distress.  Normal affect.  Obese.  HEENT: Pupils equal, round, reactive to light; extraocular movements intact; sclerae nonicteric; pharynx, ear canals and TMs normal.  Neck: Without JVD, thyromegaly, bruit, or adenopathy.  Lungs: Clear to auscultation in all fields.  Heart: Regular rate and rhythm without murmur, rub, gallop, or click.  Abdomen: Soft, nontender, without hepatosplenomegaly or hernia.  Bowel sounds normal.  : Deferred.  Rectal: Deferred.  Extremities: Without clubbing, cyanosis, edema, or pulse deficit.  No significant edema on today's exam.  Neurologic: Intact without focal deficit.  Normal station and gait observed during ingress and egress from the examination room.  Skin: Without significant lesion.  Musculoskeletal: Unremarkable.    Lab Results   Component Value Date    CHLPL 137 09/01/2020    TRIG 149 09/01/2020    HGBA1C 5.44 09/01/2020        Assessment/Plan   Medicare Risks and Personalized Health Plan  CMS Preventative Services Quick Reference  Advance Directive Discussion  Cardiovascular risk  Diabetic Lab Screening   Immunizations Discussed/Encouraged (specific immunizations; Influenza )  Obesity/Overweight     The above " risks/problems have been discussed with the patient.  Pertinent information has been shared with the patient in the After Visit Summary.  Follow up plans and orders are seen below in the Assessment/Plan Section.    Diagnoses and all orders for this visit:    1. Medicare annual wellness visit, subsequent (Primary)    2. Impaired fasting glucose  -     Comprehensive Metabolic Panel; Future  -     Hemoglobin A1c; Future    3. Hyperlipidemia  -     CK; Future  -     Comprehensive Metabolic Panel; Future  -     NMR LipoProfile; Future    4. Primary hypothyroidism  -     TSH; Future  -     T4, Free; Future  -     T3, Free; Future    5. Gout  -     Uric Acid; Future    6. Benign essential hypertension    7. Bilateral lower extremity edema    8. Gastroesophageal reflux disease without esophagitis    9. Nocturnal hypoxemia    10. Secondary polycythemia  -     CBC (No Diff); Future    11. Vitamin D deficiency  -     Vitamin D 25 Hydroxy; Future    12. Multinodular goiter    13. Paroxysmal atrial fibrillation (CMS/HCC)    14. Supraventricular tachycardia (CMS/HCC)    15. Morbidly obese (CMS/HCC)    16. Therapeutic drug monitoring    17. Need for influenza vaccination  -     Fluad Quad 65+ yrs ([id number])      Follow Up:  Return in about 6 months (around 3/15/2021) for Next scheduled follow up with lab prior.     An After Visit Summary and PPPS were given to the patient.

## 2020-09-15 NOTE — PROGRESS NOTES
09/15/2020    Patient Information  Mandy Alarcon                                                                                          [address] [address]      5/30/1933  [unfilled]  There is no work phone number on file.    Chief Complaint:     Subsequent Medicare wellness visit.  Follow-up lab work in order to monitor chronic medical issues listed in history of present illness.  No new acute complaints.    History of Present Illness:    Patient with a history of impaired fasting glucose, hyperlipidemia, hypothyroidism, gout, hypertension, lower extremity edema, esophageal reflux, nocturnal hypoxemia and secondary polycythemia, vitamin D deficiency, multinodular goiter, paroxysmal atrial fibrillation and supraventricular tachycardia, morbid obesity.  She presents today for her subsequent Medicare wellness visit.  She also had lab work in order to monitor her chronic medical issues.  Her past medical history reviewed and updated were necessary including health maintenance parameters.  This reveals she is currently up-to-date or else accounted for after today's visit.    Review of Systems   Constitution: Negative.   HENT: Negative.    Eyes: Negative.    Cardiovascular: Negative.    Respiratory: Negative.    Endocrine: Negative.    Hematologic/Lymphatic: Negative.    Skin: Negative.    Musculoskeletal: Negative.    Gastrointestinal: Negative.    Genitourinary: Negative.    Neurological: Negative.    Psychiatric/Behavioral: Negative.    Allergic/Immunologic: Negative.        Active Problems:    Patient Active Problem List   Diagnosis   • Benign essential hypertension   • Claustrophobia   • Gout   • Hyperlipidemia   • Primary hypothyroidism   • Impaired fasting glucose   • Nocturnal hypoxemia   • Secondary polycythemia   • Vitamin D deficiency   • Therapeutic drug monitoring   • Family history of coronary artery disease   • Bilateral sensorineural hearing loss, wears hearing aids   •  Multinodular goiter   • Supraventricular tachycardia (CMS/[facility])   • Morbidly obese (CMS/[facility])   • Bilateral lower extremity edema   • Gastroesophageal reflux disease without esophagitis   • Paroxysmal atrial fibrillation (CMS/[facility])   • Chronic renal insufficiency, stage III (moderate) (CMS/[facility])         Past Medical History:   Diagnosis Date   • Benign essential hypertension 10/28/2012    2012--treatment for hypertension begun.   • Bilateral lower extremity edema 3/23/2020   • Bilateral sensorineural hearing loss, wears hearing aids 2017    Left is much worse than right.  Patient had multiple ear infections as a child.   • Chronic renal insufficiency, stage II (mild), 2016--creatinine 1.35 2016--creatinine 1.35.  Baseline creatinine approximately 1.3.   • Chronic renal insufficiency, stage III (moderate) (CMS/[facility]) 9/15/2020   • Claustrophobia 2016    This patient has significant nocturnal hypoxemia and I think that she could benefit from nocturnal oxygen therapy. The exact etiology of her hypoxemia is not clear. She could possibly have obstructive sleep apnea but this is not documented. We cannot test this patient for sleep apnea due to the fact that she is severely claustrophobic. Patient was a former smoker and it is possibly that COPD he is playing a role.   • Family history of coronary artery disease 2016    Patient's mother, father, 2 sisters and a brother all  from myocardial infarctions   • Gastroesophageal reflux disease without esophagitis 3/23/2020   • Gout 10/28/2012    2012--initial diagnosis and treatment of gout.   • History of acute pancreatitis 2020   • Hyperlipidemia 10/28/2012    2012--treatment for hyperlipidemia begun.   • Impaired fasting glucose 10/28/2012    2012--initial diagnosis impaired fasting glucose.   • Morbidly obese (CMS/[facility]) 3/11/2019   • Nocturnal hypoxemia 2014--patient did not receive  nocturnal oxygen because of Medicare regulations.   05/15/2014--overnight oximetry revealed oxygen saturations less than 89% for 22 minutes and 40 seconds. Oxygen saturations less than or equal to 88% for 22 minutes and 40 seconds. Lowest oxygen saturation 83%. The longest continuous time with oxygen saturations less than or equal to 88% was 1 minute and 32 seconds.   08/02/2012--overnight oximetry revealed oxygen saturations less than 90% for one hour and 35 minutes. Oxygen saturations less than 89% 59 minutes. This patient has significant nocturnal hypoxemia and I think that she could benefit from nocturnal oxygen therapy. The exact etiology of her hypoxemia is not clear. She could possibly have obstructive sleep apnea but this is not documented. We cannot test this patient for sleep apnea due to the fact that she is severely claustrophobic. Patient was a former smoker and it is possibly that COPD he is playing a role.   • Pancreatitis    • Paroxysmal atrial fibrillation (CMS/HCC) 4/10/2020   • Pneumonia    • Postoperative nausea 3/23/2020   • Primary hypothyroidism 11/17/2015 March 11, 2019--TSH remains elevated slightly at 4.92.  Given the overall clinical picture including the multinodular goiter, we will initiate levothyroxine 50 mcg/day and reassess in about 6 weeks.  August 1, 2018--thyroid ultrasound reveals a multinodular thyroid with multiple subcentimeter nodules.  Only minimal increase in size of the largest nodule in the left lobe has occurred when compared    • Secondary polycythemia 8/2/2012 11/06/2014--patient did not receive nocturnal oxygen because of Medicare regulations.   05/15/2014--overnight oximetry revealed oxygen saturations less than 89% for 22 minutes and 40 seconds. Oxygen saturations less than or equal to 88% for 22 minutes and 40 seconds. Lowest oxygen saturation 83%. The longest continuous time with oxygen saturations less than or equal to 88% was 1 minute and 32 seconds.    08/02/2012--overnight oximetry revealed oxygen saturations less than 90% for one hour and 35 minutes. Oxygen saturations less than 89% 59 minutes. This patient has significant nocturnal hypoxemia and I think that she could benefit from nocturnal oxygen therapy. The exact etiology of her hypoxemia is not clear. She could possibly have obstructive sleep apnea but this is not documented. We cannot test this patient for sleep apnea due to the fact that she is severely claustrophobic. Patient was a former smoker and it is possibly that COPD he is playing a role.   • Vitamin D deficiency 5/23/2016         Past Surgical History:   Procedure Laterality Date   • CHOLECYSTECTOMY WITH INTRAOPERATIVE CHOLANGIOGRAM N/A 3/12/2020    Procedure: LAPAROSCOPIC CHOLECYSTOSTOMY TUBE, INTRAOPERATIVE CHOLANGIOGRAM;  Surgeon: Bryce nAdujar MD;  Location: [facility];  Service: General;  Laterality: N/A;   • CHOLECYSTECTOMY WITH INTRAOPERATIVE CHOLANGIOGRAM N/A 5/22/2020    Procedure: CHOLECYSTECTOMY LAPAROSCOPIC INTRAOPERATIVE CHOLANGIOGRAM and EGD;  Surgeon: Bryce Andujar MD;  Location: [facility];  Service: General;  Laterality: N/A;   • OOPHORECTOMY      age 48   • RADICAL ABDOMINAL HYSTERECTOMY  48 years old    48 years of age. Uterine fibroid tumors with menorrhagia - no cancer         Allergies   Allergen Reactions   • Cefaclor Anaphylaxis and Angioedema     caused angioedema 25 years ago; she tolerates cephalexin, amoxicillin, and ceftriaxone per my d/w patient and her daughter as well as amoxicillin-clavulanate confirmed in EPIC   • Sulfa Antibiotics Rash           Current Outpatient Medications:   •  acetaminophen (TYLENOL) 500 MG tablet, Take 500 mg by mouth Every 6 (Six) Hours As Needed for Mild Pain ., Disp: , Rfl:   •  allopurinol (ZYLOPRIM) 300 MG tablet, Take 300 mg by mouth Daily., Disp: , Rfl:   •  Biotin 1000 MCG chewable tablet, Chew 1 tablet Daily., Disp: , Rfl:   •  Cholecalciferol (VITAMIN D)  2000 UNITS tablet, Take 2 tablets by mouth daily., Disp: , Rfl:   •  clobetasol (TEMOVATE) 0.05 % ointment, Apply  topically to the appropriate area as directed 2 (Two) Times a Day., Disp: 15 g, Rfl: 0  •  levothyroxine (SYNTHROID, LEVOTHROID) 50 MCG tablet, Take 50 mcg by mouth Daily., Disp: , Rfl:   •  miconazole (MICOTIN) 2 % powder, Apply  topically to the appropriate area as directed 2 (Two) Times a Day., Disp: , Rfl:   •  Multiple Vitamins-Minerals (MULTIVITAMIN ADULTS PO), Take 1 tablet by mouth Daily., Disp: , Rfl:   •  polyethylene glycol (MIRALAX) packet, Take orally as directed for constipation, Disp: , Rfl:   •  pravastatin (PRAVACHOL) 40 MG tablet, Take 40 mg by mouth Daily., Disp: , Rfl:       Family History   Problem Relation Age of Onset   • Heart disease Mother         Ischemic.  from coronary artery disease.   • Heart disease Father         Ischemic.  from coronary artery disease.   • Heart disease Sister         Ischemic.  from coronary artery disease.   • Heart disease Brother         Ischemic.  from coronary artery disease.   • Heart disease Sister         Ischemic.  from coronary artery disease.   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   Substance and Sexual Activity   • Alcohol use: No  "    Comment: caffiene use tea coffee   • Drug use: No   • Sexual activity: Not Currently     Partners: Male   Lifestyle   • Physical activity     Days per week: 0 days     Minutes per session: 0 min   • Stress: Not at all   Relationships   • Social connections     Talks on phone: More than three times a week     Gets together: Three times a week     Attends [employer] service: More than 4 times per year     Active member of club or organization: No     Attends meetings of clubs or organizations: Never     Relationship status:          Vitals:    09/15/20 1336   BP: 130/80   BP Location: Left arm   Pulse: 78   SpO2: 98%   Weight: 83.9 kg (185 lb)   Height: 160 cm (62.99\")        Body mass index is 32.78 kg/m².      Physical Exam:    General: Alert and oriented x 3.  No acute distress.  Normal affect.  Obese.  HEENT: Pupils equal, round, reactive to light; extraocular movements intact; sclerae nonicteric; pharynx, ear canals and TMs normal.  Neck: Without JVD, thyromegaly, bruit, or adenopathy.  Lungs: Clear to auscultation in all fields.  Heart: Regular rate and rhythm without murmur, rub, gallop, or click.  Abdomen: Soft, nontender, without hepatosplenomegaly or hernia.  Bowel sounds normal.  : Deferred.  Rectal: Deferred.  Extremities: Without clubbing, cyanosis, edema, or pulse deficit.  No significant edema on today's exam.  Neurologic: Intact without focal deficit.  Normal station and gait observed during ingress and egress from the examination room.  Skin: Without significant lesion.  Musculoskeletal: Unremarkable.    Lab/other results:    NMR reveals a total cholesterol 137.  Triglycerides 149.  LDL particle number excellent at 694.  Small LDL particle number excellent at 504.  HDL particle number low at 24.4.  CMP normal except BUN 24, creatinine 1.57.  Hemoglobin A1c normal at 5.44.  CPK normal.  Vitamin D normal.  Thyroid function test normal.  CBC normal.    Assessment/Plan:     Diagnosis Plan "   1. Medicare annual wellness visit, subsequent     2. Impaired fasting glucose  Comprehensive Metabolic Panel    Hemoglobin A1c   3. Hyperlipidemia  CK    Comprehensive Metabolic Panel    NMR LipoProfile   4. Primary hypothyroidism  TSH    T4, Free    T3, Free   5. Gout  Uric Acid   6. Benign essential hypertension     7. Bilateral lower extremity edema     8. Gastroesophageal reflux disease without esophagitis     9. Nocturnal hypoxemia     10. Secondary polycythemia  CBC (No Diff)   11. Vitamin D deficiency  Vitamin D 25 Hydroxy   12. Multinodular goiter     13. Paroxysmal atrial fibrillation (CMS/HCC)     14. Supraventricular tachycardia (CMS/HCC)     15. Morbidly obese (CMS/HCC)     16. Therapeutic drug monitoring     17. Need for influenza vaccination  Fluad Quad 65+ yrs ([id number])   18. Chronic renal insufficiency, stage III (moderate) (CMS/HCC)       The subsequent Medicare wellness visit is documented on a separate note.    Patient has very mild impaired fasting glucose which may have resolved but we will continue to monitor.  Hyperlipidemia is under good control on the current regimen.  Her thyroid is therapeutic.  Gout is stable on allopurinol.  Her blood pressure seems to be adequately controlled.  Her lower extremity edema also is controlled.  Esophageal reflux is currently not an issue and patient is no longer taking omeprazole.  She has nocturnal hypoxemia and history of secondary polycythemia which has appeared to resolve.  We will continue to monitor.  Vitamin D in the normal range.  Multinodular goiter is stable.  She has paroxysmal atrial fibrillation which was related to her acute illness and hospitalization fairly recently.  She was seen in follow-up by the cardiologist who felt that no further treatment or work-up was needed.  Patient continues to have problems with morbid obesity and I have encouraged her to work on her weight.    Plan is as follows: No change in current medical regimen.  I  will have patient follow-up in 6 months with lab prior or follow-up as needed.  High-dose influenza vaccine given.    Procedures

## 2020-09-28 RX ORDER — ATENOLOL 50 MG/1
50 TABLET ORAL DAILY PRN
Qty: 90 TABLET | Refills: 0 | Status: SHIPPED | OUTPATIENT
Start: 2020-09-28 | End: 2021-03-15 | Stop reason: SDUPTHER

## 2020-11-30 RX ORDER — LEVOTHYROXINE SODIUM 0.05 MG/1
TABLET ORAL
Qty: 90 TABLET | Refills: 1 | Status: SHIPPED | OUTPATIENT
Start: 2020-11-30 | End: 2021-06-01

## 2020-11-30 RX ORDER — PRAVASTATIN SODIUM 40 MG
TABLET ORAL
Qty: 90 TABLET | Refills: 1 | Status: SHIPPED | OUTPATIENT
Start: 2020-11-30 | End: 2021-06-01

## 2021-01-20 DIAGNOSIS — K21.9 GASTROESOPHAGEAL REFLUX DISEASE WITHOUT ESOPHAGITIS: ICD-10-CM

## 2021-01-20 RX ORDER — OMEPRAZOLE 40 MG/1
CAPSULE, DELAYED RELEASE ORAL
Qty: 30 CAPSULE | Refills: 3 | Status: SHIPPED | OUTPATIENT
Start: 2021-01-20 | End: 2021-11-10

## 2021-01-28 ENCOUNTER — TELEPHONE (OUTPATIENT)
Dept: INTERNAL MEDICINE | Facility: CLINIC | Age: 86
End: 2021-01-28

## 2021-01-28 NOTE — TELEPHONE ENCOUNTER
PATIENT IS NEEDING A DOCTOR'S NOTE TO EXCUSE HER FROM JURY DUTY DUE TO PATIENT'S AGE AND HEARING LOSS. DEAD LINE TO MAIL IN LETTER IS TOMORROW.    PLEASE ADVISE.    CALL BACK: [phone]

## 2021-01-29 NOTE — TELEPHONE ENCOUNTER
To whom it concerns:    Mandy Alarcon, date of birth May 30, 1933 has been summoned for jury duty.  I am her primary care physician and I am requesting that she dismiss patient from jury duty due to multiple chronic medical issues.  Patient is not physically able to participate in jury duty.    Sincerely,    Daryl Santos MD, Internal Medicine    [phone]

## 2021-02-09 ENCOUNTER — OFFICE VISIT (OUTPATIENT)
Dept: CARDIOLOGY | Facility: CLINIC | Age: 86
End: 2021-02-09

## 2021-02-09 VITALS
DIASTOLIC BLOOD PRESSURE: 82 MMHG | WEIGHT: 193 LBS | HEART RATE: 62 BPM | BODY MASS INDEX: 34.2 KG/M2 | HEIGHT: 63 IN | SYSTOLIC BLOOD PRESSURE: 146 MMHG

## 2021-02-09 DIAGNOSIS — I48.0 PAROXYSMAL ATRIAL FIBRILLATION (HCC): Primary | Chronic | ICD-10-CM

## 2021-02-09 DIAGNOSIS — E78.2 MIXED HYPERLIPIDEMIA: Chronic | ICD-10-CM

## 2021-02-09 DIAGNOSIS — N18.30 CHRONIC RENAL IMPAIRMENT, STAGE 3 (MODERATE), UNSPECIFIED WHETHER STAGE 3A OR 3B CKD (HCC): Chronic | ICD-10-CM

## 2021-02-09 DIAGNOSIS — E03.9 PRIMARY HYPOTHYROIDISM: Chronic | ICD-10-CM

## 2021-02-09 DIAGNOSIS — I10 BENIGN ESSENTIAL HYPERTENSION: Chronic | ICD-10-CM

## 2021-02-09 DIAGNOSIS — Z82.49 FAMILY HISTORY OF CORONARY ARTERY DISEASE: Chronic | ICD-10-CM

## 2021-02-09 DIAGNOSIS — I47.1 SUPRAVENTRICULAR TACHYCARDIA (HCC): Chronic | ICD-10-CM

## 2021-02-09 PROCEDURE — 93000 ELECTROCARDIOGRAM COMPLETE: CPT | Performed by: INTERNAL MEDICINE

## 2021-02-09 PROCEDURE — 99214 OFFICE O/P EST MOD 30 MIN: CPT | Performed by: INTERNAL MEDICINE

## 2021-02-09 NOTE — PROGRESS NOTES
"    Subjective:     Encounter Date:02/09/2021      Patient ID: Mandy Alarcon is a 87 y.o. female.    Chief Complaint:  History of Present Illness    This is an 87 year old female patient with a history of hypertension, CKD stage 2, gout, hypothyroidism, tobacco use, hyperlipidemia, recent episode of near syncope, paroxsymal supraventricular tachycardia, who presents for follow up.         She presents today for routine 6-month follow-up.  She overall continues to do well.  She continues to have intermittent episodes of palpitations but only about 3 or 4 since I saw her last.  Again she takes 1/2 tablet of atenolol when they occur with improvement in her symptoms.  She otherwise denies any chest pain, shortness of breath, dizziness or lightheadedness, near-syncope or syncope, or lower extremity edema.     Prior History:  I saw the patient initially when she was admitted to the hospital following a near syncopal episode on 6/20/18.  Prior to admission the patient reports she was at home doing some work around her house.  She went outside for a couple of minutes and went back inside.  While she was walking around she had a sudden onset of sensation where she felt like her \"head went empty\".  She denied any palpitations, chest pain or dyspnea at that time.  She denied loss of consciousness.  While in the emergency room it was noted that her heart rate went up to the 140's-160's but she denied having any symptoms at that time. Per the ER notes she appeared to be in SVT and she was converted after given adenosine 6 mg and metoprolol tartrate 5 mg IV.  Unable to locate any of the EKG strips from the ER to confirm rhythm.  Her work up was unremarkable except for an minimally elevated d-dimer.  I had a low suspicion for thromboembolism at that time and felt that the abnormality was likely due to her age and renal insufficiency.  She underwent an echocardiogram during that admission that showed normal left ventricular " systolic function, with an EF of 60%, normal diastolic function, and mild aortic regurgitation.  She was started on metoprolol tartrate 25 mg bid, her hydrochlorothiazide was discontinued and she was sent home with a Zio monitor.      She presented to the cardiac evaluation center on 7/10/2018 from Dr. Santos' office after complaining of nocturnal chest pressure.  These symptoms began after she was discharged from the hospital, initially on a nightly basis.  She reported that the symptoms would last about 3 hours and are associated with significant shortness of breath.  It also appeared that she was having more shortness of breath and lower extremity edema than normal.  Her lab work at that time was unremarkable except for a persistently elevated d-dimer.  Proceeded with a VQ scan that same day that was low probability for a pulmonary embolism.  The following day she returned to the office for a stress test that showed no evidence of ischemia and an EF of 59%.  Her Zio monitor showed 53 nonsustained episodes of supraventricular tachycardia the longest lasting 54 beats and the fastest with a rate of 203 bpm.  She also had rare episodes of PVCs and nonsustained ventricular tachycardia longest lasting 9 beats with a rate of 166 bpm.  There was also a single nonsignificant pause of 2 seconds.     She was admitted to the hospital in 2/2020 with acute pancreatitis and pneumonia.  She developed atrial fibrillation with rapid ventricular rate during that admission that we are able to rate control and she eventually converted back to sinus rhythm.  I felt that her atrial fibrillation was related to her acute illness and did not recommend chronic anticoagulation at that time.  It appears that following that admission she had 2 additional admissions later in 2/2020 for recurrent pain.  A cholecystectomy was recommended but this was extremely difficult procedure and she underwent bailout placement of a cholecystostomy tube  instead.  She was discharged in March and then returned in May for a laparoscopic cholecystectomy which again was difficult but successful.  She has since followed up with her surgeon Dr. Andujar wound is noted to be doing well.  It does not appear that she had any recurrent issues with atrial fibrillation during those 2 following admissions.    In follow-up in 8/2020 she was doing much better.  She reported palpitation episodes every couple of weeks.  When he checked her heart rate with his episodes her heart rates range in the 80s.  When she had this episode she will take 1/2 tablet of atenolol with improvement in her symptoms.  I opted not to make any changes to her management at that time.    Review of Systems   Constitution: Negative for malaise/fatigue.   HENT: Negative for hearing loss, hoarse voice, nosebleeds and sore throat.    Eyes: Negative for pain.   Cardiovascular: Positive for palpitations. Negative for chest pain, claudication, cyanosis, dyspnea on exertion, irregular heartbeat, leg swelling, near-syncope, orthopnea, paroxysmal nocturnal dyspnea and syncope.   Respiratory: Negative for shortness of breath and snoring.    Endocrine: Negative for cold intolerance, heat intolerance, polydipsia, polyphagia and polyuria.   Skin: Negative for itching and rash.   Musculoskeletal: Negative for arthritis, falls, joint pain, joint swelling, muscle cramps, muscle weakness and myalgias.   Gastrointestinal: Negative for constipation, diarrhea, dysphagia, heartburn, hematemesis, hematochezia, melena, nausea and vomiting.   Genitourinary: Negative for frequency, hematuria and hesitancy.   Neurological: Negative for excessive daytime sleepiness, dizziness, headaches, light-headedness, numbness and weakness.   Psychiatric/Behavioral: Negative for depression. The patient is not nervous/anxious.          Current Outpatient Medications:   •  acetaminophen (TYLENOL) 500 MG tablet, Take 500 mg by mouth Every 6 (Six)  Hours As Needed for Mild Pain ., Disp: , Rfl:   •  allopurinol (ZYLOPRIM) 300 MG tablet, Take 300 mg by mouth Daily., Disp: , Rfl:   •  atenolol (TENORMIN) 50 MG tablet, Take 1 tablet by mouth Daily As Needed (PALPITATIONS). (Patient taking differently: Take 25 mg by mouth Daily As Needed (PALPITATIONS).), Disp: 90 tablet, Rfl: 0  •  Biotin 1000 MCG chewable tablet, Chew 1 tablet Daily., Disp: , Rfl:   •  Cholecalciferol (VITAMIN D) 2000 UNITS tablet, Take 2 tablets by mouth daily., Disp: , Rfl:   •  clobetasol (TEMOVATE) 0.05 % ointment, Apply  topically to the appropriate area as directed 2 (Two) Times a Day., Disp: 15 g, Rfl: 0  •  levothyroxine (SYNTHROID, LEVOTHROID) 50 MCG tablet, TAKE 1 TABLET BY MOUTH EVERY MORNING FOR THYROID, Disp: 90 tablet, Rfl: 1  •  miconazole (MICOTIN) 2 % powder, Apply  topically to the appropriate area as directed 2 (Two) Times a Day., Disp: , Rfl:   •  Multiple Vitamins-Minerals (MULTIVITAMIN ADULTS PO), Take 1 tablet by mouth Daily., Disp: , Rfl:   •  omeprazole (priLOSEC) 40 MG capsule, TAKE ONE CAPSULE BY MOUTH EVERY DAY BEFORE FIRST MEAL FOR ACID REFLUX, Disp: 30 capsule, Rfl: 3  •  polyethylene glycol (MIRALAX) packet, Take orally as directed for constipation, Disp: , Rfl:   •  pravastatin (PRAVACHOL) 40 MG tablet, TAKE 1 TABLET BY MOUTH DAILY FOR HIGH CHOLESTEROL, Disp: 90 tablet, Rfl: 1    Past Medical History:   Diagnosis Date   • Benign essential hypertension 10/28/2012    October 2012--treatment for hypertension begun.   • Bilateral lower extremity edema 3/23/2020   • Bilateral sensorineural hearing loss, wears hearing aids 6/14/2017    Left is much worse than right.  Patient had multiple ear infections as a child.   • Chronic renal insufficiency, stage II (mild), 05/18/2016--creatinine 1.35 4/28/2016 05/18/2016--creatinine 1.35.  Baseline creatinine approximately 1.3.   • Chronic renal insufficiency, stage III (moderate) (CMS/HCC) 9/15/2020   • Claustrophobia  2016    This patient has significant nocturnal hypoxemia and I think that she could benefit from nocturnal oxygen therapy. The exact etiology of her hypoxemia is not clear. She could possibly have obstructive sleep apnea but this is not documented. We cannot test this patient for sleep apnea due to the fact that she is severely claustrophobic. Patient was a former smoker and it is possibly that COPD he is playing a role.   • Family history of coronary artery disease 2016    Patient's mother, father, 2 sisters and a brother all  from myocardial infarctions   • Gastroesophageal reflux disease without esophagitis 3/23/2020   • Gout 10/28/2012    2012--initial diagnosis and treatment of gout.   • History of acute pancreatitis 2020   • Hyperlipidemia 10/28/2012    2012--treatment for hyperlipidemia begun.   • Impaired fasting glucose 10/28/2012    2012--initial diagnosis impaired fasting glucose.   • Morbidly obese (CMS/HCC) 3/11/2019   • Nocturnal hypoxemia 2014--patient did not receive nocturnal oxygen because of Medicare regulations.   05/15/2014--overnight oximetry revealed oxygen saturations less than 89% for 22 minutes and 40 seconds. Oxygen saturations less than or equal to 88% for 22 minutes and 40 seconds. Lowest oxygen saturation 83%. The longest continuous time with oxygen saturations less than or equal to 88% was 1 minute and 32 seconds.   2012--overnight oximetry revealed oxygen saturations less than 90% for one hour and 35 minutes. Oxygen saturations less than 89% 59 minutes. This patient has significant nocturnal hypoxemia and I think that she could benefit from nocturnal oxygen therapy. The exact etiology of her hypoxemia is not clear. She could possibly have obstructive sleep apnea but this is not documented. We cannot test this patient for sleep apnea due to the fact that she is severely claustrophobic. Patient was a former smoker and it  is possibly that COPD he is playing a role.   • Pancreatitis    • Paroxysmal atrial fibrillation (CMS/HCC) 4/10/2020   • Pneumonia    • Postoperative nausea 3/23/2020   • Primary hypothyroidism 11/17/2015 March 11, 2019--TSH remains elevated slightly at 4.92.  Given the overall clinical picture including the multinodular goiter, we will initiate levothyroxine 50 mcg/day and reassess in about 6 weeks.  August 1, 2018--thyroid ultrasound reveals a multinodular thyroid with multiple subcentimeter nodules.  Only minimal increase in size of the largest nodule in the left lobe has occurred when compared    • Secondary polycythemia 8/2/2012 11/06/2014--patient did not receive nocturnal oxygen because of Medicare regulations.   05/15/2014--overnight oximetry revealed oxygen saturations less than 89% for 22 minutes and 40 seconds. Oxygen saturations less than or equal to 88% for 22 minutes and 40 seconds. Lowest oxygen saturation 83%. The longest continuous time with oxygen saturations less than or equal to 88% was 1 minute and 32 seconds.   08/02/2012--overnight oximetry revealed oxygen saturations less than 90% for one hour and 35 minutes. Oxygen saturations less than 89% 59 minutes. This patient has significant nocturnal hypoxemia and I think that she could benefit from nocturnal oxygen therapy. The exact etiology of her hypoxemia is not clear. She could possibly have obstructive sleep apnea but this is not documented. We cannot test this patient for sleep apnea due to the fact that she is severely claustrophobic. Patient was a former smoker and it is possibly that COPD he is playing a role.   • Vitamin D deficiency 5/23/2016       Past Surgical History:   Procedure Laterality Date   • CHOLECYSTECTOMY WITH INTRAOPERATIVE CHOLANGIOGRAM N/A 3/12/2020    Procedure: LAPAROSCOPIC CHOLECYSTOSTOMY TUBE, INTRAOPERATIVE CHOLANGIOGRAM;  Surgeon: Bryce Andujar MD;  Location: [facility];  Service: General;   "Laterality: N/A;   • CHOLECYSTECTOMY WITH INTRAOPERATIVE CHOLANGIOGRAM N/A 2020    Procedure: CHOLECYSTECTOMY LAPAROSCOPIC INTRAOPERATIVE CHOLANGIOGRAM and EGD;  Surgeon: Bryce Andujar MD;  Location: [facility];  Service: General;  Laterality: N/A;   • OOPHORECTOMY      age 48   • RADICAL ABDOMINAL HYSTERECTOMY  48 years old    48 years of age. Uterine fibroid tumors with menorrhagia - no cancer       Family History   Problem Relation Age of Onset   • Heart disease Mother         Ischemic.  from coronary artery disease.   • Heart disease Father         Ischemic.  from coronary artery disease.   • Heart disease Sister         Ischemic.  from coronary artery disease.   • Heart disease Brother         Ischemic.  from coronary artery disease.   • Heart disease Sister         Ischemic.  from coronary artery disease.   • Breast cancer Daughter        Social History     Tobacco Use   • Smoking status: Former Smoker     Packs/day: 0.50     Start date: 1946     Quit date: 1954     Years since quittin.1   • Smokeless tobacco: Never Used   • Tobacco comment: Stopped drinking at age 30.   Substance Use Topics   • Alcohol use: No     Comment: caffiene use tea coffee   • Drug use: No         ECG 12 Lead    Date/Time: 2021 12:12 PM  Performed by: Lizbeth Laura MD  Authorized by: Lizbeth Laura MD   Comparison: compared with previous ECG   Similar to previous ECG  Rhythm: sinus rhythm               Objective:     Visit Vitals  /82   Pulse 62   Ht 160 cm (63\")   Wt 87.5 kg (193 lb)   BMI 34.19 kg/m²         Constitutional:       Appearance: Normal appearance. Well-developed.   Eyes:      General: Lids are normal.      Conjunctiva/sclera: Conjunctivae normal.      Pupils: Pupils are equal, round, and reactive to light.   HENT:      Head: Normocephalic and atraumatic.   Neck:      Musculoskeletal: Full passive range of motion without pain, normal range of motion and neck " supple.      Vascular: No carotid bruit or JVD.      Lymphadenopathy: No cervical adenopathy.   Pulmonary:      Effort: Pulmonary effort is normal.      Breath sounds: Normal breath sounds.   Cardiovascular:      Normal rate. Regular rhythm.      No gallop.   Pulses:     Radial: 2+ bilaterally.  Edema:     Peripheral edema absent.   Abdominal:      Palpations: Abdomen is soft.   Skin:     General: Skin is warm and dry.   Neurological:      Mental Status: Alert and oriented to person, place, and time.             Assessment:          Diagnosis Plan   1. Paroxysmal atrial fibrillation (CMS/HCC)     2. Supraventricular tachycardia (CMS/HCC)     3. Benign essential hypertension     4. Hyperlipidemia     5. Family history of coronary artery disease     6. Primary hypothyroidism     7. Chronic renal impairment, stage 3 (moderate), unspecified whether stage 3a or 3b CKD (CMS/HCC)            Plan:         1.  Paroxysmal atrial fibrillation.  Isolated episode during an acute illness.  No further documented recurrence.  Continue atenolol as needed.  Hold off on anticoagulation unless she has documented recurrence.  2.  Paroxysmal supraventricular tachycardia.  Continue atenolol as needed.  3.  Palpitations.  Suspect it is due to #2.  Continue management as above.  I have asked the family to notify me if she has any increased frequency in her episodes.  4.  Hypertension.  Fairly well controlled without any routine antihypertensive medication.  5.  Hyperlipidemia  6.  Hypothyroidism  7.  Chronic kidney disease    We will plan on seeing the patient back again in 1 year or sooner if further issues arise.

## 2021-03-02 DIAGNOSIS — Z23 IMMUNIZATION DUE: ICD-10-CM

## 2021-03-08 ENCOUNTER — LAB (OUTPATIENT)
Dept: LAB | Facility: HOSPITAL | Age: 86
End: 2021-03-08

## 2021-03-08 DIAGNOSIS — D75.1 SECONDARY POLYCYTHEMIA: Chronic | ICD-10-CM

## 2021-03-08 DIAGNOSIS — E55.9 VITAMIN D DEFICIENCY: Chronic | ICD-10-CM

## 2021-03-08 DIAGNOSIS — Z87.39 HISTORY OF GOUT: Chronic | ICD-10-CM

## 2021-03-08 DIAGNOSIS — E78.2 MIXED HYPERLIPIDEMIA: Chronic | ICD-10-CM

## 2021-03-08 DIAGNOSIS — E03.9 PRIMARY HYPOTHYROIDISM: Chronic | ICD-10-CM

## 2021-03-08 DIAGNOSIS — R73.01 IMPAIRED FASTING GLUCOSE: Chronic | ICD-10-CM

## 2021-03-08 LAB
25(OH)D3 SERPL-MCNC: 40.4 NG/ML (ref 30–100)
ALBUMIN SERPL-MCNC: 3.9 G/DL (ref 3.5–5.2)
ALBUMIN/GLOB SERPL: 1.1 G/DL
ALP SERPL-CCNC: 60 U/L (ref 39–117)
ALT SERPL W P-5'-P-CCNC: 11 U/L (ref 1–33)
ANION GAP SERPL CALCULATED.3IONS-SCNC: 7.4 MMOL/L (ref 5–15)
AST SERPL-CCNC: 17 U/L (ref 1–32)
BILIRUB SERPL-MCNC: 0.3 MG/DL (ref 0–1.2)
BUN SERPL-MCNC: 29 MG/DL (ref 8–23)
BUN/CREAT SERPL: 17.7 (ref 7–25)
CALCIUM SPEC-SCNC: 9.3 MG/DL (ref 8.6–10.5)
CHLORIDE SERPL-SCNC: 108 MMOL/L (ref 98–107)
CK SERPL-CCNC: 88 U/L (ref 20–180)
CO2 SERPL-SCNC: 23.6 MMOL/L (ref 22–29)
CREAT SERPL-MCNC: 1.64 MG/DL (ref 0.57–1)
DEPRECATED RDW RBC AUTO: 42.8 FL (ref 37–54)
ERYTHROCYTE [DISTWIDTH] IN BLOOD BY AUTOMATED COUNT: 13.1 % (ref 12.3–15.4)
GFR SERPL CREATININE-BSD FRML MDRD: 30 ML/MIN/1.73
GLOBULIN UR ELPH-MCNC: 3.5 GM/DL
GLUCOSE SERPL-MCNC: 101 MG/DL (ref 65–99)
HBA1C MFR BLD: 5.57 % (ref 4.8–5.6)
HCT VFR BLD AUTO: 46.6 % (ref 34–46.6)
HGB BLD-MCNC: 15.4 G/DL (ref 12–15.9)
MCH RBC QN AUTO: 30.1 PG (ref 26.6–33)
MCHC RBC AUTO-ENTMCNC: 33 G/DL (ref 31.5–35.7)
MCV RBC AUTO: 91 FL (ref 79–97)
PLATELET # BLD AUTO: 175 10*3/MM3 (ref 140–450)
PMV BLD AUTO: 10.3 FL (ref 6–12)
POTASSIUM SERPL-SCNC: 4.8 MMOL/L (ref 3.5–5.2)
PROT SERPL-MCNC: 7.4 G/DL (ref 6–8.5)
RBC # BLD AUTO: 5.12 10*6/MM3 (ref 3.77–5.28)
SODIUM SERPL-SCNC: 139 MMOL/L (ref 136–145)
T3FREE SERPL-MCNC: 2.57 PG/ML (ref 2–4.4)
T4 FREE SERPL-MCNC: 1 NG/DL (ref 0.93–1.7)
TSH SERPL DL<=0.05 MIU/L-ACNC: 3.08 UIU/ML (ref 0.27–4.2)
URATE SERPL-MCNC: 7.3 MG/DL (ref 2.4–5.7)
WBC # BLD AUTO: 6.49 10*3/MM3 (ref 3.4–10.8)

## 2021-03-08 PROCEDURE — 80061 LIPID PANEL: CPT

## 2021-03-08 PROCEDURE — 84443 ASSAY THYROID STIM HORMONE: CPT

## 2021-03-08 PROCEDURE — 83704 LIPOPROTEIN BLD QUAN PART: CPT

## 2021-03-08 PROCEDURE — 84550 ASSAY OF BLOOD/URIC ACID: CPT

## 2021-03-08 PROCEDURE — 36415 COLL VENOUS BLD VENIPUNCTURE: CPT

## 2021-03-08 PROCEDURE — 85027 COMPLETE CBC AUTOMATED: CPT

## 2021-03-08 PROCEDURE — 83036 HEMOGLOBIN GLYCOSYLATED A1C: CPT

## 2021-03-08 PROCEDURE — 84481 FREE ASSAY (FT-3): CPT

## 2021-03-08 PROCEDURE — 84439 ASSAY OF FREE THYROXINE: CPT

## 2021-03-08 PROCEDURE — 82550 ASSAY OF CK (CPK): CPT

## 2021-03-08 PROCEDURE — 82306 VITAMIN D 25 HYDROXY: CPT

## 2021-03-08 PROCEDURE — 80053 COMPREHEN METABOLIC PANEL: CPT

## 2021-03-09 ENCOUNTER — TELEPHONE (OUTPATIENT)
Dept: CARDIOLOGY | Facility: CLINIC | Age: 86
End: 2021-03-09

## 2021-03-09 NOTE — TELEPHONE ENCOUNTER
LILI in case the patient calls back.  I got a note from [facility] [place] with the patient's been having some vision issues that they are concerned may be related to elevated blood pressures.  When I saw her in the office her blood pressure was 170/98 with a pulse of 62.  I called the patient to discuss this with her further but there was no answer so I left a message asking for her to call back.

## 2021-03-10 LAB
CHOLEST SERPL-MCNC: 149 MG/DL (ref 100–199)
HDL SERPL-SCNC: 30.2 UMOL/L
HDLC SERPL-MCNC: 48 MG/DL
LDL SERPL QN: 20.6 NM
LDL SERPL-SCNC: 961 NMOL/L
LDL SMALL SERPL-SCNC: 461 NMOL/L
LDLC SERPL CALC-MCNC: 77 MG/DL (ref 0–99)
TRIGL SERPL-MCNC: 137 MG/DL (ref 0–149)

## 2021-03-15 ENCOUNTER — OFFICE VISIT (OUTPATIENT)
Dept: INTERNAL MEDICINE | Facility: CLINIC | Age: 86
End: 2021-03-15

## 2021-03-15 VITALS
OXYGEN SATURATION: 98 % | BODY MASS INDEX: 34.91 KG/M2 | SYSTOLIC BLOOD PRESSURE: 176 MMHG | HEIGHT: 63 IN | HEART RATE: 73 BPM | WEIGHT: 197 LBS | DIASTOLIC BLOOD PRESSURE: 62 MMHG

## 2021-03-15 DIAGNOSIS — Z51.81 THERAPEUTIC DRUG MONITORING: ICD-10-CM

## 2021-03-15 DIAGNOSIS — E55.9 VITAMIN D DEFICIENCY: Chronic | ICD-10-CM

## 2021-03-15 DIAGNOSIS — Z12.31 BREAST CANCER SCREENING BY MAMMOGRAM: ICD-10-CM

## 2021-03-15 DIAGNOSIS — I47.1 SUPRAVENTRICULAR TACHYCARDIA (HCC): Chronic | ICD-10-CM

## 2021-03-15 DIAGNOSIS — D75.1 SECONDARY POLYCYTHEMIA: Chronic | ICD-10-CM

## 2021-03-15 DIAGNOSIS — E04.2 MULTINODULAR GOITER: Chronic | ICD-10-CM

## 2021-03-15 DIAGNOSIS — E66.01 MORBIDLY OBESE (HCC): Chronic | ICD-10-CM

## 2021-03-15 DIAGNOSIS — I48.0 PAROXYSMAL ATRIAL FIBRILLATION (HCC): Chronic | ICD-10-CM

## 2021-03-15 DIAGNOSIS — R60.0 BILATERAL LOWER EXTREMITY EDEMA: Chronic | ICD-10-CM

## 2021-03-15 DIAGNOSIS — R73.01 IMPAIRED FASTING GLUCOSE: Primary | Chronic | ICD-10-CM

## 2021-03-15 DIAGNOSIS — H90.3 BILATERAL SENSORINEURAL HEARING LOSS: Chronic | ICD-10-CM

## 2021-03-15 DIAGNOSIS — K21.9 GASTROESOPHAGEAL REFLUX DISEASE WITHOUT ESOPHAGITIS: Chronic | ICD-10-CM

## 2021-03-15 DIAGNOSIS — N18.32 CHRONIC RENAL IMPAIRMENT, STAGE 3B (HCC): ICD-10-CM

## 2021-03-15 DIAGNOSIS — I10 BENIGN ESSENTIAL HYPERTENSION: Chronic | ICD-10-CM

## 2021-03-15 DIAGNOSIS — Z87.39 HISTORY OF GOUT: Chronic | ICD-10-CM

## 2021-03-15 DIAGNOSIS — E78.2 MIXED HYPERLIPIDEMIA: Chronic | ICD-10-CM

## 2021-03-15 DIAGNOSIS — Z78.0 POSTMENOPAUSAL STATE: ICD-10-CM

## 2021-03-15 DIAGNOSIS — E03.9 PRIMARY HYPOTHYROIDISM: Chronic | ICD-10-CM

## 2021-03-15 DIAGNOSIS — Z80.3 FAMILY HISTORY OF BREAST CANCER: ICD-10-CM

## 2021-03-15 DIAGNOSIS — G47.34 NOCTURNAL HYPOXEMIA: Chronic | ICD-10-CM

## 2021-03-15 DIAGNOSIS — F40.240 CLAUSTROPHOBIA: Chronic | ICD-10-CM

## 2021-03-15 PROCEDURE — 99214 OFFICE O/P EST MOD 30 MIN: CPT | Performed by: INTERNAL MEDICINE

## 2021-03-15 RX ORDER — METOPROLOL SUCCINATE 25 MG/1
TABLET, EXTENDED RELEASE ORAL
Qty: 30 TABLET | Refills: 1 | Status: SHIPPED | OUTPATIENT
Start: 2021-03-15 | End: 2021-04-05 | Stop reason: SDUPTHER

## 2021-03-15 RX ORDER — ALLOPURINOL 300 MG/1
TABLET ORAL
Qty: 90 TABLET | Refills: 3 | Status: SHIPPED | OUTPATIENT
Start: 2021-03-15 | End: 2022-04-15

## 2021-03-15 RX ORDER — ATENOLOL 50 MG/1
50 TABLET ORAL DAILY PRN
Qty: 90 TABLET | Refills: 0
Start: 2021-03-15 | End: 2021-03-15

## 2021-03-15 RX ORDER — CYCLOSPORINE 0.5 MG/ML
EMULSION OPHTHALMIC 2 TIMES DAILY
COMMUNITY
Start: 2021-03-04

## 2021-03-15 NOTE — PROGRESS NOTES
Some             03/15/2021    Patient Information  Mandy Alarcon                                                                                          [address] [address]      5/30/1933  [unfilled]  There is no work phone number on file.    Chief Complaint:     Follow-up lab work in order to monitor chronic medical issues listed in history of present illness.  Concern over blood pressure readings.    History of Present Illness:    Patient with a history of impaired fasting glucose, hyperlipidemia, stage IIIb chronic renal insufficiency, hypothyroidism and multinodular goiter, gout, paroxysmal atrial fibrillation and supraventricular tachycardia, esophageal reflux, lower extremity edema, morbid obesity, vitamin D deficiency, secondary polycythemia and nocturnal hypoxemia, claustrophobia, bilateral sensorineural hearing loss.  She presents today for a follow-up with lab prior in order to monitor chronic medical issues.  Her past medical history reviewed and updated were necessary including health maintenance parameters.  This reveals she is up-to-date or else accounted for.  Also I received a notification that her eye doctor was concerned that findings in her eye that are abnormal may be related to hypertension.  We will address that today.    Review of Systems   Constitutional: Negative.   HENT: Negative.    Eyes: Negative.    Cardiovascular: Negative.    Respiratory: Negative.    Endocrine: Negative.    Hematologic/Lymphatic: Negative.    Skin: Negative.    Musculoskeletal: Negative.    Gastrointestinal: Negative.    Genitourinary: Negative.    Neurological: Negative.    Psychiatric/Behavioral: Negative.    Allergic/Immunologic: Negative.        Active Problems:    Patient Active Problem List   Diagnosis   • Benign essential hypertension   • Claustrophobia   • Gout   • Hyperlipidemia   • Primary hypothyroidism   • Impaired fasting glucose   • Nocturnal hypoxemia   • Secondary polycythemia    • Vitamin D deficiency   • Therapeutic drug monitoring   • Family history of coronary artery disease   • Bilateral sensorineural hearing loss, wears hearing aids   • Multinodular goiter   • Supraventricular tachycardia (CMS/HCC)   • Morbidly obese (CMS/HCC)   • Bilateral lower extremity edema   • Gastroesophageal reflux disease without esophagitis   • Paroxysmal atrial fibrillation (CMS/HCC)   • Chronic renal impairment, stage 3b (CMS/HCC)         Past Medical History:   Diagnosis Date   • Benign essential hypertension 10/28/2012    2012--treatment for hypertension begun.   • Bilateral lower extremity edema 3/23/2020   • Bilateral sensorineural hearing loss, wears hearing aids 2017    Left is much worse than right.  Patient had multiple ear infections as a child.   • Chronic renal impairment, stage 3b (CMS/HCC) 9/15/2020   • Chronic renal insufficiency, stage II (mild), 2016--creatinine 1.35 2016--creatinine 1.35.  Baseline creatinine approximately 1.3.   • Chronic renal insufficiency, stage III (moderate) (CMS/HCC) 9/15/2020   • Claustrophobia 2016    This patient has significant nocturnal hypoxemia and I think that she could benefit from nocturnal oxygen therapy. The exact etiology of her hypoxemia is not clear. She could possibly have obstructive sleep apnea but this is not documented. We cannot test this patient for sleep apnea due to the fact that she is severely claustrophobic. Patient was a former smoker and it is possibly that COPD he is playing a role.   • Family history of coronary artery disease 2016    Patient's mother, father, 2 sisters and a brother all  from myocardial infarctions   • Gastroesophageal reflux disease without esophagitis 3/23/2020   • Gout 10/28/2012    2012--initial diagnosis and treatment of gout.   • History of acute pancreatitis 2020   • Hyperlipidemia 10/28/2012    2012--treatment for hyperlipidemia begun.    • Impaired fasting glucose 10/28/2012    October 2012--initial diagnosis impaired fasting glucose.   • Morbidly obese (CMS/HCC) 3/11/2019   • Nocturnal hypoxemia 8/2/2012 11/06/2014--patient did not receive nocturnal oxygen because of Medicare regulations.   05/15/2014--overnight oximetry revealed oxygen saturations less than 89% for 22 minutes and 40 seconds. Oxygen saturations less than or equal to 88% for 22 minutes and 40 seconds. Lowest oxygen saturation 83%. The longest continuous time with oxygen saturations less than or equal to 88% was 1 minute and 32 seconds.   08/02/2012--overnight oximetry revealed oxygen saturations less than 90% for one hour and 35 minutes. Oxygen saturations less than 89% 59 minutes. This patient has significant nocturnal hypoxemia and I think that she could benefit from nocturnal oxygen therapy. The exact etiology of her hypoxemia is not clear. She could possibly have obstructive sleep apnea but this is not documented. We cannot test this patient for sleep apnea due to the fact that she is severely claustrophobic. Patient was a former smoker and it is possibly that COPD he is playing a role.   • Pancreatitis    • Paroxysmal atrial fibrillation (CMS/HCC) 4/10/2020   • Pneumonia    • Postoperative nausea 3/23/2020   • Primary hypothyroidism 11/17/2015 March 11, 2019--TSH remains elevated slightly at 4.92.  Given the overall clinical picture including the multinodular goiter, we will initiate levothyroxine 50 mcg/day and reassess in about 6 weeks.  August 1, 2018--thyroid ultrasound reveals a multinodular thyroid with multiple subcentimeter nodules.  Only minimal increase in size of the largest nodule in the left lobe has occurred when compared    • Secondary polycythemia 8/2/2012 11/06/2014--patient did not receive nocturnal oxygen because of Medicare regulations.   05/15/2014--overnight oximetry revealed oxygen saturations less than 89% for 22 minutes and 40 seconds. Oxygen  saturations less than or equal to 88% for 22 minutes and 40 seconds. Lowest oxygen saturation 83%. The longest continuous time with oxygen saturations less than or equal to 88% was 1 minute and 32 seconds.   08/02/2012--overnight oximetry revealed oxygen saturations less than 90% for one hour and 35 minutes. Oxygen saturations less than 89% 59 minutes. This patient has significant nocturnal hypoxemia and I think that she could benefit from nocturnal oxygen therapy. The exact etiology of her hypoxemia is not clear. She could possibly have obstructive sleep apnea but this is not documented. We cannot test this patient for sleep apnea due to the fact that she is severely claustrophobic. Patient was a former smoker and it is possibly that COPD he is playing a role.   • Vitamin D deficiency 5/23/2016         Past Surgical History:   Procedure Laterality Date   • CHOLECYSTECTOMY WITH INTRAOPERATIVE CHOLANGIOGRAM N/A 3/12/2020    Procedure: LAPAROSCOPIC CHOLECYSTOSTOMY TUBE, INTRAOPERATIVE CHOLANGIOGRAM;  Surgeon: Bryce Andujar MD;  Location: [facility];  Service: General;  Laterality: N/A;   • CHOLECYSTECTOMY WITH INTRAOPERATIVE CHOLANGIOGRAM N/A 5/22/2020    Procedure: CHOLECYSTECTOMY LAPAROSCOPIC INTRAOPERATIVE CHOLANGIOGRAM and EGD;  Surgeon: Bryce Andujar MD;  Location: [facility];  Service: General;  Laterality: N/A;   • OOPHORECTOMY      age 48   • RADICAL ABDOMINAL HYSTERECTOMY  48 years old    48 years of age. Uterine fibroid tumors with menorrhagia - no cancer         Allergies   Allergen Reactions   • Cefaclor Anaphylaxis and Angioedema     caused angioedema 25 years ago; she tolerates cephalexin, amoxicillin, and ceftriaxone per my d/w patient and her daughter as well as amoxicillin-clavulanate confirmed in EPIC   • Sulfa Antibiotics Rash           Current Outpatient Medications:   •  acetaminophen (TYLENOL) 500 MG tablet, Take 500 mg by mouth Every 6 (Six) Hours As Needed for Mild Pain .,  Disp: , Rfl:   •  Biotin 1000 MCG chewable tablet, Chew 1 tablet Daily., Disp: , Rfl:   •  Cholecalciferol (VITAMIN D) 2000 UNITS tablet, Take 2 tablets by mouth daily., Disp: , Rfl:   •  levothyroxine (SYNTHROID, LEVOTHROID) 50 MCG tablet, TAKE 1 TABLET BY MOUTH EVERY MORNING FOR THYROID, Disp: 90 tablet, Rfl: 1  •  Multiple Vitamins-Minerals (MULTIVITAMIN ADULTS PO), Take 1 tablet by mouth Daily., Disp: , Rfl:   •  polyethylene glycol (MIRALAX) packet, Take orally as directed for constipation, Disp: , Rfl:   •  pravastatin (PRAVACHOL) 40 MG tablet, TAKE 1 TABLET BY MOUTH DAILY FOR HIGH CHOLESTEROL, Disp: 90 tablet, Rfl: 1  •  Restasis 0.05 % ophthalmic emulsion, 2 (two) times a day., Disp: , Rfl:   •  allopurinol (ZYLOPRIM) 300 MG tablet, Take 1 p.o. daily for gout, Disp: 90 tablet, Rfl: 3  •  metoprolol succinate XL (Toprol XL) 25 MG 24 hr tablet, Take 1 p.o. daily for high blood pressure and heart palpitations, Disp: 30 tablet, Rfl: 1  •  omeprazole (priLOSEC) 40 MG capsule, TAKE ONE CAPSULE BY MOUTH EVERY DAY BEFORE FIRST MEAL FOR ACID REFLUX, Disp: 30 capsule, Rfl: 3      Family History   Problem Relation Age of Onset   • Heart disease Mother         Ischemic.  from coronary artery disease.   • Heart disease Father         Ischemic.  from coronary artery disease.   • Heart disease Sister         Ischemic.  from coronary artery disease.   • Heart disease Brother         Ischemic.  from coronary artery disease.   • Heart disease Sister         Ischemic.  from coronary artery disease.   • Breast cancer Daughter          Social History     Socioeconomic History   • Marital status:      Spouse name: Not on file   • Number of children: 5   • Years of education: Not on file   • Highest education level: GED or equivalent   Tobacco Use   • Smoking status: Former Smoker     Packs/day: 0.50     Start date: 1946     Quit date: 1954     Years since quittin.2   • Smokeless tobacco:  "Never Used   • Tobacco comment: Stopped drinking at age 30.   Vaping Use   • Vaping Use: Never used   Substance and Sexual Activity   • Alcohol use: No     Comment: caffiene use tea coffee   • Drug use: No   • Sexual activity: Not Currently     Partners: Male         Vitals:    03/15/21 1307   BP: 176/62   BP Location: Left arm   Pulse: 73   SpO2: 98%   Weight: 89.4 kg (197 lb)   Height: 160 cm (62.99\")        Body mass index is 34.91 kg/m².      Physical Exam:    General: Alert and oriented x 3.  No acute distress.  Normal affect.  HEENT: Pupils equal, round, reactive to light; extraocular movements intact; sclerae nonicteric; pharynx, ear canals and TMs normal.  Neck: Without JVD, thyromegaly, bruit, or adenopathy.  Lungs: Clear to auscultation in all fields.  Heart: Regular rate and rhythm today without murmur, rub, gallop, or click.  Abdomen: Soft, nontender, without hepatosplenomegaly or hernia.  Bowel sounds normal.  : Deferred.  Rectal: Deferred.  Extremities: Without clubbing, cyanosis, edema, or pulse deficit.  Neurologic: Intact without focal deficit.  Normal station and gait observed during ingress and egress from the examination room.  Skin: Without significant lesion.  Musculoskeletal: Unremarkable.    Lab/other results:    NMR reveals a total cholesterol of 149.  Triglycerides 137.  LDL particle number excellent 961.  Small LDL particle number excellent 461.  HDL particle number low at 30.2.  CMP normal except glucose 101, BUN 29, creatinine 1.64.  Estimated GFR 30.  Hemoglobin A1c 5.57.  Thyroid function test normal.  Vitamin D normal at 40.4.  Uric acid elevated at 7.3.  CPK normal.  CBC normal.    Assessment/Plan:     Diagnosis Plan   1. Impaired fasting glucose     2. Hyperlipidemia     3. Chronic renal impairment, stage 3b (CMS/HCC)     4. Benign essential hypertension  metoprolol succinate XL (Toprol XL) 25 MG 24 hr tablet   5. Primary hypothyroidism     6. Multinodular goiter     7. Gout  " allopurinol (ZYLOPRIM) 300 MG tablet   8. Paroxysmal atrial fibrillation (CMS/HCC)     9. Supraventricular tachycardia (CMS/HCC)  metoprolol succinate XL (Toprol XL) 25 MG 24 hr tablet   10. Gastroesophageal reflux disease without esophagitis     11. Bilateral lower extremity edema     12. Morbidly obese (CMS/HCC)     13. Vitamin D deficiency     14. Secondary polycythemia     15. Nocturnal hypoxemia     16. Claustrophobia     17. Bilateral sensorineural hearing loss, wears hearing aids     18. Therapeutic drug monitoring     19. Breast cancer screening by mammogram  Mammo Screening Bilateral With CAD    DEXA Bone Density Axial   20. Postmenopausal state  DEXA Bone Density Axial   21. Family history of breast cancer  Mammo Screening Bilateral With CAD     Patient has very mild impaired fasting glucose that does not require medication.  Strongly recommend low carbohydrate diet, exercise, and weight loss.  Hyperlipidemia is under good control with pravastatin.  Her chronic renal insufficiency is stage IIIb and currently is stable but needs to be monitored closely.  Her blood pressure is elevated and medication adjustment is indicated.  Her thyroid is therapeutic on 50 mcg of levothyroxine.  Her gout seems stable although her uric acid levels are higher than what I would like, particularly given the fact she is on allopurinol 300 mg/day.  Suspect missed doses.  She has paroxysmal atrial fibrillation and supraventricular tachycardia and is being followed by the cardiologist.  Esophageal reflux controlled with omeprazole.  Again, addressed her morbid obesity and strongly recommended she lose weight.  Lower extremity edema is currently not an issue.  Her vitamin D is in the normal range.  Secondary polycythemia seems to have resolved but we will monitor.  She has sensorineural hearing loss and hearing aids are helpful.    Plan is as follows: Discontinue atenolol and start metoprolol succinate extended release 25 mg every  morning.  Patient will follow-up in about 3 to 4 weeks to reassess her blood pressure.  We can also assess her lower extremity edema at that time as well as her renal function.  I explained to patient and her daughter that the 25 mg may not control her blood pressure but I would rather that we move slowly and trying to get this under control.  Mammogram and DEXA scan ordered.    Procedures

## 2021-03-22 ENCOUNTER — TRANSCRIBE ORDERS (OUTPATIENT)
Dept: INTERNAL MEDICINE | Facility: CLINIC | Age: 86
End: 2021-03-22

## 2021-03-22 DIAGNOSIS — Z12.31 SCREENING MAMMOGRAM FOR HIGH-RISK PATIENT: Primary | ICD-10-CM

## 2021-04-05 ENCOUNTER — OFFICE VISIT (OUTPATIENT)
Dept: INTERNAL MEDICINE | Facility: CLINIC | Age: 86
End: 2021-04-05

## 2021-04-05 VITALS
HEART RATE: 53 BPM | OXYGEN SATURATION: 98 % | DIASTOLIC BLOOD PRESSURE: 72 MMHG | WEIGHT: 197 LBS | BODY MASS INDEX: 34.91 KG/M2 | SYSTOLIC BLOOD PRESSURE: 150 MMHG | HEIGHT: 63 IN

## 2021-04-05 DIAGNOSIS — E04.2 MULTINODULAR GOITER: Chronic | ICD-10-CM

## 2021-04-05 DIAGNOSIS — E55.9 VITAMIN D DEFICIENCY: Chronic | ICD-10-CM

## 2021-04-05 DIAGNOSIS — Z51.81 THERAPEUTIC DRUG MONITORING: ICD-10-CM

## 2021-04-05 DIAGNOSIS — E03.9 PRIMARY HYPOTHYROIDISM: Chronic | ICD-10-CM

## 2021-04-05 DIAGNOSIS — I10 BENIGN ESSENTIAL HYPERTENSION: Primary | Chronic | ICD-10-CM

## 2021-04-05 DIAGNOSIS — R60.0 BILATERAL LOWER EXTREMITY EDEMA: Chronic | ICD-10-CM

## 2021-04-05 DIAGNOSIS — E78.2 MIXED HYPERLIPIDEMIA: Chronic | ICD-10-CM

## 2021-04-05 DIAGNOSIS — D75.1 SECONDARY POLYCYTHEMIA: Chronic | ICD-10-CM

## 2021-04-05 DIAGNOSIS — I47.1 SUPRAVENTRICULAR TACHYCARDIA (HCC): Chronic | ICD-10-CM

## 2021-04-05 DIAGNOSIS — N18.32 CHRONIC RENAL IMPAIRMENT, STAGE 3B (HCC): Chronic | ICD-10-CM

## 2021-04-05 DIAGNOSIS — R73.01 IMPAIRED FASTING GLUCOSE: Chronic | ICD-10-CM

## 2021-04-05 DIAGNOSIS — Z87.39 HISTORY OF GOUT: Chronic | ICD-10-CM

## 2021-04-05 DIAGNOSIS — I48.0 PAROXYSMAL ATRIAL FIBRILLATION (HCC): Chronic | ICD-10-CM

## 2021-04-05 DIAGNOSIS — K21.9 GASTROESOPHAGEAL REFLUX DISEASE WITHOUT ESOPHAGITIS: Chronic | ICD-10-CM

## 2021-04-05 PROCEDURE — 99214 OFFICE O/P EST MOD 30 MIN: CPT | Performed by: INTERNAL MEDICINE

## 2021-04-05 RX ORDER — METOPROLOL SUCCINATE 25 MG/1
TABLET, EXTENDED RELEASE ORAL
Qty: 90 TABLET | Refills: 3 | Status: SHIPPED | OUTPATIENT
Start: 2021-04-05 | End: 2022-04-28

## 2021-04-05 NOTE — PROGRESS NOTES
04/05/2021    Patient Information  Mandy Alarcon                                                                                          [address] [address]      5/30/1933  [unfilled]  There is no work phone number on file.    Chief Complaint:     Follow-up blood pressure and chronic renal insufficiency stage IIIb as well as lower extremity edema.  Recent medication change.    History of Present Illness:    Patient with history of several chronic medical problems including hypertension which has been less than optimally controlled, chronic renal impairment stage IIIb, lower extremity edema, hypothyroidism with multinodular goiter, hyperlipidemia, impaired fasting glucose, gout, secondary polycythemia, vitamin D deficiency, esophageal reflux, paroxysmal atrial fibrillation and SVT.  She presents today to follow-up on her blood pressure as well as renal function and lower extremity edema after we changed her metoprolol to tartrate to metoprolol succinate 25 mg/day.  She seemed to tolerate that well.  Her past medical history reviewed and updated were necessary including health maintenance parameters.  This reveals she is up-to-date or else accounted for.  Medications also reviewed at length.    Review of Systems   Constitutional: Negative.   HENT: Negative.    Eyes: Negative.    Cardiovascular: Negative.  Negative for leg swelling.   Respiratory: Negative.    Endocrine: Negative.    Hematologic/Lymphatic: Negative.    Skin: Negative.    Musculoskeletal: Negative.    Gastrointestinal: Negative.    Genitourinary: Negative.    Neurological: Negative.    Psychiatric/Behavioral: Negative.    Allergic/Immunologic: Negative.        Active Problems:    Patient Active Problem List   Diagnosis   • Benign essential hypertension   • Claustrophobia   • Gout   • Hyperlipidemia   • Primary hypothyroidism   • Impaired fasting glucose   • Nocturnal hypoxemia   • Secondary polycythemia   • Vitamin D  deficiency   • Therapeutic drug monitoring   • Family history of coronary artery disease   • Bilateral sensorineural hearing loss, wears hearing aids   • Multinodular goiter   • Supraventricular tachycardia (CMS/HCC)   • Morbidly obese (CMS/HCC)   • Bilateral lower extremity edema   • Gastroesophageal reflux disease without esophagitis   • Paroxysmal atrial fibrillation (CMS/HCC)   • Chronic renal impairment, stage 3b (CMS/HCC)         Past Medical History:   Diagnosis Date   • Benign essential hypertension 10/28/2012    2012--treatment for hypertension begun.   • Bilateral lower extremity edema 3/23/2020   • Bilateral sensorineural hearing loss, wears hearing aids 2017    Left is much worse than right.  Patient had multiple ear infections as a child.   • Chronic renal impairment, stage 3b (CMS/HCC) 9/15/2020   • Chronic renal insufficiency, stage II (mild), 2016--creatinine 1.35 2016--creatinine 1.35.  Baseline creatinine approximately 1.3.   • Chronic renal insufficiency, stage III (moderate) (CMS/HCC) 9/15/2020   • Claustrophobia 2016    This patient has significant nocturnal hypoxemia and I think that she could benefit from nocturnal oxygen therapy. The exact etiology of her hypoxemia is not clear. She could possibly have obstructive sleep apnea but this is not documented. We cannot test this patient for sleep apnea due to the fact that she is severely claustrophobic. Patient was a former smoker and it is possibly that COPD he is playing a role.   • Family history of coronary artery disease 2016    Patient's mother, father, 2 sisters and a brother all  from myocardial infarctions   • Gastroesophageal reflux disease without esophagitis 3/23/2020   • Gout 10/28/2012    2012--initial diagnosis and treatment of gout.   • History of acute pancreatitis 2020   • Hyperlipidemia 10/28/2012    2012--treatment for hyperlipidemia begun.   • Impaired  fasting glucose 10/28/2012    October 2012--initial diagnosis impaired fasting glucose.   • Morbidly obese (CMS/HCC) 3/11/2019   • Nocturnal hypoxemia 8/2/2012 11/06/2014--patient did not receive nocturnal oxygen because of Medicare regulations.   05/15/2014--overnight oximetry revealed oxygen saturations less than 89% for 22 minutes and 40 seconds. Oxygen saturations less than or equal to 88% for 22 minutes and 40 seconds. Lowest oxygen saturation 83%. The longest continuous time with oxygen saturations less than or equal to 88% was 1 minute and 32 seconds.   08/02/2012--overnight oximetry revealed oxygen saturations less than 90% for one hour and 35 minutes. Oxygen saturations less than 89% 59 minutes. This patient has significant nocturnal hypoxemia and I think that she could benefit from nocturnal oxygen therapy. The exact etiology of her hypoxemia is not clear. She could possibly have obstructive sleep apnea but this is not documented. We cannot test this patient for sleep apnea due to the fact that she is severely claustrophobic. Patient was a former smoker and it is possibly that COPD he is playing a role.   • Pancreatitis    • Paroxysmal atrial fibrillation (CMS/[facility]) 4/10/2020   • Pneumonia    • Postoperative nausea 3/23/2020   • Primary hypothyroidism 11/17/2015 March 11, 2019--TSH remains elevated slightly at 4.92.  Given the overall clinical picture including the multinodular goiter, we will initiate levothyroxine 50 mcg/day and reassess in about 6 weeks.  August 1, 2018--thyroid ultrasound reveals a multinodular thyroid with multiple subcentimeter nodules.  Only minimal increase in size of the largest nodule in the left lobe has occurred when compared    • Secondary polycythemia 8/2/2012 11/06/2014--patient did not receive nocturnal oxygen because of Medicare regulations.   05/15/2014--overnight oximetry revealed oxygen saturations less than 89% for 22 minutes and 40 seconds. Oxygen saturations  less than or equal to 88% for 22 minutes and 40 seconds. Lowest oxygen saturation 83%. The longest continuous time with oxygen saturations less than or equal to 88% was 1 minute and 32 seconds.   08/02/2012--overnight oximetry revealed oxygen saturations less than 90% for one hour and 35 minutes. Oxygen saturations less than 89% 59 minutes. This patient has significant nocturnal hypoxemia and I think that she could benefit from nocturnal oxygen therapy. The exact etiology of her hypoxemia is not clear. She could possibly have obstructive sleep apnea but this is not documented. We cannot test this patient for sleep apnea due to the fact that she is severely claustrophobic. Patient was a former smoker and it is possibly that COPD he is playing a role.   • Vitamin D deficiency 5/23/2016         Past Surgical History:   Procedure Laterality Date   • CHOLECYSTECTOMY WITH INTRAOPERATIVE CHOLANGIOGRAM N/A 3/12/2020    Procedure: LAPAROSCOPIC CHOLECYSTOSTOMY TUBE, INTRAOPERATIVE CHOLANGIOGRAM;  Surgeon: Bryce Andujar MD;  Location: [facility];  Service: General;  Laterality: N/A;   • CHOLECYSTECTOMY WITH INTRAOPERATIVE CHOLANGIOGRAM N/A 5/22/2020    Procedure: CHOLECYSTECTOMY LAPAROSCOPIC INTRAOPERATIVE CHOLANGIOGRAM and EGD;  Surgeon: Bryce Andujar MD;  Location: [facility];  Service: General;  Laterality: N/A;   • OOPHORECTOMY      age 48   • RADICAL ABDOMINAL HYSTERECTOMY  48 years old    48 years of age. Uterine fibroid tumors with menorrhagia - no cancer         Allergies   Allergen Reactions   • Cefaclor Anaphylaxis and Angioedema     caused angioedema 25 years ago; she tolerates cephalexin, amoxicillin, and ceftriaxone per my d/w patient and her daughter as well as amoxicillin-clavulanate confirmed in EPIC   • Sulfa Antibiotics Rash           Current Outpatient Medications:   •  acetaminophen (TYLENOL) 500 MG tablet, Take 500 mg by mouth Every 6 (Six) Hours As Needed for Mild Pain ., Disp: ,  Rfl:   •  allopurinol (ZYLOPRIM) 300 MG tablet, Take 1 p.o. daily for gout, Disp: 90 tablet, Rfl: 3  •  Biotin 1000 MCG chewable tablet, Chew 1 tablet Daily., Disp: , Rfl:   •  Cholecalciferol (VITAMIN D) 2000 UNITS tablet, Take 2 tablets by mouth daily., Disp: , Rfl:   •  levothyroxine (SYNTHROID, LEVOTHROID) 50 MCG tablet, TAKE 1 TABLET BY MOUTH EVERY MORNING FOR THYROID, Disp: 90 tablet, Rfl: 1  •  Multiple Vitamins-Minerals (MULTIVITAMIN ADULTS PO), Take 1 tablet by mouth Daily., Disp: , Rfl:   •  omeprazole (priLOSEC) 40 MG capsule, TAKE ONE CAPSULE BY MOUTH EVERY DAY BEFORE FIRST MEAL FOR ACID REFLUX, Disp: 30 capsule, Rfl: 3  •  polyethylene glycol (MIRALAX) packet, Take orally as directed for constipation, Disp: , Rfl:   •  pravastatin (PRAVACHOL) 40 MG tablet, TAKE 1 TABLET BY MOUTH DAILY FOR HIGH CHOLESTEROL, Disp: 90 tablet, Rfl: 1  •  Restasis 0.05 % ophthalmic emulsion, 2 (two) times a day., Disp: , Rfl:   •  metoprolol succinate XL (Toprol XL) 25 MG 24 hr tablet, Take 1 p.o. daily for high blood pressure and heart palpitations, Disp: 90 tablet, Rfl: 3      Family History   Problem Relation Age of Onset   • Heart disease Mother         Ischemic.  from coronary artery disease.   • Heart disease Father         Ischemic.  from coronary artery disease.   • Heart disease Sister         Ischemic.  from coronary artery disease.   • Heart disease Brother         Ischemic.  from coronary artery disease.   • Heart disease Sister         Ischemic.  from coronary artery disease.   • Breast cancer Daughter          Social History     Socioeconomic History   • Marital status:      Spouse name: Not on file   • Number of children: 5   • Years of education: Not on file   • Highest education level: GED or equivalent   Tobacco Use   • Smoking status: Former Smoker     Packs/day: 0.50     Start date: 1946     Quit date: 1954     Years since quittin.3   • Smokeless tobacco: Never  "Used   • Tobacco comment: Stopped drinking at age 30.   Vaping Use   • Vaping Use: Never used   Substance and Sexual Activity   • Alcohol use: No     Comment: caffiene use tea coffee   • Drug use: No   • Sexual activity: Not Currently     Partners: Male         Vitals:    04/05/21 1358   BP: 150/72   BP Location: Right arm   Pulse: 53   SpO2: 98%   Weight: 89.4 kg (197 lb)   Height: 160 cm (62.99\")        Body mass index is 34.91 kg/m².      Physical Exam:    General: Alert and oriented x 3.  No acute distress.  Normal affect.  HEENT: Pupils equal, round, reactive to light; extraocular movements intact; sclerae nonicteric; pharynx, ear canals and TMs normal.  Neck: Without JVD, thyromegaly, bruit, or adenopathy.  Lungs: Clear to auscultation in all fields.  Heart: Regular rate and rhythm without murmur, rub, gallop, or click.  Abdomen: Soft, nontender, without hepatosplenomegaly or hernia.  Bowel sounds normal.  : Deferred.  Rectal: Deferred.  Extremities: Without clubbing, cyanosis, or pulse deficit.  Only 1+ bilateral lower extremity edema below the knees.  No sign of cellulitis.  Neurologic: Intact without focal deficit.  Normal station and gait observed during ingress and egress from the examination room.  Skin: Without significant lesion.  Musculoskeletal: Unremarkable.    Lab/other results:      Assessment/Plan:     Diagnosis Plan   1. Benign essential hypertension  metoprolol succinate XL (Toprol XL) 25 MG 24 hr tablet   2. Chronic renal impairment, stage 3b (CMS/HCC)     3. Bilateral lower extremity edema     4. Primary hypothyroidism  TSH    T4, Free    T3, Free   5. Multinodular goiter     6. Hyperlipidemia  CK    Comprehensive Metabolic Panel    NMR LipoProfile   7. Impaired fasting glucose  Hemoglobin A1c   8. Gout  Uric Acid   9. Secondary polycythemia     10. Vitamin D deficiency  Vitamin D 25 Hydroxy   11. Gastroesophageal reflux disease without esophagitis     12. Paroxysmal atrial fibrillation " (CMS/HCC)     13. Supraventricular tachycardia (CMS/HCC)  metoprolol succinate XL (Toprol XL) 25 MG 24 hr tablet   14. Therapeutic drug monitoring  CBC (No Diff)     Patient blood pressure is better but not quite at goal.  Medication adjustment indicated.  However, her pulse is down in the 50s and I am reluctant to increase the metoprolol any further.  Somewhat difficult situation.  Her lower extremity edema seems to be controlled as it was previously.    Plan is as follows: After serious consideration, I really do not want to add more medication to this patient's regimen.  I do feel that if she would lose perhaps 15 pounds that her blood pressure would come under control.  I think that is a better route to take.  Therefore, I will not make any changes to her medical regimen.  Patient work on low carbohydrate diet and weight loss and I will have her follow-up after September 15, 2021 with lab prior and this will also be Medicare wellness visit.    Procedures

## 2021-05-10 ENCOUNTER — HOSPITAL ENCOUNTER (OUTPATIENT)
Dept: MAMMOGRAPHY | Facility: HOSPITAL | Age: 86
Discharge: HOME | End: 2021-05-10

## 2021-05-10 ENCOUNTER — HOSPITAL ENCOUNTER (OUTPATIENT)
Dept: BONE DENSITY | Facility: HOSPITAL | Age: 86
Discharge: HOME | End: 2021-05-10

## 2021-05-10 DIAGNOSIS — Z80.3 FAMILY HISTORY OF BREAST CANCER: ICD-10-CM

## 2021-05-10 DIAGNOSIS — Z12.31 BREAST CANCER SCREENING BY MAMMOGRAM: ICD-10-CM

## 2021-05-10 PROCEDURE — 77063 BREAST TOMOSYNTHESIS BI: CPT

## 2021-05-10 PROCEDURE — 77080 DXA BONE DENSITY AXIAL: CPT

## 2021-05-10 PROCEDURE — 77067 SCR MAMMO BI INCL CAD: CPT

## 2021-06-01 RX ORDER — LEVOTHYROXINE SODIUM 0.05 MG/1
TABLET ORAL
Qty: 90 TABLET | Refills: 1 | Status: SHIPPED | OUTPATIENT
Start: 2021-06-01 | End: 2022-01-14 | Stop reason: SDUPTHER

## 2021-06-01 RX ORDER — PRAVASTATIN SODIUM 40 MG
TABLET ORAL
Qty: 90 TABLET | Refills: 1 | Status: SHIPPED | OUTPATIENT
Start: 2021-06-01 | End: 2022-01-14 | Stop reason: SDUPTHER

## 2021-09-14 ENCOUNTER — LAB (OUTPATIENT)
Dept: LAB | Facility: HOSPITAL | Age: 86
End: 2021-09-14

## 2021-09-14 DIAGNOSIS — E55.9 VITAMIN D DEFICIENCY: Chronic | ICD-10-CM

## 2021-09-14 DIAGNOSIS — E03.9 PRIMARY HYPOTHYROIDISM: Chronic | ICD-10-CM

## 2021-09-14 DIAGNOSIS — Z51.81 THERAPEUTIC DRUG MONITORING: ICD-10-CM

## 2021-09-14 DIAGNOSIS — E78.2 MIXED HYPERLIPIDEMIA: Chronic | ICD-10-CM

## 2021-09-14 DIAGNOSIS — Z87.39 HISTORY OF GOUT: Chronic | ICD-10-CM

## 2021-09-14 DIAGNOSIS — R73.01 IMPAIRED FASTING GLUCOSE: Chronic | ICD-10-CM

## 2021-09-14 LAB
25(OH)D3 SERPL-MCNC: 44.7 NG/ML (ref 30–100)
ALBUMIN SERPL-MCNC: 3.9 G/DL (ref 3.5–5.2)
ALBUMIN/GLOB SERPL: 1.3 G/DL
ALP SERPL-CCNC: 59 U/L (ref 39–117)
ALT SERPL W P-5'-P-CCNC: 14 U/L (ref 1–33)
ANION GAP SERPL CALCULATED.3IONS-SCNC: 8.6 MMOL/L (ref 5–15)
AST SERPL-CCNC: 18 U/L (ref 1–32)
BILIRUB SERPL-MCNC: 0.4 MG/DL (ref 0–1.2)
BUN SERPL-MCNC: 22 MG/DL (ref 8–23)
BUN/CREAT SERPL: 14.1 (ref 7–25)
CALCIUM SPEC-SCNC: 9.3 MG/DL (ref 8.6–10.5)
CHLORIDE SERPL-SCNC: 109 MMOL/L (ref 98–107)
CK SERPL-CCNC: 80 U/L (ref 20–180)
CO2 SERPL-SCNC: 21.4 MMOL/L (ref 22–29)
CREAT SERPL-MCNC: 1.56 MG/DL (ref 0.57–1)
DEPRECATED RDW RBC AUTO: 46.9 FL (ref 37–54)
ERYTHROCYTE [DISTWIDTH] IN BLOOD BY AUTOMATED COUNT: 13.7 % (ref 12.3–15.4)
GFR SERPL CREATININE-BSD FRML MDRD: 31 ML/MIN/1.73
GLOBULIN UR ELPH-MCNC: 3.1 GM/DL
GLUCOSE SERPL-MCNC: 106 MG/DL (ref 65–99)
HBA1C MFR BLD: 5.1 % (ref 4.8–5.6)
HCT VFR BLD AUTO: 46.6 % (ref 34–46.6)
HGB BLD-MCNC: 14.9 G/DL (ref 12–15.9)
MCH RBC QN AUTO: 30 PG (ref 26.6–33)
MCHC RBC AUTO-ENTMCNC: 32 G/DL (ref 31.5–35.7)
MCV RBC AUTO: 94 FL (ref 79–97)
PLATELET # BLD AUTO: 180 10*3/MM3 (ref 140–450)
PMV BLD AUTO: 9.5 FL (ref 6–12)
POTASSIUM SERPL-SCNC: 4.7 MMOL/L (ref 3.5–5.2)
PROT SERPL-MCNC: 7 G/DL (ref 6–8.5)
RBC # BLD AUTO: 4.96 10*6/MM3 (ref 3.77–5.28)
SODIUM SERPL-SCNC: 139 MMOL/L (ref 136–145)
T3FREE SERPL-MCNC: 3.21 PG/ML (ref 2–4.4)
T4 FREE SERPL-MCNC: 1.09 NG/DL (ref 0.93–1.7)
TSH SERPL DL<=0.05 MIU/L-ACNC: 2.23 UIU/ML (ref 0.27–4.2)
URATE SERPL-MCNC: 5 MG/DL (ref 2.4–5.7)
WBC # BLD AUTO: 5.33 10*3/MM3 (ref 3.4–10.8)

## 2021-09-14 PROCEDURE — 84443 ASSAY THYROID STIM HORMONE: CPT

## 2021-09-14 PROCEDURE — 80053 COMPREHEN METABOLIC PANEL: CPT

## 2021-09-14 PROCEDURE — 84481 FREE ASSAY (FT-3): CPT

## 2021-09-14 PROCEDURE — 85027 COMPLETE CBC AUTOMATED: CPT

## 2021-09-14 PROCEDURE — 84439 ASSAY OF FREE THYROXINE: CPT

## 2021-09-14 PROCEDURE — 83704 LIPOPROTEIN BLD QUAN PART: CPT

## 2021-09-14 PROCEDURE — 84550 ASSAY OF BLOOD/URIC ACID: CPT

## 2021-09-14 PROCEDURE — 83036 HEMOGLOBIN GLYCOSYLATED A1C: CPT

## 2021-09-14 PROCEDURE — 82550 ASSAY OF CK (CPK): CPT

## 2021-09-14 PROCEDURE — 82306 VITAMIN D 25 HYDROXY: CPT

## 2021-09-14 PROCEDURE — 80061 LIPID PANEL: CPT

## 2021-09-14 PROCEDURE — 36415 COLL VENOUS BLD VENIPUNCTURE: CPT

## 2021-09-16 LAB
CHOLEST SERPL-MCNC: 141 MG/DL (ref 100–199)
HDL SERPL-SCNC: 31.1 UMOL/L
HDLC SERPL-MCNC: 51 MG/DL
LDL SERPL QN: 20.8 NM
LDL SERPL-SCNC: 791 NMOL/L
LDL SMALL SERPL-SCNC: 286 NMOL/L
LDLC SERPL CALC-MCNC: 69 MG/DL (ref 0–99)
TRIGL SERPL-MCNC: 118 MG/DL (ref 0–149)

## 2021-09-20 ENCOUNTER — OFFICE VISIT (OUTPATIENT)
Dept: INTERNAL MEDICINE | Facility: CLINIC | Age: 86
End: 2021-09-20

## 2021-09-20 VITALS
RESPIRATION RATE: 15 BRPM | SYSTOLIC BLOOD PRESSURE: 144 MMHG | HEART RATE: 68 BPM | BODY MASS INDEX: 37.56 KG/M2 | WEIGHT: 212 LBS | OXYGEN SATURATION: 98 % | HEIGHT: 63 IN | DIASTOLIC BLOOD PRESSURE: 72 MMHG

## 2021-09-20 DIAGNOSIS — D75.1 SECONDARY POLYCYTHEMIA: Chronic | ICD-10-CM

## 2021-09-20 DIAGNOSIS — N18.32 CHRONIC RENAL IMPAIRMENT, STAGE 3B (HCC): Chronic | ICD-10-CM

## 2021-09-20 DIAGNOSIS — Z87.39 HISTORY OF GOUT: Chronic | ICD-10-CM

## 2021-09-20 DIAGNOSIS — R60.0 BILATERAL LOWER EXTREMITY EDEMA: Chronic | ICD-10-CM

## 2021-09-20 DIAGNOSIS — I48.0 PAROXYSMAL ATRIAL FIBRILLATION (HCC): Chronic | ICD-10-CM

## 2021-09-20 DIAGNOSIS — R73.01 IMPAIRED FASTING GLUCOSE: Chronic | ICD-10-CM

## 2021-09-20 DIAGNOSIS — Z00.00 ENCOUNTER FOR SUBSEQUENT ANNUAL WELLNESS VISIT (AWV) IN MEDICARE PATIENT: Primary | ICD-10-CM

## 2021-09-20 DIAGNOSIS — G47.34 NOCTURNAL HYPOXEMIA: Chronic | ICD-10-CM

## 2021-09-20 DIAGNOSIS — E78.2 MIXED HYPERLIPIDEMIA: Chronic | ICD-10-CM

## 2021-09-20 DIAGNOSIS — I10 BENIGN ESSENTIAL HYPERTENSION: Chronic | ICD-10-CM

## 2021-09-20 DIAGNOSIS — Z51.81 THERAPEUTIC DRUG MONITORING: ICD-10-CM

## 2021-09-20 DIAGNOSIS — E04.2 MULTINODULAR GOITER: Chronic | ICD-10-CM

## 2021-09-20 DIAGNOSIS — E55.9 VITAMIN D DEFICIENCY: Chronic | ICD-10-CM

## 2021-09-20 DIAGNOSIS — E03.9 PRIMARY HYPOTHYROIDISM: Chronic | ICD-10-CM

## 2021-09-20 DIAGNOSIS — K21.9 GASTROESOPHAGEAL REFLUX DISEASE WITHOUT ESOPHAGITIS: Chronic | ICD-10-CM

## 2021-09-20 DIAGNOSIS — E66.01 MORBIDLY OBESE (HCC): Chronic | ICD-10-CM

## 2021-09-20 DIAGNOSIS — I47.1 SUPRAVENTRICULAR TACHYCARDIA (HCC): Chronic | ICD-10-CM

## 2021-09-20 PROCEDURE — 99214 OFFICE O/P EST MOD 30 MIN: CPT | Performed by: INTERNAL MEDICINE

## 2021-09-20 PROCEDURE — G0439 PPPS, SUBSEQ VISIT: HCPCS | Performed by: INTERNAL MEDICINE

## 2021-09-20 NOTE — PROGRESS NOTES
The ABCs of the Annual Wellness Visit  Subsequent Medicare Wellness Visit    No chief complaint on file.     Subjective    History of Present Illness:  Mandy Alarcon is a 88 y.o. female who presents for a Subsequent Medicare Wellness Visit.    The following portions of the patient's history were reviewed and   updated as appropriate: allergies, current medications, past family history, past medical history, past social history, past surgical history and problem list.    Compared to one year ago, the patient feels her physical   health is better.    Compared to one year ago, the patient feels her mental   health is the same.    Recent Hospitalizations:  She was not admitted to the hospital during the last year.       Current Medical Providers:  Patient Care Team:  Daryl Santos MD as PCP - General (Internal Medicine)    Outpatient Medications Prior to Visit   Medication Sig Dispense Refill   • acetaminophen (TYLENOL) 500 MG tablet Take 500 mg by mouth Every 6 (Six) Hours As Needed for Mild Pain .     • allopurinol (ZYLOPRIM) 300 MG tablet Take 1 p.o. daily for gout 90 tablet 3   • Biotin 1000 MCG chewable tablet Chew 1 tablet Daily.     • Cholecalciferol (VITAMIN D) 2000 UNITS tablet Take 2 tablets by mouth daily.     • levothyroxine (SYNTHROID, LEVOTHROID) 50 MCG tablet TAKE 1 TABLET BY MOUTH EVERY MORNING FOR THYROID 90 tablet 1   • metoprolol succinate XL (Toprol XL) 25 MG 24 hr tablet Take 1 p.o. daily for high blood pressure and heart palpitations 90 tablet 3   • Multiple Vitamins-Minerals (MULTIVITAMIN ADULTS PO) Take 1 tablet by mouth Daily.     • omeprazole (priLOSEC) 40 MG capsule TAKE ONE CAPSULE BY MOUTH EVERY DAY BEFORE FIRST MEAL FOR ACID REFLUX 30 capsule 3   • polyethylene glycol (MIRALAX) packet Take orally as directed for constipation     • pravastatin (PRAVACHOL) 40 MG tablet TAKE 1 TABLET BY MOUTH DAILY FOR HIGH CHOLESTEROL 90 tablet 1   • Restasis 0.05 % ophthalmic emulsion 2 (two) times a  "day.       No facility-administered medications prior to visit.       No opioid medication identified on active medication list. I have reviewed chart for other potential  high risk medication/s and harmful drug interactions in the elderly.          Aspirin is not on active medication list.  Aspirin use is not indicated based on review of current medical condition/s. Risk of harm outweighs potential benefits.  .    Patient Active Problem List   Diagnosis   • Benign essential hypertension   • Claustrophobia   • Gout   • Hyperlipidemia   • Primary hypothyroidism   • Impaired fasting glucose   • Nocturnal hypoxemia   • Secondary polycythemia   • Vitamin D deficiency   • Therapeutic drug monitoring   • Family history of coronary artery disease   • Bilateral sensorineural hearing loss, wears hearing aids   • Multinodular goiter   • Supraventricular tachycardia (CMS/HCC)   • Morbidly obese (CMS/HCC)   • Bilateral lower extremity edema   • Gastroesophageal reflux disease without esophagitis   • Paroxysmal atrial fibrillation (CMS/HCC)   • Chronic renal impairment, stage 3b (CMS/HCC)     Advance Care Planning  Advance Directive is not on file.  ACP discussion was held with the patient during this visit. Patient does not have an advance directive, information provided.    Review of Systems   Constitutional: Negative.    HENT: Negative.    Eyes: Negative.    Respiratory: Negative.    Cardiovascular: Positive for leg swelling.   Gastrointestinal: Negative.    Endocrine: Negative.    Musculoskeletal: Negative.    Skin: Negative.    Allergic/Immunologic: Negative.    Neurological: Negative.    Hematological: Negative.    Psychiatric/Behavioral: Negative.         Objective    Vitals:    09/20/21 0937   BP: 144/72   Pulse: 68   Resp: 15   SpO2: 98%   Weight: 96.2 kg (212 lb)   Height: 160 cm (62.99\")   PainSc: 0-No pain     BMI Readings from Last 1 Encounters:   09/20/21 37.57 kg/m²   BMI is above normal parameters. " Recommendations include: exercise counseling and nutrition counseling    Does the patient have evidence of cognitive impairment? No    Physical Exam     General: Alert and oriented x 3.  No acute distress.  Obese.  Normal affect.  HEENT: Pupils equal, round, reactive to light; extraocular movements intact; sclerae nonicteric; pharynx, ear canals and TMs normal.  Neck: Without JVD, thyromegaly, bruit, or adenopathy.  Lungs: Clear to auscultation in all fields.  Heart: Regular rate and rhythm without murmur, rub, gallop, or click.  Abdomen: Soft, nontender, without hepatosplenomegaly or hernia.  Bowel sounds normal.  : Deferred.  Rectal: Deferred.  Extremities: Without clubbing, cyanosis,  or pulse deficit.  1-2+ bilateral lower extremity edema below the knees.  No signs of cellulitis.  Mild chronic venous insufficiency changes noted.  Neurologic: Intact without focal deficit.  Normal station and gait observed during ingress and egress from the examination room.  Skin: Without significant lesion.  Musculoskeletal: Unremarkable.    Lab Results   Component Value Date    CHLPL 141 2021    TRIG 118 2021    HGBA1C 5.10 2021            HEALTH RISK ASSESSMENT    Smoking Status:  Social History     Tobacco Use   Smoking Status Former Smoker   • Packs/day: 0.50   • Start date: 1946   • Quit date: 1954   • Years since quittin.7   Smokeless Tobacco Never Used   Tobacco Comment    Stopped drinking at age 30.     Alcohol Consumption:  Social History     Substance and Sexual Activity   Alcohol Use No    Comment: caffiene use tea coffee     Fall Risk Screen:    ROXANNE Fall Risk Assessment was completed, and patient is at LOW risk for falls.Assessment completed on:3/15/2021    Depression Screening:  PHQ-2/PHQ-9 Depression Screening 3/15/2021   Little interest or pleasure in doing things 0   Feeling down, depressed, or hopeless 0   Total Score 0       Health Habits and Functional and Cognitive  Screening:  Functional & Cognitive Status 9/20/2021   Do you have difficulty preparing food and eating? No   Do you have difficulty bathing yourself, getting dressed or grooming yourself? No   Do you have difficulty using the toilet? No   Do you have difficulty moving around from place to place? No   Do you have trouble with steps or getting out of a bed or a chair? No   Current Diet Well Balanced Diet   Dental Exam Up to date   Eye Exam Up to date   Exercise (times per week) 3 times per week   Current Exercises Include Walking   Current Exercise Activities Include -   Do you need help using the phone?  No   Are you deaf or do you have serious difficulty hearing?  No   Do you need help with transportation? No   Do you need help shopping? No   Do you need help preparing meals?  No   Do you need help with housework?  No   Do you need help with laundry? No   Do you need help taking your medications? No   Do you need help managing money? No   Do you ever drive or ride in a car without wearing a seat belt? No   Have you felt unusual stress, anger or loneliness in the last month? No   Who do you live with? -   If you need help, do you have trouble finding someone available to you? No   Have you been bothered in the last four weeks by sexual problems? No   Do you have difficulty concentrating, remembering or making decisions? No       Age-appropriate Screening Schedule:  Refer to the list below for future screening recommendations based on patient's age, sex and/or medical conditions. Orders for these recommended tests are listed in the plan section. The patient has been provided with a written plan.    Health Maintenance   Topic Date Due   • INFLUENZA VACCINE  10/01/2021   • LIPID PANEL  09/14/2022   • MAMMOGRAM  05/10/2023   • DXA SCAN  05/10/2023   • TDAP/TD VACCINES (2 - Td or Tdap) 06/14/2027   • ZOSTER VACCINE  Discontinued              Assessment/Plan   CMS Preventative Services Quick Reference  Risk Factors  Identified During Encounter  Cardiovascular Disease  Obesity/Overweight   The above risks/problems have been discussed with the patient.  Follow up actions/plans if indicated are seen below in the Assessment/Plan Section.  Pertinent information has been shared with the patient in the After Visit Summary.    Diagnoses and all orders for this visit:    1. Encounter for subsequent annual wellness visit (AWV) in Medicare patient (Primary)    2. Impaired fasting glucose  -     Comprehensive Metabolic Panel; Future  -     Hemoglobin A1c; Future    3. Chronic renal impairment, stage 3b (CMS/HCC)  -     CBC (No Diff); Future  -     Comprehensive Metabolic Panel; Future    4. Benign essential hypertension    5. Hyperlipidemia  -     CK; Future  -     Comprehensive Metabolic Panel; Future  -     NMR LipoProfile; Future    6. Primary hypothyroidism  -     TSH; Future  -     T4, Free; Future  -     T3, Free; Future    7. Multinodular goiter    8. Gout  -     Uric Acid; Future    9. Vitamin D deficiency  -     Vitamin D 25 Hydroxy; Future    10. Nocturnal hypoxemia    11. Secondary polycythemia  -     CBC (No Diff); Future    12. Supraventricular tachycardia (CMS/HCC)    13. Paroxysmal atrial fibrillation (CMS/HCC)    14. Morbidly obese (CMS/HCC)    15. Bilateral lower extremity edema    16. Gastroesophageal reflux disease without esophagitis    17. Therapeutic drug monitoring        Follow Up:   No follow-ups on file.     An After Visit Summary and PPPS were made available to the patient.

## 2021-09-20 NOTE — PROGRESS NOTES
09/20/2021    Patient Information  Mandy Alarcon                                                                                          [address] [address]      5/30/1933  [unfilled]  There is no work phone number on file.    Chief Complaint:     Medicare wellness visit.  Follow-up lab work in order to monitor chronic medical issues listed in history of present illness.  No new problems.    History of Present Illness:    Patient with impaired fasting glucose, stage IIIb chronic renal impairment, hypertension, hyperlipidemia, hypothyroidism, multinodular goiter, gout, vitamin D deficiency, nocturnal hypoxemia and secondary polycythemia, SVT and paroxysmal atrial fibrillation, morbid obesity, lower extremity edema, esophageal reflux.  She presents today for subsequent Medicare wellness visit.  Patient also had lab work in order to monitor her chronic medical issues.  Past medical history reviewed and updated were necessary including health maintenance parameters.  This reveals she will be up-to-date or else accounted for after today's visit.    Review of Systems   Constitutional: Negative.   HENT: Negative.    Eyes: Negative.    Cardiovascular: Positive for leg swelling.   Respiratory: Negative.  Negative for shortness of breath.    Endocrine: Negative.    Hematologic/Lymphatic: Negative.    Skin: Negative.    Musculoskeletal: Negative.    Gastrointestinal: Negative.    Genitourinary: Negative.    Neurological: Negative.    Psychiatric/Behavioral: Negative.    Allergic/Immunologic: Negative.        Active Problems:    Patient Active Problem List   Diagnosis   • Benign essential hypertension   • Claustrophobia   • Gout   • Hyperlipidemia   • Primary hypothyroidism   • Impaired fasting glucose   • Nocturnal hypoxemia   • Secondary polycythemia   • Vitamin D deficiency   • Therapeutic drug monitoring   • Family history of coronary artery disease   • Bilateral sensorineural hearing  loss, wears hearing aids   • Multinodular goiter   • Supraventricular tachycardia (CMS/HCC)   • Morbidly obese (CMS/[facility])   • Bilateral lower extremity edema   • Gastroesophageal reflux disease without esophagitis   • Paroxysmal atrial fibrillation (CMS/HCC)   • Chronic renal impairment, stage 3b (CMS/HCC)         Past Medical History:   Diagnosis Date   • Benign essential hypertension 10/28/2012    2012--treatment for hypertension begun.   • Bilateral lower extremity edema 3/23/2020   • Bilateral sensorineural hearing loss, wears hearing aids 2017    Left is much worse than right.  Patient had multiple ear infections as a child.   • Chronic renal impairment, stage 3b (CMS/HCC) 9/15/2020   • Chronic renal insufficiency, stage II (mild), 2016--creatinine 1.35 2016--creatinine 1.35.  Baseline creatinine approximately 1.3.   • Chronic renal insufficiency, stage III (moderate) (CMS/HCC) 9/15/2020   • Claustrophobia 2016    This patient has significant nocturnal hypoxemia and I think that she could benefit from nocturnal oxygen therapy. The exact etiology of her hypoxemia is not clear. She could possibly have obstructive sleep apnea but this is not documented. We cannot test this patient for sleep apnea due to the fact that she is severely claustrophobic. Patient was a former smoker and it is possibly that COPD he is playing a role.   • Family history of coronary artery disease 2016    Patient's mother, father, 2 sisters and a brother all  from myocardial infarctions   • Gastroesophageal reflux disease without esophagitis 3/23/2020   • Gout 10/28/2012    2012--initial diagnosis and treatment of gout.   • History of acute pancreatitis 2020   • Hyperlipidemia 10/28/2012    2012--treatment for hyperlipidemia begun.   • Impaired fasting glucose 10/28/2012    2012--initial diagnosis impaired fasting glucose.   • Morbidly obese (CMS/HCC) 3/11/2019    • Nocturnal hypoxemia 8/2/2012 11/06/2014--patient did not receive nocturnal oxygen because of Medicare regulations.   05/15/2014--overnight oximetry revealed oxygen saturations less than 89% for 22 minutes and 40 seconds. Oxygen saturations less than or equal to 88% for 22 minutes and 40 seconds. Lowest oxygen saturation 83%. The longest continuous time with oxygen saturations less than or equal to 88% was 1 minute and 32 seconds.   08/02/2012--overnight oximetry revealed oxygen saturations less than 90% for one hour and 35 minutes. Oxygen saturations less than 89% 59 minutes. This patient has significant nocturnal hypoxemia and I think that she could benefit from nocturnal oxygen therapy. The exact etiology of her hypoxemia is not clear. She could possibly have obstructive sleep apnea but this is not documented. We cannot test this patient for sleep apnea due to the fact that she is severely claustrophobic. Patient was a former smoker and it is possibly that COPD he is playing a role.   • Pancreatitis    • Paroxysmal atrial fibrillation (CMS/HCC) 4/10/2020   • Pneumonia    • Postoperative nausea 3/23/2020   • Primary hypothyroidism 11/17/2015 March 11, 2019--TSH remains elevated slightly at 4.92.  Given the overall clinical picture including the multinodular goiter, we will initiate levothyroxine 50 mcg/day and reassess in about 6 weeks.  August 1, 2018--thyroid ultrasound reveals a multinodular thyroid with multiple subcentimeter nodules.  Only minimal increase in size of the largest nodule in the left lobe has occurred when compared    • Secondary polycythemia 8/2/2012 11/06/2014--patient did not receive nocturnal oxygen because of Medicare regulations.   05/15/2014--overnight oximetry revealed oxygen saturations less than 89% for 22 minutes and 40 seconds. Oxygen saturations less than or equal to 88% for 22 minutes and 40 seconds. Lowest oxygen saturation 83%. The longest continuous time with oxygen  saturations less than or equal to 88% was 1 minute and 32 seconds.   08/02/2012--overnight oximetry revealed oxygen saturations less than 90% for one hour and 35 minutes. Oxygen saturations less than 89% 59 minutes. This patient has significant nocturnal hypoxemia and I think that she could benefit from nocturnal oxygen therapy. The exact etiology of her hypoxemia is not clear. She could possibly have obstructive sleep apnea but this is not documented. We cannot test this patient for sleep apnea due to the fact that she is severely claustrophobic. Patient was a former smoker and it is possibly that COPD he is playing a role.   • Vitamin D deficiency 5/23/2016         Past Surgical History:   Procedure Laterality Date   • CHOLECYSTECTOMY WITH INTRAOPERATIVE CHOLANGIOGRAM N/A 3/12/2020    Procedure: LAPAROSCOPIC CHOLECYSTOSTOMY TUBE, INTRAOPERATIVE CHOLANGIOGRAM;  Surgeon: Bryce Andujar MD;  Location: [facility];  Service: General;  Laterality: N/A;   • CHOLECYSTECTOMY WITH INTRAOPERATIVE CHOLANGIOGRAM N/A 5/22/2020    Procedure: CHOLECYSTECTOMY LAPAROSCOPIC INTRAOPERATIVE CHOLANGIOGRAM and EGD;  Surgeon: Bryce Andujar MD;  Location: [facility];  Service: General;  Laterality: N/A;   • OOPHORECTOMY      age 48   • RADICAL ABDOMINAL HYSTERECTOMY  48 years old    48 years of age. Uterine fibroid tumors with menorrhagia - no cancer         Allergies   Allergen Reactions   • Cefaclor Anaphylaxis and Angioedema     caused angioedema 25 years ago; she tolerates cephalexin, amoxicillin, and ceftriaxone per my d/w patient and her daughter as well as amoxicillin-clavulanate confirmed in EPIC   • Sulfa Antibiotics Rash           Current Outpatient Medications:   •  acetaminophen (TYLENOL) 500 MG tablet, Take 500 mg by mouth Every 6 (Six) Hours As Needed for Mild Pain ., Disp: , Rfl:   •  allopurinol (ZYLOPRIM) 300 MG tablet, Take 1 p.o. daily for gout, Disp: 90 tablet, Rfl: 3  •  Biotin 1000 MCG chewable  tablet, Chew 1 tablet Daily., Disp: , Rfl:   •  Cholecalciferol (VITAMIN D) 2000 UNITS tablet, Take 2 tablets by mouth daily., Disp: , Rfl:   •  levothyroxine (SYNTHROID, LEVOTHROID) 50 MCG tablet, TAKE 1 TABLET BY MOUTH EVERY MORNING FOR THYROID, Disp: 90 tablet, Rfl: 1  •  metoprolol succinate XL (Toprol XL) 25 MG 24 hr tablet, Take 1 p.o. daily for high blood pressure and heart palpitations, Disp: 90 tablet, Rfl: 3  •  Multiple Vitamins-Minerals (MULTIVITAMIN ADULTS PO), Take 1 tablet by mouth Daily., Disp: , Rfl:   •  omeprazole (priLOSEC) 40 MG capsule, TAKE ONE CAPSULE BY MOUTH EVERY DAY BEFORE FIRST MEAL FOR ACID REFLUX, Disp: 30 capsule, Rfl: 3  •  polyethylene glycol (MIRALAX) packet, Take orally as directed for constipation, Disp: , Rfl:   •  pravastatin (PRAVACHOL) 40 MG tablet, TAKE 1 TABLET BY MOUTH DAILY FOR HIGH CHOLESTEROL, Disp: 90 tablet, Rfl: 1  •  Restasis 0.05 % ophthalmic emulsion, 2 (two) times a day., Disp: , Rfl:       Family History   Problem Relation Age of Onset   • Heart disease Mother         Ischemic.  from coronary artery disease.   • Heart disease Father         Ischemic.  from coronary artery disease.   • Heart disease Sister         Ischemic.  from coronary artery disease.   • Heart disease Brother         Ischemic.  from coronary artery disease.   • Heart disease Sister         Ischemic.  from coronary artery disease.   • Breast cancer Daughter    • Breast cancer Daughter          Social History     Socioeconomic History   • Marital status:      Spouse name: Not on file   • Number of children: 5   • Years of education: Not on file   • Highest education level: GED or equivalent   Tobacco Use   • Smoking status: Former Smoker     Packs/day: 0.50     Start date: 1946     Quit date: 1954     Years since quittin.7   • Smokeless tobacco: Never Used   • Tobacco comment: Stopped drinking at age 30.   Vaping Use   • Vaping Use: Never used  "  Substance and Sexual Activity   • Alcohol use: No     Comment: caffiene use tea coffee   • Drug use: No   • Sexual activity: Not Currently     Partners: Male         Vitals:    09/20/21 0937   BP: 144/72   Pulse: 68   Resp: 15   SpO2: 98%   Weight: 96.2 kg (212 lb)   Height: 160 cm (62.99\")        Body mass index is 37.57 kg/m².      Physical Exam:    General: Alert and oriented x 3.  No acute distress.  Obese.  Normal affect.  HEENT: Pupils equal, round, reactive to light; extraocular movements intact; sclerae nonicteric; pharynx, ear canals and TMs normal.  Neck: Without JVD, thyromegaly, bruit, or adenopathy.  Lungs: Clear to auscultation in all fields.  Heart: Regular rate and rhythm without murmur, rub, gallop, or click.  Abdomen: Soft, nontender, without hepatosplenomegaly or hernia.  Bowel sounds normal.  : Deferred.  Rectal: Deferred.  Extremities: Without clubbing, cyanosis,  or pulse deficit.  1-2+ bilateral lower extremity edema below the knees.  No signs of cellulitis.  Mild chronic venous insufficiency changes noted.  Neurologic: Intact without focal deficit.  Normal station and gait observed during ingress and egress from the examination room.  Skin: Without significant lesion.  Musculoskeletal: Unremarkable.    Lab/other results:    NMR is perfect with a total cholesterol of 141.  Triglycerides 118.  LDL particle #791.  HDL particle #31.1.  CMP normal except glucose 106, creatinine 1.56.  Estimated GFR 31.  Hemoglobin A1c 5.1.  Vitamin D normal at 44.7.  CPK normal at 80.  Thyroid function test normal.  Uric acid normal at 5.0.  CBC is normal.    Assessment/Plan:     Diagnosis Plan   1. Encounter for subsequent annual wellness visit (AWV) in Medicare patient     2. Impaired fasting glucose  Comprehensive Metabolic Panel    Hemoglobin A1c   3. Chronic renal impairment, stage 3b (CMS/HCC)  CBC (No Diff)    Comprehensive Metabolic Panel   4. Benign essential hypertension     5. Hyperlipidemia  CK    " Comprehensive Metabolic Panel    NMR LipoProfile   6. Primary hypothyroidism  TSH    T4, Free    T3, Free   7. Multinodular goiter     8. Gout  Uric Acid   9. Vitamin D deficiency  Vitamin D 25 Hydroxy   10. Nocturnal hypoxemia     11. Secondary polycythemia  CBC (No Diff)   12. Supraventricular tachycardia (CMS/HCC)     13. Paroxysmal atrial fibrillation (CMS/HCC)     14. Morbidly obese (CMS/HCC)     15. Bilateral lower extremity edema     16. Gastroesophageal reflux disease without esophagitis     17. Therapeutic drug monitoring       The subsequent Medicare wellness visit is documented on separate note.    Patient has mild impaired fasting glucose that does not require medication.  However, she is morbidly obese and I have strongly recommended she follow a low carbohydrate diet, exercise, and lose weight.  Her chronic renal impairment is stage IIIb and seems to be stable.  We will continue to monitor closely.  Her blood pressure seems to be well controlled on the current regimen.  Hyperlipidemia is under excellent control with pravastatin.  Her thyroid is therapeutic on 50 mcg of levothyroxine.  She has a multinodular goiter and and does not need an ultrasound at this time.  Her gout is stable on allopurinol.  She tolerates that well.  Vitamin D is in the normal range.  She has nocturnal hypoxemia and has had a history of secondary polycythemia which is not evident on today's labs.  We will continue to monitor.  Her lower extremity edema seems to be reasonably controlled.  Esophageal reflux controlled with omeprazole.    Plan is as follows: Strongly recommend low carbohydrate diet, exercise, and weight loss.  No changes in current medical regimen.  Patient will obtain fasting lab work and follow-up in 6 months or otherwise follow-up as needed.  I have also recommended that she received the Covid vaccine booster when it is available to her.    Procedures

## 2021-11-10 DIAGNOSIS — K21.9 GASTROESOPHAGEAL REFLUX DISEASE WITHOUT ESOPHAGITIS: ICD-10-CM

## 2021-11-10 RX ORDER — OMEPRAZOLE 40 MG/1
CAPSULE, DELAYED RELEASE ORAL
Qty: 30 CAPSULE | Refills: 5 | Status: SHIPPED | OUTPATIENT
Start: 2021-11-10 | End: 2022-04-08 | Stop reason: SDUPTHER

## 2022-01-03 DIAGNOSIS — K21.9 GASTROESOPHAGEAL REFLUX DISEASE WITHOUT ESOPHAGITIS: ICD-10-CM

## 2022-01-04 RX ORDER — OMEPRAZOLE 40 MG/1
CAPSULE, DELAYED RELEASE ORAL
Qty: 30 CAPSULE | Refills: 5 | OUTPATIENT
Start: 2022-01-04

## 2022-01-14 DIAGNOSIS — E03.9 PRIMARY HYPOTHYROIDISM: Primary | ICD-10-CM

## 2022-01-14 RX ORDER — LEVOTHYROXINE SODIUM 0.05 MG/1
TABLET ORAL
Qty: 90 TABLET | Refills: 1 | Status: SHIPPED | OUTPATIENT
Start: 2022-01-14 | End: 2022-03-21

## 2022-01-14 RX ORDER — PRAVASTATIN SODIUM 40 MG
TABLET ORAL
Qty: 90 TABLET | Refills: 1 | Status: SHIPPED | OUTPATIENT
Start: 2022-01-14 | End: 2022-07-06

## 2022-02-09 NOTE — PROGRESS NOTES
CHIEF COMPLAINT:    Follow-up cholecystectomy    HISTORY OF PRESENT ILLNESS:    Mandy Alarcon is a 87 y.o. female who was admitted to the hospital on 1/31/2020 with acute pancreatitis.  She recovered, but she returned to the hospital after discharge from the initial hospitalization on 2/27/2020 with worsening abdominal pain.  CT imaging showed more extensive pancreatitis.  In addition to pancreatic inflammatory changes there were large peripancreatic fluid collections.  As a consequence of pancreatic inflammatory changes, she developed cholestasis and a picture of acute cholecystitis.  I recommended cholecystectomy.  Surgery was extraordinarily difficult, and I elected to bail out by placing a cholecystostomy tube.  On 5/22/2020 I returned her to the operating room for laparoscopic cholecystectomy and removal of the cholecystostomy tube.  The surgery was difficult because of pericholecystic inflammation.  Intraoperative cholangiogram was performed and was normal.    Since discharge from the hospital, she has been tolerating a regular diet.  She still has early satiety.  There is no nausea or vomiting.  Bowel movements have been normal in consistency and color.    EXAM:    Alert. Oriented.  Lungs: Clear.  Heart: Regular.  Abdomen: Incisions are healing.  There is no erythema or drainage.  The abdomen is not distended.  Bowel sounds are present.  Extremities: Warm.    We reviewed the pathology report.  It showed acute and chronic cholecystitis.    ASSESSMENT:    Severe acute pancreatitis status post lengthy hospitalization.  Attempted laparoscopic cholecystectomy on 3/12/2020- aborted in favor of placement of cholecystostomy tube.  Laparoscopic cholecystectomy with intraoperative cholangiogram on 5/22/2020.    PLAN:    She has done well since surgery.    We discussed diet which I think should remain low-fat for at least the next 3 months because of the severity of her pancreatitis.  We discussed abstinence from  Case Management Discharge Planning Note    Patient name Kena Azar  Location [facility] [address] 200 MRN [id number]  : 1939 Date 2022       Current Admission Date: 2022  Current Admission Diagnosis:Femoral neck fracture   Patient Active Problem List    Diagnosis Date Noted    Hypernatremia 2022    Leukocytosis 2022    Acute kidney injury superimposed on chronic kidney disease stage 3 2022    Postoperative blood loss anemia 2022    Femoral neck fracture 2022    Chronic combined systolic and diastolic CHF     Stage 3a chronic kidney disease ([facility] [address] ) 2022    Essential hypertension 2022    Back pain     Medicare annual wellness visit, subsequent 2020    Alzheimer's dementia 2020      LOS (days): 5  Geometric Mean LOS (GMLOS) (days): 4 30  Days to GMLOS:-0 6     OBJECTIVE:  Risk of Unplanned Readmission Score: 21     Current admission status: Inpatient   Preferred Pharmacy:   [address] [place] [address]  Phone: [phone] Fax: [phone]    Primary Care Provider: Kary Marques MD    Primary Insurance: [address] [place] [address],[address] [place] [address]  Secondary Insurance:     DISCHARGE DETAILS:    Discharge planning discussed with[de-identified] Matias Maciel, daughter  Freedom of Choice: Yes  Comments - Freedom of Choice: SW following to assist with DCP  STR placement is being recommended  SW spoke with pt's daughter over phone about plans and facility choices  Daughter requested referrals be made to [facility], [facility] and [facility]  Per daughter she has been in contact with both facilities    CM contacted family/caregiver?: Yes  Were Treatment Team discharge recommendations reviewed with patient/caregiver?: Yes  Did patient/caregiver verbalize understanding of patient care needs?: Yes  Were patient/caregiver advised of the risks associated with not following Treatment Team discharge recommendations?: Yes    Contacts  Patient Contacts: Matias Maciel  Relationship to Patient[de-identified] Family  Contact Method: Phone  Phone Number: [phone]  Reason/Outcome: Discharge Planning    Other Referral/Resources/Interventions Provided:  Interventions: Short Term Rehab  Referral Comments: STR ref[facility] to [facility] and [facility]    Treatment Team Recommendation: Short Term Rehab  Discharge Destination Plan[de-identified] Short Term Rehab  Transport at Discharge : [facility] Ambulance alcohol.  There are no activity restrictions at this time.  She will call if there are abdominal or digestive complaints.

## 2022-02-10 ENCOUNTER — OFFICE VISIT (OUTPATIENT)
Dept: CARDIOLOGY | Facility: CLINIC | Age: 87
End: 2022-02-10

## 2022-02-10 VITALS
HEIGHT: 63 IN | DIASTOLIC BLOOD PRESSURE: 74 MMHG | SYSTOLIC BLOOD PRESSURE: 102 MMHG | BODY MASS INDEX: 38.8 KG/M2 | HEART RATE: 60 BPM | OXYGEN SATURATION: 99 % | WEIGHT: 219 LBS

## 2022-02-10 DIAGNOSIS — E78.2 MIXED HYPERLIPIDEMIA: Chronic | ICD-10-CM

## 2022-02-10 DIAGNOSIS — I48.0 PAROXYSMAL ATRIAL FIBRILLATION: Primary | Chronic | ICD-10-CM

## 2022-02-10 DIAGNOSIS — I47.1 SUPRAVENTRICULAR TACHYCARDIA: Chronic | ICD-10-CM

## 2022-02-10 DIAGNOSIS — I10 BENIGN ESSENTIAL HYPERTENSION: Chronic | ICD-10-CM

## 2022-02-10 PROCEDURE — 99214 OFFICE O/P EST MOD 30 MIN: CPT | Performed by: INTERNAL MEDICINE

## 2022-02-10 PROCEDURE — 93000 ELECTROCARDIOGRAM COMPLETE: CPT | Performed by: INTERNAL MEDICINE

## 2022-02-10 NOTE — PROGRESS NOTES
"    Subjective:     Encounter Date:02/10/2022      Patient ID: Mandy Alarcon is a 88 y.o. female.    Chief Complaint:  History of Present Illness    This is an 88 year old female patient with a history of hypertension, CKD stage 2, gout, hypothyroidism, tobacco use, hyperlipidemia, recent episode of near syncope, paroxsymal supraventricular tachycardia, who presents for follow up.         She presents today for annual follow-up.  She is accompanied by her daughter.  They both deny any episodes of palpitations or heart racing over the last year.  It does not appear that she is required any of the metoprolol.  She denies any chest pain, orthopnea, near-syncope or syncope, or lower extremity edema.  She has dyspnea on exertion that is largely stable and mainly occurs when she overexerts herself.  They do not really feel like this is changed significantly.     Prior History:  I saw the patient initially when she was admitted to the hospital following a near syncopal episode on 6/20/18.  Prior to admission the patient reports she was at home doing some work around her house.  She went outside for a couple of minutes and went back inside.  While she was walking around she had a sudden onset of sensation where she felt like her \"head went empty\".  She denied any palpitations, chest pain or dyspnea at that time.  She denied loss of consciousness.  While in the emergency room it was noted that her heart rate went up to the 140's-160's but she denied having any symptoms at that time. Per the ER notes she appeared to be in SVT and she was converted after given adenosine 6 mg and metoprolol tartrate 5 mg IV.  Unable to locate any of the EKG strips from the ER to confirm rhythm.  Her work up was unremarkable except for an minimally elevated d-dimer.  I had a low suspicion for thromboembolism at that time and felt that the abnormality was likely due to her age and renal insufficiency.  She underwent an echocardiogram during that " admission that showed normal left ventricular systolic function, with an EF of 60%, normal diastolic function, and mild aortic regurgitation.  She was started on metoprolol tartrate 25 mg bid, her hydrochlorothiazide was discontinued and she was sent home with a Zio monitor.      She presented to the cardiac evaluation center on 7/10/2018 from Dr. Santos' office after complaining of nocturnal chest pressure.  These symptoms began after she was discharged from the hospital, initially on a nightly basis.  She reported that the symptoms would last about 3 hours and are associated with significant shortness of breath.  It also appeared that she was having more shortness of breath and lower extremity edema than normal.  Her lab work at that time was unremarkable except for a persistently elevated d-dimer.  Proceeded with a VQ scan that same day that was low probability for a pulmonary embolism.  The following day she returned to the office for a stress test that showed no evidence of ischemia and an EF of 59%.  Her Zio monitor showed 53 nonsustained episodes of supraventricular tachycardia the longest lasting 54 beats and the fastest with a rate of 203 bpm.  She also had rare episodes of PVCs and nonsustained ventricular tachycardia longest lasting 9 beats with a rate of 166 bpm.  There was also a single nonsignificant pause of 2 seconds.     She was admitted to the hospital in 2/2020 with acute pancreatitis and pneumonia.  She developed atrial fibrillation with rapid ventricular rate during that admission that we are able to rate control and she eventually converted back to sinus rhythm.  I felt that her atrial fibrillation was related to her acute illness and did not recommend chronic anticoagulation at that time.  It appears that following that admission she had 2 additional admissions later in 2/2020 for recurrent pain.  A cholecystectomy was recommended but this was extremely difficult procedure and she underwent  bailout placement of a cholecystostomy tube instead.  She was discharged in March and then returned in May for a laparoscopic cholecystectomy which again was difficult but successful.  She has since followed up with her surgeon Dr. Andujar wound is noted to be doing well.  It does not appear that she had any recurrent issues with atrial fibrillation during those 2 following admissions.     In follow-up in 8/2020 she was doing much better.  She reported palpitation episodes every couple of weeks.  When he checked her heart rate with his episodes her heart rates range in the 80s.  When she had this episode she will take 1/2 tablet of atenolol with improvement in her symptoms.  I opted not to make any changes to her management at that time.    Review of Systems   Constitutional: Negative for malaise/fatigue.   HENT: Negative for hearing loss, hoarse voice, nosebleeds and sore throat.    Eyes: Negative for pain.   Cardiovascular: Positive for dyspnea on exertion. Negative for chest pain, claudication, cyanosis, irregular heartbeat, leg swelling, near-syncope, orthopnea, palpitations, paroxysmal nocturnal dyspnea and syncope.   Respiratory: Negative for shortness of breath and snoring.    Endocrine: Negative for cold intolerance, heat intolerance, polydipsia, polyphagia and polyuria.   Skin: Negative for itching and rash.   Musculoskeletal: Negative for arthritis, falls, joint pain, joint swelling, muscle cramps, muscle weakness and myalgias.   Gastrointestinal: Negative for constipation, diarrhea, dysphagia, heartburn, hematemesis, hematochezia, melena, nausea and vomiting.   Genitourinary: Negative for frequency, hematuria and hesitancy.   Neurological: Negative for excessive daytime sleepiness, dizziness, headaches, light-headedness, numbness and weakness.   Psychiatric/Behavioral: Negative for depression. The patient is not nervous/anxious.          Current Outpatient Medications:   •  acetaminophen (TYLENOL) 500  MG tablet, Take 500 mg by mouth Every 6 (Six) Hours As Needed for Mild Pain ., Disp: , Rfl:   •  allopurinol (ZYLOPRIM) 300 MG tablet, Take 1 p.o. daily for gout, Disp: 90 tablet, Rfl: 3  •  Biotin 1000 MCG chewable tablet, Chew 1 tablet Daily., Disp: , Rfl:   •  Cholecalciferol (VITAMIN D) 2000 UNITS tablet, Take 2 tablets by mouth daily., Disp: , Rfl:   •  levothyroxine (SYNTHROID, LEVOTHROID) 50 MCG tablet, Take 1 tablet by mouth daily., Disp: 90 tablet, Rfl: 1  •  metoprolol succinate XL (Toprol XL) 25 MG 24 hr tablet, Take 1 p.o. daily for high blood pressure and heart palpitations, Disp: 90 tablet, Rfl: 3  •  Multiple Vitamins-Minerals (MULTIVITAMIN ADULTS PO), Take 1 tablet by mouth Daily., Disp: , Rfl:   •  omeprazole (priLOSEC) 40 MG capsule, TAKE ONE CAPSULE BY MOUTH EVERY DAY BEFORE FIRST MEAL FOR ACID REFLUX, Disp: 30 capsule, Rfl: 5  •  polyethylene glycol (MIRALAX) packet, Take orally as directed for constipation, Disp: , Rfl:   •  pravastatin (PRAVACHOL) 40 MG tablet, Take one tablet by mouth daily., Disp: 90 tablet, Rfl: 1  •  Restasis 0.05 % ophthalmic emulsion, 2 (two) times a day., Disp: , Rfl:     Past Medical History:   Diagnosis Date   • Benign essential hypertension 10/28/2012    October 2012--treatment for hypertension begun.   • Bilateral lower extremity edema 3/23/2020   • Bilateral sensorineural hearing loss, wears hearing aids 6/14/2017    Left is much worse than right.  Patient had multiple ear infections as a child.   • Chronic renal impairment, stage 3b (HCC) 9/15/2020   • Chronic renal insufficiency, stage II (mild), 05/18/2016--creatinine 1.35 4/28/2016 05/18/2016--creatinine 1.35.  Baseline creatinine approximately 1.3.   • Chronic renal insufficiency, stage III (moderate) (HCC) 9/15/2020   • Claustrophobia 4/28/2016    This patient has significant nocturnal hypoxemia and I think that she could benefit from nocturnal oxygen therapy. The exact etiology of her hypoxemia is not  clear. She could possibly have obstructive sleep apnea but this is not documented. We cannot test this patient for sleep apnea due to the fact that she is severely claustrophobic. Patient was a former smoker and it is possibly that COPD he is playing a role.   • Family history of coronary artery disease 2016    Patient's mother, father, 2 sisters and a brother all  from myocardial infarctions   • Gastroesophageal reflux disease without esophagitis 3/23/2020   • Gout 10/28/2012    2012--initial diagnosis and treatment of gout.   • History of acute pancreatitis 2020   • Hyperlipidemia 10/28/2012    2012--treatment for hyperlipidemia begun.   • Impaired fasting glucose 10/28/2012    2012--initial diagnosis impaired fasting glucose.   • Morbidly obese (HCC) 3/11/2019   • Nocturnal hypoxemia 2014--patient did not receive nocturnal oxygen because of Medicare regulations.   05/15/2014--overnight oximetry revealed oxygen saturations less than 89% for 22 minutes and 40 seconds. Oxygen saturations less than or equal to 88% for 22 minutes and 40 seconds. Lowest oxygen saturation 83%. The longest continuous time with oxygen saturations less than or equal to 88% was 1 minute and 32 seconds.   2012--overnight oximetry revealed oxygen saturations less than 90% for one hour and 35 minutes. Oxygen saturations less than 89% 59 minutes. This patient has significant nocturnal hypoxemia and I think that she could benefit from nocturnal oxygen therapy. The exact etiology of her hypoxemia is not clear. She could possibly have obstructive sleep apnea but this is not documented. We cannot test this patient for sleep apnea due to the fact that she is severely claustrophobic. Patient was a former smoker and it is possibly that COPD he is playing a role.   • Pancreatitis    • Paroxysmal atrial fibrillation (HCC) 4/10/2020   • Pneumonia    • Postoperative nausea 3/23/2020   • Primary  hypothyroidism 11/17/2015 March 11, 2019--TSH remains elevated slightly at 4.92.  Given the overall clinical picture including the multinodular goiter, we will initiate levothyroxine 50 mcg/day and reassess in about 6 weeks.  August 1, 2018--thyroid ultrasound reveals a multinodular thyroid with multiple subcentimeter nodules.  Only minimal increase in size of the largest nodule in the left lobe has occurred when compared    • Secondary polycythemia 8/2/2012 11/06/2014--patient did not receive nocturnal oxygen because of Medicare regulations.   05/15/2014--overnight oximetry revealed oxygen saturations less than 89% for 22 minutes and 40 seconds. Oxygen saturations less than or equal to 88% for 22 minutes and 40 seconds. Lowest oxygen saturation 83%. The longest continuous time with oxygen saturations less than or equal to 88% was 1 minute and 32 seconds.   08/02/2012--overnight oximetry revealed oxygen saturations less than 90% for one hour and 35 minutes. Oxygen saturations less than 89% 59 minutes. This patient has significant nocturnal hypoxemia and I think that she could benefit from nocturnal oxygen therapy. The exact etiology of her hypoxemia is not clear. She could possibly have obstructive sleep apnea but this is not documented. We cannot test this patient for sleep apnea due to the fact that she is severely claustrophobic. Patient was a former smoker and it is possibly that COPD he is playing a role.   • Vitamin D deficiency 5/23/2016       Past Surgical History:   Procedure Laterality Date   • CHOLECYSTECTOMY WITH INTRAOPERATIVE CHOLANGIOGRAM N/A 3/12/2020    Procedure: LAPAROSCOPIC CHOLECYSTOSTOMY TUBE, INTRAOPERATIVE CHOLANGIOGRAM;  Surgeon: Bryce Andujar MD;  Location: [facility];  Service: General;  Laterality: N/A;   • CHOLECYSTECTOMY WITH INTRAOPERATIVE CHOLANGIOGRAM N/A 5/22/2020    Procedure: CHOLECYSTECTOMY LAPAROSCOPIC INTRAOPERATIVE CHOLANGIOGRAM and EGD;  Surgeon: Pratima  "Bryce GLYNN MD;  Location: [facility] OR;  Service: General;  Laterality: N/A;   • OOPHORECTOMY      age 48   • RADICAL ABDOMINAL HYSTERECTOMY  48 years old    48 years of age. Uterine fibroid tumors with menorrhagia - no cancer       Family History   Problem Relation Age of Onset   • Heart disease Mother         Ischemic.  from coronary artery disease.   • Heart disease Father         Ischemic.  from coronary artery disease.   • Heart disease Sister         Ischemic.  from coronary artery disease.   • Heart disease Brother         Ischemic.  from coronary artery disease.   • Heart disease Sister         Ischemic.  from coronary artery disease.   • Breast cancer Daughter    • Breast cancer Daughter        Social History     Tobacco Use   • Smoking status: Former Smoker     Packs/day: 0.50     Start date: 1946     Quit date: 1954     Years since quittin.1   • Smokeless tobacco: Never Used   • Tobacco comment: Stopped drinking at age 30.   Vaping Use   • Vaping Use: Never used   Substance Use Topics   • Alcohol use: No     Comment: caffiene use tea coffee   • Drug use: No         ECG 12 Lead    Date/Time: 2/10/2022 12:52 PM  Performed by: Lizbeth Laura MD  Authorized by: Lizbeth Laura MD   Comparison: compared with previous ECG   Similar to previous ECG  Rhythm: sinus rhythm               Objective:     Visit Vitals  /74 (BP Location: Left arm, Patient Position: Sitting, Cuff Size: Large Adult)   Pulse 60   Ht 160 cm (63\")   Wt 99.3 kg (219 lb)   SpO2 99%   BMI 38.79 kg/m²         Constitutional:       Appearance: Normal appearance. Well-developed.   Eyes:      General: Lids are normal.      Conjunctiva/sclera: Conjunctivae normal.      Pupils: Pupils are equal, round, and reactive to light.   HENT:      Head: Normocephalic and atraumatic.   Neck:      Vascular: No carotid bruit or JVD.      Lymphadenopathy: No cervical adenopathy.   Pulmonary:      Effort: Pulmonary effort " is normal.      Breath sounds: Normal breath sounds.   Cardiovascular:      Normal rate. Regular rhythm.      No gallop.   Pulses:     Radial: 2+ bilaterally.  Edema:     Peripheral edema absent.   Abdominal:      Palpations: Abdomen is soft.   Musculoskeletal:      Cervical back: Full passive range of motion without pain, normal range of motion and neck supple. Skin:     General: Skin is warm and dry.   Neurological:      Mental Status: Alert and oriented to person, place, and time.             Assessment:          Diagnosis Plan   1. Paroxysmal atrial fibrillation (HCC)     2. Benign essential hypertension     3. Supraventricular tachycardia (HCC)     4. Hyperlipidemia            Plan:       1.  Atrial fibrillation.  Isolated episode in the setting of an acute illness.  She has had no documented recurrence.  No plans for anticoagulation unless she does have documented recurrence.  2.  Paroxysmal supraventricular tachycardia.  Continue to take metoprolol as needed for palpitations.  3.  Hypertension.  Well-controlled without being on any antihypertensive medications.  4.  Hyperlipidemia.  On pravastatin which is managed by Dr. Santos.  5.  Hypothyroidism  6.  Hyperlipidemia  7.  Chronic kidney disease    I will plan on seeing the patient back again in 1 year or sooner if further issues arise.

## 2022-03-15 ENCOUNTER — LAB (OUTPATIENT)
Dept: LAB | Facility: HOSPITAL | Age: 87
End: 2022-03-15

## 2022-03-15 DIAGNOSIS — N18.32 CHRONIC RENAL IMPAIRMENT, STAGE 3B: Chronic | ICD-10-CM

## 2022-03-15 DIAGNOSIS — D75.1 SECONDARY POLYCYTHEMIA: Chronic | ICD-10-CM

## 2022-03-15 DIAGNOSIS — Z87.39 HISTORY OF GOUT: Chronic | ICD-10-CM

## 2022-03-15 DIAGNOSIS — E78.2 MIXED HYPERLIPIDEMIA: Chronic | ICD-10-CM

## 2022-03-15 DIAGNOSIS — E03.9 PRIMARY HYPOTHYROIDISM: Chronic | ICD-10-CM

## 2022-03-15 DIAGNOSIS — E55.9 VITAMIN D DEFICIENCY: Chronic | ICD-10-CM

## 2022-03-15 DIAGNOSIS — R73.01 IMPAIRED FASTING GLUCOSE: Chronic | ICD-10-CM

## 2022-03-15 LAB
25(OH)D3 SERPL-MCNC: 50.4 NG/ML (ref 30–100)
ALBUMIN SERPL-MCNC: 4.1 G/DL (ref 3.5–5.2)
ALBUMIN/GLOB SERPL: 1.3 G/DL
ALP SERPL-CCNC: 60 U/L (ref 39–117)
ALT SERPL W P-5'-P-CCNC: 17 U/L (ref 1–33)
ANION GAP SERPL CALCULATED.3IONS-SCNC: 7 MMOL/L (ref 5–15)
AST SERPL-CCNC: 20 U/L (ref 1–32)
BILIRUB SERPL-MCNC: 0.6 MG/DL (ref 0–1.2)
BUN SERPL-MCNC: 23 MG/DL (ref 8–23)
BUN/CREAT SERPL: 12.4 (ref 7–25)
CALCIUM SPEC-SCNC: 9.5 MG/DL (ref 8.6–10.5)
CHLORIDE SERPL-SCNC: 106 MMOL/L (ref 98–107)
CK SERPL-CCNC: 70 U/L (ref 20–180)
CO2 SERPL-SCNC: 27 MMOL/L (ref 22–29)
CREAT SERPL-MCNC: 1.86 MG/DL (ref 0.57–1)
DEPRECATED RDW RBC AUTO: 46.1 FL (ref 37–54)
EGFRCR SERPLBLD CKD-EPI 2021: 25.8 ML/MIN/1.73
ERYTHROCYTE [DISTWIDTH] IN BLOOD BY AUTOMATED COUNT: 13.5 % (ref 12.3–15.4)
GLOBULIN UR ELPH-MCNC: 3.1 GM/DL
GLUCOSE SERPL-MCNC: 114 MG/DL (ref 65–99)
HBA1C MFR BLD: 5.7 % (ref 4.8–5.6)
HCT VFR BLD AUTO: 50.9 % (ref 34–46.6)
HGB BLD-MCNC: 16.5 G/DL (ref 12–15.9)
MCH RBC QN AUTO: 30.4 PG (ref 26.6–33)
MCHC RBC AUTO-ENTMCNC: 32.4 G/DL (ref 31.5–35.7)
MCV RBC AUTO: 93.7 FL (ref 79–97)
PLATELET # BLD AUTO: 200 10*3/MM3 (ref 140–450)
PMV BLD AUTO: 10.1 FL (ref 6–12)
POTASSIUM SERPL-SCNC: 5.4 MMOL/L (ref 3.5–5.2)
PROT SERPL-MCNC: 7.2 G/DL (ref 6–8.5)
RBC # BLD AUTO: 5.43 10*6/MM3 (ref 3.77–5.28)
SODIUM SERPL-SCNC: 140 MMOL/L (ref 136–145)
T3FREE SERPL-MCNC: 2.72 PG/ML (ref 2–4.4)
T4 FREE SERPL-MCNC: 1.22 NG/DL (ref 0.93–1.7)
TSH SERPL DL<=0.05 MIU/L-ACNC: 5.35 UIU/ML (ref 0.27–4.2)
URATE SERPL-MCNC: 5.4 MG/DL (ref 2.4–5.7)
WBC NRBC COR # BLD: 7.1 10*3/MM3 (ref 3.4–10.8)

## 2022-03-15 PROCEDURE — 84550 ASSAY OF BLOOD/URIC ACID: CPT

## 2022-03-15 PROCEDURE — 36415 COLL VENOUS BLD VENIPUNCTURE: CPT

## 2022-03-15 PROCEDURE — 80061 LIPID PANEL: CPT

## 2022-03-15 PROCEDURE — 84443 ASSAY THYROID STIM HORMONE: CPT

## 2022-03-15 PROCEDURE — 82550 ASSAY OF CK (CPK): CPT

## 2022-03-15 PROCEDURE — 80053 COMPREHEN METABOLIC PANEL: CPT

## 2022-03-15 PROCEDURE — 85027 COMPLETE CBC AUTOMATED: CPT

## 2022-03-15 PROCEDURE — 83036 HEMOGLOBIN GLYCOSYLATED A1C: CPT

## 2022-03-15 PROCEDURE — 84481 FREE ASSAY (FT-3): CPT

## 2022-03-15 PROCEDURE — 84439 ASSAY OF FREE THYROXINE: CPT

## 2022-03-15 PROCEDURE — 82306 VITAMIN D 25 HYDROXY: CPT

## 2022-03-15 PROCEDURE — 83704 LIPOPROTEIN BLD QUAN PART: CPT

## 2022-03-17 LAB
CHOLEST SERPL-MCNC: 156 MG/DL (ref 100–199)
HDL SERPL-SCNC: 29.9 UMOL/L
HDLC SERPL-MCNC: 55 MG/DL
LDL SERPL QN: 20.9 NM
LDL SERPL-SCNC: 1009 NMOL/L
LDL SMALL SERPL-SCNC: 303 NMOL/L
LDLC SERPL CALC-MCNC: 81 MG/DL (ref 0–99)
TRIGL SERPL-MCNC: 111 MG/DL (ref 0–149)

## 2022-03-21 ENCOUNTER — OFFICE VISIT (OUTPATIENT)
Dept: INTERNAL MEDICINE | Facility: CLINIC | Age: 87
End: 2022-03-21

## 2022-03-21 VITALS
DIASTOLIC BLOOD PRESSURE: 60 MMHG | SYSTOLIC BLOOD PRESSURE: 124 MMHG | HEART RATE: 60 BPM | HEIGHT: 63 IN | WEIGHT: 215.2 LBS | RESPIRATION RATE: 18 BRPM | OXYGEN SATURATION: 98 % | BODY MASS INDEX: 38.13 KG/M2

## 2022-03-21 DIAGNOSIS — E78.2 MIXED HYPERLIPIDEMIA: Chronic | ICD-10-CM

## 2022-03-21 DIAGNOSIS — I47.1 SUPRAVENTRICULAR TACHYCARDIA: Chronic | ICD-10-CM

## 2022-03-21 DIAGNOSIS — E66.9 NON MORBID OBESITY: ICD-10-CM

## 2022-03-21 DIAGNOSIS — R60.0 BILATERAL LOWER EXTREMITY EDEMA: Chronic | ICD-10-CM

## 2022-03-21 DIAGNOSIS — D75.1 SECONDARY POLYCYTHEMIA: Chronic | ICD-10-CM

## 2022-03-21 DIAGNOSIS — G47.34 NOCTURNAL HYPOXEMIA: Chronic | ICD-10-CM

## 2022-03-21 DIAGNOSIS — E04.2 MULTINODULAR GOITER: Chronic | ICD-10-CM

## 2022-03-21 DIAGNOSIS — Z82.49 FAMILY HISTORY OF CORONARY ARTERY DISEASE: Chronic | ICD-10-CM

## 2022-03-21 DIAGNOSIS — Z51.81 THERAPEUTIC DRUG MONITORING: ICD-10-CM

## 2022-03-21 DIAGNOSIS — I10 BENIGN ESSENTIAL HYPERTENSION: Chronic | ICD-10-CM

## 2022-03-21 DIAGNOSIS — R73.01 IMPAIRED FASTING GLUCOSE: Primary | Chronic | ICD-10-CM

## 2022-03-21 DIAGNOSIS — Z87.39 HISTORY OF GOUT: Chronic | ICD-10-CM

## 2022-03-21 DIAGNOSIS — N18.32 CHRONIC RENAL IMPAIRMENT, STAGE 3B: Chronic | ICD-10-CM

## 2022-03-21 DIAGNOSIS — K21.9 GASTROESOPHAGEAL REFLUX DISEASE WITHOUT ESOPHAGITIS: Chronic | ICD-10-CM

## 2022-03-21 DIAGNOSIS — E55.9 VITAMIN D DEFICIENCY: Chronic | ICD-10-CM

## 2022-03-21 DIAGNOSIS — E03.9 PRIMARY HYPOTHYROIDISM: Chronic | ICD-10-CM

## 2022-03-21 DIAGNOSIS — H90.3 BILATERAL SENSORINEURAL HEARING LOSS: Chronic | ICD-10-CM

## 2022-03-21 PROCEDURE — 99214 OFFICE O/P EST MOD 30 MIN: CPT | Performed by: INTERNAL MEDICINE

## 2022-03-21 RX ORDER — LEVOTHYROXINE SODIUM 0.07 MG/1
TABLET ORAL
Qty: 90 TABLET | Refills: 3 | Status: SHIPPED | OUTPATIENT
Start: 2022-03-21 | End: 2022-08-15 | Stop reason: SDUPTHER

## 2022-03-21 NOTE — PROGRESS NOTES
03/21/2022    Patient Information  Mandy Alarcon                                                                                          [address] [address]      5/30/1933  [unfilled]  There is no work phone number on file.    Chief Complaint:     Follow-up lab work in order to monitor chronic medical issues listed in history of present illness.  No new acute complaints.    History of Present Illness:    Patient with impaired fasting glucose, hyperlipidemia, hypertension, stage IIIb chronic renal impairment, gout, primary hypothyroidism, multinodular goiter, vitamin D deficiency, lower extremity edema, secondary polycythemia nocturnal hypoxemia, family history of coronary artery disease, sensorineural hearing loss, history of SVT, esophageal reflux, obesity.  She presents today for follow-up with lab prior in order to monitor chronic medical issues.  Her past medical history reviewed and updated were necessary including health maintenance parameters.  This reveals she is currently up-to-date or else accounted for.    Review of Systems   Constitutional: Negative.   HENT: Negative.    Eyes: Negative.    Cardiovascular: Negative.  Negative for leg swelling.   Respiratory: Negative.    Endocrine: Negative.    Hematologic/Lymphatic: Negative.    Skin: Negative.    Musculoskeletal: Positive for arthritis and joint pain.   Gastrointestinal: Negative.    Genitourinary: Negative.    Neurological: Negative.    Psychiatric/Behavioral: Negative.    Allergic/Immunologic: Negative.        Active Problems:    Patient Active Problem List   Diagnosis   • Benign essential hypertension   • Claustrophobia   • Gout   • Hyperlipidemia   • Primary hypothyroidism   • Impaired fasting glucose   • Nocturnal hypoxemia   • Secondary polycythemia   • Vitamin D deficiency   • Therapeutic drug monitoring   • Family history of coronary artery disease   • Bilateral sensorineural hearing loss, wears hearing aids    • Multinodular goiter   • Supraventricular tachycardia (HCC)   • Non morbid obesity   • Bilateral lower extremity edema   • Gastroesophageal reflux disease without esophagitis   • Paroxysmal atrial fibrillation (HCC)   • Chronic renal impairment, stage 3b ([facility])         Past Medical History:   Diagnosis Date   • Benign essential hypertension 10/28/2012    2012--treatment for hypertension begun.   • Bilateral lower extremity edema 2020   • Bilateral sensorineural hearing loss, wears hearing aids 2017    Left is much worse than right.  Patient had multiple ear infections as a child.   • Chronic renal impairment, stage 3b (HCC) 09/15/2020   • Claustrophobia 2016    This patient has significant nocturnal hypoxemia and I think that she could benefit from nocturnal oxygen therapy. The exact etiology of her hypoxemia is not clear. She could possibly have obstructive sleep apnea but this is not documented. We cannot test this patient for sleep apnea due to the fact that she is severely claustrophobic. Patient was a former smoker and it is possibly that COPD he is playing a role.   • Family history of coronary artery disease 2016    Patient's mother, father, 2 sisters and a brother all  from myocardial infarctions   • Gastroesophageal reflux disease without esophagitis 2020   • Gout 10/28/2012    2012--initial diagnosis and treatment of gout.   • History of acute pancreatitis 2020   • Hyperlipidemia 10/28/2012    2012--treatment for hyperlipidemia begun.   • Impaired fasting glucose 10/28/2012    2012--initial diagnosis impaired fasting glucose.   • Morbidly obese (HCC) 2019   • Nocturnal hypoxemia 2012--patient did not receive nocturnal oxygen because of Medicare regulations.   05/15/2014--overnight oximetry revealed oxygen saturations less than 89% for 22 minutes and 40 seconds. Oxygen saturations less than or equal to 88%  for 22 minutes and 40 seconds. Lowest oxygen saturation 83%. The longest continuous time with oxygen saturations less than or equal to 88% was 1 minute and 32 seconds.   08/02/2012--overnight oximetry revealed oxygen saturations less than 90% for one hour and 35 minutes. Oxygen saturations less than 89% 59 minutes. This patient has significant nocturnal hypoxemia and I think that she could benefit from nocturnal oxygen therapy. The exact etiology of her hypoxemia is not clear. She could possibly have obstructive sleep apnea but this is not documented. We cannot test this patient for sleep apnea due to the fact that she is severely claustrophobic. Patient was a former smoker and it is possibly that COPD he is playing a role.   • Non morbid obesity 03/11/2019   • Paroxysmal atrial fibrillation (HCC) 04/10/2020   • Primary hypothyroidism 11/17/2015 March 11, 2019--TSH remains elevated slightly at 4.92.  Given the overall clinical picture including the multinodular goiter, we will initiate levothyroxine 50 mcg/day and reassess in about 6 weeks.  August 1, 2018--thyroid ultrasound reveals a multinodular thyroid with multiple subcentimeter nodules.  Only minimal increase in size of the largest nodule in the left lobe has occurred when compared    • Secondary polycythemia 08/02/2012 11/06/2014--patient did not receive nocturnal oxygen because of Medicare regulations.   05/15/2014--overnight oximetry revealed oxygen saturations less than 89% for 22 minutes and 40 seconds. Oxygen saturations less than or equal to 88% for 22 minutes and 40 seconds. Lowest oxygen saturation 83%. The longest continuous time with oxygen saturations less than or equal to 88% was 1 minute and 32 seconds.   08/02/2012--overnight oximetry revealed oxygen saturations less than 90% for one hour and 35 minutes. Oxygen saturations less than 89% 59 minutes. This patient has significant nocturnal hypoxemia and I think that she could benefit from  nocturnal oxygen therapy. The exact etiology of her hypoxemia is not clear. She could possibly have obstructive sleep apnea but this is not documented. We cannot test this patient for sleep apnea due to the fact that she is severely claustrophobic. Patient was a former smoker and it is possibly that COPD he is playing a role.   • Vitamin D deficiency 05/23/2016         Past Surgical History:   Procedure Laterality Date   • CHOLECYSTECTOMY WITH INTRAOPERATIVE CHOLANGIOGRAM N/A 3/12/2020    Procedure: LAPAROSCOPIC CHOLECYSTOSTOMY TUBE, INTRAOPERATIVE CHOLANGIOGRAM;  Surgeon: Bryce Andujar MD;  Location: [facility];  Service: General;  Laterality: N/A;   • CHOLECYSTECTOMY WITH INTRAOPERATIVE CHOLANGIOGRAM N/A 5/22/2020    Procedure: CHOLECYSTECTOMY LAPAROSCOPIC INTRAOPERATIVE CHOLANGIOGRAM and EGD;  Surgeon: Bryce Andujar MD;  Location: [facility];  Service: General;  Laterality: N/A;   • OOPHORECTOMY      age 48   • RADICAL ABDOMINAL HYSTERECTOMY  48 years old    48 years of age. Uterine fibroid tumors with menorrhagia - no cancer         Allergies   Allergen Reactions   • Cefaclor Anaphylaxis and Angioedema     caused angioedema 25 years ago; she tolerates cephalexin, amoxicillin, and ceftriaxone per my d/w patient and her daughter as well as amoxicillin-clavulanate confirmed in EPIC   • Sulfa Antibiotics Rash           Current Outpatient Medications:   •  acetaminophen (TYLENOL) 500 MG tablet, Take 500 mg by mouth Every 6 (Six) Hours As Needed for Mild Pain ., Disp: , Rfl:   •  allopurinol (ZYLOPRIM) 300 MG tablet, Take 1 p.o. daily for gout, Disp: 90 tablet, Rfl: 3  •  Biotin 1000 MCG chewable tablet, Chew 1 tablet Daily., Disp: , Rfl:   •  Cholecalciferol (VITAMIN D) 2000 UNITS tablet, Take 2 tablets by mouth daily., Disp: , Rfl:   •  metoprolol succinate XL (Toprol XL) 25 MG 24 hr tablet, Take 1 p.o. daily for high blood pressure and heart palpitations, Disp: 90 tablet, Rfl: 3  •  Multiple  "Vitamins-Minerals (MULTIVITAMIN ADULTS PO), Take 1 tablet by mouth Daily., Disp: , Rfl:   •  omeprazole (priLOSEC) 40 MG capsule, TAKE ONE CAPSULE BY MOUTH EVERY DAY BEFORE FIRST MEAL FOR ACID REFLUX, Disp: 30 capsule, Rfl: 5  •  polyethylene glycol (MIRALAX) packet, Take orally as directed for constipation, Disp: , Rfl:   •  pravastatin (PRAVACHOL) 40 MG tablet, Take one tablet by mouth daily., Disp: 90 tablet, Rfl: 1  •  Restasis 0.05 % ophthalmic emulsion, 2 (two) times a day., Disp: , Rfl:   •  levothyroxine (Synthroid) 75 MCG tablet, Take 1 p.o. daily for low thyroid, Disp: 90 tablet, Rfl: 3      Family History   Problem Relation Age of Onset   • Heart disease Mother         Ischemic.  from coronary artery disease.   • Heart disease Father         Ischemic.  from coronary artery disease.   • Heart disease Sister         Ischemic.  from coronary artery disease.   • Heart disease Brother         Ischemic.  from coronary artery disease.   • Heart disease Sister         Ischemic.  from coronary artery disease.   • Breast cancer Daughter    • Breast cancer Daughter          Social History     Socioeconomic History   • Marital status:    • Number of children: 5   • Highest education level: GED or equivalent   Tobacco Use   • Smoking status: Former Smoker     Packs/day: 0.50     Start date: 1946     Quit date: 1954     Years since quittin.2   • Smokeless tobacco: Never Used   • Tobacco comment: Stopped drinking at age 30.   Vaping Use   • Vaping Use: Never used   Substance and Sexual Activity   • Alcohol use: No     Comment: caffiene use tea coffee   • Drug use: No   • Sexual activity: Not Currently     Partners: Male         Vitals:    22 0928   BP: 124/60   Pulse: 60   Resp: 18   SpO2: 98%   Weight: 97.6 kg (215 lb 3.2 oz)   Height: 160 cm (63\")        Body mass index is 38.12 kg/m².      Physical Exam:    General: Alert and oriented x 3.  No acute distress.  Normal " affect.  HEENT: Pupils equal, round, reactive to light; extraocular movements intact; sclerae nonicteric; pharynx, ear canals and TMs normal.  Neck: Without JVD, thyromegaly, bruit, or adenopathy.  Lungs: Clear to auscultation in all fields.  Heart: Regular rate and rhythm without murmur, rub, gallop, or click.  Abdomen: Soft, nontender, without hepatosplenomegaly or hernia.  Bowel sounds normal.  : Deferred.  Rectal: Deferred.  Extremities: Without clubbing, cyanosis, edema, or pulse deficit.  Neurologic: Intact without focal deficit.  Normal station and gait observed during ingress and egress from the examination room.  Skin: Without significant lesion.  Musculoskeletal: Unremarkable.    Lab/other results:    CBC reveals elevated hemoglobin of 16.5.  Hematocrit is elevated at 50.9.  CPK normal.  CMP normal except glucose 114, creatinine 1.86.  Estimated GFR 25.8.  Hemoglobin A1c 5.7.  NMR reveals a total cholesterol of 156.  Triglycerides 111.  LDL particle number slightly elevated 1009.  HDL particle number low at 29.9.  TSH is elevated at 5.35.  Free T3 and free T4 are normal.  Vitamin D normal at 50.4.  Uric acid normal at 5.4.    Assessment/Plan:     Diagnosis Plan   1. Impaired fasting glucose  Comprehensive Metabolic Panel    Hemoglobin A1c   2. Hyperlipidemia  CK    Comprehensive Metabolic Panel    NMR LipoProfile   3. Benign essential hypertension     4. Chronic renal impairment, stage 3b (HCC)     5. Gout     6. Primary hypothyroidism  TSH    T4, Free    T3, Free    levothyroxine (Synthroid) 75 MCG tablet   7. Multinodular goiter  levothyroxine (Synthroid) 75 MCG tablet   8. Vitamin D deficiency  Vitamin D 25 Hydroxy   9. Bilateral lower extremity edema     10. Secondary polycythemia  CBC (No Diff)   11. Nocturnal hypoxemia     12. Family history of coronary artery disease     13. Bilateral sensorineural hearing loss, wears hearing aids     14. Supraventricular tachycardia (HCC)     15. Gastroesophageal  reflux disease without esophagitis     16. Non morbid obesity     17. Therapeutic drug monitoring       Patient has mild impaired fasting glucose that does not require medication.  Patient does have nonmorbid obesity with a BMI of 38 approximately.  Strongly encouraged her to follow a low carbohydrate diet, exercise, and lose weight.  Hyperlipidemia is under good control.  Gout is stable on allopurinol.  She has stage IIIb chronic renal impairment that seems to be fairly stable but we need to monitor closely.  Her TSH is elevated and although her free T3 and free T4 are normal, I do think dose adjustment of levothyroxine is indicated.  Vitamin D is in the normal range.  Her lower extremity edema is well controlled and essentially not evident today.  She has nocturnal hypoxemia and is not interested in sleep testing.  This is the cause of her secondary polycythemia which is mild.  She wears hearing aids for her sensorineural hearing loss.  She has history of SVT that is currently controlled with metoprolol which helps with her blood pressure as well.  Esophageal reflux controlled with omeprazole.  Once again, dietary measures and weight loss will help.    Plan is as follows: Recommend continued low carbohydrate diet and weight loss efforts.  Increase levothyroxine to 75 mcg/day.  No other changes in medical regimen.  Patient will follow-up in 4 months with lab prior or follow-up as needed.  Also made her aware once again not to take any medication either prescription or over-the-counter without clearing it with me first.      Procedures

## 2022-04-08 DIAGNOSIS — K21.9 GASTROESOPHAGEAL REFLUX DISEASE WITHOUT ESOPHAGITIS: ICD-10-CM

## 2022-04-08 RX ORDER — OMEPRAZOLE 40 MG/1
CAPSULE, DELAYED RELEASE ORAL
Qty: 30 CAPSULE | Refills: 5 | Status: SHIPPED | OUTPATIENT
Start: 2022-04-08 | End: 2022-08-15 | Stop reason: SDUPTHER

## 2022-04-08 NOTE — TELEPHONE ENCOUNTER
Caller: Mandy Alarcon    Relationship: Self    Best call back number: [phone]    Requested Prescriptions:   Requested Prescriptions     Pending Prescriptions Disp Refills   • omeprazole (priLOSEC) 40 MG capsule 30 capsule 5        Pharmacy where request should be sent: [id number] [address] AT [facility] [phone] [facility] [phone]      Additional details provided by patient: PATIENT IS COMPLETELY OUT.     Does the patient have less than a 3 day supply:  [x] Yes  [] No    Ward Eldridge Rep   04/08/22 12:41 EDT

## 2022-04-15 DIAGNOSIS — Z87.39 HISTORY OF GOUT: Chronic | ICD-10-CM

## 2022-04-15 RX ORDER — ALLOPURINOL 300 MG/1
TABLET ORAL
Qty: 90 TABLET | Refills: 3 | Status: ON HOLD | OUTPATIENT
Start: 2022-04-15 | End: 2022-06-28

## 2022-04-27 DIAGNOSIS — I47.1 SUPRAVENTRICULAR TACHYCARDIA: Chronic | ICD-10-CM

## 2022-04-27 DIAGNOSIS — I10 BENIGN ESSENTIAL HYPERTENSION: Chronic | ICD-10-CM

## 2022-04-28 RX ORDER — METOPROLOL SUCCINATE 25 MG/1
TABLET, EXTENDED RELEASE ORAL
Qty: 90 TABLET | Refills: 3 | Status: SHIPPED | OUTPATIENT
Start: 2022-04-28 | End: 2022-08-15 | Stop reason: SDUPTHER

## 2022-05-31 ENCOUNTER — TRANSCRIBE ORDERS (OUTPATIENT)
Dept: ADMINISTRATIVE | Facility: HOSPITAL | Age: 87
End: 2022-05-31

## 2022-05-31 DIAGNOSIS — Z12.31 VISIT FOR SCREENING MAMMOGRAM: Primary | ICD-10-CM

## 2022-06-07 ENCOUNTER — HOSPITAL ENCOUNTER (OUTPATIENT)
Dept: MAMMOGRAPHY | Facility: HOSPITAL | Age: 87
Discharge: HOME | End: 2022-06-07
Admitting: INTERNAL MEDICINE

## 2022-06-07 DIAGNOSIS — Z12.31 VISIT FOR SCREENING MAMMOGRAM: ICD-10-CM

## 2022-06-07 PROCEDURE — 77063 BREAST TOMOSYNTHESIS BI: CPT

## 2022-06-07 PROCEDURE — 77067 SCR MAMMO BI INCL CAD: CPT

## 2022-06-28 ENCOUNTER — APPOINTMENT (OUTPATIENT)
Dept: CT IMAGING | Facility: HOSPITAL | Age: 87
End: 2022-06-28

## 2022-06-28 ENCOUNTER — TELEPHONE (OUTPATIENT)
Dept: INTERNAL MEDICINE | Facility: CLINIC | Age: 87
End: 2022-06-28

## 2022-06-28 ENCOUNTER — APPOINTMENT (OUTPATIENT)
Dept: GENERAL RADIOLOGY | Facility: HOSPITAL | Age: 87
End: 2022-06-28

## 2022-06-28 ENCOUNTER — HOSPITAL ENCOUNTER (INPATIENT)
Facility: HOSPITAL | Age: 87
LOS: 6 days | Discharge: HOME | End: 2022-07-04
Attending: EMERGENCY MEDICINE | Admitting: INTERNAL MEDICINE

## 2022-06-28 DIAGNOSIS — R07.89 ATYPICAL CHEST PAIN: ICD-10-CM

## 2022-06-28 DIAGNOSIS — K85.10 ACUTE BILIARY PANCREATITIS WITHOUT INFECTION OR NECROSIS: ICD-10-CM

## 2022-06-28 DIAGNOSIS — R74.01 TRANSAMINITIS: ICD-10-CM

## 2022-06-28 DIAGNOSIS — I10 HYPERTENSION, UNSPECIFIED TYPE: ICD-10-CM

## 2022-06-28 DIAGNOSIS — N18.9 CHRONIC KIDNEY DISEASE, UNSPECIFIED CKD STAGE: ICD-10-CM

## 2022-06-28 DIAGNOSIS — K85.90 ACUTE PANCREATITIS, UNSPECIFIED COMPLICATION STATUS, UNSPECIFIED PANCREATITIS TYPE: Primary | ICD-10-CM

## 2022-06-28 LAB
ALBUMIN SERPL-MCNC: 3.6 G/DL (ref 3.5–5.2)
ALBUMIN/GLOB SERPL: 1 G/DL
ALP SERPL-CCNC: 101 U/L (ref 39–117)
ALT SERPL W P-5'-P-CCNC: 338 U/L (ref 1–33)
ANION GAP SERPL CALCULATED.3IONS-SCNC: 10 MMOL/L (ref 5–15)
AST SERPL-CCNC: 442 U/L (ref 1–32)
BASOPHILS # BLD AUTO: 0.04 10*3/MM3 (ref 0–0.2)
BASOPHILS NFR BLD AUTO: 0.3 % (ref 0–1.5)
BILIRUB SERPL-MCNC: 2 MG/DL (ref 0–1.2)
BUN SERPL-MCNC: 22 MG/DL (ref 8–23)
BUN/CREAT SERPL: 16.4 (ref 7–25)
CALCIUM SPEC-SCNC: 8.8 MG/DL (ref 8.6–10.5)
CHLORIDE SERPL-SCNC: 106 MMOL/L (ref 98–107)
CO2 SERPL-SCNC: 19 MMOL/L (ref 22–29)
CREAT SERPL-MCNC: 1.34 MG/DL (ref 0.57–1)
DEPRECATED RDW RBC AUTO: 45 FL (ref 37–54)
EGFRCR SERPLBLD CKD-EPI 2021: 38 ML/MIN/1.73
EOSINOPHIL # BLD AUTO: 0.06 10*3/MM3 (ref 0–0.4)
EOSINOPHIL NFR BLD AUTO: 0.5 % (ref 0.3–6.2)
ERYTHROCYTE [DISTWIDTH] IN BLOOD BY AUTOMATED COUNT: 13.4 % (ref 12.3–15.4)
GLOBULIN UR ELPH-MCNC: 3.6 GM/DL
GLUCOSE SERPL-MCNC: 128 MG/DL (ref 65–99)
HCT VFR BLD AUTO: 46.8 % (ref 34–46.6)
HGB BLD-MCNC: 15.7 G/DL (ref 12–15.9)
HOLD SPECIMEN: NORMAL
IMM GRANULOCYTES # BLD AUTO: 0.08 10*3/MM3 (ref 0–0.05)
IMM GRANULOCYTES NFR BLD AUTO: 0.7 % (ref 0–0.5)
LIPASE SERPL-CCNC: >3000 U/L (ref 13–60)
LYMPHOCYTES # BLD AUTO: 1.03 10*3/MM3 (ref 0.7–3.1)
LYMPHOCYTES NFR BLD AUTO: 8.4 % (ref 19.6–45.3)
MCH RBC QN AUTO: 31 PG (ref 26.6–33)
MCHC RBC AUTO-ENTMCNC: 33.5 G/DL (ref 31.5–35.7)
MCV RBC AUTO: 92.3 FL (ref 79–97)
MONOCYTES # BLD AUTO: 0.98 10*3/MM3 (ref 0.1–0.9)
MONOCYTES NFR BLD AUTO: 8 % (ref 5–12)
NEUTROPHILS NFR BLD AUTO: 10.02 10*3/MM3 (ref 1.7–7)
NEUTROPHILS NFR BLD AUTO: 82.1 % (ref 42.7–76)
NRBC BLD AUTO-RTO: 0 /100 WBC (ref 0–0.2)
NT-PROBNP SERPL-MCNC: 2144 PG/ML (ref 0–1800)
PLATELET # BLD AUTO: 181 10*3/MM3 (ref 140–450)
PMV BLD AUTO: 9.8 FL (ref 6–12)
POTASSIUM SERPL-SCNC: 4.2 MMOL/L (ref 3.5–5.2)
PROT SERPL-MCNC: 7.2 G/DL (ref 6–8.5)
QT INTERVAL: 422 MS
RBC # BLD AUTO: 5.07 10*6/MM3 (ref 3.77–5.28)
SARS-COV-2 RNA PNL SPEC NAA+PROBE: NOT DETECTED
SODIUM SERPL-SCNC: 135 MMOL/L (ref 136–145)
TROPONIN T SERPL-MCNC: <0.01 NG/ML (ref 0–0.03)
WBC NRBC COR # BLD: 12.21 10*3/MM3 (ref 3.4–10.8)
WHOLE BLOOD HOLD COAG: NORMAL
WHOLE BLOOD HOLD SPECIMEN: NORMAL

## 2022-06-28 PROCEDURE — 99284 EMERGENCY DEPT VISIT MOD MDM: CPT

## 2022-06-28 PROCEDURE — 93005 ELECTROCARDIOGRAM TRACING: CPT | Performed by: EMERGENCY MEDICINE

## 2022-06-28 PROCEDURE — 80053 COMPREHEN METABOLIC PANEL: CPT | Performed by: EMERGENCY MEDICINE

## 2022-06-28 PROCEDURE — 84484 ASSAY OF TROPONIN QUANT: CPT | Performed by: EMERGENCY MEDICINE

## 2022-06-28 PROCEDURE — 82565 ASSAY OF CREATININE: CPT

## 2022-06-28 PROCEDURE — 74177 CT ABD & PELVIS W/CONTRAST: CPT

## 2022-06-28 PROCEDURE — 93005 ELECTROCARDIOGRAM TRACING: CPT

## 2022-06-28 PROCEDURE — 0 IOPAMIDOL PER 1 ML: Performed by: EMERGENCY MEDICINE

## 2022-06-28 PROCEDURE — 71045 X-RAY EXAM CHEST 1 VIEW: CPT

## 2022-06-28 PROCEDURE — 87635 SARS-COV-2 COVID-19 AMP PRB: CPT | Performed by: PHYSICIAN ASSISTANT

## 2022-06-28 PROCEDURE — 25010000002 HYDROMORPHONE PER 4 MG: Performed by: EMERGENCY MEDICINE

## 2022-06-28 PROCEDURE — 93010 ELECTROCARDIOGRAM REPORT: CPT | Performed by: INTERNAL MEDICINE

## 2022-06-28 PROCEDURE — 83880 ASSAY OF NATRIURETIC PEPTIDE: CPT | Performed by: EMERGENCY MEDICINE

## 2022-06-28 PROCEDURE — 83690 ASSAY OF LIPASE: CPT | Performed by: EMERGENCY MEDICINE

## 2022-06-28 PROCEDURE — 25010000002 ONDANSETRON PER 1 MG: Performed by: EMERGENCY MEDICINE

## 2022-06-28 PROCEDURE — 85025 COMPLETE CBC W/AUTO DIFF WBC: CPT | Performed by: EMERGENCY MEDICINE

## 2022-06-28 PROCEDURE — 25010000002 MORPHINE PER 10 MG: Performed by: EMERGENCY MEDICINE

## 2022-06-28 PROCEDURE — 25010000002 ONDANSETRON PER 1 MG: Performed by: PHYSICIAN ASSISTANT

## 2022-06-28 PROCEDURE — 71275 CT ANGIOGRAPHY CHEST: CPT

## 2022-06-28 RX ORDER — ONDANSETRON 2 MG/ML
4 INJECTION INTRAMUSCULAR; INTRAVENOUS EVERY 6 HOURS PRN
Status: DISCONTINUED | OUTPATIENT
Start: 2022-06-28 | End: 2022-07-04 | Stop reason: HOSPADM

## 2022-06-28 RX ORDER — METOPROLOL SUCCINATE 25 MG/1
25 TABLET, EXTENDED RELEASE ORAL
Status: DISCONTINUED | OUTPATIENT
Start: 2022-06-29 | End: 2022-07-04 | Stop reason: HOSPADM

## 2022-06-28 RX ORDER — SODIUM CHLORIDE, SODIUM LACTATE, POTASSIUM CHLORIDE, CALCIUM CHLORIDE 600; 310; 30; 20 MG/100ML; MG/100ML; MG/100ML; MG/100ML
100 INJECTION, SOLUTION INTRAVENOUS CONTINUOUS
Status: DISCONTINUED | OUTPATIENT
Start: 2022-06-28 | End: 2022-07-02

## 2022-06-28 RX ORDER — TRAMADOL HYDROCHLORIDE 50 MG/1
50 TABLET ORAL EVERY 4 HOURS PRN
Status: DISCONTINUED | OUTPATIENT
Start: 2022-06-28 | End: 2022-07-04 | Stop reason: HOSPADM

## 2022-06-28 RX ORDER — FAMOTIDINE 10 MG/ML
20 INJECTION, SOLUTION INTRAVENOUS ONCE
Status: COMPLETED | OUTPATIENT
Start: 2022-06-28 | End: 2022-06-28

## 2022-06-28 RX ORDER — MULTIPLE VITAMINS W/ MINERALS TAB 9MG-400MCG
1 TAB ORAL DAILY
Status: DISCONTINUED | OUTPATIENT
Start: 2022-06-29 | End: 2022-07-04 | Stop reason: HOSPADM

## 2022-06-28 RX ORDER — POLYETHYLENE GLYCOL 3350 17 G/17G
17 POWDER, FOR SOLUTION ORAL DAILY
Status: DISCONTINUED | OUTPATIENT
Start: 2022-06-29 | End: 2022-06-29

## 2022-06-28 RX ORDER — HYDROMORPHONE HYDROCHLORIDE 1 MG/ML
0.5 INJECTION, SOLUTION INTRAMUSCULAR; INTRAVENOUS; SUBCUTANEOUS ONCE
Status: COMPLETED | OUTPATIENT
Start: 2022-06-28 | End: 2022-06-28

## 2022-06-28 RX ORDER — ALUMINA, MAGNESIA, AND SIMETHICONE 2400; 2400; 240 MG/30ML; MG/30ML; MG/30ML
15 SUSPENSION ORAL EVERY 6 HOURS PRN
Status: DISCONTINUED | OUTPATIENT
Start: 2022-06-28 | End: 2022-07-04 | Stop reason: HOSPADM

## 2022-06-28 RX ORDER — ALUMINA, MAGNESIA, AND SIMETHICONE 2400; 2400; 240 MG/30ML; MG/30ML; MG/30ML
15 SUSPENSION ORAL ONCE
Status: COMPLETED | OUTPATIENT
Start: 2022-06-28 | End: 2022-06-28

## 2022-06-28 RX ORDER — FAMOTIDINE 10 MG/ML
20 INJECTION, SOLUTION INTRAVENOUS EVERY 12 HOURS SCHEDULED
Status: DISCONTINUED | OUTPATIENT
Start: 2022-06-29 | End: 2022-06-29

## 2022-06-28 RX ORDER — SODIUM CHLORIDE 0.9 % (FLUSH) 0.9 %
10 SYRINGE (ML) INJECTION AS NEEDED
Status: DISCONTINUED | OUTPATIENT
Start: 2022-06-28 | End: 2022-07-04 | Stop reason: HOSPADM

## 2022-06-28 RX ORDER — NITROGLYCERIN 0.4 MG/1
0.4 TABLET SUBLINGUAL
Status: DISCONTINUED | OUTPATIENT
Start: 2022-06-28 | End: 2022-07-04 | Stop reason: HOSPADM

## 2022-06-28 RX ORDER — ONDANSETRON 2 MG/ML
4 INJECTION INTRAMUSCULAR; INTRAVENOUS ONCE
Status: COMPLETED | OUTPATIENT
Start: 2022-06-28 | End: 2022-06-28

## 2022-06-28 RX ORDER — MORPHINE SULFATE 2 MG/ML
4 INJECTION, SOLUTION INTRAMUSCULAR; INTRAVENOUS ONCE
Status: COMPLETED | OUTPATIENT
Start: 2022-06-28 | End: 2022-06-28

## 2022-06-28 RX ORDER — CYCLOSPORINE 0.5 MG/ML
1 EMULSION OPHTHALMIC 2 TIMES DAILY
Status: DISCONTINUED | OUTPATIENT
Start: 2022-06-29 | End: 2022-07-04 | Stop reason: HOSPADM

## 2022-06-28 RX ORDER — ONDANSETRON 4 MG/1
4 TABLET, FILM COATED ORAL EVERY 6 HOURS PRN
Status: DISCONTINUED | OUTPATIENT
Start: 2022-06-28 | End: 2022-07-04 | Stop reason: HOSPADM

## 2022-06-28 RX ORDER — AMLODIPINE BESYLATE 5 MG/1
5 TABLET ORAL
Status: DISCONTINUED | OUTPATIENT
Start: 2022-06-28 | End: 2022-06-29

## 2022-06-28 RX ORDER — LIDOCAINE HYDROCHLORIDE 20 MG/ML
15 SOLUTION OROPHARYNGEAL ONCE
Status: COMPLETED | OUTPATIENT
Start: 2022-06-28 | End: 2022-06-28

## 2022-06-28 RX ORDER — HYDROMORPHONE HYDROCHLORIDE 1 MG/ML
0.5 INJECTION, SOLUTION INTRAMUSCULAR; INTRAVENOUS; SUBCUTANEOUS
Status: DISCONTINUED | OUTPATIENT
Start: 2022-06-28 | End: 2022-07-04 | Stop reason: HOSPADM

## 2022-06-28 RX ORDER — LEVOTHYROXINE SODIUM 0.07 MG/1
75 TABLET ORAL
Status: DISCONTINUED | OUTPATIENT
Start: 2022-06-29 | End: 2022-07-04 | Stop reason: HOSPADM

## 2022-06-28 RX ADMIN — SODIUM CHLORIDE 500 ML: 9 INJECTION, SOLUTION INTRAVENOUS at 17:21

## 2022-06-28 RX ADMIN — MORPHINE SULFATE 4 MG: 2 INJECTION, SOLUTION INTRAMUSCULAR; INTRAVENOUS at 17:21

## 2022-06-28 RX ADMIN — IOPAMIDOL 95 ML: 755 INJECTION, SOLUTION INTRAVENOUS at 18:42

## 2022-06-28 RX ADMIN — LIDOCAINE HYDROCHLORIDE 15 ML: 20 SOLUTION ORAL; TOPICAL at 17:25

## 2022-06-28 RX ADMIN — ONDANSETRON 4 MG: 2 INJECTION INTRAMUSCULAR; INTRAVENOUS at 17:52

## 2022-06-28 RX ADMIN — FAMOTIDINE 20 MG: 10 INJECTION, SOLUTION INTRAVENOUS at 17:21

## 2022-06-28 RX ADMIN — ALUMINUM HYDROXIDE, MAGNESIUM HYDROXIDE, AND DIMETHICONE 15 ML: 400; 400; 40 SUSPENSION ORAL at 17:25

## 2022-06-28 RX ADMIN — HYDROMORPHONE HYDROCHLORIDE 0.5 MG: 1 INJECTION, SOLUTION INTRAMUSCULAR; INTRAVENOUS; SUBCUTANEOUS at 19:41

## 2022-06-28 RX ADMIN — SODIUM CHLORIDE, POTASSIUM CHLORIDE, SODIUM LACTATE AND CALCIUM CHLORIDE 100 ML/HR: 600; 310; 30; 20 INJECTION, SOLUTION INTRAVENOUS at 20:36

## 2022-06-28 RX ADMIN — ONDANSETRON 4 MG: 2 INJECTION INTRAMUSCULAR; INTRAVENOUS at 19:41

## 2022-06-28 RX ADMIN — AMLODIPINE BESYLATE 5 MG: 5 TABLET ORAL at 20:32

## 2022-06-28 NOTE — TELEPHONE ENCOUNTER
Caller: DELTA ELIZONDO    Relationship: Emergency Contact    Best call back number:[phone]     What was the call regarding: PATIENTS DAUGHTER CALLING STATING THAT SHE IS HAVING BILATERAL LOWER LOBE PAN AND BACK PAIN THIS HAS BEEN GOING ON SINCE YESTERDAY IT HAS PROGRESSED INTO THE NIGHT AND INTO TODAY.     SHE HAS BEEN FIGHTING  SINUS CONGESTION AND VERY HORSE SOUNDING NO COUGH SHE HAS BEEN TAKING MANUEL SELTZER     Do you require a callback: YES

## 2022-06-29 ENCOUNTER — APPOINTMENT (OUTPATIENT)
Dept: MRI IMAGING | Facility: HOSPITAL | Age: 87
End: 2022-06-29

## 2022-06-29 LAB
ALBUMIN SERPL-MCNC: 3.4 G/DL (ref 3.5–5.2)
ALBUMIN/GLOB SERPL: 1.3 G/DL
ALP SERPL-CCNC: 93 U/L (ref 39–117)
ALT SERPL W P-5'-P-CCNC: 268 U/L (ref 1–33)
ANION GAP SERPL CALCULATED.3IONS-SCNC: 13 MMOL/L (ref 5–15)
AST SERPL-CCNC: 202 U/L (ref 1–32)
BACTERIA UR QL AUTO: NORMAL /HPF
BASOPHILS # BLD AUTO: 0.04 10*3/MM3 (ref 0–0.2)
BASOPHILS NFR BLD AUTO: 0.4 % (ref 0–1.5)
BILIRUB SERPL-MCNC: 3.5 MG/DL (ref 0–1.2)
BILIRUB UR QL STRIP: NEGATIVE
BUN SERPL-MCNC: 19 MG/DL (ref 8–23)
BUN/CREAT SERPL: 14.2 (ref 7–25)
CALCIUM SPEC-SCNC: 8.3 MG/DL (ref 8.6–10.5)
CHLORIDE SERPL-SCNC: 103 MMOL/L (ref 98–107)
CHOLEST SERPL-MCNC: 90 MG/DL (ref 0–200)
CLARITY UR: CLEAR
CO2 SERPL-SCNC: 21 MMOL/L (ref 22–29)
COLOR UR: ABNORMAL
CREAT SERPL-MCNC: 1.34 MG/DL (ref 0.57–1)
DEPRECATED RDW RBC AUTO: 42.4 FL (ref 37–54)
EGFRCR SERPLBLD CKD-EPI 2021: 38 ML/MIN/1.73
EOSINOPHIL # BLD AUTO: 0.17 10*3/MM3 (ref 0–0.4)
EOSINOPHIL NFR BLD AUTO: 1.7 % (ref 0.3–6.2)
ERYTHROCYTE [DISTWIDTH] IN BLOOD BY AUTOMATED COUNT: 13.2 % (ref 12.3–15.4)
ETHANOL BLD-MCNC: <10 MG/DL (ref 0–10)
ETHANOL UR QL: <0.01 %
GLOBULIN UR ELPH-MCNC: 2.7 GM/DL
GLUCOSE SERPL-MCNC: 81 MG/DL (ref 65–99)
GLUCOSE UR STRIP-MCNC: NEGATIVE MG/DL
HCT VFR BLD AUTO: 40.5 % (ref 34–46.6)
HDLC SERPL-MCNC: 59 MG/DL (ref 40–60)
HGB BLD-MCNC: 14.5 G/DL (ref 12–15.9)
HGB UR QL STRIP.AUTO: NEGATIVE
HYALINE CASTS UR QL AUTO: NORMAL /LPF
IMM GRANULOCYTES # BLD AUTO: 0.03 10*3/MM3 (ref 0–0.05)
IMM GRANULOCYTES NFR BLD AUTO: 0.3 % (ref 0–0.5)
INR PPP: 1.39 (ref 0.9–1.1)
KETONES UR QL STRIP: NEGATIVE
LDLC SERPL CALC-MCNC: 20 MG/DL (ref 0–100)
LDLC/HDLC SERPL: 0.39 {RATIO}
LEUKOCYTE ESTERASE UR QL STRIP.AUTO: NEGATIVE
LIPASE SERPL-CCNC: 2330 U/L (ref 13–60)
LYMPHOCYTES # BLD AUTO: 0.91 10*3/MM3 (ref 0.7–3.1)
LYMPHOCYTES NFR BLD AUTO: 8.9 % (ref 19.6–45.3)
MCH RBC QN AUTO: 31.9 PG (ref 26.6–33)
MCHC RBC AUTO-ENTMCNC: 35.8 G/DL (ref 31.5–35.7)
MCV RBC AUTO: 89.2 FL (ref 79–97)
MONOCYTES # BLD AUTO: 1.02 10*3/MM3 (ref 0.1–0.9)
MONOCYTES NFR BLD AUTO: 9.9 % (ref 5–12)
NEUTROPHILS NFR BLD AUTO: 78.8 % (ref 42.7–76)
NEUTROPHILS NFR BLD AUTO: 8.11 10*3/MM3 (ref 1.7–7)
NITRITE UR QL STRIP: NEGATIVE
NRBC BLD AUTO-RTO: 0 /100 WBC (ref 0–0.2)
PH UR STRIP.AUTO: 7 [PH] (ref 5–8)
PLATELET # BLD AUTO: 164 10*3/MM3 (ref 140–450)
PMV BLD AUTO: 10.1 FL (ref 6–12)
POTASSIUM SERPL-SCNC: 4.3 MMOL/L (ref 3.5–5.2)
PROT SERPL-MCNC: 6.1 G/DL (ref 6–8.5)
PROT UR QL STRIP: ABNORMAL
PROTHROMBIN TIME: 16.9 SECONDS (ref 11.7–14.2)
RBC # BLD AUTO: 4.54 10*6/MM3 (ref 3.77–5.28)
RBC # UR STRIP: NORMAL /HPF
REF LAB TEST METHOD: NORMAL
SODIUM SERPL-SCNC: 137 MMOL/L (ref 136–145)
SP GR UR STRIP: 1.02 (ref 1–1.03)
SQUAMOUS #/AREA URNS HPF: NORMAL /HPF
TRIGL SERPL-MCNC: 40 MG/DL (ref 0–150)
UROBILINOGEN UR QL STRIP: ABNORMAL
VLDLC SERPL-MCNC: 11 MG/DL (ref 5–40)
WBC # UR STRIP: NORMAL /HPF
WBC NRBC COR # BLD: 10.28 10*3/MM3 (ref 3.4–10.8)

## 2022-06-29 PROCEDURE — 85610 PROTHROMBIN TIME: CPT | Performed by: NURSE PRACTITIONER

## 2022-06-29 PROCEDURE — 36415 COLL VENOUS BLD VENIPUNCTURE: CPT | Performed by: NURSE PRACTITIONER

## 2022-06-29 PROCEDURE — 81001 URINALYSIS AUTO W/SCOPE: CPT | Performed by: STUDENT IN AN ORGANIZED HEALTH CARE EDUCATION/TRAINING PROGRAM

## 2022-06-29 PROCEDURE — A9577 INJ MULTIHANCE: HCPCS | Performed by: STUDENT IN AN ORGANIZED HEALTH CARE EDUCATION/TRAINING PROGRAM

## 2022-06-29 PROCEDURE — 99222 1ST HOSP IP/OBS MODERATE 55: CPT | Performed by: INTERNAL MEDICINE

## 2022-06-29 PROCEDURE — 82077 ASSAY SPEC XCP UR&BREATH IA: CPT | Performed by: INTERNAL MEDICINE

## 2022-06-29 PROCEDURE — 25010000002 ONDANSETRON PER 1 MG: Performed by: NURSE PRACTITIONER

## 2022-06-29 PROCEDURE — 85025 COMPLETE CBC W/AUTO DIFF WBC: CPT | Performed by: NURSE PRACTITIONER

## 2022-06-29 PROCEDURE — 83690 ASSAY OF LIPASE: CPT | Performed by: NURSE PRACTITIONER

## 2022-06-29 PROCEDURE — 74183 MRI ABD W/O CNTR FLWD CNTR: CPT

## 2022-06-29 PROCEDURE — 0 GADOBENATE DIMEGLUMINE 529 MG/ML SOLUTION: Performed by: STUDENT IN AN ORGANIZED HEALTH CARE EDUCATION/TRAINING PROGRAM

## 2022-06-29 PROCEDURE — 25010000002 HYDROMORPHONE PER 4 MG: Performed by: NURSE PRACTITIONER

## 2022-06-29 PROCEDURE — 80061 LIPID PANEL: CPT | Performed by: INTERNAL MEDICINE

## 2022-06-29 PROCEDURE — 80053 COMPREHEN METABOLIC PANEL: CPT | Performed by: NURSE PRACTITIONER

## 2022-06-29 RX ORDER — PANTOPRAZOLE SODIUM 40 MG/1
40 TABLET, DELAYED RELEASE ORAL
Status: DISCONTINUED | OUTPATIENT
Start: 2022-06-30 | End: 2022-07-04 | Stop reason: HOSPADM

## 2022-06-29 RX ADMIN — HYDROMORPHONE HYDROCHLORIDE 0.5 MG: 1 INJECTION, SOLUTION INTRAMUSCULAR; INTRAVENOUS; SUBCUTANEOUS at 22:54

## 2022-06-29 RX ADMIN — MULTIPLE VITAMINS W/ MINERALS TAB 1 TABLET: TAB at 09:19

## 2022-06-29 RX ADMIN — CYCLOSPORINE 1 DROP: 0.5 EMULSION OPHTHALMIC at 21:15

## 2022-06-29 RX ADMIN — POLYETHYLENE GLYCOL 3350 17 G: 17 POWDER, FOR SOLUTION ORAL at 09:20

## 2022-06-29 RX ADMIN — SODIUM CHLORIDE, POTASSIUM CHLORIDE, SODIUM LACTATE AND CALCIUM CHLORIDE 100 ML/HR: 600; 310; 30; 20 INJECTION, SOLUTION INTRAVENOUS at 07:43

## 2022-06-29 RX ADMIN — FAMOTIDINE 20 MG: 10 INJECTION INTRAVENOUS at 09:17

## 2022-06-29 RX ADMIN — HYDROMORPHONE HYDROCHLORIDE 0.5 MG: 1 INJECTION, SOLUTION INTRAMUSCULAR; INTRAVENOUS; SUBCUTANEOUS at 00:31

## 2022-06-29 RX ADMIN — HYDROMORPHONE HYDROCHLORIDE 0.5 MG: 1 INJECTION, SOLUTION INTRAMUSCULAR; INTRAVENOUS; SUBCUTANEOUS at 17:38

## 2022-06-29 RX ADMIN — ONDANSETRON 4 MG: 2 INJECTION INTRAMUSCULAR; INTRAVENOUS at 13:06

## 2022-06-29 RX ADMIN — CYCLOSPORINE 1 DROP: 0.5 EMULSION OPHTHALMIC at 09:20

## 2022-06-29 RX ADMIN — METOPROLOL SUCCINATE 25 MG: 25 TABLET, EXTENDED RELEASE ORAL at 21:15

## 2022-06-29 RX ADMIN — SODIUM CHLORIDE, POTASSIUM CHLORIDE, SODIUM LACTATE AND CALCIUM CHLORIDE 100 ML/HR: 600; 310; 30; 20 INJECTION, SOLUTION INTRAVENOUS at 21:15

## 2022-06-29 RX ADMIN — HYDROMORPHONE HYDROCHLORIDE 0.5 MG: 1 INJECTION, SOLUTION INTRAMUSCULAR; INTRAVENOUS; SUBCUTANEOUS at 09:18

## 2022-06-29 RX ADMIN — HYDROMORPHONE HYDROCHLORIDE 0.5 MG: 1 INJECTION, SOLUTION INTRAMUSCULAR; INTRAVENOUS; SUBCUTANEOUS at 03:57

## 2022-06-29 RX ADMIN — GADOBENATE DIMEGLUMINE 20 ML: 529 INJECTION, SOLUTION INTRAVENOUS at 12:07

## 2022-06-29 RX ADMIN — ONDANSETRON 4 MG: 2 INJECTION INTRAMUSCULAR; INTRAVENOUS at 00:36

## 2022-06-30 LAB
ALBUMIN SERPL-MCNC: 3.1 G/DL (ref 3.5–5.2)
ALBUMIN/GLOB SERPL: 1.1 G/DL
ALP SERPL-CCNC: 82 U/L (ref 39–117)
ALT SERPL W P-5'-P-CCNC: 152 U/L (ref 1–33)
ANION GAP SERPL CALCULATED.3IONS-SCNC: 9.2 MMOL/L (ref 5–15)
AST SERPL-CCNC: 80 U/L (ref 1–32)
BILIRUB SERPL-MCNC: 1.8 MG/DL (ref 0–1.2)
BUN SERPL-MCNC: 15 MG/DL (ref 8–23)
BUN/CREAT SERPL: 13.4 (ref 7–25)
CALCIUM SPEC-SCNC: 8.6 MG/DL (ref 8.6–10.5)
CHLORIDE SERPL-SCNC: 103 MMOL/L (ref 98–107)
CO2 SERPL-SCNC: 21.8 MMOL/L (ref 22–29)
CREAT SERPL-MCNC: 1.12 MG/DL (ref 0.57–1)
DEPRECATED RDW RBC AUTO: 41.8 FL (ref 37–54)
EGFRCR SERPLBLD CKD-EPI 2021: 47.1 ML/MIN/1.73
ERYTHROCYTE [DISTWIDTH] IN BLOOD BY AUTOMATED COUNT: 12.9 % (ref 12.3–15.4)
GLOBULIN UR ELPH-MCNC: 2.8 GM/DL
GLUCOSE SERPL-MCNC: 79 MG/DL (ref 65–99)
HCT VFR BLD AUTO: 37.8 % (ref 34–46.6)
HGB BLD-MCNC: 13.3 G/DL (ref 12–15.9)
LIPASE SERPL-CCNC: 573 U/L (ref 13–60)
MCH RBC QN AUTO: 31.7 PG (ref 26.6–33)
MCHC RBC AUTO-ENTMCNC: 35.2 G/DL (ref 31.5–35.7)
MCV RBC AUTO: 90 FL (ref 79–97)
PLATELET # BLD AUTO: 142 10*3/MM3 (ref 140–450)
PMV BLD AUTO: 9.7 FL (ref 6–12)
POTASSIUM SERPL-SCNC: 4 MMOL/L (ref 3.5–5.2)
PROT SERPL-MCNC: 5.9 G/DL (ref 6–8.5)
RBC # BLD AUTO: 4.2 10*6/MM3 (ref 3.77–5.28)
SODIUM SERPL-SCNC: 134 MMOL/L (ref 136–145)
WBC NRBC COR # BLD: 8.89 10*3/MM3 (ref 3.4–10.8)

## 2022-06-30 PROCEDURE — 80053 COMPREHEN METABOLIC PANEL: CPT | Performed by: STUDENT IN AN ORGANIZED HEALTH CARE EDUCATION/TRAINING PROGRAM

## 2022-06-30 PROCEDURE — 25010000002 HYDROMORPHONE PER 4 MG: Performed by: NURSE PRACTITIONER

## 2022-06-30 PROCEDURE — 85027 COMPLETE CBC AUTOMATED: CPT | Performed by: STUDENT IN AN ORGANIZED HEALTH CARE EDUCATION/TRAINING PROGRAM

## 2022-06-30 PROCEDURE — 83690 ASSAY OF LIPASE: CPT | Performed by: INTERNAL MEDICINE

## 2022-06-30 PROCEDURE — 99232 SBSQ HOSP IP/OBS MODERATE 35: CPT | Performed by: INTERNAL MEDICINE

## 2022-06-30 RX ADMIN — CYCLOSPORINE 1 DROP: 0.5 EMULSION OPHTHALMIC at 08:07

## 2022-06-30 RX ADMIN — METOPROLOL SUCCINATE 25 MG: 25 TABLET, EXTENDED RELEASE ORAL at 08:07

## 2022-06-30 RX ADMIN — SODIUM CHLORIDE, POTASSIUM CHLORIDE, SODIUM LACTATE AND CALCIUM CHLORIDE 100 ML/HR: 600; 310; 30; 20 INJECTION, SOLUTION INTRAVENOUS at 06:50

## 2022-06-30 RX ADMIN — Medication 10 ML: at 08:07

## 2022-06-30 RX ADMIN — HYDROMORPHONE HYDROCHLORIDE 0.5 MG: 1 INJECTION, SOLUTION INTRAMUSCULAR; INTRAVENOUS; SUBCUTANEOUS at 06:50

## 2022-06-30 RX ADMIN — LEVOTHYROXINE SODIUM 75 MCG: 0.07 TABLET ORAL at 05:33

## 2022-06-30 RX ADMIN — HYDROMORPHONE HYDROCHLORIDE 0.5 MG: 1 INJECTION, SOLUTION INTRAMUSCULAR; INTRAVENOUS; SUBCUTANEOUS at 21:08

## 2022-06-30 RX ADMIN — HYDROMORPHONE HYDROCHLORIDE 0.5 MG: 1 INJECTION, SOLUTION INTRAMUSCULAR; INTRAVENOUS; SUBCUTANEOUS at 12:46

## 2022-06-30 RX ADMIN — MULTIPLE VITAMINS W/ MINERALS TAB 1 TABLET: TAB at 08:07

## 2022-06-30 RX ADMIN — PANTOPRAZOLE SODIUM 40 MG: 40 TABLET, DELAYED RELEASE ORAL at 05:33

## 2022-06-30 RX ADMIN — SODIUM CHLORIDE, POTASSIUM CHLORIDE, SODIUM LACTATE AND CALCIUM CHLORIDE 100 ML/HR: 600; 310; 30; 20 INJECTION, SOLUTION INTRAVENOUS at 16:50

## 2022-06-30 RX ADMIN — CYCLOSPORINE 1 DROP: 0.5 EMULSION OPHTHALMIC at 21:03

## 2022-06-30 RX ADMIN — HYDROMORPHONE HYDROCHLORIDE 0.5 MG: 1 INJECTION, SOLUTION INTRAMUSCULAR; INTRAVENOUS; SUBCUTANEOUS at 16:50

## 2022-07-01 ENCOUNTER — ANESTHESIA EVENT (OUTPATIENT)
Dept: GASTROENTEROLOGY | Facility: HOSPITAL | Age: 87
End: 2022-07-01

## 2022-07-01 ENCOUNTER — APPOINTMENT (OUTPATIENT)
Dept: GENERAL RADIOLOGY | Facility: HOSPITAL | Age: 87
End: 2022-07-01

## 2022-07-01 ENCOUNTER — ANESTHESIA (OUTPATIENT)
Dept: GASTROENTEROLOGY | Facility: HOSPITAL | Age: 87
End: 2022-07-01

## 2022-07-01 LAB
ALBUMIN SERPL-MCNC: 2.8 G/DL (ref 3.5–5.2)
ALBUMIN/GLOB SERPL: 0.9 G/DL
ALP SERPL-CCNC: 74 U/L (ref 39–117)
ALT SERPL W P-5'-P-CCNC: 93 U/L (ref 1–33)
ANION GAP SERPL CALCULATED.3IONS-SCNC: 8.5 MMOL/L (ref 5–15)
AST SERPL-CCNC: 39 U/L (ref 1–32)
BILIRUB SERPL-MCNC: 1.3 MG/DL (ref 0–1.2)
BUN SERPL-MCNC: 15 MG/DL (ref 8–23)
BUN/CREAT SERPL: 12.7 (ref 7–25)
CALCIUM SPEC-SCNC: 8.7 MG/DL (ref 8.6–10.5)
CHLORIDE SERPL-SCNC: 105 MMOL/L (ref 98–107)
CO2 SERPL-SCNC: 20.5 MMOL/L (ref 22–29)
CREAT SERPL-MCNC: 1.18 MG/DL (ref 0.57–1)
EGFRCR SERPLBLD CKD-EPI 2021: 44.2 ML/MIN/1.73
GLOBULIN UR ELPH-MCNC: 3 GM/DL
GLUCOSE BLDC GLUCOMTR-MCNC: 69 MG/DL (ref 70–130)
GLUCOSE BLDC GLUCOMTR-MCNC: 72 MG/DL (ref 70–130)
GLUCOSE SERPL-MCNC: 70 MG/DL (ref 65–99)
LIPASE SERPL-CCNC: 118 U/L (ref 13–60)
POTASSIUM SERPL-SCNC: 3.9 MMOL/L (ref 3.5–5.2)
PROT SERPL-MCNC: 5.8 G/DL (ref 6–8.5)
SODIUM SERPL-SCNC: 134 MMOL/L (ref 136–145)

## 2022-07-01 PROCEDURE — C1769 GUIDE WIRE: HCPCS | Performed by: INTERNAL MEDICINE

## 2022-07-01 PROCEDURE — 74328 X-RAY BILE DUCT ENDOSCOPY: CPT

## 2022-07-01 PROCEDURE — 82962 GLUCOSE BLOOD TEST: CPT

## 2022-07-01 PROCEDURE — 43264 ERCP REMOVE DUCT CALCULI: CPT | Performed by: INTERNAL MEDICINE

## 2022-07-01 PROCEDURE — 25010000002 IOPAMIDOL 61 % SOLUTION: Performed by: INTERNAL MEDICINE

## 2022-07-01 PROCEDURE — 25010000002 PROPOFOL 10 MG/ML EMULSION: Performed by: ANESTHESIOLOGY

## 2022-07-01 PROCEDURE — 43262 ENDO CHOLANGIOPANCREATOGRAPH: CPT | Performed by: INTERNAL MEDICINE

## 2022-07-01 PROCEDURE — 25010000002 HYDROMORPHONE PER 4 MG: Performed by: NURSE PRACTITIONER

## 2022-07-01 PROCEDURE — 83690 ASSAY OF LIPASE: CPT | Performed by: INTERNAL MEDICINE

## 2022-07-01 PROCEDURE — 0F798ZZ DILATION OF COMMON BILE DUCT, VIA NATURAL OR ARTIFICIAL OPENING ENDOSCOPIC: ICD-10-PCS | Performed by: INTERNAL MEDICINE

## 2022-07-01 PROCEDURE — 80053 COMPREHEN METABOLIC PANEL: CPT | Performed by: STUDENT IN AN ORGANIZED HEALTH CARE EDUCATION/TRAINING PROGRAM

## 2022-07-01 RX ORDER — PROPOFOL 10 MG/ML
VIAL (ML) INTRAVENOUS CONTINUOUS PRN
Status: DISCONTINUED | OUTPATIENT
Start: 2022-07-01 | End: 2022-07-01 | Stop reason: SURG

## 2022-07-01 RX ORDER — ACETAMINOPHEN 325 MG/1
650 TABLET ORAL EVERY 6 HOURS PRN
Status: DISCONTINUED | OUTPATIENT
Start: 2022-07-01 | End: 2022-07-04 | Stop reason: HOSPADM

## 2022-07-01 RX ORDER — LIDOCAINE HYDROCHLORIDE 20 MG/ML
INJECTION, SOLUTION INFILTRATION; PERINEURAL AS NEEDED
Status: DISCONTINUED | OUTPATIENT
Start: 2022-07-01 | End: 2022-07-01 | Stop reason: SURG

## 2022-07-01 RX ORDER — SODIUM CHLORIDE 9 MG/ML
30 INJECTION, SOLUTION INTRAVENOUS CONTINUOUS PRN
Status: DISCONTINUED | OUTPATIENT
Start: 2022-07-01 | End: 2022-07-02

## 2022-07-01 RX ORDER — PROPOFOL 10 MG/ML
VIAL (ML) INTRAVENOUS AS NEEDED
Status: DISCONTINUED | OUTPATIENT
Start: 2022-07-01 | End: 2022-07-01 | Stop reason: SURG

## 2022-07-01 RX ORDER — LEVOFLOXACIN 750 MG/1
750 TABLET ORAL
Status: COMPLETED | OUTPATIENT
Start: 2022-07-01 | End: 2022-07-03

## 2022-07-01 RX ADMIN — HYDROMORPHONE HYDROCHLORIDE 0.5 MG: 1 INJECTION, SOLUTION INTRAMUSCULAR; INTRAVENOUS; SUBCUTANEOUS at 03:01

## 2022-07-01 RX ADMIN — MULTIPLE VITAMINS W/ MINERALS TAB 1 TABLET: TAB at 08:07

## 2022-07-01 RX ADMIN — HYDROMORPHONE HYDROCHLORIDE 0.5 MG: 1 INJECTION, SOLUTION INTRAMUSCULAR; INTRAVENOUS; SUBCUTANEOUS at 12:34

## 2022-07-01 RX ADMIN — LEVOTHYROXINE SODIUM 75 MCG: 0.07 TABLET ORAL at 06:46

## 2022-07-01 RX ADMIN — HYDROMORPHONE HYDROCHLORIDE 0.5 MG: 1 INJECTION, SOLUTION INTRAMUSCULAR; INTRAVENOUS; SUBCUTANEOUS at 08:13

## 2022-07-01 RX ADMIN — PROPOFOL 120 MG: 10 INJECTION, EMULSION INTRAVENOUS at 15:53

## 2022-07-01 RX ADMIN — LIDOCAINE HYDROCHLORIDE 60 MG: 20 INJECTION, SOLUTION INFILTRATION; PERINEURAL at 15:53

## 2022-07-01 RX ADMIN — SODIUM CHLORIDE 30 ML/HR: 9 INJECTION, SOLUTION INTRAVENOUS at 15:15

## 2022-07-01 RX ADMIN — CYCLOSPORINE 1 DROP: 0.5 EMULSION OPHTHALMIC at 20:01

## 2022-07-01 RX ADMIN — SODIUM CHLORIDE, POTASSIUM CHLORIDE, SODIUM LACTATE AND CALCIUM CHLORIDE 100 ML/HR: 600; 310; 30; 20 INJECTION, SOLUTION INTRAVENOUS at 12:34

## 2022-07-01 RX ADMIN — PANTOPRAZOLE SODIUM 40 MG: 40 TABLET, DELAYED RELEASE ORAL at 06:46

## 2022-07-01 RX ADMIN — ACETAMINOPHEN 650 MG: 325 TABLET, FILM COATED ORAL at 23:41

## 2022-07-01 RX ADMIN — HYDROMORPHONE HYDROCHLORIDE 0.5 MG: 1 INJECTION, SOLUTION INTRAMUSCULAR; INTRAVENOUS; SUBCUTANEOUS at 20:01

## 2022-07-01 RX ADMIN — HYDROMORPHONE HYDROCHLORIDE 0.5 MG: 1 INJECTION, SOLUTION INTRAMUSCULAR; INTRAVENOUS; SUBCUTANEOUS at 00:33

## 2022-07-01 RX ADMIN — METOPROLOL SUCCINATE 25 MG: 25 TABLET, EXTENDED RELEASE ORAL at 08:07

## 2022-07-01 RX ADMIN — CYCLOSPORINE 1 DROP: 0.5 EMULSION OPHTHALMIC at 08:07

## 2022-07-01 RX ADMIN — Medication 200 MCG/KG/MIN: at 15:53

## 2022-07-01 RX ADMIN — LEVOFLOXACIN 750 MG: 750 TABLET, FILM COATED ORAL at 20:01

## 2022-07-01 RX ADMIN — SODIUM CHLORIDE, POTASSIUM CHLORIDE, SODIUM LACTATE AND CALCIUM CHLORIDE 100 ML/HR: 600; 310; 30; 20 INJECTION, SOLUTION INTRAVENOUS at 19:34

## 2022-07-01 RX ADMIN — SODIUM CHLORIDE, POTASSIUM CHLORIDE, SODIUM LACTATE AND CALCIUM CHLORIDE 100 ML/HR: 600; 310; 30; 20 INJECTION, SOLUTION INTRAVENOUS at 03:01

## 2022-07-01 NOTE — ANESTHESIA POSTPROCEDURE EVALUATION
Patient: Mandy Alarcon    Procedure Summary     Date: 07/01/22 Room / Location:  [facility] /  [facility]    Anesthesia Start: 1544 Anesthesia Stop: 1621    Procedure: ENDOSCOPIC RETROGRADE CHOLANGIOPANCREATOGRAPHY with sphincterotomy, balloon sweep 12-15mm (N/A ) Diagnosis:       Acute biliary pancreatitis without infection or necrosis      Bile duct calculus      Cholangitis      (Acute biliary pancreatitis without infection or necrosis [K85.10])    Surgeons: Mitesh Altamirano MD Provider: Santi Sawyer MD    Anesthesia Type: MAC ASA Status: 3          Anesthesia Type: MAC    Vitals  No vitals data found for the desired time range.          Post Anesthesia Care and Evaluation    Patient location during evaluation: PHASE II  Patient participation: complete - patient participated  Level of consciousness: awake and alert  Pain management: adequate    Airway patency: patent  Anesthetic complications: No anesthetic complications  PONV Status: none  Cardiovascular status: acceptable and hemodynamically stable  Respiratory status: acceptable, nonlabored ventilation and spontaneous ventilation  Hydration status: acceptable

## 2022-07-01 NOTE — CASE MANAGEMENT/SOCIAL WORK
Continued Stay Note  [facility]     Patient Name: Mandy Alarcon  MRN: [id number]  Today's Date: 7/1/2022    Admit Date: 6/28/2022     Discharge Plan     Row Name 07/01/22 1410       Plan    Plan Home with family to transport    Plan Comments Spoke with pt by phone, she confirmed d/c plan remains to go home and her dtr will transport. PT eval pending at this time. If she should need HH she prefers [facility]. CCP will follow. - Kathy PARKER               Discharge Codes    No documentation.               Expected Discharge Date and Time     Expected Discharge Date Expected Discharge Time    Jul 1, 2022             Kathy Colon RN

## 2022-07-01 NOTE — BRIEF OP NOTE
ENDOSCOPIC RETROGRADE CHOLANGIOPANCREATOGRAPHY  Progress Note    Mandy Alarcon  7/1/2022    Pre-op Diagnosis:   Acute biliary pancreatitis without infection or necrosis [K85.10]       Post-Op Diagnosis Codes:     * Acute biliary pancreatitis without infection or necrosis [K85.10]     * Bile duct calculus [K80.50]     * Cholangitis [K83.09]    Procedure/CPT® Codes:        Procedure(s):  ENDOSCOPIC RETROGRADE CHOLANGIOPANCREATOGRAPHY with sphincterotomy, balloon sweep 12-15mm    Surgeon(s):  Mitesh Altamirano MD    Anesthesia: Monitored Anesthesia Care    Staff:   Radiology Technologist: Danish Russo  Endo Technician: Arlene Granger PCT  Endo Nurse: Cecily Reyes RN         Estimated Blood Loss: minimal    Urine Voided: * No values recorded between 7/1/2022  3:44 PM and 7/1/2022  4:15 PM *    Specimens:                None          Drains: * No LDAs found *    Findings: ERCP with sphincterotomy balloon stone extraction performed revealed mildly prominent biliary tree without stenosis apparent.  Patient post cholecystectomy with clips in place.  Balloon sweep performed with 12 mm balloon and removed sludge initially followed by purulent bile consistent with early cholangitis.  Further sweep showed no further debris.  Obstruction cholangiogram performed with 12 Herrera balloon showed no extravasation.        Complications: None          Mitesh Altamirano MD     Date: 7/1/2022  Time: 16:15 EDT

## 2022-07-01 NOTE — ANESTHESIA PREPROCEDURE EVALUATION
Anesthesia Evaluation     Patient summary reviewed and Nursing notes reviewed                Airway   Mallampati: II  TM distance: >3 FB  Neck ROM: full  No difficulty expected  Dental    (+) upper dentures    Pulmonary - normal exam   (+) a smoker Former,     ROS comment: Nocturnal hypoxemia  Cardiovascular - normal exam    ECG reviewed    (+) hypertension less than 2 medications, dysrhythmias Tachycardia, Paroxysmal Atrial Fib, hyperlipidemia,     ROS comment: Hx SVT    Neuro/Psych  GI/Hepatic/Renal/Endo    (+) obesity, morbid obesity, GERD,  renal disease CRI, thyroid problem hypothyroidism    ROS Comment: Acute pancreatitis    Musculoskeletal     Abdominal   (+) obese,    Substance History      OB/GYN          Other                      Anesthesia Plan    ASA 3     MAC     intravenous induction     Anesthetic plan, risks, benefits, and alternatives have been provided, discussed and informed consent has been obtained with: patient.        CODE STATUS:    Code Status (Patient has no pulse and is not breathing): CPR (Attempt to Resuscitate)  Medical Interventions (Patient has pulse or is breathing): Full Support

## 2022-07-01 NOTE — PROGRESS NOTES
Name: Mandy Alarcon ADMIT: 2022   : 1933  PCP: Daryl Santos MD    MRN: [id number] LOS: 3 days   AGE/SEX: 89 y.o. female  ROOM: [facility] ([place])     Subjective   Subjective     She reports some minimal pain on the left side of her abdomen, but it doesn't appear to be associated with eating/drinking. She is tolerating a clear liquid diet.        Objective   Objective   Vital Signs  Temp:  [97.7 °F (36.5 °C)-98.6 °F (37 °C)] 97.8 °F (36.6 °C)  Heart Rate:  [52-93] 52  Resp:  [16-18] 18  BP: (101-130)/(62-74) 130/64  SpO2:  [93 %-98 %] 98 %  on  Flow (L/min):  [2] 2;   Device (Oxygen Therapy): room air  Body mass index is 39.68 kg/m².  Physical Exam  Constitutional:       General: She is not in acute distress.     Appearance: She is obese. She is ill-appearing.   Cardiovascular:      Rate and Rhythm: Normal rate and regular rhythm.      Heart sounds: Normal heart sounds.   Pulmonary:      Effort: Pulmonary effort is normal.      Breath sounds: Normal breath sounds.   Abdominal:      General: Bowel sounds are normal.      Palpations: Abdomen is soft.   Musculoskeletal:         General: No tenderness.      Right lower leg: No edema.      Left lower leg: No edema.   Neurological:      Mental Status: She is alert.   Psychiatric:         Mood and Affect: Mood normal.         Behavior: Behavior normal.         Results Review     I reviewed the patient's new clinical results.  Results from last 7 days   Lab Units 22  0722 22  1027 22  1657   WBC 10*3/mm3 8.89 10.28 12.21*   HEMOGLOBIN g/dL 13.3 14.5 15.7   PLATELETS 10*3/mm3 142 164 181     Results from last 7 days   Lab Units 22  0753 22  0722 22  1027 22  1657   SODIUM mmol/L 134* 134* 137 135*   POTASSIUM mmol/L 3.9 4.0 4.3 4.2   CHLORIDE mmol/L 105 103 103 106   CO2 mmol/L 20.5* 21.8* 21.0* 19.0*   BUN mg/dL 15 15 19 22   CREATININE mg/dL 1.18* 1.12* 1.34* 1.34*   GLUCOSE mg/dL 70 79 81 128*   Estimated Creatinine  Clearance: 36.8 mL/min (A) (by C-G formula based on SCr of 1.18 mg/dL (H)).  Results from last 7 days   Lab Units 07/01/22  0753 06/30/22  0722 06/29/22  1027 06/28/22  1657   ALBUMIN g/dL 2.80* 3.10* 3.40* 3.60   BILIRUBIN mg/dL 1.3* 1.8* 3.5* 2.0*   ALK PHOS U/L 74 82 93 101   AST (SGOT) U/L 39* 80* 202* 442*   ALT (SGPT) U/L 93* 152* 268* 338*     Results from last 7 days   Lab Units 07/01/22  0753 06/30/22  0722 06/29/22  1027 06/28/22  1657   CALCIUM mg/dL 8.7 8.6 8.3* 8.8   ALBUMIN g/dL 2.80* 3.10* 3.40* 3.60       COVID19   Date Value Ref Range Status   06/28/2022 Not Detected Not Detected - Ref. Range Final   05/21/2020 Not Detected Not Detected - Ref. Range Final     Glucose   Date/Time Value Ref Range Status   07/01/2022 1058 72 70 - 130 mg/dL Final     Comment:     Meter: [id number] : [id number] Paulcallie Head JENNIE   07/01/2022 0938 69 (L) 70 - 130 mg/dL Final     Comment:     Meter: [id number] : [id number] Denys MANUEL RN       MRI abdomen w wo contrast mrcp  Narrative: MRI OF THE LIVER WITH AND WITHOUT CONTRAST, MRCP     HISTORY: To assess for choledocholithiasis.     TECHNIQUE: MRI of the abdomen was performed utilizing a dedicated liver  protocol. Axial 2-D FIESTA, in and out of phase FSPGR, axial  fat-saturated T2 imaging was acquired. Axial thin section precontrast  and dynamic enhanced postcontrast T1 weighted LAVA imaging was acquired.  Thick slab coronal MRCP imaging was acquired in multiple obliquities  through the biliary tree. Coronal thin section fat saturated T2 and 3-D  MRCP imaging was acquired.      COMPARISON: CT dated 06/28/2022.     FINDINGS: The patient is status post cholecystectomy. There is bilobar  mild intrahepatic biliary dilatation. The confluence is patent.  There  is a cystic lesion measuring 2.9 x 2.9 cm seen at the gallbladder fossa  that is favored to represent either marked cystic dilation of the cystic  duct or portion of the remnant pancreas. The common bile  duct is dilated  measuring up to 1.1 cm proximally. It demonstrates distal tapering  without definite intraluminal filling defect. The pancreatic duct  demonstrates minimal ectasia measuring up to 4 to 5 mm within the head  of the pancreas. There is peripancreatic fat stranding and small amount  of fluid present consistent with acute pancreatitis. Marked enhancement  and wall thickening of the second portion of the duodenum is present.  There is a small sliding hiatal hernia. Diffuse hepatic steatosis is  identified. No suspicious hepatic lesion is seen. The hepatic veins are  patent. The portal and its branches are patent. There is a partly imaged  large cyst within the right kidney.     Impression: 1. MR evidence of acute pancreatitis.  2. There is mild-to-moderate intrahepatic and extrahepatic biliary  dilatation without evidence of choledocholithiasis demonstrated on MRI.  A cystic lesion in the gallbladder fossa is favored to represent either  an ectatic cystic duct remanent or portion of the remaining gallbladder.     This report was finalized on 6/30/2022 1:58 PM by Dr. Ruthy Haro M.D.       Scheduled Medications  cycloSPORINE, 1 drop, Both Eyes, BID  levothyroxine, 75 mcg, Oral, Q AM  metoprolol succinate XL, 25 mg, Oral, Q24H  multivitamin with minerals, 1 tablet, Oral, Daily  pantoprazole, 40 mg, Oral, Q AM    Infusions  lactated ringers, 100 mL/hr, Last Rate: 100 mL/hr (07/01/22 1234)    Diet  NPO Diet NPO Type: Sips with Meds       Assessment/Plan     Active Hospital Problems    Diagnosis  POA   • **Acute pancreatitis, unspecified complication status, unspecified pancreatitis type [K85.90]  Yes   • Stage 3b chronic kidney disease (HCC) [N18.32]  Yes   • Paroxysmal atrial fibrillation (HCC) [I48.0]  Yes   • Obesity (BMI 30-39.9) [E66.9]  Yes   • Primary hypothyroidism [E03.9]  Yes   • Hyperlipidemia [E78.2]  Yes   • Benign essential hypertension [I10]  Yes      Resolved Hospital Problems   No  resolved problems to display.       89 y.o. female admitted with Acute pancreatitis, unspecified complication status, unspecified pancreatitis type.    · Acute pancreatitis-MRCP shows a cystic lesion in the gallbladder fossa that is favored to represent either an ectatic cystic duct remnant or portion of the remaining gallbladder as well as mild to moderate intrahepatic extrahepatic biliary dilation without evidence of choledocholithiasis. GI planning ERCP with sphincterotomy possibly today. Continue pain control, bowel regimen. Diet per GI.  · Reported gross hematuria-u/a was without any rbcs. CT abdomen without any lesions in her urinary system. Monitor for recurrence  · CKD3-creatinine is better than baseline  · Paroxysmal afib-metoprolol for rate control. She had one episode during an acute illness with no documented recurrence, so not on AC. Follows with Dr. Laura  · Hypothyroidism-synthroid  · GERD-PPI.   · HLD-holding statin with elevated liver enzymes  · Essential hypertension-well controlled  · SCDs for DVT prophylaxis.  · Full code.  · Discussed with patient, family and nursing staff.  · Anticipate discharge home with family timing yet to be determined.      Nain Ugarte MD  [facility]  07/01/22  12:48 EDT    I wore protective equipment throughout this patient encounter including a face mask, gloves and protective eyewear.  Hand hygiene was performed before donning protective equipment and after removal when leaving the room.

## 2022-07-01 NOTE — PLAN OF CARE
Goal Outcome Evaluation:              Outcome Evaluation: medicated prn for pain this am.  ERCP done today.  Pt now on Reg diet, tolerating well.  No nausea.  Family at bedside.  C/o tenderness to right jaw area under ear.  No edema noted.  Dr. Ugarte aware.  To start po antibiotics per Dr. Altamirano

## 2022-07-02 ENCOUNTER — APPOINTMENT (OUTPATIENT)
Dept: CARDIOLOGY | Facility: HOSPITAL | Age: 87
End: 2022-07-02

## 2022-07-02 ENCOUNTER — APPOINTMENT (OUTPATIENT)
Dept: ULTRASOUND IMAGING | Facility: HOSPITAL | Age: 87
End: 2022-07-02

## 2022-07-02 LAB
ALBUMIN SERPL-MCNC: 2.8 G/DL (ref 3.5–5.2)
ALBUMIN/GLOB SERPL: 0.8 G/DL
ALP SERPL-CCNC: 86 U/L (ref 39–117)
ALT SERPL W P-5'-P-CCNC: 67 U/L (ref 1–33)
ANION GAP SERPL CALCULATED.3IONS-SCNC: 7.1 MMOL/L (ref 5–15)
AST SERPL-CCNC: 25 U/L (ref 1–32)
BH CV XLRA MEAS LEFT DIST CCA EDV: -20.6 CM/SEC
BH CV XLRA MEAS LEFT DIST CCA PSV: -83.1 CM/SEC
BH CV XLRA MEAS LEFT DIST ICA EDV: -34.2 CM/SEC
BH CV XLRA MEAS LEFT DIST ICA PSV: -90.1 CM/SEC
BH CV XLRA MEAS LEFT ICA/CCA RATIO: 1.08
BH CV XLRA MEAS LEFT MID ICA EDV: -17.2 CM/SEC
BH CV XLRA MEAS LEFT MID ICA PSV: -49.4 CM/SEC
BH CV XLRA MEAS LEFT PROX CCA EDV: -20.5 CM/SEC
BH CV XLRA MEAS LEFT PROX CCA PSV: -84.5 CM/SEC
BH CV XLRA MEAS LEFT PROX ECA EDV: 17.4 CM/SEC
BH CV XLRA MEAS LEFT PROX ECA PSV: 126.1 CM/SEC
BH CV XLRA MEAS LEFT PROX ICA EDV: -20.1 CM/SEC
BH CV XLRA MEAS LEFT PROX ICA PSV: -57.5 CM/SEC
BH CV XLRA MEAS LEFT PROX SCLA PSV: 101.8 CM/SEC
BH CV XLRA MEAS LEFT VERTEBRAL A EDV: 15.4 CM/SEC
BH CV XLRA MEAS LEFT VERTEBRAL A PSV: 57.1 CM/SEC
BH CV XLRA MEAS RIGHT DIST CCA EDV: -16.7 CM/SEC
BH CV XLRA MEAS RIGHT DIST CCA PSV: -71.6 CM/SEC
BH CV XLRA MEAS RIGHT DIST ICA EDV: -28.6 CM/SEC
BH CV XLRA MEAS RIGHT DIST ICA PSV: -83.3 CM/SEC
BH CV XLRA MEAS RIGHT ICA/CCA RATIO: 1.16
BH CV XLRA MEAS RIGHT MID ICA EDV: -26.9 CM/SEC
BH CV XLRA MEAS RIGHT MID ICA PSV: -74.7 CM/SEC
BH CV XLRA MEAS RIGHT PROX CCA EDV: -14.7 CM/SEC
BH CV XLRA MEAS RIGHT PROX CCA PSV: -66.6 CM/SEC
BH CV XLRA MEAS RIGHT PROX ECA EDV: -6.6 CM/SEC
BH CV XLRA MEAS RIGHT PROX ECA PSV: -59.7 CM/SEC
BH CV XLRA MEAS RIGHT PROX ICA EDV: -21.5 CM/SEC
BH CV XLRA MEAS RIGHT PROX ICA PSV: -60.6 CM/SEC
BH CV XLRA MEAS RIGHT PROX SCLA PSV: 112.1 CM/SEC
BH CV XLRA MEAS RIGHT VERTEBRAL A EDV: -20.9 CM/SEC
BH CV XLRA MEAS RIGHT VERTEBRAL A PSV: -62.6 CM/SEC
BILIRUB SERPL-MCNC: 0.9 MG/DL (ref 0–1.2)
BUN SERPL-MCNC: 15 MG/DL (ref 8–23)
BUN/CREAT SERPL: 12.9 (ref 7–25)
CALCIUM SPEC-SCNC: 8.8 MG/DL (ref 8.6–10.5)
CHLORIDE SERPL-SCNC: 107 MMOL/L (ref 98–107)
CO2 SERPL-SCNC: 21.9 MMOL/L (ref 22–29)
CREAT BLDA-MCNC: 1.2 MG/DL (ref 0.6–1.3)
CREAT SERPL-MCNC: 1.16 MG/DL (ref 0.57–1)
DEPRECATED RDW RBC AUTO: 43.1 FL (ref 37–54)
EGFRCR SERPLBLD CKD-EPI 2021: 45.2 ML/MIN/1.73
ERYTHROCYTE [DISTWIDTH] IN BLOOD BY AUTOMATED COUNT: 13.1 % (ref 12.3–15.4)
GLOBULIN UR ELPH-MCNC: 3.7 GM/DL
GLUCOSE SERPL-MCNC: 107 MG/DL (ref 65–99)
HCT VFR BLD AUTO: 40.7 % (ref 34–46.6)
HGB BLD-MCNC: 14 G/DL (ref 12–15.9)
MAXIMAL PREDICTED HEART RATE: 131 BPM
MCH RBC QN AUTO: 31.5 PG (ref 26.6–33)
MCHC RBC AUTO-ENTMCNC: 34.4 G/DL (ref 31.5–35.7)
MCV RBC AUTO: 91.5 FL (ref 79–97)
PLATELET # BLD AUTO: 162 10*3/MM3 (ref 140–450)
PMV BLD AUTO: 9.5 FL (ref 6–12)
POTASSIUM SERPL-SCNC: 4 MMOL/L (ref 3.5–5.2)
PROT SERPL-MCNC: 6.5 G/DL (ref 6–8.5)
RBC # BLD AUTO: 4.45 10*6/MM3 (ref 3.77–5.28)
S PYO AG THROAT QL: NEGATIVE
SODIUM SERPL-SCNC: 136 MMOL/L (ref 136–145)
STRESS TARGET HR: 111 BPM
WBC NRBC COR # BLD: 8.26 10*3/MM3 (ref 3.4–10.8)

## 2022-07-02 PROCEDURE — 87081 CULTURE SCREEN ONLY: CPT | Performed by: STUDENT IN AN ORGANIZED HEALTH CARE EDUCATION/TRAINING PROGRAM

## 2022-07-02 PROCEDURE — 85027 COMPLETE CBC AUTOMATED: CPT | Performed by: STUDENT IN AN ORGANIZED HEALTH CARE EDUCATION/TRAINING PROGRAM

## 2022-07-02 PROCEDURE — 87040 BLOOD CULTURE FOR BACTERIA: CPT | Performed by: STUDENT IN AN ORGANIZED HEALTH CARE EDUCATION/TRAINING PROGRAM

## 2022-07-02 PROCEDURE — 93880 EXTRACRANIAL BILAT STUDY: CPT

## 2022-07-02 PROCEDURE — 87880 STREP A ASSAY W/OPTIC: CPT | Performed by: STUDENT IN AN ORGANIZED HEALTH CARE EDUCATION/TRAINING PROGRAM

## 2022-07-02 PROCEDURE — 80053 COMPREHEN METABOLIC PANEL: CPT | Performed by: STUDENT IN AN ORGANIZED HEALTH CARE EDUCATION/TRAINING PROGRAM

## 2022-07-02 PROCEDURE — 25010000002 HYDROMORPHONE PER 4 MG: Performed by: NURSE PRACTITIONER

## 2022-07-02 PROCEDURE — 76536 US EXAM OF HEAD AND NECK: CPT

## 2022-07-02 RX ADMIN — PANTOPRAZOLE SODIUM 40 MG: 40 TABLET, DELAYED RELEASE ORAL at 05:29

## 2022-07-02 RX ADMIN — METOPROLOL SUCCINATE 25 MG: 25 TABLET, EXTENDED RELEASE ORAL at 11:38

## 2022-07-02 RX ADMIN — HYDROMORPHONE HYDROCHLORIDE 0.5 MG: 1 INJECTION, SOLUTION INTRAMUSCULAR; INTRAVENOUS; SUBCUTANEOUS at 05:29

## 2022-07-02 RX ADMIN — LEVOTHYROXINE SODIUM 75 MCG: 0.07 TABLET ORAL at 05:29

## 2022-07-02 RX ADMIN — CYCLOSPORINE 1 DROP: 0.5 EMULSION OPHTHALMIC at 11:38

## 2022-07-02 RX ADMIN — SODIUM CHLORIDE, POTASSIUM CHLORIDE, SODIUM LACTATE AND CALCIUM CHLORIDE 100 ML/HR: 600; 310; 30; 20 INJECTION, SOLUTION INTRAVENOUS at 03:02

## 2022-07-02 RX ADMIN — MULTIPLE VITAMINS W/ MINERALS TAB 1 TABLET: TAB at 11:38

## 2022-07-02 RX ADMIN — CYCLOSPORINE 1 DROP: 0.5 EMULSION OPHTHALMIC at 20:19

## 2022-07-02 RX ADMIN — HYDROMORPHONE HYDROCHLORIDE 0.5 MG: 1 INJECTION, SOLUTION INTRAMUSCULAR; INTRAVENOUS; SUBCUTANEOUS at 22:10

## 2022-07-02 RX ADMIN — HYDROMORPHONE HYDROCHLORIDE 0.5 MG: 1 INJECTION, SOLUTION INTRAMUSCULAR; INTRAVENOUS; SUBCUTANEOUS at 07:25

## 2022-07-02 NOTE — PLAN OF CARE
Goal Outcome Evaluation:  Plan of Care Reviewed With: patient, daughter        Progress: declining  Patient A&OX4. Daughter at bedside. Had ERCP 7a-7p. Temp elevated. C/O pain to sides of neck. Visible lump on right side of throat-extremely tender to touch. Left side of neck tender but not as bad as right. No redness or white patches visible in oral cavity or back of throat. JORJE CORONA notified. NON for ultrasounds of neck and prn tylenol for temp. Started on PO levaquin postop. PRN dilaudid given for left sided abdominal pain and throat pain. SR on monitor. RA. Ax1 to bathroom. LR @100/hr. Bed in low position. Call light in reach.

## 2022-07-02 NOTE — PROGRESS NOTES
Name: Mandy Alarcon ADMIT: 2022   : 1933  PCP: Daryl Santos MD    MRN: [id number] LOS: 4 days   AGE/SEX: 89 y.o. female  ROOM: [facility]     Subjective   Subjective     ERCP was performed yesterday with sphincterotomy. Pus was seen and cholangitis was diagnosed and the patient was placed on levaquin. Overnight, she had a fever. This morning, she has exquisitely tender and enlarged bilateral subaural lymph nodes which are new. She complained of tenderness in the area on the right yesterday, but my exam was completely normal at that time as was her nurse's. Her daughter is at bedside and reports that it came on suddenly and already appears to be less swollen than yesterday. She denies any sore throat or sinus congestion. She does have a mild cough, but it isn't particularly predominant.         Objective   Objective   Vital Signs  Temp:  [97.6 °F (36.4 °C)-100.8 °F (38.2 °C)] 97.6 °F (36.4 °C)  Heart Rate:  [] 62  Resp:  [20] 20  BP: (127-177)/() 140/70  SpO2:  [95 %-97 %] 96 %  on  Flow (L/min):  [10] 10;   Device (Oxygen Therapy): room air  Body mass index is 39.68 kg/m².  Physical Exam  Constitutional:       General: She is not in acute distress.     Appearance: She is obese. She is not ill-appearing.   HENT:      Mouth/Throat:      Mouth: Mucous membranes are moist.      Pharynx: Oropharynx is clear. No oropharyngeal exudate or posterior oropharyngeal erythema.      Comments: Cannot see her tonsils on exam  Neck:      Comments: Bilateral large swollen and tender lymph nodes  Cardiovascular:      Rate and Rhythm: Normal rate and regular rhythm.      Heart sounds: Normal heart sounds.   Pulmonary:      Effort: Pulmonary effort is normal.      Breath sounds: Normal breath sounds.   Abdominal:      General: Bowel sounds are normal.      Palpations: Abdomen is soft.   Musculoskeletal:         General: No tenderness.      Right lower leg: No edema.      Left lower leg: No edema.    Lymphadenopathy:      Cervical: Cervical adenopathy present.   Neurological:      Mental Status: She is alert.   Psychiatric:         Mood and Affect: Mood normal.         Behavior: Behavior normal.         Results Review     I reviewed the patient's new clinical results.  Results from last 7 days   Lab Units 07/02/22  1143 06/30/22  0722 06/29/22  1027 06/28/22  1657   WBC 10*3/mm3 8.26 8.89 10.28 12.21*   HEMOGLOBIN g/dL 14.0 13.3 14.5 15.7   PLATELETS 10*3/mm3 162 142 164 181     Results from last 7 days   Lab Units 07/02/22  1143 07/01/22  0753 06/30/22  0722 06/29/22  1027   SODIUM mmol/L 136 134* 134* 137   POTASSIUM mmol/L 4.0 3.9 4.0 4.3   CHLORIDE mmol/L 107 105 103 103   CO2 mmol/L 21.9* 20.5* 21.8* 21.0*   BUN mg/dL 15 15 15 19   CREATININE mg/dL 1.16* 1.18* 1.12* 1.34*   GLUCOSE mg/dL 107* 70 79 81   Estimated Creatinine Clearance: 37.5 mL/min (A) (by C-G formula based on SCr of 1.16 mg/dL (H)).  Results from last 7 days   Lab Units 07/02/22  1143 07/01/22  0753 06/30/22  0722 06/29/22  1027   ALBUMIN g/dL 2.80* 2.80* 3.10* 3.40*   BILIRUBIN mg/dL 0.9 1.3* 1.8* 3.5*   ALK PHOS U/L 86 74 82 93   AST (SGOT) U/L 25 39* 80* 202*   ALT (SGPT) U/L 67* 93* 152* 268*     Results from last 7 days   Lab Units 07/02/22  1143 07/01/22  0753 06/30/22  0722 06/29/22  1027   CALCIUM mg/dL 8.8 8.7 8.6 8.3*   ALBUMIN g/dL 2.80* 2.80* 3.10* 3.40*       COVID19   Date Value Ref Range Status   06/28/2022 Not Detected Not Detected - Ref. Range Final   05/21/2020 Not Detected Not Detected - Ref. Range Final     Glucose   Date/Time Value Ref Range Status   07/01/2022 1058 72 70 - 130 mg/dL Final     Comment:     Meter: [id number] : [id number] Ric SCHNEIDER   07/01/2022 0938 69 (L) 70 - 130 mg/dL Final     Comment:     Meter: [id number] : [id number] Denys MANUEL RN       Duplex Carotid Ultrasound CAR  · Proximal right internal carotid artery plaque without significant   stenosis.  · Proximal left internal  carotid artery plaque without significant   stenosis.       Scheduled Medications  cycloSPORINE, 1 drop, Both Eyes, BID  levoFLOXacin, 750 mg, Oral, Q48H  levothyroxine, 75 mcg, Oral, Q AM  metoprolol succinate XL, 25 mg, Oral, Q24H  multivitamin with minerals, 1 tablet, Oral, Daily  pantoprazole, 40 mg, Oral, Q AM    Infusions   Diet  Diet Regular; Cardiac       Assessment/Plan     Active Hospital Problems    Diagnosis  POA   • **Acute pancreatitis, unspecified complication status, unspecified pancreatitis type [K85.90]  Yes   • Stage 3b chronic kidney disease (HCC) [N18.32]  Yes   • Paroxysmal atrial fibrillation (HCC) [I48.0]  Yes   • Obesity (BMI 30-39.9) [E66.9]  Yes   • Primary hypothyroidism [E03.9]  Yes   • Hyperlipidemia [E78.2]  Yes   • Benign essential hypertension [I10]  Yes      Resolved Hospital Problems   No resolved problems to display.       89 y.o. female admitted with Acute pancreatitis, unspecified complication status, unspecified pancreatitis type.    · Acute biliary pancreatitis with cholangitis-s/p sphincterotomy with balloon stone extraction revealing purulent bile consistent with early cholangitis. She has been placed on levaquin by GI.  · Fever-possibly related to the above. Will check some blood cultures and monitor for recurrence  · Bilateral swollen and tender subaural lymph nodes-she denies sore throat, but will check rapid strep test and throat culture if this is negative. Follow up ultrasound which was ordered overnight.   · Reported gross hematuria-u/a was without any rbcs. CT abdomen without any lesions in her urinary system. Monitor for recurrence  · CKD3-creatinine is better than baseline  · Paroxysmal afib-metoprolol for rate control. She had one episode during an acute illness with no documented recurrence, so not on AC. Follows with Dr. Laura  · Hypothyroidism-synthroid  · GERD-PPI.   · HLD-holding statin with elevated liver enzymes  · Essential hypertension-well  controlled  · SCDs for DVT prophylaxis.  · Full code.  · Discussed with patient, family and nursing staff.  · Anticipate discharge home with family timing yet to be determined.      Nain Ugarte MD  [facility]  07/02/22  13:31 EDT    I wore protective equipment throughout this patient encounter including a face mask, gloves and protective eyewear.  Hand hygiene was performed before donning protective equipment and after removal when leaving the room.

## 2022-07-02 NOTE — PLAN OF CARE
Goal Outcome Evaluation:  Plan of Care Reviewed With: patient        Progress: improving  Outcome Evaluation: VSS. Telemetry monitor, SR. Alert and oriented x4, room air. Up with standby assist. IV fluids discontinued. Ultrasounds of neck done today. Will cont to monitor.

## 2022-07-03 LAB
ANION GAP SERPL CALCULATED.3IONS-SCNC: 7.2 MMOL/L (ref 5–15)
BUN SERPL-MCNC: 15 MG/DL (ref 8–23)
BUN/CREAT SERPL: 11.9 (ref 7–25)
CALCIUM SPEC-SCNC: 8.7 MG/DL (ref 8.6–10.5)
CHLORIDE SERPL-SCNC: 107 MMOL/L (ref 98–107)
CO2 SERPL-SCNC: 21.8 MMOL/L (ref 22–29)
CREAT SERPL-MCNC: 1.26 MG/DL (ref 0.57–1)
DEPRECATED RDW RBC AUTO: 45.2 FL (ref 37–54)
EGFRCR SERPLBLD CKD-EPI 2021: 40.9 ML/MIN/1.73
ERYTHROCYTE [DISTWIDTH] IN BLOOD BY AUTOMATED COUNT: 13.3 % (ref 12.3–15.4)
GLUCOSE SERPL-MCNC: 118 MG/DL (ref 65–99)
HCT VFR BLD AUTO: 41 % (ref 34–46.6)
HGB BLD-MCNC: 13.5 G/DL (ref 12–15.9)
MCH RBC QN AUTO: 30.8 PG (ref 26.6–33)
MCHC RBC AUTO-ENTMCNC: 32.9 G/DL (ref 31.5–35.7)
MCV RBC AUTO: 93.6 FL (ref 79–97)
PLATELET # BLD AUTO: 190 10*3/MM3 (ref 140–450)
PMV BLD AUTO: 9.3 FL (ref 6–12)
POTASSIUM SERPL-SCNC: 4 MMOL/L (ref 3.5–5.2)
RBC # BLD AUTO: 4.38 10*6/MM3 (ref 3.77–5.28)
SODIUM SERPL-SCNC: 136 MMOL/L (ref 136–145)
WBC NRBC COR # BLD: 8.58 10*3/MM3 (ref 3.4–10.8)

## 2022-07-03 PROCEDURE — 97161 PT EVAL LOW COMPLEX 20 MIN: CPT

## 2022-07-03 PROCEDURE — 80048 BASIC METABOLIC PNL TOTAL CA: CPT | Performed by: STUDENT IN AN ORGANIZED HEALTH CARE EDUCATION/TRAINING PROGRAM

## 2022-07-03 PROCEDURE — 85027 COMPLETE CBC AUTOMATED: CPT | Performed by: STUDENT IN AN ORGANIZED HEALTH CARE EDUCATION/TRAINING PROGRAM

## 2022-07-03 PROCEDURE — 25010000002 HYDROMORPHONE PER 4 MG: Performed by: NURSE PRACTITIONER

## 2022-07-03 RX ADMIN — PANTOPRAZOLE SODIUM 40 MG: 40 TABLET, DELAYED RELEASE ORAL at 05:36

## 2022-07-03 RX ADMIN — CYCLOSPORINE 1 DROP: 0.5 EMULSION OPHTHALMIC at 08:46

## 2022-07-03 RX ADMIN — METOPROLOL SUCCINATE 25 MG: 25 TABLET, EXTENDED RELEASE ORAL at 08:46

## 2022-07-03 RX ADMIN — MULTIPLE VITAMINS W/ MINERALS TAB 1 TABLET: TAB at 08:46

## 2022-07-03 RX ADMIN — LEVOFLOXACIN 750 MG: 750 TABLET, FILM COATED ORAL at 17:22

## 2022-07-03 RX ADMIN — LEVOTHYROXINE SODIUM 75 MCG: 0.07 TABLET ORAL at 05:36

## 2022-07-03 RX ADMIN — CYCLOSPORINE 1 DROP: 0.5 EMULSION OPHTHALMIC at 19:53

## 2022-07-03 RX ADMIN — HYDROMORPHONE HYDROCHLORIDE 0.5 MG: 1 INJECTION, SOLUTION INTRAMUSCULAR; INTRAVENOUS; SUBCUTANEOUS at 21:57

## 2022-07-03 NOTE — THERAPY EVALUATION
Patient Name: Mandy Alarcon  : 1933    MRN: [id number]                              Today's Date: 7/3/2022       Admit Date: 2022    Visit Dx:     ICD-10-CM ICD-9-CM   1. Acute pancreatitis, unspecified complication status, unspecified pancreatitis type  K85.90 577.0   2. Atypical chest pain  R07.89 786.59   3. Hypertension, unspecified type  I10 401.9   4. Chronic kidney disease, unspecified CKD stage  N18.9 585.9   5. Transaminitis  R74.01 790.4   6. Acute biliary pancreatitis without infection or necrosis  K85.10 577.0     Patient Active Problem List   Diagnosis   • Benign essential hypertension   • Claustrophobia   • Gout   • Hyperlipidemia   • Primary hypothyroidism   • Impaired fasting glucose   • Nocturnal hypoxemia   • Secondary polycythemia   • Vitamin D deficiency   • Therapeutic drug monitoring   • Family history of coronary artery disease   • Bilateral sensorineural hearing loss, wears hearing aids   • Multinodular goiter   • Supraventricular tachycardia (HCC)   • Obesity (BMI 30-39.9)   • Bilateral lower extremity edema   • Gastroesophageal reflux disease without esophagitis   • Paroxysmal atrial fibrillation (HCC)   • Stage 3b chronic kidney disease (HCC)   • Acute pancreatitis, unspecified complication status, unspecified pancreatitis type     Past Medical History:   Diagnosis Date   • Benign essential hypertension 10/28/2012    2012--treatment for hypertension begun.   • Bilateral lower extremity edema 2020   • Bilateral sensorineural hearing loss, wears hearing aids 2017    Left is much worse than right.  Patient had multiple ear infections as a child.   • Chronic renal impairment, stage 3b (HCC) 09/15/2020   • Claustrophobia 2016    This patient has significant nocturnal hypoxemia and I think that she could benefit from nocturnal oxygen therapy. The exact etiology of her hypoxemia is not clear. She could possibly have obstructive sleep apnea but this is not  documented. We cannot test this patient for sleep apnea due to the fact that she is severely claustrophobic. Patient was a former smoker and it is possibly that COPD he is playing a role.   • Family history of coronary artery disease 2016    Patient's mother, father, 2 sisters and a brother all  from myocardial infarctions   • Gastroesophageal reflux disease without esophagitis 2020   • Gout 10/28/2012    2012--initial diagnosis and treatment of gout.   • History of acute pancreatitis 2020   • Hyperlipidemia 10/28/2012    2012--treatment for hyperlipidemia begun.   • Impaired fasting glucose 10/28/2012    2012--initial diagnosis impaired fasting glucose.   • Morbidly obese (HCC) 2019   • Nocturnal hypoxemia 2012--patient did not receive nocturnal oxygen because of Medicare regulations.   05/15/2014--overnight oximetry revealed oxygen saturations less than 89% for 22 minutes and 40 seconds. Oxygen saturations less than or equal to 88% for 22 minutes and 40 seconds. Lowest oxygen saturation 83%. The longest continuous time with oxygen saturations less than or equal to 88% was 1 minute and 32 seconds.   2012--overnight oximetry revealed oxygen saturations less than 90% for one hour and 35 minutes. Oxygen saturations less than 89% 59 minutes. This patient has significant nocturnal hypoxemia and I think that she could benefit from nocturnal oxygen therapy. The exact etiology of her hypoxemia is not clear. She could possibly have obstructive sleep apnea but this is not documented. We cannot test this patient for sleep apnea due to the fact that she is severely claustrophobic. Patient was a former smoker and it is possibly that COPD he is playing a role.   • Non morbid obesity 2019   • Paroxysmal atrial fibrillation (HCC) 04/10/2020   • Primary hypothyroidism 2015--TSH remains elevated slightly at 4.92.  Given the  overall clinical picture including the multinodular goiter, we will initiate levothyroxine 50 mcg/day and reassess in about 6 weeks.  August 1, 2018--thyroid ultrasound reveals a multinodular thyroid with multiple subcentimeter nodules.  Only minimal increase in size of the largest nodule in the left lobe has occurred when compared    • Secondary polycythemia 08/02/2012 11/06/2014--patient did not receive nocturnal oxygen because of Medicare regulations.   05/15/2014--overnight oximetry revealed oxygen saturations less than 89% for 22 minutes and 40 seconds. Oxygen saturations less than or equal to 88% for 22 minutes and 40 seconds. Lowest oxygen saturation 83%. The longest continuous time with oxygen saturations less than or equal to 88% was 1 minute and 32 seconds.   08/02/2012--overnight oximetry revealed oxygen saturations less than 90% for one hour and 35 minutes. Oxygen saturations less than 89% 59 minutes. This patient has significant nocturnal hypoxemia and I think that she could benefit from nocturnal oxygen therapy. The exact etiology of her hypoxemia is not clear. She could possibly have obstructive sleep apnea but this is not documented. We cannot test this patient for sleep apnea due to the fact that she is severely claustrophobic. Patient was a former smoker and it is possibly that COPD he is playing a role.   • Vitamin D deficiency 05/23/2016     Past Surgical History:   Procedure Laterality Date   • CHOLECYSTECTOMY WITH INTRAOPERATIVE CHOLANGIOGRAM N/A 03/12/2020    Procedure: LAPAROSCOPIC CHOLECYSTOSTOMY TUBE, INTRAOPERATIVE CHOLANGIOGRAM;  Surgeon: Bryce Andujar MD;  Location: [facility] OR;  Service: General;  Laterality: N/A;   • CHOLECYSTECTOMY WITH INTRAOPERATIVE CHOLANGIOGRAM N/A 05/22/2020    Procedure: CHOLECYSTECTOMY LAPAROSCOPIC INTRAOPERATIVE CHOLANGIOGRAM and EGD;  Surgeon: Bryce Andujar MD;  Location: [facility] OR;  Service: General;  Laterality: N/A;   •  OOPHORECTOMY      age 48   • RADICAL ABDOMINAL HYSTERECTOMY  48 years old    48 years of age. Uterine fibroid tumors with menorrhagia - no cancer      General Information     Row Name 07/03/22 1322          Physical Therapy Time and Intention    Document Type evaluation  -     Mode of Treatment individual therapy;physical therapy  -     Row Name 07/03/22 1322          General Information    Prior Level of Function independent:;gait;transfer;bed mobility  no AD  -     Existing Precautions/Restrictions fall  -     Barriers to Rehab medically complex  -     Row Name 07/03/22 1322          Living Environment    People in Home child(adiel), adult  -     Row Name 07/03/22 1322          Stairs Within Home, Primary    Stairs, Within Home, Primary pt has a flight of stairs to her bedroom  -     Row Name 07/03/22 1322          Cognition    Orientation Status (Cognition) oriented x 4  -           User Key  (r) = Recorded By, (t) = Taken By, (c) = Cosigned By    Initials Name Provider Type     Kelin Puri PT Physical Therapist               Mobility     Row Name 07/03/22 1323          Bed Mobility    Bed Mobility supine-sit;sit-supine  -     Supine-Sit [place] (Bed Mobility) supervision  -     Sit-Supine [place] (Bed Mobility) supervision  -     Row Name 07/03/22 1323          Sit-Stand Transfer    Sit-Stand [place] (Transfers) supervision  -     Row Name 07/03/22 1323          Gait/Stairs (Locomotion)    [place] Level (Gait) supervision  -     Distance in Feet (Gait) 300  -     Comment, (Gait/Stairs) pt ambulates with a fast pace, no LOB or impaired gait noted with ambulation  -           User Key  (r) = Recorded By, (t) = Taken By, (c) = Cosigned By    Initials Name Provider Type     Kelin Puri PT Physical Therapist               Obj/Interventions     Row Name 07/03/22 1324          Range of Motion Comprehensive    General Range of Motion no range of motion  deficits identified  -     Row Name 07/03/22 1324          Strength Comprehensive (MMT)    General Manual Muscle Testing (MMT) Assessment no strength deficits identified  -     Row Name 07/03/22 1324          Motor Skills    Therapeutic Exercise --  10 reps B LE AP, LAQ, and seated marches  -     Row Name 07/03/22 1324          Balance    Balance Assessment standing static balance;standing dynamic balance  -     Static Standing Balance supervision  -     Dynamic Standing Balance supervision  -           User Key  (r) = Recorded By, (t) = Taken By, (c) = Cosigned By    Initials Name Provider Type     Kelin Puri S, PT Physical Therapist               Goals/Plan    No documentation.                Clinical Impression     [facility] Name 07/03/22 1325          Pain    Pre/Posttreatment Pain Comment pt reports some soreness in abdomen and neck  -     Pain Intervention(s) Repositioned  -     Row Name 07/03/22 1325          Plan of Care Review    Plan of Care Reviewed With patient;daughter  -     Outcome Evaluation Pt is a 89 yo F who is post-op ERCP with sphinterotomy and found to have cholangitis. Pt demonstrates adequate strength and balance to perform functional mobility and gait independently. Pt ambulates with a fast pace but demonstrates no LOB or impaired gait mechanics. Pt appears to be near her functional baseline. Pt is encouraged to continue to ambulate in halls with family and nursing. Acute care PT will sign off at this time.  -     Row Name 07/03/22 1325          Therapy Assessment/Plan (PT)    Patient/Family Therapy Goals Statement (PT) to return to OF  -     Criteria for Skilled Interventions Met (PT) no problems identified which require skilled intervention  -     Therapy Frequency (PT) evaluation only  -     Row Name 07/03/22 1325          Positioning and Restraints    Pre-Treatment Position in bed  -     Post Treatment Position bed  -     In Bed supine;call light within  reach;encouraged to call for assist  -           User Key  (r) = Recorded By, (t) = Taken By, (c) = Cosigned By    Initials Name Provider Type     Kelin Puri PT Physical Therapist               Outcome Measures     Row Name 07/03/22 1328          How much help from another person do you currently need...    Turning from your back to your side while in flat bed without using bedrails? 4  -CH     Moving from lying on back to sitting on the side of a flat bed without bedrails? 4  -CH     Moving to and from a bed to a chair (including a wheelchair)? 4  -CH     Standing up from a chair using your arms (e.g., wheelchair, bedside chair)? 4  -CH     Climbing 3-5 steps with a railing? 4  -CH     To walk in hospital room? 4  -     AM-PAC 6 Clicks Score (PT) 24  -     Highest level of mobility 8 --> Walked 250 feet or more  -     Row Name 07/03/22 1328          Functional Assessment    Outcome Measure Options AM-PAC 6 Clicks Basic Mobility (PT)  -           User Key  (r) = Recorded By, (t) = Taken By, (c) = Cosigned By    Initials Name Provider Type     Kelin Puri PT Physical Therapist                             Physical Therapy Education                 Title: PT OT SLP Therapies (Done)     Topic: Physical Therapy (Done)     Point: Mobility training (Done)     Learning Progress Summary           Patient Acceptance, E,TB,D, VU,NR by  at 7/3/2022 1329                   Point: Home exercise program (Done)     Learning Progress Summary           Patient Acceptance, E,TB,D, VU,NR by  at 7/3/2022 1329                   Point: Body mechanics (Done)     Learning Progress Summary           Patient Acceptance, E,TB,D, VU,NR by  at 7/3/2022 1329                   Point: Precautions (Done)     Learning Progress Summary           Patient Acceptance, E,TB,D, VU,NR by  at 7/3/2022 1329                               User Key     Initials Effective Dates Name Provider Type [facility] 06/16/21 -   Kelin Puri PT Physical Therapist PT              PT Recommendation and Plan     Plan of Care Reviewed With: patient, daughter  Outcome Evaluation: Pt is a 89 yo F who is post-op ERCP with sphinterotomy and found to have cholangitis. Pt demonstrates adequate strength and balance to perform functional mobility and gait independently. Pt ambulates with a fast pace but demonstrates no LOB or impaired gait mechanics. Pt appears to be near her functional baseline. Pt is encouraged to continue to ambulate in halls with family and nursing. Acute care PT will sign off at this time.     Time Calculation:    PT Charges     Row Name 07/03/22 1329             Time Calculation    Start Time 1133  -      Stop Time 1141  -      Time Calculation (min) 8 min  -      PT Received On 07/03/22  -            User Key  (r) = Recorded By, (t) = Taken By, (c) = Cosigned By    Initials Name Provider Type     Kelin Puri, PT Physical Therapist              Therapy Charges for Today     Code Description Service Date Service Provider Modifiers Qty    76147611650 HC PT EVAL LOW COMPLEXITY 1 7/3/2022 Kelin Puri PT GP 1          PT G-Codes  Outcome Measure Options: AM-PAC 6 Clicks Basic Mobility (PT)  AM-PAC 6 Clicks Score (PT): 24    Kelin Puri PT  7/3/2022

## 2022-07-03 NOTE — PLAN OF CARE
Problem: Adult Inpatient Plan of Care  Goal: Plan of Care Review  7/3/2022 1500 by Marisa Saldana, RN  Flowsheets (Taken 7/3/2022 1500)  Outcome Evaluation: VSS, On room air, CT of neck ordered today. No c/o pain or SOA, Work w/ Pt today  7/3/2022 1408 by Marisa Saldana, RN  Outcome: Ongoing, Progressing   Goal Outcome Evaluation:              Outcome Evaluation: VSS, On room air, CT of neck ordered today. No c/o pain or SOA, Work w/ Pt today

## 2022-07-03 NOTE — PLAN OF CARE
Goal Outcome Evaluation:  Plan of Care Reviewed With: patient        Progress: no change   Patient A&OX4. B/P elevated. RA. SR. PRN dilaudid given for left sided abdominal pain and throat pain. Daughter at bedside. Ax1 to bathroom. Bed in low position. Call light in reach.

## 2022-07-03 NOTE — PLAN OF CARE
Goal Outcome Evaluation:  Plan of Care Reviewed With: patient, daughter           Outcome Evaluation: Pt is a 88 yo F who is post-op ERCP with sphinterotomy and found to have cholangitis. Pt demonstrates adequate strength and balance to perform functional mobility and gait independently. Pt ambulates with a fast pace but demonstrates no LOB or impaired gait mechanics. Pt appears to be near her functional baseline. Pt is encouraged to continue to ambulate in halls with family and nursing. Acute care PT will sign off at this time.

## 2022-07-03 NOTE — NURSING NOTE
Called Radiology twice to see when Pt test will be read . Both times Radiology stated they will look and see. Will continue to monitor

## 2022-07-04 ENCOUNTER — READMISSION MANAGEMENT (OUTPATIENT)
Dept: CALL CENTER | Facility: HOSPITAL | Age: 87
End: 2022-07-04

## 2022-07-04 ENCOUNTER — APPOINTMENT (OUTPATIENT)
Dept: CT IMAGING | Facility: HOSPITAL | Age: 87
End: 2022-07-04

## 2022-07-04 VITALS
WEIGHT: 224 LBS | DIASTOLIC BLOOD PRESSURE: 74 MMHG | TEMPERATURE: 98 F | RESPIRATION RATE: 18 BRPM | HEIGHT: 63 IN | HEART RATE: 58 BPM | BODY MASS INDEX: 39.69 KG/M2 | OXYGEN SATURATION: 97 % | SYSTOLIC BLOOD PRESSURE: 138 MMHG

## 2022-07-04 PROBLEM — K11.20 PAROTIDITIS: Status: ACTIVE | Noted: 2022-07-04

## 2022-07-04 LAB
ALBUMIN SERPL-MCNC: 3.1 G/DL (ref 3.5–5.2)
ALBUMIN/GLOB SERPL: 1.1 G/DL
ALP SERPL-CCNC: 69 U/L (ref 39–117)
ALT SERPL W P-5'-P-CCNC: 39 U/L (ref 1–33)
ANION GAP SERPL CALCULATED.3IONS-SCNC: 9 MMOL/L (ref 5–15)
AST SERPL-CCNC: 18 U/L (ref 1–32)
BACTERIA SPEC AEROBE CULT: NORMAL
BASOPHILS # BLD AUTO: 0.04 10*3/MM3 (ref 0–0.2)
BASOPHILS NFR BLD AUTO: 0.5 % (ref 0–1.5)
BILIRUB SERPL-MCNC: 0.6 MG/DL (ref 0–1.2)
BUN SERPL-MCNC: 14 MG/DL (ref 8–23)
BUN/CREAT SERPL: 13 (ref 7–25)
CALCIUM SPEC-SCNC: 8.6 MG/DL (ref 8.6–10.5)
CHLORIDE SERPL-SCNC: 105 MMOL/L (ref 98–107)
CO2 SERPL-SCNC: 23 MMOL/L (ref 22–29)
CREAT SERPL-MCNC: 1.08 MG/DL (ref 0.57–1)
DEPRECATED RDW RBC AUTO: 44.9 FL (ref 37–54)
EGFRCR SERPLBLD CKD-EPI 2021: 49.2 ML/MIN/1.73
EOSINOPHIL # BLD AUTO: 0.48 10*3/MM3 (ref 0–0.4)
EOSINOPHIL NFR BLD AUTO: 5.9 % (ref 0.3–6.2)
ERYTHROCYTE [DISTWIDTH] IN BLOOD BY AUTOMATED COUNT: 13.3 % (ref 12.3–15.4)
GLOBULIN UR ELPH-MCNC: 2.7 GM/DL
GLUCOSE SERPL-MCNC: 97 MG/DL (ref 65–99)
HCT VFR BLD AUTO: 41.1 % (ref 34–46.6)
HGB BLD-MCNC: 13.7 G/DL (ref 12–15.9)
IMM GRANULOCYTES # BLD AUTO: 0.04 10*3/MM3 (ref 0–0.05)
IMM GRANULOCYTES NFR BLD AUTO: 0.5 % (ref 0–0.5)
LYMPHOCYTES # BLD AUTO: 1.05 10*3/MM3 (ref 0.7–3.1)
LYMPHOCYTES NFR BLD AUTO: 12.9 % (ref 19.6–45.3)
MCH RBC QN AUTO: 30.9 PG (ref 26.6–33)
MCHC RBC AUTO-ENTMCNC: 33.3 G/DL (ref 31.5–35.7)
MCV RBC AUTO: 92.8 FL (ref 79–97)
MONOCYTES # BLD AUTO: 0.85 10*3/MM3 (ref 0.1–0.9)
MONOCYTES NFR BLD AUTO: 10.4 % (ref 5–12)
NEUTROPHILS NFR BLD AUTO: 5.69 10*3/MM3 (ref 1.7–7)
NEUTROPHILS NFR BLD AUTO: 69.8 % (ref 42.7–76)
NRBC BLD AUTO-RTO: 0 /100 WBC (ref 0–0.2)
PLATELET # BLD AUTO: 198 10*3/MM3 (ref 140–450)
PMV BLD AUTO: 9.6 FL (ref 6–12)
POTASSIUM SERPL-SCNC: 3.7 MMOL/L (ref 3.5–5.2)
PROT SERPL-MCNC: 5.8 G/DL (ref 6–8.5)
RBC # BLD AUTO: 4.43 10*6/MM3 (ref 3.77–5.28)
SODIUM SERPL-SCNC: 137 MMOL/L (ref 136–145)
WBC NRBC COR # BLD: 8.15 10*3/MM3 (ref 3.4–10.8)

## 2022-07-04 PROCEDURE — 99232 SBSQ HOSP IP/OBS MODERATE 35: CPT | Performed by: INTERNAL MEDICINE

## 2022-07-04 PROCEDURE — 70490 CT SOFT TISSUE NECK W/O DYE: CPT

## 2022-07-04 PROCEDURE — 85025 COMPLETE CBC W/AUTO DIFF WBC: CPT | Performed by: HOSPITALIST

## 2022-07-04 PROCEDURE — 80053 COMPREHEN METABOLIC PANEL: CPT | Performed by: HOSPITALIST

## 2022-07-04 RX ORDER — TRAMADOL HYDROCHLORIDE 50 MG/1
50 TABLET ORAL EVERY 8 HOURS PRN
Qty: 15 TABLET | Refills: 0 | Status: SHIPPED | OUTPATIENT
Start: 2022-07-04 | End: 2022-07-09

## 2022-07-04 RX ADMIN — METOPROLOL SUCCINATE 25 MG: 25 TABLET, EXTENDED RELEASE ORAL at 09:33

## 2022-07-04 RX ADMIN — MULTIPLE VITAMINS W/ MINERALS TAB 1 TABLET: TAB at 09:33

## 2022-07-04 RX ADMIN — PANTOPRAZOLE SODIUM 40 MG: 40 TABLET, DELAYED RELEASE ORAL at 05:21

## 2022-07-04 RX ADMIN — CYCLOSPORINE 1 DROP: 0.5 EMULSION OPHTHALMIC at 09:33

## 2022-07-04 RX ADMIN — LEVOTHYROXINE SODIUM 75 MCG: 0.07 TABLET ORAL at 05:21

## 2022-07-04 NOTE — PROGRESS NOTES
[facility]  Inpatient Progress Note    Reason for Follow Up: Choledocholithiasis, acute pancreatitis    Subjective     Interval History:   Procedure performed 7/1 ERCP with removal of stone and debris, findings consistent with cholangitis    Current Facility-Administered Medications:   •  acetaminophen (TYLENOL) tablet 650 mg, 650 mg, Oral, Q6H PRN, Greg Morales APRN, 650 mg at 07/01/22 2341  •  aluminum-magnesium hydroxide-simethicone (MAALOX MAX) 400-400-40 MG/5ML suspension 15 mL, 15 mL, Oral, Q6H PRN, Kannan Rizo APRN  •  cycloSPORINE (RESTASIS) 0.05 % ophthalmic emulsion 1 drop, 1 drop, Both Eyes, BID, Kannan Rizo APRN, 1 drop at 07/04/22 0933  •  HYDROmorphone (DILAUDID) injection 0.5 mg, 0.5 mg, Intravenous, Q2H PRN, Kannan Rizo APRN, 0.5 mg at 07/03/22 2157  •  levothyroxine (SYNTHROID, LEVOTHROID) tablet 75 mcg, 75 mcg, Oral, Q AM, Kannan Rizo APRN, 75 mcg at 07/04/22 0521  •  metoprolol succinate XL (TOPROL-XL) 24 hr tablet 25 mg, 25 mg, Oral, Q24H, Kannan Rizo APRN, 25 mg at 07/04/22 0933  •  multivitamin with minerals 1 tablet, 1 tablet, Oral, Daily, Kannan Rizo APRN, 1 tablet at 07/04/22 0933  •  nitroglycerin (NITROSTAT) SL tablet 0.4 mg, 0.4 mg, Sublingual, Q5 Min PRN, Kannan Rizo APRN  •  ondansetron (ZOFRAN) tablet 4 mg, 4 mg, Oral, Q6H PRN **OR** ondansetron (ZOFRAN) injection 4 mg, 4 mg, Intravenous, Q6H PRN, Kannan Rizo APRN, 4 mg at 06/29/22 1306  •  pantoprazole (PROTONIX) EC tablet 40 mg, 40 mg, Oral, Q AM, Nain Ugarte MD, 40 mg at 07/04/22 0521  •  sodium chloride 0.9 % flush 10 mL, 10 mL, Intravenous, PRN, Chrissie Rice PA  •  sodium chloride 0.9 % flush 10 mL, 10 mL, Intravenous, PRN, Kannan Rizo, APRN, 10 mL at 06/30/22 0807  •  traMADol (ULTRAM) tablet 50 mg, 50 mg, Oral, Q4H PRN, Matty Mathis MD  Review of Systems:    There is weakness and fatigue all other systems  reviewed and negative    Objective     Vital Signs  Temp:  [97.9 °F (36.6 °C)-98.5 °F (36.9 °C)] 98 °F (36.7 °C)  Heart Rate:  [58-65] 58  Resp:  [16-18] 18  BP: (138-186)/(74-90) 138/74  Body mass index is 39.68 kg/m².    Intake/Output Summary (Last 24 hours) at 7/4/2022 1404  Last data filed at 7/4/2022 1330  Gross per 24 hour   Intake 960 ml   Output --   Net 960 ml     I/O this shift:  In: 480 [P.O.:480]  Out: -      Physical Exam:   General: patient awake, alert and cooperative   Eyes: Normal lids and lashes, no scleral icterus   Neck: supple, normal ROM   Skin: warm and dry, not jaundiced   Cardiovascular: regular rhythm and rate, no murmurs auscultated   Pulm: clear to auscultation bilaterally, regular and unlabored   Abdomen: soft, nontender, nondistended; normal bowel sounds   Extremities: no rash or edema   Psychiatric: Normal mood and behavior; memory intact     Results Review:     I reviewed the patient's new clinical results.    Results from last 7 days   Lab Units 07/04/22  0633 07/03/22  1107 07/02/22  1143   WBC 10*3/mm3 8.15 8.58 8.26   HEMOGLOBIN g/dL 13.7 13.5 14.0   HEMATOCRIT % 41.1 41.0 40.7   PLATELETS 10*3/mm3 198 190 162     Results from last 7 days   Lab Units 07/04/22  0633 07/03/22  1107 07/02/22  1143 07/01/22  0753   SODIUM mmol/L 137 136 136 134*   POTASSIUM mmol/L 3.7 4.0 4.0 3.9   CHLORIDE mmol/L 105 107 107 105   CO2 mmol/L 23.0 21.8* 21.9* 20.5*   BUN mg/dL 14 15 15 15   CREATININE mg/dL 1.08* 1.26* 1.16* 1.18*   CALCIUM mg/dL 8.6 8.7 8.8 8.7   BILIRUBIN mg/dL 0.6  --  0.9 1.3*   ALK PHOS U/L 69  --  86 74   ALT (SGPT) U/L 39*  --  67* 93*   AST (SGOT) U/L 18  --  25 39*   GLUCOSE mg/dL 97 118* 107* 70     Results from last 7 days   Lab Units 06/29/22  1027   INR  1.39*     Lab Results   Lab Value Date/Time    LIPASE 118 (H) 07/01/2022 0753    LIPASE 573 (H) 06/30/2022 0722    LIPASE 2,330 (H) 06/29/2022 1027    LIPASE >3,000 (H) 06/28/2022 1657    LIPASE 14 05/23/2020 0520     LIPASE 44 03/05/2020 0600    LIPASE 36 02/29/2020 0623    LIPASE 52 02/28/2020 0508    LIPASE 62 (H) 02/27/2020 1016    LIPASE 38 02/05/2020 0507    LIPASE 60 02/04/2020 0435    LIPASE 181 (H) 02/03/2020 0211    LIPASE 417 (H) 02/02/2020 0441    LIPASE >3,000 (H) 01/31/2020 2142       Radiology:  CT Soft Tissue Neck Without Contrast   Final Result       Mild bilateral periparotid fat stranding located deep to metallic BBs   marking the areas of concern, which can be seen with parotitis. No   discrete or drainable fluid collection is identified, although   evaluation is limited without intravenous contrast. No pathologically   enlarged cervical lymph nodes are identified.       This report was finalized on 7/4/2022 12:18 PM by Dr. Kelly Hutton M.D.          US Thyroid   Final Result      FL ercp biliary duct only   Final Result       As described.       This report was finalized on 7/1/2022 4:41 PM by Dr. Trenton Pendleton M.D.          MRI abdomen w wo contrast mrcp   Final Result   1. MR evidence of acute pancreatitis.   2. There is mild-to-moderate intrahepatic and extrahepatic biliary   dilatation without evidence of choledocholithiasis demonstrated on MRI.   A cystic lesion in the gallbladder fossa is favored to represent either   an ectatic cystic duct remanent or portion of the remaining gallbladder.       This report was finalized on 6/30/2022 1:58 PM by Dr. Ruthy Haro M.D.          CT Angiogram Chest   Final Result           1. Acute pancreatitis. Mild intrahepatic and extrahepatic biliary ductal   dilatation. Surgical change at the gallbladder fossa with a cystic   structure, potentially choledochocele. Question of tiny   choledocholithiasis, MRCP correlation could be considered.   2. No thoracic aortic dissection.       This report was finalized on 6/28/2022 7:17 PM by Dr. Trenton Pendleton M.D.          CT Abdomen Pelvis With Contrast   Final Result           1. Acute pancreatitis. Mild  intrahepatic and extrahepatic biliary ductal   dilatation. Surgical change at the gallbladder fossa with a cystic   structure, potentially choledochocele. Question of tiny   choledocholithiasis, MRCP correlation could be considered.   2. No thoracic aortic dissection.       This report was finalized on 6/28/2022 7:17 PM by Dr. Trenton Pendleton M.D.          XR Chest 1 View   Final Result   No focal pulmonary consolidation. Mild cardiomegaly.   Tortuous aorta. Follow-up as clinically indicated.       This report was finalized on 6/28/2022 5:36 PM by Dr. Trenton Pendleton M.D.              Assessment & Plan     Patient Active Problem List   Diagnosis   • Benign essential hypertension   • Claustrophobia   • Gout   • Hyperlipidemia   • Primary hypothyroidism   • Impaired fasting glucose   • Nocturnal hypoxemia   • Secondary polycythemia   • Vitamin D deficiency   • Therapeutic drug monitoring   • Family history of coronary artery disease   • Bilateral sensorineural hearing loss, wears hearing aids   • Multinodular goiter   • Supraventricular tachycardia (HCC)   • Obesity (BMI 30-39.9)   • Bilateral lower extremity edema   • Gastroesophageal reflux disease without esophagitis   • Paroxysmal atrial fibrillation (HCC)   • Stage 3b chronic kidney disease (HCC)   • Acute pancreatitis, unspecified complication status, unspecified pancreatitis type   • Parotiditis       Assessment:  1. Acute pancreatitis  2. Cholangitis  3. Choledocholithiasis    Plan:  · ERCP was done 3 days ago she tolerated well, there are no GI or biliary issues at present  · We have no objection to discharge, although she is being worked up for tender area at maxilla, lymphadenopathy versus sialadenitis    I discussed the patients findings and my recommendations with patient and nursing staff.    Guido Lacey MD

## 2022-07-04 NOTE — PLAN OF CARE
Problem: Adult Inpatient Plan of Care  Goal: Plan of Care Review  Outcome: Ongoing, Progressing  Flowsheets (Taken 7/4/2022 5450)  Outcome Evaluation: AOX4, Daughter at bedside, Stand by assist, VSS, on room air   Goal Outcome Evaluation:              Outcome Evaluation: AOX4, Daughter at bedside, Stand by assist, VSS, on room air

## 2022-07-04 NOTE — PLAN OF CARE
Goal Outcome Evaluation:  Plan of Care Reviewed With: patient        Progress: improving   Patient A&OX4. B/P elevated. RA. SB on monitor. PRN dilaudid given for left sided abdominal pain and throat pain. Sour candy and lemon italian ice helping swelling to lymph nodes in neck. Thyroid US results dictated. CT throat tomorrow. Daughter at bedside. Ax1 to bathroom. Bed in low position. Call light in reach.

## 2022-07-04 NOTE — NURSING NOTE
Went over d/c paperwork w/ Pt . Pt understand and has no further question at this time. Pt has all belongings and is going home.D/C IV

## 2022-07-04 NOTE — NURSING NOTE
Call placed X4 to x-ray reading room to get thyroid ultrasound read. No answer each time. Voicemail left on mailbox.

## 2022-07-04 NOTE — DISCHARGE SUMMARY
Patient Name: Mandy Alarcon  : 1933  MRN: [id number]    Date of Admission: 2022  Date of Discharge:  2022  Primary Care Physician: Daryl Santos MD      Chief Complaint:   Chest Pain and Shortness of Breath      Discharge Diagnoses     Active Hospital Problems    Diagnosis  POA   • **Acute pancreatitis, unspecified complication status, unspecified pancreatitis type [K85.90]  Yes   • Parotiditis [K11.20]  No   • Stage 3b chronic kidney disease (HCC) [N18.32]  Yes   • Paroxysmal atrial fibrillation (HCC) [I48.0]  Yes   • Obesity (BMI 30-39.9) [E66.9]  Yes   • Primary hypothyroidism [E03.9]  Yes   • Hyperlipidemia [E78.2]  Yes   • Benign essential hypertension [I10]  Yes      Resolved Hospital Problems   No resolved problems to display.        Hospital Course     Ms. Alarcon is a 89 y.o. female with a history of chronic kidney disease stage IIIb, paroxysmal A. fib, obesity, hypothyroidism, and hypertension who presented to [facility] initially complaining of chest pain and shortness of breath.  Please see the admitting history and physical for further details.  She was found to have acute biliary pancreatitis and was admitted to the hospital for further evaluation and treatment.  She admitted to the hospital with acute pancreatitis secondary to biliary etiology.  She had previous gallstone pancreatitis and on initial CT scan she was found to have a stone within her common bile duct.  MRCP did not show definitively the stone.  Gastroenterology was consulted and she underwent ERCP with sphincterotomy and she was noted to have purulent discharge from her common bile duct.  She was started on Levaquin renally dosed here in the hospital and completed her course of therapy.  Her symptoms have significantly improved since ERCP.  At this point she is stable to follow-up in the outpatient setting with gastroenterology as directed.  She was found to have some bilateral fullness at the  angle of her mandible that was quite tender.  It was initially felt to be possible lymphadenopathy but further work-up in this was unrevealing for source however after further discussion with the patient was felt that she likely had parotitis.  Given how severely tender the area was there was some concern for possible bacterial infection but she was already on Levaquin which would cover for a bacterial infection in this case.  She was started with hard candies and her symptoms significantly improved.  CT scan of her soft tissue neck did show some inflammation around the glands but no definitive stones.  Her symptoms are greatly improved on the day of discharge.  I think at this point given her symptomatic improvement she stable for discharge home.  She can follow-up with ENT as needed if her symptoms worsen or redevelop.  She did have an ultrasound of her thyroid that did show some nodules but they are all small and no recommendations are made for additional imaging or sampling.      Day of Discharge     Subjective:  She is feeling much better today denies new issues or complaints the area that has been swollen and tender on her face is resolved she is tolerating a regular diet and her abdominal pain is improved.    Physical Exam:  Temp:  [97.9 °F (36.6 °C)-98.5 °F (36.9 °C)] 98 °F (36.7 °C)  Heart Rate:  [58-65] 58  Resp:  [16-18] 18  BP: (138-186)/(74-90) 138/74  Body mass index is 39.68 kg/m².  Physical Exam  Vitals and nursing note reviewed.   Constitutional:       Appearance: Normal appearance.   HENT:      Head: Normocephalic and atraumatic.   Cardiovascular:      Rate and Rhythm: Normal rate and regular rhythm.   Pulmonary:      Effort: Pulmonary effort is normal.      Breath sounds: Normal breath sounds.   Abdominal:      General: Bowel sounds are normal. There is no distension.      Palpations: Abdomen is soft.   Neurological:      General: No focal deficit present.      Mental Status: She is alert and  oriented to person, place, and time.         Consultants     Consult Orders (all) (From admission, onward)     Start     Ordered    06/28/22 2206  Inpatient Gastroenterology Consult  Once        Specialty:  Gastroenterology  Provider:  Mitesh Altamirano MD    06/28/22 2205 06/28/22 2154  Inpatient Gastroenterology Consult  Once        Specialty:  Gastroenterology  Provider:  Adriane Skaggs MD    06/28/22 2155 06/28/22 1936  [facility] (on-call MD unless specified) Details  Once        Specialty:  Hospitalist  Provider:  (Not yet assigned)    06/28/22 1935              Procedures     ENDOSCOPIC RETROGRADE CHOLANGIOPANCREATOGRAPHY with sphincterotomy, balloon sweep 12-15mm      Imaging Results (All)     Procedure Component Value Units Date/Time    CT Soft Tissue Neck Without Contrast [[id number]] Collected: 07/04/22 1208     Updated: 07/04/22 1221    Narrative:      CT SOFT TISSUE NECK WO CONTRAST-     CLINICAL HISTORY: Bilateral preauricular swelling and tenderness.     TECHNIQUE: CT images of the neck were obtained without intravenous  contrast. Reformatted images were reviewed. Radiation dose reduction  techniques were utilized, including automated exposure control and  exposure modulation based on body size.     COMPARISON: Thyroid ultrasound 07/02/2022.     FINDINGS: There is calcific intracranial atherosclerosis.  The nasopharynx, pharynx, and hypopharynx are relatively symmetric.   There are metallic BBs overlying the areas of interest bilaterally. The  markers are located over the parotid glands on both sides. There is mild  bilateral periparotid fat stranding. The parotid glands are relatively  symmetric. Evaluation is limited without intravenous contrast, however,  no discrete or drainable fluid collection is identified. No  calcifications are identified within either parotid gland.  There is a 0.7 cm hyperdense right thyroid nodule. The thyroid is better  assessed on recent dedicated thyroid  ultrasound. Submandibular glands  are within normal limits. No pathologically enlarged cervical lymph  nodes are identified.  There is calcific atherosclerosis of the aortic arch. There is calcific  atherosclerosis of the right greater then left carotid bulbs. There is  diffuse osseous demineralization. There is multilevel degenerative disc  disease. There are multiple missing teeth.          Impression:         Mild bilateral periparotid fat stranding located deep to metallic BBs  marking the areas of concern, which can be seen with parotitis. No  discrete or drainable fluid collection is identified, although  evaluation is limited without intravenous contrast. No pathologically  enlarged cervical lymph nodes are identified.     This report was finalized on 7/4/2022 12:18 PM by Dr. Kelly Hutton M.D.       US Thyroid [[id number]] Collected: 07/03/22 2112     Updated: 07/03/22 2124    Narrative:      US THYROID-     CLINICAL INFORMATION: Neck swelling.     COMPARISON: Sonogram of the thyroid 12/21/2017, 07/31/2018.     ULTRASOUND FINDINGS: The right lobe measures 3.9 x 1.9 x 2.1 cm. The  left lobe measures 4.6 x 1.6 x 1.3 cm and the isthmus 4 mm.     Thyroid size within normal limits. No increased vascularity seen.     There is a slightly hypoechoic solid nodule demonstrated within the left  thyroid gland, measuring 9 mm in diameter, ACR TR Category 4. This is  located within the lower pole. This does not qualify for fine-needle  aspiration.     Within the upper pole of the right thyroid, mid pole of the right  thyroid and isthmus there are 3 subcentimeter sized isoechoic to  slightly hyperechoic solid nodules, ACR TR Category 3. None of these  qualify for fine-needle aspiration. The overall appearance of the  thyroid is similar to the previous examinations.     CONCLUSION:  1. Thyroid size within normal limits, no increased vascularity seen.  2. Subcentimeter size thyroid nodules as described above, none  qualifies  for fine-needle aspiration. Advise conservative surveillance.     This report was finalized on 7/3/2022 9:21 PM by Dr. Yosi Ellis M.D.       FL ercp biliary duct only [[id number]] Collected: 07/01/22 1640     Updated: 07/01/22 1644    Narrative:      FL ERCP BILIARY DUCT ONLY-     INDICATIONS: ERCP     TECHNIQUE: FLUOROSCOPIC ASSISTANCE IN THE OPERATING ROOM.     FINDINGS:     10 intraoperative fluoroscopic spot views were obtained and recorded the  PACS for review, revealing ERCP, opacification of extrahepatic and  central intrahepatic biliary ducts, and a portion of the main pancreatic  duct and duodenum, balloon sweep. Please see operative report for full  details.     Fluoroscopy time: 2 minutes, 30 seconds       Impression:         As described.     This report was finalized on 7/1/2022 4:41 PM by Dr. Trenton Pendleton M.D.       MRI abdomen w wo contrast mrcp [[id number]] Collected: 06/29/22 1304     Updated: 06/30/22 1401    Narrative:      MRI OF THE LIVER WITH AND WITHOUT CONTRAST, MRCP     HISTORY: To assess for choledocholithiasis.     TECHNIQUE: MRI of the abdomen was performed utilizing a dedicated liver  protocol. Axial 2-D FIESTA, in and out of phase FSPGR, axial  fat-saturated T2 imaging was acquired. Axial thin section precontrast  and dynamic enhanced postcontrast T1 weighted LAVA imaging was acquired.  Thick slab coronal MRCP imaging was acquired in multiple obliquities  through the biliary tree. Coronal thin section fat saturated T2 and 3-D  MRCP imaging was acquired.      COMPARISON: CT dated 06/28/2022.     FINDINGS: The patient is status post cholecystectomy. There is bilobar  mild intrahepatic biliary dilatation. The confluence is patent.  There  is a cystic lesion measuring 2.9 x 2.9 cm seen at the gallbladder fossa  that is favored to represent either marked cystic dilation of the cystic  duct or portion of the remnant pancreas. The common bile duct is dilated  measuring up  to 1.1 cm proximally. It demonstrates distal tapering  without definite intraluminal filling defect. The pancreatic duct  demonstrates minimal ectasia measuring up to 4 to 5 mm within the head  of the pancreas. There is peripancreatic fat stranding and small amount  of fluid present consistent with acute pancreatitis. Marked enhancement  and wall thickening of the second portion of the duodenum is present.  There is a small sliding hiatal hernia. Diffuse hepatic steatosis is  identified. No suspicious hepatic lesion is seen. The hepatic veins are  patent. The portal and its branches are patent. There is a partly imaged  large cyst within the right kidney.       Impression:      1. MR evidence of acute pancreatitis.  2. There is mild-to-moderate intrahepatic and extrahepatic biliary  dilatation without evidence of choledocholithiasis demonstrated on MRI.  A cystic lesion in the gallbladder fossa is favored to represent either  an ectatic cystic duct remanent or portion of the remaining gallbladder.     This report was finalized on 6/30/2022 1:58 PM by Dr. Ruthy Haro M.D.       CT Angiogram Chest [[id number]] Collected: 06/28/22 1859     Updated: 06/28/22 1920    Narrative:      CT ANGIOGRAM CHEST-, CT ABDOMEN PELVIS W CONTRAST-     INDICATIONS: Pain.  Radiation dose reduction techniques were utilized,  including automated exposure control and exposure modulation based on  body size.     TECHNIQUE: CTA of the chest, three-dimensional reconstructions. Enhanced  CT of the abdomen, pelvis     COMPARISON: 02/27/2020, 05/21/2020     FINDINGS:     Chest CTA:     No thoracic aortic dissection is demonstrated. Although the exam was not  optimized to evaluate the pulmonary arteries, no central pulmonary  embolism is identified.     The heart size is enlarged without pericardial effusion. A few small  subcentimeter short axis mediastinal lymph nodes are seen that are not  significant by size criteria.     The airways  appear clear.     No pleural effusion or pneumothorax.     The lungs show no focal pulmonary consolidation or mass. Mild bilateral  atelectasis is present.                 Abdomen pelvis CT:     Inflammatory stranding around the pancreas is compatible with acute  pancreatitis.     Surgical change is apparent in the gallbladder fossa, but a  circumscribed 3 cm fluid density focus at the gallbladder fossa, axial  image 42 is noted, potentially choledochocele, or other process,  indeterminate. Mild intrahepatic and extrahepatic biliary ductal  dilatation is seen.     Punctate densities in the common biliary duct, for example axial images  44, 45, potentially evidence of choledocholithiasis, MRCP correlation  can be obtained.     Right renal cysts are noted.     Otherwise unremarkable appearance of the liver, spleen, adrenal glands,  pancreas, kidneys, bladder.     No bowel obstruction or abnormal bowel thickening is identified. Colonic  diverticula are seen that do not appear inflamed. The appendix is not  appear inflamed.     No free intraperitoneal gas or free fluid. Small umbilical hernia of fat  is seen.     Scattered small mesenteric and para-aortic lymph nodes are seen that are  not significant by size criteria.     Abdominal aorta is not aneurysmal. Aortic and other arterial  calcifications are present.     Degenerative changes are seen in the spine. No acute fracture is  identified.             Impression:            1. Acute pancreatitis. Mild intrahepatic and extrahepatic biliary ductal  dilatation. Surgical change at the gallbladder fossa with a cystic  structure, potentially choledochocele. Question of tiny  choledocholithiasis, MRCP correlation could be considered.  2. No thoracic aortic dissection.     This report was finalized on 6/28/2022 7:17 PM by Dr. Trenton Pendleton M.D.       CT Abdomen Pelvis With Contrast [[id number]] Collected: 06/28/22 1859     Updated: 06/28/22 1920    Narrative:      CT  ANGIOGRAM CHEST-, CT ABDOMEN PELVIS W CONTRAST-     INDICATIONS: Pain.  Radiation dose reduction techniques were utilized,  including automated exposure control and exposure modulation based on  body size.     TECHNIQUE: CTA of the chest, three-dimensional reconstructions. Enhanced  CT of the abdomen, pelvis     COMPARISON: 02/27/2020, 05/21/2020     FINDINGS:     Chest CTA:     No thoracic aortic dissection is demonstrated. Although the exam was not  optimized to evaluate the pulmonary arteries, no central pulmonary  embolism is identified.     The heart size is enlarged without pericardial effusion. A few small  subcentimeter short axis mediastinal lymph nodes are seen that are not  significant by size criteria.     The airways appear clear.     No pleural effusion or pneumothorax.     The lungs show no focal pulmonary consolidation or mass. Mild bilateral  atelectasis is present.                 Abdomen pelvis CT:     Inflammatory stranding around the pancreas is compatible with acute  pancreatitis.     Surgical change is apparent in the gallbladder fossa, but a  circumscribed 3 cm fluid density focus at the gallbladder fossa, axial  image 42 is noted, potentially choledochocele, or other process,  indeterminate. Mild intrahepatic and extrahepatic biliary ductal  dilatation is seen.     Punctate densities in the common biliary duct, for example axial images  44, 45, potentially evidence of choledocholithiasis, MRCP correlation  can be obtained.     Right renal cysts are noted.     Otherwise unremarkable appearance of the liver, spleen, adrenal glands,  pancreas, kidneys, bladder.     No bowel obstruction or abnormal bowel thickening is identified. Colonic  diverticula are seen that do not appear inflamed. The appendix is not  appear inflamed.     No free intraperitoneal gas or free fluid. Small umbilical hernia of fat  is seen.     Scattered small mesenteric and para-aortic lymph nodes are seen that are  not  significant by size criteria.     Abdominal aorta is not aneurysmal. Aortic and other arterial  calcifications are present.     Degenerative changes are seen in the spine. No acute fracture is  identified.             Impression:            1. Acute pancreatitis. Mild intrahepatic and extrahepatic biliary ductal  dilatation. Surgical change at the gallbladder fossa with a cystic  structure, potentially choledochocele. Question of tiny  choledocholithiasis, MRCP correlation could be considered.  2. No thoracic aortic dissection.     This report was finalized on 6/28/2022 7:17 PM by Dr. Trenton Pendleton M.D.       XR Chest 1 View [[id number]] Collected: 06/28/22 1734     Updated: 06/28/22 1739    Narrative:      XR CHEST 1 VW-     HISTORY: Female who is 89 years-old,  congestive heart failure     TECHNIQUE: Frontal view of the chest     COMPARISON: 03/05/2020     FINDINGS: The heart is mildly enlarged. Aorta is tortuous. Pulmonary  vasculature is unremarkable. No focal pulmonary consolidation, pleural  effusion, or pneumothorax. No acute osseous process.       Impression:      No focal pulmonary consolidation. Mild cardiomegaly.  Tortuous aorta. Follow-up as clinically indicated.     This report was finalized on 6/28/2022 5:36 PM by Dr. Trenton Pendleton M.D.           Results for orders placed during the hospital encounter of 06/28/22    Duplex Carotid Ultrasound CAR    Interpretation Summary  · Proximal right internal carotid artery plaque without significant stenosis.  · Proximal left internal carotid artery plaque without significant stenosis.    Results for orders placed during the hospital encounter of 06/20/18    Adult Transthoracic Echo Complete W/ Cont if Necessary Per Protocol    Interpretation Summary  · Left ventricular systolic function is normal. Calculated EF = 60%. Estimated EF was in agreement with the calculated EF. Normal left ventricular cavity size and wall thickness noted. All left  ventricular wall segments contract normally.  · Left ventricular diastolic function is normal.  · The aortic valve is abnormal in structure. The valve exhibits sclerosis.  · Mild aortic valve regurgitation is present.    Pertinent Labs     Results from last 7 days   Lab Units 07/04/22  0633 07/03/22  1107 07/02/22  1143 06/30/22  0722   WBC 10*3/mm3 8.15 8.58 8.26 8.89   HEMOGLOBIN g/dL 13.7 13.5 14.0 13.3   PLATELETS 10*3/mm3 198 190 162 142     Results from last 7 days   Lab Units 07/04/22  0633 07/03/22  1107 07/02/22  1143 07/01/22  0753   SODIUM mmol/L 137 136 136 134*   POTASSIUM mmol/L 3.7 4.0 4.0 3.9   CHLORIDE mmol/L 105 107 107 105   CO2 mmol/L 23.0 21.8* 21.9* 20.5*   BUN mg/dL 14 15 15 15   CREATININE mg/dL 1.08* 1.26* 1.16* 1.18*   GLUCOSE mg/dL 97 118* 107* 70   EGFR mL/min/1.73 49.2* 40.9* 45.2* 44.2*     Results from last 7 days   Lab Units 07/04/22  0633 07/02/22  1143 07/01/22  0753 06/30/22  0722   ALBUMIN g/dL 3.10* 2.80* 2.80* 3.10*   BILIRUBIN mg/dL 0.6 0.9 1.3* 1.8*   ALK PHOS U/L 69 86 74 82   AST (SGOT) U/L 18 25 39* 80*   ALT (SGPT) U/L 39* 67* 93* 152*     Results from last 7 days   Lab Units 07/04/22  0633 07/03/22  1107 07/02/22  1143 07/01/22  0753 06/30/22  0722   CALCIUM mg/dL 8.6 8.7 8.8 8.7 8.6   ALBUMIN g/dL 3.10*  --  2.80* 2.80* 3.10*     Results from last 7 days   Lab Units 07/01/22  0753 06/30/22  0722 06/29/22  1027 06/28/22  1657   LIPASE U/L 118* 573* 2,330* >3,000*     Results from last 7 days   Lab Units 06/28/22  1657   TROPONIN T ng/mL <0.010   PROBNP pg/mL 2,144.0*       Results from last 7 days   Lab Units 06/29/22  1027   CHOLESTEROL mg/dL 90   TRIGLYCERIDES mg/dL 40   HDL CHOL mg/dL 59   LDL CHOL mg/dL 20     Results from last 7 days   Lab Units 07/02/22  1350 07/02/22  1347   BLOODCX  No growth at 24 hours No growth at 24 hours     Results from last 7 days   Lab Units 06/28/22  1851   COVID19  Not Detected       Test Results Pending at Discharge     Pending Labs      Order Current Status    Blood Culture - Blood, Arm, Right Preliminary result    Blood Culture - Blood, Hand, Right Preliminary result          Discharge Details        Discharge Medications      New Medications      Instructions Start Date   traMADol 50 MG tablet  Commonly known as: ULTRAM   50 mg, Oral, Every 8 Hours PRN         Continue These Medications      Instructions Start Date   levothyroxine 75 MCG tablet  Commonly known as: Synthroid   Take 1 p.o. daily for low thyroid      metoprolol succinate XL 25 MG 24 hr tablet  Commonly known as: TOPROL-XL   TAKE 1 TABLET BY MOUTH DAILY FOR HIGH BLOOD PRESSURE AND HEART PALPITATIONS      multivitamin with minerals tablet tablet   1 tablet, Oral, Daily      omeprazole 40 MG capsule  Commonly known as: priLOSEC   TAKE ONE CAPSULE BY MOUTH EVERY DAY BEFORE FIRST MEAL FOR ACID REFLUX      polyethylene glycol packet  Commonly known as: MIRALAX   Take orally as directed for constipation      pravastatin 40 MG tablet  Commonly known as: PRAVACHOL   Take one tablet by mouth daily.      Restasis 0.05 % ophthalmic emulsion  Generic drug: cycloSPORINE   2 times daily             Allergies   Allergen Reactions   • Cefaclor Anaphylaxis and Angioedema     caused angioedema 25 years ago; she tolerates cephalexin, amoxicillin, and ceftriaxone per my d/w patient and her daughter as well as amoxicillin-clavulanate confirmed in EPIC   • Sulfa Antibiotics Rash       Discharge Disposition:  Home or Self Care      Discharge Diet:  Diet Order   Procedures   • Diet Regular; Cardiac       Discharge Activity:   Activity Instructions     Activity as Tolerated            CODE STATUS:    Code Status and Medical Interventions:   Ordered at: 06/28/22 2004     Code Status (Patient has no pulse and is not breathing):    CPR (Attempt to Resuscitate)     Medical Interventions (Patient has pulse or is breathing):    Full Support       Future Appointments   Date Time Provider Department Center    7/21/2022  9:30 AM Daryl Santos MD [facility]   2/14/2023 11:30 AM Lizbeth Laura MD [facility]     Additional Instructions for the Follow-ups that You Need to Schedule     Discharge Follow-up with PCP   As directed       Currently Documented PCP:    Daryl Santos MD    PCP Phone Number:    [phone]     Follow Up Details: 2-3 weeks         Discharge Follow-up with Specified Provider: Dr Parrish (ENT) as needed for parotitis   As directed      To: Dr Parrish (ENT) as needed for parotitis         Discharge Follow-up with Specified Provider: Gastroenterology; 2 Weeks   As directed      To: Gastroenterology    Follow Up: 2 Weeks            Follow-up Information     Daryl Santos MD .    Specialty: Internal Medicine  Why: 2-3 weeks  Contact information:  [id number] [address]  [phone]                         Additional Instructions for the Follow-ups that You Need to Schedule     Discharge Follow-up with PCP   As directed       Currently Documented PCP:    Daryl Santos MD    PCP Phone Number:    [phone]     Follow Up Details: 2-3 weeks         Discharge Follow-up with Specified Provider: Dr Parrish (ENT) as needed for parotitis   As directed      To: Dr Parrish (ENT) as needed for parotitis         Discharge Follow-up with Specified Provider: Gastroenterology; 2 Weeks   As directed      To: Gastroenterology    Follow Up: 2 Weeks           Time Spent on Discharge:  Greater than 30 minutes      Valentin Arteaga MD  [facility]  07/04/22  14:03 EDT

## 2022-07-04 NOTE — OUTREACH NOTE
Prep Survey    Flowsheet Row Responses   [facility] facility patient discharged from? [facility]   Is LACE score < 7 ? No   Emergency Room discharge w/ pulse ox? No   Eligibility [facility]   Date of Admission 06/28/22   Date of Discharge 07/04/22   Discharge Disposition Home or Self Care   Discharge diagnosis Acute pancreatitis  Stage 3b chronic kidney disease (Parotiditis   Does the patient have one of the following disease processes/diagnoses(primary or secondary)? Other   Does the patient have Home health ordered? No   Is there a DME ordered? No   Prep survey completed? Yes          JOES F DRUMMOND - Registered Nurse

## 2022-07-05 ENCOUNTER — TRANSITIONAL CARE MANAGEMENT TELEPHONE ENCOUNTER (OUTPATIENT)
Dept: CALL CENTER | Facility: HOSPITAL | Age: 87
End: 2022-07-05

## 2022-07-05 ENCOUNTER — TELEPHONE (OUTPATIENT)
Dept: GASTROENTEROLOGY | Facility: CLINIC | Age: 87
End: 2022-07-05

## 2022-07-05 NOTE — TELEPHONE ENCOUNTER
Called pt and spoke with daughter (ok per MARYBETH) and made a f/u appt with OSBALDO Cartagena, first available appt 8/2 @2:15pm.

## 2022-07-05 NOTE — CASE MANAGEMENT/SOCIAL WORK
Case Management Discharge Note      Final Note: Home no needs         Selected Continued Care - Discharged on 7/4/2022 Admission date: 6/28/2022 - Discharge disposition: Home or Self Care    Destination    No services have been selected for the patient.              Durable Medical Equipment    No services have been selected for the patient.              Dialysis/Infusion    No services have been selected for the patient.              Home Medical Care    No services have been selected for the patient.              Therapy    No services have been selected for the patient.              Community Resources    No services have been selected for the patient.              Community & DME    No services have been selected for the patient.                  Transportation Services  Private: Car    Final Discharge Disposition Code: 01 - home or self-care

## 2022-07-05 NOTE — TELEPHONE ENCOUNTER
Caller: DLETA ELIZONDO    Relationship: Emergency Contact    Best call back number: [phone]    What is the best time to reach you: ANYTIME    Who are you requesting to speak with (clinical staff, provider,  specific staff member): CLINICAL STAFF/DR EPPERSON    Do you know the name of the person who called: DELTA ELIZONDO    What was the call regarding: PT WAS DISCHARGED YESTERDAY. ADVISED TO CALL AND SCHEDULE 2-WEEK FU WITH DR KEANE AT [facility].     Do you require a callback: YES

## 2022-07-05 NOTE — OUTREACH NOTE
Call Center TCM Note    Flowsheet Row Responses   [facility] patient discharged from? [place]   Does the patient have one of the following disease processes/diagnoses(primary or secondary)? Other   TCM attempt successful? Yes  [no current verbal release]   Call start time 1252   Call end time 1255   Discharge diagnosis Acute pancreatitis  Stage 3b chronic kidney disease (Parotiditis   Meds reviewed with patient/caregiver? Yes   Is the patient having any side effects they believe may be caused by any medication additions or changes? No   Does the patient have all medications ordered at discharge? Yes   Is the patient taking all medications as directed (includes completed medication regime)? Yes   Medication comments picking up today   Does the patient have a primary care provider?  Yes   Does the patient have an appointment with their PCP within 7 days of discharge? Yes   Comments regarding PCP HOSP DC FU appt 7/11/22 @ 1:30 pm   Has the patient kept scheduled appointments due by today? N/A   Has home health visited the patient within 72 hours of discharge? N/A   Psychosocial issues? No   Did the patient receive a copy of their discharge instructions? Yes   Nursing interventions Reviewed instructions with patient   What is the patient's perception of their health status since discharge? Improving   Is the patient/caregiver able to teach back signs and symptoms related to disease process for when to call PCP? Yes   Is the patient/caregiver able to teach back signs and symptoms related to disease process for when to call 911? Yes   Is the patient/caregiver able to teach back the hierarchy of who to call/visit for symptoms/problems? PCP, Specialist, Home health nurse, Urgent Care, ED, 911 Yes   TCM call completed? Yes   Wrap up additional comments Pt reports she is doing much better.           Marivel Schwartz RN    7/5/2022, 12:56 EDT

## 2022-07-06 DIAGNOSIS — E03.9 PRIMARY HYPOTHYROIDISM: ICD-10-CM

## 2022-07-06 RX ORDER — LEVOTHYROXINE SODIUM 0.05 MG/1
TABLET ORAL
Qty: 90 TABLET | Refills: 2 | Status: SHIPPED | OUTPATIENT
Start: 2022-07-06 | End: 2022-08-15

## 2022-07-06 RX ORDER — PRAVASTATIN SODIUM 40 MG
TABLET ORAL
Qty: 90 TABLET | Refills: 2 | Status: SHIPPED | OUTPATIENT
Start: 2022-07-06 | End: 2022-08-15 | Stop reason: SDUPTHER

## 2022-07-07 LAB
BACTERIA SPEC AEROBE CULT: NORMAL
BACTERIA SPEC AEROBE CULT: NORMAL

## 2022-07-11 ENCOUNTER — OFFICE VISIT (OUTPATIENT)
Dept: INTERNAL MEDICINE | Facility: CLINIC | Age: 87
End: 2022-07-11

## 2022-07-11 ENCOUNTER — LAB (OUTPATIENT)
Dept: LAB | Facility: HOSPITAL | Age: 87
End: 2022-07-11

## 2022-07-11 VITALS
BODY MASS INDEX: 37.25 KG/M2 | SYSTOLIC BLOOD PRESSURE: 126 MMHG | WEIGHT: 210.2 LBS | RESPIRATION RATE: 18 BRPM | DIASTOLIC BLOOD PRESSURE: 78 MMHG | OXYGEN SATURATION: 99 % | HEART RATE: 66 BPM | HEIGHT: 63 IN

## 2022-07-11 DIAGNOSIS — K85.10 ACUTE BILIARY PANCREATITIS WITHOUT INFECTION OR NECROSIS: ICD-10-CM

## 2022-07-11 DIAGNOSIS — Z09 HOSPITAL DISCHARGE FOLLOW-UP: Primary | ICD-10-CM

## 2022-07-11 DIAGNOSIS — N18.32 STAGE 3B CHRONIC KIDNEY DISEASE: Chronic | ICD-10-CM

## 2022-07-11 DIAGNOSIS — K11.20 PAROTIDITIS: ICD-10-CM

## 2022-07-11 LAB
ALBUMIN SERPL-MCNC: 3.7 G/DL (ref 3.5–5.2)
ALBUMIN/GLOB SERPL: 1.1 G/DL
ALP SERPL-CCNC: 63 U/L (ref 39–117)
ALT SERPL W P-5'-P-CCNC: 28 U/L (ref 1–33)
AMYLASE SERPL-CCNC: 63 U/L (ref 28–100)
ANION GAP SERPL CALCULATED.3IONS-SCNC: 11 MMOL/L (ref 5–15)
AST SERPL-CCNC: 28 U/L (ref 1–32)
BASOPHILS # BLD AUTO: 0.11 10*3/MM3 (ref 0–0.2)
BASOPHILS NFR BLD AUTO: 1.5 % (ref 0–1.5)
BILIRUB SERPL-MCNC: 0.4 MG/DL (ref 0–1.2)
BUN SERPL-MCNC: 21 MG/DL (ref 8–23)
BUN/CREAT SERPL: 11.3 (ref 7–25)
CALCIUM SPEC-SCNC: 9.3 MG/DL (ref 8.6–10.5)
CHLORIDE SERPL-SCNC: 104 MMOL/L (ref 98–107)
CO2 SERPL-SCNC: 24 MMOL/L (ref 22–29)
CREAT SERPL-MCNC: 1.86 MG/DL (ref 0.57–1)
DEPRECATED RDW RBC AUTO: 44.3 FL (ref 37–54)
EGFRCR SERPLBLD CKD-EPI 2021: 25.6 ML/MIN/1.73
EOSINOPHIL # BLD AUTO: 0.55 10*3/MM3 (ref 0–0.4)
EOSINOPHIL NFR BLD AUTO: 7.5 % (ref 0.3–6.2)
ERYTHROCYTE [DISTWIDTH] IN BLOOD BY AUTOMATED COUNT: 13.3 % (ref 12.3–15.4)
GLOBULIN UR ELPH-MCNC: 3.4 GM/DL
GLUCOSE SERPL-MCNC: 90 MG/DL (ref 65–99)
HCT VFR BLD AUTO: 46.1 % (ref 34–46.6)
HGB BLD-MCNC: 15.1 G/DL (ref 12–15.9)
IMM GRANULOCYTES # BLD AUTO: 0.05 10*3/MM3 (ref 0–0.05)
IMM GRANULOCYTES NFR BLD AUTO: 0.7 % (ref 0–0.5)
LIPASE SERPL-CCNC: 74 U/L (ref 13–60)
LYMPHOCYTES # BLD AUTO: 1.88 10*3/MM3 (ref 0.7–3.1)
LYMPHOCYTES NFR BLD AUTO: 25.6 % (ref 19.6–45.3)
MCH RBC QN AUTO: 30.4 PG (ref 26.6–33)
MCHC RBC AUTO-ENTMCNC: 32.8 G/DL (ref 31.5–35.7)
MCV RBC AUTO: 92.9 FL (ref 79–97)
MONOCYTES # BLD AUTO: 0.87 10*3/MM3 (ref 0.1–0.9)
MONOCYTES NFR BLD AUTO: 11.9 % (ref 5–12)
NEUTROPHILS NFR BLD AUTO: 3.88 10*3/MM3 (ref 1.7–7)
NEUTROPHILS NFR BLD AUTO: 52.8 % (ref 42.7–76)
NRBC BLD AUTO-RTO: 0 /100 WBC (ref 0–0.2)
PLATELET # BLD AUTO: 313 10*3/MM3 (ref 140–450)
PMV BLD AUTO: 9.4 FL (ref 6–12)
POTASSIUM SERPL-SCNC: 4.6 MMOL/L (ref 3.5–5.2)
PROT SERPL-MCNC: 7.1 G/DL (ref 6–8.5)
RBC # BLD AUTO: 4.96 10*6/MM3 (ref 3.77–5.28)
SODIUM SERPL-SCNC: 139 MMOL/L (ref 136–145)
WBC NRBC COR # BLD: 7.34 10*3/MM3 (ref 3.4–10.8)

## 2022-07-11 PROCEDURE — 82150 ASSAY OF AMYLASE: CPT | Performed by: INTERNAL MEDICINE

## 2022-07-11 PROCEDURE — 80053 COMPREHEN METABOLIC PANEL: CPT | Performed by: INTERNAL MEDICINE

## 2022-07-11 PROCEDURE — 36415 COLL VENOUS BLD VENIPUNCTURE: CPT | Performed by: INTERNAL MEDICINE

## 2022-07-11 PROCEDURE — 99495 TRANSJ CARE MGMT MOD F2F 14D: CPT | Performed by: INTERNAL MEDICINE

## 2022-07-11 PROCEDURE — 1111F DSCHRG MED/CURRENT MED MERGE: CPT | Performed by: INTERNAL MEDICINE

## 2022-07-11 PROCEDURE — 83690 ASSAY OF LIPASE: CPT | Performed by: INTERNAL MEDICINE

## 2022-07-11 PROCEDURE — 85025 COMPLETE CBC W/AUTO DIFF WBC: CPT | Performed by: INTERNAL MEDICINE

## 2022-07-11 NOTE — PROGRESS NOTES
07/11/2022    Patient Information  Mandy Alarcon                                                                                          [address] [place] [address]      5/30/1933  [unfilled]  There is no work phone number on file.    Chief Complaint:     Hospital discharge follow-up/transition of care.    History of Present Illness:    Patient with a previous history of gallstone pancreatitis, current history of hypertension, gout, hyperlipidemia, hypothyroidism, impaired fasting glucose, secondary polycythemia, multinodular goiter, supraventricular tachycardia and paroxysmal atrial fibrillation, stage IIIb chronic renal insufficiency, lower extremity edema, esophageal reflux.  She presents today for hospital discharge follow-up as described below.  Her past medical history reviewed and updated were necessary including health maintenance parameters.  This reveals she is up-to-date or else accounted for.    The history regarding recent admission for acute pancreatitis is documented today under the appropriate diagnoses in the problem list by Dr. Santos and subsequently transferred to this note as follows:    July 11, 2022--patient seen in hospital discharge follow-up/transition of care.  The history regarding this admission was reviewed today at length by Dr. Santos including emergency room documentation, admission history and physical, hospital course, consultant's recommendations, discharge summary, discharge medications and instructions.  Patient reports she is feeling much better.  She tolerated the ERCP and sphincterotomy well.  Her abdominal pain has resolved.  She is eating fairly well.  No further symptoms from parotiditis.  No changes in her current medical regimen.  Check lab work today.  Follow-up on the phone for the results and possible further instructions.  Patient has an appointment with me in about 2 weeks and I will have her keep that with lab prior.    Patient Name: Mandy  KASH Alarcon  : 1933  MRN: [id number]     Date of Admission: 2022  Date of Discharge:  2022  Primary Care Physician: Daryl Santos MD        Chief Complaint:   Chest Pain and Shortness of Breath        Discharge Diagnoses            Active Hospital Problems     Diagnosis   POA   • **Acute pancreatitis, unspecified complication status, unspecified pancreatitis type [K85.90]   Yes   • Parotiditis [K11.20]   No   • Stage 3b chronic kidney disease (HCC) [N18.32]   Yes   • Paroxysmal atrial fibrillation (HCC) [I48.0]   Yes   • Obesity (BMI 30-39.9) [E66.9]   Yes   • Primary hypothyroidism [E03.9]   Yes   • Hyperlipidemia [E78.2]   Yes   • Benign essential hypertension [I10]   Yes       Resolved Hospital Problems   No resolved problems to display.         Hospital Course      Ms. Alarcon is a 89 y.o. female with a history of chronic kidney disease stage IIIb, paroxysmal A. fib, obesity, hypothyroidism, and hypertension who presented to [facility] initially complaining of chest pain and shortness of breath.  Please see the admitting history and physical for further details.  She was found to have acute biliary pancreatitis and was admitted to the hospital for further evaluation and treatment.  She admitted to the hospital with acute pancreatitis secondary to biliary etiology.  She had previous gallstone pancreatitis and on initial CT scan she was found to have a stone within her common bile duct.  MRCP did not show definitively the stone.  Gastroenterology was consulted and she underwent ERCP with sphincterotomy and she was noted to have purulent discharge from her common bile duct.  She was started on Levaquin renally dosed here in the hospital and completed her course of therapy.  Her symptoms have significantly improved since ERCP.  At this point she is stable to follow-up in the outpatient setting with gastroenterology as directed.  She was found to have some bilateral fullness at the  angle of her mandible that was quite tender.  It was initially felt to be possible lymphadenopathy but further work-up in this was unrevealing for source however after further discussion with the patient was felt that she likely had parotitis.  Given how severely tender the area was there was some concern for possible bacterial infection but she was already on Levaquin which would cover for a bacterial infection in this case.  She was started with hard candies and her symptoms significantly improved.  CT scan of her soft tissue neck did show some inflammation around the glands but no definitive stones.  Her symptoms are greatly improved on the day of discharge.  I think at this point given her symptomatic improvement she stable for discharge home.  She can follow-up with ENT as needed if her symptoms worsen or redevelop.  She did have an ultrasound of her thyroid that did show some nodules but they are all small and no recommendations are made for additional imaging or sampling.       Review of Systems   Constitutional: Negative. Negative for chills and fever.   HENT: Negative.         Facial swelling resolved   Eyes: Negative.    Cardiovascular: Negative.    Respiratory: Negative.    Endocrine: Negative.    Hematologic/Lymphatic: Negative.    Skin: Negative.    Musculoskeletal: Negative.    Gastrointestinal: Negative.  Negative for abdominal pain.   Genitourinary: Negative.    Neurological: Negative.    Psychiatric/Behavioral: Negative.    Allergic/Immunologic: Negative.        Active Problems:    Patient Active Problem List   Diagnosis   • Benign essential hypertension   • Claustrophobia   • Gout   • Hyperlipidemia   • Primary hypothyroidism   • Impaired fasting glucose   • Nocturnal hypoxemia   • Secondary polycythemia   • Vitamin D deficiency   • Therapeutic drug monitoring   • Family history of coronary artery disease   • Bilateral sensorineural hearing loss, wears hearing aids   • Multinodular goiter   •  Supraventricular tachycardia (HCC)   • Non morbid obesity   • Acute pancreatitis without infection or necrosis   • Bilateral lower extremity edema   • Gastroesophageal reflux disease without esophagitis   • Paroxysmal atrial fibrillation (HCC)   • Stage 3b chronic kidney disease (HCC)   • Parotiditis   • Hospital discharge follow-up         Past Medical History:   Diagnosis Date   • Benign essential hypertension 10/28/2012    2012--treatment for hypertension begun.   • Bilateral lower extremity edema 2020   • Bilateral sensorineural hearing loss, wears hearing aids 2017    Left is much worse than right.  Patient had multiple ear infections as a child.   • Chronic renal impairment, stage 3b (HCC) 09/15/2020   • Claustrophobia 2016    This patient has significant nocturnal hypoxemia and I think that she could benefit from nocturnal oxygen therapy. The exact etiology of her hypoxemia is not clear. She could possibly have obstructive sleep apnea but this is not documented. We cannot test this patient for sleep apnea due to the fact that she is severely claustrophobic. Patient was a former smoker and it is possibly that COPD he is playing a role.   • Family history of coronary artery disease 2016    Patient's mother, father, 2 sisters and a brother all  from myocardial infarctions   • Gastroesophageal reflux disease without esophagitis 2020   • Gout 10/28/2012    2012--initial diagnosis and treatment of gout.   • History of acute pancreatitis 2020   • Hyperlipidemia 10/28/2012    2012--treatment for hyperlipidemia begun.   • Impaired fasting glucose 10/28/2012    2012--initial diagnosis impaired fasting glucose.   • Morbidly obese ([facility]) 2019   • Nocturnal hypoxemia 2012--patient did not receive nocturnal oxygen because of Medicare regulations.   05/15/2014--overnight oximetry revealed oxygen saturations less than 89% for 22  minutes and 40 seconds. Oxygen saturations less than or equal to 88% for 22 minutes and 40 seconds. Lowest oxygen saturation 83%. The longest continuous time with oxygen saturations less than or equal to 88% was 1 minute and 32 seconds.   08/02/2012--overnight oximetry revealed oxygen saturations less than 90% for one hour and 35 minutes. Oxygen saturations less than 89% 59 minutes. This patient has significant nocturnal hypoxemia and I think that she could benefit from nocturnal oxygen therapy. The exact etiology of her hypoxemia is not clear. She could possibly have obstructive sleep apnea but this is not documented. We cannot test this patient for sleep apnea due to the fact that she is severely claustrophobic. Patient was a former smoker and it is possibly that COPD he is playing a role.   • Non morbid obesity 03/11/2019   • Paroxysmal atrial fibrillation (HCC) 04/10/2020   • Primary hypothyroidism 11/17/2015 March 11, 2019--TSH remains elevated slightly at 4.92.  Given the overall clinical picture including the multinodular goiter, we will initiate levothyroxine 50 mcg/day and reassess in about 6 weeks.  August 1, 2018--thyroid ultrasound reveals a multinodular thyroid with multiple subcentimeter nodules.  Only minimal increase in size of the largest nodule in the left lobe has occurred when compared    • Secondary polycythemia 08/02/2012 11/06/2014--patient did not receive nocturnal oxygen because of Medicare regulations.   05/15/2014--overnight oximetry revealed oxygen saturations less than 89% for 22 minutes and 40 seconds. Oxygen saturations less than or equal to 88% for 22 minutes and 40 seconds. Lowest oxygen saturation 83%. The longest continuous time with oxygen saturations less than or equal to 88% was 1 minute and 32 seconds.   08/02/2012--overnight oximetry revealed oxygen saturations less than 90% for one hour and 35 minutes. Oxygen saturations less than 89% 59 minutes. This patient has  significant nocturnal hypoxemia and I think that she could benefit from nocturnal oxygen therapy. The exact etiology of her hypoxemia is not clear. She could possibly have obstructive sleep apnea but this is not documented. We cannot test this patient for sleep apnea due to the fact that she is severely claustrophobic. Patient was a former smoker and it is possibly that COPD he is playing a role.   • Vitamin D deficiency 05/23/2016         Past Surgical History:   Procedure Laterality Date   • CHOLECYSTECTOMY WITH INTRAOPERATIVE CHOLANGIOGRAM N/A 03/12/2020    Procedure: LAPAROSCOPIC CHOLECYSTOSTOMY TUBE, INTRAOPERATIVE CHOLANGIOGRAM;  Surgeon: Bryce Andujar MD;  Location: [facility] OR;  Service: General;  Laterality: N/A;   • CHOLECYSTECTOMY WITH INTRAOPERATIVE CHOLANGIOGRAM N/A 05/22/2020    Procedure: CHOLECYSTECTOMY LAPAROSCOPIC INTRAOPERATIVE CHOLANGIOGRAM and EGD;  Surgeon: Bryce Andujar MD;  Location: [facility] OR;  Service: General;  Laterality: N/A;   • ERCP N/A 7/1/2022    Procedure: ENDOSCOPIC RETROGRADE CHOLANGIOPANCREATOGRAPHY with sphincterotomy, balloon sweep 12-15mm;  Surgeon: Mitesh Altamirano MD;  Location: [facility] ENDOSCOPY;  Service: Gastroenterology;  Laterality: N/A;  pre - gallstone pancreatitis  post - s/p sphincterotomy, sludge and  pus   • OOPHORECTOMY      age 48   • RADICAL ABDOMINAL HYSTERECTOMY  48 years old    48 years of age. Uterine fibroid tumors with menorrhagia - no cancer         Allergies   Allergen Reactions   • Cefaclor Anaphylaxis, Angioedema and Swelling     caused angioedema 25 years ago; she tolerates cephalexin, amoxicillin, and ceftriaxone per my d/w patient and her daughter as well as amoxicillin-clavulanate confirmed in EPIC  caused angioedema 25 years ago; she tolerates cephalexin, amoxicillin, and ceftriaxone per my d/w patient and her daughter as well as amoxicillin-clavulanate confirmed in EPIC  caused angioedema 25 years ago; she tolerates  cephalexin, amoxicillin, and ceftriaxone per my d/w patient and her daughter as well as amoxicillin-clavulanate confirmed in EPIC  caused angioedema 25 years ago; she tolerates cephalexin, amoxicillin, and ceftriaxone per my d/w patient and her daughter as well as amoxicillin-clavulanate confirmed in EPIC   • Sulfa Antibiotics Rash           Current Outpatient Medications:   •  levothyroxine (Synthroid) 75 MCG tablet, Take 1 p.o. daily for low thyroid, Disp: 90 tablet, Rfl: 3  •  levothyroxine (SYNTHROID, LEVOTHROID) 50 MCG tablet, TAKE 1 TABLET BY MOUTH DAILY, Disp: 90 tablet, Rfl: 2  •  metoprolol succinate XL (TOPROL-XL) 25 MG 24 hr tablet, TAKE 1 TABLET BY MOUTH DAILY FOR HIGH BLOOD PRESSURE AND HEART PALPITATIONS, Disp: 90 tablet, Rfl: 3  •  Multiple Vitamins-Minerals (MULTIVITAMIN ADULTS PO), Take 1 tablet by mouth Daily., Disp: , Rfl:   •  omeprazole (priLOSEC) 40 MG capsule, TAKE ONE CAPSULE BY MOUTH EVERY DAY BEFORE FIRST MEAL FOR ACID REFLUX, Disp: 30 capsule, Rfl: 5  •  polyethylene glycol (MIRALAX) packet, Take orally as directed for constipation, Disp: , Rfl:   •  pravastatin (PRAVACHOL) 40 MG tablet, TAKE 1 TABLET BY MOUTH DAILY, Disp: 90 tablet, Rfl: 2  •  Restasis 0.05 % ophthalmic emulsion, 2 (two) times a day., Disp: , Rfl:       Family History   Problem Relation Age of Onset   • Heart disease Mother         Ischemic.  from coronary artery disease.   • Heart disease Father         Ischemic.  from coronary artery disease.   • Heart disease Sister         Ischemic.  from coronary artery disease.   • Heart disease Sister         Ischemic.  from coronary artery disease.   • Heart disease Brother         Ischemic.  from coronary artery disease.   • Breast cancer Daughter    • Breast cancer Daughter    • Ovarian cancer Neg Hx          Social History     Socioeconomic History   • Marital status:    • Number of children: 5   • Highest education level: GED or equivalent   Tobacco  "Use   • Smoking status: Former Smoker     Packs/day: 0.50     Start date: 1946     Quit date: 1954     Years since quittin.5   • Smokeless tobacco: Never Used   • Tobacco comment: Stopped drinking at age 30.   Vaping Use   • Vaping Use: Never used   Substance and Sexual Activity   • Alcohol use: No     Comment: caffiene use tea coffee   • Drug use: No   • Sexual activity: Not Currently     Partners: Male         Vitals:    22 1331   BP: 126/78   Pulse: 66   Resp: 18   SpO2: 99%   Weight: 95.3 kg (210 lb 3.2 oz)   Height: 160 cm (63\")        Body mass index is 37.24 kg/m².      Physical Exam:    General: Alert and oriented x 3.  No acute distress.  Obese.  Normal affect.  HEENT: Pupils equal, round, reactive to light; extraocular movements intact; sclerae nonicteric; pharynx, ear canals and TMs normal.  Neck: Without JVD, thyromegaly, bruit, or adenopathy.  Lungs: Clear to auscultation in all fields.  Heart: Regular rate and rhythm without murmur, rub, gallop, or click.  Abdomen: Soft, nontender, without hepatosplenomegaly or hernia.  Bowel sounds normal.  : Deferred.  Rectal: Deferred.  Extremities: Without clubbing, cyanosis, edema, or pulse deficit.  Neurologic: Intact without focal deficit.  Normal station and gait observed during ingress and egress from the examination room.  Skin: Without significant lesion.  Musculoskeletal: Unremarkable.    Lab/other results:    I reviewed the documentation from the hospital stay including laboratory and radiographic studies as well as procedure notes.    Assessment/Plan:     Diagnosis Plan   1. Hospital discharge follow-up     2. Idiopathic acute pancreatitis without infection or necrosis     3. Stage 3b chronic kidney disease (HCC)     4. Parotiditis       Current outpatient and discharge medications have been reconciled for the patient.  Reviewed by: Daryl Santos MD    2022--patient seen in hospital discharge follow-up/transition of care. "  The history regarding this admission was reviewed today at length by Dr. Santos including emergency room documentation, admission history and physical, hospital course, consultant's recommendations, discharge summary, discharge medications and instructions.  Patient reports she is feeling much better.  She tolerated the ERCP and sphincterotomy well.  Her abdominal pain has resolved.  She is eating fairly well.  No further symptoms from parotiditis.      2022--plan is as follows: No changes in her current medical regimen.  Check lab work today.  Follow-up on the phone for the results and possible further instructions.  Patient has an appointment with me in about 2 weeks and I will have her keep that with lab prior.    Patient Name: Mandy Alarcon  : 1933  MRN: [id number]     Date of Admission: 2022  Date of Discharge:  2022  Primary Care Physician: Daryl Santos MD        Chief Complaint:   Chest Pain and Shortness of Breath        Discharge Diagnoses            Active Hospital Problems     Diagnosis   POA   • **Acute pancreatitis, unspecified complication status, unspecified pancreatitis type [K85.90]   Yes   • Parotiditis [K11.20]   No   • Stage 3b chronic kidney disease (HCC) [N18.32]   Yes   • Paroxysmal atrial fibrillation (HCC) [I48.0]   Yes   • Obesity (BMI 30-39.9) [E66.9]   Yes   • Primary hypothyroidism [E03.9]   Yes   • Hyperlipidemia [E78.2]   Yes   • Benign essential hypertension [I10]   Yes       Resolved Hospital Problems   No resolved problems to display.         Hospital Course      Ms. Alarcon is a 89 y.o. female with a history of chronic kidney disease stage IIIb, paroxysmal A. fib, obesity, hypothyroidism, and hypertension who [facility] complaining of chest pain and shortness of breath.  Please see the admitting history and physical for further details.  She was found to have acute biliary pancreatitis and was admitted to the  hospital for further evaluation and treatment.  She admitted to the hospital with acute pancreatitis secondary to biliary etiology.  She had previous gallstone pancreatitis and on initial CT scan she was found to have a stone within her common bile duct.  MRCP did not show definitively the stone.  Gastroenterology was consulted and she underwent ERCP with sphincterotomy and she was noted to have purulent discharge from her common bile duct.  She was started on Levaquin renally dosed here in the hospital and completed her course of therapy.  Her symptoms have significantly improved since ERCP.  At this point she is stable to follow-up in the outpatient setting with gastroenterology as directed.  She was found to have some bilateral fullness at the angle of her mandible that was quite tender.  It was initially felt to be possible lymphadenopathy but further work-up in this was unrevealing for source however after further discussion with the patient was felt that she likely had parotitis.  Given how severely tender the area was there was some concern for possible bacterial infection but she was already on Levaquin which would cover for a bacterial infection in this case.  She was started with hard candies and her symptoms significantly improved.  CT scan of her soft tissue neck did show some inflammation around the glands but no definitive stones.  Her symptoms are greatly improved on the day of discharge.  I think at this point given her symptomatic improvement she stable for discharge home.  She can follow-up with ENT as needed if her symptoms worsen or redevelop.  She did have an ultrasound of her thyroid that did show some nodules but they are all small and no recommendations are made for additional imaging or sampling.               Procedures

## 2022-07-28 ENCOUNTER — LAB (OUTPATIENT)
Dept: LAB | Facility: HOSPITAL | Age: 87
End: 2022-07-28

## 2022-07-28 DIAGNOSIS — E55.9 VITAMIN D DEFICIENCY: Chronic | ICD-10-CM

## 2022-07-28 DIAGNOSIS — E03.9 PRIMARY HYPOTHYROIDISM: Chronic | ICD-10-CM

## 2022-07-28 DIAGNOSIS — E78.2 MIXED HYPERLIPIDEMIA: Chronic | ICD-10-CM

## 2022-07-28 DIAGNOSIS — R73.01 IMPAIRED FASTING GLUCOSE: Chronic | ICD-10-CM

## 2022-07-28 DIAGNOSIS — D75.1 SECONDARY POLYCYTHEMIA: Chronic | ICD-10-CM

## 2022-07-28 LAB
25(OH)D3 SERPL-MCNC: 58 NG/ML (ref 30–100)
ALBUMIN SERPL-MCNC: 3.9 G/DL (ref 3.5–5.2)
ALBUMIN/GLOB SERPL: 1.2 G/DL
ALP SERPL-CCNC: 52 U/L (ref 39–117)
ALT SERPL W P-5'-P-CCNC: 22 U/L (ref 1–33)
ANION GAP SERPL CALCULATED.3IONS-SCNC: 8.3 MMOL/L (ref 5–15)
AST SERPL-CCNC: 19 U/L (ref 1–32)
BILIRUB SERPL-MCNC: 0.6 MG/DL (ref 0–1.2)
BUN SERPL-MCNC: 18 MG/DL (ref 8–23)
BUN/CREAT SERPL: 13.5 (ref 7–25)
CALCIUM SPEC-SCNC: 9.7 MG/DL (ref 8.6–10.5)
CHLORIDE SERPL-SCNC: 106 MMOL/L (ref 98–107)
CK SERPL-CCNC: 60 U/L (ref 20–180)
CO2 SERPL-SCNC: 21.7 MMOL/L (ref 22–29)
CREAT SERPL-MCNC: 1.33 MG/DL (ref 0.57–1)
DEPRECATED RDW RBC AUTO: 45.2 FL (ref 37–54)
EGFRCR SERPLBLD CKD-EPI 2021: 38.3 ML/MIN/1.73
ERYTHROCYTE [DISTWIDTH] IN BLOOD BY AUTOMATED COUNT: 13.4 % (ref 12.3–15.4)
GLOBULIN UR ELPH-MCNC: 3.2 GM/DL
GLUCOSE SERPL-MCNC: 112 MG/DL (ref 65–99)
HBA1C MFR BLD: 5.7 % (ref 4.8–5.6)
HCT VFR BLD AUTO: 47.7 % (ref 34–46.6)
HGB BLD-MCNC: 15.5 G/DL (ref 12–15.9)
MCH RBC QN AUTO: 30.4 PG (ref 26.6–33)
MCHC RBC AUTO-ENTMCNC: 32.5 G/DL (ref 31.5–35.7)
MCV RBC AUTO: 93.5 FL (ref 79–97)
PLATELET # BLD AUTO: 179 10*3/MM3 (ref 140–450)
PMV BLD AUTO: 10.3 FL (ref 6–12)
POTASSIUM SERPL-SCNC: 4.8 MMOL/L (ref 3.5–5.2)
PROT SERPL-MCNC: 7.1 G/DL (ref 6–8.5)
RBC # BLD AUTO: 5.1 10*6/MM3 (ref 3.77–5.28)
SODIUM SERPL-SCNC: 136 MMOL/L (ref 136–145)
T3FREE SERPL-MCNC: 2.9 PG/ML (ref 2–4.4)
T4 FREE SERPL-MCNC: 1.42 NG/DL (ref 0.93–1.7)
TSH SERPL DL<=0.05 MIU/L-ACNC: 1.92 UIU/ML (ref 0.27–4.2)
WBC NRBC COR # BLD: 6.08 10*3/MM3 (ref 3.4–10.8)

## 2022-07-28 PROCEDURE — 83036 HEMOGLOBIN GLYCOSYLATED A1C: CPT

## 2022-07-28 PROCEDURE — 80061 LIPID PANEL: CPT

## 2022-07-28 PROCEDURE — 80053 COMPREHEN METABOLIC PANEL: CPT

## 2022-07-28 PROCEDURE — 36415 COLL VENOUS BLD VENIPUNCTURE: CPT

## 2022-07-28 PROCEDURE — 83704 LIPOPROTEIN BLD QUAN PART: CPT

## 2022-07-28 PROCEDURE — 84443 ASSAY THYROID STIM HORMONE: CPT

## 2022-07-28 PROCEDURE — 82550 ASSAY OF CK (CPK): CPT

## 2022-07-28 PROCEDURE — 84481 FREE ASSAY (FT-3): CPT

## 2022-07-28 PROCEDURE — 82306 VITAMIN D 25 HYDROXY: CPT

## 2022-07-28 PROCEDURE — 85027 COMPLETE CBC AUTOMATED: CPT

## 2022-07-28 PROCEDURE — 84439 ASSAY OF FREE THYROXINE: CPT

## 2022-07-30 LAB
CHOLEST SERPL-MCNC: 139 MG/DL (ref 100–199)
HDL SERPL-SCNC: 27.3 UMOL/L
HDLC SERPL-MCNC: 56 MG/DL
LDL SERPL QN: 20.9 NM
LDL SERPL-SCNC: 874 NMOL/L
LDL SMALL SERPL-SCNC: 238 NMOL/L
LDLC SERPL CALC-MCNC: 64 MG/DL (ref 0–99)
TRIGL SERPL-MCNC: 106 MG/DL (ref 0–149)

## 2022-08-15 ENCOUNTER — OFFICE VISIT (OUTPATIENT)
Dept: INTERNAL MEDICINE | Facility: CLINIC | Age: 87
End: 2022-08-15

## 2022-08-15 VITALS
HEART RATE: 66 BPM | BODY MASS INDEX: 38.16 KG/M2 | OXYGEN SATURATION: 98 % | DIASTOLIC BLOOD PRESSURE: 70 MMHG | HEIGHT: 63 IN | RESPIRATION RATE: 18 BRPM | WEIGHT: 215.4 LBS | SYSTOLIC BLOOD PRESSURE: 124 MMHG

## 2022-08-15 DIAGNOSIS — E03.9 PRIMARY HYPOTHYROIDISM: Chronic | ICD-10-CM

## 2022-08-15 DIAGNOSIS — K21.9 GASTROESOPHAGEAL REFLUX DISEASE WITHOUT ESOPHAGITIS: Chronic | ICD-10-CM

## 2022-08-15 DIAGNOSIS — G47.34 NOCTURNAL HYPOXEMIA: Chronic | ICD-10-CM

## 2022-08-15 DIAGNOSIS — R73.01 IMPAIRED FASTING GLUCOSE: Primary | Chronic | ICD-10-CM

## 2022-08-15 DIAGNOSIS — D75.1 SECONDARY POLYCYTHEMIA: Chronic | ICD-10-CM

## 2022-08-15 DIAGNOSIS — I47.1 SUPRAVENTRICULAR TACHYCARDIA: Chronic | ICD-10-CM

## 2022-08-15 DIAGNOSIS — E55.9 VITAMIN D DEFICIENCY: Chronic | ICD-10-CM

## 2022-08-15 DIAGNOSIS — Z87.39 HISTORY OF GOUT: Chronic | ICD-10-CM

## 2022-08-15 DIAGNOSIS — H90.3 BILATERAL SENSORINEURAL HEARING LOSS: Chronic | ICD-10-CM

## 2022-08-15 DIAGNOSIS — I48.0 PAROXYSMAL ATRIAL FIBRILLATION: Chronic | ICD-10-CM

## 2022-08-15 DIAGNOSIS — E78.2 MIXED HYPERLIPIDEMIA: Chronic | ICD-10-CM

## 2022-08-15 DIAGNOSIS — Z51.81 THERAPEUTIC DRUG MONITORING: ICD-10-CM

## 2022-08-15 DIAGNOSIS — N18.32 STAGE 3B CHRONIC KIDNEY DISEASE: Chronic | ICD-10-CM

## 2022-08-15 DIAGNOSIS — E66.9 NON MORBID OBESITY: Chronic | ICD-10-CM

## 2022-08-15 DIAGNOSIS — I10 BENIGN ESSENTIAL HYPERTENSION: Chronic | ICD-10-CM

## 2022-08-15 DIAGNOSIS — E04.2 MULTINODULAR GOITER: Chronic | ICD-10-CM

## 2022-08-15 DIAGNOSIS — R60.0 BILATERAL LOWER EXTREMITY EDEMA: Chronic | ICD-10-CM

## 2022-08-15 PROBLEM — H25.819 COMBINED FORM OF SENILE CATARACT: Status: ACTIVE | Noted: 2021-03-03

## 2022-08-15 PROBLEM — Z09 HOSPITAL DISCHARGE FOLLOW-UP: Status: RESOLVED | Noted: 2022-07-11 | Resolved: 2022-08-15

## 2022-08-15 PROBLEM — K85.10 ACUTE BILIARY PANCREATITIS WITHOUT INFECTION OR NECROSIS: Status: RESOLVED | Noted: 2020-02-01 | Resolved: 2022-08-15

## 2022-08-15 PROBLEM — H25.819 COMBINED FORM OF SENILE CATARACT: Chronic | Status: ACTIVE | Noted: 2021-03-03

## 2022-08-15 PROBLEM — K11.20 PAROTIDITIS: Status: RESOLVED | Noted: 2022-07-04 | Resolved: 2022-08-15

## 2022-08-15 PROCEDURE — 99214 OFFICE O/P EST MOD 30 MIN: CPT | Performed by: INTERNAL MEDICINE

## 2022-08-15 RX ORDER — METOPROLOL SUCCINATE 25 MG/1
TABLET, EXTENDED RELEASE ORAL
Qty: 90 TABLET | Refills: 3
Start: 2022-08-15 | End: 2023-01-23

## 2022-08-15 RX ORDER — ALLOPURINOL 300 MG/1
TABLET ORAL
COMMUNITY
Start: 2022-07-29 | End: 2022-08-15 | Stop reason: SDUPTHER

## 2022-08-15 RX ORDER — PRAVASTATIN SODIUM 40 MG
TABLET ORAL
Qty: 90 TABLET | Refills: 3
Start: 2022-08-15 | End: 2023-04-05

## 2022-08-15 RX ORDER — OMEPRAZOLE 40 MG/1
CAPSULE, DELAYED RELEASE ORAL
Qty: 90 CAPSULE | Refills: 3
Start: 2022-08-15 | End: 2022-10-06

## 2022-08-15 RX ORDER — ALLOPURINOL 300 MG/1
TABLET ORAL
Qty: 90 TABLET | Refills: 3
Start: 2022-08-15

## 2022-08-15 RX ORDER — LEVOTHYROXINE SODIUM 0.07 MG/1
TABLET ORAL
Qty: 90 TABLET | Refills: 3
Start: 2022-08-15

## 2022-08-15 NOTE — PROGRESS NOTES
08/15/2022    Patient Information  Mandy Alarcon                                                                                          [address] [address]      5/30/1933  [unfilled]  There is no work phone number on file.    Chief Complaint:     Follow-up lab work in order to monitor chronic medical issues listed in history of present illness.  No new acute complaints.    History of Present Illness:    Patient with multiple medical problems including impaired fasting glucose, stage IIIb chronic kidney disease, hyperlipidemia, hypothyroidism, multinodular goiter, vitamin D deficiency, gout, hypertension, lower extremity edema, secondary polycythemia nocturnal hypoxemia, paroxysmal atrial fibrillation/supraventricular tachycardia, bilateral hearing loss, esophageal reflux, nonmorbid obesity.  She presents today for follow-up with lab prior in order to monitor chronic medical issues.  Her past medical history reviewed and updated were necessary including health maintenance parameters.  This reveals she will be up-to-date or else accounted for after today's visit.    Review of Systems   Constitutional: Negative.   HENT: Negative.    Eyes: Negative.    Cardiovascular: Negative.    Respiratory: Negative.    Endocrine: Negative.    Hematologic/Lymphatic: Negative.    Skin: Negative.    Musculoskeletal: Positive for arthritis.   Gastrointestinal: Negative.    Genitourinary: Negative.    Neurological: Negative.    Psychiatric/Behavioral: Negative.    Allergic/Immunologic: Negative.        Active Problems:    Patient Active Problem List   Diagnosis   • Benign essential hypertension   • Claustrophobia   • Gout   • Hyperlipidemia   • Primary hypothyroidism   • Impaired fasting glucose   • Nocturnal hypoxemia   • Secondary polycythemia   • Vitamin D deficiency   • Therapeutic drug monitoring   • Family history of coronary artery disease   • Bilateral sensorineural hearing loss, wears hearing  aids   • Multinodular goiter   • Supraventricular tachycardia (HCC)   • Non morbid obesity   • Bilateral lower extremity edema   • Gastroesophageal reflux disease without esophagitis   • Paroxysmal atrial fibrillation (HCC)   • Stage 3b chronic kidney disease (HCC)   • Combined form of senile cataract         Past Medical History:   Diagnosis Date   • Benign essential hypertension 10/28/2012    2012--treatment for hypertension begun.   • Bilateral lower extremity edema 2020   • Bilateral sensorineural hearing loss, wears hearing aids 2017    Left is much worse than right.  Patient had multiple ear infections as a child.   • Chronic renal impairment, stage 3b (HCC) 09/15/2020   • Claustrophobia 2016    This patient has significant nocturnal hypoxemia and I think that she could benefit from nocturnal oxygen therapy. The exact etiology of her hypoxemia is not clear. She could possibly have obstructive sleep apnea but this is not documented. We cannot test this patient for sleep apnea due to the fact that she is severely claustrophobic. Patient was a former smoker and it is possibly that COPD he is playing a role.   • Combined form of senile cataract 3/3/2021   • Family history of coronary artery disease 2016    Patient's mother, father, 2 sisters and a brother all  from myocardial infarctions   • Gastroesophageal reflux disease without esophagitis 2020   • Gout 10/28/2012    2012--initial diagnosis and treatment of gout.   • History of acute pancreatitis 2020   • Hyperlipidemia 10/28/2012    2012--treatment for hyperlipidemia begun.   • Impaired fasting glucose 10/28/2012    2012--initial diagnosis impaired fasting glucose.   • Morbidly obese (HCC) 2019   • Nocturnal hypoxemia 2012--patient did not receive nocturnal oxygen because of Medicare regulations.   05/15/2014--overnight oximetry revealed oxygen saturations less  than 89% for 22 minutes and 40 seconds. Oxygen saturations less than or equal to 88% for 22 minutes and 40 seconds. Lowest oxygen saturation 83%. The longest continuous time with oxygen saturations less than or equal to 88% was 1 minute and 32 seconds.   08/02/2012--overnight oximetry revealed oxygen saturations less than 90% for one hour and 35 minutes. Oxygen saturations less than 89% 59 minutes. This patient has significant nocturnal hypoxemia and I think that she could benefit from nocturnal oxygen therapy. The exact etiology of her hypoxemia is not clear. She could possibly have obstructive sleep apnea but this is not documented. We cannot test this patient for sleep apnea due to the fact that she is severely claustrophobic. Patient was a former smoker and it is possibly that COPD he is playing a role.   • Non morbid obesity 03/11/2019   • Paroxysmal atrial fibrillation (HCC) 04/10/2020   • Primary hypothyroidism 11/17/2015 March 11, 2019--TSH remains elevated slightly at 4.92.  Given the overall clinical picture including the multinodular goiter, we will initiate levothyroxine 50 mcg/day and reassess in about 6 weeks.  August 1, 2018--thyroid ultrasound reveals a multinodular thyroid with multiple subcentimeter nodules.  Only minimal increase in size of the largest nodule in the left lobe has occurred when compared    • Secondary polycythemia 08/02/2012 11/06/2014--patient did not receive nocturnal oxygen because of Medicare regulations.   05/15/2014--overnight oximetry revealed oxygen saturations less than 89% for 22 minutes and 40 seconds. Oxygen saturations less than or equal to 88% for 22 minutes and 40 seconds. Lowest oxygen saturation 83%. The longest continuous time with oxygen saturations less than or equal to 88% was 1 minute and 32 seconds.   08/02/2012--overnight oximetry revealed oxygen saturations less than 90% for one hour and 35 minutes. Oxygen saturations less than 89% 59 minutes. This  patient has significant nocturnal hypoxemia and I think that she could benefit from nocturnal oxygen therapy. The exact etiology of her hypoxemia is not clear. She could possibly have obstructive sleep apnea but this is not documented. We cannot test this patient for sleep apnea due to the fact that she is severely claustrophobic. Patient was a former smoker and it is possibly that COPD he is playing a role.   • Vitamin D deficiency 05/23/2016         Past Surgical History:   Procedure Laterality Date   • CHOLECYSTECTOMY WITH INTRAOPERATIVE CHOLANGIOGRAM N/A 03/12/2020    Procedure: LAPAROSCOPIC CHOLECYSTOSTOMY TUBE, INTRAOPERATIVE CHOLANGIOGRAM;  Surgeon: Bryce Andujar MD;  Location: [facility] OR;  Service: General;  Laterality: N/A;   • CHOLECYSTECTOMY WITH INTRAOPERATIVE CHOLANGIOGRAM N/A 05/22/2020    Procedure: CHOLECYSTECTOMY LAPAROSCOPIC INTRAOPERATIVE CHOLANGIOGRAM and EGD;  Surgeon: Bryce Andujar MD;  Location: [facility] OR;  Service: General;  Laterality: N/A;   • ERCP N/A 07/01/2022    Procedure: ENDOSCOPIC RETROGRADE CHOLANGIOPANCREATOGRAPHY with sphincterotomy, balloon sweep 12-15mm;  Surgeon: Mitesh Altamirano MD;  Location: [facility] ENDOSCOPY;  Service: Gastroenterology;  Laterality: N/A;  pre - gallstone pancreatitis  post - s/p sphincterotomy, sludge and  pus   • OOPHORECTOMY      age 48   • RADICAL ABDOMINAL HYSTERECTOMY  48 years old    48 years of age. Uterine fibroid tumors with menorrhagia - no cancer         Allergies   Allergen Reactions   • Cefaclor Anaphylaxis, Angioedema and Swelling     caused angioedema 25 years ago; she tolerates cephalexin, amoxicillin, and ceftriaxone per my d/w patient and her daughter as well as amoxicillin-clavulanate confirmed in EPIC  caused angioedema 25 years ago; she tolerates cephalexin, amoxicillin, and ceftriaxone per my d/w patient and her daughter as well as amoxicillin-clavulanate confirmed in EPIC  caused angioedema 25 years ago; she  tolerates cephalexin, amoxicillin, and ceftriaxone per my d/w patient and her daughter as well as amoxicillin-clavulanate confirmed in EPIC  caused angioedema 25 years ago; she tolerates cephalexin, amoxicillin, and ceftriaxone per my d/w patient and her daughter as well as amoxicillin-clavulanate confirmed in EPIC   • Sulfa Antibiotics Rash           Current Outpatient Medications:   •  allopurinol (ZYLOPRIM) 300 MG tablet, Take 1 p.o. daily for gout/elevated uric acid, Disp: 90 tablet, Rfl: 3  •  ascorbic acid (VITAMIN C) 500 MG capsule controlled-release CR capsule, Take 1 tablet by mouth Daily., Disp: , Rfl:   •  levothyroxine (Synthroid) 75 MCG tablet, Take 1 p.o. daily for low thyroid, Disp: 90 tablet, Rfl: 3  •  metoprolol succinate XL (TOPROL-XL) 25 MG 24 hr tablet, Take 1 p.o. daily for high blood pressure and heart palpitations, Disp: 90 tablet, Rfl: 3  •  Multiple Vitamins-Minerals (MULTIVITAMIN ADULTS PO), Take 1 tablet by mouth Daily., Disp: , Rfl:   •  omeprazole (priLOSEC) 40 MG capsule, Take 1 p.o. daily before the largest meal for acid reflux, Disp: 90 capsule, Rfl: 3  •  polyethylene glycol (MIRALAX) packet, Take orally as directed for constipation, Disp: , Rfl:   •  pravastatin (PRAVACHOL) 40 MG tablet, Take 1 p.o. daily for high cholesterol, Disp: 90 tablet, Rfl: 3  •  Restasis 0.05 % ophthalmic emulsion, 2 (two) times a day., Disp: , Rfl:       Family History   Problem Relation Age of Onset   • Heart disease Mother         Ischemic.  from coronary artery disease.   • Heart disease Father         Ischemic.  from coronary artery disease.   • Heart disease Sister         Ischemic.  from coronary artery disease.   • Heart disease Sister         Ischemic.  from coronary artery disease.   • Heart disease Brother         Ischemic.  from coronary artery disease.   • Breast cancer Daughter    • Breast cancer Daughter    • Ovarian cancer Neg Hx          Social History     Socioeconomic  "History   • Marital status:    • Number of children: 5   • Highest education level: GED or equivalent   Tobacco Use   • Smoking status: Former Smoker     Packs/day: 0.50     Start date: 1946     Quit date: 1954     Years since quittin.6   • Smokeless tobacco: Never Used   • Tobacco comment: Stopped drinking at age 30.   Vaping Use   • Vaping Use: Never used   Substance and Sexual Activity   • Alcohol use: No     Comment: caffiene use tea coffee   • Drug use: No   • Sexual activity: Not Currently     Partners: Male         Vitals:    08/15/22 1240   BP: 124/70   Pulse: 66   Resp: 18   SpO2: 98%   Weight: 97.7 kg (215 lb 6.4 oz)   Height: 160 cm (63\")        Body mass index is 38.16 kg/m².      Physical Exam:    General: Alert and oriented x 3.  No acute distress.  Obese.  Normal affect.  HEENT: Pupils equal, round, reactive to light; extraocular movements intact; sclerae nonicteric; pharynx, ear canals and TMs normal.  Neck: Without JVD, thyromegaly, bruit, or adenopathy.  Lungs: Clear to auscultation in all fields.  Heart: Regular rate and rhythm today without murmur, rub, gallop, or click.  Abdomen: Soft, nontender, without hepatosplenomegaly or hernia.  Bowel sounds normal.  : Deferred.  Rectal: Deferred.  Extremities: Without clubbing, cyanosis, or pulse deficit.  There is 1-2+ bilateral lower extremity edema without signs of cellulitis.  Neurologic: Intact without focal deficit.  Normal station and gait observed during ingress and egress from the examination room.  Skin: Without significant lesion.  Musculoskeletal: Unremarkable.    Lab/other results:    CBC is normal except hematocrit slightly elevated at 47.7.  Hemoglobin is normal at 15.5.  CPK normal.  CMP normal except glucose 112, creatinine 1.33, estimated GFR 38.3.  Hemoglobin A1c 5.7.  Total cholesterol is 139, triglycerides 106, LDL particle #874, HDL particle number low at 27.3.  Thyroid function tests are normal.  Vitamin D " normal at 58.0.    Assessment/Plan:     Diagnosis Plan   1. Impaired fasting glucose  Comprehensive Metabolic Panel    Hemoglobin A1c   2. Stage 3b chronic kidney disease (HCC)  CBC (No Diff)    Comprehensive Metabolic Panel    Urinalysis With Microscopic If Indicated (No Culture) - Urine, Clean Catch   3. Hyperlipidemia  pravastatin (PRAVACHOL) 40 MG tablet    CK    Comprehensive Metabolic Panel    NMR LipoProfile   4. Primary hypothyroidism  levothyroxine (Synthroid) 75 MCG tablet    TSH    T4, Free    T3, Free   5. Multinodular goiter  levothyroxine (Synthroid) 75 MCG tablet   6. Vitamin D deficiency  Vitamin D 25 Hydroxy   7. Gout  allopurinol (ZYLOPRIM) 300 MG tablet    Uric Acid   8. Benign essential hypertension  metoprolol succinate XL (TOPROL-XL) 25 MG 24 hr tablet   9. Bilateral lower extremity edema     10. Secondary polycythemia  CBC (No Diff)   11. Nocturnal hypoxemia     12. Paroxysmal atrial fibrillation (HCC)     13. Supraventricular tachycardia (HCC)  metoprolol succinate XL (TOPROL-XL) 25 MG 24 hr tablet   14. Bilateral sensorineural hearing loss, wears hearing aids     15. Gastroesophageal reflux disease without esophagitis  omeprazole (priLOSEC) 40 MG capsule   16. Non morbid obesity     17. Therapeutic drug monitoring       Patient has impaired fasting glucose and given her nonmorbid obesity she is at risk for development of overt diabetes.  I have strongly recommended low carbohydrate diet and weight loss.  Her chronic renal insufficiency seems stable we will continue to monitor closely.  Hyperlipidemia is under good control with 40 mg of pravastatin which she is tolerating well.  Her thyroid is therapeutic on the current dose of levothyroxine.  The multinodular goiter is stable.  Vitamin D is in normal range with supplementation.  She has gout and is on allopurinol needs uric acid level in the future.  Her lower extremity edema is currently mild and well controlled.  Her blood pressure is also  well controlled.  The metoprolol seems to be helping with the paroxysmal atrial fibrillation and supraventricular tachycardia.  She is followed by the cardiologist.  Esophageal reflux controlled with omeprazole 40 mg/day.    Plan is as follows: Recommend low carbohydrate diet and weight loss.  No changes in current medical regimen.        Procedures

## 2022-09-22 ENCOUNTER — OFFICE VISIT (OUTPATIENT)
Dept: INTERNAL MEDICINE | Facility: CLINIC | Age: 87
End: 2022-09-22

## 2022-09-22 VITALS
BODY MASS INDEX: 37.32 KG/M2 | DIASTOLIC BLOOD PRESSURE: 80 MMHG | RESPIRATION RATE: 18 BRPM | WEIGHT: 210.6 LBS | SYSTOLIC BLOOD PRESSURE: 126 MMHG | HEART RATE: 53 BPM | HEIGHT: 63 IN | OXYGEN SATURATION: 98 %

## 2022-09-22 DIAGNOSIS — E78.2 MIXED HYPERLIPIDEMIA: Chronic | ICD-10-CM

## 2022-09-22 DIAGNOSIS — Z00.00 ENCOUNTER FOR SUBSEQUENT ANNUAL WELLNESS VISIT (AWV) IN MEDICARE PATIENT: Primary | ICD-10-CM

## 2022-09-22 DIAGNOSIS — K21.9 GASTROESOPHAGEAL REFLUX DISEASE WITHOUT ESOPHAGITIS: Chronic | ICD-10-CM

## 2022-09-22 DIAGNOSIS — E04.2 MULTINODULAR GOITER: Chronic | ICD-10-CM

## 2022-09-22 DIAGNOSIS — R73.01 IMPAIRED FASTING GLUCOSE: Chronic | ICD-10-CM

## 2022-09-22 DIAGNOSIS — N18.32 STAGE 3B CHRONIC KIDNEY DISEASE: Chronic | ICD-10-CM

## 2022-09-22 DIAGNOSIS — E03.9 PRIMARY HYPOTHYROIDISM: Chronic | ICD-10-CM

## 2022-09-22 DIAGNOSIS — Z87.39 HISTORY OF GOUT: Chronic | ICD-10-CM

## 2022-09-22 DIAGNOSIS — Z82.49 FAMILY HISTORY OF CORONARY ARTERY DISEASE: Chronic | ICD-10-CM

## 2022-09-22 DIAGNOSIS — R60.0 BILATERAL LOWER EXTREMITY EDEMA: Chronic | ICD-10-CM

## 2022-09-22 DIAGNOSIS — I47.1 SUPRAVENTRICULAR TACHYCARDIA: Chronic | ICD-10-CM

## 2022-09-22 DIAGNOSIS — Z51.81 THERAPEUTIC DRUG MONITORING: ICD-10-CM

## 2022-09-22 DIAGNOSIS — G47.34 NOCTURNAL HYPOXEMIA: Chronic | ICD-10-CM

## 2022-09-22 DIAGNOSIS — I48.0 PAROXYSMAL ATRIAL FIBRILLATION: Chronic | ICD-10-CM

## 2022-09-22 DIAGNOSIS — Z23 NEED FOR INFLUENZA VACCINATION: ICD-10-CM

## 2022-09-22 DIAGNOSIS — E66.9 NON MORBID OBESITY: Chronic | ICD-10-CM

## 2022-09-22 DIAGNOSIS — D75.1 SECONDARY POLYCYTHEMIA: Chronic | ICD-10-CM

## 2022-09-22 DIAGNOSIS — I10 BENIGN ESSENTIAL HYPERTENSION: Chronic | ICD-10-CM

## 2022-09-22 DIAGNOSIS — E55.9 VITAMIN D DEFICIENCY: Chronic | ICD-10-CM

## 2022-09-22 PROCEDURE — 1159F MED LIST DOCD IN RCRD: CPT | Performed by: INTERNAL MEDICINE

## 2022-09-22 PROCEDURE — G0439 PPPS, SUBSEQ VISIT: HCPCS | Performed by: INTERNAL MEDICINE

## 2022-09-22 PROCEDURE — 90662 IIV NO PRSV INCREASED AG IM: CPT | Performed by: INTERNAL MEDICINE

## 2022-09-22 PROCEDURE — 1126F AMNT PAIN NOTED NONE PRSNT: CPT | Performed by: INTERNAL MEDICINE

## 2022-09-22 PROCEDURE — G0008 ADMIN INFLUENZA VIRUS VAC: HCPCS | Performed by: INTERNAL MEDICINE

## 2022-09-22 PROCEDURE — 1170F FXNL STATUS ASSESSED: CPT | Performed by: INTERNAL MEDICINE

## 2022-09-22 NOTE — PROGRESS NOTES
The ABCs of the Annual Wellness Visit  Subsequent Medicare Wellness Visit     No chief complaint on file.     Subjective    History of Present Illness:  Mandy Alarcon is a 89 y.o. female who presents for a Subsequent Medicare Wellness Visit.    The following portions of the patient's history were reviewed and   updated as appropriate: allergies, current medications, past family history, past medical history, past social history, past surgical history and problem list.    Compared to one year ago, the patient feels her physical   health is better.    Compared to one year ago, the patient feels her mental   health is the same.    Recent Hospitalizations:  This patient has had a [facility] admission record on file within the last 365 days.    Current Medical Providers:  Patient Care Team:  Daryl Santos MD as PCP - General (Internal Medicine)    Outpatient Medications Prior to Visit   Medication Sig Dispense Refill   • allopurinol (ZYLOPRIM) 300 MG tablet Take 1 p.o. daily for gout/elevated uric acid 90 tablet 3   • ascorbic acid (VITAMIN C) 500 MG capsule controlled-release CR capsule Take 1 tablet by mouth Daily.     • levothyroxine (Synthroid) 75 MCG tablet Take 1 p.o. daily for low thyroid 90 tablet 3   • metoprolol succinate XL (TOPROL-XL) 25 MG 24 hr tablet Take 1 p.o. daily for high blood pressure and heart palpitations 90 tablet 3   • Multiple Vitamins-Minerals (MULTIVITAMIN ADULTS PO) Take 1 tablet by mouth Daily.     • omeprazole (priLOSEC) 40 MG capsule Take 1 p.o. daily before the largest meal for acid reflux 90 capsule 3   • polyethylene glycol (MIRALAX) packet Take orally as directed for constipation     • pravastatin (PRAVACHOL) 40 MG tablet Take 1 p.o. daily for high cholesterol 90 tablet 3   • Restasis 0.05 % ophthalmic emulsion 2 (two) times a day.       No facility-administered medications prior to visit.       No opioid medication identified on active medication list. I have reviewed  "chart for other potential  high risk medication/s and harmful drug interactions in the elderly.          Aspirin is not on active medication list.  Aspirin use is not indicated based on review of current medical condition/s. Risk of harm outweighs potential benefits.  .    Patient Active Problem List   Diagnosis   • Benign essential hypertension   • Claustrophobia   • Gout   • Hyperlipidemia   • Primary hypothyroidism   • Impaired fasting glucose   • Nocturnal hypoxemia   • Secondary polycythemia   • Vitamin D deficiency   • Therapeutic drug monitoring   • Family history of coronary artery disease   • Bilateral sensorineural hearing loss, wears hearing aids   • Multinodular goiter   • Supraventricular tachycardia (HCC)   • Non morbid obesity   • Bilateral lower extremity edema   • Gastroesophageal reflux disease without esophagitis   • Paroxysmal atrial fibrillation (HCC)   • Stage 3b chronic kidney disease (HCC)   • Combined form of senile cataract     Advance Care Planning  Advance Directive is not on file.  ACP discussion was held with the patient during this visit. Patient does not have an advance directive, information provided.    Review of Systems   Constitutional: Negative.    HENT: Negative.    Eyes: Negative.    Respiratory: Negative.    Cardiovascular: Negative.    Gastrointestinal: Negative.    Endocrine: Negative.    Genitourinary: Negative.    Musculoskeletal: Negative.    Skin: Negative.    Allergic/Immunologic: Negative.    Neurological: Negative.    Psychiatric/Behavioral: Negative.         Objective    Vitals:    09/22/22 1228   BP: 126/80   Pulse: 53   Resp: 18   SpO2: 98%   Weight: 95.5 kg (210 lb 9.6 oz)   Height: 160 cm (63\")   PainSc: 0-No pain     Estimated body mass index is 37.31 kg/m² as calculated from the following:    Height as of this encounter: 160 cm (63\").    Weight as of this encounter: 95.5 kg (210 lb 9.6 oz).    Class 2 Severe Obesity (BMI >=35 and <=39.9). Obesity-related health " conditions include the following: obstructive sleep apnea, hypertension, dyslipidemias, GERD, osteoarthritis and urinary stress incontinence. Obesity is improving with treatment. BMI is is above average; BMI management plan is completed. We discussed low calorie, low carb based diet program, portion control and increasing exercise.      Does the patient have evidence of cognitive impairment? No    Physical Exam     General: Alert and oriented x 3.  No acute distress.  Obese.  Normal affect.  HEENT: Pupils equal, round, reactive to light; extraocular movements intact; sclerae nonicteric; pharynx, ear canals and TMs normal.  Neck: Without JVD, thyromegaly, bruit, or adenopathy.  Lungs: Clear to auscultation in all fields.  Heart: Regular rate and rhythm without murmur, rub, gallop, or click.  Abdomen: Soft, nontender, without hepatosplenomegaly or hernia.  Bowel sounds normal.  : Deferred.  Rectal: Deferred.  Extremities: Without clubbing, cyanosis, edema, or pulse deficit.  Neurologic: Intact without focal deficit.  Normal station and gait observed during ingress and egress from the examination room.  Skin: Without significant lesion.  Musculoskeletal: Unremarkable.    Lab Results   Component Value Date    CHLPL 139 2022    TRIG 106 2022    TRIG 40 2022    HDL 59 2022    LDL 20 2022    VLDL 11 2022    HGBA1C 5.70 (H) 2022            HEALTH RISK ASSESSMENT    Smoking Status:  Social History     Tobacco Use   Smoking Status Former Smoker   • Packs/day: 0.50   • Start date: 1946   • Quit date: 1954   • Years since quittin.7   Smokeless Tobacco Never Used   Tobacco Comment    Stopped drinking at age 30.     Alcohol Consumption:  Social History     Substance and Sexual Activity   Alcohol Use No    Comment: caffiene use tea coffee     Fall Risk Screen:    STEADI Fall Risk Assessment was completed, and patient is at LOW risk for falls.Assessment completed  on:3/21/2022    Depression Screening:  PHQ-2/PHQ-9 Depression Screening 3/21/2022   Retired Total Score -   Little Interest or Pleasure in Doing Things 0-->not at all   Feeling Down, Depressed or Hopeless 0-->not at all   PHQ-9: Brief Depression Severity Measure Score 0       Health Habits and Functional and Cognitive Screening:  Functional & Cognitive Status 9/22/2022   Do you have difficulty preparing food and eating? No   Do you have difficulty bathing yourself, getting dressed or grooming yourself? No   Do you have difficulty using the toilet? No   Do you have difficulty moving around from place to place? No   Do you have trouble with steps or getting out of a bed or a chair? No   Current Diet -   Dental Exam Up to date   Eye Exam Up to date   Exercise (times per week) -   Current Exercises Include -   Current Exercise Activities Include -   Do you need help using the phone?  No   Are you deaf or do you have serious difficulty hearing?  No   Do you need help with transportation? No   Do you need help shopping? No   Do you need help preparing meals?  No   Do you need help with housework?  No   Do you need help with laundry? No   Do you need help taking your medications? No   Do you need help managing money? No   Do you ever drive or ride in a car without wearing a seat belt? No   Have you felt unusual stress, anger or loneliness in the last month? No   Who do you live with? Alone   If you need help, do you have trouble finding someone available to you? No   Have you been bothered in the last four weeks by sexual problems? No   Do you have difficulty concentrating, remembering or making decisions? -       Age-appropriate Screening Schedule:  Refer to the list below for future screening recommendations based on patient's age, sex and/or medical conditions. Orders for these recommended tests are listed in the plan section. The patient has been provided with a written plan.    Health Maintenance   Topic Date Due   •  INFLUENZA VACCINE  10/01/2022   • DXA SCAN  05/10/2023   • LIPID PANEL  07/28/2023   • MAMMOGRAM  06/07/2024   • TDAP/TD VACCINES (2 - Td or Tdap) 06/14/2027   • ZOSTER VACCINE  Discontinued              Assessment & Plan   CMS Preventative Services Quick Reference  Risk Factors Identified During Encounter  Cardiovascular Disease  Immunizations Discussed/Encouraged (specific Immunizations; COVID19  Obesity/Overweight   The above risks/problems have been discussed with the patient.  Follow up actions/plans if indicated are seen below in the Assessment/Plan Section.  Pertinent information has been shared with the patient in the After Visit Summary.    Diagnoses and all orders for this visit:    1. Encounter for subsequent annual wellness visit (AWV) in Medicare patient (Primary)    2. Impaired fasting glucose    3. Primary hypothyroidism    4. Hyperlipidemia    5. Gout    6. Benign essential hypertension    7. Nocturnal hypoxemia    8. Secondary polycythemia    9. Stage 3b chronic kidney disease (HCC)    10. Paroxysmal atrial fibrillation (HCC)    11. Gastroesophageal reflux disease without esophagitis    12. Bilateral lower extremity edema    13. Non morbid obesity    14. Supraventricular tachycardia (HCC)    15. Multinodular goiter    16. Family history of coronary artery disease    17. Vitamin D deficiency    18. Therapeutic drug monitoring        Follow Up:   No follow-ups on file.     An After Visit Summary and PPPS were made available to the patient.

## 2022-10-06 DIAGNOSIS — K21.9 GASTROESOPHAGEAL REFLUX DISEASE WITHOUT ESOPHAGITIS: Chronic | ICD-10-CM

## 2022-10-06 RX ORDER — OMEPRAZOLE 40 MG/1
CAPSULE, DELAYED RELEASE ORAL
Qty: 30 CAPSULE | Refills: 5 | Status: SHIPPED | OUTPATIENT
Start: 2022-10-06

## 2022-12-02 NOTE — PLAN OF CARE
Goal Outcome Evaluation:  Plan of Care Reviewed With: patient        Progress: no change  Remains NPO as ordered. Up to BR to void. Pain controlled with dilaudid. Rested at intervals.       No, not prescribed...

## 2023-01-22 DIAGNOSIS — I47.1 SUPRAVENTRICULAR TACHYCARDIA: Chronic | ICD-10-CM

## 2023-01-22 DIAGNOSIS — I10 BENIGN ESSENTIAL HYPERTENSION: Chronic | ICD-10-CM

## 2023-01-23 RX ORDER — METOPROLOL SUCCINATE 25 MG/1
TABLET, EXTENDED RELEASE ORAL
Qty: 90 TABLET | Refills: 3 | Status: SHIPPED | OUTPATIENT
Start: 2023-01-23

## 2023-02-14 ENCOUNTER — OFFICE VISIT (OUTPATIENT)
Dept: CARDIOLOGY | Facility: CLINIC | Age: 88
End: 2023-02-14
Payer: MEDICARE

## 2023-02-14 VITALS
OXYGEN SATURATION: 97 % | BODY MASS INDEX: 38.48 KG/M2 | WEIGHT: 217.2 LBS | SYSTOLIC BLOOD PRESSURE: 130 MMHG | HEIGHT: 63 IN | HEART RATE: 54 BPM | DIASTOLIC BLOOD PRESSURE: 70 MMHG

## 2023-02-14 DIAGNOSIS — I48.0 PAROXYSMAL ATRIAL FIBRILLATION: Chronic | ICD-10-CM

## 2023-02-14 DIAGNOSIS — N18.32 STAGE 3B CHRONIC KIDNEY DISEASE: Chronic | ICD-10-CM

## 2023-02-14 DIAGNOSIS — E78.2 MIXED HYPERLIPIDEMIA: Chronic | ICD-10-CM

## 2023-02-14 DIAGNOSIS — I47.1 SUPRAVENTRICULAR TACHYCARDIA: Primary | Chronic | ICD-10-CM

## 2023-02-14 DIAGNOSIS — I10 BENIGN ESSENTIAL HYPERTENSION: Chronic | ICD-10-CM

## 2023-02-14 PROCEDURE — 93000 ELECTROCARDIOGRAM COMPLETE: CPT | Performed by: INTERNAL MEDICINE

## 2023-02-14 PROCEDURE — 99214 OFFICE O/P EST MOD 30 MIN: CPT | Performed by: INTERNAL MEDICINE

## 2023-02-14 NOTE — PROGRESS NOTES
"    Subjective:     Encounter Date:02/14/2023      Patient ID: Mandy Alarcon is a 89 y.o. female.    Chief Complaint:  History of Present Illness    This is an 89 year old female patient with hypertension, CKD stage 2, gout, hypothyroidism, tobacco use, hyperlipidemia, recent episode of near syncope, paroxsymal supraventricular tachycardia, who presents for follow up.         She presents today for annual follow-up.  She previously was taking the metoprolol as needed.  It now appears that she is taking it on daily basis and tolerating it well.  Heart rates looked a little low on her EKG today but she appears asymptomatic and was higher on exam.  She denies any chest pain, shortness of breath, palpitations, orthopnea, near-syncope or syncope, or significant lower extremity edema.  She denies any changes in her medications.     Prior History:  I saw the patient initially when she was admitted to the hospital following a near syncopal episode on 6/20/18.  Prior to admission the patient reports she was at home doing some work around her house.  She went outside for a couple of minutes and went back inside.  While she was walking around she had a sudden onset of sensation where she felt like her \"head went empty\".  She denied any palpitations, chest pain or dyspnea at that time.  She denied loss of consciousness.  While in the emergency room it was noted that her heart rate went up to the 140's-160's but she denied having any symptoms at that time. Per the ER notes she appeared to be in SVT and she was converted after given adenosine 6 mg and metoprolol tartrate 5 mg IV.  Unable to locate any of the EKG strips from the ER to confirm rhythm.  Her work up was unremarkable except for an minimally elevated d-dimer.  I had a low suspicion for thromboembolism at that time and felt that the abnormality was likely due to her age and renal insufficiency.  She underwent an echocardiogram during that admission that showed normal " left ventricular systolic function, with an EF of 60%, normal diastolic function, and mild aortic regurgitation.  She was started on metoprolol tartrate 25 mg bid, her hydrochlorothiazide was discontinued and she was sent home with a Zio monitor.      She presented to the cardiac evaluation center on 7/10/2018 from Dr. Santos' office after complaining of nocturnal chest pressure.  These symptoms began after she was discharged from the hospital, initially on a nightly basis.  She reported that the symptoms would last about 3 hours and are associated with significant shortness of breath.  It also appeared that she was having more shortness of breath and lower extremity edema than normal.  Her lab work at that time was unremarkable except for a persistently elevated d-dimer.  Proceeded with a VQ scan that same day that was low probability for a pulmonary embolism.  The following day she returned to the office for a stress test that showed no evidence of ischemia and an EF of 59%.  Her Zio monitor showed 53 nonsustained episodes of supraventricular tachycardia the longest lasting 54 beats and the fastest with a rate of 203 bpm.  She also had rare episodes of PVCs and nonsustained ventricular tachycardia longest lasting 9 beats with a rate of 166 bpm.  There was also a single nonsignificant pause of 2 seconds.     She was admitted to the hospital in 2/2020 with acute pancreatitis and pneumonia.  She developed atrial fibrillation with rapid ventricular rate during that admission that we are able to rate control and she eventually converted back to sinus rhythm.  I felt that her atrial fibrillation was related to her acute illness and did not recommend chronic anticoagulation at that time.  It appears that following that admission she had 2 additional admissions later in 2/2020 for recurrent pain.  A cholecystectomy was recommended but this was extremely difficult procedure and she underwent bailout placement of a  cholecystostomy tube instead.  She was discharged in March and then returned in May for a laparoscopic cholecystectomy which again was difficult but successful.  She has since followed up with her surgeon Dr. Andujar wound is noted to be doing well.  It does not appear that she had any recurrent issues with atrial fibrillation during those 2 following admissions.     In follow-up in 8/2020 she was doing much better.  She reported palpitation episodes every couple of weeks.  When he checked her heart rate with his episodes her heart rates range in the 80s.  When she had this episode she will take 1/2 tablet of atenolol with improvement in her symptoms.  I opted not to make any changes to her management at that time.    Review of Systems   Constitutional: Negative for malaise/fatigue.   HENT: Negative for hearing loss, hoarse voice, nosebleeds and sore throat.    Eyes: Negative for pain.   Cardiovascular: Positive for leg swelling. Negative for chest pain, claudication, cyanosis, dyspnea on exertion, irregular heartbeat, near-syncope, orthopnea, palpitations, paroxysmal nocturnal dyspnea and syncope.   Respiratory: Negative for shortness of breath and snoring.    Endocrine: Negative for cold intolerance, heat intolerance, polydipsia, polyphagia and polyuria.   Skin: Negative for itching and rash.   Musculoskeletal: Negative for arthritis, falls, joint pain, joint swelling, muscle cramps, muscle weakness and myalgias.   Gastrointestinal: Negative for constipation, diarrhea, dysphagia, heartburn, hematemesis, hematochezia, melena, nausea and vomiting.   Genitourinary: Negative for frequency, hematuria and hesitancy.   Neurological: Negative for excessive daytime sleepiness, dizziness, headaches, light-headedness, numbness and weakness.   Psychiatric/Behavioral: Negative for depression. The patient is not nervous/anxious.          Current Outpatient Medications:   •  allopurinol (ZYLOPRIM) 300 MG tablet, Take 1 p.o.  daily for gout/elevated uric acid, Disp: 90 tablet, Rfl: 3  •  ascorbic acid (VITAMIN C) 500 MG capsule controlled-release CR capsule, Take 1 tablet by mouth Daily., Disp: , Rfl:   •  levothyroxine (Synthroid) 75 MCG tablet, Take 1 p.o. daily for low thyroid, Disp: 90 tablet, Rfl: 3  •  metoprolol succinate XL (TOPROL-XL) 25 MG 24 hr tablet, TAKE 1 TABLET BY MOUTH DAILY FOR HIGH BLOOD PRESSURE AND HEART OR PALPITATIONS, Disp: 90 tablet, Rfl: 3  •  Multiple Vitamins-Minerals (MULTIVITAMIN ADULTS PO), Take 1 tablet by mouth Daily., Disp: , Rfl:   •  omeprazole (priLOSEC) 40 MG capsule, TAKE 1 CAPSULE BY MOUTH EVERY DAY BEFORE FIRST MEAL FOR STOMACH ACID OR REFLUX, Disp: 30 capsule, Rfl: 5  •  polyethylene glycol (MIRALAX) packet, Take orally as directed for constipation, Disp: , Rfl:   •  pravastatin (PRAVACHOL) 40 MG tablet, Take 1 p.o. daily for high cholesterol, Disp: 90 tablet, Rfl: 3  •  Restasis 0.05 % ophthalmic emulsion, 2 (two) times a day., Disp: , Rfl:     Past Medical History:   Diagnosis Date   • Benign essential hypertension 10/28/2012    October 2012--treatment for hypertension begun.   • Bilateral lower extremity edema 03/23/2020   • Bilateral sensorineural hearing loss, wears hearing aids 06/14/2017    Left is much worse than right.  Patient had multiple ear infections as a child.   • Chronic renal impairment, stage 3b (HCC) 09/15/2020   • Claustrophobia 04/28/2016    This patient has significant nocturnal hypoxemia and I think that she could benefit from nocturnal oxygen therapy. The exact etiology of her hypoxemia is not clear. She could possibly have obstructive sleep apnea but this is not documented. We cannot test this patient for sleep apnea due to the fact that she is severely claustrophobic. Patient was a former smoker and it is possibly that COPD he is playing a role.   • Combined form of senile cataract 3/3/2021   • Family history of coronary artery disease 05/24/2016    Patient's mother,  father, 2 sisters and a brother all  from myocardial infarctions   • Gastroesophageal reflux disease without esophagitis 2020   • Gout 10/28/2012    2012--initial diagnosis and treatment of gout.   • History of acute pancreatitis 2020   • Hyperlipidemia 10/28/2012    2012--treatment for hyperlipidemia begun.   • Impaired fasting glucose 10/28/2012    2012--initial diagnosis impaired fasting glucose.   • Morbidly obese (HCC) 2019   • Nocturnal hypoxemia 2012--patient did not receive nocturnal oxygen because of Medicare regulations.   05/15/2014--overnight oximetry revealed oxygen saturations less than 89% for 22 minutes and 40 seconds. Oxygen saturations less than or equal to 88% for 22 minutes and 40 seconds. Lowest oxygen saturation 83%. The longest continuous time with oxygen saturations less than or equal to 88% was 1 minute and 32 seconds.   2012--overnight oximetry revealed oxygen saturations less than 90% for one hour and 35 minutes. Oxygen saturations less than 89% 59 minutes. This patient has significant nocturnal hypoxemia and I think that she could benefit from nocturnal oxygen therapy. The exact etiology of her hypoxemia is not clear. She could possibly have obstructive sleep apnea but this is not documented. We cannot test this patient for sleep apnea due to the fact that she is severely claustrophobic. Patient was a former smoker and it is possibly that COPD he is playing a role.   • Non morbid obesity 2019   • Paroxysmal atrial fibrillation (HCC) 04/10/2020   • Primary hypothyroidism 2015--TSH remains elevated slightly at 4.92.  Given the overall clinical picture including the multinodular goiter, we will initiate levothyroxine 50 mcg/day and reassess in about 6 weeks.  2018--thyroid ultrasound reveals a multinodular thyroid with multiple subcentimeter nodules.  Only minimal increase in size  of the largest nodule in the left lobe has occurred when compared    • Secondary polycythemia 08/02/2012 11/06/2014--patient did not receive nocturnal oxygen because of Medicare regulations.   05/15/2014--overnight oximetry revealed oxygen saturations less than 89% for 22 minutes and 40 seconds. Oxygen saturations less than or equal to 88% for 22 minutes and 40 seconds. Lowest oxygen saturation 83%. The longest continuous time with oxygen saturations less than or equal to 88% was 1 minute and 32 seconds.   08/02/2012--overnight oximetry revealed oxygen saturations less than 90% for one hour and 35 minutes. Oxygen saturations less than 89% 59 minutes. This patient has significant nocturnal hypoxemia and I think that she could benefit from nocturnal oxygen therapy. The exact etiology of her hypoxemia is not clear. She could possibly have obstructive sleep apnea but this is not documented. We cannot test this patient for sleep apnea due to the fact that she is severely claustrophobic. Patient was a former smoker and it is possibly that COPD he is playing a role.   • Vitamin D deficiency 05/23/2016       Past Surgical History:   Procedure Laterality Date   • CHOLECYSTECTOMY WITH INTRAOPERATIVE CHOLANGIOGRAM N/A 03/12/2020    Procedure: LAPAROSCOPIC CHOLECYSTOSTOMY TUBE, INTRAOPERATIVE CHOLANGIOGRAM;  Surgeon: Bryce Andujar MD;  Location: [facility] OR;  Service: General;  Laterality: N/A;   • CHOLECYSTECTOMY WITH INTRAOPERATIVE CHOLANGIOGRAM N/A 05/22/2020    Procedure: CHOLECYSTECTOMY LAPAROSCOPIC INTRAOPERATIVE CHOLANGIOGRAM and EGD;  Surgeon: Bryce Andujar MD;  Location: [facility] OR;  Service: General;  Laterality: N/A;   • ERCP N/A 07/01/2022    Procedure: ENDOSCOPIC RETROGRADE CHOLANGIOPANCREATOGRAPHY with sphincterotomy, balloon sweep 12-15mm;  Surgeon: Mitesh Altamirano MD;  Location: [facility] ENDOSCOPY;  Service: Gastroenterology;  Laterality: N/A;  pre - gallstone pancreatitis  post - s/p  "sphincterotomy, sludge and  pus   • OOPHORECTOMY      age 48   • RADICAL ABDOMINAL HYSTERECTOMY  48 years old    48 years of age. Uterine fibroid tumors with menorrhagia - no cancer       Family History   Problem Relation Age of Onset   • Heart disease Mother         Ischemic.  from coronary artery disease.   • Heart disease Father         Ischemic.  from coronary artery disease.   • Heart disease Sister         Ischemic.  from coronary artery disease.   • Heart disease Sister         Ischemic.  from coronary artery disease.   • Heart disease Brother         Ischemic.  from coronary artery disease.   • Breast cancer Daughter    • Breast cancer Daughter    • Ovarian cancer Neg Hx        Social History     Tobacco Use   • Smoking status: Former     Packs/day: 0.50     Types: Cigarettes     Start date: 1946     Quit date: 1954     Years since quittin.1   • Smokeless tobacco: Never   • Tobacco comments:     Stopped drinking at age 30.   Vaping Use   • Vaping Use: Never used   Substance Use Topics   • Alcohol use: No     Comment: caffiene use tea coffee   • Drug use: No         ECG 12 Lead    Date/Time: 2023 12:35 PM  Performed by: Lizbeth Laura MD  Authorized by: Lizbeth Laura MD   Comparison: compared with previous ECG   Comparison to previous ECG: PVC is new  Rhythm: sinus rhythm  Ectopy: unifocal PVCs               Objective:     Visit Vitals  /70 (BP Location: Right arm, Patient Position: Sitting, Cuff Size: Adult)   Pulse 54   Ht 160 cm (63\")   Wt 98.5 kg (217 lb 3.2 oz)   SpO2 97%   BMI 38.48 kg/m²         Constitutional:       Appearance: Normal appearance. Well-developed.   Eyes:      General: Lids are normal.      Conjunctiva/sclera: Conjunctivae normal.      Pupils: Pupils are equal, round, and reactive to light.   HENT:      Head: Normocephalic and atraumatic.   Neck:      Vascular: No carotid bruit or JVD.      Lymphadenopathy: No cervical adenopathy. "   Pulmonary:      Effort: Pulmonary effort is normal.      Breath sounds: Normal breath sounds.   Cardiovascular:      Normal rate. Regular rhythm.      No gallop.   Pulses:     Radial: 2+ bilaterally.  Edema:     Peripheral edema absent.   Abdominal:      Palpations: Abdomen is soft.   Musculoskeletal:      Cervical back: Full passive range of motion without pain, normal range of motion and neck supple. Skin:     General: Skin is warm and dry.   Neurological:      Mental Status: Alert and oriented to person, place, and time.             Assessment:          Diagnosis Plan   1. Supraventricular tachycardia (HCC)        2. Benign essential hypertension        3. Hyperlipidemia        4. Paroxysmal atrial fibrillation (HCC)        5. Stage 3b chronic kidney disease (HCC)               Plan:       1.  Paroxysmal supraventricular tachycardia.  No recent episodes.  Continue current management with metoprolol.  2.  Paroxysmal atrial fibrillation.  Isolated episode in the setting of an acute illness.  No documented recurrence.  Remains on metoprolol succinate.  No plans for anticoagulation unless she has recurrent documented episodes.  3.  Hypertension.  Well-controlled on current regimen medications.  Continue the same.  4.  Hyperlipidemia.  On pravastatin which is managed by Dr. Santos.  5.  Chronic kidney disease    We will plan on seeing the patient back again in 1 year or sooner if further issues arise.

## 2023-03-16 DIAGNOSIS — D75.1 SECONDARY POLYCYTHEMIA: Chronic | ICD-10-CM

## 2023-03-16 DIAGNOSIS — N18.32 STAGE 3B CHRONIC KIDNEY DISEASE: Chronic | ICD-10-CM

## 2023-03-16 DIAGNOSIS — E55.9 VITAMIN D DEFICIENCY: Chronic | ICD-10-CM

## 2023-03-16 DIAGNOSIS — E03.9 PRIMARY HYPOTHYROIDISM: Chronic | ICD-10-CM

## 2023-03-16 DIAGNOSIS — E78.2 MIXED HYPERLIPIDEMIA: Chronic | ICD-10-CM

## 2023-03-16 DIAGNOSIS — Z87.39 HISTORY OF GOUT: Chronic | ICD-10-CM

## 2023-03-16 DIAGNOSIS — R73.01 IMPAIRED FASTING GLUCOSE: Chronic | ICD-10-CM

## 2023-03-19 LAB
25(OH)D3+25(OH)D2 SERPL-MCNC: 41 NG/ML (ref 30–100)
ALBUMIN SERPL-MCNC: 3.9 G/DL (ref 3.6–4.6)
ALBUMIN/GLOB SERPL: 1.3 {RATIO} (ref 1.2–2.2)
ALP SERPL-CCNC: 61 IU/L (ref 44–121)
ALT SERPL-CCNC: 14 IU/L (ref 0–32)
APPEARANCE UR: ABNORMAL
AST SERPL-CCNC: 18 IU/L (ref 0–40)
BILIRUB SERPL-MCNC: 0.4 MG/DL (ref 0–1.2)
BILIRUB UR QL STRIP: NEGATIVE
BUN SERPL-MCNC: 25 MG/DL (ref 8–27)
BUN/CREAT SERPL: 17 (ref 12–28)
CALCIUM SERPL-MCNC: 9.2 MG/DL (ref 8.7–10.3)
CHLORIDE SERPL-SCNC: 106 MMOL/L (ref 96–106)
CHOLEST SERPL-MCNC: 151 MG/DL (ref 100–199)
CK SERPL-CCNC: 66 U/L (ref 26–161)
CO2 SERPL-SCNC: 18 MMOL/L (ref 20–29)
COLOR UR: YELLOW
CREAT SERPL-MCNC: 1.48 MG/DL (ref 0.57–1)
EGFRCR SERPLBLD CKD-EPI 2021: 34 ML/MIN/1.73
ERYTHROCYTE [DISTWIDTH] IN BLOOD BY AUTOMATED COUNT: 13.1 % (ref 11.7–15.4)
GLOBULIN SER CALC-MCNC: 2.9 G/DL (ref 1.5–4.5)
GLUCOSE SERPL-MCNC: 81 MG/DL (ref 70–99)
GLUCOSE UR QL STRIP: NEGATIVE
HBA1C MFR BLD: 5.8 % (ref 4.8–5.6)
HCT VFR BLD AUTO: 47.6 % (ref 34–46.6)
HDL SERPL-SCNC: 27.5 UMOL/L
HDLC SERPL-MCNC: 57 MG/DL
HGB BLD-MCNC: 15.8 G/DL (ref 11.1–15.9)
HGB UR QL STRIP: NEGATIVE
KETONES UR QL STRIP: NEGATIVE
LDL SERPL QN: 20.9 NM
LDL SERPL-SCNC: 767 NMOL/L
LDL SMALL SERPL-SCNC: 194 NMOL/L
LDLC SERPL CALC-MCNC: 76 MG/DL (ref 0–99)
LEUKOCYTE ESTERASE UR QL STRIP: NEGATIVE
MCH RBC QN AUTO: 30.9 PG (ref 26.6–33)
MCHC RBC AUTO-ENTMCNC: 33.2 G/DL (ref 31.5–35.7)
MCV RBC AUTO: 93 FL (ref 79–97)
MICRO URNS: ABNORMAL
NITRITE UR QL STRIP: NEGATIVE
PH UR STRIP: 6.5 [PH] (ref 5–7.5)
PLATELET # BLD AUTO: 199 X10E3/UL (ref 150–450)
POTASSIUM SERPL-SCNC: 5 MMOL/L (ref 3.5–5.2)
PROT SERPL-MCNC: 6.8 G/DL (ref 6–8.5)
PROT UR QL STRIP: NEGATIVE
RBC # BLD AUTO: 5.11 X10E6/UL (ref 3.77–5.28)
SODIUM SERPL-SCNC: 141 MMOL/L (ref 134–144)
SP GR UR STRIP: 1.02 (ref 1–1.03)
T3FREE SERPL-MCNC: 2.7 PG/ML (ref 2–4.4)
T4 FREE SERPL-MCNC: 1.19 NG/DL (ref 0.82–1.77)
TRIGL SERPL-MCNC: 100 MG/DL (ref 0–149)
TSH SERPL DL<=0.005 MIU/L-ACNC: 2.69 UIU/ML (ref 0.45–4.5)
URATE SERPL-MCNC: 5.1 MG/DL (ref 3.1–7.9)
UROBILINOGEN UR STRIP-MCNC: 0.2 MG/DL (ref 0.2–1)
WBC # BLD AUTO: 8 X10E3/UL (ref 3.4–10.8)

## 2023-03-22 ENCOUNTER — OFFICE VISIT (OUTPATIENT)
Dept: INTERNAL MEDICINE | Facility: CLINIC | Age: 88
End: 2023-03-22
Payer: MEDICARE

## 2023-03-22 VITALS
DIASTOLIC BLOOD PRESSURE: 64 MMHG | BODY MASS INDEX: 38.27 KG/M2 | HEIGHT: 63 IN | HEART RATE: 63 BPM | OXYGEN SATURATION: 98 % | WEIGHT: 216 LBS | SYSTOLIC BLOOD PRESSURE: 124 MMHG

## 2023-03-22 DIAGNOSIS — E03.9 PRIMARY HYPOTHYROIDISM: Chronic | ICD-10-CM

## 2023-03-22 DIAGNOSIS — Z87.39 HISTORY OF GOUT: Chronic | ICD-10-CM

## 2023-03-22 DIAGNOSIS — I47.1 SUPRAVENTRICULAR TACHYCARDIA: Chronic | ICD-10-CM

## 2023-03-22 DIAGNOSIS — R60.0 BILATERAL LOWER EXTREMITY EDEMA: Chronic | ICD-10-CM

## 2023-03-22 DIAGNOSIS — D75.1 SECONDARY POLYCYTHEMIA: Chronic | ICD-10-CM

## 2023-03-22 DIAGNOSIS — I48.0 PAROXYSMAL ATRIAL FIBRILLATION: Chronic | ICD-10-CM

## 2023-03-22 DIAGNOSIS — R73.01 IMPAIRED FASTING GLUCOSE: Primary | Chronic | ICD-10-CM

## 2023-03-22 DIAGNOSIS — E55.9 VITAMIN D DEFICIENCY: Chronic | ICD-10-CM

## 2023-03-22 DIAGNOSIS — G47.34 NOCTURNAL HYPOXEMIA: Chronic | ICD-10-CM

## 2023-03-22 DIAGNOSIS — E66.9 NON MORBID OBESITY: Chronic | ICD-10-CM

## 2023-03-22 DIAGNOSIS — K21.9 GASTROESOPHAGEAL REFLUX DISEASE WITHOUT ESOPHAGITIS: Chronic | ICD-10-CM

## 2023-03-22 DIAGNOSIS — E78.2 MIXED HYPERLIPIDEMIA: Chronic | ICD-10-CM

## 2023-03-22 DIAGNOSIS — N18.32 STAGE 3B CHRONIC KIDNEY DISEASE: Chronic | ICD-10-CM

## 2023-03-22 DIAGNOSIS — I10 BENIGN ESSENTIAL HYPERTENSION: Chronic | ICD-10-CM

## 2023-03-22 DIAGNOSIS — E04.2 MULTINODULAR GOITER: Chronic | ICD-10-CM

## 2023-03-22 DIAGNOSIS — H90.3 BILATERAL SENSORINEURAL HEARING LOSS: Chronic | ICD-10-CM

## 2023-03-22 DIAGNOSIS — Z51.81 THERAPEUTIC DRUG MONITORING: ICD-10-CM

## 2023-03-22 NOTE — PROGRESS NOTES
03/22/2023    Patient Information  Mandy Alarcon                                                                                          [address] [address]      5/30/1933  [unfilled]  There is no work phone number on file.    Chief Complaint:     Follow-up multiple medical problems as noted below.  No new acute complaints.    History of Present Illness:    Patient with a history of multiple chronic medical problems as outlined below in the assessment and plan presents today for follow-up with lab prior in order to monitor these chronic medical issues.  Past medical history reviewed and updated were necessary including health maintenance parameters.  This reveals she is currently up-to-date or else accounted for.    Review of Systems   Constitutional: Negative.   HENT: Negative.    Eyes: Negative.    Cardiovascular: Negative.    Respiratory: Negative.    Endocrine: Negative.    Hematologic/Lymphatic: Negative.    Skin: Negative.    Musculoskeletal: Negative.    Gastrointestinal: Negative.    Genitourinary: Negative.    Neurological: Negative.    Psychiatric/Behavioral: Negative.    Allergic/Immunologic: Negative.        Active Problems:    Patient Active Problem List   Diagnosis   • Benign essential hypertension   • Claustrophobia   • Gout   • Hyperlipidemia   • Primary hypothyroidism   • Impaired fasting glucose   • Nocturnal hypoxemia   • Secondary polycythemia   • Vitamin D deficiency   • Therapeutic drug monitoring   • Family history of coronary artery disease   • Bilateral sensorineural hearing loss, wears hearing aids   • Multinodular goiter   • Supraventricular tachycardia (HCC)   • Non morbid obesity   • Bilateral lower extremity edema   • Gastroesophageal reflux disease without esophagitis   • Paroxysmal atrial fibrillation (HCC)   • Stage 3b chronic kidney disease (HCC)   • Combined form of senile cataract         Past Medical History:   Diagnosis Date   • Benign  essential hypertension 10/28/2012    2012--treatment for hypertension begun.   • Bilateral lower extremity edema 2020   • Bilateral sensorineural hearing loss, wears hearing aids 2017    Left is much worse than right.  Patient had multiple ear infections as a child.   • Chronic renal impairment, stage 3b (HCC) 09/15/2020   • Claustrophobia 2016    This patient has significant nocturnal hypoxemia and I think that she could benefit from nocturnal oxygen therapy. The exact etiology of her hypoxemia is not clear. She could possibly have obstructive sleep apnea but this is not documented. We cannot test this patient for sleep apnea due to the fact that she is severely claustrophobic. Patient was a former smoker and it is possibly that COPD he is playing a role.   • Combined form of senile cataract 3/3/2021   • Family history of coronary artery disease 2016    Patient's mother, father, 2 sisters and a brother all  from myocardial infarctions   • Gastroesophageal reflux disease without esophagitis 2020   • Gout 10/28/2012    2012--initial diagnosis and treatment of gout.   • History of acute pancreatitis 2020   • Hyperlipidemia 10/28/2012    2012--treatment for hyperlipidemia begun.   • Impaired fasting glucose 10/28/2012    2012--initial diagnosis impaired fasting glucose.   • Morbidly obese (HCC) 2019   • Nocturnal hypoxemia 2012--patient did not receive nocturnal oxygen because of Medicare regulations.   05/15/2014--overnight oximetry revealed oxygen saturations less than 89% for 22 minutes and 40 seconds. Oxygen saturations less than or equal to 88% for 22 minutes and 40 seconds. Lowest oxygen saturation 83%. The longest continuous time with oxygen saturations less than or equal to 88% was 1 minute and 32 seconds.   2012--overnight oximetry revealed oxygen saturations less than 90% for one hour and 35 minutes. Oxygen  saturations less than 89% 59 minutes. This patient has significant nocturnal hypoxemia and I think that she could benefit from nocturnal oxygen therapy. The exact etiology of her hypoxemia is not clear. She could possibly have obstructive sleep apnea but this is not documented. We cannot test this patient for sleep apnea due to the fact that she is severely claustrophobic. Patient was a former smoker and it is possibly that COPD he is playing a role.   • Non morbid obesity 03/11/2019   • Paroxysmal atrial fibrillation (HCC) 04/10/2020   • Primary hypothyroidism 11/17/2015 March 11, 2019--TSH remains elevated slightly at 4.92.  Given the overall clinical picture including the multinodular goiter, we will initiate levothyroxine 50 mcg/day and reassess in about 6 weeks.  August 1, 2018--thyroid ultrasound reveals a multinodular thyroid with multiple subcentimeter nodules.  Only minimal increase in size of the largest nodule in the left lobe has occurred when compared    • Secondary polycythemia 08/02/2012 11/06/2014--patient did not receive nocturnal oxygen because of Medicare regulations.   05/15/2014--overnight oximetry revealed oxygen saturations less than 89% for 22 minutes and 40 seconds. Oxygen saturations less than or equal to 88% for 22 minutes and 40 seconds. Lowest oxygen saturation 83%. The longest continuous time with oxygen saturations less than or equal to 88% was 1 minute and 32 seconds.   08/02/2012--overnight oximetry revealed oxygen saturations less than 90% for one hour and 35 minutes. Oxygen saturations less than 89% 59 minutes. This patient has significant nocturnal hypoxemia and I think that she could benefit from nocturnal oxygen therapy. The exact etiology of her hypoxemia is not clear. She could possibly have obstructive sleep apnea but this is not documented. We cannot test this patient for sleep apnea due to the fact that she is severely claustrophobic. Patient was a former smoker and  it is possibly that COPD he is playing a role.   • Vitamin D deficiency 05/23/2016         Past Surgical History:   Procedure Laterality Date   • CHOLECYSTECTOMY WITH INTRAOPERATIVE CHOLANGIOGRAM N/A 03/12/2020    Procedure: LAPAROSCOPIC CHOLECYSTOSTOMY TUBE, INTRAOPERATIVE CHOLANGIOGRAM;  Surgeon: Bryce Andujar MD;  [facility];  Service: General;  Laterality: N/A;   • CHOLECYSTECTOMY WITH INTRAOPERATIVE CHOLANGIOGRAM N/A 05/22/2020    Procedure: CHOLECYSTECTOMY LAPAROSCOPIC INTRAOPERATIVE CHOLANGIOGRAM and EGD;  Surgeon: Bryce Andujar MD;  Location: [facility];  Service: General;  Laterality: N/A;   • ERCP N/A 07/01/2022    Procedure: ENDOSCOPIC RETROGRADE CHOLANGIOPANCREATOGRAPHY with sphincterotomy, balloon sweep 12-15mm;  Surgeon: Mitesh Altamirano MD;  Location: [facility] ENDOSCOPY;  Service: Gastroenterology;  Laterality: N/A;  pre - gallstone pancreatitis  post - s/p sphincterotomy, sludge and  pus   • OOPHORECTOMY      age 48   • RADICAL ABDOMINAL HYSTERECTOMY  48 years old    48 years of age. Uterine fibroid tumors with menorrhagia - no cancer         Allergies   Allergen Reactions   • Cefaclor Anaphylaxis, Angioedema and Swelling     caused angioedema 25 years ago; she tolerates cephalexin, amoxicillin, and ceftriaxone per my d/w patient and her daughter as well as amoxicillin-clavulanate confirmed in EPIC  caused angioedema 25 years ago; she tolerates cephalexin, amoxicillin, and ceftriaxone per my d/w patient and her daughter as well as amoxicillin-clavulanate confirmed in EPIC  caused angioedema 25 years ago; she tolerates cephalexin, amoxicillin, and ceftriaxone per my d/w patient and her daughter as well as amoxicillin-clavulanate confirmed in EPIC  caused angioedema 25 years ago; she tolerates cephalexin, amoxicillin, and ceftriaxone per my d/w patient and her daughter as well as amoxicillin-clavulanate confirmed in EPIC   • Sulfa Antibiotics Rash           Current  Outpatient Medications:   •  allopurinol (ZYLOPRIM) 300 MG tablet, Take 1 p.o. daily for gout/elevated uric acid, Disp: 90 tablet, Rfl: 3  •  ascorbic acid (VITAMIN C) 500 MG capsule controlled-release CR capsule, Take 1 tablet by mouth Daily., Disp: , Rfl:   •  levothyroxine (Synthroid) 75 MCG tablet, Take 1 p.o. daily for low thyroid, Disp: 90 tablet, Rfl: 3  •  metoprolol succinate XL (TOPROL-XL) 25 MG 24 hr tablet, TAKE 1 TABLET BY MOUTH DAILY FOR HIGH BLOOD PRESSURE AND HEART OR PALPITATIONS, Disp: 90 tablet, Rfl: 3  •  Multiple Vitamins-Minerals (MULTIVITAMIN ADULTS PO), Take 1 tablet by mouth Daily., Disp: , Rfl:   •  omeprazole (priLOSEC) 40 MG capsule, TAKE 1 CAPSULE BY MOUTH EVERY DAY BEFORE FIRST MEAL FOR STOMACH ACID OR REFLUX, Disp: 30 capsule, Rfl: 5  •  polyethylene glycol (MIRALAX) packet, Take orally as directed for constipation, Disp: , Rfl:   •  pravastatin (PRAVACHOL) 40 MG tablet, Take 1 p.o. daily for high cholesterol, Disp: 90 tablet, Rfl: 3  •  Restasis 0.05 % ophthalmic emulsion, 2 (two) times a day., Disp: , Rfl:       Family History   Problem Relation Age of Onset   • Heart disease Mother         Ischemic.  from coronary artery disease.   • Heart disease Father         Ischemic.  from coronary artery disease.   • Heart disease Sister         Ischemic.  from coronary artery disease.   • Heart disease Sister         Ischemic.  from coronary artery disease.   • Heart disease Brother         Ischemic.  from coronary artery disease.   • Breast cancer Daughter    • Breast cancer Daughter    • Ovarian cancer Neg Hx          Social History     Socioeconomic History   • Marital status:    • Number of children: 5   • Highest education level: GED or equivalent   Tobacco Use   • Smoking status: Former     Packs/day: 0.50     Types: Cigarettes     Start date: 1946     Quit date: 1954     Years since quittin.2   • Smokeless tobacco: Never   • Tobacco comments:     " Stopped drinking at age 30.   Vaping Use   • Vaping Use: Never used   Substance and Sexual Activity   • Alcohol use: No     Comment: caffiene use tea coffee   • Drug use: No   • Sexual activity: Not Currently     Partners: Male         Vitals:    03/22/23 0930   BP: 124/64   BP Location: Right arm   Patient Position: Sitting   Cuff Size: Adult   Pulse: 63   SpO2: 98%   Weight: 98 kg (216 lb)   Height: 160 cm (63\")        Body mass index is 38.26 kg/m².      Physical Exam:    General: Alert and oriented x 3.  No acute distress.  Obese.  Normal affect.  HEENT: Pupils equal, round, reactive to light; extraocular movements intact; sclerae nonicteric; pharynx, ear canals and TMs normal.  Neck: Without JVD, thyromegaly, bruit, or adenopathy.  Lungs: Clear to auscultation in all fields.  Heart: Regular rate and rhythm without murmur, rub, gallop, or click.  Abdomen: Soft, nontender, without hepatosplenomegaly or hernia.  Bowel sounds normal.  : Deferred.  Rectal: Deferred.  Extremities: Without clubbing, cyanosis, edema, or pulse deficit.  Neurologic: Intact without focal deficit.  Normal station and gait observed during ingress and egress from the examination room.  Skin: Without significant lesion.  Musculoskeletal: Unremarkable.    Lab/other results:    CBC normal except hematocrit slightly elevated 47.6.  Hemoglobin normal at 15.8.  CPK normal at 66.  CMP normal except creatinine 1.48.  Estimated GFR 34.  Hemoglobin A1c 5.8.  Total cholesterol 151, triglycerides 100, LDL particle #767, HDL particle number low at 27.5.  Thyroid function tests are normal.  Urinalysis normal.  Uric acid normal at 5.1.  Vitamin D normal at 41.0.    Assessment/Plan:     Diagnosis Plan   1. Impaired fasting glucose  Comprehensive Metabolic Panel    Hemoglobin A1c      2. Hyperlipidemia  CK    Comprehensive Metabolic Panel    NMR LipoProfile      3. Stage 3b chronic kidney disease (HCC)  Comprehensive Metabolic Panel      4. Benign " essential hypertension  Comprehensive Metabolic Panel      5. Primary hypothyroidism  TSH    T4, Free    T3, Free      6. Multinodular goiter        7. Gout  Uric Acid      8. Bilateral lower extremity edema        9. Vitamin D deficiency  Vitamin D,25-Hydroxy      10. Gastroesophageal reflux disease without esophagitis        11. Secondary polycythemia  CBC (No Diff)      12. Nocturnal hypoxemia        13. Bilateral sensorineural hearing loss, wears hearing aids        14. Paroxysmal atrial fibrillation (HCC)        15. Supraventricular tachycardia (HCC)        16. Non morbid obesity        17. Therapeutic drug monitoring          Patient has mild impaired fasting glucose and is at risk for developing overt diabetes given her Non morbid obesity.  Once again I have strongly recommended low carbohydrate diet and weight loss.  Hyperlipidemia is under excellent control with pravastatin which she is tolerating well.  Her stage IIIb chronic renal insufficiency is stable and we are monitoring closely.  Her blood pressure appears to be well controlled with the metoprolol which is also treating her paroxysmal atrial fibrillation and SVT.  This seems stable.  Her thyroid is therapeutic.  Her multinodular goiter also is stable.  Her uric acid levels are in the normal range with allopurinol.  Esophageal reflux controlled with omeprazole.  Secondary polycythemia is very mild and stable.  Patient prefers not to evaluate possibility of sleep apnea.    Plan is as follows: Diet and weight loss as discussed above.  No changes in current medical regimen.  I will have her follow-up on or shortly after September 22, 2023 for her subsequent Medicare wellness visit.  Otherwise she will follow-up as needed.      Procedures

## 2023-04-02 DIAGNOSIS — E78.2 MIXED HYPERLIPIDEMIA: Chronic | ICD-10-CM

## 2023-04-02 DIAGNOSIS — E03.9 PRIMARY HYPOTHYROIDISM: ICD-10-CM

## 2023-04-05 RX ORDER — PRAVASTATIN SODIUM 40 MG
TABLET ORAL
Qty: 90 TABLET | Refills: 3 | Status: SHIPPED | OUTPATIENT
Start: 2023-04-05

## 2023-04-05 RX ORDER — LEVOTHYROXINE SODIUM 0.05 MG/1
TABLET ORAL
Qty: 90 TABLET | Refills: 2 | OUTPATIENT
Start: 2023-04-05

## 2023-05-02 DIAGNOSIS — E04.2 MULTINODULAR GOITER: Chronic | ICD-10-CM

## 2023-05-02 DIAGNOSIS — E03.9 PRIMARY HYPOTHYROIDISM: Chronic | ICD-10-CM

## 2023-05-03 RX ORDER — LEVOTHYROXINE SODIUM 0.07 MG/1
TABLET ORAL
Qty: 90 TABLET | Refills: 3 | Status: SHIPPED | OUTPATIENT
Start: 2023-05-03

## 2023-05-15 ENCOUNTER — TELEPHONE (OUTPATIENT)
Dept: INTERNAL MEDICINE | Facility: CLINIC | Age: 88
End: 2023-05-15

## 2023-05-15 NOTE — TELEPHONE ENCOUNTER
Is there a PCP there now this can be forwarded to in case she should report to ER instead of waiting for him to order Doppler?

## 2023-05-15 NOTE — TELEPHONE ENCOUNTER
Caller: [email or web address]    Relationship: Emergency Contact    Best call back number:[phone]     What orders are you requesting (i.e. lab or imaging): DOPPLER OF LEFT LOWER EXTREMITY     In what timeframe would the patient need to come in: ASAP    Where will you receive your lab/imaging services:     Additional notes: PATIENTS DAUGHTER CALLING STATING THAT SHE ASEST  THE CALF MUSCLE AND IN COMPARISON TO THE RIGHT CALF IT IS A LITTLE SWOLLEN AND WARM TO TOUCH SHE WOULD LIKE TO RULE OUT ANY BLOOD CLOT. SHE IS SUPPOSE TO BE FLYING OUT Friday MORNING SHE WOULD LIKE TO GET IT SCHEDULED BEFORE SHE LEAVES.

## 2023-08-01 DIAGNOSIS — Z87.39 HISTORY OF GOUT: Chronic | ICD-10-CM

## 2023-08-01 RX ORDER — ALLOPURINOL 300 MG/1
TABLET ORAL
Qty: 90 TABLET | Refills: 3 | Status: SHIPPED | OUTPATIENT
Start: 2023-08-01

## 2023-09-26 ENCOUNTER — OFFICE VISIT (OUTPATIENT)
Dept: INTERNAL MEDICINE | Facility: CLINIC | Age: 88
End: 2023-09-26
Payer: MEDICARE

## 2023-09-26 VITALS
TEMPERATURE: 97.1 F | WEIGHT: 218 LBS | OXYGEN SATURATION: 97 % | HEART RATE: 72 BPM | DIASTOLIC BLOOD PRESSURE: 80 MMHG | RESPIRATION RATE: 16 BRPM | BODY MASS INDEX: 38.62 KG/M2 | SYSTOLIC BLOOD PRESSURE: 146 MMHG | HEIGHT: 63 IN

## 2023-09-26 DIAGNOSIS — R73.01 IMPAIRED FASTING GLUCOSE: Chronic | ICD-10-CM

## 2023-09-26 DIAGNOSIS — H90.3 BILATERAL SENSORINEURAL HEARING LOSS: Chronic | ICD-10-CM

## 2023-09-26 DIAGNOSIS — I10 BENIGN ESSENTIAL HYPERTENSION: Chronic | ICD-10-CM

## 2023-09-26 DIAGNOSIS — K21.9 GASTROESOPHAGEAL REFLUX DISEASE WITHOUT ESOPHAGITIS: Chronic | ICD-10-CM

## 2023-09-26 DIAGNOSIS — R60.0 BILATERAL LOWER EXTREMITY EDEMA: Chronic | ICD-10-CM

## 2023-09-26 DIAGNOSIS — Z82.49 FAMILY HISTORY OF CORONARY ARTERY DISEASE: Chronic | ICD-10-CM

## 2023-09-26 DIAGNOSIS — E03.9 PRIMARY HYPOTHYROIDISM: Chronic | ICD-10-CM

## 2023-09-26 DIAGNOSIS — Z00.00 ENCOUNTER FOR SUBSEQUENT ANNUAL WELLNESS VISIT (AWV) IN MEDICARE PATIENT: Primary | ICD-10-CM

## 2023-09-26 DIAGNOSIS — I48.0 PAROXYSMAL ATRIAL FIBRILLATION: Chronic | ICD-10-CM

## 2023-09-26 DIAGNOSIS — G47.34 NOCTURNAL HYPOXEMIA: Chronic | ICD-10-CM

## 2023-09-26 DIAGNOSIS — Z51.81 THERAPEUTIC DRUG MONITORING: ICD-10-CM

## 2023-09-26 DIAGNOSIS — E55.9 VITAMIN D DEFICIENCY: Chronic | ICD-10-CM

## 2023-09-26 DIAGNOSIS — N18.32 STAGE 3B CHRONIC KIDNEY DISEASE: Chronic | ICD-10-CM

## 2023-09-26 DIAGNOSIS — E04.2 MULTINODULAR GOITER: Chronic | ICD-10-CM

## 2023-09-26 DIAGNOSIS — Z78.0 POSTMENOPAUSAL STATE: ICD-10-CM

## 2023-09-26 DIAGNOSIS — D75.1 SECONDARY POLYCYTHEMIA: Chronic | ICD-10-CM

## 2023-09-26 DIAGNOSIS — I47.10 SUPRAVENTRICULAR TACHYCARDIA: Chronic | ICD-10-CM

## 2023-09-26 DIAGNOSIS — Z87.39 HISTORY OF GOUT: Chronic | ICD-10-CM

## 2023-09-26 DIAGNOSIS — E78.2 MIXED HYPERLIPIDEMIA: Chronic | ICD-10-CM

## 2023-09-26 DIAGNOSIS — M85.89 OSTEOPENIA OF MULTIPLE SITES: ICD-10-CM

## 2023-09-26 DIAGNOSIS — E66.9 NON MORBID OBESITY: Chronic | ICD-10-CM

## 2023-09-26 NOTE — PROGRESS NOTES
The ABCs of the Annual Wellness Visit  Subsequent Medicare Wellness Visit    Subjective      Mandy Alarcon is a 89 y.o. female who presents for a Subsequent Medicare Wellness Visit.    The following portions of the patient's history were reviewed and   updated as appropriate: allergies, current medications, past family history, past medical history, past social history, past surgical history, and problem list.    Compared to one year ago, the patient feels her physical   health is the same.    Compared to one year ago, the patient feels her mental   health is the same.    Recent Hospitalizations:  She was not admitted to the hospital during the last year.       Current Medical Providers:  Patient Care Team:  Daryl Santos MD as PCP - General (Internal Medicine)    Outpatient Medications Prior to Visit   Medication Sig Dispense Refill    allopurinol (ZYLOPRIM) 300 MG tablet TAKE 1 TABLET BY MOUTH DAILY FOR GOUT 90 tablet 3    ascorbic acid (VITAMIN C) 500 MG capsule controlled-release CR capsule Take 1 tablet by mouth Daily.      levothyroxine (SYNTHROID, LEVOTHROID) 75 MCG tablet TAKE 1 TABLET BY MOUTH DAILY FOR THYROID 90 tablet 3    metoprolol succinate XL (TOPROL-XL) 25 MG 24 hr tablet TAKE 1 TABLET BY MOUTH DAILY FOR HIGH BLOOD PRESSURE AND HEART OR PALPITATIONS 90 tablet 3    Multiple Vitamins-Minerals (MULTIVITAMIN ADULTS PO) Take 1 tablet by mouth Daily.      omeprazole (priLOSEC) 40 MG capsule TAKE 1 CAPSULE BY MOUTH EVERY DAY BEFORE FIRST MEAL FOR STOMACH ACID OR REFLUX 30 capsule 5    polyethylene glycol (MIRALAX) packet Take orally as directed for constipation      pravastatin (PRAVACHOL) 40 MG tablet TAKE 1 TABLET BY MOUTH DAILY 90 tablet 3    Restasis 0.05 % ophthalmic emulsion 2 (two) times a day.       No facility-administered medications prior to visit.       No opioid medication identified on active medication list. I have reviewed chart for other potential  high risk medication/s and harmful  "drug interactions in the elderly.        Aspirin is not on active medication list.  Aspirin use is not indicated based on review of current medical condition/s. Risk of harm outweighs potential benefits.  .    Patient Active Problem List   Diagnosis    Benign essential hypertension    Claustrophobia    Gout    Hyperlipidemia    Primary hypothyroidism    Impaired fasting glucose    Nocturnal hypoxemia    Secondary polycythemia    Vitamin D deficiency    Therapeutic drug monitoring    Family history of coronary artery disease    Bilateral sensorineural hearing loss, wears hearing aids    Multinodular goiter    Supraventricular tachycardia    Non morbid obesity    Bilateral lower extremity edema    Gastroesophageal reflux disease without esophagitis    Paroxysmal atrial fibrillation    Stage 3b chronic kidney disease    Combined form of senile cataract    Osteopenia of multiple sites    Postmenopausal state     Advance Care Planning   Advance Care Planning     Advance Directive is not on file.  ACP discussion was held with the patient during this visit. Patient does not have an advance directive, information provided.     Objective    Vitals:    23 0925   BP: 146/80   Pulse: 72   Resp: 16   Temp: 97.1 °F (36.2 °C)   TempSrc: Temporal   SpO2: 97%   Weight: 98.9 kg (218 lb)   Height: 160 cm (62.99\")     Estimated body mass index is 38.63 kg/m² as calculated from the following:    Height as of this encounter: 160 cm (62.99\").    Weight as of this encounter: 98.9 kg (218 lb).           Does the patient have evidence of cognitive impairment?   No            HEALTH RISK ASSESSMENT    Smoking Status:  Social History     Tobacco Use   Smoking Status Former    Packs/day: 0.50    Types: Cigarettes    Start date: 1946    Quit date: 1954    Years since quittin.7   Smokeless Tobacco Never   Tobacco Comments    Stopped drinking at age 30.     Alcohol Consumption:  Social History     Substance and Sexual Activity "   Alcohol Use No    Comment: caffiene use tea coffee     Fall Risk Screen:    ROXANNE Fall Risk Assessment was completed, and patient is at MODERATE risk for falls. Assessment completed on:2023    Depression Screenin/26/2023     9:27 AM   PHQ-2/PHQ-9 Depression Screening   Little Interest or Pleasure in Doing Things 0-->not at all   Feeling Down, Depressed or Hopeless 0-->not at all   PHQ-9: Brief Depression Severity Measure Score 0       Health Habits and Functional and Cognitive Screenin/26/2023     9:28 AM   Functional & Cognitive Status   Do you have difficulty preparing food and eating? No   Do you have difficulty bathing yourself, getting dressed or grooming yourself? No   Do you have difficulty using the toilet? No   Do you have difficulty moving around from place to place? No   Do you have trouble with steps or getting out of a bed or a chair? No   Do you need help using the phone?  No   Are you deaf or do you have serious difficulty hearing?  Yes   Do you need help to go to places out of walking distance? No   Do you need help shopping? No   Do you need help preparing meals?  No   Do you need help with housework?  No   Do you need help with laundry? No   Do you need help taking your medications? No   Do you need help managing money? No   Do you ever drive or ride in a car without wearing a seat belt? No   Have you felt unusual stress, anger or loneliness in the last month? No   Who do you live with? Child   If you need help, do you have trouble finding someone available to you? No   Have you been bothered in the last four weeks by sexual problems? No   Do you have difficulty concentrating, remembering or making decisions? No       Age-appropriate Screening Schedule:  Refer to the list below for future screening recommendations based on patient's age, sex and/or medical conditions. Orders for these recommended tests are listed in the plan section. The patient has been provided with a  written plan.    Health Maintenance   Topic Date Due    DXA SCAN  05/10/2023    BMI FOLLOWUP  09/22/2023    INFLUENZA VACCINE  10/01/2023    LIPID PANEL  03/16/2024    ANNUAL WELLNESS VISIT  09/26/2024    TDAP/TD VACCINES (2 - Td or Tdap) 06/14/2027    Pneumococcal Vaccine 65+  Completed    COVID-19 Vaccine  Discontinued    ZOSTER VACCINE  Discontinued                  CMS Preventative Services Quick Reference  Risk Factors Identified During Encounter:    Hearing Problem:  Already has hearing aids  Immunizations Discussed/Encouraged: Influenza, COVID19, and RSV    The above risks/problems have been discussed with the patient.  Pertinent information has been shared with the patient in the After Visit Summary.    Diagnoses and all orders for this visit:    1. Encounter for subsequent annual wellness visit (AWV) in Medicare patient (Primary)    2. Stage 3b chronic kidney disease    3. Impaired fasting glucose    4. Hyperlipidemia    5. Multinodular goiter    6. Primary hypothyroidism    7. Secondary polycythemia    8. Vitamin D deficiency    9. Gout    10. Benign essential hypertension    11. Bilateral lower extremity edema    12. Bilateral sensorineural hearing loss, wears hearing aids    13. Family history of coronary artery disease    14. Gastroesophageal reflux disease without esophagitis    15. Nocturnal hypoxemia    16. Non morbid obesity    17. Paroxysmal atrial fibrillation    18. Supraventricular tachycardia    19. Therapeutic drug monitoring    20. Osteopenia of multiple sites  -     DEXA Bone Density Axial    21. Postmenopausal state  -     DEXA Bone Density Axial        Follow Up:   Next Medicare Wellness visit to be scheduled in 1 year.      An After Visit Summary and PPPS were made available to the patient.

## 2023-10-03 ENCOUNTER — OFFICE VISIT (OUTPATIENT)
Dept: INTERNAL MEDICINE | Facility: CLINIC | Age: 88
End: 2023-10-03
Payer: MEDICARE

## 2023-10-03 VITALS
OXYGEN SATURATION: 97 % | DIASTOLIC BLOOD PRESSURE: 78 MMHG | SYSTOLIC BLOOD PRESSURE: 138 MMHG | HEIGHT: 63 IN | WEIGHT: 213 LBS | RESPIRATION RATE: 16 BRPM | BODY MASS INDEX: 37.74 KG/M2 | HEART RATE: 56 BPM | TEMPERATURE: 96.9 F

## 2023-10-03 DIAGNOSIS — H90.3 BILATERAL SENSORINEURAL HEARING LOSS: Chronic | ICD-10-CM

## 2023-10-03 DIAGNOSIS — E55.9 VITAMIN D DEFICIENCY: Chronic | ICD-10-CM

## 2023-10-03 DIAGNOSIS — I48.0 PAROXYSMAL ATRIAL FIBRILLATION: Chronic | ICD-10-CM

## 2023-10-03 DIAGNOSIS — E03.9 PRIMARY HYPOTHYROIDISM: Chronic | ICD-10-CM

## 2023-10-03 DIAGNOSIS — Z78.0 POSTMENOPAUSAL STATE: Chronic | ICD-10-CM

## 2023-10-03 DIAGNOSIS — M85.89 OSTEOPENIA OF MULTIPLE SITES: Chronic | ICD-10-CM

## 2023-10-03 DIAGNOSIS — I10 BENIGN ESSENTIAL HYPERTENSION: Chronic | ICD-10-CM

## 2023-10-03 DIAGNOSIS — R60.0 BILATERAL LOWER EXTREMITY EDEMA: Chronic | ICD-10-CM

## 2023-10-03 DIAGNOSIS — N18.32 STAGE 3B CHRONIC KIDNEY DISEASE: Primary | Chronic | ICD-10-CM

## 2023-10-03 DIAGNOSIS — E04.2 MULTINODULAR GOITER: Chronic | ICD-10-CM

## 2023-10-03 DIAGNOSIS — E78.2 MIXED HYPERLIPIDEMIA: Chronic | ICD-10-CM

## 2023-10-03 DIAGNOSIS — K21.9 GASTROESOPHAGEAL REFLUX DISEASE WITHOUT ESOPHAGITIS: Chronic | ICD-10-CM

## 2023-10-03 DIAGNOSIS — D75.1 SECONDARY POLYCYTHEMIA: Chronic | ICD-10-CM

## 2023-10-03 DIAGNOSIS — I47.10 SUPRAVENTRICULAR TACHYCARDIA: Chronic | ICD-10-CM

## 2023-10-03 DIAGNOSIS — E66.9 NON MORBID OBESITY: Chronic | ICD-10-CM

## 2023-10-03 DIAGNOSIS — R73.01 IMPAIRED FASTING GLUCOSE: Chronic | ICD-10-CM

## 2023-10-03 DIAGNOSIS — Z87.39 HISTORY OF GOUT: Chronic | ICD-10-CM

## 2023-10-03 DIAGNOSIS — Z51.81 THERAPEUTIC DRUG MONITORING: ICD-10-CM

## 2023-10-03 NOTE — PROGRESS NOTES
10/03/2023    Patient Information  Mandy Alarcon                                                                                          [address] [facility] [address]      5/30/1933  [unfilled]  There is no work phone number on file.    Chief Complaint:     Follow-up medical problems as noted below.  No new acute complaints.    History of Present Illness:    Patient with multiple chronic medical problems as noted below in the assessment and plan presents today for follow-up.  Patient had blood work in order to monitor these chronic medical issues.  Her past medical history reviewed and updated were necessary including health maintenance parameters.  This reveals she is currently up-to-date with the exception of influenza vaccine which I want her to get towards the end of the month.  She recently had RSV vaccine.    Review of Systems   Constitutional: Negative.   HENT: Negative.     Eyes: Negative.    Cardiovascular: Negative.    Respiratory: Negative.     Endocrine: Negative.    Hematologic/Lymphatic: Negative.    Skin: Negative.    Musculoskeletal: Negative.    Gastrointestinal: Negative.    Genitourinary: Negative.    Neurological: Negative.    Psychiatric/Behavioral: Negative.     Allergic/Immunologic: Negative.      Active Problems:    Patient Active Problem List   Diagnosis    Benign essential hypertension    Claustrophobia    Gout    Hyperlipidemia    Primary hypothyroidism    Impaired fasting glucose    Nocturnal hypoxemia    Secondary polycythemia    Vitamin D deficiency    Therapeutic drug monitoring    Family history of coronary artery disease    Bilateral sensorineural hearing loss, wears hearing aids    Multinodular goiter    Supraventricular tachycardia    Non morbid obesity    Bilateral lower extremity edema    Gastroesophageal reflux disease without esophagitis    Paroxysmal atrial fibrillation    Stage 3b chronic kidney disease    Combined form of senile cataract     Osteopenia of multiple sites    Postmenopausal state         Past Medical History:   Diagnosis Date    Benign essential hypertension 10/28/2012    2012--treatment for hypertension begun.    Bilateral lower extremity edema 2020    Bilateral sensorineural hearing loss, wears hearing aids 2017    Left is much worse than right.  Patient had multiple ear infections as a child.    Chronic renal impairment, stage 3b 09/15/2020    Claustrophobia 2016    This patient has significant nocturnal hypoxemia and I think that she could benefit from nocturnal oxygen therapy. The exact etiology of her hypoxemia is not clear. She could possibly have obstructive sleep apnea but this is not documented. We cannot test this patient for sleep apnea due to the fact that she is severely claustrophobic. Patient was a former smoker and it is possibly that COPD he is playing a role.    Combined form of senile cataract 2021    Family history of coronary artery disease 2016    Patient's mother, father, 2 sisters and a brother all  from myocardial infarctions    Gastroesophageal reflux disease without esophagitis 2020    Gout 10/28/2012    2012--initial diagnosis and treatment of gout.    History of acute pancreatitis 2020    Hyperlipidemia 10/28/2012    2012--treatment for hyperlipidemia begun.    Impaired fasting glucose 10/28/2012    2012--initial diagnosis impaired fasting glucose.    Morbidly obese 2019    Nocturnal hypoxemia 2012--patient did not receive nocturnal oxygen because of Medicare regulations.   05/15/2014--overnight oximetry revealed oxygen saturations less than 89% for 22 minutes and 40 seconds. Oxygen saturations less than or equal to 88% for 22 minutes and 40 seconds. Lowest oxygen saturation 83%. The longest continuous time with oxygen saturations less than or equal to 88% was 1 minute and 32 seconds.   2012--overnight  oximetry revealed oxygen saturations less than 90% for one hour and 35 minutes. Oxygen saturations less than 89% 59 minutes. This patient has significant nocturnal hypoxemia and I think that she could benefit from nocturnal oxygen therapy. The exact etiology of her hypoxemia is not clear. She could possibly have obstructive sleep apnea but this is not documented. We cannot test this patient for sleep apnea due to the fact that she is severely claustrophobic. Patient was a former smoker and it is possibly that COPD he is playing a role.    Non morbid obesity 03/11/2019    Osteopenia of multiple sites 09/26/2023    Paroxysmal atrial fibrillation 04/10/2020    Postmenopausal state 09/26/2023    Primary hypothyroidism 11/17/2015 March 11, 2019--TSH remains elevated slightly at 4.92.  Given the overall clinical picture including the multinodular goiter, we will initiate levothyroxine 50 mcg/day and reassess in about 6 weeks.  August 1, 2018--thyroid ultrasound reveals a multinodular thyroid with multiple subcentimeter nodules.  Only minimal increase in size of the largest nodule in the left lobe has occurred when compared     Secondary polycythemia 08/02/2012 11/06/2014--patient did not receive nocturnal oxygen because of Medicare regulations.   05/15/2014--overnight oximetry revealed oxygen saturations less than 89% for 22 minutes and 40 seconds. Oxygen saturations less than or equal to 88% for 22 minutes and 40 seconds. Lowest oxygen saturation 83%. The longest continuous time with oxygen saturations less than or equal to 88% was 1 minute and 32 seconds.   08/02/2012--overnight oximetry revealed oxygen saturations less than 90% for one hour and 35 minutes. Oxygen saturations less than 89% 59 minutes. This patient has significant nocturnal hypoxemia and I think that she could benefit from nocturnal oxygen therapy. The exact etiology of her hypoxemia is not clear. She could possibly have obstructive sleep apnea but  this is not documented. We cannot test this patient for sleep apnea due to the fact that she is severely claustrophobic. Patient was a former smoker and it is possibly that COPD he is playing a role.    Vitamin D deficiency 05/23/2016         Past Surgical History:   Procedure Laterality Date    CHOLECYSTECTOMY WITH INTRAOPERATIVE CHOLANGIOGRAM N/A 03/12/2020    Procedure: LAPAROSCOPIC CHOLECYSTOSTOMY TUBE, INTRAOPERATIVE CHOLANGIOGRAM;  Surgeon: Bryce Andujar MD;  Location: [facility] OR;  Service: General;  Laterality: N/A;    CHOLECYSTECTOMY WITH INTRAOPERATIVE CHOLANGIOGRAM N/A 05/22/2020    Procedure: CHOLECYSTECTOMY LAPAROSCOPIC INTRAOPERATIVE CHOLANGIOGRAM and EGD;  Surgeon: Bryce Andujar MD;  Location: [facility] OR;  Service: General;  Laterality: N/A;    ERCP N/A 07/01/2022    Procedure: ENDOSCOPIC RETROGRADE CHOLANGIOPANCREATOGRAPHY with sphincterotomy, balloon sweep 12-15mm;  Surgeon: Mitesh Altamirano MD;  Location: [facility] ENDOSCOPY;  Service: Gastroenterology;  Laterality: N/A;  pre - gallstone pancreatitis  post - s/p sphincterotomy, sludge and  pus    OOPHORECTOMY      age 48    RADICAL ABDOMINAL HYSTERECTOMY  48 years old    48 years of age. Uterine fibroid tumors with menorrhagia - no cancer         Allergies   Allergen Reactions    Cefaclor Anaphylaxis, Angioedema and Swelling     caused angioedema 25 years ago; she tolerates cephalexin, amoxicillin, and ceftriaxone per my d/w patient and her daughter as well as amoxicillin-clavulanate confirmed in EPIC  caused angioedema 25 years ago; she tolerates cephalexin, amoxicillin, and ceftriaxone per my d/w patient and her daughter as well as amoxicillin-clavulanate confirmed in EPIC  caused angioedema 25 years ago; she tolerates cephalexin, amoxicillin, and ceftriaxone per my d/w patient and her daughter as well as amoxicillin-clavulanate confirmed in EPIC  caused angioedema 25 years ago; she tolerates cephalexin, amoxicillin, and  ceftriaxone per my d/w patient and her daughter as well as amoxicillin-clavulanate confirmed in EPIC    Sulfa Antibiotics Rash           Current Outpatient Medications:     allopurinol (ZYLOPRIM) 300 MG tablet, TAKE 1 TABLET BY MOUTH DAILY FOR GOUT, Disp: 90 tablet, Rfl: 3    ascorbic acid (VITAMIN C) 500 MG capsule controlled-release CR capsule, Take 1 tablet by mouth Daily., Disp: , Rfl:     levothyroxine (SYNTHROID, LEVOTHROID) 75 MCG tablet, TAKE 1 TABLET BY MOUTH DAILY FOR THYROID, Disp: 90 tablet, Rfl: 3    metoprolol succinate XL (TOPROL-XL) 25 MG 24 hr tablet, TAKE 1 TABLET BY MOUTH DAILY FOR HIGH BLOOD PRESSURE AND HEART OR PALPITATIONS, Disp: 90 tablet, Rfl: 3    Multiple Vitamins-Minerals (MULTIVITAMIN ADULTS PO), Take 1 tablet by mouth Daily., Disp: , Rfl:     omeprazole (priLOSEC) 40 MG capsule, TAKE 1 CAPSULE BY MOUTH EVERY DAY BEFORE FIRST MEAL FOR STOMACH ACID OR REFLUX, Disp: 30 capsule, Rfl: 5    polyethylene glycol (MIRALAX) packet, Take orally as directed for constipation, Disp: , Rfl:     pravastatin (PRAVACHOL) 40 MG tablet, TAKE 1 TABLET BY MOUTH DAILY, Disp: 90 tablet, Rfl: 3    Restasis 0.05 % ophthalmic emulsion, 2 (two) times a day., Disp: , Rfl:       Family History   Problem Relation Age of Onset    Heart disease Mother         Ischemic.  from coronary artery disease.    Heart disease Father         Ischemic.  from coronary artery disease.    Heart disease Sister         Ischemic.  from coronary artery disease.    Heart disease Sister         Ischemic.  from coronary artery disease.    Heart disease Brother         Ischemic.  from coronary artery disease.    Breast cancer Daughter     Breast cancer Daughter     Ovarian cancer Neg Hx          Social History     Socioeconomic History    Marital status:     Number of children: 5    Highest education level: GED or equivalent   Tobacco Use    Smoking status: Former     Packs/day: 0.50     Types: Cigarettes      "Start date: 1946     Quit date: 1954     Years since quittin.8    Smokeless tobacco: Never    Tobacco comments:     Stopped drinking at age 30.   Vaping Use    Vaping Use: Never used   Substance and Sexual Activity    Alcohol use: No     Comment: caffiene use tea coffee    Drug use: No    Sexual activity: Not Currently     Partners: Male         Vitals:    10/03/23 0928   BP: 138/78   Pulse: 56   Resp: 16   Temp: 96.9 °F (36.1 °C)   TempSrc: Temporal   SpO2: 97%   Weight: 96.6 kg (213 lb)   Height: 160 cm (62.99\")        Body mass index is 37.74 kg/m².      Physical Exam:    General: Alert and oriented x 3.  No acute distress.  Obese.  Normal affect.  HEENT: Pupils equal, round, reactive to light; extraocular movements intact; sclerae nonicteric; pharynx, ear canals and TMs normal.  Neck: Without JVD, thyromegaly, bruit, or adenopathy.  Lungs: Clear to auscultation in all fields.  Heart: Regular rate and rhythm without murmur, rub, gallop, or click.  Abdomen: Soft, nontender, without hepatosplenomegaly or hernia.  Bowel sounds normal.  : Deferred.  Rectal: Deferred.  Extremities: Without clubbing, cyanosis, edema, or pulse deficit.  No significant edema of the lower extremities today.  Neurologic: Intact without focal deficit.  Normal station and gait observed during ingress and egress from the examination room.  Skin: Without significant lesion.  Musculoskeletal: Unremarkable.    Lab/other results:    CBC normal except hematocrit is mildly elevated at 48.9.  Hemoglobin normal.  CPK normal.  CMP normal except glucose 108, creatinine 1.56, estimated GFR 31.5.  Calcium slightly elevated at 10.0.  Hemoglobin A1c 5.8.  Total cholesterol 144, triglyceride 134, LDL particle #815, HDL particle number low at 25.4.  Thyroid function tests are normal.  Vitamin D normal at 52.2.  Uric acid normal at 4.9.    Assessment/Plan:     Diagnosis Plan   1. Stage 3b chronic kidney disease  CBC (No Diff)    Comprehensive " Metabolic Panel      2. Impaired fasting glucose  Comprehensive Metabolic Panel    Hemoglobin A1c      3. Benign essential hypertension  Comprehensive Metabolic Panel      4. Hyperlipidemia  CK    Comprehensive Metabolic Panel    NMR LipoProfile      5. Primary hypothyroidism  T4, Free    T3, Free    TSH      6. Gout  Uric Acid      7. Bilateral lower extremity edema        8. Paroxysmal atrial fibrillation        9. Secondary polycythemia  CBC (No Diff)      10. Vitamin D deficiency  Vitamin D,25-Hydroxy      11. Multinodular goiter        12. Non morbid obesity        13. Osteopenia of multiple sites        14. Postmenopausal state        15. Supraventricular tachycardia        16. Gastroesophageal reflux disease without esophagitis        17. Bilateral sensorineural hearing loss, wears hearing aids        18. Therapeutic drug monitoring          Class 2 Severe Obesity (BMI >=35 and <=39.9). Obesity-related health conditions include the following: hypertension, dyslipidemias, GERD, osteoarthritis, and lower extremity venous stasis disease. Obesity is improving with treatment. BMI is is above average; BMI management plan is completed. We discussed low calorie, low carb based diet program, portion control, and increasing exercise.     Patient has stage IIIb chronic kidney disease which is stable.  We are monitoring closely.  She has impaired fasting glucose and is at risk for development of overt diabetes given her obesity.  I have recommended low carbohydrate diet, exercise were possible, and weight loss.  Her blood pressure is well controlled.  Her thyroid is therapeutic on the current dose of levothyroxine.  Her gout is stable on allopurinol.  Secondary polycythemia is mild and we will monitor.  Her vitamin D is in the normal range with supplementation.  Multinodular goiter has been stable.  She has osteopenia and is up-to-date on her DEXA scan.  She has a history of supraventricular tachycardia and currently  controlled with metoprolol.  Esophageal reflux controlled with omeprazole.  Weight loss will also help.    Plan is as follows: No change in current medical regimen.  Recommend high-dose influenza vaccine towards the end of the month.  Diet and weight loss as discussed above.  Patient will follow-up in 6 months with lab prior or follow-up as needed.    Procedures

## 2023-10-25 ENCOUNTER — TELEPHONE (OUTPATIENT)
Dept: INTERNAL MEDICINE | Facility: CLINIC | Age: 88
End: 2023-10-25
Payer: MEDICARE

## 2023-10-25 NOTE — TELEPHONE ENCOUNTER
Called pt to get more info she said no shortness of breath, She said she had a fever last night not today. I asked if she had a home covid test to do today she said no she will wait until Friday to test and be seen. She wanted to reschedule her appointment.

## 2023-10-26 ENCOUNTER — TELEPHONE (OUTPATIENT)
Dept: INTERNAL MEDICINE | Facility: CLINIC | Age: 88
End: 2023-10-26
Payer: MEDICARE

## 2023-10-26 ENCOUNTER — HOSPITAL ENCOUNTER (EMERGENCY)
Facility: HOSPITAL | Age: 88
Discharge: HOME | End: 2023-10-26
Attending: EMERGENCY MEDICINE
Payer: MEDICARE

## 2023-10-26 ENCOUNTER — TELEPHONE (OUTPATIENT)
Dept: INTERNAL MEDICINE | Facility: CLINIC | Age: 88
End: 2023-10-26

## 2023-10-26 VITALS
DIASTOLIC BLOOD PRESSURE: 88 MMHG | HEART RATE: 60 BPM | RESPIRATION RATE: 16 BRPM | OXYGEN SATURATION: 99 % | TEMPERATURE: 98.6 F | SYSTOLIC BLOOD PRESSURE: 166 MMHG

## 2023-10-26 DIAGNOSIS — U07.1 COVID-19 VIRUS INFECTION: Primary | ICD-10-CM

## 2023-10-26 DIAGNOSIS — U07.1 COVID-19: Primary | ICD-10-CM

## 2023-10-26 PROCEDURE — 99283 EMERGENCY DEPT VISIT LOW MDM: CPT

## 2023-10-26 RX ORDER — PREDNISONE 10 MG/1
TABLET ORAL
Qty: 46 TABLET | Refills: 0 | Status: SHIPPED | OUTPATIENT
Start: 2023-10-26

## 2023-10-26 RX ORDER — HYDROCODONE POLISTIREX AND CHLORPHENIRAMINE POLISTIREX 10; 8 MG/5ML; MG/5ML
SUSPENSION, EXTENDED RELEASE ORAL
Qty: 90 ML | Refills: 0 | Status: SHIPPED | OUTPATIENT
Start: 2023-10-26

## 2023-10-26 NOTE — DISCHARGE INSTRUCTIONS
Although you are being discharged from the ED today, I encourage you to return for worsening symptoms. Things can, and do, change such that treatment at home with medication may not be adequate. Specifically I recommend returning for chest pain or discomfort, difficulty breathing, persistent vomiting or difficulty holding down liquids or medications, fever > 102.0 F, or any other worsening or alarming symptoms.     Rest. Drink plenty of fluids.  Follow up with primary care provider for further management and to have blood pressure rechecked.  Call [phone] to get established with a new primary care provider if you do not already have one.

## 2023-10-26 NOTE — ED NOTES
Patient to ER via ems from home patient was sleeping and started dreaming and said she was dying so daughter called 911 patient was able to walk down the stairs with ems and had zero complaints   Patient reports she tested positive for covid this morning reports she took an at home test because she had some chills yesterday

## 2023-10-26 NOTE — ED PROVIDER NOTES
" EMERGENCY DEPARTMENT ENCOUNTER    Room Number:  05/05  Date seen:  10/26/2023  PCP: Daryl Santos MD    Spoken Language:  English  Language interpretation services not needed     HPI:  Chief Complaint: facial numbness    A complete HPI/ROS/PMH/PSH/SH/FH are unobtainable due to: n/a    Context: Mandy Alarcon is a 89 y.o. female presenting to the emergency department per recommendations from her primary care provider.  The history is being obtained by the patient, her daughter and by review of the medical chart.  The patient has had symptoms of COVID since Monday, and tested positive for COVID this morning.  She states that she took some over-the-counter [place] flu and cold medicine and went to sleep.  Apparently, the patient was having vivid dreams and when she woke up was complaining of numbness in the bilateral face and bilateral neck.  Her daughter states the patient was complaining of this, but the patient does not remember this happening.  She also states that the patient stated \"I feel like I am dying.\"  She states that she is very anxious during this event.  The daughters patient ultimately called her primary care provider to see what they needed to do because she tested positive for COVID.  During her conversation, she told him about the facial numbness episode, and he recommended that she come to the emergency department to rule out a stroke.  Currently, the patient denies any numbness.  She denies headache, acute changes in vision, numbness/tingling in the extremities, focal weakness, speech difficulties, or gait disturbances.  She states that she is feeling significantly better compared to earlier today.    PAST MEDICAL HISTORY  Active Ambulatory Problems     Diagnosis Date Noted    Benign essential hypertension 10/28/2012    Claustrophobia 04/28/2016    Gout 10/28/2012    Hyperlipidemia 10/28/2012    Primary hypothyroidism 11/17/2015    Impaired fasting glucose 10/28/2012    Nocturnal " hypoxemia 08/02/2012    Secondary polycythemia 08/02/2012    Vitamin D deficiency 05/23/2016    Therapeutic drug monitoring 05/23/2016    Family history of coronary artery disease 05/24/2016    Bilateral sensorineural hearing loss, wears hearing aids 06/14/2017    Multinodular goiter 01/24/2018    Supraventricular tachycardia 07/10/2018    Non morbid obesity 03/11/2019    Bilateral lower extremity edema 03/23/2020    Gastroesophageal reflux disease without esophagitis 03/23/2020    Paroxysmal atrial fibrillation 04/10/2020    Stage 3b chronic kidney disease 09/15/2020    Combined form of senile cataract 03/03/2021    Osteopenia of multiple sites 09/26/2023    Postmenopausal state 09/26/2023    COVID-19 virus infection 10/26/2023     Resolved Ambulatory Problems     Diagnosis Date Noted    History of Cellulitis of trunk, Right 04/28/2016    History of mammogram 04/28/2016    History of pneumococcal vaccination 04/28/2016    Hospital discharge follow-up 07/10/2018    Near syncope 07/10/2018    Chest tightness or pressure 07/10/2018    PND (paroxysmal nocturnal dyspnea) 07/10/2018    Acute biliary pancreatitis without infection or necrosis 02/01/2020    LINNEA (acute kidney injury) 02/02/2020    Pneumonia 02/03/2020    New onset a-fib 02/05/2020    Hyponatremia 02/05/2020    Hypomagnesemia 02/05/2020    Leukocytosis 02/05/2020    Fever 02/27/2020    Acute gallstone pancreatitis 02/27/2020    Chronic cholecystitis 03/11/2020    Postoperative nausea 03/23/2020    Postoperative pain 03/23/2020    Hospital discharge follow-up 04/17/2020    History of acute pancreatitis 04/17/2020    Acute constipation 04/17/2020    Gallstone pancreatitis 05/21/2020    Hospital discharge follow-up 06/11/2020    Left cervical radiculopathy 06/11/2020    Parotiditis 07/04/2022    Hospital discharge follow-up 07/11/2022     Past Medical History:   Diagnosis Date    Chronic renal impairment, stage 3b 09/15/2020    Hyperlipidemia 10/28/2012     Morbidly obese 2019       PAST SURGICAL HISTORY  Past Surgical History:   Procedure Laterality Date    CHOLECYSTECTOMY WITH INTRAOPERATIVE CHOLANGIOGRAM N/A 2020    Procedure: LAPAROSCOPIC CHOLECYSTOSTOMY TUBE, INTRAOPERATIVE CHOLANGIOGRAM;  Surgeon: Bryce Andujar MD;  Location: [facility] OR;  Service: General;  Laterality: N/A;    CHOLECYSTECTOMY WITH INTRAOPERATIVE CHOLANGIOGRAM N/A 2020    Procedure: CHOLECYSTECTOMY LAPAROSCOPIC INTRAOPERATIVE CHOLANGIOGRAM and EGD;  Surgeon: Bryce Andujar MD;  Location: [facility] OR;  Service: General;  Laterality: N/A;    ERCP N/A 2022    Procedure: ENDOSCOPIC RETROGRADE CHOLANGIOPANCREATOGRAPHY with sphincterotomy, balloon sweep 12-15mm;  Surgeon: Mitesh Altamirano MD;  Location: [facility] ENDOSCOPY;  Service: Gastroenterology;  Laterality: N/A;  pre - gallstone pancreatitis  post - s/p sphincterotomy, sludge and  pus    OOPHORECTOMY      age 48    RADICAL ABDOMINAL HYSTERECTOMY  48 years old    48 years of age. Uterine fibroid tumors with menorrhagia - no cancer       FAMILY HISTORY  Family History   Problem Relation Age of Onset    Heart disease Mother         Ischemic.  from coronary artery disease.    Heart disease Father         Ischemic.  from coronary artery disease.    Heart disease Sister         Ischemic.  from coronary artery disease.    Heart disease Sister         Ischemic.  from coronary artery disease.    Heart disease Brother         Ischemic.  from coronary artery disease.    Breast cancer Daughter     Breast cancer Daughter     Ovarian cancer Neg Hx        SOCIAL HISTORY  Social History     Socioeconomic History    Marital status:     Number of children: 5    Highest education level: GED or equivalent   Tobacco Use    Smoking status: Former     Packs/day: .5     Types: Cigarettes     Start date: 1946     Quit date: 1954     Years since quittin.8    Smokeless tobacco: Never     Tobacco comments:     Stopped drinking at age 30.   Vaping Use    Vaping Use: Never used   Substance and Sexual Activity    Alcohol use: No     Comment: caffiene use tea coffee    Drug use: No    Sexual activity: Not Currently     Partners: Male       ALLERGIES  Cefaclor and Sulfa antibiotics    PHYSICAL EXAM  ED Triage Vitals [10/26/23 1346]   Temp Heart Rate Resp BP SpO2   98.6 °F (37 °C) 60 16 166/88 99 %      Temp src Heart Rate Source Patient Position BP Location FiO2 (%)   -- -- -- -- --       Physical Exam  Constitutional:       Appearance: Normal appearance.   HENT:      Head: Normocephalic and atraumatic.      Mouth/Throat:      Mouth: Mucous membranes are moist.   Eyes:      Extraocular Movements: Extraocular movements intact.      Pupils: Pupils are equal, round, and reactive to light.   Cardiovascular:      Rate and Rhythm: Normal rate and regular rhythm.   Pulmonary:      Effort: Pulmonary effort is normal.      Breath sounds: Normal breath sounds.   Abdominal:      General: There is no distension.      Palpations: Abdomen is soft.      Tenderness: There is no abdominal tenderness.   Musculoskeletal:      Cervical back: Normal range of motion and neck supple.   Neurological:      Mental Status: She is alert.      Comments: Mental Status: The patient is awake, alert and oriented x 3. Recent and remote memory functions are normal. The patient is attentive with normal concentration.  Language is fluent. Speech is clear. The speech is non-dysarthric. Fund of knowledge is normal.  Cranial Nerve I: Not tested.  Cranial Nerve II: Visual fields are full to confrontation.  Cranial Nerves III, IV, VI: The pupils are 3 mm, equally round and reactive to light. The extraocular movements are full in all directions of gaze without nystagmus.  Cranial Nerve V: Facial sensation is intact to light touch.  Cranial Nerve VII: Facial movements are symmetric  Cranial Nerve VIII: Audition is intact to finger rub  bilaterally.  Cranial Nerves IX, X: Uvula and palate elevate symmetrically.  Cranial Nerve XI: Sternocleidomastoid strength is 5/5 bilaterally with normal shoulder shrug.  Cranial Nerve XII: Midline tongue protrusion without atrophy or fasciculations.  Motor: Tone is normal in all four extremities without fasciculations, atrophy, or myoclonus. There are no involuntary movements.   Muscle Strength: 5/5 muscle strength in bilateral upper and bilateral lower extremities. No pronator drift.  Sensory: The sensory examination is normal for light touch bilaterally and symmetrically.  Cerebellar: Finger to nose intact bilaterally.    Psychiatric:         Mood and Affect: Mood normal.         Behavior: Behavior normal.         Thought Content: Thought content normal.         Judgment: Judgment normal.       LAB RESULTS  No results found for this or any previous visit (from the past 24 hour(s)).    RADIOLOGY  Imaging Results (Last 24 Hours)       ** No results found for the last 24 hours. **          PROCEDURES  Procedures  None    MEDICATIONS GIVEN IN ER  Medications - No data to display    MEDICAL DECISION MAKING, PROGRESS, and CONSULTS  Vital signs and nursing notes have all been reviewed by me.    Orders placed during this visit:  Orders Placed This Encounter   Procedures    Patient Isolation Enhanced Droplet / Contact       Differential diagnosis includes but is not limited to:  -anxiety  -medication side effect  -TIA    Discussion below represents my analysis of pertinent findings related to patient's condition, differential diagnosis, treatment plan and final disposition:    The patient presents to the emergency department, as recommended by her primary care provider.  The patient currently has no complaints. Apparently, the patient was complaining of numbness involving her entire face and neck as she awoke while having vivid dreams.  With direct patient care and observation, retrospective chart review, management of  acute condition, and consultation with other physicians.  Her daughter was with her during this period, and states that the patient was saying that the entire face and entire neck was involved.  The patient's bedside neurological exam is nonfocal.  I think it is very unlikely that the patient had any CVA -but most likely had an episode of anxiety.  I have, however, discussed with the patient and her daughter the option of admitting her to the observation unit for a stroke work-up, including a brain MRI.  The patient states that she would rather go home.  I think that this is reasonable.  She has no current complaints regarding her COVID.  She is not hypoxic.  The patient will be discharged and will follow-up with her primary care provider.              Additional sources:  -Discussed/ obtained information from independent historians: patient's daughter    -Shared decision making: I discussed ED treatment plan with patient who is in agreement.  Discussed at length ED testing.     Patient discharged in stable condition.     Reviewed implications of results, diagnosis, meds, responsibility to follow up, warning signs and symptoms of possible worsening, potential complications and reasons to return to ER.     Patient/Family voiced understanding of above instructions.     Discussed plan for discharge, as there is no emergent indication for admission. Patient referred to primary care provider for BP management due to today's BP. Pt/family is agreeable and understands need for follow up and repeat testing.  Pt is aware that discharge does not mean that nothing is wrong but it indicates no emergency is present that requires admission and they must continue care with follow-up as given below or physician of their choice.     PPE: The patient was placed in a face mask in first look. Patient was wearing facemask when I entered the room and throughout our encounter. I wore full protective equipment throughout this patient  encounter including a face mask, eye protection and gloves. Hand hygiene was performed before donning protective equipment and after removal when leaving the room.    DIAGNOSIS  Final diagnoses:   COVID-19       DISPOSITION  ED Disposition       ED Disposition   Discharge    Condition   Stable    Comment   --               FOLLOW UP  Daryl Santos MD  [address]  [address]  [place] [id number]  [phone]    Schedule an appointment as soon as possible for a visit         RX  Medications - No data to display       Medication List      No changes were made to your prescriptions during this visit.         Latest Documented Vital Signs:  As of 16:18 EDT  BP- 166/88 HR- 60 Temp- 98.6 °F (37 °C) O2 sat- 99%    Provider Attestation:  I personally reviewed the past medical history, past surgical history, social history, family history, current medications and allergies as they appear in the chart. I reviewed the patient's history, physical, lab/imaging results and overall care with Dr. Antoine who is in agreement with the patient's treatment plan.    EMR Dragon/Transcription disclaimer:  Dictated using Dragon dictation    Provider note signed by:    Note Disclaimer: At [facility], we believe that sharing information builds trust and better relationships. You are receiving this note because you are receiving care at [facility] or recently visited. It is possible you will see health information before a provider has talked with you about it. This kind of information can be easy to misunderstand. To help you fully understand what it means for your health, we urge you to discuss this note with your provider.       Taylor Jaeger PA  10/26/23 2160

## 2023-10-26 NOTE — ED PROVIDER NOTES
"MD ATTESTATION NOTE    The DAVE and I have discussed this patient's history, physical exam, and treatment plan.  I have reviewed the documentation and personally had a face to face interaction with the patient. I affirm the documentation and agree with the treatment and plan.  The attached note describes my personal findings.    I provided a substantive portion of the care of this patient. I personally performed the physical exam, in its entirety.    Independent Historians: Patient and daughter    A complete HPI/ROS/PMH/PSH/SH/FH are unobtainable due to: None    Chronic or social conditions impacting patient care (social determinants of health): None    Mandy Alarcon is a 89 y.o. female who presents to the ED c/o acute episode of facial numbness while panicking after having had a nightmare.  Patient had a very vivid dream after taking over-the-counter [place] flu and cold medicine and woke up anxious and panicking.  Patient was noted to be hyperventilating.  Patient has been sick with COVID since Monday but symptoms improving and only taking over-the-counter medication to help with symptoms.  When patient's daughter called the primary doctor to describe patient's nightmare and episode and her complaint of facial numbness, they deferred patient to the emergency department for further evaluation of \"stroke\" due to the numbness.  Patient had no other focal neurologic issue during that event however.          On exam:  GENERAL: Pleasant cooperative and conversant  female who appears much younger than stated age, jovial, well-appearing, alert, no acute distress  SKIN: Warm, dry  HENT: Normocephalic, atraumatic  EYES: no scleral icterus, no conjunctivitis  RESPIRATORY: Relaxed breathing   MUSCULOSKELETAL: no deformity  NEURO: alert, face symmetric, normal speech, normal gait, grossly intact and equal sensation to all 4 extremities, moves all extremities, follows commands                                     "                         Labs  No results found for this or any previous visit (from the past 24 hour(s)).    Radiology  No Radiology Exams Resulted Within Past 24 Hours    Medical Decision Making:       MDM: Patient presents with isolated facial numbness that was brief and not unilateral.  Patient had no facial droop or speech disturbance with the numbness.  Patient was hyperventilating and panicking due to a vivid dream.  When she slowed down her breathing and calm down her numbness abated.  After long discussion with patient and daughter regarding stroke evaluation, they did decide to defer any of that evaluation and follow-up outpatient with primary provider.  We discussed welcome return to the emergency department if any new symptoms or concerns.    Procedures:  Procedures        PPE: I followed hospital protocols for proper PPE based on patient presentation including use of N95 mask for suspected infectious respiratory conditions.  Proper hand hygiene was performed both before and after the patient encounter.          Diagnosis  Final diagnoses:   COVID-19       Note Disclaimer: At [facility], we believe that sharing information builds trust and better relationships. You are receiving this note because you recently visited [facility]. It is possible you will see health information before a provider has talked with you about it. This kind of information can be easy to misunderstand. To help you fully understand what it means for your health, we urge you to discuss this note with your provider.       Cate Antoine MD  10/27/23 2374

## 2023-10-26 NOTE — TELEPHONE ENCOUNTER
"Called pt's daughter and asked her if she has called EMS for pt.  She states \"no she feels better now.\"  I stressed to pt's dtr that she could possibly be having a stroke and time is of the essence.  Her dtr stated that she's fine now, and then her mother was in the background asking what was going on, when dtr told her she needed to go to ER for facial and neck numbness the pt stated \"Im not numb\" to which her dtr responded \"mama you just told me 5 minutes ago you were numb and thought you were dying.\"  The pt had no recollection of this.  I advised pt dtr to call EMS right now.  She agreed.  "

## 2023-10-26 NOTE — TELEPHONE ENCOUNTER
Caller: Ashley Valdes    Relationship to patient: Emergency Contact  [phone]  Date of exposure: 10/26/23    Date of positive COVID19 test: 10/26/23    Date if possible COVID19 exposure: N/A     COVID19 symptoms: SORE THROAT, COUGHING, FATIGUE, FEVER, SOME COUGHING, BACK PAIN    Date of initial quarantine: 10/26/23      Additional information or concerns:  PER OUR CONVERSATION ON 10/25/23 IF THE PATIENT EST POSITIVE TO CALL BACK IMMEDIATELY. PLEASE ADVISE     What is the patients preferred pharmacy: N/A

## 2023-10-26 NOTE — TELEPHONE ENCOUNTER
"    Caller: Ashley Valdes    Relationship to patient: Emergency Contact    Best call back number: [phone]  Chief complaint: PATIENT'S FACE IS NUMB AND HER NECK IS NUMB. PATIENT'S DAUGHTER STATES SHE SAID THAT SHE FEELS LIKE SHE'S \"DYING.\"  Patient directed to call 911 or go to their nearest emergency room.     Patient verbalized understanding: [x] Yes  [] No  If no, why?    Additional notes:PATIENT IS GOING TO [facility]    "

## 2023-11-03 DIAGNOSIS — K21.9 GASTROESOPHAGEAL REFLUX DISEASE WITHOUT ESOPHAGITIS: Chronic | ICD-10-CM

## 2023-11-03 RX ORDER — OMEPRAZOLE 40 MG/1
CAPSULE, DELAYED RELEASE ORAL
Qty: 30 CAPSULE | Refills: 1 | Status: SHIPPED | OUTPATIENT
Start: 2023-11-03

## 2023-11-10 DIAGNOSIS — K21.9 GASTROESOPHAGEAL REFLUX DISEASE WITHOUT ESOPHAGITIS: Chronic | ICD-10-CM

## 2023-11-10 RX ORDER — OMEPRAZOLE 40 MG/1
CAPSULE, DELAYED RELEASE ORAL
Qty: 30 CAPSULE | Refills: 1 | Status: SHIPPED | OUTPATIENT
Start: 2023-11-10

## 2023-11-15 PROCEDURE — 99285 EMERGENCY DEPT VISIT HI MDM: CPT

## 2023-11-16 ENCOUNTER — HOSPITAL ENCOUNTER (INPATIENT)
Facility: HOSPITAL | Age: 88
LOS: 2 days | Discharge: HOME | DRG: 253 | End: 2023-11-18
Attending: EMERGENCY MEDICINE | Admitting: STUDENT IN AN ORGANIZED HEALTH CARE EDUCATION/TRAINING PROGRAM
Payer: MEDICARE

## 2023-11-16 ENCOUNTER — ANESTHESIA EVENT (OUTPATIENT)
Dept: PERIOP | Facility: HOSPITAL | Age: 88
End: 2023-11-16
Payer: MEDICARE

## 2023-11-16 ENCOUNTER — APPOINTMENT (OUTPATIENT)
Dept: CT IMAGING | Facility: HOSPITAL | Age: 88
DRG: 253 | End: 2023-11-16
Payer: MEDICARE

## 2023-11-16 ENCOUNTER — TELEPHONE (OUTPATIENT)
Dept: INTERNAL MEDICINE | Facility: CLINIC | Age: 88
End: 2023-11-16
Payer: MEDICARE

## 2023-11-16 ENCOUNTER — APPOINTMENT (OUTPATIENT)
Dept: GENERAL RADIOLOGY | Facility: HOSPITAL | Age: 88
DRG: 253 | End: 2023-11-16
Payer: MEDICARE

## 2023-11-16 ENCOUNTER — ANESTHESIA (OUTPATIENT)
Dept: PERIOP | Facility: HOSPITAL | Age: 88
End: 2023-11-16
Payer: MEDICARE

## 2023-11-16 DIAGNOSIS — I10 HYPERTENSION, UNSPECIFIED TYPE: ICD-10-CM

## 2023-11-16 DIAGNOSIS — E03.9 HYPOTHYROIDISM, UNSPECIFIED TYPE: ICD-10-CM

## 2023-11-16 DIAGNOSIS — E78.5 HYPERLIPIDEMIA, UNSPECIFIED HYPERLIPIDEMIA TYPE: ICD-10-CM

## 2023-11-16 DIAGNOSIS — N18.9 CHRONIC KIDNEY DISEASE, UNSPECIFIED CKD STAGE: ICD-10-CM

## 2023-11-16 DIAGNOSIS — M79.605 ACUTE PAIN OF LEFT LOWER EXTREMITY: Primary | ICD-10-CM

## 2023-11-16 DIAGNOSIS — I70.90 ARTERIAL OCCLUSION: ICD-10-CM

## 2023-11-16 DIAGNOSIS — I70.209 ARTERIAL OCCLUSION, LOWER EXTREMITY: ICD-10-CM

## 2023-11-16 LAB
ABO GROUP BLD: NORMAL
ALBUMIN SERPL-MCNC: 3.4 G/DL (ref 3.5–5.2)
ALBUMIN/GLOB SERPL: 1.3 G/DL
ALP SERPL-CCNC: 46 U/L (ref 39–117)
ALT SERPL W P-5'-P-CCNC: 22 U/L (ref 1–33)
ANION GAP SERPL CALCULATED.3IONS-SCNC: 7 MMOL/L (ref 5–15)
APTT PPP: 29.3 SECONDS (ref 22.7–35.4)
APTT PPP: >200 SECONDS (ref 22.7–35.4)
AST SERPL-CCNC: 14 U/L (ref 1–32)
BASOPHILS # BLD AUTO: 0.03 10*3/MM3 (ref 0–0.2)
BASOPHILS NFR BLD AUTO: 0.4 % (ref 0–1.5)
BILIRUB SERPL-MCNC: 0.4 MG/DL (ref 0–1.2)
BLD GP AB SCN SERPL QL: NEGATIVE
BUN SERPL-MCNC: 24 MG/DL (ref 8–23)
BUN/CREAT SERPL: 16.7 (ref 7–25)
CALCIUM SPEC-SCNC: 9.2 MG/DL (ref 8.2–9.6)
CHLORIDE SERPL-SCNC: 110 MMOL/L (ref 98–107)
CK SERPL-CCNC: 43 U/L (ref 20–180)
CK SERPL-CCNC: 45 U/L (ref 20–180)
CO2 SERPL-SCNC: 21 MMOL/L (ref 22–29)
CREAT SERPL-MCNC: 1.44 MG/DL (ref 0.57–1)
D DIMER PPP FEU-MCNC: 1.02 MCGFEU/ML (ref 0–0.9)
DEPRECATED RDW RBC AUTO: 49.9 FL (ref 37–54)
EGFRCR SERPLBLD CKD-EPI 2021: 34.6 ML/MIN/1.73
EOSINOPHIL # BLD AUTO: 0.41 10*3/MM3 (ref 0–0.4)
EOSINOPHIL NFR BLD AUTO: 5.3 % (ref 0.3–6.2)
ERYTHROCYTE [DISTWIDTH] IN BLOOD BY AUTOMATED COUNT: 14.5 % (ref 12.3–15.4)
GEN 5 2HR TROPONIN T REFLEX: 33 NG/L
GLOBULIN UR ELPH-MCNC: 2.6 GM/DL
GLUCOSE SERPL-MCNC: 109 MG/DL (ref 65–99)
HCT VFR BLD AUTO: 43.5 % (ref 34–46.6)
HGB BLD-MCNC: 14.4 G/DL (ref 12–15.9)
IMM GRANULOCYTES # BLD AUTO: 0.04 10*3/MM3 (ref 0–0.05)
IMM GRANULOCYTES NFR BLD AUTO: 0.5 % (ref 0–0.5)
INR PPP: 1.12 (ref 0.9–1.1)
LYMPHOCYTES # BLD AUTO: 1.66 10*3/MM3 (ref 0.7–3.1)
LYMPHOCYTES NFR BLD AUTO: 21.4 % (ref 19.6–45.3)
MAGNESIUM SERPL-MCNC: 1.9 MG/DL (ref 1.6–2.4)
MCH RBC QN AUTO: 30.6 PG (ref 26.6–33)
MCHC RBC AUTO-ENTMCNC: 33.1 G/DL (ref 31.5–35.7)
MCV RBC AUTO: 92.6 FL (ref 79–97)
MONOCYTES # BLD AUTO: 0.8 10*3/MM3 (ref 0.1–0.9)
MONOCYTES NFR BLD AUTO: 10.3 % (ref 5–12)
NEUTROPHILS NFR BLD AUTO: 4.8 10*3/MM3 (ref 1.7–7)
NEUTROPHILS NFR BLD AUTO: 62.1 % (ref 42.7–76)
NRBC BLD AUTO-RTO: 0 /100 WBC (ref 0–0.2)
PLATELET # BLD AUTO: 198 10*3/MM3 (ref 140–450)
PMV BLD AUTO: 9.5 FL (ref 6–12)
POTASSIUM SERPL-SCNC: 4 MMOL/L (ref 3.5–5.2)
PROT SERPL-MCNC: 6 G/DL (ref 6–8.5)
PROTHROMBIN TIME: 14.5 SECONDS (ref 11.7–14.2)
QT INTERVAL: 475 MS
QTC INTERVAL: 494 MS
RBC # BLD AUTO: 4.7 10*6/MM3 (ref 3.77–5.28)
RH BLD: POSITIVE
SODIUM SERPL-SCNC: 138 MMOL/L (ref 136–145)
T&S EXPIRATION DATE: NORMAL
TROPONIN T DELTA: -2 NG/L
TROPONIN T SERPL HS-MCNC: 35 NG/L
WBC NRBC COR # BLD: 7.74 10*3/MM3 (ref 3.4–10.8)

## 2023-11-16 PROCEDURE — 25010000002 PROPOFOL 10 MG/ML EMULSION: Performed by: NURSE ANESTHETIST, CERTIFIED REGISTERED

## 2023-11-16 PROCEDURE — 36415 COLL VENOUS BLD VENIPUNCTURE: CPT

## 2023-11-16 PROCEDURE — 25010000002 HEPARIN (PORCINE) PER 1000 UNITS: Performed by: NURSE ANESTHETIST, CERTIFIED REGISTERED

## 2023-11-16 PROCEDURE — 25010000002 DROPERIDOL PER 5 MG: Performed by: NURSE ANESTHETIST, CERTIFIED REGISTERED

## 2023-11-16 PROCEDURE — 0 IODIXANOL PER 1 ML: Performed by: STUDENT IN AN ORGANIZED HEALTH CARE EDUCATION/TRAINING PROGRAM

## 2023-11-16 PROCEDURE — 86901 BLOOD TYPING SEROLOGIC RH(D): CPT | Performed by: STUDENT IN AN ORGANIZED HEALTH CARE EDUCATION/TRAINING PROGRAM

## 2023-11-16 PROCEDURE — 25010000002 HYDROMORPHONE PER 4 MG: Performed by: NURSE ANESTHETIST, CERTIFIED REGISTERED

## 2023-11-16 PROCEDURE — 86900 BLOOD TYPING SEROLOGIC ABO: CPT | Performed by: STUDENT IN AN ORGANIZED HEALTH CARE EDUCATION/TRAINING PROGRAM

## 2023-11-16 PROCEDURE — 25810000003 SODIUM CHLORIDE 0.9 % SOLUTION: Performed by: SURGERY

## 2023-11-16 PROCEDURE — 82550 ASSAY OF CK (CPK): CPT | Performed by: SURGERY

## 2023-11-16 PROCEDURE — 80053 COMPREHEN METABOLIC PANEL: CPT | Performed by: EMERGENCY MEDICINE

## 2023-11-16 PROCEDURE — 25010000002 SUGAMMADEX 200 MG/2ML SOLUTION: Performed by: NURSE ANESTHETIST, CERTIFIED REGISTERED

## 2023-11-16 PROCEDURE — B41G1ZZ FLUOROSCOPY OF LEFT LOWER EXTREMITY ARTERIES USING LOW OSMOLAR CONTRAST: ICD-10-PCS | Performed by: SURGERY

## 2023-11-16 PROCEDURE — 99222 1ST HOSP IP/OBS MODERATE 55: CPT | Performed by: NURSE PRACTITIONER

## 2023-11-16 PROCEDURE — 82550 ASSAY OF CK (CPK): CPT | Performed by: EMERGENCY MEDICINE

## 2023-11-16 PROCEDURE — 75635 CT ANGIO ABDOMINAL ARTERIES: CPT

## 2023-11-16 PROCEDURE — C1894 INTRO/SHEATH, NON-LASER: HCPCS | Performed by: SURGERY

## 2023-11-16 PROCEDURE — 25010000002 VANCOMYCIN 10 G RECONSTITUTED SOLUTION: Performed by: SURGERY

## 2023-11-16 PROCEDURE — 25510000001 IOPAMIDOL PER 1 ML: Performed by: EMERGENCY MEDICINE

## 2023-11-16 PROCEDURE — 93010 ELECTROCARDIOGRAM REPORT: CPT | Performed by: INTERNAL MEDICINE

## 2023-11-16 PROCEDURE — 85379 FIBRIN DEGRADATION QUANT: CPT | Performed by: EMERGENCY MEDICINE

## 2023-11-16 PROCEDURE — 83735 ASSAY OF MAGNESIUM: CPT | Performed by: EMERGENCY MEDICINE

## 2023-11-16 PROCEDURE — 25010000002 DEXAMETHASONE PER 1 MG: Performed by: NURSE ANESTHETIST, CERTIFIED REGISTERED

## 2023-11-16 PROCEDURE — 93005 ELECTROCARDIOGRAM TRACING: CPT | Performed by: PHYSICIAN ASSISTANT

## 2023-11-16 PROCEDURE — 25010000002 ONDANSETRON PER 1 MG: Performed by: NURSE ANESTHETIST, CERTIFIED REGISTERED

## 2023-11-16 PROCEDURE — 85730 THROMBOPLASTIN TIME PARTIAL: CPT | Performed by: STUDENT IN AN ORGANIZED HEALTH CARE EDUCATION/TRAINING PROGRAM

## 2023-11-16 PROCEDURE — 85610 PROTHROMBIN TIME: CPT | Performed by: EMERGENCY MEDICINE

## 2023-11-16 PROCEDURE — 25810000003 SODIUM CHLORIDE 0.9 % SOLUTION: Performed by: EMERGENCY MEDICINE

## 2023-11-16 PROCEDURE — 25010000002 HEPARIN (PORCINE) PER 1000 UNITS: Performed by: SURGERY

## 2023-11-16 PROCEDURE — 86850 RBC ANTIBODY SCREEN: CPT | Performed by: STUDENT IN AN ORGANIZED HEALTH CARE EDUCATION/TRAINING PROGRAM

## 2023-11-16 PROCEDURE — 04CL0ZZ EXTIRPATION OF MATTER FROM LEFT FEMORAL ARTERY, OPEN APPROACH: ICD-10-PCS | Performed by: SURGERY

## 2023-11-16 PROCEDURE — 25810000003 LACTATED RINGERS PER 1000 ML: Performed by: STUDENT IN AN ORGANIZED HEALTH CARE EDUCATION/TRAINING PROGRAM

## 2023-11-16 PROCEDURE — 25010000002 FENTANYL CITRATE (PF) 50 MCG/ML SOLUTION: Performed by: NURSE ANESTHETIST, CERTIFIED REGISTERED

## 2023-11-16 PROCEDURE — 85025 COMPLETE CBC W/AUTO DIFF WBC: CPT | Performed by: EMERGENCY MEDICINE

## 2023-11-16 PROCEDURE — 25010000002 HEPARIN (PORCINE) PER 1000 UNITS: Performed by: PHYSICIAN ASSISTANT

## 2023-11-16 PROCEDURE — 84484 ASSAY OF TROPONIN QUANT: CPT | Performed by: SURGERY

## 2023-11-16 PROCEDURE — 85730 THROMBOPLASTIN TIME PARTIAL: CPT | Performed by: EMERGENCY MEDICINE

## 2023-11-16 DEVICE — LIGACLIP MCA MULTIPLE CLIP APPLIERS, 20 SMALL CLIPS
Type: IMPLANTABLE DEVICE | Site: LEG | Status: FUNCTIONAL
Brand: LIGACLIP

## 2023-11-16 DEVICE — LIGACLIP MCA MULTIPLE CLIP APPLIERS, 20 MEDIUM CLIPS
Type: IMPLANTABLE DEVICE | Site: LEG | Status: FUNCTIONAL
Brand: LIGACLIP

## 2023-11-16 RX ORDER — LEVOTHYROXINE SODIUM 0.07 MG/1
75 TABLET ORAL
Status: DISCONTINUED | OUTPATIENT
Start: 2023-11-16 | End: 2023-11-18 | Stop reason: HOSPADM

## 2023-11-16 RX ORDER — IPRATROPIUM BROMIDE AND ALBUTEROL SULFATE 2.5; .5 MG/3ML; MG/3ML
3 SOLUTION RESPIRATORY (INHALATION) ONCE AS NEEDED
Status: DISCONTINUED | OUTPATIENT
Start: 2023-11-16 | End: 2023-11-16

## 2023-11-16 RX ORDER — LIDOCAINE HYDROCHLORIDE 20 MG/ML
INJECTION, SOLUTION INFILTRATION; PERINEURAL AS NEEDED
Status: DISCONTINUED | OUTPATIENT
Start: 2023-11-16 | End: 2023-11-16 | Stop reason: SURG

## 2023-11-16 RX ORDER — HEPARIN SODIUM 10000 [USP'U]/100ML
15.5 INJECTION, SOLUTION INTRAVENOUS
Status: DISCONTINUED | OUTPATIENT
Start: 2023-11-16 | End: 2023-11-16

## 2023-11-16 RX ORDER — ACETAMINOPHEN 325 MG/1
650 TABLET ORAL EVERY 4 HOURS PRN
Status: DISCONTINUED | OUTPATIENT
Start: 2023-11-16 | End: 2023-11-18 | Stop reason: HOSPADM

## 2023-11-16 RX ORDER — VANCOMYCIN/0.9 % SOD CHLORIDE 1.5G/250ML
15 PLASTIC BAG, INJECTION (ML) INTRAVENOUS ONCE
Status: COMPLETED | OUTPATIENT
Start: 2023-11-16 | End: 2023-11-16

## 2023-11-16 RX ORDER — HYDRALAZINE HYDROCHLORIDE 20 MG/ML
5 INJECTION INTRAMUSCULAR; INTRAVENOUS
Status: DISCONTINUED | OUTPATIENT
Start: 2023-11-16 | End: 2023-11-16

## 2023-11-16 RX ORDER — DIPHENHYDRAMINE HYDROCHLORIDE 50 MG/ML
12.5 INJECTION INTRAMUSCULAR; INTRAVENOUS
Status: DISCONTINUED | OUTPATIENT
Start: 2023-11-16 | End: 2023-11-16

## 2023-11-16 RX ORDER — PROMETHAZINE HYDROCHLORIDE 25 MG/1
25 TABLET ORAL ONCE AS NEEDED
Status: DISCONTINUED | OUTPATIENT
Start: 2023-11-16 | End: 2023-11-16

## 2023-11-16 RX ORDER — SODIUM CHLORIDE 0.9 % (FLUSH) 0.9 %
3 SYRINGE (ML) INJECTION EVERY 12 HOURS SCHEDULED
Status: DISCONTINUED | OUTPATIENT
Start: 2023-11-16 | End: 2023-11-16 | Stop reason: HOSPADM

## 2023-11-16 RX ORDER — PROMETHAZINE HYDROCHLORIDE 25 MG/1
25 SUPPOSITORY RECTAL ONCE AS NEEDED
Status: DISCONTINUED | OUTPATIENT
Start: 2023-11-16 | End: 2023-11-16

## 2023-11-16 RX ORDER — ROCURONIUM BROMIDE 10 MG/ML
INJECTION, SOLUTION INTRAVENOUS AS NEEDED
Status: DISCONTINUED | OUTPATIENT
Start: 2023-11-16 | End: 2023-11-16 | Stop reason: SURG

## 2023-11-16 RX ORDER — SODIUM CHLORIDE 0.9 % (FLUSH) 0.9 %
3-10 SYRINGE (ML) INJECTION AS NEEDED
Status: DISCONTINUED | OUTPATIENT
Start: 2023-11-16 | End: 2023-11-16 | Stop reason: HOSPADM

## 2023-11-16 RX ORDER — DROPERIDOL 2.5 MG/ML
INJECTION, SOLUTION INTRAMUSCULAR; INTRAVENOUS AS NEEDED
Status: DISCONTINUED | OUTPATIENT
Start: 2023-11-16 | End: 2023-11-16 | Stop reason: SURG

## 2023-11-16 RX ORDER — NITROGLYCERIN 0.4 MG/1
0.4 TABLET SUBLINGUAL
Status: DISCONTINUED | OUTPATIENT
Start: 2023-11-16 | End: 2023-11-18 | Stop reason: HOSPADM

## 2023-11-16 RX ORDER — POLYETHYLENE GLYCOL 3350 17 G/17G
17 POWDER, FOR SOLUTION ORAL DAILY PRN
Status: DISCONTINUED | OUTPATIENT
Start: 2023-11-16 | End: 2023-11-17

## 2023-11-16 RX ORDER — SODIUM CHLORIDE, SODIUM LACTATE, POTASSIUM CHLORIDE, CALCIUM CHLORIDE 600; 310; 30; 20 MG/100ML; MG/100ML; MG/100ML; MG/100ML
9 INJECTION, SOLUTION INTRAVENOUS CONTINUOUS
Status: DISCONTINUED | OUTPATIENT
Start: 2023-11-16 | End: 2023-11-17

## 2023-11-16 RX ORDER — ONDANSETRON 4 MG/1
4 TABLET, FILM COATED ORAL EVERY 6 HOURS PRN
Status: DISCONTINUED | OUTPATIENT
Start: 2023-11-16 | End: 2023-11-18 | Stop reason: HOSPADM

## 2023-11-16 RX ORDER — SODIUM CHLORIDE 9 MG/ML
40 INJECTION, SOLUTION INTRAVENOUS AS NEEDED
Status: DISCONTINUED | OUTPATIENT
Start: 2023-11-16 | End: 2023-11-18 | Stop reason: HOSPADM

## 2023-11-16 RX ORDER — ONDANSETRON 2 MG/ML
4 INJECTION INTRAMUSCULAR; INTRAVENOUS EVERY 6 HOURS PRN
Status: DISCONTINUED | OUTPATIENT
Start: 2023-11-16 | End: 2023-11-18 | Stop reason: HOSPADM

## 2023-11-16 RX ORDER — MORPHINE SULFATE 2 MG/ML
1 INJECTION, SOLUTION INTRAMUSCULAR; INTRAVENOUS
Status: DISCONTINUED | OUTPATIENT
Start: 2023-11-16 | End: 2023-11-18 | Stop reason: HOSPADM

## 2023-11-16 RX ORDER — HEPARIN SODIUM 1000 [USP'U]/ML
INJECTION, SOLUTION INTRAVENOUS; SUBCUTANEOUS AS NEEDED
Status: DISCONTINUED | OUTPATIENT
Start: 2023-11-16 | End: 2023-11-16 | Stop reason: SURG

## 2023-11-16 RX ORDER — ALLOPURINOL 300 MG/1
300 TABLET ORAL DAILY
Status: DISCONTINUED | OUTPATIENT
Start: 2023-11-16 | End: 2023-11-18 | Stop reason: HOSPADM

## 2023-11-16 RX ORDER — SODIUM CHLORIDE 9 MG/ML
125 INJECTION, SOLUTION INTRAVENOUS CONTINUOUS
Status: DISCONTINUED | OUTPATIENT
Start: 2023-11-16 | End: 2023-11-17

## 2023-11-16 RX ORDER — HYDROCODONE BITARTRATE AND ACETAMINOPHEN 5; 325 MG/1; MG/1
1 TABLET ORAL ONCE AS NEEDED
Status: DISCONTINUED | OUTPATIENT
Start: 2023-11-16 | End: 2023-11-16

## 2023-11-16 RX ORDER — HYDROMORPHONE HYDROCHLORIDE 1 MG/ML
0.25 INJECTION, SOLUTION INTRAMUSCULAR; INTRAVENOUS; SUBCUTANEOUS
Status: DISCONTINUED | OUTPATIENT
Start: 2023-11-16 | End: 2023-11-16

## 2023-11-16 RX ORDER — HYDROCODONE BITARTRATE AND ACETAMINOPHEN 7.5; 325 MG/1; MG/1
1 TABLET ORAL EVERY 4 HOURS PRN
Status: DISCONTINUED | OUTPATIENT
Start: 2023-11-16 | End: 2023-11-16

## 2023-11-16 RX ORDER — SODIUM CHLORIDE 0.9 % (FLUSH) 0.9 %
10 SYRINGE (ML) INJECTION EVERY 12 HOURS SCHEDULED
Status: DISCONTINUED | OUTPATIENT
Start: 2023-11-16 | End: 2023-11-18 | Stop reason: HOSPADM

## 2023-11-16 RX ORDER — HEPARIN SODIUM 5000 [USP'U]/ML
80 INJECTION, SOLUTION INTRAVENOUS; SUBCUTANEOUS ONCE
Status: COMPLETED | OUTPATIENT
Start: 2023-11-16 | End: 2023-11-16

## 2023-11-16 RX ORDER — IODIXANOL 320 MG/ML
100 INJECTION, SOLUTION INTRAVASCULAR
Status: COMPLETED | OUTPATIENT
Start: 2023-11-16 | End: 2023-11-16

## 2023-11-16 RX ORDER — ONDANSETRON 2 MG/ML
4 INJECTION INTRAMUSCULAR; INTRAVENOUS ONCE AS NEEDED
Status: DISCONTINUED | OUTPATIENT
Start: 2023-11-16 | End: 2023-11-16

## 2023-11-16 RX ORDER — DROPERIDOL 2.5 MG/ML
0.62 INJECTION, SOLUTION INTRAMUSCULAR; INTRAVENOUS
Status: DISCONTINUED | OUTPATIENT
Start: 2023-11-16 | End: 2023-11-16

## 2023-11-16 RX ORDER — HEPARIN SODIUM 10000 [USP'U]/100ML
500 INJECTION, SOLUTION INTRAVENOUS
Status: DISCONTINUED | OUTPATIENT
Start: 2023-11-16 | End: 2023-11-16 | Stop reason: HOSPADM

## 2023-11-16 RX ORDER — ACETAMINOPHEN 160 MG/5ML
650 SOLUTION ORAL EVERY 4 HOURS PRN
Status: DISCONTINUED | OUTPATIENT
Start: 2023-11-16 | End: 2023-11-18 | Stop reason: HOSPADM

## 2023-11-16 RX ORDER — EPHEDRINE SULFATE 50 MG/ML
5 INJECTION, SOLUTION INTRAVENOUS ONCE AS NEEDED
Status: DISCONTINUED | OUTPATIENT
Start: 2023-11-16 | End: 2023-11-16

## 2023-11-16 RX ORDER — FENTANYL CITRATE 50 UG/ML
INJECTION, SOLUTION INTRAMUSCULAR; INTRAVENOUS AS NEEDED
Status: DISCONTINUED | OUTPATIENT
Start: 2023-11-16 | End: 2023-11-16 | Stop reason: SURG

## 2023-11-16 RX ORDER — SODIUM CHLORIDE 0.9 % (FLUSH) 0.9 %
10 SYRINGE (ML) INJECTION AS NEEDED
Status: DISCONTINUED | OUTPATIENT
Start: 2023-11-16 | End: 2023-11-18 | Stop reason: HOSPADM

## 2023-11-16 RX ORDER — BISACODYL 10 MG
10 SUPPOSITORY, RECTAL RECTAL DAILY PRN
Status: DISCONTINUED | OUTPATIENT
Start: 2023-11-16 | End: 2023-11-17

## 2023-11-16 RX ORDER — PROPOFOL 10 MG/ML
VIAL (ML) INTRAVENOUS AS NEEDED
Status: DISCONTINUED | OUTPATIENT
Start: 2023-11-16 | End: 2023-11-16 | Stop reason: SURG

## 2023-11-16 RX ORDER — LIDOCAINE HYDROCHLORIDE 10 MG/ML
0.5 INJECTION, SOLUTION INFILTRATION; PERINEURAL ONCE AS NEEDED
Status: DISCONTINUED | OUTPATIENT
Start: 2023-11-16 | End: 2023-11-16 | Stop reason: HOSPADM

## 2023-11-16 RX ORDER — FENTANYL CITRATE 50 UG/ML
25 INJECTION, SOLUTION INTRAMUSCULAR; INTRAVENOUS
Status: DISCONTINUED | OUTPATIENT
Start: 2023-11-16 | End: 2023-11-16

## 2023-11-16 RX ORDER — HYDROCODONE BITARTRATE AND ACETAMINOPHEN 5; 325 MG/1; MG/1
1 TABLET ORAL EVERY 4 HOURS PRN
Status: DISCONTINUED | OUTPATIENT
Start: 2023-11-16 | End: 2023-11-18 | Stop reason: HOSPADM

## 2023-11-16 RX ORDER — METOPROLOL SUCCINATE 25 MG/1
25 TABLET, EXTENDED RELEASE ORAL
Status: DISCONTINUED | OUTPATIENT
Start: 2023-11-16 | End: 2023-11-18 | Stop reason: HOSPADM

## 2023-11-16 RX ORDER — ONDANSETRON 2 MG/ML
INJECTION INTRAMUSCULAR; INTRAVENOUS AS NEEDED
Status: DISCONTINUED | OUTPATIENT
Start: 2023-11-16 | End: 2023-11-16 | Stop reason: SURG

## 2023-11-16 RX ORDER — FAMOTIDINE 20 MG/1
40 TABLET, FILM COATED ORAL DAILY
Status: DISCONTINUED | OUTPATIENT
Start: 2023-11-16 | End: 2023-11-18 | Stop reason: HOSPADM

## 2023-11-16 RX ORDER — AMOXICILLIN 250 MG
2 CAPSULE ORAL 2 TIMES DAILY
Status: DISCONTINUED | OUTPATIENT
Start: 2023-11-16 | End: 2023-11-17

## 2023-11-16 RX ORDER — NALOXONE HCL 0.4 MG/ML
0.4 VIAL (ML) INJECTION
Status: DISCONTINUED | OUTPATIENT
Start: 2023-11-16 | End: 2023-11-18 | Stop reason: HOSPADM

## 2023-11-16 RX ORDER — EPHEDRINE SULFATE 50 MG/ML
INJECTION INTRAVENOUS AS NEEDED
Status: DISCONTINUED | OUTPATIENT
Start: 2023-11-16 | End: 2023-11-16 | Stop reason: SURG

## 2023-11-16 RX ORDER — NALOXONE HCL 0.4 MG/ML
0.2 VIAL (ML) INJECTION AS NEEDED
Status: DISCONTINUED | OUTPATIENT
Start: 2023-11-16 | End: 2023-11-16

## 2023-11-16 RX ORDER — LABETALOL HYDROCHLORIDE 5 MG/ML
5 INJECTION, SOLUTION INTRAVENOUS
Status: DISCONTINUED | OUTPATIENT
Start: 2023-11-16 | End: 2023-11-16

## 2023-11-16 RX ORDER — HEPARIN SODIUM 5000 [USP'U]/ML
40-80 INJECTION, SOLUTION INTRAVENOUS; SUBCUTANEOUS EVERY 6 HOURS PRN
Status: DISCONTINUED | OUTPATIENT
Start: 2023-11-16 | End: 2023-11-16

## 2023-11-16 RX ORDER — ACETAMINOPHEN 650 MG/1
650 SUPPOSITORY RECTAL EVERY 4 HOURS PRN
Status: DISCONTINUED | OUTPATIENT
Start: 2023-11-16 | End: 2023-11-18 | Stop reason: HOSPADM

## 2023-11-16 RX ORDER — PRAVASTATIN SODIUM 40 MG
40 TABLET ORAL NIGHTLY
Status: DISCONTINUED | OUTPATIENT
Start: 2023-11-16 | End: 2023-11-18 | Stop reason: HOSPADM

## 2023-11-16 RX ORDER — DEXAMETHASONE SODIUM PHOSPHATE 4 MG/ML
INJECTION, SOLUTION INTRA-ARTICULAR; INTRALESIONAL; INTRAMUSCULAR; INTRAVENOUS; SOFT TISSUE AS NEEDED
Status: DISCONTINUED | OUTPATIENT
Start: 2023-11-16 | End: 2023-11-16 | Stop reason: SURG

## 2023-11-16 RX ORDER — HEPARIN SODIUM 10000 [USP'U]/100ML
500 INJECTION, SOLUTION INTRAVENOUS CONTINUOUS
Status: DISPENSED | OUTPATIENT
Start: 2023-11-16 | End: 2023-11-17

## 2023-11-16 RX ORDER — BISACODYL 5 MG/1
5 TABLET, DELAYED RELEASE ORAL DAILY PRN
Status: DISCONTINUED | OUTPATIENT
Start: 2023-11-16 | End: 2023-11-17

## 2023-11-16 RX ORDER — FLUMAZENIL 0.1 MG/ML
0.2 INJECTION INTRAVENOUS AS NEEDED
Status: DISCONTINUED | OUTPATIENT
Start: 2023-11-16 | End: 2023-11-16

## 2023-11-16 RX ADMIN — LIDOCAINE HYDROCHLORIDE 100 MG: 20 INJECTION, SOLUTION INFILTRATION; PERINEURAL at 14:31

## 2023-11-16 RX ADMIN — HEPARIN SODIUM 500 UNITS/HR: 10000 INJECTION, SOLUTION INTRAVENOUS at 17:27

## 2023-11-16 RX ADMIN — PROPOFOL 150 MG: 10 INJECTION, EMULSION INTRAVENOUS at 14:31

## 2023-11-16 RX ADMIN — VANCOMYCIN HYDROCHLORIDE 1500 MG: 10 INJECTION, POWDER, LYOPHILIZED, FOR SOLUTION INTRAVENOUS at 13:58

## 2023-11-16 RX ADMIN — Medication 10 ML: at 20:16

## 2023-11-16 RX ADMIN — HEPARIN SODIUM 10000 UNITS: 1000 INJECTION, SOLUTION INTRAVENOUS; SUBCUTANEOUS at 15:09

## 2023-11-16 RX ADMIN — HEPARIN SODIUM 15.5 UNITS/KG/HR: 10000 INJECTION, SOLUTION INTRAVENOUS at 05:22

## 2023-11-16 RX ADMIN — METOPROLOL SUCCINATE 25 MG: 25 TABLET, EXTENDED RELEASE ORAL at 12:58

## 2023-11-16 RX ADMIN — FENTANYL CITRATE 50 MCG: 50 INJECTION, SOLUTION INTRAMUSCULAR; INTRAVENOUS at 14:31

## 2023-11-16 RX ADMIN — SODIUM CHLORIDE 125 ML/HR: 9 INJECTION, SOLUTION INTRAVENOUS at 18:22

## 2023-11-16 RX ADMIN — ROCURONIUM BROMIDE 50 MG: 10 INJECTION, SOLUTION INTRAVENOUS at 14:31

## 2023-11-16 RX ADMIN — PRAVASTATIN SODIUM 40 MG: 40 TABLET ORAL at 20:16

## 2023-11-16 RX ADMIN — ONDANSETRON 4 MG: 2 INJECTION INTRAMUSCULAR; INTRAVENOUS at 15:49

## 2023-11-16 RX ADMIN — IOPAMIDOL 95 ML: 755 INJECTION, SOLUTION INTRAVENOUS at 03:25

## 2023-11-16 RX ADMIN — SODIUM CHLORIDE 125 ML/HR: 9 INJECTION, SOLUTION INTRAVENOUS at 01:07

## 2023-11-16 RX ADMIN — SUGAMMADEX 200 MG: 100 INJECTION, SOLUTION INTRAVENOUS at 16:04

## 2023-11-16 RX ADMIN — ROCURONIUM BROMIDE 30 MG: 10 INJECTION, SOLUTION INTRAVENOUS at 15:06

## 2023-11-16 RX ADMIN — HYDROMORPHONE HYDROCHLORIDE 0.25 MG: 1 INJECTION, SOLUTION INTRAMUSCULAR; INTRAVENOUS; SUBCUTANEOUS at 16:28

## 2023-11-16 RX ADMIN — EPHEDRINE SULFATE 10 MG: 50 INJECTION INTRAVENOUS at 15:44

## 2023-11-16 RX ADMIN — IODIXANOL 15 ML: 320 INJECTION, SOLUTION INTRAVASCULAR at 16:10

## 2023-11-16 RX ADMIN — HYDROMORPHONE HYDROCHLORIDE 0.25 MG: 1 INJECTION, SOLUTION INTRAMUSCULAR; INTRAVENOUS; SUBCUTANEOUS at 16:39

## 2023-11-16 RX ADMIN — HYDROCODONE BITARTRATE AND ACETAMINOPHEN 1 TABLET: 5; 325 TABLET ORAL at 22:24

## 2023-11-16 RX ADMIN — SODIUM CHLORIDE, POTASSIUM CHLORIDE, SODIUM LACTATE AND CALCIUM CHLORIDE: 600; 310; 30; 20 INJECTION, SOLUTION INTRAVENOUS at 14:20

## 2023-11-16 RX ADMIN — DROPERIDOL 0.62 MG: 2.5 INJECTION, SOLUTION INTRAMUSCULAR; INTRAVENOUS at 16:10

## 2023-11-16 RX ADMIN — DOCUSATE SODIUM 50MG AND SENNOSIDES 8.6MG 2 TABLET: 8.6; 5 TABLET, FILM COATED ORAL at 20:16

## 2023-11-16 RX ADMIN — DEXAMETHASONE SODIUM PHOSPHATE 8 MG: 4 INJECTION, SOLUTION INTRA-ARTICULAR; INTRALESIONAL; INTRAMUSCULAR; INTRAVENOUS; SOFT TISSUE at 15:07

## 2023-11-16 RX ADMIN — SODIUM CHLORIDE 125 ML/HR: 9 INJECTION, SOLUTION INTRAVENOUS at 09:22

## 2023-11-16 RX ADMIN — HEPARIN SODIUM 7700 UNITS: 5000 INJECTION, SOLUTION INTRAVENOUS; SUBCUTANEOUS at 05:17

## 2023-11-16 NOTE — CONSULTS
Patient Name: Mandy Alarcon  :1933  89 y.o.    Date of Admission: 2023  Date of Consultation:  23  Encounter Provider: CJ Boss  Place of Service: [facility] CARDIOLOGY  Referring Provider: Balibr Montalvo MD  Patient Care Team:  Daryl Santos MD as PCP - General (Internal Medicine)      Chief complaint: cold foot, pain at rest    History of Present Illness: Ms. Alarcon is a 89 year old woman followed by Dr. Laura for paroxysmal SVT and brief paroxysmal atrial fibrillation in the setting of acute illness in . She has hypertension, hyperlipidemia and chronic kidney disease. She has fair functional capacity for age. She drives and shops and ambulates well without angina or SOA. She had COVID 3 weeks ago. She reports it was a mild case and she recovered nicely.     She presented to the emergency room this morning with left foot pain and cool to touch. Found to have acute thromboembolism to both lower extremities. On the left he had popliteal occlusion with large segment of thrombus load. She has been seen by vascular surgery who recommends urgent thrombectomy. She has thrombus on the right but is asymptomatic. She has been started on a heparin drip.     Echo  Left ventricular systolic function is normal. Calculated EF = 60%. Estimated EF was in agreement with the calculated EF. Normal left ventricular cavity size and wall thickness noted. All left ventricular wall segments contract normally.  Left ventricular diastolic function is normal.  The aortic valve is abnormal in structure. The valve exhibits sclerosis.  Mild aortic valve regurgitation is present.    Holter  An abnormal monitor study.     Sinus rhythm with sinus bradycardia.  Several short runs of SVT and VT.  3.5% bradycardia with single 2.0 second pause.    Past Medical History:   Diagnosis Date    Benign essential hypertension 10/28/2012    2012--treatment for hypertension  begun.    Bilateral lower extremity edema 2020    Bilateral sensorineural hearing loss, wears hearing aids 2017    Left is much worse than right.  Patient had multiple ear infections as a child.    Chronic renal impairment, stage 3b 09/15/2020    Claustrophobia 2016    This patient has significant nocturnal hypoxemia and I think that she could benefit from nocturnal oxygen therapy. The exact etiology of her hypoxemia is not clear. She could possibly have obstructive sleep apnea but this is not documented. We cannot test this patient for sleep apnea due to the fact that she is severely claustrophobic. Patient was a former smoker and it is possibly that COPD he is playing a role.    Combined form of senile cataract 2021    Family history of coronary artery disease 2016    Patient's mother, father, 2 sisters and a brother all  from myocardial infarctions    Gastroesophageal reflux disease without esophagitis 2020    Gout 10/28/2012    2012--initial diagnosis and treatment of gout.    History of acute pancreatitis 2020    Hyperlipidemia 10/28/2012    2012--treatment for hyperlipidemia begun.    Impaired fasting glucose 10/28/2012    2012--initial diagnosis impaired fasting glucose.    Morbidly obese 2019    Nocturnal hypoxemia 2012--patient did not receive nocturnal oxygen because of Medicare regulations.   05/15/2014--overnight oximetry revealed oxygen saturations less than 89% for 22 minutes and 40 seconds. Oxygen saturations less than or equal to 88% for 22 minutes and 40 seconds. Lowest oxygen saturation 83%. The longest continuous time with oxygen saturations less than or equal to 88% was 1 minute and 32 seconds.   2012--overnight oximetry revealed oxygen saturations less than 90% for one hour and 35 minutes. Oxygen saturations less than 89% 59 minutes. This patient has significant nocturnal hypoxemia and I think  that she could benefit from nocturnal oxygen therapy. The exact etiology of her hypoxemia is not clear. She could possibly have obstructive sleep apnea but this is not documented. We cannot test this patient for sleep apnea due to the fact that she is severely claustrophobic. Patient was a former smoker and it is possibly that COPD he is playing a role.    Non morbid obesity 03/11/2019    Osteopenia of multiple sites 09/26/2023    Paroxysmal atrial fibrillation 04/10/2020    Postmenopausal state 09/26/2023    Primary hypothyroidism 11/17/2015 March 11, 2019--TSH remains elevated slightly at 4.92.  Given the overall clinical picture including the multinodular goiter, we will initiate levothyroxine 50 mcg/day and reassess in about 6 weeks.  August 1, 2018--thyroid ultrasound reveals a multinodular thyroid with multiple subcentimeter nodules.  Only minimal increase in size of the largest nodule in the left lobe has occurred when compared     Secondary polycythemia 08/02/2012 11/06/2014--patient did not receive nocturnal oxygen because of Medicare regulations.   05/15/2014--overnight oximetry revealed oxygen saturations less than 89% for 22 minutes and 40 seconds. Oxygen saturations less than or equal to 88% for 22 minutes and 40 seconds. Lowest oxygen saturation 83%. The longest continuous time with oxygen saturations less than or equal to 88% was 1 minute and 32 seconds.   08/02/2012--overnight oximetry revealed oxygen saturations less than 90% for one hour and 35 minutes. Oxygen saturations less than 89% 59 minutes. This patient has significant nocturnal hypoxemia and I think that she could benefit from nocturnal oxygen therapy. The exact etiology of her hypoxemia is not clear. She could possibly have obstructive sleep apnea but this is not documented. We cannot test this patient for sleep apnea due to the fact that she is severely claustrophobic. Patient was a former smoker and it is possibly that COPD he is  playing a role.    Vitamin D deficiency 05/23/2016       Past Surgical History:   Procedure Laterality Date    CHOLECYSTECTOMY WITH INTRAOPERATIVE CHOLANGIOGRAM N/A 03/12/2020    Procedure: LAPAROSCOPIC CHOLECYSTOSTOMY TUBE, INTRAOPERATIVE CHOLANGIOGRAM;  Surgeon: Bryce Andujar MD;  Location: [facility] OR;  Service: General;  Laterality: N/A;    CHOLECYSTECTOMY WITH INTRAOPERATIVE CHOLANGIOGRAM N/A 05/22/2020    Procedure: CHOLECYSTECTOMY LAPAROSCOPIC INTRAOPERATIVE CHOLANGIOGRAM and EGD;  Surgeon: Bryce Andujar MD;  Location: [facility] MAIN OR;  Service: General;  Laterality: N/A;    ERCP N/A 07/01/2022    Procedure: ENDOSCOPIC RETROGRADE CHOLANGIOPANCREATOGRAPHY with sphincterotomy, balloon sweep 12-15mm;  Surgeon: Mitesh Altamirano MD;  Location: [facility] ENDOSCOPY;  Service: Gastroenterology;  Laterality: N/A;  pre - gallstone pancreatitis  post - s/p sphincterotomy, sludge and  pus    OOPHORECTOMY      age 48    RADICAL ABDOMINAL HYSTERECTOMY  48 years old    48 years of age. Uterine fibroid tumors with menorrhagia - no cancer         Prior to Admission medications    Medication Sig Start Date End Date Taking? Authorizing Provider   allopurinol (ZYLOPRIM) 300 MG tablet TAKE 1 TABLET BY MOUTH DAILY FOR GOUT 8/1/23  Yes Daryl Santos MD   ascorbic acid (VITAMIN C) 500 MG capsule controlled-release CR capsule Take 1 tablet by mouth Daily.   Yes ProviderReena MD   levothyroxine (SYNTHROID, LEVOTHROID) 75 MCG tablet TAKE 1 TABLET BY MOUTH DAILY FOR THYROID 5/3/23  Yes Daryl Santos MD   metoprolol succinate XL (TOPROL-XL) 25 MG 24 hr tablet TAKE 1 TABLET BY MOUTH DAILY FOR HIGH BLOOD PRESSURE AND HEART OR PALPITATIONS 1/23/23  Yes Daryl Santos MD   Multiple Vitamins-Minerals (MULTIVITAMIN ADULTS PO) Take 1 tablet by mouth Daily.   Yes ProviderReena MD   pravastatin (PRAVACHOL) 40 MG tablet TAKE 1 TABLET BY MOUTH DAILY 4/5/23  Yes Daryl Santos MD   Hydrocod  Abbe-Chlorphe Abbe ER (TUSSIONEX PENNKINETIC) 10-8 MG/5ML ER suspension Take 1 teaspoon p.o. every 12 hours for cough and congestion from COVID-19 infection. 10/26/23   Daryl Santos MD   omeprazole (priLOSEC) 40 MG capsule TAKE 1 CAPSULE BY MOUTH EVERY DAY BEFORE FIRST MEAL FOR STOMACH ACID OR REFLUX 11/10/23   Daryl Santos MD   polyethylene glycol (MIRALAX) packet Take orally as directed for constipation 20   Daryl Santos MD   predniSONE (DELTASONE) 10 MG tablet 5 by mouth daily 5 days, then 4 daily 2 days, 3 daily 2 days, 2 daily 2 days, 1 daily 2 days, one half daily 2 days and then discontinue. 10/26/23   Daryl Santos MD   Restasis 0.05 % ophthalmic emulsion 2 (two) times a day. 3/4/21   Provider, MD Reena       Allergies   Allergen Reactions    Cefaclor Anaphylaxis, Angioedema and Swelling     caused angioedema 25 years ago; she tolerates cephalexin, amoxicillin, and ceftriaxone per my d/w patient and her daughter as well as amoxicillin-clavulanate confirmed in EPIC  caused angioedema 25 years ago; she tolerates cephalexin, amoxicillin, and ceftriaxone per my d/w patient and her daughter as well as amoxicillin-clavulanate confirmed in EPIC  caused angioedema 25 years ago; she tolerates cephalexin, amoxicillin, and ceftriaxone per my d/w patient and her daughter as well as amoxicillin-clavulanate confirmed in EPIC  caused angioedema 25 years ago; she tolerates cephalexin, amoxicillin, and ceftriaxone per my d/w patient and her daughter as well as amoxicillin-clavulanate confirmed in EPIC    Sulfa Antibiotics Rash       Social History     Socioeconomic History    Marital status:     Number of children: 5    Highest education level: GED or equivalent   Tobacco Use    Smoking status: Former     Packs/day: .5     Types: Cigarettes     Start date: 1946     Quit date: 1954     Years since quittin.9    Smokeless tobacco: Never    Tobacco comments:     Stopped  drinking at age 30.   Vaping Use    Vaping Use: Never used   Substance and Sexual Activity    Alcohol use: No     Comment: caffiene use tea coffee    Drug use: No    Sexual activity: Not Currently     Partners: Male       Family History   Problem Relation Age of Onset    Heart disease Mother         Ischemic.  from coronary artery disease.    Heart disease Father         Ischemic.  from coronary artery disease.    Heart disease Sister         Ischemic.  from coronary artery disease.    Heart disease Sister         Ischemic.  from coronary artery disease.    Heart disease Brother         Ischemic.  from coronary artery disease.    Breast cancer Daughter     Breast cancer Daughter     Ovarian cancer Neg Hx        REVIEW OF SYSTEMS:   All systems reviewed.  Pertinent positives identified in HPI.  All other systems are negative.      Objective:     Vitals:    23 0231 23 0431 23 0435 23 1101   BP: 149/67 137/54  161/89   Pulse: 55  53 63   Resp: 18   16   Temp:       TempSrc:       SpO2: 96% 97%  98%   Weight:       Height:         Body mass index is 37.73 kg/m².    Physical Exam:  Constitutional: She is oriented to person, place, and time. She appears well-developed. She does not appear ill.   HENT:   Head: Normocephalic and atraumatic. Head is without contusion.   Right Ear: Hearing normal. No drainage.   Left Ear: Hearing normal. No drainage.   Nose: No nasal deformity. No epistaxis.   Eyes: Lids are normal. Right eye exhibits no exudate. Left eye exhibits no exudate.  Neck: No JVD present.   Cardiovascular: Normal rate, regular rhythm and normal heart sounds.    Pulses:       Posterior tibial pulses are 2+ on the right side, and 2+ on the left side.   Pulmonary/Chest: Effort normal and breath sounds normal.   Abdominal: Soft. Normal appearance and bowel sounds are normal. There is no tenderness.   Musculoskeletal: Normal range of motion.        Right shoulder: She exhibits  no deformity.        Left shoulder: She exhibits no deformity.   Neurological: She is alert and oriented to person, place, and time. She has normal strength.   Skin: Skin is warm, dry and intact. No rash noted.   Psychiatric: She has a normal mood and affect. Her behavior is normal. Thought content normal.   Vitals reviewed      Lab Review:     Results from last 7 days   Lab Units 11/16/23  0158   SODIUM mmol/L 138   POTASSIUM mmol/L 4.0   CHLORIDE mmol/L 110*   CO2 mmol/L 21.0*   BUN mg/dL 24*   CREATININE mg/dL 1.44*   CALCIUM mg/dL 9.2   BILIRUBIN mg/dL 0.4   ALK PHOS U/L 46   ALT (SGPT) U/L 22   AST (SGOT) U/L 14   GLUCOSE mg/dL 109*     Results from last 7 days   Lab Units 11/16/23  0726 11/16/23  0158   CK TOTAL U/L 43 45   HSTROP T ng/L 33* 35*     Results from last 7 days   Lab Units 11/16/23  0104   WBC 10*3/mm3 7.74   HEMOGLOBIN g/dL 14.4   HEMATOCRIT % 43.5   PLATELETS 10*3/mm3 198     Results from last 7 days   Lab Units 11/16/23  0104   INR  1.12*   APTT seconds 29.3     Results from last 7 days   Lab Units 11/16/23  0158   MAGNESIUM mg/dL 1.9                 Current Facility-Administered Medications:     acetaminophen (TYLENOL) tablet 650 mg, 650 mg, Oral, Q4H PRN **OR** acetaminophen (TYLENOL) 160 MG/5ML oral solution 650 mg, 650 mg, Oral, Q4H PRN **OR** acetaminophen (TYLENOL) suppository 650 mg, 650 mg, Rectal, Q4H PRN, Lj Loco APRN    allopurinol (ZYLOPRIM) tablet 300 mg, 300 mg, Oral, Daily, Lj Loco APRN    sennosides-docusate (PERICOLACE) 8.6-50 MG per tablet 2 tablet, 2 tablet, Oral, BID **AND** polyethylene glycol (MIRALAX) packet 17 g, 17 g, Oral, Daily PRN **AND** bisacodyl (DULCOLAX) EC tablet 5 mg, 5 mg, Oral, Daily PRN **AND** bisacodyl (DULCOLAX) suppository 10 mg, 10 mg, Rectal, Daily PRN, Lj Loco APRN    famotidine (PEPCID) tablet 40 mg, 40 mg, Oral, Daily, Lj Loco APRN    heparin (porcine) 5000 UNIT/ML injection 3,900-7,700 Units, 40-80 Units/kg,  Intravenous, Q6H PRN, Rex Hoffman PA    heparin 65796 units/250 ml (100 units/ml) in D5W, 15.5 Units/kg/hr, Intravenous, Titrated, Rex Hoffman PA, Last Rate: 14.97 mL/hr at 11/16/23 0523, 15.5 Units/kg/hr at 11/16/23 0523    levothyroxine (SYNTHROID, LEVOTHROID) tablet 75 mcg, 75 mcg, Oral, Q AM, Lj Loco APRN    metoprolol succinate XL (TOPROL-XL) 24 hr tablet 25 mg, 25 mg, Oral, Q24H, Lj Loco APRN    morphine injection 1 mg, 1 mg, Intravenous, Q3H PRN **AND** naloxone (NARCAN) injection 0.4 mg, 0.4 mg, Intravenous, Q5 Min PRN, Lj Loco APRN    nitroglycerin (NITROSTAT) SL tablet 0.4 mg, 0.4 mg, Sublingual, Q5 Min PRN, Lj Loco APRN    ondansetron (ZOFRAN) tablet 4 mg, 4 mg, Oral, Q6H PRN **OR** ondansetron (ZOFRAN) injection 4 mg, 4 mg, Intravenous, Q6H PRN, Lj Loco APRN    pravastatin (PRAVACHOL) tablet 40 mg, 40 mg, Oral, Nightly, Lj Loco APRN    [COMPLETED] Insert Peripheral IV, , , Once **AND** sodium chloride 0.9 % flush 10 mL, 10 mL, Intravenous, PRN, Cate Antoine MD    sodium chloride 0.9 % flush 10 mL, 10 mL, Intravenous, Q12H, Lj Loco APRN    sodium chloride 0.9 % flush 10 mL, 10 mL, Intravenous, PRN, Lj Loco APRN    sodium chloride 0.9 % infusion 40 mL, 40 mL, Intravenous, PRN, Lj Loco APRN    sodium chloride 0.9 % infusion, 125 mL/hr, Intravenous, Continuous, Cate Antoine MD, Last Rate: 125 mL/hr at 11/16/23 0922, 125 mL/hr at 11/16/23 0922    Current Outpatient Medications:     allopurinol (ZYLOPRIM) 300 MG tablet, TAKE 1 TABLET BY MOUTH DAILY FOR GOUT, Disp: 90 tablet, Rfl: 3    ascorbic acid (VITAMIN C) 500 MG capsule controlled-release CR capsule, Take 1 tablet by mouth Daily., Disp: , Rfl:     levothyroxine (SYNTHROID, LEVOTHROID) 75 MCG tablet, TAKE 1 TABLET BY MOUTH DAILY FOR THYROID, Disp: 90 tablet, Rfl: 3    metoprolol succinate XL (TOPROL-XL) 25 MG 24 hr tablet, TAKE 1 TABLET BY MOUTH  DAILY FOR HIGH BLOOD PRESSURE AND HEART OR PALPITATIONS, Disp: 90 tablet, Rfl: 3    Multiple Vitamins-Minerals (MULTIVITAMIN ADULTS PO), Take 1 tablet by mouth Daily., Disp: , Rfl:     pravastatin (PRAVACHOL) 40 MG tablet, TAKE 1 TABLET BY MOUTH DAILY, Disp: 90 tablet, Rfl: 3    Hydrocod Abbe-Chlorphe Abbe ER (TUSSIONEX PENNKINETIC) 10-8 MG/5ML ER suspension, Take 1 teaspoon p.o. every 12 hours for cough and congestion from COVID-19 infection., Disp: 90 mL, Rfl: 0    omeprazole (priLOSEC) 40 MG capsule, TAKE 1 CAPSULE BY MOUTH EVERY DAY BEFORE FIRST MEAL FOR STOMACH ACID OR REFLUX, Disp: 30 capsule, Rfl: 1    polyethylene glycol (MIRALAX) packet, Take orally as directed for constipation, Disp: , Rfl:     predniSONE (DELTASONE) 10 MG tablet, 5 by mouth daily 5 days, then 4 daily 2 days, 3 daily 2 days, 2 daily 2 days, 1 daily 2 days, one half daily 2 days and then discontinue., Disp: 46 tablet, Rfl: 0    Restasis 0.05 % ophthalmic emulsion, 2 (two) times a day., Disp: , Rfl:     Assessment and Plan:         Acute lower extremity arterial thromboembolism. Likely due to AF vs hypercoagulable state in setting of #4. Will need AC post op and likely mobile telemetry monitor at PR.   Paroxysmal SVT  History of brief paroxysmal atrial fibrillation in the setting of acute illness 2020  Recent COVID 19 infection  Hypertension  Hyperlipidemia  Chronic kidney disease    Ms. Alarcon is a 89 year old woman with isolated AF in setting of acute illness 3 years ago. She has not been on anticoagulation. She presented with acute arterial thromboembolism and needs urgent non elective surgery. No evidence of acute or active cardiac condition. She has fair functional capacity with out cardiac symptoms, no s/s of heart failure, no critical murmur on physical exam. EKG is unremarkable. She is at acceptable risk. Would proceed as indicated.     Anais Segal, APRN  11/16/23  12:33 EST

## 2023-11-16 NOTE — ANESTHESIA PROCEDURE NOTES
Airway  Urgency: elective    Date/Time: 11/16/2023 2:34 PM  Airway not difficult    General Information and Staff    Patient location during procedure: OR  CRNA/CAA: Ruba Michel CRNA    Indications and Patient Condition  Indications for airway management: airway protection    Preoxygenated: yes  Mask difficulty assessment: 1 - vent by mask    Final Airway Details  Final airway type: endotracheal airway      Successful airway: ETT  Cuffed: yes   Successful intubation technique: direct laryngoscopy  Endotracheal tube insertion site: oral  Blade: Fe  Blade size: 3  ETT size (mm): 7.0  Cormack-Lehane Classification: grade I - full view of glottis  Placement verified by: chest auscultation and capnometry   Measured from: lips  ETT/EBT  to lips (cm): 22  Number of attempts at approach: 1  Assessment: lips, teeth, and gum same as pre-op and atraumatic intubation    Additional Comments   ett cuff up at MOP

## 2023-11-16 NOTE — ED TRIAGE NOTES
Pt presents to ED by private vehicle with left lower leg pain since around 1500 today. Pt's left foot is cool to touch, palpable pulse to left posterior tibialas. States the pain is from behind knee down into calf, but also radiates into upper leg at times.

## 2023-11-16 NOTE — TELEPHONE ENCOUNTER
Caller: Ashley Valdes    Relationship: Emergency Contact    Best call back number:    [phone]     What was the call regarding: PATIENTS DAUGHTER CALLING TO LET  KNOW THAT SHE IS CURRENTLY IN [facility] SHE HAS A BLOOD CLOTT IN HER LEG SHE WILL BE HAVING SURGERY AT 3:30PM

## 2023-11-16 NOTE — ANESTHESIA PREPROCEDURE EVALUATION
Anesthesia Evaluation     Patient summary reviewed and Nursing notes reviewed   NPO Solid Status: > 8 hours  NPO Liquid Status: > 2 hours           Airway   Mallampati: III  TM distance: >3 FB  Large neck circumference  Dental      Comment: Edentulous top, all but 3 teeth missing bottom    Pulmonary - normal exam   (+) a smoker Former,  Cardiovascular - normal exam    ECG reviewed    (+) hypertension, dysrhythmias Paroxysmal Atrial Fib, PVD, hyperlipidemia  (-) valvular problems/murmurs, past MI, CAD, cardiac stents, CABG      Neuro/Psych  (-) seizures, TIA, CVA, syncope  GI/Hepatic/Renal/Endo    (+) obesity, morbid obesity, GERD well controlled, renal disease- CRI, thyroid problem hypothyroidism and thyroid nodules    Musculoskeletal (-) negative ROS    Abdominal    Substance History - negative use     OB/GYN negative ob/gyn ROS         Other - negative ROS                     Anesthesia Plan    ASA 3     general     intravenous induction     Anesthetic plan, risks, benefits, and alternatives have been provided, discussed and informed consent has been obtained with: patient.    CODE STATUS:    Code Status (Patient has no pulse and is not breathing): CPR (Attempt to Resuscitate)  Medical Interventions (Patient has pulse or is breathing): Full Support

## 2023-11-16 NOTE — ED PROVIDER NOTES
EMERGENCY DEPARTMENT ENCOUNTER    Room number:  05/05  Date Seen:  11/16/2023  Time of transfer: 2:45 AM  PCP:  Daryl Santos MD    Laboratory Results:  Recent Results (from the past 24 hour(s))   Protime-INR    Collection Time: 11/16/23  1:04 AM    Specimen: Blood   Result Value Ref Range    Protime 14.5 (H) 11.7 - 14.2 Seconds    INR 1.12 (H) 0.90 - 1.10   aPTT    Collection Time: 11/16/23  1:04 AM    Specimen: Blood   Result Value Ref Range    PTT 29.3 22.7 - 35.4 seconds   D-dimer, Quantitative    Collection Time: 11/16/23  1:04 AM    Specimen: Blood   Result Value Ref Range    D-Dimer, Quantitative 1.02 (H) 0.00 - 0.90 MCGFEU/mL   CBC Auto Differential    Collection Time: 11/16/23  1:04 AM    Specimen: Blood   Result Value Ref Range    WBC 7.74 3.40 - 10.80 10*3/mm3    RBC 4.70 3.77 - 5.28 10*6/mm3    Hemoglobin 14.4 12.0 - 15.9 g/dL    Hematocrit 43.5 34.0 - 46.6 %    MCV 92.6 79.0 - 97.0 fL    MCH 30.6 26.6 - 33.0 pg    MCHC 33.1 31.5 - 35.7 g/dL    RDW 14.5 12.3 - 15.4 %    RDW-SD 49.9 37.0 - 54.0 fl    MPV 9.5 6.0 - 12.0 fL    Platelets 198 140 - 450 10*3/mm3    Neutrophil % 62.1 42.7 - 76.0 %    Lymphocyte % 21.4 19.6 - 45.3 %    Monocyte % 10.3 5.0 - 12.0 %    Eosinophil % 5.3 0.3 - 6.2 %    Basophil % 0.4 0.0 - 1.5 %    Immature Grans % 0.5 0.0 - 0.5 %    Neutrophils, Absolute 4.80 1.70 - 7.00 10*3/mm3    Lymphocytes, Absolute 1.66 0.70 - 3.10 10*3/mm3    Monocytes, Absolute 0.80 0.10 - 0.90 10*3/mm3    Eosinophils, Absolute 0.41 (H) 0.00 - 0.40 10*3/mm3    Basophils, Absolute 0.03 0.00 - 0.20 10*3/mm3    Immature Grans, Absolute 0.04 0.00 - 0.05 10*3/mm3    nRBC 0.0 0.0 - 0.2 /100 WBC   Comprehensive Metabolic Panel    Collection Time: 11/16/23  1:58 AM    Specimen: Blood   Result Value Ref Range    Glucose 109 (H) 65 - 99 mg/dL    BUN 24 (H) 8 - 23 mg/dL    Creatinine 1.44 (H) 0.57 - 1.00 mg/dL    Sodium 138 136 - 145 mmol/L    Potassium 4.0 3.5 - 5.2 mmol/L    Chloride 110 (H) 98 - 107 mmol/L     CO2 21.0 (L) 22.0 - 29.0 mmol/L    Calcium 9.2 8.2 - 9.6 mg/dL    Total Protein 6.0 6.0 - 8.5 g/dL    Albumin 3.4 (L) 3.5 - 5.2 g/dL    ALT (SGPT) 22 1 - 33 U/L    AST (SGOT) 14 1 - 32 U/L    Alkaline Phosphatase 46 39 - 117 U/L    Total Bilirubin 0.4 0.0 - 1.2 mg/dL    Globulin 2.6 gm/dL    A/G Ratio 1.3 g/dL    BUN/Creatinine Ratio 16.7 7.0 - 25.0    Anion Gap 7.0 5.0 - 15.0 mmol/L    eGFR 34.6 (L) >60.0 mL/min/1.73   CK    Collection Time: 11/16/23  1:58 AM    Specimen: Blood   Result Value Ref Range    Creatine Kinase 45 20 - 180 U/L   Magnesium    Collection Time: 11/16/23  1:58 AM    Specimen: Blood   Result Value Ref Range    Magnesium 1.9 1.6 - 2.4 mg/dL   ECG 12 Lead Pre-Op / Pre-Procedure    Collection Time: 11/16/23  5:13 AM   Result Value Ref Range    QT Interval 475 ms    QTC Interval 494 ms     I reviewed the above results.    Radiology:  CT Angio Abdominal Aorta Bilateral Iliofem Runoff   Final Result       1. The patient is noted to have an abrupt termination of the left   popliteal artery within the popliteal fossa. This is favored to be   secondary to thrombus. There is also occlusion of the left profunda   femoris artery within the proximal thigh, which potentially also may be   related to thrombus. Furthermore, the patient has an occlusion of the   right posterior tibial artery within the calf. This potentially also may   be related to thrombus.       Radiation dose reduction techniques were utilized, including automated   exposure control and exposure modulation based on body size.           This report was finalized on 11/16/2023 4:30 AM by Dr. Kathleen Montoya M.D on Workstation: [id number]            I reviewed the above results    Medications ordered in ED:  Medications   sodium chloride 0.9 % flush 10 mL (has no administration in time range)   sodium chloride 0.9 % infusion (125 mL/hr Intravenous New Bag 11/16/23 0107)   heparin 49545 units/250 ml (100 units/ml) in D5W (15.5  Units/kg/hr × 96.6 kg Intravenous Currently Infusing 11/16/23 0523)   heparin (porcine) 5000 UNIT/ML injection 3,900-7,700 Units (has no administration in time range)   iopamidol (ISOVUE-370) 76 % injection 100 mL (95 mL Intravenous Given by Other 11/16/23 0325)   heparin (porcine) 5000 UNIT/ML injection 7,700 Units (7,700 Units Intravenous Given 11/16/23 0517)       ED Course as of 11/16/23 0535   Thu Nov 16, 2023   0130 Patient has dopplerable left DP and PT pulses [AR]   0245 Patient history, ER presentation evaluation as well as plan on evaluating with CTA with runoffs discussed with and care assumed from Dr. Antoine. [DRAKE]   0425 WBC: 7.74 [DRAEK]   0425 Hemoglobin: 14.4 [DRAKE]   0425 Hematocrit: 43.5 [DRAKE]   0425 Platelets: 198 [DRAKE]   0425 Glucose(!): 109 [DRAKE]   0425 BUN(!): 24 [DRAKE]   0425 Creatinine(!): 1.44 [DRAKE]   0425 Sodium: 138 [DRAKE]   0425 Potassium: 4.0 [DRAKE]   0425 Magnesium: 1.9 [DRAKE]   0425 Chloride(!): 110 [DRAKE]   0425 CO2(!): 21.0 [DRAKE]   0425 Creatine Kinase: 45 [DRAKE]   0425 D-Dimer, Quant(!): 1.02 [DRAKE]   0508 Patient history, ER presentation and evaluation discussed with Dr. Dominguez, vascular surgery, who recommended to start a heparin drip with bolus, make the patient n.p.o. except medicines, obtain an EKG for possible A-fib and admit to the hospitalist. [DRAKE]   0524 Patient's ER presentation and evaluation as well as vascular surgery recommendations discussed with ELENA Goodwin, APRN, will admit to a telemetry bed. [DRAKE]   0534 EKG          EKG time: 5:13 AM  Rhythm/Rate: Sinus rhythm at 65 bpm  P waves and DE: Normal  QRS, axis: Borderline prolonged QT interval, probable LVH  ST and T waves: No acute ST/T wave changes    Interpreted Contemporaneously by me, independently viewed  Not significantly changed compared to prior EKG of 6/28/2022.   [DRAKE]      ED Course User Index  [AR] Cate Antoine MD  [DRAKE] Rex Hoffman PA       Progress and Consult Notes:  2:45 AM:  Patient care transferred from Dr. Antoine  pending CT evaluation and disposition.    Diagnosis:  Final diagnoses:   Acute pain of left lower extremity   Arterial occlusion   Chronic kidney disease, unspecified CKD stage   Hypertension, unspecified type   Hyperlipidemia, unspecified hyperlipidemia type   Hypothyroidism, unspecified type       Disposition:  ADMISSION    Discussed treatment plan and reason for admission with pt/family and admitting physician.  Pt/family voiced understanding of the plan for admission for further testing/treatment as needed.       Provider attestation:  I personally reviewed the past medical history, past surgical history, social history, family history, current medications, and allergies as they appear in the chart.    The patient was seen and examined by myself and Dr. Antoine, who agrees with plan.          Rex Hoffman PA  11/16/23 0526       Rex Hoffman PA  11/16/23 0535

## 2023-11-16 NOTE — PLAN OF CARE
Problem: Adult Inpatient Plan of Care  Goal: Plan of Care Review  Outcome: Ongoing, Progressing  Flowsheets (Taken 11/16/2023 1839)  Progress: no change  Plan of Care Reviewed With: patient  Outcome Evaluation: arrived from PACU, VSS, room air, left groin incision CDI, LLE cool with doppler pulses, A&O x4, tolerating clear liquids, no c/o pain at this time.  Goal: Patient-Specific Goal (Individualized)  Outcome: Ongoing, Progressing  Goal: Absence of Hospital-Acquired Illness or Injury  Outcome: Ongoing, Progressing  Intervention: Identify and Manage Fall Risk  Recent Flowsheet Documentation  Taken 11/16/2023 1801 by Kelsey Rubio RN  Safety Promotion/Fall Prevention:   activity supervised   assistive device/personal items within reach   clutter free environment maintained   fall prevention program maintained   nonskid shoes/slippers when out of bed   room organization consistent   safety round/check completed  Intervention: Prevent Skin Injury  Recent Flowsheet Documentation  Taken 11/16/2023 1801 by Kelsey Rubio RN  Body Position: supine, legs elevated  Goal: Optimal Comfort and Wellbeing  Outcome: Ongoing, Progressing  Goal: Readiness for Transition of Care  Outcome: Ongoing, Progressing  Intervention: Mutually Develop Transition Plan  Recent Flowsheet Documentation  Taken 11/16/2023 1741 by Kelsey Rubio, RN  Equipment Currently Used at Home: none  Transportation Anticipated: family or friend will provide  Transportation Concerns: none  Patient/Family Anticipated Services at Transition: none  Patient/Family Anticipates Transition to: home with family     Problem: Hypertension Comorbidity  Goal: Blood Pressure in Desired Range  Outcome: Ongoing, Progressing     Problem: Bleeding (Revascularization)  Goal: Absence of Bleeding  Outcome: Ongoing, Progressing     Problem: Bowel Motility Impaired (Revascularization)  Goal: Effective Bowel Elimination  Outcome: Ongoing, Progressing     Problem:  Fluid and Electrolyte Imbalance (Revascularization)  Goal: Fluid and Electrolyte Balance  Outcome: Ongoing, Progressing     Problem: Infection (Revascularization)  Goal: Absence of Infection Signs and Symptoms  Outcome: Ongoing, Progressing     Problem: Ongoing Anesthesia Effects (Revascularization)  Goal: Anesthesia/Sedation Recovery  Outcome: Ongoing, Progressing  Intervention: Optimize Anesthesia Recovery  Recent Flowsheet Documentation  Taken 11/16/2023 1801 by Kelsey Rubio RN  Safety Promotion/Fall Prevention:   activity supervised   assistive device/personal items within reach   clutter free environment maintained   fall prevention program maintained   nonskid shoes/slippers when out of bed   room organization consistent   safety round/check completed     Problem: Pain (Revascularization)  Goal: Acceptable Pain Control  Outcome: Ongoing, Progressing     Problem: Postoperative Nausea and Vomiting (Revascularization)  Goal: Nausea and Vomiting Relief  Outcome: Ongoing, Progressing     Problem: Postoperative Urinary Retention (Revascularization)  Goal: Effective Urinary Elimination  Outcome: Ongoing, Progressing     Problem: Respiratory Compromise (Revascularization)  Goal: Effective Oxygenation and Ventilation  Outcome: Ongoing, Progressing  Intervention: Optimize Oxygenation and Ventilation  Recent Flowsheet Documentation  Taken 11/16/2023 1801 by Kelsey Rubio RN  Head of Bed (HOB) Positioning: HOB at 30-45 degrees     Problem: Tissue Perfusion Altered (Revascularization)  Goal: Effective Tissue Perfusion  Outcome: Ongoing, Progressing  Intervention: Optimize Tissue Perfusion  Recent Flowsheet Documentation  Taken 11/16/2023 1801 by Kelsey Rubio RN  Body Position: supine, legs elevated   Goal Outcome Evaluation:  Plan of Care Reviewed With: patient        Progress: no change  Outcome Evaluation: arrived from PACU, VSS, room air, left groin incision CDI, LLE cool with doppler pulses, A&O x4,  tolerating clear liquids, no c/o pain at this time.

## 2023-11-16 NOTE — H&P
Patient Name:  Mandy Alarcon  YOB: 1933  MRN:  [id number]  Admit Date:  11/16/2023  Patient Care Team:  Daryl Santos MD as PCP - General (Internal Medicine)      Subjective   History Present Illness     Chief Complaint   Patient presents with    Leg Pain       Ms. Alarcon is a 89 y.o. former smoker with a history of obesity, hypertension, hyperlipidemia, paroxysmal atrial fibrillation without anticoagulation PTA, nocturnal hypoxemia/KARIN without CPAP PTA that presents to [facility] complaining of leg pain, left.    Of note, patient just diagnosed with COVID-19 approximately 3 weeks ago.    Patient answering all questions appropriately reports acute left leg pain and foot coolness beginning at 1500 on 11/15/2023; therefore, went to [place] [facility] for further evaluation.    CTA runoff confirmed left popliteal artery, left femoral, tibial arterial thrombus.  Heparin bolus and drip initiated.  Vascular surgery notified.    EKG NSR without evidence for ischemic changes.    Recommendation pending hospital course.  Details below.    History of Present Illness    Review of Systems   Constitutional:  Negative for chills and fever.   HENT:  Negative for congestion and rhinorrhea.    Eyes:  Negative for visual disturbance.   Respiratory:  Negative for cough and shortness of breath.    Cardiovascular:  Positive for leg swelling. Negative for chest pain.   Gastrointestinal:  Negative for abdominal pain, constipation, diarrhea, nausea and vomiting.   Endocrine: Negative for polydipsia, polyphagia and polyuria.   Genitourinary:  Negative for difficulty urinating and dysuria.   Musculoskeletal:  Positive for gait problem (Left lower extremity pain) and myalgias (LLE).   Skin:  Positive for color change (LLE) and pallor (LLE). Negative for rash and wound.   Neurological:  Negative for syncope, speech difficulty, light-headedness and headaches.   Psychiatric/Behavioral:  Negative for confusion and  hallucinations.         Personal History     Past Medical History:   Diagnosis Date    Benign essential hypertension 10/28/2012    2012--treatment for hypertension begun.    Bilateral lower extremity edema 2020    Bilateral sensorineural hearing loss, wears hearing aids 2017    Left is much worse than right.  Patient had multiple ear infections as a child.    Chronic renal impairment, stage 3b 09/15/2020    Claustrophobia 2016    This patient has significant nocturnal hypoxemia and I think that she could benefit from nocturnal oxygen therapy. The exact etiology of her hypoxemia is not clear. She could possibly have obstructive sleep apnea but this is not documented. We cannot test this patient for sleep apnea due to the fact that she is severely claustrophobic. Patient was a former smoker and it is possibly that COPD he is playing a role.    Combined form of senile cataract 2021    Family history of coronary artery disease 2016    Patient's mother, father, 2 sisters and a brother all  from myocardial infarctions    Gastroesophageal reflux disease without esophagitis 2020    Gout 10/28/2012    2012--initial diagnosis and treatment of gout.    History of acute pancreatitis 2020    Hyperlipidemia 10/28/2012    2012--treatment for hyperlipidemia begun.    Impaired fasting glucose 10/28/2012    2012--initial diagnosis impaired fasting glucose.    Morbidly obese 2019    Nocturnal hypoxemia 2012--patient did not receive nocturnal oxygen because of Medicare regulations.   05/15/2014--overnight oximetry revealed oxygen saturations less than 89% for 22 minutes and 40 seconds. Oxygen saturations less than or equal to 88% for 22 minutes and 40 seconds. Lowest oxygen saturation 83%. The longest continuous time with oxygen saturations less than or equal to 88% was 1 minute and 32 seconds.   2012--overnight oximetry  revealed oxygen saturations less than 90% for one hour and 35 minutes. Oxygen saturations less than 89% 59 minutes. This patient has significant nocturnal hypoxemia and I think that she could benefit from nocturnal oxygen therapy. The exact etiology of her hypoxemia is not clear. She could possibly have obstructive sleep apnea but this is not documented. We cannot test this patient for sleep apnea due to the fact that she is severely claustrophobic. Patient was a former smoker and it is possibly that COPD he is playing a role.    Non morbid obesity 03/11/2019    Osteopenia of multiple sites 09/26/2023    Paroxysmal atrial fibrillation 04/10/2020    Postmenopausal state 09/26/2023    Primary hypothyroidism 11/17/2015 March 11, 2019--TSH remains elevated slightly at 4.92.  Given the overall clinical picture including the multinodular goiter, we will initiate levothyroxine 50 mcg/day and reassess in about 6 weeks.  August 1, 2018--thyroid ultrasound reveals a multinodular thyroid with multiple subcentimeter nodules.  Only minimal increase in size of the largest nodule in the left lobe has occurred when compared     Secondary polycythemia 08/02/2012 11/06/2014--patient did not receive nocturnal oxygen because of Medicare regulations.   05/15/2014--overnight oximetry revealed oxygen saturations less than 89% for 22 minutes and 40 seconds. Oxygen saturations less than or equal to 88% for 22 minutes and 40 seconds. Lowest oxygen saturation 83%. The longest continuous time with oxygen saturations less than or equal to 88% was 1 minute and 32 seconds.   08/02/2012--overnight oximetry revealed oxygen saturations less than 90% for one hour and 35 minutes. Oxygen saturations less than 89% 59 minutes. This patient has significant nocturnal hypoxemia and I think that she could benefit from nocturnal oxygen therapy. The exact etiology of her hypoxemia is not clear. She could possibly have obstructive sleep apnea but this is  not documented. We cannot test this patient for sleep apnea due to the fact that she is severely claustrophobic. Patient was a former smoker and it is possibly that COPD he is playing a role.    Vitamin D deficiency 2016     Past Surgical History:   Procedure Laterality Date    CHOLECYSTECTOMY WITH INTRAOPERATIVE CHOLANGIOGRAM N/A 2020    Procedure: LAPAROSCOPIC CHOLECYSTOSTOMY TUBE, INTRAOPERATIVE CHOLANGIOGRAM;  Surgeon: Bryce Andujar MD;  Location: [facility] OR;  Service: General;  Laterality: N/A;    CHOLECYSTECTOMY WITH INTRAOPERATIVE CHOLANGIOGRAM N/A 2020    Procedure: CHOLECYSTECTOMY LAPAROSCOPIC INTRAOPERATIVE CHOLANGIOGRAM and EGD;  Surgeon: Bryce Andujar MD;  Location: [facility] OR;  Service: General;  Laterality: N/A;    ERCP N/A 2022    Procedure: ENDOSCOPIC RETROGRADE CHOLANGIOPANCREATOGRAPHY with sphincterotomy, balloon sweep 12-15mm;  Surgeon: Mitesh Altamirano MD;  Location: [facility] ENDOSCOPY;  Service: Gastroenterology;  Laterality: N/A;  pre - gallstone pancreatitis  post - s/p sphincterotomy, sludge and  pus    OOPHORECTOMY      age 48    RADICAL ABDOMINAL HYSTERECTOMY  48 years old    48 years of age. Uterine fibroid tumors with menorrhagia - no cancer     Family History   Problem Relation Age of Onset    Heart disease Mother         Ischemic.  from coronary artery disease.    Heart disease Father         Ischemic.  from coronary artery disease.    Heart disease Sister         Ischemic.  from coronary artery disease.    Heart disease Sister         Ischemic.  from coronary artery disease.    Heart disease Brother         Ischemic.  from coronary artery disease.    Breast cancer Daughter     Breast cancer Daughter     Ovarian cancer Neg Hx      Social History     Tobacco Use    Smoking status: Former     Packs/day: .5     Types: Cigarettes     Start date: 1946     Quit date: 1954     Years since quittin.9    Smokeless  tobacco: Never    Tobacco comments:     Stopped drinking at age 30.   Vaping Use    Vaping Use: Never used   Substance Use Topics    Alcohol use: No     Comment: caffiene use tea coffee    Drug use: No     No current facility-administered medications on file prior to encounter.     Current Outpatient Medications on File Prior to Encounter   Medication Sig Dispense Refill    allopurinol (ZYLOPRIM) 300 MG tablet TAKE 1 TABLET BY MOUTH DAILY FOR GOUT 90 tablet 3    ascorbic acid (VITAMIN C) 500 MG capsule controlled-release CR capsule Take 1 tablet by mouth Daily.      levothyroxine (SYNTHROID, LEVOTHROID) 75 MCG tablet TAKE 1 TABLET BY MOUTH DAILY FOR THYROID 90 tablet 3    metoprolol succinate XL (TOPROL-XL) 25 MG 24 hr tablet TAKE 1 TABLET BY MOUTH DAILY FOR HIGH BLOOD PRESSURE AND HEART OR PALPITATIONS 90 tablet 3    Multiple Vitamins-Minerals (MULTIVITAMIN ADULTS PO) Take 1 tablet by mouth Daily.      pravastatin (PRAVACHOL) 40 MG tablet TAKE 1 TABLET BY MOUTH DAILY 90 tablet 3    Hydrocod Abbe-Chlorphe Abbe ER (TUSSIONEX PENNKINETIC) 10-8 MG/5ML ER suspension Take 1 teaspoon p.o. every 12 hours for cough and congestion from COVID-19 infection. 90 mL 0    omeprazole (priLOSEC) 40 MG capsule TAKE 1 CAPSULE BY MOUTH EVERY DAY BEFORE FIRST MEAL FOR STOMACH ACID OR REFLUX 30 capsule 1    polyethylene glycol (MIRALAX) packet Take orally as directed for constipation      predniSONE (DELTASONE) 10 MG tablet 5 by mouth daily 5 days, then 4 daily 2 days, 3 daily 2 days, 2 daily 2 days, 1 daily 2 days, one half daily 2 days and then discontinue. 46 tablet 0    Restasis 0.05 % ophthalmic emulsion 2 (two) times a day.       Allergies   Allergen Reactions    Cefaclor Anaphylaxis, Angioedema and Swelling     caused angioedema 25 years ago; she tolerates cephalexin, amoxicillin, and ceftriaxone per my d/w patient and her daughter as well as amoxicillin-clavulanate confirmed in EPIC  caused angioedema 25 years ago; she tolerates  cephalexin, amoxicillin, and ceftriaxone per my d/w patient and her daughter as well as amoxicillin-clavulanate confirmed in EPIC  caused angioedema 25 years ago; she tolerates cephalexin, amoxicillin, and ceftriaxone per my d/w patient and her daughter as well as amoxicillin-clavulanate confirmed in EPIC  caused angioedema 25 years ago; she tolerates cephalexin, amoxicillin, and ceftriaxone per my d/w patient and her daughter as well as amoxicillin-clavulanate confirmed in [facility]    Sulfa Antibiotics Rash       Objective    Objective     Vital Signs  Temp:  [97.7 °F (36.5 °C)] 97.7 °F (36.5 °C)  Heart Rate:  [51-75] 53  Resp:  [18-20] 18  BP: (137-176)/(54-86) 137/54  SpO2:  [93 %-97 %] 97 %  on   ;   Device (Oxygen Therapy): room air  Body mass index is 37.73 kg/m².    Physical Exam  Constitutional:       General: She is not in acute distress.     Appearance: She is obese. She is not toxic-appearing.   HENT:      Head: Normocephalic and atraumatic.   Eyes:      Extraocular Movements: Extraocular movements intact.      Conjunctiva/sclera: Conjunctivae normal.   Cardiovascular:      Rate and Rhythm: Bradycardia present.      Heart sounds: Normal heart sounds.   Pulmonary:      Effort: Pulmonary effort is normal.      Breath sounds: Normal breath sounds.   Abdominal:      General: Bowel sounds are normal.      Palpations: Abdomen is soft.   Musculoskeletal:         General: Tenderness (LLE--resolved) present.      Cervical back: Normal range of motion and neck supple.      Right lower leg: No edema.      Left lower leg: No edema.   Skin:     General: Skin is warm and dry.   Neurological:      Mental Status: She is alert and oriented to person, place, and time.      Cranial Nerves: No cranial nerve deficit.   Psychiatric:         Behavior: Behavior normal.         Thought Content: Thought content normal.         Results Review:  I reviewed the patient's new clinical results.  I reviewed the patient's new imaging results  and agree with the interpretation.  I reviewed the patient's other test results and agree with the interpretation  I personally viewed and interpreted the patient's EKG/Telemetry data  Discussed with ED provider.    Lab Results (last 24 hours)       Procedure Component Value Units Date/Time    CBC & Differential [[id number]]  (Abnormal) Collected: 11/16/23 0104    Specimen: Blood Updated: 11/16/23 0126    Narrative:      The following orders were created for panel order CBC & Differential.  Procedure                               Abnormality         Status                     ---------                               -----------         ------                     CBC Auto Differential[[id number]]        Abnormal            Final result                 Please view results for these tests on the individual orders.    Protime-INR [[id number]]  (Abnormal) Collected: 11/16/23 0104    Specimen: Blood Updated: 11/16/23 0145     Protime 14.5 Seconds      INR 1.12    aPTT [[id number]]  (Normal) Collected: 11/16/23 0104    Specimen: Blood Updated: 11/16/23 0145     PTT 29.3 seconds     D-dimer, Quantitative [[id number]]  (Abnormal) Collected: 11/16/23 0104    Specimen: Blood Updated: 11/16/23 0145     D-Dimer, Quantitative 1.02 MCGFEU/mL     Narrative:      According to the assay 's published package insert, a normal (<0.50 MCGFEU/mL) D-dimer result in conjunction with a non-high clinical probability assessment, excludes deep vein thrombosis (DVT) and pulmonary embolism (PE) with high sensitivity.    D-dimer values increase with age and this can make VTE exclusion of an older population difficult. To address this, the [employer], based on best available evidence and recent guidelines, recommends that clinicians use age-adjusted D-dimer thresholds in patients greater than 50 years of age with: a) a low probability of PE who do not meet all Pulmonary Embolism Rule Out Criteria, or b) in those with  "intermediate probability of PE.   The formula for an age-adjusted D-dimer cut-off is \"age/100\".  For example, a 60 year old patient would have an age-adjusted cut-off of 0.60 MCGFEU/mL and an 80 year old 0.80 MCGFEU/mL.    CBC Auto Differential [[id number]]  (Abnormal) Collected: 11/16/23 0104    Specimen: Blood Updated: 11/16/23 0126     WBC 7.74 10*3/mm3      RBC 4.70 10*6/mm3      Hemoglobin 14.4 g/dL      Hematocrit 43.5 %      MCV 92.6 fL      MCH 30.6 pg      MCHC 33.1 g/dL      RDW 14.5 %      RDW-SD 49.9 fl      MPV 9.5 fL      Platelets 198 10*3/mm3      Neutrophil % 62.1 %      Lymphocyte % 21.4 %      Monocyte % 10.3 %      Eosinophil % 5.3 %      Basophil % 0.4 %      Immature Grans % 0.5 %      Neutrophils, Absolute 4.80 10*3/mm3      Lymphocytes, Absolute 1.66 10*3/mm3      Monocytes, Absolute 0.80 10*3/mm3      Eosinophils, Absolute 0.41 10*3/mm3      Basophils, Absolute 0.03 10*3/mm3      Immature Grans, Absolute 0.04 10*3/mm3      nRBC 0.0 /100 WBC     Comprehensive Metabolic Panel [[id number]]  (Abnormal) Collected: 11/16/23 0158    Specimen: Blood Updated: 11/16/23 0224     Glucose 109 mg/dL      BUN 24 mg/dL      Creatinine 1.44 mg/dL      Sodium 138 mmol/L      Potassium 4.0 mmol/L      Chloride 110 mmol/L      CO2 21.0 mmol/L      Calcium 9.2 mg/dL      Total Protein 6.0 g/dL      Albumin 3.4 g/dL      ALT (SGPT) 22 U/L      AST (SGOT) 14 U/L      Alkaline Phosphatase 46 U/L      Total Bilirubin 0.4 mg/dL      Globulin 2.6 gm/dL      A/G Ratio 1.3 g/dL      BUN/Creatinine Ratio 16.7     Anion Gap 7.0 mmol/L      eGFR 34.6 mL/min/1.73     Narrative:      GFR Normal >60  Chronic Kidney Disease <60  Kidney Failure <15    The GFR formula is only valid for adults with stable renal function between ages 18 and 70.    CK [[id number]]  (Normal) Collected: 11/16/23 0158    Specimen: Blood Updated: 11/16/23 0224     Creatine Kinase 45 U/L     Magnesium [[id number]]  (Normal) Collected: 11/16/23 0158    " Specimen: Blood Updated: 11/16/23 0224     Magnesium 1.9 mg/dL     High Sensitivity Troponin T [[id number]]  (Abnormal) Collected: 11/16/23 0158    Specimen: Blood Updated: 11/16/23 0704     HS Troponin T 35 ng/L     Narrative:      High Sensitive Troponin T Reference Range:  <14.0 ng/L- Negative Female for AMI  <22.0 ng/L- Negative Male for AMI  >=14 - Abnormal Female indicating possible myocardial injury.  >=22 - Abnormal Male indicating possible myocardial injury.   Clinicians would have to utilize clinical acumen, EKG, Troponin, and serial changes to determine if it is an Acute Myocardial Infarction or myocardial injury due to an underlying chronic condition.         CK [[id number]]  (Normal) Collected: 11/16/23 0726    Specimen: Blood Updated: 11/16/23 0806     Creatine Kinase 43 U/L     High Sensitivity Troponin T 2Hr [[id number]]  (Abnormal) Collected: 11/16/23 0726    Specimen: Blood Updated: 11/16/23 0806     HS Troponin T 33 ng/L      Troponin T Delta -2 ng/L     Narrative:      High Sensitive Troponin T Reference Range:  <14.0 ng/L- Negative Female for AMI  <22.0 ng/L- Negative Male for AMI  >=14 - Abnormal Female indicating possible myocardial injury.  >=22 - Abnormal Male indicating possible myocardial injury.   Clinicians would have to utilize clinical acumen, EKG, Troponin, and serial changes to determine if it is an Acute Myocardial Infarction or myocardial injury due to an underlying chronic condition.                 Imaging Results (Last 24 Hours)       Procedure Component Value Units Date/Time    CT Angio Abdominal Aorta Bilateral Iliofem Runoff [[id number]] Collected: 11/16/23 0411     Updated: 11/16/23 0434    Narrative:      CT ANGIOGRAM OF THE ABDOMEN AND PELVIS AND BILATERAL LOWER EXTREMITY  RUNOFF.     HISTORY: Left-sided leg pain     COMPARISON: June 28, 2022     TECHNIQUE: Axial CT imaging was obtained through the abdomen and pelvis  and bilateral lower extremities. IV contrast was  administered. Three-D  reformatted images were obtained.     FINDINGS:  Images through the lung bases demonstrate some patchy groundglass  infiltrates within both lungs. There are also some subpleural  micronodules which appear stable when compared to prior study, and there  is also some bibasilar scarring.     There is some calcified plaque the origin of the celiac axis. No  hemodynamically significant stenosis is seen. The patient has additional  plaque noted at the origin of the superior mesenteric artery, again,  without hemodynamically significant stenosis. The patient is noted to  have an irregular beaded appearance to the right renal artery, which may  represent fibromuscular dysplasia. There is atherosclerotic involvement  of the abdominal aorta. Inferior mesenteric artery is patent. Calcified  plaque continues into the common iliac arteries bilaterally, without  hemodynamically significant stenosis. The common, external, and internal  iliac arteries are widely patent.     Right common femoral artery is widely patent, as is the profunda femoris  artery. No dynamically significant stenosis of the right superficial  femoral artery is seen. Right popliteal artery is widely patent. There  is a high origin of the right anterior tibial artery. The right anterior  tibial and peroneal arteries are continuous to the foot. The right  posterior tibial artery occludes within the calf. It does appear to  reconstitute at the ankle.     The left common femoral artery is widely patent. The patient has an  occlusion of the left profunda femoris artery within the proximal thigh.  Left superficial femoral artery is patent throughout its course. There  is some mild calcified plaque of the left popliteal artery. There is  abrupt occlusion identified within the left popliteal artery at the  popliteal fossa. This is favored to be secondary to thrombus. There is  reconstitution of the left anterior tibial artery, as well as  the  tibioperoneal trunk. There is a focal severe stenosis of the left  anterior tibial artery within the mid calf. It is continuous to the  ankle, and there is only intermittent opacification of the left dorsalis  pedis artery. The left posterior tibial artery and peroneal artery are  continuous to the foot.     No suspicious hepatic lesions are seen. There is a small hiatal hernia.  There is a duodenal diverticulum. The gallbladder is absent. Dilatation  of the common bile duct is stable, as is a well-circumscribed fluid  collection within the gallbladder fossa. Calcified granulomata are noted  within the spleen. Pancreas and adrenal glands are normal. Kidneys  enhance symmetrically. No hydronephrosis is seen. Lobulated right renal  cyst is noted. There are additional simple appearing cysts noted on the  right kidney. No additional follow-up is necessary. No distal ureteral  or bladder stones are seen. Uterus is absent. There is colonic  diverticulosis. There is no bowel obstruction. The appendix is normal.  There is no evidence of bowel ischemia. No acute osseous abnormalities  are seen.       Impression:         1. The patient is noted to have an abrupt termination of the left  popliteal artery within the popliteal fossa. This is favored to be  secondary to thrombus. There is also occlusion of the left profunda  femoris artery within the proximal thigh, which potentially also may be  related to thrombus. Furthermore, the patient has an occlusion of the  right posterior tibial artery within the calf. This potentially also may  be related to thrombus.     Radiation dose reduction techniques were utilized, including automated  exposure control and exposure modulation based on body size.        This report was finalized on 11/16/2023 4:30 AM by Dr. Kathleen Montoya M.D on Workstation: [id number]               Results for orders placed during the hospital encounter of 06/20/18    Adult Transthoracic Echo Complete  W/ Cont if Necessary Per Protocol    Interpretation Summary  · Left ventricular systolic function is normal. Calculated EF = 60%. Estimated EF was in agreement with the calculated EF. Normal left ventricular cavity size and wall thickness noted. All left ventricular wall segments contract normally.  · Left ventricular diastolic function is normal.  · The aortic valve is abnormal in structure. The valve exhibits sclerosis.  · Mild aortic valve regurgitation is present.      ECG 12 Lead Pre-Op / Pre-Procedure   Preliminary Result   HEART RATE= 65  bpm   RR Interval= 923  ms   LA Interval= 195  ms   P Horizontal Axis= 5  deg   P Front Axis= 75  deg   QRSD Interval= 97  ms   QT Interval= 475  ms   QTcB= 494  ms   QRS Axis= 12  deg   T Wave Axis= 49  deg   - ABNORMAL ECG -   Sinus rhythm   Abnormal R-wave progression, early transition   Probable left ventricular hypertrophy   Borderline prolonged QT interval   Electronically Signed By:    Date and Time of Study: 2023-11-16 05:13:53           Assessment/Plan     Active Hospital Problems    Diagnosis  POA    **Arterial occlusion [I70.90]  Yes    Stage 3b chronic kidney disease [N18.32]  Yes    Paroxysmal atrial fibrillation [I48.0]  Yes    Non morbid obesity [E66.9]  Yes    Benign essential hypertension [I10]  Yes    Hyperlipidemia [E78.2]  Yes      Resolved Hospital Problems   No resolved problems to display.       Ms. Alarcon is a 89 y.o. former smoker with a history of obesity, hypertension, hyperlipidemia, paroxysmal atrial fibrillation without anticoagulation PTA, nocturnal hypoxemia/KARIN without CPAP PTA that presents to [facility] complaining of leg pain, left & admitted for arterial occlusion of left lower extremity.      Arterial occlusion, bilateral: Confirmed on CTA runoff.  Heparin drip infusing--extremity color and temperature already improving per patient and family at bedside.  Vascular surgery consulted plan urgent thrombectomy in OR on  11/16/2023.  Denies history of CAD or chest pain.  Falls precautions.  Strict bedrest for now.  Anticipate therapy pending vascular surgery recommendation.      Benign essential hypertension: BP acceptable acutely.  Continue Toprol-XL 25 mg p.o. daily Protonix regimen.  Telemetry.      Hyperlipidemia: LFTs unremarkable.  Statin continued.      Non morbid obesity: BMI 37.  Complicating all problems.      Paroxysmal atrial fibrillation: Rate controlled with beta-blocker.  No AC PTA.  Heparin drip infusing per above.  Telemetry.  Cardiology consulted.      Stage 3b chronic kidney disease: Serum creatinine appears at baseline.  Avoid nephrotoxins.  Monitor labs.    I discussed the patient's findings and my recommendations with patient, family, & Dr. Montalvo.    VTE Prophylaxis -  heparin drip infusing.  Code Status - Full code.  Confirmed with patient and family at bedside       CJ Perkins  [facility]  11/16/23  10:03 EST

## 2023-11-16 NOTE — CONSULTS
Patient Name: Mandy Alarcon Account #: [id number]    MRN: [id number] Admission Date: 2023      Consulting Service: Vascular Surgery Date of Evaluation: 2023    Requesting Provider: RAFAT Mcgregor MD        [place]      CHIEF COMPLAINT: Left lower extremity arterial thromboembolism  HPI: Mandy Alarcon is a 89 y.o. female is being seen for a consultation and evaluation/management of with left lower extremity arterial thromboembolism with large thrombus load in the left popliteal but preservation of the tibial outflow.  This is almost certainly a cardiac event as her history of atrial fibrillation is noted but she is not currently on any anticoagulation.  She follows with Dr. Laura.  She has had COVID 3 weeks ago and the possibility of other forms of clot can be considered but at this point in time she has multiple other embolic sites including right tibial that is asymptomatic and left profunda femoris artery as well.  Given these findings I believe that atrial fibrillation is likely the source and direct thromboembolectomy is our best answer for this nice lady.  Percutaneous thrombectomy can be pursued but the clot load appears to be very large.  Assuming her cardiac status is stable for general anesthetic we will pursue thrombectomy today on an urgent basis.  Likely her lower extremity is neurologically and motor intact and is well preserved despite the thrombus load.  IV heparin has been initiated    PAST MEDICAL HISTORY:   Past Medical History:   Diagnosis Date    Benign essential hypertension 10/28/2012    2012--treatment for hypertension begun.    Bilateral lower extremity edema 2020    Bilateral sensorineural hearing loss, wears hearing aids 2017    Left is much worse than right.  Patient had multiple ear infections as a child.    Chronic renal impairment, stage 3b 09/15/2020    Claustrophobia 2016    This patient has significant nocturnal hypoxemia and I think  that she could benefit from nocturnal oxygen therapy. The exact etiology of her hypoxemia is not clear. She could possibly have obstructive sleep apnea but this is not documented. We cannot test this patient for sleep apnea due to the fact that she is severely claustrophobic. Patient was a former smoker and it is possibly that COPD he is playing a role.    Combined form of senile cataract 2021    Family history of coronary artery disease 2016    Patient's mother, father, 2 sisters and a brother all  from myocardial infarctions    Gastroesophageal reflux disease without esophagitis 2020    Gout 10/28/2012    2012--initial diagnosis and treatment of gout.    History of acute pancreatitis 2020    Hyperlipidemia 10/28/2012    2012--treatment for hyperlipidemia begun.    Impaired fasting glucose 10/28/2012    2012--initial diagnosis impaired fasting glucose.    Morbidly obese 2019    Nocturnal hypoxemia 2012--patient did not receive nocturnal oxygen because of Medicare regulations.   05/15/2014--overnight oximetry revealed oxygen saturations less than 89% for 22 minutes and 40 seconds. Oxygen saturations less than or equal to 88% for 22 minutes and 40 seconds. Lowest oxygen saturation 83%. The longest continuous time with oxygen saturations less than or equal to 88% was 1 minute and 32 seconds.   2012--overnight oximetry revealed oxygen saturations less than 90% for one hour and 35 minutes. Oxygen saturations less than 89% 59 minutes. This patient has significant nocturnal hypoxemia and I think that she could benefit from nocturnal oxygen therapy. The exact etiology of her hypoxemia is not clear. She could possibly have obstructive sleep apnea but this is not documented. We cannot test this patient for sleep apnea due to the fact that she is severely claustrophobic. Patient was a former smoker and it is possibly that COPD he is playing a  role.    Non morbid obesity 03/11/2019    Osteopenia of multiple sites 09/26/2023    Paroxysmal atrial fibrillation 04/10/2020    Postmenopausal state 09/26/2023    Primary hypothyroidism 11/17/2015 March 11, 2019--TSH remains elevated slightly at 4.92.  Given the overall clinical picture including the multinodular goiter, we will initiate levothyroxine 50 mcg/day and reassess in about 6 weeks.  August 1, 2018--thyroid ultrasound reveals a multinodular thyroid with multiple subcentimeter nodules.  Only minimal increase in size of the largest nodule in the left lobe has occurred when compared     Secondary polycythemia 08/02/2012 11/06/2014--patient did not receive nocturnal oxygen because of Medicare regulations.   05/15/2014--overnight oximetry revealed oxygen saturations less than 89% for 22 minutes and 40 seconds. Oxygen saturations less than or equal to 88% for 22 minutes and 40 seconds. Lowest oxygen saturation 83%. The longest continuous time with oxygen saturations less than or equal to 88% was 1 minute and 32 seconds.   08/02/2012--overnight oximetry revealed oxygen saturations less than 90% for one hour and 35 minutes. Oxygen saturations less than 89% 59 minutes. This patient has significant nocturnal hypoxemia and I think that she could benefit from nocturnal oxygen therapy. The exact etiology of her hypoxemia is not clear. She could possibly have obstructive sleep apnea but this is not documented. We cannot test this patient for sleep apnea due to the fact that she is severely claustrophobic. Patient was a former smoker and it is possibly that COPD he is playing a role.    Vitamin D deficiency 05/23/2016      PAST SURGICAL HISTORY:   Past Surgical History:   Procedure Laterality Date    CHOLECYSTECTOMY WITH INTRAOPERATIVE CHOLANGIOGRAM N/A 03/12/2020    Procedure: LAPAROSCOPIC CHOLECYSTOSTOMY TUBE, INTRAOPERATIVE CHOLANGIOGRAM;  Surgeon: Bryce Andujar MD;  Location: [facility] OR;  Service:  General;  Laterality: N/A;    CHOLECYSTECTOMY WITH INTRAOPERATIVE CHOLANGIOGRAM N/A 2020    Procedure: CHOLECYSTECTOMY LAPAROSCOPIC INTRAOPERATIVE CHOLANGIOGRAM and EGD;  Surgeon: Bryce Andujar MD;  Location: [facility] OR;  Service: General;  Laterality: N/A;    ERCP N/A 2022    Procedure: ENDOSCOPIC RETROGRADE CHOLANGIOPANCREATOGRAPHY with sphincterotomy, balloon sweep 12-15mm;  Surgeon: Mitesh Altamirano MD;  Location: [facility] ENDOSCOPY;  Service: Gastroenterology;  Laterality: N/A;  pre - gallstone pancreatitis  post - s/p sphincterotomy, sludge and  pus    OOPHORECTOMY      age 48    RADICAL ABDOMINAL HYSTERECTOMY  48 years old    48 years of age. Uterine fibroid tumors with menorrhagia - no cancer      FAMILY HISTORY:   Family History   Problem Relation Age of Onset    Heart disease Mother         Ischemic.  from coronary artery disease.    Heart disease Father         Ischemic.  from coronary artery disease.    Heart disease Sister         Ischemic.  from coronary artery disease.    Heart disease Sister         Ischemic.  from coronary artery disease.    Heart disease Brother         Ischemic.  from coronary artery disease.    Breast cancer Daughter     Breast cancer Daughter     Ovarian cancer Neg Hx       SOCIAL HISTORY:   Social History     Tobacco Use    Smoking status: Former     Packs/day: .5     Types: Cigarettes     Start date: 1946     Quit date: 1954     Years since quittin.9    Smokeless tobacco: Never    Tobacco comments:     Stopped drinking at age 30.   Vaping Use    Vaping Use: Never used   Substance Use Topics    Alcohol use: No     Comment: caffiene use tea coffee    Drug use: No      MEDICATIONS:   No current facility-administered medications on file prior to encounter.     Current Outpatient Medications on File Prior to Encounter   Medication Sig Dispense Refill    allopurinol (ZYLOPRIM) 300 MG tablet TAKE 1 TABLET BY MOUTH DAILY FOR  GOUT 90 tablet 3    ascorbic acid (VITAMIN C) 500 MG capsule controlled-release CR capsule Take 1 tablet by mouth Daily.      levothyroxine (SYNTHROID, LEVOTHROID) 75 MCG tablet TAKE 1 TABLET BY MOUTH DAILY FOR THYROID 90 tablet 3    metoprolol succinate XL (TOPROL-XL) 25 MG 24 hr tablet TAKE 1 TABLET BY MOUTH DAILY FOR HIGH BLOOD PRESSURE AND HEART OR PALPITATIONS 90 tablet 3    Multiple Vitamins-Minerals (MULTIVITAMIN ADULTS PO) Take 1 tablet by mouth Daily.      pravastatin (PRAVACHOL) 40 MG tablet TAKE 1 TABLET BY MOUTH DAILY 90 tablet 3    Hydrocod Abbe-Chlorphe Abbe ER (TUSSIONEX PENNKINETIC) 10-8 MG/5ML ER suspension Take 1 teaspoon p.o. every 12 hours for cough and congestion from COVID-19 infection. 90 mL 0    omeprazole (priLOSEC) 40 MG capsule TAKE 1 CAPSULE BY MOUTH EVERY DAY BEFORE FIRST MEAL FOR STOMACH ACID OR REFLUX 30 capsule 1    polyethylene glycol (MIRALAX) packet Take orally as directed for constipation      predniSONE (DELTASONE) 10 MG tablet 5 by mouth daily 5 days, then 4 daily 2 days, 3 daily 2 days, 2 daily 2 days, 1 daily 2 days, one half daily 2 days and then discontinue. 46 tablet 0    Restasis 0.05 % ophthalmic emulsion 2 (two) times a day.               ALLERGIES: Cefaclor and Sulfa antibiotics   COMPLETE REVIEW OF SYSTEMS:     ENT ROS: negative  Cardiovascular ROS: no chest pain or dyspnea on exertion  Respiratory ROS: no cough, shortness of breath, or wheezing  Gastrointestinal ROS: no abdominal pain, change in bowel habits, or black or bloody stools  Neurological ROS: no TIA or stroke symptoms  Genito-Urinary ROS: no dysuria, trouble voiding, or hematuria  Musculoskeletal ROS: positive for -coldness and crampy pain with walking in the left calf  Dermatological ROS: negative  Psychological ROS: negative      PHYSICAL EXAM:   Patient Vitals for the past 24 hrs:   BP Temp Temp src Pulse Resp SpO2 Height Weight   11/16/23 0435 -- -- -- 53 -- -- -- --   11/16/23 0431 137/54 -- -- -- --  "97 % -- --   11/16/23 0231 149/67 -- -- 55 18 96 % -- --   11/16/23 0201 145/72 -- -- 51 -- 97 % -- --   11/16/23 0031 173/86 -- -- -- -- -- -- --   11/15/23 2303 176/79 -- -- -- -- -- -- --   11/15/23 2251 -- 97.7 °F (36.5 °C) Tympanic 75 20 93 % 160 cm (63\") 96.6 kg (213 lb)        General appearance: oriented to person, place, and time and in mild to moderate distress.  Neurological exam reveals alert, oriented, normal speech, no focal findings or movement disorder noted.  ENT exam reveals - ENT exam normal, no neck nodes or sinus tenderness.  CVS exam: normal rate, regular rhythm, normal S1, S2, no murmurs, rubs, clicks or gallops.  Chest: clear to auscultation, no wheezes, rales or rhonchi, symmetric air entry.  Abdominal exam: soft, nontender, nondistended, no masses or organomegaly.  Examination of the right foot reveals warm, good capillary refill.  Left foot slightly cooler but viable with normal motor and sensation.  Pulses: +2 carotids radials and femorals.  Nonpalpable bilateral popliteals or pedal pulses      LABS:      Results Review:       I reviewed the patient's new clinical results.  Results from last 7 days   Lab Units 11/16/23  0104   WBC 10*3/mm3 7.74   HEMOGLOBIN g/dL 14.4   PLATELETS 10*3/mm3 198     Results from last 7 days   Lab Units 11/16/23  0158   SODIUM mmol/L 138   POTASSIUM mmol/L 4.0   CHLORIDE mmol/L 110*   CO2 mmol/L 21.0*   BUN mg/dL 24*   CREATININE mg/dL 1.44*   GLUCOSE mg/dL 109*   Estimated Creatinine Clearance: 28.7 mL/min (A) (by C-G formula based on SCr of 1.44 mg/dL (H)).  Results from last 7 days   Lab Units 11/16/23  0158   CALCIUM mg/dL 9.2   ALBUMIN g/dL 3.4*   MAGNESIUM mg/dL 1.9     Results from last 7 days   Lab Units 11/16/23  0104   PROTIME Seconds 14.5*   INR  1.12*       The following radiologic or non-invasive studies have been reviewed by me: CTA reviewed with images reviewed    Active Hospital Problems    Diagnosis  POA    **Arterial occlusion [I70.90]  Yes "      Resolved Hospital Problems   No resolved problems to display.         ASSESSMENT/PLAN: 89 y.o. female with thromboembolism to both lower extremities on the left side the popliteal is occluded with a large segment of thrombus at the infrageniculate below-knee popliteal that is stopped at the tibial trifurcation which is likely preserved.  She has a smaller clot in the distal branches of the profunda.  On the right side one of her tibial vessels has been knocked out as well but she likely is asymptomatic on that side.  At this point in time she is responding well to heparin and this is keeping her from progressing her event.  The initial event occurred at 3:00 yesterday.  She is in need of thrombectomy.  Percutaneous versus open could be considered but given the clot load behind the knee I recommended an open thromboembolectomy to the left lower extremity.  We will add her on the schedule today and continue heparin drip to the OR.  She and her daughter understand risk benefits complications and plans for thrombectomy with the options for open versus endovascular be decided intraoperatively.      I discussed the plan with the patient and her daughter and they are agreeable to the plan of care at this point. Thank you for this consult.     Nathan Dominguez MD   11/16/23

## 2023-11-16 NOTE — ED PROVIDER NOTES
" EMERGENCY DEPARTMENT ENCOUNTER    Room Number:  E566/1  PCP: Daryl Santos MD  Patient Care Team:  Daryl Santos MD as PCP - General (Internal Medicine)   Independent Historians: Patient    HPI:  Chief Complaint: Left lower extremity pain    A complete HPI/ROS/PMH/PSH/SH/FH are unobtainable due to: None    Chronic or social conditions impacting patient care (Social Determinants of Health): None  (Financial Resource Strain / Food Insecurity / Transportation Needs / Physical Activity / Stress / Social Connections / Intimate Partner Violence / Housing Stability)    Context: Mandy Alarcon is a 89 y.o. female with a history of paroxysmal A-fib, hypertension, hyperlipidemia, hypothyroidism, GERD, CKD, and pancreatitis who presents to the ED c/o acute left lower extremity pain that is episodic and severe.  Patient notes no specific injury but earlier today was getting into her car and then up from a seated position and started to have left lower extremity pain that started in her foot and seemed to shoot sharply to her left calf and even into her left thigh.  Patient said the pain was sharp and shooting and throbbing with a tingling sensation afterward.  Patient's foot felt cold compared to the right and continued to have some tingling.  Patient tried to walk around and move it and stretch with pain subsiding on its own.  However, pain returned randomly without any inciting event.  Patient denies any history of DVT or PE.  Patient denies any history of peripheral vascular disease.  However, patient does report having had COVID about 3 weeks ago that was \"mild\".  Patient also reports having \"thick blood\" with excess platelets requiring therapeutic phlebotomy although she has not had that done since early in the year.  Patient is not on any anticoagulation.    Review of prior external notes (non-ED) -and- Review of prior external test results outside of this encounter: Patient last seen by her primary doctor on " October 3.  Patient was there for routine follow-up of her chronic medical conditions.  Patient treated for CKD, hypertension, hyperlipidemia, hypothyroidism, gout, bilateral lower extremity edema, paroxysmal A-fib, secondary polycythemia, vitamin D deficiency, multinodular goiter, osteopenia, SVT, GERD, hearing loss.    Prescription drug monitoring program review:         PAST MEDICAL HISTORY  Active Ambulatory Problems     Diagnosis Date Noted    Benign essential hypertension 10/28/2012    Claustrophobia 04/28/2016    Gout 10/28/2012    Hyperlipidemia 10/28/2012    Primary hypothyroidism 11/17/2015    Impaired fasting glucose 10/28/2012    Nocturnal hypoxemia 08/02/2012    Secondary polycythemia 08/02/2012    Vitamin D deficiency 05/23/2016    Therapeutic drug monitoring 05/23/2016    Family history of coronary artery disease 05/24/2016    Bilateral sensorineural hearing loss, wears hearing aids 06/14/2017    Multinodular goiter 01/24/2018    Supraventricular tachycardia 07/10/2018    Non morbid obesity 03/11/2019    Bilateral lower extremity edema 03/23/2020    Gastroesophageal reflux disease without esophagitis 03/23/2020    Paroxysmal atrial fibrillation 04/10/2020    Stage 3b chronic kidney disease 09/15/2020    Combined form of senile cataract 03/03/2021    Osteopenia of multiple sites 09/26/2023    Postmenopausal state 09/26/2023    COVID-19 virus infection 10/26/2023     Resolved Ambulatory Problems     Diagnosis Date Noted    History of Cellulitis of trunk, Right 04/28/2016    History of mammogram 04/28/2016    History of pneumococcal vaccination 04/28/2016    Hospital discharge follow-up 07/10/2018    Near syncope 07/10/2018    Chest tightness or pressure 07/10/2018    PND (paroxysmal nocturnal dyspnea) 07/10/2018    Acute biliary pancreatitis without infection or necrosis 02/01/2020    LINNEA (acute kidney injury) 02/02/2020    Pneumonia 02/03/2020    New onset a-fib 02/05/2020    Hyponatremia 02/05/2020     Hypomagnesemia 2020    Leukocytosis 2020    Fever 2020    Acute gallstone pancreatitis 2020    Chronic cholecystitis 2020    Postoperative nausea 2020    Postoperative pain 2020    Hospital discharge follow-up 2020    History of acute pancreatitis 2020    Acute constipation 2020    Gallstone pancreatitis 2020    Hospital discharge follow-up 2020    Left cervical radiculopathy 2020    Parotiditis 2022    Hospital discharge follow-up 2022     Past Medical History:   Diagnosis Date    Chronic renal impairment, stage 3b 09/15/2020    Hyperlipidemia 10/28/2012    Morbidly obese 2019         PAST SURGICAL HISTORY  Past Surgical History:   Procedure Laterality Date    CHOLECYSTECTOMY WITH INTRAOPERATIVE CHOLANGIOGRAM N/A 2020    Procedure: LAPAROSCOPIC CHOLECYSTOSTOMY TUBE, INTRAOPERATIVE CHOLANGIOGRAM;  Surgeon: Bryce Andujar MD;  Location: [facility] OR;  Service: General;  Laterality: N/A;    CHOLECYSTECTOMY WITH INTRAOPERATIVE CHOLANGIOGRAM N/A 2020    Procedure: CHOLECYSTECTOMY LAPAROSCOPIC INTRAOPERATIVE CHOLANGIOGRAM and EGD;  Surgeon: Bryce Andujar MD;  Location: [facility] OR;  Service: General;  Laterality: N/A;    ERCP N/A 2022    Procedure: ENDOSCOPIC RETROGRADE CHOLANGIOPANCREATOGRAPHY with sphincterotomy, balloon sweep 12-15mm;  Surgeon: Mitesh Altamirano MD;  Location: [facility] ENDOSCOPY;  Service: Gastroenterology;  Laterality: N/A;  pre - gallstone pancreatitis  post - s/p sphincterotomy, sludge and  pus    OOPHORECTOMY      age 48    RADICAL ABDOMINAL HYSTERECTOMY  48 years old    48 years of age. Uterine fibroid tumors with menorrhagia - no cancer         FAMILY HISTORY  Family History   Problem Relation Age of Onset    Heart disease Mother         Ischemic.  from coronary artery disease.    Heart disease Father         Ischemic.  from coronary artery disease.     Heart disease Sister         Ischemic.  from coronary artery disease.    Heart disease Sister         Ischemic.  from coronary artery disease.    Heart disease Brother         Ischemic.  from coronary artery disease.    Breast cancer Daughter     Breast cancer Daughter     Ovarian cancer Neg Hx     Malig Hyperthermia Neg Hx          SOCIAL HISTORY  Social History     Socioeconomic History    Marital status:     Number of children: 5    Highest education level: GED or equivalent   Tobacco Use    Smoking status: Former     Packs/day: .5     Types: Cigarettes     Start date: 1946     Quit date: 1954     Years since quittin.9    Smokeless tobacco: Never    Tobacco comments:     Stopped drinking at age 30.   Vaping Use    Vaping Use: Never used   Substance and Sexual Activity    Alcohol use: No     Comment: caffiene use tea coffee    Drug use: No    Sexual activity: Not Currently     Partners: Male         ALLERGIES  Cefaclor and Sulfa antibiotics        REVIEW OF SYSTEMS  Review of Systems  Included in HPI  All systems reviewed and negative except for those discussed in HPI.      PHYSICAL EXAM    I have reviewed the triage vital signs and nursing notes.    ED Triage Vitals   Temp Heart Rate Resp BP SpO2   11/15/23 2251 11/15/23 2251 11/15/23 2251 11/15/23 2303 11/15/23 2251   97.7 °F (36.5 °C) 75 20 176/79 93 %      Temp src Heart Rate Source Patient Position BP Location FiO2 (%)   11/15/23 2251 11/15/23 2251 11/15/23 2303 -- --   Tympanic Monitor Sitting         Physical Exam  GENERAL: Pleasant cooperative and conversant  female who appears much younger than stated age, jovial and laughing at times, alert, no acute distress  SKIN: Warm, dry, left lower extremity cool to touch when compared to right lower extremity  HENT: Normocephalic, atraumatic  EYES: no scleral icterus, no conjunctivitis  CV: regular rhythm, regular rate  RESPIRATORY: normal effort, lungs clear, no rhonchi,  no wheezing  ABDOMEN: soft, nontender, nondistended, no rebound, no guarding, obese  MUSCULOSKELETAL: no deformity, no calf tenderness to palpation, no pitting edema, left PT and DP pulses not palpable by myself--will obtain Doppler pulses, capillary refill to all toes on left foot less than 3 seconds, no decreased sensation over left foot or toes  NEURO: alert, moves all extremities, follows commands                                                               LAB RESULTS  Recent Results (from the past 24 hour(s))   Protime-INR    Collection Time: 11/16/23  1:04 AM    Specimen: Blood   Result Value Ref Range    Protime 14.5 (H) 11.7 - 14.2 Seconds    INR 1.12 (H) 0.90 - 1.10   aPTT    Collection Time: 11/16/23  1:04 AM    Specimen: Blood   Result Value Ref Range    PTT 29.3 22.7 - 35.4 seconds   D-dimer, Quantitative    Collection Time: 11/16/23  1:04 AM    Specimen: Blood   Result Value Ref Range    D-Dimer, Quantitative 1.02 (H) 0.00 - 0.90 MCGFEU/mL   CBC Auto Differential    Collection Time: 11/16/23  1:04 AM    Specimen: Blood   Result Value Ref Range    WBC 7.74 3.40 - 10.80 10*3/mm3    RBC 4.70 3.77 - 5.28 10*6/mm3    Hemoglobin 14.4 12.0 - 15.9 g/dL    Hematocrit 43.5 34.0 - 46.6 %    MCV 92.6 79.0 - 97.0 fL    MCH 30.6 26.6 - 33.0 pg    MCHC 33.1 31.5 - 35.7 g/dL    RDW 14.5 12.3 - 15.4 %    RDW-SD 49.9 37.0 - 54.0 fl    MPV 9.5 6.0 - 12.0 fL    Platelets 198 140 - 450 10*3/mm3    Neutrophil % 62.1 42.7 - 76.0 %    Lymphocyte % 21.4 19.6 - 45.3 %    Monocyte % 10.3 5.0 - 12.0 %    Eosinophil % 5.3 0.3 - 6.2 %    Basophil % 0.4 0.0 - 1.5 %    Immature Grans % 0.5 0.0 - 0.5 %    Neutrophils, Absolute 4.80 1.70 - 7.00 10*3/mm3    Lymphocytes, Absolute 1.66 0.70 - 3.10 10*3/mm3    Monocytes, Absolute 0.80 0.10 - 0.90 10*3/mm3    Eosinophils, Absolute 0.41 (H) 0.00 - 0.40 10*3/mm3    Basophils, Absolute 0.03 0.00 - 0.20 10*3/mm3    Immature Grans, Absolute 0.04 0.00 - 0.05 10*3/mm3    nRBC 0.0 0.0 - 0.2 /100  WBC   Comprehensive Metabolic Panel    Collection Time: 11/16/23  1:58 AM    Specimen: Blood   Result Value Ref Range    Glucose 109 (H) 65 - 99 mg/dL    BUN 24 (H) 8 - 23 mg/dL    Creatinine 1.44 (H) 0.57 - 1.00 mg/dL    Sodium 138 136 - 145 mmol/L    Potassium 4.0 3.5 - 5.2 mmol/L    Chloride 110 (H) 98 - 107 mmol/L    CO2 21.0 (L) 22.0 - 29.0 mmol/L    Calcium 9.2 8.2 - 9.6 mg/dL    Total Protein 6.0 6.0 - 8.5 g/dL    Albumin 3.4 (L) 3.5 - 5.2 g/dL    ALT (SGPT) 22 1 - 33 U/L    AST (SGOT) 14 1 - 32 U/L    Alkaline Phosphatase 46 39 - 117 U/L    Total Bilirubin 0.4 0.0 - 1.2 mg/dL    Globulin 2.6 gm/dL    A/G Ratio 1.3 g/dL    BUN/Creatinine Ratio 16.7 7.0 - 25.0    Anion Gap 7.0 5.0 - 15.0 mmol/L    eGFR 34.6 (L) >60.0 mL/min/1.73   CK    Collection Time: 11/16/23  1:58 AM    Specimen: Blood   Result Value Ref Range    Creatine Kinase 45 20 - 180 U/L   Magnesium    Collection Time: 11/16/23  1:58 AM    Specimen: Blood   Result Value Ref Range    Magnesium 1.9 1.6 - 2.4 mg/dL   High Sensitivity Troponin T    Collection Time: 11/16/23  1:58 AM    Specimen: Blood   Result Value Ref Range    HS Troponin T 35 (H) <14 ng/L   ECG 12 Lead Pre-Op / Pre-Procedure    Collection Time: 11/16/23  5:13 AM   Result Value Ref Range    QT Interval 475 ms    QTC Interval 494 ms   CK    Collection Time: 11/16/23  7:26 AM    Specimen: Blood   Result Value Ref Range    Creatine Kinase 43 20 - 180 U/L   High Sensitivity Troponin T 2Hr    Collection Time: 11/16/23  7:26 AM    Specimen: Blood   Result Value Ref Range    HS Troponin T 33 (H) <14 ng/L    Troponin T Delta -2 >=-4 - <+4 ng/L   Type & Screen    Collection Time: 11/16/23 11:10 AM    Specimen: Blood   Result Value Ref Range    ABO Type A     RH type Positive     Antibody Screen Negative     T&S Expiration Date 11/19/2023 11:59:59 PM    aPTT    Collection Time: 11/16/23 11:54 AM    Specimen: Blood   Result Value Ref Range    PTT >200.0 (C) 22.7 - 35.4 seconds       I ordered  the above labs and independently reviewed the results.        RADIOLOGY  Arteriogram (Autofinalize)    Result Date: 11/16/2023  This procedure was auto-finalized with no dictation required.    CT Angio Abdominal Aorta Bilateral Iliofem Runoff    Result Date: 11/16/2023  CT ANGIOGRAM OF THE ABDOMEN AND PELVIS AND BILATERAL LOWER EXTREMITY RUNOFF.  HISTORY: Left-sided leg pain  COMPARISON: June 28, 2022  TECHNIQUE: Axial CT imaging was obtained through the abdomen and pelvis and bilateral lower extremities. IV contrast was administered. Three-D reformatted images were obtained.  FINDINGS: Images through the lung bases demonstrate some patchy groundglass infiltrates within both lungs. There are also some subpleural micronodules which appear stable when compared to prior study, and there is also some bibasilar scarring.  There is some calcified plaque the origin of the celiac axis. No hemodynamically significant stenosis is seen. The patient has additional plaque noted at the origin of the superior mesenteric artery, again, without hemodynamically significant stenosis. The patient is noted to have an irregular beaded appearance to the right renal artery, which may represent fibromuscular dysplasia. There is atherosclerotic involvement of the abdominal aorta. Inferior mesenteric artery is patent. Calcified plaque continues into the common iliac arteries bilaterally, without hemodynamically significant stenosis. The common, external, and internal iliac arteries are widely patent.  Right common femoral artery is widely patent, as is the profunda femoris artery. No dynamically significant stenosis of the right superficial femoral artery is seen. Right popliteal artery is widely patent. There is a high origin of the right anterior tibial artery. The right anterior tibial and peroneal arteries are continuous to the foot. The right posterior tibial artery occludes within the calf. It does appear to reconstitute at the ankle.   The left common femoral artery is widely patent. The patient has an occlusion of the left profunda femoris artery within the proximal thigh. Left superficial femoral artery is patent throughout its course. There is some mild calcified plaque of the left popliteal artery. There is abrupt occlusion identified within the left popliteal artery at the popliteal fossa. This is favored to be secondary to thrombus. There is reconstitution of the left anterior tibial artery, as well as the tibioperoneal trunk. There is a focal severe stenosis of the left anterior tibial artery within the mid calf. It is continuous to the ankle, and there is only intermittent opacification of the left dorsalis pedis artery. The left posterior tibial artery and peroneal artery are continuous to the foot.  No suspicious hepatic lesions are seen. There is a small hiatal hernia. There is a duodenal diverticulum. The gallbladder is absent. Dilatation of the common bile duct is stable, as is a well-circumscribed fluid collection within the gallbladder fossa. Calcified granulomata are noted within the spleen. Pancreas and adrenal glands are normal. Kidneys enhance symmetrically. No hydronephrosis is seen. Lobulated right renal cyst is noted. There are additional simple appearing cysts noted on the right kidney. No additional follow-up is necessary. No distal ureteral or bladder stones are seen. Uterus is absent. There is colonic diverticulosis. There is no bowel obstruction. The appendix is normal. There is no evidence of bowel ischemia. No acute osseous abnormalities are seen.       1. The patient is noted to have an abrupt termination of the left popliteal artery within the popliteal fossa. This is favored to be secondary to thrombus. There is also occlusion of the left profunda femoris artery within the proximal thigh, which potentially also may be related to thrombus. Furthermore, the patient has an occlusion of the right posterior tibial artery  within the calf. This potentially also may be related to thrombus.  Radiation dose reduction techniques were utilized, including automated exposure control and exposure modulation based on body size.   This report was finalized on 11/16/2023 4:30 AM by Dr. Kathleen Montoya M.D on Workstation: [id number]       I ordered the above noted radiological studies. Reviewed by me. See dictation for official radiology interpretation.      PROCEDURES    Procedures      MEDICATIONS GIVEN IN ER    Medications   sodium chloride 0.9 % flush 10 mL ( Intravenous MAR Unhold 11/16/23 1746)   sodium chloride 0.9 % infusion (125 mL/hr Intravenous New Bag 11/16/23 1822)   allopurinol (ZYLOPRIM) tablet 300 mg (300 mg Oral Not Given 11/16/23 1806)   levothyroxine (SYNTHROID, LEVOTHROID) tablet 75 mcg (75 mcg Oral Not Given 11/16/23 1807)   metoprolol succinate XL (TOPROL-XL) 24 hr tablet 25 mg (25 mg Oral Given 11/16/23 1258)   pravastatin (PRAVACHOL) tablet 40 mg (has no administration in time range)   sodium chloride 0.9 % flush 10 mL (10 mL Intravenous Not Given 11/16/23 1806)   sodium chloride 0.9 % flush 10 mL ( Intravenous MAR Unhold 11/16/23 1746)   sodium chloride 0.9 % infusion 40 mL ( Intravenous MAR Unhold 11/16/23 1746)   sennosides-docusate (PERICOLACE) 8.6-50 MG per tablet 2 tablet (2 tablets Oral Not Given 11/16/23 1806)     And   polyethylene glycol (MIRALAX) packet 17 g ( Oral MAR Unhold 11/16/23 1746)     And   bisacodyl (DULCOLAX) EC tablet 5 mg ( Oral MAR Unhold 11/16/23 1746)     And   bisacodyl (DULCOLAX) suppository 10 mg ( Rectal MAR Unhold 11/16/23 1746)   nitroglycerin (NITROSTAT) SL tablet 0.4 mg ( Sublingual MAR Unhold 11/16/23 1746)   acetaminophen (TYLENOL) tablet 650 mg ( Oral MAR Unhold 11/16/23 1746)     Or   acetaminophen (TYLENOL) 160 MG/5ML oral solution 650 mg ( Oral MAR Unhold 11/16/23 1746)     Or   acetaminophen (TYLENOL) suppository 650 mg ( Rectal MAR Unhold 11/16/23 1746)   ondansetron (ZOFRAN)  tablet 4 mg ( Oral MAR Unhold 11/16/23 1746)     Or   ondansetron (ZOFRAN) injection 4 mg ( Intravenous MAR Unhold 11/16/23 1746)   morphine injection 1 mg ( Intravenous MAR Unhold 11/16/23 1746)     And   naloxone (NARCAN) injection 0.4 mg ( Intravenous MAR Unhold 11/16/23 1746)   famotidine (PEPCID) tablet 40 mg (40 mg Oral Not Given 11/16/23 1806)   lactated ringers infusion ( Intravenous Anesthesia Volume Adjustment 11/16/23 1523)   HYDROcodone-acetaminophen (NORCO) 5-325 MG per tablet 1 tablet (has no administration in time range)   iopamidol (ISOVUE-370) 76 % injection 100 mL (95 mL Intravenous Given by Other 11/16/23 0325)   heparin (porcine) 5000 UNIT/ML injection 7,700 Units (7,700 Units Intravenous Given 11/16/23 0517)   vancomycin IVPB 1500 mg in 0.9% NaCl (Premix) 500 mL (1,500 mg Intravenous New Bag 11/16/23 1358)   iodixanol (VISIPAQUE) 320 MG/ML injection 100 mL (15 mL Intra-arterial Given by Other 11/16/23 1610)         ORDERS PLACED DURING THIS VISIT:  Orders Placed This Encounter   Procedures    CT Angio Abdominal Aorta Bilateral Iliofem Runoff    Arteriogram (Autofinalize)    Comprehensive Metabolic Panel    Protime-INR    aPTT    D-dimer, Quantitative    CK    Magnesium    CBC Auto Differential    aPTT    aPTT    High Sensitivity Troponin T    CK    High Sensitivity Troponin T 2Hr    Basic Metabolic Panel    CBC (No Diff)    aPTT    Diet: Regular/House Diet; Texture: Regular Texture (IDDSI 7); Fluid Consistency: Thin (IDDSI 0)    Doppler pulse    Verify All Anticoagulant Orders Are Discontinued Upon Initiation of Heparin Protocol (eg Enoxaparin, Fondaparinux, Apixaban, Dabigatran, Edoxaban, or Rivaroxaban)    RN To Release aPTT Order 6 Hours After Heparin Bolus & 6 Hours After Any Heparin Rate Change    Obtain Informed Consent    Vital Signs    Intake & Output    Weigh Patient    Oral Care    Telemetry - Place Orders & Notify Provider of Results When Patient Experiences Acute Chest Pain,  Dysrhythmia or Respiratory Distress    May Be Off Telemetry for Tests    Activity - Strict Bed Rest    Assess Need for Indwelling Urinary Catheter - Follow Removal Protocol    Urinary Catheter Care    Pulse Oximetry, Continuous    Bladder Scan if Patient Unable to Void 4-6 Hours After Catheter Removal    If Bladder Scan Volume is Less Than 500mL & Patient is Without Symptoms of Bladder Discomfort / Distention Monitor Every 1-2 Hours for Spontaneous Void    Straight Cath Every 4-6 Hours As Needed If Patient is Unable to Void After 4-6 Hours, Bladder Scan Volume is Greater Than 500mL & Patient Has Symptoms of Bladder Discomfort / Distention    Notify Provider if Bladder Distention Continues    Consult Pharmacist For Review of Medications That May Cause Urinary Retention - RN To Place Order for Consult it Needed    Schedule / Prompt Voiding For Patients With Urinary Incontinence    Adjust Heparin Rate Based on aPTT Using Nomogram    Advance Diet As Tolerated -    At 4:30 PM start heparin drip at 500 units an hour.  Do not follow protocol or give boluses until 7 AM.  Nursing Communication    Code Status and Medical Interventions:    Vascular Surgery Consult    [facility] (on-call MD unless specified) Details    Inpatient Cardiology Consult    Incentive Spirometry    Oxygen Therapy- Nasal Cannula; Titrate 1-6 LPM Per SpO2; 90 - 95%    Oxygen Therapy- Nasal Cannula; Titrate 1-6 LPM Per SpO2; 90 - 95%    ECG 12 Lead Pre-Op / Pre-Procedure    Type & Screen    Insert Peripheral IV    Insert Peripheral IV    Inpatient Admission    CBC & Differential    CBC & Differential         PROGRESS, DATA ANALYSIS, CONSULTS, AND MEDICAL DECISION MAKING    All labs have been independently interpreted by me.  All radiology studies have been reviewed by me.   EKG's independently viewed and interpreted by me.  Discussion below represents my analysis of pertinent findings related to patient's condition, differential diagnosis, treatment plan and  final disposition.    MDM patient presenting with left lower extremity episodic pain with cool foot with compared to the right and only dopplerable pulses.  Concern for claudication but not abrupt arterial cut off given the patient's pulses are dopplerable and capillary refill reassuring.  Patient is at increased risk for clotting diathesis given recent COVID and history of secondary polycythemia.  Will obtain basic labs including D-dimer and CT angio abdominal aorta with lower extremity runoffs.    ED Course as of 11/16/23 1945   Thu Nov 16, 2023   0130 Patient has dopplerable left DP and PT pulses [AR]   0245 Patient history, ER presentation evaluation as well as plan on evaluating with CTA with runoffs discussed with and care assumed from Dr. Antoine. [DRAKE]   0425 WBC: 7.74 [DRAKE]   0425 Hemoglobin: 14.4 [DRAKE]   0425 Hematocrit: 43.5 [DRAKE]   0425 Platelets: 198 [DRAKE]   0425 Glucose(!): 109 [DRAKE]   0425 BUN(!): 24 [DRAKE]   0425 Creatinine(!): 1.44 [DRAKE]   0425 Sodium: 138 [DRAKE]   0425 Potassium: 4.0 [DRAKE]   0425 Magnesium: 1.9 [DRAKE]   0425 Chloride(!): 110 [DRAKE]   0425 CO2(!): 21.0 [DRAKE]   0425 Creatine Kinase: 45 [DRAKE]   0425 D-Dimer, Quant(!): 1.02 [DRAKE]   0508 Patient history, ER presentation and evaluation discussed with Dr. Dominguez, vascular surgery, who recommended to start a heparin drip with bolus, make the patient n.p.o. except medicines, obtain an EKG for possible A-fib and admit to the hospitalist. [DRAKE]   0524 Patient's ER presentation and evaluation as well as vascular surgery recommendations discussed with ELENA Goodwin, CJ, will admit to a telemetry bed. [DRAKE]   0534 EKG          EKG time: 5:13 AM  Rhythm/Rate: Sinus rhythm at 65 bpm  P waves and CT: Normal  QRS, axis: Borderline prolonged QT interval, probable LVH  ST and T waves: No acute ST/T wave changes    Interpreted Contemporaneously by me, independently viewed  Not significantly changed compared to prior EKG of 6/28/2022.   [DRAKE]      ED Course User Index  [AR]  Cate Antoine MD  [DRAKE] Rosey Hoffman, PA         PPE: I wore and adhered to appropriate PPE per hospital protocols for specific patient presentation. (For respiratory patients with suspected Covid-19 or other infectious etiology suspected for patient's symptoms, the patient wore a mask and I wore an N95 mask throughout the entire patient encounter.) Proper hand hygiene both before and after patient encounter was performed as well.         AS OF 19:45 EST VITALS:    BP - 129/69  HR - 62  TEMP - 97.6 °F (36.4 °C) (Oral)  O2 SATS - 98%        DIAGNOSIS  Final diagnoses:   Acute pain of left lower extremity   Arterial occlusion   Chronic kidney disease, unspecified CKD stage   Hypertension, unspecified type   Hyperlipidemia, unspecified hyperlipidemia type   Hypothyroidism, unspecified type         DISPOSITION  ED Disposition       ED Disposition   Decision to Admit    Condition   --    Comment   Level of Care: Telemetry [5]   Diagnosis: Arterial occlusion [[id number]]   Admitting Physician: ROSEY GILMAN [[id number]]   Attending Physician: ROSEY GILMAN [[id number]]   Certification: I Certify That Inpatient Hospital Services Are Medically Necessary For Greater Than 2 Midnights                    Note Disclaimer: At [facility], we believe that sharing information builds trust and better relationships. You are receiving this note because you recently visited [facility]. It is possible you will see health information before a provider has talked with you about it. This kind of information can be easy to misunderstand. To help you fully understand what it means for your health, we urge you to discuss this note with your provider.         Cate Antoine MD  11/16/23 0237       Cate Antoine MD  11/16/23 1946

## 2023-11-16 NOTE — OP NOTE
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    Anesthesia: General    Staff:   Circulator: Meeta Tate RN  Radiology Technologist: Janet Cuadra  Scrub Person: Isabel Lora; Lena Rosa  Assistant: Yadira Wilkins CSA    Estimated Blood Loss: 25-50 cc    Specimens:   Order Name Source Comment Collection Info Order Time   TYPE AND SCREEN   Collected By: Amy Patrick RN 11/16/2023 11:05 AM     Release to patient   Routine Release            Complications: None apparent    Findings: Acute thrombus extracted from the distal popliteal artery.  Following thrombectomy there was strong multiphasic Doppler flow noted in the posterior tibial and dorsalis pedis arteries.  Radiographic findings noted on the arteriogram as described in the procedure.    Indications:  As in preop diagnosis           Procedure: Patient was placed on the operating table in supine position.  After satisfactory general endotracheal anesthesia was achieved patient was prepped from the umbilicus circumferentially to the toes on the left and draped in usual sterile fashion.  At a tangential incision was made in the left groin and the electrocautery was used to dissect through the subcutaneous tissue and femoral sheath.  The common femoral superficial femoral and profundofemoral arteries were isolated.  Vesseloops were passed around the structures.  10,000 units of heparin was given intravenously.  A transverse arteriotomy  was made in the proximal superficial femoral artery after the proximal artery was clamped.  3 Ashly catheter was passed distally to its fullest extent.  There was retrieval of significant amount of recent thrombus.  There was good backbleeding.  2 more passes were made until no further thrombus was removed.  Lumen was irrigated with heparinized saline solution.  The arteriotomy was closed with a running 5-0 Prolene suture.  Following this an access needle was placed into the superficial femoral artery distal to the arteriotomy.  Guidewire was placed and a 6 Persian sheath was placed over the guidewire.  Hand-held injections with segmental angiograms were performed from the proximal thigh to the lower leg.  The superficial femoral and popliteal arteries were normal.  The tibial peroneal trunk was normal.  The proximal anterior tibial artery was normal but at about mid calf level it appeared to occlude.  It reconstituted distally above the ankle.  The posterior tibial and peroneal arteries were patent throughout but with spasm changes noted, as a result of passing the embolectomy catheter.  The puncture site was repaired with a 6-0 Prolene suture.  With a hand-held Doppler there were multiphasic Doppler signals noted in the posterior tibial and dorsalis pedis arteries.  The foot was warm and well-perfused with good capillary refill and good superficial venous filling.  The deep subcutaneous tissue was closed in 2 layers with running 2-0 Vicryl suture and the superficial subcutaneous tissue closed with running 3-0 Vicryl suture.  The skin was closed with running 4-0 Vicryl in a subcuticular fashion.  Dermabond was placed over the incision.  Sponge, needle, and instrument counts were all reported as correct.  Patient was extubated and transported to recovery room in satisfactory condition.        Radiographic Findings:  1) normal superficial femoral and popliteal arteries, normal tibial peroneal trunk, and multiple  segmental spasm changes noted in the posterior tibial and peroneal arteries.      Active Hospital Problems    Diagnosis  POA    **Arterial occlusion [I70.90]  Yes    Stage 3b chronic kidney disease [N18.32]  Yes    Paroxysmal atrial fibrillation [I48.0]  Yes    Non morbid obesity [E66.9]  Yes    Benign essential hypertension [I10]  Yes    Hyperlipidemia [E78.2]  Yes      Resolved Hospital Problems   No resolved problems to display.      Jace Chowdhury Jr., MD     Date: 11/16/2023  Time: 16:07 EST

## 2023-11-17 PROBLEM — I70.209 ARTERIAL OCCLUSION, LOWER EXTREMITY: Status: ACTIVE | Noted: 2023-11-17

## 2023-11-17 LAB
ANION GAP SERPL CALCULATED.3IONS-SCNC: 6.8 MMOL/L (ref 5–15)
APTT PPP: 46.2 SECONDS (ref 22.7–35.4)
APTT PPP: 56.5 SECONDS (ref 22.7–35.4)
BASOPHILS # BLD AUTO: 0.01 10*3/MM3 (ref 0–0.2)
BASOPHILS NFR BLD AUTO: 0.1 % (ref 0–1.5)
BUN SERPL-MCNC: 15 MG/DL (ref 8–23)
BUN/CREAT SERPL: 15.2 (ref 7–25)
CALCIUM SPEC-SCNC: 7.7 MG/DL (ref 8.2–9.6)
CHLORIDE SERPL-SCNC: 112 MMOL/L (ref 98–107)
CO2 SERPL-SCNC: 18.2 MMOL/L (ref 22–29)
CREAT SERPL-MCNC: 0.99 MG/DL (ref 0.57–1)
DEPRECATED RDW RBC AUTO: 48.7 FL (ref 37–54)
EGFRCR SERPLBLD CKD-EPI 2021: 54.3 ML/MIN/1.73
EOSINOPHIL # BLD AUTO: 0 10*3/MM3 (ref 0–0.4)
EOSINOPHIL NFR BLD AUTO: 0 % (ref 0.3–6.2)
ERYTHROCYTE [DISTWIDTH] IN BLOOD BY AUTOMATED COUNT: 14.3 % (ref 12.3–15.4)
GLUCOSE SERPL-MCNC: 134 MG/DL (ref 65–99)
HCT VFR BLD AUTO: 38.5 % (ref 34–46.6)
HGB BLD-MCNC: 12.4 G/DL (ref 12–15.9)
IMM GRANULOCYTES # BLD AUTO: 0.02 10*3/MM3 (ref 0–0.05)
IMM GRANULOCYTES NFR BLD AUTO: 0.3 % (ref 0–0.5)
LYMPHOCYTES # BLD AUTO: 0.59 10*3/MM3 (ref 0.7–3.1)
LYMPHOCYTES NFR BLD AUTO: 8.2 % (ref 19.6–45.3)
MCH RBC QN AUTO: 29.7 PG (ref 26.6–33)
MCHC RBC AUTO-ENTMCNC: 32.2 G/DL (ref 31.5–35.7)
MCV RBC AUTO: 92.3 FL (ref 79–97)
MONOCYTES # BLD AUTO: 0.35 10*3/MM3 (ref 0.1–0.9)
MONOCYTES NFR BLD AUTO: 4.8 % (ref 5–12)
NEUTROPHILS NFR BLD AUTO: 6.26 10*3/MM3 (ref 1.7–7)
NEUTROPHILS NFR BLD AUTO: 86.6 % (ref 42.7–76)
NRBC BLD AUTO-RTO: 0 /100 WBC (ref 0–0.2)
PLATELET # BLD AUTO: 179 10*3/MM3 (ref 140–450)
PMV BLD AUTO: 9.5 FL (ref 6–12)
POTASSIUM SERPL-SCNC: 4.9 MMOL/L (ref 3.5–5.2)
RBC # BLD AUTO: 4.17 10*6/MM3 (ref 3.77–5.28)
SODIUM SERPL-SCNC: 137 MMOL/L (ref 136–145)
WBC NRBC COR # BLD AUTO: 7.23 10*3/MM3 (ref 3.4–10.8)

## 2023-11-17 PROCEDURE — 80048 BASIC METABOLIC PNL TOTAL CA: CPT | Performed by: SURGERY

## 2023-11-17 PROCEDURE — 99232 SBSQ HOSP IP/OBS MODERATE 35: CPT | Performed by: INTERNAL MEDICINE

## 2023-11-17 PROCEDURE — 85730 THROMBOPLASTIN TIME PARTIAL: CPT | Performed by: SURGERY

## 2023-11-17 PROCEDURE — 36415 COLL VENOUS BLD VENIPUNCTURE: CPT | Performed by: SURGERY

## 2023-11-17 PROCEDURE — 85025 COMPLETE CBC W/AUTO DIFF WBC: CPT | Performed by: SURGERY

## 2023-11-17 PROCEDURE — 25010000002 MORPHINE PER 10 MG: Performed by: SURGERY

## 2023-11-17 PROCEDURE — 25010000002 HEPARIN (PORCINE) PER 1000 UNITS: Performed by: SURGERY

## 2023-11-17 RX ORDER — HEPARIN SODIUM 5000 [USP'U]/ML
40-80 INJECTION, SOLUTION INTRAVENOUS; SUBCUTANEOUS EVERY 6 HOURS PRN
Status: DISCONTINUED | OUTPATIENT
Start: 2023-11-17 | End: 2023-11-17

## 2023-11-17 RX ORDER — HEPARIN SODIUM 10000 [USP'U]/100ML
15.5 INJECTION, SOLUTION INTRAVENOUS
Status: DISCONTINUED | OUTPATIENT
Start: 2023-11-17 | End: 2023-11-17

## 2023-11-17 RX ORDER — POLYETHYLENE GLYCOL 3350 17 G/17G
17 POWDER, FOR SOLUTION ORAL DAILY
Status: DISCONTINUED | OUTPATIENT
Start: 2023-11-17 | End: 2023-11-18 | Stop reason: HOSPADM

## 2023-11-17 RX ORDER — BISACODYL 5 MG/1
5 TABLET, DELAYED RELEASE ORAL DAILY PRN
Status: DISCONTINUED | OUTPATIENT
Start: 2023-11-17 | End: 2023-11-18 | Stop reason: HOSPADM

## 2023-11-17 RX ORDER — BISACODYL 10 MG
10 SUPPOSITORY, RECTAL RECTAL DAILY PRN
Status: DISCONTINUED | OUTPATIENT
Start: 2023-11-17 | End: 2023-11-18 | Stop reason: HOSPADM

## 2023-11-17 RX ORDER — AMOXICILLIN 250 MG
2 CAPSULE ORAL 2 TIMES DAILY
Status: DISCONTINUED | OUTPATIENT
Start: 2023-11-17 | End: 2023-11-18 | Stop reason: HOSPADM

## 2023-11-17 RX ADMIN — HYDROCODONE BITARTRATE AND ACETAMINOPHEN 1 TABLET: 5; 325 TABLET ORAL at 19:58

## 2023-11-17 RX ADMIN — FAMOTIDINE 40 MG: 20 TABLET ORAL at 09:39

## 2023-11-17 RX ADMIN — ALLOPURINOL 300 MG: 300 TABLET ORAL at 09:39

## 2023-11-17 RX ADMIN — LEVOTHYROXINE SODIUM 75 MCG: 75 TABLET ORAL at 06:06

## 2023-11-17 RX ADMIN — PRAVASTATIN SODIUM 40 MG: 40 TABLET ORAL at 19:57

## 2023-11-17 RX ADMIN — DOCUSATE SODIUM 50MG AND SENNOSIDES 8.6MG 2 TABLET: 8.6; 5 TABLET, FILM COATED ORAL at 19:57

## 2023-11-17 RX ADMIN — HYDROCODONE BITARTRATE AND ACETAMINOPHEN 1 TABLET: 5; 325 TABLET ORAL at 11:50

## 2023-11-17 RX ADMIN — APIXABAN 5 MG: 5 TABLET, FILM COATED ORAL at 09:39

## 2023-11-17 RX ADMIN — HYDROCODONE BITARTRATE AND ACETAMINOPHEN 1 TABLET: 5; 325 TABLET ORAL at 23:59

## 2023-11-17 RX ADMIN — SODIUM CHLORIDE 125 ML/HR: 9 INJECTION, SOLUTION INTRAVENOUS at 05:30

## 2023-11-17 RX ADMIN — HEPARIN SODIUM 15.5 UNITS/KG/HR: 10000 INJECTION, SOLUTION INTRAVENOUS at 06:54

## 2023-11-17 RX ADMIN — APIXABAN 5 MG: 5 TABLET, FILM COATED ORAL at 19:58

## 2023-11-17 RX ADMIN — HYDROCODONE BITARTRATE AND ACETAMINOPHEN 1 TABLET: 5; 325 TABLET ORAL at 15:42

## 2023-11-17 RX ADMIN — POLYETHYLENE GLYCOL 3350 17 G: 17 POWDER, FOR SOLUTION ORAL at 09:39

## 2023-11-17 RX ADMIN — METOPROLOL SUCCINATE 25 MG: 25 TABLET, EXTENDED RELEASE ORAL at 09:39

## 2023-11-17 RX ADMIN — MORPHINE SULFATE 1 MG: 2 INJECTION, SOLUTION INTRAMUSCULAR; INTRAVENOUS at 12:39

## 2023-11-17 RX ADMIN — Medication 10 ML: at 09:39

## 2023-11-17 RX ADMIN — Medication 10 ML: at 19:58

## 2023-11-17 RX ADMIN — HYDROCODONE BITARTRATE AND ACETAMINOPHEN 1 TABLET: 5; 325 TABLET ORAL at 03:02

## 2023-11-17 RX ADMIN — MORPHINE SULFATE 1 MG: 2 INJECTION, SOLUTION INTRAMUSCULAR; INTRAVENOUS at 21:08

## 2023-11-17 NOTE — PROGRESS NOTES
[facility] Follow Up    Chief Complaint: follow up acute lower extremity thromboembolism    Interval History: Feeling well today.  Denies any further lower extremity pain.  No shortness of breath or chest pain.  She is in good spirits this morning.    Objective:     Objective:  Temp:  [97.5 °F (36.4 °C)-98.6 °F (37 °C)] 98.6 °F (37 °C)  Heart Rate:  [52-73] 59  Resp:  [16-20] 18  BP: (100-161)/(56-95) 130/59     Intake/Output Summary (Last 24 hours) at 11/17/2023 0733  Last data filed at 11/17/2023 0700  Gross per 24 hour   Intake 3540.19 ml   Output 245 ml   Net 3295.19 ml     Body mass index is 37.73 kg/m².      11/15/23  2251   Weight: 96.6 kg (213 lb)     Weight change:       Physical Exam:   General : Alert, cooperative, in no acute distress.  Neuro: Alert,cooperative and oriented.  Lungs: CTAB. Normal respiratory effort and rate.  CV: Regular rate and rhythm, normal S1 and S2, no murmurs, gallops or rubs.  ABD: Soft, nontender, nondistended. Positive bowel sounds.  Extr: No edema or cyanosis, moves all extremities.    Lab Review:   Results from last 7 days   Lab Units 11/17/23  0532 11/16/23  0158   SODIUM mmol/L 137 138   POTASSIUM mmol/L 4.9 4.0   CHLORIDE mmol/L 112* 110*   CO2 mmol/L 18.2* 21.0*   BUN mg/dL 15 24*   CREATININE mg/dL 0.99 1.44*   GLUCOSE mg/dL 134* 109*   CALCIUM mg/dL 7.7* 9.2   AST (SGOT) U/L  --  14   ALT (SGPT) U/L  --  22     Results from last 7 days   Lab Units 11/16/23  0726 11/16/23  0158   CK TOTAL U/L 43 45   HSTROP T ng/L 33* 35*     Results from last 7 days   Lab Units 11/17/23  0532 11/16/23  0104   WBC 10*3/mm3 7.23 7.74   HEMOGLOBIN g/dL 12.4 14.4   HEMATOCRIT % 38.5 43.5   PLATELETS 10*3/mm3 179 198     Results from last 7 days   Lab Units 11/17/23  0532 11/16/23  1154 11/16/23  0104   INR   --   --  1.12*   APTT seconds 46.2* >200.0* 29.3     Results from last 7 days   Lab Units 11/16/23  0158   MAGNESIUM mg/dL 1.9           Invalid input(s):  "\"LDLCALC\"          I reviewed the patient's new clinical results.  I personally viewed and interpreted the patient's EKG  Current Medications:   Scheduled Meds:allopurinol, 300 mg, Oral, Daily  famotidine, 40 mg, Oral, Daily  levothyroxine, 75 mcg, Oral, Q AM  metoprolol succinate XL, 25 mg, Oral, Q24H  pravastatin, 40 mg, Oral, Nightly  senna-docusate sodium, 2 tablet, Oral, BID  sodium chloride, 10 mL, Intravenous, Q12H      Continuous Infusions:heparin, 15.5 Units/kg/hr, Last Rate: 15.5 Units/kg/hr (11/17/23 0654)  lactated ringers, 9 mL/hr        Allergies:  Allergies   Allergen Reactions    Cefaclor Anaphylaxis, Angioedema and Swelling     caused angioedema 25 years ago; she tolerates cephalexin, amoxicillin, and ceftriaxone per my d/w patient and her daughter as well as amoxicillin-clavulanate confirmed in EPIC  caused angioedema 25 years ago; she tolerates cephalexin, amoxicillin, and ceftriaxone per my d/w patient and her daughter as well as amoxicillin-clavulanate confirmed in EPIC  caused angioedema 25 years ago; she tolerates cephalexin, amoxicillin, and ceftriaxone per my d/w patient and her daughter as well as amoxicillin-clavulanate confirmed in EPIC  caused angioedema 25 years ago; she tolerates cephalexin, amoxicillin, and ceftriaxone per my d/w patient and her daughter as well as amoxicillin-clavulanate confirmed in EPIC    Sulfa Antibiotics Rash       Assessment/Plan:     Acute lower extremity arterial thromboembolism.  Likely cardioembolic.  Now status post open left femoral thrombectomy.  No evidence of atrial fibrillation here or symptoms to suggest atrial fibrillation.  Remains on IV heparin.  2.   History of paroxysmal atrial fibrillation.  Occurred in the setting of an acute illness in 2020.  Since then she has not shown any evidence of recurrent atrial fibrillation although cannot entirely rule this out.  No evidence of atrial fibrillation's during this admission so far.  3.   Recent COVID " 19 infection  4.  Paroxysmal supraventricular tachycardia.  No episodes this admission.  5.  Hypertension.  Well-controlled this admission.  6.  Hyperlipidemia.  On pravastatin as outpatient.  7.  Chronic kidney disease.  Creatinine improved this morning.    -As above agree that her presentation is highly suspicious for cardioembolic event.  Although have not been able to confirm any recurrent atrial fibrillation since 2020 certainly agree that this is strongly suspected.  - Transition from heparin infusion to apixaban today.  Agree that she will need to remain on anticoagulation indefinitely.  - At this point I do not see any need to place a long-term monitor at discharge to confirm the diagnosis of atrial fibrillation since it will not change her management in terms of long-term anticoagulation.  -Continue metoprolol the current dose.    Lizbeth Laura MD  11/17/23  07:33 EST

## 2023-11-17 NOTE — PAYOR COMM NOTE
"Marcos Yancey (89 y.o. Female)     PLEASE SEE ATTACHED FOR INPT AUTH     REF # [id number]    PLEASE CALL ELIZABETH KUNZ RN/ DEPT @ [phone]  OR FAX  DEPARTMENT @  [phone]    THANK YOU   ELIZABETH KUNZ RN  Psychiatric           Date of Birth   05/30/1933    Social Security Number       Address   [address] [place] [address]    Home Phone       MRN   [id number]       Synagogue   Rastafarian    Marital Status                               Admission Date   11/16/23    Admission Type   Emergency    Admitting Provider   Balbir Montalvo MD    Attending Provider   Nathan Tam MD    Department, Room/Bed   Psychiatric 5 [facility], E566/1       Discharge Date       Discharge Disposition       Discharge Destination                                 Attending Provider: Nathan Tam MD    Allergies: Cefaclor, Sulfa Antibiotics    Isolation: None   Infection: COVID (History) (11/16/23)   Code Status: CPR    Ht: 160 cm (63\")   Wt: 96.6 kg (213 lb)    Admission Cmt: None   Principal Problem: Arterial occlusion [I70.90]                   Active Insurance as of 11/16/2023       Primary Coverage       Payor Plan Insurance Group Employer/Plan Group    AETNA MEDICARE REPLACEMENT AETNA MEDICARE REPLACEMENT [id number]       Payor Plan Address Payor Plan Phone Number Payor Plan Fax Number Effective Dates    [address] [phone]  10/1/2022 - None Entered    [place] [address]         Subscriber Name Subscriber Birth Date Member ID       MARCOS YANCEY 5/30/1933 [id number]               Secondary Coverage       Payor Plan Insurance Group Employer/Plan Group    [place]        Payor Plan Address Payor Plan Phone Number Payor Plan Fax Number Effective Dates    [address] [phone]  1/1/2017 - None Entered    [place] [address]         Subscriber Name Subscriber Birth Date Member ID       MARCOS YANCEY R 5/30/1933 [id number]  "                    Emergency Contacts        (Rel.) Home Phone Work Phone Mobile Phone    Ashley Valdes (Daughter) -- -- [phone]    DELTA ELIZONDO (Daughter) -- -- [phone]    Izzy Cantu (Daughter) -- -- [phone]    Heather Allen (Daughter) -- -- [phone]    Tracie Barba (Daughter) -- -- [phone]              [place]: [facility] [id number]  Tax ID [id number]     History & Physical        Lj Loco APRN at 11/16/23 0991       Attestation signed by Balbir Montalvo MD at 11/16/23 3535    Addendum:   IBalbir MD, personally performed the services described in this documentation as scribed by the above Lj Loco APRN , and it is both accurate and complete.     I provided a substantive portion of the care of the patient, >50%.  I personally performed the physical exam in its entirety, and below are my findings    General: Alert a, laying in bed, patient appears younger than her age, no acute distress, hard of hearing, pleasant  HEENT: Normocephalic, atraumatic  Eyes: PERRL, EOMI, anicteric sclerae  Lungs: Clear to auscultation bilaterally, no crackles or wheezes  CV: Regular rate and rhythm, no murmurs rubs or gallops  Abdomen: Soft, nontender, nondistended.  Normoactive bowel sounds  Extremities: Bilateral lower extremity swelling, left.  Right  Skin: Clean/dry/intact, no rashes  Neuro: Cranial nerves II through XII intact, no gross focal neurological deficits appreciated  Psych: Appropriate mood and affect      I agree with assessment and plan as per above with the addition of   I reviewed the patient's new clinical results.  I reviewed the patient's new imaging results and agree with the interpretation.  I reviewed the patient's other test results and agree with the interpretation  I personally viewed and interpreted the patient's EKG/Telemetry data  Discussed with ED provider.    Ms. Alarcon is a 89 y.o. former smoker with a history of obesity,  hypertension, hyperlipidemia, paroxysmal atrial fibrillation without anticoagulation PTA, nocturnal hypoxemia/KARIN without CPAP PTA that presents to [facility] complaining of leg pain, left.     Arterial thromboembolism of both lower extremities-CTA with runoff showed the patient is noted to have an abrupt termination of the left popliteal artery within the popliteal fossa. This is favored to be secondary to thrombus. There is also occlusion of the left profunda femoris artery within the proximal thigh, which potentially also may be related to thrombus. Furthermore, the patient has an occlusion of the  right posterior tibial artery within the calf  -Initiated on heparin drip, vascular surgery consulted, plan for thrombectomy today.    Paroxysmal atrial fibrillation: Not on anticoagulation per chart review, EKG shows sinus rhythm with prolonged QT.  Continue home metoprolol XL 25 milligrams daily, on heparin drip as above for acute thrombosis.      CKD stage III: Creatinine 1.44, stable compared to baseline, monitor.      CODE STATUS: Full code  DVT prophylax: Heparin drip  Discussed with Lj Loco APRN    Discussed with family            Balbir Montalvo MD   11/16/2023  12:58 EST                        Patient Name:  Mandy Alarcon  YOB: 1933  MRN:  [id number]  Admit Date:  11/16/2023  Patient Care Team:  Daryl Santos MD as PCP - General (Internal Medicine)      Subjective  History Present Illness     Chief Complaint   Patient presents with    Leg Pain       Ms. Alarcon is a 89 y.o. former smoker with a history of obesity, hypertension, hyperlipidemia, paroxysmal atrial fibrillation without anticoagulation PTA, nocturnal hypoxemia/KARIN without CPAP PTA that presents to [facility] complaining of leg pain, left.    Of note, patient just diagnosed with COVID-19 approximately 3 weeks ago.    Patient answering all questions appropriately reports acute left leg  pain and foot coolness beginning at 1500 on 11/15/2023; therefore, went to [facility] for further evaluation.    CTA runoff confirmed left popliteal artery, left femoral, tibial arterial thrombus.  Heparin bolus and drip initiated.  Vascular surgery notified.    EKG NSR without evidence for ischemic changes.    Recommendation pending hospital course.  Details below.    History of Present Illness    Review of Systems   Constitutional:  Negative for chills and fever.   HENT:  Negative for congestion and rhinorrhea.    Eyes:  Negative for visual disturbance.   Respiratory:  Negative for cough and shortness of breath.    Cardiovascular:  Positive for leg swelling. Negative for chest pain.   Gastrointestinal:  Negative for abdominal pain, constipation, diarrhea, nausea and vomiting.   Endocrine: Negative for polydipsia, polyphagia and polyuria.   Genitourinary:  Negative for difficulty urinating and dysuria.   Musculoskeletal:  Positive for gait problem (Left lower extremity pain) and myalgias (LLE).   Skin:  Positive for color change (LLE) and pallor (LLE). Negative for rash and wound.   Neurological:  Negative for syncope, speech difficulty, light-headedness and headaches.   Psychiatric/Behavioral:  Negative for confusion and hallucinations.         Personal History     Past Medical History:   Diagnosis Date    Benign essential hypertension 10/28/2012    October 2012--treatment for hypertension begun.    Bilateral lower extremity edema 03/23/2020    Bilateral sensorineural hearing loss, wears hearing aids 06/14/2017    Left is much worse than right.  Patient had multiple ear infections as a child.    Chronic renal impairment, stage 3b 09/15/2020    Claustrophobia 04/28/2016    This patient has significant nocturnal hypoxemia and I think that she could benefit from nocturnal oxygen therapy. The exact etiology of her hypoxemia is not clear. She could possibly have obstructive sleep apnea but this is not documented. We  cannot test this patient for sleep apnea due to the fact that she is severely claustrophobic. Patient was a former smoker and it is possibly that COPD he is playing a role.    Combined form of senile cataract 2021    Family history of coronary artery disease 2016    Patient's mother, father, 2 sisters and a brother all  from myocardial infarctions    Gastroesophageal reflux disease without esophagitis 2020    Gout 10/28/2012    2012--initial diagnosis and treatment of gout.    History of acute pancreatitis 2020    Hyperlipidemia 10/28/2012    2012--treatment for hyperlipidemia begun.    Impaired fasting glucose 10/28/2012    2012--initial diagnosis impaired fasting glucose.    Morbidly obese 2019    Nocturnal hypoxemia 2012--patient did not receive nocturnal oxygen because of Medicare regulations.   05/15/2014--overnight oximetry revealed oxygen saturations less than 89% for 22 minutes and 40 seconds. Oxygen saturations less than or equal to 88% for 22 minutes and 40 seconds. Lowest oxygen saturation 83%. The longest continuous time with oxygen saturations less than or equal to 88% was 1 minute and 32 seconds.   2012--overnight oximetry revealed oxygen saturations less than 90% for one hour and 35 minutes. Oxygen saturations less than 89% 59 minutes. This patient has significant nocturnal hypoxemia and I think that she could benefit from nocturnal oxygen therapy. The exact etiology of her hypoxemia is not clear. She could possibly have obstructive sleep apnea but this is not documented. We cannot test this patient for sleep apnea due to the fact that she is severely claustrophobic. Patient was a former smoker and it is possibly that COPD he is playing a role.    Non morbid obesity 2019    Osteopenia of multiple sites 2023    Paroxysmal atrial fibrillation 04/10/2020    Postmenopausal state 2023    Primary  hypothyroidism 11/17/2015 March 11, 2019--TSH remains elevated slightly at 4.92.  Given the overall clinical picture including the multinodular goiter, we will initiate levothyroxine 50 mcg/day and reassess in about 6 weeks.  August 1, 2018--thyroid ultrasound reveals a multinodular thyroid with multiple subcentimeter nodules.  Only minimal increase in size of the largest nodule in the left lobe has occurred when compared     Secondary polycythemia 08/02/2012 11/06/2014--patient did not receive nocturnal oxygen because of Medicare regulations.   05/15/2014--overnight oximetry revealed oxygen saturations less than 89% for 22 minutes and 40 seconds. Oxygen saturations less than or equal to 88% for 22 minutes and 40 seconds. Lowest oxygen saturation 83%. The longest continuous time with oxygen saturations less than or equal to 88% was 1 minute and 32 seconds.   08/02/2012--overnight oximetry revealed oxygen saturations less than 90% for one hour and 35 minutes. Oxygen saturations less than 89% 59 minutes. This patient has significant nocturnal hypoxemia and I think that she could benefit from nocturnal oxygen therapy. The exact etiology of her hypoxemia is not clear. She could possibly have obstructive sleep apnea but this is not documented. We cannot test this patient for sleep apnea due to the fact that she is severely claustrophobic. Patient was a former smoker and it is possibly that COPD he is playing a role.    Vitamin D deficiency 05/23/2016     Past Surgical History:   Procedure Laterality Date    CHOLECYSTECTOMY WITH INTRAOPERATIVE CHOLANGIOGRAM N/A 03/12/2020    Procedure: LAPAROSCOPIC CHOLECYSTOSTOMY TUBE, INTRAOPERATIVE CHOLANGIOGRAM;  Surgeon: Bryce Andujar MD;  Location: [facility];  Service: General;  Laterality: N/A;    CHOLECYSTECTOMY WITH INTRAOPERATIVE CHOLANGIOGRAM N/A 05/22/2020    Procedure: CHOLECYSTECTOMY LAPAROSCOPIC INTRAOPERATIVE CHOLANGIOGRAM and EGD;  Surgeon: Pratima  Bryce GLYNN MD;  Location: [facility] MAIN OR;  Service: General;  Laterality: N/A;    ERCP N/A 2022    Procedure: ENDOSCOPIC RETROGRADE CHOLANGIOPANCREATOGRAPHY with sphincterotomy, balloon sweep 12-15mm;  Surgeon: Mitesh Altamirano MD;  Location: [facility] ENDOSCOPY;  Service: Gastroenterology;  Laterality: N/A;  pre - gallstone pancreatitis  post - s/p sphincterotomy, sludge and  pus    OOPHORECTOMY      age 48    RADICAL ABDOMINAL HYSTERECTOMY  48 years old    48 years of age. Uterine fibroid tumors with menorrhagia - no cancer     Family History   Problem Relation Age of Onset    Heart disease Mother         Ischemic.  from coronary artery disease.    Heart disease Father         Ischemic.  from coronary artery disease.    Heart disease Sister         Ischemic.  from coronary artery disease.    Heart disease Sister         Ischemic.  from coronary artery disease.    Heart disease Brother         Ischemic.  from coronary artery disease.    Breast cancer Daughter     Breast cancer Daughter     Ovarian cancer Neg Hx      Social History     Tobacco Use    Smoking status: Former     Packs/day: .5     Types: Cigarettes     Start date: 1946     Quit date: 1954     Years since quittin.9    Smokeless tobacco: Never    Tobacco comments:     Stopped drinking at age 30.   Vaping Use    Vaping Use: Never used   Substance Use Topics    Alcohol use: No     Comment: caffiene use tea coffee    Drug use: No     No current facility-administered medications on file prior to encounter.     Current Outpatient Medications on File Prior to Encounter   Medication Sig Dispense Refill    allopurinol (ZYLOPRIM) 300 MG tablet TAKE 1 TABLET BY MOUTH DAILY FOR GOUT 90 tablet 3    ascorbic acid (VITAMIN C) 500 MG capsule controlled-release CR capsule Take 1 tablet by mouth Daily.      levothyroxine (SYNTHROID, LEVOTHROID) 75 MCG tablet TAKE 1 TABLET BY MOUTH DAILY FOR THYROID 90 tablet 3    metoprolol  succinate XL (TOPROL-XL) 25 MG 24 hr tablet TAKE 1 TABLET BY MOUTH DAILY FOR HIGH BLOOD PRESSURE AND HEART OR PALPITATIONS 90 tablet 3    Multiple Vitamins-Minerals (MULTIVITAMIN ADULTS PO) Take 1 tablet by mouth Daily.      pravastatin (PRAVACHOL) 40 MG tablet TAKE 1 TABLET BY MOUTH DAILY 90 tablet 3    Hydrocod Abbe-Chlorphe Abbe ER (TUSSIONEX PENNKINETIC) 10-8 MG/5ML ER suspension Take 1 teaspoon p.o. every 12 hours for cough and congestion from COVID-19 infection. 90 mL 0    omeprazole (priLOSEC) 40 MG capsule TAKE 1 CAPSULE BY MOUTH EVERY DAY BEFORE FIRST MEAL FOR STOMACH ACID OR REFLUX 30 capsule 1    polyethylene glycol (MIRALAX) packet Take orally as directed for constipation      predniSONE (DELTASONE) 10 MG tablet 5 by mouth daily 5 days, then 4 daily 2 days, 3 daily 2 days, 2 daily 2 days, 1 daily 2 days, one half daily 2 days and then discontinue. 46 tablet 0    Restasis 0.05 % ophthalmic emulsion 2 (two) times a day.       Allergies   Allergen Reactions    Cefaclor Anaphylaxis, Angioedema and Swelling     caused angioedema 25 years ago; she tolerates cephalexin, amoxicillin, and ceftriaxone per my d/w patient and her daughter as well as amoxicillin-clavulanate confirmed in EPIC  caused angioedema 25 years ago; she tolerates cephalexin, amoxicillin, and ceftriaxone per my d/w patient and her daughter as well as amoxicillin-clavulanate confirmed in EPIC  caused angioedema 25 years ago; she tolerates cephalexin, amoxicillin, and ceftriaxone per my d/w patient and her daughter as well as amoxicillin-clavulanate confirmed in EPIC  caused angioedema 25 years ago; she tolerates cephalexin, amoxicillin, and ceftriaxone per my d/w patient and her daughter as well as amoxicillin-clavulanate confirmed in EPIC    Sulfa Antibiotics Rash       Objective   Objective     Vital Signs  Temp:  [97.7 °F (36.5 °C)] 97.7 °F (36.5 °C)  Heart Rate:  [51-75] 53  Resp:  [18-20] 18  BP: (137-176)/(54-86) 137/54  SpO2:  [93 %-97  %] 97 %  on   ;   Device (Oxygen Therapy): room air  Body mass index is 37.73 kg/m².    Physical Exam  Constitutional:       General: She is not in acute distress.     Appearance: She is obese. She is not toxic-appearing.   HENT:      Head: Normocephalic and atraumatic.   Eyes:      Extraocular Movements: Extraocular movements intact.      Conjunctiva/sclera: Conjunctivae normal.   Cardiovascular:      Rate and Rhythm: Bradycardia present.      Heart sounds: Normal heart sounds.   Pulmonary:      Effort: Pulmonary effort is normal.      Breath sounds: Normal breath sounds.   Abdominal:      General: Bowel sounds are normal.      Palpations: Abdomen is soft.   Musculoskeletal:         General: Tenderness (LLE--resolved) present.      Cervical back: Normal range of motion and neck supple.      Right lower leg: No edema.      Left lower leg: No edema.   Skin:     General: Skin is warm and dry.   Neurological:      Mental Status: She is alert and oriented to person, place, and time.      Cranial Nerves: No cranial nerve deficit.   Psychiatric:         Behavior: Behavior normal.         Thought Content: Thought content normal.         Results Review:  I reviewed the patient's new clinical results.  I reviewed the patient's new imaging results and agree with the interpretation.  I reviewed the patient's other test results and agree with the interpretation  I personally viewed and interpreted the patient's EKG/Telemetry data  Discussed with ED provider.    Lab Results (last 24 hours)       Procedure Component Value Units Date/Time    CBC & Differential [[id number]]  (Abnormal) Collected: 11/16/23 0104    Specimen: Blood Updated: 11/16/23 0126    Narrative:      The following orders were created for panel order CBC & Differential.  Procedure                               Abnormality         Status                     ---------                               -----------         ------                     CBC Auto  "Differential[[id number]]        Abnormal            Final result                 Please view results for these tests on the individual orders.    Protime-INR [[id number]]  (Abnormal) Collected: 11/16/23 0104    Specimen: Blood Updated: 11/16/23 0145     Protime 14.5 Seconds      INR 1.12    aPTT [[id number]]  (Normal) Collected: 11/16/23 0104    Specimen: Blood Updated: 11/16/23 0145     PTT 29.3 seconds     D-dimer, Quantitative [[id number]]  (Abnormal) Collected: 11/16/23 0104    Specimen: Blood Updated: 11/16/23 0145     D-Dimer, Quantitative 1.02 MCGFEU/mL     Narrative:      According to the assay 's published package insert, a normal (<0.50 MCGFEU/mL) D-dimer result in conjunction with a non-high clinical probability assessment, excludes deep vein thrombosis (DVT) and pulmonary embolism (PE) with high sensitivity.    D-dimer values increase with age and this can make VTE exclusion of an older population difficult. To address this, the American College of Physicians, based on best available evidence and recent guidelines, recommends that clinicians use age-adjusted D-dimer thresholds in patients greater than 50 years of age with: a) a low probability of PE who do not meet all Pulmonary Embolism Rule Out Criteria, or b) in those with intermediate probability of PE.   The formula for an age-adjusted D-dimer cut-off is \"age/100\".  For example, a 60 year old patient would have an age-adjusted cut-off of 0.60 MCGFEU/mL and an 80 year old 0.80 MCGFEU/mL.    CBC Auto Differential [[id number]]  (Abnormal) Collected: 11/16/23 0104    Specimen: Blood Updated: 11/16/23 0126     WBC 7.74 10*3/mm3      RBC 4.70 10*6/mm3      Hemoglobin 14.4 g/dL      Hematocrit 43.5 %      MCV 92.6 fL      MCH 30.6 pg      MCHC 33.1 g/dL      RDW 14.5 %      RDW-SD 49.9 fl      MPV 9.5 fL      Platelets 198 10*3/mm3      Neutrophil % 62.1 %      Lymphocyte % 21.4 %      Monocyte % 10.3 %      Eosinophil % 5.3 %      Basophil % 0.4 %  "     Immature Grans % 0.5 %      Neutrophils, Absolute 4.80 10*3/mm3      Lymphocytes, Absolute 1.66 10*3/mm3      Monocytes, Absolute 0.80 10*3/mm3      Eosinophils, Absolute 0.41 10*3/mm3      Basophils, Absolute 0.03 10*3/mm3      Immature Grans, Absolute 0.04 10*3/mm3      nRBC 0.0 /100 WBC     Comprehensive Metabolic Panel [[id number]]  (Abnormal) Collected: 11/16/23 0158    Specimen: Blood Updated: 11/16/23 0224     Glucose 109 mg/dL      BUN 24 mg/dL      Creatinine 1.44 mg/dL      Sodium 138 mmol/L      Potassium 4.0 mmol/L      Chloride 110 mmol/L      CO2 21.0 mmol/L      Calcium 9.2 mg/dL      Total Protein 6.0 g/dL      Albumin 3.4 g/dL      ALT (SGPT) 22 U/L      AST (SGOT) 14 U/L      Alkaline Phosphatase 46 U/L      Total Bilirubin 0.4 mg/dL      Globulin 2.6 gm/dL      A/G Ratio 1.3 g/dL      BUN/Creatinine Ratio 16.7     Anion Gap 7.0 mmol/L      eGFR 34.6 mL/min/1.73     Narrative:      GFR Normal >60  Chronic Kidney Disease <60  Kidney Failure <15    The GFR formula is only valid for adults with stable renal function between ages 18 and 70.    CK [[id number]]  (Normal) Collected: 11/16/23 0158    Specimen: Blood Updated: 11/16/23 0224     Creatine Kinase 45 U/L     Magnesium [[id number]]  (Normal) Collected: 11/16/23 0158    Specimen: Blood Updated: 11/16/23 0224     Magnesium 1.9 mg/dL     High Sensitivity Troponin T [[id number]]  (Abnormal) Collected: 11/16/23 0158    Specimen: Blood Updated: 11/16/23 0704     HS Troponin T 35 ng/L     Narrative:      High Sensitive Troponin T Reference Range:  <14.0 ng/L- Negative Female for AMI  <22.0 ng/L- Negative Male for AMI  >=14 - Abnormal Female indicating possible myocardial injury.  >=22 - Abnormal Male indicating possible myocardial injury.   Clinicians would have to utilize clinical acumen, EKG, Troponin, and serial changes to determine if it is an Acute Myocardial Infarction or myocardial injury due to an underlying chronic condition.         CK  [[id number]]  (Normal) Collected: 11/16/23 0726    Specimen: Blood Updated: 11/16/23 0806     Creatine Kinase 43 U/L     High Sensitivity Troponin T 2Hr [[id number]]  (Abnormal) Collected: 11/16/23 0726    Specimen: Blood Updated: 11/16/23 0806     HS Troponin T 33 ng/L      Troponin T [facility] -2 ng/L     Narrative:      High Sensitive Troponin T Reference Range:  <14.0 ng/L- Negative Female for AMI  <22.0 ng/L- Negative Male for AMI  >=14 - Abnormal Female indicating possible myocardial injury.  >=22 - Abnormal Male indicating possible myocardial injury.   Clinicians would have to utilize clinical acumen, EKG, Troponin, and serial changes to determine if it is an Acute Myocardial Infarction or myocardial injury due to an underlying chronic condition.                 Imaging Results (Last 24 Hours)       Procedure Component Value Units Date/Time    CT Angio Abdominal Aorta Bilateral Iliofem Runoff [[id number]] Collected: 11/16/23 0411     Updated: 11/16/23 0434    Narrative:      CT ANGIOGRAM OF THE ABDOMEN AND PELVIS AND BILATERAL LOWER EXTREMITY  RUNOFF.     HISTORY: Left-sided leg pain     COMPARISON: June 28, 2022     TECHNIQUE: Axial CT imaging was obtained through the abdomen and pelvis  and bilateral lower extremities. IV contrast was administered. Three-D  reformatted images were obtained.     FINDINGS:  Images through the lung bases demonstrate some patchy groundglass  infiltrates within both lungs. There are also some subpleural  micronodules which appear stable when compared to prior study, and there  is also some bibasilar scarring.     There is some calcified plaque the origin of the celiac axis. No  hemodynamically significant stenosis is seen. The patient has additional  plaque noted at the origin of the superior mesenteric artery, again,  without hemodynamically significant stenosis. The patient is noted to  have an irregular beaded appearance to the right renal artery, which may  represent fibromuscular  dysplasia. There is atherosclerotic involvement  of the abdominal aorta. Inferior mesenteric artery is patent. Calcified  plaque continues into the common iliac arteries bilaterally, without  hemodynamically significant stenosis. The common, external, and internal  iliac arteries are widely patent.     Right common femoral artery is widely patent, as is the profunda femoris  artery. No dynamically significant stenosis of the right superficial  femoral artery is seen. Right popliteal artery is widely patent. There  is a high origin of the right anterior tibial artery. The right anterior  tibial and peroneal arteries are continuous to the foot. The right  posterior tibial artery occludes within the calf. It does appear to  reconstitute at the ankle.     The left common femoral artery is widely patent. The patient has an  occlusion of the left profunda femoris artery within the proximal thigh.  Left superficial femoral artery is patent throughout its course. There  is some mild calcified plaque of the left popliteal artery. There is  abrupt occlusion identified within the left popliteal artery at the  popliteal fossa. This is favored to be secondary to thrombus. There is  reconstitution of the left anterior tibial artery, as well as the  tibioperoneal trunk. There is a focal severe stenosis of the left  anterior tibial artery within the mid calf. It is continuous to the  ankle, and there is only intermittent opacification of the left dorsalis  pedis artery. The left posterior tibial artery and peroneal artery are  continuous to the foot.     No suspicious hepatic lesions are seen. There is a small hiatal hernia.  There is a duodenal diverticulum. The gallbladder is absent. Dilatation  of the common bile duct is stable, as is a well-circumscribed fluid  collection within the gallbladder fossa. Calcified granulomata are noted  within the spleen. Pancreas and adrenal glands are normal. Kidneys  enhance symmetrically. No  hydronephrosis is seen. Lobulated right renal  cyst is noted. There are additional simple appearing cysts noted on the  right kidney. No additional follow-up is necessary. No distal ureteral  or bladder stones are seen. Uterus is absent. There is colonic  diverticulosis. There is no bowel obstruction. The appendix is normal.  There is no evidence of bowel ischemia. No acute osseous abnormalities  are seen.       Impression:         1. The patient is noted to have an abrupt termination of the left  popliteal artery within the popliteal fossa. This is favored to be  secondary to thrombus. There is also occlusion of the left profunda  femoris artery within the proximal thigh, which potentially also may be  related to thrombus. Furthermore, the patient has an occlusion of the  right posterior tibial artery within the calf. This potentially also may  be related to thrombus.     Radiation dose reduction techniques were utilized, including automated  exposure control and exposure modulation based on body size.        This report was finalized on 11/16/2023 4:30 AM by Dr. Kathleen Montoya M.D on Workstation: [id number]               Results for orders placed during the hospital encounter of 06/20/18    Adult Transthoracic Echo Complete W/ Cont if Necessary Per Protocol    Interpretation Summary  · Left ventricular systolic function is normal. Calculated EF = 60%. Estimated EF was in agreement with the calculated EF. Normal left ventricular cavity size and wall thickness noted. All left ventricular wall segments contract normally.  · Left ventricular diastolic function is normal.  · The aortic valve is abnormal in structure. The valve exhibits sclerosis.  · Mild aortic valve regurgitation is present.      ECG 12 Lead Pre-Op / Pre-Procedure   Preliminary Result   HEART RATE= 65  bpm   RR Interval= 923  ms   RI Interval= 195  ms   P Horizontal Axis= 5  deg   P Front Axis= 75  deg   QRSD Interval= 97  ms   QT Interval= 475   ms   QTcB= 494  ms   QRS Axis= 12  deg   T Wave Axis= 49  deg   - ABNORMAL ECG -   Sinus rhythm   Abnormal R-wave progression, early transition   Probable left ventricular hypertrophy   Borderline prolonged QT interval   Electronically Signed By:    Date and Time of Study: 2023-11-16 05:13:53           Assessment/Plan     Active Hospital Problems    Diagnosis  POA    **Arterial occlusion [I70.90]  Yes    Stage 3b chronic kidney disease [N18.32]  Yes    Paroxysmal atrial fibrillation [I48.0]  Yes    Non morbid obesity [E66.9]  Yes    Benign essential hypertension [I10]  Yes    Hyperlipidemia [E78.2]  Yes      Resolved Hospital Problems   No resolved problems to display.       Ms. Alarcon is a 89 y.o. former smoker with a history of obesity, hypertension, hyperlipidemia, paroxysmal atrial fibrillation without anticoagulation PTA, nocturnal hypoxemia/KARIN without CPAP PTA that presents to [facility] complaining of leg pain, left & admitted for arterial occlusion of left lower extremity.      Arterial occlusion, bilateral: Confirmed on CTA runoff.  Heparin drip infusing--extremity color and temperature already improving per patient and family at bedside.  Vascular surgery consulted plan urgent thrombectomy in OR on 11/16/2023.  Denies history of CAD or chest pain.  Falls precautions.  Strict bedrest for now.  Anticipate therapy pending vascular surgery recommendation.      Benign essential hypertension: BP acceptable acutely.  Continue Toprol-XL 25 mg p.o. daily Protonix regimen.  Telemetry.      Hyperlipidemia: LFTs unremarkable.  Statin continued.      Non morbid obesity: BMI 37.  Complicating all problems.      Paroxysmal atrial fibrillation: Rate controlled with beta-blocker.  No AC PTA.  Heparin drip infusing per above.  Telemetry.  Cardiology consulted.      Stage 3b chronic kidney disease: Serum creatinine appears at baseline.  Avoid nephrotoxins.  Monitor labs.    I discussed the patient's findings  and my recommendations with patient, family, & Dr. Montalvo.    VTE Prophylaxis -  heparin drip infusing.  Code Status - Full code.  Confirmed with patient and family at bedside       CJ Perkins  [facility]  11/16/23  10:03 EST      Electronically signed by Balbir Montalvo MD at 11/16/23 1339          Emergency Department Notes        Rex Hoffman PA at 11/16/23 0245       Attestation signed by Cate Antoine MD at 11/16/23 7447        SHARED APC FACE TO FACE: This visit was performed by both the physician and an APC. I personally evaluated and examined the patient. I performed the entirety of the physical exam and I documented this exam in this attestation or in accompanying documentation.    Cate Antoine MD 11/16/2023 18:57 EST                         EMERGENCY DEPARTMENT ENCOUNTER    Room number:  05/05  Date Seen:  11/16/2023  Time of transfer: 2:45 AM  PCP:  Daryl Santos MD    Laboratory Results:  Recent Results (from the past 24 hour(s))   Protime-INR    Collection Time: 11/16/23  1:04 AM    Specimen: Blood   Result Value Ref Range    Protime 14.5 (H) 11.7 - 14.2 Seconds    INR 1.12 (H) 0.90 - 1.10   aPTT    Collection Time: 11/16/23  1:04 AM    Specimen: Blood   Result Value Ref Range    PTT 29.3 22.7 - 35.4 seconds   D-dimer, Quantitative    Collection Time: 11/16/23  1:04 AM    Specimen: Blood   Result Value Ref Range    D-Dimer, Quantitative 1.02 (H) 0.00 - 0.90 MCGFEU/mL   CBC Auto Differential    Collection Time: 11/16/23  1:04 AM    Specimen: Blood   Result Value Ref Range    WBC 7.74 3.40 - 10.80 10*3/mm3    RBC 4.70 3.77 - 5.28 10*6/mm3    Hemoglobin 14.4 12.0 - 15.9 g/dL    Hematocrit 43.5 34.0 - 46.6 %    MCV 92.6 79.0 - 97.0 fL    MCH 30.6 26.6 - 33.0 pg    MCHC 33.1 31.5 - 35.7 g/dL    RDW 14.5 12.3 - 15.4 %    RDW-SD 49.9 37.0 - 54.0 fl    MPV 9.5 6.0 - 12.0 fL    Platelets 198 140 - 450 10*3/mm3    Neutrophil % 62.1 42.7 - 76.0 %     Lymphocyte % 21.4 19.6 - 45.3 %    Monocyte % 10.3 5.0 - 12.0 %    Eosinophil % 5.3 0.3 - 6.2 %    Basophil % 0.4 0.0 - 1.5 %    Immature Grans % 0.5 0.0 - 0.5 %    Neutrophils, Absolute 4.80 1.70 - 7.00 10*3/mm3    Lymphocytes, Absolute 1.66 0.70 - 3.10 10*3/mm3    Monocytes, Absolute 0.80 0.10 - 0.90 10*3/mm3    Eosinophils, Absolute 0.41 (H) 0.00 - 0.40 10*3/mm3    Basophils, Absolute 0.03 0.00 - 0.20 10*3/mm3    Immature Grans, Absolute 0.04 0.00 - 0.05 10*3/mm3    nRBC 0.0 0.0 - 0.2 /100 WBC   Comprehensive Metabolic Panel    Collection Time: 11/16/23  1:58 AM    Specimen: Blood   Result Value Ref Range    Glucose 109 (H) 65 - 99 mg/dL    BUN 24 (H) 8 - 23 mg/dL    Creatinine 1.44 (H) 0.57 - 1.00 mg/dL    Sodium 138 136 - 145 mmol/L    Potassium 4.0 3.5 - 5.2 mmol/L    Chloride 110 (H) 98 - 107 mmol/L    CO2 21.0 (L) 22.0 - 29.0 mmol/L    Calcium 9.2 8.2 - 9.6 mg/dL    Total Protein 6.0 6.0 - 8.5 g/dL    Albumin 3.4 (L) 3.5 - 5.2 g/dL    ALT (SGPT) 22 1 - 33 U/L    AST (SGOT) 14 1 - 32 U/L    Alkaline Phosphatase 46 39 - 117 U/L    Total Bilirubin 0.4 0.0 - 1.2 mg/dL    Globulin 2.6 gm/dL    A/G Ratio 1.3 g/dL    BUN/Creatinine Ratio 16.7 7.0 - 25.0    Anion Gap 7.0 5.0 - 15.0 mmol/L    eGFR 34.6 (L) >60.0 mL/min/1.73   CK    Collection Time: 11/16/23  1:58 AM    Specimen: Blood   Result Value Ref Range    Creatine Kinase 45 20 - 180 U/L   Magnesium    Collection Time: 11/16/23  1:58 AM    Specimen: Blood   Result Value Ref Range    Magnesium 1.9 1.6 - 2.4 mg/dL   ECG 12 Lead Pre-Op / Pre-Procedure    Collection Time: 11/16/23  5:13 AM   Result Value Ref Range    QT Interval 475 ms    QTC Interval 494 ms     I reviewed the above results.    Radiology:  CT Angio Abdominal Aorta Bilateral Iliofem Runoff   Final Result       1. The patient is noted to have an abrupt termination of the left   popliteal artery within the popliteal fossa. This is favored to be   secondary to thrombus. There is also occlusion of the  left profunda   femoris artery within the proximal thigh, which potentially also may be   related to thrombus. Furthermore, the patient has an occlusion of the   right posterior tibial artery within the calf. This potentially also may   be related to thrombus.       Radiation dose reduction techniques were utilized, including automated   exposure control and exposure modulation based on body size.           This report was finalized on 11/16/2023 4:30 AM by Dr. Kathleen Montoya M.D on Workstation: [id number]            I reviewed the above results    Medications ordered in ED:  Medications   sodium chloride 0.9 % flush 10 mL (has no administration in time range)   sodium chloride 0.9 % infusion (125 mL/hr Intravenous New Bag 11/16/23 0107)   heparin 16081 units/250 ml (100 units/ml) in D5W (15.5 Units/kg/hr × 96.6 kg Intravenous Currently Infusing 11/16/23 0523)   heparin (porcine) 5000 UNIT/ML injection 3,900-7,700 Units (has no administration in time range)   iopamidol (ISOVUE-370) 76 % injection 100 mL (95 mL Intravenous Given by Other 11/16/23 0325)   heparin (porcine) 5000 UNIT/ML injection 7,700 Units (7,700 Units Intravenous Given 11/16/23 0517)       ED Course as of 11/16/23 0535   Thu Nov 16, 2023   0130 Patient has dopplerable left DP and PT pulses [AR]   0245 Patient history, ER presentation evaluation as well as plan on evaluating with CTA with runoffs discussed with and care assumed from Dr. Antoine. [DRAKE]   0425 WBC: 7.74 [DRAKE]   0425 Hemoglobin: 14.4 [DRAKE]   0425 Hematocrit: 43.5 [DRAKE]   0425 Platelets: 198 [DRAKE]   0425 Glucose(!): 109 [DRAKE]   0425 BUN(!): 24 [DRAKE]   0425 Creatinine(!): 1.44 [DRAKE]   0425 Sodium: 138 [DRAKE]   0425 Potassium: 4.0 [DRAKE]   0425 Magnesium: 1.9 [DRAKE]   0425 Chloride(!): 110 [DRAKE]   0425 CO2(!): 21.0 [DRAKE]   0425 Creatine Kinase: 45 [DRAKE]   0425 D-Dimer, Quant(!): 1.02 [DRAKE]   0508 Patient history, ER presentation and evaluation discussed with Dr. Dominguez, vascular surgery, who  recommended to start a heparin drip with bolus, make the patient n.p.o. except medicines, obtain an EKG for possible A-fib and admit to the hospitalist. [DRAKE]   7980 Patient's ER presentation and evaluation as well as vascular surgery recommendations discussed with CJ Floyd, will admit to a telemetry bed. [DRAKE]   3337 EKG          EKG time: 5:13 AM  Rhythm/Rate: Sinus rhythm at 65 bpm  P waves and DE: Normal  QRS, axis: Borderline prolonged QT interval, probable LVH  ST and T waves: No acute ST/T wave changes    Interpreted Contemporaneously by me, independently viewed  Not significantly changed compared to prior EKG of 6/28/2022.   [DRAKE]      ED Course User Index  [AR] Cate Antoine MD  [DRAKE] Rex Hoffman PA       Progress and Consult Notes:  2:45 AM:  Patient care transferred from Dr. Antoine pending CT evaluation and disposition.    Diagnosis:  Final diagnoses:   Acute pain of left lower extremity   Arterial occlusion   Chronic kidney disease, unspecified CKD stage   Hypertension, unspecified type   Hyperlipidemia, unspecified hyperlipidemia type   Hypothyroidism, unspecified type       Disposition:  ADMISSION    Discussed treatment plan and reason for admission with pt/family and admitting physician.  Pt/family voiced understanding of the plan for admission for further testing/treatment as needed.       Provider attestation:  I personally reviewed the past medical history, past surgical history, social history, family history, current medications, and allergies as they appear in the chart.    The patient was seen and examined by myself and Dr. Antione, who agrees with plan.          Rex Hoffman PA  11/16/23 0526       Rex Hoffman PA  11/16/23 0535      Electronically signed by Cate Antoine MD at 11/16/23 4175       Cate Antoine MD at 11/16/23 0031           EMERGENCY DEPARTMENT ENCOUNTER    Room Number:  E566/1  PCP: Daryl Santos MD  Patient Care Team:  Tom  "Daryl PARKER MD as PCP - General (Internal Medicine)   Independent Historians: Patient    HPI:  Chief Complaint: Left lower extremity pain    A complete HPI/ROS/PMH/PSH/SH/FH are unobtainable due to: None    Chronic or social conditions impacting patient care (Social Determinants of Health): None  (Financial Resource Strain / Food Insecurity / Transportation Needs / Physical Activity / Stress / Social Connections / Intimate Partner Violence / Housing Stability)    Context: Mandy Alarcon is a 89 y.o. female with a history of paroxysmal A-fib, hypertension, hyperlipidemia, hypothyroidism, GERD, CKD, and pancreatitis who presents to the ED c/o acute left lower extremity pain that is episodic and severe.  Patient notes no specific injury but earlier today was getting into her car and then up from a seated position and started to have left lower extremity pain that started in her foot and seemed to shoot sharply to her left calf and even into her left thigh.  Patient said the pain was sharp and shooting and throbbing with a tingling sensation afterward.  Patient's foot felt cold compared to the right and continued to have some tingling.  Patient tried to walk around and move it and stretch with pain subsiding on its own.  However, pain returned randomly without any inciting event.  Patient denies any history of DVT or PE.  Patient denies any history of peripheral vascular disease.  However, patient does report having had COVID about 3 weeks ago that was \"mild\".  Patient also reports having \"thick blood\" with excess platelets requiring therapeutic phlebotomy although she has not had that done since early in the year.  Patient is not on any anticoagulation.    Review of prior external notes (non-ED) -and- Review of prior external test results outside of this encounter: Patient last seen by her primary doctor on October 3.  Patient was there for routine follow-up of her chronic medical conditions.  Patient treated for CKD, " hypertension, hyperlipidemia, hypothyroidism, gout, bilateral lower extremity edema, paroxysmal A-fib, secondary polycythemia, vitamin D deficiency, multinodular goiter, osteopenia, SVT, GERD, hearing loss.    Prescription drug monitoring program review:         PAST MEDICAL HISTORY  Active Ambulatory Problems     Diagnosis Date Noted    Benign essential hypertension 10/28/2012    Claustrophobia 04/28/2016    Gout 10/28/2012    Hyperlipidemia 10/28/2012    Primary hypothyroidism 11/17/2015    Impaired fasting glucose 10/28/2012    Nocturnal hypoxemia 08/02/2012    Secondary polycythemia 08/02/2012    Vitamin D deficiency 05/23/2016    Therapeutic drug monitoring 05/23/2016    Family history of coronary artery disease 05/24/2016    Bilateral sensorineural hearing loss, wears hearing aids 06/14/2017    Multinodular goiter 01/24/2018    Supraventricular tachycardia 07/10/2018    Non morbid obesity 03/11/2019    Bilateral lower extremity edema 03/23/2020    Gastroesophageal reflux disease without esophagitis 03/23/2020    Paroxysmal atrial fibrillation 04/10/2020    Stage 3b chronic kidney disease 09/15/2020    Combined form of senile cataract 03/03/2021    Osteopenia of multiple sites 09/26/2023    Postmenopausal state 09/26/2023    COVID-19 virus infection 10/26/2023     Resolved Ambulatory Problems     Diagnosis Date Noted    History of Cellulitis of trunk, Right 04/28/2016    History of mammogram 04/28/2016    History of pneumococcal vaccination 04/28/2016    Hospital discharge follow-up 07/10/2018    Near syncope 07/10/2018    Chest tightness or pressure 07/10/2018    PND (paroxysmal nocturnal dyspnea) 07/10/2018    Acute biliary pancreatitis without infection or necrosis 02/01/2020    LINNEA (acute kidney injury) 02/02/2020    Pneumonia 02/03/2020    New onset a-fib 02/05/2020    Hyponatremia 02/05/2020    Hypomagnesemia 02/05/2020    Leukocytosis 02/05/2020    Fever 02/27/2020    Acute gallstone pancreatitis  2020    Chronic cholecystitis 2020    Postoperative nausea 2020    Postoperative pain 2020    Hospital discharge follow-up 2020    History of acute pancreatitis 2020    Acute constipation 2020    Gallstone pancreatitis 2020    Hospital discharge follow-up 2020    Left cervical radiculopathy 2020    Parotiditis 2022    Hospital discharge follow-up 2022     Past Medical History:   Diagnosis Date    Chronic renal impairment, stage 3b 09/15/2020    Hyperlipidemia 10/28/2012    Morbidly obese 2019         PAST SURGICAL HISTORY  Past Surgical History:   Procedure Laterality Date    CHOLECYSTECTOMY WITH INTRAOPERATIVE CHOLANGIOGRAM N/A 2020    Procedure: LAPAROSCOPIC CHOLECYSTOSTOMY TUBE, INTRAOPERATIVE CHOLANGIOGRAM;  Surgeon: Bryce Andujar MD;  Location: [facility];  Service: General;  Laterality: N/A;    CHOLECYSTECTOMY WITH INTRAOPERATIVE CHOLANGIOGRAM N/A 2020    Procedure: CHOLECYSTECTOMY LAPAROSCOPIC INTRAOPERATIVE CHOLANGIOGRAM and EGD;  Surgeon: Bryce nAdujar MD;  Location: [facility] OR;  Service: General;  Laterality: N/A;    ERCP N/A 2022    Procedure: ENDOSCOPIC RETROGRADE CHOLANGIOPANCREATOGRAPHY with sphincterotomy, balloon sweep 12-15mm;  Surgeon: Mitesh Altamirano MD;  Location: [facility] ENDOSCOPY;  Service: Gastroenterology;  Laterality: N/A;  pre - gallstone pancreatitis  post - s/p sphincterotomy, sludge and  pus    OOPHORECTOMY      age 48    RADICAL ABDOMINAL HYSTERECTOMY  48 years old    48 years of age. Uterine fibroid tumors with menorrhagia - no cancer         FAMILY HISTORY  Family History   Problem Relation Age of Onset    Heart disease Mother         Ischemic.  from coronary artery disease.    Heart disease Father         Ischemic.  from coronary artery disease.    Heart disease Sister         Ischemic.  from coronary artery disease.    Heart disease Sister          Ischemic.  from coronary artery disease.    Heart disease Brother         Ischemic.  from coronary artery disease.    Breast cancer Daughter     Breast cancer Daughter     Ovarian cancer Neg Hx     Malig Hyperthermia Neg Hx          SOCIAL HISTORY  Social History     Socioeconomic History    Marital status:     Number of children: 5    Highest education level: GED or equivalent   Tobacco Use    Smoking status: Former     Packs/day: .5     Types: Cigarettes     Start date: 1946     Quit date: 1954     Years since quittin.9    Smokeless tobacco: Never    Tobacco comments:     Stopped drinking at age 30.   Vaping Use    Vaping Use: Never used   Substance and Sexual Activity    Alcohol use: No     Comment: caffiene use tea coffee    Drug use: No    Sexual activity: Not Currently     Partners: Male         ALLERGIES  Cefaclor and Sulfa antibiotics        REVIEW OF SYSTEMS  Review of Systems  Included in HPI  All systems reviewed and negative except for those discussed in HPI.      PHYSICAL EXAM    I have reviewed the triage vital signs and nursing notes.    ED Triage Vitals   Temp Heart Rate Resp BP SpO2   11/15/23 2251 11/15/23 2251 11/15/23 2251 11/15/23 2303 11/15/23 2251   97.7 °F (36.5 °C) 75 20 176/79 93 %      Temp src Heart Rate Source Patient Position BP Location FiO2 (%)   11/15/23 2251 11/15/23 2251 11/15/23 2303 -- --   Tympanic Monitor Sitting         Physical Exam  GENERAL: Pleasant cooperative and conversant  female who appears much younger than stated age, jovial and laughing at times, alert, no acute distress  SKIN: Warm, dry, left lower extremity cool to touch when compared to right lower extremity  HENT: Normocephalic, atraumatic  EYES: no scleral icterus, no conjunctivitis  CV: regular rhythm, regular rate  RESPIRATORY: normal effort, lungs clear, no rhonchi, no wheezing  ABDOMEN: soft, nontender, nondistended, no rebound, no guarding, obese  MUSCULOSKELETAL: no  deformity, no calf tenderness to palpation, no pitting edema, left PT and DP pulses not palpable by myself--will obtain Doppler pulses, capillary refill to all toes on left foot less than 3 seconds, no decreased sensation over left foot or toes  NEURO: alert, moves all extremities, follows commands                                                               LAB RESULTS  Recent Results (from the past 24 hour(s))   Protime-INR    Collection Time: 11/16/23  1:04 AM    Specimen: Blood   Result Value Ref Range    Protime 14.5 (H) 11.7 - 14.2 Seconds    INR 1.12 (H) 0.90 - 1.10   aPTT    Collection Time: 11/16/23  1:04 AM    Specimen: Blood   Result Value Ref Range    PTT 29.3 22.7 - 35.4 seconds   D-dimer, Quantitative    Collection Time: 11/16/23  1:04 AM    Specimen: Blood   Result Value Ref Range    D-Dimer, Quantitative 1.02 (H) 0.00 - 0.90 MCGFEU/mL   CBC Auto Differential    Collection Time: 11/16/23  1:04 AM    Specimen: Blood   Result Value Ref Range    WBC 7.74 3.40 - 10.80 10*3/mm3    RBC 4.70 3.77 - 5.28 10*6/mm3    Hemoglobin 14.4 12.0 - 15.9 g/dL    Hematocrit 43.5 34.0 - 46.6 %    MCV 92.6 79.0 - 97.0 fL    MCH 30.6 26.6 - 33.0 pg    MCHC 33.1 31.5 - 35.7 g/dL    RDW 14.5 12.3 - 15.4 %    RDW-SD 49.9 37.0 - 54.0 fl    MPV 9.5 6.0 - 12.0 fL    Platelets 198 140 - 450 10*3/mm3    Neutrophil % 62.1 42.7 - 76.0 %    Lymphocyte % 21.4 19.6 - 45.3 %    Monocyte % 10.3 5.0 - 12.0 %    Eosinophil % 5.3 0.3 - 6.2 %    Basophil % 0.4 0.0 - 1.5 %    Immature Grans % 0.5 0.0 - 0.5 %    Neutrophils, Absolute 4.80 1.70 - 7.00 10*3/mm3    Lymphocytes, Absolute 1.66 0.70 - 3.10 10*3/mm3    Monocytes, Absolute 0.80 0.10 - 0.90 10*3/mm3    Eosinophils, Absolute 0.41 (H) 0.00 - 0.40 10*3/mm3    Basophils, Absolute 0.03 0.00 - 0.20 10*3/mm3    Immature Grans, Absolute 0.04 0.00 - 0.05 10*3/mm3    nRBC 0.0 0.0 - 0.2 /100 WBC   Comprehensive Metabolic Panel    Collection Time: 11/16/23  1:58 AM    Specimen: Blood   Result  Value Ref Range    Glucose 109 (H) 65 - 99 mg/dL    BUN 24 (H) 8 - 23 mg/dL    Creatinine 1.44 (H) 0.57 - 1.00 mg/dL    Sodium 138 136 - 145 mmol/L    Potassium 4.0 3.5 - 5.2 mmol/L    Chloride 110 (H) 98 - 107 mmol/L    CO2 21.0 (L) 22.0 - 29.0 mmol/L    Calcium 9.2 8.2 - 9.6 mg/dL    Total Protein 6.0 6.0 - 8.5 g/dL    Albumin 3.4 (L) 3.5 - 5.2 g/dL    ALT (SGPT) 22 1 - 33 U/L    AST (SGOT) 14 1 - 32 U/L    Alkaline Phosphatase 46 39 - 117 U/L    Total Bilirubin 0.4 0.0 - 1.2 mg/dL    Globulin 2.6 gm/dL    A/G Ratio 1.3 g/dL    BUN/Creatinine Ratio 16.7 7.0 - 25.0    Anion Gap 7.0 5.0 - 15.0 mmol/L    eGFR 34.6 (L) >60.0 mL/min/1.73   CK    Collection Time: 11/16/23  1:58 AM    Specimen: Blood   Result Value Ref Range    Creatine Kinase 45 20 - 180 U/L   Magnesium    Collection Time: 11/16/23  1:58 AM    Specimen: Blood   Result Value Ref Range    Magnesium 1.9 1.6 - 2.4 mg/dL   High Sensitivity Troponin T    Collection Time: 11/16/23  1:58 AM    Specimen: Blood   Result Value Ref Range    HS Troponin T 35 (H) <14 ng/L   ECG 12 Lead Pre-Op / Pre-Procedure    Collection Time: 11/16/23  5:13 AM   Result Value Ref Range    QT Interval 475 ms    QTC Interval 494 ms   CK    Collection Time: 11/16/23  7:26 AM    Specimen: Blood   Result Value Ref Range    Creatine Kinase 43 20 - 180 U/L   High Sensitivity Troponin T 2Hr    Collection Time: 11/16/23  7:26 AM    Specimen: Blood   Result Value Ref Range    HS Troponin T 33 (H) <14 ng/L    Troponin T Delta -2 >=-4 - <+4 ng/L   Type & Screen    Collection Time: 11/16/23 11:10 AM    Specimen: Blood   Result Value Ref Range    ABO Type A     RH type Positive     Antibody Screen Negative     T&S Expiration Date 11/19/2023 11:59:59 PM    aPTT    Collection Time: 11/16/23 11:54 AM    Specimen: Blood   Result Value Ref Range    PTT >200.0 (C) 22.7 - 35.4 seconds       I ordered the above labs and independently reviewed the results.        RADIOLOGY  Arteriogram  (Autofinalize)    Result Date: 11/16/2023  This procedure was auto-finalized with no dictation required.    CT Angio Abdominal Aorta Bilateral Iliofem Runoff    Result Date: 11/16/2023  CT ANGIOGRAM OF THE ABDOMEN AND PELVIS AND BILATERAL LOWER EXTREMITY RUNOFF.  HISTORY: Left-sided leg pain  COMPARISON: June 28, 2022  TECHNIQUE: Axial CT imaging was obtained through the abdomen and pelvis and bilateral lower extremities. IV contrast was administered. Three-D reformatted images were obtained.  FINDINGS: Images through the lung bases demonstrate some patchy groundglass infiltrates within both lungs. There are also some subpleural micronodules which appear stable when compared to prior study, and there is also some bibasilar scarring.  There is some calcified plaque the origin of the celiac axis. No hemodynamically significant stenosis is seen. The patient has additional plaque noted at the origin of the superior mesenteric artery, again, without hemodynamically significant stenosis. The patient is noted to have an irregular beaded appearance to the right renal artery, which may represent fibromuscular dysplasia. There is atherosclerotic involvement of the abdominal aorta. Inferior mesenteric artery is patent. Calcified plaque continues into the common iliac arteries bilaterally, without hemodynamically significant stenosis. The common, external, and internal iliac arteries are widely patent.  Right common femoral artery is widely patent, as is the profunda femoris artery. No dynamically significant stenosis of the right superficial femoral artery is seen. Right popliteal artery is widely patent. There is a high origin of the right anterior tibial artery. The right anterior tibial and peroneal arteries are continuous to the foot. The right posterior tibial artery occludes within the calf. It does appear to reconstitute at the ankle.  The left common femoral artery is widely patent. The patient has an occlusion of the  left profunda femoris artery within the proximal thigh. Left superficial femoral artery is patent throughout its course. There is some mild calcified plaque of the left popliteal artery. There is abrupt occlusion identified within the left popliteal artery at the popliteal fossa. This is favored to be secondary to thrombus. There is reconstitution of the left anterior tibial artery, as well as the tibioperoneal trunk. There is a focal severe stenosis of the left anterior tibial artery within the mid calf. It is continuous to the ankle, and there is only intermittent opacification of the left dorsalis pedis artery. The left posterior tibial artery and peroneal artery are continuous to the foot.  No suspicious hepatic lesions are seen. There is a small hiatal hernia. There is a duodenal diverticulum. The gallbladder is absent. Dilatation of the common bile duct is stable, as is a well-circumscribed fluid collection within the gallbladder fossa. Calcified granulomata are noted within the spleen. Pancreas and adrenal glands are normal. Kidneys enhance symmetrically. No hydronephrosis is seen. Lobulated right renal cyst is noted. There are additional simple appearing cysts noted on the right kidney. No additional follow-up is necessary. No distal ureteral or bladder stones are seen. Uterus is absent. There is colonic diverticulosis. There is no bowel obstruction. The appendix is normal. There is no evidence of bowel ischemia. No acute osseous abnormalities are seen.       1. The patient is noted to have an abrupt termination of the left popliteal artery within the popliteal fossa. This is favored to be secondary to thrombus. There is also occlusion of the left profunda femoris artery within the proximal thigh, which potentially also may be related to thrombus. Furthermore, the patient has an occlusion of the right posterior tibial artery within the calf. This potentially also may be related to thrombus.  Radiation dose  reduction techniques were utilized, including automated exposure control and exposure modulation based on body size.   This report was finalized on 11/16/2023 4:30 AM by Dr. Kathleen Montoya M.D on Workstation: [id number]       I ordered the above noted radiological studies. Reviewed by me. See dictation for official radiology interpretation.      PROCEDURES    Procedures      MEDICATIONS GIVEN IN ER    Medications   sodium chloride 0.9 % flush 10 mL ( Intravenous MAR Unhold 11/16/23 1746)   sodium chloride 0.9 % infusion (125 mL/hr Intravenous New Bag 11/16/23 1822)   allopurinol (ZYLOPRIM) tablet 300 mg (300 mg Oral Not Given 11/16/23 1806)   levothyroxine (SYNTHROID, LEVOTHROID) tablet 75 mcg (75 mcg Oral Not Given 11/16/23 1807)   metoprolol succinate XL (TOPROL-XL) 24 hr tablet 25 mg (25 mg Oral Given 11/16/23 1258)   pravastatin (PRAVACHOL) tablet 40 mg (has no administration in time range)   sodium chloride 0.9 % flush 10 mL (10 mL Intravenous Not Given 11/16/23 1806)   sodium chloride 0.9 % flush 10 mL ( Intravenous MAR Unhold 11/16/23 1746)   sodium chloride 0.9 % infusion 40 mL ( Intravenous MAR Unhold 11/16/23 1746)   sennosides-docusate (PERICOLACE) 8.6-50 MG per tablet 2 tablet (2 tablets Oral Not Given 11/16/23 1806)     And   polyethylene glycol (MIRALAX) packet 17 g ( Oral MAR Unhold 11/16/23 1746)     And   bisacodyl (DULCOLAX) EC tablet 5 mg ( Oral MAR Unhold 11/16/23 1746)     And   bisacodyl (DULCOLAX) suppository 10 mg ( Rectal MAR Unhold 11/16/23 1746)   nitroglycerin (NITROSTAT) SL tablet 0.4 mg ( Sublingual MAR Unhold 11/16/23 1746)   acetaminophen (TYLENOL) tablet 650 mg ( Oral MAR Unhold 11/16/23 1746)     Or   acetaminophen (TYLENOL) 160 MG/5ML oral solution 650 mg ( Oral MAR Unhold 11/16/23 1746)     Or   acetaminophen (TYLENOL) suppository 650 mg ( Rectal MAR Unhold 11/16/23 1746)   ondansetron (ZOFRAN) tablet 4 mg ( Oral MAR Unhold 11/16/23 1746)     Or   ondansetron (ZOFRAN)  injection 4 mg ( Intravenous MAR Unhold 11/16/23 1746)   morphine injection 1 mg ( Intravenous MAR Unhold 11/16/23 1746)     And   naloxone (NARCAN) injection 0.4 mg ( Intravenous MAR Unhold 11/16/23 1746)   famotidine (PEPCID) tablet 40 mg (40 mg Oral Not Given 11/16/23 1806)   lactated ringers infusion ( Intravenous Anesthesia Volume Adjustment 11/16/23 1523)   HYDROcodone-acetaminophen (NORCO) 5-325 MG per tablet 1 tablet (has no administration in time range)   iopamidol (ISOVUE-370) 76 % injection 100 mL (95 mL Intravenous Given by Other 11/16/23 0325)   heparin (porcine) 5000 UNIT/ML injection 7,700 Units (7,700 Units Intravenous Given 11/16/23 0517)   vancomycin IVPB 1500 mg in 0.9% NaCl (Premix) 500 mL (1,500 mg Intravenous New Bag 11/16/23 1358)   iodixanol (VISIPAQUE) 320 MG/ML injection 100 mL (15 mL Intra-arterial Given by Other 11/16/23 1610)         ORDERS PLACED DURING THIS VISIT:  Orders Placed This Encounter   Procedures    CT Angio Abdominal Aorta Bilateral Iliofem Runoff    Arteriogram (Autofinalize)    Comprehensive Metabolic Panel    Protime-INR    aPTT    D-dimer, Quantitative    CK    Magnesium    CBC Auto Differential    aPTT    aPTT    High Sensitivity Troponin T    CK    High Sensitivity Troponin T 2Hr    Basic Metabolic Panel    CBC (No Diff)    aPTT    Diet: Regular/House Diet; Texture: Regular Texture (IDDSI 7); Fluid Consistency: Thin (IDDSI 0)    Doppler pulse    Verify All Anticoagulant Orders Are Discontinued Upon Initiation of Heparin Protocol (eg Enoxaparin, Fondaparinux, Apixaban, Dabigatran, Edoxaban, or Rivaroxaban)    RN To Release aPTT Order 6 Hours After Heparin Bolus & 6 Hours After Any Heparin Rate Change    Obtain Informed Consent    Vital Signs    Intake & Output    Weigh Patient    Oral Care    Telemetry - Place Orders & Notify Provider of Results When Patient Experiences Acute Chest Pain, Dysrhythmia or Respiratory Distress    May Be Off Telemetry for Tests    Activity -  Strict Bed Rest    Assess Need for Indwelling Urinary Catheter - Follow Removal Protocol    Urinary Catheter Care    Pulse Oximetry, Continuous    Bladder Scan if Patient Unable to Void 4-6 Hours After Catheter Removal    If Bladder Scan Volume is Less Than 500mL & Patient is Without Symptoms of Bladder Discomfort / Distention Monitor Every 1-2 Hours for Spontaneous Void    Straight Cath Every 4-6 Hours As Needed If Patient is Unable to Void After 4-6 Hours, Bladder Scan Volume is Greater Than 500mL & Patient Has Symptoms of Bladder Discomfort / Distention    Notify Provider if Bladder Distention Continues    Consult Pharmacist For Review of Medications That May Cause Urinary Retention - RN To Place Order for Consult it Needed    Schedule / Prompt Voiding For Patients With Urinary Incontinence    Adjust Heparin Rate Based on aPTT Using Nomogram    Advance Diet As Tolerated -    At 4:30 PM start heparin drip at 500 units an hour.  Do not follow protocol or give boluses until 7 AM.  Nursing Communication    Code Status and Medical Interventions:    Vascular Surgery Consult    [facility] (on-call MD unless specified) Details    Inpatient Cardiology Consult    Incentive Spirometry    Oxygen Therapy- Nasal Cannula; Titrate 1-6 LPM Per SpO2; 90 - 95%    Oxygen Therapy- Nasal Cannula; Titrate 1-6 LPM Per SpO2; 90 - 95%    ECG 12 Lead Pre-Op / Pre-Procedure    Type & Screen    Insert Peripheral IV    Insert Peripheral IV    Inpatient Admission    CBC & Differential    CBC & Differential         PROGRESS, DATA ANALYSIS, CONSULTS, AND MEDICAL DECISION MAKING    All labs have been independently interpreted by me.  All radiology studies have been reviewed by me.   EKG's independently viewed and interpreted by me.  Discussion below represents my analysis of pertinent findings related to patient's condition, differential diagnosis, treatment plan and final disposition.    MDM patient presenting with left lower extremity episodic pain  with cool foot with compared to the right and only dopplerable pulses.  Concern for claudication but not abrupt arterial cut off given the patient's pulses are dopplerable and capillary refill reassuring.  Patient is at increased risk for clotting diathesis given recent COVID and history of secondary polycythemia.  Will obtain basic labs including D-dimer and CT angio abdominal aorta with lower extremity runoffs.    ED Course as of 11/16/23 1945   Thu Nov 16, 2023   0130 Patient has dopplerable left DP and PT pulses [AR]   0245 Patient history, ER presentation evaluation as well as plan on evaluating with CTA with runoffs discussed with and care assumed from Dr. Antoine. [DRAKE]   0425 WBC: 7.74 [DRAKE]   0425 Hemoglobin: 14.4 [DRAKE]   0425 Hematocrit: 43.5 [DRAKE]   0425 Platelets: 198 [DRAKE]   0425 Glucose(!): 109 [DRAKE]   0425 BUN(!): 24 [DRAKE]   0425 Creatinine(!): 1.44 [DRAKE]   0425 Sodium: 138 [DRAKE]   0425 Potassium: 4.0 [DRAKE]   0425 Magnesium: 1.9 [DRAKE]   0425 Chloride(!): 110 [DRAKE]   0425 CO2(!): 21.0 [DRAKE]   0425 Creatine Kinase: 45 [DRAKE]   0425 D-Dimer, Quant(!): 1.02 [DRAKE]   0508 Patient history, ER presentation and evaluation discussed with Dr. Dominguez, vascular surgery, who recommended to start a heparin drip with bolus, make the patient n.p.o. except medicines, obtain an EKG for possible A-fib and admit to the hospitalist. [DRAKE]   0524 Patient's ER presentation and evaluation as well as vascular surgery recommendations discussed with ELENA Goodwin, CJ, will admit to a telemetry bed. [DRAKE]   0513 EKG          EKG time: 5:13 AM  Rhythm/Rate: Sinus rhythm at 65 bpm  P waves and MD: Normal  QRS, axis: Borderline prolonged QT interval, probable LVH  ST and T waves: No acute ST/T wave changes    Interpreted Contemporaneously by me, independently viewed  Not significantly changed compared to prior EKG of 6/28/2022.   [DRAKE]      ED Course User Index  [AR] Cate Antoine MD  [DRAKE] Rex Hoffman PA         PPE: I wore and adhered to  appropriate PPE per hospital protocols for specific patient presentation. (For respiratory patients with suspected Covid-19 or other infectious etiology suspected for patient's symptoms, the patient wore a mask and I wore an N95 mask throughout the entire patient encounter.) Proper hand hygiene both before and after patient encounter was performed as well.         AS OF 19:45 EST VITALS:    BP - 129/69  HR - 62  TEMP - 97.6 °F (36.4 °C) (Oral)  O2 SATS - 98%        DIAGNOSIS  Final diagnoses:   Acute pain of left lower extremity   Arterial occlusion   Chronic kidney disease, unspecified CKD stage   Hypertension, unspecified type   Hyperlipidemia, unspecified hyperlipidemia type   Hypothyroidism, unspecified type         DISPOSITION  ED Disposition       ED Disposition   Decision to Admit    Condition   --    Comment   Level of Care: Telemetry [5]   Diagnosis: Arterial occlusion [817108]   Admitting Physician: ROSEY GILMAN [[id number]]   Attending Physician: ROSEY GILMAN [[id number]]   Certification: I Certify That Inpatient Hospital Services Are Medically Necessary For Greater Than 2 Midnights                    Note Disclaimer: At [facility], we believe that sharing information builds trust and better relationships. You are receiving this note because you recently visited [facility]. It is possible you will see health information before a provider has talked with you about it. This kind of information can be easy to misunderstand. To help you fully understand what it means for your health, we urge you to discuss this note with your provider.         Cate Antoine MD  11/16/23 0237       Cate Antoine MD  11/16/23 1946      Electronically signed by Cate Antoine MD at 11/16/23 1946       Adriane Sam, RN at 11/15/23 2308          Pt presents to ED by private vehicle with left lower leg pain since around 1500 today. Pt's left foot is cool to touch, palpable pulse to left posterior tibialas.  States the pain is from behind knee down into calf, but also radiates into upper leg at times.     Electronically signed by Adriane Sam RN at 11/15/23 2310       Medication Administration Report for Mandy Alarcon as of 11/17/23 1236     Legend:    Given Hold Not Given Due Canceled Entry Other Actions    Time Time (Time) Time Time-Action         Discontinued     Completed     Future     MAR Hold     Linked             Medications 11/16/23 11/17/23      acetaminophen (TYLENOL) tablet 650 mg  Dose: 650 mg  Freq: Every 4 Hours PRN Route: PO  PRN Reason: Mild Pain  Start: 11/16/23 0913   Admin Instructions:   If given for fever, use fever parameter: fever greater than 100.4 °F  Based on patient request - if ordered for moderate or severe pain, provider allows for administration of a medication prescribed for a lower pain scale.    Do not exceed 4 grams of acetaminophen in a 24 hr period. Max dose of 2gm for AST/ALT greater than 120 units/L.    If given for pain, use the following pain scale:   Mild Pain = Pain Score of 1-3, CPOT 1-2  Moderate Pain = Pain Score of 4-6, CPOT 3-4  Severe Pain = Pain Score of 7-10, CPOT 5-8    1311-MAR Hold     1746-MAR Unhold              Or  acetaminophen (TYLENOL) 160 MG/5ML oral solution 650 mg  Dose: 650 mg  Freq: Every 4 Hours PRN Route: PO  PRN Reason: Mild Pain  Start: 11/16/23 0913   Admin Instructions:   If given for fever, use fever parameter: fever greater than 100.4 °F  Based on patient request - if ordered for moderate or severe pain, provider allows for administration of a medication prescribed for a lower pain scale.    Do not exceed 4 grams of acetaminophen in a 24 hr period. Max dose of 2gm for AST/ALT greater than 120 units/L.    If given for pain, use the following pain scale:   Mild Pain = Pain Score of 1-3, CPOT 1-2  Moderate Pain = Pain Score of 4-6, CPOT 3-4  Severe Pain = Pain Score of 7-10, CPOT 5-8    1311-MAR Hold     1746-MAR Unhold               Or  acetaminophen (TYLENOL) suppository 650 mg  Dose: 650 mg  Freq: Every 4 Hours PRN Route: RE  PRN Reason: Mild Pain  Start: 11/16/23 0913   Admin Instructions:   If given for fever, use fever parameter: fever greater than 100.4 °F  Based on patient request - if ordered for moderate or severe pain, provider allows for administration of a medication prescribed for a lower pain scale.    Do not exceed 4 grams of acetaminophen in a 24 hr period. Max dose of 2gm for AST/ALT greater than 120 units/L.    If given for pain, use the following pain scale:   Mild Pain = Pain Score of 1-3, CPOT 1-2  Moderate Pain = Pain Score of 4-6, CPOT 3-4  Severe Pain = Pain Score of 7-10, CPOT 5-8    1311-MAR Hold     1746-MAR Unhold               allopurinol (ZYLOPRIM) tablet 300 mg  Dose: 300 mg  Freq: Daily Route: PO  Start: 11/16/23 1018   Admin Instructions:   (BK) Take with food if GI upset occurs.    (1806)-Not Given            0939-Given               apixaban (ELIQUIS) tablet 5 mg  Dose: 5 mg  Freq: Every 12 Hours Scheduled Route: PO  Indications Comment: Atrial Fibrillation  Start: 11/17/23 1000   Admin Instructions:   Tablet may be crushed and suspended in 60 mL of water or D5W and immediately delivered via NG tube.     0939-Given     2100              sennosides-docusate (PERICOLACE) 8.6-50 MG per tablet 2 tablet  Dose: 2 tablet  Freq: 2 Times Daily Route: PO  Start: 11/17/23 2100   Admin Instructions:   HOLD MEDICATION IF PATIENT HAS HAD BOWEL MOVEMENT. Start bowel management regimen if patient has not had a bowel movement after 12 hours.     2100              And  polyethylene glycol (MIRALAX) packet 17 g  Dose: 17 g  Freq: Daily Route: PO  Start: 11/17/23 1000   Admin Instructions:   Use if no bowel movement after 12 hours. Mix in 6-8 ounces of water.  Use 4-8 ounces of water, tea, or juice for each 17 gram dose.     0939-Given              And  bisacodyl (DULCOLAX) EC tablet 5 mg  Dose: 5 mg  Freq: Daily PRN Route:  PO  PRN Reason: Constipation  PRN Comment: Use if polyethylene glycol is ineffective  Start: 11/17/23 0904   Admin Instructions:   Use if no bowel movement after 12 hours.  Swallow whole. Do not crush, split, or chew tablet.        And  bisacodyl (DULCOLAX) suppository 10 mg  Dose: 10 mg  Freq: Daily PRN Route: RE  PRN Reason: Constipation  PRN Comment: Use if bisacodyl oral is ineffective  Start: 11/17/23 0904   Admin Instructions:   Use if no bowel movement after 12 hours.  Hold for diarrhea         famotidine (PEPCID) tablet 40 mg  Dose: 40 mg  Freq: Daily Route: PO  Start: 11/16/23 0932    (1806)-Not Given            0939-Given               HYDROcodone-acetaminophen (NORCO) 5-325 MG per tablet 1 tablet  Dose: 1 tablet  Freq: Every 4 Hours PRN Route: PO  PRN Reason: Moderate Pain  Start: 11/16/23 1746   End: 11/26/23 1745   Admin Instructions:   [IKER]    Do not exceed 4 grams of acetaminophen in a 24 hr period. Max dose of 2gm for AST/ALT greater than 120 units/L        If given for pain, use the following pain scale:   Mild Pain = Pain Score of 1-3, CPOT 1-2  Moderate Pain = Pain Score of 4-6, CPOT 3-4  Severe Pain = Pain Score of 7-10, CPOT 5-8    2224-Given            0302-Given     1150-Given              levothyroxine (SYNTHROID, LEVOTHROID) tablet 75 mcg  Dose: 75 mcg  Freq: Every Early Morning Route: PO  Start: 11/16/23 1018   Admin Instructions:   Take on empty stomach.    (1807)-Not Given            0606-Given               metoprolol succinate XL (TOPROL-XL) 24 hr tablet 25 mg  Dose: 25 mg  Freq: Every 24 Hours Scheduled Route: PO  Start: 11/16/23 1018   Admin Instructions:   Hold for SBP less than 100, DBP less than 60, or heart rate less than 50    Do not crush or chew the capsules or tablets. The drug may not work as designed if the capsule or tablet is crushed or chewed. Swallow whole.  Do not crush or chew.    1258-Given            0939-Given               morphine injection 1 mg  Dose: 1  mg  Freq: Every 3 Hours PRN Route: IV  PRN Reason: Moderate Pain  Start: 11/16/23 0913   End: 11/23/23 0912   Admin Instructions:   Based on patient request - if ordered for moderate or severe pain, provider allows for administration of a medication prescribed for a lower pain scale.  If given for pain, use the following pain scale:  Mild Pain = Pain Score of 1-3, CPOT 1-2  Moderate Pain = Pain Score of 4-6, CPOT 3-4  Severe Pain = Pain Score of 7-10, CPOT 5-8    1311-MAR Hold     1746-MAR Unhold              And  naloxone (NARCAN) injection 0.4 mg  Dose: 0.4 mg  Freq: Every 5 Minutes PRN Route: IV  PRN Reason: Respiratory Depression  Start: 11/16/23 0913   Admin Instructions:   If respiratory rate is less than 8 breaths/minute or patient is difficult to arouse stop any narcotics and contact physician.   Administer slow IV push. Repeat as ordered until patient's respiratory rate is greater than 12 breaths/minute.    1311-MAR Hold     1746-MAR Unhold               nitroglycerin (NITROSTAT) SL tablet 0.4 mg  Dose: 0.4 mg  Freq: Every 5 Minutes PRN Route: SL  PRN Reason: Chest Pain  PRN Comment: Only if SBP Greater Than 100  Start: 11/16/23 0912   Admin Instructions:   If Pain Unrelieved After 3 Doses Notify MD  May administer up to 3 doses per episode.    1311-MAR Hold     1746-MAR Unhold               ondansetron (ZOFRAN) tablet 4 mg  Dose: 4 mg  Freq: Every 6 Hours PRN Route: PO  PRN Reasons: Nausea,Vomiting  Start: 11/16/23 0913   Admin Instructions:   If BOTH ondansetron (ZOFRAN) and promethazine (PHENERGAN) are ordered use ondansetron first and THEN promethazine IF ondansetron is ineffective.    1311-MAR Hold     1746-MAR Unhold              Or  ondansetron (ZOFRAN) injection 4 mg  Dose: 4 mg  Freq: Every 6 Hours PRN Route: IV  PRN Reasons: Nausea,Vomiting  Start: 11/16/23 0913   Admin Instructions:   If BOTH ondansetron (ZOFRAN) and promethazine (PHENERGAN) are ordered use ondansetron first and THEN promethazine  IF ondansetron is ineffective.    1311-MAR Hold     1746-MAR Unhold               pravastatin (PRAVACHOL) tablet 40 mg  Dose: 40 mg  Freq: Nightly Route: PO  Start: 11/16/23 2100   Admin Instructions:   Avoid grapefruit juice.    2016-Given            2100               sodium chloride 0.9 % flush 10 mL  Dose: 10 mL  Freq: As Needed Route: IV  PRN Reason: Line Care  Start: 11/16/23 0911    1311-MAR Hold     1746-MAR Unhold               sodium chloride 0.9 % flush 10 mL  Dose: 10 mL  Freq: Every 12 Hours Scheduled Route: IV  Start: 11/16/23 0932    1311-MAR Hold     1746-MAR Unhold     (1806)-Not Given     2016-Given         0939-Given     2100              sodium chloride 0.9 % flush 10 mL  Dose: 10 mL  Freq: As Needed Route: IV  PRN Reason: Line Care  Start: 11/16/23 0054    1311-MAR Hold     1746-MAR Unhold               sodium chloride 0.9 % infusion 40 mL  Dose: 40 mL  Freq: As Needed Route: IV  PRN Reason: Line Care  Start: 11/16/23 0911   Admin Instructions:   Following administration of an IV intermittent medication, flush line with 40mL NS at 100mL/hr.    1311-MAR Hold     1746-MAR Unhold              Completed Medications  Medications 11/16/23 11/17/23       heparin (porcine) 5000 UNIT/ML injection 7,700 Units  Dose: 80 Units/kg  Weight Dosing Info: 96.6 kg  Freq: Once Route: IV  Indications of Use: Other - full anticoagulation  Indications Comment: Arterial occlusion  Start: 11/16/23 0521   End: 11/16/23 0517   Admin Instructions:   **Max Dose of 10,000 units** Bolus for VTE / PE    0517-Given                iodixanol (VISIPAQUE) 320 MG/ML injection 100 mL  Dose: 100 mL  Freq: Once in Imaging Route: IA  Start: 11/16/23 1611   End: 11/16/23 1610    1610-Given by Other                iopamidol (ISOVUE-370) 76 % injection 100 mL  Dose: 100 mL  Freq: Once in Imaging Route: IV  Start: 11/16/23 0341   End: 11/16/23 0325    0325-Given by Other                vancomycin IVPB 1500 mg in 0.9% NaCl (Premix) 500  mL  Dose: 15 mg/kg  Weight Dosing Info: 96.6 kg  Freq: Once Route: IV  Indications of Use: PERIOPERATIVE PHARMACOPROPHYLAXIS  Start: 11/16/23 1348   End: 11/16/23 1528   Admin Instructions:   Administer within 2 hours of surgical incision.   Order specific questions:   SCIP Justification for use: Documented IgE-Mediated Penicillin or Cephalosporin Allergy      1358-New Bag               Discontinued Medications  Medications 11/16/23 11/17/23       sennosides-docusate (PERICOLACE) 8.6-50 MG per tablet 2 tablet  Dose: 2 tablet  Freq: 2 Times Daily Route: PO  Start: 11/16/23 0932   End: 11/17/23 0904   Admin Instructions:   HOLD MEDICATION IF PATIENT HAS HAD BOWEL MOVEMENT. Start bowel management regimen if patient has not had a bowel movement after 12 hours.    1311-MAR Hold     1746-MAR Unhold     (1806)-Not Given     2016-Given         (0939)-Not Given [C]              And  polyethylene glycol (MIRALAX) packet 17 g  Dose: 17 g  Freq: Daily PRN Route: PO  PRN Reason: Constipation  PRN Comment: Use if senna-docusate is ineffective  Start: 11/16/23 0911   End: 11/17/23 0904   Admin Instructions:   Use if no bowel movement after 12 hours. Mix in 6-8 ounces of water.  Use 4-8 ounces of water, tea, or juice for each 17 gram dose.    1311-MAR Hold     1746-MAR Unhold              And  bisacodyl (DULCOLAX) EC tablet 5 mg  Dose: 5 mg  Freq: Daily PRN Route: PO  PRN Reason: Constipation  PRN Comment: Use if polyethylene glycol is ineffective  Start: 11/16/23 0911   End: 11/17/23 0904   Admin Instructions:   Use if no bowel movement after 12 hours.  Swallow whole. Do not crush, split, or chew tablet.    1311-MAR Hold     1746-MAR Unhold              And  bisacodyl (DULCOLAX) suppository 10 mg  Dose: 10 mg  Freq: Daily PRN Route: RE  PRN Reason: Constipation  PRN Comment: Use if bisacodyl oral is ineffective  Start: 11/16/23 0911   End: 11/17/23 0904   Admin Instructions:   Use if no bowel movement after 12 hours.  Hold for  diarrhea    1311-MAR Hold     1746-MAR Unhold               diphenhydrAMINE (BENADRYL) injection 12.5 mg  Dose: 12.5 mg  Freq: Every 15 Minutes PRN Route: IV  PRN Reason: Itching  PRN Comment: May repeat x 1  Start: 11/16/23 1600   End: 11/16/23 1746   Admin Instructions:   Caution: Look alike/sound alike drug alert. This med may be ordered in other forms and routes. Before giving verify the last time the drug was given by any route/form.         droperidol (INAPSINE) injection 0.625 mg  Dose: 0.625 mg  Freq: Every 20 Minutes PRN Route: IV  PRN Reasons: Nausea,Vomiting  Indications of Use: NAUSEA AND VOMITING  Start: 11/16/23 1600   End: 11/16/23 1746   Admin Instructions:   Use ondansetron first; proceed to droperidol for nausea refractory to ondansetron.        Or  droperidol (INAPSINE) injection 0.625 mg  Dose: 0.625 mg  Freq: Every 20 Minutes PRN Route: IM  PRN Reasons: Nausea,Vomiting  Indications of Use: NAUSEA AND VOMITING  Start: 11/16/23 1600   End: 11/16/23 1746   Admin Instructions:   Use ondansetron first; proceed to droperidol for nausea refractory to ondansetron.         ePHEDrine injection 5 mg  Dose: 5 mg  Freq: Once As Needed Route: IV  PRN Comment: symptomatic hypotension - Notify attending anesthesiologist if this needs to be given  Start: 11/16/23 1600   End: 11/16/23 1746   Admin Instructions:   Caution: Look alike/sound alike drug alert   Dilute with NS to 5-10 mg/mL.  Central line preferred, if unavailable use large bore IV access with frequent nurse monitoring of IV site.         fentaNYL citrate (PF) (SUBLIMAZE) injection 25 mcg  Dose: 25 mcg  Freq: Every 5 Minutes PRN Route: IV  PRN Reason: Severe Pain  Start: 11/16/23 1600   End: 11/16/23 1746   Admin Instructions:   Maximum total dose of fentanyl is 200 mcg.  If given for pain, use the following pain scale:  Mild Pain = Pain Score of 1-3, CPOT 1-2  Moderate Pain = Pain Score of 4-6, CPOT 3-4  Severe Pain = Pain Score of 7-10, CPOT 5-8          flumazenil (ROMAZICON) injection 0.2 mg  Dose: 0.2 mg  Freq: As Needed Route: IV  PRN Comment: for benzodiazepine induced unresponsiveness or sedation  Indications of Use: BENZODIAZEPINE-INDUCED SEDATION  Start: 11/16/23 1600   End: 11/16/23 1746   Admin Instructions:   Notify Anesthesia if given  ** give IV over 15-30 seconds **         heparin (porcine) 5000 UNIT/ML injection 3,900-7,700 Units  Dose: 40-80 Units/kg  Weight Dosing Info: 96.6 kg  Freq: Every 6 Hours PRN Route: IV  PRN Comment: Per Heparin Nomogram  Indications of Use: DVT/PE (active thrombosis)  Start: 11/17/23 0635   End: 11/17/23 0908   Admin Instructions:   **Max Dose of 10,000 units** Bolus for VTE / PE:  PTT Less Than 60 sec, Administer 80 units/kg Bolus   PTT 60 - 70 sec, Administer 40 units/kg Bolus         heparin (porcine) 5000 UNIT/ML injection 3,900-7,700 Units  Dose: 40-80 Units/kg  Weight Dosing Info: 96.6 kg  Freq: Every 6 Hours PRN Route: IV  PRN Comment: Per Heparin Nomogram  Indications of Use: Other - full anticoagulation  Indications Comment: Arterial occlusion  Start: 11/16/23 0504   End: 11/16/23 1746   Admin Instructions:   **Max Dose of 10,000 units** Bolus for VTE / PE:  PTT Less Than 60 sec, Administer 80 units/kg Bolus   PTT 60 - 70 sec, Administer 40 units/kg Bolus    1311-MAR Hold     1746-MAR Unhold               heparin 71948 units/250 ml (100 units/ml) in D5W  Rate: 14.97 mL/hr Dose: 15.5 Units/kg/hr  Weight Dosing Info: 96.6 kg  Freq: Titrated Route: IV  Indications of Use: DVT/PE (active thrombosis)  Start: 11/17/23 0700   End: 11/17/23 0908   Admin Instructions:   Weight Based Heparin Nomogram: VTE / PE: Heparin Infusion 18 units/kg/hr (initial max of 1,500 units/hr)    aPTT Less Than 60: Bolus 80 units/kg & Increase Dose By 4 units/kg/hr.  aPTT 60-70: Bolus 40 units/kg & Increase Dose By 2 units/kg/hr.  aPTT 71-95: No Bolus, No Dose Change  aPTT : No Bolus, Decrease Dose By 2 units/kg/hr.  aPTT Greater  Than 115: No Bolus. Hold Infusion For 1 hour.  Decrease Dose By 3 units/kg/hr. Repeat aPTT 6 Hours After Restarted.  If aPTT Remains Greater Than 115 Stop Heparin Drip & Contact Provider     0654-New Bag     0724-Handoff     1040-Stopped             heparin 35331 units/250 ml (100 units/ml) in D5W  Rate: 5 mL/hr Dose: 500 Units/hr  Freq: Continuous Route: IV  Start: 11/16/23 2100   End: 11/17/23 0659 2000-Currently Infusing            0654-Stopped               heparin 28984 units/250 ml (100 units/ml) in D5W  Rate: 5 mL/hr Dose: 500 Units/hr  Freq: Titrated Route: IV  Start: 11/16/23 1730   End: 11/16/23 1759 1727-New Bag     1916-Handoff               heparin 89851 units/250 ml (100 units/ml) in D5W  Rate: 14.97 mL/hr Dose: 15.5 Units/kg/hr  Weight Dosing Info: 96.6 kg  Freq: Titrated Route: IV  Indications of Use: Other - full anticoagulation  Indications Comment: Arterial occlusion  Start: 11/16/23 0521   End: 11/16/23 1746   Admin Instructions:   Weight Based Heparin Nomogram: VTE / PE: Heparin Infusion 18 units/kg/hr (initial max of 1,500 units/hr)    aPTT Less Than 60: Bolus 80 units/kg & Increase Dose By 4 units/kg/hr.  aPTT 60-70: Bolus 40 units/kg & Increase Dose By 2 units/kg/hr.  aPTT 71-95: No Bolus, No Dose Change  aPTT : No Bolus, Decrease Dose By 2 units/kg/hr.  aPTT Greater Than 115: No Bolus. Hold Infusion For 1 hour.  Decrease Dose By 3 units/kg/hr. Repeat aPTT 6 Hours After Restarted.  If aPTT Remains Greater Than 115 Stop Heparin Drip & Contact Provider    0522-New Bag     0523-Currently Infusing     1258-Stopped              hydrALAZINE (APRESOLINE) injection 5 mg  Dose: 5 mg  Freq: Every 10 Minutes PRN Route: IV  PRN Reason: High Blood Pressure  PRN Comment: for systolic blood pressure greater than 180 mmHg or diastolic blood pressure greater than 105 mmHg  Start: 11/16/23 1600   End: 11/16/23 1746   Admin Instructions:   Up to 20 mg. If labetalol and hydralazine are both ordered,  use labetalol first.  Caution: Look alike/sound alike drug alert         HYDROcodone-acetaminophen (NORCO) 5-325 MG per tablet 1 tablet  Dose: 1 tablet  Freq: Once As Needed Route: PO  PRN Reason: Moderate Pain  Start: 11/16/23 1600   End: 11/16/23 1746   Admin Instructions:   Based on patient request - if ordered for moderate or severe pain, provider allows for administration of a medication prescribed for a lower pain scale.  [IKER]    Do not exceed 4 grams of acetaminophen in a 24 hr period. Max dose of 2gm for AST/ALT greater than 120 units/L        If given for pain, use the following pain scale:   Mild Pain = Pain Score of 1-3, CPOT 1-2  Moderate Pain = Pain Score of 4-6, CPOT 3-4  Severe Pain = Pain Score of 7-10, CPOT 5-8         HYDROcodone-acetaminophen (NORCO) 7.5-325 MG per tablet 1 tablet  Dose: 1 tablet  Freq: Every 4 Hours PRN Route: PO  PRN Reason: Severe Pain  Start: 11/16/23 1600   End: 11/16/23 1746   Admin Instructions:   Based on patient request - if ordered for moderate or severe pain, provider allows for administration of a medication prescribed for a lower pain scale.  [IKER]    Do not exceed 4 grams of acetaminophen in a 24 hr period. Max dose of 2gm for AST/ALT greater than 120 units/L        If given for pain, use the following pain scale:   Mild Pain = Pain Score of 1-3, CPOT 1-2  Moderate Pain = Pain Score of 4-6, CPOT 3-4  Severe Pain = Pain Score of 7-10, CPOT 5-8         HYDROmorphone (DILAUDID) injection 0.25 mg  Dose: 0.25 mg  Freq: Every 5 Minutes PRN Route: IV  PRN Reason: Moderate Pain  Start: 11/16/23 1600   End: 11/16/23 1746   Admin Instructions:   Maximum total dose of hydromorphone is 2 mg.  If given for pain, use the following pain scale:  Mild Pain = Pain Score of 1-3, CPOT 1-2  Moderate Pain = Pain Score of 4-6, CPOT 3-4  Severe Pain = Pain Score of 7-10, CPOT 5-8    1628-Given     1639-Given               ipratropium-albuterol (DUO-NEB) nebulizer solution 3 mL  Dose: 3  mL  Freq: Once As Needed Route: NEBULIZATION  PRN Reasons: Wheezing,Shortness of Air  PRN Comment: bronchospasm  Start: 11/16/23 1600   End: 11/16/23 1746   Admin Instructions:   Notify Anesthesia if minineb is given         labetalol (NORMODYNE,TRANDATE) injection 5 mg  Dose: 5 mg  Freq: Every 5 Minutes PRN Route: IV  PRN Reason: High Blood Pressure  PRN Comment: for systolic blood pressure greater than 180 mmHg or diastolic blood pressure greater than 105 mmHg  Start: 11/16/23 1600   End: 11/16/23 1746   Admin Instructions:   Hold for heart rate less than 60.    If labetalol and hydralazine are both ordered, use labetalol first. Maximum dose of labetalol is 20mg IV while in PACU, notify anesthesiologist for further orders if needed.  Give IV Push over 2 minutes.         lactated ringers infusion  Rate: 9 mL/hr Dose: 9 mL/hr  Freq: Continuous Route: IV  Start: 11/16/23 1330   End: 11/17/23 1102    1420-New Bag     1523-Anesthesia Volume Adjustment               lidocaine (XYLOCAINE) 1 % injection 0.5 mL  Dose: 0.5 mL  Freq: Once As Needed Route: ID  PRN Comment: IV Start  Start: 11/16/23 1327   End: 11/16/23 1623         naloxone (NARCAN) injection 0.2 mg  Dose: 0.2 mg  Freq: As Needed Route: IV  PRN Reasons: Opioid Reversal,Respiratory Depression  PRN Comment: unresponsiveness, decrease oxygen saturation  Indications of Use: ACUTE RESPIRATORY FAILURE,OPIOID-INDUCED RESPIRATORY DEPRESSION  Start: 11/16/23 1600   End: 11/16/23 1746   Admin Instructions:   Notify Anesthesia if given         ondansetron (ZOFRAN) injection 4 mg  Dose: 4 mg  Freq: Once As Needed Route: IV  PRN Reasons: Nausea,Vomiting  Indications of Use: POSTOPERATIVE NAUSEA AND VOMITING  Start: 11/16/23 1600   End: 11/16/23 1746   Admin Instructions:   If BOTH ondansetron (ZOFRAN) and promethazine (PHENERGAN) are ordered use ondansetron first and THEN promethazine IF ondansetron is ineffective.         promethazine (PHENERGAN) suppository 25 mg  Dose:  25 mg  Freq: Once As Needed Route: RE  PRN Reasons: Nausea,Vomiting  Start: 23 1600   End: 23   Admin Instructions:   If BOTH ondansetron (ZOFRAN) and promethazine (PHENERGAN) are ordered use ondansetron first and THEN promethazine IF ondansetron is ineffective.        Or  promethazine (PHENERGAN) tablet 25 mg  Dose: 25 mg  Freq: Once As Needed Route: PO  PRN Reasons: Nausea,Vomiting  Start: 23 1600   End: 23   Admin Instructions:   If BOTH ondansetron (ZOFRAN) and promethazine (PHENERGAN) are ordered use ondansetron first and THEN promethazine IF ondansetron is ineffective.           sodium chloride 0.9 % flush 3 mL  Dose: 3 mL  Freq: Every 12 Hours Scheduled Route: IV  Start: 23 1330   End: 23 1623    1330                sodium chloride 0.9 % flush 3-10 mL  Dose: 3-10 mL  Freq: As Needed Route: IV  PRN Reason: Line Care  Start: 23 1327   End: 23 1623         sodium chloride 0.9 % infusion  Rate: 125 mL/hr Dose: 125 mL/hr  Freq: Continuous Route: IV  Start: 23 0111   End: 23 0727    0107-New Bag     0922-New Bag     1420-Currently Infusing     1522-Paused [C]     1523-Restarted       1822-New Bag            0530-New Bag     0731-Stopped              sodium chloride 500 mL with heparin (porcine) 5000 UNIT/ML 5,000 Units mixture  Freq: As Needed  Start: 23 1509   End: 23 1614    1509-Given [C]                              Physician Progress Notes (last 72 hours)        Lj Loco APRN at 23 0830              Name: Mandy Alarcon ADMIT: 2023   : 1933  PCP: Daryl Santos MD    MRN: [id number] LOS: 1 days   AGE/SEX: 89 y.o. female  ROOM: [facility]     Subjective   Subjective   Patient appears comfortable and in no apparent distress.  Reports complete sensation and warmth on left lower extremity return.  Denies chest pain or shortness of breath.  Tolerating intake.  Denies overnight issues.  Voices no other  "concerns.      Objective   Objective   Vital Signs  Temp:  [97.5 °F (36.4 °C)-98.6 °F (37 °C)] 97.6 °F (36.4 °C)  Heart Rate:  [52-73] 59  Resp:  [16-20] 18  BP: (100-161)/(56-95) 131/65  SpO2:  [80 %-100 %] 98 %  on  Flow (L/min):  [2] 2;   Device (Oxygen Therapy): room air  Body mass index is 37.73 kg/m².    Physical Exam  Constitutional:       General: She is not in acute distress.     Appearance: She is obese. She is not toxic-appearing.   Cardiovascular:      Rate and Rhythm: Bradycardia present.      Heart sounds: Normal heart sounds.   Pulmonary:      Effort: Pulmonary effort is normal.      Breath sounds: Normal breath sounds.   Abdominal:      General: Bowel sounds are normal.      Palpations: Abdomen is soft.   Musculoskeletal:         General: No tenderness.      Right lower leg: No edema.      Left lower leg: No edema.   Skin:     General: Skin is warm and dry.   Neurological:      Mental Status: She is alert and oriented to person, place, and time.       Results Review     I reviewed the patient's new clinical results.  Results from last 7 days   Lab Units 11/17/23  0532 11/16/23  0104   WBC 10*3/mm3 7.23 7.74   HEMOGLOBIN g/dL 12.4 14.4   PLATELETS 10*3/mm3 179 198     Results from last 7 days   Lab Units 11/17/23  0532 11/16/23  0158   SODIUM mmol/L 137 138   POTASSIUM mmol/L 4.9 4.0   CHLORIDE mmol/L 112* 110*   CO2 mmol/L 18.2* 21.0*   BUN mg/dL 15 24*   CREATININE mg/dL 0.99 1.44*   GLUCOSE mg/dL 134* 109*   EGFR mL/min/1.73 54.3* 34.6*     Results from last 7 days   Lab Units 11/16/23  0158   ALBUMIN g/dL 3.4*   BILIRUBIN mg/dL 0.4   ALK PHOS U/L 46   AST (SGOT) U/L 14   ALT (SGPT) U/L 22     Results from last 7 days   Lab Units 11/17/23  0532 11/16/23  0158   CALCIUM mg/dL 7.7* 9.2   ALBUMIN g/dL  --  3.4*   MAGNESIUM mg/dL  --  1.9       No results found for: \"HGBA1C\", \"POCGLU\"    CT Angio Abdominal Aorta Bilateral Iliofem Runoff    Result Date: 11/16/2023   1. The patient is noted to have an " abrupt termination of the left popliteal artery within the popliteal fossa. This is favored to be secondary to thrombus. There is also occlusion of the left profunda femoris artery within the proximal thigh, which potentially also may be related to thrombus. Furthermore, the patient has an occlusion of the right posterior tibial artery within the calf. This potentially also may be related to thrombus.  Radiation dose reduction techniques were utilized, including automated exposure control and exposure modulation based on body size.   This report was finalized on 11/16/2023 4:30 AM by Dr. Kathleen Montoya M.D on Workstation: [id number]       I have personally reviewed all medications:  Scheduled Medications  allopurinol, 300 mg, Oral, Daily  famotidine, 40 mg, Oral, Daily  levothyroxine, 75 mcg, Oral, Q AM  metoprolol succinate XL, 25 mg, Oral, Q24H  senna-docusate sodium, 2 tablet, Oral, BID   And  polyethylene glycol, 17 g, Oral, Daily  pravastatin, 40 mg, Oral, Nightly  sodium chloride, 10 mL, Intravenous, Q12H    Infusions  heparin, 15.5 Units/kg/hr, Last Rate: 15.5 Units/kg/hr (11/17/23 0654)  lactated ringers, 9 mL/hr    Diet  Diet: Regular/House Diet; Texture: Regular Texture (IDDSI 7); Fluid Consistency: Thin (IDDSI 0)    I have personally reviewed:  [x]  Laboratory   []  Microbiology   []  Radiology   []  EKG/Telemetry  []  Cardiology/Vascular   []  Pathology    []  Records      Assessment/Plan     Active Hospital Problems    Diagnosis  POA    **Arterial occlusion [I70.90]  Yes    Stage 3b chronic kidney disease [N18.32]  Yes    Paroxysmal atrial fibrillation [I48.0]  Yes    Non morbid obesity [E66.9]  Yes    Benign essential hypertension [I10]  Yes    Hyperlipidemia [E78.2]  Yes      Resolved Hospital Problems   No resolved problems to display.       Ms. Alarcon is a 89 y.o. former smoker with a history of obesity, hypertension, hyperlipidemia, paroxysmal atrial fibrillation without anticoagulation PTA,  nocturnal hypoxemia/KARIN without CPAP PTA that presents to [facility] complaining of leg pain, left & admitted for arterial occlusion of left lower extremity.       Arterial occlusion, bilateral: Confirmed on CTA runoff.  Heparin drip infusing. Vascular surgery following s/p thrombectomy (suspect cardiac origin) in OR on 11/16/2023.  Plan for oral AC at discharge.       Benign essential hypertension: BP acceptable acutely.  Continue Toprol-XL 25 mg p.o. daily Protonix regimen.  Telemetry.       Hyperlipidemia: LFTs unremarkable.  Statin continued.       Non morbid obesity: BMI 37.  Complicating all problems.       Paroxysmal atrial fibrillation: Rate controlled with beta-blocker.  No AC PTA.  Heparin drip infusing per above.  Telemetry.  Cardiology following.       Stage 3b chronic kidney disease / metabolic acidosis most likely due to above: Serum creatinine better than baseline.  Avoid nephrotoxins.  Monitor labs.     Heparin gtt infusing for above adequate for DVT prophylaxis.  Full code.  Discussed with patient and nursing staff.  Anticipate discharge home with family in 1-2 days. Pending cardiology approval for transition from heparin to oral AC       CJ Perkins  [facility]  11/17/23  09:06 EST        Electronically signed by Lj Loco APRN at 11/17/23 0906       Lizbeth Laura MD at 11/17/23 0732            [facility] Follow Up    Chief Complaint: follow up acute lower extremity thromboembolism    Interval History: Feeling well today.  Denies any further lower extremity pain.  No shortness of breath or chest pain.  She is in good spirits this morning.    Objective:     Objective:  Temp:  [97.5 °F (36.4 °C)-98.6 °F (37 °C)] 98.6 °F (37 °C)  Heart Rate:  [52-73] 59  Resp:  [16-20] 18  BP: (100-161)/(56-95) 130/59     Intake/Output Summary (Last 24 hours) at 11/17/2023 0733  Last data filed at 11/17/2023 0700  Gross per 24 hour   Intake  "3540.19 ml   Output 245 ml   Net 3295.19 ml     Body mass index is 37.73 kg/m².      11/15/23  2251   Weight: 96.6 kg (213 lb)     Weight change:       Physical Exam:   General : Alert, cooperative, in no acute distress.  Neuro: Alert,cooperative and oriented.  Lungs: CTAB. Normal respiratory effort and rate.  CV: Regular rate and rhythm, normal S1 and S2, no murmurs, gallops or rubs.  ABD: Soft, nontender, nondistended. Positive bowel sounds.  Extr: No edema or cyanosis, moves all extremities.    Lab Review:   Results from last 7 days   Lab Units 11/17/23  0532 11/16/23  0158   SODIUM mmol/L 137 138   POTASSIUM mmol/L 4.9 4.0   CHLORIDE mmol/L 112* 110*   CO2 mmol/L 18.2* 21.0*   BUN mg/dL 15 24*   CREATININE mg/dL 0.99 1.44*   GLUCOSE mg/dL 134* 109*   CALCIUM mg/dL 7.7* 9.2   AST (SGOT) U/L  --  14   ALT (SGPT) U/L  --  22     Results from last 7 days   Lab Units 11/16/23  0726 11/16/23  0158   CK TOTAL U/L 43 45   HSTROP T ng/L 33* 35*     Results from last 7 days   Lab Units 11/17/23  0532 11/16/23  0104   WBC 10*3/mm3 7.23 7.74   HEMOGLOBIN g/dL 12.4 14.4   HEMATOCRIT % 38.5 43.5   PLATELETS 10*3/mm3 179 198     Results from last 7 days   Lab Units 11/17/23  0532 11/16/23  1154 11/16/23  0104   INR   --   --  1.12*   APTT seconds 46.2* >200.0* 29.3     Results from last 7 days   Lab Units 11/16/23  0158   MAGNESIUM mg/dL 1.9           Invalid input(s): \"LDLCALC\"          I reviewed the patient's new clinical results.  I personally viewed and interpreted the patient's EKG  Current Medications:   Scheduled Meds:allopurinol, 300 mg, Oral, Daily  famotidine, 40 mg, Oral, Daily  levothyroxine, 75 mcg, Oral, Q AM  metoprolol succinate XL, 25 mg, Oral, Q24H  pravastatin, 40 mg, Oral, Nightly  senna-docusate sodium, 2 tablet, Oral, BID  sodium chloride, 10 mL, Intravenous, Q12H      Continuous Infusions:heparin, 15.5 Units/kg/hr, Last Rate: 15.5 Units/kg/hr (11/17/23 9198)  lactated ringers, 9 " mL/hr        Allergies:  Allergies   Allergen Reactions    Cefaclor Anaphylaxis, Angioedema and Swelling     caused angioedema 25 years ago; she tolerates cephalexin, amoxicillin, and ceftriaxone per my d/w patient and her daughter as well as amoxicillin-clavulanate confirmed in EPIC  caused angioedema 25 years ago; she tolerates cephalexin, amoxicillin, and ceftriaxone per my d/w patient and her daughter as well as amoxicillin-clavulanate confirmed in EPIC  caused angioedema 25 years ago; she tolerates cephalexin, amoxicillin, and ceftriaxone per my d/w patient and her daughter as well as amoxicillin-clavulanate confirmed in EPIC  caused angioedema 25 years ago; she tolerates cephalexin, amoxicillin, and ceftriaxone per my d/w patient and her daughter as well as amoxicillin-clavulanate confirmed in EPIC    Sulfa Antibiotics Rash       Assessment/Plan:     Acute lower extremity arterial thromboembolism.  Likely cardioembolic.  Now status post open left femoral thrombectomy.  No evidence of atrial fibrillation here or symptoms to suggest atrial fibrillation.  Remains on IV heparin.  2.   History of paroxysmal atrial fibrillation.  Occurred in the setting of an acute illness in 2020.  Since then she has not shown any evidence of recurrent atrial fibrillation although cannot entirely rule this out.  No evidence of atrial fibrillation's during this admission so far.  3.   Recent COVID 19 infection  4.  Paroxysmal supraventricular tachycardia.  No episodes this admission.  5.  Hypertension.  Well-controlled this admission.  6.  Hyperlipidemia.  On pravastatin as outpatient.  7.  Chronic kidney disease.  Creatinine improved this morning.    -As above agree that her presentation is highly suspicious for cardioembolic event.  Although have not been able to confirm any recurrent atrial fibrillation since 2020 certainly agree that this is strongly suspected.  - Transition from heparin infusion to apixaban today.  Agree that  she will need to remain on anticoagulation indefinitely.  - At this point I do not see any need to place a long-term monitor at discharge to confirm the diagnosis of atrial fibrillation since it will not change her management in terms of long-term anticoagulation.  -Continue metoprolol the current dose.    Lizbeth Laura MD  11/17/23  07:33 EST    Electronically signed by Lizbeth Laura MD at 11/17/23 0927       Jace Chowdhury Jr., MD at 11/17/23 0716          The patient is 1 day status post open left femoral thrombectomy with left lower extremity angiogram for what appears to be a cardiac embolus.  She has done satisfactorily with no significant issues.  She has done well from a hemodynamic standpoint.  She has been on a heparin drip but only at 500 units/h up to this hour.  Switching to normal protocol.    Hemoglobin 12.4 with platelet count of 179,000.  White blood cell count normal.  BUN and creatinine normal with potassium of 4.9.    Left groin incision looks good with only minimal ecchymosis, no hematoma, no signs of infection.  Normal multiphasic Doppler signal of the left posterior tibial artery and biphasic Doppler signal in the dorsalis pedis artery.  Foot hyperemic and warm.  No calf tenderness or signs of compartment syndrome.    Patient doing well following embolectomy.  I believe she needs to be on anticoagulation most likely lifelong since this appears to be from cardiac origin.  We will leave the final determination to the cardiology service.  To switch over to oral anticoagulation today from my standpoint.  Likely home tomorrow if no issues with anticoagulation.    Electronically signed by Jace Chowdhury Jr., MD at 11/17/23 0703          Consult Notes (last 72 hours)        Anais Segal, APRN at 11/16/23 1233        Consult Orders    1. Inpatient Cardiology Consult [[id number]] ordered by Nathan Dominguez MD at 11/16/23 0714                     Patient Name: Mandy HEWITT  Dorian  :1933  89 y.o.    Date of Admission: 2023  Date of Consultation:  23  Encounter Provider: CJ Boss  Place of Service: [facility] CARDIOLOGY  Referring Provider: Balbir Montalvo MD  Patient Care Team:  Daryl Santos MD as PCP - General (Internal Medicine)      Chief complaint: cold foot, pain at rest    History of Present Illness: Ms. Alarcon is a 89 year old woman followed by Dr. Laura for paroxysmal SVT and brief paroxysmal atrial fibrillation in the setting of acute illness in . She has hypertension, hyperlipidemia and chronic kidney disease. She has fair functional capacity for age. She drives and shops and ambulates well without angina or SOA. She had COVID 3 weeks ago. She reports it was a mild case and she recovered nicely.     She presented to the emergency room this morning with left foot pain and cool to touch. Found to have acute thromboembolism to both lower extremities. On the left he had popliteal occlusion with large segment of thrombus load. She has been seen by vascular surgery who recommends urgent thrombectomy. She has thrombus on the right but is asymptomatic. She has been started on a heparin drip.     Echo  Left ventricular systolic function is normal. Calculated EF = 60%. Estimated EF was in agreement with the calculated EF. Normal left ventricular cavity size and wall thickness noted. All left ventricular wall segments contract normally.  Left ventricular diastolic function is normal.  The aortic valve is abnormal in structure. The valve exhibits sclerosis.  Mild aortic valve regurgitation is present.    Holter  An abnormal monitor study.     Sinus rhythm with sinus bradycardia.  Several short runs of SVT and VT.  3.5% bradycardia with single 2.0 second pause.    Past Medical History:   Diagnosis Date    Benign essential hypertension 10/28/2012    2012--treatment for hypertension begun.    Bilateral lower  extremity edema 2020    Bilateral sensorineural hearing loss, wears hearing aids 2017    Left is much worse than right.  Patient had multiple ear infections as a child.    Chronic renal impairment, stage 3b 09/15/2020    Claustrophobia 2016    This patient has significant nocturnal hypoxemia and I think that she could benefit from nocturnal oxygen therapy. The exact etiology of her hypoxemia is not clear. She could possibly have obstructive sleep apnea but this is not documented. We cannot test this patient for sleep apnea due to the fact that she is severely claustrophobic. Patient was a former smoker and it is possibly that COPD he is playing a role.    Combined form of senile cataract 2021    Family history of coronary artery disease 2016    Patient's mother, father, 2 sisters and a brother all  from myocardial infarctions    Gastroesophageal reflux disease without esophagitis 2020    Gout 10/28/2012    2012--initial diagnosis and treatment of gout.    History of acute pancreatitis 2020    Hyperlipidemia 10/28/2012    2012--treatment for hyperlipidemia begun.    Impaired fasting glucose 10/28/2012    2012--initial diagnosis impaired fasting glucose.    Morbidly obese 2019    Nocturnal hypoxemia 2012--patient did not receive nocturnal oxygen because of Medicare regulations.   05/15/2014--overnight oximetry revealed oxygen saturations less than 89% for 22 minutes and 40 seconds. Oxygen saturations less than or equal to 88% for 22 minutes and 40 seconds. Lowest oxygen saturation 83%. The longest continuous time with oxygen saturations less than or equal to 88% was 1 minute and 32 seconds.   2012--overnight oximetry revealed oxygen saturations less than 90% for one hour and 35 minutes. Oxygen saturations less than 89% 59 minutes. This patient has significant nocturnal hypoxemia and I think that she could benefit from  nocturnal oxygen therapy. The exact etiology of her hypoxemia is not clear. She could possibly have obstructive sleep apnea but this is not documented. We cannot test this patient for sleep apnea due to the fact that she is severely claustrophobic. Patient was a former smoker and it is possibly that COPD he is playing a role.    Non morbid obesity 03/11/2019    Osteopenia of multiple sites 09/26/2023    Paroxysmal atrial fibrillation 04/10/2020    Postmenopausal state 09/26/2023    Primary hypothyroidism 11/17/2015 March 11, 2019--TSH remains elevated slightly at 4.92.  Given the overall clinical picture including the multinodular goiter, we will initiate levothyroxine 50 mcg/day and reassess in about 6 weeks.  August 1, 2018--thyroid ultrasound reveals a multinodular thyroid with multiple subcentimeter nodules.  Only minimal increase in size of the largest nodule in the left lobe has occurred when compared     Secondary polycythemia 08/02/2012 11/06/2014--patient did not receive nocturnal oxygen because of Medicare regulations.   05/15/2014--overnight oximetry revealed oxygen saturations less than 89% for 22 minutes and 40 seconds. Oxygen saturations less than or equal to 88% for 22 minutes and 40 seconds. Lowest oxygen saturation 83%. The longest continuous time with oxygen saturations less than or equal to 88% was 1 minute and 32 seconds.   08/02/2012--overnight oximetry revealed oxygen saturations less than 90% for one hour and 35 minutes. Oxygen saturations less than 89% 59 minutes. This patient has significant nocturnal hypoxemia and I think that she could benefit from nocturnal oxygen therapy. The exact etiology of her hypoxemia is not clear. She could possibly have obstructive sleep apnea but this is not documented. We cannot test this patient for sleep apnea due to the fact that she is severely claustrophobic. Patient was a former smoker and it is possibly that COPD he is playing a role.    Vitamin D  deficiency 05/23/2016       Past Surgical History:   Procedure Laterality Date    CHOLECYSTECTOMY WITH INTRAOPERATIVE CHOLANGIOGRAM N/A 03/12/2020    Procedure: LAPAROSCOPIC CHOLECYSTOSTOMY TUBE, INTRAOPERATIVE CHOLANGIOGRAM;  Surgeon: Bryce Andujar MD;  Location: [facility] MAIN OR;  Service: General;  Laterality: N/A;    CHOLECYSTECTOMY WITH INTRAOPERATIVE CHOLANGIOGRAM N/A 05/22/2020    Procedure: CHOLECYSTECTOMY LAPAROSCOPIC INTRAOPERATIVE CHOLANGIOGRAM and EGD;  Surgeon: Bryce Andujar MD;  Location: [facility] MAIN OR;  Service: General;  Laterality: N/A;    ERCP N/A 07/01/2022    Procedure: ENDOSCOPIC RETROGRADE CHOLANGIOPANCREATOGRAPHY with sphincterotomy, balloon sweep 12-15mm;  Surgeon: Mitesh Altamirano MD;  Location: [facility] ENDOSCOPY;  Service: Gastroenterology;  Laterality: N/A;  pre - gallstone pancreatitis  post - s/p sphincterotomy, sludge and  pus    OOPHORECTOMY      age 48    RADICAL ABDOMINAL HYSTERECTOMY  48 years old    48 years of age. Uterine fibroid tumors with menorrhagia - no cancer         Prior to Admission medications    Medication Sig Start Date End Date Taking? Authorizing Provider   allopurinol (ZYLOPRIM) 300 MG tablet TAKE 1 TABLET BY MOUTH DAILY FOR GOUT 8/1/23  Yes Daryl Santos MD   ascorbic acid (VITAMIN C) 500 MG capsule controlled-release CR capsule Take 1 tablet by mouth Daily.   Yes ProviderReena MD   levothyroxine (SYNTHROID, LEVOTHROID) 75 MCG tablet TAKE 1 TABLET BY MOUTH DAILY FOR THYROID 5/3/23  Yes Daryl Santos MD   metoprolol succinate XL (TOPROL-XL) 25 MG 24 hr tablet TAKE 1 TABLET BY MOUTH DAILY FOR HIGH BLOOD PRESSURE AND HEART OR PALPITATIONS 1/23/23  Yes Daryl Santos MD   Multiple Vitamins-Minerals (MULTIVITAMIN ADULTS PO) Take 1 tablet by mouth Daily.   Yes ProviderReena MD   pravastatin (PRAVACHOL) 40 MG tablet TAKE 1 TABLET BY MOUTH DAILY 4/5/23  Yes Daryl Santos MD   Hydrocod Abbe-Chlorphe Abbe ER (TUSSIONEX  PENNKINETIC) 10-8 MG/5ML ER suspension Take 1 teaspoon p.o. every 12 hours for cough and congestion from COVID-19 infection. 10/26/23   Daryl Santos MD   omeprazole (priLOSEC) 40 MG capsule TAKE 1 CAPSULE BY MOUTH EVERY DAY BEFORE FIRST MEAL FOR STOMACH ACID OR REFLUX 11/10/23   Daryl Santos MD   polyethylene glycol (MIRALAX) packet Take orally as directed for constipation 20   Daryl Santos MD   predniSONE (DELTASONE) 10 MG tablet 5 by mouth daily 5 days, then 4 daily 2 days, 3 daily 2 days, 2 daily 2 days, 1 daily 2 days, one half daily 2 days and then discontinue. 10/26/23   Daryl Santos MD   Restasis 0.05 % ophthalmic emulsion 2 (two) times a day. 3/4/21   ProviderReena MD       Allergies   Allergen Reactions    Cefaclor Anaphylaxis, Angioedema and Swelling     caused angioedema 25 years ago; she tolerates cephalexin, amoxicillin, and ceftriaxone per my d/w patient and her daughter as well as amoxicillin-clavulanate confirmed in EPIC  caused angioedema 25 years ago; she tolerates cephalexin, amoxicillin, and ceftriaxone per my d/w patient and her daughter as well as amoxicillin-clavulanate confirmed in EPIC  caused angioedema 25 years ago; she tolerates cephalexin, amoxicillin, and ceftriaxone per my d/w patient and her daughter as well as amoxicillin-clavulanate confirmed in EPIC  caused angioedema 25 years ago; she tolerates cephalexin, amoxicillin, and ceftriaxone per my d/w patient and her daughter as well as amoxicillin-clavulanate confirmed in EPIC    Sulfa Antibiotics Rash       Social History     Socioeconomic History    Marital status:     Number of children: 5    Highest education level: GED or equivalent   Tobacco Use    Smoking status: Former     Packs/day: .5     Types: Cigarettes     Start date: 1946     Quit date: 1954     Years since quittin.9    Smokeless tobacco: Never    Tobacco comments:     Stopped drinking at age 30.   Vaping Use     Vaping Use: Never used   Substance and Sexual Activity    Alcohol use: No     Comment: caffiene use tea coffee    Drug use: No    Sexual activity: Not Currently     Partners: Male       Family History   Problem Relation Age of Onset    Heart disease Mother         Ischemic.  from coronary artery disease.    Heart disease Father         Ischemic.  from coronary artery disease.    Heart disease Sister         Ischemic.  from coronary artery disease.    Heart disease Sister         Ischemic.  from coronary artery disease.    Heart disease Brother         Ischemic.  from coronary artery disease.    Breast cancer Daughter     Breast cancer Daughter     Ovarian cancer Neg Hx        REVIEW OF SYSTEMS:   All systems reviewed.  Pertinent positives identified in HPI.  All other systems are negative.      Objective:     Vitals:    23 0231 23 0431 23 0435 23 1101   BP: 149/67 137/54  161/89   Pulse: 55  53 63   Resp: 18   16   Temp:       TempSrc:       SpO2: 96% 97%  98%   Weight:       Height:         Body mass index is 37.73 kg/m².    Physical Exam:  Constitutional: She is oriented to person, place, and time. She appears well-developed. She does not appear ill.   HENT:   Head: Normocephalic and atraumatic. Head is without contusion.   Right Ear: Hearing normal. No drainage.   Left Ear: Hearing normal. No drainage.   Nose: No nasal deformity. No epistaxis.   Eyes: Lids are normal. Right eye exhibits no exudate. Left eye exhibits no exudate.  Neck: No JVD present.   Cardiovascular: Normal rate, regular rhythm and normal heart sounds.    Pulses:       Posterior tibial pulses are 2+ on the right side, and 2+ on the left side.   Pulmonary/Chest: Effort normal and breath sounds normal.   Abdominal: Soft. Normal appearance and bowel sounds are normal. There is no tenderness.   Musculoskeletal: Normal range of motion.        Right shoulder: She exhibits no deformity.        Left shoulder:  She exhibits no deformity.   Neurological: She is alert and oriented to person, place, and time. She has normal strength.   Skin: Skin is warm, dry and intact. No rash noted.   Psychiatric: She has a normal mood and affect. Her behavior is normal. Thought content normal.   Vitals reviewed      Lab Review:     Results from last 7 days   Lab Units 11/16/23  0158   SODIUM mmol/L 138   POTASSIUM mmol/L 4.0   CHLORIDE mmol/L 110*   CO2 mmol/L 21.0*   BUN mg/dL 24*   CREATININE mg/dL 1.44*   CALCIUM mg/dL 9.2   BILIRUBIN mg/dL 0.4   ALK PHOS U/L 46   ALT (SGPT) U/L 22   AST (SGOT) U/L 14   GLUCOSE mg/dL 109*     Results from last 7 days   Lab Units 11/16/23  0726 11/16/23  0158   CK TOTAL U/L 43 45   HSTROP T ng/L 33* 35*     Results from last 7 days   Lab Units 11/16/23  0104   WBC 10*3/mm3 7.74   HEMOGLOBIN g/dL 14.4   HEMATOCRIT % 43.5   PLATELETS 10*3/mm3 198     Results from last 7 days   Lab Units 11/16/23  0104   INR  1.12*   APTT seconds 29.3     Results from last 7 days   Lab Units 11/16/23  0158   MAGNESIUM mg/dL 1.9                 Current Facility-Administered Medications:     acetaminophen (TYLENOL) tablet 650 mg, 650 mg, Oral, Q4H PRN **OR** acetaminophen (TYLENOL) 160 MG/5ML oral solution 650 mg, 650 mg, Oral, Q4H PRN **OR** acetaminophen (TYLENOL) suppository 650 mg, 650 mg, Rectal, Q4H PRN, Lj Loco APRN    allopurinol (ZYLOPRIM) tablet 300 mg, 300 mg, Oral, Daily, Lj Loco APRN    sennosides-docusate (PERICOLACE) 8.6-50 MG per tablet 2 tablet, 2 tablet, Oral, BID **AND** polyethylene glycol (MIRALAX) packet 17 g, 17 g, Oral, Daily PRN **AND** bisacodyl (DULCOLAX) EC tablet 5 mg, 5 mg, Oral, Daily PRN **AND** bisacodyl (DULCOLAX) suppository 10 mg, 10 mg, Rectal, Daily PRN, Lj Loco APRN    famotidine (PEPCID) tablet 40 mg, 40 mg, Oral, Daily, Lj Loco APRN    heparin (porcine) 5000 UNIT/ML injection 3,900-7,700 Units, 40-80 Units/kg, Intravenous, Q6H PRN, Rex Hoffman,  PA    heparin 70642 units/250 ml (100 units/ml) in D5W, 15.5 Units/kg/hr, Intravenous, Titrated, Rex Hoffman PA, Last Rate: 14.97 mL/hr at 11/16/23 0523, 15.5 Units/kg/hr at 11/16/23 0523    levothyroxine (SYNTHROID, LEVOTHROID) tablet 75 mcg, 75 mcg, Oral, Q AM, Lj Loco APRN    metoprolol succinate XL (TOPROL-XL) 24 hr tablet 25 mg, 25 mg, Oral, Q24H, Lj Loco APRN    morphine injection 1 mg, 1 mg, Intravenous, Q3H PRN **AND** naloxone (NARCAN) injection 0.4 mg, 0.4 mg, Intravenous, Q5 Min PRN, Lj Loco APRN    nitroglycerin (NITROSTAT) SL tablet 0.4 mg, 0.4 mg, Sublingual, Q5 Min PRN, Lj Loco APRN    ondansetron (ZOFRAN) tablet 4 mg, 4 mg, Oral, Q6H PRN **OR** ondansetron (ZOFRAN) injection 4 mg, 4 mg, Intravenous, Q6H PRN, Lj Loco APRN    pravastatin (PRAVACHOL) tablet 40 mg, 40 mg, Oral, Nightly, Lj Loco APRN    [COMPLETED] Insert Peripheral IV, , , Once **AND** sodium chloride 0.9 % flush 10 mL, 10 mL, Intravenous, PRN, Cate Antoine MD    sodium chloride 0.9 % flush 10 mL, 10 mL, Intravenous, Q12H, Lj Loco APRN    sodium chloride 0.9 % flush 10 mL, 10 mL, Intravenous, PRN, Lj Loco APRN    sodium chloride 0.9 % infusion 40 mL, 40 mL, Intravenous, PRN, Lj Loco APRN    sodium chloride 0.9 % infusion, 125 mL/hr, Intravenous, Continuous, Cate Antoine MD, Last Rate: 125 mL/hr at 11/16/23 0922, 125 mL/hr at 11/16/23 0922    Current Outpatient Medications:     allopurinol (ZYLOPRIM) 300 MG tablet, TAKE 1 TABLET BY MOUTH DAILY FOR GOUT, Disp: 90 tablet, Rfl: 3    ascorbic acid (VITAMIN C) 500 MG capsule controlled-release CR capsule, Take 1 tablet by mouth Daily., Disp: , Rfl:     levothyroxine (SYNTHROID, LEVOTHROID) 75 MCG tablet, TAKE 1 TABLET BY MOUTH DAILY FOR THYROID, Disp: 90 tablet, Rfl: 3    metoprolol succinate XL (TOPROL-XL) 25 MG 24 hr tablet, TAKE 1 TABLET BY MOUTH DAILY FOR HIGH BLOOD PRESSURE AND HEART  OR PALPITATIONS, Disp: 90 tablet, Rfl: 3    Multiple Vitamins-Minerals (MULTIVITAMIN ADULTS PO), Take 1 tablet by mouth Daily., Disp: , Rfl:     pravastatin (PRAVACHOL) 40 MG tablet, TAKE 1 TABLET BY MOUTH DAILY, Disp: 90 tablet, Rfl: 3    Hydrocod Abbe-Chlorphe Abbe ER (TUSSIONEX PENNKINETIC) 10-8 MG/5ML ER suspension, Take 1 teaspoon p.o. every 12 hours for cough and congestion from COVID-19 infection., Disp: 90 mL, Rfl: 0    omeprazole (priLOSEC) 40 MG capsule, TAKE 1 CAPSULE BY MOUTH EVERY DAY BEFORE FIRST MEAL FOR STOMACH ACID OR REFLUX, Disp: 30 capsule, Rfl: 1    polyethylene glycol (MIRALAX) packet, Take orally as directed for constipation, Disp: , Rfl:     predniSONE (DELTASONE) 10 MG tablet, 5 by mouth daily 5 days, then 4 daily 2 days, 3 daily 2 days, 2 daily 2 days, 1 daily 2 days, one half daily 2 days and then discontinue., Disp: 46 tablet, Rfl: 0    Restasis 0.05 % ophthalmic emulsion, 2 (two) times a day., Disp: , Rfl:     Assessment and Plan:         Acute lower extremity arterial thromboembolism. Likely due to AF vs hypercoagulable state in setting of #4. Will need AC post op and likely mobile telemetry monitor at [facility].   Paroxysmal SVT  History of brief paroxysmal atrial fibrillation in the setting of acute illness 2020  Recent COVID 19 infection  Hypertension  Hyperlipidemia  Chronic kidney disease    Ms. Alarcon is a 89 year old woman with isolated AF in setting of acute illness 3 years ago. She has not been on anticoagulation. She presented with acute arterial thromboembolism and needs urgent non elective surgery. No evidence of acute or active cardiac condition. She has fair functional capacity with out cardiac symptoms, no s/s of heart failure, no critical murmur on physical exam. EKG is unremarkable. She is at acceptable risk. Would proceed as indicated.     CJ Boss  11/16/23  12:33 EST          Electronically signed by Anais Segal APRN at 11/16/23 2115       Nathan Dominguez  MD ARTURO at 23 0706        Consult Orders    1. Vascular Surgery Consult [[id number]] ordered by Rex Hoffman PA at 23 0451                   Patient Name: Mandy Alarcon Account #: [id number]    MRN: [id number] Admission Date: 2023      Consulting Service: Vascular Surgery Date of Evaluation: 2023    Requesting Provider: RAFAT Mcgregor MD        [place]      CHIEF COMPLAINT: Left lower extremity arterial thromboembolism  HPI: Mandy Alarcon is a 89 y.o. female is being seen for a consultation and evaluation/management of with left lower extremity arterial thromboembolism with large thrombus load in the left popliteal but preservation of the tibial outflow.  This is almost certainly a cardiac event as her history of atrial fibrillation is noted but she is not currently on any anticoagulation.  She follows with Dr. Laura.  She has had COVID 3 weeks ago and the possibility of other forms of clot can be considered but at this point in time she has multiple other embolic sites including right tibial that is asymptomatic and left profunda femoris artery as well.  Given these findings I believe that atrial fibrillation is likely the source and direct thromboembolectomy is our best answer for this nice lady.  Percutaneous thrombectomy can be pursued but the clot load appears to be very large.  Assuming her cardiac status is stable for general anesthetic we will pursue thrombectomy today on an urgent basis.  Likely her lower extremity is neurologically and motor intact and is well preserved despite the thrombus load.  IV heparin has been initiated    PAST MEDICAL HISTORY:   Past Medical History:   Diagnosis Date    Benign essential hypertension 10/28/2012    2012--treatment for hypertension begun.    Bilateral lower extremity edema 2020    Bilateral sensorineural hearing loss, wears hearing aids 2017    Left is much worse than right.  Patient had multiple ear  infections as a child.    Chronic renal impairment, stage 3b 09/15/2020    Claustrophobia 2016    This patient has significant nocturnal hypoxemia and I think that she could benefit from nocturnal oxygen therapy. The exact etiology of her hypoxemia is not clear. She could possibly have obstructive sleep apnea but this is not documented. We cannot test this patient for sleep apnea due to the fact that she is severely claustrophobic. Patient was a former smoker and it is possibly that COPD he is playing a role.    Combined form of senile cataract 2021    Family history of coronary artery disease 2016    Patient's mother, father, 2 sisters and a brother all  from myocardial infarctions    Gastroesophageal reflux disease without esophagitis 2020    Gout 10/28/2012    2012--initial diagnosis and treatment of gout.    History of acute pancreatitis 2020    Hyperlipidemia 10/28/2012    2012--treatment for hyperlipidemia begun.    Impaired fasting glucose 10/28/2012    2012--initial diagnosis impaired fasting glucose.    Morbidly obese 2019    Nocturnal hypoxemia 2012--patient did not receive nocturnal oxygen because of Medicare regulations.   05/15/2014--overnight oximetry revealed oxygen saturations less than 89% for 22 minutes and 40 seconds. Oxygen saturations less than or equal to 88% for 22 minutes and 40 seconds. Lowest oxygen saturation 83%. The longest continuous time with oxygen saturations less than or equal to 88% was 1 minute and 32 seconds.   2012--overnight oximetry revealed oxygen saturations less than 90% for one hour and 35 minutes. Oxygen saturations less than 89% 59 minutes. This patient has significant nocturnal hypoxemia and I think that she could benefit from nocturnal oxygen therapy. The exact etiology of her hypoxemia is not clear. She could possibly have obstructive sleep apnea but this is not documented. We  cannot test this patient for sleep apnea due to the fact that she is severely claustrophobic. Patient was a former smoker and it is possibly that COPD he is playing a role.    Non morbid obesity 03/11/2019    Osteopenia of multiple sites 09/26/2023    Paroxysmal atrial fibrillation 04/10/2020    Postmenopausal state 09/26/2023    Primary hypothyroidism 11/17/2015 March 11, 2019--TSH remains elevated slightly at 4.92.  Given the overall clinical picture including the multinodular goiter, we will initiate levothyroxine 50 mcg/day and reassess in about 6 weeks.  August 1, 2018--thyroid ultrasound reveals a multinodular thyroid with multiple subcentimeter nodules.  Only minimal increase in size of the largest nodule in the left lobe has occurred when compared     Secondary polycythemia 08/02/2012 11/06/2014--patient did not receive nocturnal oxygen because of Medicare regulations.   05/15/2014--overnight oximetry revealed oxygen saturations less than 89% for 22 minutes and 40 seconds. Oxygen saturations less than or equal to 88% for 22 minutes and 40 seconds. Lowest oxygen saturation 83%. The longest continuous time with oxygen saturations less than or equal to 88% was 1 minute and 32 seconds.   08/02/2012--overnight oximetry revealed oxygen saturations less than 90% for one hour and 35 minutes. Oxygen saturations less than 89% 59 minutes. This patient has significant nocturnal hypoxemia and I think that she could benefit from nocturnal oxygen therapy. The exact etiology of her hypoxemia is not clear. She could possibly have obstructive sleep apnea but this is not documented. We cannot test this patient for sleep apnea due to the fact that she is severely claustrophobic. Patient was a former smoker and it is possibly that COPD he is playing a role.    Vitamin D deficiency 05/23/2016      PAST SURGICAL HISTORY:   Past Surgical History:   Procedure Laterality Date    CHOLECYSTECTOMY WITH INTRAOPERATIVE CHOLANGIOGRAM  N/A 2020    Procedure: LAPAROSCOPIC CHOLECYSTOSTOMY TUBE, INTRAOPERATIVE CHOLANGIOGRAM;  Surgeon: Bryce Andujar MD;  Location: [facility] OR;  Service: General;  Laterality: N/A;    CHOLECYSTECTOMY WITH INTRAOPERATIVE CHOLANGIOGRAM N/A 2020    Procedure: CHOLECYSTECTOMY LAPAROSCOPIC INTRAOPERATIVE CHOLANGIOGRAM and EGD;  Surgeon: Bryce Andujar MD;  Location: [facility] MAIN OR;  Service: General;  Laterality: N/A;    ERCP N/A 2022    Procedure: ENDOSCOPIC RETROGRADE CHOLANGIOPANCREATOGRAPHY with sphincterotomy, balloon sweep 12-15mm;  Surgeon: Mitesh Altamirano MD;  Location: [facility] ENDOSCOPY;  Service: Gastroenterology;  Laterality: N/A;  pre - gallstone pancreatitis  post - s/p sphincterotomy, sludge and  pus    OOPHORECTOMY      age 48    RADICAL ABDOMINAL HYSTERECTOMY  48 years old    48 years of age. Uterine fibroid tumors with menorrhagia - no cancer      FAMILY HISTORY:   Family History   Problem Relation Age of Onset    Heart disease Mother         Ischemic.  from coronary artery disease.    Heart disease Father         Ischemic.  from coronary artery disease.    Heart disease Sister         Ischemic.  from coronary artery disease.    Heart disease Sister         Ischemic.  from coronary artery disease.    Heart disease Brother         Ischemic.  from coronary artery disease.    Breast cancer Daughter     Breast cancer Daughter     Ovarian cancer Neg Hx       SOCIAL HISTORY:   Social History     Tobacco Use    Smoking status: Former     Packs/day: .5     Types: Cigarettes     Start date: 1946     Quit date: 1954     Years since quittin.9    Smokeless tobacco: Never    Tobacco comments:     Stopped drinking at age 30.   Vaping Use    Vaping Use: Never used   Substance Use Topics    Alcohol use: No     Comment: caffiene use tea coffee    Drug use: No      MEDICATIONS:   No current facility-administered medications on file prior to encounter.      Current Outpatient Medications on File Prior to Encounter   Medication Sig Dispense Refill    allopurinol (ZYLOPRIM) 300 MG tablet TAKE 1 TABLET BY MOUTH DAILY FOR GOUT 90 tablet 3    ascorbic acid (VITAMIN C) 500 MG capsule controlled-release CR capsule Take 1 tablet by mouth Daily.      levothyroxine (SYNTHROID, LEVOTHROID) 75 MCG tablet TAKE 1 TABLET BY MOUTH DAILY FOR THYROID 90 tablet 3    metoprolol succinate XL (TOPROL-XL) 25 MG 24 hr tablet TAKE 1 TABLET BY MOUTH DAILY FOR HIGH BLOOD PRESSURE AND HEART OR PALPITATIONS 90 tablet 3    Multiple Vitamins-Minerals (MULTIVITAMIN ADULTS PO) Take 1 tablet by mouth Daily.      pravastatin (PRAVACHOL) 40 MG tablet TAKE 1 TABLET BY MOUTH DAILY 90 tablet 3    Hydrocod Abbe-Chlorphe Abbe ER (TUSSIONEX PENNKINETIC) 10-8 MG/5ML ER suspension Take 1 teaspoon p.o. every 12 hours for cough and congestion from COVID-19 infection. 90 mL 0    omeprazole (priLOSEC) 40 MG capsule TAKE 1 CAPSULE BY MOUTH EVERY DAY BEFORE FIRST MEAL FOR STOMACH ACID OR REFLUX 30 capsule 1    polyethylene glycol (MIRALAX) packet Take orally as directed for constipation      predniSONE (DELTASONE) 10 MG tablet 5 by mouth daily 5 days, then 4 daily 2 days, 3 daily 2 days, 2 daily 2 days, 1 daily 2 days, one half daily 2 days and then discontinue. 46 tablet 0    Restasis 0.05 % ophthalmic emulsion 2 (two) times a day.               ALLERGIES: Cefaclor and Sulfa antibiotics   COMPLETE REVIEW OF SYSTEMS:     ENT ROS: negative  Cardiovascular ROS: no chest pain or dyspnea on exertion  Respiratory ROS: no cough, shortness of breath, or wheezing  Gastrointestinal ROS: no abdominal pain, change in bowel habits, or black or bloody stools  Neurological ROS: no TIA or stroke symptoms  Genito-Urinary ROS: no dysuria, trouble voiding, or hematuria  Musculoskeletal ROS: positive for -coldness and crampy pain with walking in the left calf  Dermatological ROS: negative  Psychological ROS: negative      PHYSICAL  "EXAM:   Patient Vitals for the past 24 hrs:   BP Temp Temp src Pulse Resp SpO2 Height Weight   11/16/23 0435 -- -- -- 53 -- -- -- --   11/16/23 0431 137/54 -- -- -- -- 97 % -- --   11/16/23 0231 149/67 -- -- 55 18 96 % -- --   11/16/23 0201 145/72 -- -- 51 -- 97 % -- --   11/16/23 0031 173/86 -- -- -- -- -- -- --   11/15/23 2303 176/79 -- -- -- -- -- -- --   11/15/23 2251 -- 97.7 °F (36.5 °C) Tympanic 75 20 93 % 160 cm (63\") 96.6 kg (213 lb)        General appearance: oriented to person, place, and time and in mild to moderate distress.  Neurological exam reveals alert, oriented, normal speech, no focal findings or movement disorder noted.  ENT exam reveals - ENT exam normal, no neck nodes or sinus tenderness.  CVS exam: normal rate, regular rhythm, normal S1, S2, no murmurs, rubs, clicks or gallops.  Chest: clear to auscultation, no wheezes, rales or rhonchi, symmetric air entry.  Abdominal exam: soft, nontender, nondistended, no masses or organomegaly.  Examination of the right foot reveals warm, good capillary refill.  Left foot slightly cooler but viable with normal motor and sensation.  Pulses: +2 carotids radials and femorals.  Nonpalpable bilateral popliteals or pedal pulses      LABS:      Results Review:       I reviewed the patient's new clinical results.  Results from last 7 days   Lab Units 11/16/23  0104   WBC 10*3/mm3 7.74   HEMOGLOBIN g/dL 14.4   PLATELETS 10*3/mm3 198     Results from last 7 days   Lab Units 11/16/23  0158   SODIUM mmol/L 138   POTASSIUM mmol/L 4.0   CHLORIDE mmol/L 110*   CO2 mmol/L 21.0*   BUN mg/dL 24*   CREATININE mg/dL 1.44*   GLUCOSE mg/dL 109*   Estimated Creatinine Clearance: 28.7 mL/min (A) (by C-G formula based on SCr of 1.44 mg/dL (H)).  Results from last 7 days   Lab Units 11/16/23  0158   CALCIUM mg/dL 9.2   ALBUMIN g/dL 3.4*   MAGNESIUM mg/dL 1.9     Results from last 7 days   Lab Units 11/16/23  0104   PROTIME Seconds 14.5*   INR  1.12*       The following radiologic " or non-invasive studies have been reviewed by me: CTA reviewed with images reviewed    Active Hospital Problems    Diagnosis  POA    **Arterial occlusion [I70.90]  Yes      Resolved Hospital Problems   No resolved problems to display.         ASSESSMENT/PLAN: 89 y.o. female with thromboembolism to both lower extremities on the left side the popliteal is occluded with a large segment of thrombus at the infrageniculate below-knee popliteal that is stopped at the tibial trifurcation which is likely preserved.  She has a smaller clot in the distal branches of the profunda.  On the right side one of her tibial vessels has been knocked out as well but she likely is asymptomatic on that side.  At this point in time she is responding well to heparin and this is keeping her from progressing her event.  The initial event occurred at 3:00 yesterday.  She is in need of thrombectomy.  Percutaneous versus open could be considered but given the clot load behind the knee I recommended an open thromboembolectomy to the left lower extremity.  We will add her on the schedule today and continue heparin drip to the OR.  She and her daughter understand risk benefits complications and plans for thrombectomy with the options for open versus endovascular be decided intraoperatively.      I discussed the plan with the patient and her daughter and they are agreeable to the plan of care at this point. Thank you for this consult.     Nathan Dominguez MD   11/16/23       Electronically signed by Nathan Dominguez MD at 11/16/23 6996

## 2023-11-17 NOTE — PLAN OF CARE
Goal Outcome Evaluation:      VSS ex/ NSR to SB, RA when awake, 2L put on when sleeping. NS @ 125 mL/hr. Heparin gtt @ 500 units/hr (5 mL/hr). No boluses, no titration, stopping gtt rate @ 0700 and continuing to normal heparin protocol. L groin CDI, glue, SANA, soft to palpate. BLE Pulses dopplerable, marked. Ax1 to BSC. PRN Norco x2.

## 2023-11-17 NOTE — PROGRESS NOTES
Name: Mandy Alarcon ADMIT: 2023   : 1933  PCP: Daryl Santos MD    MRN: [id number] LOS: 1 days   AGE/SEX: 89 y.o. female  ROOM: [facility]     Subjective   Subjective   Patient appears comfortable and in no apparent distress.  Reports complete sensation and warmth on left lower extremity return.  Denies chest pain or shortness of breath.  Tolerating intake.  Denies overnight issues.  Voices no other concerns.       Objective   Objective   Vital Signs  Temp:  [97.5 °F (36.4 °C)-98.6 °F (37 °C)] 97.6 °F (36.4 °C)  Heart Rate:  [52-73] 59  Resp:  [16-20] 18  BP: (100-161)/(56-95) 131/65  SpO2:  [80 %-100 %] 98 %  on  Flow (L/min):  [2] 2;   Device (Oxygen Therapy): room air  Body mass index is 37.73 kg/m².    Physical Exam  Constitutional:       General: She is not in acute distress.     Appearance: She is obese. She is not toxic-appearing.   Cardiovascular:      Rate and Rhythm: Bradycardia present.      Heart sounds: Normal heart sounds.   Pulmonary:      Effort: Pulmonary effort is normal.      Breath sounds: Normal breath sounds.   Abdominal:      General: Bowel sounds are normal.      Palpations: Abdomen is soft.   Musculoskeletal:         General: No tenderness.      Right lower leg: No edema.      Left lower leg: No edema.   Skin:     General: Skin is warm and dry.   Neurological:      Mental Status: She is alert and oriented to person, place, and time.       Results Review     I reviewed the patient's new clinical results.  Results from last 7 days   Lab Units 23  0532 23  0104   WBC 10*3/mm3 7.23 7.74   HEMOGLOBIN g/dL 12.4 14.4   PLATELETS 10*3/mm3 179 198     Results from last 7 days   Lab Units 23  0532 23  0158   SODIUM mmol/L 137 138   POTASSIUM mmol/L 4.9 4.0   CHLORIDE mmol/L 112* 110*   CO2 mmol/L 18.2* 21.0*   BUN mg/dL 15 24*   CREATININE mg/dL 0.99 1.44*   GLUCOSE mg/dL 134* 109*   EGFR mL/min/1.73 54.3* 34.6*     Results from last 7 days   Lab Units  "11/16/23  0158   ALBUMIN g/dL 3.4*   BILIRUBIN mg/dL 0.4   ALK PHOS U/L 46   AST (SGOT) U/L 14   ALT (SGPT) U/L 22     Results from last 7 days   Lab Units 11/17/23  0532 11/16/23  0158   CALCIUM mg/dL 7.7* 9.2   ALBUMIN g/dL  --  3.4*   MAGNESIUM mg/dL  --  1.9       No results found for: \"HGBA1C\", \"POCGLU\"    CT Angio Abdominal Aorta Bilateral Iliofem Runoff    Result Date: 11/16/2023   1. The patient is noted to have an abrupt termination of the left popliteal artery within the popliteal fossa. This is favored to be secondary to thrombus. There is also occlusion of the left profunda femoris artery within the proximal thigh, which potentially also may be related to thrombus. Furthermore, the patient has an occlusion of the right posterior tibial artery within the calf. This potentially also may be related to thrombus.  Radiation dose reduction techniques were utilized, including automated exposure control and exposure modulation based on body size.   This report was finalized on 11/16/2023 4:30 AM by Dr. Kathleen Montoya M.D on Workstation: [id number]       I have personally reviewed all medications:  Scheduled Medications  allopurinol, 300 mg, Oral, Daily  famotidine, 40 mg, Oral, Daily  levothyroxine, 75 mcg, Oral, Q AM  metoprolol succinate XL, 25 mg, Oral, Q24H  senna-docusate sodium, 2 tablet, Oral, BID   And  polyethylene glycol, 17 g, Oral, Daily  pravastatin, 40 mg, Oral, Nightly  sodium chloride, 10 mL, Intravenous, Q12H    Infusions  heparin, 15.5 Units/kg/hr, Last Rate: 15.5 Units/kg/hr (11/17/23 0654)  lactated ringers, 9 mL/hr    Diet  Diet: Regular/House Diet; Texture: Regular Texture (IDDSI 7); Fluid Consistency: Thin (IDDSI 0)    I have personally reviewed:  [x]  Laboratory   []  Microbiology   []  Radiology   []  EKG/Telemetry  []  Cardiology/Vascular   []  Pathology    []  Records       Assessment/Plan     Active Hospital Problems    Diagnosis  POA    **Arterial occlusion [I70.90]  Yes    " Stage 3b chronic kidney disease [N18.32]  Yes    Paroxysmal atrial fibrillation [I48.0]  Yes    Non morbid obesity [E66.9]  Yes    Benign essential hypertension [I10]  Yes    Hyperlipidemia [E78.2]  Yes      Resolved Hospital Problems   No resolved problems to display.       Ms. Alarcon is a 89 y.o. former smoker with a history of obesity, hypertension, hyperlipidemia, paroxysmal atrial fibrillation without anticoagulation PTA, nocturnal hypoxemia/KARIN without CPAP PTA that presents to [facility] complaining of leg pain, left & admitted for arterial occlusion of left lower extremity.       Arterial occlusion, bilateral: Confirmed on CTA runoff.  Heparin drip infusing. Vascular surgery following s/p thrombectomy (suspect cardiac origin) in OR on 11/16/2023.  Plan for oral AC at discharge.       Benign essential hypertension: BP acceptable acutely.  Continue Toprol-XL 25 mg p.o. daily Protonix regimen.  Telemetry.       Hyperlipidemia: LFTs unremarkable.  Statin continued.       Non morbid obesity: BMI 37.  Complicating all problems.       Paroxysmal atrial fibrillation: Rate controlled with beta-blocker.  No AC PTA.  Heparin drip infusing per above.  Telemetry.  Cardiology following.       Stage 3b chronic kidney disease / metabolic acidosis most likely due to above: Serum creatinine better than baseline.  Avoid nephrotoxins.  Monitor labs.     Heparin gtt infusing for above adequate for DVT prophylaxis.  Full code.  Discussed with patient and nursing staff.  Anticipate discharge home with family in 1-2 days. Pending cardiology approval for transition from heparin to oral AC       CJ Perkins  [facility]  11/17/23  09:06 EST

## 2023-11-17 NOTE — ANESTHESIA POSTPROCEDURE EVALUATION
Patient: Mandy Alarcon    Procedure Summary       Date: 11/16/23 Room / [facility]    Anesthesia Start: 1420 Anesthesia Stop: 1621    Procedure: OPEN LEFT FEMORAL THROMBECTOMY/EMBOLECTOMY WITH LEFT LOWER EXTREMITY ANGIOGRAM (Left: Thigh) Diagnosis:       Arterial occlusion      (Arterial occlusion [I70.90])    Surgeons: Jace Chowdhury Jr., MD Provider: Flynn Bauer MD    Anesthesia Type: general ASA Status: 3            Anesthesia Type: general    Vitals  Vitals Value Taken Time   /95 11/16/23 1730   Temp 36.4 °C (97.6 °F) 11/16/23 1620   Pulse 52 11/16/23 1739   Resp 16 11/16/23 1730   SpO2 98 % 11/16/23 1739   Vitals shown include unfiled device data.        Post Anesthesia Care and Evaluation    Patient location during evaluation: PACU  Patient participation: complete - patient participated  Level of consciousness: awake  Pain score: 1  Pain management: adequate  Anesthetic complications: No anesthetic complications  PONV Status: none  Cardiovascular status: acceptable  Respiratory status: acceptable  Hydration status: acceptable

## 2023-11-17 NOTE — CASE MANAGEMENT/SOCIAL WORK
Discharge Planning Assessment  [facility]     Patient Name: Mandy Alarcon  MRN: [id number]  Today's Date: 11/17/2023    Admit Date: 11/16/2023    Plan: Home with family   Discharge Needs Assessment       Row Name 11/17/23 1623       Living Environment    People in Home child(adiel), adult    Current Living Arrangements home    Potentially Unsafe Housing Conditions none    Primary Care Provided by self    Provides Primary Care For no one    Family Caregiver if Needed child(adiel), adult    Quality of Family Relationships helpful;involved;supportive    Able to Return to Prior Arrangements yes       Resource/Environmental Concerns    Resource/Environmental Concerns none    Transportation Concerns none       Transition Planning    Patient/Family Anticipates Transition to home with family    Patient/Family Anticipated Services at Transition none    Transportation Anticipated family or friend will provide       Discharge Needs Assessment    Readmission Within the Last 30 Days no previous admission in last 30 days    Equipment Currently Used at Home none    Equipment Needed After Discharge none    Provided Post Acute Provider List? N/A    Provided Post Acute Provider Quality & Resource List? N/A                   Discharge Plan       Row Name 11/17/23 1933       Plan    Plan Home with family    Patient/Family in Agreement with Plan yes    Plan Comments CCP met with patient at bedside. Introduced self and explained role of CCP. Patient confirmed the information on her face sheet is accurate. Patient is enrolled into [facility]. Patients PCP is Daryl Santos. Patient lives at home with her daughter, daughters’ spouse, and grandchild (who is blind). Patient has no history of HH or SNF. Patient has a cane and walker at home but does not use them. Patient will be started on Eliquis. CCP checked with the pharmacy and there is no cost for the prescription. Patient plans to discharge home with family. Family to transport.                   Continued Care and Services - Admitted Since 11/16/2023    Coordination has not been started for this encounter.          Demographic Summary       Row Name 11/17/23 1622       General Information    Admission Type inpatient    Referral Source admission list    Reason for Consult discharge planning    Preferred Language English                   Functional Status       Row Name 11/17/23 1622       Functional Status    Usual Activity Tolerance good    Current Activity Tolerance good       Functional Status, IADL    Medications independent    Meal Preparation independent    Housekeeping independent    Laundry independent    Shopping independent       Mental Status    General Appearance WDL WDL       Mental Status Summary    Recent Changes in Mental Status/Cognitive Functioning no changes       Employment/    Employment Status retired                   Psychosocial    No documentation.                  Abuse/Neglect    No documentation.                  Legal    No documentation.                  Substance Abuse    No documentation.                  Patient Forms    No documentation.

## 2023-11-17 NOTE — PROGRESS NOTES
The patient is 1 day status post open left femoral thrombectomy with left lower extremity angiogram for what appears to be a cardiac embolus.  She has done satisfactorily with no significant issues.  She has done well from a hemodynamic standpoint.  She has been on a heparin drip but only at 500 units/h up to this hour.  Switching to normal protocol.    Hemoglobin 12.4 with platelet count of 179,000.  White blood cell count normal.  BUN and creatinine normal with potassium of 4.9.    Left groin incision looks good with only minimal ecchymosis, no hematoma, no signs of infection.  Normal multiphasic Doppler signal of the left posterior tibial artery and biphasic Doppler signal in the dorsalis pedis artery.  Foot hyperemic and warm.  No calf tenderness or signs of compartment syndrome.    Patient doing well following embolectomy.  I believe she needs to be on anticoagulation most likely lifelong since this appears to be from cardiac origin.  We will leave the final determination to the cardiology service.  To switch over to oral anticoagulation today from my standpoint.  Likely home tomorrow if no issues with anticoagulation.

## 2023-11-17 NOTE — PLAN OF CARE
Problem: Adult Inpatient Plan of Care  Goal: Plan of Care Review  Flowsheets (Taken 11/17/2023 1729)  Outcome Evaluation: VSS, AOX4, Assist X1, LLE doppler pulses, Pain meds given, Room air. Bed alarm on.Call light with in reach.  Goal: Absence of Hospital-Acquired Illness or Injury  Intervention: Identify and Manage Fall Risk  Recent Flowsheet Documentation  Taken 11/17/2023 1600 by Marisa Saldana RN  Safety Promotion/Fall Prevention:   fall prevention program maintained   nonskid shoes/slippers when out of bed   safety round/check completed  Taken 11/17/2023 1400 by Marisa Saldana RN  Safety Promotion/Fall Prevention:   nonskid shoes/slippers when out of bed   safety round/check completed   fall prevention program maintained   clutter free environment maintained  Taken 11/17/2023 1200 by Marisa Saldana RN  Safety Promotion/Fall Prevention:   fall prevention program maintained   nonskid shoes/slippers when out of bed  Taken 11/17/2023 1000 by Marisa Saldana RN  Safety Promotion/Fall Prevention:   fall prevention program maintained   nonskid shoes/slippers when out of bed   safety round/check completed  Taken 11/17/2023 0800 by Marisa Saldana RN  Safety Promotion/Fall Prevention:   safety round/check completed   nonskid shoes/slippers when out of bed   fall prevention program maintained  Intervention: Prevent Skin Injury  Recent Flowsheet Documentation  Taken 11/17/2023 1600 by Marisa Saldana RN  Body Position: supine, legs elevated  Taken 11/17/2023 1400 by Marisa Saldana RN  Body Position: supine  Skin Protection:   adhesive use limited   tubing/devices free from skin contact  Taken 11/17/2023 1200 by Marisa Saldana RN  Body Position:   tilted   right  Taken 11/17/2023 1000 by Marisa Saldana RN  Body Position: position changed independently  Taken 11/17/2023 0800 by Marisa Saldana RN  Body Position: position changed independently  Intervention: Prevent and Manage VTE (Venous  Thromboembolism) Risk  Recent Flowsheet Documentation  Taken 11/17/2023 1600 by Marisa Saldana RN  Activity Management: activity minimized  Taken 11/17/2023 1400 by Marisa Saldana RN  Activity Management: activity minimized  Taken 11/17/2023 1200 by Marisa Saldana RN  Activity Management: activity minimized  Taken 11/17/2023 1000 by Marisa Saldana RN  Activity Management: activity encouraged  Taken 11/17/2023 0800 by Marisa Saldana RN  Activity Management: activity minimized  VTE Prevention/Management: (Heparin gtt) other (see comments)  Goal: Optimal Comfort and Wellbeing  Intervention: Provide Person-Centered Care  Recent Flowsheet Documentation  Taken 11/17/2023 1400 by Marisa Saldana RN  Trust Relationship/Rapport:   care explained   choices provided   Goal Outcome Evaluation:              Outcome Evaluation: VSS, AOX4, Assist X1, LLE doppler pulses, Pain meds given, Room air. Bed alarm on.Call light with in reach.

## 2023-11-18 ENCOUNTER — READMISSION MANAGEMENT (OUTPATIENT)
Dept: CALL CENTER | Facility: HOSPITAL | Age: 88
End: 2023-11-18
Payer: MEDICARE

## 2023-11-18 VITALS
SYSTOLIC BLOOD PRESSURE: 163 MMHG | HEIGHT: 63 IN | TEMPERATURE: 97.8 F | DIASTOLIC BLOOD PRESSURE: 61 MMHG | BODY MASS INDEX: 37.74 KG/M2 | RESPIRATION RATE: 18 BRPM | HEART RATE: 48 BPM | OXYGEN SATURATION: 94 % | WEIGHT: 213 LBS

## 2023-11-18 LAB
BASOPHILS # BLD AUTO: 0.04 10*3/MM3 (ref 0–0.2)
BASOPHILS NFR BLD AUTO: 0.5 % (ref 0–1.5)
DEPRECATED RDW RBC AUTO: 47.6 FL (ref 37–54)
EOSINOPHIL # BLD AUTO: 0.26 10*3/MM3 (ref 0–0.4)
EOSINOPHIL NFR BLD AUTO: 3.4 % (ref 0.3–6.2)
ERYTHROCYTE [DISTWIDTH] IN BLOOD BY AUTOMATED COUNT: 14.2 % (ref 12.3–15.4)
HCT VFR BLD AUTO: 37.3 % (ref 34–46.6)
HGB BLD-MCNC: 12.2 G/DL (ref 12–15.9)
IMM GRANULOCYTES # BLD AUTO: 0.03 10*3/MM3 (ref 0–0.05)
IMM GRANULOCYTES NFR BLD AUTO: 0.4 % (ref 0–0.5)
LYMPHOCYTES # BLD AUTO: 1.36 10*3/MM3 (ref 0.7–3.1)
LYMPHOCYTES NFR BLD AUTO: 17.7 % (ref 19.6–45.3)
MCH RBC QN AUTO: 30 PG (ref 26.6–33)
MCHC RBC AUTO-ENTMCNC: 32.7 G/DL (ref 31.5–35.7)
MCV RBC AUTO: 91.6 FL (ref 79–97)
MONOCYTES # BLD AUTO: 0.72 10*3/MM3 (ref 0.1–0.9)
MONOCYTES NFR BLD AUTO: 9.4 % (ref 5–12)
NEUTROPHILS NFR BLD AUTO: 5.27 10*3/MM3 (ref 1.7–7)
NEUTROPHILS NFR BLD AUTO: 68.6 % (ref 42.7–76)
NRBC BLD AUTO-RTO: 0 /100 WBC (ref 0–0.2)
PLATELET # BLD AUTO: 171 10*3/MM3 (ref 140–450)
PMV BLD AUTO: 9.1 FL (ref 6–12)
RBC # BLD AUTO: 4.07 10*6/MM3 (ref 3.77–5.28)
WBC NRBC COR # BLD AUTO: 7.68 10*3/MM3 (ref 3.4–10.8)

## 2023-11-18 PROCEDURE — 85025 COMPLETE CBC W/AUTO DIFF WBC: CPT | Performed by: SURGERY

## 2023-11-18 PROCEDURE — 99232 SBSQ HOSP IP/OBS MODERATE 35: CPT | Performed by: INTERNAL MEDICINE

## 2023-11-18 PROCEDURE — G0008 ADMIN INFLUENZA VIRUS VAC: HCPCS | Performed by: INTERNAL MEDICINE

## 2023-11-18 PROCEDURE — 90662 IIV NO PRSV INCREASED AG IM: CPT | Performed by: INTERNAL MEDICINE

## 2023-11-18 PROCEDURE — 25010000002 INFLUENZA VAC HIGH-DOSE QUAD 0.7 ML SUSPENSION PREFILLED SYRINGE: Performed by: INTERNAL MEDICINE

## 2023-11-18 RX ORDER — OXYCODONE HYDROCHLORIDE 5 MG/1
5 TABLET ORAL EVERY 4 HOURS PRN
Qty: 30 TABLET | Refills: 0 | Status: SHIPPED | OUTPATIENT
Start: 2023-11-18 | End: 2023-12-04

## 2023-11-18 RX ORDER — ACETAMINOPHEN 500 MG
1000 TABLET ORAL EVERY 8 HOURS SCHEDULED
Start: 2023-11-18 | End: 2023-11-23

## 2023-11-18 RX ADMIN — HYDROCODONE BITARTRATE AND ACETAMINOPHEN 1 TABLET: 5; 325 TABLET ORAL at 04:16

## 2023-11-18 RX ADMIN — APIXABAN 5 MG: 5 TABLET, FILM COATED ORAL at 08:37

## 2023-11-18 RX ADMIN — INFLUENZA A VIRUS A/VICTORIA/4897/2022 IVR-238 (H1N1) ANTIGEN (FORMALDEHYDE INACTIVATED), INFLUENZA A VIRUS A/DARWIN/9/2021 SAN-010 (H3N2) ANTIGEN (FORMALDEHYDE INACTIVATED), INFLUENZA B VIRUS B/PHUKET/3073/2013 ANTIGEN (FORMALDEHYDE INACTIVATED), AND INFLUENZA B VIRUS B/MICHIGAN/01/2021 ANTIGEN (FORMALDEHYDE INACTIVATED) 0.7 ML: 60; 60; 60; 60 INJECTION, SUSPENSION INTRAMUSCULAR at 14:41

## 2023-11-18 RX ADMIN — ALLOPURINOL 300 MG: 300 TABLET ORAL at 08:37

## 2023-11-18 RX ADMIN — FAMOTIDINE 40 MG: 20 TABLET ORAL at 08:37

## 2023-11-18 RX ADMIN — LEVOTHYROXINE SODIUM 75 MCG: 75 TABLET ORAL at 06:35

## 2023-11-18 RX ADMIN — HYDROCODONE BITARTRATE AND ACETAMINOPHEN 1 TABLET: 5; 325 TABLET ORAL at 08:37

## 2023-11-18 RX ADMIN — METOPROLOL SUCCINATE 25 MG: 25 TABLET, EXTENDED RELEASE ORAL at 08:37

## 2023-11-18 RX ADMIN — POLYETHYLENE GLYCOL 3350 17 G: 17 POWDER, FOR SOLUTION ORAL at 08:37

## 2023-11-18 RX ADMIN — HYDROCODONE BITARTRATE AND ACETAMINOPHEN 1 TABLET: 5; 325 TABLET ORAL at 12:45

## 2023-11-18 RX ADMIN — DOCUSATE SODIUM 50MG AND SENNOSIDES 8.6MG 2 TABLET: 8.6; 5 TABLET, FILM COATED ORAL at 08:37

## 2023-11-18 RX ADMIN — Medication 10 ML: at 08:55

## 2023-11-18 NOTE — PROGRESS NOTES
[facility] Follow Up    Chief Complaint: Follow up cardioembolism    Interval History: She continues to do well.  She denies minimal left lower extremity pain.  Denies any chest pain or shortness of breath.  Tolerating transition to apixaban.    Objective:     Objective:  Temp:  [97.5 °F (36.4 °C)-98 °F (36.7 °C)] 98 °F (36.7 °C)  Heart Rate:  [46-74] 52  Resp:  [18] 18  BP: (134-161)/(69-83) 134/82     Intake/Output Summary (Last 24 hours) at 11/18/2023 0750  Last data filed at 11/17/2023 2030  Gross per 24 hour   Intake 1200 ml   Output 300 ml   Net 900 ml     Body mass index is 37.73 kg/m².      11/15/23  2251   Weight: 96.6 kg (213 lb)     Weight change:       Physical Exam:   General : Alert, cooperative, in no acute distress.  Neuro: Alert,cooperative and oriented.  Lungs: CTAB. Normal respiratory effort and rate.  CV: Regular rate and rhythm, normal S1 and S2, no murmurs, gallops or rubs.  ABD: Soft, nontender, nondistended. Positive bowel sounds.  Extr: No edema or cyanosis, moves all extremities.    Lab Review:   Results from last 7 days   Lab Units 11/17/23  0532 11/16/23  0158   SODIUM mmol/L 137 138   POTASSIUM mmol/L 4.9 4.0   CHLORIDE mmol/L 112* 110*   CO2 mmol/L 18.2* 21.0*   BUN mg/dL 15 24*   CREATININE mg/dL 0.99 1.44*   GLUCOSE mg/dL 134* 109*   CALCIUM mg/dL 7.7* 9.2   AST (SGOT) U/L  --  14   ALT (SGPT) U/L  --  22     Results from last 7 days   Lab Units 11/16/23  0726 11/16/23  0158   CK TOTAL U/L 43 45   HSTROP T ng/L 33* 35*     Results from last 7 days   Lab Units 11/18/23  0528 11/17/23  0532   WBC 10*3/mm3 7.68 7.23   HEMOGLOBIN g/dL 12.2 12.4   HEMATOCRIT % 37.3 38.5   PLATELETS 10*3/mm3 171 179     Results from last 7 days   Lab Units 11/17/23  1300 11/17/23  0532 11/16/23  1154 11/16/23  0104   INR   --   --   --  1.12*   APTT seconds 56.5* 46.2*   < > 29.3    < > = values in this interval not displayed.     Results from last 7 days   Lab Units 11/16/23  0158  "  MAGNESIUM mg/dL 1.9           Invalid input(s): \"LDLCALC\"          I reviewed the patient's new clinical results.  I personally viewed and interpreted the patient's EKG  Current Medications:   Scheduled Meds:allopurinol, 300 mg, Oral, Daily  apixaban, 5 mg, Oral, Q12H  famotidine, 40 mg, Oral, Daily  levothyroxine, 75 mcg, Oral, Q AM  metoprolol succinate XL, 25 mg, Oral, Q24H  senna-docusate sodium, 2 tablet, Oral, BID   And  polyethylene glycol, 17 g, Oral, Daily  pravastatin, 40 mg, Oral, Nightly  sodium chloride, 10 mL, Intravenous, Q12H      Continuous Infusions:     Allergies:  Allergies   Allergen Reactions    Cefaclor Anaphylaxis, Angioedema and Swelling     caused angioedema 25 years ago; she tolerates cephalexin, amoxicillin, and ceftriaxone per my d/w patient and her daughter as well as amoxicillin-clavulanate confirmed in EPIC  caused angioedema 25 years ago; she tolerates cephalexin, amoxicillin, and ceftriaxone per my d/w patient and her daughter as well as amoxicillin-clavulanate confirmed in EPIC  caused angioedema 25 years ago; she tolerates cephalexin, amoxicillin, and ceftriaxone per my d/w patient and her daughter as well as amoxicillin-clavulanate confirmed in EPIC  caused angioedema 25 years ago; she tolerates cephalexin, amoxicillin, and ceftriaxone per my d/w patient and her daughter as well as amoxicillin-clavulanate confirmed in EPIC    Sulfa Antibiotics Rash       Assessment/Plan:     Acute lower extremity arterial thromboembolism.  Likely cardioembolic.  Now status post open left femoral thrombectomy.  No evidence of atrial fibrillation here or symptoms to suggest atrial fibrillation.  Remains on IV heparin.  2.   History of paroxysmal atrial fibrillation.  Occurred in the setting of an acute illness in 2020.  Since then she has not shown any evidence of recurrent atrial fibrillation although cannot entirely rule this out.  No evidence of atrial fibrillation's during this admission so " far.  3.   Recent COVID 19 infection  4.  Paroxysmal supraventricular tachycardia.  No episodes this admission.  5.  Hypertension.  Well-controlled this admission.  6.  Hyperlipidemia.  On pravastatin as outpatient.  7.  Chronic kidney disease.  Creatinine improved this morning.    -Continue apixaban for anticoagulation.  - Continue metoprolol at current dose.  - Okay for discharge from a cardiac standpoint.  We will arrange for 1 month follow-up.    Lizbeth Laura MD  11/18/23  07:50 EST

## 2023-11-18 NOTE — DISCHARGE SUMMARY
Date of Admission: 11/16/2023  Date of Discharge:  11/18/2023  Primary Care Physician: Daryl Santos MD     Discharge Diagnosis:  Active Hospital Problems    Diagnosis  POA    **Arterial occlusion [I70.90]  Yes    Arterial occlusion, lower extremity [I70.209]  Yes    Stage 3b chronic kidney disease [N18.32]  Yes    Paroxysmal atrial fibrillation [I48.0]  Yes    Non morbid obesity [E66.9]  Yes    Benign essential hypertension [I10]  Yes    Hyperlipidemia [E78.2]  Yes      Resolved Hospital Problems   No resolved problems to display.       Presenting Problem/History of Present Illness:  Arterial occlusion [I70.90]  Acute pain of left lower extremity [M79.605]  Hyperlipidemia, unspecified hyperlipidemia type [E78.5]  Hypothyroidism, unspecified type [E03.9]  Chronic kidney disease, unspecified CKD stage [N18.9]  Hypertension, unspecified type [I10]  Arterial occlusion, lower extremity [I70.209]     Hospital Course:  The patient is a 89 y.o. female with a history of HTN, HLD, PAF (not on anticoagulation prior to arrival) who presented with left leg pain and was found to have a left femoral and left tibial arterial thrombus. Please see admission H&P for further details. She was started on a heparin drip and pain control. She was evaluated by vascular surgery and she had an open left femoral thrombectomy and left lower extremity arteriogram on 11/16/23. Her postoperative course was uneventful and her symptoms have essentially resolved. She was also evaluated by cardiology. Her arterial occlusion was thought to have been embolic from her atrial fibrillation and she was transitioned to eliquis which she tolerated well. Plans are for indefinite anticoagulation. She is medically stable and has been cleared for discharge by the consulting services. She will be discharged home with follow up as below.    Exam Today:  Blood pressure 163/61, pulse (!) 48, temperature 97.8 °F (36.6 °C), temperature source Oral, resp. rate 18,  "height 160 cm (63\"), weight 96.6 kg (213 lb), SpO2 94%, not currently breastfeeding.  Constitutional:       General: She is not in acute distress.     Appearance: She is alert. She is not toxic-appearing.   Cardiovascular:      Rate and Rhythm: Mild bradycardia present.     Intact distal pulses   Pulmonary:      Effort: Pulmonary effort is normal.      Breath sounds: Normal breath sounds.   Abdominal:      General: Bowel sounds are normal.      Palpations: Abdomen is soft and non-tender.  Musculoskeletal:         General: No tenderness.      Right lower leg: No edema.      Left lower leg: No edema.   Skin:     General: Skin is warm and dry.   Neurological:      Mental Status: She is alert and oriented to person, place, and time.   Psychiatric:  Appropriate mood and affect    Procedures Performed:  Procedure(s):  OPEN LEFT FEMORAL THROMBECTOMY/EMBOLECTOMY WITH LEFT LOWER EXTREMITY ANGIOGRAM       Consults:   Consults       Date and Time Order Name Status Description    11/16/2023  7:14 AM Inpatient Cardiology Consult Completed     11/16/2023  5:11 AM [facility] (on-call MD unless specified) Details      11/16/2023  4:51 AM Vascular Surgery Consult Completed              Discharge Disposition:  Home or Self Care    Discharge Medications:     Discharge Medications        New Medications        Instructions Start Date   acetaminophen 500 MG tablet  Commonly known as: TYLENOL   1,000 mg, Oral, Every 8 Hours Scheduled      Eliquis 5 MG tablet tablet  Generic drug: apixaban   5 mg, Oral, Every 12 Hours Scheduled      oxyCODONE 5 MG immediate release tablet  Commonly known as: Roxicodone   5 mg, Oral, Every 4 Hours PRN             Continue These Medications        Instructions Start Date   allopurinol 300 MG tablet  Commonly known as: ZYLOPRIM   TAKE 1 TABLET BY MOUTH DAILY FOR GOUT      ascorbic acid 500 MG capsule controlled-release CR capsule  Commonly known as: VITAMIN C   1 tablet, Oral, Daily      Hydrocod Abbe-Chlorphe Abbe " ER 10-8 MG/5ML ER suspension  Commonly known as: TUSSIONEX PENNKINETIC   Take 1 teaspoon p.o. every 12 hours for cough and congestion from COVID-19 infection.      levothyroxine 75 MCG tablet  Commonly known as: SYNTHROID, LEVOTHROID   TAKE 1 TABLET BY MOUTH DAILY FOR THYROID      metoprolol succinate XL 25 MG 24 hr tablet  Commonly known as: TOPROL-XL   TAKE 1 TABLET BY MOUTH DAILY FOR HIGH BLOOD PRESSURE AND HEART OR PALPITATIONS      multivitamin with minerals tablet tablet   1 tablet, Oral, Daily      omeprazole 40 MG capsule  Commonly known as: priLOSEC   TAKE 1 CAPSULE BY MOUTH EVERY DAY BEFORE FIRST MEAL FOR STOMACH ACID OR REFLUX      polyethylene glycol packet  Commonly known as: MIRALAX   Take orally as directed for constipation      pravastatin 40 MG tablet  Commonly known as: PRAVACHOL   TAKE 1 TABLET BY MOUTH DAILY      Restasis 0.05 % ophthalmic emulsion  Generic drug: cycloSPORINE   2 times daily             Stop These Medications      predniSONE 10 MG tablet  Commonly known as: DELTASONE              Discharge Diet:   Diet Instructions       Diet: Regular/House Diet; Regular Texture (IDDSI 7); Thin (IDDSI 0)      Discharge Diet: Regular/House Diet    Texture: Regular Texture (IDDSI 7)    Fluid Consistency: Thin (IDDSI 0)            Activity at Discharge:   Activity Instructions       Other Activity Instructions      Activity Instructions: per vascular surgery postoperative instructions            Follow-up Appointments:  Future Appointments   Date Time Provider Department Center   1/12/2024 10:00 AM [facility] [place]   2/16/2024 12:45 PM Lizbeth Laura MD [facility]   3/18/2024  9:30 AM [facility] [place] [facility]   3/25/2024  9:30 AM Daryl Santos MD [facility]     Additional Instructions for the Follow-ups that You Need to Schedule       Discharge Follow-up with PCP   As directed       Currently Documented PCP:    Daryl Santos MD    PCP Phone  Number:    [phone]     Follow Up Details: 1 week        Discharge Follow-up with Specified Provider: Dr. Jace Chowdhury (Vascular Surgery)   As directed      To: Dr. Jace Chowdhury (Vascular Surgery)   Follow Up Details: as scheduled                   Nathan Tam MD  11/18/23  14:06 EST    Time Spent on Discharge Activities: Greater than 30 minutes.

## 2023-11-18 NOTE — PLAN OF CARE
Goal Outcome Evaluation:      VSS ex/ sinus holden at times. RA. Ax [place] to BSC. BLE pulses dopplerable, L groin site CDI, Soft, SANA. PRN norco x3, morphine x1. PO eliquis. Plans to possibly D/C today.

## 2023-11-18 NOTE — OUTREACH NOTE
Prep Survey      Flowsheet Row Responses   [facility] patient discharged from? [place]   Is LACE score < 7 ? No   Eligibility [facility]   Date of Admission 11/16/23   Date of Discharge 11/18/23   Discharge Disposition Home or Self Care   Discharge diagnosis *Arterial occlusion   Does the patient have one of the following disease processes/diagnoses(primary or secondary)? Other   Does the patient have Home health ordered? No   Is there a DME ordered? No   Prep survey completed? Yes            LEANA MORRIS - Registered Nurse

## 2023-11-18 NOTE — PLAN OF CARE
Goal Outcome Evaluation:  Plan of Care Reviewed With: patient        Progress: no change  Outcome Evaluation: VSS, RA, SR, A+Ox4, LLE dopplerable pulses, incision CDI, PRN PO pain meds per order controlling pain well, home today

## 2023-11-19 ENCOUNTER — NURSE TRIAGE (OUTPATIENT)
Dept: CALL CENTER | Facility: HOSPITAL | Age: 88
End: 2023-11-19
Payer: MEDICARE

## 2023-11-19 ENCOUNTER — HOSPITAL ENCOUNTER (EMERGENCY)
Facility: HOSPITAL | Age: 88
Discharge: HOME | End: 2023-11-19
Attending: EMERGENCY MEDICINE | Admitting: EMERGENCY MEDICINE
Payer: MEDICARE

## 2023-11-19 VITALS
HEIGHT: 63 IN | RESPIRATION RATE: 17 BRPM | WEIGHT: 213 LBS | OXYGEN SATURATION: 99 % | SYSTOLIC BLOOD PRESSURE: 148 MMHG | HEART RATE: 55 BPM | DIASTOLIC BLOOD PRESSURE: 69 MMHG | BODY MASS INDEX: 37.74 KG/M2 | TEMPERATURE: 97.6 F

## 2023-11-19 DIAGNOSIS — M79.2 NEURALGIA OF LEFT THIGH: Primary | ICD-10-CM

## 2023-11-19 LAB
ANION GAP SERPL CALCULATED.3IONS-SCNC: 6 MMOL/L (ref 5–15)
APTT PPP: 33.7 SECONDS (ref 22.7–35.4)
BASOPHILS # BLD AUTO: 0.03 10*3/MM3 (ref 0–0.2)
BASOPHILS NFR BLD AUTO: 0.4 % (ref 0–1.5)
BUN SERPL-MCNC: 20 MG/DL (ref 8–23)
BUN/CREAT SERPL: 13.1 (ref 7–25)
CALCIUM SPEC-SCNC: 9.1 MG/DL (ref 8.2–9.6)
CHLORIDE SERPL-SCNC: 106 MMOL/L (ref 98–107)
CO2 SERPL-SCNC: 25 MMOL/L (ref 22–29)
CREAT SERPL-MCNC: 1.53 MG/DL (ref 0.57–1)
DEPRECATED RDW RBC AUTO: 50.5 FL (ref 37–54)
EGFRCR SERPLBLD CKD-EPI 2021: 32.2 ML/MIN/1.73
EOSINOPHIL # BLD AUTO: 0.52 10*3/MM3 (ref 0–0.4)
EOSINOPHIL NFR BLD AUTO: 7.6 % (ref 0.3–6.2)
ERYTHROCYTE [DISTWIDTH] IN BLOOD BY AUTOMATED COUNT: 14.6 % (ref 12.3–15.4)
GLUCOSE SERPL-MCNC: 118 MG/DL (ref 65–99)
HCT VFR BLD AUTO: 42.9 % (ref 34–46.6)
HGB BLD-MCNC: 14 G/DL (ref 12–15.9)
IMM GRANULOCYTES # BLD AUTO: 0.02 10*3/MM3 (ref 0–0.05)
IMM GRANULOCYTES NFR BLD AUTO: 0.3 % (ref 0–0.5)
INR PPP: 1.49 (ref 0.9–1.1)
LYMPHOCYTES # BLD AUTO: 1.55 10*3/MM3 (ref 0.7–3.1)
LYMPHOCYTES NFR BLD AUTO: 22.8 % (ref 19.6–45.3)
MCH RBC QN AUTO: 30.7 PG (ref 26.6–33)
MCHC RBC AUTO-ENTMCNC: 32.6 G/DL (ref 31.5–35.7)
MCV RBC AUTO: 94.1 FL (ref 79–97)
MONOCYTES # BLD AUTO: 0.7 10*3/MM3 (ref 0.1–0.9)
MONOCYTES NFR BLD AUTO: 10.3 % (ref 5–12)
NEUTROPHILS NFR BLD AUTO: 3.98 10*3/MM3 (ref 1.7–7)
NEUTROPHILS NFR BLD AUTO: 58.6 % (ref 42.7–76)
NRBC BLD AUTO-RTO: 0 /100 WBC (ref 0–0.2)
PLATELET # BLD AUTO: 168 10*3/MM3 (ref 140–450)
PMV BLD AUTO: 9.2 FL (ref 6–12)
POTASSIUM SERPL-SCNC: 4.2 MMOL/L (ref 3.5–5.2)
PROTHROMBIN TIME: 18.3 SECONDS (ref 11.7–14.2)
RBC # BLD AUTO: 4.56 10*6/MM3 (ref 3.77–5.28)
SODIUM SERPL-SCNC: 137 MMOL/L (ref 136–145)
WBC NRBC COR # BLD AUTO: 6.8 10*3/MM3 (ref 3.4–10.8)

## 2023-11-19 PROCEDURE — 80048 BASIC METABOLIC PNL TOTAL CA: CPT | Performed by: EMERGENCY MEDICINE

## 2023-11-19 PROCEDURE — 85730 THROMBOPLASTIN TIME PARTIAL: CPT | Performed by: EMERGENCY MEDICINE

## 2023-11-19 PROCEDURE — 99283 EMERGENCY DEPT VISIT LOW MDM: CPT

## 2023-11-19 PROCEDURE — 85610 PROTHROMBIN TIME: CPT | Performed by: EMERGENCY MEDICINE

## 2023-11-19 PROCEDURE — 85025 COMPLETE CBC W/AUTO DIFF WBC: CPT | Performed by: EMERGENCY MEDICINE

## 2023-11-19 RX ORDER — SODIUM CHLORIDE 0.9 % (FLUSH) 0.9 %
10 SYRINGE (ML) INJECTION AS NEEDED
Status: DISCONTINUED | OUTPATIENT
Start: 2023-11-19 | End: 2023-11-19 | Stop reason: HOSPADM

## 2023-11-19 NOTE — ED NOTES
Pt had surgery for dvt behind left knee on Thursday and was discharged sat.  Last night 2000 her knee became numb and the numbness has increased.

## 2023-11-19 NOTE — ED PROVIDER NOTES
EMERGENCY DEPARTMENT ENCOUNTER    Room Number:  18/18  PCP: Daryl Santos MD  Patient Care Team:  Daryl Santos MD as PCP - General (Internal Medicine)   Independent Historians: Patient and daughter    HPI:  Chief Complaint: Left knee numbness    A complete HPI/ROS/PMH/PSH/SH/FH are unobtainable due to: None    Chronic or social conditions impacting patient care (Social Determinants of Health): None  (Financial Resource Strain / Food Insecurity / Transportation Needs / Physical Activity / Stress / Social Connections / Intimate Partner Violence / Housing Stability)    Context: Mandy Alarcon is a 89 y.o. female who presents to the ED c/o acute left anterior knee numbness.  PT is POD #3 from open left femoral thrombectomy with lle angiogram after arterial occlusion.  Patient just went home yesterday afternoon and says when she got home she noted some numbness to her left anterior knee and thigh.  Patient denies any numbness distally.  Patient denies any pain in her left leg other than at her incision site which is expected.  Patient has not noted any drainage from her incision site.  Patient denies any fevers or skin changes.  Patient called triage line and was told to come in for evaluation.    Review of prior external notes (non-ED) -and- Review of prior external test results outside of this encounter: On review of patient's operative note patient had on November 16 open left femoral thrombectomy with left lower extremity angiogram.  Patient had acute thrombus extracted from distal popliteal artery.  Following the thrombectomy patient had strong multiphasic Doppler flow noted in posterior tibial and dorsalis pedis arteries.    Prescription drug monitoring program review:         PAST MEDICAL HISTORY  Active Ambulatory Problems     Diagnosis Date Noted    Benign essential hypertension 10/28/2012    Claustrophobia 04/28/2016    Gout 10/28/2012    Hyperlipidemia 10/28/2012    Primary hypothyroidism  11/17/2015    Impaired fasting glucose 10/28/2012    Nocturnal hypoxemia 08/02/2012    Secondary polycythemia 08/02/2012    Vitamin D deficiency 05/23/2016    Therapeutic drug monitoring 05/23/2016    Family history of coronary artery disease 05/24/2016    Bilateral sensorineural hearing loss, wears hearing aids 06/14/2017    Multinodular goiter 01/24/2018    Supraventricular tachycardia 07/10/2018    Non morbid obesity 03/11/2019    Bilateral lower extremity edema 03/23/2020    Gastroesophageal reflux disease without esophagitis 03/23/2020    Paroxysmal atrial fibrillation 04/10/2020    Stage 3b chronic kidney disease 09/15/2020    Combined form of senile cataract 03/03/2021    Osteopenia of multiple sites 09/26/2023    Postmenopausal state 09/26/2023    COVID-19 virus infection 10/26/2023    Arterial occlusion 11/16/2023    Arterial occlusion, lower extremity 11/17/2023     Resolved Ambulatory Problems     Diagnosis Date Noted    History of Cellulitis of trunk, Right 04/28/2016    History of mammogram 04/28/2016    History of pneumococcal vaccination 04/28/2016    Hospital discharge follow-up 07/10/2018    Near syncope 07/10/2018    Chest tightness or pressure 07/10/2018    PND (paroxysmal nocturnal dyspnea) 07/10/2018    Acute biliary pancreatitis without infection or necrosis 02/01/2020    LINNEA (acute kidney injury) 02/02/2020    Pneumonia 02/03/2020    New onset a-fib 02/05/2020    Hyponatremia 02/05/2020    Hypomagnesemia 02/05/2020    Leukocytosis 02/05/2020    Fever 02/27/2020    Acute gallstone pancreatitis 02/27/2020    Chronic cholecystitis 03/11/2020    Postoperative nausea 03/23/2020    Postoperative pain 03/23/2020    Hospital discharge follow-up 04/17/2020    History of acute pancreatitis 04/17/2020    Acute constipation 04/17/2020    Gallstone pancreatitis 05/21/2020    Hospital discharge follow-up 06/11/2020    Left cervical radiculopathy 06/11/2020    Parotiditis 07/04/2022    Hospital discharge  follow-up 2022     Past Medical History:   Diagnosis Date    Chronic renal impairment, stage 3b 09/15/2020    Hyperlipidemia 10/28/2012    Morbidly obese 2019         PAST SURGICAL HISTORY  Past Surgical History:   Procedure Laterality Date    CHOLECYSTECTOMY WITH INTRAOPERATIVE CHOLANGIOGRAM N/A 2020    Procedure: LAPAROSCOPIC CHOLECYSTOSTOMY TUBE, INTRAOPERATIVE CHOLANGIOGRAM;  Surgeon: Bryce Andujar MD;  Location: [facility] OR;  Service: General;  Laterality: N/A;    CHOLECYSTECTOMY WITH INTRAOPERATIVE CHOLANGIOGRAM N/A 2020    Procedure: CHOLECYSTECTOMY LAPAROSCOPIC INTRAOPERATIVE CHOLANGIOGRAM and EGD;  Surgeon: Bryce Andujar MD;  Location: [facility] OR;  Service: General;  Laterality: N/A;    ERCP N/A 2022    Procedure: ENDOSCOPIC RETROGRADE CHOLANGIOPANCREATOGRAPHY with sphincterotomy, balloon sweep 12-15mm;  Surgeon: Mitesh Altamirano MD;  Location: [facility] ENDOSCOPY;  Service: Gastroenterology;  Laterality: N/A;  pre - gallstone pancreatitis  post - s/p sphincterotomy, sludge and  pus    FEMORAL THROMBECTOMY/EMBOLECTOMY Left 2023    Procedure: OPEN LEFT FEMORAL THROMBECTOMY/EMBOLECTOMY WITH LEFT LOWER EXTREMITY ANGIOGRAM;  Surgeon: Jace Chowdhury Jr., MD;  Location: [facility] HYBRID OR ;  Service: Vascular;  Laterality: Left;    OOPHORECTOMY      age 48    RADICAL ABDOMINAL HYSTERECTOMY  48 years old    48 years of age. Uterine fibroid tumors with menorrhagia - no cancer         FAMILY HISTORY  Family History   Problem Relation Age of Onset    Heart disease Mother         Ischemic.  from coronary artery disease.    Heart disease Father         Ischemic.  from coronary artery disease.    Heart disease Sister         Ischemic.  from coronary artery disease.    Heart disease Sister         Ischemic.  from coronary artery disease.    Heart disease Brother         Ischemic.  from coronary artery disease.    Breast cancer  Daughter     Breast cancer Daughter     Ovarian cancer Neg Hx     Malig Hyperthermia Neg Hx          SOCIAL HISTORY  Social History     Socioeconomic History    Marital status:     Number of children: 5    Highest education level: GED or equivalent   Tobacco Use    Smoking status: Former     Packs/day: .5     Types: Cigarettes     Start date: 1946     Quit date: 1954     Years since quittin.9    Smokeless tobacco: Never    Tobacco comments:     Stopped drinking at age 30.   Vaping Use    Vaping Use: Never used   Substance and Sexual Activity    Alcohol use: No     Comment: caffiene use tea coffee    Drug use: No    Sexual activity: Not Currently     Partners: Male         ALLERGIES  Cefaclor and Sulfa antibiotics        REVIEW OF SYSTEMS  Review of Systems  Included in HPI  All systems reviewed and negative except for those discussed in HPI.      PHYSICAL EXAM    I have reviewed the triage vital signs and nursing notes.    ED Triage Vitals [23 1131]   Temp Heart Rate Resp BP SpO2   98.9 °F (37.2 °C) 57 16 -- 93 %      Temp src Heart Rate Source Patient Position BP Location FiO2 (%)   Tympanic Monitor -- -- --       Physical Exam  GENERAL: Pleasant cooperative and conversant  female who appears much younger than stated age, alert, no acute distress  SKIN: Warm, dry, left groin incision site intact and dressed with surrounding superficial ecchymosis  HENT: Normocephalic, atraumatic  RESPIRATORY: Relaxed breathing  MUSCULOSKELETAL: no deformity, and normal weightbearing on both legs, easily palpable 2+ DP and PT pulses left lower extremity, calf is soft and nontender, no lower extremity edema  NEURO: alert, moves all extremities, follows commands, focal decrease sensation over anterior knee and anterior thigh of left leg, intact sensation to left lower extremity beyond the knee                                                               LAB RESULTS  Recent Results (from the past 24  hour(s))   Basic Metabolic Panel    Collection Time: 11/19/23 11:56 AM    Specimen: Blood   Result Value Ref Range    Glucose 118 (H) 65 - 99 mg/dL    BUN 20 8 - 23 mg/dL    Creatinine 1.53 (H) 0.57 - 1.00 mg/dL    Sodium 137 136 - 145 mmol/L    Potassium 4.2 3.5 - 5.2 mmol/L    Chloride 106 98 - 107 mmol/L    CO2 25.0 22.0 - 29.0 mmol/L    Calcium 9.1 8.2 - 9.6 mg/dL    BUN/Creatinine Ratio 13.1 7.0 - 25.0    Anion Gap 6.0 5.0 - 15.0 mmol/L    eGFR 32.2 (L) >60.0 mL/min/1.73   Protime-INR    Collection Time: 11/19/23 11:56 AM    Specimen: Blood   Result Value Ref Range    Protime 18.3 (H) 11.7 - 14.2 Seconds    INR 1.49 (H) 0.90 - 1.10   aPTT    Collection Time: 11/19/23 11:56 AM    Specimen: Blood   Result Value Ref Range    PTT 33.7 22.7 - 35.4 seconds   CBC Auto Differential    Collection Time: 11/19/23 11:56 AM    Specimen: Blood   Result Value Ref Range    WBC 6.80 3.40 - 10.80 10*3/mm3    RBC 4.56 3.77 - 5.28 10*6/mm3    Hemoglobin 14.0 12.0 - 15.9 g/dL    Hematocrit 42.9 34.0 - 46.6 %    MCV 94.1 79.0 - 97.0 fL    MCH 30.7 26.6 - 33.0 pg    MCHC 32.6 31.5 - 35.7 g/dL    RDW 14.6 12.3 - 15.4 %    RDW-SD 50.5 37.0 - 54.0 fl    MPV 9.2 6.0 - 12.0 fL    Platelets 168 140 - 450 10*3/mm3    Neutrophil % 58.6 42.7 - 76.0 %    Lymphocyte % 22.8 19.6 - 45.3 %    Monocyte % 10.3 5.0 - 12.0 %    Eosinophil % 7.6 (H) 0.3 - 6.2 %    Basophil % 0.4 0.0 - 1.5 %    Immature Grans % 0.3 0.0 - 0.5 %    Neutrophils, Absolute 3.98 1.70 - 7.00 10*3/mm3    Lymphocytes, Absolute 1.55 0.70 - 3.10 10*3/mm3    Monocytes, Absolute 0.70 0.10 - 0.90 10*3/mm3    Eosinophils, Absolute 0.52 (H) 0.00 - 0.40 10*3/mm3    Basophils, Absolute 0.03 0.00 - 0.20 10*3/mm3    Immature Grans, Absolute 0.02 0.00 - 0.05 10*3/mm3    nRBC 0.0 0.0 - 0.2 /100 WBC       I ordered the above labs and independently reviewed the results.            ORDERS PLACED DURING THIS VISIT:  Orders Placed This Encounter   Procedures    Basic Metabolic Panel     Protime-INR    aPTT    CBC Auto Differential    CBC & Differential         PROGRESS, DATA ANALYSIS, CONSULTS, AND MEDICAL DECISION MAKING    All labs have been independently interpreted by me.  All radiology studies have been reviewed by me.   EKG's independently viewed and interpreted by me.  Discussion below represents my analysis of pertinent findings related to patient's condition, differential diagnosis, treatment plan and final disposition.    MDM patient with postoperative numbness after vascular surgery.  Although patient's pattern of numbness is not particularly worrisome for vascular occlusion and patient has easily palpable distal pulses (unlike her preoperative exam), will obtain basic labs to include electrolytes and blood count and consult vascular surgery to assess need for any imaging like ultrasound CTAs.  Patient is amenable to this plan    ED Course as of 11/19/23 1739   Sun Nov 19, 2023   1213 I discussed patient's case with  on-call for vascular surgery who actually saw the patient yesterday prior to her discharge.  Patient's pattern of numbness in skin consistent with common postoperative complication of injury to the cutaneous femoral nerve with neuralgia postoperatively that could take a couple weeks to get better.  No further imaging or testing indicated [AR]      ED Course User Index  [AR] Cate Antoine MD         PPE: I wore and adhered to appropriate PPE per hospital protocols for specific patient presentation. (For respiratory patients with suspected Covid-19 or other infectious etiology suspected for patient's symptoms, the patient wore a mask and I wore an N95 mask throughout the entire patient encounter.) Proper hand hygiene both before and after patient encounter was performed as well.         AS OF 17:39 EST VITALS:    BP - 148/69  HR - 55  TEMP - 97.6 °F (36.4 °C) (Oral)  O2 SATS - 99%        DIAGNOSIS  Final diagnoses:   Neuralgia of left thigh          DISPOSITION  ED Disposition       ED Disposition   Discharge    Condition   Stable    Comment   --                  Note Disclaimer: At [facility], we believe that sharing information builds trust and better relationships. You are receiving this note because you recently visited [facility]. It is possible you will see health information before a provider has talked with you about it. This kind of information can be easy to misunderstand. To help you fully understand what it means for your health, we urge you to discuss this note with your provider.         Cate Antoine MD  11/19/23 [id number]

## 2023-11-19 NOTE — TELEPHONE ENCOUNTER
"She was discharged on 11/18/23- at [facility]-   Arterial occlusion She states her  left leg is numb, knee to ankle is numb. She can walk on it, and move it.  She noticed the numbness at around 8 pm last night and it has not improved. She has been in the recliner chair.  She had a recent vascular surgery and she had an open left femoral thrombectomy and left lower extremity arteriogram on 11/16/23 - There is not a difference in temperature. Follow care advice.   Reason for Disposition   Sounds like a life-threatening emergency to the triager    Additional Information   Negative: [1] SEVERE weakness (i.e., unable to walk or barely able to walk, requires support) AND [2] new-onset or worsening   Negative: [1] Weakness (i.e., paralysis, loss of muscle strength) of the face, arm / hand, or leg / foot on one side of the body AND [2] sudden onset AND [3] present now  (Exception: Bell's palsy suspected [i.e., weakness only on one side of the face, developing over hours to days, no other symptoms].)   Negative: [1] Numbness (i.e., loss of sensation) of the face, arm / hand, or leg / foot on one side of the body AND [2] sudden onset AND [3] present now   Negative: [1] Loss of speech or garbled speech AND [2] sudden onset AND [3] present now   Negative: Difficult to awaken or acting confused (e.g., disoriented, slurred speech)    Answer Assessment - Initial Assessment Questions  1. SYMPTOM: \"What is the main symptom you are concerned about?\" (e.g., weakness, numbness)      Numbness of the left leg from knee down   2. ONSET: \"When did this start?\" (minutes, hours, days; while sleeping)      8pm last night - 11/18/23   3. LAST NORMAL: \"When was the last time you (the patient) were normal (no symptoms)?\"      11/18/23 before 8 pm   4. PATTERN \"Does this come and go, or has it been constant since it started?\"  \"Is it present now?\"      No it is constant   5. CARDIAC SYMPTOMS: \"Have you had any of the following symptoms: chest pain, " "difficulty breathing, palpitations?\"      no  6. NEUROLOGIC SYMPTOMS: \"Have you had any of the following symptoms: headache, dizziness, vision loss, double vision, changes in speech, unsteady on your feet?\"      The leg is numb  7. OTHER SYMPTOMS: \"Do you have any other symptoms?\"      no  8. PREGNANCY: \"Is there any chance you are pregnant?\" \"When was your last menstrual period?\"      no    Protocols used: Neurologic Deficit-ADULT-AH    "

## 2023-11-19 NOTE — DISCHARGE INSTRUCTIONS
Your specific pattern of numbness is consistent with a cutaneous nerve that can be injured with the surgery you had.  Improvement can take 2-3 weeks. You may experience burning pain in addition to the numbness.  That can be treated if you develop that--just discuss with your vascular surgeon.      Eat small, frequent meals and drink plenty of fluids. Take any medication prescribed as instructed.       Monitor for any signs of recurrent symptoms or worsening and see your primary doctor to discuss your ER visit while returning to the ER if any concerns as we discussed.

## 2023-11-20 ENCOUNTER — TRANSITIONAL CARE MANAGEMENT TELEPHONE ENCOUNTER (OUTPATIENT)
Dept: CALL CENTER | Facility: HOSPITAL | Age: 88
End: 2023-11-20
Payer: MEDICARE

## 2023-11-20 NOTE — OUTREACH NOTE
Call Center TCM Note      Flowsheet Row Responses   [facility] patient discharged from? [place]   Does the patient have one of the following disease processes/diagnoses(primary or secondary)? Other   TCM attempt successful? Yes   Call start time 0838   Call end time 0845   Discharge diagnosis *Arterial occlusion   Is patient permission given to speak with other caregiver? Yes  [alaina]   Person spoke with today (if not patient) and relationship patient and daughter alaina   Meds reviewed with patient/caregiver? Yes   Does the patient have all medications ordered at discharge? Yes   Prescription comments Pain medication  is not helping patient- she is very tearful   Is the patient taking all medications as directed (includes completed medication regime)? Yes   Comments 11/21/2023  1:30 PM  HOSPITAL FOLLOW UP 15 min [facility] PRIMARY CARE Daryl Santos MD   Does the patient have an appointment with their PCP within 7-14 days of discharge? No   Nursing Interventions Assisted patient with making appointment per protocol   Has home health visited the patient within 72 hours of discharge? N/A   Psychosocial issues? No   Did the patient receive a copy of their discharge instructions? Yes   Nursing interventions Reviewed instructions with patient   What is the patient's perception of their health status since discharge? Improving   Is the patient/caregiver able to teach back signs and symptoms related to disease process for when to call PCP? Yes   Is the patient/caregiver able to teach back signs and symptoms related to disease process for when to call 911? Yes   Is the patient/caregiver able to teach back the hierarchy of who to call/visit for symptoms/problems? PCP, Specialist, Home health nurse, Urgent Care, ED, 911 Yes   TCM call completed? Yes   Wrap up additional comments Patient overall doing okay however patient is in alot of pain- daughter to call Dr. Chowdhury to discuss patients pain  level and medication. INCISION site is WDL no drainage, swelling, redness, or fever noted. No other concerns or questions at this time.   Call end time 0845   Would this patient benefit from a Referral to [facility] Social Work? No   Is the patient interested in additional calls from an ambulatory ? No            Gwendolyn Vázquez RN    11/20/2023, 08:45 EST

## 2023-11-20 NOTE — CASE MANAGEMENT/SOCIAL WORK
Case Management Discharge Note      Final Note: Home with family    Provided Post Acute Provider List?: N/A  Provided Post Acute Provider Quality & Resource List?: N/A    Selected Continued Care - Discharged on 11/18/2023 Admission date: 11/16/2023 - Discharge disposition: Home or Self Care      Destination    No services have been selected for the patient.                Durable Medical Equipment    No services have been selected for the patient.                Dialysis/Infusion    No services have been selected for the patient.                Home Medical Care    No services have been selected for the patient.                Therapy    No services have been selected for the patient.                Community Resources    No services have been selected for the patient.                Community & DME    No services have been selected for the patient.                    Transportation Services  Private: Car    Final Discharge Disposition Code: 01 - home or self-care   Statement Selected

## 2023-11-21 NOTE — PAYOR COMM NOTE
"Marcos Alarcon (89 y.o. Female)     PLEASE SEE ATTACHED FOR DC NOTICE    REF #   [id number]     THANK YOU  ELIZABETH KUNZ RN/ DEPT  [facility]   [phone]  [phone]        Date of Birth   05/30/1933    Social Security Number       Address   [address] [place] [address]    Home Phone       MRN   [id number]       [employer]   Christianity    Marital Status                               Admission Date   11/16/23    Admission Type   Emergency    Admitting Provider   Balbir Montalvo MD    Attending Provider       Department, Room/Bed   [facility], E566/1       Discharge Date   11/18/2023    Discharge Disposition   Home or Self Care    Discharge Destination                                 Attending Provider: (none)   Allergies: Cefaclor, Sulfa Antibiotics    Isolation: None   Infection: COVID (History) (11/16/23)   Code Status: Prior    Ht: 160 cm (63\")   Wt: 96.6 kg (213 lb)    Admission Cmt: None   Principal Problem: Arterial occlusion [I70.90]                   Active Insurance as of 11/16/2023       Primary Coverage       Payor Plan Insurance Group Employer/Plan Group    AETNA MEDICARE REPLACEMENT AETNA MEDICARE REPLACEMENT [id number]       Payor Plan Address Payor Plan Phone Number Payor Plan Fax Number Effective Dates    [address] [phone]  10/1/2022 - None Entered    [place] [address]         Subscriber Name Subscriber Birth Date Member ID       MARCOS ALARCON 5/30/1933 [id number]               Secondary Coverage       Payor Plan Insurance Group Employer/Plan Group    [place]        Payor Plan Address Payor Plan Phone Number Payor Plan Fax Number Effective Dates    [address] [phone]  1/1/2017 - None Entered    [facility] [place] [address]         Subscriber Name Subscriber Birth Date Member ID       MARCOS ALARCON 5/30/1933 [id number]                     Emergency Contacts        " (Rel.) Home Phone Work Phone Mobile Phone    Ashley Valdes (Daughter) -- -- [phone]    DELTA ELIZONDO (Daughter) -- -- [phone]    Izzy Cantu (Daughter) -- -- [phone]    Heather Allen (Daughter) -- -- [phone]    Tracie Barba (Daughter) -- -- [phone]    MARY ELLEN BARBA (Daughter) [phone] -- [phone]              [place]: [facility] [id number]  Tax ID [id number]     Discharge Summary        Nathan Tam MD at 11/18/23 1019          Date of Admission: 11/16/2023  Date of Discharge:  11/18/2023  Primary Care Physician: Daryl Santos MD     Discharge Diagnosis:  Active Hospital Problems    Diagnosis  POA    **Arterial occlusion [I70.90]  Yes    Arterial occlusion, lower extremity [I70.209]  Yes    Stage 3b chronic kidney disease [N18.32]  Yes    Paroxysmal atrial fibrillation [I48.0]  Yes    Non morbid obesity [E66.9]  Yes    Benign essential hypertension [I10]  Yes    Hyperlipidemia [E78.2]  Yes      Resolved Hospital Problems   No resolved problems to display.       Presenting Problem/History of Present Illness:  Arterial occlusion [I70.90]  Acute pain of left lower extremity [M79.605]  Hyperlipidemia, unspecified hyperlipidemia type [E78.5]  Hypothyroidism, unspecified type [E03.9]  Chronic kidney disease, unspecified CKD stage [N18.9]  Hypertension, unspecified type [I10]  Arterial occlusion, lower extremity [I70.209]     Hospital Course:  The patient is a 89 y.o. female with a history of HTN, HLD, PAF (not on anticoagulation prior to arrival) who presented with left leg pain and was found to have a left femoral and left tibial arterial thrombus. Please see admission H&P for further details. She was started on a heparin drip and pain control. She was evaluated by vascular surgery and she had an open left femoral thrombectomy and left lower extremity arteriogram on 11/16/23. Her postoperative course was uneventful and her symptoms have essentially resolved. She was also evaluated  "by cardiology. Her arterial occlusion was thought to have been embolic from her atrial fibrillation and she was transitioned to eliquis which she tolerated well. Plans are for indefinite anticoagulation. She is medically stable and has been cleared for discharge by the consulting services. She will be discharged home with follow up as below.    Exam Today:  Blood pressure 163/61, pulse (!) 48, temperature 97.8 °F (36.6 °C), temperature source Oral, resp. rate 18, height 160 cm (63\"), weight 96.6 kg (213 lb), SpO2 94%, not currently breastfeeding.  Constitutional:       General: She is not in acute distress.     Appearance: She is alert. She is not toxic-appearing.   Cardiovascular:      Rate and Rhythm: Mild bradycardia present.     Intact distal pulses   Pulmonary:      Effort: Pulmonary effort is normal.      Breath sounds: Normal breath sounds.   Abdominal:      General: Bowel sounds are normal.      Palpations: Abdomen is soft and non-tender.  Musculoskeletal:         General: No tenderness.      Right lower leg: No edema.      Left lower leg: No edema.   Skin:     General: Skin is warm and dry.   Neurological:      Mental Status: She is alert and oriented to person, place, and time.   Psychiatric:  Appropriate mood and affect    Procedures Performed:  Procedure(s):  OPEN LEFT FEMORAL THROMBECTOMY/EMBOLECTOMY WITH LEFT LOWER EXTREMITY ANGIOGRAM       Consults:   Consults       Date and Time Order Name Status Description    11/16/2023  7:14 AM Inpatient Cardiology Consult Completed     11/16/2023  5:11 AM [facility] (on-call MD unless specified) Details      11/16/2023  4:51 AM Vascular Surgery Consult Completed              Discharge Disposition:  Home or Self Care    Discharge Medications:     Discharge Medications        New Medications        Instructions Start Date   acetaminophen 500 MG tablet  Commonly known as: TYLENOL   1,000 mg, Oral, Every 8 Hours Scheduled      Eliquis 5 MG tablet tablet  Generic drug: " apixaban   5 mg, Oral, Every 12 Hours Scheduled      oxyCODONE 5 MG immediate release tablet  Commonly known as: Roxicodone   5 mg, Oral, Every 4 Hours PRN             Continue These Medications        Instructions Start Date   allopurinol 300 MG tablet  Commonly known as: ZYLOPRIM   TAKE 1 TABLET BY MOUTH DAILY FOR GOUT      ascorbic acid 500 MG capsule controlled-release CR capsule  Commonly known as: VITAMIN C   1 tablet, Oral, Daily      Hydrocod Abbe-Chlorphe Abbe ER 10-8 MG/5ML ER suspension  Commonly known as: TUSSIONEX PENNKINETIC   Take 1 teaspoon p.o. every 12 hours for cough and congestion from COVID-19 infection.      levothyroxine 75 MCG tablet  Commonly known as: SYNTHROID, LEVOTHROID   TAKE 1 TABLET BY MOUTH DAILY FOR THYROID      metoprolol succinate XL 25 MG 24 hr tablet  Commonly known as: TOPROL-XL   TAKE 1 TABLET BY MOUTH DAILY FOR HIGH BLOOD PRESSURE AND HEART OR PALPITATIONS      multivitamin with minerals tablet tablet   1 tablet, Oral, Daily      omeprazole 40 MG capsule  Commonly known as: priLOSEC   TAKE 1 CAPSULE BY MOUTH EVERY DAY BEFORE FIRST MEAL FOR STOMACH ACID OR REFLUX      polyethylene glycol packet  Commonly known as: MIRALAX   Take orally as directed for constipation      pravastatin 40 MG tablet  Commonly known as: PRAVACHOL   TAKE 1 TABLET BY MOUTH DAILY      Restasis 0.05 % ophthalmic emulsion  Generic drug: cycloSPORINE   2 times daily             Stop These Medications      predniSONE 10 MG tablet  Commonly known as: DELTASONE              Discharge Diet:   Diet Instructions       Diet: Regular/House Diet; Regular Texture (IDDSI 7); Thin (IDDSI 0)      Discharge Diet: Regular/House Diet    Texture: Regular Texture (IDDSI 7)    Fluid Consistency: Thin (IDDSI 0)            Activity at Discharge:   Activity Instructions       Other Activity Instructions      Activity Instructions: per vascular surgery postoperative instructions            Follow-up Appointments:  Future  Appointments   Date Time Provider Department Center   1/12/2024 10:00 AM [facility] 1 [facility] D[facility] [place]   2/16/2024 12:45 PM Lizbeth Laura MD [facility]   3/18/2024  9:30 AM [facility] [place] [facility]   3/25/2024  9:30 AM Daryl Santos MD [facility]     Additional Instructions for the Follow-ups that You Need to Schedule       Discharge Follow-up with PCP   As directed       Currently Documented PCP:    Daryl Santos MD    PCP Phone Number:    [phone]     Follow Up Details: 1 week        Discharge Follow-up with Specified Provider: Dr. Jace Chowdhury (Vascular Surgery)   As directed      To: Dr. Jace Chowdhury (Vascular Surgery)   Follow Up Details: as scheduled                   Nathan Tam MD  11/18/23  14:06 EST    Time Spent on Discharge Activities: Greater than 30 minutes.          Electronically signed by Nathan Tam MD at 11/18/23 7679

## 2023-11-27 ENCOUNTER — HOSPITAL ENCOUNTER (OUTPATIENT)
Facility: HOSPITAL | Age: 88
Setting detail: OBSERVATION
Discharge: HOME | End: 2023-11-28
Attending: EMERGENCY MEDICINE | Admitting: EMERGENCY MEDICINE
Payer: MEDICARE

## 2023-11-27 ENCOUNTER — TELEPHONE (OUTPATIENT)
Dept: INTERNAL MEDICINE | Facility: CLINIC | Age: 88
End: 2023-11-27

## 2023-11-27 ENCOUNTER — READMISSION MANAGEMENT (OUTPATIENT)
Dept: CALL CENTER | Facility: HOSPITAL | Age: 88
End: 2023-11-27
Payer: MEDICARE

## 2023-11-27 ENCOUNTER — APPOINTMENT (OUTPATIENT)
Dept: CT IMAGING | Facility: HOSPITAL | Age: 88
End: 2023-11-27
Payer: MEDICARE

## 2023-11-27 DIAGNOSIS — Z79.01 ANTICOAGULATED: ICD-10-CM

## 2023-11-27 DIAGNOSIS — R31.0 GROSS HEMATURIA: Primary | ICD-10-CM

## 2023-11-27 DIAGNOSIS — T14.8XXA HEMATOMA: ICD-10-CM

## 2023-11-27 PROBLEM — R31.9 HEMATURIA: Status: ACTIVE | Noted: 2023-11-27

## 2023-11-27 LAB
ALBUMIN SERPL-MCNC: 3.5 G/DL (ref 3.5–5.2)
ALBUMIN/GLOB SERPL: 1.1 G/DL
ALP SERPL-CCNC: 59 U/L (ref 39–117)
ALT SERPL W P-5'-P-CCNC: 17 U/L (ref 1–33)
ANION GAP SERPL CALCULATED.3IONS-SCNC: 9.2 MMOL/L (ref 5–15)
AST SERPL-CCNC: 19 U/L (ref 1–32)
BACTERIA UR QL AUTO: ABNORMAL /HPF
BASOPHILS # BLD AUTO: 0.04 10*3/MM3 (ref 0–0.2)
BASOPHILS NFR BLD AUTO: 0.6 % (ref 0–1.5)
BILIRUB SERPL-MCNC: 0.5 MG/DL (ref 0–1.2)
BILIRUB UR QL STRIP: NEGATIVE
BUN SERPL-MCNC: 12 MG/DL (ref 8–23)
BUN/CREAT SERPL: 9.9 (ref 7–25)
CALCIUM SPEC-SCNC: 9.5 MG/DL (ref 8.2–9.6)
CHLORIDE SERPL-SCNC: 106 MMOL/L (ref 98–107)
CLARITY UR: ABNORMAL
CO2 SERPL-SCNC: 18.8 MMOL/L (ref 22–29)
COLOR UR: ABNORMAL
CREAT SERPL-MCNC: 1.21 MG/DL (ref 0.57–1)
D-LACTATE SERPL-SCNC: 1.4 MMOL/L (ref 0.5–2)
DEPRECATED RDW RBC AUTO: 45.2 FL (ref 37–54)
EGFRCR SERPLBLD CKD-EPI 2021: 42.7 ML/MIN/1.73
EOSINOPHIL # BLD AUTO: 0.51 10*3/MM3 (ref 0–0.4)
EOSINOPHIL NFR BLD AUTO: 7.5 % (ref 0.3–6.2)
ERYTHROCYTE [DISTWIDTH] IN BLOOD BY AUTOMATED COUNT: 13.9 % (ref 12.3–15.4)
GLOBULIN UR ELPH-MCNC: 3.1 GM/DL
GLUCOSE SERPL-MCNC: 106 MG/DL (ref 65–99)
GLUCOSE UR STRIP-MCNC: NEGATIVE MG/DL
HCT VFR BLD AUTO: 42.4 % (ref 34–46.6)
HGB BLD-MCNC: 13.5 G/DL (ref 12–15.9)
HGB UR QL STRIP.AUTO: ABNORMAL
HOLD SPECIMEN: NORMAL
HOLD SPECIMEN: NORMAL
HYALINE CASTS UR QL AUTO: ABNORMAL /LPF
IMM GRANULOCYTES # BLD AUTO: 0.05 10*3/MM3 (ref 0–0.05)
IMM GRANULOCYTES NFR BLD AUTO: 0.7 % (ref 0–0.5)
KETONES UR QL STRIP: NEGATIVE
LEUKOCYTE ESTERASE UR QL STRIP.AUTO: NEGATIVE
LIPASE SERPL-CCNC: 19 U/L (ref 13–60)
LYMPHOCYTES # BLD AUTO: 1.39 10*3/MM3 (ref 0.7–3.1)
LYMPHOCYTES NFR BLD AUTO: 20.5 % (ref 19.6–45.3)
MCH RBC QN AUTO: 28.5 PG (ref 26.6–33)
MCHC RBC AUTO-ENTMCNC: 31.8 G/DL (ref 31.5–35.7)
MCV RBC AUTO: 89.6 FL (ref 79–97)
MONOCYTES # BLD AUTO: 0.74 10*3/MM3 (ref 0.1–0.9)
MONOCYTES NFR BLD AUTO: 10.9 % (ref 5–12)
NEUTROPHILS NFR BLD AUTO: 4.05 10*3/MM3 (ref 1.7–7)
NEUTROPHILS NFR BLD AUTO: 59.8 % (ref 42.7–76)
NITRITE UR QL STRIP: NEGATIVE
NRBC BLD AUTO-RTO: 0 /100 WBC (ref 0–0.2)
PH UR STRIP.AUTO: 6 [PH] (ref 5–8)
PLATELET # BLD AUTO: 286 10*3/MM3 (ref 140–450)
PMV BLD AUTO: 8.9 FL (ref 6–12)
POTASSIUM SERPL-SCNC: 4.2 MMOL/L (ref 3.5–5.2)
PROT SERPL-MCNC: 6.6 G/DL (ref 6–8.5)
PROT UR QL STRIP: ABNORMAL
RBC # BLD AUTO: 4.73 10*6/MM3 (ref 3.77–5.28)
RBC # UR STRIP: ABNORMAL /HPF
REF LAB TEST METHOD: ABNORMAL
SODIUM SERPL-SCNC: 134 MMOL/L (ref 136–145)
SP GR UR STRIP: 1.02 (ref 1–1.03)
SQUAMOUS #/AREA URNS HPF: ABNORMAL /HPF
TSH SERPL DL<=0.05 MIU/L-ACNC: 1.22 UIU/ML (ref 0.27–4.2)
UROBILINOGEN UR QL STRIP: ABNORMAL
WBC # UR STRIP: ABNORMAL /HPF
WBC NRBC COR # BLD AUTO: 6.78 10*3/MM3 (ref 3.4–10.8)
WHOLE BLOOD HOLD COAG: NORMAL
WHOLE BLOOD HOLD SPECIMEN: NORMAL

## 2023-11-27 PROCEDURE — 83690 ASSAY OF LIPASE: CPT

## 2023-11-27 PROCEDURE — 85025 COMPLETE CBC W/AUTO DIFF WBC: CPT

## 2023-11-27 PROCEDURE — G0378 HOSPITAL OBSERVATION PER HR: HCPCS

## 2023-11-27 PROCEDURE — 99284 EMERGENCY DEPT VISIT MOD MDM: CPT

## 2023-11-27 PROCEDURE — 80053 COMPREHEN METABOLIC PANEL: CPT

## 2023-11-27 PROCEDURE — 84443 ASSAY THYROID STIM HORMONE: CPT

## 2023-11-27 PROCEDURE — 74176 CT ABD & PELVIS W/O CONTRAST: CPT

## 2023-11-27 PROCEDURE — 36415 COLL VENOUS BLD VENIPUNCTURE: CPT

## 2023-11-27 PROCEDURE — 81001 URINALYSIS AUTO W/SCOPE: CPT | Performed by: EMERGENCY MEDICINE

## 2023-11-27 PROCEDURE — 83605 ASSAY OF LACTIC ACID: CPT

## 2023-11-27 RX ORDER — ONDANSETRON 4 MG/1
4 TABLET, FILM COATED ORAL EVERY 6 HOURS PRN
Status: DISCONTINUED | OUTPATIENT
Start: 2023-11-27 | End: 2023-11-28 | Stop reason: HOSPADM

## 2023-11-27 RX ORDER — SODIUM CHLORIDE 0.9 % (FLUSH) 0.9 %
10 SYRINGE (ML) INJECTION AS NEEDED
Status: DISCONTINUED | OUTPATIENT
Start: 2023-11-27 | End: 2023-11-28 | Stop reason: HOSPADM

## 2023-11-27 RX ORDER — AMOXICILLIN 250 MG
2 CAPSULE ORAL 2 TIMES DAILY
Status: DISCONTINUED | OUTPATIENT
Start: 2023-11-27 | End: 2023-11-28 | Stop reason: HOSPADM

## 2023-11-27 RX ORDER — ACETAMINOPHEN 325 MG/1
650 TABLET ORAL EVERY 4 HOURS PRN
Status: DISCONTINUED | OUTPATIENT
Start: 2023-11-27 | End: 2023-11-28 | Stop reason: HOSPADM

## 2023-11-27 RX ORDER — METOPROLOL SUCCINATE 25 MG/1
25 TABLET, EXTENDED RELEASE ORAL DAILY
Status: DISCONTINUED | OUTPATIENT
Start: 2023-11-28 | End: 2023-11-28 | Stop reason: HOSPADM

## 2023-11-27 RX ORDER — LEVOTHYROXINE SODIUM 0.05 MG/1
75 TABLET ORAL DAILY
Status: DISCONTINUED | OUTPATIENT
Start: 2023-11-28 | End: 2023-11-28 | Stop reason: HOSPADM

## 2023-11-27 RX ORDER — PANTOPRAZOLE SODIUM 40 MG/1
40 TABLET, DELAYED RELEASE ORAL
Status: DISCONTINUED | OUTPATIENT
Start: 2023-11-28 | End: 2023-11-28 | Stop reason: HOSPADM

## 2023-11-27 RX ORDER — POLYETHYLENE GLYCOL 3350 17 G/17G
17 POWDER, FOR SOLUTION ORAL DAILY PRN
Status: DISCONTINUED | OUTPATIENT
Start: 2023-11-27 | End: 2023-11-28 | Stop reason: HOSPADM

## 2023-11-27 RX ORDER — BISACODYL 10 MG
10 SUPPOSITORY, RECTAL RECTAL DAILY PRN
Status: DISCONTINUED | OUTPATIENT
Start: 2023-11-27 | End: 2023-11-28 | Stop reason: HOSPADM

## 2023-11-27 RX ORDER — BISACODYL 5 MG/1
5 TABLET, DELAYED RELEASE ORAL DAILY PRN
Status: DISCONTINUED | OUTPATIENT
Start: 2023-11-27 | End: 2023-11-28 | Stop reason: HOSPADM

## 2023-11-27 RX ORDER — ALLOPURINOL 100 MG/1
300 TABLET ORAL DAILY
Status: DISCONTINUED | OUTPATIENT
Start: 2023-11-28 | End: 2023-11-28 | Stop reason: HOSPADM

## 2023-11-27 RX ORDER — ONDANSETRON 2 MG/ML
4 INJECTION INTRAMUSCULAR; INTRAVENOUS EVERY 6 HOURS PRN
Status: DISCONTINUED | OUTPATIENT
Start: 2023-11-27 | End: 2023-11-28 | Stop reason: HOSPADM

## 2023-11-27 RX ORDER — OXYCODONE HYDROCHLORIDE 5 MG/1
5 TABLET ORAL EVERY 4 HOURS PRN
Status: DISCONTINUED | OUTPATIENT
Start: 2023-11-27 | End: 2023-11-28 | Stop reason: HOSPADM

## 2023-11-27 RX ORDER — SODIUM CHLORIDE 0.9 % (FLUSH) 0.9 %
10 SYRINGE (ML) INJECTION EVERY 12 HOURS SCHEDULED
Status: DISCONTINUED | OUTPATIENT
Start: 2023-11-27 | End: 2023-11-28 | Stop reason: HOSPADM

## 2023-11-27 RX ORDER — PRAVASTATIN SODIUM 40 MG
40 TABLET ORAL DAILY
Status: DISCONTINUED | OUTPATIENT
Start: 2023-11-28 | End: 2023-11-28 | Stop reason: HOSPADM

## 2023-11-27 RX ORDER — SODIUM CHLORIDE 9 MG/ML
40 INJECTION, SOLUTION INTRAVENOUS AS NEEDED
Status: DISCONTINUED | OUTPATIENT
Start: 2023-11-27 | End: 2023-11-28 | Stop reason: HOSPADM

## 2023-11-27 RX ADMIN — Medication 10 ML: at 21:28

## 2023-11-27 RX ADMIN — OXYCODONE 5 MG: 5 TABLET ORAL at 23:24

## 2023-11-27 NOTE — ED NOTES
Nursing report ED to floor  Mandy Alarcon  89 y.o.  female    HPI :   Chief Complaint   Patient presents with    Blood in Urine       Admitting doctor:   Ananda Pedroza MD    Admitting diagnosis:   The primary encounter diagnosis was Gross hematuria. Diagnoses of Anticoagulated and Hematoma were also pertinent to this visit.    Code status:   Current Code Status       Date Active Code Status Order ID Comments User Context       Prior            Allergies:   Cefaclor and Sulfa antibiotics    Isolation:   No active isolations    Intake and Output  No intake or output data in the 24 hours ending 11/27/23 1851    Weight:       11/27/23  1535   Weight: 96.6 kg (213 lb)       Most recent vitals:   Vitals:    11/27/23 1706 11/27/23 1736 11/27/23 1805 11/27/23 1806   BP: 136/66 143/54  140/57   Pulse: 55 52 51    Resp:    16   Temp:       TempSrc:       SpO2: 97% 98%  98%   Weight:       Height:           Active LDAs/IV Access:   Lines, Drains & Airways       Active LDAs       Name Placement date Placement time Site Days    Peripheral IV 11/27/23 1829 Anterior;Right Forearm 11/27/23 1829  Forearm  less than 1                    Labs (abnormal labs have a star):   Labs Reviewed   COMPREHENSIVE METABOLIC PANEL - Abnormal; Notable for the following components:       Result Value    Glucose 106 (*)     Creatinine 1.21 (*)     Sodium 134 (*)     CO2 18.8 (*)     eGFR 42.7 (*)     All other components within normal limits    Narrative:     GFR Normal >60  Chronic Kidney Disease <60  Kidney Failure <15    The GFR formula is only valid for adults with stable renal function between ages 18 and 70.   URINALYSIS W/ MICROSCOPIC IF INDICATED (NO CULTURE) - Abnormal; Notable for the following components:    Color, UA Red (*)     Appearance, UA Cloudy (*)     Blood, UA Large (3+) (*)     Protein,  mg/dL (2+) (*)     All other components within normal limits   CBC WITH AUTO DIFFERENTIAL - Abnormal; Notable for the following  components:    Eosinophil % 7.5 (*)     Immature Grans % 0.7 (*)     Eosinophils, Absolute 0.51 (*)     All other components within normal limits   URINALYSIS, MICROSCOPIC ONLY - Abnormal; Notable for the following components:    RBC, UA Too Numerous to Count (*)     WBC, UA 6-10 (*)     All other components within normal limits   LIPASE - Normal   LACTIC ACID, PLASMA - Normal   RAINBOW DRAW    Narrative:     The following orders were created for panel order [place] Draw.  Procedure                               Abnormality         Status                     ---------                               -----------         ------                     Green Top (Gel)[[id number]]                                  Final result               Lavender Top[[id number]]                                     Final result               Gold Top - SST[[id number]]                                   Final result               Light Blue Top[[id number]]                                   Final result                 Please view results for these tests on the individual orders.   CBC AND DIFFERENTIAL    Narrative:     The following orders were created for panel order CBC & Differential.  Procedure                               Abnormality         Status                     ---------                               -----------         ------                     CBC Auto Differential[[id number]]        Abnormal            Final result                 Please view results for these tests on the individual orders.   GREEN TOP   LAVENDER TOP   GOLD TOP - SST   LIGHT BLUE TOP       EKG:   No orders to display       Meds given in ED:   Medications   sodium chloride 0.9 % flush 10 mL (has no administration in time range)   sodium chloride 0.9 % flush 10 mL (has no administration in time range)   sodium chloride 0.9 % flush 10 mL (has no administration in time range)   sodium chloride 0.9 % infusion 40 mL (has no administration in time range)   acetaminophen  (TYLENOL) tablet 650 mg (has no administration in time range)   sennosides-docusate (PERICOLACE) 8.6-50 MG per tablet 2 tablet (has no administration in time range)     And   polyethylene glycol (MIRALAX) packet 17 g (has no administration in time range)     And   bisacodyl (DULCOLAX) EC tablet 5 mg (has no administration in time range)     And   bisacodyl (DULCOLAX) suppository 10 mg (has no administration in time range)   ondansetron (ZOFRAN) tablet 4 mg (has no administration in time range)     Or   ondansetron (ZOFRAN) injection 4 mg (has no administration in time range)       Imaging results:  CT Abdomen Pelvis Without Contrast    Result Date: 2023    1. No urolithiasis or hydronephrosis. Right renal cysts. In a patient this age with hematuria, CT urogram is suggested to exclude the possibility of an occult urinary lesion.  2. Colonic diverticulosis. No acute inflammatory process of bowel is identified.  3. Subcutaneous left groin hematoma.  This report was finalized on 2023 4:52 PM by Dr. Trenton Pendleton M.D on Workstation: [employer]       Ambulatory status:   - ad jones    Social issues:   Social History     Socioeconomic History    Marital status:     Number of children: 5    Highest education level: GED or equivalent   Tobacco Use    Smoking status: Former     Packs/day: .5     Types: Cigarettes     Start date: 1946     Quit date: 1954     Years since quittin.9    Smokeless tobacco: Never    Tobacco comments:     Stopped drinking at age 30.   Vaping Use    Vaping Use: Never used   Substance and Sexual Activity    Alcohol use: No     Comment: caffiene use tea coffee    Drug use: No    Sexual activity: Not Currently     Partners: Male       NIH Stroke Scale:       Chrissie Leonard RN  23 18:51 EST

## 2023-11-27 NOTE — ED PROVIDER NOTES
" EMERGENCY DEPARTMENT ENCOUNTER    Room Number:  11/11  PCP: Daryl Santos MD  Patient Care Team:  Daryl Santos MD as PCP - General (Internal Medicine)   Independent Historians: Patient and daughter    HPI:  Chief Complaint: Blood in the urine    A complete HPI/ROS/PMH/PSH/SH/FH are unobtainable due to: None    Chronic or social conditions impacting patient care (Social Determinants of Health): None  (Financial Resource Strain / Food Insecurity / Transportation Needs / Physical Activity / Stress / Social Connections / Intimate Partner Violence / Housing Stability)    Context: Mandy Alarcon is a 89 y.o. female with history of hypertension, paroxysmal A-fib, PVD who presents to the ED c/o acute blood in urine with small blood clots since this morning.  Patient has had some urgency and urinary \"pressure\" without any hedy dysuria.  Patient also denies any abdominal pain, vomiting, diarrhea, fevers.  Patient did have a recent hospitalization with vascular surgery for an arterial occlusion in her left lower extremity and was started on Eliquis.  Patient has been taking her Eliquis daily.  She is never had any complication with hematuria before.  Patient denies any falls or trauma.    Review of prior external notes (non-ED) -and- Review of prior external test results outside of this encounter: Patient discharged November 18 after being hospitalized for arterial occlusion of the left lower extremity in the setting of CKD and paroxysmal A-fib.  Patient not been on anticoagulation prior to this hospitalization despite her history of paroxysmal A-fib.  On November 16 patient had left femoral thrombectomy and left lower extremity arteriogram.  Patient had on visit after that to the emergency department on November 19 for some paresthesias to the anterior left knee.    Prescription drug monitoring program review:         PAST MEDICAL HISTORY  Active Ambulatory Problems     Diagnosis Date Noted    Benign essential " hypertension 10/28/2012    Claustrophobia 04/28/2016    Gout 10/28/2012    Hyperlipidemia 10/28/2012    Primary hypothyroidism 11/17/2015    Impaired fasting glucose 10/28/2012    Nocturnal hypoxemia 08/02/2012    Secondary polycythemia 08/02/2012    Vitamin D deficiency 05/23/2016    Therapeutic drug monitoring 05/23/2016    Family history of coronary artery disease 05/24/2016    Bilateral sensorineural hearing loss, wears hearing aids 06/14/2017    Multinodular goiter 01/24/2018    Supraventricular tachycardia 07/10/2018    Non morbid obesity 03/11/2019    Bilateral lower extremity edema 03/23/2020    Gastroesophageal reflux disease without esophagitis 03/23/2020    Paroxysmal atrial fibrillation 04/10/2020    Stage 3b chronic kidney disease 09/15/2020    Combined form of senile cataract 03/03/2021    Osteopenia of multiple sites 09/26/2023    Postmenopausal state 09/26/2023    COVID-19 virus infection 10/26/2023    Arterial occlusion 11/16/2023    Arterial occlusion, lower extremity 11/17/2023     Resolved Ambulatory Problems     Diagnosis Date Noted    History of Cellulitis of trunk, Right 04/28/2016    History of mammogram 04/28/2016    History of pneumococcal vaccination 04/28/2016    Hospital discharge follow-up 07/10/2018    Near syncope 07/10/2018    Chest tightness or pressure 07/10/2018    PND (paroxysmal nocturnal dyspnea) 07/10/2018    Acute biliary pancreatitis without infection or necrosis 02/01/2020    LINNEA (acute kidney injury) 02/02/2020    Pneumonia 02/03/2020    New onset a-fib 02/05/2020    Hyponatremia 02/05/2020    Hypomagnesemia 02/05/2020    Leukocytosis 02/05/2020    Fever 02/27/2020    Acute gallstone pancreatitis 02/27/2020    Chronic cholecystitis 03/11/2020    Postoperative nausea 03/23/2020    Postoperative pain 03/23/2020    Hospital discharge follow-up 04/17/2020    History of acute pancreatitis 04/17/2020    Acute constipation 04/17/2020    Gallstone pancreatitis 05/21/2020     Hospital discharge follow-up 2020    Left cervical radiculopathy 2020    Parotiditis 2022    Hospital discharge follow-up 2022     Past Medical History:   Diagnosis Date    Chronic renal impairment, stage 3b 09/15/2020    Hyperlipidemia 10/28/2012    Morbidly obese 2019         PAST SURGICAL HISTORY  Past Surgical History:   Procedure Laterality Date    CHOLECYSTECTOMY WITH INTRAOPERATIVE CHOLANGIOGRAM N/A 2020    Procedure: LAPAROSCOPIC CHOLECYSTOSTOMY TUBE, INTRAOPERATIVE CHOLANGIOGRAM;  Surgeon: Bryce Andujar MD;  Location: [facility] OR;  Service: General;  Laterality: N/A;    CHOLECYSTECTOMY WITH INTRAOPERATIVE CHOLANGIOGRAM N/A 2020    Procedure: CHOLECYSTECTOMY LAPAROSCOPIC INTRAOPERATIVE CHOLANGIOGRAM and EGD;  Surgeon: Bryce Andujar MD;  Location: [facility] OR;  Service: General;  Laterality: N/A;    ERCP N/A 2022    Procedure: ENDOSCOPIC RETROGRADE CHOLANGIOPANCREATOGRAPHY with sphincterotomy, balloon sweep 12-15mm;  Surgeon: Mitesh Altamirano MD;  Location: [facility] ENDOSCOPY;  Service: Gastroenterology;  Laterality: N/A;  pre - gallstone pancreatitis  post - s/p sphincterotomy, sludge and  pus    FEMORAL THROMBECTOMY/EMBOLECTOMY Left 2023    Procedure: OPEN LEFT FEMORAL THROMBECTOMY/EMBOLECTOMY WITH LEFT LOWER EXTREMITY ANGIOGRAM;  Surgeon: Jace Chowdhury Jr., MD;  Location: [facility] OR ;  Service: Vascular;  Laterality: Left;    OOPHORECTOMY      age 48    RADICAL ABDOMINAL HYSTERECTOMY  48 years old    48 years of age. Uterine fibroid tumors with menorrhagia - no cancer         FAMILY HISTORY  Family History   Problem Relation Age of Onset    Heart disease Mother         Ischemic.  from coronary artery disease.    Heart disease Father         Ischemic.  from coronary artery disease.    Heart disease Sister         Ischemic.  from coronary artery disease.    Heart disease Sister         Ischemic.   from coronary artery disease.    Heart disease Brother         Ischemic.  from coronary artery disease.    Breast cancer Daughter     Breast cancer Daughter     Ovarian cancer Neg Hx     Malig Hyperthermia Neg Hx          SOCIAL HISTORY  Social History     Socioeconomic History    Marital status:     Number of children: 5    Highest education level: GED or equivalent   Tobacco Use    Smoking status: Former     Packs/day: .5     Types: Cigarettes     Start date: 1946     Quit date: 1954     Years since quittin.9    Smokeless tobacco: Never    Tobacco comments:     Stopped drinking at age 30.   Vaping Use    Vaping Use: Never used   Substance and Sexual Activity    Alcohol use: No     Comment: caffiene use tea coffee    Drug use: No    Sexual activity: Not Currently     Partners: Male         ALLERGIES  Cefaclor and Sulfa antibiotics        REVIEW OF SYSTEMS  Review of Systems  Included in HPI  All systems reviewed and negative except for those discussed in HPI.      PHYSICAL EXAM    I have reviewed the triage vital signs and nursing notes.    ED Triage Vitals   Temp Heart Rate Resp BP SpO2   23 1352 23 1352 23 1352 23 1357 23 1352   98.9 °F (37.2 °C) 63 16 133/72 96 %      Temp src Heart Rate Source Patient Position BP Location FiO2 (%)   23 1352 23 1352 -- -- --   Tympanic Monitor          Physical Exam  GENERAL: Pleasant cooperative and conversant  female sitting in a wheelchair, appears much younger than stated age, alert, no acute distress  SKIN: Warm, dry  HENT: Normocephalic, atraumatic  EYES: no scleral icterus, EOMI  RESPIRATORY: Relaxed breathing  ABDOMEN: soft, nontender, nondistended, no rebound, no guarding  MUSCULOSKELETAL: no deformity, 2+ bilateral posterior tibialis pulses  NEURO: alert, moves all extremities, follows commands                                                               LAB RESULTS  Recent Results (from the  past 24 hour(s))   Comprehensive Metabolic Panel    Collection Time: 11/27/23  2:15 PM    Specimen: Arm, Left; Blood   Result Value Ref Range    Glucose 106 (H) 65 - 99 mg/dL    BUN 12 8 - 23 mg/dL    Creatinine 1.21 (H) 0.57 - 1.00 mg/dL    Sodium 134 (L) 136 - 145 mmol/L    Potassium 4.2 3.5 - 5.2 mmol/L    Chloride 106 98 - 107 mmol/L    CO2 18.8 (L) 22.0 - 29.0 mmol/L    Calcium 9.5 8.2 - 9.6 mg/dL    Total Protein 6.6 6.0 - 8.5 g/dL    Albumin 3.5 3.5 - 5.2 g/dL    ALT (SGPT) 17 1 - 33 U/L    AST (SGOT) 19 1 - 32 U/L    Alkaline Phosphatase 59 39 - 117 U/L    Total Bilirubin 0.5 0.0 - 1.2 mg/dL    Globulin 3.1 gm/dL    A/G Ratio 1.1 g/dL    BUN/Creatinine Ratio 9.9 7.0 - 25.0    Anion Gap 9.2 5.0 - 15.0 mmol/L    eGFR 42.7 (L) >60.0 mL/min/1.73   Lipase    Collection Time: 11/27/23  2:15 PM    Specimen: Arm, Left; Blood   Result Value Ref Range    Lipase 19 13 - 60 U/L   Lactic Acid, Plasma    Collection Time: 11/27/23  2:15 PM    Specimen: Arm, Left; Blood   Result Value Ref Range    Lactate 1.4 0.5 - 2.0 mmol/L   Green Top (Gel)    Collection Time: 11/27/23  2:15 PM   Result Value Ref Range    Extra Tube Hold for add-ons.    Lavender Top    Collection Time: 11/27/23  2:15 PM   Result Value Ref Range    Extra Tube hold for add-on    Gold Top - SST    Collection Time: 11/27/23  2:15 PM   Result Value Ref Range    Extra Tube Hold for add-ons.    Light Blue Top    Collection Time: 11/27/23  2:15 PM   Result Value Ref Range    Extra Tube Hold for add-ons.    CBC Auto Differential    Collection Time: 11/27/23  2:15 PM    Specimen: Arm, Left; Blood   Result Value Ref Range    WBC 6.78 3.40 - 10.80 10*3/mm3    RBC 4.73 3.77 - 5.28 10*6/mm3    Hemoglobin 13.5 12.0 - 15.9 g/dL    Hematocrit 42.4 34.0 - 46.6 %    MCV 89.6 79.0 - 97.0 fL    MCH 28.5 26.6 - 33.0 pg    MCHC 31.8 31.5 - 35.7 g/dL    RDW 13.9 12.3 - 15.4 %    RDW-SD 45.2 37.0 - 54.0 fl    MPV 8.9 6.0 - 12.0 fL    Platelets 286 140 - 450 10*3/mm3     Neutrophil % 59.8 42.7 - 76.0 %    Lymphocyte % 20.5 19.6 - 45.3 %    Monocyte % 10.9 5.0 - 12.0 %    Eosinophil % 7.5 (H) 0.3 - 6.2 %    Basophil % 0.6 0.0 - 1.5 %    Immature Grans % 0.7 (H) 0.0 - 0.5 %    Neutrophils, Absolute 4.05 1.70 - 7.00 10*3/mm3    Lymphocytes, Absolute 1.39 0.70 - 3.10 10*3/mm3    Monocytes, Absolute 0.74 0.10 - 0.90 10*3/mm3    Eosinophils, Absolute 0.51 (H) 0.00 - 0.40 10*3/mm3    Basophils, Absolute 0.04 0.00 - 0.20 10*3/mm3    Immature Grans, Absolute 0.05 0.00 - 0.05 10*3/mm3    nRBC 0.0 0.0 - 0.2 /100 WBC   Urinalysis With Microscopic If Indicated (No Culture) - Urine, Clean Catch    Collection Time: 11/27/23  3:37 PM    Specimen: Urine, Clean Catch   Result Value Ref Range    Color, UA Red (A) Yellow, Straw    Appearance, UA Cloudy (A) Clear    pH, UA 6.0 5.0 - 8.0    Specific Gravity, UA 1.025 1.005 - 1.030    Glucose, UA Negative Negative    Ketones, UA Negative Negative    Bilirubin, UA Negative Negative    Blood, UA Large (3+) (A) Negative    Protein,  mg/dL (2+) (A) Negative    Leuk Esterase, UA Negative Negative    Nitrite, UA Negative Negative    Urobilinogen, UA 1.0 E.U./dL 0.2 - 1.0 E.U./dL   Urinalysis, Microscopic Only - Urine, Clean Catch    Collection Time: 11/27/23  3:37 PM    Specimen: Urine, Clean Catch   Result Value Ref Range    RBC, UA Too Numerous to Count (A) None Seen, 0-2 /HPF    WBC, UA 6-10 (A) None Seen, 0-2 /HPF    Bacteria, UA None Seen None Seen /HPF    Squamous Epithelial Cells, UA 0-2 None Seen, 0-2 /HPF    Hyaline Casts, UA None Seen None Seen /LPF    Methodology Automated Microscopy        I ordered the above labs and independently reviewed the results.        RADIOLOGY  CT Abdomen Pelvis Without Contrast    Result Date: 11/27/2023  CT ABDOMEN PELVIS WO CONTRAST-  INDICATIONS: Hematuria  TECHNIQUE: Radiation dose reduction techniques were utilized, including automated exposure control and exposure modulation based on body size. Unenhanced  ABDOMEN AND PELVIS CT  COMPARISON: 11/16/2023.  FINDINGS:  Right renal cysts are again demonstrated. Assessment of and for urinary lesions is significantly limited without intravenous contrast material.  The gallbladder is surgically absent with suspected choledochal cyst.  A new subcutaneous hyperdense focus of the left groin is noted, compatible with hematoma, 3.4 x 2.8 cm on axial image 126.  Otherwise unremarkable unenhanced appearance of the liver, spleen, adrenal glands, pancreas, kidneys, bladder.  No bowel obstruction or abnormal bowel thickening is identified. Duodenal diverticulum is noted. Numerous colonic diverticula are seen that do not appear inflamed.  No free intraperitoneal gas or free fluid.  Scattered small mesenteric and para-aortic lymph nodes are seen that are not significant by size criteria.  Abdominal aorta is not aneurysmal. Aortic and other arterial calcifications are present.  The lung bases show small likely atelectasis or scarring.  Degenerative changes are seen in the spine. No acute fracture is identified.          1. No urolithiasis or hydronephrosis. Right renal cysts. In a patient this age with hematuria, CT urogram is suggested to exclude the possibility of an occult urinary lesion.  2. Colonic diverticulosis. No acute inflammatory process of bowel is identified.  3. Subcutaneous left groin hematoma.  This report was finalized on 11/27/2023 4:52 PM by Dr. Trenton Pendleton M.D on Workstation: [employer]       I ordered the above noted radiological studies. Reviewed by me. See dictation for official radiology interpretation.      PROCEDURES    Procedures      MEDICATIONS GIVEN IN ER    Medications   sodium chloride 0.9 % flush 10 mL (has no administration in time range)         ORDERS PLACED DURING THIS VISIT:  Orders Placed This Encounter   Procedures    CT Abdomen Pelvis Without Contrast    [facility]man Draw    Comprehensive Metabolic Panel    Lipase    Urinalysis With Microscopic If  Indicated (No Culture) - Urine, Clean Catch    Lactic Acid, Plasma    CBC Auto Differential    Urinalysis, Microscopic Only - Urine, Clean Catch    NPO Diet NPO Type: Strict NPO    Undress & Gown    Vascular Surgery Consult    Insert Peripheral IV    CBC & Differential    Green Top (Gel)    Lavender Top    Gold Top - SST    Light Blue Top         PROGRESS, DATA ANALYSIS, CONSULTS, AND MEDICAL DECISION MAKING    All labs have been independently interpreted by me.  All radiology studies have been reviewed by me.   EKG's independently viewed and interpreted by me.  Discussion below represents my analysis of pertinent findings related to patient's condition, differential diagnosis, treatment plan and final disposition.    MDM patient presents with hematuria after recent initiation of anticoagulation.  Patient does have a history of CKD stage IIIb.  Patient does also have some urinary urgency and pressure but no hedy dysuria no other systemic symptoms like nausea, vomiting, abdominal pain, flank pain, or fevers.  Plan further evaluation with laboratory studies to include hemoglobin, creatinine, and urinalysis.  Will reevaluate need for CT abdomen pelvis or specialty consultation.    ED Course as of 11/27/23 1801 Mon Nov 27, 2023   1755 Discussed patient's case with vascular Dr. Ayers who recommended holding anticoagulation tonight until getting further recs from urology. [AR]   1800 I discussed patient with our provider in the observation unit who is amenable to admitting the patient for further care [AR]      ED Course User Index  [AR] Cate Antoine MD     I discussed all results with patient and her daughter and they are amenable to being admitted for further evaluation and observation.    PPE: I wore and adhered to appropriate PPE per hospital protocols for specific patient presentation. (For respiratory patients with suspected Covid-19 or other infectious etiology suspected for patient's symptoms, the  patient wore a mask and I wore an N95 mask throughout the entire patient encounter.) Proper hand hygiene both before and after patient encounter was performed as well.         AS OF 18:01 EST VITALS:    BP - 129/57  HR - 50  TEMP - 98.9 °F (37.2 °C) (Tympanic)  O2 SATS - 94%        DIAGNOSIS  Final diagnoses:   Gross hematuria   Anticoagulated   Hematoma         DISPOSITION  ED Disposition       ED Disposition   Decision to Admit    Condition   --    Comment   Observation unit                  Note Disclaimer: At [facility], we believe that sharing information builds trust and better relationships. You are receiving this note because you recently visited [facility]. It is possible you will see health information before a provider has talked with you about it. This kind of information can be easy to misunderstand. To help you fully understand what it means for your health, we urge you to discuss this note with your provider.         Cate Antoine MD  11/27/23 1163

## 2023-11-27 NOTE — PROGRESS NOTES
Clinical Pharmacy Services: Medication History    Mandy Alarcon is a 89 y.o. female [facility]   Chief Complaint   Patient presents with    Blood in Urine       She  has a past medical history of Benign essential hypertension (10/28/2012), Bilateral lower extremity edema (03/23/2020), Bilateral sensorineural hearing loss, wears hearing aids (06/14/2017), Chronic renal impairment, stage 3b (09/15/2020), Claustrophobia (04/28/2016), Combined form of senile cataract (03/03/2021), Family history of coronary artery disease (05/24/2016), Gastroesophageal reflux disease without esophagitis (03/23/2020), Gout (10/28/2012), History of acute pancreatitis (04/17/2020), Hyperlipidemia (10/28/2012), Impaired fasting glucose (10/28/2012), Morbidly obese (03/11/2019), Nocturnal hypoxemia (08/02/2012), Non morbid obesity (03/11/2019), Osteopenia of multiple sites (09/26/2023), Paroxysmal atrial fibrillation (04/10/2020), Postmenopausal state (09/26/2023), Primary hypothyroidism (11/17/2015), Secondary polycythemia (08/02/2012), and Vitamin D deficiency (05/23/2016).    Allergies as of 11/27/2023 - Reviewed 11/27/2023   Allergen Reaction Noted    Cefaclor Anaphylaxis, Angioedema, and Swelling 04/28/2016    Sulfa antibiotics Rash 04/28/2016       Medication information was obtained from: Medication Bottles   Pharmacy and Phone Number:     Prior to Admission Medications       Prescriptions Last Dose Informant Patient Reported? Taking?    allopurinol (ZYLOPRIM) 300 MG tablet  Medication Bottle No Yes    TAKE 1 TABLET BY MOUTH DAILY FOR GOUT    Patient taking differently:  Take 1 tablet by mouth Daily.    apixaban (ELIQUIS) 5 MG tablet tablet  Medication Bottle No Yes    Take 1 tablet by mouth Every 12 (Twelve) Hours. Indications: Atrial Fibrillation    levothyroxine (SYNTHROID, LEVOTHROID) 75 MCG tablet  Medication Bottle No Yes    TAKE 1 TABLET BY MOUTH DAILY FOR THYROID    Patient taking differently:   Take 1 tablet by mouth Daily.    metoprolol succinate XL (TOPROL-XL) 25 MG 24 hr tablet  Medication Bottle No Yes    TAKE 1 TABLET BY MOUTH DAILY FOR HIGH BLOOD PRESSURE AND HEART OR PALPITATIONS    Patient taking differently:  Take 1 tablet by mouth Daily.    oxyCODONE (Roxicodone) 5 MG immediate release tablet  Medication Bottle No Yes    Take 1 tablet by mouth Every 4 (Four) Hours As Needed for Moderate Pain for up to 30 doses.    pravastatin (PRAVACHOL) 40 MG tablet  Medication Bottle No Yes    TAKE 1 TABLET BY MOUTH DAILY    Patient taking differently:  Take 1 tablet by mouth Daily.    VITAMIN D PO  Child Yes Yes    Take 1 tablet by mouth Daily.    ascorbic acid (VITAMIN C) 500 MG capsule controlled-release CR capsule   Yes No    Take 1 tablet by mouth Daily.    Hydrocod Abbe-Chlorphe Abbe ER (TUSSIONEX PENNKINETIC) 10-8 MG/5ML ER suspension   No No    Take 1 teaspoon p.o. every 12 hours for cough and congestion from COVID-19 infection.    Multiple Vitamins-Minerals (MULTIVITAMIN ADULTS PO)   Yes No    Take 1 tablet by mouth Daily.    omeprazole (priLOSEC) 40 MG capsule  Child No No    TAKE 1 CAPSULE BY MOUTH EVERY DAY BEFORE FIRST MEAL FOR STOMACH ACID OR REFLUX    Patient taking differently:  Take 1 capsule by mouth Daily.    polyethylene glycol (MIRALAX) packet   No No    Take orally as directed for constipation    Restasis 0.05 % ophthalmic emulsion   Yes No    2 (two) times a day.              Medication notes: Daughter stated pt is prescribed omeprazole but has not started taking.     This medication list is complete to the best of my knowledge as of 11/27/2023    Please call if questions.    Marilynn Galan  Medication History Technician   [phone]    11/27/2023 18:57 EST

## 2023-11-27 NOTE — TELEPHONE ENCOUNTER
Caller: Ashley Valdes    Relationship to patient: Emergency Contact    Best call back number: [phone]    Chief complaint: PATIENT HAD SURGERY LAS WEEK FOR BLOOD CLOT. WOKE UP TODAY AND HAD BLOOD IN URINE THAT FILLED TOILET    Patient directed to call 911 or go to their nearest emergency room.     Patient verbalized understanding: [] Yes  [] No  If no, why?

## 2023-11-28 ENCOUNTER — READMISSION MANAGEMENT (OUTPATIENT)
Dept: CALL CENTER | Facility: HOSPITAL | Age: 88
End: 2023-11-28
Payer: MEDICARE

## 2023-11-28 VITALS
OXYGEN SATURATION: 97 % | TEMPERATURE: 97.5 F | RESPIRATION RATE: 19 BRPM | HEIGHT: 63 IN | DIASTOLIC BLOOD PRESSURE: 51 MMHG | WEIGHT: 211.4 LBS | BODY MASS INDEX: 37.46 KG/M2 | HEART RATE: 58 BPM | SYSTOLIC BLOOD PRESSURE: 113 MMHG

## 2023-11-28 LAB
ANION GAP SERPL CALCULATED.3IONS-SCNC: 9.7 MMOL/L (ref 5–15)
BUN SERPL-MCNC: 14 MG/DL (ref 8–23)
BUN/CREAT SERPL: 11.7 (ref 7–25)
CALCIUM SPEC-SCNC: 9 MG/DL (ref 8.2–9.6)
CHLORIDE SERPL-SCNC: 108 MMOL/L (ref 98–107)
CO2 SERPL-SCNC: 21.3 MMOL/L (ref 22–29)
CREAT SERPL-MCNC: 1.2 MG/DL (ref 0.57–1)
DEPRECATED RDW RBC AUTO: 45.5 FL (ref 37–54)
EGFRCR SERPLBLD CKD-EPI 2021: 43.1 ML/MIN/1.73
ERYTHROCYTE [DISTWIDTH] IN BLOOD BY AUTOMATED COUNT: 14.1 % (ref 12.3–15.4)
GLUCOSE SERPL-MCNC: 97 MG/DL (ref 65–99)
HCT VFR BLD AUTO: 38.4 % (ref 34–46.6)
HGB BLD-MCNC: 12.3 G/DL (ref 12–15.9)
MCH RBC QN AUTO: 29.1 PG (ref 26.6–33)
MCHC RBC AUTO-ENTMCNC: 32 G/DL (ref 31.5–35.7)
MCV RBC AUTO: 90.8 FL (ref 79–97)
PLATELET # BLD AUTO: 241 10*3/MM3 (ref 140–450)
PMV BLD AUTO: 8.9 FL (ref 6–12)
POTASSIUM SERPL-SCNC: 3.9 MMOL/L (ref 3.5–5.2)
RBC # BLD AUTO: 4.23 10*6/MM3 (ref 3.77–5.28)
SODIUM SERPL-SCNC: 139 MMOL/L (ref 136–145)
WBC NRBC COR # BLD AUTO: 5.41 10*3/MM3 (ref 3.4–10.8)

## 2023-11-28 PROCEDURE — 80048 BASIC METABOLIC PNL TOTAL CA: CPT | Performed by: NURSE PRACTITIONER

## 2023-11-28 PROCEDURE — 85027 COMPLETE CBC AUTOMATED: CPT | Performed by: NURSE PRACTITIONER

## 2023-11-28 PROCEDURE — G0378 HOSPITAL OBSERVATION PER HR: HCPCS

## 2023-11-28 RX ADMIN — PANTOPRAZOLE SODIUM 40 MG: 40 TABLET, DELAYED RELEASE ORAL at 08:21

## 2023-11-28 RX ADMIN — ALLOPURINOL 300 MG: 100 TABLET ORAL at 08:20

## 2023-11-28 RX ADMIN — LEVOTHYROXINE SODIUM 75 MCG: 50 TABLET ORAL at 08:20

## 2023-11-28 RX ADMIN — Medication 10 ML: at 08:22

## 2023-11-28 RX ADMIN — PRAVASTATIN SODIUM 40 MG: 40 TABLET ORAL at 08:22

## 2023-11-28 NOTE — OUTREACH NOTE
Medical Week 2 Survey      Flowsheet Row Responses   [facility] patient discharged from? [place]   Does the patient have one of the following disease processes/diagnoses(primary or secondary)? Other   Week 2 attempt successful? No   Unsuccessful attempts Attempt 1   Revoke Readmitted            Alyssa KUMAR - Registered Nurse

## 2023-11-28 NOTE — OUTREACH NOTE
Prep Survey      Flowsheet Row Responses   [facility] patient discharged from? [place]   Is LACE score < 7 ? No   Eligibility [facility]   Date of Admission 11/27/23   Date of Discharge 11/28/23   Discharge Disposition Home or Self Care   Discharge diagnosis Hematuria   Does the patient have one of the following disease processes/diagnoses(primary or secondary)? Other   Does the patient have Home health ordered? No   Is there a DME ordered? No   Prep survey completed? Yes            Gwendolyn MORRIS - Registered Nurse

## 2023-11-28 NOTE — PROGRESS NOTES
MD Attestation Note    I supervised care provided by the midlevel provider.    The DAVE and I have discussed this patient's history, physical exam, and treatment plan. I have reviewed the documentation and personally had a face to face interaction with the patient  I affirm the documentation and agree with the treatment and plan.       My personal findings are:        Subjective:  Patient admitted for further evaluation of hematuria.  She was recently started on Eliquis due to left femoral artery thrombosis status post thrombectomy in mid November with Dr. Ayers, vascular surgery.  She has a history of atrial fibrillation but has not been anticoagulated previously.  She just began having blood in her urine in the last couple of days.  She denies fever.        Objective:  General: Awake and alert, no acute distress  Neuro: Cranial nerves II through XII grossly intact, speech fluent and clear  Cardiovascular: Bilateral lower extremities are warm and well-perfused, 2+ DP and PT pulses bilateral lower extremities, brisk cap refill in all toes bilaterally.        Assessment/ Plan:  Hematuria  Likely secondary to apixaban however patient had recent femoral artery embolus requiring thrombectomy, chronic A-fib with MAP1GS5-DPRp score of 5.  Eliquis will be held temporarily.  Urology to evaluate.  May involve vascular surgery and cardiology as needed.  I explained to patient and her daughter the difficult scenario we are in considering that anticoagulation is necessary to reduce risk of stroke further embolization.  It may be worth considering if she is a candidate for Watchman if she cannot tolerate oral anticoagulation going forward.

## 2023-11-28 NOTE — PLAN OF CARE
Problem: Adult Inpatient Plan of Care  Goal: Plan of Care Review  Outcome: Met   Goal Outcome Evaluation:  Patient is alert and oriented times 4. Patient denies pain. Patient being discharged home. Vital signs are stable. On room air. AVS and discharge instructions given to patient and daughter. IV removed. Patient is agreeable with plan. Questions and concerns addressed. Patient to follow up with urology and primary care provider. Dressing changed to left groin. Stand by assist with activity. Patient left with daughter via private vehicle with all personal belongings.

## 2023-11-28 NOTE — PROGRESS NOTES
JACOB ENGLISH Attestation Note    I supervised care provided by the midlevel provider.    The DAVE and I have discussed this patient's history, physical exam, and treatment plan. I have reviewed the documentation and personally had a face to face interaction with the patient  I affirm the documentation and agree with the treatment and plan. I provided a substantive portion of the care of this patient.  I personally performed the physical exam, in its entirety.  My personal findings are documented in below:    History:  89-year-old female observed overnight for acute hematuria.  Patient chronically anticoagulated though this has been held.  Seen by urology this morning with plan to follow-up in office for cystoscopy.  Patient feels well has no complaints this time.    Physical Exam:  General: No acute distress.  HENT: NCAT, PERRL, Nares patent.  Eyes: no scleral icterus.  Neck: trachea midline, no ROM limitations.  CV: Pink warm and well-perfused throughout  Respiratory: No distress or increased work of breathing  Abdomen: soft, no focal tenderness or rigidity.  Benign exam  Musculoskeletal: no deformity.  Neuro: alert, moves all extremities, follows commands.  Skin: warm, dry.    Assessment and Plan:  Plan discharge and outpatient urology follow-up as recommended.

## 2023-11-28 NOTE — CONSULTS
FIRST UROLOGY CONSULT      Patient Identification:  NAME:  Mandy Alarcon  Age:  89 y.o.   Sex:  female   :  1933   MRN:  [id number]     Chief complaint:  Blood in urine    History of present illness:      89 yo female who recently started eliquis due to thrombectomy of femoral artery.  Acute onset of painless GH and eliquis held.  At elevated risk of CVA if off anti-coagulation.  No prior episodes, even when anti-coagulated as an inpatient recently.  No clots or difficulty passing urine.    In hospital:  -AVSS  -WBC - 5 K  -Creat - 1.2  -UA - large blood, TNTC RBCs, 6-10 WBCs, no bact    -CT - no hydro, no stones, no masses seen    Urine has now cleared - no voiding issues    Asked to see    Past medical history:  Past Medical History:   Diagnosis Date    Benign essential hypertension 10/28/2012    2012--treatment for hypertension begun.    Bilateral lower extremity edema 2020    Bilateral sensorineural hearing loss, wears hearing aids 2017    Left is much worse than right.  Patient had multiple ear infections as a child.    Chronic renal impairment, stage 3b 09/15/2020    Claustrophobia 2016    This patient has significant nocturnal hypoxemia and I think that she could benefit from nocturnal oxygen therapy. The exact etiology of her hypoxemia is not clear. She could possibly have obstructive sleep apnea but this is not documented. We cannot test this patient for sleep apnea due to the fact that she is severely claustrophobic. Patient was a former smoker and it is possibly that COPD he is playing a role.    Combined form of senile cataract 2021    Family history of coronary artery disease 2016    Patient's mother, father, 2 sisters and a brother all  from myocardial infarctions    Gastroesophageal reflux disease without esophagitis 2020    Gout 10/28/2012    2012--initial diagnosis and treatment of gout.    History of acute pancreatitis 2020     Hyperlipidemia 10/28/2012    October 2012--treatment for hyperlipidemia begun.    Impaired fasting glucose 10/28/2012    October 2012--initial diagnosis impaired fasting glucose.    Morbidly obese 03/11/2019    Nocturnal hypoxemia 08/02/2012 11/06/2014--patient did not receive nocturnal oxygen because of Medicare regulations.   05/15/2014--overnight oximetry revealed oxygen saturations less than 89% for 22 minutes and 40 seconds. Oxygen saturations less than or equal to 88% for 22 minutes and 40 seconds. Lowest oxygen saturation 83%. The longest continuous time with oxygen saturations less than or equal to 88% was 1 minute and 32 seconds.   08/02/2012--overnight oximetry revealed oxygen saturations less than 90% for one hour and 35 minutes. Oxygen saturations less than 89% 59 minutes. This patient has significant nocturnal hypoxemia and I think that she could benefit from nocturnal oxygen therapy. The exact etiology of her hypoxemia is not clear. She could possibly have obstructive sleep apnea but this is not documented. We cannot test this patient for sleep apnea due to the fact that she is severely claustrophobic. Patient was a former smoker and it is possibly that COPD he is playing a role.    Non morbid obesity 03/11/2019    Osteopenia of multiple sites 09/26/2023    Paroxysmal atrial fibrillation 04/10/2020    Postmenopausal state 09/26/2023    Primary hypothyroidism 11/17/2015 March 11, 2019--TSH remains elevated slightly at 4.92.  Given the overall clinical picture including the multinodular goiter, we will initiate levothyroxine 50 mcg/day and reassess in about 6 weeks.  August 1, 2018--thyroid ultrasound reveals a multinodular thyroid with multiple subcentimeter nodules.  Only minimal increase in size of the largest nodule in the left lobe has occurred when compared     Secondary polycythemia 08/02/2012 11/06/2014--patient did not receive nocturnal oxygen because of Medicare regulations.    05/15/2014--overnight oximetry revealed oxygen saturations less than 89% for 22 minutes and 40 seconds. Oxygen saturations less than or equal to 88% for 22 minutes and 40 seconds. Lowest oxygen saturation 83%. The longest continuous time with oxygen saturations less than or equal to 88% was 1 minute and 32 seconds.   08/02/2012--overnight oximetry revealed oxygen saturations less than 90% for one hour and 35 minutes. Oxygen saturations less than 89% 59 minutes. This patient has significant nocturnal hypoxemia and I think that she could benefit from nocturnal oxygen therapy. The exact etiology of her hypoxemia is not clear. She could possibly have obstructive sleep apnea but this is not documented. We cannot test this patient for sleep apnea due to the fact that she is severely claustrophobic. Patient was a former smoker and it is possibly that COPD he is playing a role.    Vitamin D deficiency 05/23/2016       Past surgical history:  Past Surgical History:   Procedure Laterality Date    CHOLECYSTECTOMY WITH INTRAOPERATIVE CHOLANGIOGRAM N/A 03/12/2020    Procedure: LAPAROSCOPIC CHOLECYSTOSTOMY TUBE, INTRAOPERATIVE CHOLANGIOGRAM;  Surgeon: Bryce Andujar MD;  Location: [facility];  Service: General;  Laterality: N/A;    CHOLECYSTECTOMY WITH INTRAOPERATIVE CHOLANGIOGRAM N/A 05/22/2020    Procedure: CHOLECYSTECTOMY LAPAROSCOPIC INTRAOPERATIVE CHOLANGIOGRAM and EGD;  Surgeon: Bryce Andujar MD;  Location: [facility] OR;  Service: General;  Laterality: N/A;    ERCP N/A 07/01/2022    Procedure: ENDOSCOPIC RETROGRADE CHOLANGIOPANCREATOGRAPHY with sphincterotomy, balloon sweep 12-15mm;  Surgeon: Mitesh Altamirano MD;  Location: [facility] ENDOSCOPY;  Service: Gastroenterology;  Laterality: N/A;  pre - gallstone pancreatitis  post - s/p sphincterotomy, sludge and  pus    FEMORAL THROMBECTOMY/EMBOLECTOMY Left 11/16/2023    Procedure: OPEN LEFT FEMORAL THROMBECTOMY/EMBOLECTOMY WITH LEFT LOWER EXTREMITY  ANGIOGRAM;  Surgeon: Jace Chowdhury Jr., MD;  Location: [facility] OR 18/19;  Service: Vascular;  Laterality: Left;    OOPHORECTOMY      age 48    RADICAL ABDOMINAL HYSTERECTOMY  48 years old    48 years of age. Uterine fibroid tumors with menorrhagia - no cancer       Allergies:  Cefaclor and Sulfa antibiotics    Home medications:  Medications Prior to Admission   Medication Sig Dispense Refill Last Dose    allopurinol (ZYLOPRIM) 300 MG tablet TAKE 1 TABLET BY MOUTH DAILY FOR GOUT (Patient taking differently: Take 1 tablet by mouth Daily.) 90 tablet 3     apixaban (ELIQUIS) 5 MG tablet tablet Take 1 tablet by mouth Every 12 (Twelve) Hours. Indications: Atrial Fibrillation 60 tablet 0     levothyroxine (SYNTHROID, LEVOTHROID) 75 MCG tablet TAKE 1 TABLET BY MOUTH DAILY FOR THYROID (Patient taking differently: Take 1 tablet by mouth Daily.) 90 tablet 3     metoprolol succinate XL (TOPROL-XL) 25 MG 24 hr tablet TAKE 1 TABLET BY MOUTH DAILY FOR HIGH BLOOD PRESSURE AND HEART OR PALPITATIONS (Patient taking differently: Take 1 tablet by mouth Daily.) 90 tablet 3     oxyCODONE (Roxicodone) 5 MG immediate release tablet Take 1 tablet by mouth Every 4 (Four) Hours As Needed for Moderate Pain for up to 30 doses. 30 tablet 0     pravastatin (PRAVACHOL) 40 MG tablet TAKE 1 TABLET BY MOUTH DAILY (Patient taking differently: Take 1 tablet by mouth Daily.) 90 tablet 3     VITAMIN D PO Take 1 tablet by mouth Daily.       ascorbic acid (VITAMIN C) 500 MG capsule controlled-release CR capsule Take 1 tablet by mouth Daily.       Hydrocod Abbe-Chlorphe Abbe ER (TUSSIONEX PENNKINETIC) 10-8 MG/5ML ER suspension Take 1 teaspoon p.o. every 12 hours for cough and congestion from COVID-19 infection. 90 mL 0     Multiple Vitamins-Minerals (MULTIVITAMIN ADULTS PO) Take 1 tablet by mouth Daily.       omeprazole (priLOSEC) 40 MG capsule TAKE 1 CAPSULE BY MOUTH EVERY DAY BEFORE FIRST MEAL FOR STOMACH ACID OR REFLUX (Patient taking  differently: Take 1 capsule by mouth Daily.) 30 capsule 1     polyethylene glycol (MIRALAX) packet Take orally as directed for constipation       Restasis 0.05 % ophthalmic emulsion 2 (two) times a day.           Hospital medications:  allopurinol, 300 mg, Oral, Daily  [Held by provider] apixaban, 5 mg, Oral, Q12H  levothyroxine, 75 mcg, Oral, Daily  metoprolol succinate XL, 25 mg, Oral, Daily  pantoprazole, 40 mg, Oral, Q AM  pravastatin, 40 mg, Oral, Daily  senna-docusate sodium, 2 tablet, Oral, BID  sodium chloride, 10 mL, Intravenous, Q12H           acetaminophen    senna-docusate sodium **AND** polyethylene glycol **AND** bisacodyl **AND** bisacodyl    ondansetron **OR** ondansetron    oxyCODONE    sodium chloride    sodium chloride    sodium chloride    Family history:  Family History   Problem Relation Age of Onset    Heart disease Mother         Ischemic.  from coronary artery disease.    Heart disease Father         Ischemic.  from coronary artery disease.    Heart disease Sister         Ischemic.  from coronary artery disease.    Heart disease Sister         Ischemic.  from coronary artery disease.    Heart disease Brother         Ischemic.  from coronary artery disease.    Breast cancer Daughter     Breast cancer Daughter     Ovarian cancer Neg Hx     Malig Hyperthermia Neg Hx        Social history:  Social History     Tobacco Use    Smoking status: Former     Packs/day: .5     Types: Cigarettes     Start date: 1946     Quit date: 1954     Years since quittin.9    Smokeless tobacco: Never    Tobacco comments:     Stopped drinking at age 30.   Vaping Use    Vaping Use: Never used   Substance Use Topics    Alcohol use: No     Comment: caffiene use tea coffee    Drug use: No       Review of systems:      Positive for:  nothing  Negative for:  chest pain, cough, sob, o/w neg    Objective:  TMax 24 hours:   Temp (24hrs), Av.2 °F (36.8 °C), Min:97.5 °F (36.4 °C), Max:98.9 °F  (37.2 °C)      Vitals Ranges:   Temp:  [97.5 °F (36.4 °C)-98.9 °F (37.2 °C)] 97.9 °F (36.6 °C)  Heart Rate:  [50-63] 58  Resp:  [14-18] 18  BP: (129-149)/(52-74) 144/52    Intake/Output Last 3 shifts:  No intake/output data recorded.     Physical Exam:    General Appearance:    Alert, cooperative, NAD   Back:     No CVA tenderness   Lungs:     Respirations unlabored, no wheezing   Abdomen:     Soft, NDNT, no masses, no guarding   :    Pelvic not performed, bladder nondistended and nontender   Neuro/Psych:   Orientation intact, mood/affect pleasant       Results review:   I reviewed the patient's new clinical results.    Data review:  Lab Results (last 24 hours)       Procedure Component Value Units Date/Time    Basic Metabolic Panel [[id number]]  (Abnormal) Collected: 11/28/23 0400    Specimen: Blood from Arm, Right Updated: 11/28/23 0516     Glucose 97 mg/dL      BUN 14 mg/dL      Creatinine 1.20 mg/dL      Sodium 139 mmol/L      Potassium 3.9 mmol/L      Chloride 108 mmol/L      CO2 21.3 mmol/L      Calcium 9.0 mg/dL      BUN/Creatinine Ratio 11.7     Anion Gap 9.7 mmol/L      eGFR 43.1 mL/min/1.73     Narrative:      GFR Normal >60  Chronic Kidney Disease <60  Kidney Failure <15    The GFR formula is only valid for adults with stable renal function between ages 18 and 70.    CBC (No Diff) [[id number]]  (Normal) Collected: 11/28/23 0400    Specimen: Blood from Arm, Right Updated: 11/28/23 0500     WBC 5.41 10*3/mm3      RBC 4.23 10*6/mm3      Hemoglobin 12.3 g/dL      Hematocrit 38.4 %      MCV 90.8 fL      MCH 29.1 pg      MCHC 32.0 g/dL      RDW 14.1 %      RDW-SD 45.5 fl      MPV 8.9 fL      Platelets 241 10*3/mm3     TSH [[id number]]  (Normal) Collected: 11/27/23 1415    Specimen: Blood from Arm, Left Updated: 11/27/23 2227     TSH 1.220 uIU/mL     Urinalysis With Microscopic If Indicated (No Culture) - Urine, Clean Catch [[id number]]  (Abnormal) Collected: 11/27/23 1537    Specimen: Urine, Clean Catch Updated:  11/27/23 1601     Color, UA Red     Comment: Any Substance that causes an abnormal urine color can alter the accuracy of the chemical reactions.        Appearance, UA Cloudy     pH, UA 6.0     Specific Gravity, UA 1.025     Glucose, UA Negative     Ketones, UA Negative     Bilirubin, UA Negative     Blood, UA Large (3+)     Protein,  mg/dL (2+)     Leuk Esterase, UA Negative     Nitrite, UA Negative     Urobilinogen, UA 1.0 E.U./dL    Urinalysis, Microscopic Only - Urine, Clean Catch [[id number]]  (Abnormal) Collected: 11/27/23 1537    Specimen: Urine, Clean Catch Updated: 11/27/23 1601     RBC, UA Too Numerous to Count /HPF      WBC, UA 6-10 /HPF      Bacteria, UA None Seen /HPF      Squamous Epithelial Cells, UA 0-2 /HPF      Hyaline Casts, UA None Seen /LPF      Methodology Automated Microscopy    [place] Draw [[id number]] Collected: 11/27/23 1415    Specimen: Blood from Arm, Left Updated: 11/27/23 1531    Narrative:      The following orders were created for panel order [place] Draw.  Procedure                               Abnormality         Status                     ---------                               -----------         ------                     Green Top (Gel)[[id number]]                                  Final result               Lavender Top[[id number]]                                     Final result               Gold Top - SST[[id number]]                                   Final result               Light Blue Top[[id number]]                                   Final result                 Please view results for these tests on the individual orders.    Green Top (Gel) [[id number]] Collected: 11/27/23 1415    Specimen: Blood from Arm, Left Updated: 11/27/23 1531     Extra Tube Hold for add-ons.     Comment: Auto resulted.       Lavender Top [[id number]] Collected: 11/27/23 1415    Specimen: Blood from Arm, Left Updated: 11/27/23 1531     Extra Tube hold for add-on     Comment: Auto resulted       Gold  [facility] [[id number]] Collected: 11/27/23 1415    Specimen: Blood from Arm, Left Updated: 11/27/23 1531     Extra Tube Hold for add-ons.     Comment: Auto resulted.       Light Blue Top [[id number]] Collected: 11/27/23 1415    Specimen: Blood from Arm, Left Updated: 11/27/23 1531     Extra Tube Hold for add-ons.     Comment: Auto resulted       Comprehensive Metabolic Panel [[id number]]  (Abnormal) Collected: 11/27/23 1415    Specimen: Blood from Arm, Left Updated: 11/27/23 1459     Glucose 106 mg/dL      BUN 12 mg/dL      Creatinine 1.21 mg/dL      Sodium 134 mmol/L      Potassium 4.2 mmol/L      Chloride 106 mmol/L      CO2 18.8 mmol/L      Calcium 9.5 mg/dL      Total Protein 6.6 g/dL      Albumin 3.5 g/dL      ALT (SGPT) 17 U/L      AST (SGOT) 19 U/L      Alkaline Phosphatase 59 U/L      Total Bilirubin 0.5 mg/dL      Globulin 3.1 gm/dL      A/G Ratio 1.1 g/dL      BUN/Creatinine Ratio 9.9     Anion Gap 9.2 mmol/L      eGFR 42.7 mL/min/1.73     Narrative:      GFR Normal >60  Chronic Kidney Disease <60  Kidney Failure <15    The GFR formula is only valid for adults with stable renal function between ages 18 and 70.    Lipase [[id number]]  (Normal) Collected: 11/27/23 1415    Specimen: Blood from Arm, Left Updated: 11/27/23 1459     Lipase 19 U/L     Lactic Acid, Plasma [[id number]]  (Normal) Collected: 11/27/23 1415    Specimen: Blood from Arm, Left Updated: 11/27/23 1454     Lactate 1.4 mmol/L     CBC & Differential [[id number]]  (Abnormal) Collected: 11/27/23 1415    Specimen: Blood from Arm, Left Updated: 11/27/23 1434    Narrative:      The following orders were created for panel order CBC & Differential.  Procedure                               Abnormality         Status                     ---------                               -----------         ------                     CBC Auto Differential[[id number]]        Abnormal            Final result                 Please view results for these tests on the  individual orders.    CBC Auto Differential [[id number]]  (Abnormal) Collected: 11/27/23 1415    Specimen: Blood from Arm, Left Updated: 11/27/23 1434     WBC 6.78 10*3/mm3      RBC 4.73 10*6/mm3      Hemoglobin 13.5 g/dL      Hematocrit 42.4 %      MCV 89.6 fL      MCH 28.5 pg      MCHC 31.8 g/dL      RDW 13.9 %      RDW-SD 45.2 fl      MPV 8.9 fL      Platelets 286 10*3/mm3      Neutrophil % 59.8 %      Lymphocyte % 20.5 %      Monocyte % 10.9 %      Eosinophil % 7.5 %      Basophil % 0.6 %      Immature Grans % 0.7 %      Neutrophils, Absolute 4.05 10*3/mm3      Lymphocytes, Absolute 1.39 10*3/mm3      Monocytes, Absolute 0.74 10*3/mm3      Eosinophils, Absolute 0.51 10*3/mm3      Basophils, Absolute 0.04 10*3/mm3      Immature Grans, Absolute 0.05 10*3/mm3      nRBC 0.0 /100 WBC              Imaging:  Imaging Results (Last 24 Hours)       Procedure Component Value Units Date/Time    CT Abdomen Pelvis Without Contrast [[id number]] Collected: 11/27/23 1647     Updated: 11/27/23 1655    Narrative:      CT ABDOMEN PELVIS WO CONTRAST-     INDICATIONS: Hematuria     TECHNIQUE: Radiation dose reduction techniques were utilized, including  automated exposure control and exposure modulation based on body size.  Unenhanced ABDOMEN AND PELVIS CT     COMPARISON: 11/16/2023.     FINDINGS:     Right renal cysts are again demonstrated. Assessment of and for urinary  lesions is significantly limited without intravenous contrast material.     The gallbladder is surgically absent with suspected choledochal cyst.     A new subcutaneous hyperdense focus of the left groin is noted,  compatible with hematoma, 3.4 x 2.8 cm on axial image 126.     Otherwise unremarkable unenhanced appearance of the liver, spleen,  adrenal glands, pancreas, kidneys, bladder.     No bowel obstruction or abnormal bowel thickening is identified.  Duodenal diverticulum is noted. Numerous colonic diverticula are seen  that do not appear inflamed.     No free  intraperitoneal gas or free fluid.     Scattered small mesenteric and para-aortic lymph nodes are seen that are  not significant by size criteria.     Abdominal aorta is not aneurysmal. Aortic and other arterial  calcifications are present.     The lung bases show small likely atelectasis or scarring.     Degenerative changes are seen in the spine. No acute fracture is  identified.             Impression:            1. No urolithiasis or hydronephrosis. Right renal cysts. In a patient  this age with hematuria, CT urogram is suggested to exclude the  possibility of an occult urinary lesion.     2. Colonic diverticulosis. No acute inflammatory process of bowel is  identified.     3. Subcutaneous left groin hematoma.     This report was finalized on 11/27/2023 4:52 PM by Dr. Trenton Pendleton M.D on Workstation: [id number]                Assessment:     Gross hematuria    Plan:     Hold eliquis for now  Needs cystoscopy - no upper tract disease noted  OK to DC home now, will do cysto at office this afternoon or tomorrow  Will d/w Dr. Ayers - would like to restart anti-coag asap    Pete Hansen MD  11/28/23  06:26 EST

## 2023-11-28 NOTE — PROGRESS NOTES
ED OBSERVATION PROGRESS/DISCHARGE SUMMARY    Date of Admission: 11/27/2023   LOS: 0 days   PCP: Daryl Santos MD    Final Diagnosis Hematuria      Subjective     Hospital Outcome:   Patient is a pleasant afebrile ambulatory 89-year-old  female admitted to the ED observation unit for hematuria.  She is status post open left femoral thrombectomy/embolectomy of left lower extremity with angiogram on 11/16/2023 and subsequently was discharged home on oral anticoagulant.    She presented yesterday for painless hematuria.  ER provider discussed with vascular surgery who recommends holding patient's oral anticoagulant.  She was seen and evaluated by Dr. Chowdhury with the vascular surgery service this morning who recommends patient undergo cystoscopy and following that hopefully she can be restarted on that and they will follow with her as an outpatient.    She was seen and evaluated by Dr. Huff [facility] who advises the patient will need to undergo cystoscopy and recommends discharge home from the observation unit and they will coordinate this to be performed either this afternoon or tomorrow morning.  Patient and family are agreeable to plan.    ROS:  General: no fevers, chills  Respiratory: no cough, dyspnea  Cardiovascular: no chest pain, palpitations  Abdomen: No abdominal pain, nausea, vomiting, or diarrhea  Neurologic: No focal weakness    Objective   Physical Exam:  I have reviewed the vital signs.  Temp:  [97.5 °F (36.4 °C)-98.9 °F (37.2 °C)] 97.5 °F (36.4 °C)  Heart Rate:  [50-63] 62  Resp:  [14-19] 19  BP: (129-149)/(52-74) 137/57  General Appearance:    Alert, cooperative, no distress, patient appears much younger than stated age  Head:    Normocephalic, atraumatic  Eyes:    Sclerae anicteric  Neck:   Supple, no mass  Lungs: Clear to auscultation bilaterally, respirations unlabored  Heart: Regular rate and rhythm, S1 and S2 normal, no murmur, rub or gallop  Abdomen:  Soft,  non-tender, bowel sounds active, some abdominal obesity was appreciated  Extremities: No clubbing, cyanosis, or edema to lower extremities  Pulses:  2+ and symmetric in distal lower extremities  Skin: No rashes   Neurologic: Oriented x3, Normal strength to extremities    Results Review:    I have reviewed the labs, radiology results and diagnostic studies.    Results from last 7 days   Lab Units 11/28/23  0400   WBC 10*3/mm3 5.41   HEMOGLOBIN g/dL 12.3   HEMATOCRIT % 38.4   PLATELETS 10*3/mm3 241     Results from last 7 days   Lab Units 11/28/23  0400 11/27/23  1415   SODIUM mmol/L 139 134*   POTASSIUM mmol/L 3.9 4.2   CHLORIDE mmol/L 108* 106   CO2 mmol/L 21.3* 18.8*   BUN mg/dL 14 12   CREATININE mg/dL 1.20* 1.21*   CALCIUM mg/dL 9.0 9.5   BILIRUBIN mg/dL  --  0.5   ALK PHOS U/L  --  59   ALT (SGPT) U/L  --  17   AST (SGOT) U/L  --  19   GLUCOSE mg/dL 97 106*     Imaging Results (Last 24 Hours)       Procedure Component Value Units Date/Time    CT Abdomen Pelvis Without Contrast [[id number]] Collected: 11/27/23 1647     Updated: 11/27/23 1655    Narrative:      CT ABDOMEN PELVIS WO CONTRAST-     INDICATIONS: Hematuria     TECHNIQUE: Radiation dose reduction techniques were utilized, including  automated exposure control and exposure modulation based on body size.  Unenhanced ABDOMEN AND PELVIS CT     COMPARISON: 11/16/2023.     FINDINGS:     Right renal cysts are again demonstrated. Assessment of and for urinary  lesions is significantly limited without intravenous contrast material.     The gallbladder is surgically absent with suspected choledochal cyst.     A new subcutaneous hyperdense focus of the left groin is noted,  compatible with hematoma, 3.4 x 2.8 cm on axial image 126.     Otherwise unremarkable unenhanced appearance of the liver, spleen,  adrenal glands, pancreas, kidneys, bladder.     No bowel obstruction or abnormal bowel thickening is identified.  Duodenal diverticulum is noted. Numerous colonic  diverticula are seen  that do not appear inflamed.     No free intraperitoneal gas or free fluid.     Scattered small mesenteric and para-aortic lymph nodes are seen that are  not significant by size criteria.     Abdominal aorta is not aneurysmal. Aortic and other arterial  calcifications are present.     The lung bases show small likely atelectasis or scarring.     Degenerative changes are seen in the spine. No acute fracture is  identified.             Impression:            1. No urolithiasis or hydronephrosis. Right renal cysts. In a patient  this age with hematuria, CT urogram is suggested to exclude the  possibility of an occult urinary lesion.     2. Colonic diverticulosis. No acute inflammatory process of bowel is  identified.     3. Subcutaneous left groin hematoma.     This report was finalized on 11/27/2023 4:52 PM by Dr. Trenton Pendleton M.D on Workstation: [id number]               I have reviewed the medications.  ---------------------------------------------------------------------------------------------  Assessment & Plan   Assessment/Problem List    Hematuria      Plan:    Hematuria  -Hold Eliquis until after cystoscopy, urology to confer with vascular surgery regarding this  -Urology consult, recommends discharge home, will schedule cystoscopy for later this afternoon or tomorrow morning     Hypothyroidism  -Continue Synthroid  -TSH 1.2-0    Disposition: Home    Follow-up after Discharge: Urology & vascular surgery    This note will serve as a discharge summary.    Ximena Peck, APRN 11/28/23 08:32 EST    I have worn appropriate PPE during this patient encounter, sanitized my hands both with entering and exiting patient's room.      38 minutes has been spent by [facility] providers in the care of this patient while under observation status

## 2023-11-28 NOTE — DISCHARGE INSTRUCTIONS
[facility] will contact you today with a time for your outpatient cystoscopy.  They will also notify you where the procedure will be done.    Hold your Eliquis until instructed by urology to resume taking it.

## 2023-11-28 NOTE — H&P
[facility]   HISTORY AND PHYSICAL    Patient Name: Mandy Alarcon  : 1933  MRN: [id number]  Primary Care Physician:  Daryl Santos MD  Date of admission: 2023    Subjective   Subjective     Chief Complaint:   Chief Complaint   Patient presents with    Blood in Urine         HPI:    Mandy Alarcon is a 89 y.o. female, with a past medical history including, but not limited to, hypothyroidism, paroxysmal atrial fibrillation, hyperlipidemia, hypertension, and hearing loss, presented to the emergency department with a complaint of hematuria.  She states that when she got up this morning she went to the bathroom and did not have any blood in her urine.  She states the second time she went to the bathroom today there was blood in her urine.  She was started on Eliquis last week after having a femoral thrombectomy\embolectomy on her left lower extremity.  She denies any abdominal pain, back pain, urinary urgency, frequency, headache, or any other areas of bleeding.  In the emergency department lab work shows a sodium of 134, creatinine of 1.21, urinalysis shows 3+ blood, too numerous to count RBCs and 6-10 WBCs,.  CT abdomen pelvis shows no urolithiasis or hydronephrosis.  Right renal cysts.  While in the emergency department the case was discussed with Dr. Ayers who recommended holding Eliquis until seen by urology and further recommendations given.  Urology has been consulted to see the patient in the a.m.  Was discussed with the patient and her daughter both voiced understanding.    Review of Systems   All systems were reviewed and negative except for: What was mentioned above in the HPI.    Personal History     Past Medical History:   Diagnosis Date    Benign essential hypertension 10/28/2012    2012--treatment for hypertension begun.    Bilateral lower extremity edema 2020    Bilateral sensorineural hearing loss, wears hearing aids 2017    Left is much worse than right.   Patient had multiple ear infections as a child.    Chronic renal impairment, stage 3b 09/15/2020    Claustrophobia 2016    This patient has significant nocturnal hypoxemia and I think that she could benefit from nocturnal oxygen therapy. The exact etiology of her hypoxemia is not clear. She could possibly have obstructive sleep apnea but this is not documented. We cannot test this patient for sleep apnea due to the fact that she is severely claustrophobic. Patient was a former smoker and it is possibly that COPD he is playing a role.    Combined form of senile cataract 2021    Family history of coronary artery disease 2016    Patient's mother, father, 2 sisters and a brother all  from myocardial infarctions    Gastroesophageal reflux disease without esophagitis 2020    Gout 10/28/2012    2012--initial diagnosis and treatment of gout.    History of acute pancreatitis 2020    Hyperlipidemia 10/28/2012    2012--treatment for hyperlipidemia begun.    Impaired fasting glucose 10/28/2012    2012--initial diagnosis impaired fasting glucose.    Morbidly obese 2019    Nocturnal hypoxemia 2012--patient did not receive nocturnal oxygen because of Medicare regulations.   05/15/2014--overnight oximetry revealed oxygen saturations less than 89% for 22 minutes and 40 seconds. Oxygen saturations less than or equal to 88% for 22 minutes and 40 seconds. Lowest oxygen saturation 83%. The longest continuous time with oxygen saturations less than or equal to 88% was 1 minute and 32 seconds.   2012--overnight oximetry revealed oxygen saturations less than 90% for one hour and 35 minutes. Oxygen saturations less than 89% 59 minutes. This patient has significant nocturnal hypoxemia and I think that she could benefit from nocturnal oxygen therapy. The exact etiology of her hypoxemia is not clear. She could possibly have obstructive sleep apnea but this  is not documented. We cannot test this patient for sleep apnea due to the fact that she is severely claustrophobic. Patient was a former smoker and it is possibly that COPD he is playing a role.    Non morbid obesity 03/11/2019    Osteopenia of multiple sites 09/26/2023    Paroxysmal atrial fibrillation 04/10/2020    Postmenopausal state 09/26/2023    Primary hypothyroidism 11/17/2015 March 11, 2019--TSH remains elevated slightly at 4.92.  Given the overall clinical picture including the multinodular goiter, we will initiate levothyroxine 50 mcg/day and reassess in about 6 weeks.  August 1, 2018--thyroid ultrasound reveals a multinodular thyroid with multiple subcentimeter nodules.  Only minimal increase in size of the largest nodule in the left lobe has occurred when compared     Secondary polycythemia 08/02/2012 11/06/2014--patient did not receive nocturnal oxygen because of Medicare regulations.   05/15/2014--overnight oximetry revealed oxygen saturations less than 89% for 22 minutes and 40 seconds. Oxygen saturations less than or equal to 88% for 22 minutes and 40 seconds. Lowest oxygen saturation 83%. The longest continuous time with oxygen saturations less than or equal to 88% was 1 minute and 32 seconds.   08/02/2012--overnight oximetry revealed oxygen saturations less than 90% for one hour and 35 minutes. Oxygen saturations less than 89% 59 minutes. This patient has significant nocturnal hypoxemia and I think that she could benefit from nocturnal oxygen therapy. The exact etiology of her hypoxemia is not clear. She could possibly have obstructive sleep apnea but this is not documented. We cannot test this patient for sleep apnea due to the fact that she is severely claustrophobic. Patient was a former smoker and it is possibly that COPD he is playing a role.    Vitamin D deficiency 05/23/2016       Past Surgical History:   Procedure Laterality Date    CHOLECYSTECTOMY WITH INTRAOPERATIVE CHOLANGIOGRAM  N/A 03/12/2020    Procedure: LAPAROSCOPIC CHOLECYSTOSTOMY TUBE, INTRAOPERATIVE CHOLANGIOGRAM;  Surgeon: Bryce Andujar MD;  Location: [facility] OR;  Service: General;  Laterality: N/A;    CHOLECYSTECTOMY WITH INTRAOPERATIVE CHOLANGIOGRAM N/A 05/22/2020    Procedure: CHOLECYSTECTOMY LAPAROSCOPIC INTRAOPERATIVE CHOLANGIOGRAM and EGD;  Surgeon: Bryce Andujar MD;  Location: [facility] MAIN OR;  Service: General;  Laterality: N/A;    ERCP N/A 07/01/2022    Procedure: ENDOSCOPIC RETROGRADE CHOLANGIOPANCREATOGRAPHY with sphincterotomy, balloon sweep 12-15mm;  Surgeon: Mitesh Altamirano MD;  Location: [facility] ENDOSCOPY;  Service: Gastroenterology;  Laterality: N/A;  pre - gallstone pancreatitis  post - s/p sphincterotomy, sludge and  pus    FEMORAL THROMBECTOMY/EMBOLECTOMY Left 11/16/2023    Procedure: OPEN LEFT FEMORAL THROMBECTOMY/EMBOLECTOMY WITH LEFT LOWER EXTREMITY ANGIOGRAM;  Surgeon: Jace Chowdhury Jr., MD;  Location: [facility] HYBRID OR 18/19;  Service: Vascular;  Laterality: Left;    OOPHORECTOMY      age 48    RADICAL ABDOMINAL HYSTERECTOMY  48 years old    48 years of age. Uterine fibroid tumors with menorrhagia - no cancer       Family History: family history includes Breast cancer in her daughter and daughter; Heart disease in her brother, father, mother, sister, and sister. Otherwise pertinent FHx was reviewed and not pertinent to current issue.    Social History:  reports that she quit smoking about 69 years ago. Her smoking use included cigarettes. She started smoking about 77 years ago. She smoked an average of .5 packs per day. She has never used smokeless tobacco. She reports that she does not drink alcohol and does not use drugs.    Home Medications:  Hydrocod Abbe-Chlorphe Abbe ER, Vitamin D, allopurinol, apixaban, ascorbic acid, cycloSPORINE, levothyroxine, metoprolol succinate XL, multivitamin with minerals, omeprazole, oxyCODONE, polyethylene glycol, and  pravastatin    Allergies:  Allergies   Allergen Reactions    Cefaclor Anaphylaxis, Angioedema and Swelling     caused angioedema 25 years ago; she tolerates cephalexin, amoxicillin, and ceftriaxone per my d/w patient and her daughter as well as amoxicillin-clavulanate confirmed in EPIC  caused angioedema 25 years ago; she tolerates cephalexin, amoxicillin, and ceftriaxone per my d/w patient and her daughter as well as amoxicillin-clavulanate confirmed in EPIC  caused angioedema 25 years ago; she tolerates cephalexin, amoxicillin, and ceftriaxone per my d/w patient and her daughter as well as amoxicillin-clavulanate confirmed in EPIC  caused angioedema 25 years ago; she tolerates cephalexin, amoxicillin, and ceftriaxone per my d/w patient and her daughter as well as amoxicillin-clavulanate confirmed in EPIC    Sulfa Antibiotics Rash       Objective   Objective     Vitals:   Temp:  [97.5 °F (36.4 °C)-98.9 °F (37.2 °C)] 97.5 °F (36.4 °C)  Heart Rate:  [50-63] 58  Resp:  [14-16] 14  BP: (129-149)/(54-74) 149/74  Physical Exam    Constitutional: Awake, alert   Eyes: PERRLA, sclerae anicteric, no conjunctival injection   HENT: NCAT, mucous membranes moist   Neck: Supple, no thyromegaly, no lymphadenopathy, trachea midline   Respiratory: Clear to auscultation bilaterally, nonlabored respirations    Cardiovascular: RRR, no murmurs, rubs, or gallops, palpable pedal pulses bilaterally   Gastrointestinal: Positive bowel sounds, soft, nontender, nondistended   Musculoskeletal: No bilateral ankle edema, no clubbing or cyanosis to extremities   Psychiatric: Appropriate affect, cooperative   Neurologic: Oriented x 3, strength symmetric in all extremities, Cranial Nerves grossly intact to confrontation, speech clear   Skin: No rashes     Result Review    Result Review:  I have personally reviewed the results from the time of this admission to 11/27/2023 21:15 EST and agree with these findings:  [x]  Laboratory list / accordion  []   Microbiology  [x]  Radiology  []  EKG/Telemetry   []  Cardiology/Vascular   []  Pathology  [x]  Old records  []  Other:        Assessment & Plan   Assessment / Plan     Brief Patient Summary:  Mandy Alarcon is a 89 y.o. female who was admitted to the observation unit for further evaluation and treatment of her hematuria.    Active Hospital Problems:  Active Hospital Problems    Diagnosis     **Hematuria      Plan:   Hematuria  -Vital signs every 4 hours  -Cardiac monitoring  -Continuous pulse ox  -Hold Eliquis until seen by urology  -Neurology consult in a.m.    Hypothyroidism  -Continue Synthroid  -TSH pending    DVT prophylaxis:  No DVT prophylaxis order currently exists.    CODE STATUS:       Admission Status:  I believe this patient meets observation status.    78 minutes have been spent by [facility] providers in the care of this patient while under observation status.      Appropriate PPE worn during patient encounter.  Hand hygeine performed before and after seeing the patient.      Electronically signed by CJ Dey, 11/27/23, 9:15 PM EST.

## 2023-11-28 NOTE — CONSULTS
History of present illness:  The patient is status post open left femoral thrombectomy with angiogram for cardiac embolus to the left leg.  Postoperatively she was started on Eliquis with after evaluation by the cardiology service.  She was discharged home doing satisfactorily until yesterday morning when she had 2 episodes of gross hematuria.  She presented to the emergency rooms for this, at the direction of her primary care physician.  She has been evaluated by the urology service and currently her Eliquis is being held.  Plans are to do cystoscopy either later today or tomorrow. She has voided 2 more times with no hematuria noted. She has been ambulating without difficulty.  Up and down stairs with no problem.    She had a CT scan of the abdomen and pelvis which demonstrated a 3.4 x 2.8 cm small hematoma in the left groin.  CT scan otherwise unremarkable.    Left groin incision healing satisfactorily without signs of infection.  No obvious hematoma noted on examination.  Faint palpable dorsalis pedis pulse.  Left foot warm and well perfused with good capillary refill.    She is doing satisfactorily from a vascular standpoint.  Obviously, the concern is about recurrent embolus.  Hopefully no significant finding will be noted on her cystoscopy and she can be restarted on her anticoagulation.  I will see her in follow-up in the office as scheduled.

## 2023-11-29 ENCOUNTER — TRANSITIONAL CARE MANAGEMENT TELEPHONE ENCOUNTER (OUTPATIENT)
Dept: CALL CENTER | Facility: HOSPITAL | Age: 88
End: 2023-11-29
Payer: MEDICARE

## 2023-11-29 NOTE — OUTREACH NOTE
Call Center TCM Note      Flowsheet Row Responses   [facility] patient discharged from? [place]   Does the patient have one of the following disease processes/diagnoses(primary or secondary)? Other   TCM attempt successful? Yes   Call start time 1023   Call end time 1027   Discharge diagnosis Hematuria   Person spoke with today (if not patient) and relationship Patient   Meds reviewed with patient/caregiver? Yes   Does the patient have all medications ordered at discharge? Yes   Is the patient taking all medications as directed (includes completed medication regime)? Yes   Comments PCP Dr Santos. Patient has a previously scheduled PCP office visit for 12/4/23  1pm that she would like to keep. message routed to office.   Does the patient have an appointment with their PCP within 7-14 days of discharge? Other  [Patient has previously scheduled office visit within 14days]   Nursing Interventions Confirmed date/time of appointment, Routed TCM call to PCP office   Has home health visited the patient within 72 hours of discharge? N/A   Psychosocial issues? No   Did the patient receive a copy of their discharge instructions? Yes   Nursing interventions Reviewed instructions with patient   What is the patient's perception of their health status since discharge? Improving   Is the patient/caregiver able to teach back signs and symptoms related to disease process for when to call PCP? Yes   Is the patient/caregiver able to teach back signs and symptoms related to disease process for when to call 911? Yes   Is the patient/caregiver able to teach back the hierarchy of who to call/visit for symptoms/problems? PCP, Specialist, Home health nurse, Urgent Care, ED, 911 Yes   If the patient is a current smoker, are they able to teach back resources for cessation? Not a smoker   TCM call completed? Yes   Call end time 1027   Would this patient benefit from a Referral to Amb Social Work? No   Is the patient interested in  additional calls from an ambulatory ? No            Shauna Pastor RN    11/29/2023, 10:29 EST

## 2023-12-04 ENCOUNTER — OFFICE VISIT (OUTPATIENT)
Dept: INTERNAL MEDICINE | Facility: CLINIC | Age: 88
End: 2023-12-04
Payer: MEDICARE

## 2023-12-04 VITALS
OXYGEN SATURATION: 97 % | DIASTOLIC BLOOD PRESSURE: 78 MMHG | BODY MASS INDEX: 37.56 KG/M2 | SYSTOLIC BLOOD PRESSURE: 122 MMHG | RESPIRATION RATE: 18 BRPM | TEMPERATURE: 97.5 F | WEIGHT: 212 LBS | HEIGHT: 63 IN | HEART RATE: 64 BPM

## 2023-12-04 DIAGNOSIS — E04.2 MULTINODULAR GOITER: Chronic | ICD-10-CM

## 2023-12-04 DIAGNOSIS — I70.209 ARTERIAL OCCLUSION, LOWER EXTREMITY: ICD-10-CM

## 2023-12-04 DIAGNOSIS — N18.32 STAGE 3B CHRONIC KIDNEY DISEASE: Chronic | ICD-10-CM

## 2023-12-04 DIAGNOSIS — I10 BENIGN ESSENTIAL HYPERTENSION: Chronic | ICD-10-CM

## 2023-12-04 DIAGNOSIS — K21.9 GASTROESOPHAGEAL REFLUX DISEASE WITHOUT ESOPHAGITIS: Chronic | ICD-10-CM

## 2023-12-04 DIAGNOSIS — N30.91 HEMORRHAGIC CYSTITIS: Primary | ICD-10-CM

## 2023-12-04 DIAGNOSIS — Z87.39 HISTORY OF GOUT: Chronic | ICD-10-CM

## 2023-12-04 DIAGNOSIS — E03.9 PRIMARY HYPOTHYROIDISM: Chronic | ICD-10-CM

## 2023-12-04 DIAGNOSIS — I48.0 PAROXYSMAL ATRIAL FIBRILLATION: Chronic | ICD-10-CM

## 2023-12-04 DIAGNOSIS — E78.2 MIXED HYPERLIPIDEMIA: Chronic | ICD-10-CM

## 2023-12-04 DIAGNOSIS — I47.10 SUPRAVENTRICULAR TACHYCARDIA: Chronic | ICD-10-CM

## 2023-12-04 DIAGNOSIS — R73.01 IMPAIRED FASTING GLUCOSE: Chronic | ICD-10-CM

## 2023-12-04 RX ORDER — HYDROCODONE POLISTIREX AND CHLORPHENIRAMINE POLISTIREX 10; 8 MG/5ML; MG/5ML
5 SUSPENSION, EXTENDED RELEASE ORAL
COMMUNITY
Start: 2023-10-26 | End: 2023-12-04

## 2023-12-04 RX ORDER — OMEPRAZOLE 40 MG/1
CAPSULE, DELAYED RELEASE ORAL
Qty: 90 CAPSULE | OUTPATIENT
Start: 2023-12-04

## 2023-12-04 RX ORDER — CEPHALEXIN 500 MG/1
1 CAPSULE ORAL 2 TIMES DAILY
COMMUNITY
Start: 2023-11-28

## 2023-12-04 RX ORDER — LEVOTHYROXINE SODIUM 0.07 MG/1
TABLET ORAL
Start: 2023-12-04

## 2023-12-04 RX ORDER — OMEPRAZOLE 40 MG/1
CAPSULE, DELAYED RELEASE ORAL
Start: 2023-12-04

## 2023-12-04 RX ORDER — ALLOPURINOL 300 MG/1
TABLET ORAL
Start: 2023-12-04

## 2023-12-04 RX ORDER — METOPROLOL SUCCINATE 25 MG/1
TABLET, EXTENDED RELEASE ORAL
Start: 2023-12-04

## 2023-12-04 NOTE — PROGRESS NOTES
12/04/2023    Patient Information  Mandy Alarcon                                                                                          [address] [facility] [id number]      5/30/1933  [unfilled]  There is no work phone number on file.    Chief Complaint:     Follow-up multiple acute medical problems as noted below.    History of Present Illness:    Patient has been hospitalized recently and is here for follow-up but is beyond the time for this to be transition of care visit per Medicare guidelines.  Instead we will make this a hospital emergency room follow-up because she was recently admitted just for observation and subsequently discharged.  She has a complicated history including the fact that she had a recent open left femoral thrombectomy/embolectomy of the left lower extremity femoral artery room on November 16, 2023.  This was felt to be an embolus from paroxysmal atrial fibrillation and she was not on anticoagulation at this time.  She was subsequently discharged on oral anticoagulant.  She then presented to the emergency room with complaints of gross hematuria and was admitted just for observation and was seen by the urologist and subsequently underwent a cystoscopy which showed inflammation and areas consistent with hemorrhagic cystitis.  There is no ureteral obstruction and no obvious tumors.  Her anticoagulation with Eliquis has been withheld and apparently she is back on that without return of hematuria.  She was placed on cephalexin antibiotic which she has apparently completed or is nearing completion.  Her past medical history reviewed and updated were necessary including health maintenance parameters.  This reveals she is currently up-to-date or else accounted for.    Review of Systems   Constitutional: Negative. Negative for chills and fever.   HENT: Negative.     Eyes: Negative.    Cardiovascular: Negative.  Negative for chest pain.   Respiratory: Negative.  Negative  for shortness of breath.    Endocrine: Negative.    Hematologic/Lymphatic: Negative.    Skin: Negative.    Musculoskeletal: Negative.    Gastrointestinal: Negative.    Genitourinary: Negative.  Negative for bladder incontinence, dysuria, flank pain, frequency, hematuria, hesitancy, incomplete emptying, pelvic pain and urgency.   Neurological: Negative.    Psychiatric/Behavioral: Negative.     Allergic/Immunologic: Negative.        Active Problems:    Patient Active Problem List   Diagnosis    Benign essential hypertension    Claustrophobia    Gout    Hyperlipidemia    Primary hypothyroidism    Impaired fasting glucose    Nocturnal hypoxemia    Secondary polycythemia    Vitamin D deficiency    Therapeutic drug monitoring    Family history of coronary artery disease    Bilateral sensorineural hearing loss, wears hearing aids    Multinodular goiter    Supraventricular tachycardia    Non morbid obesity    Bilateral lower extremity edema    Gastroesophageal reflux disease without esophagitis    Paroxysmal atrial fibrillation    Stage 3b chronic kidney disease    Combined form of senile cataract    Osteopenia of multiple sites    Postmenopausal state    COVID-19 virus infection    Arterial occlusion, lower extremity    Hemorrhagic cystitis         Past Medical History:   Diagnosis Date    Benign essential hypertension 10/28/2012    October 2012--treatment for hypertension begun.    Bilateral lower extremity edema 03/23/2020    Bilateral sensorineural hearing loss, wears hearing aids 06/14/2017    Left is much worse than right.  Patient had multiple ear infections as a child.    Chronic renal impairment, stage 3b 09/15/2020    Claustrophobia 04/28/2016    This patient has significant nocturnal hypoxemia and I think that she could benefit from nocturnal oxygen therapy. The exact etiology of her hypoxemia is not clear. She could possibly have obstructive sleep apnea but this is not documented. We cannot test this patient for  sleep apnea due to the fact that she is severely claustrophobic. Patient was a former smoker and it is possibly that COPD he is playing a role.    Combined form of senile cataract 2021    Family history of coronary artery disease 2016    Patient's mother, father, 2 sisters and a brother all  from myocardial infarctions    Gastroesophageal reflux disease without esophagitis 2020    Gout 10/28/2012    2012--initial diagnosis and treatment of gout.    History of acute pancreatitis 2020    Hyperlipidemia 10/28/2012    2012--treatment for hyperlipidemia begun.    Impaired fasting glucose 10/28/2012    2012--initial diagnosis impaired fasting glucose.    Morbidly obese 2019    Nocturnal hypoxemia 2012--patient did not receive nocturnal oxygen because of Medicare regulations.   05/15/2014--overnight oximetry revealed oxygen saturations less than 89% for 22 minutes and 40 seconds. Oxygen saturations less than or equal to 88% for 22 minutes and 40 seconds. Lowest oxygen saturation 83%. The longest continuous time with oxygen saturations less than or equal to 88% was 1 minute and 32 seconds.   2012--overnight oximetry revealed oxygen saturations less than 90% for one hour and 35 minutes. Oxygen saturations less than 89% 59 minutes. This patient has significant nocturnal hypoxemia and I think that she could benefit from nocturnal oxygen therapy. The exact etiology of her hypoxemia is not clear. She could possibly have obstructive sleep apnea but this is not documented. We cannot test this patient for sleep apnea due to the fact that she is severely claustrophobic. Patient was a former smoker and it is possibly that COPD he is playing a role.    Non morbid obesity 2019    Osteopenia of multiple sites 2023    Paroxysmal atrial fibrillation 04/10/2020    Postmenopausal state 2023    Primary hypothyroidism 2015  2019--TSH remains elevated slightly at 4.92.  Given the overall clinical picture including the multinodular goiter, we will initiate levothyroxine 50 mcg/day and reassess in about 6 weeks.  August 1, 2018--thyroid ultrasound reveals a multinodular thyroid with multiple subcentimeter nodules.  Only minimal increase in size of the largest nodule in the left lobe has occurred when compared     Secondary polycythemia 08/02/2012 11/06/2014--patient did not receive nocturnal oxygen because of Medicare regulations.   05/15/2014--overnight oximetry revealed oxygen saturations less than 89% for 22 minutes and 40 seconds. Oxygen saturations less than or equal to 88% for 22 minutes and 40 seconds. Lowest oxygen saturation 83%. The longest continuous time with oxygen saturations less than or equal to 88% was 1 minute and 32 seconds.   08/02/2012--overnight oximetry revealed oxygen saturations less than 90% for one hour and 35 minutes. Oxygen saturations less than 89% 59 minutes. This patient has significant nocturnal hypoxemia and I think that she could benefit from nocturnal oxygen therapy. The exact etiology of her hypoxemia is not clear. She could possibly have obstructive sleep apnea but this is not documented. We cannot test this patient for sleep apnea due to the fact that she is severely claustrophobic. Patient was a former smoker and it is possibly that COPD he is playing a role.    Vitamin D deficiency 05/23/2016         Past Surgical History:   Procedure Laterality Date    CHOLECYSTECTOMY WITH INTRAOPERATIVE CHOLANGIOGRAM N/A 03/12/2020    Procedure: LAPAROSCOPIC CHOLECYSTOSTOMY TUBE, INTRAOPERATIVE CHOLANGIOGRAM;  Surgeon: Bryce Andujar MD;  Location: [facility] OR;  Service: General;  Laterality: N/A;    CHOLECYSTECTOMY WITH INTRAOPERATIVE CHOLANGIOGRAM N/A 05/22/2020    Procedure: CHOLECYSTECTOMY LAPAROSCOPIC INTRAOPERATIVE CHOLANGIOGRAM and EGD;  Surgeon: Bryce Andujar MD;  Location: [facility]  OR;  Service: General;  Laterality: N/A;    ERCP N/A 07/01/2022    Procedure: ENDOSCOPIC RETROGRADE CHOLANGIOPANCREATOGRAPHY with sphincterotomy, balloon sweep 12-15mm;  Surgeon: Mitesh Altamirano MD;  Location: [facility] ENDOSCOPY;  Service: Gastroenterology;  Laterality: N/A;  pre - gallstone pancreatitis  post - s/p sphincterotomy, sludge and  pus    FEMORAL THROMBECTOMY/EMBOLECTOMY Left 11/16/2023    Procedure: OPEN LEFT FEMORAL THROMBECTOMY/EMBOLECTOMY WITH LEFT LOWER EXTREMITY ANGIOGRAM;  Surgeon: Jace Chowdhury Jr., MD;  Location: [facility] HYBRID OR 18/19;  Service: Vascular;  Laterality: Left;    OOPHORECTOMY      age 48    RADICAL ABDOMINAL HYSTERECTOMY  48 years old    48 years of age. Uterine fibroid tumors with menorrhagia - no cancer         Allergies   Allergen Reactions    Cefaclor Anaphylaxis, Angioedema and Swelling     caused angioedema 25 years ago; she tolerates cephalexin, amoxicillin, and ceftriaxone per my d/w patient and her daughter as well as amoxicillin-clavulanate confirmed in EPIC  caused angioedema 25 years ago; she tolerates cephalexin, amoxicillin, and ceftriaxone per my d/w patient and her daughter as well as amoxicillin-clavulanate confirmed in EPIC  caused angioedema 25 years ago; she tolerates cephalexin, amoxicillin, and ceftriaxone per my d/w patient and her daughter as well as amoxicillin-clavulanate confirmed in EPIC  caused angioedema 25 years ago; she tolerates cephalexin, amoxicillin, and ceftriaxone per my d/w patient and her daughter as well as amoxicillin-clavulanate confirmed in EPIC    Sulfa Antibiotics Rash           Current Outpatient Medications:     Acetaminophen (Tylenol) 325 MG capsule, Take  by mouth., Disp: , Rfl:     allopurinol (ZYLOPRIM) 300 MG tablet, TAKE 1 TABLET BY MOUTH DAILY FOR GOUT, Disp: , Rfl:     apixaban (ELIQUIS) 5 MG tablet tablet, Take 1 tablet by mouth Every 12 (Twelve) Hours., Disp: , Rfl:     cephalexin (KEFLEX) 500 MG capsule, Take 1  "capsule by mouth 2 (Two) Times a Day., Disp: , Rfl:     levothyroxine (SYNTHROID, LEVOTHROID) 75 MCG tablet, TAKE 1 TABLET BY MOUTH DAILY FOR THYROID, Disp: , Rfl:     metoprolol succinate XL (TOPROL-XL) 25 MG 24 hr tablet, TAKE 1 TABLET BY MOUTH DAILY FOR HIGH BLOOD PRESSURE AND HEART OR PALPITATIONS, Disp: , Rfl:     omeprazole (priLOSEC) 40 MG capsule, TAKE 1 CAPSULE BY MOUTH EVERY DAY BEFORE FIRST MEAL FOR STOMACH ACID OR REFLUX, Disp: , Rfl:     pravastatin (PRAVACHOL) 40 MG tablet, TAKE 1 TABLET BY MOUTH DAILY (Patient not taking: Reported on 2023), Disp: 90 tablet, Rfl: 3      Family History   Problem Relation Age of Onset    Heart disease Mother         Ischemic.  from coronary artery disease.    Heart disease Father         Ischemic.  from coronary artery disease.    Heart disease Sister         Ischemic.  from coronary artery disease.    Heart disease Sister         Ischemic.  from coronary artery disease.    Heart disease Brother         Ischemic.  from coronary artery disease.    Breast cancer Daughter     Breast cancer Daughter     Ovarian cancer Neg Hx     Malig Hyperthermia Neg Hx          Social History     Socioeconomic History    Marital status:     Number of children: 5    Highest education level: GED or equivalent   Tobacco Use    Smoking status: Former     Packs/day: .5     Types: Cigarettes     Start date: 1946     Quit date: 1954     Years since quittin.9    Smokeless tobacco: Never    Tobacco comments:     Stopped drinking at age 30.   Vaping Use    Vaping Use: Never used   Substance and Sexual Activity    Alcohol use: No     Comment: caffiene use tea coffee    Drug use: No    Sexual activity: Not Currently     Partners: Male         Vitals:    23 1303   BP: 122/78   Pulse: 64   Resp: 18   Temp: 97.5 °F (36.4 °C)   SpO2: 97%   Weight: 96.2 kg (212 lb)   Height: 160 cm (62.99\")        Body mass index is 37.56 kg/m².      Physical " Exam:    General: Alert and oriented x 3.  No acute distress.  Obese.  Normal affect.  HEENT: Pupils equal, round, reactive to light; extraocular movements intact; sclerae nonicteric; pharynx, ear canals and TMs normal.  Neck: Without JVD, thyromegaly, bruit, or adenopathy.  Lungs: Clear to auscultation in all fields.  Heart: Regular rate and rhythm today without murmur, rub, gallop, or click.  Abdomen: Soft, nontender, without hepatosplenomegaly or hernia.  Bowel sounds normal.  : Deferred.  Rectal: Deferred.  Extremities: Without clubbing, cyanosis, edema, but her left distal pulses are palpable but faint.  There is no obvious signs of ischemia and her foot is not cold.  Neurologic: Intact without focal deficit.  Normal station and gait observed during ingress and egress from the examination room.  Skin: Without significant lesion.  Musculoskeletal: Unremarkable.    Lab/other results:    I reviewed the documentation from the hospital stay when she was admitted on November 17, 2023 and also reviewed the documentation for the observation stay for the gross hematuria November 27, 2023    Assessment/Plan:     Diagnosis Plan   1. Hemorrhagic cystitis        2. Arterial occlusion, lower extremity        3. Paroxysmal atrial fibrillation        4. Impaired fasting glucose        5. Benign essential hypertension  metoprolol succinate XL (TOPROL-XL) 25 MG 24 hr tablet      6. Hyperlipidemia        7. Stage 3b chronic kidney disease        8. Gout  allopurinol (ZYLOPRIM) 300 MG tablet      9. Gastroesophageal reflux disease without esophagitis  omeprazole (priLOSEC) 40 MG capsule      10. Multinodular goiter  levothyroxine (SYNTHROID, LEVOTHROID) 75 MCG tablet      11. Supraventricular tachycardia  metoprolol succinate XL (TOPROL-XL) 25 MG 24 hr tablet      12. Primary hypothyroidism  levothyroxine (SYNTHROID, LEVOTHROID) 75 MCG tablet        Patient seen in follow-up for multiple medical problems as noted above.  She had  hemorrhagic cystitis believed to be related to urinary tract infection and her symptoms of back pain have totally resolved and she has had no further gross hematuria since starting on the Keflex.  She had an arterial embolism to her left femoral artery and had an embolectomy and her left lower extremity appears nonischemic although her pulses are somewhat weak.  Absolutely no signs of ischemia today and patient is not having any pain in her leg like she had previously.  She seems to be tolerating the Eliquis well with no signs or symptoms of bleeding thus far.  She has multiple other medical problems as noted above that are contributing to the clinical picture.    Plan is as follows: Finished the Keflex which will be completed this Wednesday.  Patient to contact me for any urinary symptoms or signs of blood or return of back pain.  She has multiple follow-ups with urology as well as nephrology and cardiology in the near future.  She should keep her follow-up with me in March as previously planned with lab prior.        Procedures

## 2023-12-06 DIAGNOSIS — K21.9 GASTROESOPHAGEAL REFLUX DISEASE WITHOUT ESOPHAGITIS: Chronic | ICD-10-CM

## 2023-12-06 RX ORDER — OMEPRAZOLE 40 MG/1
CAPSULE, DELAYED RELEASE ORAL
Start: 2023-12-06

## 2023-12-06 NOTE — TELEPHONE ENCOUNTER
Caller: Ashley Valdes    Relationship: Emergency Contact    Best call back number: [phone]    Requested Prescriptions:   Requested Prescriptions     Pending Prescriptions Disp Refills    omeprazole (priLOSEC) 40 MG capsule       Sig: TAKE 1 CAPSULE BY MOUTH EVERY DAY BEFORE FIRST MEAL FOR STOMACH ACID OR REFLUX        Pharmacy where request [facility] be sent[id number] [address] AT [facility] [phone] [facility] [phone]      Last office visit with prescribing clinician: 12/4/2023   Last telemedicine visit with prescribing clinician: Visit date not found   Next office visit with prescribing clinician: 3/25/2024     Additional details provided by patient: PLEASE CHECK THIS MEDICATION KEFLEX 500 MG IF THIS NEEDS TO BE EXTENDED PAST 7 DAYS. THANKS     Does the patient have less than a 3 day supply:  [x] Yes  [] No    Would you like a call back once the refill request has been completed: [] Yes [x] No    If the office needs to give you a call back, can they leave a voicemail: [] Yes [] No    Ward Little Rep   12/06/23 09:56 EST

## 2023-12-07 ENCOUNTER — READMISSION MANAGEMENT (OUTPATIENT)
Dept: CALL CENTER | Facility: HOSPITAL | Age: 88
End: 2023-12-07
Payer: MEDICARE

## 2023-12-07 NOTE — OUTREACH NOTE
Medical Week 2 Survey      Flowsheet Row Responses   [facility] patient discharged from? [place]   Does the patient have one of the following disease processes/diagnoses(primary or secondary)? Other   Week 2 attempt successful? No   Unsuccessful attempts Attempt 1            Lynda Chapa Nurse

## 2023-12-13 ENCOUNTER — READMISSION MANAGEMENT (OUTPATIENT)
Dept: CALL CENTER | Facility: HOSPITAL | Age: 88
End: 2023-12-13
Payer: MEDICARE

## 2023-12-13 NOTE — OUTREACH NOTE
Medical Week 3 Survey      Flowsheet Row Responses   [facility] patient discharged from? [place]   Does the patient have one of the following disease processes/diagnoses(primary or secondary)? Other   Week 3 attempt successful? Yes   Call start time 1154   Discharge diagnosis Hematuria   Is patient permission given to speak with other caregiver? Yes   Person spoke with today (if not patient) and relationship Patient   Meds reviewed with patient/caregiver? Yes   Is the patient having any side effects they believe may be caused by any medication additions or changes? No   Does the patient have all medications ordered at discharge? Yes   Is the patient taking all medications as directed (includes completed medication regime)? Yes   Does the patient have a primary care provider?  Yes   Does the patient have an appointment with their PCP within 7 days of discharge? Yes   Has the patient kept scheduled appointments due by today? Yes   Psychosocial issues? No   Did the patient receive a copy of their discharge instructions? Yes   Nursing interventions Reviewed instructions with patient   What is the patient's perception of their health status since discharge? Improving   Is the patient/caregiver able to teach back signs and symptoms related to disease process for when to call PCP? Yes   Is the patient/caregiver able to teach back signs and symptoms related to disease process for when to call 911? Yes   Is the patient/caregiver able to teach back the hierarchy of who to call/visit for symptoms/problems? PCP, Specialist, Home health nurse, Urgent Care, ED, 911 Yes   If the patient is a current smoker, are they able to teach back resources for cessation? Not a smoker   Week 3 Call Completed? Yes   Graduated Yes            Rajesh DRUMMOND - Registered Nurse

## 2023-12-18 ENCOUNTER — TELEPHONE (OUTPATIENT)
Dept: INTERNAL MEDICINE | Facility: CLINIC | Age: 88
End: 2023-12-18
Payer: MEDICARE

## 2023-12-18 DIAGNOSIS — I48.0 PAROXYSMAL ATRIAL FIBRILLATION: Primary | Chronic | ICD-10-CM

## 2023-12-18 NOTE — TELEPHONE ENCOUNTER
Caller: Ashley Valdes    Relationship: Emergency Contact    Best call back number:     What medication are you requesting:     apixaban (ELIQUIS) 5 MG tablet tablet  1 tablet, Every 12 Hours Scheduled           Summary: Take 1 tablet by mouth Every 12 (Twelve) Hours              Have you had these symptoms before:    [x] Yes  [] No    Have you been treated for these symptoms before:   [x] Yes  [] No    If a prescription is needed, what is your preferred pharmacy and phone number: [facility] #[id number] [address] AT [facility] - [phone] [facility] [phone] FX     Additional notes:

## 2023-12-26 ENCOUNTER — OFFICE VISIT (OUTPATIENT)
Age: 88
End: 2023-12-26
Payer: MEDICARE

## 2023-12-26 VITALS
DIASTOLIC BLOOD PRESSURE: 70 MMHG | HEART RATE: 54 BPM | OXYGEN SATURATION: 97 % | BODY MASS INDEX: 37.63 KG/M2 | SYSTOLIC BLOOD PRESSURE: 130 MMHG | WEIGHT: 212.4 LBS | HEIGHT: 63 IN

## 2023-12-26 DIAGNOSIS — I10 BENIGN ESSENTIAL HYPERTENSION: Chronic | ICD-10-CM

## 2023-12-26 DIAGNOSIS — I48.0 PAROXYSMAL ATRIAL FIBRILLATION: Chronic | ICD-10-CM

## 2023-12-26 DIAGNOSIS — I70.209 ARTERIAL OCCLUSION, LOWER EXTREMITY: Primary | ICD-10-CM

## 2023-12-26 DIAGNOSIS — E78.2 MIXED HYPERLIPIDEMIA: Chronic | ICD-10-CM

## 2023-12-26 DIAGNOSIS — I47.10 SUPRAVENTRICULAR TACHYCARDIA: Chronic | ICD-10-CM

## 2023-12-28 ENCOUNTER — HOSPITAL ENCOUNTER (OUTPATIENT)
Facility: HOSPITAL | Age: 88
Setting detail: OBSERVATION
Discharge: HOME | End: 2023-12-30
Attending: EMERGENCY MEDICINE | Admitting: HOSPITALIST
Payer: MEDICARE

## 2023-12-28 ENCOUNTER — APPOINTMENT (OUTPATIENT)
Dept: CT IMAGING | Facility: HOSPITAL | Age: 88
End: 2023-12-28
Payer: MEDICARE

## 2023-12-28 DIAGNOSIS — R31.0 GROSS HEMATURIA: Primary | ICD-10-CM

## 2023-12-28 DIAGNOSIS — N17.9 AKI (ACUTE KIDNEY INJURY): ICD-10-CM

## 2023-12-28 DIAGNOSIS — Z79.01 ANTICOAGULATED: ICD-10-CM

## 2023-12-28 LAB
ALBUMIN SERPL-MCNC: 4.1 G/DL (ref 3.5–5.2)
ALBUMIN/GLOB SERPL: 1.4 G/DL
ALP SERPL-CCNC: 53 U/L (ref 39–117)
ALT SERPL W P-5'-P-CCNC: 14 U/L (ref 1–33)
ANION GAP SERPL CALCULATED.3IONS-SCNC: 9 MMOL/L (ref 5–15)
AST SERPL-CCNC: 16 U/L (ref 1–32)
BACTERIA UR QL AUTO: ABNORMAL /HPF
BASOPHILS # BLD AUTO: 0.06 10*3/MM3 (ref 0–0.2)
BASOPHILS NFR BLD AUTO: 0.9 % (ref 0–1.5)
BILIRUB SERPL-MCNC: 0.4 MG/DL (ref 0–1.2)
BILIRUB UR QL STRIP: ABNORMAL
BUN SERPL-MCNC: 19 MG/DL (ref 8–23)
BUN/CREAT SERPL: 12.5 (ref 7–25)
CALCIUM SPEC-SCNC: 9.7 MG/DL (ref 8.2–9.6)
CHLORIDE SERPL-SCNC: 111 MMOL/L (ref 98–107)
CLARITY UR: ABNORMAL
CO2 SERPL-SCNC: 19 MMOL/L (ref 22–29)
COLOR UR: ABNORMAL
CREAT SERPL-MCNC: 1.52 MG/DL (ref 0.57–1)
DEPRECATED RDW RBC AUTO: 46.9 FL (ref 37–54)
EGFRCR SERPLBLD CKD-EPI 2021: 32.4 ML/MIN/1.73
EOSINOPHIL # BLD AUTO: 0.45 10*3/MM3 (ref 0–0.4)
EOSINOPHIL NFR BLD AUTO: 6.9 % (ref 0.3–6.2)
ERYTHROCYTE [DISTWIDTH] IN BLOOD BY AUTOMATED COUNT: 14.2 % (ref 12.3–15.4)
GLOBULIN UR ELPH-MCNC: 2.9 GM/DL
GLUCOSE SERPL-MCNC: 124 MG/DL (ref 65–99)
GLUCOSE UR STRIP-MCNC: NEGATIVE MG/DL
HCT VFR BLD AUTO: 46.7 % (ref 34–46.6)
HGB BLD-MCNC: 14.9 G/DL (ref 12–15.9)
HGB UR QL STRIP.AUTO: ABNORMAL
HYALINE CASTS UR QL AUTO: ABNORMAL /LPF
IMM GRANULOCYTES # BLD AUTO: 0.03 10*3/MM3 (ref 0–0.05)
IMM GRANULOCYTES NFR BLD AUTO: 0.5 % (ref 0–0.5)
KETONES UR QL STRIP: NEGATIVE
LEUKOCYTE ESTERASE UR QL STRIP.AUTO: ABNORMAL
LYMPHOCYTES # BLD AUTO: 1.5 10*3/MM3 (ref 0.7–3.1)
LYMPHOCYTES NFR BLD AUTO: 23.1 % (ref 19.6–45.3)
MCH RBC QN AUTO: 29 PG (ref 26.6–33)
MCHC RBC AUTO-ENTMCNC: 31.9 G/DL (ref 31.5–35.7)
MCV RBC AUTO: 91 FL (ref 79–97)
MONOCYTES # BLD AUTO: 0.65 10*3/MM3 (ref 0.1–0.9)
MONOCYTES NFR BLD AUTO: 10 % (ref 5–12)
NEUTROPHILS NFR BLD AUTO: 3.8 10*3/MM3 (ref 1.7–7)
NEUTROPHILS NFR BLD AUTO: 58.6 % (ref 42.7–76)
NITRITE UR QL STRIP: NEGATIVE
NRBC BLD AUTO-RTO: 0 /100 WBC (ref 0–0.2)
PH UR STRIP.AUTO: 6.5 [PH] (ref 5–8)
PLATELET # BLD AUTO: 200 10*3/MM3 (ref 140–450)
PMV BLD AUTO: 9.3 FL (ref 6–12)
POTASSIUM SERPL-SCNC: 4.1 MMOL/L (ref 3.5–5.2)
PROT SERPL-MCNC: 7 G/DL (ref 6–8.5)
PROT UR QL STRIP: ABNORMAL
RBC # BLD AUTO: 5.13 10*6/MM3 (ref 3.77–5.28)
RBC # UR STRIP: ABNORMAL /HPF
REF LAB TEST METHOD: ABNORMAL
SODIUM SERPL-SCNC: 139 MMOL/L (ref 136–145)
SP GR UR STRIP: 1.02 (ref 1–1.03)
SQUAMOUS #/AREA URNS HPF: ABNORMAL /HPF
URATE SERPL-MCNC: 4.5 MG/DL (ref 2.4–5.7)
UROBILINOGEN UR QL STRIP: ABNORMAL
WBC # UR STRIP: ABNORMAL /HPF
WBC NRBC COR # BLD AUTO: 6.49 10*3/MM3 (ref 3.4–10.8)

## 2023-12-28 PROCEDURE — G0378 HOSPITAL OBSERVATION PER HR: HCPCS

## 2023-12-28 PROCEDURE — 96361 HYDRATE IV INFUSION ADD-ON: CPT

## 2023-12-28 PROCEDURE — 81001 URINALYSIS AUTO W/SCOPE: CPT | Performed by: EMERGENCY MEDICINE

## 2023-12-28 PROCEDURE — 84550 ASSAY OF BLOOD/URIC ACID: CPT | Performed by: HOSPITALIST

## 2023-12-28 PROCEDURE — 99284 EMERGENCY DEPT VISIT MOD MDM: CPT

## 2023-12-28 PROCEDURE — 87086 URINE CULTURE/COLONY COUNT: CPT | Performed by: PHYSICIAN ASSISTANT

## 2023-12-28 PROCEDURE — 25010000002 CEFTRIAXONE PER 250 MG: Performed by: PHYSICIAN ASSISTANT

## 2023-12-28 PROCEDURE — 96365 THER/PROPH/DIAG IV INF INIT: CPT

## 2023-12-28 PROCEDURE — 25810000003 SODIUM CHLORIDE 0.9 % SOLUTION: Performed by: HOSPITALIST

## 2023-12-28 PROCEDURE — 85025 COMPLETE CBC W/AUTO DIFF WBC: CPT | Performed by: EMERGENCY MEDICINE

## 2023-12-28 PROCEDURE — 74176 CT ABD & PELVIS W/O CONTRAST: CPT

## 2023-12-28 PROCEDURE — 80053 COMPREHEN METABOLIC PANEL: CPT | Performed by: EMERGENCY MEDICINE

## 2023-12-28 PROCEDURE — 36415 COLL VENOUS BLD VENIPUNCTURE: CPT

## 2023-12-28 RX ORDER — LEVOTHYROXINE SODIUM 0.07 MG/1
75 TABLET ORAL
Status: DISCONTINUED | OUTPATIENT
Start: 2023-12-28 | End: 2023-12-30 | Stop reason: HOSPADM

## 2023-12-28 RX ORDER — SODIUM CHLORIDE 9 MG/ML
40 INJECTION, SOLUTION INTRAVENOUS AS NEEDED
Status: DISCONTINUED | OUTPATIENT
Start: 2023-12-28 | End: 2023-12-30 | Stop reason: HOSPADM

## 2023-12-28 RX ORDER — PRAVASTATIN SODIUM 40 MG
40 TABLET ORAL DAILY
Status: DISCONTINUED | OUTPATIENT
Start: 2023-12-28 | End: 2023-12-30 | Stop reason: HOSPADM

## 2023-12-28 RX ORDER — ONDANSETRON 4 MG/1
4 TABLET, FILM COATED ORAL EVERY 6 HOURS PRN
Status: DISCONTINUED | OUTPATIENT
Start: 2023-12-28 | End: 2023-12-30 | Stop reason: HOSPADM

## 2023-12-28 RX ORDER — SODIUM CHLORIDE 0.9 % (FLUSH) 0.9 %
10 SYRINGE (ML) INJECTION AS NEEDED
Status: DISCONTINUED | OUTPATIENT
Start: 2023-12-28 | End: 2023-12-30 | Stop reason: HOSPADM

## 2023-12-28 RX ORDER — SODIUM CHLORIDE 9 MG/ML
100 INJECTION, SOLUTION INTRAVENOUS CONTINUOUS
Status: DISCONTINUED | OUTPATIENT
Start: 2023-12-28 | End: 2023-12-30 | Stop reason: HOSPADM

## 2023-12-28 RX ORDER — PANTOPRAZOLE SODIUM 40 MG/1
40 TABLET, DELAYED RELEASE ORAL
Status: DISCONTINUED | OUTPATIENT
Start: 2023-12-28 | End: 2023-12-30 | Stop reason: HOSPADM

## 2023-12-28 RX ORDER — ACETAMINOPHEN 325 MG/1
650 TABLET ORAL EVERY 4 HOURS PRN
Status: DISCONTINUED | OUTPATIENT
Start: 2023-12-28 | End: 2023-12-30 | Stop reason: HOSPADM

## 2023-12-28 RX ORDER — ALLOPURINOL 300 MG/1
300 TABLET ORAL DAILY
Status: DISCONTINUED | OUTPATIENT
Start: 2023-12-28 | End: 2023-12-30 | Stop reason: HOSPADM

## 2023-12-28 RX ORDER — SODIUM CHLORIDE 0.9 % (FLUSH) 0.9 %
10 SYRINGE (ML) INJECTION EVERY 12 HOURS SCHEDULED
Status: DISCONTINUED | OUTPATIENT
Start: 2023-12-28 | End: 2023-12-30 | Stop reason: HOSPADM

## 2023-12-28 RX ORDER — METOPROLOL SUCCINATE 25 MG/1
25 TABLET, EXTENDED RELEASE ORAL DAILY
Status: DISCONTINUED | OUTPATIENT
Start: 2023-12-28 | End: 2023-12-30 | Stop reason: HOSPADM

## 2023-12-28 RX ADMIN — SODIUM CHLORIDE 100 ML/HR: 9 INJECTION, SOLUTION INTRAVENOUS at 11:01

## 2023-12-28 RX ADMIN — LEVOTHYROXINE SODIUM 75 MCG: 75 TABLET ORAL at 14:38

## 2023-12-28 RX ADMIN — METOPROLOL SUCCINATE 25 MG: 25 TABLET, EXTENDED RELEASE ORAL at 14:09

## 2023-12-28 RX ADMIN — ACETAMINOPHEN 650 MG: 325 TABLET, FILM COATED ORAL at 20:41

## 2023-12-28 RX ADMIN — PANTOPRAZOLE SODIUM 40 MG: 40 TABLET, DELAYED RELEASE ORAL at 14:09

## 2023-12-28 RX ADMIN — SODIUM CHLORIDE 100 ML/HR: 9 INJECTION, SOLUTION INTRAVENOUS at 20:37

## 2023-12-28 RX ADMIN — CEFTRIAXONE 2000 MG: 2 INJECTION, POWDER, FOR SOLUTION INTRAMUSCULAR; INTRAVENOUS at 09:20

## 2023-12-28 RX ADMIN — ALLOPURINOL 300 MG: 300 TABLET ORAL at 14:37

## 2023-12-28 RX ADMIN — PRAVASTATIN SODIUM 40 MG: 40 TABLET ORAL at 14:38

## 2023-12-28 NOTE — ED PROVIDER NOTES
EMERGENCY DEPARTMENT ENCOUNTER    Room Number:  HA2/B  PCP: Daryl Santos MD  Discussed/ obtained information from independent historians: Daughter at bedside      HPI:  Chief Complaint: Gross hematuria    Context: Mandy Alarcon is a 89 y.o. female who presents to the ED c/o gross hematuria that she noticed when she woke to use the restroom at approximately 0200 this morning.  She reports she noticed blood on the toilet paper when she wiped.  She returned to bed and had to urinate again soon after.  It was at this point she noticed gross hematuria.  She has had an additional episode prior to arrival in the emergency department.  Patient is on Eliquis for history of arterial occlusion.  Reports she had a similar episode of gross hematuria 1 month ago.  Her Eliquis was held for several days and she completed a course of Keflex with resolution of her symptoms.  No prior history of kidney stones.  Patient denies abdominal or back pain.  No other systemic complaints at this time.      External (non-ED) record review:   Reviewed note from office visit with PCP on 12/4/2023 where patient seen for hemorrhagic cystitis, arterial occlusion.  Reviewed assessment and plan.  Patient to complete Keflex and will follow-up in 3 months.  Reviewed prior laboratory studies.  Most recent CBC with hemoglobin 12.3.  Most recent BMP with creatinine 1.20.      PAST MEDICAL HISTORY  Active Ambulatory Problems     Diagnosis Date Noted   • Benign essential hypertension 10/28/2012   • Claustrophobia 04/28/2016   • Gout 10/28/2012   • Hyperlipidemia 10/28/2012   • Primary hypothyroidism 11/17/2015   • Impaired fasting glucose 10/28/2012   • Nocturnal hypoxemia 08/02/2012   • Secondary polycythemia 08/02/2012   • Vitamin D deficiency 05/23/2016   • Therapeutic drug monitoring 05/23/2016   • Family history of coronary artery disease 05/24/2016   • Bilateral sensorineural hearing loss, wears hearing aids 06/14/2017   • Multinodular goiter  01/24/2018   • Supraventricular tachycardia 07/10/2018   • Non morbid obesity 03/11/2019   • Bilateral lower extremity edema 03/23/2020   • Gastroesophageal reflux disease without esophagitis 03/23/2020   • Paroxysmal atrial fibrillation 04/10/2020   • Stage 3b chronic kidney disease 09/15/2020   • Combined form of senile cataract 03/03/2021   • Osteopenia of multiple sites 09/26/2023   • Postmenopausal state 09/26/2023   • COVID-19 virus infection 10/26/2023   • Arterial occlusion, lower extremity 11/17/2023   • Hemorrhagic cystitis 11/27/2023     Resolved Ambulatory Problems     Diagnosis Date Noted   • History of Cellulitis of trunk, Right 04/28/2016   • History of mammogram 04/28/2016   • History of pneumococcal vaccination 04/28/2016   • Hospital discharge follow-up 07/10/2018   • Near syncope 07/10/2018   • Chest tightness or pressure 07/10/2018   • PND (paroxysmal nocturnal dyspnea) 07/10/2018   • Acute biliary pancreatitis without infection or necrosis 02/01/2020   • LINNEA (acute kidney injury) 02/02/2020   • Pneumonia 02/03/2020   • New onset a-fib 02/05/2020   • Hyponatremia 02/05/2020   • Hypomagnesemia 02/05/2020   • Leukocytosis 02/05/2020   • Fever 02/27/2020   • Acute gallstone pancreatitis 02/27/2020   • Chronic cholecystitis 03/11/2020   • Postoperative nausea 03/23/2020   • Postoperative pain 03/23/2020   • Hospital discharge follow-up 04/17/2020   • History of acute pancreatitis 04/17/2020   • Acute constipation 04/17/2020   • Gallstone pancreatitis 05/21/2020   • Hospital discharge follow-up 06/11/2020   • Left cervical radiculopathy 06/11/2020   • Parotiditis 07/04/2022   • Hospital discharge follow-up 07/11/2022     Past Medical History:   Diagnosis Date   • Chronic renal impairment, stage 3b 09/15/2020   • Hyperlipidemia 10/28/2012   • Morbidly obese 03/11/2019         PAST SURGICAL HISTORY  Past Surgical History:   Procedure Laterality Date   • CHOLECYSTECTOMY WITH INTRAOPERATIVE CHOLANGIOGRAM  N/A 2020    Procedure: LAPAROSCOPIC CHOLECYSTOSTOMY TUBE, INTRAOPERATIVE CHOLANGIOGRAM;  Surgeon: Bryce Andujar MD;  Location: [facility] OR;  Service: General;  Laterality: N/A;   • CHOLECYSTECTOMY WITH INTRAOPERATIVE CHOLANGIOGRAM N/A 2020    Procedure: CHOLECYSTECTOMY LAPAROSCOPIC INTRAOPERATIVE CHOLANGIOGRAM and EGD;  Surgeon: Bryce Andujar MD;  Location: [facility] MAIN OR;  Service: General;  Laterality: N/A;   • ERCP N/A 2022    Procedure: ENDOSCOPIC RETROGRADE CHOLANGIOPANCREATOGRAPHY with sphincterotomy, balloon sweep 12-15mm;  Surgeon: Mitesh Altamirano MD;  Location: [facility] ENDOSCOPY;  Service: Gastroenterology;  Laterality: N/A;  pre - gallstone pancreatitis  post - s/p sphincterotomy, sludge and  pus   • FEMORAL THROMBECTOMY/EMBOLECTOMY Left 2023    Procedure: OPEN LEFT FEMORAL THROMBECTOMY/EMBOLECTOMY WITH LEFT LOWER EXTREMITY ANGIOGRAM;  Surgeon: Jace Chowdhury Jr., MD;  Location: [facility] HYBRID OR ;  Service: Vascular;  Laterality: Left;   • OOPHORECTOMY      age 48   • RADICAL ABDOMINAL HYSTERECTOMY  48 years old    48 years of age. Uterine fibroid tumors with menorrhagia - no cancer         FAMILY HISTORY  Family History   Problem Relation Age of Onset   • Heart disease Mother         Ischemic.  from coronary artery disease.   • Heart disease Father         Ischemic.  from coronary artery disease.   • Heart disease Sister         Ischemic.  from coronary artery disease.   • Heart disease Sister         Ischemic.  from coronary artery disease.   • Heart disease Brother         Ischemic.  from coronary artery disease.   • Breast cancer Daughter    • Breast cancer Daughter    • Ovarian cancer Neg Hx    • Malig Hyperthermia Neg Hx          SOCIAL HISTORY  Social History     Socioeconomic History   • Marital status:    • Number of children: 5   • Highest education level: GED or equivalent   Tobacco Use   • Smoking status:  Former     Packs/day: .5     Types: Cigarettes     Start date: 1946     Quit date: 1954     Years since quittin.0   • Smokeless tobacco: Never   • Tobacco comments:     Stopped drinking at age 30.   Vaping Use   • Vaping Use: Never used   Substance and Sexual Activity   • Alcohol use: No     Comment: caffiene use tea coffee   • Drug use: No   • Sexual activity: Not Currently     Partners: Male         ALLERGIES  Cefaclor and Sulfa antibiotics        REVIEW OF SYSTEMS  Review of Systems   Constitutional:  Negative for chills and fever.   HENT:  Negative for ear pain and sore throat.    Respiratory:  Negative for cough and shortness of breath.    Cardiovascular:  Negative for chest pain and palpitations.   Gastrointestinal:  Negative for abdominal pain and vomiting.   Genitourinary:  Positive for hematuria. Negative for dysuria.   Musculoskeletal:  Negative for arthralgias and joint swelling.   Skin:  Negative for pallor and rash.   Neurological:  Negative for numbness and headaches.   Psychiatric/Behavioral:  Negative for confusion and hallucinations.             PHYSICAL EXAM  ED Triage Vitals   Temp Heart Rate Resp BP SpO2   23 0428 23 0428 23 0428 239 23   97.8 °F (36.6 °C) 79 18 176/91 97 %      Temp src Heart Rate Source Patient Position BP Location FiO2 (%)   23 0428 23 0428 23 0449 23 --   Tympanic Monitor Sitting Left arm        Physical Exam  Constitutional:       General: She is not in acute distress.     Appearance: She is well-developed.   HENT:      Head: Normocephalic and atraumatic.   Eyes:      Extraocular Movements: Extraocular movements intact.   Cardiovascular:      Rate and Rhythm: Normal rate and regular rhythm.      Heart sounds: Normal heart sounds.   Pulmonary:      Effort: Pulmonary effort is normal.      Breath sounds: Normal breath sounds.   Abdominal:      General: There is no distension.      Tenderness:  There is no abdominal tenderness.   Skin:     General: Skin is warm.   Neurological:      General: No focal deficit present.      Mental Status: She is alert and oriented to person, place, and time.   Psychiatric:         Mood and Affect: Mood normal.         Vital signs and nursing notes reviewed.          LAB RESULTS  Recent Results (from the past 24 hour(s))   Comprehensive Metabolic Panel    Collection Time: 12/28/23  4:54 AM    Specimen: Blood   Result Value Ref Range    Glucose 124 (H) 65 - 99 mg/dL    BUN 19 8 - 23 mg/dL    Creatinine 1.52 (H) 0.57 - 1.00 mg/dL    Sodium 139 136 - 145 mmol/L    Potassium 4.1 3.5 - 5.2 mmol/L    Chloride 111 (H) 98 - 107 mmol/L    CO2 19.0 (L) 22.0 - 29.0 mmol/L    Calcium 9.7 (H) 8.2 - 9.6 mg/dL    Total Protein 7.0 6.0 - 8.5 g/dL    Albumin 4.1 3.5 - 5.2 g/dL    ALT (SGPT) 14 1 - 33 U/L    AST (SGOT) 16 1 - 32 U/L    Alkaline Phosphatase 53 39 - 117 U/L    Total Bilirubin 0.4 0.0 - 1.2 mg/dL    Globulin 2.9 gm/dL    A/G Ratio 1.4 g/dL    BUN/Creatinine Ratio 12.5 7.0 - 25.0    Anion Gap 9.0 5.0 - 15.0 mmol/L    eGFR 32.4 (L) >60.0 mL/min/1.73   CBC Auto Differential    Collection Time: 12/28/23  4:54 AM    Specimen: Blood   Result Value Ref Range    WBC 6.49 3.40 - 10.80 10*3/mm3    RBC 5.13 3.77 - 5.28 10*6/mm3    Hemoglobin 14.9 12.0 - 15.9 g/dL    Hematocrit 46.7 (H) 34.0 - 46.6 %    MCV 91.0 79.0 - 97.0 fL    MCH 29.0 26.6 - 33.0 pg    MCHC 31.9 31.5 - 35.7 g/dL    RDW 14.2 12.3 - 15.4 %    RDW-SD 46.9 37.0 - 54.0 fl    MPV 9.3 6.0 - 12.0 fL    Platelets 200 140 - 450 10*3/mm3    Neutrophil % 58.6 42.7 - 76.0 %    Lymphocyte % 23.1 19.6 - 45.3 %    Monocyte % 10.0 5.0 - 12.0 %    Eosinophil % 6.9 (H) 0.3 - 6.2 %    Basophil % 0.9 0.0 - 1.5 %    Immature Grans % 0.5 0.0 - 0.5 %    Neutrophils, Absolute 3.80 1.70 - 7.00 10*3/mm3    Lymphocytes, Absolute 1.50 0.70 - 3.10 10*3/mm3    Monocytes, Absolute 0.65 0.10 - 0.90 10*3/mm3    Eosinophils, Absolute 0.45 (H) 0.00 - 0.40  10*3/mm3    Basophils, Absolute 0.06 0.00 - 0.20 10*3/mm3    Immature Grans, Absolute 0.03 0.00 - 0.05 10*3/mm3    nRBC 0.0 0.0 - 0.2 /100 WBC   Urinalysis With Microscopic If Indicated (No Culture) - Urine, Clean Catch    Collection Time: 12/28/23  5:00 AM    Specimen: Urine, Clean Catch   Result Value Ref Range    Color, UA Red (A) Yellow, Straw    Appearance, UA Turbid (A) Clear    pH, UA 6.5 5.0 - 8.0    Specific Gravity, UA 1.022 1.005 - 1.030    Glucose, UA Negative Negative    Ketones, UA Negative Negative    Bilirubin, UA Moderate (2+) (A) Negative    Blood, UA Large (3+) (A) Negative    Protein, UA >=300 mg/dL (3+) (A) Negative    Leuk Esterase, UA Large (3+) (A) Negative    Nitrite, UA Negative Negative    Urobilinogen, UA 0.2 E.U./dL 0.2 - 1.0 E.U./dL   Urinalysis, Microscopic Only - Urine, Clean Catch    Collection Time: 12/28/23  5:00 AM    Specimen: Urine, Clean Catch   Result Value Ref Range    RBC, UA Too Numerous to Count (A) None Seen, 0-2 /HPF    WBC, UA Unable to determine due to loaded field (A) None Seen, 0-2 /HPF    Bacteria, UA Unable to determine due to loaded field (A) None Seen /HPF    Squamous Epithelial Cells, UA Unable to determine due to loaded field (A) None Seen, 0-2 /HPF    Hyaline Casts, UA Unable to determine due to loaded field None Seen /LPF    Methodology Manual Light Microscopy        Ordered the above labs and reviewed the results.        RADIOLOGY  CT Abdomen Pelvis Without Contrast    Result Date: 12/28/2023  CT ABDOMEN AND PELVIS WITHOUT IV CONTRAST  HISTORY: Gross hematuria, lower abdominal pain liver enzymes and white blood cell count within normal limits  TECHNIQUE: Radiation dose reduction techniques were utilized, including automated exposure control and exposure modulation based on body size. 3 mm images were obtained through the abdomen and pelvis without IV contrast.  COMPARISON: Multiple CT abdomen pelvis dating back to 1/31/2020, CTA chest 6/20/2022 and abdominal  MRI 6/29/2022  FINDINGS: Multiple scattered pulm nodules are present within the lung bases, many of which were seen on 6/20/2022. There are a few sub-6 mm nodules within the left lower lobe not definitively seen on prior imaging. Area of nodularity within the most inferior aspect of the imaged left lower lobe corresponds with calcified granuloma seen on 1/31/2020. There is a small hiatal hernia. No findings of small bowel obstruction. The appendix is unremarkable. Mild to moderate intrahepatic bili ductal dilation is present status post cholecystectomy, as before..  There is a cystic area contiguous with the gallbladder fossa measuring 3.1 x 2.5 cm, grossly unchanged since 6/28/2022. The pancreas, spleen and adrenal glands have an unremarkable noncontrast CT appearance. Hypodense renal lesions demonstrating density less than 15 Hounsfield units are likely benign per Bosniak 2019 criteria. There is no hydronephrosis. The bladder is decompressed and cannot be evaluated. The uterus is surgically absent.  No abdominal pelvic adenopathy by size criteria. There is colonic diverticulosis. No free intraperitoneal air is seen. Postsurgical changes from open left femoral thrombectomy/embolectomy performed on 11/16/2023 are present and not well evaluated without intravenous contrast. For the purpose of this dictation, last well-formed space referred to as L5-S1. Compression infirmities at L1 and T11 are present, as before.      1.  No noncontrast CT findings of acute intraabdominal pathology. 2.  A few sub-6 mm pulm nodules in the left lower lobe not definitively seen on prior imaging. If this patient is at increased risk for lung cancer, follow-up chest CT in 12 months can be considered to ensure stability. If this patient is not at increased risk for lung cancer, no further follow-up is necessary per Fleischner criteria. 3.  Postsurgical changes from a recent left femoral thrombectomy/embolectomy cannot be evaluated without  intravenous contrast. Correlation with patient history and physical exam is recommended with follow-up CT with intravenous contrast if clinically indicated. 4.  Other incidental and chronic findings as above.    This report was finalized on 12/28/2023 8:20 AM by Dr. Carlitos Willis M.D on Workstation: [id number]       Ordered the above noted radiological studies. Reviewed by me in PACS.                MEDICATIONS GIVEN IN ER  Medications   cefTRIAXone (ROCEPHIN) 2,000 mg in sodium chloride 0.9 % 100 mL IVPB-VTB (0 mg Intravenous Stopped 12/28/23 1136)               MEDICAL DECISION MAKING, PROGRESS, and CONSULTS    All labs have been independently reviewed by me.  All radiology studies have been reviewed by me and I have also reviewed the radiology report.   EKG's independently viewed and interpreted by me.  Discussion below represents my analysis of pertinent findings related to patient's condition, differential diagnosis, treatment plan and final disposition.            Orders placed during this visit:  Orders Placed This Encounter   Procedures   • CT Abdomen Pelvis Without Contrast   • Comprehensive Metabolic Panel   • Urinalysis With Microscopic If Indicated (No Culture) - Urine, Clean Catch   • CBC Auto Differential   • Urinalysis, Microscopic Only - Urine, Clean Catch   • CBC & Differential           Differential diagnosis:  Hemorrhagic cystitis, bladder mass, kidney stone      Independent interpretation of labs, radiology studies, and discussions with consultants:  ED Course as of 12/28/23 1536   Thu Dec 28, 2023   0633 WBC: 6.49 [MP]   0633 Hemoglobin: 14.9 [MP]   0633 BUN: 19 [MP]   0633 Creatinine(!): 1.52 [MP]   0633 Nitrite, UA: Negative [MP]   0633 Leukocytes, UA(!): Large (3+) [MP]   0633 Blood, UA(!): Large (3+) [MP]   0633 RBC, UA(!): Too Numerous to Count [MP]   0633 WBC, UA(!): Unable to determine due to loaded field [MP]   0633 Bacteria, UA(!): Unable to determine due to loaded field [MP]   0945 I  spoke with Dr. Tobar with [facility].  I reviewed patient presentation and ED findings.  He agrees to admit patient to a [facility] observation bed.  He request a urology consult. [MP]   1058 I spoke with Dr. Hansen with urology.  Reviewed patient presentation and ED findings.  He recommends inserting Hussein catheter, [address] and initiating CBI. [MP]      ED Course User Index  [MP] Leona Manley PA-C         Additional orders considered but not ordered:  Cystoscopy      Additional sources:    - Chronic or social conditions impacting care: Anticoagulation on Eliquis          DIAGNOSIS  Final diagnoses:   Gross hematuria   LINNEA (acute kidney injury)   Anticoagulated             Latest Documented Vital Signs:  As of 06:48 EST  BP- 176/91 HR- 79 Temp- 97.8 °F (36.6 °C) (Tympanic) O2 sat- 97%              --    Please note that portions of this were completed with a voice recognition program.       Note Disclaimer: At [facility], we believe that sharing information builds trust and better relationships. You are receiving this note because you are receiving care at [facility] or recently visited. It is possible you will see health information before a provider has talked with you about it. This kind of information can be easy to misunderstand. To help you fully understand what it means for your health, we urge you to discuss this note with your provider.             Leona Manley PA-C  12/28/23 9781

## 2023-12-28 NOTE — H&P
Patient Name:  Mandy Alarcon  YOB: 1933  MRN:  [id number]  Admit Date:  12/28/2023  Patient Care Team:  Daryl Santos MD as PCP - General (Internal Medicine)      Subjective   History Present Illness     Chief Complaint   Patient presents with    Blood in Urine       Ms. Alarcon is a 89 y.o. female with CKD 3, gout, hypertension and recent hospitalization in November with acute left leg pain which ultimately was due to thrombus in the left femoral and tibial artery.  She underwent thrombectomy and was started on Eliquis.  She was thought to have a very brief spell of atrial fibrillation and it was felt that that was probably the culprit for her clot.  Unfortunately just a short time after that hospital stay about 8 days later she came in with gross hematuria.  She was placed in the observation unit at that time and was seen by urology who recommended discharge to their clinic for cystoscopy which was performed and apparently showed some areas of hemorrhagic cystitis but nothing else to cause any bleeding.  She remained off her Eliquis for about 3 days before restarting it and did well up until recently.  Overnight she developed acute onset of gross hematuria again starting around 2 in the morning.  She has had some urinary urgency and frequency over the last couple of days that she had not mentioned to the family but had not passed any blood until earlier this morning.  Daughter at bedside noticed a couple of clots and what she passed at home as well.  Last dose of Eliquis was last night.  Labs show some mild elevation in creatinine compared to her usual baseline as well as obvious hematuria.  She has been recommended for admission.  Overall though other than the hematuria she feels completely normal      Review of Systems   Constitutional:  Negative for chills, fatigue and fever.   HENT:  Positive for hearing loss. Negative for congestion and trouble swallowing.    Eyes:  Negative for visual  disturbance.   Respiratory:  Negative for cough, chest tightness and shortness of breath.    Cardiovascular:  Negative for chest pain, palpitations and leg swelling.   Gastrointestinal:  Negative for abdominal distention, abdominal pain, constipation, diarrhea, nausea and vomiting.   Genitourinary:  Positive for frequency, hematuria and urgency. Negative for difficulty urinating and dysuria.   Musculoskeletal:  Positive for joint swelling (Recent bilateral ankle swelling thought to be gout.  Family treated with simple rest and it has resolved.  She has been off of her allopurinol for a bit). Negative for arthralgias.   Skin:  Negative for rash.   Neurological:  Negative for dizziness and headaches.   Psychiatric/Behavioral:  Negative for confusion.         Personal History     Past Medical History:   Diagnosis Date    Benign essential hypertension 10/28/2012    2012--treatment for hypertension begun.    Bilateral lower extremity edema 2020    Bilateral sensorineural hearing loss, wears hearing aids 2017    Left is much worse than right.  Patient had multiple ear infections as a child.    Chronic renal impairment, stage 3b 09/15/2020    Claustrophobia 2016    This patient has significant nocturnal hypoxemia and I think that she could benefit from nocturnal oxygen therapy. The exact etiology of her hypoxemia is not clear. She could possibly have obstructive sleep apnea but this is not documented. We cannot test this patient for sleep apnea due to the fact that she is severely claustrophobic. Patient was a former smoker and it is possibly that COPD he is playing a role.    Combined form of senile cataract 2021    Family history of coronary artery disease 2016    Patient's mother, father, 2 sisters and a brother all  from myocardial infarctions    Gastroesophageal reflux disease without esophagitis 2020    Gout 10/28/2012    2012--initial diagnosis and treatment of  gout.    History of acute pancreatitis 04/17/2020    Hyperlipidemia 10/28/2012    October 2012--treatment for hyperlipidemia begun.    Impaired fasting glucose 10/28/2012    October 2012--initial diagnosis impaired fasting glucose.    Morbidly obese 03/11/2019    Nocturnal hypoxemia 08/02/2012 11/06/2014--patient did not receive nocturnal oxygen because of Medicare regulations.   05/15/2014--overnight oximetry revealed oxygen saturations less than 89% for 22 minutes and 40 seconds. Oxygen saturations less than or equal to 88% for 22 minutes and 40 seconds. Lowest oxygen saturation 83%. The longest continuous time with oxygen saturations less than or equal to 88% was 1 minute and 32 seconds.   08/02/2012--overnight oximetry revealed oxygen saturations less than 90% for one hour and 35 minutes. Oxygen saturations less than 89% 59 minutes. This patient has significant nocturnal hypoxemia and I think that she could benefit from nocturnal oxygen therapy. The exact etiology of her hypoxemia is not clear. She could possibly have obstructive sleep apnea but this is not documented. We cannot test this patient for sleep apnea due to the fact that she is severely claustrophobic. Patient was a former smoker and it is possibly that COPD he is playing a role.    Non morbid obesity 03/11/2019    Osteopenia of multiple sites 09/26/2023    Paroxysmal atrial fibrillation 04/10/2020    Postmenopausal state 09/26/2023    Primary hypothyroidism 11/17/2015 March 11, 2019--TSH remains elevated slightly at 4.92.  Given the overall clinical picture including the multinodular goiter, we will initiate levothyroxine 50 mcg/day and reassess in about 6 weeks.  August 1, 2018--thyroid ultrasound reveals a multinodular thyroid with multiple subcentimeter nodules.  Only minimal increase in size of the largest nodule in the left lobe has occurred when compared     Secondary polycythemia 08/02/2012 11/06/2014--patient did not receive  nocturnal oxygen because of Medicare regulations.   05/15/2014--overnight oximetry revealed oxygen saturations less than 89% for 22 minutes and 40 seconds. Oxygen saturations less than or equal to 88% for 22 minutes and 40 seconds. Lowest oxygen saturation 83%. The longest continuous time with oxygen saturations less than or equal to 88% was 1 minute and 32 seconds.   08/02/2012--overnight oximetry revealed oxygen saturations less than 90% for one hour and 35 minutes. Oxygen saturations less than 89% 59 minutes. This patient has significant nocturnal hypoxemia and I think that she could benefit from nocturnal oxygen therapy. The exact etiology of her hypoxemia is not clear. She could possibly have obstructive sleep apnea but this is not documented. We cannot test this patient for sleep apnea due to the fact that she is severely claustrophobic. Patient was a former smoker and it is possibly that COPD he is playing a role.    Vitamin D deficiency 05/23/2016     Past Surgical History:   Procedure Laterality Date    CHOLECYSTECTOMY WITH INTRAOPERATIVE CHOLANGIOGRAM N/A 03/12/2020    Procedure: LAPAROSCOPIC CHOLECYSTOSTOMY TUBE, INTRAOPERATIVE CHOLANGIOGRAM;  Surgeon: Bryce Andujar MD;  Location: [facility];  Service: General;  Laterality: N/A;    CHOLECYSTECTOMY WITH INTRAOPERATIVE CHOLANGIOGRAM N/A 05/22/2020    Procedure: CHOLECYSTECTOMY LAPAROSCOPIC INTRAOPERATIVE CHOLANGIOGRAM and EGD;  Surgeon: Bryce Andujar MD;  Location: [facility] OR;  Service: General;  Laterality: N/A;    ERCP N/A 07/01/2022    Procedure: ENDOSCOPIC RETROGRADE CHOLANGIOPANCREATOGRAPHY with sphincterotomy, balloon sweep 12-15mm;  Surgeon: Mitesh Altamirano MD;  Location: [facility] ENDOSCOPY;  Service: Gastroenterology;  Laterality: N/A;  pre - gallstone pancreatitis  post - s/p sphincterotomy, sludge and  pus    FEMORAL THROMBECTOMY/EMBOLECTOMY Left 11/16/2023    Procedure: OPEN LEFT FEMORAL THROMBECTOMY/EMBOLECTOMY WITH LEFT  LOWER EXTREMITY ANGIOGRAM;  Surgeon: Jace Chowdhury Jr., MD;  Location: [facility] OR ;  Service: Vascular;  Laterality: Left;    OOPHORECTOMY      age 48    RADICAL ABDOMINAL HYSTERECTOMY  48 years old    48 years of age. Uterine fibroid tumors with menorrhagia - no cancer     Family History   Problem Relation Age of Onset    Heart disease Mother         Ischemic.  from coronary artery disease.    Heart disease Father         Ischemic.  from coronary artery disease.    Heart disease Sister         Ischemic.  from coronary artery disease.    Heart disease Sister         Ischemic.  from coronary artery disease.    Heart disease Brother         Ischemic.  from coronary artery disease.    Breast cancer Daughter     Breast cancer Daughter     Ovarian cancer Neg Hx     Malig Hyperthermia Neg Hx      Social History     Tobacco Use    Smoking status: Former     Packs/day: .5     Types: Cigarettes     Start date: 1946     Quit date: 1954     Years since quittin.0    Smokeless tobacco: Never    Tobacco comments:     Stopped drinking at age 30.   Vaping Use    Vaping Use: Never used   Substance Use Topics    Alcohol use: No     Comment: caffiene use tea coffee    Drug use: No     No current facility-administered medications on file prior to encounter.     Current Outpatient Medications on File Prior to Encounter   Medication Sig Dispense Refill    Acetaminophen (Tylenol) 325 MG capsule Take 1 capsule by mouth As Needed.      allopurinol (ZYLOPRIM) 300 MG tablet TAKE 1 TABLET BY MOUTH DAILY FOR GOUT (Patient taking differently: Take 1 tablet by mouth Daily.)      apixaban (ELIQUIS) 5 MG tablet tablet Take 1 tablet (5 mg) every 12 hours for blood thinner (Patient taking differently: Take 1 tablet by mouth Every 12 (Twelve) Hours.) 60 tablet 4    levothyroxine (SYNTHROID, LEVOTHROID) 75 MCG tablet TAKE 1 TABLET BY MOUTH DAILY FOR THYROID (Patient taking differently: Take 1 tablet  by mouth Every Morning.)      metoprolol succinate XL (TOPROL-XL) 25 MG 24 hr tablet TAKE 1 TABLET BY MOUTH DAILY FOR HIGH BLOOD PRESSURE AND HEART OR PALPITATIONS (Patient taking differently: Take 1 tablet by mouth Daily.)      omeprazole (priLOSEC) 40 MG capsule TAKE 1 CAPSULE BY MOUTH EVERY DAY BEFORE FIRST MEAL FOR STOMACH ACID OR REFLUX (Patient taking differently: Take 1 capsule by mouth Daily.)      pravastatin (PRAVACHOL) 40 MG tablet TAKE 1 TABLET BY MOUTH DAILY (Patient taking differently: Take 1 tablet by mouth Daily.) 90 tablet 3    [DISCONTINUED] cephalexin (KEFLEX) 500 MG capsule Take 1 capsule by mouth 2 (Two) Times a Day. (Patient not taking: Reported on 12/26/2023)       Allergies   Allergen Reactions    Cefaclor Anaphylaxis, Angioedema and Swelling     caused angioedema 25 years ago; she tolerates cephalexin, amoxicillin, and ceftriaxone per my d/w patient and her daughter as well as amoxicillin-clavulanate confirmed in EPIC  caused angioedema 25 years ago; she tolerates cephalexin, amoxicillin, and ceftriaxone per my d/w patient and her daughter as well as amoxicillin-clavulanate confirmed in EPIC  caused angioedema 25 years ago; she tolerates cephalexin, amoxicillin, and ceftriaxone per my d/w patient and her daughter as well as amoxicillin-clavulanate confirmed in EPIC  caused angioedema 25 years ago; she tolerates cephalexin, amoxicillin, and ceftriaxone per my d/w patient and her daughter as well as amoxicillin-clavulanate confirmed in EPIC    Sulfa Antibiotics Rash       Objective    Objective     Vital Signs  Temp:  [97.8 °F (36.6 °C)] 97.8 °F (36.6 °C)  Heart Rate:  [70-79] 70  Resp:  [18] 18  BP: (153-176)/(74-91) 153/83  SpO2:  [95 %-97 %] 95 %  on   ;   Device (Oxygen Therapy): room air  Body mass index is 37.55 kg/m².    Physical Exam  Vitals and nursing note reviewed.   Constitutional:       General: She is not in acute distress.     Appearance: Normal appearance.   HENT:      Head:  Normocephalic and atraumatic.   Eyes:      General: No scleral icterus.  Neck:      Vascular: No JVD.   Cardiovascular:      Rate and Rhythm: Normal rate and regular rhythm.      Pulses: Normal pulses.      Heart sounds: Normal heart sounds. No murmur heard.  Pulmonary:      Effort: Pulmonary effort is normal. No respiratory distress.      Breath sounds: Normal breath sounds.   Abdominal:      General: Bowel sounds are normal. There is no distension.      Palpations: Abdomen is soft.      Tenderness: There is no abdominal tenderness.   Musculoskeletal:         General: No swelling or tenderness.      Cervical back: Neck supple.   Skin:     General: Skin is warm and dry.      Coloration: Skin is not jaundiced.      Findings: No rash.   Neurological:      Mental Status: She is alert and oriented to person, place, and time.   Psychiatric:         Mood and Affect: Mood normal.         Behavior: Behavior normal.         Results Review:  I reviewed the patient's new clinical results.  I reviewed the patient's new imaging results and agree with the interpretation.  I reviewed the patient's other test results and agree with the interpretation  I personally viewed and interpreted the patient's EKG/Telemetry data  Discussed with ED provider.    Lab Results (last 24 hours)       Procedure Component Value Units Date/Time    CBC & Differential [[id number]]  (Abnormal) Collected: 12/28/23 0454    Specimen: Blood Updated: 12/28/23 0512    Narrative:      The following orders were created for panel order CBC & Differential.  Procedure                               Abnormality         Status                     ---------                               -----------         ------                     CBC Auto Differential[[id number]]        Abnormal            Final result                 Please view results for these tests on the individual orders.    Comprehensive Metabolic Panel [[id number]]  (Abnormal) Collected: 12/28/23 0454     Specimen: Blood Updated: 12/28/23 0530     Glucose 124 mg/dL      BUN 19 mg/dL      Creatinine 1.52 mg/dL      Sodium 139 mmol/L      Potassium 4.1 mmol/L      Chloride 111 mmol/L      CO2 19.0 mmol/L      Calcium 9.7 mg/dL      Total Protein 7.0 g/dL      Albumin 4.1 g/dL      ALT (SGPT) 14 U/L      AST (SGOT) 16 U/L      Alkaline Phosphatase 53 U/L      Total Bilirubin 0.4 mg/dL      Globulin 2.9 gm/dL      A/G Ratio 1.4 g/dL      BUN/Creatinine Ratio 12.5     Anion Gap 9.0 mmol/L      eGFR 32.4 mL/min/1.73     Narrative:      GFR Normal >60  Chronic Kidney Disease <60  Kidney Failure <15    The GFR formula is only valid for adults with stable renal function between ages 18 and 70.    CBC Auto Differential [[id number]]  (Abnormal) Collected: 12/28/23 0454    Specimen: Blood Updated: 12/28/23 0512     WBC 6.49 10*3/mm3      RBC 5.13 10*6/mm3      Hemoglobin 14.9 g/dL      Hematocrit 46.7 %      MCV 91.0 fL      MCH 29.0 pg      MCHC 31.9 g/dL      RDW 14.2 %      RDW-SD 46.9 fl      MPV 9.3 fL      Platelets 200 10*3/mm3      Neutrophil % 58.6 %      Lymphocyte % 23.1 %      Monocyte % 10.0 %      Eosinophil % 6.9 %      Basophil % 0.9 %      Immature Grans % 0.5 %      Neutrophils, Absolute 3.80 10*3/mm3      Lymphocytes, Absolute 1.50 10*3/mm3      Monocytes, Absolute 0.65 10*3/mm3      Eosinophils, Absolute 0.45 10*3/mm3      Basophils, Absolute 0.06 10*3/mm3      Immature Grans, Absolute 0.03 10*3/mm3      nRBC 0.0 /100 WBC     Urinalysis With Microscopic If Indicated (No Culture) - Urine, Clean Catch [[id number]]  (Abnormal) Collected: 12/28/23 0500    Specimen: Urine, Clean Catch Updated: 12/28/23 0546     Color, UA Red     Comment: Any Substance that causes an abnormal urine color can alter the accuracy of the chemical reactions.        Appearance, UA Turbid     pH, UA 6.5     Specific Gravity, UA 1.022     Glucose, UA Negative     Ketones, UA Negative     Bilirubin, UA Moderate (2+)     Blood, UA Large (3+)      Protein, UA >=300 mg/dL (3+)     Leuk Esterase, UA Large (3+)     Nitrite, UA Negative     Urobilinogen, UA 0.2 E.U./dL    Urinalysis, Microscopic Only - Urine, Clean Catch [[id number]]  (Abnormal) Collected: 12/28/23 0500    Specimen: Urine, Clean Catch Updated: 12/28/23 0546     RBC, UA Too Numerous to Count /HPF      WBC, UA       Unable to determine due to loaded field     /HPF     Bacteria, UA       Unable to determine due to loaded field     /HPF     Squamous Epithelial Cells, UA       Unable to determine due to loaded field     /HPF     Hyaline Casts, UA       Unable to determine due to loaded field     /LPF     Methodology Manual Light Microscopy            Imaging Results (Last 24 Hours)       Procedure Component Value Units Date/Time    CT Abdomen Pelvis Without Contrast [[id number]] Collected: 12/28/23 0803     Updated: 12/28/23 0824    Narrative:      CT ABDOMEN AND PELVIS WITHOUT IV CONTRAST     HISTORY: Gross hematuria, lower abdominal pain liver enzymes and white  blood cell count within normal limits     TECHNIQUE: Radiation dose reduction techniques were utilized, including  automated exposure control and exposure modulation based on body size.   3 mm images were obtained through the abdomen and pelvis without IV  contrast.     COMPARISON: Multiple CT abdomen pelvis dating back to 1/31/2020, CTA  chest 6/20/2022 and abdominal MRI 6/29/2022     FINDINGS:  Multiple scattered pulm nodules are present within the lung bases, many  of which were seen on 6/20/2022. There are a few sub-6 mm nodules within  the left lower lobe not definitively seen on prior imaging. Area of  nodularity within the most inferior aspect of the imaged left lower lobe  corresponds with calcified granuloma seen on 1/31/2020. There is a small  hiatal hernia. No findings of small bowel obstruction. The appendix is  unremarkable. Mild to moderate intrahepatic bili ductal dilation is  present status post cholecystectomy, as  before..     There is a cystic area contiguous with the gallbladder fossa measuring  3.1 x 2.5 cm, grossly unchanged since 6/28/2022. The pancreas, spleen  and adrenal glands have an unremarkable noncontrast CT appearance.  Hypodense renal lesions demonstrating density less than 15 Hounsfield  units are likely benign per Bosniak 2019 criteria. There is no  hydronephrosis. The bladder is decompressed and cannot be evaluated. The  uterus is surgically absent.     No abdominal pelvic adenopathy by size criteria. There is colonic  diverticulosis. No free intraperitoneal air is seen. Postsurgical  changes from open left femoral thrombectomy/embolectomy performed on  11/16/2023 are present and not well evaluated without intravenous  contrast. For the purpose of this dictation, last well-formed space  referred to as L5-S1. Compression infirmities at L1 and T11 are present,  as before.       Impression:      1.  No noncontrast CT findings of acute intraabdominal pathology.  2.  A few sub-6 mm pulm nodules in the left lower lobe not definitively  seen on prior imaging. If this patient is at increased risk for lung  cancer, follow-up chest CT in 12 months can be considered to ensure  stability. If this patient is not at increased risk for lung cancer, no  further follow-up is necessary per Fleischner criteria.  3.  Postsurgical changes from a recent left femoral  thrombectomy/embolectomy cannot be evaluated without intravenous  contrast. Correlation with patient history and physical exam is  recommended with follow-up CT with intravenous contrast if clinically  indicated.  4.  Other incidental and chronic findings as above.           This report was finalized on 12/28/2023 8:20 AM by Dr. Carlitos Willis M.D on Workstation: [id number]                   No orders to display        Assessment/Plan     Active Hospital Problems    Diagnosis  POA    **Gross hematuria [R31.0]  Yes    Stage 3b chronic kidney disease [N18.32]  Yes     Paroxysmal atrial fibrillation [I48.0]  Yes    LINNEA (acute kidney injury) [N17.9]  Yes    Primary hypothyroidism [E03.9]  Yes    Benign essential hypertension [I10]  Yes    Gout [Z87.39]  Yes      Resolved Hospital Problems   No resolved problems to display.       Ms. Alarcon is a 89 y.o. female with a history of gout, hypothyroid, hypertension, CKD 3 and recent hospitalization with right femoral artery thrombus status post thrombectomy admitted now with recurrent hematuria for the second time in a month.  Previous workup included cystoscopy which showed some irritation consistent with hemorrhagic cystitis    Placed in observation.  Will follow hemoglobin closely though currently normal.  Urology to see in consult, asked ER to go ahead and call  Hold Eliquis today.  Cannot hold for long given recent thrombus though she does not appear to be in A-fib currently.  Just seen in cardiology office 2 days ago, reviewed note  Keep n.p.o. until urology makes decision regarding any workup needed today  She has a very mild LINNEA which should resolve with fluids.  Monitor urine output closely.  Apparent recent gout flare though current exam totally benign.  Added uric acid to labs which is come back normal.  Continue allopurinol  I discussed the patient's findings and my recommendations with patient and daughter at bedside  Reviewed home meds and restarted as appropriate    VTE Prophylaxis - SCDs.  Code Status - Full code.       Nathan Tobar MD  [facility]  12/28/23  10:45 EST

## 2023-12-28 NOTE — PROGRESS NOTES
Clinical Pharmacy Services: Medication History    Mandy Alarcon is a 89 y.o. female presenting to [facility] for   Chief Complaint   Patient presents with    Vaginal Bleeding       She  has a past medical history of Benign essential hypertension (10/28/2012), Bilateral lower extremity edema (03/23/2020), Bilateral sensorineural hearing loss, wears hearing aids (06/14/2017), Chronic renal impairment, stage 3b (09/15/2020), Claustrophobia (04/28/2016), Combined form of senile cataract (03/03/2021), Family history of coronary artery disease (05/24/2016), Gastroesophageal reflux disease without esophagitis (03/23/2020), Gout (10/28/2012), History of acute pancreatitis (04/17/2020), Hyperlipidemia (10/28/2012), Impaired fasting glucose (10/28/2012), Morbidly obese (03/11/2019), Nocturnal hypoxemia (08/02/2012), Non morbid obesity (03/11/2019), Osteopenia of multiple sites (09/26/2023), Paroxysmal atrial fibrillation (04/10/2020), Postmenopausal state (09/26/2023), Primary hypothyroidism (11/17/2015), Secondary polycythemia (08/02/2012), and Vitamin D deficiency (05/23/2016).    Allergies as of 12/28/2023 - Reviewed 12/28/2023   Allergen Reaction Noted    Cefaclor Anaphylaxis, Angioedema, and Swelling 04/28/2016    Sulfa antibiotics Rash 04/28/2016       Medication information was obtained from: Pharmacy  Pharmacy and Phone Number:   [id number] - [place], [place] - [address] [place] [facility] [place] [facility] - [phone]  - [phone]   [address]  [facility] [address]  Phone: [phone] Fax: [phone]    [facility] [place]  [address] [facility] [address]  Phone: [phone] Fax: [phone]        Prior to Admission Medications       Prescriptions Last Dose Informant Patient Reported? Taking?    Acetaminophen (Tylenol) 325 MG capsule  Self Yes Yes    Take 1 capsule by mouth As Needed.    allopurinol (ZYLOPRIM) 300 MG  tablet  Pharmacy No Yes    TAKE 1 TABLET BY MOUTH DAILY FOR GOUT    Patient taking differently:  Take 1 tablet by mouth Daily.    apixaban (ELIQUIS) 5 MG tablet tablet  Pharmacy No Yes    Take 1 tablet (5 mg) every 12 hours for blood thinner    Patient taking differently:  Take 1 tablet by mouth Every 12 (Twelve) Hours.    levothyroxine (SYNTHROID, LEVOTHROID) 75 MCG tablet  Pharmacy No Yes    TAKE 1 TABLET BY MOUTH DAILY FOR THYROID    Patient taking differently:  Take 1 tablet by mouth Every Morning.    metoprolol succinate XL (TOPROL-XL) 25 MG 24 hr tablet  Pharmacy No Yes    TAKE 1 TABLET BY MOUTH DAILY FOR HIGH BLOOD PRESSURE AND HEART OR PALPITATIONS    Patient taking differently:  Take 1 tablet by mouth Daily.    omeprazole (priLOSEC) 40 MG capsule  Pharmacy No Yes    TAKE 1 CAPSULE BY MOUTH EVERY DAY BEFORE FIRST MEAL FOR STOMACH ACID OR REFLUX    Patient taking differently:  Take 1 capsule by mouth Daily.    pravastatin (PRAVACHOL) 40 MG tablet  Pharmacy No Yes    TAKE 1 TABLET BY MOUTH DAILY    Patient taking differently:  Take 1 tablet by mouth Daily.              Medication notes:     This medication list is complete to the best of my knowledge as of 12/28/2023    Please call if questions.    Juan M Cohen  Medication History Technician  [phone]    12/28/2023 09:18 EST

## 2023-12-28 NOTE — ED NOTES
Spoke with Dr. Hansen regarding pt's bladder irrigation color being a pale pink. Directed me to scale back the fluid rate if it continues to get clearer.

## 2023-12-28 NOTE — ED PROVIDER NOTES
The DAVE and I have discussed this patient's history, physical exam and treatment plan.  I provided a substantive portion of the care of this patient.  I have reviewed the documentation and personally had a face to face interaction with the patient and personally performed the physical exam, in its entirety.  I affirm the documentation and agree with the treatment and plan.  The following describes my personal findings.      The patient presents complaining of blood in after urinating 2 days.  Patient reports urinary frequency patient reports she is compliant with her Eliquis.  Patient reports she had an episode of blood in her urine approximately 1 month ago, treated as a urinary tract infection with temporary hold of the DOAC    Comprehensive Physical exam:  Patient is nontoxic appearing oriented, conversant awake, alert  HEENT: normocephalic, atraumatic  Neck: No JVD, no goiter, no pain with ROM  Pulmonary: Nontachypneic, breath sounds are well bilaterally  cardiovascular: Nontachycardic  Abdomen: Soft, mild discomfort to deep palpation suprapubic area, no guarding or rebound  musculoskeletal: Good range of motion, pulse, sensation x 4  Neuro/psychiatric:calm, appropriate, cooperative  Skin:warm, dry      Anticipate admission for persistent hematuria,, need for IV antibiotics, IV fluids, bladder/.     Adrianne Torres MD  12/28/23 6758

## 2023-12-28 NOTE — ED NOTES
..Nursing report ED to floor  Mandy Alarcon  89 y.o.  female    HPI :   Chief Complaint   Patient presents with    Blood in Urine       Admitting doctor:   Nathan Tobar MD    Admitting diagnosis:   The primary encounter diagnosis was Gross hematuria. Diagnoses of LINNEA (acute kidney injury) and Anticoagulated were also pertinent to this visit.    Code status:   Current Code Status       Date Active Code Status Order ID Comments User Context       12/28/2023 0951 CPR (Attempt to Resuscitate) [id number]  Nathan Tobar MD ED        Question Answer    Code Status (Patient has no pulse and is not breathing) CPR (Attempt to Resuscitate)    Medical Interventions (Patient has pulse or is breathing) Full Support                    Allergies:   Cefaclor and Sulfa antibiotics    Isolation:   No active isolations    Intake and Output    Intake/Output Summary (Last 24 hours) at 12/28/2023 1434  Last data filed at 12/28/2023 1300  Gross per 24 hour   Intake 100 ml   Output -500 ml   Net 600 ml       Weight:       12/28/23  0428   Weight: 96.2 kg (212 lb)       Most recent vitals:   Vitals:    12/28/23 0901 12/28/23 1233 12/28/23 1331 12/28/23 1332   BP: 153/83 178/81 171/92    BP Location:       Patient Position:       Pulse: 70 62  60   Resp:       Temp:       TempSrc:       SpO2: 95% 97%  96%   Weight:       Height:           Active LDAs/IV Access:   Lines, Drains & Airways       Active LDAs       Name Placement date Placement time Site Days    Peripheral IV 12/28/23 0918 Left;Posterior Wrist 12/28/23  0918  Wrist  less than 1    Continuous Bladder Irrigation Triple-lumen 22 Fr 12/28/23  1208  Triple-lumen  less than 1                    Labs (abnormal labs have a star):   Labs Reviewed   COMPREHENSIVE METABOLIC PANEL - Abnormal; Notable for the following components:       Result Value    Glucose 124 (*)     Creatinine 1.52 (*)     Chloride 111 (*)     CO2 19.0 (*)     Calcium 9.7 (*)     eGFR 32.4 (*)      All other components within normal limits    Narrative:     GFR Normal >60  Chronic Kidney Disease <60  Kidney Failure <15    The GFR formula is only valid for adults with stable renal function between ages 18 and 70.   URINALYSIS W/ MICROSCOPIC IF INDICATED (NO CULTURE) - Abnormal; Notable for the following components:    Color, UA Red (*)     Appearance, UA Turbid (*)     Bilirubin, UA Moderate (2+) (*)     Blood, UA Large (3+) (*)     Protein, UA >=300 mg/dL (3+) (*)     Leuk Esterase, UA Large (3+) (*)     All other components within normal limits   CBC WITH AUTO DIFFERENTIAL - Abnormal; Notable for the following components:    Hematocrit 46.7 (*)     Eosinophil % 6.9 (*)     Eosinophils, Absolute 0.45 (*)     All other components within normal limits   URINALYSIS, MICROSCOPIC ONLY - Abnormal; Notable for the following components:    RBC, UA Too Numerous to Count (*)     WBC, UA Unable to determine due to loaded field (*)     Bacteria, UA Unable to determine due to loaded field (*)     Squamous Epithelial Cells, UA Unable to determine due to loaded field (*)     All other components within normal limits   URIC ACID - Normal   URINE CULTURE   CBC AND DIFFERENTIAL    Narrative:     The following orders were created for panel order CBC & Differential.  Procedure                               Abnormality         Status                     ---------                               -----------         ------                     CBC Auto Differential[[id number]]        Abnormal            Final result                 Please view results for these tests on the individual orders.       EKG:   No orders to display       Meds given in ED:   Medications   sodium chloride 0.9 % flush 10 mL (has no administration in time range)   sodium chloride 0.9 % flush 10 mL (has no administration in time range)   sodium chloride 0.9 % infusion 40 mL (has no administration in time range)   sodium chloride 0.9 % infusion (100 mL/hr  Intravenous New Bag 12/28/23 1101)   acetaminophen (TYLENOL) tablet 650 mg (has no administration in time range)   ondansetron (ZOFRAN) tablet 4 mg (has no administration in time range)   cefTRIAXone (ROCEPHIN) 2,000 mg in sodium chloride 0.9 % 100 mL IVPB-VTB (has no administration in time range)   allopurinol (ZYLOPRIM) tablet 300 mg (has no administration in time range)   levothyroxine (SYNTHROID, LEVOTHROID) tablet 75 mcg (has no administration in time range)   metoprolol succinate XL (TOPROL-XL) 24 hr tablet 25 mg (25 mg Oral Given 12/28/23 1409)   pantoprazole (PROTONIX) EC tablet 40 mg (40 mg Oral Given 12/28/23 1409)   pravastatin (PRAVACHOL) tablet 40 mg (has no administration in time range)   cefTRIAXone (ROCEPHIN) 2,000 mg in sodium chloride 0.9 % 100 mL IVPB-VTB (0 mg Intravenous Stopped 12/28/23 1136)       Imaging results:  CT Abdomen Pelvis Without Contrast    Result Date: 12/28/2023  1.  No noncontrast CT findings of acute intraabdominal pathology. 2.  A few sub-6 mm pulm nodules in the left lower lobe not definitively seen on prior imaging. If this patient is at increased risk for lung cancer, follow-up chest CT in 12 months can be considered to ensure stability. If this patient is not at increased risk for lung cancer, no further follow-up is necessary per Fleischner criteria. 3.  Postsurgical changes from a recent left femoral thrombectomy/embolectomy cannot be evaluated without intravenous contrast. Correlation with patient history and physical exam is recommended with follow-up CT with intravenous contrast if clinically indicated. 4.  Other incidental and chronic findings as above.    This report was finalized on 12/28/2023 8:20 AM by Dr. Carlitos Willis M.D on Workstation: [id number]       Ambulatory status:   - pt will need to be up with assess it due to multiple tubes. (Bladder irrigation tubing, IV)     Social issues:   Social History     Socioeconomic History    Marital status:      Number of children: 5    Highest education level: GED or equivalent   Tobacco Use    Smoking status: Former     Packs/day: .5     Types: Cigarettes     Start date: 1946     Quit date: 1954     Years since quittin.0    Smokeless tobacco: Never    Tobacco comments:     Stopped drinking at age 30.   Vaping Use    Vaping Use: Never used   Substance and Sexual Activity    Alcohol use: No     Comment: caffiene use tea coffee    Drug use: No    Sexual activity: Not Currently     Partners: Male       NIH Stroke Scale:       Tiffanie Bauer RN  23 14:34 EST

## 2023-12-28 NOTE — ED NOTES
Put a call out to nephrology to see if NPO order could be modified. Ekaterina ENGLISH wanted us to clear it with them first.

## 2023-12-29 LAB
ANION GAP SERPL CALCULATED.3IONS-SCNC: 7 MMOL/L (ref 5–15)
BUN SERPL-MCNC: 20 MG/DL (ref 8–23)
BUN/CREAT SERPL: 14.9 (ref 7–25)
CALCIUM SPEC-SCNC: 8.4 MG/DL (ref 8.2–9.6)
CHLORIDE SERPL-SCNC: 115 MMOL/L (ref 98–107)
CO2 SERPL-SCNC: 17 MMOL/L (ref 22–29)
CREAT SERPL-MCNC: 1.34 MG/DL (ref 0.57–1)
DEPRECATED RDW RBC AUTO: 49.5 FL (ref 37–54)
EGFRCR SERPLBLD CKD-EPI 2021: 37.7 ML/MIN/1.73
ERYTHROCYTE [DISTWIDTH] IN BLOOD BY AUTOMATED COUNT: 14.3 % (ref 12.3–15.4)
GLUCOSE SERPL-MCNC: 103 MG/DL (ref 65–99)
HCT VFR BLD AUTO: 40 % (ref 34–46.6)
HGB BLD-MCNC: 13.2 G/DL (ref 12–15.9)
MCH RBC QN AUTO: 30.8 PG (ref 26.6–33)
MCHC RBC AUTO-ENTMCNC: 33 G/DL (ref 31.5–35.7)
MCV RBC AUTO: 93.5 FL (ref 79–97)
PLATELET # BLD AUTO: 183 10*3/MM3 (ref 140–450)
PMV BLD AUTO: 9.7 FL (ref 6–12)
POTASSIUM SERPL-SCNC: 4.1 MMOL/L (ref 3.5–5.2)
RBC # BLD AUTO: 4.28 10*6/MM3 (ref 3.77–5.28)
SODIUM SERPL-SCNC: 139 MMOL/L (ref 136–145)
WBC NRBC COR # BLD AUTO: 5.55 10*3/MM3 (ref 3.4–10.8)

## 2023-12-29 PROCEDURE — 96361 HYDRATE IV INFUSION ADD-ON: CPT

## 2023-12-29 PROCEDURE — 25010000002 CEFTRIAXONE PER 250 MG: Performed by: HOSPITALIST

## 2023-12-29 PROCEDURE — 85027 COMPLETE CBC AUTOMATED: CPT | Performed by: HOSPITALIST

## 2023-12-29 PROCEDURE — 25810000003 SODIUM CHLORIDE 0.9 % SOLUTION: Performed by: HOSPITALIST

## 2023-12-29 PROCEDURE — G0378 HOSPITAL OBSERVATION PER HR: HCPCS

## 2023-12-29 PROCEDURE — 80048 BASIC METABOLIC PNL TOTAL CA: CPT | Performed by: HOSPITALIST

## 2023-12-29 PROCEDURE — 36415 COLL VENOUS BLD VENIPUNCTURE: CPT | Performed by: HOSPITALIST

## 2023-12-29 RX ADMIN — ALLOPURINOL 300 MG: 300 TABLET ORAL at 10:11

## 2023-12-29 RX ADMIN — ACETAMINOPHEN 650 MG: 325 TABLET, FILM COATED ORAL at 18:17

## 2023-12-29 RX ADMIN — PANTOPRAZOLE SODIUM 40 MG: 40 TABLET, DELAYED RELEASE ORAL at 06:42

## 2023-12-29 RX ADMIN — ACETAMINOPHEN 650 MG: 325 TABLET, FILM COATED ORAL at 22:22

## 2023-12-29 RX ADMIN — PRAVASTATIN SODIUM 40 MG: 40 TABLET ORAL at 10:11

## 2023-12-29 RX ADMIN — ACETAMINOPHEN 650 MG: 325 TABLET, FILM COATED ORAL at 00:52

## 2023-12-29 RX ADMIN — ACETAMINOPHEN 650 MG: 325 TABLET, FILM COATED ORAL at 06:42

## 2023-12-29 RX ADMIN — SODIUM CHLORIDE 100 ML/HR: 9 INJECTION, SOLUTION INTRAVENOUS at 06:48

## 2023-12-29 RX ADMIN — LEVOTHYROXINE SODIUM 75 MCG: 75 TABLET ORAL at 06:42

## 2023-12-29 RX ADMIN — CEFTRIAXONE 2000 MG: 2 INJECTION, POWDER, FOR SOLUTION INTRAMUSCULAR; INTRAVENOUS at 10:12

## 2023-12-29 RX ADMIN — SODIUM CHLORIDE 100 ML/HR: 9 INJECTION, SOLUTION INTRAVENOUS at 18:07

## 2023-12-29 NOTE — PROGRESS NOTES
Continued Stay Note  [facility]     Patient Name: Mandy Alarcon  MRN: [id number]  Today's Date: 12/29/2023    Admit Date: 12/28/2023    Plan: Home no needs   Discharge Plan       Row Name 12/29/23 4070       Plan    Plan Home no needs    Plan Comments Introduced self and role of CCP. Patient confirmed DC plan is to return to home. Stated she lives with her dgt, Ashley, NEAL and granddaugther (age 50 and is blind). Stated she is independent with ADL's, uses no DMEs and continues to drive. Stated her dgter will assist as needed and will provide transportation at DC. Denies any needs/equipment                   Discharge Codes    No documentation.                       Tracie Rizvi RN

## 2023-12-29 NOTE — PLAN OF CARE
Goal Outcome Evaluation: Pt resting in bed with daughter at bedside. CBI is running slow and clear. Pt tolerating well. VS stable. Bed in lowest position with siderails up X2. Call light within reach. RN will continue to monitor.

## 2023-12-29 NOTE — PROGRESS NOTES
Urine now clear  Will wean her tur drip  Check bladder scan to make sure bladder is decompressed    Could have a voiding trial this afternoon if clear

## 2023-12-29 NOTE — CONSULTS
FIRST UROLOGY CONSULT      Patient Identification:  NAME:  Mandy Alarcon  Age:  89 y.o.   Sex:  female   :  1933   MRN:  [id number]       Chief complaint: hematuria    History of present illness:  89yo lady on eliquis with hemorrhagic cystitis.      Past medical history:  Past Medical History:   Diagnosis Date    Benign essential hypertension 10/28/2012    2012--treatment for hypertension begun.    Bilateral lower extremity edema 2020    Bilateral sensorineural hearing loss, wears hearing aids 2017    Left is much worse than right.  Patient had multiple ear infections as a child.    Chronic renal impairment, stage 3b 09/15/2020    Claustrophobia 2016    This patient has significant nocturnal hypoxemia and I think that she could benefit from nocturnal oxygen therapy. The exact etiology of her hypoxemia is not clear. She could possibly have obstructive sleep apnea but this is not documented. We cannot test this patient for sleep apnea due to the fact that she is severely claustrophobic. Patient was a former smoker and it is possibly that COPD he is playing a role.    Combined form of senile cataract 2021    Family history of coronary artery disease 2016    Patient's mother, father, 2 sisters and a brother all  from myocardial infarctions    Gastroesophageal reflux disease without esophagitis 2020    Gout 10/28/2012    2012--initial diagnosis and treatment of gout.    History of acute pancreatitis 2020    Hyperlipidemia 10/28/2012    2012--treatment for hyperlipidemia begun.    Impaired fasting glucose 10/28/2012    2012--initial diagnosis impaired fasting glucose.    Morbidly obese 2019    Nocturnal hypoxemia 2012--patient did not receive nocturnal oxygen because of Medicare regulations.   05/15/2014--overnight oximetry revealed oxygen saturations less than 89% for 22 minutes and 40 seconds.  Oxygen saturations less than or equal to 88% for 22 minutes and 40 seconds. Lowest oxygen saturation 83%. The longest continuous time with oxygen saturations less than or equal to 88% was 1 minute and 32 seconds.   08/02/2012--overnight oximetry revealed oxygen saturations less than 90% for one hour and 35 minutes. Oxygen saturations less than 89% 59 minutes. This patient has significant nocturnal hypoxemia and I think that she could benefit from nocturnal oxygen therapy. The exact etiology of her hypoxemia is not clear. She could possibly have obstructive sleep apnea but this is not documented. We cannot test this patient for sleep apnea due to the fact that she is severely claustrophobic. Patient was a former smoker and it is possibly that COPD he is playing a role.    Non morbid obesity 03/11/2019    Osteopenia of multiple sites 09/26/2023    Paroxysmal atrial fibrillation 04/10/2020    Postmenopausal state 09/26/2023    Primary hypothyroidism 11/17/2015 March 11, 2019--TSH remains elevated slightly at 4.92.  Given the overall clinical picture including the multinodular goiter, we will initiate levothyroxine 50 mcg/day and reassess in about 6 weeks.  August 1, 2018--thyroid ultrasound reveals a multinodular thyroid with multiple subcentimeter nodules.  Only minimal increase in size of the largest nodule in the left lobe has occurred when compared     Secondary polycythemia 08/02/2012 11/06/2014--patient did not receive nocturnal oxygen because of Medicare regulations.   05/15/2014--overnight oximetry revealed oxygen saturations less than 89% for 22 minutes and 40 seconds. Oxygen saturations less than or equal to 88% for 22 minutes and 40 seconds. Lowest oxygen saturation 83%. The longest continuous time with oxygen saturations less than or equal to 88% was 1 minute and 32 seconds.   08/02/2012--overnight oximetry revealed oxygen saturations less than 90% for one hour and 35 minutes. Oxygen saturations less  than 89% 59 minutes. This patient has significant nocturnal hypoxemia and I think that she could benefit from nocturnal oxygen therapy. The exact etiology of her hypoxemia is not clear. She could possibly have obstructive sleep apnea but this is not documented. We cannot test this patient for sleep apnea due to the fact that she is severely claustrophobic. Patient was a former smoker and it is possibly that COPD he is playing a role.    Vitamin D deficiency 05/23/2016       Past surgical history:  Past Surgical History:   Procedure Laterality Date    CHOLECYSTECTOMY WITH INTRAOPERATIVE CHOLANGIOGRAM N/A 03/12/2020    Procedure: LAPAROSCOPIC CHOLECYSTOSTOMY TUBE, INTRAOPERATIVE CHOLANGIOGRAM;  Surgeon: Bryce Andujar MD;  Location: [facility] OR;  Service: General;  Laterality: N/A;    CHOLECYSTECTOMY WITH INTRAOPERATIVE CHOLANGIOGRAM N/A 05/22/2020    Procedure: CHOLECYSTECTOMY LAPAROSCOPIC INTRAOPERATIVE CHOLANGIOGRAM and EGD;  Surgeon: Bryce Andujar MD;  Location: [facility] OR;  Service: General;  Laterality: N/A;    ERCP N/A 07/01/2022    Procedure: ENDOSCOPIC RETROGRADE CHOLANGIOPANCREATOGRAPHY with sphincterotomy, balloon sweep 12-15mm;  Surgeon: Mitesh Altamirano MD;  Location: [facility] ENDOSCOPY;  Service: Gastroenterology;  Laterality: N/A;  pre - gallstone pancreatitis  post - s/p sphincterotomy, sludge and  pus    FEMORAL THROMBECTOMY/EMBOLECTOMY Left 11/16/2023    Procedure: OPEN LEFT FEMORAL THROMBECTOMY/EMBOLECTOMY WITH LEFT LOWER EXTREMITY ANGIOGRAM;  Surgeon: Jace Chowdhury Jr., MD;  Location: [facility] OR 18/19;  Service: Vascular;  Laterality: Left;    OOPHORECTOMY      age 48    RADICAL ABDOMINAL HYSTERECTOMY  48 years old    48 years of age. Uterine fibroid tumors with menorrhagia - no cancer       Allergies:  Cefaclor and Sulfa antibiotics    Home medications:  Medications Prior to Admission   Medication Sig Dispense Refill Last Dose    Acetaminophen (Tylenol) 325 MG  capsule Take 1 capsule by mouth As Needed.       allopurinol (ZYLOPRIM) 300 MG tablet TAKE 1 TABLET BY MOUTH DAILY FOR GOUT (Patient taking differently: Take 1 tablet by mouth Daily.)       apixaban (ELIQUIS) 5 MG tablet tablet Take 1 tablet (5 mg) every 12 hours for blood thinner (Patient taking differently: Take 1 tablet by mouth Every 12 (Twelve) Hours.) 60 tablet 4     levothyroxine (SYNTHROID, LEVOTHROID) 75 MCG tablet TAKE 1 TABLET BY MOUTH DAILY FOR THYROID (Patient taking differently: Take 1 tablet by mouth Every Morning.)       metoprolol succinate XL (TOPROL-XL) 25 MG 24 hr tablet TAKE 1 TABLET BY MOUTH DAILY FOR HIGH BLOOD PRESSURE AND HEART OR PALPITATIONS (Patient taking differently: Take 1 tablet by mouth Daily.)       omeprazole (priLOSEC) 40 MG capsule TAKE 1 CAPSULE BY MOUTH EVERY DAY BEFORE FIRST MEAL FOR STOMACH ACID OR REFLUX (Patient taking differently: Take 1 capsule by mouth Daily.)       pravastatin (PRAVACHOL) 40 MG tablet TAKE 1 TABLET BY MOUTH DAILY (Patient taking differently: Take 1 tablet by mouth Daily.) 90 tablet 3        Hospital medications:  allopurinol, 300 mg, Oral, Daily  cefTRIAXone, 2,000 mg, Intravenous, Q24H  levothyroxine, 75 mcg, Oral, Q AM  metoprolol succinate XL, 25 mg, Oral, Daily  pantoprazole, 40 mg, Oral, Q AM  pravastatin, 40 mg, Oral, Daily  sodium chloride, 10 mL, Intravenous, Q12H      sodium chloride, 100 mL/hr, Last Rate: 100 mL/hr (23 0648)        acetaminophen    ondansetron    sodium chloride    sodium chloride    Family history:  Family History   Problem Relation Age of Onset    Heart disease Mother         Ischemic.  from coronary artery disease.    Heart disease Father         Ischemic.  from coronary artery disease.    Heart disease Sister         Ischemic.  from coronary artery disease.    Heart disease Sister         Ischemic.  from coronary artery disease.    Heart disease Brother         Ischemic.  from coronary artery  disease.    Breast cancer Daughter     Breast cancer Daughter     Ovarian cancer Neg Hx     Malig Hyperthermia Neg Hx        Social history:  Social History     Tobacco Use    Smoking status: Former     Packs/day: .5     Types: Cigarettes     Start date: 1946     Quit date: 1954     Years since quittin.0    Smokeless tobacco: Never    Tobacco comments:     Stopped drinking at age 30.   Vaping Use    Vaping Use: Never used   Substance Use Topics    Alcohol use: No     Comment: caffiene use tea coffee    Drug use: No       Review of systems:    Negative 12-system ROS except for the following:          Objective:  TMax 24 hours:   Temp (24hrs), Av.2 °F (36.2 °C), Min:96.7 °F (35.9 °C), Max:97.7 °F (36.5 °C)      Vitals Ranges:   Temp:  [96.7 °F (35.9 °C)-97.7 °F (36.5 °C)] 97.1 °F (36.2 °C)  Heart Rate:  [56-70] 59  Resp:  [16-18] 18  BP: (139-178)/(61-92) 139/61    Intake/Output Last 3 shifts:  I/O last 3 completed shifts:  In: 100 [IV Piggyback:100]  Out: -250      Physical Exam:       General Appearance:    Alert, cooperative, in no acute distress   Head:    Normocephalic, without obvious abnormality, atraumatic   Eyes:          PERRL, conjunctivae and corneas clear   Ears:    Normal external inspection   Throat:   No oral lesions, oral mucosa moist   Neck:   Supple, no LAD, trachea midline   Back:     No CVA tenderness   Lungs:     Respirations unlabored, symmetric excursion    Heart:    RRR, intact peripheral pulses   Abdomen:          :         Extremities:   No edema, no deformity   Skin:   No bleeding, bruising or rashes   Neuro/Psych:   Orientation intact, mood/affect pleasant, no focal findings       Results review:   I reviewed the patient's new clinical results.    Data review:  Lab Results (last 24 hours)       Procedure Component Value Units Date/Time    CBC (No Diff) [[id number]] Collected: 23    Specimen: Blood Updated: 23    Basic Metabolic Panel [[id number]]  Collected: 12/29/23 0602    Specimen: Blood Updated: 12/29/23 0639    Urine Culture - Urine, Urine, Clean Catch [[id number]] Collected: 12/28/23 0500    Specimen: Urine, Clean Catch Updated: 12/28/23 1509    Uric Acid [[id number]]  (Normal) Collected: 12/28/23 0454    Specimen: Blood Updated: 12/28/23 1106     Uric Acid 4.5 mg/dL              Imaging:  Imaging Results (Last 24 Hours)       Procedure Component Value Units Date/Time    CT Abdomen Pelvis Without Contrast [[id number]] Collected: 12/28/23 0803     Updated: 12/28/23 0824    Narrative:      CT ABDOMEN AND PELVIS WITHOUT IV CONTRAST     HISTORY: Gross hematuria, lower abdominal pain liver enzymes and white  blood cell count within normal limits     TECHNIQUE: Radiation dose reduction techniques were utilized, including  automated exposure control and exposure modulation based on body size.   3 mm images were obtained through the abdomen and pelvis without IV  contrast.     COMPARISON: Multiple CT abdomen pelvis dating back to 1/31/2020, CTA  chest 6/20/2022 and abdominal MRI 6/29/2022     FINDINGS:  Multiple scattered pulm nodules are present within the lung bases, many  of which were seen on 6/20/2022. There are a few sub-6 mm nodules within  the left lower lobe not definitively seen on prior imaging. Area of  nodularity within the most inferior aspect of the imaged left lower lobe  corresponds with calcified granuloma seen on 1/31/2020. There is a small  hiatal hernia. No findings of small bowel obstruction. The appendix is  unremarkable. Mild to moderate intrahepatic bili ductal dilation is  present status post cholecystectomy, as before..     There is a cystic area contiguous with the gallbladder fossa measuring  3.1 x 2.5 cm, grossly unchanged since 6/28/2022. The pancreas, spleen  and adrenal glands have an unremarkable noncontrast CT appearance.  Hypodense renal lesions demonstrating density less than 15 Hounsfield  units are likely benign per Bosniak  2019 criteria. There is no  hydronephrosis. The bladder is decompressed and cannot be evaluated. The  uterus is surgically absent.     No abdominal pelvic adenopathy by size criteria. There is colonic  diverticulosis. No free intraperitoneal air is seen. Postsurgical  changes from open left femoral thrombectomy/embolectomy performed on  11/16/2023 are present and not well evaluated without intravenous  contrast. For the purpose of this dictation, last well-formed space  referred to as L5-S1. Compression infirmities at L1 and T11 are present,  as before.       Impression:      1.  No noncontrast CT findings of acute intraabdominal pathology.  2.  A few sub-6 mm pulm nodules in the left lower lobe not definitively  seen on prior imaging. If this patient is at increased risk for lung  cancer, follow-up chest CT in 12 months can be considered to ensure  stability. If this patient is not at increased risk for lung cancer, no  further follow-up is necessary per Fleischner criteria.  3.  Postsurgical changes from a recent left femoral  thrombectomy/embolectomy cannot be evaluated without intravenous  contrast. Correlation with patient history and physical exam is  recommended with follow-up CT with intravenous contrast if clinically  indicated.  4.  Other incidental and chronic findings as above.           This report was finalized on 12/28/2023 8:20 AM by Dr. Carlitos Willis M.D on Workstation: [id number]                  Assessment:       Gross hematuria    Benign essential hypertension    Gout    Primary hypothyroidism    LINNEA (acute kidney injury)    Paroxysmal atrial fibrillation    Stage 3b chronic kidney disease    Likely hemorrhagic cystitis aggravated by eliquis    Plan:     Wean tur drip and voiding trial  Hold eliquis 24h  Probably would benefit from daily macrobid 100mg for at least a month or so    Rex Reynaga Jr., MD  12/29/23  06:48 EST

## 2023-12-29 NOTE — PLAN OF CARE
Problem: Adult Inpatient Plan of Care  Goal: Plan of Care Review  Outcome: Ongoing, Progressing  Flowsheets (Taken 12/29/2023 0330)  Progress: no change  Plan of Care Reviewed With: patient  Outcome Evaluation: Pt alert and oriented very pleasant, Pt on continuous bladder irrigation, pink tinged urine noted, c/o lower abdominal discomfort described as pressure, medicated with Tylenol, good pain relief noted, tolerating Regular diet, no c/o nausea, had 1 good BM, catheter care done,   Goal Outcome Evaluation:  Plan of Care Reviewed With: patient        Progress: no change  Outcome Evaluation: Pt alert and oriented very pleasant, Pt on continuous bladder irrigation, pink tinged urine noted, c/o lower abdominal discomfort described as pressure, medicated with Tylenol, good pain relief noted, tolerating Regular diet, no c/o nausea, had 1 good BM, catheter care done,

## 2023-12-29 NOTE — PROGRESS NOTES
Name: Mandy Alarcon ADMIT: 2023   : 1933  PCP: Daryl Santos MD    MRN: [id number] LOS: 0 days   AGE/SEX: 89 y.o. female  ROOM: [facility]     Subjective   Subjective   Patient feels good.  Urine still very minimally blood-tinged       Objective   Objective   Vital Signs  Temp:  [96.7 °F (35.9 °C)-97.6 °F (36.4 °C)] 97.6 °F (36.4 °C)  Heart Rate:  [52-59] 53  Resp:  [16-18] 16  BP: (130-183)/(56-80) 183/75  SpO2:  [95 %-97 %] 97 %  on   ;   Device (Oxygen Therapy): room air  Body mass index is 37.55 kg/m².  Physical Exam  Vitals and nursing note reviewed.   Constitutional:       General: She is not in acute distress.     Appearance: Normal appearance.   HENT:      Head: Normocephalic and atraumatic.   Eyes:      General: No scleral icterus.  Neck:      Vascular: No JVD.   Cardiovascular:      Rate and Rhythm: Normal rate and regular rhythm.      Pulses: Normal pulses.      Heart sounds: Normal heart sounds. No murmur heard.  Pulmonary:      Effort: Pulmonary effort is normal. No respiratory distress.      Breath sounds: Normal breath sounds.   Abdominal:      General: Bowel sounds are normal. There is no distension.      Palpations: Abdomen is soft.      Tenderness: There is no abdominal tenderness.   Musculoskeletal:         General: No swelling or tenderness.   Skin:     General: Skin is warm and dry.      Coloration: Skin is not jaundiced.      Findings: No rash.   Neurological:      Mental Status: She is alert and oriented to person, place, and time.   Psychiatric:         Mood and Affect: Mood normal.         Behavior: Behavior normal.       Results Review     I reviewed the patient's new clinical results.  Results from last 7 days   Lab Units 23  0602 23  0454   WBC 10*3/mm3 5.55 6.49   HEMOGLOBIN g/dL 13.2 14.9   PLATELETS 10*3/mm3 183 200     Results from last 7 days   Lab Units 23  0602 23  0454   SODIUM mmol/L 139 139   POTASSIUM mmol/L 4.1 4.1   CHLORIDE mmol/L  "115* 111*   CO2 mmol/L 17.0* 19.0*   BUN mg/dL 20 19   CREATININE mg/dL 1.34* 1.52*   GLUCOSE mg/dL 103* 124*   EGFR mL/min/1.73 37.7* 32.4*     Results from last 7 days   Lab Units 12/28/23  0454   ALBUMIN g/dL 4.1   BILIRUBIN mg/dL 0.4   ALK PHOS U/L 53   AST (SGOT) U/L 16   ALT (SGPT) U/L 14     Results from last 7 days   Lab Units 12/29/23  0602 12/28/23  0454   CALCIUM mg/dL 8.4 9.7*   ALBUMIN g/dL  --  4.1       No results found for: \"HGBA1C\", \"POCGLU\"    CT Abdomen Pelvis Without Contrast    Result Date: 12/28/2023  1.  No noncontrast CT findings of acute intraabdominal pathology. 2.  A few sub-6 mm pulm nodules in the left lower lobe not definitively seen on prior imaging. If this patient is at increased risk for lung cancer, follow-up chest CT in 12 months can be considered to ensure stability. If this patient is not at increased risk for lung cancer, no further follow-up is necessary per Fleischner criteria. 3.  Postsurgical changes from a recent left femoral thrombectomy/embolectomy cannot be evaluated without intravenous contrast. Correlation with patient history and physical exam is recommended with follow-up CT with intravenous contrast if clinically indicated. 4.  Other incidental and chronic findings as above.    This report was finalized on 12/28/2023 8:20 AM by Dr. Carlitos Willis M.D on Workstation: [id number]       I have personally reviewed all medications:  Scheduled Medications  allopurinol, 300 mg, Oral, Daily  cefTRIAXone, 2,000 mg, Intravenous, Q24H  levothyroxine, 75 mcg, Oral, Q AM  metoprolol succinate XL, 25 mg, Oral, Daily  pantoprazole, 40 mg, Oral, Q AM  pravastatin, 40 mg, Oral, Daily  sodium chloride, 10 mL, Intravenous, Q12H    Infusions  sodium chloride, 100 mL/hr, Last Rate: 100 mL/hr (12/29/23 0648)    Diet  Diet: Regular/House Diet; Texture: Regular Texture (IDDSI 7); Fluid Consistency: Thin (IDDSI 0)    I have personally reviewed:  [x]  Laboratory   [x]  Microbiology   []  " Radiology   []  EKG/Telemetry  []  Cardiology/Vascular   []  Pathology    []  Records       Assessment/Plan     Active Hospital Problems    Diagnosis  POA    **Gross hematuria [R31.0]  Yes    Stage 3b chronic kidney disease [N18.32]  Yes    Paroxysmal atrial fibrillation [I48.0]  Yes    LINNEA (acute kidney injury) [N17.9]  Yes    Primary hypothyroidism [E03.9]  Yes    Benign essential hypertension [I10]  Yes    Gout [Z87.39]  Yes      Resolved Hospital Problems   No resolved problems to display.       89 y.o. female with history of gout, hypothyroid, hypertension and CKD 3 as well as recent hospitalization with right femoral artery thrombus status post thrombectomy admitted with recurrent Gross hematuria.    Hematuria is resolving.  Urine looks fairly clear with just a slight blood tinge.  Have been in communication with nursing all day, looks like plan is going to be to hold overnight due to continued slight blood-tinged and monitor with plans for voiding trial in a.m.    Holding Eliquis.  Would not be comfortable holding for much longer than an additional day given recent thrombus unless hematuria is significant    LINNEA has already resolved and is at or below her usual baseline    Disposition: Patient plan to go home at discharge with family.  Hopefully ready tomorrow      Nathan Tobar MD  [facility]  12/29/23  17:44 EST

## 2023-12-30 ENCOUNTER — READMISSION MANAGEMENT (OUTPATIENT)
Dept: CALL CENTER | Facility: HOSPITAL | Age: 88
End: 2023-12-30
Payer: MEDICARE

## 2023-12-30 VITALS
BODY MASS INDEX: 37.56 KG/M2 | OXYGEN SATURATION: 98 % | TEMPERATURE: 97.4 F | HEIGHT: 63 IN | SYSTOLIC BLOOD PRESSURE: 163 MMHG | DIASTOLIC BLOOD PRESSURE: 75 MMHG | HEART RATE: 55 BPM | RESPIRATION RATE: 16 BRPM | WEIGHT: 212 LBS

## 2023-12-30 PROBLEM — N17.9 AKI (ACUTE KIDNEY INJURY): Status: RESOLVED | Noted: 2020-02-02 | Resolved: 2023-12-30

## 2023-12-30 LAB — BACTERIA SPEC AEROBE CULT: NO GROWTH

## 2023-12-30 PROCEDURE — G0378 HOSPITAL OBSERVATION PER HR: HCPCS

## 2023-12-30 PROCEDURE — 25810000003 SODIUM CHLORIDE 0.9 % SOLUTION: Performed by: HOSPITALIST

## 2023-12-30 PROCEDURE — 25010000002 CEFTRIAXONE PER 250 MG: Performed by: HOSPITALIST

## 2023-12-30 PROCEDURE — 96361 HYDRATE IV INFUSION ADD-ON: CPT

## 2023-12-30 PROCEDURE — 51798 US URINE CAPACITY MEASURE: CPT

## 2023-12-30 RX ORDER — CEPHALEXIN 250 MG/1
250 CAPSULE ORAL DAILY
Qty: 30 CAPSULE | Refills: 0 | Status: SHIPPED | OUTPATIENT
Start: 2023-12-30

## 2023-12-30 RX ADMIN — PANTOPRAZOLE SODIUM 40 MG: 40 TABLET, DELAYED RELEASE ORAL at 06:30

## 2023-12-30 RX ADMIN — LEVOTHYROXINE SODIUM 75 MCG: 75 TABLET ORAL at 06:30

## 2023-12-30 RX ADMIN — ALLOPURINOL 300 MG: 300 TABLET ORAL at 08:13

## 2023-12-30 RX ADMIN — CEFTRIAXONE 2000 MG: 2 INJECTION, POWDER, FOR SOLUTION INTRAMUSCULAR; INTRAVENOUS at 08:13

## 2023-12-30 RX ADMIN — ACETAMINOPHEN 650 MG: 325 TABLET, FILM COATED ORAL at 02:29

## 2023-12-30 RX ADMIN — PRAVASTATIN SODIUM 40 MG: 40 TABLET ORAL at 08:13

## 2023-12-30 RX ADMIN — SODIUM CHLORIDE 100 ML/HR: 9 INJECTION, SOLUTION INTRAVENOUS at 04:10

## 2023-12-30 RX ADMIN — METOPROLOL SUCCINATE 25 MG: 25 TABLET, EXTENDED RELEASE ORAL at 08:13

## 2023-12-30 NOTE — NURSING NOTE
Seen by Urology, voiding trial done. Pt voided. Sat up in chair. HR bradycardic. Restart Eliquis tomorrow. Discharged home today.

## 2023-12-30 NOTE — PLAN OF CARE
Goal Outcome Evaluation:  Plan of Care Reviewed With: patient, daughter        Progress: improving  Outcome Evaluation: Continuous bladder irrigation clamped, urine in demarco is clear and slighty blood tinged, patient reports intermittent bladder spasms, given Tylenol with relief, vss, afebrile, ivf infusing, falls precautions maintained, will continue to monitor.

## 2023-12-30 NOTE — PLAN OF CARE
Problem: Adult Inpatient Plan of Care  Goal: Plan of Care Review  Outcome: Ongoing, Progressing  Flowsheets (Taken 12/30/2023 0444)  Progress: improving  Plan of Care Reviewed With: patient  Outcome Evaluation: doing well, medicated with Tylenol for urethral spasm and discomfort with good pain control, 3 way demarco cath in place, irrigation clamped as ordered, light pink tinged urine noted especially with activity, oral fluids encouraged, VSS, slept well   Goal Outcome Evaluation:  Plan of Care Reviewed With: patient        Progress: improving  Outcome Evaluation: doing well, medicated with Tylenol for urethral spasm and discomfort with good pain control, 3 way demarco cath in place, irrigation clamped as ordered, light pink tinged urine noted especially with activity, oral fluids encouraged, VSS, slept well

## 2023-12-30 NOTE — PROGRESS NOTES
F/U on hemorrhagic cystitis. Recent cystoscopy was negative. Patient on Eliquis (afib) which has been held since 12/28/2023.     Patient awake and sitting in chair.     AVSS, with 3-way demarco in place (clamped) good UOP  Abdomen benign,   Urine is clear today  Eating well and ambulating in room    Crit (1.34)   Hg (13.2)   WBC (5.55)  Urinalysis 12/28/2023 - neg nitrites, large leuks, WBCs too numerous to count  Urine culture 12/28/2023 - no growth  Currently on IV ceftriaxone and IV fluids     CT abd/pelv wo/contrast 12/29/2023 - Hypodense renal lesions demonstrating density less than 15 Hounsfield  units are likely benign per Bosniak 2019 criteria. There is no hydronephrosis. The bladder is decompressed and cannot be evaluated.    PLAN:   - Voiding trial today.    - Cleared from urology standpoint for discharge if patient is able to urinate   - Okay to restart eliquis tomorrow

## 2023-12-30 NOTE — DISCHARGE SUMMARY
Patient Name: Mandy Alarcon  : 1933  MRN: [id number]    Date of Admission: 2023  Date of Discharge:  2023  Primary Care Physician: Daryl Santos MD      Chief Complaint:   Blood in Urine      Discharge Diagnoses     Active Hospital Problems    Diagnosis  POA    **Gross hematuria [R31.0]  Yes    Stage 3b chronic kidney disease [N18.32]  Yes    Paroxysmal atrial fibrillation [I48.0]  Yes    Primary hypothyroidism [E03.9]  Yes    Benign essential hypertension [I10]  Yes    Gout [Z87.39]  Yes      Resolved Hospital Problems    Diagnosis Date Resolved POA    LINNEA (acute kidney injury) [N17.9] 2023 Yes        Hospital Course     Ms. Alarcon is a 89 y.o. female with a history of CKD 3b, PAF and HTN with recent hospitalization for femoral artery thrombus status post thrombectomy and discharged on Eliquis.  She presented to [facility] initially complaining of hematuria.  Please see the admitting history and physical for further details.  She had an episode of hematuria about 8 days after being started on the Eliquis which ultimately resolved after the med was held for a couple days.  Cystoscopy at the time showed some areas of hemorrhagic cystitis but no overt lesions that were bleeding.  She had done well with restarting Eliquis up until the morning of admission when gross hematuria began again with a few blood clots being passed.  She was admitted and urology was consulted who recommended CBI with three-way catheter placement.  Eliquis was held again.  From medical standpoint she was stable other than some very minimal increase in creatinine on admission which resolved almost immediately.  Urine has steadily cleared over the last 48 hours and catheter was removed this morning with good passage of urine afterward on her own.  She is not currently bleeding.  Would like to restart Eliquis as soon as felt safe by urology given this very recent clot.  Anticipate her going home  today if cleared by urology.  Patient is very much agreeable with this.  Urology note yesterday indicated that they would want her to stay on a low-dose suppressive antibiotics so I have put her on Keflex since her renal function contraindicates Macrobid.    Day of Discharge     Subjective:  Feels good and hoping to go home today    Physical Exam:  Temp:  [97.1 °F (36.2 °C)-97.6 °F (36.4 °C)] 97.4 °F (36.3 °C)  Heart Rate:  [53-55] 55  Resp:  [16] 16  BP: (136-183)/(61-75) 163/75  Body mass index is 37.55 kg/m².  Physical Exam  Vitals and nursing note reviewed.   Constitutional:       General: She is not in acute distress.     Appearance: Normal appearance.   HENT:      Head: Normocephalic and atraumatic.   Eyes:      General: No scleral icterus.  Neck:      Vascular: No JVD.   Cardiovascular:      Rate and Rhythm: Normal rate and regular rhythm.      Pulses: Normal pulses.      Heart sounds: Normal heart sounds. No murmur heard.  Pulmonary:      Breath sounds: Normal breath sounds.   Musculoskeletal:         General: No swelling or tenderness.   Skin:     General: Skin is warm and dry.      Coloration: Skin is not jaundiced.      Findings: No rash.   Neurological:      Mental Status: She is alert and oriented to person, place, and time.   Psychiatric:         Mood and Affect: Mood normal.         Behavior: Behavior normal.         Consultants     Consult Orders (all) (From admission, onward)       Start     Ordered    12/28/23 0946  Urology (on-call MD unless specified)  Once        Specialty:  Urology  Provider:  Pete Hansen MD    12/28/23 0945    12/28/23 0904  [facility] (on-call MD unless specified) Details  Once        Specialty:  Hospitalist  Provider:  Nathan Tobar MD    12/28/23 0903                  Procedures       Imaging Results (All)       Procedure Component Value Units Date/Time    CT Abdomen Pelvis Without Contrast [[id number]] Collected: 12/28/23 0803     Updated: 12/28/23  0824    Narrative:      CT ABDOMEN AND PELVIS WITHOUT IV CONTRAST     HISTORY: Gross hematuria, lower abdominal pain liver enzymes and white  blood cell count within normal limits     TECHNIQUE: Radiation dose reduction techniques were utilized, including  automated exposure control and exposure modulation based on body size.   3 mm images were obtained through the abdomen and pelvis without IV  contrast.     COMPARISON: Multiple CT abdomen pelvis dating back to 1/31/2020, CTA  chest 6/20/2022 and abdominal MRI 6/29/2022     FINDINGS:  Multiple scattered pulm nodules are present within the lung bases, many  of which were seen on 6/20/2022. There are a few sub-6 mm nodules within  the left lower lobe not definitively seen on prior imaging. Area of  nodularity within the most inferior aspect of the imaged left lower lobe  corresponds with calcified granuloma seen on 1/31/2020. There is a small  hiatal hernia. No findings of small bowel obstruction. The appendix is  unremarkable. Mild to moderate intrahepatic bili ductal dilation is  present status post cholecystectomy, as before..     There is a cystic area contiguous with the gallbladder fossa measuring  3.1 x 2.5 cm, grossly unchanged since 6/28/2022. The pancreas, spleen  and adrenal glands have an unremarkable noncontrast CT appearance.  Hypodense renal lesions demonstrating density less than 15 Hounsfield  units are likely benign per Bosniak 2019 criteria. There is no  hydronephrosis. The bladder is decompressed and cannot be evaluated. The  uterus is surgically absent.     No abdominal pelvic adenopathy by size criteria. There is colonic  diverticulosis. No free intraperitoneal air is seen. Postsurgical  changes from open left femoral thrombectomy/embolectomy performed on  11/16/2023 are present and not well evaluated without intravenous  contrast. For the purpose of this dictation, last well-formed space  referred to as L5-S1. Compression infirmities at L1 and  "T11 are present,  as before.       Impression:      1.  No noncontrast CT findings of acute intraabdominal pathology.  2.  A few sub-6 mm pulm nodules in the left lower lobe not definitively  seen on prior imaging. If this patient is at increased risk for lung  cancer, follow-up chest CT in 12 months can be considered to ensure  stability. If this patient is not at increased risk for lung cancer, no  further follow-up is necessary per Fleischner criteria.  3.  Postsurgical changes from a recent left femoral  thrombectomy/embolectomy cannot be evaluated without intravenous  contrast. Correlation with patient history and physical exam is  recommended with follow-up CT with intravenous contrast if clinically  indicated.  4.  Other incidental and chronic findings as above.           This report was finalized on 12/28/2023 8:20 AM by Dr. Carlitos Willis M.D on Workstation: [id number]               Multiple pulm  Pertinent Labs     Results from last 7 days   Lab Units 12/29/23  0602 12/28/23  0454   WBC 10*3/mm3 5.55 6.49   HEMOGLOBIN g/dL 13.2 14.9   PLATELETS 10*3/mm3 183 200     Results from last 7 days   Lab Units 12/29/23  0602 12/28/23  0454   SODIUM mmol/L 139 139   POTASSIUM mmol/L 4.1 4.1   CHLORIDE mmol/L 115* 111*   CO2 mmol/L 17.0* 19.0*   BUN mg/dL 20 19   CREATININE mg/dL 1.34* 1.52*   GLUCOSE mg/dL 103* 124*   EGFR mL/min/1.73 37.7* 32.4*     Results from last 7 days   Lab Units 12/28/23  0454   ALBUMIN g/dL 4.1   BILIRUBIN mg/dL 0.4   ALK PHOS U/L 53   AST (SGOT) U/L 16   ALT (SGPT) U/L 14     Results from last 7 days   Lab Units 12/29/23  0602 12/28/23  0454   CALCIUM mg/dL 8.4 9.7*   ALBUMIN g/dL  --  4.1         Results from last 7 days   Lab Units 12/28/23  0454   URIC ACID mg/dL 4.5         Invalid input(s): \"LDLCALC\"  Results from last 7 days   Lab Units 12/28/23  0500   URINECX  No growth         Test Results Pending at Discharge       Discharge Details        Discharge Medications        New " Medications        Instructions Start Date   cephalexin 250 MG capsule  Commonly known as: Keflex   250 mg, Oral, Daily             Changes to Medications        Instructions Start Date   allopurinol 300 MG tablet  Commonly known as: ZYLOPRIM  What changed:   how much to take  how to take this  when to take this  additional instructions   TAKE 1 TABLET BY MOUTH DAILY FOR GOUT      apixaban 5 MG tablet tablet  Commonly known as: ELIQUIS  What changed:   how much to take  how to take this  when to take this  additional instructions   Take 1 tablet (5 mg) every 12 hours for blood thinner      levothyroxine 75 MCG tablet  Commonly known as: SYNTHROID, LEVOTHROID  What changed:   how much to take  how to take this  when to take this  additional instructions   TAKE 1 TABLET BY MOUTH DAILY FOR THYROID      metoprolol succinate XL 25 MG 24 hr tablet  Commonly known as: TOPROL-XL  What changed:   how much to take  how to take this  when to take this  additional instructions   TAKE 1 TABLET BY MOUTH DAILY FOR HIGH BLOOD PRESSURE AND HEART OR PALPITATIONS      omeprazole 40 MG capsule  Commonly known as: priLOSEC  What changed:   how much to take  how to take this  when to take this  additional instructions   TAKE 1 CAPSULE BY MOUTH EVERY DAY BEFORE FIRST MEAL FOR STOMACH ACID OR REFLUX      pravastatin 40 MG tablet  Commonly known as: PRAVACHOL  What changed:   how much to take  how to take this  when to take this  additional instructions   TAKE 1 TABLET BY MOUTH DAILY             Continue These Medications        Instructions Start Date   Tylenol 325 MG capsule  Generic drug: Acetaminophen   1 capsule, Oral, As Needed               Allergies   Allergen Reactions    Cefaclor Anaphylaxis, Angioedema and Swelling     caused angioedema 25 years ago; she tolerates cephalexin, amoxicillin, and ceftriaxone per my d/w patient and her daughter as well as amoxicillin-clavulanate confirmed in EPIC  caused angioedema 25 years ago; she  tolerates cephalexin, amoxicillin, and ceftriaxone per my d/w patient and her daughter as well as amoxicillin-clavulanate confirmed in EPIC  caused angioedema 25 years ago; she tolerates cephalexin, amoxicillin, and ceftriaxone per my d/w patient and her daughter as well as amoxicillin-clavulanate confirmed in EPIC  caused angioedema 25 years ago; she tolerates cephalexin, amoxicillin, and ceftriaxone per my d/w patient and her daughter as well as amoxicillin-clavulanate confirmed in [facility]    Sulfa Antibiotics Rash       Discharge Disposition:  Home or Self Care      Discharge Diet:  Diet Order   Procedures    Diet: Regular/House Diet; Texture: Regular Texture (IDDSI 7); Fluid Consistency: Thin (IDDSI 0)       Discharge Activity:       CODE STATUS:    Code Status and Medical Interventions:   Ordered at: 12/28/23 0951     Code Status (Patient has no pulse and is not breathing):    CPR (Attempt to Resuscitate)     Medical Interventions (Patient has pulse or is breathing):    Full Support       Future Appointments   Date Time Provider Department Center   1/12/2024 10:00 AM [facility] 1  [facility]   2/16/2024 12:45 PM Lizbeth Laura MD [facility]   3/18/2024  9:30 AM [facility]   3/25/2024  9:30 AM Daryl Santos MD [facility]     Additional Instructions for the Follow-ups that You Need to Schedule       Discharge Follow-up with PCP   As directed       Currently Documented PCP:    Daryl Santos MD    PCP Phone Number:    [phone]     Follow Up Details: 1 week        Discharge Follow-up with Specialty: urology per their recommendations   As directed      Specialty: urology per their recommendations               Follow-up Information       Daryl Santos MD .    Specialty: Internal Medicine  Why: 1 week  Contact information:  [id number] [address]  [phone]                             Additional Instructions for the  Follow-ups that You Need to Schedule       Discharge Follow-up with PCP   As directed       Currently Documented PCP:    Daryl Santos MD    PCP Phone Number:    [phone]     Follow Up Details: 1 week        Discharge Follow-up with Specialty: urology per their recommendations   As directed      Specialty: urology per their recommendations            Time Spent on Discharge:  Greater than 30 minutes      Nathan Tobar MD  [facility]  12/30/23  13:24 EST

## 2023-12-30 NOTE — OUTREACH NOTE
Prep Survey      Flowsheet Row Responses   [facility] facility patient discharged from? [place]   Is LACE score < 7 ? No   Eligibility [facility]   Date of Admission 12/28/23   Date of Discharge 12/30/23   Discharge Disposition Home or Self Care   Discharge diagnosis Gross hematuria   Does the patient have one of the following disease processes/diagnoses(primary or secondary)? Other   Does the patient have Home health ordered? No   Is there a DME ordered? No   Prep survey completed? Yes            KILO COULTER - Registered Nurse

## 2023-12-31 NOTE — CASE MANAGEMENT/SOCIAL WORK
Case Management Discharge Note      Final Note: home         Selected Continued Care - Discharged on 12/30/2023 Admission date: 12/28/2023 - Discharge disposition: Home or Self Care      Destination    No services have been selected for the patient.                Durable Medical Equipment    No services have been selected for the patient.                Dialysis/Infusion    No services have been selected for the patient.                Home Medical Care    No services have been selected for the patient.                Therapy    No services have been selected for the patient.                Community Resources    No services have been selected for the patient.                Community & DME    No services have been selected for the patient.                         Final Discharge Disposition Code: 01 - home or self-care

## 2024-01-02 ENCOUNTER — TRANSITIONAL CARE MANAGEMENT TELEPHONE ENCOUNTER (OUTPATIENT)
Dept: CALL CENTER | Facility: HOSPITAL | Age: 89
End: 2024-01-02
Payer: MEDICARE

## 2024-01-02 NOTE — OUTREACH NOTE
Call Center TCM Note      Flowsheet Row Responses   [facility] patient discharged from? [place]   Does the patient have one of the following disease processes/diagnoses(primary or secondary)? Other   TCM attempt successful? Yes   Call start time 0902   Call end time 0907   Discharge diagnosis Gross hematuria   Person spoke with today (if not patient) and relationship Patient and Ashley(daughter)   Meds reviewed with patient/caregiver? Yes   Is the patient having any side effects they believe may be caused by any medication additions or changes? No   Does the patient have all medications ordered at discharge? Yes   Is the patient taking all medications as directed (includes completed medication regime)? Yes   Medication comments antibiotics   Does the patient have an appointment with their PCP within 7-14 days of discharge? No   Nursing Interventions Patient declined scheduling/rescheduling appointment at this time   Has home health visited the patient within 72 hours of discharge? N/A   Psychosocial issues? No   Did the patient receive a copy of their discharge instructions? Yes   Nursing interventions Reviewed instructions with patient   What is the patient's perception of their health status since discharge? Improving   Is the patient/caregiver able to teach back signs and symptoms related to disease process for when to call PCP? Yes   Is the patient/caregiver able to teach back signs and symptoms related to disease process for when to call 911? Yes   Is the patient/caregiver able to teach back the hierarchy of who to call/visit for symptoms/problems? PCP, Specialist, Home health nurse, Urgent Care, ED, 911 Yes   If the patient is a current smoker, are they able to teach back resources for cessation? Not a smoker   TCM call completed? Yes   Wrap up additional comments Pt. reports doing well, encouraged fluids per AVS. No c/v at this time.   Call end time 0907   Would this patient benefit from a Referral  to Amb Social Work? No   Is the patient interested in additional calls from an ambulatory ? No            Chelsea Erickson RN    1/2/2024, 09:09 EST

## 2024-01-10 DIAGNOSIS — I10 BENIGN ESSENTIAL HYPERTENSION: Chronic | ICD-10-CM

## 2024-01-10 DIAGNOSIS — I47.10 SUPRAVENTRICULAR TACHYCARDIA: Chronic | ICD-10-CM

## 2024-01-10 RX ORDER — METOPROLOL SUCCINATE 25 MG/1
TABLET, EXTENDED RELEASE ORAL
Qty: 90 TABLET | Refills: 1 | Status: SHIPPED | OUTPATIENT
Start: 2024-01-10

## 2024-01-12 ENCOUNTER — HOSPITAL ENCOUNTER (OUTPATIENT)
Dept: BONE DENSITY | Facility: HOSPITAL | Age: 89
Discharge: HOME | End: 2024-01-12
Admitting: INTERNAL MEDICINE
Payer: MEDICARE

## 2024-01-12 PROCEDURE — 77080 DXA BONE DENSITY AXIAL: CPT

## 2024-02-16 ENCOUNTER — OFFICE VISIT (OUTPATIENT)
Age: 89
End: 2024-02-16
Payer: MEDICARE

## 2024-02-16 VITALS
DIASTOLIC BLOOD PRESSURE: 80 MMHG | WEIGHT: 216 LBS | HEIGHT: 63 IN | HEART RATE: 51 BPM | SYSTOLIC BLOOD PRESSURE: 136 MMHG | BODY MASS INDEX: 38.27 KG/M2

## 2024-02-16 DIAGNOSIS — I70.209 ARTERIAL OCCLUSION, LOWER EXTREMITY: ICD-10-CM

## 2024-02-16 DIAGNOSIS — I48.0 PAROXYSMAL ATRIAL FIBRILLATION: Primary | Chronic | ICD-10-CM

## 2024-02-16 DIAGNOSIS — E78.2 MIXED HYPERLIPIDEMIA: Chronic | ICD-10-CM

## 2024-02-16 DIAGNOSIS — I10 BENIGN ESSENTIAL HYPERTENSION: Chronic | ICD-10-CM

## 2024-02-16 DIAGNOSIS — N18.32 STAGE 3B CHRONIC KIDNEY DISEASE: Chronic | ICD-10-CM

## 2024-03-11 DIAGNOSIS — I74.9 EMBOLISM AND THROMBOSIS OF UNSPECIFIED ARTERY: Primary | ICD-10-CM

## 2024-03-11 DIAGNOSIS — I75.023 ATHEROEMBOLISM OF BILATERAL LOWER EXTREMITIES: ICD-10-CM

## 2024-03-18 DIAGNOSIS — E55.9 VITAMIN D DEFICIENCY: Chronic | ICD-10-CM

## 2024-03-18 DIAGNOSIS — Z87.39 HISTORY OF GOUT: Chronic | ICD-10-CM

## 2024-03-18 DIAGNOSIS — N18.32 STAGE 3B CHRONIC KIDNEY DISEASE: Chronic | ICD-10-CM

## 2024-03-18 DIAGNOSIS — E78.2 MIXED HYPERLIPIDEMIA: Chronic | ICD-10-CM

## 2024-03-18 DIAGNOSIS — E03.9 PRIMARY HYPOTHYROIDISM: Chronic | ICD-10-CM

## 2024-03-18 DIAGNOSIS — D75.1 SECONDARY POLYCYTHEMIA: Chronic | ICD-10-CM

## 2024-03-18 DIAGNOSIS — I10 BENIGN ESSENTIAL HYPERTENSION: Chronic | ICD-10-CM

## 2024-03-18 DIAGNOSIS — R73.01 IMPAIRED FASTING GLUCOSE: Chronic | ICD-10-CM

## 2024-03-20 LAB
25(OH)D3+25(OH)D2 SERPL-MCNC: 42 NG/ML (ref 30–100)
ALBUMIN SERPL-MCNC: 3.8 G/DL (ref 3.6–4.6)
ALBUMIN/GLOB SERPL: 1.2 {RATIO} (ref 1.2–2.2)
ALP SERPL-CCNC: 61 IU/L (ref 44–121)
ALT SERPL-CCNC: 17 IU/L (ref 0–32)
AST SERPL-CCNC: 21 IU/L (ref 0–40)
BILIRUB SERPL-MCNC: 0.4 MG/DL (ref 0–1.2)
BUN SERPL-MCNC: 23 MG/DL (ref 10–36)
BUN/CREAT SERPL: 15 (ref 12–28)
CALCIUM SERPL-MCNC: 9.3 MG/DL (ref 8.7–10.3)
CHLORIDE SERPL-SCNC: 110 MMOL/L (ref 96–106)
CHOLEST SERPL-MCNC: 129 MG/DL (ref 100–199)
CK SERPL-CCNC: 52 U/L (ref 26–161)
CO2 SERPL-SCNC: 19 MMOL/L (ref 20–29)
CREAT SERPL-MCNC: 1.55 MG/DL (ref 0.57–1)
EGFRCR SERPLBLD CKD-EPI 2021: 32 ML/MIN/1.73
ERYTHROCYTE [DISTWIDTH] IN BLOOD BY AUTOMATED COUNT: 13.3 % (ref 11.7–15.4)
GLOBULIN SER CALC-MCNC: 3.1 G/DL (ref 1.5–4.5)
GLUCOSE SERPL-MCNC: 135 MG/DL (ref 70–99)
HBA1C MFR BLD: 5.8 % (ref 4.8–5.6)
HCT VFR BLD AUTO: 48 % (ref 34–46.6)
HDL SERPL-SCNC: 25 UMOL/L
HDLC SERPL-MCNC: 47 MG/DL
HGB BLD-MCNC: 15.7 G/DL (ref 11.1–15.9)
LDL SERPL QN: 20.7 NM
LDL SERPL-SCNC: 729 NMOL/L
LDL SMALL SERPL-SCNC: 288 NMOL/L
LDLC SERPL CALC-MCNC: 56 MG/DL (ref 0–99)
MCH RBC QN AUTO: 30 PG (ref 26.6–33)
MCHC RBC AUTO-ENTMCNC: 32.7 G/DL (ref 31.5–35.7)
MCV RBC AUTO: 92 FL (ref 79–97)
PLATELET # BLD AUTO: 184 X10E3/UL (ref 150–450)
POTASSIUM SERPL-SCNC: 4.6 MMOL/L (ref 3.5–5.2)
PROT SERPL-MCNC: 6.9 G/DL (ref 6–8.5)
RBC # BLD AUTO: 5.23 X10E6/UL (ref 3.77–5.28)
SODIUM SERPL-SCNC: 140 MMOL/L (ref 134–144)
T3FREE SERPL-MCNC: 2.8 PG/ML (ref 2–4.4)
T4 FREE SERPL-MCNC: 1.17 NG/DL (ref 0.82–1.77)
TRIGL SERPL-MCNC: 149 MG/DL (ref 0–149)
TSH SERPL DL<=0.005 MIU/L-ACNC: 3.77 UIU/ML (ref 0.45–4.5)
URATE SERPL-MCNC: 4.4 MG/DL (ref 3.1–7.9)
WBC # BLD AUTO: 6.6 X10E3/UL (ref 3.4–10.8)

## 2024-03-25 ENCOUNTER — OFFICE VISIT (OUTPATIENT)
Dept: INTERNAL MEDICINE | Facility: CLINIC | Age: 89
End: 2024-03-25
Payer: MEDICARE

## 2024-03-25 ENCOUNTER — HOSPITAL ENCOUNTER (OUTPATIENT)
Dept: GENERAL RADIOLOGY | Facility: HOSPITAL | Age: 89
Discharge: HOME | End: 2024-03-25
Admitting: INTERNAL MEDICINE
Payer: MEDICARE

## 2024-03-25 VITALS
SYSTOLIC BLOOD PRESSURE: 130 MMHG | HEIGHT: 63 IN | BODY MASS INDEX: 38.27 KG/M2 | WEIGHT: 216 LBS | HEART RATE: 66 BPM | RESPIRATION RATE: 16 BRPM | OXYGEN SATURATION: 97 % | TEMPERATURE: 97 F | DIASTOLIC BLOOD PRESSURE: 74 MMHG

## 2024-03-25 DIAGNOSIS — M85.89 OSTEOPENIA OF MULTIPLE SITES: Chronic | ICD-10-CM

## 2024-03-25 DIAGNOSIS — Z87.440 HISTORY OF HEMORRHAGIC CYSTITIS: Chronic | ICD-10-CM

## 2024-03-25 DIAGNOSIS — E78.2 MIXED HYPERLIPIDEMIA: Chronic | ICD-10-CM

## 2024-03-25 DIAGNOSIS — I10 BENIGN ESSENTIAL HYPERTENSION: Chronic | ICD-10-CM

## 2024-03-25 DIAGNOSIS — M25.562 ACUTE PAIN OF LEFT KNEE: ICD-10-CM

## 2024-03-25 DIAGNOSIS — D75.1 SECONDARY POLYCYTHEMIA: Chronic | ICD-10-CM

## 2024-03-25 DIAGNOSIS — Z78.0 POSTMENOPAUSAL STATE: Chronic | ICD-10-CM

## 2024-03-25 DIAGNOSIS — E04.2 MULTINODULAR GOITER: Chronic | ICD-10-CM

## 2024-03-25 DIAGNOSIS — E03.9 PRIMARY HYPOTHYROIDISM: Chronic | ICD-10-CM

## 2024-03-25 DIAGNOSIS — H25.819 COMBINED FORM OF SENILE CATARACT, UNSPECIFIED LATERALITY: Chronic | ICD-10-CM

## 2024-03-25 DIAGNOSIS — M70.50 ANSERINE BURSITIS: ICD-10-CM

## 2024-03-25 DIAGNOSIS — E55.9 VITAMIN D DEFICIENCY: Chronic | ICD-10-CM

## 2024-03-25 DIAGNOSIS — Z51.81 THERAPEUTIC DRUG MONITORING: ICD-10-CM

## 2024-03-25 DIAGNOSIS — Z86.718 HISTORY OF ARTERIAL THROMBOSIS: Chronic | ICD-10-CM

## 2024-03-25 DIAGNOSIS — R60.0 BILATERAL LOWER EXTREMITY EDEMA: Chronic | ICD-10-CM

## 2024-03-25 DIAGNOSIS — Z87.39 HISTORY OF GOUT: Chronic | ICD-10-CM

## 2024-03-25 DIAGNOSIS — I47.10 SUPRAVENTRICULAR TACHYCARDIA: Chronic | ICD-10-CM

## 2024-03-25 DIAGNOSIS — N18.32 STAGE 3B CHRONIC KIDNEY DISEASE: Chronic | ICD-10-CM

## 2024-03-25 DIAGNOSIS — H90.3 BILATERAL SENSORINEURAL HEARING LOSS: Chronic | ICD-10-CM

## 2024-03-25 DIAGNOSIS — R73.01 IMPAIRED FASTING GLUCOSE: Primary | Chronic | ICD-10-CM

## 2024-03-25 DIAGNOSIS — K21.9 GASTROESOPHAGEAL REFLUX DISEASE WITHOUT ESOPHAGITIS: Chronic | ICD-10-CM

## 2024-03-25 DIAGNOSIS — Z86.16 HISTORY OF 2019 NOVEL CORONAVIRUS DISEASE (COVID-19): Chronic | ICD-10-CM

## 2024-03-25 DIAGNOSIS — E66.01 MORBIDLY OBESE: Chronic | ICD-10-CM

## 2024-03-25 DIAGNOSIS — I48.0 PAROXYSMAL ATRIAL FIBRILLATION: Chronic | ICD-10-CM

## 2024-03-25 PROBLEM — I70.209 ARTERIAL OCCLUSION, LOWER EXTREMITY: Status: RESOLVED | Noted: 2023-11-17 | Resolved: 2024-03-25

## 2024-03-25 PROCEDURE — 73560 X-RAY EXAM OF KNEE 1 OR 2: CPT

## 2024-03-25 NOTE — PROGRESS NOTES
03/25/2024    Patient Information  Mandy Alarcon                                                                                          [address] [facility] [address]      5/30/1933  [unfilled]  There is no work phone number on file.    Chief Complaint:     Follow-up medical problems as noted below.  No new acute complaints.    History of Present Illness:    Patient with history of multiple chronic medical problems as noted below in assessment and plan presents today for follow-up with lab prior in order to monitor chronic medical issues.  Her past medical history reviewed and updated were necessary including health maintenance parameters.  This reveals she is currently up-to-date or else accounted for.    Review of Systems   Constitutional: Negative.   HENT: Negative.     Eyes: Negative.    Cardiovascular: Negative.    Respiratory: Negative.     Endocrine: Negative.    Hematologic/Lymphatic: Negative.    Skin: Negative.    Musculoskeletal: Negative.  Positive for arthritis.   Gastrointestinal: Negative.    Genitourinary: Negative.  Negative for flank pain and hematuria.   Neurological: Negative.    Psychiatric/Behavioral: Negative.     Allergic/Immunologic: Negative.        Active Problems:    Patient Active Problem List   Diagnosis    Benign essential hypertension    Claustrophobia    Gout    Hyperlipidemia    Primary hypothyroidism    Impaired fasting glucose    Nocturnal hypoxemia    Secondary polycythemia    Vitamin D deficiency    Therapeutic drug monitoring    Family history of coronary artery disease    Bilateral sensorineural hearing loss, wears hearing aids    Multinodular goiter    Supraventricular tachycardia    Morbidly obese    Bilateral lower extremity edema    Gastroesophageal reflux disease without esophagitis    Paroxysmal atrial fibrillation    Stage 3b chronic kidney disease    Combined form of senile cataract    Osteopenia of multiple sites    Postmenopausal state     History of 2019 novel coronavirus disease (COVID-19)    History of hemorrhagic cystitis    History of arterial thrombosis, 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    Acute pain of left knee    Anserine bursitis         Past Medical History:   Diagnosis Date    Benign essential hypertension 10/28/2012    2012--treatment for hypertension begun.    Bilateral lower extremity edema 2020    Bilateral sensorineural hearing loss, wears hearing aids 2017    Left is much worse than right.  Patient had multiple ear infections as a child.    Chronic renal impairment, stage 3b 09/15/2020    Claustrophobia 2016    This patient has significant nocturnal hypoxemia and I think that she could benefit from nocturnal oxygen therapy. The exact etiology of her hypoxemia is not clear. She could possibly have obstructive sleep apnea but this is not documented. We cannot test this patient for sleep apnea due to the fact that she is severely claustrophobic. Patient was a former smoker and it is possibly that COPD he is playing a role.    Combined form of senile cataract 2021    Family history of coronary artery disease 2016    Patient's mother, father, 2 sisters and a brother all  from myocardial infarctions    Gastroesophageal reflux disease without esophagitis 2020    Gout 10/28/2012    2012--initial diagnosis and treatment of gout.    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 2023    History of hemorrhagic cystitis 2023    Hyperlipidemia 10/28/2012    2012--treatment for hyperlipidemia begun.    Impaired fasting glucose 10/28/2012    2012--initial diagnosis impaired fasting glucose.    Morbidly obese 2019    Nocturnal hypoxemia 2012--patient did not  receive nocturnal oxygen because of Medicare regulations.   05/15/2014--overnight oximetry revealed oxygen saturations less than 89% for 22 minutes and 40 seconds. Oxygen saturations less than or equal to 88% for 22 minutes and 40 seconds. Lowest oxygen saturation 83%. The longest continuous time with oxygen saturations less than or equal to 88% was 1 minute and 32 seconds.   08/02/2012--overnight oximetry revealed oxygen saturations less than 90% for one hour and 35 minutes. Oxygen saturations less than 89% 59 minutes. This patient has significant nocturnal hypoxemia and I think that she could benefit from nocturnal oxygen therapy. The exact etiology of her hypoxemia is not clear. She could possibly have obstructive sleep apnea but this is not documented. We cannot test this patient for sleep apnea due to the fact that she is severely claustrophobic. Patient was a former smoker and it is possibly that COPD he is playing a role.    Non morbid obesity 03/11/2019    Osteopenia of multiple sites 09/26/2023    Paroxysmal atrial fibrillation 04/10/2020    Postmenopausal state 09/26/2023    Primary hypothyroidism 11/17/2015 March 11, 2019--TSH remains elevated slightly at 4.92.  Given the overall clinical picture including the multinodular goiter, we will initiate levothyroxine 50 mcg/day and reassess in about 6 weeks.  August 1, 2018--thyroid ultrasound reveals a multinodular thyroid with multiple subcentimeter nodules.  Only minimal increase in size of the largest nodule in the left lobe has occurred when compared     Secondary polycythemia 08/02/2012 11/06/2014--patient did not receive nocturnal oxygen because of Medicare regulations.   05/15/2014--overnight oximetry revealed oxygen saturations less than 89% for 22 minutes and 40 seconds. Oxygen saturations less than or equal to 88% for 22 minutes and 40 seconds. Lowest oxygen saturation 83%. The longest continuous time with oxygen saturations less than or equal  to 88% was 1 minute and 32 seconds.   08/02/2012--overnight oximetry revealed oxygen saturations less than 90% for one hour and 35 minutes. Oxygen saturations less than 89% 59 minutes. This patient has significant nocturnal hypoxemia and I think that she could benefit from nocturnal oxygen therapy. The exact etiology of her hypoxemia is not clear. She could possibly have obstructive sleep apnea but this is not documented. We cannot test this patient for sleep apnea due to the fact that she is severely claustrophobic. Patient was a former smoker and it is possibly that COPD he is playing a role.    Vitamin D deficiency 05/23/2016         Past Surgical History:   Procedure Laterality Date    CHOLECYSTECTOMY WITH INTRAOPERATIVE CHOLANGIOGRAM N/A 03/12/2020    Procedure: LAPAROSCOPIC CHOLECYSTOSTOMY TUBE, INTRAOPERATIVE CHOLANGIOGRAM;  Surgeon: Bryce Andujar MD;  Location: [facility] OR;  Service: General;  Laterality: N/A;    CHOLECYSTECTOMY WITH INTRAOPERATIVE CHOLANGIOGRAM N/A 05/22/2020    Procedure: CHOLECYSTECTOMY LAPAROSCOPIC INTRAOPERATIVE CHOLANGIOGRAM and EGD;  Surgeon: Bryce Andujar MD;  Location: [facility] OR;  Service: General;  Laterality: N/A;    ERCP N/A 07/01/2022    Procedure: ENDOSCOPIC RETROGRADE CHOLANGIOPANCREATOGRAPHY with sphincterotomy, balloon sweep 12-15mm;  Surgeon: Mitesh Altamirano MD;  Location: [facility] ENDOSCOPY;  Service: Gastroenterology;  Laterality: N/A;  pre - gallstone pancreatitis  post - s/p sphincterotomy, sludge and  pus    FEMORAL THROMBECTOMY/EMBOLECTOMY Left 11/16/2023    Procedure: OPEN LEFT FEMORAL THROMBECTOMY/EMBOLECTOMY WITH LEFT LOWER EXTREMITY ANGIOGRAM;  Surgeon: Jace Chowdhury Jr., MD;  Location: [facility] HYBRID OR 18/19;  Service: Vascular;  Laterality: Left;    OOPHORECTOMY      age 48    RADICAL ABDOMINAL HYSTERECTOMY  48 years old    48 years of age. Uterine fibroid tumors with menorrhagia - no cancer         Allergies   Allergen Reactions     Cefaclor Anaphylaxis, Angioedema and Swelling     caused angioedema 25 years ago; she tolerates cephalexin, amoxicillin, and ceftriaxone per my d/w patient and her daughter as well as amoxicillin-clavulanate confirmed in EPIC  caused angioedema 25 years ago; she tolerates cephalexin, amoxicillin, and ceftriaxone per my d/w patient and her daughter as well as amoxicillin-clavulanate confirmed in EPIC  caused angioedema 25 years ago; she tolerates cephalexin, amoxicillin, and ceftriaxone per my d/w patient and her daughter as well as amoxicillin-clavulanate confirmed in EPIC  caused angioedema 25 years ago; she tolerates cephalexin, amoxicillin, and ceftriaxone per my d/w patient and her daughter as well as amoxicillin-clavulanate confirmed in EPIC    Keflex [Cephalexin] Hives    Sulfa Antibiotics Rash           Current Outpatient Medications:     Acetaminophen (Tylenol) 325 MG capsule, Take 1 capsule by mouth As Needed., Disp: , Rfl:     allopurinol (ZYLOPRIM) 300 MG tablet, TAKE 1 TABLET BY MOUTH DAILY FOR GOUT (Patient taking differently: Take 1 tablet by mouth Daily.), Disp: , Rfl:     apixaban (ELIQUIS) 5 MG tablet tablet, Take 1 tablet (5 mg) every 12 hours for blood thinner (Patient taking differently: Take 1 tablet by mouth Every 12 (Twelve) Hours.), Disp: 60 tablet, Rfl: 4    BIOTIN PO, Take  by mouth., Disp: , Rfl:     levothyroxine (SYNTHROID, LEVOTHROID) 75 MCG tablet, TAKE 1 TABLET BY MOUTH DAILY FOR THYROID (Patient taking differently: Take 1 tablet by mouth Every Morning.), Disp: , Rfl:     metoprolol succinate XL (TOPROL-XL) 25 MG 24 hr tablet, TAKE 1 TABLET BY MOUTH DAILY FOR HIGH BLOOD PRESSURE AND HEART OR PALPITATIONS, Disp: 90 tablet, Rfl: 1    omeprazole (priLOSEC) 40 MG capsule, TAKE 1 CAPSULE BY MOUTH EVERY DAY BEFORE FIRST MEAL FOR STOMACH ACID OR REFLUX (Patient taking differently: Take 1 capsule by mouth Daily.), Disp: , Rfl:     pravastatin (PRAVACHOL) 40 MG tablet, TAKE 1 TABLET BY  "MOUTH DAILY (Patient taking differently: Take 1 tablet by mouth Daily.), Disp: 90 tablet, Rfl: 3    VITAMIN D PO, Take  by mouth., Disp: , Rfl:       Family History   Problem Relation Age of Onset    Heart disease Mother         Ischemic.  from coronary artery disease.    Heart disease Father         Ischemic.  from coronary artery disease.    Heart disease Sister         Ischemic.  from coronary artery disease.    Heart disease Sister         Ischemic.  from coronary artery disease.    Heart disease Brother         Ischemic.  from coronary artery disease.    Breast cancer Daughter     Breast cancer Daughter     Ovarian cancer Neg Hx     Malig Hyperthermia Neg Hx          Social History     Socioeconomic History    Marital status:     Number of children: 5    Highest education level: GED or equivalent   Tobacco Use    Smoking status: Former     Current packs/day: 0.00     Average packs/day: 0.5 packs/day for 8.0 years (4.0 ttl pk-yrs)     Types: Cigarettes     Start date: 1946     Quit date: 1954     Years since quittin.2    Smokeless tobacco: Never    Tobacco comments:     Stopped drinking at age 30.   Vaping Use    Vaping status: Never Used   Substance and Sexual Activity    Alcohol use: No     Comment: caffiene use tea coffee    Drug use: No    Sexual activity: Not Currently     Partners: Male         Vitals:    24 0930   BP: 130/74   Pulse: 66   Resp: 16   Temp: 97 °F (36.1 °C)   TempSrc: Temporal   SpO2: 97%   Weight: 98 kg (216 lb)   Height: 160 cm (62.99\")        Body mass index is 38.27 kg/m².      Physical Exam:    General: Alert and oriented x 3.  No acute distress.  Morbidly obese.  Normal affect.  HEENT: Pupils equal, round, reactive to light; extraocular movements intact; sclerae nonicteric; pharynx, ear canals and TMs normal.  Neck: Without JVD, thyromegaly, bruit, or adenopathy.  Lungs: Clear to auscultation in all fields.  Heart: Regular rate and rhythm " without murmur, rub, gallop, or click.  Abdomen: Soft, nontender, without hepatosplenomegaly or hernia.  Bowel sounds normal.  : Deferred.  Rectal: Deferred.  Extremities: Without clubbing, cyanosis, edema, or pulse deficit.  Neurologic: Intact without focal deficit.  Normal station and gait observed during ingress and egress from the examination room.  Skin: Without significant lesion.  Musculoskeletal: Unremarkable.    Lab/other results:    Vitamin D normal at 42.  Uric acid normal at 4.4.  Thyroid function tests are normal.  Total cholesterol 129, triglycerides 149, LDL particle #929, HDL particle number low at 25.  Hemoglobin A1c 5.8.  CMP normal except glucose 135, creatinine 1.55, estimated GFR 32, chloride mildly elevated 110.  CBC normal except hematocrit elevated at 48.  Hemoglobin normal at 15.7.    Assessment/Plan:     Diagnosis Plan   1. Impaired fasting glucose  Comprehensive Metabolic Panel    Hemoglobin A1c      2. Hyperlipidemia  CK    Comprehensive Metabolic Panel    NMR LipoProfile      3. Multinodular goiter  T4, Free    T3, Free    TSH      4. Primary hypothyroidism  T4, Free    T3, Free    TSH      5. Secondary polycythemia  CBC (No Diff)      6. Stage 3b chronic kidney disease  Urinalysis With Microscopic If Indicated (No Culture) - Urine, Clean Catch      7. Vitamin D deficiency  Vitamin D,25-Hydroxy      8. History of 2019 novel coronavirus disease (COVID-19)  SARS-CoV-2 Antibodies, Nucleocapsid (Natural Immunity)      9. Gout  Uric Acid      10. Benign essential hypertension        11. Bilateral lower extremity edema        12. Gastroesophageal reflux disease without esophagitis        13. Supraventricular tachycardia        14. Paroxysmal atrial fibrillation        15. History of arterial thrombosis, 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        16. Bilateral sensorineural hearing loss, wears hearing aids        17. History of hemorrhagic cystitis  Urinalysis With  Microscopic If Indicated (No Culture) - Urine, Clean Catch      18. Osteopenia of multiple sites        19. Postmenopausal state        20. Morbidly obese        21. Combined form of senile cataract, unspecified laterality        22. Therapeutic drug monitoring        23. Acute pain of left knee  XR knee 1 or 2 vw bilateral      24. Anserine bursitis  XR knee 1 or 2 vw bilateral        Patient has impaired fasting glucose that does not require medication.  However, patient is at risk for development of overt diabetes given her morbid obesity.  I considered to be morbidly obese because of the multiple associated diagnoses combined with a BMI of approximately 38.  Hyperlipidemia is under reasonable control.  Her thyroid is therapeutic.  Multinodular goiter is stable.  Secondary polycythemia is mild.  We will monitor this.  She has stage IIIb chronic renal insufficiency that seems stable.  Vitamin D in normal range with supplementation.  She has a history of COVID-19 infection in the past and we will monitor her antibodies.  Her gout is stable on allopurinol.  Blood pressure is well-controlled.  Esophageal reflux controlled with omeprazole.  She has paroxysmal atrial fibrillation and is on Eliquis without further episodes of hemorrhagic cystitis.  She has a follow-up tomorrow with urology.    Plan is as follows: Patient will follow-up on or shortly after September 26, 2024 for her subsequent Medicare wellness visit.  Otherwise she will follow-up as needed.    Addendum: Patient does have a new complaint of left knee pain as described under anserine bursitis diagnosis.  Will get an x-ray of the knee and then set her up for possible cortisone injection.    Procedures

## 2024-04-03 DIAGNOSIS — K21.9 GASTROESOPHAGEAL REFLUX DISEASE WITHOUT ESOPHAGITIS: Chronic | ICD-10-CM

## 2024-04-03 RX ORDER — OMEPRAZOLE 40 MG/1
CAPSULE, DELAYED RELEASE ORAL
Qty: 30 CAPSULE | Refills: 1 | Status: SHIPPED | OUTPATIENT
Start: 2024-04-03

## 2024-04-03 NOTE — TELEPHONE ENCOUNTER
Called patient to inform that her appointment with Kori Link CNM on 4/9 has changed locations and will no longer be a [facility], but at [facility] ([address] [place] [address], [address]). The date and time have not changed, just the location. Pt did not answer; left voicemail with detailed instructions of location change.    Patient has been placed on recall list.

## 2024-04-09 ENCOUNTER — OFFICE VISIT (OUTPATIENT)
Dept: INTERNAL MEDICINE | Facility: CLINIC | Age: 89
End: 2024-04-09
Payer: MEDICARE

## 2024-04-09 VITALS
SYSTOLIC BLOOD PRESSURE: 134 MMHG | RESPIRATION RATE: 16 BRPM | DIASTOLIC BLOOD PRESSURE: 74 MMHG | HEART RATE: 57 BPM | HEIGHT: 63 IN | WEIGHT: 216 LBS | OXYGEN SATURATION: 95 % | BODY MASS INDEX: 38.27 KG/M2 | TEMPERATURE: 93.7 F

## 2024-04-09 DIAGNOSIS — E03.9 PRIMARY HYPOTHYROIDISM: Chronic | ICD-10-CM

## 2024-04-09 DIAGNOSIS — M25.562 ACUTE PAIN OF LEFT KNEE: ICD-10-CM

## 2024-04-09 DIAGNOSIS — Z87.39 HISTORY OF GOUT: Chronic | ICD-10-CM

## 2024-04-09 DIAGNOSIS — M70.50 ANSERINE BURSITIS: Primary | ICD-10-CM

## 2024-04-09 DIAGNOSIS — I48.0 PAROXYSMAL ATRIAL FIBRILLATION: Chronic | ICD-10-CM

## 2024-04-09 DIAGNOSIS — E04.2 MULTINODULAR GOITER: Chronic | ICD-10-CM

## 2024-04-09 RX ORDER — ALLOPURINOL 300 MG/1
TABLET ORAL
Start: 2024-04-09

## 2024-04-09 RX ORDER — LEVOTHYROXINE SODIUM 0.07 MG/1
TABLET ORAL
Qty: 90 TABLET | Refills: 1 | Status: SHIPPED | OUTPATIENT
Start: 2024-04-09

## 2024-04-09 RX ORDER — METHYLPREDNISOLONE ACETATE 40 MG/ML
40 INJECTION, SUSPENSION INTRA-ARTICULAR; INTRALESIONAL; INTRAMUSCULAR; SOFT TISSUE ONCE
Status: COMPLETED | OUTPATIENT
Start: 2024-04-09 | End: 2024-04-09

## 2024-04-09 RX ADMIN — METHYLPREDNISOLONE ACETATE 40 MG: 40 INJECTION, SUSPENSION INTRA-ARTICULAR; INTRALESIONAL; INTRAMUSCULAR; SOFT TISSUE at 10:51

## 2024-04-09 NOTE — PROGRESS NOTES
Procedure note    March 25, 2024--patient reports relatively new diagnosis of left knee pain which is anterior and seems to be aggravated with use.  Generally is worse when she is up on her feet for prolonged periods.  Not aggravated by movement.  No other pain in that leg given her history of arterial thrombus.  Examination reveals some degenerative changes.  She has tenderness over the left anserine bursa.  No overlying erythema.  Definite tenderness.  Plan is as follows: Will obtain an x-ray of the left knee and set patient up for possible cortisone injection.    BILATERAL KNEE X-RAYS     HISTORY: Bilateral knee pain, worse on the left than the right.     TECHNIQUE: 2 images of each knee are provided.     FINDINGS: On the right, there is bulky marginal osteophyte around all 3  compartments with decent preservation of the radiographic joint spaces  and no suprapatellar bursal effusion. No bone lesion. Soft tissue  contours are normal.     On the left, there is also bulky marginal osteophyte with significant  joint space narrowing in the medial compartment. No joint effusion or  bone lesion. Soft tissue contours are normal.     IMPRESSION:  Osteoarthritis at both knees.     This report was finalized on 3/26/2024 4:06 PM by Dr. Rex Painter M.D on Workstation: [id number]    Procedure: Injection of anserine bursa of the left knee    Verbal informed consent given.  Risk, benefits, potential complications and alternatives discussed.  The left knee was injected with 40 mg of Depo-Medrol and the anserine bursal area by Dr. Santos.  Patient tolerated procedure well without apparent complication.  Postprocedure instructions given.

## 2024-04-15 ENCOUNTER — TELEPHONE (OUTPATIENT)
Dept: INTERNAL MEDICINE | Facility: CLINIC | Age: 89
End: 2024-04-15
Payer: MEDICARE

## 2024-04-15 DIAGNOSIS — E78.2 MIXED HYPERLIPIDEMIA: Chronic | ICD-10-CM

## 2024-04-15 DIAGNOSIS — K21.9 GASTROESOPHAGEAL REFLUX DISEASE WITHOUT ESOPHAGITIS: Chronic | ICD-10-CM

## 2024-04-15 RX ORDER — PRAVASTATIN SODIUM 40 MG
TABLET ORAL
Qty: 90 TABLET | Refills: 3 | OUTPATIENT
Start: 2024-04-15

## 2024-04-15 NOTE — TELEPHONE ENCOUNTER
"Spoke with Pt daughter about \"Pravastatin that she needs a refill on\" however I only see Metoprolol given by Dr. Santos on her meds list for HBP.   "

## 2024-04-25 ENCOUNTER — TELEPHONE (OUTPATIENT)
Dept: INTERNAL MEDICINE | Facility: CLINIC | Age: 89
End: 2024-04-25

## 2024-04-25 DIAGNOSIS — E78.2 MIXED HYPERLIPIDEMIA: Chronic | ICD-10-CM

## 2024-04-25 RX ORDER — PRAVASTATIN SODIUM 40 MG
TABLET ORAL
Qty: 90 TABLET | Refills: 3 | Status: SHIPPED | OUTPATIENT
Start: 2024-04-25

## 2024-04-25 NOTE — TELEPHONE ENCOUNTER
Caller: Mandy Alarcon    Relationship: Self    Best call back number: [phone]    What medication are you requesting: PRAVASTATIN 40 MG - 90 DAY SUPPLY     If a prescription is needed, what is your preferred pharmacy and phone number: [id number] [place], [place] - [address] AT [facility] - [phone] [facility] [phone] FX     Additional notes: PATIENT STATES HER PHARMACY ADVISED HER TO CALL AND ASK FOR A NEW PRESCRIPTION DUE TO THE CURRENT PRESCRIPTION DOES NOT HAVE ANY REFILLS.     PATIENT STATES SHE HAS BEEN OUT OF THIS MEDICATION FOR A WEEK.     HUB COULD NOT FIND ON PATIENTS CURRENT MEDICATION LIST.

## 2024-04-25 NOTE — TELEPHONE ENCOUNTER
Caller: Ashley Valdes    Relationship: Emergency Contact    Best call back number: [phone]    What are your concerns: PLEASE ADVISE PATIENT IF SHE NEEDS TO CONTINUE TO TAKE THIS. (PATIENT'S DAUGHTER NOT ON BH VERBAL)

## 2024-05-01 ENCOUNTER — OFFICE VISIT (OUTPATIENT)
Dept: OBSTETRICS AND GYNECOLOGY | Facility: CLINIC | Age: 89
End: 2024-05-01
Payer: MEDICARE

## 2024-05-01 VITALS
DIASTOLIC BLOOD PRESSURE: 80 MMHG | SYSTOLIC BLOOD PRESSURE: 170 MMHG | BODY MASS INDEX: 38.09 KG/M2 | WEIGHT: 215 LBS | HEIGHT: 63 IN

## 2024-05-01 DIAGNOSIS — N89.8 VAGINAL ITCHING: ICD-10-CM

## 2024-05-01 DIAGNOSIS — Z01.411 ENCOUNTER FOR GYNECOLOGICAL EXAMINATION WITH ABNORMAL FINDING: Primary | ICD-10-CM

## 2024-05-01 RX ORDER — CLOBETASOL PROPIONATE 0.5 MG/G
CREAM TOPICAL
Qty: 60 G | Refills: 4 | Status: SHIPPED | OUTPATIENT
Start: 2024-05-01

## 2024-05-01 RX ORDER — HYDROXYZINE HYDROCHLORIDE 25 MG/1
25 TABLET, FILM COATED ORAL 2 TIMES DAILY PRN
Qty: 60 TABLET | Refills: 1 | Status: SHIPPED | OUTPATIENT
Start: 2024-05-01

## 2024-05-01 NOTE — PROGRESS NOTES
GYN ANNUAL EXAM     Chief Complaint   Patient presents with    ngyn     AE and has a vaginal itch       HPI    Mandy is a 89 y.o. female who presents for annual well woman exam. She is a new patient to our practice. She does report having some vaginal itching for the last few months. She rates it as severe, even interfering with her sleep pattern.     OB History          5    Para   5    Term   5            AB        Living             SAB        IAB        Ectopic        Molar        Multiple        Live Births                    SUBJECTIVE    MENSTRUAL & SEXUAL HEALTH  LMP: No LMP recorded. Patient has had a hysterectomy.  Last pap: has been some time since last pap, never had an abnormal pap in the past.  History of abnormal pap: no  History of STD: No  Family history of cancer: breast cancer       Family history of breast cancer: yes - daughter  Performs SBE: performs monthly.  Mammogram: , Birads I (Normal).  History of abnormal mammogram: no  Bleeding since LMP: no  Vasomotor symptoms: no  HRT: no  Incontinence: no  Dyspareunia: no  DEXA scan: , Osteopenia    Past Medical History:   Diagnosis Date    Benign essential hypertension 10/28/2012    2012--treatment for hypertension begun.    Bilateral lower extremity edema 2020    Bilateral sensorineural hearing loss, wears hearing aids 2017    Left is much worse than right.  Patient had multiple ear infections as a child.    Chronic renal impairment, stage 3b 09/15/2020    Claustrophobia 2016    This patient has significant nocturnal hypoxemia and I think that she could benefit from nocturnal oxygen therapy. The exact etiology of her hypoxemia is not clear. She could possibly have obstructive sleep apnea but this is not documented. We cannot test this patient for sleep apnea due to the fact that she is severely claustrophobic. Patient was a former smoker and it is possibly that COPD he is playing a role.    Combined  form of senile cataract 2021    Family history of coronary artery disease 2016    Patient's mother, father, 2 sisters and a brother all  from myocardial infarctions    Gastroesophageal reflux disease without esophagitis 2020    Gout 10/28/2012    2012--initial diagnosis and treatment of gout.    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 2023    History of hemorrhagic cystitis 2023    Hyperlipidemia 10/28/2012    2012--treatment for hyperlipidemia begun.    Impaired fasting glucose 10/28/2012    2012--initial diagnosis impaired fasting glucose.    Morbidly obese 2019    Nocturnal hypoxemia 2012--patient did not receive nocturnal oxygen because of Medicare regulations.   05/15/2014--overnight oximetry revealed oxygen saturations less than 89% for 22 minutes and 40 seconds. Oxygen saturations less than or equal to 88% for 22 minutes and 40 seconds. Lowest oxygen saturation 83%. The longest continuous time with oxygen saturations less than or equal to 88% was 1 minute and 32 seconds.   2012--overnight oximetry revealed oxygen saturations less than 90% for one hour and 35 minutes. Oxygen saturations less than 89% 59 minutes. This patient has significant nocturnal hypoxemia and I think that she could benefit from nocturnal oxygen therapy. The exact etiology of her hypoxemia is not clear. She could possibly have obstructive sleep apnea but this is not documented. We cannot test this patient for sleep apnea due to the fact that she is severely claustrophobic. Patient was a former smoker and it is possibly that COPD he is playing a role.    Non morbid obesity 2019    Osteopenia of multiple sites 2023    Paroxysmal atrial fibrillation 04/10/2020    Postmenopausal  state 09/26/2023    Primary hypothyroidism 11/17/2015 March 11, 2019--TSH remains elevated slightly at 4.92.  Given the overall clinical picture including the multinodular goiter, we will initiate levothyroxine 50 mcg/day and reassess in about 6 weeks.  August 1, 2018--thyroid ultrasound reveals a multinodular thyroid with multiple subcentimeter nodules.  Only minimal increase in size of the largest nodule in the left lobe has occurred when compared     Secondary polycythemia 08/02/2012 11/06/2014--patient did not receive nocturnal oxygen because of Medicare regulations.   05/15/2014--overnight oximetry revealed oxygen saturations less than 89% for 22 minutes and 40 seconds. Oxygen saturations less than or equal to 88% for 22 minutes and 40 seconds. Lowest oxygen saturation 83%. The longest continuous time with oxygen saturations less than or equal to 88% was 1 minute and 32 seconds.   08/02/2012--overnight oximetry revealed oxygen saturations less than 90% for one hour and 35 minutes. Oxygen saturations less than 89% 59 minutes. This patient has significant nocturnal hypoxemia and I think that she could benefit from nocturnal oxygen therapy. The exact etiology of her hypoxemia is not clear. She could possibly have obstructive sleep apnea but this is not documented. We cannot test this patient for sleep apnea due to the fact that she is severely claustrophobic. Patient was a former smoker and it is possibly that COPD he is playing a role.    Vitamin D deficiency 05/23/2016       Past Surgical History:   Procedure Laterality Date    CHOLECYSTECTOMY WITH INTRAOPERATIVE CHOLANGIOGRAM N/A 03/12/2020    Procedure: LAPAROSCOPIC CHOLECYSTOSTOMY TUBE, INTRAOPERATIVE CHOLANGIOGRAM;  Surgeon: Bryce Andujar MD;  Location: [facility];  Service: General;  Laterality: N/A;    CHOLECYSTECTOMY WITH INTRAOPERATIVE CHOLANGIOGRAM N/A 05/22/2020    Procedure: CHOLECYSTECTOMY LAPAROSCOPIC INTRAOPERATIVE CHOLANGIOGRAM  and EGD;  Surgeon: Bryce Andujar MD;  Location: [facility] MAIN OR;  Service: General;  Laterality: N/A;    ERCP N/A 07/01/2022    Procedure: ENDOSCOPIC RETROGRADE CHOLANGIOPANCREATOGRAPHY with sphincterotomy, balloon sweep 12-15mm;  Surgeon: Mitesh Altamirano MD;  Location: [facility] ENDOSCOPY;  Service: Gastroenterology;  Laterality: N/A;  pre - gallstone pancreatitis  post - s/p sphincterotomy, sludge and  pus    FEMORAL THROMBECTOMY/EMBOLECTOMY Left 11/16/2023    Procedure: OPEN LEFT FEMORAL THROMBECTOMY/EMBOLECTOMY WITH LEFT LOWER EXTREMITY ANGIOGRAM;  Surgeon: Jace Chowdhury Jr., MD;  Location: [facility] HYBRID OR 18/19;  Service: Vascular;  Laterality: Left;    OOPHORECTOMY      age 48    RADICAL ABDOMINAL HYSTERECTOMY  48 years old    48 years of age. Uterine fibroid tumors with menorrhagia - no cancer         Current Outpatient Medications:     allopurinol (ZYLOPRIM) 300 MG tablet, TAKE 1 TABLET BY MOUTH DAILY FOR GOUT, Disp: , Rfl:     apixaban (ELIQUIS) 5 MG tablet tablet, Take 1 tablet (5 mg) every 12 hours for blood thinner, Disp: , Rfl:     BIOTIN PO, Take  by mouth., Disp: , Rfl:     levothyroxine (SYNTHROID, LEVOTHROID) 75 MCG tablet, TAKE 1 TABLET BY MOUTH DAILY FOR THYROID, Disp: 90 tablet, Rfl: 1    metoprolol succinate XL (TOPROL-XL) 25 MG 24 hr tablet, TAKE 1 TABLET BY MOUTH DAILY FOR HIGH BLOOD PRESSURE AND HEART OR PALPITATIONS, Disp: 90 tablet, Rfl: 1    omeprazole (priLOSEC) 40 MG capsule, TAKE 1 CAPSULE BY MOUTH EVERY DAY BEFORE FIRST MEAL FOR STOMACH ACID OR REFLUX, Disp: 30 capsule, Rfl: 1    pravastatin (PRAVACHOL) 40 MG tablet, Take 1 tablet (40 mg) every day for high cholesterol., Disp: 90 tablet, Rfl: 3    VITAMIN D PO, Take  by mouth., Disp: , Rfl:     clobetasol prop emollient base (TEMOVATE-E) 0.05 % emollient cream, Apply 1 Application topically to the appropriate area as directed 2 (Two) Times a Day for 14 days then 2 to 3 times a week after that for maintenance., Disp: 60  g, Rfl: 4    hydrOXYzine (ATARAX) 25 MG tablet, Take 1 tablet by mouth 2 (Two) Times a Day As Needed for Itching., Disp: 60 tablet, Rfl: 1    Allergies   Allergen Reactions    Cefaclor Anaphylaxis, Angioedema and Swelling     caused angioedema 25 years ago; she tolerates cephalexin, amoxicillin, and ceftriaxone per my d/w patient and her daughter as well as amoxicillin-clavulanate confirmed in EPIC  caused angioedema 25 years ago; she tolerates cephalexin, amoxicillin, and ceftriaxone per my d/w patient and her daughter as well as amoxicillin-clavulanate confirmed in EPIC  caused angioedema 25 years ago; she tolerates cephalexin, amoxicillin, and ceftriaxone per my d/w patient and her daughter as well as amoxicillin-clavulanate confirmed in EPIC  caused angioedema 25 years ago; she tolerates cephalexin, amoxicillin, and ceftriaxone per my d/w patient and her daughter as well as amoxicillin-clavulanate confirmed in EPIC    Keflex [Cephalexin] Hives    Sulfa Antibiotics Rash       Social History     Tobacco Use    Smoking status: Former     Current packs/day: 0.00     Average packs/day: 0.5 packs/day for 8.0 years (4.0 ttl pk-yrs)     Types: Cigarettes     Start date: 1946     Quit date: 1954     Years since quittin.3    Smokeless tobacco: Never    Tobacco comments:     Stopped drinking at age 30.   Vaping Use    Vaping status: Never Used   Substance Use Topics    Alcohol use: No     Comment: caffiene use tea coffee    Drug use: No       Family History   Problem Relation Age of Onset    Heart disease Mother         Ischemic.  from coronary artery disease.    Heart disease Father         Ischemic.  from coronary artery disease.    Heart disease Sister         Ischemic.  from coronary artery disease.    Heart disease Sister         Ischemic.  from coronary artery disease.    Heart disease Brother         Ischemic.  from coronary artery disease.    Breast cancer Daughter     Breast cancer  "Daughter     Ovarian cancer Neg Hx     Malig Hyperthermia Neg Hx        Review of Systems   Constitutional:  Negative for fatigue, unexpected weight gain and unexpected weight loss.   Gastrointestinal:  Negative for abdominal pain.   Genitourinary:  Positive for vaginal pain (with vaginal itching). Negative for decreased libido, difficulty urinating, dyspareunia, dysuria, pelvic pain, pelvic pressure, urgency, urinary incontinence and vaginal discharge.   All other systems reviewed and are negative.      OBJECTIVE    /80   Ht 160 cm (62.99\")   Wt 97.5 kg (215 lb)   BMI 38.10 kg/m²     Physical Exam  Constitutional:       General: She is awake. She is not in acute distress.     Appearance: Normal appearance. She is well-developed, well-groomed and normal weight. She is not ill-appearing.   Genitourinary:      Vulva, bladder and urethral meatus normal.      No lesions in the vagina.      Right Labia: rash.      Right Labia: No tenderness, lesions or skin changes.     Left Labia: rash.      Left Labia: No tenderness, lesions or skin changes.     No labial fusion noted.            No inguinal adenopathy present in the right or left side.     No vaginal discharge, erythema, tenderness, bleeding or granulation tissue.      No vaginal prolapse present.     Moderate vaginal atrophy present.       Right Adnexa: not tender and no mass present.     Left Adnexa: not tender and no mass present.     No cervical discharge, friability, lesion, polyp, eversion or elongation.      Uterus is not enlarged, tender or irregular.      No urethral prolapse or discharge present.      Pelvic floor neuro is intact.     Pelvic exam was performed with patient in the lithotomy position.   Breasts:     Breasts are symmetrical.      Breasts are soft.     Right: Normal. No inverted nipple, mass, nipple discharge or skin change.      Left: Normal. No inverted nipple, mass, nipple discharge or skin change.   HENT:      Head: Normocephalic " and atraumatic.   Eyes:      General: No scleral icterus.     Conjunctiva/sclera: Conjunctivae normal.   Cardiovascular:      Rate and Rhythm: Normal rate and regular rhythm.      Heart sounds: Normal heart sounds.   Pulmonary:      Effort: Pulmonary effort is normal.      Breath sounds: Normal breath sounds.   Abdominal:      Palpations: Abdomen is soft.   Musculoskeletal:         General: Normal range of motion.      Cervical back: Normal range of motion.   Lymphadenopathy:      Lower Body: No right inguinal adenopathy. No left inguinal adenopathy.   Neurological:      General: No focal deficit present.      Mental Status: She is alert and oriented to person, place, and time.   Skin:     General: Skin is warm and dry.      Coloration: Skin is not jaundiced or pale.   Psychiatric:         Behavior: Behavior normal. Behavior is cooperative.   Vitals reviewed.         ASSESSMENT    Diagnoses and all orders for this visit:    1. Encounter for gynecological examination with abnormal finding (Primary)    2. Vaginal itching  -     NuSwab VG+ - Swab, Vagina  -     clobetasol prop emollient base (TEMOVATE-E) 0.05 % emollient cream; Apply 1 Application topically to the appropriate area as directed 2 (Two) Times a Day for 14 days then 2 to 3 times a week after that for maintenance.  Dispense: 60 g; Refill: 4  -     hydrOXYzine (ATARAX) 25 MG tablet; Take 1 tablet by mouth 2 (Two) Times a Day As Needed for Itching.  Dispense: 60 tablet; Refill: 1         PLAN   WELL WOMAN EXAM: Pap smear declined today. Recommend MVI daily.    MENOPAUSAL: status post hysterectomy.   VAGINAL ITCHING: Discussed lichen sclerosus and the use of steroid cream to treat symptoms. She will return to care in 2 weeks if no better, for vulvar biopsy.   SMOKING STATUS: former smoker.  BONE HEALTH: weight bearing exercise, dietary calcium recommendations and vitamin D reviewed. DEXA scan if not already performed.   COLON HEALTH: screening colonoscopy or  Lisa recommended.  BREAST HEALTH: Encouraged annual mammogram screening starting at age 40. Instructed on how to perform SBE. Encouraged breast health self awareness.  EXERCISE: Encouraged 150 minutes of exercise per week if not medially contraindicated.   BMI: Body mass index is 38.1 kg/m².     Return in about 1 year (around 5/1/2025), or if symptoms worsen or fail to improve, for annual physical.    I spent 15 minutes caring for Mandy on this date of service. This time includes time spent by me in the following activities: preparing for the visit, reviewing tests, obtaining and/or reviewing a separately obtained history, performing a medically appropriate examination and/or evaluation, counseling and educating the patient/family/caregiver, ordering medications, tests, or procedures, referring and communicating with other health care professionals, documenting information in the medical record, independently interpreting results and communicating that information with the patient/family/caregiver, and care coordination.    Ana Rosa Bonds CNM  5/1/2024  11:57 EDT

## 2024-05-02 DIAGNOSIS — I48.0 PAROXYSMAL ATRIAL FIBRILLATION: Chronic | ICD-10-CM

## 2024-05-04 LAB
A VAGINAE DNA VAG QL NAA+PROBE: NORMAL SCORE
BVAB2 DNA VAG QL NAA+PROBE: NORMAL SCORE
C ALBICANS DNA VAG QL NAA+PROBE: NEGATIVE
C GLABRATA DNA VAG QL NAA+PROBE: NEGATIVE
C TRACH DNA VAG QL NAA+PROBE: NEGATIVE
MEGA1 DNA VAG QL NAA+PROBE: NORMAL SCORE
N GONORRHOEA DNA VAG QL NAA+PROBE: NEGATIVE
T VAGINALIS DNA VAG QL NAA+PROBE: NEGATIVE

## 2024-05-08 ENCOUNTER — TELEPHONE (OUTPATIENT)
Dept: OBSTETRICS AND GYNECOLOGY | Facility: CLINIC | Age: 89
End: 2024-05-08
Payer: MEDICARE

## 2024-05-08 RX ORDER — HYDROXYZINE HYDROCHLORIDE 25 MG/1
25 TABLET, FILM COATED ORAL 2 TIMES DAILY PRN
Qty: 60 TABLET | Refills: 1 | Status: SHIPPED | OUTPATIENT
Start: 2024-05-08

## 2024-06-17 ENCOUNTER — OFFICE VISIT (OUTPATIENT)
Age: 89
End: 2024-06-17
Payer: MEDICARE

## 2024-06-17 ENCOUNTER — HOSPITAL ENCOUNTER (OUTPATIENT)
Facility: HOSPITAL | Age: 89
Discharge: HOME | End: 2024-06-17
Admitting: SURGERY
Payer: MEDICARE

## 2024-06-17 VITALS
HEART RATE: 66 BPM | SYSTOLIC BLOOD PRESSURE: 171 MMHG | DIASTOLIC BLOOD PRESSURE: 72 MMHG | WEIGHT: 215 LBS | HEIGHT: 63 IN | BODY MASS INDEX: 38.09 KG/M2

## 2024-06-17 DIAGNOSIS — Z86.718 HISTORY OF ARTERIAL THROMBOSIS: Primary | Chronic | ICD-10-CM

## 2024-06-17 DIAGNOSIS — I75.023 ATHEROEMBOLISM OF BILATERAL LOWER EXTREMITIES: ICD-10-CM

## 2024-06-17 DIAGNOSIS — I74.9 EMBOLISM AND THROMBOSIS OF UNSPECIFIED ARTERY: ICD-10-CM

## 2024-06-17 DIAGNOSIS — I70.0 ATHEROSCLEROSIS OF AORTA: ICD-10-CM

## 2024-06-17 LAB
BH CV LOWER ARTERIAL LEFT ABI RATIO: 1.19
BH CV LOWER ARTERIAL LEFT DORSALIS PEDIS SYS MAX: 160
BH CV LOWER ARTERIAL LEFT POST TIBIAL SYS MAX: 190
BH CV LOWER ARTERIAL RIGHT ABI RATIO: 1.38
BH CV LOWER ARTERIAL RIGHT DORSALIS PEDIS SYS MAX: 190
BH CV LOWER ARTERIAL RIGHT POST TIBIAL SYS MAX: 220
UPPER ARTERIAL LEFT ARM BRACHIAL SYS MAX: NORMAL
UPPER ARTERIAL RIGHT ARM BRACHIAL SYS MAX: NORMAL

## 2024-06-17 PROCEDURE — 93922 UPR/L XTREMITY ART 2 LEVELS: CPT

## 2024-06-17 PROCEDURE — 93922 UPR/L XTREMITY ART 2 LEVELS: CPT | Performed by: SURGERY

## 2024-06-17 PROCEDURE — 1160F RVW MEDS BY RX/DR IN RCRD: CPT | Performed by: NURSE PRACTITIONER

## 2024-06-17 PROCEDURE — 99213 OFFICE O/P EST LOW 20 MIN: CPT | Performed by: NURSE PRACTITIONER

## 2024-06-17 PROCEDURE — 1159F MED LIST DOCD IN RCRD: CPT | Performed by: NURSE PRACTITIONER

## 2024-06-17 NOTE — PROGRESS NOTES
Chief Complaint  Follow-up (F/u with scans)    Subjective        Mandy Alarcon presents to [facility] VASCULAR SURGERY  HPI   Mandy Alarcon is a 89 y.o. female that has been followed in our office for peripheral arterial disease.  She presented to [facility] in 2023 for a lower extremity thromboembolism.  This was from a cardiac source.  She had a open left femoral thrombectomy with angioplasty in November 2023.  She did have opening of her incision postoperatively that was treated with normal saline wet-to-dry dressings.  She returns today in follow up along with ABIs. She reports she  has been doing well without hospitalizations or surgeries. She denies any worsening claudication symptoms, rest pain, or tissue loss.     Review of Systems   Constitutional:  Negative for fever.   Eyes:  Negative for visual disturbance.   Cardiovascular:  Negative for leg swelling.   Gastrointestinal:  Negative for abdominal pain.   Musculoskeletal:  Negative for back pain.   Skin:  Negative for color change, pallor and wound.   Neurological:  Negative for dizziness, facial asymmetry, speech difficulty and weakness.        Mandy Alarcon  reports that she quit smoking about 70 years ago. Her smoking use included cigarettes. She started smoking about 78 years ago. She has a 4 pack-year smoking history. She has never used smokeless tobacco..        Objective   Vital Signs:  Vitals:    06/17/24 1415   BP: 171/72   Pulse: 66      Body mass index is 38.1 kg/m².           Physical Exam  Vitals reviewed.   Constitutional:       Appearance: Normal appearance.   HENT:      Head: Normocephalic.   Cardiovascular:      Rate and Rhythm: Normal rate and regular rhythm.      Pulses: Normal pulses.           Dorsalis pedis pulses are 3+ on the right side and 3+ on the left side.        Posterior tibial pulses are 3+ on the right side and 3+ on the left side.   Pulmonary:      Effort: Pulmonary effort is normal.    Skin:     General: Skin is warm.   Neurological:      General: No focal deficit present.      Mental Status: She is alert and oriented to person, place, and time.   Psychiatric:         Mood and Affect: Mood normal.        Result Review :      ABIs from today: Doppler Ankle Brachial Index Single Level CAR (06/17/2024 13:49)                    Assessment and Plan     Diagnoses and all orders for this visit:    1. History of arterial thrombosis, 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 (Primary)  -     Doppler Ankle Brachial Index Single Level CAR; Future    2. Atherosclerosis of aorta  -     Doppler Ankle Brachial Index Single Level CAR; Future          Patient presents today for follow up of her peripheral arterial disease status post thromboembolectomy.  She  is to continue her Eliquis. She is to continue her statin for cholesterol control.  We discussed adequate blood pressure management.  We discussed continuing her walking protocol. She will return in 1 year along with ABIs.         Follow Up     Return in about 1 year (around 6/17/2025) for raz.  Patient was given instructions and counseling regarding her condition or for health maintenance advice. Please see specific information pulled into the AVS if appropriate.     CJ Jackson

## 2024-07-03 DIAGNOSIS — I48.0 PAROXYSMAL ATRIAL FIBRILLATION: Chronic | ICD-10-CM

## 2024-07-08 DIAGNOSIS — I10 BENIGN ESSENTIAL HYPERTENSION: Chronic | ICD-10-CM

## 2024-07-08 DIAGNOSIS — I47.10 SUPRAVENTRICULAR TACHYCARDIA: Chronic | ICD-10-CM

## 2024-07-08 RX ORDER — METOPROLOL SUCCINATE 25 MG/1
TABLET, EXTENDED RELEASE ORAL
Qty: 90 TABLET | Refills: 0 | Status: SHIPPED | OUTPATIENT
Start: 2024-07-08

## 2024-07-08 NOTE — TELEPHONE ENCOUNTER
Rx Refill Note  Requested Prescriptions     Pending Prescriptions Disp Refills    metoprolol succinate XL (TOPROL-XL) 25 MG 24 hr tablet [Pharmacy Med Name: METOPROLOL ER SUCCINATE 25MG TABS] 90 tablet 0     Sig: TAKE 1 TABLET BY MOUTH DAILY FOR HIGH BLOOD PRESSURE AND HEART OR PALPITATIONS      Last office visit with prescribing clinician: 4/9/2024   Last telemedicine visit with prescribing clinician: Visit date not found   Next office visit with prescribing clinician: 10/3/2024                         Would you like a call back once the refill request has been completed: [] Yes [] No    If the office needs to give you a call back, can they leave a voicemail: [] Yes [] No    Delia Corona MA  07/08/24, 08:27 EDT

## 2024-07-18 DIAGNOSIS — K21.9 GASTROESOPHAGEAL REFLUX DISEASE WITHOUT ESOPHAGITIS: Chronic | ICD-10-CM

## 2024-07-18 RX ORDER — OMEPRAZOLE 40 MG/1
CAPSULE, DELAYED RELEASE ORAL
Qty: 30 CAPSULE | Refills: 1 | Status: SHIPPED | OUTPATIENT
Start: 2024-07-18

## 2024-08-16 ENCOUNTER — OFFICE VISIT (OUTPATIENT)
Dept: CARDIOLOGY | Facility: CLINIC | Age: 89
End: 2024-08-16
Payer: MEDICARE

## 2024-08-16 VITALS
WEIGHT: 220 LBS | HEART RATE: 56 BPM | DIASTOLIC BLOOD PRESSURE: 76 MMHG | SYSTOLIC BLOOD PRESSURE: 138 MMHG | BODY MASS INDEX: 40.48 KG/M2 | HEIGHT: 62 IN

## 2024-08-16 DIAGNOSIS — E78.2 MIXED HYPERLIPIDEMIA: Chronic | ICD-10-CM

## 2024-08-16 DIAGNOSIS — I48.0 PAROXYSMAL ATRIAL FIBRILLATION: Chronic | ICD-10-CM

## 2024-08-16 DIAGNOSIS — I10 BENIGN ESSENTIAL HYPERTENSION: Chronic | ICD-10-CM

## 2024-08-16 DIAGNOSIS — I47.10 SUPRAVENTRICULAR TACHYCARDIA: Primary | Chronic | ICD-10-CM

## 2024-08-16 NOTE — PROGRESS NOTES
Subjective:     Encounter Date: 08/16/2024      Patient ID: Mandy Alarcon is a 89 y.o. female.    Chief Complaint:hospital follow up   History of Present Illness  This is a 89 y/o female who follows with Dr. Laura and is known to me.  She has a pmhx of hypertension, CKD stage 2, gout, hypothyroidism, tobacco abuse, hyperlipidemia, paroxysmal SVT and near syncope.    He is here today for follow-up visit. She has been doing well since Dr. Laura saw her in February. She has been having issues with hearing but has a hearing aid now which has helped. She denies any chest pain, shortness of breath, palpitations, dizziness or syncope. She denies swelling in her lower extremities, orthopnea or PND. Blood pressure is well controlled.     Prior history:  She began following with Dr. Laura in 2018 when she was admitted to the hospital with a syncopal event. When she presented to the ED that admission, she was noted to be in SVT and was converted with adenosine. An echocardiogram was obtained that showed a normal LV systolic function, EF 60%, normal diastolic function and mild aortic regurgitation. She was started on metoprolol tartrate 25 mg BID, HCTZ was stopped and she was sent home with a ZIO monitor.     [place] [facility] [place] nocturnal chest pressure. She had a persistently elevated d-dimer and a VQ scan was obtained. This was low probability for a PE. She returned for a stress test that showed no evidence of ischemia. EF was 59%. Zio showed 53 nonsustained episodes of supraventricular tachycardia the longest lasting 54 beats and the fastest with a rate of 203 bpm.  She also had rare episodes of PVCs and nonsustained ventricular tachycardia longest lasting 9 beats with a rate of 166 bpm.  There was also a single nonsignificant pause of 2 seconds.     She was admitted in February 2020 with acute pancreatitis and pneumonia. She developed atrial fibrillation with RVR but converted back to sinus  rhythm. It was felt that the afib was related to her acute illness and anticoagulation was not recommended at the time.     She was seen in office February 2023 and was doing well. No changes were made.    She presented to the ED on 11/16 with complaints of left foot pain and was found to have acute thromboembolism to both lower extremities. The left was noted to have popliteal occlusion with large segment of thrombus load. Vascular was consulted and recommended urgent thrombectomy. A thrombus was present on the right as well but she was asymptomatic with this. She underwent a left femoral thrombectomy and was ultimately discharged on apixaban for anticoagulation.    She was readmitted 12/27 for hematuria. Urology and vascular both saw the patient. She underwent a cystoscopy that showed multiple areas of redness in her bladder suggestive of hemorrhagic cystitis. She was treated with antibiotics and apixaban was later resumed.     I saw her in December 2023 she was doing well at the time. No changes were made.    I have reviewed and updated as appropriate allergies, current medications, past family history, past medical history, past surgical history and problem list.    Review of Systems   Constitutional: Negative for fever, malaise/fatigue, weight gain and weight loss.   HENT:  Negative for congestion, hoarse voice and sore throat.    Eyes:  Negative for blurred vision and double vision.   Cardiovascular:  Negative for chest pain, dyspnea on exertion, leg swelling, orthopnea and syncope.   Respiratory:  Negative for cough, shortness of breath and wheezing.    Gastrointestinal:  Negative for abdominal pain, hematemesis, hematochezia and melena.   Genitourinary:  Negative for dysuria and hematuria.   Neurological:  Negative for dizziness, headaches, light-headedness and numbness.   Psychiatric/Behavioral:  Negative for depression. The patient is not nervous/anxious.          Current Outpatient Medications:      allopurinol (ZYLOPRIM) 300 MG tablet, TAKE 1 TABLET BY MOUTH DAILY FOR GOUT, Disp: , Rfl:     apixaban (Eliquis) 5 MG tablet tablet, TAKE 1 TABLET(5 MG) BY MOUTH EVERY 12 HOURS FOR BLOOD THINNER, Disp: 60 tablet, Rfl: 1    BIOTIN PO, Take  by mouth., Disp: , Rfl:     clobetasol prop emollient base (TEMOVATE-E) 0.05 % emollient cream, Apply 1 Application topically to the appropriate area as directed 2 (Two) Times a Day for 14 days then 2 to 3 times a week after that for maintenance., Disp: 60 g, Rfl: 4    hydrOXYzine (ATARAX) 25 MG tablet, Take 1 tablet by mouth 2 (Two) Times a Day As Needed for Itching., Disp: 60 tablet, Rfl: 1    hydrOXYzine (ATARAX) 25 MG tablet, Take 1 tablet by mouth 2 (Two) Times a Day As Needed for Itching., Disp: 60 tablet, Rfl: 1    levothyroxine (SYNTHROID, LEVOTHROID) 75 MCG tablet, TAKE 1 TABLET BY MOUTH DAILY FOR THYROID, Disp: 90 tablet, Rfl: 1    metoprolol succinate XL (TOPROL-XL) 25 MG 24 hr tablet, TAKE 1 TABLET BY MOUTH DAILY FOR HIGH BLOOD PRESSURE AND HEART OR PALPITATIONS, Disp: 90 tablet, Rfl: 0    omeprazole (priLOSEC) 40 MG capsule, TAKE 1 CAPSULE BY MOUTH EVERY DAY BEFORE FIRST MEAL FOR STOMACH ACID OR REFLUX, Disp: 30 capsule, Rfl: 1    pravastatin (PRAVACHOL) 40 MG tablet, Take 1 tablet (40 mg) every day for high cholesterol., Disp: 90 tablet, Rfl: 3    VITAMIN D PO, Take  by mouth., Disp: , Rfl:     Past Medical History:   Diagnosis Date    Benign essential hypertension 10/28/2012    October 2012--treatment for hypertension begun.    Bilateral lower extremity edema 03/23/2020    Bilateral sensorineural hearing loss, wears hearing aids 06/14/2017    Left is much worse than right.  Patient had multiple ear infections as a child.    Chronic renal impairment, stage 3b 09/15/2020    Claustrophobia 04/28/2016    This patient has significant nocturnal hypoxemia and I think that she could benefit from nocturnal oxygen therapy. The exact etiology of her hypoxemia is not clear. She  could possibly have obstructive sleep apnea but this is not documented. We cannot test this patient for sleep apnea due to the fact that she is severely claustrophobic. Patient was a former smoker and it is possibly that COPD he is playing a role.    Combined form of senile cataract 2021    Embolism and thrombosis of unspecified artery 2023    Family history of coronary artery disease 2016    Patient's mother, father, 2 sisters and a brother all  from myocardial infarctions    Gastroesophageal reflux disease without esophagitis 2020    Gout 10/28/2012    2012--initial diagnosis and treatment of gout.    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 2023    History of hemorrhagic cystitis 2023    Hyperlipidemia 10/28/2012    2012--treatment for hyperlipidemia begun.    Impaired fasting glucose 10/28/2012    2012--initial diagnosis impaired fasting glucose.    Morbidly obese 2019    Nocturnal hypoxemia 2012--patient did not receive nocturnal oxygen because of Medicare regulations.   05/15/2014--overnight oximetry revealed oxygen saturations less than 89% for 22 minutes and 40 seconds. Oxygen saturations less than or equal to 88% for 22 minutes and 40 seconds. Lowest oxygen saturation 83%. The longest continuous time with oxygen saturations less than or equal to 88% was 1 minute and 32 seconds.   2012--overnight oximetry revealed oxygen saturations less than 90% for one hour and 35 minutes. Oxygen saturations less than 89% 59 minutes. This patient has significant nocturnal hypoxemia and I think that she could benefit from nocturnal oxygen therapy. The exact etiology of her hypoxemia is not clear. She could possibly have obstructive sleep apnea but this is not  documented. We cannot test this patient for sleep apnea due to the fact that she is severely claustrophobic. Patient was a former smoker and it is possibly that COPD he is playing a role.    Non morbid obesity 03/11/2019    Osteopenia of multiple sites 09/26/2023    Paroxysmal atrial fibrillation 04/10/2020    Postmenopausal state 09/26/2023    Primary hypothyroidism 11/17/2015 March 11, 2019--TSH remains elevated slightly at 4.92.  Given the overall clinical picture including the multinodular goiter, we will initiate levothyroxine 50 mcg/day and reassess in about 6 weeks.  August 1, 2018--thyroid ultrasound reveals a multinodular thyroid with multiple subcentimeter nodules.  Only minimal increase in size of the largest nodule in the left lobe has occurred when compared     Secondary polycythemia 08/02/2012 11/06/2014--patient did not receive nocturnal oxygen because of Medicare regulations.   05/15/2014--overnight oximetry revealed oxygen saturations less than 89% for 22 minutes and 40 seconds. Oxygen saturations less than or equal to 88% for 22 minutes and 40 seconds. Lowest oxygen saturation 83%. The longest continuous time with oxygen saturations less than or equal to 88% was 1 minute and 32 seconds.   08/02/2012--overnight oximetry revealed oxygen saturations less than 90% for one hour and 35 minutes. Oxygen saturations less than 89% 59 minutes. This patient has significant nocturnal hypoxemia and I think that she could benefit from nocturnal oxygen therapy. The exact etiology of her hypoxemia is not clear. She could possibly have obstructive sleep apnea but this is not documented. We cannot test this patient for sleep apnea due to the fact that she is severely claustrophobic. Patient was a former smoker and it is possibly that COPD he is playing a role.    Vitamin D deficiency 05/23/2016       Past Surgical History:   Procedure Laterality Date    CHOLECYSTECTOMY WITH INTRAOPERATIVE CHOLANGIOGRAM N/A  2020    Procedure: LAPAROSCOPIC CHOLECYSTOSTOMY TUBE, INTRAOPERATIVE CHOLANGIOGRAM;  Surgeon: Bryce Andujar MD;  Location: [facility] OR;  Service: General;  Laterality: N/A;    CHOLECYSTECTOMY WITH INTRAOPERATIVE CHOLANGIOGRAM N/A 2020    Procedure: CHOLECYSTECTOMY LAPAROSCOPIC INTRAOPERATIVE CHOLANGIOGRAM and EGD;  Surgeon: Bryce Andujar MD;  Location: [facility] MAIN OR;  Service: General;  Laterality: N/A;    ERCP N/A 2022    Procedure: ENDOSCOPIC RETROGRADE CHOLANGIOPANCREATOGRAPHY with sphincterotomy, balloon sweep 12-15mm;  Surgeon: Mitesh Altamirano MD;  Location: [facility] ENDOSCOPY;  Service: Gastroenterology;  Laterality: N/A;  pre - gallstone pancreatitis  post - s/p sphincterotomy, sludge and  pus    FEMORAL THROMBECTOMY/EMBOLECTOMY Left 2023    Procedure: OPEN LEFT FEMORAL THROMBECTOMY/EMBOLECTOMY WITH LEFT LOWER EXTREMITY ANGIOGRAM;  Surgeon: Jace Chowdhury Jr., MD;  Location: [facility] HYBRID OR ;  Service: Vascular;  Laterality: Left;    OOPHORECTOMY      age 48    RADICAL ABDOMINAL HYSTERECTOMY  48 years old    48 years of age. Uterine fibroid tumors with menorrhagia - no cancer    THROMBECTOMY      laterality: Left left femoral with angio.       Family History   Problem Relation Age of Onset    Heart disease Mother         Ischemic.  from coronary artery disease.    Heart disease Father         Ischemic.  from coronary artery disease.    Heart disease Sister         Ischemic.  from coronary artery disease.    Heart disease Sister         Ischemic.  from coronary artery disease.    Heart disease Brother         Ischemic.  from coronary artery disease.    Breast cancer Daughter     Breast cancer Daughter     Ovarian cancer Neg Hx     Malig Hyperthermia Neg Hx        Social History     Tobacco Use    Smoking status: Former     Current packs/day: 0.00     Average packs/day: 0.5 packs/day for 8.0 years (4.0 ttl pk-yrs)     Types:  "Cigarettes     Start date: 1946     Quit date: 1954     Years since quittin.6    Smokeless tobacco: Never    Tobacco comments:     Stopped drinking at age 30.   Vaping Use    Vaping status: Never Used   Substance Use Topics    Alcohol use: No     Comment: caffiene use tea coffee    Drug use: No         ECG 12 Lead    Date/Time: 2024 12:45 PM  Performed by: Ursula Greenfield APRN    Authorized by: Ursula Greenfield APRN  Comparison: compared with previous ECG from 2024  Similar to previous ECG  Rhythm: sinus rhythm             Objective:     Visit Vitals  /76   Pulse 56   Ht 157.5 cm (62\")   Wt 99.8 kg (220 lb)   BMI 40.24 kg/m²             Physical Exam  Constitutional:       Appearance: Normal appearance. She is obese.   HENT:      Head: Normocephalic.   Neck:      Vascular: No carotid bruit.   Cardiovascular:      Rate and Rhythm: Normal rate and regular rhythm.      Chest Wall: PMI is not displaced.      Pulses: Normal pulses.           Radial pulses are 2+ on the right side and 2+ on the left side.      Heart sounds: Normal heart sounds. No murmur heard.     No friction rub. No gallop.   Pulmonary:      Effort: Pulmonary effort is normal.      Breath sounds: Normal breath sounds.   Abdominal:      General: Bowel sounds are normal. There is no distension.      Palpations: Abdomen is soft.   Musculoskeletal:      Right lower leg: No edema.      Left lower leg: No edema.   Skin:     General: Skin is warm and dry.      Capillary Refill: Capillary refill takes less than 2 seconds.   Neurological:      Mental Status: She is alert and oriented to person, place, and time.   Psychiatric:         Mood and Affect: Mood normal.         Behavior: Behavior normal.         Thought Content: Thought content normal.          Lab Review:   Lipid Panel          2023    09:49 3/18/2024    09:55   Lipid Panel   Total Cholesterol 144  129    Triglycerides 134  149          Cardiac Procedures: "       Assessment:         Diagnoses and all orders for this visit:    1. Supraventricular tachycardia (Primary)    2. Paroxysmal atrial fibrillation    3. Hyperlipidemia    4. Benign essential hypertension            Plan:       Left lower extremity arterial occlusion: s/p thrombectomy. . n apixaban. No changes at this time.  PAF: one episode, felt to be secondary to acute illness. Initially, no AC was indicated but now on AC with recent arterial occlusion.   HTN: blood pressure is well controlled. No changes.  PSVT: no noted recent episodes. On beta blocker therapy with Toprol XL   HLD: on statin. Managed by PCP  CKD    Thank you for allowing me to participate in this patient's care. Please call with any questions or concerns. Ms. Alarcon will follow up with Dr. Laura in 6 months.          Your medication list            Accurate as of August 16, 2024 11:53 AM. If you have any questions, ask your nurse or doctor.                CONTINUE taking these medications        Instructions Last Dose Given Next Dose Due   allopurinol 300 MG tablet  Commonly known as: ZYLOPRIM      TAKE 1 TABLET BY MOUTH DAILY FOR GOUT       apixaban 5 MG tablet tablet  Commonly known as: Eliquis      TAKE 1 TABLET(5 MG) BY MOUTH EVERY 12 HOURS FOR BLOOD THINNER       BIOTIN PO      Take  by mouth.       clobetasol prop emollient base 0.05 % emollient cream  Commonly known as: TEMOVATE-E      Apply 1 Application topically to the appropriate area as directed 2 (Two) Times a Day for 14 days then 2 to 3 times a week after that for maintenance.       hydrOXYzine 25 MG tablet  Commonly known as: ATARAX      Take 1 tablet by mouth 2 (Two) Times a Day As Needed for Itching.       hydrOXYzine 25 MG tablet  Commonly known as: ATARAX      Take 1 tablet by mouth 2 (Two) Times a Day As Needed for Itching.       levothyroxine 75 MCG tablet  Commonly known as: SYNTHROID, LEVOTHROID      TAKE 1 TABLET BY MOUTH DAILY FOR THYROID       metoprolol succinate XL  25 MG 24 hr tablet  Commonly known as: TOPROL-XL      TAKE 1 TABLET BY MOUTH DAILY FOR HIGH BLOOD PRESSURE AND HEART OR PALPITATIONS       omeprazole 40 MG capsule  Commonly known as: priLOSEC      TAKE 1 CAPSULE BY MOUTH EVERY DAY BEFORE FIRST MEAL FOR STOMACH ACID OR REFLUX       pravastatin 40 MG tablet  Commonly known as: PRAVACHOL      Take 1 tablet (40 mg) every day for high cholesterol.       VITAMIN D PO      Take  by mouth.                  CJ Nichole  08/16/24  1:47 PM EST

## 2024-08-22 ENCOUNTER — TELEPHONE (OUTPATIENT)
Dept: INTERNAL MEDICINE | Facility: CLINIC | Age: 89
End: 2024-08-22

## 2024-08-22 DIAGNOSIS — U07.1 COVID-19 VIRUS INFECTION: Primary | ICD-10-CM

## 2024-08-22 RX ORDER — PREDNISONE 10 MG/1
TABLET ORAL
Qty: 46 TABLET | Refills: 0 | Status: SHIPPED | OUTPATIENT
Start: 2024-08-22

## 2024-08-22 RX ORDER — HYDROCODONE POLISTIREX AND CHLORPHENIRAMINE POLISTIREX 10; 8 MG/5ML; MG/5ML
SUSPENSION, EXTENDED RELEASE ORAL
Qty: 100 ML | Refills: 0 | Status: SHIPPED | OUTPATIENT
Start: 2024-08-22

## 2024-08-26 ENCOUNTER — TELEPHONE (OUTPATIENT)
Dept: INTERNAL MEDICINE | Facility: CLINIC | Age: 89
End: 2024-08-26
Payer: MEDICARE

## 2024-08-26 DIAGNOSIS — U07.1 COVID-19 VIRUS INFECTION: Primary | ICD-10-CM

## 2024-08-26 RX ORDER — CODEINE PHOSPHATE/GUAIFENESIN 10-100MG/5
5 LIQUID (ML) ORAL 3 TIMES DAILY PRN
Qty: 100 ML | Refills: 0 | Status: SHIPPED | OUTPATIENT
Start: 2024-08-26

## 2024-08-26 NOTE — TELEPHONE ENCOUNTER
----- Message from Daryl Santos sent at 8/26/2024  9:33 AM EDT -----  I sent a different prescription for Mucinex/codeine syrup  ----- Message -----  From: Muna Craft MA  Sent: 8/23/2024   2:01 PM EDT  To: Daryl Santos MD    Insurance denied patient's prescription for Tussionex Pennkinetic cough syrup. Please advise on an alternative.

## 2024-08-27 ENCOUNTER — TELEPHONE (OUTPATIENT)
Dept: INTERNAL MEDICINE | Facility: CLINIC | Age: 89
End: 2024-08-27
Payer: MEDICARE

## 2024-08-27 NOTE — TELEPHONE ENCOUNTER
----- Message from Daryl Santos sent at 8/26/2024  9:34 AM EDT -----  I sent a prescription for guaifenesin/codeine syrup to her local pharmacy  ----- Message -----  From: Muna Craft MA  Sent: 8/23/2024   2:01 PM EDT  To: Daryl Santos MD    Insurance denied patient's prescription for Tussionex Pennkinetic cough syrup. Please advise on an alternative.

## 2024-08-31 DIAGNOSIS — I48.0 PAROXYSMAL ATRIAL FIBRILLATION: Chronic | ICD-10-CM

## 2024-09-08 DIAGNOSIS — Z87.39 HISTORY OF GOUT: Chronic | ICD-10-CM

## 2024-09-09 RX ORDER — ALLOPURINOL 300 MG/1
TABLET ORAL
Qty: 90 TABLET | Refills: 1 | Status: SHIPPED | OUTPATIENT
Start: 2024-09-09

## 2024-09-17 DIAGNOSIS — K21.9 GASTROESOPHAGEAL REFLUX DISEASE WITHOUT ESOPHAGITIS: Chronic | ICD-10-CM

## 2024-09-17 RX ORDER — OMEPRAZOLE 40 MG/1
CAPSULE, DELAYED RELEASE ORAL
Qty: 30 CAPSULE | Refills: 5 | Status: SHIPPED | OUTPATIENT
Start: 2024-09-17

## 2024-10-03 ENCOUNTER — OFFICE VISIT (OUTPATIENT)
Dept: INTERNAL MEDICINE | Facility: CLINIC | Age: 89
End: 2024-10-03
Payer: MEDICARE

## 2024-10-03 VITALS
OXYGEN SATURATION: 96 % | SYSTOLIC BLOOD PRESSURE: 140 MMHG | HEIGHT: 62 IN | RESPIRATION RATE: 16 BRPM | HEART RATE: 58 BPM | TEMPERATURE: 97.4 F | WEIGHT: 222 LBS | DIASTOLIC BLOOD PRESSURE: 70 MMHG | BODY MASS INDEX: 40.85 KG/M2

## 2024-10-03 DIAGNOSIS — Z78.0 POSTMENOPAUSAL STATE: Chronic | ICD-10-CM

## 2024-10-03 DIAGNOSIS — H90.3 BILATERAL SENSORINEURAL HEARING LOSS: Chronic | ICD-10-CM

## 2024-10-03 DIAGNOSIS — K21.9 GASTROESOPHAGEAL REFLUX DISEASE WITHOUT ESOPHAGITIS: Chronic | ICD-10-CM

## 2024-10-03 DIAGNOSIS — E78.2 MIXED HYPERLIPIDEMIA: Chronic | ICD-10-CM

## 2024-10-03 DIAGNOSIS — Z82.49 FAMILY HISTORY OF CORONARY ARTERY DISEASE: Chronic | ICD-10-CM

## 2024-10-03 DIAGNOSIS — N18.32 STAGE 3B CHRONIC KIDNEY DISEASE: Chronic | ICD-10-CM

## 2024-10-03 DIAGNOSIS — I10 BENIGN ESSENTIAL HYPERTENSION: Chronic | ICD-10-CM

## 2024-10-03 DIAGNOSIS — Z86.16 HISTORY OF 2019 NOVEL CORONAVIRUS DISEASE (COVID-19): Chronic | ICD-10-CM

## 2024-10-03 DIAGNOSIS — Z51.81 THERAPEUTIC DRUG MONITORING: ICD-10-CM

## 2024-10-03 DIAGNOSIS — H25.811 COMBINED FORMS OF AGE-RELATED CATARACT OF RIGHT EYE: Chronic | ICD-10-CM

## 2024-10-03 DIAGNOSIS — M85.89 OSTEOPENIA OF MULTIPLE SITES: Chronic | ICD-10-CM

## 2024-10-03 DIAGNOSIS — I48.0 PAROXYSMAL ATRIAL FIBRILLATION: Chronic | ICD-10-CM

## 2024-10-03 DIAGNOSIS — E04.2 MULTINODULAR GOITER: Chronic | ICD-10-CM

## 2024-10-03 DIAGNOSIS — Z23 NEED FOR INFLUENZA VACCINATION: ICD-10-CM

## 2024-10-03 DIAGNOSIS — R73.01 IMPAIRED FASTING GLUCOSE: Chronic | ICD-10-CM

## 2024-10-03 DIAGNOSIS — I47.10 SUPRAVENTRICULAR TACHYCARDIA: Chronic | ICD-10-CM

## 2024-10-03 DIAGNOSIS — D75.1 SECONDARY POLYCYTHEMIA: Chronic | ICD-10-CM

## 2024-10-03 DIAGNOSIS — F40.240 CLAUSTROPHOBIA: Chronic | ICD-10-CM

## 2024-10-03 DIAGNOSIS — E03.9 PRIMARY HYPOTHYROIDISM: Chronic | ICD-10-CM

## 2024-10-03 DIAGNOSIS — E55.9 VITAMIN D DEFICIENCY: Chronic | ICD-10-CM

## 2024-10-03 DIAGNOSIS — G47.34 NOCTURNAL HYPOXEMIA: Chronic | ICD-10-CM

## 2024-10-03 DIAGNOSIS — Z87.440 HISTORY OF HEMORRHAGIC CYSTITIS: Chronic | ICD-10-CM

## 2024-10-03 DIAGNOSIS — Z87.39 HISTORY OF GOUT: Chronic | ICD-10-CM

## 2024-10-03 DIAGNOSIS — Z86.718 HISTORY OF ARTERIAL THROMBOSIS: Chronic | ICD-10-CM

## 2024-10-03 DIAGNOSIS — R60.0 BILATERAL LOWER EXTREMITY EDEMA: Chronic | ICD-10-CM

## 2024-10-03 DIAGNOSIS — E66.01 MORBIDLY OBESE: Chronic | ICD-10-CM

## 2024-10-03 DIAGNOSIS — Z00.00 ENCOUNTER FOR SUBSEQUENT ANNUAL WELLNESS VISIT (AWV) IN MEDICARE PATIENT: Primary | ICD-10-CM

## 2024-10-03 PROBLEM — M70.50 ANSERINE BURSITIS: Status: RESOLVED | Noted: 2024-03-25 | Resolved: 2024-10-03

## 2024-10-03 PROBLEM — M25.562 ACUTE PAIN OF LEFT KNEE: Status: RESOLVED | Noted: 2024-03-25 | Resolved: 2024-10-03

## 2024-10-03 NOTE — ASSESSMENT & PLAN NOTE
Patient's (Body mass index is 40.59 kg/m².) indicates that they are obese (BMI >30) with health conditions that include hypertension, impaired fasting glucose, dyslipidemias, GERD, and osteoarthritis . Weight is unchanged. BMI  is above average; BMI management plan is completed. We discussed low calorie, low carb based diet program, portion control, and increasing exercise.

## 2024-10-03 NOTE — PROGRESS NOTES
Subjective   The ABCs of the Annual Wellness Visit  Medicare Wellness Visit      Mandy Alarcon is a 89 y.o. patient who presents for a Medicare Wellness Visit.    The following portions of the patient's history were reviewed and   updated as appropriate: allergies, current medications, past family history, past medical history, past social history, past surgical history, and problem list.    Compared to one year ago, the patient's physical   health is better.  Compared to one year ago, the patient's mental   health is the same.    Recent Hospitalizations:  This patient has had a [facility] admission record on file within the last 365 days.  Current Medical Providers:  Patient Care Team:  Daryl Santos MD as PCP - General (Internal Medicine)    Outpatient Medications Prior to Visit   Medication Sig Dispense Refill    allopurinol (ZYLOPRIM) 300 MG tablet TAKE 1 TABLET BY MOUTH DAILY FOR GOUT 90 tablet 1    apixaban (Eliquis) 5 MG tablet tablet TAKE 1 TABLET(5 MG) BY MOUTH EVERY 12 HOURS FOR BLOOD THINNER 60 tablet 1    BIOTIN PO Take  by mouth.      clobetasol prop emollient base (TEMOVATE-E) 0.05 % emollient cream Apply 1 Application topically to the appropriate area as directed 2 (Two) Times a Day for 14 days then 2 to 3 times a week after that for maintenance. 60 g 4    Hydrocod Abbe-Chlorphe Abbe ER (TUSSIONEX PENNKINETIC) 10-8 MG/5ML ER suspension Take 1 teaspoon p.o. every 12 hours as needed for cough/and/or congestion from COVID-19 infection 100 mL 0    levothyroxine (SYNTHROID, LEVOTHROID) 75 MCG tablet TAKE 1 TABLET BY MOUTH DAILY FOR THYROID 90 tablet 1    metoprolol succinate XL (TOPROL-XL) 25 MG 24 hr tablet TAKE 1 TABLET BY MOUTH DAILY FOR HIGH BLOOD PRESSURE AND HEART OR PALPITATIONS 90 tablet 0    omeprazole (priLOSEC) 40 MG capsule TAKE 1 CAPSULE BY MOUTH EVERY DAY BEFORE FIRST MEAL FOR STOMACH ACID OR REFLUX 30 capsule 5    pravastatin (PRAVACHOL) 40 MG tablet Take 1 tablet (40 mg) every  day for high cholesterol. 90 tablet 3    VITAMIN D PO Take  by mouth.      guaiFENesin-codeine (GUAIFENESIN AC) 100-10 MG/5ML liquid Take 5 mL by mouth 3 (Three) Times a Day As Needed for Cough. 100 mL 0    predniSONE (DELTASONE) 10 MG tablet 5 by mouth daily 5 days, then 4 daily 2 days, 3 daily 2 days, 2 daily 2 days, 1 daily 2 days, one half daily 2 days and then discontinue. 46 tablet 0     No facility-administered medications prior to visit.     No opioid medication identified on active medication list. I have reviewed chart for other potential  high risk medication/s and harmful drug interactions in the elderly.      Aspirin is not on active medication list.  Aspirin use is contraindicated for this patient due to: current use of Eliquis.  .    Patient Active Problem List   Diagnosis    Benign essential hypertension    Claustrophobia    Gout    Hyperlipidemia    Primary hypothyroidism    Impaired fasting glucose    Nocturnal hypoxemia    Secondary polycythemia    Vitamin D deficiency    Therapeutic drug monitoring    Family history of coronary artery disease    Bilateral sensorineural hearing loss, wears hearing aids    Multinodular goiter    Supraventricular tachycardia    Morbidly obese    Bilateral lower extremity edema    Gastroesophageal reflux disease without esophagitis    Paroxysmal atrial fibrillation    Stage 3b chronic kidney disease    Combined form of senile cataract    Osteopenia of multiple sites    Postmenopausal state    History of 2019 novel coronavirus disease (COVID-19)    History of hemorrhagic cystitis    History of arterial thrombosis, 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     Advance Care Planning Advance Directive is not on file.  ACP discussion was held with the patient during this visit. Patient does not have an advance directive, information provided.            Objective   Vitals:    10/03/24 1017   BP: 140/70   Pulse: 58   Resp: 16   Temp: 97.4 °F (36.3 °C)  "  TempSrc: Oral   SpO2: 96%   Weight: 101 kg (222 lb)   Height: 157.5 cm (62.01\")       Estimated body mass index is 40.59 kg/m² as calculated from the following:    Height as of this encounter: 157.5 cm (62.01\").    Weight as of this encounter: 101 kg (222 lb).            Does the patient have evidence of cognitive impairment? No                                                                                               Health  Risk Assessment    Smoking Status:  Social History     Tobacco Use   Smoking Status Former    Current packs/day: 0.00    Average packs/day: 0.5 packs/day for 8.0 years (4.0 ttl pk-yrs)    Types: Cigarettes    Start date: 1946    Quit date: 1954    Years since quittin.8   Smokeless Tobacco Never   Tobacco Comments    Stopped drinking at age 30.     Alcohol Consumption:  Social History     Substance and Sexual Activity   Alcohol Use No    Comment: caffiene use tea coffee       Fall Risk Screen  STEADI Fall Risk Assessment was completed, and patient is at LOW risk for falls.Assessment completed on:10/3/2024    Depression Screening:      10/3/2024    10:19 AM   PHQ-2/PHQ-9 Depression Screening   Little Interest or Pleasure in Doing Things 0-->not at all   Feeling Down, Depressed or Hopeless 0-->not at all   PHQ-9: Brief Depression Severity Measure Score 0     Health Habits and Functional and Cognitive Screening:      10/3/2024    10:19 AM   Functional & Cognitive Status   Do you have difficulty preparing food and eating? No   Do you have difficulty bathing yourself, getting dressed or grooming yourself? No   Do you have difficulty using the toilet? No   Do you have difficulty moving around from place to place? No   Do you have trouble with steps or getting out of a bed or a chair? No   Current Diet Limited Junk Food   Dental Exam Up to date   Eye Exam Up to date   Exercise (times per week) 0 times per week   Current Exercises Include No Regular Exercise   Do you need help using " the phone?  No   Are you deaf or do you have serious difficulty hearing?  No   Do you need help to go to places out of walking distance? No   Do you need help shopping? No   Do you need help preparing meals?  No   Do you need help with housework?  No   Do you need help with laundry? No   Do you need help taking your medications? No   Do you need help managing money? No   Do you ever drive or ride in a car without wearing a seat belt? No   Have you felt unusual stress, anger or loneliness in the last month? No   Who do you live with? Child   If you need help, do you have trouble finding someone available to you? No   Have you been bothered in the last four weeks by sexual problems? No   Do you have difficulty concentrating, remembering or making decisions? No           Age-appropriate Screening Schedule:  Refer to the list below for future screening recommendations based on patient's age, sex and/or medical conditions. Orders for these recommended tests are listed in the plan section. The patient has been provided with a written plan.    Health Maintenance List  Health Maintenance   Topic Date Due    MAMMOGRAM  06/07/2024    INFLUENZA VACCINE  08/01/2024    ANNUAL WELLNESS VISIT  09/26/2024    LIPID PANEL  03/18/2025    BMI FOLLOWUP  10/03/2025    DXA SCAN  01/12/2026    TDAP/TD VACCINES (2 - Td or Tdap) 06/14/2027    RSV Vaccine - Adults  Completed    Pneumococcal Vaccine 65+  Completed    COVID-19 Vaccine  Discontinued    ZOSTER VACCINE  Discontinued                                                                                                                                                CMS Preventative Services Quick Reference  Risk Factors Identified During Encounter  Immunizations Discussed/Encouraged: Influenza and RSV (Respiratory Syncytial Virus)    The above risks/problems have been discussed with the patient.  Pertinent information has been shared with the patient in the After Visit Summary.  An After  Visit Summary and PPPS were made available to the patient.    Follow Up:   Next Medicare Wellness visit to be scheduled in 1 year.     Assessment & Plan  Encounter for subsequent annual wellness visit (AWV) in Medicare patient    Impaired fasting glucose    Hyperlipidemia     Multinodular goiter    Stage 3b chronic kidney disease    Secondary polycythemia    Primary hypothyroidism    History of 2019 novel coronavirus disease (COVID-19)    Gout    Benign essential hypertension    Bilateral lower extremity edema    Bilateral sensorineural hearing loss, wears hearing aids    Claustrophobia    Combined forms of age-related cataract of right eye    Family history of coronary artery disease    Gastroesophageal reflux disease without esophagitis    History of arterial thrombosis, 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    History of hemorrhagic cystitis    Morbidly obese  Patient's (Body mass index is 40.59 kg/m².) indicates that they are obese (BMI >30) with health conditions that include hypertension, impaired fasting glucose, dyslipidemias, GERD, and osteoarthritis . Weight is unchanged. BMI  is above average; BMI management plan is completed. We discussed low calorie, low carb based diet program, portion control, and increasing exercise.   Nocturnal hypoxemia    Osteopenia of multiple sites    Postmenopausal state    Paroxysmal atrial fibrillation    Supraventricular tachycardia    Vitamin D deficiency    Therapeutic drug monitoring    Need for influenza vaccination      Orders Placed This Encounter   Procedures    Fluzone High-Dose 65+yrs ([id number])             Follow Up:   No follow-ups on file.

## 2024-10-06 DIAGNOSIS — I10 BENIGN ESSENTIAL HYPERTENSION: Chronic | ICD-10-CM

## 2024-10-06 DIAGNOSIS — E03.9 PRIMARY HYPOTHYROIDISM: Chronic | ICD-10-CM

## 2024-10-06 DIAGNOSIS — E04.2 MULTINODULAR GOITER: Chronic | ICD-10-CM

## 2024-10-06 DIAGNOSIS — I47.10 SUPRAVENTRICULAR TACHYCARDIA: Chronic | ICD-10-CM

## 2024-10-07 RX ORDER — METOPROLOL SUCCINATE 25 MG/1
TABLET, EXTENDED RELEASE ORAL
Qty: 90 TABLET | Refills: 3 | Status: SHIPPED | OUTPATIENT
Start: 2024-10-07

## 2024-10-07 RX ORDER — LEVOTHYROXINE SODIUM 75 UG/1
TABLET ORAL
Qty: 90 TABLET | Refills: 3 | Status: SHIPPED | OUTPATIENT
Start: 2024-10-07

## 2024-10-10 ENCOUNTER — OFFICE VISIT (OUTPATIENT)
Dept: INTERNAL MEDICINE | Facility: CLINIC | Age: 89
End: 2024-10-10
Payer: MEDICARE

## 2024-10-10 ENCOUNTER — HOSPITAL ENCOUNTER (OUTPATIENT)
Dept: GENERAL RADIOLOGY | Facility: HOSPITAL | Age: 89
Discharge: HOME | End: 2024-10-10
Admitting: INTERNAL MEDICINE
Payer: MEDICARE

## 2024-10-10 VITALS
OXYGEN SATURATION: 96 % | RESPIRATION RATE: 16 BRPM | HEIGHT: 62 IN | TEMPERATURE: 98.2 F | BODY MASS INDEX: 41.22 KG/M2 | DIASTOLIC BLOOD PRESSURE: 80 MMHG | SYSTOLIC BLOOD PRESSURE: 128 MMHG | HEART RATE: 66 BPM | WEIGHT: 224 LBS

## 2024-10-10 DIAGNOSIS — I48.0 PAROXYSMAL ATRIAL FIBRILLATION: Chronic | ICD-10-CM

## 2024-10-10 DIAGNOSIS — R73.01 IMPAIRED FASTING GLUCOSE: Chronic | ICD-10-CM

## 2024-10-10 DIAGNOSIS — E66.01 MORBIDLY OBESE: Chronic | ICD-10-CM

## 2024-10-10 DIAGNOSIS — Z86.16 HISTORY OF 2019 NOVEL CORONAVIRUS DISEASE (COVID-19): Chronic | ICD-10-CM

## 2024-10-10 DIAGNOSIS — K21.9 GASTROESOPHAGEAL REFLUX DISEASE WITHOUT ESOPHAGITIS: Chronic | ICD-10-CM

## 2024-10-10 DIAGNOSIS — I47.10 SUPRAVENTRICULAR TACHYCARDIA: Chronic | ICD-10-CM

## 2024-10-10 DIAGNOSIS — R60.0 BILATERAL LOWER EXTREMITY EDEMA: Chronic | ICD-10-CM

## 2024-10-10 DIAGNOSIS — E03.9 PRIMARY HYPOTHYROIDISM: Chronic | ICD-10-CM

## 2024-10-10 DIAGNOSIS — D75.1 SECONDARY POLYCYTHEMIA: Chronic | ICD-10-CM

## 2024-10-10 DIAGNOSIS — Z87.440 HISTORY OF HEMORRHAGIC CYSTITIS: Chronic | ICD-10-CM

## 2024-10-10 DIAGNOSIS — E78.2 MIXED HYPERLIPIDEMIA: Chronic | ICD-10-CM

## 2024-10-10 DIAGNOSIS — R07.89 LEFT-SIDED CHEST WALL PAIN: ICD-10-CM

## 2024-10-10 DIAGNOSIS — Z86.718 HISTORY OF ARTERIAL THROMBOSIS: Chronic | ICD-10-CM

## 2024-10-10 DIAGNOSIS — E04.2 MULTINODULAR GOITER: Chronic | ICD-10-CM

## 2024-10-10 DIAGNOSIS — Z78.0 POSTMENOPAUSAL STATE: Chronic | ICD-10-CM

## 2024-10-10 DIAGNOSIS — H90.3 BILATERAL SENSORINEURAL HEARING LOSS: Chronic | ICD-10-CM

## 2024-10-10 DIAGNOSIS — N18.32 STAGE 3B CHRONIC KIDNEY DISEASE: Primary | Chronic | ICD-10-CM

## 2024-10-10 DIAGNOSIS — E55.9 VITAMIN D DEFICIENCY: Chronic | ICD-10-CM

## 2024-10-10 DIAGNOSIS — Z87.39 HISTORY OF GOUT: Chronic | ICD-10-CM

## 2024-10-10 PROCEDURE — 1160F RVW MEDS BY RX/DR IN RCRD: CPT | Performed by: INTERNAL MEDICINE

## 2024-10-10 PROCEDURE — 1159F MED LIST DOCD IN RCRD: CPT | Performed by: INTERNAL MEDICINE

## 2024-10-10 PROCEDURE — 1126F AMNT PAIN NOTED NONE PRSNT: CPT | Performed by: INTERNAL MEDICINE

## 2024-10-10 PROCEDURE — 71101 X-RAY EXAM UNILAT RIBS/CHEST: CPT

## 2024-10-10 PROCEDURE — 99214 OFFICE O/P EST MOD 30 MIN: CPT | Performed by: INTERNAL MEDICINE

## 2024-10-10 NOTE — PROGRESS NOTES
10/10/2024    Patient Information  Mandy Alarcon                                                                                          [address] [facility] [address]      5/30/1933  [unfilled]  There is no work phone number on file.    Chief Complaint:     Follow-up chronic medical problems as noted below.  No new acute complaints.    History of Present Illness:    Patient with several chronic medical problems as noted below in assessment plan presents today for follow-up and lab prior in order to monitor chronic medical issues.  Her past medical history reviewed and updated were necessary including health maintenance parameters.  This reveals she is up-to-date or else accounted for.    Review of Systems   Constitutional: Negative.   HENT: Negative.     Eyes: Negative.    Cardiovascular: Negative.    Respiratory: Negative.     Endocrine: Negative.    Hematologic/Lymphatic: Negative.    Skin: Negative.    Musculoskeletal: Negative.    Gastrointestinal: Negative.    Genitourinary: Negative.    Neurological: Negative.    Psychiatric/Behavioral: Negative.     Allergic/Immunologic: Negative.        Active Problems:    Patient Active Problem List   Diagnosis    Benign essential hypertension    Claustrophobia    Gout    Hyperlipidemia    Primary hypothyroidism    Impaired fasting glucose    Nocturnal hypoxemia    Secondary polycythemia    Vitamin D deficiency    Therapeutic drug monitoring    Family history of coronary artery disease    Bilateral sensorineural hearing loss, wears hearing aids    Multinodular goiter    Supraventricular tachycardia    Morbidly obese    Bilateral lower extremity edema    Gastroesophageal reflux disease without esophagitis    Paroxysmal atrial fibrillation    Stage 3b chronic kidney disease    Combined form of senile cataract    Osteopenia of multiple sites    Postmenopausal state    History of 2019 novel coronavirus disease (COVID-19)    History of hemorrhagic  cystitis    History of arterial thrombosis, 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         Past Medical History:   Diagnosis Date    Benign essential hypertension 10/28/2012    2012--treatment for hypertension begun.    Bilateral lower extremity edema 2020    Bilateral sensorineural hearing loss, wears hearing aids 2017    Left is much worse than right.  Patient had multiple ear infections as a child.    Chronic renal impairment, stage 3b 09/15/2020    Claustrophobia 2016    This patient has significant nocturnal hypoxemia and I think that she could benefit from nocturnal oxygen therapy. The exact etiology of her hypoxemia is not clear. She could possibly have obstructive sleep apnea but this is not documented. We cannot test this patient for sleep apnea due to the fact that she is severely claustrophobic. Patient was a former smoker and it is possibly that COPD he is playing a role.    Combined form of senile cataract 2021    Embolism and thrombosis of unspecified artery 2023    Family history of coronary artery disease 2016    Patient's mother, father, 2 sisters and a brother all  from myocardial infarctions    Gastroesophageal reflux disease without esophagitis 2020    Gout 10/28/2012    2012--initial diagnosis and treatment of gout.    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 2023    History of hemorrhagic cystitis 2023    Hyperlipidemia 10/28/2012    2012--treatment for hyperlipidemia begun.    Impaired fasting glucose 10/28/2012    2012--initial diagnosis impaired fasting glucose.    Morbidly obese 2019    Nocturnal hypoxemia 2012--patient did not receive nocturnal oxygen because of Medicare regulations.    05/15/2014--overnight oximetry revealed oxygen saturations less than 89% for 22 minutes and 40 seconds. Oxygen saturations less than or equal to 88% for 22 minutes and 40 seconds. Lowest oxygen saturation 83%. The longest continuous time with oxygen saturations less than or equal to 88% was 1 minute and 32 seconds.   08/02/2012--overnight oximetry revealed oxygen saturations less than 90% for one hour and 35 minutes. Oxygen saturations less than 89% 59 minutes. This patient has significant nocturnal hypoxemia and I think that she could benefit from nocturnal oxygen therapy. The exact etiology of her hypoxemia is not clear. She could possibly have obstructive sleep apnea but this is not documented. We cannot test this patient for sleep apnea due to the fact that she is severely claustrophobic. Patient was a former smoker and it is possibly that COPD he is playing a role.    Non morbid obesity 03/11/2019    Osteopenia of multiple sites 09/26/2023    Paroxysmal atrial fibrillation 04/10/2020    Postmenopausal state 09/26/2023    Primary hypothyroidism 11/17/2015 March 11, 2019--TSH remains elevated slightly at 4.92.  Given the overall clinical picture including the multinodular goiter, we will initiate levothyroxine 50 mcg/day and reassess in about 6 weeks.  August 1, 2018--thyroid ultrasound reveals a multinodular thyroid with multiple subcentimeter nodules.  Only minimal increase in size of the largest nodule in the left lobe has occurred when compared     Secondary polycythemia 08/02/2012 11/06/2014--patient did not receive nocturnal oxygen because of Medicare regulations.   05/15/2014--overnight oximetry revealed oxygen saturations less than 89% for 22 minutes and 40 seconds. Oxygen saturations less than or equal to 88% for 22 minutes and 40 seconds. Lowest oxygen saturation 83%. The longest continuous time with oxygen saturations less than or equal to 88% was 1 minute and 32 seconds.   08/02/2012--overnight  oximetry revealed oxygen saturations less than 90% for one hour and 35 minutes. Oxygen saturations less than 89% 59 minutes. This patient has significant nocturnal hypoxemia and I think that she could benefit from nocturnal oxygen therapy. The exact etiology of her hypoxemia is not clear. She could possibly have obstructive sleep apnea but this is not documented. We cannot test this patient for sleep apnea due to the fact that she is severely claustrophobic. Patient was a former smoker and it is possibly that COPD he is playing a role.    Vitamin D deficiency 05/23/2016         Past Surgical History:   Procedure Laterality Date    CHOLECYSTECTOMY WITH INTRAOPERATIVE CHOLANGIOGRAM N/A 03/12/2020    Procedure: LAPAROSCOPIC CHOLECYSTOSTOMY TUBE, INTRAOPERATIVE CHOLANGIOGRAM;  Surgeon: Bryce Andujar MD;  Location: [facility] OR;  Service: General;  Laterality: N/A;    CHOLECYSTECTOMY WITH INTRAOPERATIVE CHOLANGIOGRAM N/A 05/22/2020    Procedure: CHOLECYSTECTOMY LAPAROSCOPIC INTRAOPERATIVE CHOLANGIOGRAM and EGD;  Surgeon: Bryce Andujar MD;  Location: [facility] OR;  Service: General;  Laterality: N/A;    ERCP N/A 07/01/2022    Procedure: ENDOSCOPIC RETROGRADE CHOLANGIOPANCREATOGRAPHY with sphincterotomy, balloon sweep 12-15mm;  Surgeon: Mitesh Altamirano MD;  Location: [facility] ENDOSCOPY;  Service: Gastroenterology;  Laterality: N/A;  pre - gallstone pancreatitis  post - s/p sphincterotomy, sludge and  pus    FEMORAL THROMBECTOMY/EMBOLECTOMY Left 11/16/2023    Procedure: OPEN LEFT FEMORAL THROMBECTOMY/EMBOLECTOMY WITH LEFT LOWER EXTREMITY ANGIOGRAM;  Surgeon: Jace Chowdhury Jr., MD;  Location: [facility] HYBRID OR 18/19;  Service: Vascular;  Laterality: Left;    OOPHORECTOMY      age 48    RADICAL ABDOMINAL HYSTERECTOMY  48 years old    48 years of age. Uterine fibroid tumors with menorrhagia - no cancer    THROMBECTOMY  2023    laterality: Left left femoral with angio.         Allergies   Allergen  Reactions    Cefaclor Anaphylaxis, Angioedema and Swelling     caused angioedema 25 years ago; she tolerates cephalexin, amoxicillin, and ceftriaxone per my d/w patient and her daughter as well as amoxicillin-clavulanate confirmed in EPIC  caused angioedema 25 years ago; she tolerates cephalexin, amoxicillin, and ceftriaxone per my d/w patient and her daughter as well as amoxicillin-clavulanate confirmed in EPIC  caused angioedema 25 years ago; she tolerates cephalexin, amoxicillin, and ceftriaxone per my d/w patient and her daughter as well as amoxicillin-clavulanate confirmed in EPIC  caused angioedema 25 years ago; she tolerates cephalexin, amoxicillin, and ceftriaxone per my d/w patient and her daughter as well as amoxicillin-clavulanate confirmed in EPIC    Keflex [Cephalexin] Hives    Sulfa Antibiotics Rash           Current Outpatient Medications:     allopurinol (ZYLOPRIM) 300 MG tablet, TAKE 1 TABLET BY MOUTH DAILY FOR GOUT, Disp: 90 tablet, Rfl: 1    apixaban (Eliquis) 5 MG tablet tablet, TAKE 1 TABLET(5 MG) BY MOUTH EVERY 12 HOURS FOR BLOOD THINNER, Disp: 60 tablet, Rfl: 1    BIOTIN PO, Take  by mouth., Disp: , Rfl:     clobetasol prop emollient base (TEMOVATE-E) 0.05 % emollient cream, Apply 1 Application topically to the appropriate area as directed 2 (Two) Times a Day for 14 days then 2 to 3 times a week after that for maintenance., Disp: 60 g, Rfl: 4    levothyroxine (SYNTHROID, LEVOTHROID) 75 MCG tablet, TAKE 1 TABLET BY MOUTH DAILY FOR THYROID, Disp: 90 tablet, Rfl: 3    metoprolol succinate XL (TOPROL-XL) 25 MG 24 hr tablet, TAKE 1 TABLET BY MOUTH DAILY FOR HIGH BLOOD PRESSURE AND HEART OR PALPITATIONS, Disp: 90 tablet, Rfl: 3    omeprazole (priLOSEC) 40 MG capsule, TAKE 1 CAPSULE BY MOUTH EVERY DAY BEFORE FIRST MEAL FOR STOMACH ACID OR REFLUX, Disp: 30 capsule, Rfl: 5    pravastatin (PRAVACHOL) 40 MG tablet, Take 1 tablet (40 mg) every day for high cholesterol., Disp: 90 tablet, Rfl: 3    VITAMIN  "D PO, Take  by mouth., Disp: , Rfl:       Family History   Problem Relation Age of Onset    Heart disease Mother         Ischemic.  from coronary artery disease.    Heart disease Father         Ischemic.  from coronary artery disease.    Heart disease Sister         Ischemic.  from coronary artery disease.    Heart disease Sister         Ischemic.  from coronary artery disease.    Heart disease Brother         Ischemic.  from coronary artery disease.    Breast cancer Daughter     Breast cancer Daughter     Ovarian cancer Neg Hx     Malig Hyperthermia Neg Hx          Social History     Socioeconomic History    Marital status:     Number of children: 5    Highest education level: GED or equivalent   Tobacco Use    Smoking status: Former     Current packs/day: 0.00     Average packs/day: 0.5 packs/day for 8.0 years (4.0 ttl pk-yrs)     Types: Cigarettes     Start date: 1946     Quit date: 1954     Years since quittin.8    Smokeless tobacco: Never    Tobacco comments:     Stopped drinking at age 30.   Vaping Use    Vaping status: Never Used   Substance and Sexual Activity    Alcohol use: No     Comment: caffiene use tea coffee    Drug use: No    Sexual activity: Not Currently     Partners: Male         Vitals:    10/10/24 1230   BP: 128/80   Pulse: 66   Resp: 16   Temp: 98.2 °F (36.8 °C)   TempSrc: Oral   SpO2: 96%   Weight: 102 kg (224 lb)   Height: 157.5 cm (62.01\")        Body mass index is 40.96 kg/m².      Physical Exam:    General: Alert and oriented x 3.  No acute distress.  Normal affect.  Morbidly obese.  HEENT: Pupils equal, round, reactive to light; extraocular movements intact; sclerae nonicteric; pharynx, ear canals and TMs normal.  Neck: Without JVD, thyromegaly, bruit, or adenopathy.  Lungs: Clear to auscultation in all fields.  Heart: Regular rate and rhythm without murmur, rub, gallop, or click.  Abdomen: Soft, nontender, without hepatosplenomegaly or hernia.  " Bowel sounds normal.  : Deferred.  Rectal: Deferred.  Extremities: Without clubbing, cyanosis, edema, or pulse deficit.  Neurologic: Intact without focal deficit.  Normal station and gait observed during ingress and egress from the examination room.  Skin: Without significant lesion.  Musculoskeletal: Unremarkable.    Lab/other results:    SARS antibodies are positive.  CBC is normal except hematocrit 46.9.  CPK normal at 68.  CMP normal except creatinine 1.54.  Estimated GFR 32.  Hemoglobin A1c 6.0.  Total cholesterol 139, triglycerides 145, LDL particle #1009, HDL particle #26.4.  Thyroid function tests are normal.  Urinalysis normal.  Vitamin D normal.  Uric acid normal.    Assessment/Plan:     Diagnosis Plan   1. Stage 3b chronic kidney disease        2. Primary hypothyroidism        3. Multinodular goiter        4. Secondary polycythemia        5. Paroxysmal atrial fibrillation        6. Impaired fasting glucose        7. Hyperlipidemia        8. History of 2019 novel coronavirus disease (COVID-19)        9. Gout        10. Bilateral lower extremity edema        11. Gastroesophageal reflux disease without esophagitis        12. Bilateral sensorineural hearing loss, wears hearing aids        13. History of arterial thrombosis, 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        14. History of hemorrhagic cystitis        15. Morbidly obese        16. Postmenopausal state        17. Supraventricular tachycardia        18. Vitamin D deficiency          Patient presents in follow-up for multiple medical problems as noted above which are under excellent control and seems stable.  I do not see any reason to make any changes at this time.  Patient does have morbid obesity but the age of 89 were not going to press this issue.  She has been heavy most of her life at this point in time I do not think we will make any reasonable progress by pressing the issue.  Have asked her to try to limit her  carbohydrate intake at least make an attempt to drop a few pounds.    Plan is as follows: No change current medical regimen.  Will schedule patient for fasting lab and follow-up in 6 months.  She is aware to avoid nonsteroidal anti-inflammatories.    Addendum: Patient reports about 4 months ago she was climbing up into a pickup truck and reached up with her left hand to grab the hand rail to pull herself manage she had sudden pain in her left posterior chest near the shoulder scapula and this has been present ever since.  It seems to be aggravated by her sleeping on it at night in certain positions or trying to pull herself up using the left arm.  Seems to bother her more after she gets up in the morning.  She does have osteopenia and one of the things I am uncertain about is that she fractured a rib.  If this is the case then the rib may not be healing well given the duration of the symptoms.  I do think this is musculoskeletal in nature and plan is as follows: Will obtain chest x-ray and rib details on the left.  I will follow-up on the phone with the results and possible further instructions.  Patient may benefit from physical therapy session.  Dry needling might help although she is on a blood thinner.  As mentioned, further to follow.  She cannot take nonsteroidals due to her kidney issues.  Tylenol might help.      Procedures

## 2024-10-11 ENCOUNTER — TELEPHONE (OUTPATIENT)
Dept: INTERNAL MEDICINE | Facility: CLINIC | Age: 89
End: 2024-10-11

## 2024-10-11 NOTE — TELEPHONE ENCOUNTER
Caller: Ashley Valdes    Relationship: Emergency Contact    Best call back number: [phone]     Caller requesting test results: DAUGHTER     What test was performed: X-RAY ON HER RIBS     When was the test performed: 10/10/24     Where was the test performed: NEXT TO THE OFFICE     Additional notes:

## 2024-10-17 ENCOUNTER — HOSPITAL ENCOUNTER (EMERGENCY)
Facility: HOSPITAL | Age: 89
Discharge: HOME | End: 2024-10-17
Attending: EMERGENCY MEDICINE
Payer: MEDICARE

## 2024-10-17 VITALS
HEART RATE: 51 BPM | SYSTOLIC BLOOD PRESSURE: 160 MMHG | OXYGEN SATURATION: 98 % | DIASTOLIC BLOOD PRESSURE: 103 MMHG | TEMPERATURE: 98 F | RESPIRATION RATE: 16 BRPM

## 2024-10-17 DIAGNOSIS — N30.01 ACUTE CYSTITIS WITH HEMATURIA: Primary | ICD-10-CM

## 2024-10-17 DIAGNOSIS — N18.9 CHRONIC RENAL IMPAIRMENT, UNSPECIFIED CKD STAGE: ICD-10-CM

## 2024-10-17 DIAGNOSIS — Z79.01 ANTICOAGULATED: ICD-10-CM

## 2024-10-17 LAB
ALBUMIN SERPL-MCNC: 3.7 G/DL (ref 3.5–5.2)
ALBUMIN/GLOB SERPL: 1.2 G/DL
ALP SERPL-CCNC: 60 U/L (ref 39–117)
ALT SERPL W P-5'-P-CCNC: 15 U/L (ref 1–33)
ANION GAP SERPL CALCULATED.3IONS-SCNC: 7.8 MMOL/L (ref 5–15)
AST SERPL-CCNC: 19 U/L (ref 1–32)
BACTERIA UR QL AUTO: ABNORMAL /HPF
BASOPHILS # BLD AUTO: 0.05 10*3/MM3 (ref 0–0.2)
BASOPHILS NFR BLD AUTO: 0.6 % (ref 0–1.5)
BILIRUB SERPL-MCNC: 0.4 MG/DL (ref 0–1.2)
BILIRUB UR QL STRIP: NEGATIVE
BUN SERPL-MCNC: 21 MG/DL (ref 8–23)
BUN/CREAT SERPL: 13 (ref 7–25)
CALCIUM SPEC-SCNC: 9.5 MG/DL (ref 8.2–9.6)
CHLORIDE SERPL-SCNC: 107 MMOL/L (ref 98–107)
CLARITY UR: ABNORMAL
CO2 SERPL-SCNC: 24.2 MMOL/L (ref 22–29)
COLOR UR: YELLOW
CREAT SERPL-MCNC: 1.61 MG/DL (ref 0.57–1)
DEPRECATED RDW RBC AUTO: 46.8 FL (ref 37–54)
EGFRCR SERPLBLD CKD-EPI 2021: 30.1 ML/MIN/1.73
EOSINOPHIL # BLD AUTO: 0.35 10*3/MM3 (ref 0–0.4)
EOSINOPHIL NFR BLD AUTO: 4.3 % (ref 0.3–6.2)
ERYTHROCYTE [DISTWIDTH] IN BLOOD BY AUTOMATED COUNT: 13.6 % (ref 12.3–15.4)
GLOBULIN UR ELPH-MCNC: 3.2 GM/DL
GLUCOSE SERPL-MCNC: 125 MG/DL (ref 65–99)
GLUCOSE UR STRIP-MCNC: NEGATIVE MG/DL
HCT VFR BLD AUTO: 45.5 % (ref 34–46.6)
HGB BLD-MCNC: 14.9 G/DL (ref 12–15.9)
HGB UR QL STRIP.AUTO: ABNORMAL
HOLD SPECIMEN: NORMAL
HOLD SPECIMEN: NORMAL
HYALINE CASTS UR QL AUTO: ABNORMAL /LPF
IMM GRANULOCYTES # BLD AUTO: 0.03 10*3/MM3 (ref 0–0.05)
IMM GRANULOCYTES NFR BLD AUTO: 0.4 % (ref 0–0.5)
KETONES UR QL STRIP: NEGATIVE
LEUKOCYTE ESTERASE UR QL STRIP.AUTO: ABNORMAL
LYMPHOCYTES # BLD AUTO: 1.71 10*3/MM3 (ref 0.7–3.1)
LYMPHOCYTES NFR BLD AUTO: 21.2 % (ref 19.6–45.3)
MCH RBC QN AUTO: 30.9 PG (ref 26.6–33)
MCHC RBC AUTO-ENTMCNC: 32.7 G/DL (ref 31.5–35.7)
MCV RBC AUTO: 94.4 FL (ref 79–97)
MONOCYTES # BLD AUTO: 0.63 10*3/MM3 (ref 0.1–0.9)
MONOCYTES NFR BLD AUTO: 7.8 % (ref 5–12)
NEUTROPHILS NFR BLD AUTO: 5.29 10*3/MM3 (ref 1.7–7)
NEUTROPHILS NFR BLD AUTO: 65.7 % (ref 42.7–76)
NITRITE UR QL STRIP: NEGATIVE
NRBC BLD AUTO-RTO: 0 /100 WBC (ref 0–0.2)
PH UR STRIP.AUTO: 6 [PH] (ref 5–8)
PLATELET # BLD AUTO: 189 10*3/MM3 (ref 140–450)
PMV BLD AUTO: 9.7 FL (ref 6–12)
POTASSIUM SERPL-SCNC: 4.5 MMOL/L (ref 3.5–5.2)
PROT SERPL-MCNC: 6.9 G/DL (ref 6–8.5)
PROT UR QL STRIP: ABNORMAL
RBC # BLD AUTO: 4.82 10*6/MM3 (ref 3.77–5.28)
RBC # UR STRIP: ABNORMAL /HPF
REF LAB TEST METHOD: ABNORMAL
SODIUM SERPL-SCNC: 139 MMOL/L (ref 136–145)
SP GR UR STRIP: 1.02 (ref 1–1.03)
SQUAMOUS #/AREA URNS HPF: ABNORMAL /HPF
UROBILINOGEN UR QL STRIP: ABNORMAL
WBC # UR STRIP: ABNORMAL /HPF
WBC NRBC COR # BLD AUTO: 8.06 10*3/MM3 (ref 3.4–10.8)
WHOLE BLOOD HOLD COAG: NORMAL
WHOLE BLOOD HOLD SPECIMEN: NORMAL

## 2024-10-17 PROCEDURE — 87086 URINE CULTURE/COLONY COUNT: CPT

## 2024-10-17 PROCEDURE — 36415 COLL VENOUS BLD VENIPUNCTURE: CPT

## 2024-10-17 PROCEDURE — 87186 SC STD MICRODIL/AGAR DIL: CPT | Performed by: EMERGENCY MEDICINE

## 2024-10-17 PROCEDURE — 99283 EMERGENCY DEPT VISIT LOW MDM: CPT

## 2024-10-17 PROCEDURE — 81001 URINALYSIS AUTO W/SCOPE: CPT

## 2024-10-17 PROCEDURE — 80053 COMPREHEN METABOLIC PANEL: CPT

## 2024-10-17 PROCEDURE — 85025 COMPLETE CBC W/AUTO DIFF WBC: CPT

## 2024-10-17 RX ORDER — CEPHALEXIN 500 MG/1
500 CAPSULE ORAL 3 TIMES DAILY
Qty: 21 CAPSULE | Refills: 0 | Status: SHIPPED | OUTPATIENT
Start: 2024-10-17

## 2024-10-17 RX ORDER — CEPHALEXIN 500 MG/1
500 CAPSULE ORAL ONCE
Status: COMPLETED | OUTPATIENT
Start: 2024-10-17 | End: 2024-10-17

## 2024-10-17 RX ADMIN — CEPHALEXIN 500 MG: 500 CAPSULE ORAL at 14:31

## 2024-10-17 NOTE — ED PROVIDER NOTES
EMERGENCY DEPARTMENT ENCOUNTER    Room Number:  23/23  PCP: Daryl Santos MD  Historian: Patient, family member    I initially evaluated the patient at 11:51 AM    HPI:  Chief Complaint: Blood in urine  A complete HPI/ROS/PMH/PSH/SH/FH are unobtainable due to: Nothing  Context: Mandy Alarcon is a 89 y.o. female with a medical history of hypertension, hyperlipidemia, GERD, atrial fibrillation, lower extremity thrombus who presents to the ED c/o acute blood in urine.  Patient urinated around 2 AM this morning and her urine was normal.  When she urinated around 7 AM, urine was red.  She had several episodes of hematuria over the next few hours.  When she last urinated around 11 AM, urine was only light pink in color.  She denies seeing any blood clots in the urine.  She feels like she is able to empty her bladder completely.  She is on Eliquis.  Denies fever, chills, chest pain, shortness of breath, weakness, dizziness, syncope, abdominal pain, flank pain, dysuria, increased urinary frequency, or flank pain.  She has had hemorrhagic cystitis in the past.  She called her urologist office today and was advised to come to the ED.            PAST MEDICAL HISTORY  Active Ambulatory Problems     Diagnosis Date Noted    Benign essential hypertension 10/28/2012    Claustrophobia 04/28/2016    Gout 10/28/2012    Hyperlipidemia 10/28/2012    Primary hypothyroidism 11/17/2015    Impaired fasting glucose 10/28/2012    Nocturnal hypoxemia 08/02/2012    Secondary polycythemia 08/02/2012    Vitamin D deficiency 05/23/2016    Therapeutic drug monitoring 05/23/2016    Family history of coronary artery disease 05/24/2016    Bilateral sensorineural hearing loss, wears hearing aids 06/14/2017    Multinodular goiter 01/24/2018    Supraventricular tachycardia 07/10/2018    Morbidly obese 03/11/2019    Bilateral lower extremity edema 03/23/2020    Gastroesophageal reflux disease without esophagitis 03/23/2020    Paroxysmal atrial  fibrillation 04/10/2020    Stage 3b chronic kidney disease 09/15/2020    Combined form of senile cataract 03/03/2021    Osteopenia of multiple sites 09/26/2023    Postmenopausal state 09/26/2023    History of 2019 novel coronavirus disease (COVID-19) 10/26/2023    History of hemorrhagic cystitis 11/27/2023    History of arterial thrombosis, 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 12/28/2023    Left-sided chest wall pain 10/10/2024     Resolved Ambulatory Problems     Diagnosis Date Noted    History of Cellulitis of trunk, Right 04/28/2016    History of mammogram 04/28/2016    History of pneumococcal vaccination 04/28/2016    Hospital discharge follow-up 07/10/2018    Near syncope 07/10/2018    Chest tightness or pressure 07/10/2018    PND (paroxysmal nocturnal dyspnea) 07/10/2018    Acute biliary pancreatitis without infection or necrosis 02/01/2020    LINNEA (acute kidney injury) 02/02/2020    Pneumonia 02/03/2020    New onset a-fib 02/05/2020    Hyponatremia 02/05/2020    Hypomagnesemia 02/05/2020    Leukocytosis 02/05/2020    Fever 02/27/2020    Acute gallstone pancreatitis 02/27/2020    Chronic cholecystitis 03/11/2020    Postoperative nausea 03/23/2020    Postoperative pain 03/23/2020    Hospital discharge follow-up 04/17/2020    History of acute pancreatitis 04/17/2020    Acute constipation 04/17/2020    Gallstone pancreatitis 05/21/2020    Hospital discharge follow-up 06/11/2020    Left cervical radiculopathy 06/11/2020    Parotiditis 07/04/2022    Hospital discharge follow-up 07/11/2022    Arterial occlusion, lower extremity 11/17/2023    Acute pain of left knee 03/25/2024    Anserine bursitis 03/25/2024     Past Medical History:   Diagnosis Date    Chronic renal impairment, stage 3b 09/15/2020    Embolism and thrombosis of unspecified artery 12/12/2023    Hyperlipidemia 10/28/2012    Non morbid obesity 03/11/2019         PAST SURGICAL HISTORY  Past Surgical History:   Procedure Laterality  Date    CHOLECYSTECTOMY WITH INTRAOPERATIVE CHOLANGIOGRAM N/A 2020    Procedure: LAPAROSCOPIC CHOLECYSTOSTOMY TUBE, INTRAOPERATIVE CHOLANGIOGRAM;  Surgeon: Bryce Andujar MD;  Location: [facility] OR;  Service: General;  Laterality: N/A;    CHOLECYSTECTOMY WITH INTRAOPERATIVE CHOLANGIOGRAM N/A 2020    Procedure: CHOLECYSTECTOMY LAPAROSCOPIC INTRAOPERATIVE CHOLANGIOGRAM and EGD;  Surgeon: Bryce Andujar MD;  Location: [facility] OR;  Service: General;  Laterality: N/A;    ERCP N/A 2022    Procedure: ENDOSCOPIC RETROGRADE CHOLANGIOPANCREATOGRAPHY with sphincterotomy, balloon sweep 12-15mm;  Surgeon: Mitesh Altamirano MD;  Location: [facility] ENDOSCOPY;  Service: Gastroenterology;  Laterality: N/A;  pre - gallstone pancreatitis  post - s/p sphincterotomy, sludge and  pus    FEMORAL THROMBECTOMY/EMBOLECTOMY Left 2023    Procedure: OPEN LEFT FEMORAL THROMBECTOMY/EMBOLECTOMY WITH LEFT LOWER EXTREMITY ANGIOGRAM;  Surgeon: Jace Chowdhury Jr., MD;  Location: [facility] HYBRID OR ;  Service: Vascular;  Laterality: Left;    OOPHORECTOMY      age 48    RADICAL ABDOMINAL HYSTERECTOMY  48 years old    48 years of age. Uterine fibroid tumors with menorrhagia - no cancer    THROMBECTOMY      laterality: Left left femoral with angio.         FAMILY HISTORY  Family History   Problem Relation Age of Onset    Heart disease Mother         Ischemic.  from coronary artery disease.    Heart disease Father         Ischemic.  from coronary artery disease.    Heart disease Sister         Ischemic.  from coronary artery disease.    Heart disease Sister         Ischemic.  from coronary artery disease.    Heart disease Brother         Ischemic.  from coronary artery disease.    Breast cancer Daughter     Breast cancer Daughter     Ovarian cancer Neg Hx     Malig Hyperthermia Neg Hx          SOCIAL HISTORY  Social History     Socioeconomic History    Marital status:      Number of children: 5    Highest education level: GED or equivalent   Tobacco Use    Smoking status: Former     Current packs/day: 0.00     Average packs/day: 0.5 packs/day for 8.0 years (4.0 ttl pk-yrs)     Types: Cigarettes     Start date: 1946     Quit date: 1954     Years since quittin.8    Smokeless tobacco: Never    Tobacco comments:     Stopped drinking at age 30.   Vaping Use    Vaping status: Never Used   Substance and Sexual Activity    Alcohol use: No     Comment: caffiene use tea coffee    Drug use: No    Sexual activity: Not Currently     Partners: Male         ALLERGIES  Cefaclor, Keflex [cephalexin], and Sulfa antibiotics    REVIEW OF SYSTEMS  Review of Systems  Included in HPI  All systems reviewed and negative except for those discussed in HPI.      PHYSICAL EXAM  ED Triage Vitals   Temp Heart Rate Resp BP SpO2   10/17/24 1057 10/17/24 1057 10/17/24 1057 10/17/24 1100 10/17/24 1057   98 °F (36.7 °C) 73 16 (!) 192/88 96 %      Temp src Heart Rate Source Patient Position BP Location FiO2 (%)   -- 10/17/24 1057 -- -- --    Monitor          Physical Exam      GENERAL: Awake, alert, oriented x 3.  Well-developed, well-nourished and nontoxic-appearing elderly female.  Resting comfortably in no acute distress  HENT: NCAT, nares patent  EYES: no scleral icterus  CV: regular rhythm, normal rate  RESPIRATORY: normal effort, clear to auscultation bilaterally  ABDOMEN: soft, nondistended, nontender, no CVA tenderness  MUSCULOSKELETAL: Extremities are nontender with full range of motion  NEURO: Speech is normal.  No facial droop.  PSYCH:  calm, cooperative  SKIN: warm, dry    Vital signs and nursing notes reviewed.          LAB RESULTS  Recent Results (from the past 24 hours)   Comprehensive Metabolic Panel    Collection Time: 10/17/24 11:09 AM    Specimen: Arm, Left; Blood   Result Value Ref Range    Glucose 125 (H) 65 - 99 mg/dL    BUN 21 8 - 23 mg/dL    Creatinine 1.61 (H) 0.57 - 1.00 mg/dL     Sodium 139 136 - 145 mmol/L    Potassium 4.5 3.5 - 5.2 mmol/L    Chloride 107 98 - 107 mmol/L    CO2 24.2 22.0 - 29.0 mmol/L    Calcium 9.5 8.2 - 9.6 mg/dL    Total Protein 6.9 6.0 - 8.5 g/dL    Albumin 3.7 3.5 - 5.2 g/dL    ALT (SGPT) 15 1 - 33 U/L    AST (SGOT) 19 1 - 32 U/L    Alkaline Phosphatase 60 39 - 117 U/L    Total Bilirubin 0.4 0.0 - 1.2 mg/dL    Globulin 3.2 gm/dL    A/G Ratio 1.2 g/dL    BUN/Creatinine Ratio 13.0 7.0 - 25.0    Anion Gap 7.8 5.0 - 15.0 mmol/L    eGFR 30.1 (L) >60.0 mL/min/1.73   CBC Auto Differential    Collection Time: 10/17/24 11:09 AM    Specimen: Arm, Left; Blood   Result Value Ref Range    WBC 8.06 3.40 - 10.80 10*3/mm3    RBC 4.82 3.77 - 5.28 10*6/mm3    Hemoglobin 14.9 12.0 - 15.9 g/dL    Hematocrit 45.5 34.0 - 46.6 %    MCV 94.4 79.0 - 97.0 fL    MCH 30.9 26.6 - 33.0 pg    MCHC 32.7 31.5 - 35.7 g/dL    RDW 13.6 12.3 - 15.4 %    RDW-SD 46.8 37.0 - 54.0 fl    MPV 9.7 6.0 - 12.0 fL    Platelets 189 140 - 450 10*3/mm3    Neutrophil % 65.7 42.7 - 76.0 %    Lymphocyte % 21.2 19.6 - 45.3 %    Monocyte % 7.8 5.0 - 12.0 %    Eosinophil % 4.3 0.3 - 6.2 %    Basophil % 0.6 0.0 - 1.5 %    Immature Grans % 0.4 0.0 - 0.5 %    Neutrophils, Absolute 5.29 1.70 - 7.00 10*3/mm3    Lymphocytes, Absolute 1.71 0.70 - 3.10 10*3/mm3    Monocytes, Absolute 0.63 0.10 - 0.90 10*3/mm3    Eosinophils, Absolute 0.35 0.00 - 0.40 10*3/mm3    Basophils, Absolute 0.05 0.00 - 0.20 10*3/mm3    Immature Grans, Absolute 0.03 0.00 - 0.05 10*3/mm3    nRBC 0.0 0.0 - 0.2 /100 WBC   Green Top (Gel)    Collection Time: 10/17/24 11:09 AM   Result Value Ref Range    Extra Tube Hold for add-ons.    Lavender Top    Collection Time: 10/17/24 11:09 AM   Result Value Ref Range    Extra Tube hold for add-on    Gold Top - SST    Collection Time: 10/17/24 11:09 AM   Result Value Ref Range    Extra Tube Hold for add-ons.    Light Blue Top    Collection Time: 10/17/24 11:09 AM   Result Value Ref Range    Extra Tube Hold for add-ons.     Urinalysis With Microscopic If Indicated (No Culture) - Urine, Clean Catch    Collection Time: 10/17/24 11:16 AM    Specimen: Urine, Clean Catch   Result Value Ref Range    Color, UA Yellow Yellow, Straw    Appearance, UA Cloudy (A) Clear    pH, UA 6.0 5.0 - 8.0    Specific Gravity, UA 1.019 1.005 - 1.030    Glucose, UA Negative Negative    Ketones, UA Negative Negative    Bilirubin, UA Negative Negative    Blood, UA Large (3+) (A) Negative    Protein, UA 30 mg/dL (1+) (A) Negative    Leuk Esterase, UA Moderate (2+) (A) Negative    Nitrite, UA Negative Negative    Urobilinogen, UA 1.0 E.U./dL 0.2 - 1.0 E.U./dL   Urinalysis, Microscopic Only - Urine, Clean Catch    Collection Time: 10/17/24 11:16 AM    Specimen: Urine, Clean Catch   Result Value Ref Range    RBC, UA Too Numerous to Count (A) None Seen, 0-2 /HPF    WBC, UA Too Numerous to Count (A) None Seen, 0-2 /HPF    Bacteria, UA None Seen None Seen /HPF    Squamous Epithelial Cells, UA 0-2 None Seen, 0-2 /HPF    Hyaline Casts, UA None Seen None Seen /LPF    Methodology Automated Microscopy        Ordered the above labs and reviewed the results.        RADIOLOGY  No Radiology Exams Resulted Within Past 24 Hours    Ordered the above noted radiological studies. Reviewed by me in PACS.            PROCEDURES  Procedures          OUTPATIENT MEDICATION MANAGEMENT:  No current Epic-ordered facility-administered medications on file.     Current Outpatient Medications Ordered in Epic   Medication Sig Dispense Refill    allopurinol (ZYLOPRIM) 300 MG tablet TAKE 1 TABLET BY MOUTH DAILY FOR GOUT 90 tablet 1    apixaban (Eliquis) 5 MG tablet tablet TAKE 1 TABLET(5 MG) BY MOUTH EVERY 12 HOURS FOR BLOOD THINNER 60 tablet 1    BIOTIN PO Take  by mouth.      cephalexin (KEFLEX) 500 MG capsule Take 1 capsule by mouth 3 (Three) Times a Day. 21 capsule 0    clobetasol prop emollient base (TEMOVATE-E) 0.05 % emollient cream Apply 1 Application topically to the appropriate area as  directed 2 (Two) Times a Day for 14 days then 2 to 3 times a week after that for maintenance. 60 g 4    levothyroxine (SYNTHROID, LEVOTHROID) 75 MCG tablet TAKE 1 TABLET BY MOUTH DAILY FOR THYROID 90 tablet 3    metoprolol succinate XL (TOPROL-XL) 25 MG 24 hr tablet TAKE 1 TABLET BY MOUTH DAILY FOR HIGH BLOOD PRESSURE AND HEART OR PALPITATIONS 90 tablet 3    omeprazole (priLOSEC) 40 MG capsule TAKE 1 CAPSULE BY MOUTH EVERY DAY BEFORE FIRST MEAL FOR STOMACH ACID OR REFLUX 30 capsule 5    pravastatin (PRAVACHOL) 40 MG tablet Take 1 tablet (40 mg) every day for high cholesterol. 90 tablet 3    VITAMIN D PO Take  by mouth.             MEDICATIONS GIVEN IN ER  Medications   cephalexin (KEFLEX) capsule 500 mg (500 mg Oral Given 10/17/24 1431)                   MEDICAL DECISION MAKING, PROGRESS, and CONSULTS    All labs have been independently reviewed by me.  All radiology studies have been reviewed by me and I have also reviewed the radiology report.   EKG's independently viewed and interpreted by me.  Discussion below represents my analysis of pertinent findings related to patient's condition, differential diagnosis, treatment plan and final disposition.      Additional sources:    - Discussed/ obtained information from independent historians: Family at bedside    - External (non-ED) record review: Patient saw her PCP on 10/10/2024 for follow-up for chronic kidney disease.  She was last admitted here in December 2023 for gross hematuria.  Cystoscopy was performed and shows some areas of hemorrhagic cystitis but no overt lesions or active bleeding.  She underwent continuous bladder irrigation.  Eliquis was held but restarted at discharge.  She was discharged on prophylactic Keflex.    -Prescription drug monitoring program review:     N/A    - Chronic or social conditions impacting patient care (Social Determinants of Health): None          Orders placed during this visit:  Orders Placed This Encounter   Procedures     Urine Culture - Urine,    Urinalysis With Microscopic If Indicated (No Culture) - Urine, Clean Catch    Comprehensive Metabolic Panel    [place] Draw    CBC Auto Differential    Urinalysis, Microscopic Only - Urine, Clean Catch    Urology (on-call MD unless specified)    CBC & Differential    Green Top (Gel)    Lavender Top    Gold Top - SST    Light Blue Top         Additional orders considered but not ordered:          Differential diagnosis includes, but is not limited to:    UTI, cystitis, renal failure      Independent interpretation of labs, radiology studies, and discussions with consultants:  ED Course as of 10/17/24 1514   Thu Oct 17, 2024   1151 BP(!): 192/88 [WH]   1151 Temp: 98 °F (36.7 °C) [WH]   1151 Heart Rate: 73 [WH]   1151 Resp: 16 [WH]   1151 SpO2: 96 % [WH]   1152 Color, UA: Yellow [WH]   1153 Leukocytes, UA(!): Moderate (2+) [WH]   1153 Nitrite, UA: Negative [WH]   1153 RBC, UA(!): Too Numerous to Count [WH]   1153 WBC, UA(!): Too Numerous to Count [WH]   1153 Bacteria, UA: None Seen [WH]   1153 Glucose(!): 125 [WH]   1153 Creatinine(!): 1.61  1.54 two weeks ago [WH]   1159 WBC: 8.06 [WH]   1200 Hemoglobin: 14.9 [WH]   1339 Patient is resting comfortably.  Test results and diagnoses were discussed with her and her family.  Case will be discussed with urology. [WH]   1418 Case discussed with [facility].  She is here in the ED to see the patient.  She recommends obtaining a urine culture and discharging the patient on antibiotics. [WH]   1431 MDM: Patient presented to the ED complaining of acute hematuria.  She denied abdominal pain, flank pain, fever, or dysuria.  She is on Eliquis.  Urine began to clear prior to ED arrival.  Urinalysis done here showed too numerous to count RBCs and WBCs but no bacteria.  Urine was yellow.  Hemoglobin was normal.  Creatinine was mildly elevated but stable.  Case was discussed with urology.  Patient will be discharged on oral antibiotics.  Urine culture  is pending []      ED Course User Index  [] Ranjith Dolan MD         COMPLEXITY OF CARE        DIAGNOSIS  Final diagnoses:   Acute cystitis with hematuria   Chronic renal impairment, unspecified CKD stage   Anticoagulated         DISPOSITION  DISCHARGE    Patient discharged in stable condition.    Reviewed implications of results, diagnosis, meds, responsibility to follow up, warning signs and symptoms of possible worsening, potential complications and reasons to return to ER, including worsening/persistent symptoms, abdominal pain, flank pain, vomiting, fever, or other concern.    Patient/Family voiced understanding of above instructions.    Discussed plan for discharge, as there is no emergent indication for admission. Patient referred to primary care provider for BP management due to today's BP. Pt/family is agreeable and understands need for follow up and repeat testing.  Pt is aware that discharge does not mean that nothing is wrong but it indicates no emergency is present that requires admission and they must continue care with follow-up as given below or physician of their choice.     FOLLOW-UP  [facility]  [address] [id number] [phone]  Schedule an appointment as soon as possible for a visit            Medication List        New Prescriptions      cephalexin 500 MG capsule  Commonly known as: KEFLEX  Take 1 capsule by mouth 3 (Three) Times a Day.               Where to Get Your Medications        These medications were sent to [id number] - [place], [place] - [address] AT [facility] - [phone] [facility] [phone] [place]x [address], [facility] [address]      Phone: [phone]   cephalexin 500 MG capsule                   Latest Documented Vital Signs:  AS OF 15:14 EDT VITALS:    BP - (!) 160/103  HR - 51  TEMP - 98 °F (36.7 °C)  O2 SATS - 98%            --    Please note that portions of this  were completed with a voice recognition program.       Note Disclaimer: At [facility], we believe that sharing information builds trust and better relationships. You are receiving this note because you are receiving care at [facility] or recently visited. It is possible you will see health information before a provider has talked with you about it. This kind of information can be easy to misunderstand. To help you fully understand what it means for your health, we urge you to discuss this note with your provider.             Ranjith Dolan MD  10/17/24 6911

## 2024-10-17 NOTE — CONSULTS
[facility]      Patient Identification:  NAME:  Mandy Alarcon  Age:  89 y.o.   Sex:  female   :  1933   MRN:  [id number]     Chief complaint: Blood in urine    History of present illness:      Pt is a 89 y.o. female with a history of UTI, gross hematuria and a-fib on DOAC that presented to the ER on 10/17/24 with complaints of acute blood in urine. Patient reports noticing blood first this morning. Urology consulted for evaluation and management of gross hematuria. Patient denies any associated fever, chills, nausea, vomiting, flank or suprapubic pain. Patient has not had any dysuria, urinary frequency/urgency or any other signs of acute infection. Most recent urine and urine given for sample appeared yellow with no visible blood. Patient is able to void without complaints. She did not notice any clots. Patient is on Eliquis BID and reports skipping dose this morning due to bleeding. Patient currently sees Dr. Hansen with [facility]. Patient does have a history of gross hematuria and received a full workup. Prior cystoscopy in  showed findings indicative of hemorrhagic cystitis. Patient is eager for discharge.     In hospital:  -AVSS, good UOP  -WBC - 8.06  -Creat - 1.61  Consistent with baseline  -UA - Moderate LE, negative nitrites, 3+ blood, TNTC RBC, TNTC WBC, no bacteria  -UCx - In process    Asked to see    Past medical history:  Past Medical History:   Diagnosis Date    Benign essential hypertension 10/28/2012    2012--treatment for hypertension begun.    Bilateral lower extremity edema 2020    Bilateral sensorineural hearing loss, wears hearing aids 2017    Left is much worse than right.  Patient had multiple ear infections as a child.    Chronic renal impairment, stage 3b 09/15/2020    Claustrophobia 2016    This patient has significant nocturnal hypoxemia and I think that she could benefit from nocturnal oxygen therapy. The exact etiology  of her hypoxemia is not clear. She could possibly have obstructive sleep apnea but this is not documented. We cannot test this patient for sleep apnea due to the fact that she is severely claustrophobic. Patient was a former smoker and it is possibly that COPD he is playing a role.    Combined form of senile cataract 2021    Embolism and thrombosis of unspecified artery 2023    Family history of coronary artery disease 2016    Patient's mother, father, 2 sisters and a brother all  from myocardial infarctions    Gastroesophageal reflux disease without esophagitis 2020    Gout 10/28/2012    2012--initial diagnosis and treatment of gout.    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 2023    History of hemorrhagic cystitis 2023    Hyperlipidemia 10/28/2012    2012--treatment for hyperlipidemia begun.    Impaired fasting glucose 10/28/2012    2012--initial diagnosis impaired fasting glucose.    Morbidly obese 2019    Nocturnal hypoxemia 2012--patient did not receive nocturnal oxygen because of Medicare regulations.   05/15/2014--overnight oximetry revealed oxygen saturations less than 89% for 22 minutes and 40 seconds. Oxygen saturations less than or equal to 88% for 22 minutes and 40 seconds. Lowest oxygen saturation 83%. The longest continuous time with oxygen saturations less than or equal to 88% was 1 minute and 32 seconds.   2012--overnight oximetry revealed oxygen saturations less than 90% for one hour and 35 minutes. Oxygen saturations less than 89% 59 minutes. This patient has significant nocturnal hypoxemia and I think that she could benefit from nocturnal oxygen therapy. The exact etiology of her hypoxemia is not clear. She could possibly have obstructive  sleep apnea but this is not documented. We cannot test this patient for sleep apnea due to the fact that she is severely claustrophobic. Patient was a former smoker and it is possibly that COPD he is playing a role.    Non morbid obesity 03/11/2019    Osteopenia of multiple sites 09/26/2023    Paroxysmal atrial fibrillation 04/10/2020    Postmenopausal state 09/26/2023    Primary hypothyroidism 11/17/2015 March 11, 2019--TSH remains elevated slightly at 4.92.  Given the overall clinical picture including the multinodular goiter, we will initiate levothyroxine 50 mcg/day and reassess in about 6 weeks.  August 1, 2018--thyroid ultrasound reveals a multinodular thyroid with multiple subcentimeter nodules.  Only minimal increase in size of the largest nodule in the left lobe has occurred when compared     Secondary polycythemia 08/02/2012 11/06/2014--patient did not receive nocturnal oxygen because of Medicare regulations.   05/15/2014--overnight oximetry revealed oxygen saturations less than 89% for 22 minutes and 40 seconds. Oxygen saturations less than or equal to 88% for 22 minutes and 40 seconds. Lowest oxygen saturation 83%. The longest continuous time with oxygen saturations less than or equal to 88% was 1 minute and 32 seconds.   08/02/2012--overnight oximetry revealed oxygen saturations less than 90% for one hour and 35 minutes. Oxygen saturations less than 89% 59 minutes. This patient has significant nocturnal hypoxemia and I think that she could benefit from nocturnal oxygen therapy. The exact etiology of her hypoxemia is not clear. She could possibly have obstructive sleep apnea but this is not documented. We cannot test this patient for sleep apnea due to the fact that she is severely claustrophobic. Patient was a former smoker and it is possibly that COPD he is playing a role.    Vitamin D deficiency 05/23/2016       Past surgical history:  Past Surgical History:   Procedure Laterality Date     CHOLECYSTECTOMY WITH INTRAOPERATIVE CHOLANGIOGRAM N/A 2020    Procedure: LAPAROSCOPIC CHOLECYSTOSTOMY TUBE, INTRAOPERATIVE CHOLANGIOGRAM;  Surgeon: Bryce Andujar MD;  Location: [facility] OR;  Service: General;  Laterality: N/A;    CHOLECYSTECTOMY WITH INTRAOPERATIVE CHOLANGIOGRAM N/A 2020    Procedure: CHOLECYSTECTOMY LAPAROSCOPIC INTRAOPERATIVE CHOLANGIOGRAM and EGD;  Surgeon: Bryce Andujar MD;  Location: [facility] MAIN OR;  Service: General;  Laterality: N/A;    ERCP N/A 2022    Procedure: ENDOSCOPIC RETROGRADE CHOLANGIOPANCREATOGRAPHY with sphincterotomy, balloon sweep 12-15mm;  Surgeon: Mitesh Altamirano MD;  Location: [facility] ENDOSCOPY;  Service: Gastroenterology;  Laterality: N/A;  pre - gallstone pancreatitis  post - s/p sphincterotomy, sludge and  pus    FEMORAL THROMBECTOMY/EMBOLECTOMY Left 2023    Procedure: OPEN LEFT FEMORAL THROMBECTOMY/EMBOLECTOMY WITH LEFT LOWER EXTREMITY ANGIOGRAM;  Surgeon: Jace Chowdhury Jr., MD;  Location: [facility] HYBRID OR ;  Service: Vascular;  Laterality: Left;    OOPHORECTOMY      age 48    RADICAL ABDOMINAL HYSTERECTOMY  48 years old    48 years of age. Uterine fibroid tumors with menorrhagia - no cancer    THROMBECTOMY      laterality: Left left femoral with angio.       Allergies:  Cefaclor, Keflex [cephalexin], and Sulfa antibiotics    Home medications:  (Not in a hospital admission)       Hospital medications:    No current facility-administered medications for this encounter.        Family history:  Family History   Problem Relation Age of Onset    Heart disease Mother         Ischemic.  from coronary artery disease.    Heart disease Father         Ischemic.  from coronary artery disease.    Heart disease Sister         Ischemic.  from coronary artery disease.    Heart disease Sister         Ischemic.  from coronary artery disease.    Heart disease Brother         Ischemic.  from coronary artery  disease.    Breast cancer Daughter     Breast cancer Daughter     Ovarian cancer Neg Hx     Malig Hyperthermia Neg Hx        Social history:  Social History     Tobacco Use    Smoking status: Former     Current packs/day: 0.00     Average packs/day: 0.5 packs/day for 8.0 years (4.0 ttl pk-yrs)     Types: Cigarettes     Start date: 1946     Quit date: 1954     Years since quittin.8    Smokeless tobacco: Never    Tobacco comments:     Stopped drinking at age 30.   Vaping Use    Vaping status: Never Used   Substance Use Topics    Alcohol use: No     Comment: caffiene use tea coffee    Drug use: No       Review of systems:      12 point negative except as in HPI    Objective:  TMax 24 hours:   Temp (24hrs), Av °F (36.7 °C), Min:98 °F (36.7 °C), Max:98 °F (36.7 °C)      Vitals Ranges:   Temp:  [98 °F (36.7 °C)] 98 °F (36.7 °C)  Heart Rate:  [51-73] 51  Resp:  [16] 16  BP: (160-192)/() 160/103    Intake/Output Last 3 shifts:  No intake/output data recorded.     Physical Exam:    General Appearance:    Alert, cooperative, NAD   Back:     No CVA tenderness   Lungs:     Respirations unlabored, no wheezing   Abdomen:     Soft, NDNT, no palpable bladder distension, nontender   :    Pelvic not performed   Neuro/Psych:   Orientation intact, mood/affect pleasant       Results review:   I reviewed the patient's new clinical results.    Data review:  Lab Results (last 24 hours)       Procedure Component Value Units Date/Time    Comprehensive Metabolic Panel [[id number]]  (Abnormal) Collected: 10/17/24 1109    Specimen: Blood from Arm, Left Updated: 10/17/24 1147     Glucose 125 mg/dL      BUN 21 mg/dL      Creatinine 1.61 mg/dL      Sodium 139 mmol/L      Potassium 4.5 mmol/L      Chloride 107 mmol/L      CO2 24.2 mmol/L      Calcium 9.5 mg/dL      Total Protein 6.9 g/dL      Albumin 3.7 g/dL      ALT (SGPT) 15 U/L      AST (SGOT) 19 U/L      Alkaline Phosphatase 60 U/L      Total Bilirubin 0.4 mg/dL       Globulin 3.2 gm/dL      A/G Ratio 1.2 g/dL      BUN/Creatinine Ratio 13.0     Anion Gap 7.8 mmol/L      eGFR 30.1 mL/min/1.73     Narrative:      GFR Normal >60  Chronic Kidney Disease <60  Kidney Failure <15    The GFR formula is only valid for adults with stable renal function between ages 18 and 70.    [place] Draw [[id number]] Collected: 10/17/24 1109    Specimen: Blood from Arm, Left Updated: 10/17/24 1130    Narrative:      The following orders were created for panel order [place] Draw.  Procedure                               Abnormality         Status                     ---------                               -----------         ------                     Green Top (Gel)[[id number]]                                  Final result               Lavender Top[[id number]]                                     Final result               Gold Top - SST[[id number]]                                   Final result               Light Blue Top[[id number]]                                   Final result                 Please view results for these tests on the individual orders.    Lavender Top [[id number]] Collected: 10/17/24 1109    Specimen: Blood from Arm, Left Updated: 10/17/24 1130     Extra Tube hold for add-on     Comment: Auto resulted       Green Top (Gel) [[id number]] Collected: 10/17/24 1109    Specimen: Blood from Arm, Left Updated: 10/17/24 1130     Extra Tube Hold for add-ons.     Comment: Auto resulted.       Gold Top - SST [[id number]] Collected: 10/17/24 1109    Specimen: Blood from Arm, Left Updated: 10/17/24 1130     Extra Tube Hold for add-ons.     Comment: Auto resulted.       Light Blue Top [[id number]] Collected: 10/17/24 1109    Specimen: Blood from Arm, Left Updated: 10/17/24 1130     Extra Tube Hold for add-ons.     Comment: Auto resulted       CBC & Differential [[id number]]  (Normal) Collected: 10/17/24 1109    Specimen: Blood from Arm, Left Updated: 10/17/24 1126    Narrative:      The following orders  were created for panel order CBC & Differential.  Procedure                               Abnormality         Status                     ---------                               -----------         ------                     CBC Auto Differential[[id number]]        Normal              Final result                 Please view results for these tests on the individual orders.    CBC Auto Differential [[id number]]  (Normal) Collected: 10/17/24 1109    Specimen: Blood from Arm, Left Updated: 10/17/24 1126     WBC 8.06 10*3/mm3      RBC 4.82 10*6/mm3      Hemoglobin 14.9 g/dL      Hematocrit 45.5 %      MCV 94.4 fL      MCH 30.9 pg      MCHC 32.7 g/dL      RDW 13.6 %      RDW-SD 46.8 fl      MPV 9.7 fL      Platelets 189 10*3/mm3      Neutrophil % 65.7 %      Lymphocyte % 21.2 %      Monocyte % 7.8 %      Eosinophil % 4.3 %      Basophil % 0.6 %      Immature Grans % 0.4 %      Neutrophils, Absolute 5.29 10*3/mm3      Lymphocytes, Absolute 1.71 10*3/mm3      Monocytes, Absolute 0.63 10*3/mm3      Eosinophils, Absolute 0.35 10*3/mm3      Basophils, Absolute 0.05 10*3/mm3      Immature Grans, Absolute 0.03 10*3/mm3      nRBC 0.0 /100 WBC     Urinalysis, Microscopic Only - Urine, Clean Catch [[id number]]  (Abnormal) Collected: 10/17/24 1116    Specimen: Urine, Clean Catch Updated: 10/17/24 1125     RBC, UA Too Numerous to Count /HPF      WBC, UA Too Numerous to Count /HPF      Bacteria, UA None Seen /HPF      Squamous Epithelial Cells, UA 0-2 /HPF      Hyaline Casts, UA None Seen /LPF      Methodology Automated Microscopy    Urine Culture - Urine, Urine, Clean Catch [[id number]] Collected: 10/17/24 1116    Specimen: Urine, Clean Catch Updated: 10/17/24 1125    Urinalysis With Microscopic If Indicated (No Culture) - Urine, Clean Catch [[id number]]  (Abnormal) Collected: 10/17/24 1116    Specimen: Urine, Clean Catch Updated: 10/17/24 1123     Color, UA Yellow     Appearance, UA Cloudy     pH, UA 6.0     Specific Gravity, UA 1.019      Glucose, UA Negative     Ketones, UA Negative     Bilirubin, UA Negative     Blood, UA Large (3+)     Protein, UA 30 mg/dL (1+)     Leuk Esterase, UA Moderate (2+)     Nitrite, UA Negative     Urobilinogen, UA 1.0 E.U./dL             Imaging:  Imaging Results (Last 24 Hours)       ** No results found for the last 24 hours. **             Assessment:     Gross hematuria   Acute cystitis     Plan:   - No acute urologic intervention  - Okay for discharge from urology standpoint with prophylactic course of antibiotics   - Follow-up with Dr. Hansen in 2-3 weeks - our office will call patient directly to schedule  - Urology will sign off; please call with any questions/concerns or clinical change     Lacy Juarez, CJ  10/17/24    Plan reviewed and discussed with Dr. Segovia

## 2024-10-17 NOTE — DISCHARGE INSTRUCTIONS
Take antibiotic as prescribed.  Drink plenty of fluids.  Return to the emergency department if worsening/persistent symptoms, abdominal pain, flank pain, fever, vomiting, trouble urinating, or other concern.  Follow-up with your urologist as soon as possible.

## 2024-10-17 NOTE — ED TRIAGE NOTES
Patient to ED via pv from home c/o episode of bright red blood in urine that she noticed this morning. Patient reports she does not see blood in her urine anymore. Patient takes Eliquis.

## 2024-10-19 LAB — BACTERIA SPEC AEROBE CULT: ABNORMAL

## 2024-10-23 ENCOUNTER — TELEPHONE (OUTPATIENT)
Dept: PHYSICAL THERAPY | Facility: CLINIC | Age: 89
End: 2024-10-23

## 2024-10-23 NOTE — TELEPHONE ENCOUNTER
Caller: DR [facility]    Relationship:          What was the call regarding: DR OFFICE CALLED AND CANCELED           Universal Safety Interventions

## 2024-10-26 NOTE — PROGRESS NOTES
Name: Mandy Alarcon ADMIT: 2020   : 1933  PCP: Daryl Santos MD    MRN: [id number] LOS: 1 days   AGE/SEX: 86 y.o. female  ROOM[facility]   Subjective   Chief Complaint   Patient presents with   • Chest Pain   abd pain is a little better  Better with meds  On IVFs  Had U/S    ROS  No f/c  +n/-v  No cp/palp  No soa/cough    Objective   Vital Signs  Temp:  [96.7 °F (35.9 °C)-97.4 °F (36.3 °C)] 97.1 °F (36.2 °C)  Heart Rate:  [89-98] 92  Resp:  [20-24] 20  BP: (100-116)/(57-62) 107/58  SpO2:  [92 %-94 %] 93 %  on  Flow (L/min):  [2] 2;   Device (Oxygen Therapy): nasal cannula  Body mass index is 37.75 kg/m².    Physical Exam   Constitutional: She is oriented to person, place, and time. She appears well-developed and well-nourished.   HENT:   Head: Normocephalic and atraumatic.   Eyes: Conjunctivae are normal. No scleral icterus.   Neck: Neck supple. No tracheal deviation present.   Cardiovascular: Normal rate and regular rhythm.   No murmur heard.  Pulmonary/Chest: Effort normal and breath sounds normal.   Abdominal: Soft. She exhibits distension (mild). There is tenderness (mild epigastric). There is no guarding.   Neurological: She is alert and oriented to person, place, and time. She exhibits normal muscle tone.   Skin: Skin is warm and dry.   Psychiatric: She has a normal mood and affect. Her behavior is normal.       Results Review:       I reviewed the patient's new clinical results.  Results from last 7 days   Lab Units 20  0458 20  2142   WBC 10*3/mm3 13.95* 10.78   HEMOGLOBIN g/dL 15.4 15.0   PLATELETS 10*3/mm3 199 235     Results from last 7 days   Lab Units 20  0441 20  2142   SODIUM mmol/L 132* 138   POTASSIUM mmol/L 4.4 3.7   CHLORIDE mmol/L 99 102   CO2 mmol/L 16.6* 20.4*   BUN mg/dL 35* 25*   CREATININE mg/dL 2.28* 1.74*   GLUCOSE mg/dL 88 180*   Estimated Creatinine Clearance: 19.6 mL/min (A) (by C-G formula based on SCr of 2.28 mg/dL (H)).  Results from last  7 days   Lab Units 02/02/20  0441 01/31/20  2142   ALBUMIN g/dL 3.20* 3.90   BILIRUBIN mg/dL 2.0* 0.3   ALK PHOS U/L 38* 41   AST (SGOT) U/L 26 27   ALT (SGPT) U/L 22 16     Results from last 7 days   Lab Units 02/02/20  0441 01/31/20  2142   CALCIUM mg/dL 7.8* 9.0   ALBUMIN g/dL 3.20* 3.90         Coag   Results from last 7 days   Lab Units 02/01/20  0458   INR  1.16*     HbA1C   Lab Results   Component Value Date    HGBA1C 5.90 (H) 02/01/2020    HGBA1C 5.80 (H) 08/20/2019    HGBA1C 5.81 (H) 03/04/2019     Infection     Radiology(recent) Ct Abdomen Pelvis Without Contrast    Result Date: 1/31/2020   1. The patient has inflammatory stranding surrounding the pancreatic head and neck, in keeping with history of pancreatitis. No discrete peripancreatic collections are seen. There is no evidence of gastric outlet obstruction.  Radiation dose reduction techniques were utilized, including automated exposure control and exposure modulation based on body size.  This report was finalized on 1/31/2020 11:52 PM by Dr. Kathleen Montoya M.D.      Us Gallbladder    Result Date: 2/1/2020   1. No stones or sludge are seen within the gallbladder. Gallbladder appears mildly distended, with gallbladder wall thickening and edema noted. Appearance is nonspecific, and may be reactive, and may be related to the patient's pancreatitis. 2. The patient does have some mild dilatation of the common bile duct, measuring up to 9 mm. Some of this may be age related, but correlation with liver function tests is recommended. MRCP or ERCP could be considered for additional evaluation if these are abnormal.  This report was finalized on 2/1/2020 9:55 PM by Dr. Kathleen Montoya M.D.      Xr Chest 1 View    Result Date: 1/31/2020  Mild bibasilar atelectasis.  This report was finalized on 1/31/2020 10:12 PM by Dr. Kathleen Montoya M.D.      Troponin T   Date Value Ref Range Status   01/31/2020 <0.010 0.000 - 0.030 ng/mL Final     No components found  for: TSH;2      cholecalciferol 1,000 Units Oral Daily   clobetasol  Topical Q12H   enoxaparin 30 mg Subcutaneous Q24H   famotidine 20 mg Intravenous Q24H   levothyroxine 50 mcg Oral Q AM   pravastatin 40 mg Oral Nightly   sodium chloride 10 mL Intravenous Q12H       sodium chloride 125 mL/hr Last Rate: 125 mL/hr (02/02/20 0044)   Diet Clear Liquid      Assessment/Plan      Active Hospital Problems    Diagnosis  POA   • **Acute pancreatitis without infection or necrosis [K85.90]  Yes   • LINNEA (acute kidney injury) (CMS/HCC) [N17.9]  Unknown   • Obesity (BMI 30-39.9) [E66.9]  Yes   • CKD (chronic kidney disease) stage 3, GFR 30-59 ml/min (CMS/HCC) [N18.3]  Yes   • Supraventricular tachycardia (CMS/HCC) [I47.1]  Yes   • Vitamin D deficiency [E55.9]  Yes   • Primary hypothyroidism [E03.9]  Yes   • Benign essential hypertension [I10]  Yes   • Hyperlipidemia [E78.5]  Yes   • Secondary polycythemia [D75.1]  Yes      Resolved Hospital Problems   No resolved problems to display.       Ms. Alarcon is a 86 y.o. former smoker with a history of HTN, HLD, and CKD stage III who presents with epigastric pain secondary to acute pancreatitis.     Acute pancreatitis  -IVF  -Clear liquid diet  -PRN agents for pain  -Anti-emetics as needed  -Seen by GI. Etiology is unclear. Possibly related to lisinopril/HCTZ  -Mild duct thickening on U/S, defer to GI if they recommend further imaging. TB slightly up.      Acute on chronic CKD stage 3  -baseline 1.30  -IVFs, check post void residual  -BMP in AM     Hypertension  -Stable  -HCTZ and lisinopril held as stated above          VTE Prophylaxis - lovenox  Code Status - Full      DW staff  DW family      Amadou Das MD  [facility]  02/02/20  8:06 AM   26-Oct-2024 18:26

## 2024-11-04 DIAGNOSIS — I48.0 PAROXYSMAL ATRIAL FIBRILLATION: Chronic | ICD-10-CM

## 2024-11-25 ENCOUNTER — LAB (OUTPATIENT)
Dept: LAB | Facility: HOSPITAL | Age: 89
End: 2024-11-25
Payer: MEDICARE

## 2024-11-25 ENCOUNTER — TRANSCRIBE ORDERS (OUTPATIENT)
Dept: ADMINISTRATIVE | Facility: HOSPITAL | Age: 89
End: 2024-11-25
Payer: MEDICARE

## 2024-11-25 DIAGNOSIS — N18.32 CHRONIC KIDNEY DISEASE (CKD) STAGE G3B/A1, MODERATELY DECREASED GLOMERULAR FILTRATION RATE (GFR) BETWEEN 30-44 ML/MIN/1.73 SQUARE METER AND ALBUMINURIA CREATININE RATIO LESS THAN 30 MG/G (CMS/H*: ICD-10-CM

## 2024-11-25 DIAGNOSIS — M10.00 GOUTY NEURITIS: ICD-10-CM

## 2024-11-25 DIAGNOSIS — G63 GOUTY NEURITIS: ICD-10-CM

## 2024-11-25 DIAGNOSIS — N18.32 CHRONIC KIDNEY DISEASE (CKD) STAGE G3B/A1, MODERATELY DECREASED GLOMERULAR FILTRATION RATE (GFR) BETWEEN 30-44 ML/MIN/1.73 SQUARE METER AND ALBUMINURIA CREATININE RATIO LESS THAN 30 MG/G (CMS/H*: Primary | ICD-10-CM

## 2024-11-25 LAB
ALBUMIN SERPL-MCNC: 4 G/DL (ref 3.5–5.2)
ANION GAP SERPL CALCULATED.3IONS-SCNC: 13.9 MMOL/L (ref 5–15)
BASOPHILS # BLD AUTO: 0.07 10*3/MM3 (ref 0–0.2)
BASOPHILS NFR BLD AUTO: 0.8 % (ref 0–1.5)
BUN SERPL-MCNC: 22 MG/DL (ref 8–23)
BUN/CREAT SERPL: 12.9 (ref 7–25)
CALCIUM SPEC-SCNC: 9.3 MG/DL (ref 8.2–9.6)
CHLORIDE SERPL-SCNC: 101 MMOL/L (ref 98–107)
CO2 SERPL-SCNC: 22.1 MMOL/L (ref 22–29)
CREAT SERPL-MCNC: 1.7 MG/DL (ref 0.57–1)
DEPRECATED RDW RBC AUTO: 47.2 FL (ref 37–54)
EGFRCR SERPLBLD CKD-EPI 2021: 28.2 ML/MIN/1.73
EOSINOPHIL # BLD AUTO: 0.43 10*3/MM3 (ref 0–0.4)
EOSINOPHIL NFR BLD AUTO: 5 % (ref 0.3–6.2)
ERYTHROCYTE [DISTWIDTH] IN BLOOD BY AUTOMATED COUNT: 13.5 % (ref 12.3–15.4)
GLUCOSE SERPL-MCNC: 99 MG/DL (ref 65–99)
HCT VFR BLD AUTO: 49.2 % (ref 34–46.6)
HGB BLD-MCNC: 15.6 G/DL (ref 12–15.9)
IMM GRANULOCYTES # BLD AUTO: 0.04 10*3/MM3 (ref 0–0.05)
IMM GRANULOCYTES NFR BLD AUTO: 0.5 % (ref 0–0.5)
LYMPHOCYTES # BLD AUTO: 2.54 10*3/MM3 (ref 0.7–3.1)
LYMPHOCYTES NFR BLD AUTO: 29.5 % (ref 19.6–45.3)
MCH RBC QN AUTO: 30.1 PG (ref 26.6–33)
MCHC RBC AUTO-ENTMCNC: 31.7 G/DL (ref 31.5–35.7)
MCV RBC AUTO: 94.8 FL (ref 79–97)
MONOCYTES # BLD AUTO: 0.97 10*3/MM3 (ref 0.1–0.9)
MONOCYTES NFR BLD AUTO: 11.3 % (ref 5–12)
NEUTROPHILS NFR BLD AUTO: 4.57 10*3/MM3 (ref 1.7–7)
NEUTROPHILS NFR BLD AUTO: 52.9 % (ref 42.7–76)
NRBC BLD AUTO-RTO: 0 /100 WBC (ref 0–0.2)
PHOSPHATE SERPL-MCNC: 3.2 MG/DL (ref 2.5–4.5)
PLATELET # BLD AUTO: 219 10*3/MM3 (ref 140–450)
PMV BLD AUTO: 10.5 FL (ref 6–12)
POTASSIUM SERPL-SCNC: 4.4 MMOL/L (ref 3.5–5.2)
RBC # BLD AUTO: 5.19 10*6/MM3 (ref 3.77–5.28)
SODIUM SERPL-SCNC: 137 MMOL/L (ref 136–145)
URATE SERPL-MCNC: 3.5 MG/DL (ref 2.4–5.7)
WBC NRBC COR # BLD AUTO: 8.62 10*3/MM3 (ref 3.4–10.8)

## 2024-11-25 PROCEDURE — 84550 ASSAY OF BLOOD/URIC ACID: CPT

## 2024-11-25 PROCEDURE — 36415 COLL VENOUS BLD VENIPUNCTURE: CPT

## 2024-11-25 PROCEDURE — 80069 RENAL FUNCTION PANEL: CPT

## 2024-11-25 PROCEDURE — 85025 COMPLETE CBC W/AUTO DIFF WBC: CPT

## 2024-12-13 ENCOUNTER — TELEPHONE (OUTPATIENT)
Dept: INTERNAL MEDICINE | Facility: CLINIC | Age: 89
End: 2024-12-13
Payer: MEDICARE

## 2024-12-13 NOTE — TELEPHONE ENCOUNTER
Dr Valle at [facility] called and wanted to ask if it was ok to put the patient on a lower dose of Eliquis 2.5 MG BID? Due to her kidney functions he believes that dose would be better for patient and asked if you agreed? Call Lucy back at [phone] and advise. She is his NP

## 2024-12-16 DIAGNOSIS — I48.0 PAROXYSMAL ATRIAL FIBRILLATION: Chronic | ICD-10-CM

## 2025-01-13 DIAGNOSIS — I48.0 PAROXYSMAL ATRIAL FIBRILLATION: Chronic | ICD-10-CM

## 2025-01-13 DIAGNOSIS — I75.023 ATHEROEMBOLISM OF BILATERAL LOWER EXTREMITIES: ICD-10-CM

## 2025-01-13 DIAGNOSIS — I74.9 EMBOLISM AND THROMBOSIS OF UNSPECIFIED ARTERY: Primary | ICD-10-CM

## 2025-01-13 NOTE — TELEPHONE ENCOUNTER
Caller: Ashley Valdes    Relationship: Emergency Contact    Best call back number: [phone]     Requested Prescriptions:   Requested Prescriptions     Pending Prescriptions Disp Refills    apixaban (Eliquis) 2.5 MG tablet tablet 60 tablet 5     Sig: TAKE 1 TABLET(2.5 MG) BY MOUTH EVERY 12 HOURS FOR BLOOD THINNER        Pharmacy where request should be sent: [id number] [address] AT [facility] [phone] [facility] [phone]      Last office visit with prescribing clinician: 10/10/2024   Last telemedicine visit with prescribing clinician: Visit date not found   Next office visit with prescribing clinician: 4/17/2025     Additional details provided by patient: PATIENT DAUGHTER ASHLEY CALLED STATED THE [facility] STATED NOT RECEIVED THIS PRESCRIPTION ON 12/16/24 AND PLEASE RESEND THE PRESCRIPTION.      Does the patient have less than a 3 day supply:  [] Yes  [x] No    Would you like a call back once the refill request has been completed: [] Yes [] No    If the office needs to give you a call back, can they leave a voicemail: [] Yes [] No    Ward Irving Rep   01/13/25 12:41 EST

## 2025-03-01 DIAGNOSIS — Z87.39 HISTORY OF GOUT: Chronic | ICD-10-CM

## 2025-03-02 DIAGNOSIS — K21.9 GASTROESOPHAGEAL REFLUX DISEASE WITHOUT ESOPHAGITIS: Chronic | ICD-10-CM

## 2025-03-03 RX ORDER — OMEPRAZOLE 40 MG/1
CAPSULE, DELAYED RELEASE ORAL
Qty: 30 CAPSULE | Refills: 5 | Status: SHIPPED | OUTPATIENT
Start: 2025-03-03

## 2025-03-03 RX ORDER — ALLOPURINOL 300 MG/1
TABLET ORAL
Qty: 90 TABLET | Refills: 1 | Status: SHIPPED | OUTPATIENT
Start: 2025-03-03

## 2025-03-06 NOTE — PROGRESS NOTES
"    Subjective:     Encounter Date:03/07/2025      Patient ID: Mandy Alarcon is a 89 y.o. female.    Chief Complaint:  History of Present Illness    This is an 89 year old female patient with hypertension, CKD stage 2, gout, hypothyroidism, tobacco use, hyperlipidemia, recent episode of near syncope, paroxsymal supraventricular tachycardia, who presents for follow up.         I saw the patient last in the office in 2/2023 at which time she was doing well.  She was only using the metoprolol as needed and not had any recurrent episodes of atrial fibrillation or supraventricular tachycardia that she was aware of.     She was admitted to the hospital in 11/2023 following an admission for acute lower extremity arterial thromboembolism.  It was felt to be due to to cardioembolism.  She required open left femoral thrombectomy.  Although she did not have any evidence of atrial fibrillation around that time she was started on apixaban.     On I saw the patient last in 2/2024 she.  She was doing well from a cardiac standpoint.  She return in 8/2024 and was seen by CJ Chaidez.  She was still doing well at that time and no changes were made.    She presents today for routine follow-up.  She denies any significant chest pain, shortness of breath, palpitation, orthopnea, near-syncope or syncope or lower extremity swelling.    Her apixaban was decreased appropriately to 2.5 mg by her nephrologist Dr. Valle.     Prior History:  I saw the patient initially when she was admitted to the hospital following a near syncopal episode on 6/20/18.  Prior to admission the patient reports she was at home doing some work around her house.  She went outside for a couple of minutes and went back inside.  While she was walking around she had a sudden onset of sensation where she felt like her \"head went empty\".  She denied any palpitations, chest pain or dyspnea at that time.  She denied loss of consciousness.  While in the emergency " room it was noted that her heart rate went up to the 140's-160's but she denied having any symptoms at that time. Per the ER notes she appeared to be in SVT and she was converted after given adenosine 6 mg and metoprolol tartrate 5 mg IV.  Unable to locate any of the EKG strips from the ER to confirm rhythm.  Her work up was unremarkable except for an minimally elevated d-dimer.  I had a low suspicion for thromboembolism at that time and felt that the abnormality was likely due to her age and renal insufficiency.  She underwent an echocardiogram during that admission that showed normal left ventricular systolic function, with an EF of 60%, normal diastolic function, and mild aortic regurgitation.  She was started on metoprolol tartrate 25 mg bid, her hydrochlorothiazide was discontinued and she was sent home with a Zio monitor.      She presented to the cardiac evaluation center on 7/10/2018 from Dr. Santos' office after complaining of nocturnal chest pressure.  These symptoms began after she was discharged from the hospital, initially on a nightly basis.  She reported that the symptoms would last about 3 hours and are associated with significant shortness of breath.  It also appeared that she was having more shortness of breath and lower extremity edema than normal.  Her lab work at that time was unremarkable except for a persistently elevated d-dimer.  Proceeded with a VQ scan that same day that was low probability for a pulmonary embolism.  The following day she returned to the office for a stress test that showed no evidence of ischemia and an EF of 59%.  Her Zio monitor showed 53 nonsustained episodes of supraventricular tachycardia the longest lasting 54 beats and the fastest with a rate of 203 bpm.  She also had rare episodes of PVCs and nonsustained ventricular tachycardia longest lasting 9 beats with a rate of 166 bpm.  There was also a single nonsignificant pause of 2 seconds.     She was admitted to the  hospital in 2/2020 with acute pancreatitis and pneumonia.  She developed atrial fibrillation with rapid ventricular rate during that admission that we are able to rate control and she eventually converted back to sinus rhythm.  I felt that her atrial fibrillation was related to her acute illness and did not recommend chronic anticoagulation at that time.  It appears that following that admission she had 2 additional admissions later in 2/2020 for recurrent pain.  A cholecystectomy was recommended but this was extremely difficult procedure and she underwent bailout placement of a cholecystostomy tube instead.  She was discharged in March and then returned in May for a laparoscopic cholecystectomy which again was difficult but successful.  She has since followed up with her surgeon Dr. Andujar wound is noted to be doing well.  It does not appear that she had any recurrent issues with atrial fibrillation during those 2 following admissions.     In follow-up in 8/2020 she was doing much better.  She reported palpitation episodes every couple of weeks.  When he checked her heart rate with his episodes her heart rates range in the 80s.  When she had this episode she will take 1/2 tablet of atenolol with improvement in her symptoms.  I opted not to make any changes to her management at that time.    Review of Systems   Constitutional: Negative for malaise/fatigue.   HENT:  Negative for hearing loss, hoarse voice, nosebleeds and sore throat.    Eyes:  Negative for pain.   Cardiovascular:  Negative for chest pain, claudication, cyanosis, dyspnea on exertion, irregular heartbeat, leg swelling, near-syncope, orthopnea, palpitations, paroxysmal nocturnal dyspnea and syncope.   Respiratory:  Negative for shortness of breath and snoring.    Endocrine: Negative for cold intolerance, heat intolerance, polydipsia, polyphagia and polyuria.   Skin:  Negative for itching and rash.   Musculoskeletal:  Negative for arthritis, falls,  joint pain, joint swelling, muscle cramps, muscle weakness and myalgias.   Gastrointestinal:  Negative for constipation, diarrhea, dysphagia, heartburn, hematemesis, hematochezia, melena, nausea and vomiting.   Genitourinary:  Negative for frequency, hematuria and hesitancy.   Neurological:  Negative for excessive daytime sleepiness, dizziness, headaches, light-headedness, numbness and weakness.   Psychiatric/Behavioral:  Negative for depression. The patient is not nervous/anxious.          Current Outpatient Medications:     allopurinol (ZYLOPRIM) 300 MG tablet, TAKE 1 TABLET BY MOUTH DAILY FOR GOUT, Disp: 90 tablet, Rfl: 1    apixaban (Eliquis) 2.5 MG tablet tablet, TAKE 1 TABLET(2.5 MG) BY MOUTH EVERY 12 HOURS FOR BLOOD THINNER, Disp: 60 tablet, Rfl: 5    BIOTIN PO, Take  by mouth., Disp: , Rfl:     cephalexin (KEFLEX) 500 MG capsule, Take 1 capsule by mouth 3 (Three) Times a Day., Disp: 21 capsule, Rfl: 0    clobetasol prop emollient base (TEMOVATE-E) 0.05 % emollient cream, Apply 1 Application topically to the appropriate area as directed 2 (Two) Times a Day for 14 days then 2 to 3 times a week after that for maintenance., Disp: 60 g, Rfl: 4    levothyroxine (SYNTHROID, LEVOTHROID) 75 MCG tablet, TAKE 1 TABLET BY MOUTH DAILY FOR THYROID, Disp: 90 tablet, Rfl: 3    metoprolol succinate XL (TOPROL-XL) 25 MG 24 hr tablet, TAKE 1 TABLET BY MOUTH DAILY FOR HIGH BLOOD PRESSURE AND HEART OR PALPITATIONS, Disp: 90 tablet, Rfl: 3    omeprazole (priLOSEC) 40 MG capsule, TAKE 1 CAPSULE BY MOUTH EVERY DAY BEFORE FIRST MEAL FOR STOMACH ACID OR REFLUX, Disp: 30 capsule, Rfl: 5    pravastatin (PRAVACHOL) 40 MG tablet, Take 1 tablet (40 mg) every day for high cholesterol., Disp: 90 tablet, Rfl: 3    VITAMIN D PO, Take  by mouth., Disp: , Rfl:     Past Medical History:   Diagnosis Date    Benign essential hypertension 10/28/2012    October 2012--treatment for hypertension begun.    Bilateral lower extremity edema 03/23/2020     Bilateral sensorineural hearing loss, wears hearing aids 2017    Left is much worse than right.  Patient had multiple ear infections as a child.    Chronic renal impairment, stage 3b 09/15/2020    Claustrophobia 2016    This patient has significant nocturnal hypoxemia and I think that she could benefit from nocturnal oxygen therapy. The exact etiology of her hypoxemia is not clear. She could possibly have obstructive sleep apnea but this is not documented. We cannot test this patient for sleep apnea due to the fact that she is severely claustrophobic. Patient was a former smoker and it is possibly that COPD he is playing a role.    Combined form of senile cataract 2021    Embolism and thrombosis of unspecified artery 2023    Family history of coronary artery disease 2016    Patient's mother, father, 2 sisters and a brother all  from myocardial infarctions    Gastroesophageal reflux disease without esophagitis 2020    Gout 10/28/2012    2012--initial diagnosis and treatment of gout.    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 2023    History of hemorrhagic cystitis 2023    Hyperlipidemia 10/28/2012    2012--treatment for hyperlipidemia begun.    Impaired fasting glucose 10/28/2012    2012--initial diagnosis impaired fasting glucose.    Morbidly obese 2019    Nocturnal hypoxemia 2012--patient did not receive nocturnal oxygen because of Medicare regulations.   05/15/2014--overnight oximetry revealed oxygen saturations less than 89% for 22 minutes and 40 seconds. Oxygen saturations less than or equal to 88% for 22 minutes and 40 seconds. Lowest oxygen saturation 83%. The longest continuous time with oxygen saturations less than or equal to 88% was 1 minute  and 32 seconds.   08/02/2012--overnight oximetry revealed oxygen saturations less than 90% for one hour and 35 minutes. Oxygen saturations less than 89% 59 minutes. This patient has significant nocturnal hypoxemia and I think that she could benefit from nocturnal oxygen therapy. The exact etiology of her hypoxemia is not clear. She could possibly have obstructive sleep apnea but this is not documented. We cannot test this patient for sleep apnea due to the fact that she is severely claustrophobic. Patient was a former smoker and it is possibly that COPD he is playing a role.    Non morbid obesity 03/11/2019    Osteopenia of multiple sites 09/26/2023    Paroxysmal atrial fibrillation 04/10/2020    Postmenopausal state 09/26/2023    Primary hypothyroidism 11/17/2015 March 11, 2019--TSH remains elevated slightly at 4.92.  Given the overall clinical picture including the multinodular goiter, we will initiate levothyroxine 50 mcg/day and reassess in about 6 weeks.  August 1, 2018--thyroid ultrasound reveals a multinodular thyroid with multiple subcentimeter nodules.  Only minimal increase in size of the largest nodule in the left lobe has occurred when compared     Secondary polycythemia 08/02/2012 11/06/2014--patient did not receive nocturnal oxygen because of Medicare regulations.   05/15/2014--overnight oximetry revealed oxygen saturations less than 89% for 22 minutes and 40 seconds. Oxygen saturations less than or equal to 88% for 22 minutes and 40 seconds. Lowest oxygen saturation 83%. The longest continuous time with oxygen saturations less than or equal to 88% was 1 minute and 32 seconds.   08/02/2012--overnight oximetry revealed oxygen saturations less than 90% for one hour and 35 minutes. Oxygen saturations less than 89% 59 minutes. This patient has significant nocturnal hypoxemia and I think that she could benefit from nocturnal oxygen therapy. The exact etiology of her hypoxemia is not clear. She could  possibly have obstructive sleep apnea but this is not documented. We cannot test this patient for sleep apnea due to the fact that she is severely claustrophobic. Patient was a former smoker and it is possibly that COPD he is playing a role.    Vitamin D deficiency 2016       Past Surgical History:   Procedure Laterality Date    CHOLECYSTECTOMY WITH INTRAOPERATIVE CHOLANGIOGRAM N/A 2020    Procedure: LAPAROSCOPIC CHOLECYSTOSTOMY TUBE, INTRAOPERATIVE CHOLANGIOGRAM;  Surgeon: Bryce Andujar MD;  Location: [facility] OR;  Service: General;  Laterality: N/A;    CHOLECYSTECTOMY WITH INTRAOPERATIVE CHOLANGIOGRAM N/A 2020    Procedure: CHOLECYSTECTOMY LAPAROSCOPIC INTRAOPERATIVE CHOLANGIOGRAM and EGD;  Surgeon: Bryce Andujar MD;  Location: [facility] OR;  Service: General;  Laterality: N/A;    ERCP N/A 2022    Procedure: ENDOSCOPIC RETROGRADE CHOLANGIOPANCREATOGRAPHY with sphincterotomy, balloon sweep 12-15mm;  Surgeon: Mitesh Altamirano MD;  Location: [facility] ENDOSCOPY;  Service: Gastroenterology;  Laterality: N/A;  pre - gallstone pancreatitis  post - s/p sphincterotomy, sludge and  pus    FEMORAL THROMBECTOMY/EMBOLECTOMY Left 2023    Procedure: OPEN LEFT FEMORAL THROMBECTOMY/EMBOLECTOMY WITH LEFT LOWER EXTREMITY ANGIOGRAM;  Surgeon: Jace Chowdhury Jr., MD;  Location: [facility] OR ;  Service: Vascular;  Laterality: Left;    OOPHORECTOMY      age 48    RADICAL ABDOMINAL HYSTERECTOMY  48 years old    48 years of age. Uterine fibroid tumors with menorrhagia - no cancer    THROMBECTOMY      laterality: Left left femoral with angio.       Family History   Problem Relation Age of Onset    Heart disease Mother         Ischemic.  from coronary artery disease.    Heart disease Father         Ischemic.  from coronary artery disease.    Heart disease Sister         Ischemic.  from coronary artery disease.    Heart disease Sister         Ischemic.   "from coronary artery disease.    Heart disease Brother         Ischemic.  from coronary artery disease.    Breast cancer Daughter     Breast cancer Daughter     Ovarian cancer Neg Hx     Malig Hyperthermia Neg Hx        Social History     Tobacco Use    Smoking status: Former     Current packs/day: 0.00     Average packs/day: 0.5 packs/day for 8.0 years (4.0 ttl pk-yrs)     Types: Cigarettes     Start date: 1946     Quit date: 1954     Years since quittin.2    Smokeless tobacco: Never    Tobacco comments:     Stopped drinking at age 30.   Vaping Use    Vaping status: Never Used   Substance Use Topics    Alcohol use: No     Comment: caffiene use tea coffee    Drug use: No         ECG 12 Lead    Date/Time: 3/7/2025 12:24 PM  Performed by: Lizbeth Laura MD    Authorized by: Lizbeth Laura MD  Comparison: compared with previous ECG   Similar to previous ECG  Rhythm: sinus rhythm             Objective:     Visit Vitals  /80 (BP Location: Left arm, Patient Position: Sitting, Cuff Size: Adult)   Pulse 66   Ht 157.5 cm (62.01\")   Wt 102 kg (224 lb 3.2 oz)   SpO2 97%   BMI 40.99 kg/m²         Constitutional:       Appearance: Normal appearance. Well-developed.   Eyes:      General: Lids are normal.      Conjunctiva/sclera: Conjunctivae normal.      Pupils: Pupils are equal, round, and reactive to light.   HENT:      Head: Normocephalic and atraumatic.   Neck:      Vascular: No carotid bruit or JVD.      Lymphadenopathy: No cervical adenopathy.   Pulmonary:      Effort: Pulmonary effort is normal.      Breath sounds: Normal breath sounds.   Cardiovascular:      Normal rate. Regular rhythm.      No gallop.    Pulses:     Radial: 2+ bilaterally.  Edema:     Peripheral edema absent.   Abdominal:      Palpations: Abdomen is soft.   Musculoskeletal:      Cervical back: Full passive range of motion without pain, normal range of motion and neck supple. Skin:     General: Skin is warm and dry. "   Neurological:      Mental Status: Alert and oriented to person, place, and time.             Assessment:          Diagnosis Plan   1. Paroxysmal atrial fibrillation        2. Supraventricular tachycardia        3. Benign essential hypertension        4. Hyperlipidemia        5. Stage 3b chronic kidney disease        6. History of arterial thrombosis, 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               Plan:       Paroxysmal atrial fibrillation.  No documented recurrent episodes.  However she is on chronic anticoagulation due to arterial embolic event to her left lower extremity that was felt to be due to cardioembolism.  She is doing well on low-dose apixaban which was appropriately adjusted by Dr. Valle.  Continue current management.  2.  Hypertension.  Well-controlled on current regimen medications.  Continue the same.  3.  Hyperlipidemia.  On pravastatin which is managed by Dr. Santos.  4.  Chronic kidney disease stage III.  Followed by Dr. Valle.  4.  Hypothyroidism    Will plan on seeing the patient back again in 6 months.

## 2025-03-07 ENCOUNTER — OFFICE VISIT (OUTPATIENT)
Age: OVER 89
End: 2025-03-07
Payer: MEDICARE

## 2025-03-07 VITALS
HEART RATE: 66 BPM | WEIGHT: 224.2 LBS | BODY MASS INDEX: 41.26 KG/M2 | HEIGHT: 62 IN | SYSTOLIC BLOOD PRESSURE: 130 MMHG | OXYGEN SATURATION: 97 % | DIASTOLIC BLOOD PRESSURE: 80 MMHG

## 2025-03-07 DIAGNOSIS — I47.10 SUPRAVENTRICULAR TACHYCARDIA: Chronic | ICD-10-CM

## 2025-03-07 DIAGNOSIS — I48.0 PAROXYSMAL ATRIAL FIBRILLATION: Primary | Chronic | ICD-10-CM

## 2025-03-07 DIAGNOSIS — I10 BENIGN ESSENTIAL HYPERTENSION: Chronic | ICD-10-CM

## 2025-03-07 DIAGNOSIS — Z86.718 HISTORY OF ARTERIAL THROMBOSIS: Chronic | ICD-10-CM

## 2025-03-07 DIAGNOSIS — N18.32 STAGE 3B CHRONIC KIDNEY DISEASE: Chronic | ICD-10-CM

## 2025-03-07 DIAGNOSIS — E78.2 MIXED HYPERLIPIDEMIA: Chronic | ICD-10-CM

## 2025-03-07 NOTE — LETTER
March 7, 2025     Daryl Santos MD  [address] [address]  [address]  [facility] [place] [facility] [address]    Patient: Mandy Alarcon   YOB: 1933   Date of Visit: 3/7/2025       Dear Daryl Santos MD,    Mandy Alarcon was in my office today. Below are the relevant portions of my assessment and plan of care.           If you have questions, please do not hesitate to call me. I look forward to following Mandy along with you.         Sincerely,        Lizbeth Laura MD        CC: No Recipients

## 2025-03-13 ENCOUNTER — TELEPHONE (OUTPATIENT)
Age: OVER 89
End: 2025-03-13
Payer: MEDICARE

## 2025-03-13 DIAGNOSIS — I82.90 THROMBUS: Primary | ICD-10-CM

## 2025-03-13 DIAGNOSIS — I72.4: ICD-10-CM

## 2025-03-13 DIAGNOSIS — I74.3 FEMORAL ARTERY THROMBOSIS: Primary | ICD-10-CM

## 2025-03-13 NOTE — TELEPHONE ENCOUNTER
Pt daughter Ashley called stating pt is having increased redness, pain, warmth and swelling in her LLE thigh/knee. Pt has a hx of femoral artery thrombosis in the same region. For this, I have recommended an JESUS and possible RAVI if warranted. Pt scheduled for 1300 tomorrow 3/14/25, pt daughter Ashley verbalized understanding.

## 2025-03-14 ENCOUNTER — OFFICE VISIT (OUTPATIENT)
Age: OVER 89
End: 2025-03-14
Payer: MEDICARE

## 2025-03-14 ENCOUNTER — HOSPITAL ENCOUNTER (OUTPATIENT)
Facility: HOSPITAL | Age: OVER 89
Discharge: HOME | End: 2025-03-14
Payer: MEDICARE

## 2025-03-14 VITALS
DIASTOLIC BLOOD PRESSURE: 80 MMHG | WEIGHT: 224.3 LBS | HEIGHT: 62 IN | SYSTOLIC BLOOD PRESSURE: 150 MMHG | BODY MASS INDEX: 41.28 KG/M2

## 2025-03-14 DIAGNOSIS — I82.90 THROMBUS: ICD-10-CM

## 2025-03-14 DIAGNOSIS — I72.4: ICD-10-CM

## 2025-03-14 DIAGNOSIS — I74.3 FEMORAL ARTERY THROMBOSIS: ICD-10-CM

## 2025-03-14 DIAGNOSIS — I73.9 PAD (PERIPHERAL ARTERY DISEASE): Primary | ICD-10-CM

## 2025-03-14 LAB
BH CV LOW VAS LEFT EXTERNAL ILIAC AUGMENT: NORMAL
BH CV LOW VAS LEFT EXTERNAL ILIAC COMPRESS: NORMAL
BH CV LOWER ARTERIAL LEFT ABI RATIO: 1.1
BH CV LOWER ARTERIAL LEFT DORSALIS PEDIS SYS MAX: 188
BH CV LOWER ARTERIAL LEFT POST TIBIAL SYS MAX: 200
BH CV LOWER ARTERIAL RIGHT ABI RATIO: 1
BH CV LOWER ARTERIAL RIGHT DORSALIS PEDIS SYS MAX: 180
BH CV LOWER ARTERIAL RIGHT POST TIBIAL SYS MAX: 190
BH CV LOWER VASCULAR LEFT COMMON FEMORAL AUGMENT: NORMAL
BH CV LOWER VASCULAR LEFT COMMON FEMORAL COMPETENT: NORMAL
BH CV LOWER VASCULAR LEFT COMMON FEMORAL COMPRESS: NORMAL
BH CV LOWER VASCULAR LEFT COMMON FEMORAL PHASIC: NORMAL
BH CV LOWER VASCULAR LEFT COMMON FEMORAL SPONT: NORMAL
BH CV LOWER VASCULAR LEFT DISTAL FEMORAL COMPRESS: NORMAL
BH CV LOWER VASCULAR LEFT EXTERNAL ILIAC COMPETENT: NORMAL
BH CV LOWER VASCULAR LEFT EXTERNAL ILIAC PHASIC: NORMAL
BH CV LOWER VASCULAR LEFT EXTERNAL ILIAC SPONT: NORMAL
BH CV LOWER VASCULAR LEFT GASTRONEMIUS COMPRESS: NORMAL
BH CV LOWER VASCULAR LEFT GREATER SAPH AK COMPRESS: NORMAL
BH CV LOWER VASCULAR LEFT GREATER SAPH BK COMPRESS: NORMAL
BH CV LOWER VASCULAR LEFT LESSER SAPH COMPRESS: NORMAL
BH CV LOWER VASCULAR LEFT MID FEMORAL AUGMENT: NORMAL
BH CV LOWER VASCULAR LEFT MID FEMORAL COMPETENT: NORMAL
BH CV LOWER VASCULAR LEFT MID FEMORAL COMPRESS: NORMAL
BH CV LOWER VASCULAR LEFT MID FEMORAL PHASIC: NORMAL
BH CV LOWER VASCULAR LEFT MID FEMORAL SPONT: NORMAL
BH CV LOWER VASCULAR LEFT PERONEAL COMPRESS: NORMAL
BH CV LOWER VASCULAR LEFT POPLITEAL AUGMENT: NORMAL
BH CV LOWER VASCULAR LEFT POPLITEAL COMPETENT: NORMAL
BH CV LOWER VASCULAR LEFT POPLITEAL COMPRESS: NORMAL
BH CV LOWER VASCULAR LEFT POPLITEAL PHASIC: NORMAL
BH CV LOWER VASCULAR LEFT POPLITEAL SPONT: NORMAL
BH CV LOWER VASCULAR LEFT POSTERIOR TIBIAL COMPRESS: NORMAL
BH CV LOWER VASCULAR LEFT PROFUNDA FEMORAL COMPRESS: NORMAL
BH CV LOWER VASCULAR LEFT PROXIMAL FEMORAL COMPRESS: NORMAL
BH CV LOWER VASCULAR LEFT SAPHENOFEMORAL JUNCTION COMPRESS: NORMAL
BH CV LOWER VASCULAR RIGHT COMMON FEMORAL AUGMENT: NORMAL
BH CV LOWER VASCULAR RIGHT COMMON FEMORAL COMPETENT: NORMAL
BH CV LOWER VASCULAR RIGHT COMMON FEMORAL COMPRESS: NORMAL
BH CV LOWER VASCULAR RIGHT COMMON FEMORAL PHASIC: NORMAL
BH CV LOWER VASCULAR RIGHT COMMON FEMORAL SPONT: NORMAL
BH CV POP FLUID COLLECT LEFT: 1
UPPER ARTERIAL LEFT ARM BRACHIAL SYS MAX: 176
UPPER ARTERIAL RIGHT ARM BRACHIAL SYS MAX: 188

## 2025-03-14 PROCEDURE — 93922 UPR/L XTREMITY ART 2 LEVELS: CPT

## 2025-03-14 PROCEDURE — 93971 EXTREMITY STUDY: CPT

## 2025-03-14 RX ORDER — NITROFURANTOIN MACROCRYSTALS 50 MG/1
50 CAPSULE ORAL DAILY
COMMUNITY

## 2025-03-14 NOTE — PROGRESS NOTES
Chief Complaint  femoral artery (Left leg hurt 2 days ago)    Subjective        Mandy Alarcon presents to [facility]  HPI   Mandy Alarcon is a 89 y.o. female that has been followed in our office for peripheral arterial disease.  She presented to [facility] in 2023 for a lower extremity thromboembolism.  This was from a cardiac source.  She had a open left femoral thrombectomy with angioplasty in November 2023.  She did have opening of her incision postoperatively that was treated with normal saline wet-to-dry dressings.  We received a phone call from her daughter reporting that she was having increase of pain, redness, and warmth to her left lower extremity therefore she comes in today for evaluation along with ABIs. She reports she was recently diagnosed with arthritis to her left knee. 2 days ago, she started having pain. She did put a lidoderm patch on and this did help but she is still complaining of pain.  Review of Systems   Constitutional:  Negative for fever.   Eyes:  Negative for visual disturbance.   Cardiovascular:  Negative for leg swelling.   Gastrointestinal:  Negative for abdominal pain.   Musculoskeletal:  Negative for back pain.   Skin:  Negative for color change, pallor and wound.   Neurological:  Negative for dizziness, facial asymmetry, speech difficulty and weakness.        Mandy Alarcon  reports that she quit smoking about 71 years ago. Her smoking use included cigarettes. She started smoking about 79 years ago. She has a 4 pack-year smoking history. She has been exposed to tobacco smoke. She has never used smokeless tobacco..        Objective   Vital Signs:  Vitals:    03/14/25 1342   BP: 150/80        Body mass index is 41.01 kg/m².           Physical Exam  Vitals reviewed.   Constitutional:       Appearance: Normal appearance.   HENT:      Head: Normocephalic.   Cardiovascular:      Rate and Rhythm: Normal rate and regular rhythm.       Pulses: Normal pulses.           Dorsalis pedis pulses are 3+ on the right side and 3+ on the left side.        Posterior tibial pulses are 3+ on the right side and 3+ on the left side.   Pulmonary:      Effort: Pulmonary effort is normal.   Skin:     General: Skin is warm.   Neurological:      General: No focal deficit present.      Mental Status: She is alert and oriented to person, place, and time.   Psychiatric:         Mood and Affect: Mood normal.          Result Review :      Doppler Ankle Brachial Index Single Level CAR (06/17/2024 13:49)       ABIs from today: Doppler Ankle Brachial Index Single Level CAR (03/14/2025 13:35)         Duplex Venous Lower Extremity - Left CAR (03/14/2025 14:20)      Assessment and Plan     Diagnoses and all orders for this visit:    1. PAD (peripheral artery disease) (Primary)  -     Doppler Ankle Brachial Index Single Level CAR; Future            Patient presents today for new onset of left knee and leg pain.  Today, her ABIs are normal.  He also had a venous duplex to rule out DVT and it was negative.  I do think this is related to osteoarthritis to her left knee.  I do not have a suspicion for cellulitis.  She will follow-up with her primary care provider for further evaluation.  We will plan to see her back in 1 year along with ABIs.         Follow Up     Return in about 1 year (around 3/14/2026) for raz.  Patient was given instructions and counseling regarding her condition or for health maintenance advice. Please see specific information pulled into the AVS if appropriate.     CJ Jackson

## 2025-03-18 ENCOUNTER — TELEPHONE (OUTPATIENT)
Dept: INTERNAL MEDICINE | Facility: CLINIC | Age: OVER 89
End: 2025-03-18

## 2025-03-18 NOTE — TELEPHONE ENCOUNTER
Caller: Ashley Valdes    Relationship to patient: Emergency Contact    Best call back number: [phone] - PATIENT'S NUMBER     Chief complaint: LEFT KNEE PAIN     Type of visit: IN OFFICE PROCEDURE/STEROID INJECTION     Requested date: ASAP     If rescheduling, when is the original appointment: NA    Additional notes:PLEASE CALL /BLOOD CLOT HAS ALREADY BEEN RULED OUT

## 2025-03-24 ENCOUNTER — OFFICE VISIT (OUTPATIENT)
Dept: INTERNAL MEDICINE | Facility: CLINIC | Age: OVER 89
End: 2025-03-24
Payer: MEDICARE

## 2025-03-24 VITALS
WEIGHT: 226 LBS | OXYGEN SATURATION: 96 % | HEART RATE: 65 BPM | SYSTOLIC BLOOD PRESSURE: 144 MMHG | DIASTOLIC BLOOD PRESSURE: 86 MMHG | TEMPERATURE: 98.2 F | RESPIRATION RATE: 16 BRPM | BODY MASS INDEX: 41.59 KG/M2 | HEIGHT: 62 IN

## 2025-03-24 DIAGNOSIS — M70.50 ANSERINE BURSITIS: ICD-10-CM

## 2025-03-24 DIAGNOSIS — M25.562 CHRONIC PAIN OF LEFT KNEE: Chronic | ICD-10-CM

## 2025-03-24 DIAGNOSIS — G89.29 CHRONIC PAIN OF LEFT KNEE: Chronic | ICD-10-CM

## 2025-03-24 DIAGNOSIS — M17.0 PRIMARY OSTEOARTHRITIS OF BOTH KNEES: Primary | ICD-10-CM

## 2025-03-24 PROBLEM — R07.89 LEFT-SIDED CHEST WALL PAIN: Status: RESOLVED | Noted: 2024-10-10 | Resolved: 2025-03-24

## 2025-03-24 RX ORDER — METHYLPREDNISOLONE ACETATE 40 MG/ML
40 INJECTION, SUSPENSION INTRA-ARTICULAR; INTRALESIONAL; INTRAMUSCULAR; SOFT TISSUE ONCE
Status: SHIPPED | OUTPATIENT
Start: 2025-03-24

## 2025-03-24 RX ORDER — TRIAMCINOLONE ACETONIDE 40 MG/ML
40 INJECTION, SUSPENSION INTRA-ARTICULAR; INTRAMUSCULAR ONCE
Status: DISCONTINUED | OUTPATIENT
Start: 2025-03-24 | End: 2025-03-24

## 2025-03-24 NOTE — PROGRESS NOTES
03/24/2025    Patient Information  Mandy Alarcon                                                                                          [address] [facility] [address]      5/30/1933  [unfilled]  There is no work phone number on file.    Chief Complaint:     Complaining of pain left knee.    History of Present Illness:    Patient has known osteoarthritis of the knees presents today with pain involving her left knee without known recent trauma.  The knee is painful and tender anterior laterally.  The knee does not give way and is not unstable.  Past medical history reviewed and updated were necessary.    Review of Systems   Constitutional: Negative.   HENT: Negative.     Eyes: Negative.    Cardiovascular: Negative.    Respiratory: Negative.     Endocrine: Negative.    Hematologic/Lymphatic: Negative.    Skin: Negative.    Musculoskeletal: Negative.  Positive for arthritis.        Left knee pain   Gastrointestinal: Negative.    Genitourinary: Negative.    Neurological: Negative.    Psychiatric/Behavioral: Negative.     Allergic/Immunologic: Negative.        Active Problems:    Patient Active Problem List   Diagnosis    Benign essential hypertension    Claustrophobia    Gout    Hyperlipidemia    Primary hypothyroidism    Impaired fasting glucose    Nocturnal hypoxemia    Secondary polycythemia    Vitamin D deficiency    Therapeutic drug monitoring    Family history of coronary artery disease    Bilateral sensorineural hearing loss, wears hearing aids    Multinodular goiter    Supraventricular tachycardia    Morbidly obese    Bilateral lower extremity edema    Gastroesophageal reflux disease without esophagitis    Paroxysmal atrial fibrillation    Stage 3b chronic kidney disease    Combined form of senile cataract    Osteopenia of multiple sites    Postmenopausal state    History of 2019 novel coronavirus disease (COVID-19)    History of hemorrhagic cystitis    History of arterial thrombosis,  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    Chronic pain of left knee    Primary osteoarthritis of both knees    Anserine bursitis         Past Medical History:   Diagnosis Date    Benign essential hypertension 10/28/2012    2012--treatment for hypertension begun.    Bilateral lower extremity edema 2020    Bilateral sensorineural hearing loss, wears hearing aids 2017    Left is much worse than right.  Patient had multiple ear infections as a child.    Chronic renal impairment, stage 3b 09/15/2020    Claustrophobia 2016    This patient has significant nocturnal hypoxemia and I think that she could benefit from nocturnal oxygen therapy. The exact etiology of her hypoxemia is not clear. She could possibly have obstructive sleep apnea but this is not documented. We cannot test this patient for sleep apnea due to the fact that she is severely claustrophobic. Patient was a former smoker and it is possibly that COPD he is playing a role.    Combined form of senile cataract 2021    Embolism and thrombosis of unspecified artery 2023    Family history of coronary artery disease 2016    Patient's mother, father, 2 sisters and a brother all  from myocardial infarctions    Gastroesophageal reflux disease without esophagitis 2020    Gout 10/28/2012    2012--initial diagnosis and treatment of gout.    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 2023    History of hemorrhagic cystitis 2023    Hyperlipidemia 10/28/2012    2012--treatment for hyperlipidemia begun.    Impaired fasting glucose 10/28/2012    2012--initial diagnosis impaired fasting glucose.    Morbidly obese 2019    Nocturnal hypoxemia 2012--patient did not receive nocturnal  oxygen because of Medicare regulations.   05/15/2014--overnight oximetry revealed oxygen saturations less than 89% for 22 minutes and 40 seconds. Oxygen saturations less than or equal to 88% for 22 minutes and 40 seconds. Lowest oxygen saturation 83%. The longest continuous time with oxygen saturations less than or equal to 88% was 1 minute and 32 seconds.   08/02/2012--overnight oximetry revealed oxygen saturations less than 90% for one hour and 35 minutes. Oxygen saturations less than 89% 59 minutes. This patient has significant nocturnal hypoxemia and I think that she could benefit from nocturnal oxygen therapy. The exact etiology of her hypoxemia is not clear. She could possibly have obstructive sleep apnea but this is not documented. We cannot test this patient for sleep apnea due to the fact that she is severely claustrophobic. Patient was a former smoker and it is possibly that COPD he is playing a role.    Non morbid obesity 03/11/2019    Osteopenia of multiple sites 09/26/2023    Paroxysmal atrial fibrillation 04/10/2020    Postmenopausal state 09/26/2023    Primary hypothyroidism 11/17/2015 March 11, 2019--TSH remains elevated slightly at 4.92.  Given the overall clinical picture including the multinodular goiter, we will initiate levothyroxine 50 mcg/day and reassess in about 6 weeks.  August 1, 2018--thyroid ultrasound reveals a multinodular thyroid with multiple subcentimeter nodules.  Only minimal increase in size of the largest nodule in the left lobe has occurred when compared     Primary osteoarthritis of both knees 03/24/2025    BILATERAL KNEE X-RAYS     HISTORY: Bilateral knee pain, worse on the left than the right.     TECHNIQUE: 2 images of each knee are provided.     FINDINGS: On the right, there is bulky marginal osteophyte around all 3  compartments with decent preservation of the radiographic joint spaces  and no suprapatellar bursal effusion. No bone lesion. Soft tissue  contours are  normal.     On the left, there    Secondary polycythemia 08/02/2012 11/06/2014--patient did not receive nocturnal oxygen because of Medicare regulations.   05/15/2014--overnight oximetry revealed oxygen saturations less than 89% for 22 minutes and 40 seconds. Oxygen saturations less than or equal to 88% for 22 minutes and 40 seconds. Lowest oxygen saturation 83%. The longest continuous time with oxygen saturations less than or equal to 88% was 1 minute and 32 seconds.   08/02/2012--overnight oximetry revealed oxygen saturations less than 90% for one hour and 35 minutes. Oxygen saturations less than 89% 59 minutes. This patient has significant nocturnal hypoxemia and I think that she could benefit from nocturnal oxygen therapy. The exact etiology of her hypoxemia is not clear. She could possibly have obstructive sleep apnea but this is not documented. We cannot test this patient for sleep apnea due to the fact that she is severely claustrophobic. Patient was a former smoker and it is possibly that COPD he is playing a role.    Vitamin D deficiency 05/23/2016         Past Surgical History:   Procedure Laterality Date    CHOLECYSTECTOMY WITH INTRAOPERATIVE CHOLANGIOGRAM N/A 03/12/2020    Procedure: LAPAROSCOPIC CHOLECYSTOSTOMY TUBE, INTRAOPERATIVE CHOLANGIOGRAM;  Surgeon: Bryce Andujar MD;  Location: [facility] OR;  Service: General;  Laterality: N/A;    CHOLECYSTECTOMY WITH INTRAOPERATIVE CHOLANGIOGRAM N/A 05/22/2020    Procedure: CHOLECYSTECTOMY LAPAROSCOPIC INTRAOPERATIVE CHOLANGIOGRAM and EGD;  Surgeon: Bryce Andujar MD;  Location: [facility] OR;  Service: General;  Laterality: N/A;    ERCP N/A 07/01/2022    Procedure: ENDOSCOPIC RETROGRADE CHOLANGIOPANCREATOGRAPHY with sphincterotomy, balloon sweep 12-15mm;  Surgeon: Mitesh Altamirano MD;  Location: [facility] ENDOSCOPY;  Service: Gastroenterology;  Laterality: N/A;  pre - gallstone pancreatitis  post - s/p sphincterotomy, sludge and  pus     FEMORAL THROMBECTOMY/EMBOLECTOMY Left 11/16/2023    Procedure: OPEN LEFT FEMORAL THROMBECTOMY/EMBOLECTOMY WITH LEFT LOWER EXTREMITY ANGIOGRAM;  Surgeon: Jace Chowdhury Jr., MD;  Location: [facility] 18/19;  Service: Vascular;  Laterality: Left;    OOPHORECTOMY      age 48    RADICAL ABDOMINAL HYSTERECTOMY  48 years old    48 years of age. Uterine fibroid tumors with menorrhagia - no cancer    THROMBECTOMY  2023    laterality: Left left femoral with angio.         Allergies   Allergen Reactions    Cefaclor Anaphylaxis, Angioedema and Swelling     caused angioedema 25 years ago; she tolerates cephalexin, amoxicillin, and ceftriaxone per my d/w patient and her daughter as well as amoxicillin-clavulanate confirmed in EPIC  caused angioedema 25 years ago; she tolerates cephalexin, amoxicillin, and ceftriaxone per my d/w patient and her daughter as well as amoxicillin-clavulanate confirmed in EPIC  caused angioedema 25 years ago; she tolerates cephalexin, amoxicillin, and ceftriaxone per my d/w patient and her daughter as well as amoxicillin-clavulanate confirmed in EPIC  caused angioedema 25 years ago; she tolerates cephalexin, amoxicillin, and ceftriaxone per my d/w patient and her daughter as well as amoxicillin-clavulanate confirmed in EPIC    Keflex [Cephalexin] Hives    Sulfa Antibiotics Rash           Current Outpatient Medications:     allopurinol (ZYLOPRIM) 300 MG tablet, TAKE 1 TABLET BY MOUTH DAILY FOR GOUT, Disp: 90 tablet, Rfl: 1    apixaban (Eliquis) 2.5 MG tablet tablet, TAKE 1 TABLET(2.5 MG) BY MOUTH EVERY 12 HOURS FOR BLOOD THINNER, Disp: 60 tablet, Rfl: 5    BIOTIN PO, Take  by mouth., Disp: , Rfl:     cephalexin (KEFLEX) 500 MG capsule, Take 1 capsule by mouth 3 (Three) Times a Day., Disp: 21 capsule, Rfl: 0    clobetasol prop emollient base (TEMOVATE-E) 0.05 % emollient cream, Apply 1 Application topically to the appropriate area as directed 2 (Two) Times a Day for 14 days then 2 to 3  times a week after that for maintenance., Disp: 60 g, Rfl: 4    levothyroxine (SYNTHROID, LEVOTHROID) 75 MCG tablet, TAKE 1 TABLET BY MOUTH DAILY FOR THYROID, Disp: 90 tablet, Rfl: 3    metoprolol succinate XL (TOPROL-XL) 25 MG 24 hr tablet, TAKE 1 TABLET BY MOUTH DAILY FOR HIGH BLOOD PRESSURE AND HEART OR PALPITATIONS, Disp: 90 tablet, Rfl: 3    nitrofurantoin (MACRODANTIN) 50 MG capsule, Take 1 capsule by mouth Daily., Disp: , Rfl:     omeprazole (priLOSEC) 40 MG capsule, TAKE 1 CAPSULE BY MOUTH EVERY DAY BEFORE FIRST MEAL FOR STOMACH ACID OR REFLUX, Disp: 30 capsule, Rfl: 5    pravastatin (PRAVACHOL) 40 MG tablet, Take 1 tablet (40 mg) every day for high cholesterol., Disp: 90 tablet, Rfl: 3    VITAMIN D PO, Take  by mouth., Disp: , Rfl:       Family History   Problem Relation Age of Onset    Heart disease Mother         Ischemic.  from coronary artery disease.    Heart disease Father         Ischemic.  from coronary artery disease.    Heart disease Sister         Ischemic.  from coronary artery disease.    Heart disease Sister         Ischemic.  from coronary artery disease.    Heart disease Brother         Ischemic.  from coronary artery disease.    Breast cancer Daughter     Breast cancer Daughter     Ovarian cancer Neg Hx     Malig Hyperthermia Neg Hx          Social History     Socioeconomic History    Marital status:     Number of children: 5    Highest education level: GED or equivalent   Tobacco Use    Smoking status: Former     Current packs/day: 0.00     Average packs/day: 0.5 packs/day for 8.0 years (4.0 ttl pk-yrs)     Types: Cigarettes     Start date: 1946     Quit date: 1954     Years since quittin.2     Passive exposure: Past    Smokeless tobacco: Never    Tobacco comments:     Stopped drinking at age 30.   Vaping Use    Vaping status: Never Used   Substance and Sexual Activity    Alcohol use: No     Comment: caffiene use tea coffee    Drug use: No    Sexual  "activity: Not Currently     Partners: Male         Vitals:    03/24/25 1205   BP: 144/86   Pulse: 65   Resp: 16   Temp: 98.2 °F (36.8 °C)   TempSrc: Oral   SpO2: 96%   Weight: 103 kg (226 lb)   Height: 157.5 cm (62.01\")        Body mass index is 41.33 kg/m².      Physical Exam:    General Exam not revealing.  Examination of the left knee reveals definite tenderness over the anserine bursitis and little puffiness.  There is no redness and no definite warmth.  Range of motion appears good over the knee.    Lab/other results:      Assessment/Plan:     Diagnosis Plan   1. Primary osteoarthritis of both knees        2. Anserine bursitis        3. Chronic pain of left knee          Patient has had a history of chronic recurrent pain of the left knee.  She has osteoarthritis of both knees but I think her current problem is due to anserine bursitis of the left knee.  She would like to have a cortisone shot and I think that is a reasonable thing for us to do.  If she does not respond to this and we could always refer her to orthopedics.  See procedure note below.      Procedures    Verbal informed consent given.  The left knee was prepped with alcohol wipes x 3.  Area was injected with 40 mg of Depo-Medrol.  Patient tolerated procedure well without apparent complication.  Postprocedure instructions given.  Patient received immediate relief.    "

## 2025-03-29 DIAGNOSIS — E78.2 MIXED HYPERLIPIDEMIA: Chronic | ICD-10-CM

## 2025-03-31 RX ORDER — PRAVASTATIN SODIUM 40 MG
TABLET ORAL
Qty: 90 TABLET | Refills: 3 | Status: SHIPPED | OUTPATIENT
Start: 2025-03-31

## 2025-04-04 RX ORDER — NYSTATIN 100000 [USP'U]/G
POWDER TOPICAL 2 TIMES DAILY
Qty: 90 G | Refills: 1 | Status: SHIPPED | OUTPATIENT
Start: 2025-04-04

## 2025-04-10 DIAGNOSIS — E55.9 VITAMIN D DEFICIENCY: Chronic | ICD-10-CM

## 2025-04-10 DIAGNOSIS — I10 BENIGN ESSENTIAL HYPERTENSION: Primary | Chronic | ICD-10-CM

## 2025-04-10 DIAGNOSIS — N18.32 STAGE 3B CHRONIC KIDNEY DISEASE: Chronic | ICD-10-CM

## 2025-04-10 DIAGNOSIS — D75.1 SECONDARY POLYCYTHEMIA: Chronic | ICD-10-CM

## 2025-04-10 DIAGNOSIS — R60.0 BILATERAL LOWER EXTREMITY EDEMA: Chronic | ICD-10-CM

## 2025-04-10 DIAGNOSIS — R73.01 IMPAIRED FASTING GLUCOSE: Chronic | ICD-10-CM

## 2025-04-10 DIAGNOSIS — E03.9 PRIMARY HYPOTHYROIDISM: Chronic | ICD-10-CM

## 2025-04-10 DIAGNOSIS — E78.2 MIXED HYPERLIPIDEMIA: Chronic | ICD-10-CM

## 2025-04-10 DIAGNOSIS — E04.2 MULTINODULAR GOITER: Chronic | ICD-10-CM

## 2025-04-10 DIAGNOSIS — Z86.16 HISTORY OF 2019 NOVEL CORONAVIRUS DISEASE (COVID-19): Chronic | ICD-10-CM

## 2025-04-10 DIAGNOSIS — I48.0 PAROXYSMAL ATRIAL FIBRILLATION: Chronic | ICD-10-CM

## 2025-04-10 DIAGNOSIS — Z87.39 HISTORY OF GOUT: Chronic | ICD-10-CM

## 2025-04-10 DIAGNOSIS — E66.01 MORBIDLY OBESE: Chronic | ICD-10-CM

## 2025-04-10 DIAGNOSIS — Z51.81 THERAPEUTIC DRUG MONITORING: ICD-10-CM

## 2025-04-14 LAB
25(OH)D3+25(OH)D2 SERPL-MCNC: 25.7 NG/ML (ref 30–100)
ALBUMIN SERPL-MCNC: 3.9 G/DL (ref 3.6–4.6)
ALP SERPL-CCNC: 65 IU/L (ref 44–121)
ALT SERPL-CCNC: 16 IU/L (ref 0–32)
APPEARANCE UR: CLEAR
AST SERPL-CCNC: 20 IU/L (ref 0–40)
BILIRUB SERPL-MCNC: 0.5 MG/DL (ref 0–1.2)
BILIRUB UR QL STRIP: NEGATIVE
BUN SERPL-MCNC: 28 MG/DL (ref 10–36)
BUN/CREAT SERPL: 17 (ref 12–28)
CALCIUM SERPL-MCNC: 9.5 MG/DL (ref 8.7–10.3)
CHLORIDE SERPL-SCNC: 104 MMOL/L (ref 96–106)
CHOLEST SERPL-MCNC: 143 MG/DL (ref 100–199)
CK SERPL-CCNC: 87 U/L (ref 26–161)
CO2 SERPL-SCNC: 21 MMOL/L (ref 20–29)
COLOR UR: YELLOW
CREAT SERPL-MCNC: 1.67 MG/DL (ref 0.57–1)
EGFRCR SERPLBLD CKD-EPI 2021: 29 ML/MIN/1.73
ERYTHROCYTE [DISTWIDTH] IN BLOOD BY AUTOMATED COUNT: 13.5 % (ref 11.7–15.4)
GLOBULIN SER CALC-MCNC: 2.6 G/DL (ref 1.5–4.5)
GLUCOSE SERPL-MCNC: 95 MG/DL (ref 70–99)
GLUCOSE UR QL STRIP: NEGATIVE
HBA1C MFR BLD: 5.9 % (ref 4.8–5.6)
HCT VFR BLD AUTO: 49.4 % (ref 34–46.6)
HDL SERPL-SCNC: 28.9 UMOL/L
HDLC SERPL-MCNC: 57 MG/DL
HGB BLD-MCNC: 16.2 G/DL (ref 11.1–15.9)
HGB UR QL STRIP: NEGATIVE
KETONES UR QL STRIP: NEGATIVE
LDL SERPL QN: 20.8 NM
LDL SERPL-SCNC: 728 NMOL/L
LDL SMALL SERPL-SCNC: 205 NMOL/L
LDLC SERPL CALC-MCNC: 68 MG/DL (ref 0–99)
LEUKOCYTE ESTERASE UR QL STRIP: NEGATIVE
MCH RBC QN AUTO: 30.9 PG (ref 26.6–33)
MCHC RBC AUTO-ENTMCNC: 32.8 G/DL (ref 31.5–35.7)
MCV RBC AUTO: 94 FL (ref 79–97)
MICRO URNS: NORMAL
NITRITE UR QL STRIP: NEGATIVE
PH UR STRIP: 6 [PH] (ref 5–7.5)
PLATELET # BLD AUTO: 203 X10E3/UL (ref 150–450)
POTASSIUM SERPL-SCNC: 4.8 MMOL/L (ref 3.5–5.2)
PROT SERPL-MCNC: 6.5 G/DL (ref 6–8.5)
PROT UR QL STRIP: NEGATIVE
RBC # BLD AUTO: 5.25 X10E6/UL (ref 3.77–5.28)
SARS-COV-2 AB SERPL QL IA: POSITIVE
SODIUM SERPL-SCNC: 138 MMOL/L (ref 134–144)
SP GR UR STRIP: 1.02 (ref 1–1.03)
T3FREE SERPL-MCNC: 2.4 PG/ML (ref 2–4.4)
T4 FREE SERPL-MCNC: 1.25 NG/DL (ref 0.82–1.77)
TRIGL SERPL-MCNC: 98 MG/DL (ref 0–149)
TSH SERPL DL<=0.005 MIU/L-ACNC: 4.03 UIU/ML (ref 0.45–4.5)
URATE SERPL-MCNC: 4.3 MG/DL (ref 3.1–7.9)
UROBILINOGEN UR STRIP-MCNC: 0.2 MG/DL (ref 0.2–1)
WBC # BLD AUTO: 7.6 X10E3/UL (ref 3.4–10.8)

## 2025-05-05 ENCOUNTER — OFFICE VISIT (OUTPATIENT)
Dept: INTERNAL MEDICINE | Facility: CLINIC | Age: OVER 89
End: 2025-05-05
Payer: MEDICARE

## 2025-05-05 VITALS
HEIGHT: 62 IN | BODY MASS INDEX: 41.04 KG/M2 | OXYGEN SATURATION: 97 % | DIASTOLIC BLOOD PRESSURE: 74 MMHG | HEART RATE: 71 BPM | WEIGHT: 223 LBS | RESPIRATION RATE: 16 BRPM | SYSTOLIC BLOOD PRESSURE: 118 MMHG | TEMPERATURE: 98.2 F

## 2025-05-05 DIAGNOSIS — R73.01 IMPAIRED FASTING GLUCOSE: Primary | Chronic | ICD-10-CM

## 2025-05-05 DIAGNOSIS — R60.0 BILATERAL LOWER EXTREMITY EDEMA: Chronic | ICD-10-CM

## 2025-05-05 DIAGNOSIS — E55.9 VITAMIN D DEFICIENCY: Chronic | ICD-10-CM

## 2025-05-05 DIAGNOSIS — I47.10 SUPRAVENTRICULAR TACHYCARDIA: Chronic | ICD-10-CM

## 2025-05-05 DIAGNOSIS — H90.3 BILATERAL SENSORINEURAL HEARING LOSS: Chronic | ICD-10-CM

## 2025-05-05 DIAGNOSIS — E66.01 MORBIDLY OBESE: Chronic | ICD-10-CM

## 2025-05-05 DIAGNOSIS — Z86.16 HISTORY OF 2019 NOVEL CORONAVIRUS DISEASE (COVID-19): Chronic | ICD-10-CM

## 2025-05-05 DIAGNOSIS — E04.2 MULTINODULAR GOITER: Chronic | ICD-10-CM

## 2025-05-05 DIAGNOSIS — Z78.0 POSTMENOPAUSAL STATE: Chronic | ICD-10-CM

## 2025-05-05 DIAGNOSIS — Z82.49 FAMILY HISTORY OF CORONARY ARTERY DISEASE: Chronic | ICD-10-CM

## 2025-05-05 DIAGNOSIS — Z51.81 THERAPEUTIC DRUG MONITORING: ICD-10-CM

## 2025-05-05 DIAGNOSIS — Z87.440 HISTORY OF HEMORRHAGIC CYSTITIS: Chronic | ICD-10-CM

## 2025-05-05 DIAGNOSIS — Z87.39 HISTORY OF GOUT: Chronic | ICD-10-CM

## 2025-05-05 DIAGNOSIS — E03.9 PRIMARY HYPOTHYROIDISM: Chronic | ICD-10-CM

## 2025-05-05 DIAGNOSIS — M85.89 OSTEOPENIA OF MULTIPLE SITES: Chronic | ICD-10-CM

## 2025-05-05 DIAGNOSIS — K21.9 GASTROESOPHAGEAL REFLUX DISEASE WITHOUT ESOPHAGITIS: Chronic | ICD-10-CM

## 2025-05-05 DIAGNOSIS — G47.34 NOCTURNAL HYPOXEMIA: Chronic | ICD-10-CM

## 2025-05-05 DIAGNOSIS — E78.2 MIXED HYPERLIPIDEMIA: Chronic | ICD-10-CM

## 2025-05-05 DIAGNOSIS — I48.0 PAROXYSMAL ATRIAL FIBRILLATION: Chronic | ICD-10-CM

## 2025-05-05 DIAGNOSIS — N18.32 STAGE 3B CHRONIC KIDNEY DISEASE: Chronic | ICD-10-CM

## 2025-05-05 DIAGNOSIS — Z86.718 HISTORY OF ARTERIAL THROMBOSIS: Chronic | ICD-10-CM

## 2025-05-05 DIAGNOSIS — D75.1 SECONDARY POLYCYTHEMIA: Chronic | ICD-10-CM

## 2025-05-05 DIAGNOSIS — I10 BENIGN ESSENTIAL HYPERTENSION: Chronic | ICD-10-CM

## 2025-05-05 NOTE — PROGRESS NOTES
05/05/2025    Patient Information  Mandy Alarcon                                                                                          [address] [facility] [place] [address]      5/30/1933  [unfilled]  There is no work phone number on file.    Chief Complaint:     Follow-up chronic medical problems.  No new acute complaints.    History of Present Illness:    Patient with several chronic medical problems as noted below in assessment and plan presents today for follow-up with lab prior in order to monitor chronic medical issues.  Her past medical history reviewed and updated were necessary including health maintenance parameters.  This reveals she is currently up-to-date or else accounted for.    Review of Systems   Constitutional: Negative.   HENT: Negative.     Eyes: Negative.    Cardiovascular:  Positive for leg swelling and palpitations. Negative for chest pain, near-syncope, orthopnea, paroxysmal nocturnal dyspnea and syncope.   Respiratory: Negative.     Endocrine: Negative.    Hematologic/Lymphatic: Negative.    Skin: Negative.    Musculoskeletal:  Positive for arthritis and joint pain.   Gastrointestinal: Negative.    Genitourinary: Negative.    Neurological: Negative.    Psychiatric/Behavioral: Negative.     Allergic/Immunologic: Negative.        Active Problems:    Patient Active Problem List   Diagnosis    Benign essential hypertension    Claustrophobia    Gout    Hyperlipidemia    Primary hypothyroidism    Impaired fasting glucose    Nocturnal hypoxemia    Secondary polycythemia    Vitamin D deficiency    Therapeutic drug monitoring    Family history of coronary artery disease    Bilateral sensorineural hearing loss, wears hearing aids    Multinodular goiter    Supraventricular tachycardia    Morbidly obese    Bilateral lower extremity edema    Gastroesophageal reflux disease without esophagitis    Paroxysmal atrial fibrillation    Stage 3b chronic kidney disease    Combined form of  senile cataract    Osteopenia of multiple sites    Postmenopausal state    History of  novel coronavirus disease (COVID-19)    History of hemorrhagic cystitis    History of arterial thrombosis, 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    Chronic pain of left knee    Primary osteoarthritis of both knees    Anserine bursitis         Past Medical History:   Diagnosis Date    Benign essential hypertension 10/28/2012    2012--treatment for hypertension begun.    Bilateral lower extremity edema 2020    Bilateral sensorineural hearing loss, wears hearing aids 2017    Left is much worse than right.  Patient had multiple ear infections as a child.    Chronic renal impairment, stage 3b 09/15/2020    Claustrophobia 2016    This patient has significant nocturnal hypoxemia and I think that she could benefit from nocturnal oxygen therapy. The exact etiology of her hypoxemia is not clear. She could possibly have obstructive sleep apnea but this is not documented. We cannot test this patient for sleep apnea due to the fact that she is severely claustrophobic. Patient was a former smoker and it is possibly that COPD he is playing a role.    Combined form of senile cataract 2021    Embolism and thrombosis of unspecified artery 2023    Family history of coronary artery disease 2016    Patient's mother, father, 2 sisters and a brother all  from myocardial infarctions    Gastroesophageal reflux disease without esophagitis 2020    Gout 10/28/2012    2012--initial diagnosis and treatment of gout.    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 2023    History of hemorrhagic cystitis 2023    Hyperlipidemia 10/28/2012    2012--treatment for hyperlipidemia begun.    Impaired  fasting glucose 10/28/2012    October 2012--initial diagnosis impaired fasting glucose.    Morbidly obese 03/11/2019    Nocturnal hypoxemia 08/02/2012 11/06/2014--patient did not receive nocturnal oxygen because of Medicare regulations.   05/15/2014--overnight oximetry revealed oxygen saturations less than 89% for 22 minutes and 40 seconds. Oxygen saturations less than or equal to 88% for 22 minutes and 40 seconds. Lowest oxygen saturation 83%. The longest continuous time with oxygen saturations less than or equal to 88% was 1 minute and 32 seconds.   08/02/2012--overnight oximetry revealed oxygen saturations less than 90% for one hour and 35 minutes. Oxygen saturations less than 89% 59 minutes. This patient has significant nocturnal hypoxemia and I think that she could benefit from nocturnal oxygen therapy. The exact etiology of her hypoxemia is not clear. She could possibly have obstructive sleep apnea but this is not documented. We cannot test this patient for sleep apnea due to the fact that she is severely claustrophobic. Patient was a former smoker and it is possibly that COPD he is playing a role.    Non morbid obesity 03/11/2019    Osteopenia of multiple sites 09/26/2023    Paroxysmal atrial fibrillation 04/10/2020    Postmenopausal state 09/26/2023    Primary hypothyroidism 11/17/2015 March 11, 2019--TSH remains elevated slightly at 4.92.  Given the overall clinical picture including the multinodular goiter, we will initiate levothyroxine 50 mcg/day and reassess in about 6 weeks.  August 1, 2018--thyroid ultrasound reveals a multinodular thyroid with multiple subcentimeter nodules.  Only minimal increase in size of the largest nodule in the left lobe has occurred when compared     Primary osteoarthritis of both knees 03/24/2025    BILATERAL KNEE X-RAYS     HISTORY: Bilateral knee pain, worse on the left than the right.     TECHNIQUE: 2 images of each knee are provided.     FINDINGS: On the right,  there is bulky marginal osteophyte around all 3  compartments with decent preservation of the radiographic joint spaces  and no suprapatellar bursal effusion. No bone lesion. Soft tissue  contours are normal.     On the left, there    Secondary polycythemia 08/02/2012 11/06/2014--patient did not receive nocturnal oxygen because of Medicare regulations.   05/15/2014--overnight oximetry revealed oxygen saturations less than 89% for 22 minutes and 40 seconds. Oxygen saturations less than or equal to 88% for 22 minutes and 40 seconds. Lowest oxygen saturation 83%. The longest continuous time with oxygen saturations less than or equal to 88% was 1 minute and 32 seconds.   08/02/2012--overnight oximetry revealed oxygen saturations less than 90% for one hour and 35 minutes. Oxygen saturations less than 89% 59 minutes. This patient has significant nocturnal hypoxemia and I think that she could benefit from nocturnal oxygen therapy. The exact etiology of her hypoxemia is not clear. She could possibly have obstructive sleep apnea but this is not documented. We cannot test this patient for sleep apnea due to the fact that she is severely claustrophobic. Patient was a former smoker and it is possibly that COPD he is playing a role.    Vitamin D deficiency 05/23/2016         Past Surgical History:   Procedure Laterality Date    CHOLECYSTECTOMY WITH INTRAOPERATIVE CHOLANGIOGRAM N/A 03/12/2020    Procedure: LAPAROSCOPIC CHOLECYSTOSTOMY TUBE, INTRAOPERATIVE CHOLANGIOGRAM;  Surgeon: Bryce Andujar MD;  Location: [facility];  Service: General;  Laterality: N/A;    CHOLECYSTECTOMY WITH INTRAOPERATIVE CHOLANGIOGRAM N/A 05/22/2020    Procedure: CHOLECYSTECTOMY LAPAROSCOPIC INTRAOPERATIVE CHOLANGIOGRAM and EGD;  Surgeon: Bryce Andujar MD;  Location: [facility] OR;  Service: General;  Laterality: N/A;    ERCP N/A 07/01/2022    Procedure: ENDOSCOPIC RETROGRADE CHOLANGIOPANCREATOGRAPHY with sphincterotomy, balloon sweep  12-15mm;  Surgeon: Mitesh Altamirano MD;  Location: [facility] ENDOSCOPY;  Service: Gastroenterology;  Laterality: N/A;  pre - gallstone pancreatitis  post - s/p sphincterotomy, sludge and  pus    FEMORAL THROMBECTOMY/EMBOLECTOMY Left 11/16/2023    Procedure: OPEN LEFT FEMORAL THROMBECTOMY/EMBOLECTOMY WITH LEFT LOWER EXTREMITY ANGIOGRAM;  Surgeon: Jace Chowdhury Jr., MD;  Location: [facility] HYBRID OR 18/19;  Service: Vascular;  Laterality: Left;    OOPHORECTOMY      age 48    RADICAL ABDOMINAL HYSTERECTOMY  48 years old    48 years of age. Uterine fibroid tumors with menorrhagia - no cancer    THROMBECTOMY  2023    laterality: Left left femoral with angio.         Allergies   Allergen Reactions    Cefaclor Anaphylaxis, Angioedema and Swelling     caused angioedema 25 years ago; she tolerates cephalexin, amoxicillin, and ceftriaxone per my d/w patient and her daughter as well as amoxicillin-clavulanate confirmed in EPIC  caused angioedema 25 years ago; she tolerates cephalexin, amoxicillin, and ceftriaxone per my d/w patient and her daughter as well as amoxicillin-clavulanate confirmed in EPIC  caused angioedema 25 years ago; she tolerates cephalexin, amoxicillin, and ceftriaxone per my d/w patient and her daughter as well as amoxicillin-clavulanate confirmed in EPIC  caused angioedema 25 years ago; she tolerates cephalexin, amoxicillin, and ceftriaxone per my d/w patient and her daughter as well as amoxicillin-clavulanate confirmed in EPIC    Keflex [Cephalexin] Hives    Sulfa Antibiotics Rash           Current Outpatient Medications:     allopurinol (ZYLOPRIM) 300 MG tablet, TAKE 1 TABLET BY MOUTH DAILY FOR GOUT, Disp: 90 tablet, Rfl: 1    apixaban (Eliquis) 2.5 MG tablet tablet, TAKE 1 TABLET(2.5 MG) BY MOUTH EVERY 12 HOURS FOR BLOOD THINNER, Disp: 60 tablet, Rfl: 5    BIOTIN PO, Take  by mouth., Disp: , Rfl:     clobetasol prop emollient base (TEMOVATE-E) 0.05 % emollient cream, Apply 1 Application topically  to the appropriate area as directed 2 (Two) Times a Day for 14 days then 2 to 3 times a week after that for maintenance., Disp: 60 g, Rfl: 4    levothyroxine (SYNTHROID, LEVOTHROID) 75 MCG tablet, TAKE 1 TABLET BY MOUTH DAILY FOR THYROID, Disp: 90 tablet, Rfl: 3    metoprolol succinate XL (TOPROL-XL) 25 MG 24 hr tablet, TAKE 1 TABLET BY MOUTH DAILY FOR HIGH BLOOD PRESSURE AND HEART OR PALPITATIONS, Disp: 90 tablet, Rfl: 3    nystatin [id number] UNIT/GM powder, APPLY TO THE AFFECTED AREA TWICE DAILY, Disp: 90 g, Rfl: 1    omeprazole (priLOSEC) 40 MG capsule, TAKE 1 CAPSULE BY MOUTH EVERY DAY BEFORE FIRST MEAL FOR STOMACH ACID OR REFLUX, Disp: 30 capsule, Rfl: 5    pravastatin (PRAVACHOL) 40 MG tablet, TAKE 1 TABLET(40 MG) BY MOUTH EVERY DAY FOR HIGH CHOLESTEROL, Disp: 90 tablet, Rfl: 3    VITAMIN D PO, Take  by mouth., Disp: , Rfl:   No current facility-administered medications for this visit.      Family History   Problem Relation Age of Onset    Heart disease Mother         Ischemic.  from coronary artery disease.    Heart disease Father         Ischemic.  from coronary artery disease.    Heart disease Sister         Ischemic.  from coronary artery disease.    Heart disease Sister         Ischemic.  from coronary artery disease.    Heart disease Brother         Ischemic.  from coronary artery disease.    Breast cancer Daughter     Breast cancer Daughter     Ovarian cancer Neg Hx     Malig Hyperthermia Neg Hx          Social History     Socioeconomic History    Marital status:     Number of children: 5    Highest education level: GED or equivalent   Tobacco Use    Smoking status: Former     Current packs/day: 0.00     Average packs/day: 0.5 packs/day for 8.0 years (4.0 ttl pk-yrs)     Types: Cigarettes     Start date: 1946     Quit date: 1954     Years since quittin.3     Passive exposure: Past    Smokeless tobacco: Never    Tobacco comments:     Stopped drinking at age 30.  "  Vaping Use    Vaping status: Never Used   Substance and Sexual Activity    Alcohol use: No     Comment: caffiene use tea coffee    Drug use: No    Sexual activity: Not Currently     Partners: Male         Vitals:    05/05/25 1219   BP: 118/74   BP Location: Left arm   Patient Position: Sitting   Pulse: 71   Resp: 16   Temp: 98.2 °F (36.8 °C)   TempSrc: Oral   SpO2: 97%   Weight: 101 kg (223 lb)   Height: 157.5 cm (62.01\")        Body mass index is 40.78 kg/m².      Physical Exam:    General: Alert and oriented x 3.  No acute distress.  Morbidly obese. normal affect.  HEENT: Pupils equal, round, reactive to light; extraocular movements intact; sclerae nonicteric; pharynx, ear canals and TMs normal.  Neck: Without JVD, thyromegaly, bruit, or adenopathy.  Lungs: Clear to auscultation in all fields.  Heart: Regular rate and rhythm without murmur, rub, gallop, or click.  Abdomen: Soft, nontender, without hepatosplenomegaly or hernia.  Bowel sounds normal.  : Deferred.  Rectal: Deferred.  Extremities: Without clubbing, cyanosis, or pulse deficit.  1-2+ bilateral lower extremity edema below the knees without signs of cellulitis.  Neurologic: Intact without focal deficit.  Normal station and gait observed during ingress and egress from the examination room.  Skin: Without significant lesion.  Musculoskeletal: Unremarkable.    Lab/other results:    CBC reveals a hemoglobin of 16.2 and hematocrit 49.4.  Platelets are normal.  CK normal at 87.  CMP reveals a creatinine of 1.67 with estimated GFR of 29.  Hemoglobin A1c 5.9.  Total cholesterol 143, triglycerides 98, LDL particle #728, HDL particle #28.9.  Thyroid function tests are normal.  Urinalysis normal.  Vitamin D low at 25.7.  Uric acid normal at 4.3.  SARS antibodies are positive.    Assessment/Plan:     Diagnosis Plan   1. Impaired fasting glucose  Comprehensive Metabolic Panel    Hemoglobin A1c      2. Stage 3b chronic kidney disease  CBC (No Diff)    Comprehensive " Metabolic Panel    Urinalysis With Microscopic If Indicated (No Culture) - Urine, Clean Catch      3. Primary hypothyroidism  TSH    T4, Free    T3, Free      4. Multinodular goiter        5. Hyperlipidemia  CK    Comprehensive Metabolic Panel    NMR LipoProfile      6. Benign essential hypertension  Comprehensive Metabolic Panel      7. History of 2019 novel coronavirus disease (COVID-19)  SARS-CoV-2 Antibodies, Nucleocapsid (Natural Immunity)      8. Gout  Uric Acid      9. Secondary polycythemia  CBC (No Diff)      10. Nocturnal hypoxemia        11. Vitamin D deficiency  Vitamin D,25-Hydroxy      12. Paroxysmal atrial fibrillation        13. Supraventricular tachycardia        14. History of arterial thrombosis, 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        15. Family history of coronary artery disease        16. Morbidly obese        17. Bilateral lower extremity edema        18. Postmenopausal state        19. Osteopenia of multiple sites        20. History of hemorrhagic cystitis        21. Gastroesophageal reflux disease without esophagitis        22. Bilateral sensorineural hearing loss, wears hearing aids        23. Therapeutic drug monitoring          Patient has impaired fasting glucose that does not require medication.  However, she is morbidly obese enough strongly encouraged low carbohydrate diet and attainment of ideal body weight.  This is not likely to happen but she needs to do the best she can.  Her stage IIIb chronic kidney disease may be a little worse and is just consistent with stage IV.  Will continue to monitor this.  Patient sees a nephrologist.  Her thyroid is therapeutic on the current dose of levothyroxine.  Hyperlipidemia is under good control.  Her blood pressure has been controlled.  She has a history of COVID-19 and still has antibodies.  Her gout is stable on allopurinol.  Patient has secondary polycythemia due to nocturnal hypoxemia.  Patient is not  interested in sleep study.  She has paroxysmal atrial fibrillation supraventricular tachycardia as well as history of left femoral artery thrombosis with thrombectomy and is followed by cardiology and vascular.  She is on Eliquis 2.5 mg every 12 hours.  No signs or symptoms of bleeding.  Her lower extremity edema seems to be controlled currently.  No recurrence of hemorrhagic cystitis.  Her esophageal reflux controlled with omeprazole.    Plan is as follows: Once again I encouraged low-carb diet and weight loss.  No changes in current medical regimen.  Patient will follow-up after October 3, 2025 for subsequent Medicare wellness visit.  Otherwise she will follow-up as needed.        Procedures

## 2025-05-07 DIAGNOSIS — I10 BENIGN ESSENTIAL HYPERTENSION: Chronic | ICD-10-CM

## 2025-05-07 DIAGNOSIS — I47.10 SUPRAVENTRICULAR TACHYCARDIA: Chronic | ICD-10-CM

## 2025-05-07 RX ORDER — METOPROLOL SUCCINATE 25 MG/1
TABLET, EXTENDED RELEASE ORAL
Qty: 90 TABLET | Refills: 3 | Status: SHIPPED | OUTPATIENT
Start: 2025-05-07

## 2025-05-07 NOTE — TELEPHONE ENCOUNTER
Rx Refill Note  Requested Prescriptions     Pending Prescriptions Disp Refills    metoprolol succinate XL (TOPROL-XL) 25 MG 24 hr tablet [Pharmacy Med Name: METOPROLOL ER SUCCINATE 25MG TABS] 90 tablet 3     Sig: TAKE 1 TABLET BY MOUTH DAILY FOR HIGH BLOOD PRESSURE AND HEART OR PALPITATIONS      Last office visit with prescribing clinician: 5/5/2025   Last telemedicine visit with prescribing clinician: Visit date not found   Next office visit with prescribing clinician: 5/12/2025       Aurora James  05/07/25, 09:34 EDT

## 2025-05-08 RX ORDER — METOPROLOL SUCCINATE 25 MG/1
TABLET, EXTENDED RELEASE ORAL
Qty: 90 TABLET | Refills: 3 | Status: SHIPPED | OUTPATIENT
Start: 2025-05-08

## 2025-05-12 ENCOUNTER — OFFICE VISIT (OUTPATIENT)
Dept: INTERNAL MEDICINE | Facility: CLINIC | Age: OVER 89
End: 2025-05-12
Payer: MEDICARE

## 2025-05-12 VITALS
RESPIRATION RATE: 16 BRPM | HEIGHT: 62 IN | OXYGEN SATURATION: 94 % | DIASTOLIC BLOOD PRESSURE: 68 MMHG | HEART RATE: 60 BPM | SYSTOLIC BLOOD PRESSURE: 132 MMHG | TEMPERATURE: 98.2 F | BODY MASS INDEX: 41.41 KG/M2 | WEIGHT: 225 LBS

## 2025-05-12 DIAGNOSIS — L72.0 EPIDERMAL CYST OF FACE: Primary | ICD-10-CM

## 2025-05-12 PROCEDURE — 1126F AMNT PAIN NOTED NONE PRSNT: CPT | Performed by: INTERNAL MEDICINE

## 2025-05-12 PROCEDURE — 17110 DESTRUCTION B9 LES UP TO 14: CPT | Performed by: INTERNAL MEDICINE

## 2025-05-12 PROCEDURE — 1160F RVW MEDS BY RX/DR IN RCRD: CPT | Performed by: INTERNAL MEDICINE

## 2025-05-12 PROCEDURE — 1159F MED LIST DOCD IN RCRD: CPT | Performed by: INTERNAL MEDICINE

## 2025-05-12 NOTE — PROGRESS NOTES
May 12, 2025--procedure note:    Destruction benign skin lesion 13 mm face.    Patient has an obvious dermal cyst involving her face on the right cheek that is superior and close to but not involving her eye.  She is concerned this is enlarging.  She would like to have it removed/destroyed.  The lesion is clearly benign there is absolutely no characteristics consistent with a malignant neoplasm.    Procedure note:    Verbal informed consent given.  Risk, benefits, potential complications and alternatives discussed.  The area in question was prepped with alcohol wipes x 3 and anesthetized with 2% Xylocaine.  The lesion was 13 mm in diameter.  It was destroyed using electrocautery.  Patient tolerated procedure well without apparent complication.  Postprocedure instructions given.

## 2025-06-21 DIAGNOSIS — I48.0 PAROXYSMAL ATRIAL FIBRILLATION: Chronic | ICD-10-CM

## 2025-07-14 ENCOUNTER — TELEPHONE (OUTPATIENT)
Dept: INTERNAL MEDICINE | Facility: CLINIC | Age: OVER 89
End: 2025-07-14
Payer: MEDICARE

## 2025-07-14 DIAGNOSIS — N18.32 STAGE 3B CHRONIC KIDNEY DISEASE: Primary | Chronic | ICD-10-CM

## 2025-07-14 NOTE — TELEPHONE ENCOUNTER
Received a fax from [facility] stating per [employer] they need an updated referral for additional follow up care.She has an appointment with them on July 16 fax to [phone]

## 2025-08-16 DIAGNOSIS — K21.9 GASTROESOPHAGEAL REFLUX DISEASE WITHOUT ESOPHAGITIS: Chronic | ICD-10-CM

## 2025-08-18 RX ORDER — OMEPRAZOLE 40 MG/1
CAPSULE, DELAYED RELEASE ORAL
Qty: 30 CAPSULE | Refills: 5 | Status: SHIPPED | OUTPATIENT
Start: 2025-08-18

## 2025-08-20 DIAGNOSIS — Z87.39 HISTORY OF GOUT: Chronic | ICD-10-CM

## 2025-08-20 RX ORDER — ALLOPURINOL 300 MG/1
TABLET ORAL
Qty: 90 TABLET | Refills: 3 | Status: SHIPPED | OUTPATIENT
Start: 2025-08-20

## (undated) DEVICE — LN SMPL CO2 SHTRM SD STREAM W/M LUER

## (undated) DEVICE — SOL NS 500ML

## (undated) DEVICE — ENDOPOUCH RETRIEVER SPECIMEN RETRIEVAL BAGS: Brand: ENDOPOUCH RETRIEVER

## (undated) DEVICE — STPCK 3WY D201 DISCOFIX

## (undated) DEVICE — 3M™ STERI-STRIP™ COMPOUND BENZOIN TINCTURE 40 BAGS/CARTON 4 CARTONS/CASE C1544: Brand: 3M™ STERI-STRIP™

## (undated) DEVICE — VESSEL LOOPS X-RAY DETECTABLE: Brand: DEROYAL

## (undated) DEVICE — DRAPE,REIN 53X77,STERILE: Brand: MEDLINE

## (undated) DEVICE — APPL CHLORAPREP HI/LITE 26ML ORNG

## (undated) DEVICE — TUBING, SUCTION, 1/4" X 20', STRAIGHT: Brand: MEDLINE INDUSTRIES, INC.

## (undated) DEVICE — ENDOPATH XCEL UNIVERSAL TROCAR STABLILITY SLEEVES: Brand: ENDOPATH XCEL

## (undated) DEVICE — SUT SILK 3/0 TIES 18IN A184H

## (undated) DEVICE — DRP C/ARM 41X74IN

## (undated) DEVICE — MEDI-VAC YANKAUER SUCTION HANDLE W/BULBOUS TIP: Brand: CARDINAL HEALTH

## (undated) DEVICE — RETRIEVAL BALLOON CATHETER: Brand: EXTRACTOR™ PRO RX

## (undated) DEVICE — BITEBLOCK OMNI BLOC

## (undated) DEVICE — ENDOPATH XCEL BLUNT TIP TROCARS WITH SMOOTH SLEEVES: Brand: ENDOPATH XCEL

## (undated) DEVICE — SOL NACL 0.9PCT 1000ML

## (undated) DEVICE — ENDOPATH XCEL BLADELESS TROCARS WITH STABILITY SLEEVES: Brand: ENDOPATH XCEL

## (undated) DEVICE — SUT SILK 2/0 TIES 18IN A185H

## (undated) DEVICE — CATH CHOLANG 4.5F18IN BRGNDY

## (undated) DEVICE — Device

## (undated) DEVICE — CATH IV INSYTE AUTOGARD 14G 1 1/2IN ORNG

## (undated) DEVICE — LABEL SHEET CUSTOM 2X2 YELLOW: Brand: MEDLINE INDUSTRIES, INC.

## (undated) DEVICE — PATIENT RETURN ELECTRODE, SINGLE-USE, CONTACT QUALITY MONITORING, ADULT, WITH 9FT CORD, FOR PATIENTS WEIGING OVER 33LBS. (15KG): Brand: MEGADYNE

## (undated) DEVICE — PENCL ES MEGADINE EZ/CLEAN BUTN W/HOLSTR 10FT

## (undated) DEVICE — APPL HEMO SURG ARISTA/AH/FLEXITIP XL 38CM

## (undated) DEVICE — JACKSON-PRATT 100CC BULB RESERVOIR: Brand: CARDINAL HEALTH

## (undated) DEVICE — CONTAINER,SPECIMEN,OR STERILE,4OZ: Brand: MEDLINE

## (undated) DEVICE — PINNACLE R/O II INTRODUCER SHEATH WITH RADIOPAQUE MARKER: Brand: PINNACLE

## (undated) DEVICE — LOU LAP CHOLE: Brand: MEDLINE INDUSTRIES, INC.

## (undated) DEVICE — SUT MNCRYL PLS ANTIB UD 4/0 PS2 18IN

## (undated) DEVICE — VISUALIZATION SYSTEM: Brand: CLEARIFY

## (undated) DEVICE — GLV SURG SENSICARE PI MIC PF SZ7.5 LF STRL

## (undated) DEVICE — SUT SILK 4/0 TIES 18IN A183H

## (undated) DEVICE — EXTENSION SET, MALE LUER LOCK ADAPTER WITH RETRACTABLE COLLAR

## (undated) DEVICE — SENSR O2 OXIMAX FNGR A/ 18IN NONSTR

## (undated) DEVICE — ERBE NESSY®PLATE 170 SPLIT; 168CM²; CABLE 3M: Brand: ERBE

## (undated) DEVICE — CATH FOL SIMPLYLATEX SILELAST 16F 17IN

## (undated) DEVICE — SUT VIC 2/0 CT1 27IN J259H

## (undated) DEVICE — IRRIGATOR BULB ASEPTO 60CC STRL

## (undated) DEVICE — ADAPT CLN BIOGUARD AIR/H2O DISP

## (undated) DEVICE — 1 ML TUBERCULIN SYRINGE REGULAR TIP: Brand: MONOJECT

## (undated) DEVICE — ENDOCUT SCISSOR TIP, DISPOSABLE: Brand: RENEW

## (undated) DEVICE — SPHINCTEROTOME: Brand: HYDRATOME RX 44

## (undated) DEVICE — 2, DISPOSABLE SUCTION/IRRIGATOR WITH DISPOSABLE TIP: Brand: STRYKEFLOW

## (undated) DEVICE — GAUZE,SPONGE,4"X4",16PLY,XRAY,STRL,LF: Brand: MEDLINE

## (undated) DEVICE — PK AAA 40

## (undated) DEVICE — ECHELON FLEX 60 ARTICULATING ENDOSCOPIC LINEAR CUTTER (NO CARTRIDGE): Brand: ECHELON FLEX ENDOPATH

## (undated) DEVICE — TUBING, SUCTION, 1/4" X 10', STRAIGHT: Brand: MEDLINE

## (undated) DEVICE — HARMONIC ACE +7 LAPAROSCOPIC SHEARS ADVANCED HEMOSTASIS 5MM DIAMETER 36CM SHAFT LENGTH  FOR USE WITH GRAY HAND PIECE ONLY: Brand: HARMONIC ACE

## (undated) DEVICE — SUT VIC 0 CT2 CR8 18IN DYED J727D

## (undated) DEVICE — CANN O2 ETCO2 FITS ALL CONN CO2 SMPL A/ 7IN DISP LF

## (undated) DEVICE — SUT PROLN 6/0 C1 D/A 30IN 8706H

## (undated) DEVICE — DEV LK WIREGUIDE FUSN OLYMP SCP

## (undated) DEVICE — ANTIBACTERIAL UNDYED BRAIDED (POLYGLACTIN 910), SYNTHETIC ABSORBABLE SUTURE: Brand: COATED VICRYL

## (undated) DEVICE — KT ORCA ORCAPOD DISP STRL

## (undated) DEVICE — GLV SURG BIOGEL LTX PF 8

## (undated) DEVICE — APPL HEMO SURG ARISTA/AH/FLEXITIP XLR 38CM

## (undated) DEVICE — LAPAROSCOPIC SMOKE FILTRATION SYSTEM: Brand: PALL LAPAROSHIELD® PLUS LAPAROSCOPIC SMOKE FILTRATION SYSTEM